# Patient Record
Sex: FEMALE | Race: WHITE | NOT HISPANIC OR LATINO | Employment: UNEMPLOYED | URBAN - METROPOLITAN AREA
[De-identification: names, ages, dates, MRNs, and addresses within clinical notes are randomized per-mention and may not be internally consistent; named-entity substitution may affect disease eponyms.]

---

## 2017-04-27 ENCOUNTER — HOSPITAL ENCOUNTER (EMERGENCY)
Facility: HOSPITAL | Age: 62
Discharge: HOME | End: 2017-04-27
Attending: EMERGENCY MEDICINE

## 2017-04-27 VITALS
RESPIRATION RATE: 24 BRPM | OXYGEN SATURATION: 97 % | BODY MASS INDEX: 35.93 KG/M2 | HEART RATE: 76 BPM | SYSTOLIC BLOOD PRESSURE: 148 MMHG | HEIGHT: 60 IN | TEMPERATURE: 98 F | DIASTOLIC BLOOD PRESSURE: 73 MMHG | WEIGHT: 183 LBS

## 2017-04-27 DIAGNOSIS — J44.1 COPD EXACERBATION: Primary | ICD-10-CM

## 2017-04-27 DIAGNOSIS — R05.9 COUGH: ICD-10-CM

## 2017-04-27 PROCEDURE — 99284 EMERGENCY DEPT VISIT MOD MDM: CPT | Mod: 25

## 2017-04-27 PROCEDURE — 63600175 PHARM REV CODE 636 W HCPCS: Performed by: PHYSICIAN ASSISTANT

## 2017-04-27 PROCEDURE — 25000242 PHARM REV CODE 250 ALT 637 W/ HCPCS: Performed by: PHYSICIAN ASSISTANT

## 2017-04-27 PROCEDURE — 96372 THER/PROPH/DIAG INJ SC/IM: CPT

## 2017-04-27 PROCEDURE — 94640 AIRWAY INHALATION TREATMENT: CPT

## 2017-04-27 RX ORDER — IPRATROPIUM BROMIDE AND ALBUTEROL SULFATE 2.5; .5 MG/3ML; MG/3ML
3 SOLUTION RESPIRATORY (INHALATION)
Status: COMPLETED | OUTPATIENT
Start: 2017-04-27 | End: 2017-04-27

## 2017-04-27 RX ORDER — BENZONATATE 100 MG/1
100 CAPSULE ORAL 3 TIMES DAILY PRN
Qty: 20 CAPSULE | Refills: 0 | Status: SHIPPED | OUTPATIENT
Start: 2017-04-27 | End: 2017-05-07

## 2017-04-27 RX ORDER — ALBUTEROL SULFATE 90 UG/1
1-2 AEROSOL, METERED RESPIRATORY (INHALATION) EVERY 6 HOURS PRN
Qty: 1 INHALER | Refills: 0 | Status: SHIPPED | OUTPATIENT
Start: 2017-04-27 | End: 2018-07-09

## 2017-04-27 RX ORDER — BETAMETHASONE SODIUM PHOSPHATE AND BETAMETHASONE ACETATE 3; 3 MG/ML; MG/ML
6 INJECTION, SUSPENSION INTRA-ARTICULAR; INTRALESIONAL; INTRAMUSCULAR; SOFT TISSUE
Status: COMPLETED | OUTPATIENT
Start: 2017-04-27 | End: 2017-04-27

## 2017-04-27 RX ORDER — METHYLPREDNISOLONE 4 MG/1
TABLET ORAL
Qty: 1 PACKAGE | Refills: 0 | Status: SHIPPED | OUTPATIENT
Start: 2017-04-27 | End: 2017-05-18

## 2017-04-27 RX ORDER — CIPROFLOXACIN 500 MG/1
500 TABLET ORAL
COMMUNITY
End: 2018-01-30

## 2017-04-27 RX ADMIN — IPRATROPIUM BROMIDE AND ALBUTEROL SULFATE 3 ML: .5; 3 SOLUTION RESPIRATORY (INHALATION) at 11:04

## 2017-04-27 RX ADMIN — BETAMETHASONE SODIUM PHOSPHATE AND BETAMETHASONE ACETATE 6 MG: 3; 3 INJECTION, SUSPENSION INTRA-ARTICULAR; INTRALESIONAL; INTRAMUSCULAR at 12:04

## 2017-04-27 NOTE — ED NOTES
Tolerated IM injection well Given written and verbal DC instructions questions answered per MD aware to follow up with PCP encouraged to return if needed. Given RX with teaching.

## 2017-04-27 NOTE — ED PROVIDER NOTES
Encounter Date: 4/27/2017       History     Chief Complaint   Patient presents with    Cough     Onset of cough productive of clear sputum 4 days ago.  Hx of chronic bronchitis.       Review of patient's allergies indicates:   Allergen Reactions    Pcn [penicillins]      HPI Comments: Patient is a 61 year old female with complaint of cough for four days. She reports PMH significant for COPD and bronchitis. She reports productive cough with clear sputum. Associated rhinorrhea and sneezing. She states she has used her nebulizer and taken three days of cipro with no significant improvement. She reports smoking 1 pack of cigarettes a day. She denied fever, chills, nausea, vomiting, chest pain, shortness of breath or recent sick contact.     The history is provided by the patient.     Past Medical History:   Diagnosis Date    COPD (chronic obstructive pulmonary disease)      Past Surgical History:   Procedure Laterality Date    CHOLECYSTECTOMY      HYSTERECTOMY       No family history on file.  Social History   Substance Use Topics    Smoking status: Current Every Day Smoker     Packs/day: 1.00    Smokeless tobacco: Not on file    Alcohol use Not on file     Review of Systems   Constitutional: Negative for chills and fever.   HENT: Positive for congestion and rhinorrhea. Negative for sore throat.    Respiratory: Positive for cough and wheezing. Negative for shortness of breath.    Cardiovascular: Negative for chest pain.   Gastrointestinal: Negative for abdominal pain, diarrhea, nausea and vomiting.   Genitourinary: Negative for dysuria.   Musculoskeletal: Negative for back pain, neck pain and neck stiffness.   Skin: Negative for rash.   Neurological: Negative for dizziness, weakness and headaches.   Hematological: Does not bruise/bleed easily.       Physical Exam   Initial Vitals   BP Pulse Resp Temp SpO2   04/27/17 1117 04/27/17 1117 04/27/17 1117 04/27/17 1117 04/27/17 1117   148/73 84 16 97.8 °F (36.6 °C) 97  %     Physical Exam    Nursing note and vitals reviewed.  Constitutional: She appears well-developed and well-nourished. No distress.   HENT:   Head: Normocephalic and atraumatic.   Right Ear: Tympanic membrane, external ear and ear canal normal.   Left Ear: Tympanic membrane, external ear and ear canal normal.   Nose: Rhinorrhea present.   Mouth/Throat: Uvula is midline and oropharynx is clear and moist.   Eyes: Conjunctivae are normal. Pupils are equal, round, and reactive to light. Right eye exhibits no discharge. Left eye exhibits no discharge.   Neck: Normal range of motion. Neck supple.   Cardiovascular: Normal rate, regular rhythm and normal heart sounds. Exam reveals no gallop and no friction rub.    No murmur heard.  Pulmonary/Chest: Effort normal. She has wheezes. She has no rhonchi. She has no rales.   Mild expiratory wheezing bilaterally.    Abdominal: Soft. Bowel sounds are normal. There is no tenderness. There is no guarding.   Musculoskeletal: Normal range of motion.   Neurological: She is alert.   Skin: Skin is warm and dry.         ED Course   Procedures  Labs Reviewed - No data to display          Medical Decision Making:   History:   Old Medical Records: I decided to obtain old medical records.  Clinical Tests:   Radiological Study: Ordered       APC / Resident Notes:   This is an emergent evaluation of a 61 year old female with complaint of cough for four days. She denied fever. She reports clear mucous. She has been using nebulizer treatments with mild improvement. She is well appearing and vital signs are stable. Oxygen sats stable. She has no increased work of breathing or tachypnea. She has mild expiratory wheezing. She was given a duo neb treatment with improvement. CXR is negative. She has no indication for antibiotics. Will treat with steroids. Smoking cessation discussed with patient in detail. Discussed results with patient. Return precautions given. Patient is to follow up with their  primary care provider. Case was discussed with Dr. Hines who is in agreement with the plan of care. All questions answered.                 ED Course     Clinical Impression:   The primary encounter diagnosis was COPD exacerbation. A diagnosis of Cough was also pertinent to this visit.          Geovanna Robles PA-C  04/27/17 142

## 2017-04-27 NOTE — ED AVS SNAPSHOT
[facility] [address] [place] [address]               Tessa Enriquez   2017 11:22 AM   ED    Description:  Female : 1955   Department:  [facility]           Your Care was Coordinated By:     Provider Role From To    Lance Hines MD Attending Provider 17 1123 --    Geovanna Robles PA-C Physician Assistant 17 1123 --      Reason for Visit     Cough           Diagnoses this Visit        Comments    COPD exacerbation    -  Primary     Cough           ED Disposition     ED Disposition Condition Comment    Discharge             To Do List           Follow-up Information     Please follow up.    Why:  Call the [facility] at [phone] to establish care with a primary care provider         Follow up with [facility].    Specialty:  Emergency Medicine    Why:  As needed    Contact information:    [address] [place] [id number] [phone]       These Medications        Disp Refills Start End    methylPREDNISolone (MEDROL DOSEPACK) 4 mg tablet 1 Package 0 2017    Take as prescribed    albuterol 90 mcg/actuation inhaler 1 Inhaler 0 2017    Inhale 1-2 puffs into the lungs every 6 (six) hours as needed for Wheezing. Rescue - Inhalation    benzonatate (TESSALON) 100 MG capsule 20 capsule 0 2017    Take 1 capsule (100 mg total) by mouth 3 (three) times daily as needed for Cough. - Oral      [facility]     [facility] Nurse Care Line - 24/ Assistance  Unless otherwise directed by your provider, please contact [facility], our nurse care line that is available for  assistance.     Registered nurses in the [facility] provide: appointment scheduling, clinical advisement, health education, and other advisory services.  Call: [phone] (toll free)               Medications            Message regarding Medications     Verify the changes and/or additions to your medication regime listed below are the same as discussed with your clinician today.  If any of these changes or additions are incorrect, please notify your healthcare provider.        START taking these NEW medications        Refills    methylPREDNISolone (MEDROL DOSEPACK) 4 mg tablet 0    Sig: Take as prescribed    Class: Print    albuterol 90 mcg/actuation inhaler 0    Sig: Inhale 1-2 puffs into the lungs every 6 (six) hours as needed for Wheezing. Rescue    Class: Print    Route: Inhalation    benzonatate (TESSALON) 100 MG capsule 0    Sig: Take 1 capsule (100 mg total) by mouth 3 (three) times daily as needed for Cough.    Class: Print    Route: Oral      These medications were administered today        Dose Freq    albuterol-ipratropium 2.5mg-0.5mg/3mL nebulizer solution 3 mL 3 mL ED 1 Time    Sig: Take 3 mLs by nebulization ED 1 Time.    Class: Normal    Route: Nebulization    betamethasone acetate-betamethasone sodium phosphate injection 6 mg 6 mg ED 1 Time    Sig: Inject 1 mL (6 mg total) into the muscle ED 1 Time.    Class: Normal    Route: Intramuscular           Verify that the below list of medications is an accurate representation of the medications you are currently taking.  If none reported, the list may be blank. If incorrect, please contact your healthcare provider. Carry this list with you in case of emergency.           Current Medications     albuterol-ipratropium 2.5mg-0.5mg/3mL (DUONEB) 0.5 mg-3 mg(2.5 mg base)/3 mL nebulizer solution Take 3 mLs by nebulization every 6 (six) hours as needed for Wheezing.    ciprofloxacin HCl (CIPRO) 500 MG tablet Take 500 mg by mouth every 12 (twelve) hours.    albuterol 90 mcg/actuation inhaler Inhale 1-2 puffs into the lungs every 6 (six) hours as needed for Wheezing. Rescue    albuterol-ipratropium 2.5mg-0.5mg/3mL nebulizer solution 3 mL Take 3 mLs by nebulization ED 1 Time.     benzonatate (TESSALON) 100 MG capsule Take 1 capsule (100 mg total) by mouth 3 (three) times daily as needed for Cough.    betamethasone acetate-betamethasone sodium phosphate injection 6 mg Inject 1 mL (6 mg total) into the muscle ED 1 Time.    methylPREDNISolone (MEDROL DOSEPACK) 4 mg tablet Take as prescribed           Clinical Reference Information           Your Vitals Were     BP Pulse Temp Resp Height Weight    148/73 (BP Location: Left arm, Patient Position: Sitting) 76 97.8 °F (36.6 °C) (Oral) 24 5' (1.524 m) 83 kg (183 lb)    SpO2 BMI             97% 35.74 kg/m2         Allergies as of 4/27/2017        Reactions    Pcn [Penicillins]       Immunizations Administered on Date of Encounter - 4/27/2017     None      ED Micro, Lab, POCT     None      ED Imaging Orders     Start Ordered       Status Ordering Provider    04/27/17 1131 04/27/17 1130  X-Ray Chest PA And Lateral  1 time imaging      Final result         Discharge Instructions       Stop smoking!  Take medications as prescribed.  Establish care with a primary care provider.  For worsening symptoms, chest pain, shortness of breath, increased abdominal pain, high grade fever, stroke or stroke like symptoms, immediately go to the nearest Emergency Room or call 911 as soon as possible.       Discharge References/Attachments     COPD FLARE (ENGLISH)      [facility] Sign-Up     Activating your [facility] account is as easy as 1-2-3!     1) Visit [email or web address], select Sign Up Now, enter this activation code and your date of birth, then select Next.  [email or web address]  Expires: 6/11/2017 12:03 PM      2) Create a username and password to use when you visit [facility] in the future and select a security question in case you lose your password and select Next.    3) Enter your e-mail address and click Sign Up!    Additional Information  If you have questions, please e-mail [email or web address] or call [phone] to talk to our [facility] staff. Remember,  [facility] is NOT to be used for urgent needs. For medical emergencies, dial 911.         Smoking Cessation     If you would like to quit smoking:   You may be eligible for free services if you are a [place] resident and started smoking cigarettes before September 1, 1988.  Call the Smoking Cessation Trust (SCT) toll free at (646) 884-8407 or (123) 618-5975.   Call 1-800-QUIT-NOW if you do not meet the above criteria.   Contact us via email: [email or web address]   View our website for more information: [email or web address]         [facility] complies with applicable Federal civil rights laws and does not discriminate on the basis of race, color, national origin, age, disability, or sex.        Language Assistance Services     ATTENTION: Language assistance services are available, free of charge. Please call [phone].      ATENCIÓN: Si habla español, tiene a connelly disposición servicios gratuitos de asistencia lingüística. Llame al [phone].     CHÚ Ý: N?u b?n nói Ti?ng Vi?t, có các d?ch v? h? tr? ngôn ng? mi?n phí dành cho b?n. G?i s? [phone].

## 2017-04-27 NOTE — ED NOTES
C/o cough and some wheezing when she breathes, states that she has used her home nebulizer last night and is taking cipro which she had at home for the past 3 days but does not feel that she is getting better. C/o cough with sneezing and post nasal drip that makes her cough. Alert calm even non labored respirations with moist cough. Smokes 1 PPD. Aware to notify nurse of needs or concerns.

## 2017-04-27 NOTE — DISCHARGE INSTRUCTIONS
Stop smoking!  Take medications as prescribed.  Establish care with a primary care provider.  For worsening symptoms, chest pain, shortness of breath, increased abdominal pain, high grade fever, stroke or stroke like symptoms, immediately go to the nearest Emergency Room or call 911 as soon as possible.

## 2017-08-30 ENCOUNTER — HOSPITAL ENCOUNTER (EMERGENCY)
Facility: HOSPITAL | Age: 62
Discharge: HOME | End: 2017-08-30
Attending: EMERGENCY MEDICINE

## 2017-08-30 VITALS
BODY MASS INDEX: 31.41 KG/M2 | TEMPERATURE: 97 F | WEIGHT: 160 LBS | HEIGHT: 60 IN | HEART RATE: 90 BPM | SYSTOLIC BLOOD PRESSURE: 149 MMHG | RESPIRATION RATE: 20 BRPM | DIASTOLIC BLOOD PRESSURE: 77 MMHG | OXYGEN SATURATION: 96 %

## 2017-08-30 DIAGNOSIS — S20.211A RIB CONTUSION, RIGHT, INITIAL ENCOUNTER: Primary | ICD-10-CM

## 2017-08-30 DIAGNOSIS — R07.81 RIB PAIN ON RIGHT SIDE: ICD-10-CM

## 2017-08-30 PROCEDURE — 99283 EMERGENCY DEPT VISIT LOW MDM: CPT | Mod: 25

## 2017-08-30 PROCEDURE — 94799 UNLISTED PULMONARY SVC/PX: CPT

## 2017-08-30 PROCEDURE — 25000003 PHARM REV CODE 250: Performed by: NURSE PRACTITIONER

## 2017-08-30 RX ORDER — ACETAMINOPHEN 500 MG
1000 TABLET ORAL
Status: COMPLETED | OUTPATIENT
Start: 2017-08-30 | End: 2017-08-30

## 2017-08-30 RX ORDER — IBUPROFEN 600 MG/1
600 TABLET ORAL
Status: COMPLETED | OUTPATIENT
Start: 2017-08-30 | End: 2017-08-30

## 2017-08-30 RX ADMIN — IBUPROFEN 600 MG: 600 TABLET, FILM COATED ORAL at 03:08

## 2017-08-30 RX ADMIN — ACETAMINOPHEN 1000 MG: 500 TABLET, FILM COATED ORAL at 03:08

## 2017-08-30 NOTE — ED PROVIDER NOTES
Encounter Date: 8/30/2017       History     Chief Complaint   Patient presents with    Rib Injury     Fell getting out of bath tub - right sided pain      Tessa Enriquez is a 61 year old female with pmh COPD presenting to the ED with c/o right lateral chest wall pain. The patient states that she was attempting to get out of the bath tub yesterday and that her arms slipped, causing her to hit the right side of her chest on the side of the tub. She has had no shortness of breath and has taken no medications for pain.           Review of patient's allergies indicates:   Allergen Reactions    Pcn [penicillins]      Past Medical History:   Diagnosis Date    COPD (chronic obstructive pulmonary disease)      Past Surgical History:   Procedure Laterality Date    CHOLECYSTECTOMY      HYSTERECTOMY       No family history on file.  Social History   Substance Use Topics    Smoking status: Current Every Day Smoker     Packs/day: 1.00    Smokeless tobacco: Not on file    Alcohol use Not on file     Review of Systems   Constitutional: Negative.    HENT: Negative.    Respiratory: Negative.  Negative for shortness of breath.    Cardiovascular: Positive for chest pain (right chest wall).   Gastrointestinal: Negative.    Genitourinary: Negative.    Musculoskeletal: Negative.    Skin: Negative.    Neurological: Negative.        Physical Exam     Initial Vitals [08/30/17 1431]   BP Pulse Resp Temp SpO2   (!) 149/77 90 20 97.1 °F (36.2 °C) 96 %      MAP       101         Physical Exam    Nursing note and vitals reviewed.  Constitutional: She appears well-developed and well-nourished. She is not diaphoretic. No distress.   HENT:   Head: Normocephalic and atraumatic.   Eyes: Conjunctivae are normal.   Neck: Normal range of motion.   Cardiovascular: Normal rate and regular rhythm.   Pulmonary/Chest: Breath sounds normal. She exhibits tenderness. She exhibits no crepitus and no deformity.       No chest ecchymosis   Musculoskeletal:  Normal range of motion.   Neurological: She is alert and oriented to person, place, and time.   Skin: Skin is warm and dry. Capillary refill takes less than 2 seconds.   Psychiatric: She has a normal mood and affect.         ED Course   Procedures  Labs Reviewed - No data to display                APC / Resident Notes:   Tessa Enriquez is a 61 year old female presenting to the ED with right chest wall tenderness after a fall. The patient had no chest ecchymosis or crepitus. Xrays show no fractures or pneumothorax and she had no shortness of breath. She was provided with an incentive spirometer and instructed on use by respiratory therapist. I discussed specific return precautions and the patient verbalized understanding. She was instructed to use her 800 mg Ibuprofen BID as needed for pain. Based on my clinical evaluation, I do not appreciate any immediate, emergent, or life threatening condition or etiology that warrants additional workup today and feel that the patient can be discharged with close follow up care.                 ED Course     Clinical Impression:   The primary encounter diagnosis was Rib contusion, right, initial encounter. A diagnosis of Rib pain on right side was also pertinent to this visit.    Disposition:   Disposition: Discharged  Condition: Stable                        Debbi Valencia NP  08/30/17 4601

## 2017-08-30 NOTE — ED NOTES
Pt reports slipping while getting out of bath tub and falling against side of injuring her R ribs. Denies any other injuries or hitting head.

## 2018-01-30 ENCOUNTER — HOSPITAL ENCOUNTER (EMERGENCY)
Facility: HOSPITAL | Age: 63
Discharge: HOME | End: 2018-01-30
Attending: EMERGENCY MEDICINE
Payer: COMMERCIAL

## 2018-01-30 VITALS
TEMPERATURE: 98 F | SYSTOLIC BLOOD PRESSURE: 140 MMHG | HEART RATE: 91 BPM | DIASTOLIC BLOOD PRESSURE: 80 MMHG | WEIGHT: 160 LBS | OXYGEN SATURATION: 97 % | RESPIRATION RATE: 16 BRPM | BODY MASS INDEX: 31.25 KG/M2

## 2018-01-30 DIAGNOSIS — S61.212A LACERATION OF RIGHT MIDDLE FINGER WITHOUT FOREIGN BODY WITHOUT DAMAGE TO NAIL, INITIAL ENCOUNTER: Primary | ICD-10-CM

## 2018-01-30 DIAGNOSIS — S60.031A CONTUSION OF RIGHT MIDDLE FINGER WITHOUT DAMAGE TO NAIL, INITIAL ENCOUNTER: ICD-10-CM

## 2018-01-30 PROCEDURE — 99284 EMERGENCY DEPT VISIT MOD MDM: CPT

## 2018-01-30 PROCEDURE — 25000003 PHARM REV CODE 250: Performed by: PHYSICIAN ASSISTANT

## 2018-01-30 RX ORDER — CEPHALEXIN 500 MG/1
500 CAPSULE ORAL 4 TIMES DAILY
Qty: 20 CAPSULE | Refills: 0 | Status: SHIPPED | OUTPATIENT
Start: 2018-01-30 | End: 2018-02-04

## 2018-01-30 RX ORDER — IBUPROFEN 600 MG/1
600 TABLET ORAL EVERY 6 HOURS PRN
Qty: 20 TABLET | Refills: 0 | Status: SHIPPED | OUTPATIENT
Start: 2018-01-30 | End: 2018-07-09

## 2018-01-30 RX ORDER — HYDROCODONE BITARTRATE AND ACETAMINOPHEN 5; 325 MG/1; MG/1
1 TABLET ORAL 4 TIMES DAILY PRN
Qty: 8 TABLET | Refills: 0 | Status: SHIPPED | OUTPATIENT
Start: 2018-01-30 | End: 2018-07-09

## 2018-01-30 RX ORDER — IBUPROFEN 600 MG/1
600 TABLET ORAL
Status: COMPLETED | OUTPATIENT
Start: 2018-01-30 | End: 2018-01-30

## 2018-01-30 RX ADMIN — BACITRACIN ZINC, NEOMYCIN SULFATE, POLYMYXIN B SULFATE 1 EACH: 3.5; 5000; 4 OINTMENT TOPICAL at 02:01

## 2018-01-30 RX ADMIN — IBUPROFEN 600 MG: 600 TABLET ORAL at 02:01

## 2018-01-30 NOTE — ED PROVIDER NOTES
"Encounter Date: 1/30/2018    SCRIBE #1 NOTE: I, Dulce Do, am scribing for, and in the presence of,  PATRICE Foster. I have scribed the entire note.       History     Chief Complaint   Patient presents with    Laceration     right middle finger, slammed finger into car door on yesterday     01/30/2018 1:56 PM     Chief complaint: Right middle finger laceration      Tessa Enriquez is a 62 y.o. female who presents to the ED with concern for right middle finger laceration with associated "throbbing" pain after the patient slammed her finger into a car door yesterday "around 2 PM." She states that she has had a Tetanus shot in the last 5 years. Patient took Tylenol earlier today with mild relief. There are no exacerbating factors for her symptoms. No pertinent PMHx noted. Her PSHx includes a hysterectomy.       The history is provided by the patient.     Review of patient's allergies indicates:   Allergen Reactions    Pcn [penicillins]      Past Medical History:   Diagnosis Date    COPD (chronic obstructive pulmonary disease)      Past Surgical History:   Procedure Laterality Date    CHOLECYSTECTOMY      HYSTERECTOMY       History reviewed. No pertinent family history.  Social History   Substance Use Topics    Smoking status: Current Every Day Smoker     Packs/day: 1.00    Smokeless tobacco: Not on file    Alcohol use Not on file     Review of Systems   Constitutional: Negative for chills and fever.   HENT: Negative for facial swelling and trouble swallowing.    Eyes: Negative for discharge.   Respiratory: Negative for cough, chest tightness, shortness of breath and wheezing.    Cardiovascular: Negative for chest pain and palpitations.   Gastrointestinal: Negative for abdominal pain, diarrhea, nausea and vomiting.   Genitourinary: Negative for dysuria and hematuria.   Musculoskeletal: Negative for arthralgias, back pain, joint swelling, myalgias, neck pain and neck stiffness.   Skin: Positive " for wound (right middle finger laceration with associated pain). Negative for color change, pallor and rash.   Neurological: Negative for dizziness, syncope, weakness, light-headedness, numbness and headaches.   Hematological: Does not bruise/bleed easily.   Psychiatric/Behavioral: The patient is not nervous/anxious.        Physical Exam     Initial Vitals [01/30/18 1352]   BP Pulse Resp Temp SpO2   (!) 140/80 91 16 97.9 °F (36.6 °C) 97 %      MAP       100         Physical Exam    Nursing note and vitals reviewed.  Constitutional: She appears well-developed and well-nourished. She is not diaphoretic. No distress.   HENT:   Head: Normocephalic and atraumatic.   Cardiovascular: Normal rate, regular rhythm, normal heart sounds and intact distal pulses.   No murmur heard.  Pulmonary/Chest: Breath sounds normal. No respiratory distress. She has no wheezes. She has no rhonchi. She has no rales.   Musculoskeletal: Normal range of motion. She exhibits tenderness.        Right hand: She exhibits laceration and swelling.   1 cm healing laceration to flexor aspect of right third finger between DIP and PIP joint with associated swelling and ecchymosis.    Neurological: She is alert and oriented to person, place, and time. She has normal strength. No sensory deficit.   Skin: Skin is warm and dry. No rash and no abscess noted. No erythema.   Psychiatric: She has a normal mood and affect.         ED Course   Procedures  Labs Reviewed - No data to display     Imaging Results          X-Ray Hand 3 view Right (Final result)  Result time 01/30/18 14:35:42    Final result by Ramesh Bar Jr., MD (01/30/18 14:35:42)                 Impression:     No acute fractures identified. Mild narrowing of the radiocarpal joint and lucent defects in the proximal phalanx of the index and middle finger may be due to early DJD.      Electronically signed by: Ramesh Bar MD  Date:     01/30/18  Time:    14:35              Narrative:    3  views of right hand are provided. An acute fracture is not seen. A 4 mm cystic lesion in the proximal end of the proximal phalanx of the index finger may represent subchondral cyst formation. Similar hypodensity is noted at the proximal end of the proximal phalanx of middle finger. Or prominent bone erosion or osteoporosis is not seen. Sclerosis is not seen. There is mild narrowing of the radiocarpal noted.                                 Medical Decision Making:   History:   Old Medical Records: I decided to obtain old medical records.  Differential Diagnosis:   Fracture  Dislocation  Sprain  Contusion  Strain  Spasm      Clinical Tests:   Radiological Study: Reviewed and Ordered       APC / Resident Notes:   X-rays of the hand show no acute abnormalities, fractures or dislocations.  Her tetanus is up-to-date.  Her wound is approximately 24 hours old and no laceration repair is indicated at this time.  It is well cleansed and dressed with antibiotic ointment.  She is covered with Keflex.  She is discharged home to follow-up with her primary care provider for reevaluation in the next couple of days as needed.  She voices understanding and is agreeable to the plan.  She is given specific return precautions.  Any biotics are given, because she works in the [employer] at [employer] and is concerned about exposure to [employer] meats and dirty dishwater.       Scribe Attestation:   Scribe #1: I performed the above scribed service and the documentation accurately describes the services I performed. I attest to the accuracy of the note.    I, Katt Garland PA-C, personally performed the services described in this documentation. All medical record entries made by the scribe were at my direction and in my presence.  I have reviewed the chart and agree that the record reflects my personal performance and is accurate and complete. Katt Garland PA-C.  7:15 PM 01/31/2018          ED Course      Clinical Impression:     1.  Laceration of right middle finger without foreign body without damage to nail, initial encounter    2. Contusion of right middle finger without damage to nail, initial encounter          Disposition:   Disposition: Discharged  Condition: Stable                        Katt Garland PA-C  01/31/18 1915

## 2018-05-07 ENCOUNTER — HOSPITAL ENCOUNTER (EMERGENCY)
Facility: HOSPITAL | Age: 63
Discharge: HOME | End: 2018-05-07
Attending: EMERGENCY MEDICINE
Payer: COMMERCIAL

## 2018-05-07 VITALS
OXYGEN SATURATION: 98 % | HEIGHT: 61 IN | DIASTOLIC BLOOD PRESSURE: 75 MMHG | BODY MASS INDEX: 30.21 KG/M2 | WEIGHT: 160 LBS | SYSTOLIC BLOOD PRESSURE: 165 MMHG | TEMPERATURE: 98 F | HEART RATE: 85 BPM | RESPIRATION RATE: 20 BRPM

## 2018-05-07 DIAGNOSIS — R06.02 SHORTNESS OF BREATH: ICD-10-CM

## 2018-05-07 DIAGNOSIS — R51.9 SINUS HEADACHE: ICD-10-CM

## 2018-05-07 DIAGNOSIS — J40 BRONCHITIS: Primary | ICD-10-CM

## 2018-05-07 PROCEDURE — 93010 ELECTROCARDIOGRAM REPORT: CPT | Mod: ,,, | Performed by: INTERNAL MEDICINE

## 2018-05-07 PROCEDURE — 93005 ELECTROCARDIOGRAM TRACING: CPT

## 2018-05-07 PROCEDURE — 99284 EMERGENCY DEPT VISIT MOD MDM: CPT | Mod: 25

## 2018-05-07 RX ORDER — AZITHROMYCIN 250 MG/1
250 TABLET, FILM COATED ORAL DAILY
Qty: 6 TABLET | Refills: 0 | Status: SHIPPED | OUTPATIENT
Start: 2018-05-07 | End: 2018-07-09

## 2018-05-07 RX ORDER — METHYLPREDNISOLONE 4 MG/1
TABLET ORAL
Qty: 1 PACKAGE | Refills: 0 | Status: SHIPPED | OUTPATIENT
Start: 2018-05-07 | End: 2018-05-28

## 2018-05-07 NOTE — ED PROVIDER NOTES
"Encounter Date: 5/7/2018    SCRIBE #1 NOTE: I, Pamela Restrepo, am scribing for, and in the presence of, Dr. Arriaza.       History     Chief Complaint   Patient presents with    URI     c/o sore throat & Lt ear pain since yesterday    Shortness of Breath     Hx of COPD - "felt like increased humidity's giving me a flare-up" (reports had @ resp tx @ home PTA) - continues to smoke       05/07/2018 12:56 PM     Chief complaint: Shortness of breath      Tessa Enriquez is a 62 y.o. female with COPD who presents to the ED with an onset of intermittent SOB with associated nasal congestion, left ear pain, and worsening cough. The SOB is exacerbated with coughing spell. She reports prior similar symptoms to her bronchitis flare-ups. The patient states that her cough is worse than baseline, which is how she knows she is having a flare-up, and has been receiving breathing treatments PRN. She is a daily cigarette smoker. The patient denies recent antibiotics or steroids, fevers, or any other symptoms at this time. She has a SHx including a cholecystectomy and a hysterectomy.       The history is provided by the patient.     Review of patient's allergies indicates:   Allergen Reactions    Pcn [penicillins]      Past Medical History:   Diagnosis Date    COPD (chronic obstructive pulmonary disease)      Past Surgical History:   Procedure Laterality Date    CHOLECYSTECTOMY      HYSTERECTOMY       History reviewed. No pertinent family history.  Social History   Substance Use Topics    Smoking status: Current Every Day Smoker     Packs/day: 1.00    Smokeless tobacco: Not on file    Alcohol use Not on file     Review of Systems   Constitutional: Negative for fever.   HENT: Positive for congestion (nasal) and ear pain (left).    Respiratory: Positive for cough and shortness of breath.    All other systems reviewed and are negative.    Physical Exam     Initial Vitals [05/07/18 1203]   BP Pulse Resp Temp SpO2   (!) 165/75 85 20 " 98.1 °F (36.7 °C) 98 %      MAP       105         Physical Exam    Nursing note and vitals reviewed.  Constitutional: She appears well-developed and well-nourished.  Non-toxic appearance. No distress.   HENT:   Head: Normocephalic and atraumatic.   Right Ear: Tympanic membrane normal.   Left Ear: Tympanic membrane normal.   Mucous membranes are moist. Nasal congestion. cobblestoning at the back of throat. No facial or maxillary tenderness.    Eyes: EOM are normal. Pupils are equal, round, and reactive to light.   Neck: Normal range of motion. Neck supple. No neck rigidity. No JVD present.   Cardiovascular: Normal rate, regular rhythm, normal heart sounds and intact distal pulses. Exam reveals no gallop and no friction rub.    No murmur heard.  Pulmonary/Chest: She has wheezes. She has no rhonchi. She has no rales.   Good air movement with faint expiratory wheezing.    Abdominal: Soft. Bowel sounds are normal. She exhibits no distension. There is no tenderness. There is no rigidity, no rebound and no guarding.   Musculoskeletal: Normal range of motion.   Neurological: She is alert and oriented to person, place, and time. She has normal strength and normal reflexes. No cranial nerve deficit or sensory deficit. She exhibits normal muscle tone. Coordination normal. GCS eye subscore is 4. GCS verbal subscore is 5. GCS motor subscore is 6.   Skin: Skin is warm and dry.   Psychiatric: She has a normal mood and affect. Her speech is normal and behavior is normal. She is not actively hallucinating.       ED Course   Procedures  Labs Reviewed - No data to display     Imaging Results          X-Ray Chest PA And Lateral (Final result)  Result time 05/07/18 14:13:56    Final result by Karlie Ahuja MD (05/07/18 14:13:56)                 Impression:      No acute radiographic findings in the chest.      Electronically signed by: Karlie Ahuja MD  Date:    05/07/2018  Time:    14:13             Narrative:     EXAMINATION:  XR CHEST PA AND LATERAL    CLINICAL HISTORY:  cough;    TECHNIQUE:  PA and lateral views of the chest were performed.    COMPARISON:  08/30/2017    FINDINGS:  Cardiac size is normal.  Flattening of the diaphragm is noted.  No alveolar opacity, pleural abnormality or pneumothorax is seen.  Large patient body habitus noted.  Atherosclerosis is seen in the aortic arch.                                    Medical Decision Making:   History:   Old Medical Records: I decided to obtain old medical records.  Initial Assessment:   Patient is 62-year-old woman with a history of COPD who presents to the emergency department with complaints of intermittent shortness of breath. She continues to smoke.  She has associated left ear pain, nasal congestion, cough.  She shows no evidence of respiratory distress.  X-ray shows no acute radiographic findings, no evidence of pneumonia or pneumothorax.  She also complains of sinus pressure but has no facial erythema or significant tenderness to palpation. She has mild expiratory wheezing on examination. She will be treated with a course of azithromycin and steroids for acute on chronic bronchitis.  She is discharged improved in no acute distress to follow up with her primary care physician.  Clinical Tests:   Radiological Study: Ordered and Reviewed  Medical Tests: Ordered and Reviewed            Scribe Attestation:   Scribe #1: I performed the above scribed service and the documentation accurately describes the services I performed. I attest to the accuracy of the note.     I, Arsalan Reich, personally performed the services described in this documentation. All medical record entries made by the scribe were at my direction and in my presence.  I have reviewed the chart and agree that the record reflects my personal performance and is accurate and complete. Tomer Arriaza MD.           Clinical Impression:     1. Bronchitis    2. Shortness of breath    3. Sinus  headache          Disposition:   Disposition: Discharged  Condition: Stable                        Tomer Arriaza MD  05/07/18 7883

## 2018-05-07 NOTE — ED NOTES
Presents to ED with c/o SOB onset 1-2 weeks ago. + productive cough also. Pt denies chest pain. Does c/o pain to her left ear. Described as tender. Pt states she believes it is her sinuses and that she needs a Z-pack. Pt in NAD. VSS. Pt denies needs. Awaiting MD evaluation. Will monitor prn.

## 2018-07-09 ENCOUNTER — HOSPITAL ENCOUNTER (INPATIENT)
Facility: HOSPITAL | Age: 63
LOS: 1 days | Discharge: HOME | DRG: 190 | End: 2018-07-10
Attending: EMERGENCY MEDICINE | Admitting: INTERNAL MEDICINE
Payer: COMMERCIAL

## 2018-07-09 DIAGNOSIS — J44.9 CHRONIC OBSTRUCTIVE PULMONARY DISEASE WITH HYPOXIA: Primary | ICD-10-CM

## 2018-07-09 PROBLEM — F17.200 SMOKING ADDICTION: Status: ACTIVE | Noted: 2018-07-09

## 2018-07-09 PROBLEM — J96.01 ACUTE HYPOXEMIC RESPIRATORY FAILURE: Status: ACTIVE | Noted: 2018-07-09

## 2018-07-09 LAB
ALBUMIN SERPL BCP-MCNC: 3.8 G/DL
ALP SERPL-CCNC: 84 U/L
ALT SERPL W/O P-5'-P-CCNC: 12 U/L
ANION GAP SERPL CALC-SCNC: 10 MMOL/L
ANION GAP SERPL CALC-SCNC: 12 MMOL/L
AST SERPL-CCNC: 12 U/L
BASOPHILS # BLD AUTO: 0 K/UL
BASOPHILS # BLD AUTO: 0 K/UL
BASOPHILS NFR BLD: 0 %
BASOPHILS NFR BLD: 0.2 %
BILIRUB SERPL-MCNC: 0.6 MG/DL
BUN SERPL-MCNC: 10 MG/DL
BUN SERPL-MCNC: 9 MG/DL
CALCIUM SERPL-MCNC: 9.3 MG/DL
CALCIUM SERPL-MCNC: 9.3 MG/DL
CHLORIDE SERPL-SCNC: 103 MMOL/L
CHLORIDE SERPL-SCNC: 105 MMOL/L
CO2 SERPL-SCNC: 19 MMOL/L
CO2 SERPL-SCNC: 21 MMOL/L
CREAT SERPL-MCNC: 0.7 MG/DL
CREAT SERPL-MCNC: 0.9 MG/DL
DIFFERENTIAL METHOD: ABNORMAL
DIFFERENTIAL METHOD: ABNORMAL
EOSINOPHIL # BLD AUTO: 0 K/UL
EOSINOPHIL # BLD AUTO: 0 K/UL
EOSINOPHIL NFR BLD: 0 %
EOSINOPHIL NFR BLD: 0.2 %
ERYTHROCYTE [DISTWIDTH] IN BLOOD BY AUTOMATED COUNT: 13.3 %
ERYTHROCYTE [DISTWIDTH] IN BLOOD BY AUTOMATED COUNT: 13.4 %
EST. GFR  (AFRICAN AMERICAN): >60 ML/MIN/1.73 M^2
EST. GFR  (AFRICAN AMERICAN): >60 ML/MIN/1.73 M^2
EST. GFR  (NON AFRICAN AMERICAN): >60 ML/MIN/1.73 M^2
EST. GFR  (NON AFRICAN AMERICAN): >60 ML/MIN/1.73 M^2
GLUCOSE SERPL-MCNC: 103 MG/DL
GLUCOSE SERPL-MCNC: 226 MG/DL
HCT VFR BLD AUTO: 36.9 %
HCT VFR BLD AUTO: 39.7 %
HGB BLD-MCNC: 12.4 G/DL
HGB BLD-MCNC: 13.3 G/DL
LYMPHOCYTES # BLD AUTO: 0.3 K/UL
LYMPHOCYTES # BLD AUTO: 1.5 K/UL
LYMPHOCYTES NFR BLD: 18.6 %
LYMPHOCYTES NFR BLD: 4.4 %
MCH RBC QN AUTO: 29.5 PG
MCH RBC QN AUTO: 29.8 PG
MCHC RBC AUTO-ENTMCNC: 33.5 G/DL
MCHC RBC AUTO-ENTMCNC: 33.5 G/DL
MCV RBC AUTO: 88 FL
MCV RBC AUTO: 89 FL
MONOCYTES # BLD AUTO: 0 K/UL
MONOCYTES # BLD AUTO: 0.3 K/UL
MONOCYTES NFR BLD: 0.6 %
MONOCYTES NFR BLD: 4.2 %
NEUTROPHILS # BLD AUTO: 6 K/UL
NEUTROPHILS # BLD AUTO: 6.3 K/UL
NEUTROPHILS NFR BLD: 76.8 %
NEUTROPHILS NFR BLD: 95 %
PLATELET # BLD AUTO: 198 K/UL
PLATELET # BLD AUTO: 212 K/UL
PMV BLD AUTO: 9.7 FL
PMV BLD AUTO: 9.9 FL
POTASSIUM SERPL-SCNC: 3.3 MMOL/L
POTASSIUM SERPL-SCNC: 3.8 MMOL/L
PROT SERPL-MCNC: 7.4 G/DL
RBC # BLD AUTO: 4.15 M/UL
RBC # BLD AUTO: 4.5 M/UL
SODIUM SERPL-SCNC: 134 MMOL/L
SODIUM SERPL-SCNC: 136 MMOL/L
WBC # BLD AUTO: 6.4 K/UL
WBC # BLD AUTO: 8.2 K/UL

## 2018-07-09 PROCEDURE — 63600175 PHARM REV CODE 636 W HCPCS: Performed by: EMERGENCY MEDICINE

## 2018-07-09 PROCEDURE — 96365 THER/PROPH/DIAG IV INF INIT: CPT

## 2018-07-09 PROCEDURE — 12000002 HC ACUTE/MED SURGE SEMI-PRIVATE ROOM

## 2018-07-09 PROCEDURE — 36415 COLL VENOUS BLD VENIPUNCTURE: CPT

## 2018-07-09 PROCEDURE — 80048 BASIC METABOLIC PNL TOTAL CA: CPT

## 2018-07-09 PROCEDURE — 85025 COMPLETE CBC W/AUTO DIFF WBC: CPT

## 2018-07-09 PROCEDURE — 99285 EMERGENCY DEPT VISIT HI MDM: CPT | Mod: 25

## 2018-07-09 PROCEDURE — 25000242 PHARM REV CODE 250 ALT 637 W/ HCPCS: Performed by: EMERGENCY MEDICINE

## 2018-07-09 PROCEDURE — 80053 COMPREHEN METABOLIC PANEL: CPT

## 2018-07-09 PROCEDURE — 25000003 PHARM REV CODE 250: Performed by: NURSE PRACTITIONER

## 2018-07-09 PROCEDURE — 94761 N-INVAS EAR/PLS OXIMETRY MLT: CPT

## 2018-07-09 PROCEDURE — 25000003 PHARM REV CODE 250: Performed by: EMERGENCY MEDICINE

## 2018-07-09 PROCEDURE — 27000221 HC OXYGEN, UP TO 24 HOURS

## 2018-07-09 PROCEDURE — 94640 AIRWAY INHALATION TREATMENT: CPT

## 2018-07-09 PROCEDURE — 25000242 PHARM REV CODE 250 ALT 637 W/ HCPCS: Performed by: INTERNAL MEDICINE

## 2018-07-09 RX ORDER — POTASSIUM CHLORIDE 20 MEQ/1
40 TABLET, EXTENDED RELEASE ORAL
Status: DISCONTINUED | OUTPATIENT
Start: 2018-07-09 | End: 2018-07-10 | Stop reason: HOSPADM

## 2018-07-09 RX ORDER — ACETAMINOPHEN 325 MG/1
650 TABLET ORAL EVERY 6 HOURS PRN
Status: DISCONTINUED | OUTPATIENT
Start: 2018-07-09 | End: 2018-07-10

## 2018-07-09 RX ORDER — PREDNISONE 20 MG/1
60 TABLET ORAL
Status: COMPLETED | OUTPATIENT
Start: 2018-07-09 | End: 2018-07-09

## 2018-07-09 RX ORDER — SODIUM CHLORIDE 0.9 % (FLUSH) 0.9 %
3 SYRINGE (ML) INJECTION
Status: DISCONTINUED | OUTPATIENT
Start: 2018-07-09 | End: 2018-07-10 | Stop reason: HOSPADM

## 2018-07-09 RX ORDER — AMOXICILLIN 250 MG
1 CAPSULE ORAL 2 TIMES DAILY PRN
Status: DISCONTINUED | OUTPATIENT
Start: 2018-07-09 | End: 2018-07-10 | Stop reason: HOSPADM

## 2018-07-09 RX ORDER — POTASSIUM CHLORIDE 20 MEQ/1
40 TABLET, EXTENDED RELEASE ORAL ONCE
Status: DISCONTINUED | OUTPATIENT
Start: 2018-07-09 | End: 2018-07-09

## 2018-07-09 RX ORDER — PANTOPRAZOLE SODIUM 20 MG/1
40 TABLET, DELAYED RELEASE ORAL DAILY
Status: DISCONTINUED | OUTPATIENT
Start: 2018-07-10 | End: 2018-07-10 | Stop reason: HOSPADM

## 2018-07-09 RX ORDER — PANTOPRAZOLE SODIUM 20 MG/1
40 TABLET, DELAYED RELEASE ORAL DAILY
Status: DISCONTINUED | OUTPATIENT
Start: 2018-07-09 | End: 2018-07-09 | Stop reason: SDUPTHER

## 2018-07-09 RX ORDER — IPRATROPIUM BROMIDE AND ALBUTEROL SULFATE 2.5; .5 MG/3ML; MG/3ML
3 SOLUTION RESPIRATORY (INHALATION) EVERY 4 HOURS
Status: DISCONTINUED | OUTPATIENT
Start: 2018-07-09 | End: 2018-07-10 | Stop reason: HOSPADM

## 2018-07-09 RX ORDER — IPRATROPIUM BROMIDE AND ALBUTEROL SULFATE 2.5; .5 MG/3ML; MG/3ML
3 SOLUTION RESPIRATORY (INHALATION) EVERY 4 HOURS
Status: DISCONTINUED | OUTPATIENT
Start: 2018-07-09 | End: 2018-07-09 | Stop reason: SDUPTHER

## 2018-07-09 RX ORDER — IPRATROPIUM BROMIDE AND ALBUTEROL SULFATE 2.5; .5 MG/3ML; MG/3ML
3 SOLUTION RESPIRATORY (INHALATION)
Status: DISPENSED | OUTPATIENT
Start: 2018-07-09 | End: 2018-07-09

## 2018-07-09 RX ORDER — ACETAMINOPHEN 325 MG/1
650 TABLET ORAL EVERY 4 HOURS PRN
Status: DISCONTINUED | OUTPATIENT
Start: 2018-07-09 | End: 2018-07-10

## 2018-07-09 RX ORDER — ONDANSETRON 2 MG/ML
4 INJECTION INTRAMUSCULAR; INTRAVENOUS EVERY 8 HOURS PRN
Status: DISCONTINUED | OUTPATIENT
Start: 2018-07-09 | End: 2018-07-10 | Stop reason: HOSPADM

## 2018-07-09 RX ORDER — ENOXAPARIN SODIUM 100 MG/ML
40 INJECTION SUBCUTANEOUS EVERY 24 HOURS
Status: DISCONTINUED | OUTPATIENT
Start: 2018-07-09 | End: 2018-07-09

## 2018-07-09 RX ORDER — MOXIFLOXACIN HYDROCHLORIDE 400 MG/250ML
400 INJECTION, SOLUTION INTRAVENOUS
Status: COMPLETED | OUTPATIENT
Start: 2018-07-09 | End: 2018-07-09

## 2018-07-09 RX ORDER — ACETAMINOPHEN 500 MG
1000 TABLET ORAL
Status: COMPLETED | OUTPATIENT
Start: 2018-07-09 | End: 2018-07-09

## 2018-07-09 RX ORDER — POLYETHYLENE GLYCOL 3350 17 G/17G
17 POWDER, FOR SOLUTION ORAL DAILY
Status: DISCONTINUED | OUTPATIENT
Start: 2018-07-10 | End: 2018-07-10 | Stop reason: HOSPADM

## 2018-07-09 RX ADMIN — IPRATROPIUM BROMIDE AND ALBUTEROL SULFATE 3 ML: .5; 3 SOLUTION RESPIRATORY (INHALATION) at 08:07

## 2018-07-09 RX ADMIN — PANTOPRAZOLE SODIUM 40 MG: 20 TABLET, DELAYED RELEASE ORAL at 04:07

## 2018-07-09 RX ADMIN — ACETAMINOPHEN 650 MG: 325 TABLET, FILM COATED ORAL at 10:07

## 2018-07-09 RX ADMIN — IPRATROPIUM BROMIDE AND ALBUTEROL SULFATE 3 ML: .5; 3 SOLUTION RESPIRATORY (INHALATION) at 12:07

## 2018-07-09 RX ADMIN — POTASSIUM CHLORIDE 40 MEQ: 1500 TABLET, EXTENDED RELEASE ORAL at 10:07

## 2018-07-09 RX ADMIN — MOXIFLOXACIN HYDROCHLORIDE 400 MG: 400 INJECTION, SOLUTION INTRAVENOUS at 02:07

## 2018-07-09 RX ADMIN — IPRATROPIUM BROMIDE AND ALBUTEROL SULFATE 3 ML: .5; 3 SOLUTION RESPIRATORY (INHALATION) at 04:07

## 2018-07-09 RX ADMIN — PREDNISONE 60 MG: 20 TABLET ORAL at 12:07

## 2018-07-09 RX ADMIN — ACETAMINOPHEN 1000 MG: 500 TABLET, FILM COATED ORAL at 12:07

## 2018-07-09 NOTE — ASSESSMENT & PLAN NOTE
-In the setting of COPD and suspected URI   -Will treat COPD and URI and meanwhile continue with supplemental oxygen  -Assess for need for home oxygen.

## 2018-07-09 NOTE — H&P
[facility] Medicine  History & Physical    Patient Name: Tessa Enriquez  MRN: [id number]  Admission Date: 7/9/2018  Attending Physician: Jeff Malagon MD  Primary Care Provider: Karma Mcdermott MD         Patient information was obtained from patient and ER records.     Subjective:     Principal Problem:Acute hypoxemic respiratory failure    Chief Complaint:   Chief Complaint   Patient presents with    Cough    Shortness of Breath        HPI: Ms. Enriquez is a 62 YOWF with PMHx of COPD, and is a current smoker, who presented to the ED with complaints of SOB that is uncontrolled and unmanageable with her home remedies. She noted that few days ago she started having runny nose,sneezing and mild soreness in throat, following which she developed shortness of breath that was unbearable this morning.     An active smoker, without any PCP or Pulmonologist of record, her head started blowing with continuous cough since last night.     She was given steroid and nebulzier treatment with hope to improve her oxygen saturation, but it did not improve, although the pt showed subjective improvement. She was therefore decided to get admitted for possibility of her candidacy for home oxygent this time.     At the time of my interview with her she denied SOB, CP or palpitations. She relayed that she wants to quit smoking.            Past Medical History:   Diagnosis Date    COPD (chronic obstructive pulmonary disease)        Past Surgical History:   Procedure Laterality Date    CHOLECYSTECTOMY      HYSTERECTOMY         Review of patient's allergies indicates:   Allergen Reactions    Pcn [penicillins]        No current facility-administered medications on file prior to encounter.      Current Outpatient Prescriptions on File Prior to Encounter   Medication Sig    albuterol-ipratropium 2.5mg-0.5mg/3mL (DUONEB) 0.5 mg-3 mg(2.5 mg base)/3 mL nebulizer solution Take 3 mLs by nebulization every 6  (six) hours as needed for Wheezing.    [DISCONTINUED] albuterol 90 mcg/actuation inhaler Inhale 1-2 puffs into the lungs every 6 (six) hours as needed for Wheezing. Rescue    [DISCONTINUED] azithromycin (Z-LUCIEN) 250 MG tablet Take 1 tablet (250 mg total) by mouth once daily. Take first 2 tablets together, then 1 every day until finished.    [DISCONTINUED] hydrocodone-acetaminophen 5-325mg (NORCO) 5-325 mg per tablet Take 1 tablet by mouth 4 (four) times daily as needed.    [DISCONTINUED] ibuprofen (ADVIL,MOTRIN) 600 MG tablet Take 1 tablet (600 mg total) by mouth every 6 (six) hours as needed for Pain.     Family History     None        Social History Main Topics    Smoking status: Current Every Day Smoker     Packs/day: 1.00    Smokeless tobacco: Not on file    Alcohol use Not on file    Drug use: Unknown    Sexual activity: Not on file     Review of Systems   Constitutional: Negative.    HENT: Positive for congestion, sinus pain and sinus pressure.    Respiratory: Positive for shortness of breath.    Cardiovascular: Negative.    Gastrointestinal: Negative.    Genitourinary: Negative.    Musculoskeletal: Negative.    Neurological: Negative.    Hematological: Negative.      Objective:     Vital Signs (Most Recent):  Temp: 97.5 °F (36.4 °C) (07/09/18 1512)  Pulse: 77 (07/09/18 1631)  Resp: 18 (07/09/18 1631)  BP: 128/65 (07/09/18 1512)  SpO2: (!) 92 % (07/09/18 1631) Vital Signs (24h Range):  Temp:  [97.5 °F (36.4 °C)-98.5 °F (36.9 °C)] 97.5 °F (36.4 °C)  Pulse:  [] 77  Resp:  [18-24] 18  SpO2:  [85 %-96 %] 92 %  BP: (114-145)/(56-76) 128/65     Weight: 72.6 kg (160 lb)  Body mass index is 31.25 kg/m².    Physical Exam   Constitutional: She is oriented to person, place, and time. She appears well-developed and well-nourished.   HENT:   Head: Normocephalic and atraumatic.   Left Ear: External ear normal.   Nose: Nose normal.   Eyes: Conjunctivae and EOM are normal. Pupils are equal, round, and reactive  to light. Right eye exhibits no discharge. Left eye exhibits no discharge. No scleral icterus.   Neck: Normal range of motion. Neck supple. No JVD present. No thyromegaly present.   Cardiovascular: Normal rate, normal heart sounds and intact distal pulses.  Exam reveals no gallop and no friction rub.    No murmur heard.  Pulmonary/Chest: No respiratory distress. She has no wheezes. She has rales. She exhibits no tenderness.   Abdominal: Soft. Bowel sounds are normal. She exhibits no distension. There is no guarding. No hernia.   Musculoskeletal: Normal range of motion. She exhibits no edema, tenderness or deformity.   Lymphadenopathy:     She has no cervical adenopathy.   Neurological: She is alert and oriented to person, place, and time. She displays normal reflexes. No cranial nerve deficit or sensory deficit.   Skin: Skin is warm and dry. Capillary refill takes less than 2 seconds. No rash noted. No erythema. No pallor.   Nursing note and vitals reviewed.        CRANIAL NERVES     CN III, IV, VI   Pupils are equal, round, and reactive to light.  Extraocular motions are normal.        Significant Labs:   CBC:   Recent Labs  Lab 07/09/18  1240   WBC 8.20   HGB 13.3   HCT 39.7        CMP:   Recent Labs  Lab 07/09/18  1240   *   K 3.8      CO2 21*      BUN 9   CREATININE 0.7   CALCIUM 9.3   PROT 7.4   ALBUMIN 3.8   BILITOT 0.6   ALKPHOS 84   AST 12   ALT 12   ANIONGAP 10   EGFRNONAA >60       Significant Imaging: I have reviewed all pertinent imaging results/findings within the past 24 hours.    Assessment/Plan:     * Acute hypoxemic respiratory failure    -In the setting of COPD and suspected URI   -Will treat COPD and URI and meanwhile continue with supplemental oxygen  -Assess for need for home oxygen.             Smoking addiction    - Bed side counseling done and  to provide the educational material as well.         Chronic obstructive pulmonary disease with hypoxia      -  Started on bronchodilator nebulizer with supplemental oxygen via nasal canula.  - Also started on antibiotics for susepected sinus infection.             VTE Risk Mitigation         Ordered     enoxaparin injection 40 mg  Daily      07/09/18 1710     IP VTE HIGH RISK PATIENT  Once      07/09/18 1710     Place OLIVA hose  Until discontinued      07/09/18 6621             Jeff Malagon MD  Department of Hospital Medicine   [facility]

## 2018-07-09 NOTE — ED PROVIDER NOTES
"Encounter Date: 7/9/2018    SCRIBE #1 NOTE: I, Alyx Smith , am scribing for, and in the presence of, Dr. Arriaza .       History     Chief Complaint   Patient presents with    Cough    Shortness of Breath       07/09/2018 11:48 AM     Chief complaint: SOB      Tessa Enriquez is a 62 y.o. female with a PMHx of COPD,bronchitits, and PNA who presents to the ED with complaints of SOB with associated wheezing and coughing with an onset x 1 day PTA. The patient reports that she is still smoking 1 ppd. She relays that she is short of breath at baseline, but it has become much worse. She states that she has an audible wheeze and was not able to get an appointment with her new PCP until 7/12/2018. The patient relays that last night she slept on her left side to get some relief from her symptoms, but this has exacerbated her wheezing today. Patient reports that she is constantly coughing. She also endorses a throbbing headache. She states, " my head is hurting so badly, I feel like the top may explode." The patient is concerned that she may have pneumonia. She reports that the last time she had pneumonia in both lungs, she was afebrile. The patient denies fever or any other symptoms at this time. The patient does not have a pulmonologist.           The history is provided by the patient.     Review of patient's allergies indicates:   Allergen Reactions    Pcn [penicillins]      Past Medical History:   Diagnosis Date    COPD (chronic obstructive pulmonary disease)      Past Surgical History:   Procedure Laterality Date    CHOLECYSTECTOMY      HYSTERECTOMY       History reviewed. No pertinent family history.  Social History   Substance Use Topics    Smoking status: Current Every Day Smoker     Packs/day: 1.00    Smokeless tobacco: Not on file    Alcohol use Not on file     Review of Systems   REVIEW OF SYSTEMS  CONSTITUTIONAL: Negative for fever.  HEENT:  Negative for sore throat.   HEART:   Negative for " chest pain..  LUNG:  Positive for shortness of breath, cough, and wheezing   ABDOMEN:  Negative for nausea.   :  No discharge, dysuria  EXTREMITIES:  No swelling  NEURO:  Negative for weakness. Positive headache   SKIN:  Negative for rash.  Psych: No depression  HEME: Does not bruise/bleed easily.     Physical Exam     Initial Vitals [07/09/18 1138]   BP Pulse Resp Temp SpO2   (!) 145/76 96 (!) 24 98.5 °F (36.9 °C) 96 %      MAP       --         Physical Exam    Nursing note and vitals reviewed.  Constitutional: She appears well-developed and well-nourished.  Non-toxic appearance. No distress.   HENT:   Head: Normocephalic and atraumatic.   Eyes: EOM are normal. Pupils are equal, round, and reactive to light.   Neck: Normal range of motion. Neck supple. No neck rigidity. No JVD present.   Cardiovascular: Normal rate, regular rhythm, normal heart sounds and intact distal pulses. Exam reveals no gallop and no friction rub.    No murmur heard.  Pulmonary/Chest: No respiratory distress. She has wheezes (expiratory ). She has no rhonchi. She has no rales. She exhibits no tenderness.   Wheezing is greater on the left than the right    Abdominal: Soft. Bowel sounds are normal. She exhibits no distension. There is no tenderness. There is no rigidity, no rebound and no guarding.   Musculoskeletal: Normal range of motion. She exhibits no tenderness.   Neurological: She is alert and oriented to person, place, and time. She has normal strength and normal reflexes. No cranial nerve deficit or sensory deficit. She exhibits normal muscle tone. Coordination normal. GCS eye subscore is 4. GCS verbal subscore is 5. GCS motor subscore is 6.   5/5 strength   Speaking in full sentences      Skin: Skin is warm and dry.   Psychiatric: She has a normal mood and affect. Her speech is normal and behavior is normal. She is not actively hallucinating.         ED Course   Procedures  Labs Reviewed   CBC W/ AUTO DIFFERENTIAL - Abnormal;  Notable for the following:        Result Value    Gran% 76.8 (*)     All other components within normal limits   COMPREHENSIVE METABOLIC PANEL - Abnormal; Notable for the following:     Sodium 134 (*)     CO2 21 (*)     All other components within normal limits          Imaging Results          X-Ray Chest PA And Lateral (Final result)  Result time 07/09/18 12:37:57    Final result by Ramesh Bar Jr., MD (07/09/18 12:37:57)                 Impression:      No acute abnormality.      Electronically signed by: Ramesh Bar MD  Date:    07/09/2018  Time:    12:37             Narrative:    EXAMINATION:  XR CHEST PA AND LATERAL    CLINICAL HISTORY:  cough, sob;    TECHNIQUE:  PA and lateral views of the chest were performed.    COMPARISON:  Chest x-ray of May 7, 2018    FINDINGS:  The lungs are clear, with normal appearance of pulmonary vasculature and no pleural effusion or pneumothorax.    The cardiac silhouette is normal in size. The hilar and mediastinal contours are unremarkable.    Bones are intact.                                 Medical Decision Making:   History:   Old Medical Records: I decided to obtain old medical records.  Initial Assessment:   8-year-old woman with a history of COPD and current 1 pack per day tobacco use presents emergency department complaining of shortness of breath for 1 day with associated wheezing.  Chest x-ray shows no evidence of pneumonia.  No pneumothorax.  She has extensive wheezing on examination consistent with COPD exacerbation.  She was given 60 mg of prednisone orally and 3 rounds of duo nebs with no improvement.  Her oxygen worsened after treatments with O2 sats of 85-87% on room air.  She is placed on oxygen.  Discussed with the hospitalist who agrees to admit the patient for breathing treatments, oxygen supplementation and steroids for hypoxic respiratory failure likely secondary to COPD.  She started prophylactically on antibiotics.  Clinical Tests:   Lab  Tests: Ordered and Reviewed  Radiological Study: Ordered and Reviewed            Scribe Attestation:   Scribe #1: I performed the above scribed service and the documentation accurately describes the services I performed. I attest to the accuracy of the note.               Clinical Impression:   The encounter diagnosis was Chronic obstructive pulmonary disease with hypoxia.      Disposition:   Disposition: Admitted               I, Arsalan Reich, personally performed the services described in this documentation. All medical record entries made by the scribe were at my direction and in my presence.  I have reviewed the chart and agree that the record reflects my personal performance and is accurate and complete. Tomer Arriaza MD.         Tomer Arriaza MD  07/09/18 9413

## 2018-07-09 NOTE — HPI
Ms. Enriquez is a 62 YOWF with PMHx of COPD, and is a current smoker, who presented to the ED with complaints of SOB that is uncontrolled and unmanageable with her home remedies. She noted that few days ago she started having runny nose,sneezing and mild soreness in throat, following which she developed shortness of breath that was unbearable this morning.     An active smoker, without any PCP or Pulmonologist of record, her head started blowing with continuous cough since last night.     She was given steroid and nebulzier treatment with hope to improve her oxygen saturation, but it did not improve, although the pt showed subjective improvement. She was therefore decided to get admitted for possibility of her candidacy for home oxygent this time.     At the time of my interview with her she denied SOB, CP or palpitations. She relayed that she wants to quit smoking.

## 2018-07-09 NOTE — PLAN OF CARE
Problem: Patient Care Overview  Goal: Plan of Care Review  Outcome: Ongoing (interventions implemented as appropriate)  Patient receiving arosol tx via duoneb now and q4hr.  Hr 77 and 02 saturation 92% on nc at 1lpm.  Increased to 2lpm.

## 2018-07-09 NOTE — SUBJECTIVE & OBJECTIVE
Past Medical History:   Diagnosis Date    COPD (chronic obstructive pulmonary disease)        Past Surgical History:   Procedure Laterality Date    CHOLECYSTECTOMY      HYSTERECTOMY         Review of patient's allergies indicates:   Allergen Reactions    Pcn [penicillins]        No current facility-administered medications on file prior to encounter.      Current Outpatient Prescriptions on File Prior to Encounter   Medication Sig    albuterol-ipratropium 2.5mg-0.5mg/3mL (DUONEB) 0.5 mg-3 mg(2.5 mg base)/3 mL nebulizer solution Take 3 mLs by nebulization every 6 (six) hours as needed for Wheezing.    [DISCONTINUED] albuterol 90 mcg/actuation inhaler Inhale 1-2 puffs into the lungs every 6 (six) hours as needed for Wheezing. Rescue    [DISCONTINUED] azithromycin (Z-LUCIEN) 250 MG tablet Take 1 tablet (250 mg total) by mouth once daily. Take first 2 tablets together, then 1 every day until finished.    [DISCONTINUED] hydrocodone-acetaminophen 5-325mg (NORCO) 5-325 mg per tablet Take 1 tablet by mouth 4 (four) times daily as needed.    [DISCONTINUED] ibuprofen (ADVIL,MOTRIN) 600 MG tablet Take 1 tablet (600 mg total) by mouth every 6 (six) hours as needed for Pain.     Family History     None        Social History Main Topics    Smoking status: Current Every Day Smoker     Packs/day: 1.00    Smokeless tobacco: Not on file    Alcohol use Not on file    Drug use: Unknown    Sexual activity: Not on file     Review of Systems   Constitutional: Negative.    HENT: Positive for congestion, sinus pain and sinus pressure.    Respiratory: Positive for shortness of breath.    Cardiovascular: Negative.    Gastrointestinal: Negative.    Genitourinary: Negative.    Musculoskeletal: Negative.    Neurological: Negative.    Hematological: Negative.      Objective:     Vital Signs (Most Recent):  Temp: 97.5 °F (36.4 °C) (07/09/18 1512)  Pulse: 77 (07/09/18 1631)  Resp: 18 (07/09/18 1631)  BP: 128/65 (07/09/18 1512)  SpO2: (!)  92 % (07/09/18 1631) Vital Signs (24h Range):  Temp:  [97.5 °F (36.4 °C)-98.5 °F (36.9 °C)] 97.5 °F (36.4 °C)  Pulse:  [] 77  Resp:  [18-24] 18  SpO2:  [85 %-96 %] 92 %  BP: (114-145)/(56-76) 128/65     Weight: 72.6 kg (160 lb)  Body mass index is 31.25 kg/m².    Physical Exam   Constitutional: She is oriented to person, place, and time. She appears well-developed and well-nourished.   HENT:   Head: Normocephalic and atraumatic.   Left Ear: External ear normal.   Nose: Nose normal.   Eyes: Conjunctivae and EOM are normal. Pupils are equal, round, and reactive to light. Right eye exhibits no discharge. Left eye exhibits no discharge. No scleral icterus.   Neck: Normal range of motion. Neck supple. No JVD present. No thyromegaly present.   Cardiovascular: Normal rate, normal heart sounds and intact distal pulses.  Exam reveals no gallop and no friction rub.    No murmur heard.  Pulmonary/Chest: No respiratory distress. She has no wheezes. She has rales. She exhibits no tenderness.   Abdominal: Soft. Bowel sounds are normal. She exhibits no distension. There is no guarding. No hernia.   Musculoskeletal: Normal range of motion. She exhibits no edema, tenderness or deformity.   Lymphadenopathy:     She has no cervical adenopathy.   Neurological: She is alert and oriented to person, place, and time. She displays normal reflexes. No cranial nerve deficit or sensory deficit.   Skin: Skin is warm and dry. Capillary refill takes less than 2 seconds. No rash noted. No erythema. No pallor.   Nursing note and vitals reviewed.        CRANIAL NERVES     CN III, IV, VI   Pupils are equal, round, and reactive to light.  Extraocular motions are normal.        Significant Labs:   CBC:   Recent Labs  Lab 07/09/18  1240   WBC 8.20   HGB 13.3   HCT 39.7        CMP:   Recent Labs  Lab 07/09/18  1240   *   K 3.8      CO2 21*      BUN 9   CREATININE 0.7   CALCIUM 9.3   PROT 7.4   ALBUMIN 3.8   BILITOT 0.6    ALKPHOS 84   AST 12   ALT 12   ANIONGAP 10   EGFRNONAA >60       Significant Imaging: I have reviewed all pertinent imaging results/findings within the past 24 hours.

## 2018-07-09 NOTE — ASSESSMENT & PLAN NOTE
- Started on bronchodilator nebulizer with supplemental oxygen via nasal canula.  - Also started on antibiotics for susepected sinus infection.

## 2018-07-09 NOTE — PLAN OF CARE
Problem: Patient Care Overview  Goal: Plan of Care Review  Outcome: Ongoing (interventions implemented as appropriate)  Patient resting in bed watching tv without distress noted or complained of.  Remains on oxygen @ 2L via NC with no SOB noted or stated.  Plan of care and medications reviewed with patient with stated understanding.  Smoking cessation discussed with patient.  No requests/concerns at this time.  Encouraged patient to voice any.  Will continue to monitor.

## 2018-07-10 VITALS
DIASTOLIC BLOOD PRESSURE: 67 MMHG | TEMPERATURE: 99 F | OXYGEN SATURATION: 95 % | RESPIRATION RATE: 18 BRPM | HEIGHT: 60 IN | BODY MASS INDEX: 31.41 KG/M2 | HEART RATE: 94 BPM | WEIGHT: 160 LBS | SYSTOLIC BLOOD PRESSURE: 136 MMHG

## 2018-07-10 LAB
ANION GAP SERPL CALC-SCNC: 9 MMOL/L
BUN SERPL-MCNC: 12 MG/DL
CALCIUM SERPL-MCNC: 9.4 MG/DL
CHLORIDE SERPL-SCNC: 109 MMOL/L
CO2 SERPL-SCNC: 23 MMOL/L
CREAT SERPL-MCNC: 1 MG/DL
EST. GFR  (AFRICAN AMERICAN): >60 ML/MIN/1.73 M^2
EST. GFR  (NON AFRICAN AMERICAN): >60 ML/MIN/1.73 M^2
GLUCOSE SERPL-MCNC: 98 MG/DL
POTASSIUM SERPL-SCNC: 4.1 MMOL/L
SODIUM SERPL-SCNC: 141 MMOL/L

## 2018-07-10 PROCEDURE — 94640 AIRWAY INHALATION TREATMENT: CPT

## 2018-07-10 PROCEDURE — 80048 BASIC METABOLIC PNL TOTAL CA: CPT

## 2018-07-10 PROCEDURE — 94761 N-INVAS EAR/PLS OXIMETRY MLT: CPT

## 2018-07-10 PROCEDURE — 25000242 PHARM REV CODE 250 ALT 637 W/ HCPCS: Performed by: INTERNAL MEDICINE

## 2018-07-10 PROCEDURE — 63600175 PHARM REV CODE 636 W HCPCS: Performed by: HOSPITALIST

## 2018-07-10 PROCEDURE — 99900035 HC TECH TIME PER 15 MIN (STAT)

## 2018-07-10 PROCEDURE — 25000003 PHARM REV CODE 250: Performed by: NURSE PRACTITIONER

## 2018-07-10 PROCEDURE — 36415 COLL VENOUS BLD VENIPUNCTURE: CPT

## 2018-07-10 PROCEDURE — 25000003 PHARM REV CODE 250: Performed by: INTERNAL MEDICINE

## 2018-07-10 RX ORDER — MOXIFLOXACIN HYDROCHLORIDE 400 MG/1
400 TABLET ORAL DAILY
Qty: 5 TABLET | Refills: 0 | Status: SHIPPED | OUTPATIENT
Start: 2018-07-11 | End: 2018-07-16

## 2018-07-10 RX ORDER — HYDROXYZINE HYDROCHLORIDE 25 MG/1
25 TABLET, FILM COATED ORAL 3 TIMES DAILY PRN
Qty: 10 TABLET | Refills: 0 | Status: SHIPPED | OUTPATIENT
Start: 2018-07-10 | End: 2018-07-11

## 2018-07-10 RX ORDER — GABAPENTIN 300 MG/1
300 CAPSULE ORAL NIGHTLY
Qty: 30 CAPSULE | Refills: 0 | Status: SHIPPED | OUTPATIENT
Start: 2018-07-10 | End: 2018-07-26

## 2018-07-10 RX ORDER — HYDROXYZINE HYDROCHLORIDE 25 MG/1
25 TABLET, FILM COATED ORAL 3 TIMES DAILY PRN
Status: DISCONTINUED | OUTPATIENT
Start: 2018-07-10 | End: 2018-07-10 | Stop reason: HOSPADM

## 2018-07-10 RX ORDER — PREDNISONE 20 MG/1
60 TABLET ORAL DAILY
Status: DISCONTINUED | OUTPATIENT
Start: 2018-07-10 | End: 2018-07-10 | Stop reason: HOSPADM

## 2018-07-10 RX ORDER — PREDNISONE 20 MG/1
60 TABLET ORAL DAILY
Qty: 20 TABLET | Refills: 0 | Status: SHIPPED | OUTPATIENT
Start: 2018-07-10 | End: 2018-07-20

## 2018-07-10 RX ORDER — MOXIFLOXACIN HYDROCHLORIDE 400 MG/250ML
400 INJECTION, SOLUTION INTRAVENOUS ONCE
Status: COMPLETED | OUTPATIENT
Start: 2018-07-10 | End: 2018-07-10

## 2018-07-10 RX ORDER — MOXIFLOXACIN HYDROCHLORIDE 400 MG/1
400 TABLET ORAL DAILY
Status: DISCONTINUED | OUTPATIENT
Start: 2018-07-11 | End: 2018-07-10 | Stop reason: HOSPADM

## 2018-07-10 RX ADMIN — HYDROXYZINE HYDROCHLORIDE 25 MG: 25 TABLET, FILM COATED ORAL at 05:07

## 2018-07-10 RX ADMIN — PREDNISONE 60 MG: 20 TABLET ORAL at 03:07

## 2018-07-10 RX ADMIN — PANTOPRAZOLE SODIUM 40 MG: 20 TABLET, DELAYED RELEASE ORAL at 09:07

## 2018-07-10 RX ADMIN — IPRATROPIUM BROMIDE AND ALBUTEROL SULFATE 3 ML: .5; 3 SOLUTION RESPIRATORY (INHALATION) at 12:07

## 2018-07-10 RX ADMIN — MOXIFLOXACIN HYDROCHLORIDE 400 MG: 400 INJECTION, SOLUTION INTRAVENOUS at 03:07

## 2018-07-10 RX ADMIN — POTASSIUM CHLORIDE 40 MEQ: 1500 TABLET, EXTENDED RELEASE ORAL at 12:07

## 2018-07-10 NOTE — PROGRESS NOTES
07/10/18 0800   Patient Assessment/Suction   Level of Consciousness (AVPU) alert   Respiratory Effort Normal   All Lung Fields Breath Sounds diminished   PRE-TX-O2-ETCO2   O2 Device (Oxygen Therapy) room air   SpO2 95 %   Pulse Oximetry Type Intermittent   $ Pulse Oximetry - Multiple Charge Pulse Oximetry - Multiple   Pulse 98   Resp 18   Aerosol Therapy   $ Aerosol Therapy Charges Refused   Respiratory Treatment Status refused   Duoneb Q4, POX Q4, refused tx this am, states she is getting to much albuterol and had a reaction to it last night, vitals as charted.

## 2018-07-10 NOTE — NURSING
All discharge and medication instructions reviewed with pt at this time. 1 paper prescription given to pt. Pt education given, Smoking cessation encouraged. IV removed, catheter intact, pressure applied x 2 min, covered with gauze and coban. Pt tolerated well. Tele monitor removed and returned to monitor room at this time. Pt waiting for ride home, gave instructions for pt to call nurse station when ready to go. Pt verbalized understanding of all above information.

## 2018-07-10 NOTE — PLAN OF CARE
Met with pt to complete her assessment.  Pt, who is independent with her self care, has a nebulizer and lives with her son and daughter-in-law.  Pt's PCP is Dr. Mcdermott and insurance is BC/BS.  Pt's discharge disposition is home with no anticipated needs.       07/10/18 1432   Discharge Assessment   Assessment Type Discharge Planning Assessment   Confirmed/corrected address and phone number on facesheet? Yes   Assessment information obtained from? Patient   Communicated expected length of stay with patient/caregiver yes   Prior to hospitilization cognitive status: Alert/Oriented   Prior to hospitalization functional status: Independent   Current cognitive status: Alert/Oriented   Current Functional Status: Independent   Lives With child(aakash), adult  (Pt lives with her son and daughter-in-law. )   Able to Return to Prior Arrangements yes   Is patient able to care for self after discharge? Yes   Who are your caregiver(s) and their phone number(s)? (daughter-in-law Umu, [phone])   Patient's perception of discharge disposition home or selfcare   Readmission Within The Last 30 Days no previous admission in last 30 days   Patient currently being followed by outpatient case management? No   Patient currently receives any other outside agency services? No   Equipment Currently Used at Home nebulizer   Do you have any problems affording any of your prescribed medications? No  ([facility] on [place])   Is the patient taking medications as prescribed? yes   Does the patient have transportation home? Yes   Transportation Available family or friend will provide   Does the patient receive services at the [facility]? No   Discharge Plan A Home   Patient/Family In Agreement With Plan yes

## 2018-07-10 NOTE — PLAN OF CARE
7/10/2018    Patient: Tessa Enriquez    YOB: 1955    To whom it may concern,    Tessa Enriquez was admitted to the hospital on 7/9/2018 and was discharged on 7/10/2018. Patient was under my care at the hospital and is cleared to return to work on 7/13/18, with no restrictions. If you have any questions or concerns, please don't hesitate to contact the department of hospital medicine at [facility] at [phone].     Sincerely,    Tyrone Dash MD    Department of Hospital Medicine

## 2018-07-10 NOTE — HOSPITAL COURSE
Patient is a 62-year-old  female with a past medical history significant for obesity, smoking addiction, and COPD who presents the hospital for COPD exacerbation.  Patient initially started on 3 L nasal cannula with significant hypoxemia and was started on scheduled bronchodilators, steroids, and antibiotics.  Patient rapidly improved over the course of her hospitalization.  She was saturating well on room air and with minimal wheezing and shortness of breath at time of discharge. She did initially have some hypokalemia which was replaced and recheck prior to discharge. Patient was seen and examined on the day of discharge and deemed stable medically for discharge home.  She was started on some gabapentin for which she had related some neuropathy and also given referral for Orthopedic surgery for Dr. Branch.  She will need to follow with the primary care provider.  She was given extensive counseling regarding smoking cessation, as well as avoiding vaping over the next 2 weeks is that would increase her risk of recurrence for COPD.  She was offered nicotine replacement therapy but refused.

## 2018-07-10 NOTE — PLAN OF CARE
Hospital follow ups on the AVS.  Updated pt's nurse Tabatha.       07/10/18 6855   Discharge Reassessment   Assessment Type Discharge Planning Reassessment

## 2018-07-10 NOTE — DISCHARGE INSTRUCTIONS
Thank you for choosing [facility] for your medical care. The primary doctor who is taking care of you at the time of your discharge is Tyrone Dash MD.     You were admitted to the hospital with Acute hypoxemic respiratory failure.     Please note your discharge instructions, including diet/activity restrictions, follow-up appointments, and medication changes.  If you have any questions about your medical issues, prescriptions, or any other questions, please feel free to contact the [facility] Medicine Dept at [phone] and we will help.    If you are previously with Home health, outpatient PT/OT or under a therapy program, you are cleared to return to those programs.    Please direct all long term medication refills and follow up to your primary care provider, Karma Mcdermott MD. Thank you again for letting us take care of your health care needs.       negative...

## 2018-07-10 NOTE — PLAN OF CARE
Problem: Patient Care Overview  Goal: Plan of Care Review  Outcome: Ongoing (interventions implemented as appropriate)  Pt resting quietly with no signs of distress. C/O itching post respiratory treatment, NP notified. Pt free from falls during shift. Will continue to monitor.

## 2018-07-10 NOTE — PLAN OF CARE
07/10/18 1458   Final Note   Assessment Type Final Discharge Note   Discharge Disposition Home   What phone number can be called within the next 1-3 days to see how you are doing after discharge? ([phone])   Hospital Follow Up  Appt(s) scheduled? Yes  (on the AVS)

## 2018-07-11 ENCOUNTER — TELEPHONE (OUTPATIENT)
Dept: ORTHOPEDICS | Facility: CLINIC | Age: 63
End: 2018-07-11

## 2018-07-11 ENCOUNTER — TELEPHONE (OUTPATIENT)
Dept: MEDSURG UNIT | Facility: HOSPITAL | Age: 63
End: 2018-07-11

## 2018-07-11 ENCOUNTER — OFFICE VISIT (OUTPATIENT)
Dept: ORTHOPEDICS | Facility: CLINIC | Age: 63
End: 2018-07-11
Payer: COMMERCIAL

## 2018-07-11 VITALS
BODY MASS INDEX: 31.41 KG/M2 | HEART RATE: 88 BPM | WEIGHT: 160 LBS | HEIGHT: 60 IN | SYSTOLIC BLOOD PRESSURE: 148 MMHG | DIASTOLIC BLOOD PRESSURE: 84 MMHG

## 2018-07-11 DIAGNOSIS — M48.062 SPINAL STENOSIS OF LUMBAR REGION WITH NEUROGENIC CLAUDICATION: Primary | ICD-10-CM

## 2018-07-11 DIAGNOSIS — M43.16 SPONDYLOLISTHESIS, LUMBAR REGION: ICD-10-CM

## 2018-07-11 PROCEDURE — 3008F BODY MASS INDEX DOCD: CPT | Mod: ,,, | Performed by: ORTHOPAEDIC SURGERY

## 2018-07-11 PROCEDURE — 72100 X-RAY EXAM L-S SPINE 2/3 VWS: CPT | Mod: ,,, | Performed by: ORTHOPAEDIC SURGERY

## 2018-07-11 PROCEDURE — 72170 X-RAY EXAM OF PELVIS: CPT | Mod: ,,, | Performed by: ORTHOPAEDIC SURGERY

## 2018-07-11 PROCEDURE — 1111F DSCHRG MED/CURRENT MED MERGE: CPT | Mod: ,,, | Performed by: ORTHOPAEDIC SURGERY

## 2018-07-11 PROCEDURE — 99203 OFFICE O/P NEW LOW 30 MIN: CPT | Mod: 25,,, | Performed by: ORTHOPAEDIC SURGERY

## 2018-07-11 RX ORDER — HYDROXYZINE HYDROCHLORIDE 25 MG/1
25 TABLET, FILM COATED ORAL 3 TIMES DAILY
COMMUNITY
End: 2018-07-12

## 2018-07-11 NOTE — TELEPHONE ENCOUNTER
Spoke with the patient.     ----- Message from Irving Jacob sent at 7/11/2018 10:23 AM CDT -----  Patient was seen today and forgot to ask a question before she left just call her when you get a chance [phone]

## 2018-07-11 NOTE — PROGRESS NOTES
Subjective:       Patient ID: Tessa Enriquez is a 62 y.o. female.    Chief Complaint: Pain of the Lumbar Spine (Lumbar pain radiates down into the groin area, both legs to knees with numbness, burning and tingling. She fell at home 1/1/2 years ago on left leg. Only had x ray at that time and nothing was seen. )      History of Present Illness  Patient presents for 1-1/2 year history of low back pain radiating down the anterior aspect of the left leg to the knee. It now involves the right leg also but not below the knee for the left leg being greater her symptoms occur at rest and a worse with prolonged walking she has no bowel or bladder dysfunction. She is not undergone any treatment her pains on a constant daily basis.    Current Medications  Current Outpatient Prescriptions   Medication Sig Dispense Refill    albuterol-ipratropium 2.5mg-0.5mg/3mL (DUONEB) 0.5 mg-3 mg(2.5 mg base)/3 mL nebulizer solution Take 3 mLs by nebulization every 6 (six) hours as needed for Wheezing.      gabapentin (NEURONTIN) 300 MG capsule Take 1 capsule (300 mg total) by mouth every evening. 30 capsule 0    hydrOXYzine HCl (ATARAX) 25 MG tablet Take 25 mg by mouth 3 (three) times daily.      moxifloxacin (AVELOX) 400 mg tablet Take 1 tablet (400 mg total) by mouth once daily. for 5 days 5 tablet 0    predniSONE (DELTASONE) 20 MG tablet Take 3 tablets (60 mg total) by mouth once daily. Take 3 tablets daily on day #1-2, Take 2 tablets daily on days #3-4, Take 1 tablet daily on day #5-6, then stop taking for 10 days 20 tablet 0     No current facility-administered medications for this visit.        Allergies  Review of patient's allergies indicates:   Allergen Reactions    Pcn [penicillins]        Past Medical History  Past Medical History:   Diagnosis Date    COPD (chronic obstructive pulmonary disease)        Surgical History  Past Surgical History:   Procedure Laterality Date    CHOLECYSTECTOMY      HYSTERECTOMY          Family History:   History reviewed. No pertinent family history.    Social History:   Social History     Social History    Marital status:      Spouse name: N/A    Number of children: N/A    Years of education: N/A     Occupational History    Not on file.     Social History Main Topics    Smoking status: Former Smoker     Packs/day: 1.00    Smokeless tobacco: Not on file    Alcohol use Not on file    Drug use: Unknown    Sexual activity: Not on file     Other Topics Concern    Not on file     Social History Narrative    No narrative on file       Hospitalization/Major Diagnostic Procedure:     Review of Systems     General/Constitutional:  Chills denies. Fatigue denies. Fever denies. Weight gain denies. Weight loss denies.    Respiratory:  Shortness of breath denies.    Cardiovascular:  Chest pain denies.    Gastrointestinal:  Constipation denies. Diarrhea denies. Nausea denies. Vomiting denies.     Hematology:  Easy bruising denies. Prolonged bleeding denies.     Genitourinary:  Frequent urination denies. Pain in lower back denies. Painful urination denies.     Musculoskeletal:  See HPI for details    Skin:  Rash denies.    Neurologic:  Dizziness denies. Gait abnormalities denies. Seizures denies. Tingling/Numbess denies.    Psychiatric:  Anxiety denies. Depressed mood denies.     Objective:   Vital Signs:   Vitals:    07/11/18 0918   BP: (!) 148/84   Pulse: 88        Physical Exam      General Examination:     Constitutional: The patient is alert and oriented to lace person and time. Mood is pleasant.     Head/Face: Normal facial features normal eyebrows    Eyes: Normal extraocular motion bilaterally    Lungs: Respirations are equal and unlabored    Gait is coordinated.    Cardiovascular: There are no swelling or varicosities present.    Lymphatic: Negative for adenopathy    Skin: Normal    Neurological: Level of consciousness normal. Oriented to place person and time and  situation    Psychiatric: Oriented to time place person and situation    Lumbar spine exam shows patient walks with slightly waddling gait she can stand erect and has pain when doing so subjective tenderness over the paraspinous muscles L3-S1 without spasm range of motion moderately restricted due to pain range of motion of the hip and knee is normal straight leg raising causes back pain femoral nerve stretch test on the left side is weakly positive    XRAY Report/ Interpretation : AP lateral x-rays lumbar spine performed today. Indications low back pain. Findings: Grade 1 degenerative spondylolisthesis have what appears to be L5-S1 level though there may be a rudimentary S1-S2 level  AP pelvis x-ray was taken today. Indications low back pain and/or hip pain. Findings AP pelvis x-ray appears to be normal with no evidence of fractures or significant degenerative disease      Assessment:       1. Spinal stenosis of lumbar region with neurogenic claudication    2. Spondylolisthesis, lumbar region        Plan:       Tessa was seen today for pain.    Diagnoses and all orders for this visit:    Spinal stenosis of lumbar region with neurogenic claudication  -     MRI Lumbar Spine Without Contrast    Spondylolisthesis, lumbar region  -     X-Ray Lumbar Spine Ap And Lateral  -     X-Ray Pelvis Routine AP         No Follow-up on file.    Treatment options were discussed regards to the nature of the spinal condition conservative pain interventional and surgical options were discussed in detail and the probability of success of the separate treatment options was discussed in detail the patient expressed a clear understanding of the treatment options would regards to surgery the procedure risks benefits complications and outcomes were discussed.  No guarantees were given regards to surgical outcome.  Lumbar MRI study will be ordered for further evaluation of complaints or likely do not cause her symptoms is the spondylolisthesis  with nerve compression    This note was created using Dragon voice recognition software that occasionally misinterpreted phrases or words.

## 2018-07-11 NOTE — DISCHARGE SUMMARY
[facility] Hospital Medicine  Discharge Summary      Patient Name: Tessa Enriquez  MRN: [id number]  Admission Date: 7/9/2018  Hospital Length of Stay: 1 days  Discharge Date and Time: 7/10/2018  6:11 PM  Attending Physician: No att. providers found   Discharging Provider: Tyrone Dash MD  Primary Care Provider: Karma Mcdermott MD      HPI:   Ms. Enriquez is a 62 YOWF with PMHx of COPD, and is a current smoker, who presented to the ED with complaints of SOB that is uncontrolled and unmanageable with her home remedies. She noted that few days ago she started having runny nose,sneezing and mild soreness in throat, following which she developed shortness of breath that was unbearable this morning.     An active smoker, without any PCP or Pulmonologist of record, her head started blowing with continuous cough since last night.     She was given steroid and nebulzier treatment with hope to improve her oxygen saturation, but it did not improve, although the pt showed subjective improvement. She was therefore decided to get admitted for possibility of her candidacy for home oxygent this time.     At the time of my interview with her she denied SOB, CP or palpitations. She relayed that she wants to quit smoking.            * No surgery found *      Hospital Course:   Patient is a 62-year-old  female with a past medical history significant for obesity, smoking addiction, and COPD who presents the hospital for COPD exacerbation.  Patient initially started on 3 L nasal cannula with significant hypoxemia and was started on scheduled bronchodilators, steroids, and antibiotics.  Patient rapidly improved over the course of her hospitalization.  She was saturating well on room air and with minimal wheezing and shortness of breath at time of discharge. She did initially have some hypokalemia which was replaced and recheck prior to discharge. Patient was seen and examined on the day of discharge  and deemed stable medically for discharge home.  She was started on some gabapentin for which she had related some neuropathy and also given referral for Orthopedic surgery for Dr. Branch.  She will need to follow with the primary care provider.  She was given extensive counseling regarding smoking cessation, as well as avoiding vaping over the next 2 weeks is that would increase her risk of recurrence for COPD.  She was offered nicotine replacement therapy but refused.     Consults:     No new Assessment & Plan notes have been filed under this hospital service since the last note was generated.  Service: Hospital Medicine    Final Active Diagnoses:    Diagnosis Date Noted POA    PRINCIPAL PROBLEM:  Acute hypoxemic respiratory failure [J96.01] 07/09/2018 Unknown    Chronic obstructive pulmonary disease with hypoxia [J44.9, R09.02] 07/09/2018 Yes    Smoking addiction [F17.200] 07/09/2018 Unknown      Problems Resolved During this Admission:    Diagnosis Date Noted Date Resolved POA       Discharged Condition: stable    Disposition: Home or Self Care    Follow Up:  Follow-up Information     Karma Mcdermott MD On 7/12/2018.    Specialty:  Internal Medicine  Why:  new pt 7-12-18 @ 10:30 a.m.   Be there at 10 a.m.   Contact information:  [address]  [address]  [place] [address]  [phone]             Shun Branch MD In 2 weeks.    Specialties:  Orthopedic Surgery, General Surgery, Surgery  Why:  MD nurse will call pt to schedule the appointment  Contact information:  [address]  [address]  [place] [address]  [phone]                 Patient Instructions:     Diet Adult Regular     Notify your health care provider if you experience any of the following:  temperature >100.4     Notify your health care provider if you experience any of the following:  difficulty breathing or increased cough     Activity as tolerated         Significant Diagnostic Studies: Labs:   BMP:   Recent Labs  Lab 07/09/18  1240  07/09/18  1827 07/10/18  1352    226* 98   * 136 141   K 3.8 3.3* 4.1    105 109   CO2 21* 19* 23   BUN 9 10 12   CREATININE 0.7 0.9 1.0   CALCIUM 9.3 9.3 9.4       Pending Diagnostic Studies:     None         Medications:  Reconciled Home Medications:      Medication List      START taking these medications    gabapentin 300 MG capsule  Commonly known as:  NEURONTIN  Take 1 capsule (300 mg total) by mouth every evening.     hydrOXYzine HCl 25 MG tablet  Commonly known as:  ATARAX  Take 1 tablet (25 mg total) by mouth 3 (three) times daily as needed for Itching.     moxifloxacin 400 mg tablet  Commonly known as:  AVELOX  Take 1 tablet (400 mg total) by mouth once daily. for 5 days  Start taking on:  7/11/2018     predniSONE 20 MG tablet  Commonly known as:  DELTASONE  Take 3 tablets (60 mg total) by mouth once daily. Take 3 tablets daily on day #1-2, Take 2 tablets daily on days #3-4, Take 1 tablet daily on day #5-6, then stop taking for 10 days        CONTINUE taking these medications    DUONEB 2.5 mg-0.5 mg/3 mL nebulizer solution  Generic drug:  albuterol-ipratropium  Take 3 mLs by nebulization every 6 (six) hours as needed for Wheezing.        STOP taking these medications    azithromycin 250 MG tablet  Commonly known as:  Z-LUCIEN            Indwelling Lines/Drains at time of discharge:   Lines/Drains/Airways          No matching active lines, drains, or airways          Time spent on the discharge of patient: 33 minutes  Patient was seen and examined on the date of discharge and determined to be suitable for discharge.         Tyrone Dash MD  Department of Hospital Medicine  [facility]

## 2018-07-12 ENCOUNTER — OFFICE VISIT (OUTPATIENT)
Dept: INTERNAL MEDICINE | Facility: CLINIC | Age: 63
End: 2018-07-12
Payer: COMMERCIAL

## 2018-07-12 VITALS
DIASTOLIC BLOOD PRESSURE: 78 MMHG | WEIGHT: 186 LBS | BODY MASS INDEX: 36.52 KG/M2 | TEMPERATURE: 98 F | HEIGHT: 60 IN | OXYGEN SATURATION: 94 % | RESPIRATION RATE: 18 BRPM | HEART RATE: 82 BPM | SYSTOLIC BLOOD PRESSURE: 136 MMHG

## 2018-07-12 DIAGNOSIS — Z13.220 LIPID SCREENING: ICD-10-CM

## 2018-07-12 DIAGNOSIS — J20.9 CHRONIC BRONCHITIS WITH ACUTE EXACERBATION: Primary | ICD-10-CM

## 2018-07-12 DIAGNOSIS — Z11.59 NEED FOR HEPATITIS C SCREENING TEST: ICD-10-CM

## 2018-07-12 DIAGNOSIS — M89.9 BONE DISEASE: ICD-10-CM

## 2018-07-12 DIAGNOSIS — Z12.39 BREAST CANCER SCREENING: ICD-10-CM

## 2018-07-12 DIAGNOSIS — J42 CHRONIC BRONCHITIS WITH ACUTE EXACERBATION: Primary | ICD-10-CM

## 2018-07-12 DIAGNOSIS — Z12.11 COLON CANCER SCREENING: ICD-10-CM

## 2018-07-12 DIAGNOSIS — E78.00 PURE HYPERCHOLESTEROLEMIA: ICD-10-CM

## 2018-07-12 DIAGNOSIS — Z12.72 SCREENING FOR VAGINAL CANCER: ICD-10-CM

## 2018-07-12 DIAGNOSIS — Z01.00 ENCOUNTER FOR VISION SCREENING: ICD-10-CM

## 2018-07-12 DIAGNOSIS — E87.6 HYPOKALEMIA: ICD-10-CM

## 2018-07-12 PROCEDURE — 1111F DSCHRG MED/CURRENT MED MERGE: CPT | Mod: ,,, | Performed by: INTERNAL MEDICINE

## 2018-07-12 PROCEDURE — 99204 OFFICE O/P NEW MOD 45 MIN: CPT | Mod: ,,, | Performed by: INTERNAL MEDICINE

## 2018-07-12 PROCEDURE — 3008F BODY MASS INDEX DOCD: CPT | Mod: ,,, | Performed by: INTERNAL MEDICINE

## 2018-07-12 RX ORDER — BENZONATATE 100 MG/1
100 CAPSULE ORAL 3 TIMES DAILY PRN
Qty: 30 CAPSULE | Refills: 0 | Status: SHIPPED | OUTPATIENT
Start: 2018-07-12 | End: 2018-07-26

## 2018-07-12 RX ORDER — ALBUTEROL SULFATE 0.83 MG/ML
2.5 SOLUTION RESPIRATORY (INHALATION) EVERY 6 HOURS PRN
Qty: 25 EACH | Refills: 2 | Status: SHIPPED | OUTPATIENT
Start: 2018-07-12 | End: 2018-07-26

## 2018-07-12 NOTE — PROGRESS NOTES
SUBJECTIVE:    Patient ID: Tessa Enriquez is a 62 y.o. female.    Chief Complaint: Establish Care (pt was in [facility] for 1 day with onset of copd)    HPI     Pt went to hospital-hx bronchitis-got wet-bronchitis flared-worsened-oxygen level was 97%-got three breathing treatments and oxygen level dropped-got 7 treatments within 12 hours-which led to itching and rash on feet-hears wheezing as she flares-prior smoker ppd until the hospital stay-hospital did not give new Rx-she was given prednisone to take as well as those listed -no hx of asthma-only wheezes with the flares-still coughing clear mucous-    Generally, no shortness of breath until a flare-mostly starts with URI involving sinuses-she can feel sinuses draining with a flare-normally she is healthy exec for nebulized prn-she mud-bikes-    Arthritis-Ortho says it is disc related to degenerative changes-pain across lower back and into groin and down the lateral anterior left thigh constantly which makes her rub the leg constantly-standing in one spot makes it worse and after an 8 hour shift she can barely get to her car-      Past Medical History:   Diagnosis Date    Arthritis     COPD (chronic obstructive pulmonary disease)      Social History     Social History    Marital status:      Spouse name: N/A    Number of children: N/A    Years of education: N/A     Occupational History    Not on file.     Social History Main Topics    Smoking status: Former Smoker     Packs/day: 1.00     Years: 40.00     Types: Cigarettes     Quit date: 7/8/2018    Smokeless tobacco: Never Used    Alcohol use No    Drug use: No    Sexual activity: Not on file     Other Topics Concern    Not on file     Social History Narrative    No narrative on file     Past Surgical History:   Procedure Laterality Date    CHOLECYSTECTOMY      HYSTERECTOMY       Family History   Problem Relation Age of Onset    COPD Mother     COPD Father      Vitals:    07/12/18  1040   BP: 136/78   Pulse: 82   Resp: 18   Temp: 98.3 °F (36.8 °C)   SpO2: (!) 94%   Weight: 84.4 kg (186 lb)   Height: 5' (1.524 m)       Review of Systems   Constitutional: Negative for appetite change, chills, diaphoresis, fatigue, fever and unexpected weight change.   HENT: Negative for congestion, ear pain, hearing loss, nosebleeds, postnasal drip, sinus pain, sinus pressure, sneezing, sore throat, tinnitus, trouble swallowing and voice change.         HPI   Eyes: Negative for photophobia, pain, itching and visual disturbance.   Respiratory: Negative for apnea, cough, chest tightness, shortness of breath, wheezing and stridor.         HPI   Cardiovascular: Negative for chest pain, palpitations and leg swelling.   Gastrointestinal: Negative for abdominal distention, abdominal pain, blood in stool, constipation, diarrhea, nausea and vomiting.   Endocrine: Negative for cold intolerance, heat intolerance, polydipsia and polyuria.   Genitourinary: Negative for difficulty urinating, dyspareunia, dysuria, flank pain, frequency, hematuria, menstrual problem, pelvic pain, urgency, vaginal discharge and vaginal pain.   Musculoskeletal: Negative for arthralgias, back pain, joint swelling, myalgias, neck pain and neck stiffness.   Skin: Negative for pallor.   Allergic/Immunologic: Negative for environmental allergies and food allergies.   Neurological: Negative for dizziness, tremors, speech difficulty, weakness, light-headedness and numbness.   Hematological: Does not bruise/bleed easily.   Psychiatric/Behavioral: Negative for agitation, confusion, decreased concentration, sleep disturbance and suicidal ideas. The patient is not nervous/anxious.           Objective:      Physical Exam        Assessment:       1. Chronic bronchitis with acute exacerbation    2. Screening for vaginal cancer    3. Need for hepatitis C screening test    4. Bone disease    5. Breast cancer screening    6. Encounter for vision screening    7.  Hypokalemia    8. Colon cancer screening    9. Lipid screening    10. Pure hypercholesterolemia         Plan:           Chronic bronchitis with acute exacerbation  -     benzonatate (TESSALON) 100 MG capsule; Take 1 capsule (100 mg total) by mouth 3 (three) times daily as needed for Cough.  Dispense: 30 capsule; Refill: 0    Screening for vaginal cancer  -     Ambulatory referral to Obstetrics / Gynecology    Need for hepatitis C screening test    Bone disease  -     DXA Bone Density Spine And Hip    Breast cancer screening  -     Mammo Digital Screening Bilat with Tomos; Future; Expected date: 07/12/2018    Encounter for vision screening  -     Ambulatory referral to Optometry    Hypokalemia  -     Basic metabolic panel; Future; Expected date: 07/12/2018    Colon cancer screening  -     Cologuard Screening (Multitarget Stool DNA)    Lipid screening    Pure hypercholesterolemia  -     Lipid panel; Future; Expected date: 07/12/2018    Other orders  -     albuterol (PROVENTIL) 2.5 mg /3 mL (0.083 %) nebulizer solution; Take 3 mLs (2.5 mg total) by nebulization every 6 (six) hours as needed for Wheezing.  Dispense: 25 each; Refill: 2

## 2018-07-13 NOTE — PHYSICIAN QUERY
PT Name: Tessa Enriquez  MR #: [id number]    Physician Query Form - Respiratory Condition Clarification      CDS/: Tierney Cortez RN CCDS           Contact information: [email or web address]    This form is a permanent document in the medical record.    Query Date: July 13, 2018    By submitting this query, we are merely seeking further clarification of documentation. Please utilize your independent clinical judgment when addressing the question(s) below.    The Medical record contains the following   Indicators   Supporting Clinical Findings Location in Medical Record   x   SOB, MCGRATH, Wheezing, Productive Cough, Use of Accessory Muscles, etc. Positive for shortness of breath, cough, and wheezing .  Positive for SOB   ED note  H&P   x   Acute/Chronic Illness COPD  Smoker PMH      Radiology Findings     x   Respiratory Distress or Failure Acute hypoxemic respiratory failure     -In the setting of COPD and suspected URI   -Will treat COPD and URI and meanwhile continue with supplemental oxygen  -Assess for need for home oxygen.       H&P   x   Hypoxia or Hypercapnia Hypoxia H&P/DS   x   RR         ABGs         O2 sat Resp:  [18-24] 18  SpO2:  [85 %-96 %] 92 % 24 HR range 7/9      BiPAP/Intubation     x   Supplemental O2 1L-2L NC  NN      Home O2, Oxygen Dependence     x   Treatment Albuterol Neb  Avelox  Prednisone PO MAR      Other       Respiratory failure can be acute, chronic or both. It is generally further specificed as hypoxic, hypercapnic or both. Lastly, it is important to identify an etiology, if known or suspected.   References:: https://www.acphospitalist.org/archives/2013/10/coding; htm; http://[employer]/acute-respiratory-failure-know    The clinical guidelines noted below are only system guidelines, and do not replace the providers clinical judgment.    Provider, please specify diagnosis or diagnoses associated with above clinical findings.       [    ] Hypoxia Only    [  x  ]  Acute Respiratory Failure with Hypoxia - ABG pO2 < 60 mmHg or O2 sat of 88% on RA and respiratory symptoms documented    [    ] Acute Respiratory Insufficiency - Generally describes less severe respiratory symptoms and measurements (pO2, SpO2, pH, and pCO2) NOT meeting criteria for respiratory failure      [    ] Acute Respiratory Distress - Generally describes less severe respiratory symptoms (tachypnea, in respiratory distress, increased work of breathing, unable to speak in complete sentences, labored breathing, use of accessory muscles, RR> 24, cyanosis, dyspnea, wheezing, stridor, lethargy) without sufficient measurements (pO2, SpO2, pH, and pCO2) to meet criteria for respiratory failure     [    ] Other Respiratory Diagnosis (please specify): _________________________________  [    ] Clinically Undetermined    Please document in your progress notes daily for the duration of treatment until resolved and include in your discharge summary.

## 2018-07-19 ENCOUNTER — TELEPHONE (OUTPATIENT)
Dept: INTERNAL MEDICINE | Facility: CLINIC | Age: 63
End: 2018-07-19

## 2018-07-19 DIAGNOSIS — M85.80 OSTEOPENIA, UNSPECIFIED LOCATION: Primary | ICD-10-CM

## 2018-07-19 NOTE — TELEPHONE ENCOUNTER
----- Message from Karma Mcdermott MD sent at 7/19/2018  7:27 AM CDT -----  Please call the patient regarding her abnormal result. The studies indicate low bone mineral density but not low enough to begin treatment. I will order a vitamin D level and also would recommend Caltrate with D2 daily. Please prepare order for vitamin D level

## 2018-07-26 ENCOUNTER — OFFICE VISIT (OUTPATIENT)
Dept: INTERNAL MEDICINE | Facility: CLINIC | Age: 63
End: 2018-07-26
Payer: COMMERCIAL

## 2018-07-26 VITALS
HEIGHT: 60 IN | OXYGEN SATURATION: 97 % | TEMPERATURE: 98 F | RESPIRATION RATE: 18 BRPM | SYSTOLIC BLOOD PRESSURE: 122 MMHG | WEIGHT: 194 LBS | BODY MASS INDEX: 38.09 KG/M2 | DIASTOLIC BLOOD PRESSURE: 78 MMHG | HEART RATE: 78 BPM

## 2018-07-26 DIAGNOSIS — Z11.59 NEED FOR HEPATITIS C SCREENING TEST: ICD-10-CM

## 2018-07-26 DIAGNOSIS — J41.0 SIMPLE CHRONIC BRONCHITIS: Primary | ICD-10-CM

## 2018-07-26 DIAGNOSIS — E87.6 HYPOKALEMIA: ICD-10-CM

## 2018-07-26 PROBLEM — F17.200 SMOKING ADDICTION: Status: RESOLVED | Noted: 2018-07-09 | Resolved: 2018-07-26

## 2018-07-26 PROCEDURE — 3008F BODY MASS INDEX DOCD: CPT | Mod: ,,, | Performed by: INTERNAL MEDICINE

## 2018-07-26 PROCEDURE — 1111F DSCHRG MED/CURRENT MED MERGE: CPT | Mod: ,,, | Performed by: INTERNAL MEDICINE

## 2018-07-26 PROCEDURE — 99213 OFFICE O/P EST LOW 20 MIN: CPT | Mod: ,,, | Performed by: INTERNAL MEDICINE

## 2018-07-26 RX ORDER — PHENTERMINE HYDROCHLORIDE 15 MG/1
15 CAPSULE ORAL EVERY MORNING
Qty: 30 CAPSULE | Refills: 0 | Status: SHIPPED | OUTPATIENT
Start: 2018-07-26 | End: 2018-08-25

## 2018-07-26 RX ORDER — FLUTICASONE FUROATE AND VILANTEROL 200; 25 UG/1; UG/1
1 POWDER RESPIRATORY (INHALATION) DAILY
Qty: 2 EACH | Refills: 0 | COMMUNITY
Start: 2018-07-26 | End: 2020-09-30

## 2018-07-26 RX ORDER — FLUTICASONE FUROATE AND VILANTEROL 200; 25 UG/1; UG/1
1 POWDER RESPIRATORY (INHALATION) DAILY
COMMUNITY
End: 2018-07-26 | Stop reason: ALTCHOICE

## 2018-07-26 NOTE — PATIENT INSTRUCTIONS
Weight Management: Healthy Eating  Food is your bodys fuel. You cant live without it. The key is to give your body enough nutrients and energy without eating too much. Reading food labels can help you make healthy choices. Also, learn new eating habits to manage your weight.     All the values on the label are based on one serving. The serving size is the average portion. Remember to multiply the values on the label by the number of servings you eat.   Eat less fat  A gram of fat has almost 2.5 times the calories of a gram of protein or carbohydrates. Try to balance your food choices so that only 20% to 35% of your calories comes from total fat. This means an average of 2½ to 3½ grams of fat for each 100 calories you eat.  Eat more fiber  High-fiber foods are digested more slowly than low-fiber foods, so you feel full longer. Try to get at least 25 grams of fiber each day for a 2000 calorie diet. Foods high in fiber include:  · Vegetables and fruits  · Whole-grain or bran breads, pastas, and cereals  · Legumes (beans) and peas  As you begin to eat more fiber, be sure to drink plenty of water to keep your digestive system working smoothly.  Tips  Do's and don'ts include:   · Dont skip meals. This often leads to overeating later on. Its best to spread your eating throughout the day.  · Eat a variety of foods, not just a few favorites.  · If you find yourself eating when youre not hungry, ask yourself why. Many of us eat when were bored, stressed, or just to be polite. Listen to your body. If youre not hungry, get busy doing something else instead of eating.  · Eat slower, shooting for 20 to 30 minutes for each meal. It takes 20 minutes for your stomach to tell your brain that its full. Slow eaters tend to eat less and are still satisfied, while fast eaters may tend to be overeaters.   · Pay attention to what you eat. Dont read or watch TV during your meal.  Date Last Reviewed: 1/31/2016  © 0841-5725 [employer]  [employer]. [address] [place] [address], [place] [address]. All rights reserved. This information is not intended as a substitute for professional medical care. Always follow your healthcare professional's instructions.

## 2018-07-26 NOTE — PROGRESS NOTES
SUBJECTIVE:    Patient ID: Tessa Enriquez is a 62 y.o. female.    Chief Complaint: No chief complaint on file.    HPI     In for recheck for significant episode of bronchitis-Since last check she has STOPPED SMOKING!!! She has no separation anxiety-she is, however, gaining weight-she is not coughing-breathing good and definitely different since not smoking!!         Past Medical History:   Diagnosis Date    Arthritis     COPD (chronic obstructive pulmonary disease)      Social History     Social History    Marital status:      Spouse name: N/A    Number of children: N/A    Years of education: N/A     Occupational History    Not on file.     Social History Main Topics    Smoking status: Former Smoker     Packs/day: 1.00     Years: 40.00     Types: Cigarettes     Quit date: 7/8/2018    Smokeless tobacco: Never Used    Alcohol use No    Drug use: No    Sexual activity: Not on file     Other Topics Concern    Not on file     Social History Narrative    No narrative on file     Past Surgical History:   Procedure Laterality Date    CHOLECYSTECTOMY      HYSTERECTOMY       Family History   Problem Relation Age of Onset    COPD Mother     COPD Father      Vitals:    07/26/18 1742   BP: 122/78   Pulse: 78   Resp: 18   Temp: 98.2 °F (36.8 °C)   SpO2: 97%   Weight: 88 kg (194 lb)   Height: 5' (1.524 m)       Review of Systems   All other systems reviewed and are negative.         Objective:      Physical Exam   Constitutional: She is oriented to person, place, and time. She appears well-developed and well-nourished. She is cooperative.   overweight   HENT:   Head: Normocephalic and atraumatic.   Right Ear: Tympanic membrane normal.   Left Ear: Tympanic membrane normal.   Eyes: Conjunctivae and EOM are normal. Right pupil is round and reactive. Left pupil is round and reactive.   Neck: Trachea normal and normal range of motion. Neck supple.   Cardiovascular: Normal rate, regular rhythm, S1  normal, S2 normal, normal heart sounds and intact distal pulses.    Pulmonary/Chest: Breath sounds normal.   Abdominal: Soft. Bowel sounds are normal. There is no rigidity and no guarding.   Musculoskeletal: Normal range of motion.   Lymphadenopathy:     She has no cervical adenopathy.     She has no axillary adenopathy.   Neurological: She is alert and oriented to person, place, and time.   Skin: Skin is warm and dry. Capillary refill takes less than 2 seconds.   Psychiatric: She has a normal mood and affect. Her behavior is normal. Judgment and thought content normal.   Nursing note and vitals reviewed.          Assessment:       1. Simple chronic bronchitis    2. BMI 37.0-37.9, adult    3. Need for hepatitis C screening test    4. Hypokalemia         Plan:           Simple chronic bronchitis  -     fluticasone-vilnterol (BREO ELLIPTA) 200-25 mcg/dose DsDv diskus inhaler; Inhale 1 puff into the lungs once daily. Controller  Dispense: 2 each; Refill: 0    BMI 37.0-37.9, adult        -    Dietary recommendations made    Need for hepatitis C screening test  -     Hepatitis C antibody; Future; Expected date: 07/26/2018    Hypokalemia  -     Potassium; Future; Expected date: 08/09/2018    Other orders  -     phentermine 15 MG capsule; Take 1 capsule (15 mg total) by mouth every morning.  Dispense: 30 capsule; Refill: 0

## 2018-08-01 ENCOUNTER — OFFICE VISIT (OUTPATIENT)
Dept: ORTHOPEDICS | Facility: CLINIC | Age: 63
End: 2018-08-01
Payer: COMMERCIAL

## 2018-08-01 VITALS
HEIGHT: 60 IN | SYSTOLIC BLOOD PRESSURE: 130 MMHG | BODY MASS INDEX: 32.98 KG/M2 | DIASTOLIC BLOOD PRESSURE: 70 MMHG | WEIGHT: 168 LBS

## 2018-08-01 DIAGNOSIS — M48.062 SPINAL STENOSIS OF LUMBAR REGION WITH NEUROGENIC CLAUDICATION: ICD-10-CM

## 2018-08-01 DIAGNOSIS — M43.16 SPONDYLOLISTHESIS, LUMBAR REGION: Primary | ICD-10-CM

## 2018-08-01 PROCEDURE — 3008F BODY MASS INDEX DOCD: CPT | Mod: ,,, | Performed by: ORTHOPAEDIC SURGERY

## 2018-08-01 PROCEDURE — 1111F DSCHRG MED/CURRENT MED MERGE: CPT | Mod: ,,, | Performed by: ORTHOPAEDIC SURGERY

## 2018-08-01 PROCEDURE — 99213 OFFICE O/P EST LOW 20 MIN: CPT | Mod: ,,, | Performed by: ORTHOPAEDIC SURGERY

## 2018-08-01 RX ORDER — TRAMADOL HYDROCHLORIDE 50 MG/1
50 TABLET ORAL EVERY 6 HOURS PRN
Qty: 28 TABLET | Refills: 3 | Status: SHIPPED | OUTPATIENT
Start: 2018-08-01 | End: 2018-08-08

## 2018-08-01 NOTE — PROGRESS NOTES
Subjective:       Patient ID: Tessa Enriquez is a 62 y.o. female.    Chief Complaint: Pain of the Lumbar Spine (Lumbar MRI results)      History of Present Illness   Patient presents for 1-1/2 year history of low back pain radiating down the anterior aspect of the left leg to the knee. It now involves the right leg also but not below the knee for the left leg being greater her symptoms occur at rest and a worse with prolonged walking she has no bowel or bladder dysfunction. She is not undergone any treatment her pains on a constant daily basis.   to discuss mri  results    Current Medications  Current Outpatient Prescriptions   Medication Sig Dispense Refill    calcium carbonate/vitamin D3 (CALTRATE WITH VITAMIN D3 ORAL) Take by mouth.      fluticasone-vilanterol (BREO ELLIPTA) 200-25 mcg/dose DsDv diskus inhaler Inhale 1 puff into the lungs once daily. Controller 2 each 0    phentermine 15 MG capsule Take 1 capsule (15 mg total) by mouth every morning. 30 capsule 0     No current facility-administered medications for this visit.        Allergies  Review of patient's allergies indicates:   Allergen Reactions    Pcn [penicillins]        Past Medical History  Past Medical History:   Diagnosis Date    Arthritis     COPD (chronic obstructive pulmonary disease)        Surgical History  Past Surgical History:   Procedure Laterality Date    CHOLECYSTECTOMY      HYSTERECTOMY         Family History:   Family History   Problem Relation Age of Onset    COPD Mother     COPD Father        Social History:   Social History     Social History    Marital status:      Spouse name: N/A    Number of children: N/A    Years of education: N/A     Occupational History    Not on file.     Social History Main Topics    Smoking status: Former Smoker     Packs/day: 1.00     Years: 40.00     Types: Cigarettes     Quit date: 7/8/2018    Smokeless tobacco: Never Used    Alcohol use No    Drug use: No    Sexual  activity: Not on file     Other Topics Concern    Not on file     Social History Narrative    No narrative on file       Hospitalization/Major Diagnostic Procedure:     Review of Systems     General/Constitutional:  Chills denies. Fatigue denies. Fever denies. Weight gain denies. Weight loss denies.    Respiratory:  Shortness of breath denies.    Cardiovascular:  Chest pain denies.    Gastrointestinal:  Constipation denies. Diarrhea denies. Nausea denies. Vomiting denies.     Hematology:  Easy bruising denies. Prolonged bleeding denies.     Genitourinary:  Frequent urination denies. Pain in lower back denies. Painful urination denies.     Musculoskeletal:  See HPI for details    Skin:  Rash denies.    Neurologic:  Dizziness denies. Gait abnormalities denies. Seizures denies. Tingling/Numbess denies.    Psychiatric:  Anxiety denies. Depressed mood denies.     Objective:   Vital Signs:   Vitals:    08/01/18 1117   BP: 130/70        Physical Exam      General Examination:     Constitutional: The patient is alert and oriented to lace person and time. Mood is pleasant.     Head/Face: Normal facial features normal eyebrows    Eyes: Normal extraocular motion bilaterally    Lungs: Respirations are equal and unlabored    Gait is coordinated.    Cardiovascular: There are no swelling or varicosities present.    Lymphatic: Negative for adenopathy    Skin: Normal    Neurological: Level of consciousness normal. Oriented to place person and time and situation    Psychiatric: Oriented to time place person and situation  Lumbar spine exam shows patient walks with slightly waddling gait she can stand erect and has pain when doing so subjective tenderness over the paraspinous muscles L3-S1 without spasm range of motion moderately restricted due to pain range of motion of the hip and knee is normal straight leg raising causes back pain femoral nerve stretch test on the left side is weakly positive  Lumbar MRI was personally reviewed  reviewed and discussed with patient. She has a transitional S1 level. There are some degenerative changes with spondylolisthesis and foraminal narrowing at the L5-S1 level.    XRAY Report/ Interpretation:      Assessment:       1. Spondylolisthesis, lumbar region    2. Spinal stenosis of lumbar region with neurogenic claudication        Plan:       Tessa was seen today for pain.    Diagnoses and all orders for this visit:    Spondylolisthesis, lumbar region    Spinal stenosis of lumbar region with neurogenic claudication         No Follow-up on file.    Treatment options were discussed. She will consider referral to pain management.    This note was created using Dragon voice recognition software that occasionally misinterpreted phrases or words.

## 2018-08-01 NOTE — PATIENT INSTRUCTIONS
[facility]  [facility]      EPIDURAL STEROID INJECTION    What is the epidural space?    The membrane that covers the spinal cord and nerve roots in your spine is called the dura membrane. The space surrounding the dura is the epidural space. Nerves travel through the epidural space to your neck, back, legs and arms. Inflammation of these nerve roots may cause pain in these regions due to irritation from a damaged disc or from contact in some way with the bony structure of the spine.     What is an epidural steroid injection and why is it helpful?    An epidural injection places anti-inflammatory medicine into the epidural space to decrease inflammation of the nerve roots, hopefully reducing your pain. The epidural injection may help the injury to heal by reducing inflammation. It may provide permanent relief or a period of pain relief for several months while the injury or cause of your pain is healing.    What will happen to me during the procedure?    An IV will be started so that sedation can be given. You will be positioned in such a way that your doctor can best visualize the area to be injected. The skin on your back or neck will be scrubbed using sterile scrub (soap). Next, the physician will numb a small area of skin where the epidural needle will be placed. This medicine stings for several seconds. After the numbing medicine is effective, your doctor will direct a small epidural needle using x-ray guidance into the epidural space. A small of amount of contrast (dye) is then injected to insure proper needle position in the epidural space. Then a mixture of numbing medicine (anesthetic) and anti-inflammatory (cortisone/steroid) will be injected.     What will happen after the procedure?    You will go back to the recovery area where you will be monitored for 30-60 minutes. You may experience some numbness and/or weakness into your legs and arms for a few hours. The steroids  will begin working 3-5 days after the procedure and may continue to improve your pain for up to fourteen days.     You should have a follow up visit 2 weeks after the procedure to determine if further treatment is needed. These injections are often performed in a series of three (3) to provide the best possible results.     General Pre/Post Procedure Instructions:    You should not eat or drink anything after 12 oclock the night before the procedure. If you are diabetic, do not take your insulin or oral medication the morning of the procedure because you have had nothing to eat. If you are taking routine heart or blood pressure medicine, you should take it with a sip of water the morning of the procedure. You should not take medications that give pain relief or lessen your usual pain. These medicines can be restarted after the procedure if they are needed.     If you are on Coumadin, Heparin, Plavix or other blood thinners (including aspirin and all medications that contain aspirin), you must notify the office well in advance so the timing of these medications can be coordinated with your primary care physician.     You will be at the hospital/surgery center for a few hours for your procedure. A  must accompany you and be responsible for driving you home. No driving is allowed the day of the procedure. You may return to your normal activities the day after the procedure, including returning to work.     If you are unable to keep this appointment, please give notice as soon as possible and at least 24 hours in advance during regular office hours.    Thank you.

## 2018-10-01 ENCOUNTER — OFFICE VISIT (OUTPATIENT)
Dept: ORTHOPEDICS | Facility: CLINIC | Age: 63
End: 2018-10-01
Payer: COMMERCIAL

## 2018-10-01 VITALS
DIASTOLIC BLOOD PRESSURE: 70 MMHG | BODY MASS INDEX: 37.3 KG/M2 | SYSTOLIC BLOOD PRESSURE: 110 MMHG | HEIGHT: 60 IN | WEIGHT: 190 LBS

## 2018-10-01 DIAGNOSIS — M48.062 SPINAL STENOSIS OF LUMBAR REGION WITH NEUROGENIC CLAUDICATION: ICD-10-CM

## 2018-10-01 DIAGNOSIS — M43.16 SPONDYLOLISTHESIS, LUMBAR REGION: Primary | ICD-10-CM

## 2018-10-01 PROCEDURE — 99213 OFFICE O/P EST LOW 20 MIN: CPT | Mod: ,,, | Performed by: PHYSICIAN ASSISTANT

## 2018-10-01 PROCEDURE — 3008F BODY MASS INDEX DOCD: CPT | Mod: ,,, | Performed by: PHYSICIAN ASSISTANT

## 2018-10-01 RX ORDER — TRAMADOL HYDROCHLORIDE 50 MG/1
50 TABLET ORAL EVERY 4 HOURS PRN
Qty: 42 TABLET | Refills: 2 | Status: SHIPPED | OUTPATIENT
Start: 2018-10-01 | End: 2018-12-23 | Stop reason: SDUPTHER

## 2018-10-01 RX ORDER — TRAMADOL HYDROCHLORIDE 50 MG/1
TABLET ORAL
COMMUNITY
Start: 2018-09-10 | End: 2018-10-01 | Stop reason: SDUPTHER

## 2018-10-01 NOTE — PROGRESS NOTES
Subjective:       Patient ID: Tessa Enriquez is a 62 y.o. female.    Chief Complaint: Pain of the Lumbar Spine (Lumbar pain is a little better. It does radiate down both legs to knees with numbness, tingling and burning. No other treatment)      History of Present Illness  Patient is here to follow-up for chronic low back pain with bilateral lower extremity radiculitis to the knee worse on the left than on the right. Overall she manages her pain with activity modification and about 1-2 tramadol a day.    Current Medications  Current Outpatient Medications   Medication Sig Dispense Refill    calcium carbonate/vitamin D3 (CALTRATE WITH VITAMIN D3 ORAL) Take by mouth.      fluticasone-vilanterol (BREO ELLIPTA) 200-25 mcg/dose DsDv diskus inhaler Inhale 1 puff into the lungs once daily. Controller 2 each 0    traMADol (ULTRAM) 50 mg tablet        No current facility-administered medications for this visit.        Allergies  Review of patient's allergies indicates:   Allergen Reactions    Pcn [penicillins]        Past Medical History  Past Medical History:   Diagnosis Date    Arthritis     COPD (chronic obstructive pulmonary disease)        Surgical History  Past Surgical History:   Procedure Laterality Date    CHOLECYSTECTOMY      HYSTERECTOMY         Family History:   Family History   Problem Relation Age of Onset    COPD Mother     COPD Father        Social History:   Social History     Socioeconomic History    Marital status:      Spouse name: Not on file    Number of children: Not on file    Years of education: Not on file    Highest education level: Not on file   Social Needs    Financial resource strain: Not on file    Food insecurity - worry: Not on file    Food insecurity - inability: Not on file    Transportation needs - medical: Not on file    Transportation needs - non-medical: Not on file   Occupational History    Not on file   Tobacco Use    Smoking status: Former Smoker      Packs/day: 1.00     Years: 40.00     Pack years: 40.00     Types: Cigarettes     Last attempt to quit: 2018     Years since quittin.2    Smokeless tobacco: Never Used   Substance and Sexual Activity    Alcohol use: No    Drug use: No    Sexual activity: Not on file   Other Topics Concern    Not on file   Social History Narrative    Not on file       Hospitalization/Major Diagnostic Procedure:     Review of Systems     General/Constitutional:  Chills denies. Fatigue denies. Fever denies. Weight gain denies. Weight loss denies.    Respiratory:  Shortness of breath denies.    Cardiovascular:  Chest pain denies.    Gastrointestinal:  Constipation denies. Diarrhea denies. Nausea denies. Vomiting denies.     Hematology:  Easy bruising denies. Prolonged bleeding denies.     Genitourinary:  Frequent urination denies. Pain in lower back denies. Painful urination denies.     Musculoskeletal:  See HPI for details    Skin:  Rash denies.    Neurologic:  Dizziness denies. Gait abnormalities denies. Seizures denies. Tingling/Numbess denies.    Psychiatric:  Anxiety denies. Depressed mood denies.     Objective:   Vital Signs:   Vitals:    10/01/18 1551   BP: 110/70        Physical Exam      General Examination:     Constitutional: The patient is alert and oriented to lace person and time. Mood is pleasant.     Head/Face: Normal facial features normal eyebrows    Eyes: Normal extraocular motion bilaterally    Lungs: Respirations are equal and unlabored    Gait is coordinated.    Cardiovascular: There are no swelling or varicosities present.    Lymphatic: Negative for adenopathy    Skin: Normal    Neurological: Level of consciousness normal. Oriented to place person and time and situation    Psychiatric: Oriented to time place person and situation    Lumbar spine exam shows patient walks with slightly waddling gait she can stand erect and has pain when doing so subjective tenderness over the paraspinous muscles  L3-S1 without spasm range of motion moderately restricted due to pain range of motion of the hip and knee is normal straight leg raising causes back pain femoral nerve stretch test on the left side is weakly positive.    XRAY Report/ Interpretation: No new studies      Assessment:       1. Spondylolisthesis, lumbar region    2. Spinal stenosis of lumbar region with neurogenic claudication        Plan:       Tessa was seen today for pain.    Diagnoses and all orders for this visit:    Spondylolisthesis, lumbar region    Spinal stenosis of lumbar region with neurogenic claudication         No Follow-up on file.    Continue with tramadol. I refilled her prescription. We will see her back in 3 months or sooner if needed.    This note was created using Dragon voice recognition software that occasionally misinterpreted phrases or words.

## 2018-10-02 ENCOUNTER — OFFICE VISIT (OUTPATIENT)
Dept: FAMILY MEDICINE | Facility: CLINIC | Age: 63
End: 2018-10-02
Payer: COMMERCIAL

## 2018-10-02 VITALS
WEIGHT: 196 LBS | HEART RATE: 82 BPM | BODY MASS INDEX: 38.48 KG/M2 | RESPIRATION RATE: 16 BRPM | DIASTOLIC BLOOD PRESSURE: 78 MMHG | TEMPERATURE: 98 F | SYSTOLIC BLOOD PRESSURE: 142 MMHG | HEIGHT: 60 IN | OXYGEN SATURATION: 98 %

## 2018-10-02 DIAGNOSIS — R63.5 WEIGHT GAIN: Primary | ICD-10-CM

## 2018-10-02 DIAGNOSIS — J44.9 CHRONIC OBSTRUCTIVE PULMONARY DISEASE WITH HYPOXIA: ICD-10-CM

## 2018-10-02 DIAGNOSIS — M51.16 LUMBAR DISC DISEASE WITH RADICULOPATHY: ICD-10-CM

## 2018-10-02 PROCEDURE — 3008F BODY MASS INDEX DOCD: CPT | Mod: ,,, | Performed by: INTERNAL MEDICINE

## 2018-10-02 PROCEDURE — 99213 OFFICE O/P EST LOW 20 MIN: CPT | Mod: ,,, | Performed by: INTERNAL MEDICINE

## 2018-10-02 RX ORDER — PHENTERMINE HYDROCHLORIDE 15 MG/1
15 CAPSULE ORAL EVERY MORNING
COMMUNITY
End: 2018-10-02 | Stop reason: SDUPTHER

## 2018-10-02 RX ORDER — PHENTERMINE HYDROCHLORIDE 15 MG/1
15 CAPSULE ORAL EVERY MORNING
Qty: 30 CAPSULE | Refills: 0 | Status: SHIPPED | OUTPATIENT
Start: 2018-10-02 | End: 2019-02-11

## 2018-10-02 NOTE — PROGRESS NOTES
"  SUBJECTIVE:    Patient ID: Tessa Enriquez is a 62 y.o. female.    Chief Complaint: Cough; Medication Refill; and Follow-up    HPI     PT STOPPED SMOKING but gained all this weight from August-she says she was fine until this am when she got out of bed she felt her throat was closing up-said they have new chicken batter at work and since she is out of there she does not have that feeling-no actual sore throat-only new thing is the new batter-        Last 3 sets of Vitals    Vitals - 1 value per visit 8/1/2018 10/1/2018 10/2/2018   SYSTOLIC 130 110 144   DIASTOLIC 70 70 78   PULSE - - 82   TEMPERATURE - - 97.9   RESPIRATIONS - - 16   SPO2 - - 98   Weight (lb) 168 190 196   Weight (kg) 76.204 86.183 88.905   HEIGHT 5' 0" 5' 0" 5' 0"   BODY MASS INDEX 32.81 37.11 38.28   VISIT REPORT - - -   Pain Score  5 6 0     Admission on 07/09/2018, Discharged on 07/10/2018   Component Date Value Ref Range Status    WBC 07/09/2018 8.20  3.90 - 12.70 K/uL Final    RBC 07/09/2018 4.50  4.00 - 5.40 M/uL Final    Hemoglobin 07/09/2018 13.3  12.0 - 16.0 g/dL Final    Hematocrit 07/09/2018 39.7  37.0 - 48.5 % Final    MCV 07/09/2018 88  82 - 98 fL Final    MCH 07/09/2018 29.5  27.0 - 31.0 pg Final    MCHC 07/09/2018 33.5  32.0 - 36.0 g/dL Final    RDW 07/09/2018 13.3  11.5 - 14.5 % Final    Platelets 07/09/2018 212  150 - 350 K/uL Final    MPV 07/09/2018 9.7  9.2 - 12.9 fL Final    Gran # (ANC) 07/09/2018 6.3  1.8 - 7.7 K/uL Final    Lymph # 07/09/2018 1.5  1.0 - 4.8 K/uL Final    Mono # 07/09/2018 0.3  0.3 - 1.0 K/uL Final    Eos # 07/09/2018 0.0  0.0 - 0.5 K/uL Final    Baso # 07/09/2018 0.00  0.00 - 0.20 K/uL Final    Gran% 07/09/2018 76.8* 38.0 - 73.0 % Final    Lymph% 07/09/2018 18.6  18.0 - 48.0 % Final    Mono% 07/09/2018 4.2  4.0 - 15.0 % Final    Eosinophil% 07/09/2018 0.2  0.0 - 8.0 % Final    Basophil% 07/09/2018 0.2  0.0 - 1.9 % Final    Differential Method 07/09/2018 Automated   Final    " Sodium 07/09/2018 134* 136 - 145 mmol/L Final    Potassium 07/09/2018 3.8  3.5 - 5.1 mmol/L Final    Chloride 07/09/2018 103  95 - 110 mmol/L Final    CO2 07/09/2018 21* 23 - 29 mmol/L Final    Glucose 07/09/2018 103  70 - 110 mg/dL Final    BUN, Bld 07/09/2018 9  6 - 20 mg/dL Final    Creatinine 07/09/2018 0.7  0.5 - 1.4 mg/dL Final    Calcium 07/09/2018 9.3  8.7 - 10.5 mg/dL Final    Total Protein 07/09/2018 7.4  6.0 - 8.4 g/dL Final    Albumin 07/09/2018 3.8  3.5 - 5.2 g/dL Final    Total Bilirubin 07/09/2018 0.6  0.1 - 1.0 mg/dL Final    Alkaline Phosphatase 07/09/2018 84  55 - 135 U/L Final    AST 07/09/2018 12  10 - 40 U/L Final    ALT 07/09/2018 12  10 - 44 U/L Final    Anion Gap 07/09/2018 10  8 - 16 mmol/L Final    eGFR if African American 07/09/2018 >60  >60 mL/min/1.73 m^2 Final    eGFR if non African American 07/09/2018 >60  >60 mL/min/1.73 m^2 Final    Sodium 07/09/2018 136  136 - 145 mmol/L Final    Potassium 07/09/2018 3.3* 3.5 - 5.1 mmol/L Final    Chloride 07/09/2018 105  95 - 110 mmol/L Final    CO2 07/09/2018 19* 23 - 29 mmol/L Final    Glucose 07/09/2018 226* 70 - 110 mg/dL Final    BUN, Bld 07/09/2018 10  6 - 20 mg/dL Final    Creatinine 07/09/2018 0.9  0.5 - 1.4 mg/dL Final    Calcium 07/09/2018 9.3  8.7 - 10.5 mg/dL Final    Anion Gap 07/09/2018 12  8 - 16 mmol/L Final    eGFR if African American 07/09/2018 >60  >60 mL/min/1.73 m^2 Final    eGFR if non African American 07/09/2018 >60  >60 mL/min/1.73 m^2 Final    WBC 07/09/2018 6.40  3.90 - 12.70 K/uL Final    RBC 07/09/2018 4.15  4.00 - 5.40 M/uL Final    Hemoglobin 07/09/2018 12.4  12.0 - 16.0 g/dL Final    Hematocrit 07/09/2018 36.9* 37.0 - 48.5 % Final    MCV 07/09/2018 89  82 - 98 fL Final    MCH 07/09/2018 29.8  27.0 - 31.0 pg Final    MCHC 07/09/2018 33.5  32.0 - 36.0 g/dL Final    RDW 07/09/2018 13.4  11.5 - 14.5 % Final    Platelets 07/09/2018 198  150 - 350 K/uL Final    MPV 07/09/2018 9.9  9.2 -  12.9 fL Final    Gran # (ANC) 2018 6.0  1.8 - 7.7 K/uL Final    Lymph # 2018 0.3* 1.0 - 4.8 K/uL Final    Mono # 2018 0.0* 0.3 - 1.0 K/uL Final    Eos # 2018 0.0  0.0 - 0.5 K/uL Final    Baso # 2018 0.00  0.00 - 0.20 K/uL Final    Gran% 2018 95.0* 38.0 - 73.0 % Final    Lymph% 2018 4.4* 18.0 - 48.0 % Final    Mono% 2018 0.6* 4.0 - 15.0 % Final    Eosinophil% 2018 0.0  0.0 - 8.0 % Final    Basophil% 2018 0.0  0.0 - 1.9 % Final    Differential Method 2018 Automated   Final    Sodium 07/10/2018 141  136 - 145 mmol/L Final    Potassium 07/10/2018 4.1  3.5 - 5.1 mmol/L Final    Chloride 07/10/2018 109  95 - 110 mmol/L Final    CO2 07/10/2018 23  23 - 29 mmol/L Final    Glucose 07/10/2018 98  70 - 110 mg/dL Final    BUN, Bld 07/10/2018 12  6 - 20 mg/dL Final    Creatinine 07/10/2018 1.0  0.5 - 1.4 mg/dL Final    Calcium 07/10/2018 9.4  8.7 - 10.5 mg/dL Final    Anion Gap 07/10/2018 9  8 - 16 mmol/L Final    eGFR if African American 07/10/2018 >60  >60 mL/min/1.73 m^2 Final    eGFR if non African American 07/10/2018 >60  >60 mL/min/1.73 m^2 Final       Past Medical History:   Diagnosis Date    Arthritis     COPD (chronic obstructive pulmonary disease)      Social History     Socioeconomic History    Marital status:      Spouse name: Not on file    Number of children: Not on file    Years of education: Not on file    Highest education level: Not on file   Social Needs    Financial resource strain: Not on file    Food insecurity - worry: Not on file    Food insecurity - inability: Not on file    Transportation needs - medical: Not on file    Transportation needs - non-medical: Not on file   Occupational History    Not on file   Tobacco Use    Smoking status: Former Smoker     Packs/day: 1.00     Years: 40.00     Pack years: 40.00     Types: Cigarettes     Last attempt to quit: 2018     Years since quittin.2     Smokeless tobacco: Never Used   Substance and Sexual Activity    Alcohol use: No    Drug use: No    Sexual activity: Not on file   Other Topics Concern    Not on file   Social History Narrative    Not on file     Past Surgical History:   Procedure Laterality Date    CHOLECYSTECTOMY      HYSTERECTOMY       Family History   Problem Relation Age of Onset    COPD Mother     COPD Father      Vitals:    10/02/18 1614   BP: (!) 144/78   Pulse: 82   Resp: 16   Temp: 97.9 °F (36.6 °C)   SpO2: 98%   Weight: 88.9 kg (196 lb)   Height: 5' (1.524 m)       Review of Systems   Constitutional: Negative for chills, fatigue, fever and unexpected weight change.   HENT: Negative for congestion, ear pain, hearing loss, sinus pain and sore throat.    Eyes: Negative for pain and visual disturbance.   Respiratory: Negative for cough, shortness of breath and wheezing.    Cardiovascular: Negative for chest pain, palpitations and leg swelling.   Gastrointestinal: Negative for abdominal pain, blood in stool, constipation, diarrhea, nausea and vomiting.   Endocrine: Negative for cold intolerance and heat intolerance.   Genitourinary: Negative for difficulty urinating, dysuria, frequency, pelvic pain and urgency.   Musculoskeletal: Negative for back pain, joint swelling and neck pain.        3 ruptured discs-back and leg pain when she is up on her feet-she is on tramadol which has been allowed-does not want the injections   Skin: Negative for pallor and rash.   Neurological: Negative for dizziness, tremors, weakness, numbness and headaches.   Hematological: Does not bruise/bleed easily.   Psychiatric/Behavioral: Negative for agitation, sleep disturbance and suicidal ideas.          Objective:      Physical Exam   Constitutional: She is oriented to person, place, and time. She appears well-developed and well-nourished. She is cooperative.   BMI   HENT:   Head: Normocephalic and atraumatic.   Right Ear: Tympanic membrane normal.    Left Ear: Tympanic membrane normal.   Eyes: Conjunctivae and EOM are normal. Right pupil is round and reactive. Left pupil is round and reactive.   Neck: Trachea normal and normal range of motion. Neck supple.   Cardiovascular: Normal rate, regular rhythm, S1 normal, S2 normal, normal heart sounds and intact distal pulses.   Pulmonary/Chest:   Decreased BS   Abdominal: Soft. Bowel sounds are normal. There is no rigidity and no guarding.   Lymphadenopathy:     She has no cervical adenopathy.     She has no axillary adenopathy.   Neurological: She is alert and oriented to person, place, and time.   Skin: Skin is warm and dry. Capillary refill takes less than 2 seconds.   Psychiatric: She has a normal mood and affect. Her behavior is normal. Thought content normal.   Nursing note and vitals reviewed.          Assessment:       1. Weight gain    2. Chronic obstructive pulmonary disease with hypoxia    3. Lumbar disc disease with radiculopathy    4. BMI 38.0-38.9,adult         Plan:           Weight gain  -     phentermine 15 MG capsule; Take 1 capsule (15 mg total) by mouth every morning.  Dispense: 30 capsule; Refill: 0    Chronic obstructive pulmonary disease with hypoxia    Lumbar disc disease with radiculopathy    BMI 38.0-38.9,adult

## 2018-12-23 DIAGNOSIS — M43.16 SPONDYLOLISTHESIS, LUMBAR REGION: ICD-10-CM

## 2018-12-23 DIAGNOSIS — M48.062 SPINAL STENOSIS OF LUMBAR REGION WITH NEUROGENIC CLAUDICATION: ICD-10-CM

## 2018-12-26 RX ORDER — TRAMADOL HYDROCHLORIDE 50 MG/1
TABLET ORAL
Qty: 42 TABLET | Refills: 1 | Status: SHIPPED | OUTPATIENT
Start: 2018-12-26 | End: 2019-01-07 | Stop reason: SDUPTHER

## 2019-01-07 ENCOUNTER — OFFICE VISIT (OUTPATIENT)
Dept: ORTHOPEDICS | Facility: CLINIC | Age: 64
End: 2019-01-07
Payer: COMMERCIAL

## 2019-01-07 VITALS
WEIGHT: 196 LBS | DIASTOLIC BLOOD PRESSURE: 58 MMHG | HEART RATE: 77 BPM | HEIGHT: 60 IN | BODY MASS INDEX: 38.48 KG/M2 | SYSTOLIC BLOOD PRESSURE: 112 MMHG

## 2019-01-07 DIAGNOSIS — M70.62 GREATER TROCHANTERIC BURSITIS OF LEFT HIP: ICD-10-CM

## 2019-01-07 DIAGNOSIS — M48.062 SPINAL STENOSIS OF LUMBAR REGION WITH NEUROGENIC CLAUDICATION: ICD-10-CM

## 2019-01-07 DIAGNOSIS — M43.16 SPONDYLOLISTHESIS, LUMBAR REGION: Primary | ICD-10-CM

## 2019-01-07 PROCEDURE — 3008F PR BODY MASS INDEX (BMI) DOCUMENTED: ICD-10-PCS | Mod: ,,, | Performed by: ORTHOPAEDIC SURGERY

## 2019-01-07 PROCEDURE — 99213 OFFICE O/P EST LOW 20 MIN: CPT | Mod: 25,,, | Performed by: ORTHOPAEDIC SURGERY

## 2019-01-07 PROCEDURE — 99213 PR OFFICE/OUTPT VISIT, EST, LEVL III, 20-29 MIN: ICD-10-PCS | Mod: 25,,, | Performed by: ORTHOPAEDIC SURGERY

## 2019-01-07 PROCEDURE — 3008F BODY MASS INDEX DOCD: CPT | Mod: ,,, | Performed by: ORTHOPAEDIC SURGERY

## 2019-01-07 PROCEDURE — 20610 DRAIN/INJ JOINT/BURSA W/O US: CPT | Mod: LT,,, | Performed by: ORTHOPAEDIC SURGERY

## 2019-01-07 PROCEDURE — 20610 LARGE JOINT ASPIRATION/INJECTION: L GREATER TROCHANTERIC BURSA: ICD-10-PCS | Mod: LT,,, | Performed by: ORTHOPAEDIC SURGERY

## 2019-01-07 RX ORDER — TRAMADOL HYDROCHLORIDE 50 MG/1
50 TABLET ORAL EVERY 4 HOURS PRN
Qty: 42 TABLET | Refills: 3 | Status: SHIPPED | OUTPATIENT
Start: 2019-01-07 | End: 2019-01-14

## 2019-01-07 RX ORDER — METHYLPREDNISOLONE ACETATE 40 MG/ML
40 INJECTION, SUSPENSION INTRA-ARTICULAR; INTRALESIONAL; INTRAMUSCULAR; SOFT TISSUE
Status: DISCONTINUED | OUTPATIENT
Start: 2019-01-07 | End: 2019-01-07 | Stop reason: HOSPADM

## 2019-01-07 RX ADMIN — METHYLPREDNISOLONE ACETATE 40 MG: 40 INJECTION, SUSPENSION INTRA-ARTICULAR; INTRALESIONAL; INTRAMUSCULAR; SOFT TISSUE at 03:01

## 2019-01-07 NOTE — PROCEDURES
Large Joint Aspiration/Injection: L greater trochanteric bursa  Date/Time: 1/7/2019 3:16 PM  Performed by: Shun Branch MD  Authorized by: Shun Branch MD     Consent Done?:  Yes (Verbal)  Indications:  Pain  Procedure site marked: Yes    Timeout: Prior to procedure the correct patient, procedure, and site was verified      Location:  Hip  Site:  L greater trochanteric bursa  Prep: Patient was prepped and draped in usual sterile fashion    Needle size:  25 G  Medications:  40 mg methylPREDNISolone acetate 40 mg/mL; 40 mg methylPREDNISolone acetate 40 mg/mL  Patient tolerance:  Patient tolerated the procedure well with no immediate complications

## 2019-01-07 NOTE — PROGRESS NOTES
Subjective:       Patient ID: Tessa Enriquez is a 63 y.o. female.    Chief Complaint: Pain of the Lumbar Spine (Lumbar pain radiates down both legs to ankles with numbness, tingling and burning. No other treatment)      History of Present Illness  Patient is here to follow-up for chronic low back pain with bilateral lower extremity radiculitis to the knee worse on the left than on the right. Overall she manages her pain with activity modification and about 1-2 tramadol a day.    Chief complaint today is significant pain over the left greater trochanter    Current Medications  Current Outpatient Medications   Medication Sig Dispense Refill    calcium carbonate/vitamin D3 (CALTRATE WITH VITAMIN D3 ORAL) Take by mouth.      fluticasone-vilanterol (BREO ELLIPTA) 200-25 mcg/dose DsDv diskus inhaler Inhale 1 puff into the lungs once daily. Controller 2 each 0    phentermine 15 MG capsule Take 1 capsule (15 mg total) by mouth every morning. 30 capsule 0    traMADol (ULTRAM) 50 mg tablet TAKE ONE TABLET BY MOUTH EVERY FOUR HOURS AS NEEDED FOR PAIN 42 tablet 1     No current facility-administered medications for this visit.        Allergies  Review of patient's allergies indicates:   Allergen Reactions    Pcn [penicillins]        Past Medical History  Past Medical History:   Diagnosis Date    Arthritis     COPD (chronic obstructive pulmonary disease)        Surgical History  Past Surgical History:   Procedure Laterality Date    CHOLECYSTECTOMY      HYSTERECTOMY         Family History:   Family History   Problem Relation Age of Onset    COPD Mother     COPD Father        Social History:   Social History     Socioeconomic History    Marital status:      Spouse name: Not on file    Number of children: Not on file    Years of education: Not on file    Highest education level: Not on file   Social Needs    Financial resource strain: Not on file    Food insecurity - worry: Not on file    Food insecurity  - inability: Not on file    Transportation needs - medical: Not on file    Transportation needs - non-medical: Not on file   Occupational History    Not on file   Tobacco Use    Smoking status: Former Smoker     Packs/day: 1.00     Years: 40.00     Pack years: 40.00     Types: Cigarettes     Last attempt to quit: 2018     Years since quittin.5    Smokeless tobacco: Never Used   Substance and Sexual Activity    Alcohol use: No    Drug use: No    Sexual activity: Not on file   Other Topics Concern    Not on file   Social History Narrative    Not on file       Hospitalization/Major Diagnostic Procedure:     Review of Systems     General/Constitutional:  Chills denies. Fatigue denies. Fever denies. Weight gain denies. Weight loss denies.    Respiratory:  Shortness of breath denies.    Cardiovascular:  Chest pain denies.    Gastrointestinal:  Constipation denies. Diarrhea denies. Nausea denies. Vomiting denies.     Hematology:  Easy bruising denies. Prolonged bleeding denies.     Genitourinary:  Frequent urination denies. Pain in lower back denies. Painful urination denies.     Musculoskeletal:  See HPI for details    Skin:  Rash denies.    Neurologic:  Dizziness denies. Gait abnormalities denies. Seizures denies. Tingling/Numbess denies.    Psychiatric:  Anxiety denies. Depressed mood denies.     Objective:   Vital Signs:   Vitals:    19 1443   BP: (!) 112/58   Pulse: 77        Physical Exam      General Examination:     Constitutional: The patient is alert and oriented to lace person and time. Mood is pleasant.     Head/Face: Normal facial features normal eyebrows    Eyes: Normal extraocular motion bilaterally    Lungs: Respirations are equal and unlabored    Gait is coordinated.    Cardiovascular: There are no swelling or varicosities present.    Lymphatic: Negative for adenopathy    Skin: Normal    Neurological: Level of consciousness normal. Oriented to place person and time and  "situation    Psychiatric: Oriented to time place person and situation    Lumbar spine exam shows patient walks with slightly waddling gait she can stand erect and has pain when doing so subjective tenderness over the paraspinous muscles L3-S1 without spasm range of motion moderately restricted due to pain range of motion of the hip and knee is normal straight leg raising causes back pain femoral nerve stretch test on the left side is weakly positive. Significant tenderness palpation over the left hip greater trochanter    XRAY Report/ Interpretation: No new studies today      Assessment:       1. Spondylolisthesis, lumbar region    2. Spinal stenosis of lumbar region with neurogenic claudication    3. Greater trochanteric bursitis of left hip        Plan:       Tessa was seen today for pain.    Diagnoses and all orders for this visit:    Spondylolisthesis, lumbar region    Spinal stenosis of lumbar region with neurogenic claudication    Greater trochanteric bursitis of left hip         No Follow-up on file.  Joseph Carranza, physician's assistant served in the capacity as a "scribe" for this patient encounter  A "face to face" encounter occurred with Dr. Branch on this date  The treatment plan and medical decision making is outlined below:    Continue with activity modification and activity as tolerated. Today she was in a left hip greater trochanteric bursa injection with 2 cc lidocaine and 80 mg Depo-Medrol. Please see procedure report for details. Continue with medications as prescribed. Follow-up in 3-4 months or sooner if needed.    This note was created using Dragon voice recognition software that occasionally misinterpreted phrases or words.  "

## 2019-02-11 ENCOUNTER — OFFICE VISIT (OUTPATIENT)
Dept: FAMILY MEDICINE | Facility: CLINIC | Age: 64
End: 2019-02-11
Payer: COMMERCIAL

## 2019-02-11 VITALS
HEIGHT: 60 IN | BODY MASS INDEX: 40.44 KG/M2 | OXYGEN SATURATION: 97 % | SYSTOLIC BLOOD PRESSURE: 138 MMHG | TEMPERATURE: 98 F | WEIGHT: 206 LBS | RESPIRATION RATE: 14 BRPM | DIASTOLIC BLOOD PRESSURE: 80 MMHG | HEART RATE: 78 BPM

## 2019-02-11 DIAGNOSIS — R63.0 ANOREXIA: ICD-10-CM

## 2019-02-11 DIAGNOSIS — J44.9 CHRONIC OBSTRUCTIVE PULMONARY DISEASE WITH HYPOXIA: ICD-10-CM

## 2019-02-11 DIAGNOSIS — Z11.59 NEED FOR HEPATITIS C SCREENING TEST: ICD-10-CM

## 2019-02-11 DIAGNOSIS — Z12.39 BREAST CANCER SCREENING: ICD-10-CM

## 2019-02-11 DIAGNOSIS — R73.01 IFG (IMPAIRED FASTING GLUCOSE): ICD-10-CM

## 2019-02-11 DIAGNOSIS — Z12.11 SCREEN FOR COLON CANCER: ICD-10-CM

## 2019-02-11 DIAGNOSIS — R53.83 OTHER FATIGUE: Primary | ICD-10-CM

## 2019-02-11 DIAGNOSIS — R07.89 ATYPICAL CHEST PAIN: ICD-10-CM

## 2019-02-11 PROCEDURE — 93000 PR ELECTROCARDIOGRAM, COMPLETE: ICD-10-PCS | Mod: ,,, | Performed by: INTERNAL MEDICINE

## 2019-02-11 PROCEDURE — 99214 PR OFFICE/OUTPT VISIT, EST, LEVL IV, 30-39 MIN: ICD-10-PCS | Mod: ,,, | Performed by: INTERNAL MEDICINE

## 2019-02-11 PROCEDURE — 3008F BODY MASS INDEX DOCD: CPT | Mod: ,,, | Performed by: INTERNAL MEDICINE

## 2019-02-11 PROCEDURE — 93000 ELECTROCARDIOGRAM COMPLETE: CPT | Mod: ,,, | Performed by: INTERNAL MEDICINE

## 2019-02-11 PROCEDURE — 99214 OFFICE O/P EST MOD 30 MIN: CPT | Mod: ,,, | Performed by: INTERNAL MEDICINE

## 2019-02-11 PROCEDURE — 3008F PR BODY MASS INDEX (BMI) DOCUMENTED: ICD-10-PCS | Mod: ,,, | Performed by: INTERNAL MEDICINE

## 2019-02-11 RX ORDER — TRAMADOL HYDROCHLORIDE 50 MG/1
50 TABLET ORAL EVERY 4 HOURS PRN
COMMUNITY
Start: 2019-01-18 | End: 2019-05-30 | Stop reason: SDUPTHER

## 2019-02-11 NOTE — PROGRESS NOTES
"  SUBJECTIVE:    Patient ID: Tessa Enriquez is a 63 y.o. female.    Chief Complaint: Fatigue (no energy and no appetite but pt works 50 hours a week) and Follow-up    Fatigue   This is a chronic (worse in the last 3-4 months) problem. The current episode started more than 1 month ago. The problem occurs constantly. The problem has been rapidly worsening. Associated symptoms include anorexia, fatigue, neck pain (base of skull) and numbness (lower thigh left). The symptoms are aggravated by exertion.    Pt stays resting, less so working."Im just tired" I can just sit down and go to sleep.Family concerned she is not eating but she has gained weight.    Pt states she goes to work every day at 6 am and leaves at 2:30-works 6 days a week        Admission on 07/09/2018, Discharged on 07/10/2018   Component Date Value Ref Range Status    WBC 07/09/2018 8.20  3.90 - 12.70 K/uL Final    RBC 07/09/2018 4.50  4.00 - 5.40 M/uL Final    Hemoglobin 07/09/2018 13.3  12.0 - 16.0 g/dL Final    Hematocrit 07/09/2018 39.7  37.0 - 48.5 % Final    MCV 07/09/2018 88  82 - 98 fL Final    MCH 07/09/2018 29.5  27.0 - 31.0 pg Final    MCHC 07/09/2018 33.5  32.0 - 36.0 g/dL Final    RDW 07/09/2018 13.3  11.5 - 14.5 % Final    Platelets 07/09/2018 212  150 - 350 K/uL Final    MPV 07/09/2018 9.7  9.2 - 12.9 fL Final    Gran # (ANC) 07/09/2018 6.3  1.8 - 7.7 K/uL Final    Lymph # 07/09/2018 1.5  1.0 - 4.8 K/uL Final    Mono # 07/09/2018 0.3  0.3 - 1.0 K/uL Final    Eos # 07/09/2018 0.0  0.0 - 0.5 K/uL Final    Baso # 07/09/2018 0.00  0.00 - 0.20 K/uL Final    Gran% 07/09/2018 76.8* 38.0 - 73.0 % Final    Lymph% 07/09/2018 18.6  18.0 - 48.0 % Final    Mono% 07/09/2018 4.2  4.0 - 15.0 % Final    Eosinophil% 07/09/2018 0.2  0.0 - 8.0 % Final    Basophil% 07/09/2018 0.2  0.0 - 1.9 % Final    Differential Method 07/09/2018 Automated   Final    Sodium 07/09/2018 134* 136 - 145 mmol/L Final    Potassium 07/09/2018 3.8  3.5 - 5.1 " mmol/L Final    Chloride 07/09/2018 103  95 - 110 mmol/L Final    CO2 07/09/2018 21* 23 - 29 mmol/L Final    Glucose 07/09/2018 103  70 - 110 mg/dL Final    BUN, Bld 07/09/2018 9  6 - 20 mg/dL Final    Creatinine 07/09/2018 0.7  0.5 - 1.4 mg/dL Final    Calcium 07/09/2018 9.3  8.7 - 10.5 mg/dL Final    Total Protein 07/09/2018 7.4  6.0 - 8.4 g/dL Final    Albumin 07/09/2018 3.8  3.5 - 5.2 g/dL Final    Total Bilirubin 07/09/2018 0.6  0.1 - 1.0 mg/dL Final    Alkaline Phosphatase 07/09/2018 84  55 - 135 U/L Final    AST 07/09/2018 12  10 - 40 U/L Final    ALT 07/09/2018 12  10 - 44 U/L Final    Anion Gap 07/09/2018 10  8 - 16 mmol/L Final    eGFR if African American 07/09/2018 >60  >60 mL/min/1.73 m^2 Final    eGFR if non African American 07/09/2018 >60  >60 mL/min/1.73 m^2 Final    Sodium 07/09/2018 136  136 - 145 mmol/L Final    Potassium 07/09/2018 3.3* 3.5 - 5.1 mmol/L Final    Chloride 07/09/2018 105  95 - 110 mmol/L Final    CO2 07/09/2018 19* 23 - 29 mmol/L Final    Glucose 07/09/2018 226* 70 - 110 mg/dL Final    BUN, Bld 07/09/2018 10  6 - 20 mg/dL Final    Creatinine 07/09/2018 0.9  0.5 - 1.4 mg/dL Final    Calcium 07/09/2018 9.3  8.7 - 10.5 mg/dL Final    Anion Gap 07/09/2018 12  8 - 16 mmol/L Final    eGFR if African American 07/09/2018 >60  >60 mL/min/1.73 m^2 Final    eGFR if non African American 07/09/2018 >60  >60 mL/min/1.73 m^2 Final    WBC 07/09/2018 6.40  3.90 - 12.70 K/uL Final    RBC 07/09/2018 4.15  4.00 - 5.40 M/uL Final    Hemoglobin 07/09/2018 12.4  12.0 - 16.0 g/dL Final    Hematocrit 07/09/2018 36.9* 37.0 - 48.5 % Final    MCV 07/09/2018 89  82 - 98 fL Final    MCH 07/09/2018 29.8  27.0 - 31.0 pg Final    MCHC 07/09/2018 33.5  32.0 - 36.0 g/dL Final    RDW 07/09/2018 13.4  11.5 - 14.5 % Final    Platelets 07/09/2018 198  150 - 350 K/uL Final    MPV 07/09/2018 9.9  9.2 - 12.9 fL Final    Gran # (ANC) 07/09/2018 6.0  1.8 - 7.7 K/uL Final    Lymph #  07/09/2018 0.3* 1.0 - 4.8 K/uL Final    Mono # 07/09/2018 0.0* 0.3 - 1.0 K/uL Final    Eos # 07/09/2018 0.0  0.0 - 0.5 K/uL Final    Baso # 07/09/2018 0.00  0.00 - 0.20 K/uL Final    Gran% 07/09/2018 95.0* 38.0 - 73.0 % Final    Lymph% 07/09/2018 4.4* 18.0 - 48.0 % Final    Mono% 07/09/2018 0.6* 4.0 - 15.0 % Final    Eosinophil% 07/09/2018 0.0  0.0 - 8.0 % Final    Basophil% 07/09/2018 0.0  0.0 - 1.9 % Final    Differential Method 07/09/2018 Automated   Final    Sodium 07/10/2018 141  136 - 145 mmol/L Final    Potassium 07/10/2018 4.1  3.5 - 5.1 mmol/L Final    Chloride 07/10/2018 109  95 - 110 mmol/L Final    CO2 07/10/2018 23  23 - 29 mmol/L Final    Glucose 07/10/2018 98  70 - 110 mg/dL Final    BUN, Bld 07/10/2018 12  6 - 20 mg/dL Final    Creatinine 07/10/2018 1.0  0.5 - 1.4 mg/dL Final    Calcium 07/10/2018 9.4  8.7 - 10.5 mg/dL Final    Anion Gap 07/10/2018 9  8 - 16 mmol/L Final    eGFR if African American 07/10/2018 >60  >60 mL/min/1.73 m^2 Final    eGFR if non African American 07/10/2018 >60  >60 mL/min/1.73 m^2 Final       Past Medical History:   Diagnosis Date    Arthritis     COPD (chronic obstructive pulmonary disease)      Past Surgical History:   Procedure Laterality Date    CHOLECYSTECTOMY      HYSTERECTOMY       Family History   Problem Relation Age of Onset    COPD Mother     COPD Father        Marital Status:   Alcohol History:  reports that she does not drink alcohol.  Tobacco History:  reports that she quit smoking about 7 months ago. Her smoking use included cigarettes. She has a 40.00 pack-year smoking history. she has never used smokeless tobacco.  Drug History:  reports that she does not use drugs.  Sexual History:   Social History     Substance and Sexual Activity   Sexual Activity Not on file     Sexual Orientation:Sexual Orientation: heterosexual    Review of patient's allergies indicates:   Allergen Reactions    Pcn [penicillins]      Current  Outpatient Medications   Medication Sig Dispense Refill    calcium carbonate/vitamin D3 (CALTRATE WITH VITAMIN D3 ORAL) Take by mouth.      fluticasone-vilanterol (BREO ELLIPTA) 200-25 mcg/dose DsDv diskus inhaler Inhale 1 puff into the lungs once daily. Controller 2 each 0    traMADol (ULTRAM) 50 mg tablet Take 50 mg by mouth every 4 (four) hours as needed.        No current facility-administered medications for this visit.        Review of Systems   Constitutional: Positive for fatigue.   Gastrointestinal: Positive for anorexia.   Genitourinary: Positive for frequency (bladdere leakage).   Musculoskeletal: Positive for neck pain (base of skull).   Neurological: Positive for numbness (lower thigh left).          Objective:        Physical Exam   Constitutional: She is oriented to person, place, and time. She appears well-developed and well-nourished. She is cooperative. No distress.   HENT:   Head: Normocephalic and atraumatic.   Right Ear: Tympanic membrane normal.   Left Ear: Tympanic membrane normal.   Nose: Nose normal.   Mouth/Throat: Uvula is midline and mucous membranes are normal.   Eyes: Conjunctivae and EOM are normal. Pupils are equal, round, and reactive to light. Right eye exhibits no discharge. Left eye exhibits no discharge. No scleral icterus. Right pupil is round and reactive. Left pupil is round and reactive.   Neck: Trachea normal and normal range of motion. Neck supple. No JVD present. Carotid bruit is not present. No thyromegaly present.   Cardiovascular: Normal rate, regular rhythm and intact distal pulses. Exam reveals no gallop and no friction rub.   No murmur heard.  Pulmonary/Chest: Effort normal and breath sounds normal. No respiratory distress. She has no wheezes. She has no rales.   Abdominal: Soft. Bowel sounds are normal. She exhibits no distension and no mass. There is no splenomegaly or hepatomegaly. There is no tenderness. There is no guarding. No hernia.   Musculoskeletal:  Normal range of motion. She exhibits no edema.   Neurological: She is alert and oriented to person, place, and time. She has normal strength.   Skin: Skin is warm and dry. Capillary refill takes less than 2 seconds. No lesion and no rash noted. No cyanosis. Nails show no clubbing.   Psychiatric: She has a normal mood and affect. Her speech is normal and behavior is normal. Judgment and thought content normal.   Nursing note and vitals reviewed.          Assessment:       1. Other fatigue    .    2. BMI 40.0-44.9, adult    3. Chronic obstructive pulmonary disease with hypoxia    4. Atypical chest pain    5. IFG (impaired fasting glucose)    6. Breast cancer screening    7. Need for hepatitis C screening test    8. Screen for colon cancer         Plan:       Other fatigue  -     Basic metabolic panel; Future; Expected date: 02/11/2019  -     TSH; Future; Expected date: 02/11/2019  -     CBC auto differential; Future; Expected date: 02/11/2019  -     Comprehensive metabolic panel; Future; Expected date: 02/11/2019  -     Vitamin D; Future; Expected date: 02/12/2019  -     IN OFFICE EKG 12-LEAD (to Muse)        BMI 40.0-44.9, adult    Chronic obstructive pulmonary disease with hypoxia    Atypical chest pain  -     Lipid panel; Future; Expected date: 02/11/2019  -     IN OFFICE EKG 12-LEAD (to Muse)    IFG (impaired fasting glucose)  -     Hemoglobin A1c; Future; Expected date: 02/11/2019  -     Basic metabolic panel; Future; Expected date: 02/11/2019  -     Lipid panel; Future; Expected date: 02/11/2019    Breast cancer screening  -     Mammo Digital Screening Bilat with [place]; Future; Expected date: 08/11/2019    Need for hepatitis C screening test  -     Hepatitis C antibody; Future; Expected date: 02/11/2019    Screen for colon cancer  -     Cologuard Screening (Multitarget Stool DNA); Future; Expected date: 02/11/2019      Follow-up in about 8 weeks (around 4/8/2019).        2/11/2019 Karma Mcdermott M.D.

## 2019-02-19 ENCOUNTER — HOSPITAL ENCOUNTER (EMERGENCY)
Facility: HOSPITAL | Age: 64
Discharge: HOME | End: 2019-02-19
Attending: EMERGENCY MEDICINE
Payer: COMMERCIAL

## 2019-02-19 VITALS
HEART RATE: 86 BPM | SYSTOLIC BLOOD PRESSURE: 147 MMHG | OXYGEN SATURATION: 99 % | DIASTOLIC BLOOD PRESSURE: 78 MMHG | BODY MASS INDEX: 38.71 KG/M2 | RESPIRATION RATE: 18 BRPM | TEMPERATURE: 98 F | HEIGHT: 61 IN | WEIGHT: 205 LBS

## 2019-02-19 DIAGNOSIS — J32.9 VIRAL SINUSITIS: ICD-10-CM

## 2019-02-19 DIAGNOSIS — R51.9 NONINTRACTABLE HEADACHE, UNSPECIFIED CHRONICITY PATTERN, UNSPECIFIED HEADACHE TYPE: Primary | ICD-10-CM

## 2019-02-19 DIAGNOSIS — B97.89 VIRAL SINUSITIS: ICD-10-CM
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
## 2019-02-19 PROCEDURE — 63600175 PHARM REV CODE 636 W HCPCS: Performed by: NURSE PRACTITIONER

## 2019-02-19 PROCEDURE — 73630 XR FOOT COMPLETE 3 VIEW LEFT: ICD-10-PCS | Mod: 26,LT,, | Performed by: RADIOLOGY

## 2019-02-19 PROCEDURE — 73630 X-RAY EXAM OF FOOT: CPT | Mod: 26,LT,, | Performed by: RADIOLOGY

## 2019-02-19 PROCEDURE — 96372 THER/PROPH/DIAG INJ SC/IM: CPT

## 2019-02-19 PROCEDURE — 99284 EMERGENCY DEPT VISIT MOD MDM: CPT | Mod: 25

## 2019-02-19 PROCEDURE — 73630 X-RAY EXAM OF FOOT: CPT | Mod: TC,FY,LT

## 2019-02-19 RX ORDER — DOXYLAMINE SUCCINATE AND PHENYLEPHRINE HYDROCHLORIDE 7.5; 1 MG/1; MG/1
1 TABLET ORAL
Qty: 25 TABLET | Refills: 0 | Status: SHIPPED | OUTPATIENT
Start: 2019-02-19 | End: 2019-02-19 | Stop reason: SDUPTHER

## 2019-02-19 RX ORDER — DOXYLAMINE SUCCINATE AND PHENYLEPHRINE HYDROCHLORIDE 7.5; 1 MG/1; MG/1
1 TABLET ORAL
Qty: 25 TABLET | Refills: 0 | Status: SHIPPED | OUTPATIENT
Start: 2019-02-19 | End: 2019-03-01

## 2019-02-19 RX ORDER — ONDANSETRON 4 MG/1
4 TABLET, FILM COATED ORAL EVERY 8 HOURS PRN
Qty: 10 TABLET | Refills: 0 | Status: SHIPPED | OUTPATIENT
Start: 2019-02-19 | End: 2019-02-19 | Stop reason: SDUPTHER

## 2019-02-19 RX ORDER — PROMETHAZINE HYDROCHLORIDE 25 MG/ML
25 INJECTION, SOLUTION INTRAMUSCULAR; INTRAVENOUS
Status: COMPLETED | OUTPATIENT
Start: 2019-02-19 | End: 2019-02-19

## 2019-02-19 RX ORDER — KETOROLAC TROMETHAMINE 30 MG/ML
30 INJECTION, SOLUTION INTRAMUSCULAR; INTRAVENOUS
Status: COMPLETED | OUTPATIENT
Start: 2019-02-19 | End: 2019-02-19

## 2019-02-19 RX ORDER — FLUTICASONE PROPIONATE 50 MCG
2 SPRAY, SUSPENSION (ML) NASAL DAILY PRN
Qty: 15 G | Refills: 0 | Status: SHIPPED | OUTPATIENT
Start: 2019-02-19 | End: 2019-02-19 | Stop reason: SDUPTHER

## 2019-02-19 RX ORDER — ONDANSETRON 4 MG/1
4 TABLET, FILM COATED ORAL EVERY 8 HOURS PRN
Qty: 10 TABLET | Refills: 0 | Status: SHIPPED | OUTPATIENT
Start: 2019-02-19 | End: 2019-06-12

## 2019-02-19 RX ORDER — FLUTICASONE PROPIONATE 50 MCG
2 SPRAY, SUSPENSION (ML) NASAL DAILY PRN
Qty: 15 G | Refills: 0 | Status: SHIPPED | OUTPATIENT
Start: 2019-02-19 | End: 2019-03-01

## 2019-02-19 RX ADMIN — KETOROLAC TROMETHAMINE 30 MG: 30 INJECTION, SOLUTION INTRAMUSCULAR at 01:02

## 2019-02-19 RX ADMIN — PROMETHAZINE HYDROCHLORIDE 25 MG: 25 INJECTION INTRAMUSCULAR; INTRAVENOUS at 01:02

## 2019-02-19 NOTE — ED PROVIDER NOTES
Encounter Date: 2019       History     Chief Complaint   Patient presents with    Headache     R SIDE FRONTAL HEADACHE ONSET YESTERDAY    Nasal Congestion    Vomiting     VOMITING WITH POSITION CHANGE ONSET TODAY     Tessa Enriquez is a 63 y.o female with PMHx including arthritis and COPD. She presents to ED with headache and vomiting since this morning.   Patient reports sinus congestion and sinus pressure since yesterday. Clear nasal discharge  No fever, body aches or chills  No abdominal pain.     Patient also complants of pain to left great and 2nd toe from fall 3 days ago            Review of patient's allergies indicates:   Allergen Reactions    Pcn [penicillins]      Past Medical History:   Diagnosis Date    Arthritis     COPD (chronic obstructive pulmonary disease)      Past Surgical History:   Procedure Laterality Date    CHOLECYSTECTOMY      HYSTERECTOMY       Family History   Problem Relation Age of Onset    COPD Mother     COPD Father      Social History     Tobacco Use    Smoking status: Former Smoker     Packs/day: 1.00     Years: 40.00     Pack years: 40.00     Types: Cigarettes     Last attempt to quit: 2018     Years since quittin.6    Smokeless tobacco: Never Used   Substance Use Topics    Alcohol use: No    Drug use: No     Review of Systems   Constitutional: Negative for fever.   HENT: Positive for congestion, ear pain, sinus pressure and sinus pain. Negative for postnasal drip, rhinorrhea and sore throat.    Respiratory: Negative for cough and shortness of breath.    Cardiovascular: Negative for chest pain.   Gastrointestinal: Positive for abdominal distention, abdominal pain and vomiting. Negative for diarrhea and nausea.   Genitourinary: Negative for dysuria.   Musculoskeletal: Negative for back pain.   Skin: Negative for rash.   Neurological: Negative for weakness.   Hematological: Does not bruise/bleed easily.   All other systems reviewed and are  negative.      Physical Exam     Initial Vitals [02/19/19 1250]   BP Pulse Resp Temp SpO2   (!) 147/80 84 18 98.4 °F (36.9 °C) 97 %      MAP       --         Physical Exam    Nursing note and vitals reviewed.  Constitutional: She appears well-developed and well-nourished.   HENT:   Head: Normocephalic.       Right Ear: Hearing, tympanic membrane, external ear and ear canal normal.   Left Ear: Hearing, tympanic membrane, external ear and ear canal normal.   Nose: Right sinus exhibits frontal sinus tenderness.   Eyes: Conjunctivae are normal. Pupils are equal, round, and reactive to light.   Neck: Normal range of motion.   Cardiovascular: Normal rate, regular rhythm and normal heart sounds.   Pulmonary/Chest: Breath sounds normal.   Abdominal: Soft. Bowel sounds are normal. She exhibits no distension. There is no tenderness. There is no rebound and no guarding.   Musculoskeletal: She exhibits tenderness.        Left foot: There is decreased range of motion, tenderness, bony tenderness and swelling. There is normal capillary refill, no crepitus, no deformity and no laceration.        Feet:    Neurological: She is alert and oriented to person, place, and time.   Skin: Skin is warm and dry.   Psychiatric: She has a normal mood and affect. Her behavior is normal. Judgment and thought content normal.         ED Course   Procedures  Labs Reviewed - No data to display       Imaging Results    None          Medical Decision Making:   Initial Assessment:   Patient with headache and vomiting since this morning.   Patient reports sinus congestion and sinus pressure since yesterday. Clear nasal discharge  No fever  No abdominal pain.     Left 1st and 2nd toe with mild swelling and light purple discoloration in color. Tender with manipulation. Limited ROM  Differential Diagnosis:   Sinusitis  Migraine headache  URI  Influenza   Otitis media      Toe fracture, contusion  Clinical Tests:   Radiological Study: Ordered  ED  Management:  Normal Orthostatic BP    Meds admin    XR left foot normal    Headache and nauseaDiscussed physical exam findings with patient  No acute emergent medical condition identified at this time to warrant further testing/diagnostics.  Plan to discharge patient home with instructions per AVS.  Follow-up with PCP in 2-5 days or return to ED if symptoms worsen.  Patient verbalized agreement to discharge treatment plan. improved                          Clinical Impression:   The primary encounter diagnosis was Nonintractable headache, unspecified chronicity pattern, unspecified headache type. Diagnoses of Fall and Viral sinusitis were also pertinent to this visit.                             Jackie Carter NP  02/19/19 [id number]

## 2019-05-19 PROBLEM — J96.01 ACUTE HYPOXEMIC RESPIRATORY FAILURE: Status: RESOLVED | Noted: 2018-07-09 | Resolved: 2019-05-19

## 2019-05-31 RX ORDER — TRAMADOL HYDROCHLORIDE 50 MG/1
TABLET ORAL
Qty: 42 TABLET | Refills: 2 | Status: SHIPPED | OUTPATIENT
Start: 2019-05-31 | End: 2019-06-04 | Stop reason: SDUPTHER

## 2019-06-10 RX ORDER — TRAMADOL HYDROCHLORIDE 50 MG/1
TABLET ORAL
Qty: 42 TABLET | Refills: 2 | Status: SHIPPED | OUTPATIENT
Start: 2019-06-10 | End: 2019-06-12 | Stop reason: SDUPTHER

## 2019-06-12 ENCOUNTER — OFFICE VISIT (OUTPATIENT)
Dept: ORTHOPEDICS | Facility: CLINIC | Age: 64
End: 2019-06-12
Payer: COMMERCIAL

## 2019-06-12 VITALS
HEART RATE: 82 BPM | SYSTOLIC BLOOD PRESSURE: 140 MMHG | HEIGHT: 61 IN | DIASTOLIC BLOOD PRESSURE: 60 MMHG | BODY MASS INDEX: 39.27 KG/M2 | WEIGHT: 208 LBS

## 2019-06-12 DIAGNOSIS — M48.062 SPINAL STENOSIS OF LUMBAR REGION WITH NEUROGENIC CLAUDICATION: ICD-10-CM

## 2019-06-12 DIAGNOSIS — M70.62 GREATER TROCHANTERIC BURSITIS OF LEFT HIP: ICD-10-CM

## 2019-06-12 DIAGNOSIS — M43.16 SPONDYLOLISTHESIS, LUMBAR REGION: Primary | ICD-10-CM

## 2019-06-12 PROCEDURE — 72100 PR  X-RAY LUMBAR SPINE 2/3 VW: ICD-10-PCS | Mod: ,,, | Performed by: ORTHOPAEDIC SURGERY

## 2019-06-12 PROCEDURE — 20610 LARGE JOINT ASPIRATION/INJECTION: L GREATER TROCHANTERIC BURSA: ICD-10-PCS | Mod: LT,,, | Performed by: ORTHOPAEDIC SURGERY

## 2019-06-12 PROCEDURE — 20610 DRAIN/INJ JOINT/BURSA W/O US: CPT | Mod: LT,,, | Performed by: ORTHOPAEDIC SURGERY

## 2019-06-12 PROCEDURE — 3008F BODY MASS INDEX DOCD: CPT | Mod: ,,, | Performed by: ORTHOPAEDIC SURGERY

## 2019-06-12 PROCEDURE — 3008F PR BODY MASS INDEX (BMI) DOCUMENTED: ICD-10-PCS | Mod: ,,, | Performed by: ORTHOPAEDIC SURGERY

## 2019-06-12 PROCEDURE — 72100 X-RAY EXAM L-S SPINE 2/3 VWS: CPT | Mod: ,,, | Performed by: ORTHOPAEDIC SURGERY

## 2019-06-12 PROCEDURE — 99213 PR OFFICE/OUTPT VISIT, EST, LEVL III, 20-29 MIN: ICD-10-PCS | Mod: 25,,, | Performed by: ORTHOPAEDIC SURGERY

## 2019-06-12 PROCEDURE — 99213 OFFICE O/P EST LOW 20 MIN: CPT | Mod: 25,,, | Performed by: ORTHOPAEDIC SURGERY

## 2019-06-12 RX ORDER — TRAMADOL HYDROCHLORIDE 50 MG/1
50 TABLET ORAL EVERY 4 HOURS PRN
Qty: 42 TABLET | Refills: 2 | Status: SHIPPED | OUTPATIENT
Start: 2019-06-12 | End: 2019-06-19

## 2019-06-12 RX ORDER — METHYLPREDNISOLONE ACETATE 40 MG/ML
40 INJECTION, SUSPENSION INTRA-ARTICULAR; INTRALESIONAL; INTRAMUSCULAR; SOFT TISSUE
Status: DISCONTINUED | OUTPATIENT
Start: 2019-06-12 | End: 2019-06-12 | Stop reason: HOSPADM

## 2019-06-12 RX ADMIN — METHYLPREDNISOLONE ACETATE 40 MG: 40 INJECTION, SUSPENSION INTRA-ARTICULAR; INTRALESIONAL; INTRAMUSCULAR; SOFT TISSUE at 03:06

## 2019-06-12 NOTE — PROCEDURES
Large Joint Aspiration/Injection: L greater trochanteric bursa  Date/Time: 6/12/2019 3:01 PM  Performed by: Shun Branch MD  Authorized by: Shun Branch MD     Consent Done?:  Yes (Verbal)  Indications:  Pain  Procedure site marked: Yes    Timeout: Prior to procedure the correct patient, procedure, and site was verified      Location:  Hip  Site:  L greater trochanteric bursa  Prep: Patient was prepped and draped in usual sterile fashion    Needle size:  25 G  Medications:  40 mg methylPREDNISolone acetate 40 mg/mL; 40 mg methylPREDNISolone acetate 40 mg/mL  Patient tolerance:  Patient tolerated the procedure well with no immediate complications

## 2019-06-12 NOTE — PROGRESS NOTES
Subjective:       Patient ID: Tessa Enriquez is a 63 y.o. female.    Chief Complaint: Pain of the Lumbar Spine (Lumbar pain x 3 years. Pain radiates down both legs to knees with numbness, tingling and burning.)      History of Present Illness  Patient is here to follow-up for chronic low back pain with bilateral lower extremity radiculitis to the knee worse on the left than on the right. Overall she manages her pain with activity modification and about 1-2 tramadol a day. She also has a history of left hip greater trochanter bursitis. When she was last here 6 months ago we gave her an injection and this helped significantly. She is requesting a repeat    Current Medications  Current Outpatient Medications   Medication Sig Dispense Refill    calcium carbonate/vitamin D3 (CALTRATE WITH VITAMIN D3 ORAL) Take by mouth.      fluticasone-vilanterol (BREO ELLIPTA) 200-25 mcg/dose DsDv diskus inhaler Inhale 1 puff into the lungs once daily. Controller 2 each 0    traMADol (ULTRAM) 50 mg tablet TAKE ONE TABLET BY MOUTH EVERY FOUR HOURS AS NEEDED 42 tablet 2     No current facility-administered medications for this visit.        Allergies  Review of patient's allergies indicates:   Allergen Reactions    Pcn [penicillins]        Past Medical History  Past Medical History:   Diagnosis Date    Acute hypoxemic respiratory failure 7/9/2018    Arthritis     COPD (chronic obstructive pulmonary disease)        Surgical History  Past Surgical History:   Procedure Laterality Date    CHOLECYSTECTOMY      HYSTERECTOMY         Family History:   Family History   Problem Relation Age of Onset    COPD Mother     COPD Father        Social History:   Social History     Socioeconomic History    Marital status:      Spouse name: Not on file    Number of children: Not on file    Years of education: Not on file    Highest education level: Not on file   Occupational History    Not on file   Social Needs    Financial  resource strain: Not on file    Food insecurity:     Worry: Not on file     Inability: Not on file    Transportation needs:     Medical: Not on file     Non-medical: Not on file   Tobacco Use    Smoking status: Former Smoker     Packs/day: 1.00     Years: 40.00     Pack years: 40.00     Types: Cigarettes     Last attempt to quit: 2018     Years since quittin.9    Smokeless tobacco: Never Used   Substance and Sexual Activity    Alcohol use: No    Drug use: No    Sexual activity: Not on file   Lifestyle    Physical activity:     Days per week: Not on file     Minutes per session: Not on file    Stress: Not on file   Relationships    Social connections:     Talks on phone: Not on file     Gets together: Not on file     Attends Gnosticism service: Not on file     Active member of club or organization: Not on file     Attends meetings of clubs or organizations: Not on file     Relationship status: Not on file   Other Topics Concern    Not on file   Social History Narrative    Not on file       Hospitalization/Major Diagnostic Procedure:     Review of Systems     General/Constitutional:  Chills denies. Fatigue denies. Fever denies. Weight gain denies. Weight loss denies.    Respiratory:  Shortness of breath denies.    Cardiovascular:  Chest pain denies.    Gastrointestinal:  Constipation denies. Diarrhea denies. Nausea denies. Vomiting denies.     Hematology:  Easy bruising denies. Prolonged bleeding denies.     Genitourinary:  Frequent urination denies. Pain in lower back denies. Painful urination denies.     Musculoskeletal:  See HPI for details    Skin:  Rash denies.    Neurologic:  Dizziness denies. Gait abnormalities denies. Seizures denies. Tingling/Numbess denies.    Psychiatric:  Anxiety denies. Depressed mood denies.     Objective:   Vital Signs:   Vitals:    19 1438   BP: (!) 140/60   Pulse:         Physical Exam      General Examination:     Constitutional: The patient is alert and  "oriented to lace person and time. Mood is pleasant.     Head/Face: Normal facial features normal eyebrows    Eyes: Normal extraocular motion bilaterally    Lungs: Respirations are equal and unlabored    Gait is coordinated.    Cardiovascular: There are no swelling or varicosities present.    Lymphatic: Negative for adenopathy    Skin: Normal    Neurological: Level of consciousness normal. Oriented to place person and time and situation    Psychiatric: Oriented to time place person and situation    Continue with activity modification and activity as tolerated. Today she was in a left hip greater trochanteric bursa injection with 2 cc lidocaine and 80 mg Depo-Medrol. Please see procedure report for details. Continue with medications as prescribed. Follow-up in 3-4 months or sooner if needed.    XRAY Report/ Interpretation: AP and lateral x-rays of lumbar spine will performed today. Indications low back pain. Findings: Rudimentary L5-S1 disc with degenerative spondylolisthesis L3-4 level      Assessment:       1. Spondylolisthesis, lumbar region    2. Spinal stenosis of lumbar region with neurogenic claudication    3. Greater trochanteric bursitis of left hip        Plan:       Tessa was seen today for pain.    Diagnoses and all orders for this visit:    Spondylolisthesis, lumbar region  -     X-Ray Lumbar Spine Ap And Lateral    Spinal stenosis of lumbar region with neurogenic claudication    Greater trochanteric bursitis of left hip         No follow-ups on file.  Joseph Carranza, physician's assistant served in the capacity as a "scribe" for this patient encounter  A "face to face" encounter occurred with Dr. Branch on this date  The treatment plan and medical decision making is outlined below:  Today she was in a left hip greater trochanteric bursa injection with 2 cc lidocaine and 80 mg Depo-Medrol. Please see procedure report for details. Continue with medications as prescribed. Follow-up in 4 months or sooner if " needed.    This note was created using Dragon voice recognition software that occasionally misinterpreted phrases or words.

## 2019-06-18 ENCOUNTER — TELEPHONE (OUTPATIENT)
Dept: FAMILY MEDICINE | Facility: CLINIC | Age: 64
End: 2019-06-18

## 2019-06-18 NOTE — TELEPHONE ENCOUNTER
Pt called to Reschedule F/U Appt and see if had a recent Colon screen LM on VM to call office back.

## 2019-09-13 RX ORDER — TRAMADOL HYDROCHLORIDE 50 MG/1
TABLET ORAL
Qty: 42 TABLET | Refills: 1 | Status: SHIPPED | OUTPATIENT
Start: 2019-09-13 | End: 2019-10-09 | Stop reason: SDUPTHER

## 2019-10-09 ENCOUNTER — OFFICE VISIT (OUTPATIENT)
Dept: ORTHOPEDICS | Facility: CLINIC | Age: 64
End: 2019-10-09
Payer: COMMERCIAL

## 2019-10-09 VITALS
BODY MASS INDEX: 39.27 KG/M2 | DIASTOLIC BLOOD PRESSURE: 58 MMHG | HEIGHT: 61 IN | SYSTOLIC BLOOD PRESSURE: 120 MMHG | WEIGHT: 208 LBS | HEART RATE: 60 BPM

## 2019-10-09 DIAGNOSIS — M43.16 SPONDYLOLISTHESIS, LUMBAR REGION: Primary | ICD-10-CM

## 2019-10-09 DIAGNOSIS — M48.062 SPINAL STENOSIS OF LUMBAR REGION WITH NEUROGENIC CLAUDICATION: ICD-10-CM

## 2019-10-09 DIAGNOSIS — M70.62 GREATER TROCHANTERIC BURSITIS OF LEFT HIP: ICD-10-CM

## 2019-10-09 PROCEDURE — 99213 OFFICE O/P EST LOW 20 MIN: CPT | Mod: 25,S$GLB,, | Performed by: ORTHOPAEDIC SURGERY

## 2019-10-09 PROCEDURE — 3008F PR BODY MASS INDEX (BMI) DOCUMENTED: ICD-10-PCS | Mod: S$GLB,,, | Performed by: ORTHOPAEDIC SURGERY

## 2019-10-09 PROCEDURE — 99213 PR OFFICE/OUTPT VISIT, EST, LEVL III, 20-29 MIN: ICD-10-PCS | Mod: 25,S$GLB,, | Performed by: ORTHOPAEDIC SURGERY

## 2019-10-09 PROCEDURE — 20610 DRAIN/INJ JOINT/BURSA W/O US: CPT | Mod: LT,S$GLB,, | Performed by: ORTHOPAEDIC SURGERY

## 2019-10-09 PROCEDURE — 3008F BODY MASS INDEX DOCD: CPT | Mod: S$GLB,,, | Performed by: ORTHOPAEDIC SURGERY

## 2019-10-09 PROCEDURE — 20610 LARGE JOINT ASPIRATION/INJECTION: L GREATER TROCHANTERIC BURSA: ICD-10-PCS | Mod: LT,S$GLB,, | Performed by: ORTHOPAEDIC SURGERY

## 2019-10-09 RX ORDER — METHYLPREDNISOLONE ACETATE 40 MG/ML
40 INJECTION, SUSPENSION INTRA-ARTICULAR; INTRALESIONAL; INTRAMUSCULAR; SOFT TISSUE
Status: DISCONTINUED | OUTPATIENT
Start: 2019-10-09 | End: 2019-10-09 | Stop reason: HOSPADM

## 2019-10-09 RX ORDER — CELECOXIB 100 MG/1
100 CAPSULE ORAL 2 TIMES DAILY
Qty: 60 CAPSULE | Refills: 2 | Status: SHIPPED | OUTPATIENT
Start: 2019-10-09 | End: 2020-01-07

## 2019-10-09 RX ORDER — TRAMADOL HYDROCHLORIDE 50 MG/1
50 TABLET ORAL EVERY 4 HOURS PRN
Qty: 42 TABLET | Refills: 1 | Status: SHIPPED | OUTPATIENT
Start: 2019-10-09 | End: 2019-10-23

## 2019-10-09 RX ADMIN — METHYLPREDNISOLONE ACETATE 40 MG: 40 INJECTION, SUSPENSION INTRA-ARTICULAR; INTRALESIONAL; INTRAMUSCULAR; SOFT TISSUE at 03:10

## 2019-10-09 NOTE — PROGRESS NOTES
Subjective:       Patient ID: Tessa Enriquez is a 63 y.o. female.    Chief Complaint: Pain of the Left Hip (Left hip pain/injection f/u. State that the injection did help and states that it is wearing off. State that she would like to get another injection today possible. )      History of Present Illness  Patient is here to follow-up for chronic low back pain with bilateral lower extremity radiculitis to the knee worse on the left than on the right. Overall she manages her pain with activity modification and about 1-2 tramadol a day. She also has a history of left hip greater trochanter bursitis. When she was last here 4 months ago we gave her an injection and this helped significantly. She is requesting a repeat    Current Medications  Current Outpatient Medications   Medication Sig Dispense Refill    calcium carbonate/vitamin D3 (CALTRATE WITH VITAMIN D3 ORAL) Take by mouth.      fluticasone-vilanterol (BREO ELLIPTA) 200-25 mcg/dose DsDv diskus inhaler Inhale 1 puff into the lungs once daily. Controller 2 each 0    traMADol (ULTRAM) 50 mg tablet TAKE ONE TABLET BY MOUTH EVERY FOUR HOURS AS NEEDED 42 tablet 1     No current facility-administered medications for this visit.        Allergies  Review of patient's allergies indicates:   Allergen Reactions    Pcn [penicillins]        Past Medical History  Past Medical History:   Diagnosis Date    Acute hypoxemic respiratory failure 7/9/2018    Arthritis     COPD (chronic obstructive pulmonary disease)        Surgical History  Past Surgical History:   Procedure Laterality Date    CHOLECYSTECTOMY      HYSTERECTOMY         Family History:   Family History   Problem Relation Age of Onset    COPD Mother     COPD Father        Social History:   Social History     Socioeconomic History    Marital status:      Spouse name: Not on file    Number of children: Not on file    Years of education: Not on file    Highest education level: Not on file    Occupational History    Not on file   Social Needs    Financial resource strain: Not on file    Food insecurity:     Worry: Not on file     Inability: Not on file    Transportation needs:     Medical: Not on file     Non-medical: Not on file   Tobacco Use    Smoking status: Former Smoker     Packs/day: 1.00     Years: 40.00     Pack years: 40.00     Types: Cigarettes     Last attempt to quit: 2018     Years since quittin.2    Smokeless tobacco: Never Used   Substance and Sexual Activity    Alcohol use: No    Drug use: No    Sexual activity: Not on file   Lifestyle    Physical activity:     Days per week: Not on file     Minutes per session: Not on file    Stress: Not on file   Relationships    Social connections:     Talks on phone: Not on file     Gets together: Not on file     Attends Religion service: Not on file     Active member of club or organization: Not on file     Attends meetings of clubs or organizations: Not on file     Relationship status: Not on file   Other Topics Concern    Not on file   Social History Narrative    Not on file       Hospitalization/Major Diagnostic Procedure:     Review of Systems     General/Constitutional:  Chills denies. Fatigue denies. Fever denies. Weight gain denies. Weight loss denies.    Respiratory:  Shortness of breath denies.    Cardiovascular:  Chest pain denies.    Gastrointestinal:  Constipation denies. Diarrhea denies. Nausea denies. Vomiting denies.     Hematology:  Easy bruising denies. Prolonged bleeding denies.     Genitourinary:  Frequent urination denies. Pain in lower back denies. Painful urination denies.     Musculoskeletal:  See HPI for details    Skin:  Rash denies.    Neurologic:  Dizziness denies. Gait abnormalities denies. Seizures denies. Tingling/Numbess denies.    Psychiatric:  Anxiety denies. Depressed mood denies.     Objective:   Vital Signs:   Vitals:    10/09/19 1448   BP: (!) 120/58   Pulse: 60        Physical Exam   "    General Examination:     Constitutional: The patient is alert and oriented to lace person and time. Mood is pleasant.     Head/Face: Normal facial features normal eyebrows    Eyes: Normal extraocular motion bilaterally    Lungs: Respirations are equal and unlabored    Gait is coordinated.    Cardiovascular: There are no swelling or varicosities present.    Lymphatic: Negative for adenopathy    Skin: Normal    Neurological: Level of consciousness normal. Oriented to place person and time and situation    Psychiatric: Oriented to time place person and situation    Patient is obese. Tenderness palpation over the left hip greater trochanter and reproducible pain with resisted abduction of the left hip. Bilateral lower extremities additional neurovascular intact. Lumbar range of motion flexion within normal limitations and extension limited secondary to pain and guarding.    XRAY Report/ Interpretation: No new studies today      Assessment:       1. Spondylolisthesis, lumbar region    2. Spinal stenosis of lumbar region with neurogenic claudication    3. Greater trochanteric bursitis of left hip        Plan:       Tessa was seen today for pain.    Diagnoses and all orders for this visit:    Spondylolisthesis, lumbar region    Spinal stenosis of lumbar region with neurogenic claudication    Greater trochanteric bursitis of left hip         No follow-ups on file.  Joseph Carranza, physician's assistant served in the capacity as a "scribe" for this patient encounter  A "face to face" encounter occurred with Dr. Branch on this date  The treatment plan and medical decision making is outlined below:  Continue with activity modification and activity as tolerated. Today she was in a left hip greater trochanteric bursa injection with 2 cc lidocaine and 80 mg Depo-Medrol. Please see procedure report for details. Continue with medications as prescribed. Follow-up in 3-4 months or sooner if needed. Refills of tramadol and trial " of Celebrex    This note was created using Dragon voice recognition software that occasionally misinterpreted phrases or words.

## 2019-10-09 NOTE — PROCEDURES
Large Joint Aspiration/Injection: L greater trochanteric bursa  Date/Time: 10/9/2019 3:00 PM  Performed by: Shun Branch MD  Authorized by: Shun Branch MD     Consent Done?:  Yes (Verbal)  Indications:  Pain  Procedure site marked: Yes    Timeout: Prior to procedure the correct patient, procedure, and site was verified    Anesthesia  Local anesthesia used  Anesthetic: lidocaine 1% without epinephrine    Location:  Hip  Site:  L greater trochanteric bursa  Prep: Patient was prepped and draped in usual sterile fashion    Needle size:  25 G  Medications:  40 mg methylPREDNISolone acetate 40 mg/mL; 40 mg methylPREDNISolone acetate 40 mg/mL  Patient tolerance:  Patient tolerated the procedure well with no immediate complications

## 2020-01-27 RX ORDER — TRAMADOL HYDROCHLORIDE 50 MG/1
TABLET ORAL
Qty: 42 TABLET | Refills: 0 | Status: SHIPPED | OUTPATIENT
Start: 2020-01-27 | End: 2020-02-12 | Stop reason: SDUPTHER

## 2020-01-30 ENCOUNTER — TELEPHONE (OUTPATIENT)
Dept: ORTHOPEDICS | Facility: CLINIC | Age: 65
End: 2020-01-30

## 2020-01-30 NOTE — TELEPHONE ENCOUNTER
----- Message from Uum Haddad sent at 1/30/2020 11:30 AM CST -----  Contact: patient  Patient needs a refill on Tramadol 100 mg, she is requesting it be sent to [address], if there is an issue call her back at [phone].

## 2020-02-12 ENCOUNTER — OFFICE VISIT (OUTPATIENT)
Dept: ORTHOPEDICS | Facility: CLINIC | Age: 65
End: 2020-02-12
Payer: COMMERCIAL

## 2020-02-12 VITALS — SYSTOLIC BLOOD PRESSURE: 142 MMHG | BODY MASS INDEX: 40.62 KG/M2 | DIASTOLIC BLOOD PRESSURE: 70 MMHG | WEIGHT: 215 LBS

## 2020-02-12 DIAGNOSIS — M17.12 PRIMARY OSTEOARTHRITIS OF LEFT KNEE: ICD-10-CM

## 2020-02-12 DIAGNOSIS — M70.62 GREATER TROCHANTERIC BURSITIS OF LEFT HIP: Primary | ICD-10-CM

## 2020-02-12 PROCEDURE — 3008F BODY MASS INDEX DOCD: CPT | Mod: S$GLB,,, | Performed by: ORTHOPAEDIC SURGERY

## 2020-02-12 PROCEDURE — 20610 DRAIN/INJ JOINT/BURSA W/O US: CPT | Mod: 51,LT,S$GLB, | Performed by: ORTHOPAEDIC SURGERY

## 2020-02-12 PROCEDURE — 3008F PR BODY MASS INDEX (BMI) DOCUMENTED: ICD-10-PCS | Mod: S$GLB,,, | Performed by: ORTHOPAEDIC SURGERY

## 2020-02-12 PROCEDURE — 20610 DRAIN/INJ JOINT/BURSA W/O US: CPT | Mod: LT,S$GLB,, | Performed by: ORTHOPAEDIC SURGERY

## 2020-02-12 PROCEDURE — 20610 LARGE JOINT ASPIRATION/INJECTION: L KNEE: ICD-10-PCS | Mod: LT,S$GLB,, | Performed by: ORTHOPAEDIC SURGERY

## 2020-02-12 PROCEDURE — 99213 OFFICE O/P EST LOW 20 MIN: CPT | Mod: 25,S$GLB,, | Performed by: ORTHOPAEDIC SURGERY

## 2020-02-12 PROCEDURE — 99213 PR OFFICE/OUTPT VISIT, EST, LEVL III, 20-29 MIN: ICD-10-PCS | Mod: 25,S$GLB,, | Performed by: ORTHOPAEDIC SURGERY

## 2020-02-12 RX ORDER — METHYLPREDNISOLONE ACETATE 40 MG/ML
40 INJECTION, SUSPENSION INTRA-ARTICULAR; INTRALESIONAL; INTRAMUSCULAR; SOFT TISSUE
Status: DISCONTINUED | OUTPATIENT
Start: 2020-02-12 | End: 2020-02-12 | Stop reason: HOSPADM

## 2020-02-12 RX ORDER — CELECOXIB 100 MG/1
100 CAPSULE ORAL 2 TIMES DAILY
Qty: 60 CAPSULE | Refills: 2 | Status: ON HOLD | OUTPATIENT
Start: 2020-02-12 | End: 2020-11-09 | Stop reason: SDDI

## 2020-02-12 RX ORDER — TRAMADOL HYDROCHLORIDE 50 MG/1
50 TABLET ORAL EVERY 4 HOURS PRN
Qty: 42 TABLET | Refills: 2 | Status: SHIPPED | OUTPATIENT
Start: 2020-02-12 | End: 2020-05-11 | Stop reason: SDUPTHER

## 2020-02-12 RX ADMIN — METHYLPREDNISOLONE ACETATE 40 MG: 40 INJECTION, SUSPENSION INTRA-ARTICULAR; INTRALESIONAL; INTRAMUSCULAR; SOFT TISSUE at 03:02

## 2020-02-12 NOTE — PROCEDURES
Large Joint Aspiration/Injection: L knee  Performed by: Shun Branch MD  Authorized by: Shun Branch MD  Date/Time: 2/12/2020 3:15 PM    Consent Done?:  Yes (Verbal)  Indications:  pain  Timeout: Immediately prior to procedure a time out was called to verify the correct patient, procedure, equipment, support staff and site/side marked as required.  Prep:Patient was prepped and draped in the usual sterile fashion.  Procedure site marked: Yes     Anesthesia  Local anesthesia used  Anesthetic: lidocaine 1% without epinephrine    Details:   Needle size: 22 G    Ultrasonic Guidance for needle placement: No    Medications: 40 mg methylPREDNISolone acetate 40 mg/mL; 40 mg methylPREDNISolone acetate 40 mg/mL  Patient tolerance:  patient tolerated the procedure well with no immediate complications    Large Joint Aspiration/Injection: L greater trochanteric bursa  Performed by: Shun Branch MD  Authorized by: Shun Branch MD  Date/Time: 2/12/2020 3:15 PM    Consent Done?:  Yes (Verbal)  Indications:  pain  Timeout: Immediately prior to procedure a time out was called to verify the correct patient, procedure, equipment, support staff and site/side marked as required.  Prep:Patient was prepped and draped in the usual sterile fashion.  Procedure site marked: Yes     Anesthesia  Local anesthesia used  Anesthetic: lidocaine 1% without epinephrine    Details:   Needle size: 22 G    Ultrasonic Guidance for needle placement: No    Medications: 40 mg methylPREDNISolone acetate 40 mg/mL; 40 mg methylPREDNISolone acetate 40 mg/mL  Patient tolerance:  patient tolerated the procedure well with no immediate complications

## 2020-02-12 NOTE — PROGRESS NOTES
Subjective:       Patient ID: Tessa Enriquez is a 64 y.o. female.    Chief Complaint: Pain of the Left Hip (Left hip injection helped a lot 10-9-19. She is here for another injection)      History of Present Illness  extremity radiculitis to the knee worse on the left than on the right. Overall she manages her pain with activity modification and about 1-2 tramadol a day. She also has a history of left hip greater trochanter bursitis. When she was last here 4 months ago we gave her an injection and this helped significantly. She is requesting a repeat    Current Medications  Current Outpatient Medications   Medication Sig Dispense Refill    calcium carbonate/vitamin D3 (CALTRATE WITH VITAMIN D3 ORAL) Take by mouth.      fluticasone-vilanterol (BREO ELLIPTA) 200-25 mcg/dose DsDv diskus inhaler Inhale 1 puff into the lungs once daily. Controller 2 each 0    traMADol (ULTRAM) 50 mg tablet TAKE ONE TABLET BY MOUTH EVERY FOUR HOURS AS NEEDED 42 tablet 0     No current facility-administered medications for this visit.        Allergies  Review of patient's allergies indicates:   Allergen Reactions    Pcn [penicillins]        Past Medical History  Past Medical History:   Diagnosis Date    Acute hypoxemic respiratory failure 7/9/2018    Arthritis     COPD (chronic obstructive pulmonary disease)        Surgical History  Past Surgical History:   Procedure Laterality Date    CHOLECYSTECTOMY      HYSTERECTOMY         Family History:   Family History   Problem Relation Age of Onset    COPD Mother     COPD Father        Social History:   Social History     Socioeconomic History    Marital status:      Spouse name: Not on file    Number of children: Not on file    Years of education: Not on file    Highest education level: Not on file   Occupational History    Not on file   Social Needs    Financial resource strain: Not on file    Food insecurity:     Worry: Not on file     Inability: Not on file     Transportation needs:     Medical: Not on file     Non-medical: Not on file   Tobacco Use    Smoking status: Former Smoker     Packs/day: 1.00     Years: 40.00     Pack years: 40.00     Types: Cigarettes     Last attempt to quit: 2018     Years since quittin.6    Smokeless tobacco: Never Used   Substance and Sexual Activity    Alcohol use: No    Drug use: No    Sexual activity: Not on file   Lifestyle    Physical activity:     Days per week: Not on file     Minutes per session: Not on file    Stress: Not on file   Relationships    Social connections:     Talks on phone: Not on file     Gets together: Not on file     Attends Sikhism service: Not on file     Active member of club or organization: Not on file     Attends meetings of clubs or organizations: Not on file     Relationship status: Not on file   Other Topics Concern    Not on file   Social History Narrative    Not on file       Hospitalization/Major Diagnostic Procedure:     Review of Systems     General/Constitutional:  Chills denies. Fatigue denies. Fever denies. Weight gain denies. Weight loss denies.    Respiratory:  Shortness of breath denies.    Cardiovascular:  Chest pain denies.    Gastrointestinal:  Constipation denies. Diarrhea denies. Nausea denies. Vomiting denies.     Hematology:  Easy bruising denies. Prolonged bleeding denies.     Genitourinary:  Frequent urination denies. Pain in lower back denies. Painful urination denies.     Musculoskeletal:  See HPI for details    Skin:  Rash denies.    Neurologic:  Dizziness denies. Gait abnormalities denies. Seizures denies. Tingling/Numbess denies.    Psychiatric:  Anxiety denies. Depressed mood denies.     Objective:   Vital Signs:   Vitals:    20 1509   BP: (!) 142/70        Physical Exam      General Examination:     Constitutional: The patient is alert and oriented to lace person and time. Mood is pleasant.     Head/Face: Normal facial features normal eyebrows    Eyes:  "Normal extraocular motion bilaterally    Lungs: Respirations are equal and unlabored    Gait is coordinated.    Cardiovascular: There are no swelling or varicosities present.    Lymphatic: Negative for adenopathy    Skin: Normal    Neurological: Level of consciousness normal. Oriented to place person and time and situation    Psychiatric: Oriented to time place person and situation    Patient is obese. Tenderness palpation over the left hip greater trochanter and reproducible pain with resisted abduction of the left hip. Bilateral lower extremities additional neurovascular intact. Lumbar range of motion flexion within normal limitations and extension limited secondary to pain and guarding.  Left knee with popliteal fossa tenderness palpation in medial joint line tenderness palpation full active range of motion and normal strength with no ligamentous instability.    XRAY Report/ Interpretation:  Left knee AP and lateral x-rays taken in the office today reviewed the patient demonstrate mild medial compartment bilateral knee degenerative change      Assessment:       1. Greater trochanteric bursitis of left hip    2. Primary osteoarthritis of left knee        Plan:       Tessa was seen today for pain.    Diagnoses and all orders for this visit:    Greater trochanteric bursitis of left hip    Primary osteoarthritis of left knee         No follow-ups on file.    Joesph Carranza, physician's assistant served in the capacity as a "scribe" for this patient encounter  A "face to face" encounter occurred with Dr. Branch on this date  The treatment plan and medical decision making is outlined below:  Trochanteric bursa of the left hip was injected with steroid xylocaine mixture.   Osteoarthritis left knee was injected with steroid xylocaine mixture. No side effects were noted from the injections    This note was created using Dragon voice recognition software that occasionally misinterpreted phrases or words.  "

## 2020-05-11 ENCOUNTER — OFFICE VISIT (OUTPATIENT)
Dept: ORTHOPEDICS | Facility: CLINIC | Age: 65
End: 2020-05-11
Payer: COMMERCIAL

## 2020-05-11 VITALS — BODY MASS INDEX: 38.36 KG/M2 | WEIGHT: 203 LBS | DIASTOLIC BLOOD PRESSURE: 74 MMHG | SYSTOLIC BLOOD PRESSURE: 118 MMHG

## 2020-05-11 DIAGNOSIS — M70.62 GREATER TROCHANTERIC BURSITIS OF LEFT HIP: Primary | ICD-10-CM

## 2020-05-11 DIAGNOSIS — S99.921A INJURY OF SMALL TOE, RIGHT, INITIAL ENCOUNTER: ICD-10-CM

## 2020-05-11 DIAGNOSIS — M17.12 PRIMARY OSTEOARTHRITIS OF LEFT KNEE: ICD-10-CM

## 2020-05-11 PROCEDURE — 3008F PR BODY MASS INDEX (BMI) DOCUMENTED: ICD-10-PCS | Mod: S$GLB,,, | Performed by: ORTHOPAEDIC SURGERY

## 2020-05-11 PROCEDURE — 99213 PR OFFICE/OUTPT VISIT, EST, LEVL III, 20-29 MIN: ICD-10-PCS | Mod: 25,S$GLB,, | Performed by: ORTHOPAEDIC SURGERY

## 2020-05-11 PROCEDURE — 20610 LARGE JOINT ASPIRATION/INJECTION: L KNEE: ICD-10-PCS | Mod: 51,LT,S$GLB, | Performed by: ORTHOPAEDIC SURGERY

## 2020-05-11 PROCEDURE — 99213 OFFICE O/P EST LOW 20 MIN: CPT | Mod: 25,S$GLB,, | Performed by: ORTHOPAEDIC SURGERY

## 2020-05-11 PROCEDURE — 3008F BODY MASS INDEX DOCD: CPT | Mod: S$GLB,,, | Performed by: ORTHOPAEDIC SURGERY

## 2020-05-11 PROCEDURE — 20610 DRAIN/INJ JOINT/BURSA W/O US: CPT | Mod: 51,LT,S$GLB, | Performed by: ORTHOPAEDIC SURGERY

## 2020-05-11 PROCEDURE — 20610 DRAIN/INJ JOINT/BURSA W/O US: CPT | Mod: LT,S$GLB,, | Performed by: ORTHOPAEDIC SURGERY

## 2020-05-11 RX ORDER — METHYLPREDNISOLONE ACETATE 40 MG/ML
40 INJECTION, SUSPENSION INTRA-ARTICULAR; INTRALESIONAL; INTRAMUSCULAR; SOFT TISSUE
Status: DISCONTINUED | OUTPATIENT
Start: 2020-05-11 | End: 2020-05-11 | Stop reason: HOSPADM

## 2020-05-11 RX ORDER — TRAMADOL HYDROCHLORIDE 50 MG/1
50 TABLET ORAL EVERY 4 HOURS PRN
Qty: 42 TABLET | Refills: 3 | Status: SHIPPED | OUTPATIENT
Start: 2020-05-11 | End: 2020-08-17 | Stop reason: SDUPTHER

## 2020-05-11 RX ADMIN — METHYLPREDNISOLONE ACETATE 40 MG: 40 INJECTION, SUSPENSION INTRA-ARTICULAR; INTRALESIONAL; INTRAMUSCULAR; SOFT TISSUE at 10:05

## 2020-05-11 NOTE — PROGRESS NOTES
Subjective:       Patient ID: Tessa Enriquez is a 64 y.o. female.    Chief Complaint: Pain of the Left Knee (Left knee injection helped and she is here for another injection. ) and Pain of the Left Hip (Left hip injection helped 2-12 and she is here for another injection)      History of Present Illness  Patient is here follow-up for left knee pain and left hip pain over the greater trochanter.  Requesting repeat corticosteroid injections.  Also she states that she fell while getting out of the shower and injured her right little toe    Current Medications  Current Outpatient Medications   Medication Sig Dispense Refill    calcium carbonate/vitamin D3 (CALTRATE WITH VITAMIN D3 ORAL) Take by mouth.      celecoxib (CELEBREX) 100 MG capsule Take 1 capsule (100 mg total) by mouth 2 (two) times daily. 60 capsule 2    fluticasone-vilanterol (BREO ELLIPTA) 200-25 mcg/dose DsDv diskus inhaler Inhale 1 puff into the lungs once daily. Controller 2 each 0    traMADol (ULTRAM) 50 mg tablet Take 1 tablet (50 mg total) by mouth every 4 (four) hours as needed. 42 tablet 2     No current facility-administered medications for this visit.        Allergies  Review of patient's allergies indicates:   Allergen Reactions    Pcn [penicillins]        Past Medical History  Past Medical History:   Diagnosis Date    Acute hypoxemic respiratory failure 7/9/2018    Arthritis     COPD (chronic obstructive pulmonary disease)        Surgical History  Past Surgical History:   Procedure Laterality Date    CHOLECYSTECTOMY      HYSTERECTOMY         Family History:   Family History   Problem Relation Age of Onset    COPD Mother     COPD Father        Social History:   Social History     Socioeconomic History    Marital status:      Spouse name: Not on file    Number of children: Not on file    Years of education: Not on file    Highest education level: Not on file   Occupational History    Not on file   Social Needs     Financial resource strain: Not on file    Food insecurity:     Worry: Not on file     Inability: Not on file    Transportation needs:     Medical: Not on file     Non-medical: Not on file   Tobacco Use    Smoking status: Former Smoker     Packs/day: 1.00     Years: 40.00     Pack years: 40.00     Types: Cigarettes     Last attempt to quit: 2018     Years since quittin.8    Smokeless tobacco: Never Used   Substance and Sexual Activity    Alcohol use: No    Drug use: No    Sexual activity: Not on file   Lifestyle    Physical activity:     Days per week: Not on file     Minutes per session: Not on file    Stress: Not on file   Relationships    Social connections:     Talks on phone: Not on file     Gets together: Not on file     Attends Muslim service: Not on file     Active member of club or organization: Not on file     Attends meetings of clubs or organizations: Not on file     Relationship status: Not on file   Other Topics Concern    Not on file   Social History Narrative    Not on file       Hospitalization/Major Diagnostic Procedure:     Review of Systems     General/Constitutional:  Chills denies. Fatigue denies. Fever denies. Weight gain denies. Weight loss denies.    Respiratory:  Shortness of breath denies.    Cardiovascular:  Chest pain denies.    Gastrointestinal:  Constipation denies. Diarrhea denies. Nausea denies. Vomiting denies.     Hematology:  Easy bruising denies. Prolonged bleeding denies.     Genitourinary:  Frequent urination denies. Pain in lower back denies. Painful urination denies.     Musculoskeletal:  See HPI for details    Skin:  Rash denies.    Neurologic:  Dizziness denies. Gait abnormalities denies. Seizures denies. Tingling/Numbess denies.    Psychiatric:  Anxiety denies. Depressed mood denies.     Objective:   Vital Signs:   Vitals:    20 1012   BP: 118/74        Physical Exam      General Examination:     Constitutional: The patient is alert and oriented  "to lace person and time. Mood is pleasant.     Head/Face: Normal facial features normal eyebrows    Eyes: Normal extraocular motion bilaterally    Lungs: Respirations are equal and unlabored    Gait is coordinated.    Cardiovascular: There are no swelling or varicosities present.    Lymphatic: Negative for adenopathy    Skin: Normal    Neurological: Level of consciousness normal. Oriented to place person and time and situation    Psychiatric: Oriented to time place person and situation    Patient is obese. Tenderness palpation over the left hip greater trochanter and reproducible pain with resisted abduction of the left hip. Bilateral lower extremities additional neurovascular intact. Lumbar range of motion flexion within normal limitations and extension limited secondary to pain and guarding.  Left knee with popliteal fossa tenderness palpation in medial joint line tenderness palpation full active range of motion and normal strength with no ligamentous instability.  - right little toe swollen and erythematous with tenderness palpation    XRAY Report/ Interpretation:  AP pelvis and left hip frogleg x-ray taken in the office today and reviewed with the patient demonstrates no significant arthritic change but evidence of chronic bursitis over the greater troch.          Assessment:       1. Greater trochanteric bursitis of left hip    2. Primary osteoarthritis of left knee    3. Injury of small toe, right, initial encounter        Plan:       Tessa was seen today for pain and pain.    Diagnoses and all orders for this visit:    Greater trochanteric bursitis of left hip  -     X-Ray Hip 2 or 3 views Left    Primary osteoarthritis of left knee    Injury of small toe, right, initial encounter         No follow-ups on file.  Joseph Carranza, physician's assistant served in the capacity as a "scribe" for this patient encounter  A "face to face" encounter occurred with Dr. Branch on this date  The treatment plan and medical " decision making is outlined below:  Today she was given a left hip greater trochanteric bursa injection with 80 mg Depo-Medrol and 2 cc lidocaine.  She was given the same injection for the left knee.  Activity as tolerated.  Patient defers any treatment for her right pinky toe.  Follow-up as needed.    This note was created using Dragon voice recognition software that occasionally misinterpreted phrases or words.

## 2020-05-11 NOTE — PROCEDURES
Large Joint Aspiration/Injection: L greater trochanteric bursa  Date/Time: 5/11/2020 10:30 AM  Performed by: Shun Branch MD  Authorized by: Shun Branch MD     Consent Done?:  Yes (Verbal)  Indications:  Pain  Site marked: the procedure site was marked    Timeout: prior to procedure the correct patient, procedure, and site was verified    Prep: patient was prepped and draped in usual sterile fashion      Local anesthesia used?: Yes    Local anesthetic:  Lidocaine 1% without epinephrine    Details:  Needle Size:  22 G  Ultrasonic Guidance for needle placement?: No    Location:  Hip  Site:  L greater trochanteric bursa  Medications:  40 mg methylPREDNISolone acetate 40 mg/mL; 40 mg methylPREDNISolone acetate 40 mg/mL  Patient tolerance:  Patient tolerated the procedure well with no immediate complications  Large Joint Aspiration/Injection: L knee  Date/Time: 5/11/2020 10:30 AM  Performed by: Shun Branch MD  Authorized by: Shun Branch MD     Consent Done?:  Yes (Verbal)  Indications:  Pain  Site marked: the procedure site was marked    Timeout: prior to procedure the correct patient, procedure, and site was verified    Prep: patient was prepped and draped in usual sterile fashion      Local anesthesia used?: Yes    Local anesthetic:  Lidocaine 1% without epinephrine    Details:  Needle Size:  22 G  Ultrasonic Guidance for needle placement?: No    Location:  Knee  Site:  L knee  Medications:  40 mg methylPREDNISolone acetate 40 mg/mL; 40 mg methylPREDNISolone acetate 40 mg/mL  Patient tolerance:  Patient tolerated the procedure well with no immediate complications

## 2020-08-17 ENCOUNTER — OFFICE VISIT (OUTPATIENT)
Dept: ORTHOPEDICS | Facility: CLINIC | Age: 65
End: 2020-08-17
Payer: COMMERCIAL

## 2020-08-17 VITALS
DIASTOLIC BLOOD PRESSURE: 73 MMHG | BODY MASS INDEX: 38.36 KG/M2 | WEIGHT: 203 LBS | HEART RATE: 82 BPM | SYSTOLIC BLOOD PRESSURE: 146 MMHG

## 2020-08-17 DIAGNOSIS — M17.12 PRIMARY OSTEOARTHRITIS OF LEFT KNEE: ICD-10-CM

## 2020-08-17 DIAGNOSIS — M70.62 GREATER TROCHANTERIC BURSITIS OF LEFT HIP: Primary | ICD-10-CM

## 2020-08-17 PROCEDURE — 99213 OFFICE O/P EST LOW 20 MIN: CPT | Mod: S$GLB,,, | Performed by: ORTHOPAEDIC SURGERY

## 2020-08-17 PROCEDURE — 99213 PR OFFICE/OUTPT VISIT, EST, LEVL III, 20-29 MIN: ICD-10-PCS | Mod: S$GLB,,, | Performed by: ORTHOPAEDIC SURGERY

## 2020-08-17 PROCEDURE — 3008F BODY MASS INDEX DOCD: CPT | Mod: S$GLB,,, | Performed by: ORTHOPAEDIC SURGERY

## 2020-08-17 PROCEDURE — 3008F PR BODY MASS INDEX (BMI) DOCUMENTED: ICD-10-PCS | Mod: S$GLB,,, | Performed by: ORTHOPAEDIC SURGERY

## 2020-08-17 RX ORDER — TRAMADOL HYDROCHLORIDE 50 MG/1
50 TABLET ORAL EVERY 4 HOURS PRN
Qty: 42 TABLET | Refills: 3 | Status: SHIPPED | OUTPATIENT
Start: 2020-08-17 | End: 2020-08-19

## 2020-08-17 NOTE — PROGRESS NOTES
Subjective:       Patient ID: Tessa Enriquez is a 64 y.o. female.    Chief Complaint: Pain of the Left Hip (Left hip is. Injection is still working) and Pain of the Left Knee (Left knee is good and she said the injection helped. She just would like some Tramadol)      History of Present Illness  Patient is here follow-up for left knee pain and left hip pain over the greater trochanter.   when she was last here a few months ago she received corticosteroid injections for both and they have continue to work for her.  She has minimal pain at this time.    Current Medications  Current Outpatient Medications   Medication Sig Dispense Refill    calcium carbonate/vitamin D3 (CALTRATE WITH VITAMIN D3 ORAL) Take by mouth.      celecoxib (CELEBREX) 100 MG capsule Take 1 capsule (100 mg total) by mouth 2 (two) times daily. 60 capsule 2    fluticasone-vilanterol (BREO ELLIPTA) 200-25 mcg/dose DsDv diskus inhaler Inhale 1 puff into the lungs once daily. Controller 2 each 0    traMADoL (ULTRAM) 50 mg tablet Take 1 tablet (50 mg total) by mouth every 4 (four) hours as needed. 42 tablet 3     No current facility-administered medications for this visit.        Allergies  Review of patient's allergies indicates:   Allergen Reactions    Pcn [penicillins]        Past Medical History  Past Medical History:   Diagnosis Date    Acute hypoxemic respiratory failure 7/9/2018    Arthritis     COPD (chronic obstructive pulmonary disease)        Surgical History  Past Surgical History:   Procedure Laterality Date    CHOLECYSTECTOMY      HYSTERECTOMY         Family History:   Family History   Problem Relation Age of Onset    COPD Mother     COPD Father        Social History:   Social History     Socioeconomic History    Marital status:      Spouse name: Not on file    Number of children: Not on file    Years of education: Not on file    Highest education level: Not on file   Occupational History    Not on file   Social  Needs    Financial resource strain: Not on file    Food insecurity     Worry: Not on file     Inability: Not on file    Transportation needs     Medical: Not on file     Non-medical: Not on file   Tobacco Use    Smoking status: Former Smoker     Packs/day: 1.00     Years: 40.00     Pack years: 40.00     Types: Cigarettes     Quit date: 2018     Years since quittin.1    Smokeless tobacco: Never Used   Substance and Sexual Activity    Alcohol use: No    Drug use: No    Sexual activity: Not on file   Lifestyle    Physical activity     Days per week: Not on file     Minutes per session: Not on file    Stress: Not on file   Relationships    Social connections     Talks on phone: Not on file     Gets together: Not on file     Attends Church service: Not on file     Active member of club or organization: Not on file     Attends meetings of clubs or organizations: Not on file     Relationship status: Not on file   Other Topics Concern    Not on file   Social History Narrative    Not on file       Hospitalization/Major Diagnostic Procedure:     Review of Systems     General/Constitutional:  Chills denies. Fatigue denies. Fever denies. Weight gain denies. Weight loss denies.    Respiratory:  Shortness of breath denies.    Cardiovascular:  Chest pain denies.    Gastrointestinal:  Constipation denies. Diarrhea denies. Nausea denies. Vomiting denies.     Hematology:  Easy bruising denies. Prolonged bleeding denies.     Genitourinary:  Frequent urination denies. Pain in lower back denies. Painful urination denies.     Musculoskeletal:  See HPI for details    Skin:  Rash denies.    Neurologic:  Dizziness denies. Gait abnormalities denies. Seizures denies. Tingling/Numbess denies.    Psychiatric:  Anxiety denies. Depressed mood denies.     Objective:   Vital Signs:   Vitals:    20 1407   BP: (!) 146/73   Pulse: 82        Physical Exam      General Examination:     Constitutional: The patient is alert  "and oriented to lace person and time. Mood is pleasant.     Head/Face: Normal facial features normal eyebrows    Eyes: Normal extraocular motion bilaterally    Lungs: Respirations are equal and unlabored    Gait is coordinated.    Cardiovascular: There are no swelling or varicosities present.    Lymphatic: Negative for adenopathy    Skin: Normal    Neurological: Level of consciousness normal. Oriented to place person and time and situation    Psychiatric: Oriented to time place person and situation    Patient is obese.  Very mild Tenderness to palpation over the left hip greater trochanter. Bilateral lower extremities additional neurovascular intact. Lumbar range of motion flexion within normal limitations and extension limited secondary to pain and guarding.  Left knee with mild popliteal fossa tenderness to palpation.  No medial joint line tenderness palpation. full active range of motion and normal strength with no ligamentous instability.    XRAY Report/ Interpretation:  No new studies today      Assessment:       1. Greater trochanteric bursitis of left hip    2. Primary osteoarthritis of left knee        Plan:       Tessa was seen today for pain and pain.    Diagnoses and all orders for this visit:    Greater trochanteric bursitis of left hip    Primary osteoarthritis of left knee         No follow-ups on file.  Joseph Carranza, physician's assistant served in the capacity as a "scribe" for this patient encounter  A "face to face" encounter occurred with Dr. Branch on this date  The treatment plan and medical decision making is outlined below:  Follow-up p.r.n. to repeat injections if needed.  Medications refilled today.  No corticosteroid injections needed    This note was created using Dragon voice recognition software that occasionally misinterpreted phrases or words.    "

## 2020-09-30 ENCOUNTER — OFFICE VISIT (OUTPATIENT)
Dept: FAMILY MEDICINE | Facility: CLINIC | Age: 65
End: 2020-09-30
Payer: COMMERCIAL

## 2020-09-30 VITALS
TEMPERATURE: 98 F | DIASTOLIC BLOOD PRESSURE: 78 MMHG | BODY MASS INDEX: 38.42 KG/M2 | HEIGHT: 61 IN | RESPIRATION RATE: 16 BRPM | HEART RATE: 88 BPM | WEIGHT: 203.5 LBS | OXYGEN SATURATION: 98 % | SYSTOLIC BLOOD PRESSURE: 136 MMHG

## 2020-09-30 DIAGNOSIS — Z13.6 ENCOUNTER FOR LIPID SCREENING FOR CARDIOVASCULAR DISEASE: ICD-10-CM

## 2020-09-30 DIAGNOSIS — K57.92 DIVERTICULITIS: Primary | ICD-10-CM

## 2020-09-30 DIAGNOSIS — Z11.59 ENCOUNTER FOR HEPATITIS C SCREENING TEST FOR LOW RISK PATIENT: ICD-10-CM

## 2020-09-30 DIAGNOSIS — Z11.4 SCREENING FOR HIV WITHOUT PRESENCE OF RISK FACTORS: ICD-10-CM

## 2020-09-30 DIAGNOSIS — Z87.891 FORMER HEAVY TOBACCO SMOKER: ICD-10-CM

## 2020-09-30 DIAGNOSIS — Z12.31 ENCOUNTER FOR SCREENING MAMMOGRAM FOR MALIGNANT NEOPLASM OF BREAST: ICD-10-CM

## 2020-09-30 DIAGNOSIS — Z12.2 ENCOUNTER FOR SCREENING FOR MALIGNANT NEOPLASM OF RESPIRATORY ORGANS: ICD-10-CM

## 2020-09-30 DIAGNOSIS — K62.5 RECTAL BLEEDING: ICD-10-CM

## 2020-09-30 DIAGNOSIS — R73.01 ELEVATED FASTING GLUCOSE: ICD-10-CM

## 2020-09-30 DIAGNOSIS — E87.6 HYPOKALEMIA: ICD-10-CM

## 2020-09-30 DIAGNOSIS — Z13.220 ENCOUNTER FOR LIPID SCREENING FOR CARDIOVASCULAR DISEASE: ICD-10-CM

## 2020-09-30 PROCEDURE — 3008F BODY MASS INDEX DOCD: CPT | Mod: S$GLB,,, | Performed by: NURSE PRACTITIONER

## 2020-09-30 PROCEDURE — 3008F PR BODY MASS INDEX (BMI) DOCUMENTED: ICD-10-PCS | Mod: S$GLB,,, | Performed by: NURSE PRACTITIONER

## 2020-09-30 PROCEDURE — 99215 PR OFFICE/OUTPT VISIT, EST, LEVL V, 40-54 MIN: ICD-10-PCS | Mod: S$GLB,,, | Performed by: NURSE PRACTITIONER

## 2020-09-30 PROCEDURE — 99215 OFFICE O/P EST HI 40 MIN: CPT | Mod: S$GLB,,, | Performed by: NURSE PRACTITIONER

## 2020-09-30 RX ORDER — CIPROFLOXACIN 500 MG/5ML
KIT ORAL 2 TIMES DAILY
Refills: 0 | Status: CANCELLED | OUTPATIENT
Start: 2020-09-30 | End: 2020-10-10

## 2020-09-30 RX ORDER — METRONIDAZOLE 500 MG/1
500 TABLET ORAL EVERY 8 HOURS
Qty: 30 TABLET | Refills: 0 | Status: SHIPPED | OUTPATIENT
Start: 2020-09-30 | End: 2020-10-10

## 2020-09-30 RX ORDER — CIPROFLOXACIN 500 MG/1
500 TABLET ORAL 2 TIMES DAILY
Qty: 20 TABLET | Refills: 0 | Status: SHIPPED | OUTPATIENT
Start: 2020-09-30 | End: 2020-10-10

## 2020-09-30 NOTE — LETTER
September 30, 2020      [facility]  [address]  [facility] [address]  Phone: [phone]  Fax: [phone]       Patient: Tessa Enriquez   YOB: 1955  Date of Visit: 09/30/2020    To Whom It May Concern:    Sophia Enriquez  was at [facility] on 09/30/2020. She may return to work on 10/05/2020 with no restrictions. If you have any questions or concerns, or if I can be of further assistance, please do not hesitate to contact me.    Sincerely,        KEANU Null

## 2020-09-30 NOTE — PROGRESS NOTES
SUBJECTIVE:      Patient ID: Tessa Enriquez is a 64 y.o. female.    Chief Complaint: Rectal Bleeding (x 3 days )    Patient of Dr. Mcdermott, unknown to this provider, presents for rectal bleeding x3 days.  Lost to follow-up since February 2019.  She admits she has a H/O diverticulitis and ate popcorn a couple days prior to the start of rectal bleeding.  She states she is having bowel urgency and abdominal cramping.  She denies nausea, vomiting, fever.  She is very out of date for labs and imaging studies.  Labs from several years ago reveal hypokalemia & elevated fasting glucose.    Discussed outstanding health maintenance - refuses immunizations at this time but amenable to imaging studies    Rectal Bleeding  This is a new problem. The current episode started in the past 7 days. The problem occurs 2 to 4 times per day. The problem has been unchanged. Associated symptoms include abdominal pain, a change in bowel habit and fatigue. Pertinent negatives include no arthralgias, chest pain, congestion, coughing, joint swelling, myalgias, nausea, neck pain, numbness, sore throat, vomiting or weakness. The symptoms are aggravated by eating. She has tried nothing for the symptoms.       Past Surgical History:   Procedure Laterality Date    CHOLECYSTECTOMY      HYSTERECTOMY       Family History   Problem Relation Age of Onset    COPD Mother     COPD Father       Social History     Socioeconomic History    Marital status:      Spouse name: Not on file    Number of children: Not on file    Years of education: Not on file    Highest education level: Not on file   Occupational History    Not on file   Social Needs    Financial resource strain: Not on file    Food insecurity     Worry: Not on file     Inability: Not on file    Transportation needs     Medical: Not on file     Non-medical: Not on file   Tobacco Use    Smoking status: Former Smoker     Packs/day: 2.00     Years: 40.00     Pack years: 80.00      Types: Cigarettes     Quit date: 2018     Years since quittin.2    Smokeless tobacco: Never Used   Substance and Sexual Activity    Alcohol use: No    Drug use: No    Sexual activity: Not on file   Lifestyle    Physical activity     Days per week: Not on file     Minutes per session: Not on file    Stress: Not on file   Relationships    Social connections     Talks on phone: Not on file     Gets together: Not on file     Attends [employer] service: Not on file     Active member of club or organization: Not on file     Attends meetings of clubs or organizations: Not on file     Relationship status: Not on file   Other Topics Concern    Not on file   Social History Narrative    Not on file     Current Outpatient Medications   Medication Sig Dispense Refill    celecoxib (CELEBREX) 100 MG capsule Take 1 capsule (100 mg total) by mouth 2 (two) times daily. 60 capsule 2    traMADoL (ULTRAM) 50 mg tablet TAKE ONE TABLET BY MOUTH EVERY FOUR HOURS AS NEEDED  42 tablet 2    ciprofloxacin HCl (CIPRO) 500 MG tablet Take 1 tablet (500 mg total) by mouth 2 (two) times daily. for 10 days 20 tablet 0    metroNIDAZOLE (FLAGYL) 500 MG tablet Take 1 tablet (500 mg total) by mouth every 8 (eight) hours. for 10 days 30 tablet 0     No current facility-administered medications for this visit.      Review of patient's allergies indicates:   Allergen Reactions    Pcn [penicillins]       Past Medical History:   Diagnosis Date    Acute hypoxemic respiratory failure 2018    Arthritis     COPD (chronic obstructive pulmonary disease)      Past Surgical History:   Procedure Laterality Date    CHOLECYSTECTOMY      HYSTERECTOMY         Review of Systems   Constitutional: Positive for fatigue. Negative for activity change, appetite change and unexpected weight change.   HENT: Negative for congestion, ear pain, hearing loss, postnasal drip, sinus pressure, sinus pain, sneezing and sore throat.    Eyes: Negative for  "photophobia and pain.   Respiratory: Negative for cough, chest tightness, shortness of breath and wheezing.    Cardiovascular: Negative for chest pain, palpitations and leg swelling.   Gastrointestinal: Positive for abdominal pain, change in bowel habit, diarrhea and hematochezia. Negative for abdominal distention, blood in stool, constipation, nausea and vomiting.   Endocrine: Negative for cold intolerance, heat intolerance, polydipsia and polyuria.   Genitourinary: Negative for difficulty urinating, dysuria, flank pain, frequency, hematuria, pelvic pain and urgency.   Musculoskeletal: Negative for arthralgias, back pain, joint swelling, myalgias and neck pain.   Skin: Negative for pallor.   Allergic/Immunologic: Negative for environmental allergies and food allergies.   Neurological: Negative for dizziness, weakness, light-headedness and numbness.   Hematological: Does not bruise/bleed easily.   Psychiatric/Behavioral: Negative for agitation, confusion, decreased concentration and sleep disturbance. The patient is not nervous/anxious.       OBJECTIVE:      Vitals:    09/30/20 1501 09/30/20 1622   BP: (!) 144/74 136/78   BP Location: Right arm Left arm   Patient Position: Sitting Sitting   BP Method: Large (Manual) Large (Manual)   Pulse: 88    Resp: 16    Temp: 97.7 °F (36.5 °C)    TempSrc: Oral    SpO2: 98%    Weight: 92.3 kg (203 lb 8 oz)    Height: 5' 1" (1.549 m)      Physical Exam  Vitals signs and nursing note reviewed.   Constitutional:       General: She is not in acute distress.     Appearance: Normal appearance. She is well-developed. She is obese.   HENT:      Head: Normocephalic and atraumatic.      Right Ear: Hearing normal.      Left Ear: Hearing normal.      Nose: Nose normal. No rhinorrhea.   Eyes:      General: Lids are normal.         Right eye: No discharge.         Left eye: No discharge.      Conjunctiva/sclera: Conjunctivae normal.      Right eye: Right conjunctiva is not injected.      Left " eye: Left conjunctiva is not injected.      Pupils: Pupils are equal, round, and reactive to light. Pupils are equal.      Right eye: Pupil is round and reactive.      Left eye: Pupil is round and reactive.   Neck:      Musculoskeletal: Normal range of motion and neck supple.      Thyroid: No thyromegaly.      Vascular: No JVD.      Trachea: Trachea normal. No tracheal deviation.   Cardiovascular:      Rate and Rhythm: Normal rate and regular rhythm.      Pulses:           Radial pulses are 2+ on the right side and 2+ on the left side.      Heart sounds: Normal heart sounds. No murmur. No friction rub. No gallop.    Pulmonary:      Effort: Pulmonary effort is normal. No respiratory distress.      Breath sounds: Normal breath sounds. No stridor. No decreased breath sounds, wheezing, rhonchi or rales.   Abdominal:      General: Abdomen is protuberant. Bowel sounds are increased. There is no distension.      Palpations: Abdomen is soft. Abdomen is not rigid.      Tenderness: There is abdominal tenderness in the right lower quadrant and left lower quadrant. There is no guarding.   Musculoskeletal: Normal range of motion.   Lymphadenopathy:      Cervical: No cervical adenopathy.   Skin:     General: Skin is warm and dry.      Capillary Refill: Capillary refill takes less than 2 seconds.      Coloration: Skin is not pale.      Findings: No lesion or rash.   Neurological:      Mental Status: She is alert and oriented to person, place, and time.      Motor: No atrophy.      Coordination: Coordination normal.      Gait: Gait normal.   Psychiatric:         Attention and Perception: She is attentive.         Speech: Speech normal.         Behavior: Behavior normal.         Thought Content: Thought content normal.         Judgment: Judgment normal.        Assessment:       1. Diverticulitis    2. Rectal bleeding    3. Hypokalemia    4. Elevated fasting glucose    5. Encounter for lipid screening for cardiovascular disease    6.  Encounter for screening for malignant neoplasm of respiratory organs    7. Former heavy tobacco smoker    8. Encounter for screening mammogram for malignant neoplasm of breast    9. Encounter for hepatitis C screening test for low risk patient    10. Screening for HIV without presence of risk factors        Plan:       Diverticulitis  -     Comprehensive metabolic panel; Future; Expected date: 09/30/2020  -     Ambulatory referral/consult to Gastroenterology; Future; Expected date: 10/07/2020  -     metroNIDAZOLE (FLAGYL) 500 MG tablet; Take 1 tablet (500 mg total) by mouth every 8 (eight) hours. for 10 days  Dispense: 30 tablet; Refill: 0  -     ciprofloxacin HCl (CIPRO) 500 MG tablet; Take 1 tablet (500 mg total) by mouth 2 (two) times daily. for 10 days  Dispense: 20 tablet; Refill: 0  -     CT Abdomen Pelvis  Without Contrast; Future; Expected date: 09/30/2020    Rectal bleeding  -     CBC auto differential; Future; Expected date: 09/30/2020  -     Occult blood x 1, stool; Future; Expected date: 09/30/2020  -     Ambulatory referral/consult to Gastroenterology; Future; Expected date: 10/07/2020  -     CT Abdomen Pelvis  Without Contrast; Future; Expected date: 09/30/2020    Hypokalemia  -     Comprehensive metabolic panel; Future; Expected date: 09/30/2020    Elevated fasting glucose  -     Comprehensive metabolic panel; Future; Expected date: 09/30/2020  -     Hemoglobin A1C; Future; Expected date: 09/30/2020    Encounter for lipid screening for cardiovascular disease  -     Lipid Panel; Future; Expected date: 09/30/2020    Encounter for screening for malignant neoplasm of respiratory organs  -     CT Chest Lung Screening Low Dose; Future; Expected date: 09/30/2020    Former heavy tobacco smoker  -     CT Chest Lung Screening Low Dose; Future; Expected date: 09/30/2020    Encounter for screening mammogram for malignant neoplasm of breast  -     Mammo Digital Screening Bilat; Future; Expected date:  09/30/2020    Encounter for hepatitis C screening test for low risk patient  -     Hepatitis C Antibody; Future; Expected date: 09/30/2020    Screening for HIV without presence of risk factors  -     HIV 1/2 Ag/Ab (4th Gen); Future; Expected date: 09/30/2020        Follow up in about 2 weeks (around 10/14/2020) for labs/imaging/stomach review.      9/30/2020 KEANU Proctor, FNP-C

## 2020-10-06 ENCOUNTER — TELEPHONE (OUTPATIENT)
Dept: FAMILY MEDICINE | Facility: CLINIC | Age: 65
End: 2020-10-06

## 2020-10-06 ENCOUNTER — LAB VISIT (OUTPATIENT)
Dept: LAB | Facility: HOSPITAL | Age: 65
End: 2020-10-06
Attending: NURSE PRACTITIONER
Payer: COMMERCIAL

## 2020-10-06 DIAGNOSIS — R73.01 ELEVATED FASTING GLUCOSE: ICD-10-CM

## 2020-10-06 DIAGNOSIS — Z13.6 ENCOUNTER FOR LIPID SCREENING FOR CARDIOVASCULAR DISEASE: ICD-10-CM

## 2020-10-06 DIAGNOSIS — E87.6 HYPOKALEMIA: ICD-10-CM

## 2020-10-06 DIAGNOSIS — Z13.220 ENCOUNTER FOR LIPID SCREENING FOR CARDIOVASCULAR DISEASE: ICD-10-CM

## 2020-10-06 DIAGNOSIS — Z11.4 SCREENING FOR HIV WITHOUT PRESENCE OF RISK FACTORS: ICD-10-CM

## 2020-10-06 DIAGNOSIS — Z13.29 SCREENING FOR THYROID DISORDER: Primary | ICD-10-CM

## 2020-10-06 DIAGNOSIS — Z11.59 ENCOUNTER FOR HEPATITIS C SCREENING TEST FOR LOW RISK PATIENT: ICD-10-CM

## 2020-10-06 DIAGNOSIS — K57.92 DIVERTICULITIS: ICD-10-CM

## 2020-10-06 LAB
ALBUMIN SERPL BCP-MCNC: 3.9 G/DL (ref 3.5–5.2)
ALP SERPL-CCNC: 68 U/L (ref 55–135)
ALT SERPL W/O P-5'-P-CCNC: 18 U/L (ref 10–44)
ANION GAP SERPL CALC-SCNC: 8 MMOL/L (ref 8–16)
AST SERPL-CCNC: 23 U/L (ref 10–40)
BILIRUB SERPL-MCNC: 0.4 MG/DL (ref 0.1–1)
BUN SERPL-MCNC: 13 MG/DL (ref 8–23)
CALCIUM SERPL-MCNC: 8.9 MG/DL (ref 8.7–10.5)
CHLORIDE SERPL-SCNC: 103 MMOL/L (ref 95–110)
CHOLEST SERPL-MCNC: 229 MG/DL (ref 120–199)
CHOLEST/HDLC SERPL: 4.2 {RATIO} (ref 2–5)
CO2 SERPL-SCNC: 26 MMOL/L (ref 23–29)
CREAT SERPL-MCNC: 0.7 MG/DL (ref 0.5–1.4)
EST. GFR  (AFRICAN AMERICAN): >60 ML/MIN/1.73 M^2
EST. GFR  (NON AFRICAN AMERICAN): >60 ML/MIN/1.73 M^2
GLUCOSE SERPL-MCNC: 107 MG/DL (ref 70–110)
HDLC SERPL-MCNC: 54 MG/DL (ref 40–75)
HDLC SERPL: 23.6 % (ref 20–50)
LDLC SERPL CALC-MCNC: 151.2 MG/DL (ref 63–159)
NONHDLC SERPL-MCNC: 175 MG/DL
POTASSIUM SERPL-SCNC: 4.1 MMOL/L (ref 3.5–5.1)
PROT SERPL-MCNC: 7.1 G/DL (ref 6–8.4)
SODIUM SERPL-SCNC: 137 MMOL/L (ref 136–145)
TRIGL SERPL-MCNC: 119 MG/DL (ref 30–150)

## 2020-10-06 PROCEDURE — 80061 LIPID PANEL: CPT

## 2020-10-06 PROCEDURE — 87389 HIV-1 AG W/HIV-1&-2 AB AG IA: CPT

## 2020-10-06 PROCEDURE — 83036 HEMOGLOBIN GLYCOSYLATED A1C: CPT

## 2020-10-06 PROCEDURE — 36415 COLL VENOUS BLD VENIPUNCTURE: CPT

## 2020-10-06 PROCEDURE — 86803 HEPATITIS C AB TEST: CPT

## 2020-10-06 PROCEDURE — 80053 COMPREHEN METABOLIC PANEL: CPT

## 2020-10-07 ENCOUNTER — HOSPITAL ENCOUNTER (OUTPATIENT)
Dept: RADIOLOGY | Facility: HOSPITAL | Age: 65
Discharge: HOME | End: 2020-10-07
Attending: NURSE PRACTITIONER
Payer: COMMERCIAL

## 2020-10-07 DIAGNOSIS — K57.92 DIVERTICULITIS: ICD-10-CM

## 2020-10-07 DIAGNOSIS — Z12.2 ENCOUNTER FOR SCREENING FOR MALIGNANT NEOPLASM OF RESPIRATORY ORGANS: ICD-10-CM

## 2020-10-07 DIAGNOSIS — K62.5 RECTAL BLEEDING: ICD-10-CM

## 2020-10-07 DIAGNOSIS — Z87.891 FORMER HEAVY TOBACCO SMOKER: ICD-10-CM

## 2020-10-07 LAB
ESTIMATED AVG GLUCOSE: 114 MG/DL (ref 68–131)
HBA1C MFR BLD HPLC: 5.6 % (ref 4.5–6.2)
HCV AB S/CO SERPL IA: 0.1 S/CO RATIO (ref 0–0.9)
HIV 1+2 AB+HIV1 P24 AG SERPL QL IA: NON REACTIVE

## 2020-10-07 PROCEDURE — 25500020 PHARM REV CODE 255: Mod: PO | Performed by: NURSE PRACTITIONER

## 2020-10-07 PROCEDURE — G0297 LDCT FOR LUNG CA SCREEN: HCPCS | Mod: TC,PO

## 2020-10-07 PROCEDURE — 74177 CT ABD & PELVIS W/CONTRAST: CPT | Mod: TC,PO

## 2020-10-07 RX ADMIN — IOHEXOL 100 ML: 350 INJECTION, SOLUTION INTRAVENOUS at 03:10

## 2020-10-14 ENCOUNTER — PATIENT MESSAGE (OUTPATIENT)
Dept: FAMILY MEDICINE | Facility: CLINIC | Age: 65
End: 2020-10-14

## 2020-10-14 ENCOUNTER — LAB VISIT (OUTPATIENT)
Dept: LAB | Facility: HOSPITAL | Age: 65
End: 2020-10-14
Attending: INTERNAL MEDICINE
Payer: COMMERCIAL

## 2020-10-14 ENCOUNTER — TELEPHONE (OUTPATIENT)
Dept: FAMILY MEDICINE | Facility: CLINIC | Age: 65
End: 2020-10-14

## 2020-10-14 ENCOUNTER — OFFICE VISIT (OUTPATIENT)
Dept: FAMILY MEDICINE | Facility: CLINIC | Age: 65
End: 2020-10-14
Payer: COMMERCIAL

## 2020-10-14 VITALS
SYSTOLIC BLOOD PRESSURE: 124 MMHG | RESPIRATION RATE: 16 BRPM | HEART RATE: 86 BPM | WEIGHT: 203.81 LBS | OXYGEN SATURATION: 98 % | DIASTOLIC BLOOD PRESSURE: 76 MMHG | BODY MASS INDEX: 38.48 KG/M2 | TEMPERATURE: 97 F | HEIGHT: 61 IN

## 2020-10-14 DIAGNOSIS — Z13.29 SCREENING FOR THYROID DISORDER: ICD-10-CM

## 2020-10-14 DIAGNOSIS — K63.89 COLONIC MASS: ICD-10-CM

## 2020-10-14 DIAGNOSIS — E03.9 ACQUIRED HYPOTHYROIDISM: Primary | ICD-10-CM

## 2020-10-14 DIAGNOSIS — E78.00 PURE HYPERCHOLESTEROLEMIA: ICD-10-CM

## 2020-10-14 DIAGNOSIS — K62.5 RECTAL BLEEDING: ICD-10-CM

## 2020-10-14 DIAGNOSIS — E03.9 HYPOTHYROIDISM, UNSPECIFIED TYPE: Primary | ICD-10-CM

## 2020-10-14 DIAGNOSIS — R91.1 SOLITARY PULMONARY NODULE: ICD-10-CM

## 2020-10-14 LAB
BASOPHILS # BLD AUTO: 0.07 K/UL (ref 0–0.2)
BASOPHILS NFR BLD: 1.2 % (ref 0–1.9)
DIFFERENTIAL METHOD: ABNORMAL
EOSINOPHIL # BLD AUTO: 0.2 K/UL (ref 0–0.5)
EOSINOPHIL NFR BLD: 3.8 % (ref 0–8)
ERYTHROCYTE [DISTWIDTH] IN BLOOD BY AUTOMATED COUNT: 12.9 % (ref 11.5–14.5)
HCT VFR BLD AUTO: 39.1 % (ref 37–48.5)
HGB BLD-MCNC: 12.1 G/DL (ref 12–16)
IMM GRANULOCYTES # BLD AUTO: 0.02 K/UL (ref 0–0.04)
IMM GRANULOCYTES NFR BLD AUTO: 0.3 % (ref 0–0.5)
LYMPHOCYTES # BLD AUTO: 1.4 K/UL (ref 1–4.8)
LYMPHOCYTES NFR BLD: 23.4 % (ref 18–48)
MCH RBC QN AUTO: 28.4 PG (ref 27–31)
MCHC RBC AUTO-ENTMCNC: 30.9 G/DL (ref 32–36)
MCV RBC AUTO: 92 FL (ref 82–98)
MONOCYTES # BLD AUTO: 0.4 K/UL (ref 0.3–1)
MONOCYTES NFR BLD: 6.7 % (ref 4–15)
NEUTROPHILS # BLD AUTO: 3.9 K/UL (ref 1.8–7.7)
NEUTROPHILS NFR BLD: 64.6 % (ref 38–73)
NRBC BLD-RTO: 0 /100 WBC
PLATELET # BLD AUTO: 275 K/UL (ref 150–350)
PMV BLD AUTO: 11.6 FL (ref 9.2–12.9)
RBC # BLD AUTO: 4.26 M/UL (ref 4–5.4)
T4 FREE SERPL-MCNC: 0.49 NG/DL (ref 0.71–1.51)
TSH SERPL DL<=0.005 MIU/L-ACNC: 17.68 UIU/ML (ref 0.34–5.6)
WBC # BLD AUTO: 5.99 K/UL (ref 3.9–12.7)

## 2020-10-14 PROCEDURE — 99214 PR OFFICE/OUTPT VISIT, EST, LEVL IV, 30-39 MIN: ICD-10-PCS | Mod: S$GLB,,, | Performed by: NURSE PRACTITIONER

## 2020-10-14 PROCEDURE — 36415 COLL VENOUS BLD VENIPUNCTURE: CPT

## 2020-10-14 PROCEDURE — 84439 ASSAY OF FREE THYROXINE: CPT

## 2020-10-14 PROCEDURE — 85025 COMPLETE CBC W/AUTO DIFF WBC: CPT

## 2020-10-14 PROCEDURE — 84443 ASSAY THYROID STIM HORMONE: CPT

## 2020-10-14 PROCEDURE — 99214 OFFICE O/P EST MOD 30 MIN: CPT | Mod: S$GLB,,, | Performed by: NURSE PRACTITIONER

## 2020-10-14 PROCEDURE — 3008F BODY MASS INDEX DOCD: CPT | Mod: S$GLB,,, | Performed by: NURSE PRACTITIONER

## 2020-10-14 PROCEDURE — 3008F PR BODY MASS INDEX (BMI) DOCUMENTED: ICD-10-PCS | Mod: S$GLB,,, | Performed by: NURSE PRACTITIONER

## 2020-10-14 RX ORDER — LEVOTHYROXINE SODIUM 125 UG/1
125 TABLET ORAL
Qty: 30 TABLET | Refills: 11 | Status: SHIPPED | OUTPATIENT
Start: 2020-10-14 | End: 2020-10-14 | Stop reason: DRUGHIGH

## 2020-10-14 RX ORDER — LEVOTHYROXINE SODIUM 88 UG/1
88 TABLET ORAL
Qty: 30 TABLET | Refills: 11 | Status: SHIPPED | OUTPATIENT
Start: 2020-10-14 | End: 2021-05-25 | Stop reason: SDUPTHER

## 2020-10-14 NOTE — PROGRESS NOTES
SUBJECTIVE:      Patient ID: Tessa Enriquez is a 64 y.o. female.    Chief Complaint: Follow-up (lab/imaging )    Patient of Dr. Mcdermott, known to this provider, presents for lab & imaging review - TSH 17.68, free T4 0.49, total cholesterol 229, trigs 119, HDL 54, ; low-dose lung CT showed 3 mm nodule in lateral right lower lobe and a focus of ground glass opacity 11 mm in the superior right lower lobe along with mild emphysematous changes, CT abdomen/pelvis showed colonic mass and/or hematoma near rectosigmoid junction (referral placed to GI at last visit with complaints of rectal bleeding, patient is scheduled next week for same)  Fatigue  This is a chronic problem. The current episode started more than 1 month ago. The problem occurs daily. The problem has been unchanged. Associated symptoms include a change in bowel habit and fatigue. Pertinent negatives include no abdominal pain, arthralgias, chest pain, congestion, coughing, joint swelling, myalgias, nausea, neck pain, numbness, sore throat, vomiting or weakness. The symptoms are aggravated by exertion. She has tried rest for the symptoms. The treatment provided mild relief.   Hyperlipidemia  This is a new problem. This is a new diagnosis. The problem is uncontrolled. Recent lipid tests were reviewed and are high. Exacerbating diseases include hypothyroidism and obesity. Factors aggravating her hyperlipidemia include fatty foods and smoking. Pertinent negatives include no chest pain, myalgias or shortness of breath. She is currently on no antihyperlipidemic treatment. Compliance problems include adherence to diet and adherence to exercise.  Risk factors for coronary artery disease include dyslipidemia, obesity, post-menopausal and a sedentary lifestyle.       Past Surgical History:   Procedure Laterality Date    CHOLECYSTECTOMY      HYSTERECTOMY       Family History   Problem Relation Age of Onset    COPD Mother     COPD Father       Social  History     Socioeconomic History    Marital status:      Spouse name: Not on file    Number of children: Not on file    Years of education: Not on file    Highest education level: Not on file   Occupational History    Not on file   Social Needs    Financial resource strain: Not on file    Food insecurity     Worry: Not on file     Inability: Not on file    Transportation needs     Medical: Not on file     Non-medical: Not on file   Tobacco Use    Smoking status: Former Smoker     Packs/day: 2.00     Years: 40.00     Pack years: 80.00     Types: Cigarettes     Quit date: 2018     Years since quittin.2    Smokeless tobacco: Never Used   Substance and Sexual Activity    Alcohol use: No    Drug use: No    Sexual activity: Not on file   Lifestyle    Physical activity     Days per week: Not on file     Minutes per session: Not on file    Stress: Not on file   Relationships    Social connections     Talks on phone: Not on file     Gets together: Not on file     Attends Restorationist service: Not on file     Active member of club or organization: Not on file     Attends meetings of clubs or organizations: Not on file     Relationship status: Not on file   Other Topics Concern    Not on file   Social History Narrative    Not on file     Current Outpatient Medications   Medication Sig Dispense Refill    celecoxib (CELEBREX) 100 MG capsule Take 1 capsule (100 mg total) by mouth 2 (two) times daily. 60 capsule 2    traMADoL (ULTRAM) 50 mg tablet TAKE ONE TABLET BY MOUTH EVERY FOUR HOURS AS NEEDED  42 tablet 2    levothyroxine (SYNTHROID) 88 MCG tablet Take 1 tablet (88 mcg total) by mouth before breakfast. 30 tablet 11     No current facility-administered medications for this visit.      Review of patient's allergies indicates:   Allergen Reactions    Pcn [penicillins]       Past Medical History:   Diagnosis Date    Acute hypoxemic respiratory failure 2018    Arthritis     COPD (chronic  "obstructive pulmonary disease)      Past Surgical History:   Procedure Laterality Date    CHOLECYSTECTOMY      HYSTERECTOMY         Review of Systems   Constitutional: Positive for fatigue and unexpected weight change (gain). Negative for activity change and appetite change.   HENT: Negative for congestion, ear pain, hearing loss, postnasal drip, sinus pressure, sinus pain, sneezing and sore throat.    Eyes: Negative for photophobia and pain.   Respiratory: Negative for cough, chest tightness, shortness of breath and wheezing.    Cardiovascular: Negative for chest pain, palpitations and leg swelling.   Gastrointestinal: Positive for blood in stool (intermittent), change in bowel habit, diarrhea and hematochezia. Negative for abdominal distention, abdominal pain, constipation, nausea and vomiting.   Endocrine: Negative for cold intolerance, heat intolerance, polydipsia and polyuria.   Genitourinary: Negative for difficulty urinating, dysuria, flank pain, frequency, hematuria, pelvic pain and urgency.   Musculoskeletal: Negative for arthralgias, back pain, joint swelling, myalgias and neck pain.   Skin: Negative for pallor.   Allergic/Immunologic: Negative for environmental allergies and food allergies.   Neurological: Negative for dizziness, weakness, light-headedness and numbness.   Hematological: Does not bruise/bleed easily.   Psychiatric/Behavioral: Negative for agitation, confusion, decreased concentration and sleep disturbance. The patient is not nervous/anxious.       OBJECTIVE:      Vitals:    10/14/20 0954   BP: 124/76   BP Location: Right arm   Patient Position: Sitting   BP Method: Large (Manual)   Pulse: 86   Resp: 16   Temp: 97.2 °F (36.2 °C)   TempSrc: Oral   SpO2: 98%   Weight: 92.4 kg (203 lb 12.8 oz)   Height: 5' 1" (1.549 m)     Physical Exam  Vitals signs and nursing note reviewed.   Constitutional:       General: She is not in acute distress.     Appearance: Normal appearance. She is " well-developed. She is obese.   HENT:      Head: Normocephalic and atraumatic.      Right Ear: Hearing normal.      Left Ear: Hearing normal.      Nose: Nose normal. No rhinorrhea.   Eyes:      General: Lids are normal.         Right eye: No discharge.         Left eye: No discharge.      Conjunctiva/sclera: Conjunctivae normal.      Right eye: Right conjunctiva is not injected.      Left eye: Left conjunctiva is not injected.      Pupils: Pupils are equal, round, and reactive to light. Pupils are equal.      Right eye: Pupil is round and reactive.      Left eye: Pupil is round and reactive.   Neck:      Musculoskeletal: Normal range of motion and neck supple.      Thyroid: No thyromegaly.      Vascular: No JVD.      Trachea: Trachea normal. No tracheal deviation.   Cardiovascular:      Rate and Rhythm: Normal rate and regular rhythm.      Pulses:           Radial pulses are 2+ on the right side and 2+ on the left side.      Heart sounds: Normal heart sounds. No murmur. No friction rub. No gallop.    Pulmonary:      Effort: Pulmonary effort is normal. No respiratory distress.      Breath sounds: Normal breath sounds. No stridor. No decreased breath sounds, wheezing, rhonchi or rales.   Abdominal:      General: Abdomen is protuberant. Bowel sounds are normal. There is no distension.      Palpations: Abdomen is soft. Abdomen is not rigid.      Tenderness: There is no abdominal tenderness. There is no guarding.   Musculoskeletal: Normal range of motion.   Lymphadenopathy:      Cervical: No cervical adenopathy.   Skin:     General: Skin is warm and dry.      Capillary Refill: Capillary refill takes less than 2 seconds.      Coloration: Skin is not pale.      Findings: No lesion or rash.   Neurological:      Mental Status: She is alert and oriented to person, place, and time.      Motor: No atrophy.      Coordination: Coordination normal.      Gait: Gait normal.   Psychiatric:         Attention and Perception: She is  attentive.         Speech: Speech normal.         Behavior: Behavior normal.         Thought Content: Thought content normal.         Judgment: Judgment normal.        Assessment:       1. Acquired hypothyroidism    2. Solitary pulmonary nodule    3. Colonic mass    4. Pure hypercholesterolemia        Plan:       Acquired hypothyroidism        - levothyroxine 88 mcg before breakfast ordered in separate encounter        - repeat TSH in 6 weeks ordered in separate encounter    Solitary pulmonary nodule  -     CT Chest Lung Screening Low Dose; Future; Expected date: 04/13/2021    Colonic mass        - already scheduled for GI appointment from last visit    Pure hypercholesterolemia        - Limit: red meat, butter, fried foods, cheese, and other foods that have a lot of saturated fat. Consume more: lean meats, fish, fruits, vegetables, whole grains, beans, lentils, and nuts. Weight loss, and 30-45 min of cardiovascular exercise daily.        Follow up in about 6 months (around 4/14/2021) for CT lung recheck.      10/15/2020 KEANU Proctor, FNP-C

## 2020-10-14 NOTE — TELEPHONE ENCOUNTER
Based on TSH, patient is severely hypothyroid. Calling in script for levothyroxine 88 mcg based on weight. TSH to be repeated in 6 weeks (non-fasting) ordered in this encounter. I will call her to remind her about the lab draw.    Patient notified by phone about med & repeat lab.

## 2020-10-14 NOTE — TELEPHONE ENCOUNTER
----- Message from Karma Mcdermott MD sent at 10/14/2020  1:36 PM CDT -----  Pt is hypothyroid and needs to be on thyroid medication-start levothyroxine 50 ucg and recheck TSH in 6 weeks

## 2020-10-15 ENCOUNTER — PATIENT MESSAGE (OUTPATIENT)
Dept: FAMILY MEDICINE | Facility: CLINIC | Age: 65
End: 2020-10-15

## 2020-10-15 PROBLEM — E03.9 ACQUIRED HYPOTHYROIDISM: Status: ACTIVE | Noted: 2020-10-15

## 2020-10-15 PROBLEM — E78.00 PURE HYPERCHOLESTEROLEMIA: Status: ACTIVE | Noted: 2020-10-15

## 2020-10-15 RX ORDER — LEVOTHYROXINE SODIUM 50 UG/1
50 TABLET ORAL
Qty: 30 TABLET | Refills: 3 | OUTPATIENT
Start: 2020-10-15 | End: 2021-10-15

## 2020-10-19 ENCOUNTER — HOSPITAL ENCOUNTER (OUTPATIENT)
Dept: RADIOLOGY | Facility: HOSPITAL | Age: 65
Discharge: HOME | End: 2020-10-19
Attending: NURSE PRACTITIONER
Payer: COMMERCIAL

## 2020-10-19 DIAGNOSIS — Z12.31 ENCOUNTER FOR SCREENING MAMMOGRAM FOR MALIGNANT NEOPLASM OF BREAST: ICD-10-CM

## 2020-10-19 PROCEDURE — 77067 SCR MAMMO BI INCL CAD: CPT | Mod: TC,PO

## 2020-10-20 ENCOUNTER — TELEPHONE (OUTPATIENT)
Dept: FAMILY MEDICINE | Facility: CLINIC | Age: 65
End: 2020-10-20

## 2020-10-20 ENCOUNTER — OFFICE VISIT (OUTPATIENT)
Dept: GASTROENTEROLOGY | Facility: CLINIC | Age: 65
End: 2020-10-20
Payer: COMMERCIAL

## 2020-10-20 ENCOUNTER — LAB VISIT (OUTPATIENT)
Dept: PRIMARY CARE CLINIC | Facility: CLINIC | Age: 65
End: 2020-10-20
Payer: COMMERCIAL

## 2020-10-20 VITALS
SYSTOLIC BLOOD PRESSURE: 174 MMHG | HEIGHT: 61 IN | DIASTOLIC BLOOD PRESSURE: 83 MMHG | RESPIRATION RATE: 16 BRPM | BODY MASS INDEX: 38.34 KG/M2 | HEART RATE: 89 BPM | WEIGHT: 203.06 LBS

## 2020-10-20 DIAGNOSIS — Z01.818 PREOP TESTING: ICD-10-CM

## 2020-10-20 DIAGNOSIS — R93.3 ABNORMAL CT SCAN, COLON: ICD-10-CM

## 2020-10-20 DIAGNOSIS — K62.5 RECTAL BLEEDING: Primary | ICD-10-CM

## 2020-10-20 DIAGNOSIS — K52.9 CHRONIC DIARRHEA: ICD-10-CM

## 2020-10-20 DIAGNOSIS — K57.92 DIVERTICULITIS: ICD-10-CM

## 2020-10-20 PROCEDURE — 99999 PR PBB SHADOW E&M-EST. PATIENT-LVL IV: CPT | Mod: PBBFAC,,, | Performed by: INTERNAL MEDICINE

## 2020-10-20 PROCEDURE — U0003 INFECTIOUS AGENT DETECTION BY NUCLEIC ACID (DNA OR RNA); SEVERE ACUTE RESPIRATORY SYNDROME CORONAVIRUS 2 (SARS-COV-2) (CORONAVIRUS DISEASE [COVID-19]), AMPLIFIED PROBE TECHNIQUE, MAKING USE OF HIGH THROUGHPUT TECHNOLOGIES AS DESCRIBED BY CMS-2020-01-R: HCPCS

## 2020-10-20 PROCEDURE — 99999 PR PBB SHADOW E&M-EST. PATIENT-LVL IV: ICD-10-PCS | Mod: PBBFAC,,, | Performed by: INTERNAL MEDICINE

## 2020-10-20 PROCEDURE — 99244 OFF/OP CNSLTJ NEW/EST MOD 40: CPT | Mod: S$GLB,,, | Performed by: INTERNAL MEDICINE

## 2020-10-20 PROCEDURE — 99244 PR OFFICE CONSULTATION,LEVEL IV: ICD-10-PCS | Mod: S$GLB,,, | Performed by: INTERNAL MEDICINE

## 2020-10-20 NOTE — TELEPHONE ENCOUNTER
----- Message from Yaron Lopez, KEANU,FNP-C sent at 10/19/2020  3:34 PM CDT -----  Mammogram fine, repeat in 1 year

## 2020-10-20 NOTE — PROGRESS NOTES
[facility]     CC: Blood in stool     HPI 64 y.o. female with prior tobacco use, COPD and arthritis/chronic LBP presents for evaluation of 1 month of intermittent, moderate volume, red blood in stool not associated with abdominal pain, weight loss or change in bowel habits. She has no prior colonoscopy and no family history of colon cancer or IBD. She does note chronic, post-prandial diarrhea since cholecystectomy in  and will often have 4-8 BMs per day.       Past Medical History:   Diagnosis Date    Acute hypoxemic respiratory failure 2018    Arthritis     COPD (chronic obstructive pulmonary disease)        Past Surgical History:   Procedure Laterality Date    CHOLECYSTECTOMY      HYSTERECTOMY         Social History     Tobacco Use    Smoking status: Former Smoker     Packs/day: 2.00     Years: 40.00     Pack years: 80.00     Types: Cigarettes     Quit date: 2018     Years since quittin.2    Smokeless tobacco: Never Used   Substance Use Topics    Alcohol use: No    Drug use: No       Family History   Problem Relation Age of Onset    COPD Mother     COPD Father        Allergies and Medications reviewed     Review of Systems  General ROS: negative for - chills, fever or weight loss  Psychological ROS: negative for - hallucination, depression or suicidal ideation  Ophthalmic ROS: negative for - blurry vision, photophobia or eye pain  ENT ROS: negative for - epistaxis, sore throat or rhinorrhea  Respiratory ROS: no cough, shortness of breath, or wheezing  Cardiovascular ROS: no chest pain or dyspnea on exertion  Gastrointestinal ROS: + blood in stool, + chronic diarrhea, no change in bowel habits.   Genito-Urinary ROS: no dysuria, trouble voiding, or hematuria  Musculoskeletal ROS: negative for - arthralgia, myalgia, weakness  Neurological ROS: no syncope or seizures; no ataxia  Dermatological ROS: negative for pruritis, rash and jaundice    Physical Examination  BP (!) 174/83  "(BP Location: Left arm, Patient Position: Sitting)   Pulse 89   Resp 16   Ht 5' 1" (1.549 m)   Wt 92.1 kg (203 lb 0.7 oz)   BMI 38.36 kg/m²   General appearance: alert, cooperative, no distress  HENT: Normocephalic, atraumatic, neck symmetrical, no nasal discharge   Eyes: conjunctivae/corneas clear, PERRL, EOM's intact, sclera anicteric  Lungs: clear to auscultation bilaterally, no dullness to percussion bilaterally, symmetric expansion, breathing unlabored  Heart: regular rate and rhythm without rub; no displacement of the PMI   Abdomen: obese. Soft, non-tender,nondistended, BS active , no masses appreciated   Extremities: extremities symmetric; no clubbing, cyanosis, or edema  Integument: Skin color, texture, turgor normal; no rashes; hair distrubution normal, no jaundice  Neurologic: Alert and oriented X 3, no focal sensory or motor neurologic deficits  Psychiatric: no pressured speech; normal affect; no evidence of impaired cognition, no anxiety/depression     Labs:  Lab Results   Component Value Date    WBC 5.99 10/14/2020    HGB 12.1 10/14/2020    HCT 39.1 10/14/2020    MCV 92 10/14/2020     10/14/2020       CMP  Sodium   Date Value Ref Range Status   10/06/2020 137 136 - 145 mmol/L Final     Potassium   Date Value Ref Range Status   10/06/2020 4.1 3.5 - 5.1 mmol/L Final     Chloride   Date Value Ref Range Status   10/06/2020 103 95 - 110 mmol/L Final     CO2   Date Value Ref Range Status   10/06/2020 26 23 - 29 mmol/L Final     Glucose   Date Value Ref Range Status   10/06/2020 107 70 - 110 mg/dL Final     BUN, Bld   Date Value Ref Range Status   10/06/2020 13 8 - 23 mg/dL Final     Creatinine   Date Value Ref Range Status   10/06/2020 0.7 0.5 - 1.4 mg/dL Final     Calcium   Date Value Ref Range Status   10/06/2020 8.9 8.7 - 10.5 mg/dL Final     Total Protein   Date Value Ref Range Status   10/06/2020 7.1 6.0 - 8.4 g/dL Final     Albumin   Date Value Ref Range Status   10/06/2020 3.9 3.5 - 5.2 " g/dL Final     Total Bilirubin   Date Value Ref Range Status   10/06/2020 0.4 0.1 - 1.0 mg/dL Final     Comment:     For infants and newborns, interpretation of results should be based  on gestational age, weight and in agreement with clinical  observations.  Premature Infant recommended reference ranges:  Up to 24 hours.............<8.0 mg/dL  Up to 48 hours............<12.0 mg/dL  3-5 days..................<15.0 mg/dL  6-29 days.................<15.0 mg/dL       Alkaline Phosphatase   Date Value Ref Range Status   10/06/2020 68 55 - 135 U/L Final     AST   Date Value Ref Range Status   10/06/2020 23 10 - 40 U/L Final     ALT   Date Value Ref Range Status   10/06/2020 18 10 - 44 U/L Final     Anion Gap   Date Value Ref Range Status   10/06/2020 8 8 - 16 mmol/L Final     eGFR if    Date Value Ref Range Status   10/06/2020 >60.0 >60 mL/min/1.73 m^2 Final     eGFR if non    Date Value Ref Range Status   10/06/2020 >60.0 >60 mL/min/1.73 m^2 Final     Comment:     Calculation used to obtain the estimated glomerular filtration  rate (eGFR) is the CKD-EPI equation.            Imaging:  CT abdomen 10/7/20 was independently visualized and reviewed by me and showed    Hyperattenuating intraluminal colonic mass and/or hematoma near the  rectosigmoid junction. Colonoscopy correlation is recommended in this  patient with history of hematochezia.    Assessment:   64 y.o. female presents for evaluation of blood in stool, abnormal CT of colon and chronic diarrhea.     Plan:  -Schedule colonoscopy with biopsies for microscopic colitis  -Consider trial of bile acid sequestrant       Jolynn Ayala MD  [facility]  [address], [address]  [place] [address]  Office: [phone]  Fax: [phone]

## 2020-10-20 NOTE — LETTER
October 20, 2020      Yaron Lopez, KEANU,FNP-C  [address]  [place] [address]           [facility] [facility]  [address],   [place] [address]  Phone: [phone]          Patient: Tessa Enriquez   MR Number: [id number]   YOB: 1955   Date of Visit: 10/20/2020       Dear Yaron Lopez:    Thank you for referring Tessa Enrqiuez to me for evaluation. Attached you will find relevant portions of my assessment and plan of care.    If you have questions, please do not hesitate to call me. I look forward to following Tessa Enriquez along with you.    Sincerely,    Jolynn Ayala MD    Enclosure  CC:  No Recipients    If you would like to receive this communication electronically, please contact [email or web address] or [phone] to request more information on Immediately Link access.    For providers and/or their staff who would like to refer a patient to [facility], please contact us through our one-stop-shop provider referral line, [facility], at [phone].    If you feel you have received this communication in error or would no longer like to receive these types of communications, please e-mail [email or web address]

## 2020-10-21 LAB — SARS-COV-2 RNA RESP QL NAA+PROBE: NOT DETECTED

## 2020-10-23 ENCOUNTER — ANESTHESIA EVENT (OUTPATIENT)
Dept: ENDOSCOPY | Facility: HOSPITAL | Age: 65
End: 2020-10-23
Payer: COMMERCIAL

## 2020-10-23 ENCOUNTER — ANESTHESIA (OUTPATIENT)
Dept: ENDOSCOPY | Facility: HOSPITAL | Age: 65
End: 2020-10-23
Payer: COMMERCIAL

## 2020-10-23 ENCOUNTER — HOSPITAL ENCOUNTER (OUTPATIENT)
Facility: HOSPITAL | Age: 65
Discharge: HOME | End: 2020-10-23
Attending: INTERNAL MEDICINE | Admitting: INTERNAL MEDICINE
Payer: COMMERCIAL

## 2020-10-23 DIAGNOSIS — K62.5 RECTAL BLEEDING: ICD-10-CM

## 2020-10-23 DIAGNOSIS — K63.89 COLONIC MASS: Primary | ICD-10-CM

## 2020-10-23 PROCEDURE — 88342 CHG IMMUNOCYTOCHEMISTRY: ICD-10-PCS | Mod: 26,,, | Performed by: PATHOLOGY

## 2020-10-23 PROCEDURE — D9220A PRA ANESTHESIA: Mod: CRNA,,, | Performed by: NURSE ANESTHETIST, CERTIFIED REGISTERED

## 2020-10-23 PROCEDURE — 88305 TISSUE EXAM BY PATHOLOGIST: CPT | Mod: 26,,, | Performed by: PATHOLOGY

## 2020-10-23 PROCEDURE — D9220A PRA ANESTHESIA: ICD-10-PCS | Mod: ANES,,, | Performed by: ANESTHESIOLOGY

## 2020-10-23 PROCEDURE — 45385 COLONOSCOPY W/LESION REMOVAL: CPT | Mod: ,,, | Performed by: INTERNAL MEDICINE

## 2020-10-23 PROCEDURE — 63600175 PHARM REV CODE 636 W HCPCS: Performed by: NURSE ANESTHETIST, CERTIFIED REGISTERED

## 2020-10-23 PROCEDURE — 88342 IMHCHEM/IMCYTCHM 1ST ANTB: CPT | Performed by: PATHOLOGY

## 2020-10-23 PROCEDURE — 45385 COLONOSCOPY W/LESION REMOVAL: CPT | Performed by: INTERNAL MEDICINE

## 2020-10-23 PROCEDURE — 88341 IMHCHEM/IMCYTCHM EA ADD ANTB: CPT | Performed by: PATHOLOGY

## 2020-10-23 PROCEDURE — 27201028 HC NEEDLE, SCLERO: Performed by: INTERNAL MEDICINE

## 2020-10-23 PROCEDURE — 25000003 PHARM REV CODE 250: Performed by: INTERNAL MEDICINE

## 2020-10-23 PROCEDURE — 37000009 HC ANESTHESIA EA ADD 15 MINS: Performed by: INTERNAL MEDICINE

## 2020-10-23 PROCEDURE — 45380 PR COLONOSCOPY,BIOPSY: ICD-10-PCS | Mod: 59,,, | Performed by: INTERNAL MEDICINE

## 2020-10-23 PROCEDURE — 25000003 PHARM REV CODE 250: Performed by: NURSE ANESTHETIST, CERTIFIED REGISTERED

## 2020-10-23 PROCEDURE — 27200997: Performed by: INTERNAL MEDICINE

## 2020-10-23 PROCEDURE — 45380 COLONOSCOPY AND BIOPSY: CPT | Mod: 59,,, | Performed by: INTERNAL MEDICINE

## 2020-10-23 PROCEDURE — 45381 COLONOSCOPY SUBMUCOUS NJX: CPT | Mod: 51,,, | Performed by: INTERNAL MEDICINE

## 2020-10-23 PROCEDURE — 88342 IMHCHEM/IMCYTCHM 1ST ANTB: CPT | Mod: 26,,, | Performed by: PATHOLOGY

## 2020-10-23 PROCEDURE — 45381 PR COLONOSCPY,FLEX,W/DIR SUBMUC INJECT: ICD-10-PCS | Mod: 51,,, | Performed by: INTERNAL MEDICINE

## 2020-10-23 PROCEDURE — 45385 PR COLONOSCOPY,REMV LESN,SNARE: ICD-10-PCS | Mod: ,,, | Performed by: INTERNAL MEDICINE

## 2020-10-23 PROCEDURE — 88341 IMHCHEM/IMCYTCHM EA ADD ANTB: CPT | Mod: 26,,, | Performed by: PATHOLOGY

## 2020-10-23 PROCEDURE — 88305 TISSUE EXAM BY PATHOLOGIST: CPT | Mod: 59 | Performed by: PATHOLOGY

## 2020-10-23 PROCEDURE — 37000008 HC ANESTHESIA 1ST 15 MINUTES: Performed by: INTERNAL MEDICINE

## 2020-10-23 PROCEDURE — 45380 COLONOSCOPY AND BIOPSY: CPT | Performed by: INTERNAL MEDICINE

## 2020-10-23 PROCEDURE — 45381 COLONOSCOPY SUBMUCOUS NJX: CPT | Performed by: INTERNAL MEDICINE

## 2020-10-23 PROCEDURE — 88341 PR IHC OR ICC EACH ADD'L SINGLE ANTIBODY  STAINPR: ICD-10-PCS | Mod: 26,,, | Performed by: PATHOLOGY

## 2020-10-23 PROCEDURE — 27201089 HC SNARE, DISP (ANY): Performed by: INTERNAL MEDICINE

## 2020-10-23 PROCEDURE — D9220A PRA ANESTHESIA: Mod: ANES,,, | Performed by: ANESTHESIOLOGY

## 2020-10-23 PROCEDURE — 27201012 HC FORCEPS, HOT/COLD, DISP: Performed by: INTERNAL MEDICINE

## 2020-10-23 PROCEDURE — D9220A PRA ANESTHESIA: ICD-10-PCS | Mod: CRNA,,, | Performed by: NURSE ANESTHETIST, CERTIFIED REGISTERED

## 2020-10-23 PROCEDURE — 88305 TISSUE EXAM BY PATHOLOGIST: ICD-10-PCS | Mod: 26,,, | Performed by: PATHOLOGY

## 2020-10-23 RX ORDER — PROPOFOL 10 MG/ML
VIAL (ML) INTRAVENOUS
Status: DISCONTINUED | OUTPATIENT
Start: 2020-10-23 | End: 2020-10-23

## 2020-10-23 RX ORDER — SODIUM CHLORIDE 9 MG/ML
INJECTION, SOLUTION INTRAVENOUS CONTINUOUS
Status: DISCONTINUED | OUTPATIENT
Start: 2020-10-23 | End: 2020-10-23 | Stop reason: HOSPADM

## 2020-10-23 RX ORDER — LIDOCAINE HYDROCHLORIDE 20 MG/ML
INJECTION INTRAVENOUS
Status: DISCONTINUED | OUTPATIENT
Start: 2020-10-23 | End: 2020-10-23

## 2020-10-23 RX ADMIN — LIDOCAINE HYDROCHLORIDE 100 MG: 20 INJECTION, SOLUTION INTRAVENOUS at 08:10

## 2020-10-23 RX ADMIN — PROPOFOL 50 MG: 10 INJECTION, EMULSION INTRAVENOUS at 09:10

## 2020-10-23 RX ADMIN — PROPOFOL 50 MG: 10 INJECTION, EMULSION INTRAVENOUS at 08:10

## 2020-10-23 RX ADMIN — SODIUM CHLORIDE: 0.9 INJECTION, SOLUTION INTRAVENOUS at 08:10

## 2020-10-23 RX ADMIN — PROPOFOL 100 MG: 10 INJECTION, EMULSION INTRAVENOUS at 08:10

## 2020-10-23 NOTE — DISCHARGE INSTRUCTIONS
Anesthesia: General Anesthesia     You are watched continuously during your procedure by your anesthesia provider.     Youre due to have surgery. During surgery, youll be given medicine called anesthesia or anesthetic. This will keep you comfortable and pain-free. Your anesthesia provider will use general anesthesia.  What is general anesthesia?  General anesthesia puts you into a state like deep sleep. It goes into the bloodstream (IV anesthetics), into the lungs (gas anesthetics), or both. You feel nothing during the procedure. You will not remember it. During the procedure, the anesthesia provider monitors you continuously. He or she checks your heart rate and rhythm, blood pressure, breathing, and blood oxygen.  · IV anesthetics. IV anesthetics are given through an IV line in your arm. Theyre often given first. This is so you are asleep before a gas anesthetic is started. Some kinds of IV anesthetics relieve pain. Others relax you. Your doctor will decide which kind is best in your case.  · Gas anesthetics. Gas anesthetics are breathed into the lungs. They are often used to keep you asleep. They can be given through a facemask or a tube placed in your larynx or trachea (breathing tube).  ¨ If you have a facemask, your anesthesia provider will most likely place it over your nose and mouth while youre still awake. Youll breathe oxygen through the mask as your IV anesthetic is started. Gas anesthetic may be added through the mask.  ¨ If you have a tube in the larynx or trachea, it will be inserted into your throat after youre asleep.  Anesthesia tools and medicines  You will likely have:  · IV anesthetics. These are put into an IV line into your bloodstream.  · Gas anesthetics. You breathe these anesthetics into your lungs, where they pass into your bloodstream.  · Pulse oximeter. This is a small clip that is attached to the end of your finger. This measures your blood oxygen level.  · Electrocardiography  leads (electrodes). These are small sticky pads that are placed on your chest. They record your heart rate and rhythm.  · Blood pressure cuff. This reads your blood pressure.  Risks and possible complications  General anesthesia has some risks. These include:  · Breathing problems  · Nausea and vomiting  · Sore throat or hoarseness (usually temporary)  · Allergic reaction to the anesthetic  · Irregular heartbeat (rare)  · Cardiac arrest (rare)   Anesthesia safety  · Follow all instructions you are given for how long not to eat or drink before your procedure.  · Be sure your doctor knows what medicines and drugs you take. This includes over-the-counter medicines, herbs, supplements, alcohol or other drugs. You will be asked when those were last taken.  · Have an adult family member or friend drive you home after the procedure.  · For the first 24 hours after your surgery:  ¨ Do not drive or use heavy equipment.  ¨ Do not make important decisions or sign legal documents. If important decisions or signing legal documents is necessary during the first 24 hours after surgery, have a trusted family member or spouse act on your behalf.  ¨ Avoid alcohol.  ¨ Have a responsible adult stay with you. He or she can watch for problems and help keep you safe.  Date Last Reviewed: 12/1/2016  © 4874-9367 [employer]. [address] [place] [address], [place] [address]. All rights reserved. This information is not intended as a substitute for professional medical care. Always follow your healthcare professional's instructions.        Understanding Colon and Rectal Polyps    The colon (also called the large intestine) is a muscular tube that forms the last part of the digestive tract. It absorbs water and stores food waste. The colon is about 4 to 6 feet long. The rectum is the last 6 inches of the colon. The colon and rectum have a smooth lining composed of millions of cells. Changes in these cells can lead to growths in the  colon that can become cancerous and should be removed. Multiple tests are available to screen for colon cancer, but the colonoscopy is the most recommended test. During colonoscopy, these polyps can be removed. How often you need this test depends on many things including your condition, your family history, symptoms, and what the findings were at the previous colonoscopy.   When the colon lining changes  Changes that happen in the cells that line the colon or rectum can lead to growths called polyps. Over a period of years, polyps can turn cancerous. Removing polyps early may prevent cancer from ever forming.  Polyps  Polyps are fleshy clumps of tissue that form on the lining of the colon or rectum. Small polyps are usually benign (not cancerous). However, over time, cells in a polyp can change and become cancerous. Certain types of polyps known as adenomatous polyps are premalignant. The risk for invasive cancer increases with the size of the polyp and certain cell and gene features. This means that they can become cancerous if they're not removed. Hyperplastic polyps are benign. They can grow quite large and not turn cancerous.   Cancer  Almost all colorectal cancers start when polyp cells begin growing abnormally. As a cancerous tumor grows, it may involve more and more of the colon or rectum. In time, cancer can also grow beyond the colon or rectum and spread to nearby organs or to glands called lymph nodes. The cells can also travel to other parts of the body. This is known as metastasis. The earlier a cancerous tumor is removed, the better the chance of preventing its spread.    Date Last Reviewed: 8/1/2016  © 3392-8113 [employer]. [address] [place] [address], [place] [address]. All rights reserved. This information is not intended as a substitute for professional medical care. Always follow your healthcare professional's instructions.

## 2020-10-23 NOTE — ANESTHESIA PREPROCEDURE EVALUATION
10/23/2020  Tessa Enriquez is a 64 y.o., female.    Anesthesia Evaluation          Review of Systems  Anesthesia Hx:  No problems with previous Anesthesia   Cardiovascular:   Exercise tolerance: good MCGRATH    Pulmonary:   COPD    Renal/:  Renal/ Normal     Hepatic/GI:   Bowel Prep.    Neurological:  Neurology Normal    Endocrine:   Hypothyroidism        Physical Exam  General:  Morbid Obesity    Airway/Jaw/Neck:  Airway Findings: Mouth Opening: Normal Tongue: Normal  General Airway Assessment: Adult  TM Distance: Normal, at least 6 cm       Chest/Lungs:  Chest/Lungs Clear    Heart/Vascular:  Heart Findings: Normal            Anesthesia Plan  Type of Anesthesia, risks & benefits discussed:  Anesthesia Type:  general  Patient's Preference:   Intra-op Monitoring Plan: standard ASA monitors  Intra-op Monitoring Plan Comments:   Post Op Pain Control Plan: IV/PO Opioids PRN  Post Op Pain Control Plan Comments:   Induction:   IV  Beta Blocker:  Patient is not currently on a Beta-Blocker (No further documentation required).       Informed Consent: Patient understands risks and agrees with Anesthesia plan.  Questions answered. Anesthesia consent signed with patient.  ASA Score: 3     Day of Surgery Review of History & Physical:    H&P update referred to the provider.         Ready For Surgery From Anesthesia Perspective.

## 2020-10-23 NOTE — PROVATION PATIENT INSTRUCTIONS
Discharge Summary/Instructions after an Endoscopic Procedure  Patient Name: Tessa Enriquez  Patient MRN: [id number]  Patient YOB: 1955 Friday, October 23, 2020  Jolynn Ayala MD  RESTRICTIONS:  During your procedure today, you received medications for sedation.  These   medications may affect your judgment, balance and coordination.  Therefore,   for 24 hours, you have the following restrictions:   - DO NOT drive a car, operate machinery, make legal/financial decisions,   sign important papers or drink alcohol.    ACTIVITY:  Today: no heavy lifting, straining or running due to procedural   sedation/anesthesia.  The following day: return to full activity including work.  DIET:  Eat and drink normally unless instructed otherwise.     TREATMENT FOR COMMON SIDE EFFECTS:  - Mild abdominal pain, nausea, belching, bloating or excessive gas:  rest,   eat lightly and use a heating pad.  - Sore Throat: treat with throat lozenges and/or gargle with warm salt   water.  - Because air was used during the procedure, expelling large amounts of air   from your rectum or belching is normal.  - If a bowel prep was taken, you may not have a bowel movement for 1-3 days.    This is normal.  SYMPTOMS TO WATCH FOR AND REPORT TO YOUR PHYSICIAN:  1. Abdominal pain or bloating, other than gas cramps.  2. Chest pain.  3. Back pain.  4. Signs of infection such as: chills or fever occurring within 24 hours   after the procedure.  5. Rectal bleeding, which would show as bright red, maroon, or black stools.   (A tablespoon of blood from the rectum is not serious, especially if   hemorrhoids are present.)  6. Vomiting.  7. Weakness or dizziness.  GO DIRECTLY TO THE NEAREST EMERGENCY ROOM IF YOU HAVE ANY OF THE FOLLOWING:      Difficulty breathing              Chills and/or fever over 101 F   Persistent vomiting and/or vomiting blood   Severe abdominal pain   Severe chest pain   Black, tarry stools   Bleeding- more than  one tablespoon   Any other symptom or condition that you feel may need urgent attention  Your doctor recommends these additional instructions:  If any biopsies were taken, your doctors clinic will contact you in 1 to 2   weeks with any results.  - Discharge patient to home (with escort).   - Patient has a contact number available for emergencies.  The signs and   symptoms of potential delayed complications were discussed with the   patient.  Return to normal activities tomorrow.  Written discharge   instructions were provided to the patient.   - Resume previous diet.   - Continue present medications.   - Await pathology results.   - Repeat colonoscopy is recommended.  The colonoscopy date will be   determined after pathology results from today's exam become available for   review.   - Refer to a colo-rectal surgeon  -CT chest/abdomen/pelvis and CEA for staging.  For questions, problems or results please call your physician - Jolynn Ayala MD at Work:  [phone].  [facility], EMERGENCY ROOM PHONE NUMBER: [phone]  IF A COMPLICATION OR EMERGENCY SITUATION ARISES AND YOU ARE UNABLE TO REACH   YOUR PHYSICIAN - GO DIRECTLY TO THE EMERGENCY ROOM.  Jolynn Ayala MD  10/23/2020 9:33:32 AM  This report has been verified and signed electronically.  [employer]ATION

## 2020-10-23 NOTE — H&P
"[facility] Note    CC: Rectal bleeding, abnormal cT abdomen    HPI 64 y.o. female presents for initial colonoscopy due to abnormal CT and rectal bleeding    Past Medical History:   Diagnosis Date    Acute hypoxemic respiratory failure 7/9/2018    Arthritis     COPD (chronic obstructive pulmonary disease)     Thyroid disease        Allergies and Medications reviewed     Review of Systems  General ROS: negative for - chills, fever or weight loss  Cardiovascular ROS: no chest pain or dyspnea on exertion  Gastrointestinal ROS: + rectal bleeding    Physical Examination  BP (!) 166/77 (BP Location: Left arm, Patient Position: Lying)   Pulse 82   Temp 97.5 °F (36.4 °C) (Skin)   Resp 17   Ht 5' 1" (1.549 m)   Wt 92.1 kg (203 lb)   SpO2 96%   Breastfeeding No   BMI 38.36 kg/m²   General appearance: alert, cooperative, no distress  HENT: Normocephalic, atraumatic, neck symmetrical, no nasal discharge, sclera anicteric   Lungs: clear to auscultation bilaterally, symmetric chest wall expansion bilaterally  Heart: regular rate and rhythm without rub; no displacement of the PMI   Abdomen: soft  Extremities: extremities symmetric; no clubbing, cyanosis, or edema        Labs:  Lab Results   Component Value Date    WBC 5.99 10/14/2020    HGB 12.1 10/14/2020    HCT 39.1 10/14/2020    MCV 92 10/14/2020     10/14/2020         Assessment:   64 y.o. female presents for colonoscopy    Plan:  -Proceed to colonoscopy     Adrienne Arbour Carona, MD [facility]  [address], [address]  [place] [address]  Office: [phone]  Fax: [phone]  "

## 2020-10-23 NOTE — ANESTHESIA POSTPROCEDURE EVALUATION
Anesthesia Post Evaluation    Patient: Tessa Enriquez    Procedure(s) Performed: Procedure(s) (LRB):  COLONOSCOPY (N/A)    Final Anesthesia Type: general    Patient location during evaluation: PACU  Patient participation: Yes- Able to Participate  Level of consciousness: awake and alert and oriented  Post-procedure vital signs: reviewed and stable  Pain management: adequate  Airway patency: patent    PONV status at discharge: No PONV  Anesthetic complications: no      Cardiovascular status: blood pressure returned to baseline  Respiratory status: unassisted, spontaneous ventilation and room air  Hydration status: euvolemic  Follow-up not needed.          Vitals Value Taken Time   /64 10/23/20 1000   Temp 36.4 °C (97.5 °F) 10/23/20 0935   Pulse 70 10/23/20 1041   Resp 16 10/23/20 1000   SpO2 93 % 10/23/20 1041   Vitals shown include unvalidated device data.      No case tracking events are documented in the log.      Pain/Bautista Score: Bautista Score: 8 (10/23/2020  9:37 AM)

## 2020-10-23 NOTE — TRANSFER OF CARE
"Anesthesia Transfer of Care Note    Patient: Tessa Enriquez    Procedure(s) Performed: Procedure(s) (LRB):  COLONOSCOPY (N/A)    Patient location: GI    Anesthesia Type: general    Transport from OR: Transported from OR on room air with adequate spontaneous ventilation    Post pain: adequate analgesia    Post assessment: no apparent anesthetic complications    Post vital signs: stable    Level of consciousness: sedated    Nausea/Vomiting: no nausea/vomiting    Complications: none    Transfer of care protocol was followed      Last vitals:   Visit Vitals  BP (!) 90/53   Pulse 74   Temp 36.4 °C (97.5 °F) (Skin)   Resp 16   Ht 5' 1" (1.549 m)   Wt 92.1 kg (203 lb)   SpO2 97%   Breastfeeding No   BMI 38.36 kg/m²     "

## 2020-10-23 NOTE — ANESTHESIA POSTPROCEDURE EVALUATION
Anesthesia Post Evaluation    Patient: Tessa Enriquez    Procedure(s) Performed: Procedure(s) (LRB):  COLONOSCOPY (N/A)    Final Anesthesia Type: general    Patient location during evaluation: PACU  Patient participation: Yes- Able to Participate  Level of consciousness: awake and alert and oriented  Post-procedure vital signs: reviewed and stable  Pain management: adequate  Airway patency: patent    PONV status at discharge: No PONV  Anesthetic complications: no      Cardiovascular status: blood pressure returned to baseline  Respiratory status: unassisted, spontaneous ventilation and room air  Hydration status: euvolemic  Follow-up not needed.          Vitals Value Taken Time   /82 10/23/20 0941   Temp 36.4 °C (97.5 °F) 10/23/20 0935   Pulse 86 10/23/20 0942   Resp 14 10/23/20 0935   SpO2 98 % 10/23/20 0942   Vitals shown include unvalidated device data.      No case tracking events are documented in the log.      Pain/Bautista Score: Bautista Score: 8 (10/23/2020  9:37 AM)
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
## 2020-10-26 ENCOUNTER — TELEPHONE (OUTPATIENT)
Dept: SURGERY | Facility: CLINIC | Age: 65
End: 2020-10-26

## 2020-10-26 VITALS
HEART RATE: 88 BPM | TEMPERATURE: 98 F | HEIGHT: 61 IN | RESPIRATION RATE: 16 BRPM | WEIGHT: 203 LBS | OXYGEN SATURATION: 94 % | BODY MASS INDEX: 38.33 KG/M2 | DIASTOLIC BLOOD PRESSURE: 64 MMHG | SYSTOLIC BLOOD PRESSURE: 155 MMHG

## 2020-10-26 NOTE — TELEPHONE ENCOUNTER
Hey this lady has big near obstructive rectosigmoid mass ( ~10-19 cm from anal verge, proximal and distal ends tattooed) as well as large sigmoid polyp I piece-mealed and tattooed. She had a recent CT chest/abdomen that shows mass but not def spread (small pulmonary nodules). Could you please see in clinic in the next week or 2 ?   Dr. Wagner said next week.  Anurag

## 2020-10-26 NOTE — TELEPHONE ENCOUNTER
----- Message from Lizzy Mccall LPN sent at 10/26/2020  9:06 AM CDT -----  Good morning, Dr. Ayala has placed a referral to Dr. Wagenr for clonic mass.    Thanks,  Lizzy

## 2020-10-27 NOTE — TELEPHONE ENCOUNTER
I called patient and scheduled her on Tuesday, 11/3/20 at 1:30pm in [place].  Patient asked me if she should return to work before she sees Dr. Wagner and I informed her that it would be up to her.  She stated that she had a COVID-19 test on Thursday and it was negative.  I informed her that if Dr. Wagner schedules surgery then a COVID-19 test would be done 72hrs prior to surgery.  Patient understood. Anurag

## 2020-10-28 LAB
COMMENT: ABNORMAL
FINAL PATHOLOGIC DIAGNOSIS: ABNORMAL
GROSS: ABNORMAL
Lab: ABNORMAL
MICROSCOPIC EXAM: ABNORMAL

## 2020-11-03 ENCOUNTER — OFFICE VISIT (OUTPATIENT)
Dept: SURGERY | Facility: CLINIC | Age: 65
End: 2020-11-03
Payer: COMMERCIAL

## 2020-11-03 VITALS
SYSTOLIC BLOOD PRESSURE: 171 MMHG | BODY MASS INDEX: 37.91 KG/M2 | WEIGHT: 200.81 LBS | HEART RATE: 91 BPM | HEIGHT: 61 IN | TEMPERATURE: 98 F | DIASTOLIC BLOOD PRESSURE: 82 MMHG

## 2020-11-03 DIAGNOSIS — Z01.818 PREOP EXAMINATION: ICD-10-CM

## 2020-11-03 DIAGNOSIS — K63.89 COLONIC MASS: Primary | ICD-10-CM

## 2020-11-03 PROCEDURE — 99999 PR PBB SHADOW E&M-EST. PATIENT-LVL IV: ICD-10-PCS | Mod: PBBFAC,,, | Performed by: SURGERY

## 2020-11-03 PROCEDURE — 99245 OFF/OP CONSLTJ NEW/EST HI 55: CPT | Mod: S$GLB,,, | Performed by: SURGERY

## 2020-11-03 PROCEDURE — 99245 PR OFFICE CONSULTATION,LEVEL V: ICD-10-PCS | Mod: S$GLB,,, | Performed by: SURGERY

## 2020-11-03 PROCEDURE — 99999 PR PBB SHADOW E&M-EST. PATIENT-LVL IV: CPT | Mod: PBBFAC,,, | Performed by: SURGERY

## 2020-11-03 NOTE — PROGRESS NOTES
Subjective:       Patient ID: Tessa Enriquez is a 64 y.o. female.    Chief Complaint: Consult (Colonic mass)    HPI  this is a pleasant 64-year-old female who was referred to me in consultation from Dr. Ayala for evaluation of colon mass.  Patient notes she had noted some blood per rectum.  In addition to this she was having some abdominal pain.  This initially led to a CT scan which demonstrated a mass in the rectosigmoid area.  Patient then had a colonoscopy performed in late October.  Patient had multiple polyps removed.  She had a large circumferential mass noted in the rectosigmoid area.  This was biopsied.  Pathology confirmed malignancy the tissue was unable to truly define the type of cancer.  Most likely consistent with an adenocarcinoma.  In addition she had a flat polyp in the sigmoid colon with adenomatous features in high-grade dysplasia.  Both areas were tattooed.  Patient notes that she has had cessation of her bleeding.  Does have occasional lower abdominal pain.  She has had no fevers or chills and no other complaints.  She does have a past medical history significant for COPD and arthritis.  Her past surgical history significant for hysterectomy and cholecystectomy.  This was her 1st colonoscopy.  Patient lives at home with her daughter and does perform all activities of daily living  Review of Systems   Constitutional: Negative for activity change, appetite change, fever and unexpected weight change.   Respiratory: Negative for chest tightness, shortness of breath and wheezing.    Cardiovascular: Negative for chest pain.   Gastrointestinal: Positive for abdominal pain, anal bleeding and blood in stool. Negative for abdominal distention, constipation, diarrhea, nausea, rectal pain and vomiting.   Genitourinary: Negative for difficulty urinating, dysuria and frequency.   Musculoskeletal: Negative for arthralgias and joint swelling.   Integumentary:  Negative for wound and breast mass.    Neurological: Negative for dizziness.   Hematological: Negative for adenopathy.   Psychiatric/Behavioral: Negative for agitation and decreased concentration.   Breast: Negative for mass        Objective:      Physical Exam  Vitals signs reviewed.   Constitutional:       Appearance: She is well-developed.   HENT:      Head: Normocephalic and atraumatic.   Eyes:      General: No scleral icterus.        Right eye: No discharge.         Left eye: No discharge.      Pupils: Pupils are equal, round, and reactive to light.   Neck:      Musculoskeletal: Normal range of motion and neck supple.      Thyroid: No thyromegaly.      Trachea: No tracheal deviation.   Cardiovascular:      Rate and Rhythm: Normal rate and regular rhythm.      Heart sounds: Normal heart sounds. No murmur.   Pulmonary:      Effort: Pulmonary effort is normal.      Breath sounds: Normal breath sounds.   Chest:      Chest wall: No tenderness.   Abdominal:      General: Bowel sounds are normal. There is no distension or abdominal bruit.      Palpations: Abdomen is soft. Abdomen is not rigid. There is no mass or pulsatile mass.      Tenderness: There is no abdominal tenderness. There is no guarding or rebound. Negative signs include Evans's sign and McBurney's sign.      Hernia: No hernia is present. There is no hernia in the ventral area.   Genitourinary:     Rectum: Normal.   Musculoskeletal: Normal range of motion.   Skin:     General: Skin is warm.      Findings: No erythema or rash.   Neurological:      Mental Status: She is alert and oriented to person, place, and time.   Psychiatric:         Mood and Affect: Mood normal.         Behavior: Behavior normal.                   Path: 10/23    .  Colon, sigmoid polyps x3 (polypectomies):   Tubulovillous adenoma with focal superficial high-grade dysplasia, margin   negative for dysplasia   Tubulo/tubulovillous adenoma, multiple fragments   4.  Rectosigmoid mass (biopsy):   Detached fragments of  malignant glandular epithelium, see comment   Predominately necrotic tissue fibrinopurulent exudate     CT scan:  Ct scan reviewed.  No evidence of metastatic disease.            Assessment:       1. Colonic mass    2. Preop examination        Plan:       Lengthy discussion with the patient and her daughter I have informed them that patient does have a colon cancer of the mass in the rectosigmoid junction.  Most likely this is an adenocarcinoma.  Based on CT scan findings there is no evidence of metastatic spread.  Discussed with her the staging criteria for colon cancer.  Discussed with them that this is most likely stage I 2 or 3.  I did recommend surgical resection.  Discussed with him that following surgery once the final pathology was reviewed decisions on proceeding with chemotherapy would be made at that time.  Lengthy discussion with the patient and her daughter regarding the surgical options as well as the outcomes.  Risks of the surgery including but not limited to bleeding, infection, anastomotic leak, ostomy and hernia were discussed in detail.  Patient acknowledges these risks and desires to proceed with surgery.  Will plan robotic with possible conversion to open colectomy with colorectal anastomosis.  Informed consent has been obtained.  Surgery scheduled for Monday November 9th.

## 2020-11-03 NOTE — Clinical Note
I saw your patient, Tessa Enriquez in the office.  Attached are my findings and plan.  Thank you for referring her to my office and if you have any questions please do not hesitate to call my cell [phone].    Dewey Wagner

## 2020-11-03 NOTE — PATIENT INSTRUCTIONS
Surgery is scheduled for 11/9/20 arrival time will be given by the the preop nurse.  The preop nurse will call you from [phone]  Nothing to eat or drink after midnight.  Someone to drive you home.    THE PREOP NURSE WILL CALL, SOMETIMES AS LATE AS 4 or 5 PM IN THE AFTERNOON THE DAY BEFORE SURGERY.    Bathe the night before and the morning of your procedure with a Chlorhexidine wash such as Hibiclens, can be purchased at most Pharmacy's no prescription needed.    Special Instruction:  Your surgery is scheduled at the [facility] in [address] [place].    Here is instruction for your colon cleanse the day before your procedure.    Try to Avoid these foods 7 days before your Colonosocpy:  beans, peas, corn, nuts, popcorn and tomatoes.  Try not to use Advil or Ibuprofen, Non-Steroidal Anti-inflammatories such as Aleve for 7 days before your colonoscopy, no fish oil for 5 days before the procedure.  No Red or Purple colored clear liquids on the day of your colon cleanse.  ** Dr. Wagner does not usually stop Aspirin especially if you have been placed on this by your family doctor or a Cardiologist.  Please Call if you have any questions [phone] for Dr. Wagner' office.    Medication to purchase at your pharmacy no need for Prescription:  2 bottles of Magnesium Citrate 10 or 12 ounce bottle will do.  Dulcolax tablets you will need 4 tablets in total.    On the day before the procedure you are on clear liquids all day, no solid food.   You can take your morning medications if you are allowed by your Doctor.  Clear liquid means any nonalcoholic  liquids including Jello and popcicles, juice with no pulp, soft drinks, tea, coffee no creamer, water Gatorade, chicken and/or beef broth.    You can start taking your first laxative starting as early as 8am but try not to start later than 12:00 noon.  First dose will be two Dulcolax tabs followed by 8 ounces clear liquid.  Take 2 more Dulcolax 2  hours after the first dose followed by 8 ounces of clear liquids.  Remember that the laxatives work best when you drink plenty of fluids.    Drink your first bottle of Magnesium Citrate at 5:00pm followed by 5-8ounce glasses of liquid over a 3 hour period.  At 9:00 pm take your 2nd bottle magnesium citrate followed by 3 more 8ounce glasses of clear liquid.  After Midnight nothing else to eat or drink.    Contact the office if you have questions.    Contact Marito Graham LPN for any questions or concerns. [phone]

## 2020-11-03 NOTE — LETTER
November 3, 2020      Jolynn Ayala MD  [address]  [address]  [facility] [address]           [facility] General [facility]  [address],   [facility] [address]  Phone: [phone]          Patient: Tessa Enriquez   MR Number: [id number]   YOB: 1955   Date of Visit: 11/3/2020       Dear Dr. Jolynn Ayala:    Thank you for referring Tessa Enriquez to me for evaluation. Attached you will find relevant portions of my assessment and plan of care.    If you have questions, please do not hesitate to call me. I look forward to following Tessa Enriquez along with you.    Sincerely,    Brandon Wagner MD    Enclosure  CC:  No Recipients    If you would like to receive this communication electronically, please contact [email or web address] or [phone] to request more information on [employer] Link access.    For providers and/or their staff who would like to refer a patient to [facility], please contact us through our one-stop-shop provider referral line, [facility], at [phone].    If you feel you have received this communication in error or would no longer like to receive these types of communications, please e-mail [email or web address]

## 2020-11-04 RX ORDER — SODIUM CHLORIDE 9 MG/ML
INJECTION, SOLUTION INTRAVENOUS CONTINUOUS
Status: CANCELLED | OUTPATIENT
Start: 2020-11-04

## 2020-11-04 RX ORDER — METRONIDAZOLE 500 MG/100ML
500 INJECTION, SOLUTION INTRAVENOUS
Status: CANCELLED | OUTPATIENT
Start: 2020-11-04

## 2020-11-06 ENCOUNTER — ANESTHESIA EVENT (OUTPATIENT)
Dept: SURGERY | Facility: HOSPITAL | Age: 65
DRG: 330 | End: 2020-11-06
Payer: COMMERCIAL

## 2020-11-06 ENCOUNTER — HOSPITAL ENCOUNTER (OUTPATIENT)
Dept: PREADMISSION TESTING | Facility: HOSPITAL | Age: 65
Discharge: HOME | DRG: 330 | End: 2020-11-06
Attending: SURGERY
Payer: COMMERCIAL

## 2020-11-06 ENCOUNTER — LAB VISIT (OUTPATIENT)
Dept: PRIMARY CARE CLINIC | Facility: CLINIC | Age: 65
End: 2020-11-06
Payer: COMMERCIAL

## 2020-11-06 VITALS — WEIGHT: 200.81 LBS | BODY MASS INDEX: 37.91 KG/M2 | HEIGHT: 61 IN

## 2020-11-06 DIAGNOSIS — K63.89 COLONIC MASS: ICD-10-CM

## 2020-11-06 DIAGNOSIS — Z01.818 PREOP EXAMINATION: ICD-10-CM

## 2020-11-06 LAB
ABO + RH BLD: NORMAL
AG TESTED RBC: NORMAL
BLD GP AB SCN CELLS X3 SERPL QL: NORMAL
BLOOD GROUP ANTIBODIES SERPL: NORMAL

## 2020-11-06 PROCEDURE — U0003 INFECTIOUS AGENT DETECTION BY NUCLEIC ACID (DNA OR RNA); SEVERE ACUTE RESPIRATORY SYNDROME CORONAVIRUS 2 (SARS-COV-2) (CORONAVIRUS DISEASE [COVID-19]), AMPLIFIED PROBE TECHNIQUE, MAKING USE OF HIGH THROUGHPUT TECHNOLOGIES AS DESCRIBED BY CMS-2020-01-R: HCPCS

## 2020-11-06 PROCEDURE — 36415 COLL VENOUS BLD VENIPUNCTURE: CPT

## 2020-11-06 PROCEDURE — 93005 ELECTROCARDIOGRAM TRACING: CPT

## 2020-11-06 PROCEDURE — 86922 COMPATIBILITY TEST ANTIGLOB: CPT

## 2020-11-06 PROCEDURE — 99900104 DSU ONLY-NO CHARGE-EA ADD'L HR (STAT)

## 2020-11-06 PROCEDURE — 86901 BLOOD TYPING SEROLOGIC RH(D): CPT

## 2020-11-06 PROCEDURE — 99900103 DSU ONLY-NO CHARGE-INITIAL HR (STAT)

## 2020-11-06 PROCEDURE — 93010 EKG 12-LEAD: ICD-10-PCS | Mod: ,,, | Performed by: INTERNAL MEDICINE

## 2020-11-06 PROCEDURE — 86902 BLOOD TYPE ANTIGEN DONOR EA: CPT

## 2020-11-06 PROCEDURE — 86870 RBC ANTIBODY IDENTIFICATION: CPT

## 2020-11-06 PROCEDURE — 86905 BLOOD TYPING RBC ANTIGENS: CPT

## 2020-11-06 PROCEDURE — 93010 ELECTROCARDIOGRAM REPORT: CPT | Mod: ,,, | Performed by: INTERNAL MEDICINE

## 2020-11-06 NOTE — DISCHARGE INSTRUCTIONS
To confirm, Your doctor has instructed you that surgery is scheduled for: 11/9/2020    Please report to [facility] the morning of surgery. You must check-in and receive a wristband before going to your procedure.    Pre-Op will call the FRIDAY prior to surgery between 1:00 and 6:00 PM with the final arrival time.  Phone number: [phone]    PLEASE NOTE:  The surgery schedule has many variables which may affect the time of your surgery case.  Family members should be available if your surgery time changes.  Plan to be here the day of your procedure between 4-6 hours.    MEDICATIONS:  TAKE ONLY THESE MEDICATIONS WITH A SMALL SIP OF WATER THE MORNING OF YOUR PROCEDURE:  LEVOTHYROXINE, TRAMADOL IF NEEDED      DO NOT TAKE THESE MEDICATIONS 5-7 DAYS PRIOR to your procedure or per your surgeon's request:   ASPIRIN, ALEVE, ADVIL, IBUPROFEN, FISH OIL VITAMIN E, HERBALS, CELEBREX  (May take Tylenol)    ONLY if you are prescribed any types of blood thinners such as:  Aspirin, Coumadin, Plavix, Pradaxa, Xarelto, Aggrenox, Effient, Eliquis, Savasya, Brilinta, or any other, ask your surgeon whether you should stop taking them and how long before surgery you should stop.  You may also need to verify with the prescribing physician if it is ok to stop your medication.      INSTRUCTIONS IMPORTANT!!  · Do not eat or drink anything between midnight and the time of your procedure- this includes gum, mints, and candy.  · Do not smoke or drink alcoholic beverages 24 hours prior to your procedure.  · Shower the night before AND the morning of your procedure with a Chlorhexidine wash such as Hibiclens or Dial antibacterial soap from the neck down.  Do not get it on your face or in your eyes.  You may use your own shampoo and face wash. This helps your skin to be as bacteria free as possible.    · If you wear contact lenses, dentures, hearing aids or glasses, bring a container to put them in during  surgery and give to a family member for safe keeping.  Please leave all jewelry, piercing's and valuables at home.   · DO NOT remove hair from the surgery site.  Do not shave the incision site unless you are given specific instructions to do so.    · ONLY if you have been diagnosed with sleep apnea please bring your C-PAP machine.  · ONLY if you wear home oxygen please bring your portable oxygen tank the day of your procedure.  · ONLY if you have a history of OPEN HEART SURGERY you will need a clearance from your Cardiologist per Anesthesia.      · ONLY for patients requiring bowel prep, written instructions will be given by your doctor's office.  · ONLY if you have a neuro stimulator, please bring the controller with you the morning of surgery  · ONLY if a type and screen test is needed before surgery, please return: DONE TODAY  · If your doctor has scheduled you for an overnight stay, bring a small overnight bag with any personal items you need.  · Make arrangements in advance for transportation home by a responsible adult.  It is not safe to drive a vehicle during the 24 hours after anesthesia.     · ONLY ONE VISITOR PER PATIENT IS ALLOWED TO COME IN THE HOSPITAL THE DAY OF PROCEDURE.   · Visiting hours are 8:00AM-6:00PM. For the safety of all patients, visitors under the age of 12 are not allowed above the first floor of the hospital.    · All [facility] facilities and properties are tobacco free.  Smoking is NOT allowed.       If you have any questions about these instructions, call Pre-Op Admit  Nursing at [phone] or the Pre-Op Day Surgery Unit at [phone].

## 2020-11-07 LAB — SARS-COV-2 RNA RESP QL NAA+PROBE: NOT DETECTED

## 2020-11-09 ENCOUNTER — HOSPITAL ENCOUNTER (INPATIENT)
Facility: HOSPITAL | Age: 65
LOS: 5 days | Discharge: HOME | DRG: 330 | End: 2020-11-14
Attending: SURGERY | Admitting: SURGERY
Payer: COMMERCIAL

## 2020-11-09 ENCOUNTER — ANESTHESIA (OUTPATIENT)
Dept: SURGERY | Facility: HOSPITAL | Age: 65
DRG: 330 | End: 2020-11-09
Payer: COMMERCIAL

## 2020-11-09 DIAGNOSIS — C20 RECTAL CANCER: Primary | ICD-10-CM

## 2020-11-09 DIAGNOSIS — K63.89 COLONIC MASS: ICD-10-CM

## 2020-11-09 LAB
HGB BLD-MCNC: 10.4 G/DL (ref 12–16)
T4 FREE SERPL-MCNC: 0.78 NG/DL (ref 0.71–1.51)
TSH SERPL DL<=0.005 MIU/L-ACNC: 7.28 UIU/ML (ref 0.4–4)

## 2020-11-09 PROCEDURE — 88341 IMHCHEM/IMCYTCHM EA ADD ANTB: CPT | Performed by: PATHOLOGY

## 2020-11-09 PROCEDURE — 25000003 PHARM REV CODE 250: Performed by: ANESTHESIOLOGY

## 2020-11-09 PROCEDURE — 99900103 DSU ONLY-NO CHARGE-INITIAL HR (STAT): Performed by: SURGERY

## 2020-11-09 PROCEDURE — 88309 TISSUE EXAM BY PATHOLOGIST: CPT | Performed by: PATHOLOGY

## 2020-11-09 PROCEDURE — 88309 PR  SURG PATH,LEVEL VI: ICD-10-PCS | Mod: 26,,, | Performed by: PATHOLOGY

## 2020-11-09 PROCEDURE — 88305 TISSUE EXAM BY PATHOLOGIST: ICD-10-PCS | Mod: 26,,, | Performed by: PATHOLOGY

## 2020-11-09 PROCEDURE — 63600175 PHARM REV CODE 636 W HCPCS: Performed by: NURSE ANESTHETIST, CERTIFIED REGISTERED

## 2020-11-09 PROCEDURE — 63600175 PHARM REV CODE 636 W HCPCS: Performed by: SURGERY

## 2020-11-09 PROCEDURE — 71000033 HC RECOVERY, INTIAL HOUR: Performed by: SURGERY

## 2020-11-09 PROCEDURE — 25000003 PHARM REV CODE 250: Performed by: NURSE ANESTHETIST, CERTIFIED REGISTERED

## 2020-11-09 PROCEDURE — 44139 PR MOBILIZE SPLENIC FLEX: ICD-10-PCS | Mod: ,,, | Performed by: SURGERY

## 2020-11-09 PROCEDURE — 63600175 PHARM REV CODE 636 W HCPCS: Performed by: ANESTHESIOLOGY

## 2020-11-09 PROCEDURE — 36000713 HC OR TIME LEV V EA ADD 15 MIN: Performed by: SURGERY

## 2020-11-09 PROCEDURE — 88341 PR IHC OR ICC EACH ADD'L SINGLE ANTIBODY  STAINPR: ICD-10-PCS | Mod: 26,,, | Performed by: PATHOLOGY

## 2020-11-09 PROCEDURE — S0030 INJECTION, METRONIDAZOLE: HCPCS | Performed by: SURGERY

## 2020-11-09 PROCEDURE — 88341 IMHCHEM/IMCYTCHM EA ADD ANTB: CPT | Mod: 26,,, | Performed by: PATHOLOGY

## 2020-11-09 PROCEDURE — 88309 TISSUE EXAM BY PATHOLOGIST: CPT | Mod: 26,,, | Performed by: PATHOLOGY

## 2020-11-09 PROCEDURE — 85018 HEMOGLOBIN: CPT

## 2020-11-09 PROCEDURE — 25000003 PHARM REV CODE 250: Performed by: SURGERY

## 2020-11-09 PROCEDURE — C1894 INTRO/SHEATH, NON-LASER: HCPCS | Performed by: SURGERY

## 2020-11-09 PROCEDURE — 88342 IMHCHEM/IMCYTCHM 1ST ANTB: CPT | Mod: 26,,, | Performed by: PATHOLOGY

## 2020-11-09 PROCEDURE — 37000009 HC ANESTHESIA EA ADD 15 MINS: Performed by: SURGERY

## 2020-11-09 PROCEDURE — C9290 INJ, BUPIVACAINE LIPOSOME: HCPCS | Performed by: SURGERY

## 2020-11-09 PROCEDURE — 36415 COLL VENOUS BLD VENIPUNCTURE: CPT

## 2020-11-09 PROCEDURE — 71000039 HC RECOVERY, EACH ADD'L HOUR: Performed by: SURGERY

## 2020-11-09 PROCEDURE — D9220A PRA ANESTHESIA: Mod: ANES,,, | Performed by: ANESTHESIOLOGY

## 2020-11-09 PROCEDURE — 88342 IMHCHEM/IMCYTCHM 1ST ANTB: CPT | Performed by: PATHOLOGY

## 2020-11-09 PROCEDURE — 88305 TISSUE EXAM BY PATHOLOGIST: CPT | Mod: 26,,, | Performed by: PATHOLOGY

## 2020-11-09 PROCEDURE — 84439 ASSAY OF FREE THYROXINE: CPT

## 2020-11-09 PROCEDURE — 84443 ASSAY THYROID STIM HORMONE: CPT

## 2020-11-09 PROCEDURE — 88342 CHG IMMUNOCYTOCHEMISTRY: ICD-10-PCS | Mod: 26,,, | Performed by: PATHOLOGY

## 2020-11-09 PROCEDURE — 99900104 DSU ONLY-NO CHARGE-EA ADD'L HR (STAT): Performed by: SURGERY

## 2020-11-09 PROCEDURE — 12000002 HC ACUTE/MED SURGE SEMI-PRIVATE ROOM

## 2020-11-09 PROCEDURE — 44145 PARTIAL REMOVAL OF COLON: CPT | Mod: 22,,, | Performed by: SURGERY

## 2020-11-09 PROCEDURE — 88305 TISSUE EXAM BY PATHOLOGIST: CPT | Performed by: PATHOLOGY

## 2020-11-09 PROCEDURE — 27201423 OPTIME MED/SURG SUP & DEVICES STERILE SUPPLY: Performed by: SURGERY

## 2020-11-09 PROCEDURE — 37000008 HC ANESTHESIA 1ST 15 MINUTES: Performed by: SURGERY

## 2020-11-09 PROCEDURE — 94761 N-INVAS EAR/PLS OXIMETRY MLT: CPT

## 2020-11-09 PROCEDURE — 36000712 HC OR TIME LEV V 1ST 15 MIN: Performed by: SURGERY

## 2020-11-09 PROCEDURE — 44139 MOBILIZATION OF COLON: CPT | Mod: ,,, | Performed by: SURGERY

## 2020-11-09 PROCEDURE — D9220A PRA ANESTHESIA: ICD-10-PCS | Mod: CRNA,,, | Performed by: NURSE ANESTHETIST, CERTIFIED REGISTERED

## 2020-11-09 PROCEDURE — 44145 PR PART REMOVAL COLON W COLOPROCTOSTOMY: ICD-10-PCS | Mod: 22,,, | Performed by: SURGERY

## 2020-11-09 PROCEDURE — D9220A PRA ANESTHESIA: ICD-10-PCS | Mod: ANES,,, | Performed by: ANESTHESIOLOGY

## 2020-11-09 PROCEDURE — D9220A PRA ANESTHESIA: Mod: CRNA,,, | Performed by: NURSE ANESTHETIST, CERTIFIED REGISTERED

## 2020-11-09 RX ORDER — HYDROMORPHONE HYDROCHLORIDE 2 MG/ML
0.5 INJECTION, SOLUTION INTRAMUSCULAR; INTRAVENOUS; SUBCUTANEOUS
Status: DISCONTINUED | OUTPATIENT
Start: 2020-11-09 | End: 2020-11-12

## 2020-11-09 RX ORDER — METRONIDAZOLE 500 MG/100ML
500 INJECTION, SOLUTION INTRAVENOUS
Status: COMPLETED | OUTPATIENT
Start: 2020-11-09 | End: 2020-11-09

## 2020-11-09 RX ORDER — GABAPENTIN 100 MG/1
200 CAPSULE ORAL 2 TIMES DAILY
Status: DISCONTINUED | OUTPATIENT
Start: 2020-11-09 | End: 2020-11-14 | Stop reason: HOSPADM

## 2020-11-09 RX ORDER — LIDOCAINE HCL/PF 100 MG/5ML
SYRINGE (ML) INTRAVENOUS
Status: DISCONTINUED | OUTPATIENT
Start: 2020-11-09 | End: 2020-11-09

## 2020-11-09 RX ORDER — BISACODYL 5 MG
5 TABLET, DELAYED RELEASE (ENTERIC COATED) ORAL NIGHTLY
Status: DISCONTINUED | OUTPATIENT
Start: 2020-11-09 | End: 2020-11-14 | Stop reason: HOSPADM

## 2020-11-09 RX ORDER — SODIUM CHLORIDE 0.9 % (FLUSH) 0.9 %
3 SYRINGE (ML) INJECTION EVERY 8 HOURS
Status: DISCONTINUED | OUTPATIENT
Start: 2020-11-09 | End: 2020-11-09

## 2020-11-09 RX ORDER — OXYCODONE HYDROCHLORIDE 5 MG/1
5 TABLET ORAL ONCE AS NEEDED
Status: DISCONTINUED | OUTPATIENT
Start: 2020-11-09 | End: 2020-11-09

## 2020-11-09 RX ORDER — MUPIROCIN 20 MG/G
OINTMENT TOPICAL 2 TIMES DAILY
Status: COMPLETED | OUTPATIENT
Start: 2020-11-09 | End: 2020-11-14

## 2020-11-09 RX ORDER — DEXAMETHASONE SODIUM PHOSPHATE 4 MG/ML
INJECTION, SOLUTION INTRA-ARTICULAR; INTRALESIONAL; INTRAMUSCULAR; INTRAVENOUS; SOFT TISSUE
Status: DISCONTINUED | OUTPATIENT
Start: 2020-11-09 | End: 2020-11-09

## 2020-11-09 RX ORDER — ACETAMINOPHEN 10 MG/ML
1000 INJECTION, SOLUTION INTRAVENOUS EVERY 6 HOURS
Status: COMPLETED | OUTPATIENT
Start: 2020-11-09 | End: 2020-11-10

## 2020-11-09 RX ORDER — PROPOFOL 10 MG/ML
VIAL (ML) INTRAVENOUS
Status: DISCONTINUED | OUTPATIENT
Start: 2020-11-09 | End: 2020-11-09

## 2020-11-09 RX ORDER — KETOROLAC TROMETHAMINE 30 MG/ML
INJECTION, SOLUTION INTRAMUSCULAR; INTRAVENOUS
Status: DISCONTINUED | OUTPATIENT
Start: 2020-11-09 | End: 2020-11-09

## 2020-11-09 RX ORDER — SODIUM CHLORIDE, SODIUM LACTATE, POTASSIUM CHLORIDE, CALCIUM CHLORIDE 600; 310; 30; 20 MG/100ML; MG/100ML; MG/100ML; MG/100ML
500 INJECTION, SOLUTION INTRAVENOUS ONCE
Status: DISCONTINUED | OUTPATIENT
Start: 2020-11-09 | End: 2020-11-09

## 2020-11-09 RX ORDER — ONDANSETRON 2 MG/ML
INJECTION INTRAMUSCULAR; INTRAVENOUS
Status: DISCONTINUED | OUTPATIENT
Start: 2020-11-09 | End: 2020-11-09

## 2020-11-09 RX ORDER — IBUPROFEN 600 MG/1
600 TABLET ORAL 4 TIMES DAILY
Status: DISCONTINUED | OUTPATIENT
Start: 2020-11-09 | End: 2020-11-11

## 2020-11-09 RX ORDER — FAMOTIDINE 10 MG/ML
20 INJECTION INTRAVENOUS EVERY 12 HOURS
Status: DISCONTINUED | OUTPATIENT
Start: 2020-11-09 | End: 2020-11-14 | Stop reason: HOSPADM

## 2020-11-09 RX ORDER — LIDOCAINE HYDROCHLORIDE 10 MG/ML
1 INJECTION, SOLUTION EPIDURAL; INFILTRATION; INTRACAUDAL; PERINEURAL ONCE
Status: COMPLETED | OUTPATIENT
Start: 2020-11-09 | End: 2020-11-09

## 2020-11-09 RX ORDER — EPHEDRINE SULFATE 50 MG/ML
INJECTION, SOLUTION INTRAVENOUS
Status: DISCONTINUED | OUTPATIENT
Start: 2020-11-09 | End: 2020-11-09

## 2020-11-09 RX ORDER — BUPIVACAINE HYDROCHLORIDE AND EPINEPHRINE 2.5; 5 MG/ML; UG/ML
INJECTION, SOLUTION EPIDURAL; INFILTRATION; INTRACAUDAL; PERINEURAL
Status: DISCONTINUED | OUTPATIENT
Start: 2020-11-09 | End: 2020-11-09 | Stop reason: HOSPADM

## 2020-11-09 RX ORDER — ROCURONIUM BROMIDE 10 MG/ML
INJECTION, SOLUTION INTRAVENOUS
Status: DISCONTINUED | OUTPATIENT
Start: 2020-11-09 | End: 2020-11-09

## 2020-11-09 RX ORDER — DEXTROSE, SODIUM CHLORIDE, SODIUM LACTATE, POTASSIUM CHLORIDE, AND CALCIUM CHLORIDE 5; .6; .31; .03; .02 G/100ML; G/100ML; G/100ML; G/100ML; G/100ML
INJECTION, SOLUTION INTRAVENOUS CONTINUOUS
Status: DISCONTINUED | OUTPATIENT
Start: 2020-11-09 | End: 2020-11-10

## 2020-11-09 RX ORDER — LORAZEPAM 2 MG/ML
0.25 INJECTION INTRAMUSCULAR EVERY 4 HOURS PRN
Status: DISCONTINUED | OUTPATIENT
Start: 2020-11-09 | End: 2020-11-14 | Stop reason: HOSPADM

## 2020-11-09 RX ORDER — FENTANYL CITRATE 50 UG/ML
25 INJECTION, SOLUTION INTRAMUSCULAR; INTRAVENOUS EVERY 5 MIN PRN
Status: DISCONTINUED | OUTPATIENT
Start: 2020-11-09 | End: 2020-11-09

## 2020-11-09 RX ORDER — SUCCINYLCHOLINE CHLORIDE 20 MG/ML
INJECTION INTRAMUSCULAR; INTRAVENOUS
Status: DISCONTINUED | OUTPATIENT
Start: 2020-11-09 | End: 2020-11-09

## 2020-11-09 RX ORDER — MIDAZOLAM HYDROCHLORIDE 1 MG/ML
INJECTION, SOLUTION INTRAMUSCULAR; INTRAVENOUS
Status: DISCONTINUED | OUTPATIENT
Start: 2020-11-09 | End: 2020-11-09

## 2020-11-09 RX ORDER — PHENYLEPHRINE HYDROCHLORIDE 10 MG/ML
INJECTION INTRAVENOUS
Status: DISCONTINUED | OUTPATIENT
Start: 2020-11-09 | End: 2020-11-09

## 2020-11-09 RX ORDER — METOCLOPRAMIDE HYDROCHLORIDE 5 MG/ML
5 INJECTION INTRAMUSCULAR; INTRAVENOUS EVERY 6 HOURS PRN
Status: DISCONTINUED | OUTPATIENT
Start: 2020-11-09 | End: 2020-11-14 | Stop reason: HOSPADM

## 2020-11-09 RX ORDER — ONDANSETRON 2 MG/ML
4 INJECTION INTRAMUSCULAR; INTRAVENOUS ONCE AS NEEDED
Status: COMPLETED | OUTPATIENT
Start: 2020-11-09 | End: 2020-11-09

## 2020-11-09 RX ORDER — NALOXONE HCL 0.4 MG/ML
0.02 VIAL (ML) INJECTION
Status: DISCONTINUED | OUTPATIENT
Start: 2020-11-09 | End: 2020-11-14 | Stop reason: HOSPADM

## 2020-11-09 RX ORDER — METRONIDAZOLE 500 MG/100ML
500 INJECTION, SOLUTION INTRAVENOUS
Status: COMPLETED | OUTPATIENT
Start: 2020-11-09 | End: 2020-11-10

## 2020-11-09 RX ORDER — ACETAMINOPHEN 10 MG/ML
INJECTION, SOLUTION INTRAVENOUS
Status: DISCONTINUED | OUTPATIENT
Start: 2020-11-09 | End: 2020-11-09

## 2020-11-09 RX ORDER — ENOXAPARIN SODIUM 100 MG/ML
30 INJECTION SUBCUTANEOUS EVERY 24 HOURS
Status: DISCONTINUED | OUTPATIENT
Start: 2020-11-10 | End: 2020-11-12

## 2020-11-09 RX ORDER — HYDROMORPHONE HYDROCHLORIDE 2 MG/ML
1 INJECTION, SOLUTION INTRAMUSCULAR; INTRAVENOUS; SUBCUTANEOUS EVERY 4 HOURS PRN
Status: DISCONTINUED | OUTPATIENT
Start: 2020-11-09 | End: 2020-11-14 | Stop reason: HOSPADM

## 2020-11-09 RX ORDER — ONDANSETRON 2 MG/ML
4 INJECTION INTRAMUSCULAR; INTRAVENOUS EVERY 12 HOURS PRN
Status: DISCONTINUED | OUTPATIENT
Start: 2020-11-09 | End: 2020-11-14 | Stop reason: HOSPADM

## 2020-11-09 RX ORDER — ONDANSETRON 2 MG/ML
4 INJECTION INTRAMUSCULAR; INTRAVENOUS ONCE AS NEEDED
Status: DISCONTINUED | OUTPATIENT
Start: 2020-11-09 | End: 2020-11-14 | Stop reason: HOSPADM

## 2020-11-09 RX ORDER — SODIUM CHLORIDE 9 MG/ML
INJECTION, SOLUTION INTRAVENOUS CONTINUOUS
Status: DISCONTINUED | OUTPATIENT
Start: 2020-11-09 | End: 2020-11-09

## 2020-11-09 RX ORDER — FENTANYL CITRATE 50 UG/ML
INJECTION, SOLUTION INTRAMUSCULAR; INTRAVENOUS
Status: DISCONTINUED | OUTPATIENT
Start: 2020-11-09 | End: 2020-11-09

## 2020-11-09 RX ORDER — CIPROFLOXACIN 2 MG/ML
400 INJECTION, SOLUTION INTRAVENOUS
Status: COMPLETED | OUTPATIENT
Start: 2020-11-09 | End: 2020-11-09

## 2020-11-09 RX ADMIN — PHENYLEPHRINE HYDROCHLORIDE 100 MCG: 10 INJECTION INTRAVENOUS at 01:11

## 2020-11-09 RX ADMIN — MIDAZOLAM 2 MG: 1 INJECTION INTRAMUSCULAR; INTRAVENOUS at 01:11

## 2020-11-09 RX ADMIN — ROCURONIUM BROMIDE 5 MG: 10 INJECTION, SOLUTION INTRAVENOUS at 01:11

## 2020-11-09 RX ADMIN — ACETAMINOPHEN 1000 MG: 10 INJECTION, SOLUTION INTRAVENOUS at 02:11

## 2020-11-09 RX ADMIN — METRONIDAZOLE 500 MG: 500 INJECTION, SOLUTION INTRAVENOUS at 09:11

## 2020-11-09 RX ADMIN — PHENYLEPHRINE HYDROCHLORIDE 100 MCG: 10 INJECTION INTRAVENOUS at 03:11

## 2020-11-09 RX ADMIN — SODIUM CHLORIDE, SODIUM GLUCONATE, SODIUM ACETATE, POTASSIUM CHLORIDE, MAGNESIUM CHLORIDE, SODIUM PHOSPHATE, DIBASIC, AND POTASSIUM PHOSPHATE: .53; .5; .37; .037; .03; .012; .00082 INJECTION, SOLUTION INTRAVENOUS at 02:11

## 2020-11-09 RX ADMIN — LIDOCAINE HYDROCHLORIDE 100 MG: 20 INJECTION, SOLUTION INTRAVENOUS at 01:11

## 2020-11-09 RX ADMIN — PHENYLEPHRINE HYDROCHLORIDE 200 MCG: 10 INJECTION INTRAVENOUS at 03:11

## 2020-11-09 RX ADMIN — METRONIDAZOLE 500 MG: 500 INJECTION, SOLUTION INTRAVENOUS at 01:11

## 2020-11-09 RX ADMIN — PHENYLEPHRINE HYDROCHLORIDE 200 MCG: 10 INJECTION INTRAVENOUS at 04:11

## 2020-11-09 RX ADMIN — ROCURONIUM BROMIDE 10 MG: 10 INJECTION, SOLUTION INTRAVENOUS at 03:11

## 2020-11-09 RX ADMIN — ONDANSETRON 4 MG: 2 INJECTION INTRAMUSCULAR; INTRAVENOUS at 05:11

## 2020-11-09 RX ADMIN — ONDANSETRON 4 MG: 2 INJECTION, SOLUTION INTRAMUSCULAR; INTRAVENOUS at 02:11

## 2020-11-09 RX ADMIN — SODIUM CHLORIDE, SODIUM GLUCONATE, SODIUM ACETATE, POTASSIUM CHLORIDE, MAGNESIUM CHLORIDE, SODIUM PHOSPHATE, DIBASIC, AND POTASSIUM PHOSPHATE: .53; .5; .37; .037; .03; .012; .00082 INJECTION, SOLUTION INTRAVENOUS at 04:11

## 2020-11-09 RX ADMIN — ACETAMINOPHEN 1000 MG: 10 INJECTION, SOLUTION INTRAVENOUS at 08:11

## 2020-11-09 RX ADMIN — FAMOTIDINE 20 MG: 10 INJECTION INTRAVENOUS at 09:11

## 2020-11-09 RX ADMIN — FENTANYL CITRATE 75 MCG: 50 INJECTION, SOLUTION INTRAMUSCULAR; INTRAVENOUS at 01:11

## 2020-11-09 RX ADMIN — PHENYLEPHRINE HYDROCHLORIDE 100 MCG: 10 INJECTION INTRAVENOUS at 02:11

## 2020-11-09 RX ADMIN — DEXTROSE, SODIUM CHLORIDE, SODIUM LACTATE, POTASSIUM CHLORIDE, AND CALCIUM CHLORIDE: 5; .6; .31; .03; .02 INJECTION, SOLUTION INTRAVENOUS at 06:11

## 2020-11-09 RX ADMIN — BISACODYL 5 MG: 5 TABLET, COATED ORAL at 09:11

## 2020-11-09 RX ADMIN — SUCCINYLCHOLINE CHLORIDE 140 MG: 20 INJECTION, SOLUTION INTRAMUSCULAR; INTRAVENOUS; PARENTERAL at 01:11

## 2020-11-09 RX ADMIN — EPHEDRINE SULFATE 25 MG: 50 INJECTION, SOLUTION INTRAMUSCULAR; INTRAVENOUS; SUBCUTANEOUS at 03:11

## 2020-11-09 RX ADMIN — SUGAMMADEX 200 MG: 100 INJECTION, SOLUTION INTRAVENOUS at 04:11

## 2020-11-09 RX ADMIN — PROMETHAZINE HYDROCHLORIDE 6.25 MG: 25 INJECTION INTRAMUSCULAR; INTRAVENOUS at 05:11

## 2020-11-09 RX ADMIN — MUPIROCIN: 20 OINTMENT TOPICAL at 09:11

## 2020-11-09 RX ADMIN — SODIUM CHLORIDE, SODIUM GLUCONATE, SODIUM ACETATE, POTASSIUM CHLORIDE, MAGNESIUM CHLORIDE, SODIUM PHOSPHATE, DIBASIC, AND POTASSIUM PHOSPHATE: .53; .5; .37; .037; .03; .012; .00082 INJECTION, SOLUTION INTRAVENOUS at 11:11

## 2020-11-09 RX ADMIN — FENTANYL CITRATE 25 MCG: 50 INJECTION INTRAMUSCULAR; INTRAVENOUS at 05:11

## 2020-11-09 RX ADMIN — LIDOCAINE HYDROCHLORIDE 10 MG: 10 INJECTION, SOLUTION EPIDURAL; INFILTRATION; INTRACAUDAL; PERINEURAL at 10:11

## 2020-11-09 RX ADMIN — FENTANYL CITRATE 25 MCG: 50 INJECTION, SOLUTION INTRAMUSCULAR; INTRAVENOUS at 02:11

## 2020-11-09 RX ADMIN — IBUPROFEN 600 MG: 600 TABLET ORAL at 09:11

## 2020-11-09 RX ADMIN — PHENYLEPHRINE HYDROCHLORIDE 200 MCG: 10 INJECTION INTRAVENOUS at 01:11

## 2020-11-09 RX ADMIN — KETOROLAC TROMETHAMINE 30 MG: 30 INJECTION, SOLUTION INTRAMUSCULAR; INTRAVENOUS at 04:11

## 2020-11-09 RX ADMIN — PROPOFOL 150 MG: 10 INJECTION, EMULSION INTRAVENOUS at 01:11

## 2020-11-09 RX ADMIN — FENTANYL CITRATE 100 MCG: 50 INJECTION, SOLUTION INTRAMUSCULAR; INTRAVENOUS at 04:11

## 2020-11-09 RX ADMIN — CIPROFLOXACIN 400 MG: 2 INJECTION INTRAVENOUS at 01:11

## 2020-11-09 RX ADMIN — PHENYLEPHRINE HYDROCHLORIDE 150 MCG: 10 INJECTION INTRAVENOUS at 01:11

## 2020-11-09 RX ADMIN — ROCURONIUM BROMIDE 45 MG: 10 INJECTION, SOLUTION INTRAVENOUS at 01:11

## 2020-11-09 RX ADMIN — DEXAMETHASONE SODIUM PHOSPHATE 8 MG: 4 INJECTION, SOLUTION INTRA-ARTICULAR; INTRALESIONAL; INTRAMUSCULAR; INTRAVENOUS; SOFT TISSUE at 02:11

## 2020-11-09 RX ADMIN — GABAPENTIN 200 MG: 100 CAPSULE ORAL at 09:11

## 2020-11-09 NOTE — ANESTHESIA PROCEDURE NOTES
Intubation  Performed by: Nickie Jones CRNA  Authorized by: Eamon Gallegos MD     Intubation:     Induction:  Intravenous    Intubated:  Postinduction    Mask Ventilation:  Easy with oral airway    Attempts:  1    Attempted By:  Student    Method of Intubation:  Direct    Blade:  Marcus 2    Laryngeal View Grade: Grade I - full view of chords      Difficult Airway Encountered?: No      Complications:  None    Airway Device:  Oral endotracheal tube    Airway Device Size:  7.0    Style/Cuff Inflation:  Cuffed    Tube secured:  20    Secured at:  The lips    Placement Verified By:  Capnometry    Complicating Factors:  None    Findings Post-Intubation:  BS equal bilateral

## 2020-11-09 NOTE — OR NURSING
Spoke with Dr Wagner, blood bank called they have 2 units of PRBCS ready, pt has an antibody and if more blood is needed, will have to get from [place].  Dr wagner says 2 is good no new orders.

## 2020-11-09 NOTE — OP NOTE
Date of surgery:  November 9th 2020      Staff surgeon:  Dr. Brandon Wagner     Resident: Dr Candy Palumbo    Assistant: Lisette HERRERAA    Preop diagnosis:  Rectosigmoid cancer    Postoperative diagnosis:  Proximal rectal cancer    Procedure:  Robotic with conversion to open  Low anterior resection with colorectal anastomosis (22 modifier)  Mobilization of splenic flexure    Anesthesia:  General endotracheal anesthesia    Indication for procedure:  Pleasant 64-year-old female referred to my office following colonoscopy demonstrating large bulky mass in the rectosigmoid colon.  Biopsy confirmed adenocarcinoma.  Patient was scheduled for surgery on the above-mentioned date.    Description of procedure:  Following signing informed consent patient received preoperative IV antibiotics taken the operating room placed supine position.  General endotracheal anesthesia was administered without event.  Both arms were tucked patient was placed in stirrups and Velasco catheter was inserted.  The abdomen is prepped and draped standard fashion and appropriate time-out procedure was performed.  I make a small transverse incision above the umbilicus to right of midline.  Dissection was carried down to the fascia in the abdominal cavity is entered.  Pneumoperitoneum to 15 mm of mercury was established and an Optiview trocar was placed.  I next place a robotic trocar in the right lower quadrant.  To replace the left upper quadrant.  All trocars are placed under direct visualization.  A 5 mm assistant trocars placed in the right upper quadrant.  Patient is placed in Trendelenburg tilted towards the right side.  The robot is docked.  Omental adhesions were taken down without event.  Patient has had tattoo present in the proximal sigmoid colon corresponding to flat added no masses polyp which was resected piecemeal at the time of her colonoscopy.  There is significant inflammation of the sigmoid colon extending down to the  rectosigmoid colon.  It is plastered to the left lateral anterior abdominal wall and also to the anterior abdominal wall to the pelvis at the peritoneal reflection.  The DONNY is identified.  I dissected just distal to the takeoff of the DONNY.  Dissection was carried down the retroperitoneum.  The ureter was clearly seen and identified.  Continued my dissection distally.  Next the inferior mesenteric artery was isolated.  It is ligated with the vessel sealer uneventfully.  Mobilized proximally along the retroperitoneum without event.  I next take down the white line of Toldt laterally and perform a dissection proceeding distally.  As mentioned previously there is significant inflammation in the chronic scarring presumptively for diverticulitis along the sigmod colon into the rectosigmoid.  I dissected posteriorly around the mesorectum into Waldeyer's fascia.  Tattoo was present in the proximal or mid rectum.  There is a bulky mass present this area.  I am able to dissect posteriorly but given the size of the lesion I am unable to dissect laterally.  This point decision was made to proceed with the exploratory laparotomy.  Generous lower midline incision was made.  Fascia divided sharply abdominal cavity is entered a Irving wound retractor was placed.  Bulky firm masses present in the proximal rectal tattoo was seen extending down to the mid rectum.  Mesorectum is ligated.  I stapled across the mid rectum with a contour stapler uneventfully.  Next mobilized along the white line of Toldt in fully released splenic flexure uneventfully.  Vessels are ligated with silk ties.  Transverse colon and omentum were  free the descending colon to come down for the anastomosis.  Given patient's body habitus chronic scarring in size of the mass I do feel a 22 modifier is appropriate.  Next my point of proximal resection.  This is present in the distal descending colon is most proximal tattoo.  The mesentery is ligated with  silk ties.  Sharply divide the descending colon without event.  Sigmoid and proximal rectum were sent off the field to pathology.  Pursestring Prolene sutures placed uneventfully 29 EEA anvil was placed uneventfully.  The rectal stump was then checked for a leak from the staple line.  This is done by submerging the pelvis and fluid in injecting air to the rectum.  No leak is identified.  I then date the EEA stapler bring out the tip anteriorly and fashion create a stapled end-to-end anastomosis.  Flexible sigmoidoscopy was performed to the descending colon.  The anastomosis was clearly visualized.  Appears nice and healthy with good circular rings.  There is no leak identified.  Both donuts are visualized.  They appeared healthy.  The distal doughnut is slightly slimmer than normal.  Next we irrigated sure adequate hemostasis.  All lap counts are correct.  To imbricate the staple line left posterior lateral wall uneventfully.  Next inject the fascia with Exparel mixed with Marcaine.  I closed trocar sites with 0 Vicryl suture.  The fascia is closed with running looped PDS suture.  Skin incision closed for Monocryl.  Patient was extubated taken recovery room in stable condition.  There were no immediate complications.  Blood loss was 300 cc

## 2020-11-09 NOTE — BRIEF OP NOTE
[facility]  Brief Operative Note    Surgery Date: 11/9/2020     Surgeon(s) and Role:     * Brandon Wagner MD - Primary    Assisting Surgeon:Lisette ENGLAND    Resident: Candy Palumbo, PGY 5      Pre-op Diagnosis:  Colonic Cancer    Post-op Diagnosis:  Post-Op Diagnosis Codes:     * Colonic cancer    Procedure: Robotic with conversion to open low anterior resection  Colorectal anastomosis in mid rectum  Mobilization of splenic flexure    Anesthesia: General    Description of the findings of the procedure(s): Firm bulky mass noted in proximal rectum. Low anterior resection performed with colorectal anastomosis after mobilization fo splenic flexure.        Estimated Blood Loss: 200 mL         Specimens:   Specimen (12h ago, onward)    None

## 2020-11-09 NOTE — TRANSFER OF CARE
"Anesthesia Transfer of Care Note    Patient: Tessa Enriquez    Procedure(s) Performed: Procedure(s) (LRB):  ROBOTIC COLECTOMY low anterior resection, possible open (N/A)  COLECTOMY (N/A)    Patient location: PACU    Anesthesia Type: general    Transport from OR: Transported from OR on 2-3 L/min O2 by NC with adequate spontaneous ventilation    Post pain: adequate analgesia    Post assessment: no apparent anesthetic complications and tolerated procedure well    Post vital signs: stable    Level of consciousness: sedated and responds to stimulation    Nausea/Vomiting: no nausea/vomiting    Complications: none    Transfer of care protocol was followed      Last vitals:   Visit Vitals  BP (!) 156/82 (BP Location: Left arm, Patient Position: Sitting)   Pulse 94   Temp 36.2 °C (97.2 °F) (Temporal)   Resp 20   Ht 5' 1" (1.549 m)   Wt 90.7 kg (200 lb)   SpO2 (!) 94%   Breastfeeding No   BMI 37.79 kg/m²     "

## 2020-11-09 NOTE — ANESTHESIA PREPROCEDURE EVALUATION
11/09/2020  Tessa Enriquez is a 64 y.o., female.    Pre-op Assessment    I have reviewed the Patient Summary Reports.     I have reviewed the Nursing Notes. I have reviewed the NPO Status.   I have reviewed the Medications.     Review of Systems  Anesthesia Hx:  No problems with previous Anesthesia    Social:  Former Smoker    Hematology/Oncology:        Current/Recent Cancer. Other (see Oncology comments) Oncology Comments: Colon mass    Cardiovascular:   Exercise tolerance: good MCGRATH    Pulmonary:   COPD    Renal/:  Renal/ Normal     Hepatic/GI:   Bowel Prep.    Musculoskeletal:   Arthritis     Neurological:  Neurology Normal    Endocrine:   Hypothyroidism        Physical Exam  General:  Obesity    Airway/Jaw/Neck:  Airway Findings: Mouth Opening: Normal Tongue: Normal  General Airway Assessment: Adult  TM Distance: Normal, at least 6 cm        Eyes/Ears/Nose:  EYES/EARS/NOSE FINDINGS: Normal   Dental:  Dental Findings: Edentulous   Chest/Lungs:  Chest/Lungs Findings: Clear to auscultation, Normal Respiratory Rate     Heart/Vascular:  Heart Findings: Rate: Normal  Rhythm: Regular Rhythm        Mental Status:  Mental Status Findings:  Cooperative, Alert and Oriented         Anesthesia Plan  Type of Anesthesia, risks & benefits discussed:  Anesthesia Type:  general  Patient's Preference:   Intra-op Monitoring Plan: standard ASA monitors  Intra-op Monitoring Plan Comments:   Post Op Pain Control Plan: IV/PO Opioids PRN  Post Op Pain Control Plan Comments:   Induction:   IV  Beta Blocker:  Patient is not currently on a Beta-Blocker (No further documentation required).       Informed Consent: Patient understands risks and agrees with Anesthesia plan.  Questions answered. Anesthesia consent signed with patient.  ASA Score: 3     Day of Surgery Review of History & Physical: I have interviewed and examined the  patient. I have reviewed the patient's H&P dated:    H&P update referred to the surgeon.         Ready For Surgery From Anesthesia Perspective.

## 2020-11-09 NOTE — INTERVAL H&P NOTE
The patient has been examined and the H&P has been reviewed:    I concur with the findings and no changes have occurred since H&P was written.    Surgery risks, benefits and alternative options discussed and understood by patient/family.          Active Hospital Problems    Diagnosis  POA    Colonic mass [K63.89]  Yes      Resolved Hospital Problems   No resolved problems to display.

## 2020-11-10 LAB
ANION GAP SERPL CALC-SCNC: 7 MMOL/L (ref 8–16)
BASOPHILS # BLD AUTO: ABNORMAL K/UL (ref 0–0.2)
BASOPHILS NFR BLD: 0 % (ref 0–1.9)
BLD PROD TYP BPU: NORMAL
BLD PROD TYP BPU: NORMAL
BLOOD UNIT EXPIRATION DATE: NORMAL
BLOOD UNIT EXPIRATION DATE: NORMAL
BLOOD UNIT TYPE CODE: 6200
BLOOD UNIT TYPE CODE: 6200
BLOOD UNIT TYPE: NORMAL
BLOOD UNIT TYPE: NORMAL
BUN SERPL-MCNC: 7 MG/DL (ref 8–23)
CALCIUM SERPL-MCNC: 7.6 MG/DL (ref 8.7–10.5)
CHLORIDE SERPL-SCNC: 103 MMOL/L (ref 95–110)
CO2 SERPL-SCNC: 24 MMOL/L (ref 23–29)
CODING SYSTEM: NORMAL
CODING SYSTEM: NORMAL
CREAT SERPL-MCNC: 0.8 MG/DL (ref 0.5–1.4)
DIFFERENTIAL METHOD: ABNORMAL
DISPENSE STATUS: NORMAL
DISPENSE STATUS: NORMAL
EOSINOPHIL # BLD AUTO: ABNORMAL K/UL (ref 0–0.5)
EOSINOPHIL NFR BLD: 0 % (ref 0–8)
ERYTHROCYTE [DISTWIDTH] IN BLOOD BY AUTOMATED COUNT: 12.6 % (ref 11.5–14.5)
EST. GFR  (AFRICAN AMERICAN): >60 ML/MIN/1.73 M^2
EST. GFR  (NON AFRICAN AMERICAN): >60 ML/MIN/1.73 M^2
GLUCOSE SERPL-MCNC: 180 MG/DL (ref 70–110)
HCT VFR BLD AUTO: 31.5 % (ref 37–48.5)
HGB BLD-MCNC: 9.9 G/DL (ref 12–16)
HYPOCHROMIA BLD QL SMEAR: ABNORMAL
IMM GRANULOCYTES # BLD AUTO: ABNORMAL K/UL (ref 0–0.04)
IMM GRANULOCYTES NFR BLD AUTO: ABNORMAL % (ref 0–0.5)
LYMPHOCYTES # BLD AUTO: ABNORMAL K/UL (ref 1–4.8)
LYMPHOCYTES NFR BLD: 8 % (ref 18–48)
MCH RBC QN AUTO: 28.9 PG (ref 27–31)
MCHC RBC AUTO-ENTMCNC: 31.4 G/DL (ref 32–36)
MCV RBC AUTO: 92 FL (ref 82–98)
MONOCYTES # BLD AUTO: ABNORMAL K/UL (ref 0.3–1)
MONOCYTES NFR BLD: 5 % (ref 4–15)
NEUTROPHILS NFR BLD: 72 % (ref 38–73)
NEUTS BAND NFR BLD MANUAL: 15 %
NRBC BLD-RTO: 0 /100 WBC
NUM UNITS TRANS PACKED RBC: NORMAL
NUM UNITS TRANS PACKED RBC: NORMAL
PLATELET # BLD AUTO: 261 K/UL (ref 150–350)
PLATELET BLD QL SMEAR: ABNORMAL
PMV BLD AUTO: 11.1 FL (ref 9.2–12.9)
POTASSIUM SERPL-SCNC: 4.4 MMOL/L (ref 3.5–5.1)
RBC # BLD AUTO: 3.42 M/UL (ref 4–5.4)
SODIUM SERPL-SCNC: 134 MMOL/L (ref 136–145)
WBC # BLD AUTO: 9.48 K/UL (ref 3.9–12.7)

## 2020-11-10 PROCEDURE — 12000002 HC ACUTE/MED SURGE SEMI-PRIVATE ROOM

## 2020-11-10 PROCEDURE — 36415 COLL VENOUS BLD VENIPUNCTURE: CPT

## 2020-11-10 PROCEDURE — 97161 PT EVAL LOW COMPLEX 20 MIN: CPT

## 2020-11-10 PROCEDURE — 25000003 PHARM REV CODE 250: Performed by: INTERNAL MEDICINE

## 2020-11-10 PROCEDURE — 85007 BL SMEAR W/DIFF WBC COUNT: CPT

## 2020-11-10 PROCEDURE — 94761 N-INVAS EAR/PLS OXIMETRY MLT: CPT

## 2020-11-10 PROCEDURE — 97530 THERAPEUTIC ACTIVITIES: CPT

## 2020-11-10 PROCEDURE — 25000003 PHARM REV CODE 250: Performed by: SURGERY

## 2020-11-10 PROCEDURE — S0030 INJECTION, METRONIDAZOLE: HCPCS | Performed by: SURGERY

## 2020-11-10 PROCEDURE — 94799 UNLISTED PULMONARY SVC/PX: CPT

## 2020-11-10 PROCEDURE — 27000221 HC OXYGEN, UP TO 24 HOURS

## 2020-11-10 PROCEDURE — 97165 OT EVAL LOW COMPLEX 30 MIN: CPT

## 2020-11-10 PROCEDURE — 85027 COMPLETE CBC AUTOMATED: CPT

## 2020-11-10 PROCEDURE — 63600175 PHARM REV CODE 636 W HCPCS: Performed by: SURGERY

## 2020-11-10 PROCEDURE — 80048 BASIC METABOLIC PNL TOTAL CA: CPT

## 2020-11-10 RX ORDER — SODIUM CHLORIDE, SODIUM LACTATE, POTASSIUM CHLORIDE, CALCIUM CHLORIDE 600; 310; 30; 20 MG/100ML; MG/100ML; MG/100ML; MG/100ML
INJECTION, SOLUTION INTRAVENOUS CONTINUOUS
Status: DISCONTINUED | OUTPATIENT
Start: 2020-11-10 | End: 2020-11-11

## 2020-11-10 RX ORDER — DIPHENHYDRAMINE HCL 25 MG
25 CAPSULE ORAL EVERY 6 HOURS PRN
Status: DISCONTINUED | OUTPATIENT
Start: 2020-11-10 | End: 2020-11-14 | Stop reason: HOSPADM

## 2020-11-10 RX ADMIN — MUPIROCIN: 20 OINTMENT TOPICAL at 08:11

## 2020-11-10 RX ADMIN — DIPHENHYDRAMINE HYDROCHLORIDE 25 MG: 25 CAPSULE ORAL at 08:11

## 2020-11-10 RX ADMIN — FAMOTIDINE 20 MG: 10 INJECTION INTRAVENOUS at 08:11

## 2020-11-10 RX ADMIN — HYDROMORPHONE HYDROCHLORIDE 0.5 MG: 2 INJECTION INTRAMUSCULAR; INTRAVENOUS; SUBCUTANEOUS at 03:11

## 2020-11-10 RX ADMIN — ACETAMINOPHEN 1000 MG: 10 INJECTION, SOLUTION INTRAVENOUS at 05:11

## 2020-11-10 RX ADMIN — IBUPROFEN 600 MG: 600 TABLET ORAL at 05:11

## 2020-11-10 RX ADMIN — METRONIDAZOLE 500 MG: 500 INJECTION, SOLUTION INTRAVENOUS at 04:11

## 2020-11-10 RX ADMIN — GABAPENTIN 200 MG: 100 CAPSULE ORAL at 08:11

## 2020-11-10 RX ADMIN — IBUPROFEN 600 MG: 600 TABLET ORAL at 01:11

## 2020-11-10 RX ADMIN — ENOXAPARIN SODIUM 30 MG: 30 INJECTION SUBCUTANEOUS at 11:11

## 2020-11-10 RX ADMIN — BISACODYL 5 MG: 5 TABLET, COATED ORAL at 08:11

## 2020-11-10 RX ADMIN — DEXTROSE, SODIUM CHLORIDE, SODIUM LACTATE, POTASSIUM CHLORIDE, AND CALCIUM CHLORIDE: 5; .6; .31; .03; .02 INJECTION, SOLUTION INTRAVENOUS at 04:11

## 2020-11-10 RX ADMIN — SODIUM CHLORIDE, SODIUM LACTATE, POTASSIUM CHLORIDE, AND CALCIUM CHLORIDE: .6; .31; .03; .02 INJECTION, SOLUTION INTRAVENOUS at 11:11

## 2020-11-10 RX ADMIN — ACETAMINOPHEN 1000 MG: 10 INJECTION, SOLUTION INTRAVENOUS at 11:11

## 2020-11-10 RX ADMIN — HYDROMORPHONE HYDROCHLORIDE 1 MG: 2 INJECTION INTRAMUSCULAR; INTRAVENOUS; SUBCUTANEOUS at 08:11

## 2020-11-10 RX ADMIN — IBUPROFEN 600 MG: 600 TABLET ORAL at 08:11

## 2020-11-10 NOTE — ANESTHESIA POSTPROCEDURE EVALUATION
Anesthesia Post Evaluation    Patient: Tessa Enriquez    Procedure(s) Performed: Procedure(s) (LRB):  ROBOTIC COLECTOMY low anterior resection, possible open (N/A)  COLECTOMY (N/A)    Final Anesthesia Type: general    Patient location during evaluation: PACU  Patient participation: Yes- Able to Participate  Level of consciousness: awake and alert  Post-procedure vital signs: reviewed and stable  Pain management: adequate  Airway patency: patent    PONV status at discharge: No PONV  Anesthetic complications: no      Cardiovascular status: hemodynamically stable  Respiratory status: unassisted and room air  Hydration status: euvolemic  Follow-up not needed.          Vitals Value Taken Time   /57 11/09/20 2004   Temp 36.4 °C (97.6 °F) 11/09/20 2004   Pulse 92 11/09/20 2004   Resp 16 11/09/20 2004   SpO2 98 % 11/09/20 2004         Event Time   Out of Recovery 18:20:00         Pain/Bautista Score: Pain Rating Prior to Med Admin: 9 (11/9/2020  8:06 PM)  Bautista Score: 8 (11/9/2020  6:15 PM)

## 2020-11-10 NOTE — RESPIRATORY THERAPY
11/10/20 0712   Patient Assessment/Suction   Level of Consciousness (AVPU) alert   All Lung Fields Breath Sounds diminished   PRE-TX-O2   O2 Device (Oxygen Therapy) nasal cannula   $ Is the patient on Low Flow Oxygen? Yes   Flow (L/min) 2  (decreased to 1lpm)   SpO2 97 %   Pulse Oximetry Type Intermittent   $ Pulse Oximetry - Multiple Charge Pulse Oximetry - Multiple   Pulse 98   Resp 18   Incentive Spirometer   $ Incentive Spirometer Charges done with encouragement   Incentive Spirometer Predicted Level (mL) 1800   Administration (IS) instruction provided, initial   Number of Repetitions (IS) 10   Level Incentive Spirometer (mL) 1250   Patient Tolerance (IS) good

## 2020-11-10 NOTE — NURSING
Pt arrived to unit from surgery via bed, IVF infusing, 02 2L NC and SCD's applied, pt is alert, 5 lap sites and midline dressing is dry/intact, report given to on-coming nurse

## 2020-11-10 NOTE — PT/OT/SLP EVAL
"Physical Therapy Evaluation    Patient Name:  Tessa Enriquez   MRN:  [id number]    Recommendations:     Discharge Recommendations:  home health PT   Discharge Equipment Recommendations: (TBD with gait)   Barriers to discharge: dizziness    Assessment:     Tessa Enriquez is a 64 y.o. female admitted with a medical diagnosis of Colonic mass.  She presents with the following impairments/functional limitations:  weakness, impaired endurance, impaired functional mobilty, impaired balance, pain, impaired skin, edema, impaired cardiopulmonary response to activity. Patient is agreeable to participation with PT evaluation. She reports 6/10 abdominal pain and was premedicated for activity. She reports feeling groggy after receiving pain medication and benadryl. Patient has a medical history of chronic back pain hypothyroidism, and obesity. Her daughter in law lives with her. The patient works full time in the [employer] department at [employer]. She requires ModA for supine to sit transfer stating "this is always hard for me because I have arthritis everywhere". Upon sitting EOB she reports significant dizziness and was only able to sit for ~2 minutes before requesting to return to bed. Patient will likely be appropriate for D/C home with HHPT and assistance from family once able to tolerate transfers and ambulation. PT returned in afternoon at patient's request and she was able to tolerate standing with RW and a few side steps at the EOB with RW, but still reports dizziness. At this time, recommend RW and BSC. Will update D/C recommendations as patient progresses.     Rehab Prognosis: Good; patient would benefit from acute skilled PT services to address these deficits and reach maximum level of function.    Recent Surgery: Procedure(s) (LRB):  ROBOTIC COLECTOMY low anterior resection, possible open (N/A)  COLECTOMY (N/A) 1 Day Post-Op    Plan:     During this hospitalization, patient to be seen daily to address the identified " rehab impairments via gait training, therapeutic activities, therapeutic exercises and progress toward the following goals:    · Plan of Care Expires:  12/10/20    Subjective     Chief Complaint: dizzy with sitting   Patient/Family Comments/goals: decrease dizziness   Pain/Comfort:  · Pain Rating 1: 6/10  · Location - Orientation 1: generalized  · Location 1: abdomen  · Pain Addressed 1: Pre-medicate for activity    Patients cultural, spiritual, Baptism conflicts given the current situation:      Living Environment:  Patient lives with DIL in [facility] wit 3 UMA and B rails   Prior to admission, patients level of function was independent. She works full time in the [employer] department at [employer]. She had a fall 5 years ago in the tub.  Equipment used at home: none.  DME owned (not currently used): none.  Upon discharge, patient will have assistance from DIL.    Objective:     Communicated with CHARITY Fine prior to session.  Patient found HOB elevated with bed alarm, colin catheter, oxygen, peripheral IV, SCD  upon PT entry to room.    General Precautions: Standard, fall, respiratory   Orthopedic Precautions:N/A   Braces: N/A     Exams:  · Cognitive Exam:  Patient is oriented to Person, Place, Time and Situation  · RUE ROM: WFL  · LUE ROM: WFL  · RLE Strength: WFL  · LLE Strength: WFL    Functional Mobility:  · Bed Mobility:     · Supine to Sit: moderate assistance  · Balance: Sitting: CGA    Therapeutic Activities and Exercises:   Patient was educated on the importance of OOB activity and functional mobility to negate negative effects of prolonged bed rest during hospitalization, safe transfers and ambulation, log rolling, abdominal bracing, and D/C planning     AM-PAC 6 CLICK MOBILITY  Total Score:18     Patient left HOB elevated with all lines intact, call button in reach, bed alarm on and RN notified.    GOALS:   Multidisciplinary Problems     Physical Therapy Goals        Problem: Physical Therapy Goal    Goal  Priority Disciplines Outcome Goal Variances Interventions   Physical Therapy Goal     PT, PT/OT      Description: Goals to be met by: 12/10/2020     Patient will increase functional independence with mobility by performin. Supine to sit with Supervision  2. Sit to stand transfer with Supervision  3. Bed to chair transfer with Supervision   4. Gait  x 250 feet with Supervision.   5. Ascend/descend 3 stairs with bilateral Handrails Supervision.                      History:     Past Medical History:   Diagnosis Date    Acute hypoxemic respiratory failure 2018    Arthritis     Cancer     colon    COPD (chronic obstructive pulmonary disease)     History of home oxygen therapy     ONLY PRN AND HASN'T USED IT IN A YEAR    Thyroid disease        Past Surgical History:   Procedure Laterality Date    CHOLECYSTECTOMY      COLONOSCOPY N/A 10/23/2020    Procedure: COLONOSCOPY;  Surgeon: Jolynn Ayala MD;  Location: [facility];  Service: Endoscopy;  Laterality: N/A;    HYSTERECTOMY         Time Tracking:     PT Received On: 11/10/20  PT Start Time: 924     PT Stop Time: 45  PT Total Time (min): 21 min     Billable Minutes: Evaluation 10 and Gait Training 11      Radha Coleman, PT  11/10/2020

## 2020-11-10 NOTE — PLAN OF CARE
SW met with patient at bedside to complete discharge planning assessment.  Patient alert and oriented xs 4.  Patient verified all demographic information on facesheet is correct.  Patient verified PCP is Dr. Karma Mcdermott.  Patient verified primary health insurance is [employer].  Patient with NO home health and NEBULIZER for DME.  Patient with NO POA or Living Will.  Patient not on dialysis or medication coumadin.  Patient with no 30 day admission.  Patient with no financial issues at this time.  Patient family will provide transportation upon discharge from facility.  Patient independent with ADLs, live with son and daughter in law, drives self.  Patient uses [id number] - [place], [place] - [address].       11/10/20 0959   Discharge Assessment   Assessment Type Discharge Planning Assessment   Confirmed/corrected address and phone number on facesheet? Yes   Assessment information obtained from? Patient   Expected Length of Stay (days) 3   Communicated expected length of stay with patient/caregiver yes   Prior to hospitilization cognitive status: Alert/Oriented   Prior to hospitalization functional status: Independent   Current cognitive status: Alert/Oriented   Current Functional Status: Independent   Facility Arrived From: home   Lives With child(aakash), adult   Able to Return to Prior Arrangements yes   Is patient able to care for self after discharge? Yes   Patient's perception of discharge disposition home or selfcare   Readmission Within the Last 30 Days no previous admission in last 30 days   Patient currently being followed by outpatient case management? No   Patient currently receives any other outside agency services? No   Equipment Currently Used at Home nebulizer   Do you have any problems affording any of your prescribed medications? No   Is the patient taking medications as prescribed? yes   Does the patient have transportation home? Yes   Transportation Anticipated family or friend will provide    Does the patient receive services at the [facility]mad[facility] Clinic? No   Discharge Plan A Home   Discharge Plan B Home with family   DME Needed Upon Discharge  none   Patient/Family in Agreement with Plan yes

## 2020-11-10 NOTE — RESPIRATORY THERAPY
11/09/20 1929   PRE-TX-O2   O2 Device (Oxygen Therapy) nasal cannula   Flow (L/min) 3   SpO2 98 %

## 2020-11-10 NOTE — SUBJECTIVE & OBJECTIVE
Interval History:  Patient is seen and examined today.  She is awake alert.  Place of some bloating and abdomen.  Otherwise denies any nausea denies any abdominal pain.  Patient has been started on clear liquid diet tolerating it.  Physical therapy has been consulted for early ambulation patient still has Velasco's catheter per surgery recommendation it will be discontinued tomorrow.  Patient had itching in the morning denies any rash requesting Benadryl.    Review of Systems   Constitutional: Negative for activity change, appetite change and fever.   HENT: Negative for congestion and sore throat.    Eyes: Negative for photophobia and visual disturbance.   Respiratory: Negative for cough and shortness of breath.    Cardiovascular: Negative for chest pain and leg swelling.   Gastrointestinal: Positive for abdominal distention and abdominal pain. Negative for nausea and vomiting.   Endocrine: Negative for cold intolerance and heat intolerance.   Genitourinary: Negative for difficulty urinating and hematuria.   Musculoskeletal: Positive for back pain. Negative for arthralgias.   Skin: Negative for color change and rash.   Allergic/Immunologic: Negative for food allergies.   Neurological: Negative for dizziness.   Hematological: Does not bruise/bleed easily.   Psychiatric/Behavioral: Negative for agitation. The patient is not nervous/anxious.      Objective:     Vital Signs (Most Recent):  Temp: 97.3 °F (36.3 °C) (11/10/20 1220)  Pulse: 103 (11/10/20 1220)  Resp: 18 (11/10/20 1220)  BP: (!) 115/57 (11/10/20 1220)  SpO2: 98 % (11/10/20 1220) Vital Signs (24h Range):  Temp:  [97 °F (36.1 °C)-98.1 °F (36.7 °C)] 97.3 °F (36.3 °C)  Pulse:  [] 103  Resp:  [8-24] 18  SpO2:  [93 %-98 %] 98 %  BP: ()/(50-60) 115/57     Weight: 90.7 kg (200 lb)  Body mass index is 37.79 kg/m².    Intake/Output Summary (Last 24 hours) at 11/10/2020 1246  Last data filed at 11/10/2020 0600  Gross per 24 hour   Intake 5445 ml   Output 850  ml   Net 4595 ml      Physical Exam  Vitals signs reviewed.   Constitutional:       Appearance: She is obese.      Comments: Somnolent, easily arousable with verbal stimulus answering question appropriately   HENT:      Head: Normocephalic and atraumatic.      Nose: Nose normal.      Mouth/Throat:      Mouth: Mucous membranes are dry.   Eyes:      Extraocular Movements: Extraocular movements intact.      Pupils: Pupils are equal, round, and reactive to light.   Cardiovascular:      Rate and Rhythm: Normal rate.      Pulses: Normal pulses.   Pulmonary:      Effort: Pulmonary effort is normal.      Breath sounds: Normal breath sounds.   Abdominal:      General: There is distension.      Tenderness: There is abdominal tenderness.      Comments: Incision site intact in dressing   Genitourinary:     Comments: Urinary catheter present  Musculoskeletal: Normal range of motion.   Skin:     General: Skin is warm.      Capillary Refill: Capillary refill takes 2 to 3 seconds.   Neurological:      General: No focal deficit present.      Mental Status: She is alert and oriented to person, place, and time.   Psychiatric:         Mood and Affect: Mood normal.         Behavior: Behavior normal.         Significant Labs:   BMP:   Recent Labs   Lab 11/10/20  0554   *   *   K 4.4      CO2 24   BUN 7*   CREATININE 0.8   CALCIUM 7.6*     CBC:   Recent Labs   Lab 11/09/20  1932 11/10/20  0554   WBC  --  9.48   HGB 10.4* 9.9*   HCT  --  31.5*   PLT  --  261     CMP:   Recent Labs   Lab 11/10/20  0554   *   K 4.4      CO2 24   *   BUN 7*   CREATININE 0.8   CALCIUM 7.6*   ANIONGAP 7*   EGFRNONAA >60       Significant Imaging: I have reviewed all pertinent imaging results/findings within the past 24 hours.

## 2020-11-10 NOTE — HPI
Patient is 64-year-old  female with past medical history significant for chronic back pain, hypothyroidism, obesity who getting admitted in the hospital postoperatively.  Patient received robotic laparoscopic with conversion to open low anterior resection with colorectal anastomosis by Dr. patel on 11/09/2020.  Patient was recently diagnosed on colonoscopy a large bulky mass in the rectosigmoid colon biopsy conformed adenocarcinoma patient was referred to general surgery for surgery.  Patient is getting seen in PACU currently patient received pain medication and and Phenergan for nausea and is somnolent.  Is arousable with verbal stimulus complains of some nausea and abdominal soreness otherwise denies any vomiting, chest pain, shortness of breath.  Vital signs are stable

## 2020-11-10 NOTE — ASSESSMENT & PLAN NOTE
Status post laparoscopic with conversion to open low anterior resection with colorectal anastomosis by Dr. patel on 11/09/2020.  Will follow postop surgical recommendations.  Encourage intensive spirometry use  Early ambulation and physical therapy has been consulted  As needed antiemetics, pain medication  DVT prophylaxis  A.m. labs

## 2020-11-10 NOTE — PT/OT/SLP EVAL
Occupational Therapy   Evaluation    Name: Tessa Enriquez  MRN: [id number]  Admitting Diagnosis:  Colonic mass 1 Day Post-Op    Recommendations:     Discharge Recommendations: home health OT  Discharge Equipment Recommendations:  TBD  Barriers to discharge:  None    Assessment:     Tessa Enriquez is a 64 y.o. female with a medical diagnosis of Colonic mass.  She presents with performance deficits affecting function: weakness, impaired endurance, impaired self care skills and impaired functional mobilty. Pt sat EOB x 3 minutes with supervision.     Rehab Prognosis: Good; patient would benefit from acute skilled OT services to address these deficits and reach maximum level of function.       Plan:     Patient to be seen 3 x/week to address the above listed problems via self-care/home management, therapeutic activities, therapeutic exercises  · Plan of Care Expires: 11/24/20  · Plan of Care Reviewed with: patient    Subjective     Chief Complaint: None  Patient/Family Comments/goals: To go home    Occupational Profile:  Living Environment: Pt lives alone in a single story home.  Previous level of function: Independent with ADLs  Roles and Routines: Independent employed adult  Equipment Used at Home:  none  Assistance upon Discharge: Pt will have assistance from family at discharge.    Pain/Comfort:  Pain Rating 1: 0/10    Patients cultural, spiritual, Gnosticist conflicts given the current situation:      Objective:     Communicated with: nurse prior to session.  Patient found supine with colin catheter, peripheral IV, SCD, telemetry upon OT entry to room.    General Precautions: Standard, fall      Occupational Performance:    Bed Mobility:    · Patient completed Rolling/Turning to Left with  supervision  · Patient completed Scooting/Bridging with supervision  · Patient completed Supine to Sit with supervision  · Patient completed Sit to Supine with supervision    Activities of Daily Living:  · Feeding:  modified  independence  · Grooming: supervision  · Upper Body Dressing: supervision  · Lower Body Dressing: minimum assistance  · Toileting: minimum assistance    Cognitive/Visual Perceptual:  Cognitive/Psychosocial Skills:  -       Oriented to: Person, Place, Time and Situation   -       Follows Commands/attention:Follows multistep  commands  -       Communication: clear/fluent    Physical Exam:  Balance: -       Sitting: good  Upper Extremity Range of Motion:  -       Right Upper Extremity: WNL  -       Left Upper Extremity: WNL  Upper Extremity Strength: -       Right Upper Extremity: WNL  -       Left Upper Extremity: WNL    AMPAC 6 Click ADL:  AMPAC Total Score: 18    Treatment & Education:  Pt was given education on role of OT, POC and calling for assistance for OOB mobility. Pt verbalized understanding.   Education:    Patient left supine with all lines intact, call button in reach and her daughter in law present    GOALS:   Multidisciplinary Problems     Occupational Therapy Goals        Problem: Occupational Therapy Goal    Goal Priority Disciplines Outcome Interventions   Occupational Therapy Goal     OT, PT/OT Ongoing, Progressing    Description: Goals to be met by: 11/24/2020     Patient will increase functional independence with ADLs by performing:    UE Dressing with Set-up Assistance.  LE Dressing with Supervision.  Grooming while seated with Set-up Assistance.  Toileting from toilet with Supervision for hygiene and clothing management.   Toilet transfer to toilet with Supervision.                     History:     Past Medical History:   Diagnosis Date    Acute hypoxemic respiratory failure 7/9/2018    Arthritis     Cancer     colon    COPD (chronic obstructive pulmonary disease)     History of home oxygen therapy     ONLY PRN AND HASN'T USED IT IN A YEAR    Thyroid disease          Past Surgical History:   Procedure Laterality Date    CHOLECYSTECTOMY      COLECTOMY N/A 11/9/2020    Procedure:  COLECTOMY;  Surgeon: Brandon Wagner MD;  Location: [facility] OR;  Service: General;  Laterality: N/A;  LOWER- ANTERIOR    COLONOSCOPY N/A 10/23/2020    Procedure: COLONOSCOPY;  Surgeon: Jolynn Ayala MD;  Location: [facility] ENDO;  Service: Endoscopy;  Laterality: N/A;    HYSTERECTOMY      ROBOT-ASSISTED COLECTOMY N/A 11/9/2020    Procedure: ROBOTIC COLECTOMY low anterior resection, possible open;  Surgeon: Brandon Wagner MD;  Location: [facility] OR;  Service: General;  Laterality: N/A;  CONVERTED TO OPEN AT 1503       Time Tracking:     OT Date of Treatment: 11/10/20  OT Start Time: 1112  OT Stop Time: 1125  OT Total Time (min): 13 min    Billable Minutes:Evaluation 13    Brenda Pelaez, OTR  11/10/2020

## 2020-11-10 NOTE — ASSESSMENT & PLAN NOTE
Patient's COPD is controlled currently.  Patient is currently off COPD Pathway. Continue scheduled inhalers Supplemental oxygen and monitor respiratory status closely.

## 2020-11-10 NOTE — PLAN OF CARE
Problem: Occupational Therapy Goal  Goal: Occupational Therapy Goal  Description: Goals to be met by: 11/24/2020     Patient will increase functional independence with ADLs by performing:    UE Dressing with Set-up Assistance.  LE Dressing with Supervision.  Grooming while seated with Set-up Assistance.  Toileting from toilet with Supervision for hygiene and clothing management.   Toilet transfer to toilet with Supervision.    Outcome: Ongoing, Progressing

## 2020-11-10 NOTE — ASSESSMENT & PLAN NOTE
Status post laparoscopic with conversion to open low anterior resection with colorectal anastomosis by Dr. patel on 11/09/2020.  Will follow postop surgical recommendations.  Encourage intensive spirometry use  Gentle IV fluid hydration  Early ambulation  As needed antiemetics, pain medication  DVT prophylaxis  A.m. labs

## 2020-11-10 NOTE — PLAN OF CARE
Patient sleeping, arouses slowly and says pain 5, nausea better, reevaluated per anesthesia MD and released to her room

## 2020-11-10 NOTE — PROGRESS NOTES
POD 1 s/p LAR for proximal rectal cancer  Pt resting comfortably  She notes good pain control.  No n/v.      No fever/chills.    Wt Readings from Last 3 Encounters:   11/09/20 90.7 kg (200 lb)   11/07/20 89.4 kg (197 lb)   11/06/20 91.1 kg (200 lb 13.4 oz)     Temp Readings from Last 3 Encounters:   11/10/20 97.4 °F (36.3 °C)   11/07/20 98.4 °F (36.9 °C) (Oral)   11/03/20 97.7 °F (36.5 °C) (Temporal)     BP Readings from Last 3 Encounters:   11/10/20 (!) 107/58   11/07/20 135/73   11/03/20 (!) 171/82     Pulse Readings from Last 3 Encounters:   11/10/20 93   11/07/20 84   11/03/20 91     AAOx3  CTA B  Sinus  Soft/obese/appt ttp.  Few BS noted  2+ pulses B      Lab Results   Component Value Date    WBC 9.48 11/10/2020    HGB 9.9 (L) 11/10/2020    HCT 31.5 (L) 11/10/2020    MCV 92 11/10/2020     11/10/2020       BMP  Lab Results   Component Value Date     (L) 11/10/2020    K 4.4 11/10/2020     11/10/2020    CO2 24 11/10/2020    BUN 7 (L) 11/10/2020    CREATININE 0.8 11/10/2020    CALCIUM 7.6 (L) 11/10/2020    ANIONGAP 7 (L) 11/10/2020    ESTGFRAFRICA >60 11/10/2020    EGFRNONAA >60 11/10/2020     A/P: s/p LAR  Ambulate  D/c colin tomorrow  Clears.  wean o2.

## 2020-11-10 NOTE — SUBJECTIVE & OBJECTIVE
Past Medical History:   Diagnosis Date    Acute hypoxemic respiratory failure 2018    Arthritis     Cancer     colon    COPD (chronic obstructive pulmonary disease)     History of home oxygen therapy     ONLY PRN AND HASN'T USED IT IN A YEAR    Thyroid disease        Past Surgical History:   Procedure Laterality Date    CHOLECYSTECTOMY      COLONOSCOPY N/A 10/23/2020    Procedure: COLONOSCOPY;  Surgeon: Jolynn Ayala MD;  Location: [facility];  Service: Endoscopy;  Laterality: N/A;    HYSTERECTOMY         Review of patient's allergies indicates:   Allergen Reactions    Pcn [penicillins] Hives, Itching and Rash       No current facility-administered medications on file prior to encounter.      Current Outpatient Medications on File Prior to Encounter   Medication Sig    levothyroxine (SYNTHROID) 88 MCG tablet Take 1 tablet (88 mcg total) by mouth before breakfast.    traMADoL (ULTRAM) 50 mg tablet TAKE ONE TABLET BY MOUTH EVERY FOUR HOURS AS NEEDED     [DISCONTINUED] celecoxib (CELEBREX) 100 MG capsule Take 1 capsule (100 mg total) by mouth 2 (two) times daily. (Patient not taking: Reported on 11/3/2020)     Family History     Problem Relation (Age of Onset)    COPD Mother, Father        Tobacco Use    Smoking status: Former Smoker     Packs/day: 2.00     Years: 40.00     Pack years: 80.00     Types: Cigarettes     Quit date: 2018     Years since quittin.3    Smokeless tobacco: Never Used   Substance and Sexual Activity    Alcohol use: No    Drug use: No    Sexual activity: Not Currently     Partners: Male     Review of Systems   Constitutional: Positive for activity change and appetite change. Negative for fever.   HENT: Negative for congestion and sore throat.    Eyes: Negative for photophobia and visual disturbance.   Respiratory: Negative for cough and shortness of breath.    Cardiovascular: Negative for chest pain and leg swelling.   Gastrointestinal: Positive for abdominal  distention, abdominal pain and nausea. Negative for vomiting.   Endocrine: Negative for cold intolerance and heat intolerance.   Genitourinary: Negative for difficulty urinating and hematuria.   Musculoskeletal: Positive for back pain. Negative for arthralgias.   Skin: Negative for color change and rash.   Allergic/Immunologic: Negative for food allergies.   Neurological: Negative for dizziness.   Hematological: Does not bruise/bleed easily.   Psychiatric/Behavioral: Negative for agitation. The patient is not nervous/anxious.      Objective:     Vital Signs (Most Recent):  Temp: 97.2 °F (36.2 °C) (11/09/20 1826)  Pulse: 89 (11/09/20 1826)  Resp: 14 (11/09/20 1826)  BP: (!) 100/53 (11/09/20 1826)  SpO2: 95 % (11/09/20 1826) Vital Signs (24h Range):  Temp:  [97 °F (36.1 °C)-97.2 °F (36.2 °C)] 97.2 °F (36.2 °C)  Pulse:  [88-94] 89  Resp:  [8-24] 14  SpO2:  [93 %-98 %] 95 %  BP: ()/(50-82) 100/53     Weight: 90.7 kg (200 lb)  Body mass index is 37.79 kg/m².    Physical Exam  Vitals signs reviewed.   Constitutional:       Appearance: She is obese.      Comments: Somnolent, easily arousable with verbal stimulus answering question appropriately   HENT:      Head: Normocephalic and atraumatic.      Nose: Nose normal.      Mouth/Throat:      Mouth: Mucous membranes are dry.   Eyes:      Extraocular Movements: Extraocular movements intact.      Pupils: Pupils are equal, round, and reactive to light.   Cardiovascular:      Rate and Rhythm: Normal rate.      Pulses: Normal pulses.   Pulmonary:      Effort: Pulmonary effort is normal.      Breath sounds: Normal breath sounds.   Abdominal:      General: There is distension.      Tenderness: There is abdominal tenderness.      Comments: Incision site intact in dressing   Genitourinary:     Comments: Urinary catheter present  Musculoskeletal: Normal range of motion.   Skin:     General: Skin is warm.      Capillary Refill: Capillary refill takes 2 to 3 seconds.    Neurological:      General: No focal deficit present.      Mental Status: She is oriented to person, place, and time.      Comments: Somnolent   Psychiatric:         Mood and Affect: Mood normal.         Behavior: Behavior normal.           CRANIAL NERVES     CN III, IV, VI   Pupils are equal, round, and reactive to light.       Significant Labs:   BMP:   Recent Labs   Lab 11/07/20 1947         K 4.1      CO2 25   BUN 8   CREATININE 0.8   CALCIUM 9.2     CBC:   Recent Labs   Lab 11/07/20 1947   WBC 6.57   HGB 12.2   HCT 38.2        CMP:   Recent Labs   Lab 11/07/20 1947      K 4.1      CO2 25      BUN 8   CREATININE 0.8   CALCIUM 9.2   PROT 7.0   ALBUMIN 3.5   BILITOT 0.3   ALKPHOS 80   AST 12   ALT 12   ANIONGAP 10   EGFRNONAA >60       Significant Imaging: I have reviewed all pertinent imaging results/findings within the past 24 hours.

## 2020-11-10 NOTE — PLAN OF CARE
Patient AAO, VSS. Pt verbalized understanding of POC. Purposeful hourly/q2hr rounding done during shift to promote patient safety. Patient free from falls and injury during shift.  Bed alarm on. Dressing to surgical incision remain dry/intact. No N/V noted. Bed in lowest position, brakes locked, and call light within reach.  Will continue to monitor.

## 2020-11-10 NOTE — PROGRESS NOTES
[facility] Hospital Medicine  Progress Note    Patient Name: Tessa Enriquez  MRN: [id number]  Patient Class: IP- Inpatient   Admission Date: 11/9/2020  Length of Stay: 1 days  Attending Physician: Delisa Monzon MD  Primary Care Provider: Karma Mcdermott MD        Subjective:     Principal Problem:Colonic mass        HPI:  Patient is 64-year-old  female with past medical history significant for chronic back pain, hypothyroidism, obesity who getting admitted in the hospital postoperatively.  Patient received robotic laparoscopic with conversion to open low anterior resection with colorectal anastomosis by Dr. patel on 11/09/2020.  Patient was recently diagnosed on colonoscopy a large bulky mass in the rectosigmoid colon biopsy conformed adenocarcinoma patient was referred to general surgery for surgery.  Patient is getting seen in PACU currently patient received pain medication and and Phenergan for nausea and is somnolent.  Is arousable with verbal stimulus complains of some nausea and abdominal soreness otherwise denies any vomiting, chest pain, shortness of breath.  Vital signs are stable    Overview/Hospital Course:  No notes on file    Interval History:  Patient is seen and examined today.  She is awake alert.  Place of some bloating and abdomen.  Otherwise denies any nausea denies any abdominal pain.  Patient has been started on clear liquid diet tolerating it.  Physical therapy has been consulted for early ambulation patient still has Velasco's catheter per surgery recommendation it will be discontinued tomorrow.  Patient had itching in the morning denies any rash requesting Benadryl.    Review of Systems   Constitutional: Negative for activity change, appetite change and fever.   HENT: Negative for congestion and sore throat.    Eyes: Negative for photophobia and visual disturbance.   Respiratory: Negative for cough and shortness of breath.    Cardiovascular: Negative for chest pain  and leg swelling.   Gastrointestinal: Positive for abdominal distention and abdominal pain. Negative for nausea and vomiting.   Endocrine: Negative for cold intolerance and heat intolerance.   Genitourinary: Negative for difficulty urinating and hematuria.   Musculoskeletal: Positive for back pain. Negative for arthralgias.   Skin: Negative for color change and rash.   Allergic/Immunologic: Negative for food allergies.   Neurological: Negative for dizziness.   Hematological: Does not bruise/bleed easily.   Psychiatric/Behavioral: Negative for agitation. The patient is not nervous/anxious.      Objective:     Vital Signs (Most Recent):  Temp: 97.3 °F (36.3 °C) (11/10/20 1220)  Pulse: 103 (11/10/20 1220)  Resp: 18 (11/10/20 1220)  BP: (!) 115/57 (11/10/20 1220)  SpO2: 98 % (11/10/20 1220) Vital Signs (24h Range):  Temp:  [97 °F (36.1 °C)-98.1 °F (36.7 °C)] 97.3 °F (36.3 °C)  Pulse:  [] 103  Resp:  [8-24] 18  SpO2:  [93 %-98 %] 98 %  BP: ()/(50-60) 115/57     Weight: 90.7 kg (200 lb)  Body mass index is 37.79 kg/m².    Intake/Output Summary (Last 24 hours) at 11/10/2020 1246  Last data filed at 11/10/2020 0600  Gross per 24 hour   Intake 5445 ml   Output 850 ml   Net 4595 ml      Physical Exam  Vitals signs reviewed.   Constitutional:       Appearance: She is obese.      Comments: Somnolent, easily arousable with verbal stimulus answering question appropriately   HENT:      Head: Normocephalic and atraumatic.      Nose: Nose normal.      Mouth/Throat:      Mouth: Mucous membranes are dry.   Eyes:      Extraocular Movements: Extraocular movements intact.      Pupils: Pupils are equal, round, and reactive to light.   Cardiovascular:      Rate and Rhythm: Normal rate.      Pulses: Normal pulses.   Pulmonary:      Effort: Pulmonary effort is normal.      Breath sounds: Normal breath sounds.   Abdominal:      General: There is distension.      Tenderness: There is abdominal tenderness.      Comments: Incision  site intact in dressing   Genitourinary:     Comments: Urinary catheter present  Musculoskeletal: Normal range of motion.   Skin:     General: Skin is warm.      Capillary Refill: Capillary refill takes 2 to 3 seconds.   Neurological:      General: No focal deficit present.      Mental Status: She is alert and oriented to person, place, and time.   Psychiatric:         Mood and Affect: Mood normal.         Behavior: Behavior normal.         Significant Labs:   BMP:   Recent Labs   Lab 11/10/20  0554   *   *   K 4.4      CO2 24   BUN 7*   CREATININE 0.8   CALCIUM 7.6*     CBC:   Recent Labs   Lab 11/09/20  1932 11/10/20  0554   WBC  --  9.48   HGB 10.4* 9.9*   HCT  --  31.5*   PLT  --  261     CMP:   Recent Labs   Lab 11/10/20  0554   *   K 4.4      CO2 24   *   BUN 7*   CREATININE 0.8   CALCIUM 7.6*   ANIONGAP 7*   EGFRNONAA >60       Significant Imaging: I have reviewed all pertinent imaging results/findings within the past 24 hours.      Assessment/Plan:      * Colonic mass  Status post laparoscopic with conversion to open low anterior resection with colorectal anastomosis by Dr. patel on 11/09/2020.  Will follow postop surgical recommendations.  Encourage intensive spirometry use  Early ambulation and physical therapy has been consulted  As needed antiemetics, pain medication  DVT prophylaxis  A.m. labs          Acquired hypothyroidism  Patient has chronic hypothyroidism. TFTs reviewed-   Lab Results   Component Value Date    TSH 17.680 (H) 10/14/2020   . Will continue chronic levothyroxine and adjust for and clinical changes.        Chronic obstructive pulmonary disease with hypoxia  Patient's COPD is controlled currently.  Patient is currently off COPD Pathway. Continue scheduled inhalers Supplemental oxygen and monitor respiratory status closely.           VTE Risk Mitigation (From admission, onward)         Ordered     enoxaparin injection 30 mg  Every 24 hours       11/09/20 1831     IP VTE HIGH RISK PATIENT  Once      11/09/20 1831     Place sequential compression device  Until discontinued      11/09/20 1831                Discharge Planning   DERICK:      Code Status: Prior   Is the patient medically ready for discharge?:     Reason for patient still in hospital (select all that apply): Patient trending condition  Discharge Plan A: Home                  Delisa Monzon MD  Department of Hospital Medicine   [facility]

## 2020-11-10 NOTE — H&P
[facility] Medicine  History & Physical    Patient Name: Tessa Enriquez  MRN: [id number]  Admission Date: 11/9/2020  Attending Physician: Delisa Monzon MD  Primary Care Provider: Karma Mcdermott MD         Patient information was obtained from patient and ER records.     Subjective:     Principal Problem:Colonic mass    Chief Complaint: No chief complaint on file.       HPI: Patient is 64-year-old  female with past medical history significant for chronic back pain, hypothyroidism, obesity who getting admitted in the hospital postoperatively.  Patient received robotic laparoscopic with conversion to open low anterior resection with colorectal anastomosis by Dr. patel on 11/09/2020.  Patient was recently diagnosed on colonoscopy a large bulky mass in the rectosigmoid colon biopsy conformed adenocarcinoma patient was referred to general surgery for surgery.  Patient is getting seen in PACU currently patient received pain medication and and Phenergan for nausea and is somnolent.  Is arousable with verbal stimulus complains of some nausea and abdominal soreness otherwise denies any vomiting, chest pain, shortness of breath.  Vital signs are stable    Past Medical History:   Diagnosis Date    Acute hypoxemic respiratory failure 7/9/2018    Arthritis     Cancer     colon    COPD (chronic obstructive pulmonary disease)     History of home oxygen therapy     ONLY PRN AND HASN'T USED IT IN A YEAR    Thyroid disease        Past Surgical History:   Procedure Laterality Date    CHOLECYSTECTOMY      COLONOSCOPY N/A 10/23/2020    Procedure: COLONOSCOPY;  Surgeon: Jolynn Ayala MD;  Location: [facility];  Service: Endoscopy;  Laterality: N/A;    HYSTERECTOMY         Review of patient's allergies indicates:   Allergen Reactions    Pcn [penicillins] Hives, Itching and Rash       No current facility-administered medications on file prior to encounter.      Current Outpatient  Medications on File Prior to Encounter   Medication Sig    levothyroxine (SYNTHROID) 88 MCG tablet Take 1 tablet (88 mcg total) by mouth before breakfast.    traMADoL (ULTRAM) 50 mg tablet TAKE ONE TABLET BY MOUTH EVERY FOUR HOURS AS NEEDED     [DISCONTINUED] celecoxib (CELEBREX) 100 MG capsule Take 1 capsule (100 mg total) by mouth 2 (two) times daily. (Patient not taking: Reported on 11/3/2020)     Family History     Problem Relation (Age of Onset)    COPD Mother, Father        Tobacco Use    Smoking status: Former Smoker     Packs/day: 2.00     Years: 40.00     Pack years: 80.00     Types: Cigarettes     Quit date: 2018     Years since quittin.3    Smokeless tobacco: Never Used   Substance and Sexual Activity    Alcohol use: No    Drug use: No    Sexual activity: Not Currently     Partners: Male     Review of Systems   Constitutional: Positive for activity change and appetite change. Negative for fever.   HENT: Negative for congestion and sore throat.    Eyes: Negative for photophobia and visual disturbance.   Respiratory: Negative for cough and shortness of breath.    Cardiovascular: Negative for chest pain and leg swelling.   Gastrointestinal: Positive for abdominal distention, abdominal pain and nausea. Negative for vomiting.   Endocrine: Negative for cold intolerance and heat intolerance.   Genitourinary: Negative for difficulty urinating and hematuria.   Musculoskeletal: Positive for back pain. Negative for arthralgias.   Skin: Negative for color change and rash.   Allergic/Immunologic: Negative for food allergies.   Neurological: Negative for dizziness.   Hematological: Does not bruise/bleed easily.   Psychiatric/Behavioral: Negative for agitation. The patient is not nervous/anxious.      Objective:     Vital Signs (Most Recent):  Temp: 97.2 °F (36.2 °C) (20)  Pulse: 89 (20)  Resp: 14 (20)  BP: (!) 100/53 (20)  SpO2: 95 % (20) Vital  Signs (24h Range):  Temp:  [97 °F (36.1 °C)-97.2 °F (36.2 °C)] 97.2 °F (36.2 °C)  Pulse:  [88-94] 89  Resp:  [8-24] 14  SpO2:  [93 %-98 %] 95 %  BP: ()/(50-82) 100/53     Weight: 90.7 kg (200 lb)  Body mass index is 37.79 kg/m².    Physical Exam  Vitals signs reviewed.   Constitutional:       Appearance: She is obese.      Comments: Somnolent, easily arousable with verbal stimulus answering question appropriately   HENT:      Head: Normocephalic and atraumatic.      Nose: Nose normal.      Mouth/Throat:      Mouth: Mucous membranes are dry.   Eyes:      Extraocular Movements: Extraocular movements intact.      Pupils: Pupils are equal, round, and reactive to light.   Cardiovascular:      Rate and Rhythm: Normal rate.      Pulses: Normal pulses.   Pulmonary:      Effort: Pulmonary effort is normal.      Breath sounds: Normal breath sounds.   Abdominal:      General: There is distension.      Tenderness: There is abdominal tenderness.      Comments: Incision site intact in dressing   Genitourinary:     Comments: Urinary catheter present  Musculoskeletal: Normal range of motion.   Skin:     General: Skin is warm.      Capillary Refill: Capillary refill takes 2 to 3 seconds.   Neurological:      General: No focal deficit present.      Mental Status: She is oriented to person, place, and time.      Comments: Somnolent   Psychiatric:         Mood and Affect: Mood normal.         Behavior: Behavior normal.           CRANIAL NERVES     CN III, IV, VI   Pupils are equal, round, and reactive to light.       Significant Labs:   BMP:   Recent Labs   Lab 11/07/20 1947         K 4.1      CO2 25   BUN 8   CREATININE 0.8   CALCIUM 9.2     CBC:   Recent Labs   Lab 11/07/20 1947   WBC 6.57   HGB 12.2   HCT 38.2        CMP:   Recent Labs   Lab 11/07/20 1947      K 4.1      CO2 25      BUN 8   CREATININE 0.8   CALCIUM 9.2   PROT 7.0   ALBUMIN 3.5   BILITOT 0.3   ALKPHOS 80   AST 12    ALT 12   ANIONGAP 10   EGFRNONAA >60       Significant Imaging: I have reviewed all pertinent imaging results/findings within the past 24 hours.    Assessment/Plan:     * Colonic mass  Status post laparoscopic with conversion to open low anterior resection with colorectal anastomosis by Dr. patel on 11/09/2020.  Will follow postop surgical recommendations.  Encourage intensive spirometry use  Gentle IV fluid hydration  Early ambulation  As needed antiemetics, pain medication  DVT prophylaxis  A.m. labs          Acquired hypothyroidism  Patient has chronic hypothyroidism. TFTs reviewed-   Lab Results   Component Value Date    TSH 17.680 (H) 10/14/2020   . Will continue chronic levothyroxine and adjust for and clinical changes.        Chronic obstructive pulmonary disease with hypoxia  Patient's COPD is controlled currently.  Patient is currently off COPD Pathway. Continue scheduled inhalers Supplemental oxygen and monitor respiratory status closely.           VTE Risk Mitigation (From admission, onward)         Ordered     enoxaparin injection 30 mg  Every 24 hours      11/09/20 1831     IP VTE HIGH RISK PATIENT  Once      11/09/20 1831     Place sequential compression device  Until discontinued      11/09/20 1831                   Delisa Monzon MD  Department of Hospital Medicine   [facility]

## 2020-11-10 NOTE — ASSESSMENT & PLAN NOTE
Patient has chronic hypothyroidism. TFTs reviewed-   Lab Results   Component Value Date    TSH 17.680 (H) 10/14/2020   . Will continue chronic levothyroxine and adjust for and clinical changes.

## 2020-11-11 LAB
ANION GAP SERPL CALC-SCNC: 14 MMOL/L (ref 8–16)
BASOPHILS # BLD AUTO: 0.06 K/UL (ref 0–0.2)
BASOPHILS NFR BLD: 0.6 % (ref 0–1.9)
BUN SERPL-MCNC: 17 MG/DL (ref 8–23)
CALCIUM SERPL-MCNC: 7.7 MG/DL (ref 8.7–10.5)
CHLORIDE SERPL-SCNC: 98 MMOL/L (ref 95–110)
CO2 SERPL-SCNC: 19 MMOL/L (ref 23–29)
CREAT SERPL-MCNC: 1.5 MG/DL (ref 0.5–1.4)
CRP SERPL-MCNC: 413.2 MG/L (ref 0–8.2)
DIFFERENTIAL METHOD: ABNORMAL
EOSINOPHIL # BLD AUTO: 0 K/UL (ref 0–0.5)
EOSINOPHIL NFR BLD: 0.4 % (ref 0–8)
ERYTHROCYTE [DISTWIDTH] IN BLOOD BY AUTOMATED COUNT: 12.9 % (ref 11.5–14.5)
EST. GFR  (AFRICAN AMERICAN): 43 ML/MIN/1.73 M^2
EST. GFR  (NON AFRICAN AMERICAN): 37 ML/MIN/1.73 M^2
GLUCOSE SERPL-MCNC: 114 MG/DL (ref 70–110)
HCT VFR BLD AUTO: 28.9 % (ref 37–48.5)
HGB BLD-MCNC: 8.9 G/DL (ref 12–16)
IMM GRANULOCYTES # BLD AUTO: 0.07 K/UL (ref 0–0.04)
IMM GRANULOCYTES NFR BLD AUTO: 0.7 % (ref 0–0.5)
LYMPHOCYTES # BLD AUTO: 0.8 K/UL (ref 1–4.8)
LYMPHOCYTES NFR BLD: 7.4 % (ref 18–48)
MCH RBC QN AUTO: 29 PG (ref 27–31)
MCHC RBC AUTO-ENTMCNC: 30.8 G/DL (ref 32–36)
MCV RBC AUTO: 94 FL (ref 82–98)
MONOCYTES # BLD AUTO: 0.3 K/UL (ref 0.3–1)
MONOCYTES NFR BLD: 2.8 % (ref 4–15)
NEUTROPHILS # BLD AUTO: 9.3 K/UL (ref 1.8–7.7)
NEUTROPHILS NFR BLD: 88.1 % (ref 38–73)
NRBC BLD-RTO: 0 /100 WBC
PLATELET # BLD AUTO: 314 K/UL (ref 150–350)
PMV BLD AUTO: 11.4 FL (ref 9.2–12.9)
POTASSIUM SERPL-SCNC: 4 MMOL/L (ref 3.5–5.1)
RBC # BLD AUTO: 3.07 M/UL (ref 4–5.4)
SODIUM SERPL-SCNC: 131 MMOL/L (ref 136–145)
WBC # BLD AUTO: 10.53 K/UL (ref 3.9–12.7)

## 2020-11-11 PROCEDURE — 80048 BASIC METABOLIC PNL TOTAL CA: CPT

## 2020-11-11 PROCEDURE — 27000221 HC OXYGEN, UP TO 24 HOURS

## 2020-11-11 PROCEDURE — 25000003 PHARM REV CODE 250: Performed by: SURGERY

## 2020-11-11 PROCEDURE — 85025 COMPLETE CBC W/AUTO DIFF WBC: CPT

## 2020-11-11 PROCEDURE — 86140 C-REACTIVE PROTEIN: CPT

## 2020-11-11 PROCEDURE — 94761 N-INVAS EAR/PLS OXIMETRY MLT: CPT

## 2020-11-11 PROCEDURE — 97530 THERAPEUTIC ACTIVITIES: CPT

## 2020-11-11 PROCEDURE — 25000003 PHARM REV CODE 250: Performed by: INTERNAL MEDICINE

## 2020-11-11 PROCEDURE — 94799 UNLISTED PULMONARY SVC/PX: CPT

## 2020-11-11 PROCEDURE — 12000002 HC ACUTE/MED SURGE SEMI-PRIVATE ROOM

## 2020-11-11 PROCEDURE — 63600175 PHARM REV CODE 636 W HCPCS: Performed by: SURGERY

## 2020-11-11 PROCEDURE — 97116 GAIT TRAINING THERAPY: CPT

## 2020-11-11 RX ORDER — LEVOTHYROXINE SODIUM 88 UG/1
88 TABLET ORAL
Status: DISCONTINUED | OUTPATIENT
Start: 2020-11-11 | End: 2020-11-14 | Stop reason: HOSPADM

## 2020-11-11 RX ORDER — SODIUM CHLORIDE 9 MG/ML
INJECTION, SOLUTION INTRAVENOUS CONTINUOUS
Status: DISCONTINUED | OUTPATIENT
Start: 2020-11-11 | End: 2020-11-12

## 2020-11-11 RX ADMIN — DIPHENHYDRAMINE HYDROCHLORIDE 25 MG: 25 CAPSULE ORAL at 04:11

## 2020-11-11 RX ADMIN — FAMOTIDINE 20 MG: 10 INJECTION INTRAVENOUS at 08:11

## 2020-11-11 RX ADMIN — HYDROMORPHONE HYDROCHLORIDE 1 MG: 2 INJECTION INTRAMUSCULAR; INTRAVENOUS; SUBCUTANEOUS at 04:11

## 2020-11-11 RX ADMIN — FAMOTIDINE 20 MG: 10 INJECTION INTRAVENOUS at 09:11

## 2020-11-11 RX ADMIN — MUPIROCIN: 20 OINTMENT TOPICAL at 08:11

## 2020-11-11 RX ADMIN — GABAPENTIN 200 MG: 100 CAPSULE ORAL at 08:11

## 2020-11-11 RX ADMIN — ENOXAPARIN SODIUM 30 MG: 30 INJECTION SUBCUTANEOUS at 04:11

## 2020-11-11 RX ADMIN — MUPIROCIN: 20 OINTMENT TOPICAL at 09:11

## 2020-11-11 RX ADMIN — GABAPENTIN 200 MG: 100 CAPSULE ORAL at 09:11

## 2020-11-11 RX ADMIN — LEVOTHYROXINE SODIUM 88 MCG: 88 TABLET ORAL at 09:11

## 2020-11-11 RX ADMIN — DIPHENHYDRAMINE HYDROCHLORIDE 25 MG: 25 CAPSULE ORAL at 08:11

## 2020-11-11 RX ADMIN — SODIUM CHLORIDE: 0.9 INJECTION, SOLUTION INTRAVENOUS at 09:11

## 2020-11-11 NOTE — RESPIRATORY THERAPY
11/10/20 1900   Patient Assessment/Suction   Level of Consciousness (AVPU) alert   Respiratory Effort Normal;Unlabored   PRE-TX-O2   O2 Device (Oxygen Therapy) nasal cannula   $ Is the patient on Low Flow Oxygen? Yes   Flow (L/min) 2   SpO2 96 %   Pulse Oximetry Type Intermittent   $ Pulse Oximetry - Multiple Charge Pulse Oximetry - Multiple   Incentive Spirometer   $ Incentive Spirometer Charges done with encouragement   Incentive Spirometer Predicted Level (mL) 1800   Administration (IS) instruction provided, follow-up;mouthpiece   Number of Repetitions (IS) 10   Level Incentive Spirometer (mL) 1500   Patient Tolerance (IS) good        11/10/20 1900   Patient Assessment/Suction   Level of Consciousness (AVPU) alert   Respiratory Effort Normal;Unlabored   PRE-TX-O2   O2 Device (Oxygen Therapy) nasal cannula   $ Is the patient on Low Flow Oxygen? Yes   Flow (L/min) 2   SpO2 96 %   Pulse Oximetry Type Intermittent   $ Pulse Oximetry - Multiple Charge Pulse Oximetry - Multiple   Incentive Spirometer   $ Incentive Spirometer Charges done with encouragement   Incentive Spirometer Predicted Level (mL) 1800   Administration (IS) instruction provided, follow-up;mouthpiece   Number of Repetitions (IS) 10   Level Incentive Spirometer (mL) 1500   Patient Tolerance (IS) good

## 2020-11-11 NOTE — PT/OT/SLP PROGRESS
Physical Therapy Treatment    Patient Name:  Tessa Enriquez   MRN:  [id number]    Recommendations:     Discharge Recommendations:  home health PT   Discharge Equipment Recommendations: walker, rolling   Barriers to discharge: None    Assessment:     Tessa Enriquez is a 64 y.o. female admitted with a medical diagnosis of Colonic mass.  She presents with the following impairments/functional limitations:  weakness, impaired endurance, impaired self care skills, impaired functional mobilty, gait instability, impaired balance, impaired skin, edema, impaired cardiopulmonary response to activity. Patient is agreeable to participation with PT treatment. RT present for O2 assessment and supplemental O2 was removed. Patient requires Carol for supine <> sit <> stand transfer. She ambulated 250' with RW, CGA, and a chair to follow. Patient reports significant improvement in dizziness today. She is agreeable to sit up in chair with alarm on and RN notified.     Rehab Prognosis: Good; patient would benefit from acute skilled PT services to address these deficits and reach maximum level of function.    Recent Surgery: Procedure(s) (LRB):  ROBOTIC COLECTOMY low anterior resection, possible open (N/A)  COLECTOMY (N/A) 2 Days Post-Op    Plan:     During this hospitalization, patient to be seen daily to address the identified rehab impairments via gait training, therapeutic activities, therapeutic exercises and progress toward the following goals:    · Plan of Care Expires:  12/10/20    Subjective     Chief Complaint: needs to urinate and have BM  Patient/Family Comments/goals: walk  Pain/Comfort:  · Pain Rating 1: 0/10      Objective:     Communicated with CHARITY Lance prior to session.  Patient found HOB elevated with bed alarm, oxygen, peripheral IV, telemetry, SCD upon PT entry to room.     General Precautions: Standard, fall   Orthopedic Precautions:N/A   Braces: N/A     Functional Mobility:  · Bed Mobility:     · Supine to Sit:  minimum assistance  · Transfers:     · Sit to Stand:  minimum assistance with rolling walker  · Gait: 250' RW, CGA, RA, chair to follow  · Balance: Fair -      AM-PAC 6 CLICK MOBILITY  Turning over in bed (including adjusting bedclothes, sheets and blankets)?: 3  Sitting down on and standing up from a chair with arms (e.g., wheelchair, bedside commode, etc.): 3  Moving from lying on back to sitting on the side of the bed?: 3  Moving to and from a bed to a chair (including a wheelchair)?: 3  Need to walk in hospital room?: 3  Climbing 3-5 steps with a railing?: 3  Basic Mobility Total Score: 18       Therapeutic Activities and Exercises:   Patient was educated on the importance of OOB activity and functional mobility to negate negative effects of prolonged bed rest during hospitalization, safe transfers and ambulation, and D/C planning     Patient assisted to the restroom where she had BM and urinated. She requires TotalA for hygiene after BM stating she cannot reach to wipe due to incision    Patient left up in chair with all lines intact, call button in reach, chair alarm on and RN notified..    GOALS:   Multidisciplinary Problems     Physical Therapy Goals        Problem: Physical Therapy Goal    Goal Priority Disciplines Outcome Goal Variances Interventions   Physical Therapy Goal     PT, PT/OT Ongoing, Progressing     Description: Goals to be met by: 12/10/2020     Patient will increase functional independence with mobility by performin. Supine to sit with Supervision  2. Sit to stand transfer with Supervision  3. Bed to chair transfer with Supervision   4. Gait  x 250 feet with Supervision.   5. Ascend/descend 3 stairs with bilateral Handrails Supervision.                      Time Tracking:     PT Received On: 20  PT Start Time: 1020     PT Stop Time: 1043  PT Total Time (min): 23 min     Billable Minutes: Gait Training 13 and Therapeutic Activity 10    Treatment Type: Treatment  PT/PTA: PT      PTA Visit Number: 0     Radha Coleman, PT  11/11/2020

## 2020-11-11 NOTE — PROGRESS NOTES
POD 2 s/p LAR  Pt resting comfortably in chair  Pt notes minimal abd pain  Denies n/v.  She reports flatus and has had 2 loose BM  SHe reports urinating twice this AM since colin removal    Wt Readings from Last 3 Encounters:   11/09/20 90.7 kg (200 lb)   11/07/20 89.4 kg (197 lb)   11/06/20 91.1 kg (200 lb 13.4 oz)     Temp Readings from Last 3 Encounters:   11/11/20 98.8 °F (37.1 °C)   11/07/20 98.4 °F (36.9 °C) (Oral)   11/03/20 97.7 °F (36.5 °C) (Temporal)     BP Readings from Last 3 Encounters:   11/11/20 (!) 110/54   11/07/20 135/73   11/03/20 (!) 171/82     Pulse Readings from Last 3 Encounters:   11/11/20 96   11/07/20 84   11/03/20 91     AAIx3  CTA B  Sinus  Soft/nd/ obese  BS are present  2+ pulses B    Lab Results   Component Value Date    WBC 10.53 11/11/2020    HGB 8.9 (L) 11/11/2020    HCT 28.9 (L) 11/11/2020    MCV 94 11/11/2020     11/11/2020       BMP  Lab Results   Component Value Date     (L) 11/11/2020    K 4.0 11/11/2020    CL 98 11/11/2020    CO2 19 (L) 11/11/2020    BUN 17 11/11/2020    CREATININE 1.5 (H) 11/11/2020    CALCIUM 7.7 (L) 11/11/2020    ANIONGAP 14 11/11/2020    ESTGFRAFRICA 43 (A) 11/11/2020    EGFRNONAA 37 (A) 11/11/2020     Lab Results   Component Value Date    .2 (H) 11/11/2020     A/P: s/p LAR  Ambulate  Adv to full liquid diet.  Clinically pt doingvery well.  Will follow CRP and cr.

## 2020-11-11 NOTE — PLAN OF CARE
Minimal comoplaints of pain. Pt. Has been able to sit on the side of the bed only, dizziness with attempting to stand. Velasco draining dark, colored urine. Abd dressing CDI. Purposeful rounding utilized to ensure pt. Needs met and safety maintained.

## 2020-11-11 NOTE — PLAN OF CARE
Pt AAO, VSS. Plan of care reviewed with pt at beginning of shift.  Pt/family verbalized understanding. Pain well managed with scheduled meds. IV fluids infusing. Dressings to abd CDI. Hypoactive BS. Hourly/ Q2 hourly rounds completed on this pt throughout shift.  Pain monitored, restroom offered, repositions independently, safety maintained.  Patient has remained free from fall/injury, no skin breakdown noted.  Side rails up x2, bed in locked and lowest position, call light kept within reach.  Needs attended to, will continue to monitor/ update as indicated.

## 2020-11-11 NOTE — SUBJECTIVE & OBJECTIVE
Interval History:  Patient is seen and examined today.  Patient is doing very well denies any acute complaints.  Creatinine slightly trended up to 1.5 will change IV fluid to normal saline at 1:20 a.m. 5 cc recheck BMP in the morning possible discharge in the morning with home health.  Tolerating diet  Review of Systems   Constitutional: Negative for activity change, appetite change and fever.   HENT: Negative for congestion and sore throat.    Eyes: Negative for photophobia and visual disturbance.   Respiratory: Negative for cough and shortness of breath.    Cardiovascular: Negative for chest pain and leg swelling.   Gastrointestinal: Negative for abdominal distention, abdominal pain, nausea and vomiting.   Endocrine: Negative for cold intolerance and heat intolerance.   Genitourinary: Negative for difficulty urinating and hematuria.   Musculoskeletal: Positive for back pain. Negative for arthralgias.   Skin: Negative for color change and rash.   Allergic/Immunologic: Negative for food allergies.   Neurological: Negative for dizziness.   Hematological: Does not bruise/bleed easily.   Psychiatric/Behavioral: Negative for agitation. The patient is not nervous/anxious.      Objective:     Vital Signs (Most Recent):  Temp: 98.8 °F (37.1 °C) (11/11/20 0727)  Pulse: 96 (11/11/20 0727)  Resp: 16 (11/11/20 0727)  BP: (!) 110/54 (11/11/20 0727)  SpO2: 98 % (11/11/20 1038) Vital Signs (24h Range):  Temp:  [97.5 °F (36.4 °C)-100.2 °F (37.9 °C)] 98.8 °F (37.1 °C)  Pulse:  [] 96  Resp:  [16-18] 16  SpO2:  [94 %-98 %] 98 %  BP: (103-132)/(54-64) 110/54     Weight: 90.7 kg (200 lb)  Body mass index is 37.79 kg/m².    Intake/Output Summary (Last 24 hours) at 11/11/2020 1222  Last data filed at 11/11/2020 1000  Gross per 24 hour   Intake 2196.25 ml   Output 1045 ml   Net 1151.25 ml      Physical Exam  Vitals signs reviewed.   Constitutional:       Appearance: She is obese.      Comments: Somnolent, easily arousable with  verbal stimulus answering question appropriately   HENT:      Head: Normocephalic and atraumatic.      Nose: Nose normal.      Mouth/Throat:      Mouth: Mucous membranes are dry.   Eyes:      Extraocular Movements: Extraocular movements intact.      Pupils: Pupils are equal, round, and reactive to light.   Cardiovascular:      Rate and Rhythm: Normal rate.      Pulses: Normal pulses.   Pulmonary:      Effort: Pulmonary effort is normal.      Breath sounds: Normal breath sounds.   Abdominal:      General: There is distension.      Tenderness: There is abdominal tenderness.      Comments: Incision site intact in dressing   Genitourinary:     Comments: Urinary catheter present  Musculoskeletal: Normal range of motion.   Skin:     General: Skin is warm.      Capillary Refill: Capillary refill takes 2 to 3 seconds.   Neurological:      General: No focal deficit present.      Mental Status: She is alert and oriented to person, place, and time.   Psychiatric:         Mood and Affect: Mood normal.         Behavior: Behavior normal.         Significant Labs:   BMP:   Recent Labs   Lab 11/11/20  0547   *   *   K 4.0   CL 98   CO2 19*   BUN 17   CREATININE 1.5*   CALCIUM 7.7*     CBC:   Recent Labs   Lab 11/09/20  1932 11/10/20  0554 11/11/20  0547   WBC  --  9.48 10.53   HGB 10.4* 9.9* 8.9*   HCT  --  31.5* 28.9*   PLT  --  261 314     CMP:   Recent Labs   Lab 11/10/20  0554 11/11/20  0547   * 131*   K 4.4 4.0    98   CO2 24 19*   * 114*   BUN 7* 17   CREATININE 0.8 1.5*   CALCIUM 7.6* 7.7*   ANIONGAP 7* 14   EGFRNONAA >60 37*       Significant Imaging: I have reviewed all pertinent imaging results/findings within the past 24 hours.

## 2020-11-11 NOTE — ASSESSMENT & PLAN NOTE
Status post laparoscopic with conversion to open low anterior resection with colorectal anastomosis by Dr. patel on 11/09/2020.  Will follow postop surgical recommendations.  Encourage intensive spirometry use  Early ambulation and physical therapy has been consulted  As needed antiemetics, pain medication  DVT prophylaxis  Creatinine slightly elevated to 1.5 will give normal saline at 125 cc/hour recheck BMP in the morning

## 2020-11-11 NOTE — PLAN OF CARE
Problem: Physical Therapy Goal  Goal: Physical Therapy Goal  Description: Goals to be met by: 12/10/2020     Patient will increase functional independence with mobility by performin. Supine to sit with Supervision  2. Sit to stand transfer with Supervision  3. Bed to chair transfer with Supervision   4. Gait  x 250 feet with Supervision.   5. Ascend/descend 3 stairs with bilateral Handrails Supervision.     Outcome: Ongoing, Progressing

## 2020-11-11 NOTE — PROGRESS NOTES
[facility] Medicine  Progress Note    Patient Name: Tessa Enriquez  MRN: [id number]  Patient Class: IP- Inpatient   Admission Date: 11/9/2020  Length of Stay: 2 days  Attending Physician: Delisa Monzon MD  Primary Care Provider: Karma Mcdermott MD        Subjective:     Principal Problem:Colonic mass        HPI:  Patient is 64-year-old  female with past medical history significant for chronic back pain, hypothyroidism, obesity who getting admitted in the hospital postoperatively.  Patient received robotic laparoscopic with conversion to open low anterior resection with colorectal anastomosis by Dr. patel on 11/09/2020.  Patient was recently diagnosed on colonoscopy a large bulky mass in the rectosigmoid colon biopsy conformed adenocarcinoma patient was referred to general surgery for surgery.  Patient is getting seen in PACU currently patient received pain medication and and Phenergan for nausea and is somnolent.  Is arousable with verbal stimulus complains of some nausea and abdominal soreness otherwise denies any vomiting, chest pain, shortness of breath.  Vital signs are stable    Overview/Hospital Course:  No notes on file    Interval History:  Patient is seen and examined today.  Patient is doing very well denies any acute complaints.  Creatinine slightly trended up to 1.5 will change IV fluid to normal saline at 1:20 a.m. 5 cc recheck BMP in the morning possible discharge in the morning with home health.  Tolerating diet  Review of Systems   Constitutional: Negative for activity change, appetite change and fever.   HENT: Negative for congestion and sore throat.    Eyes: Negative for photophobia and visual disturbance.   Respiratory: Negative for cough and shortness of breath.    Cardiovascular: Negative for chest pain and leg swelling.   Gastrointestinal: Negative for abdominal distention, abdominal pain, nausea and vomiting.   Endocrine: Negative for cold intolerance and  heat intolerance.   Genitourinary: Negative for difficulty urinating and hematuria.   Musculoskeletal: Positive for back pain. Negative for arthralgias.   Skin: Negative for color change and rash.   Allergic/Immunologic: Negative for food allergies.   Neurological: Negative for dizziness.   Hematological: Does not bruise/bleed easily.   Psychiatric/Behavioral: Negative for agitation. The patient is not nervous/anxious.      Objective:     Vital Signs (Most Recent):  Temp: 98.8 °F (37.1 °C) (11/11/20 0727)  Pulse: 96 (11/11/20 0727)  Resp: 16 (11/11/20 0727)  BP: (!) 110/54 (11/11/20 0727)  SpO2: 98 % (11/11/20 1038) Vital Signs (24h Range):  Temp:  [97.5 °F (36.4 °C)-100.2 °F (37.9 °C)] 98.8 °F (37.1 °C)  Pulse:  [] 96  Resp:  [16-18] 16  SpO2:  [94 %-98 %] 98 %  BP: (103-132)/(54-64) 110/54     Weight: 90.7 kg (200 lb)  Body mass index is 37.79 kg/m².    Intake/Output Summary (Last 24 hours) at 11/11/2020 1222  Last data filed at 11/11/2020 1000  Gross per 24 hour   Intake 2196.25 ml   Output 1045 ml   Net 1151.25 ml      Physical Exam  Vitals signs reviewed.   Constitutional:       Appearance: She is obese.      Comments: Somnolent, easily arousable with verbal stimulus answering question appropriately   HENT:      Head: Normocephalic and atraumatic.      Nose: Nose normal.      Mouth/Throat:      Mouth: Mucous membranes are dry.   Eyes:      Extraocular Movements: Extraocular movements intact.      Pupils: Pupils are equal, round, and reactive to light.   Cardiovascular:      Rate and Rhythm: Normal rate.      Pulses: Normal pulses.   Pulmonary:      Effort: Pulmonary effort is normal.      Breath sounds: Normal breath sounds.   Abdominal:      General: There is distension.      Tenderness: There is abdominal tenderness.      Comments: Incision site intact in dressing   Genitourinary:     Comments: Urinary catheter present  Musculoskeletal: Normal range of motion.   Skin:     General: Skin is warm.       Capillary Refill: Capillary refill takes 2 to 3 seconds.   Neurological:      General: No focal deficit present.      Mental Status: She is alert and oriented to person, place, and time.   Psychiatric:         Mood and Affect: Mood normal.         Behavior: Behavior normal.         Significant Labs:   BMP:   Recent Labs   Lab 11/11/20  0547   *   *   K 4.0   CL 98   CO2 19*   BUN 17   CREATININE 1.5*   CALCIUM 7.7*     CBC:   Recent Labs   Lab 11/09/20  1932 11/10/20  0554 11/11/20  0547   WBC  --  9.48 10.53   HGB 10.4* 9.9* 8.9*   HCT  --  31.5* 28.9*   PLT  --  261 314     CMP:   Recent Labs   Lab 11/10/20  0554 11/11/20  0547   * 131*   K 4.4 4.0    98   CO2 24 19*   * 114*   BUN 7* 17   CREATININE 0.8 1.5*   CALCIUM 7.6* 7.7*   ANIONGAP 7* 14   EGFRNONAA >60 37*       Significant Imaging: I have reviewed all pertinent imaging results/findings within the past 24 hours.      Assessment/Plan:      * Colonic mass  Status post laparoscopic with conversion to open low anterior resection with colorectal anastomosis by Dr. patel on 11/09/2020.  Will follow postop surgical recommendations.  Encourage intensive spirometry use  Early ambulation and physical therapy has been consulted  As needed antiemetics, pain medication  DVT prophylaxis  Creatinine slightly elevated to 1.5 will give normal saline at 125 cc/hour recheck BMP in the morning          Acquired hypothyroidism  Patient has chronic hypothyroidism. TFTs reviewed-   Lab Results   Component Value Date    TSH 17.680 (H) 10/14/2020   . Will continue chronic levothyroxine and adjust for and clinical changes.        Chronic obstructive pulmonary disease with hypoxia  Patient's COPD is controlled currently.  Patient is currently off COPD Pathway. Continue scheduled inhalers Supplemental oxygen and monitor respiratory status closely.           VTE Risk Mitigation (From admission, onward)         Ordered     enoxaparin injection 30 mg   Every 24 hours      11/09/20 1831     IP VTE HIGH RISK PATIENT  Once      11/09/20 1831     Place sequential compression device  Until discontinued      11/09/20 1831                Discharge Planning   DERICK:      Code Status: Prior   Is the patient medically ready for discharge?:     Reason for patient still in hospital (select all that apply): Patient trending condition  Discharge Plan A: Home                  Delisa Monzon MD  Department of Hospital Medicine   [facility]

## 2020-11-12 LAB
ABO + RH BLD: NORMAL
ANION GAP SERPL CALC-SCNC: 9 MMOL/L (ref 8–16)
BASOPHILS # BLD AUTO: 0.01 K/UL (ref 0–0.2)
BASOPHILS NFR BLD: 0.1 % (ref 0–1.9)
BLD GP AB SCN CELLS X3 SERPL QL: NORMAL
BLD PROD TYP BPU: NORMAL
BLD PROD TYP BPU: NORMAL
BLOOD UNIT EXPIRATION DATE: NORMAL
BLOOD UNIT EXPIRATION DATE: NORMAL
BLOOD UNIT TYPE CODE: 6200
BLOOD UNIT TYPE CODE: 6200
BLOOD UNIT TYPE: NORMAL
BLOOD UNIT TYPE: NORMAL
BUN SERPL-MCNC: 8 MG/DL (ref 8–23)
CALCIUM SERPL-MCNC: 7.4 MG/DL (ref 8.7–10.5)
CHLORIDE SERPL-SCNC: 104 MMOL/L (ref 95–110)
CO2 SERPL-SCNC: 22 MMOL/L (ref 23–29)
CODING SYSTEM: NORMAL
CODING SYSTEM: NORMAL
CREAT SERPL-MCNC: 0.7 MG/DL (ref 0.5–1.4)
CRP SERPL-MCNC: 391.3 MG/L (ref 0–8.2)
DIFFERENTIAL METHOD: ABNORMAL
DISPENSE STATUS: NORMAL
DISPENSE STATUS: NORMAL
EOSINOPHIL # BLD AUTO: 0.1 K/UL (ref 0–0.5)
EOSINOPHIL NFR BLD: 0.7 % (ref 0–8)
ERYTHROCYTE [DISTWIDTH] IN BLOOD BY AUTOMATED COUNT: 13 % (ref 11.5–14.5)
EST. GFR  (AFRICAN AMERICAN): >60 ML/MIN/1.73 M^2
EST. GFR  (NON AFRICAN AMERICAN): >60 ML/MIN/1.73 M^2
GLUCOSE SERPL-MCNC: 107 MG/DL (ref 70–110)
HCT VFR BLD AUTO: 24.2 % (ref 37–48.5)
HGB BLD-MCNC: 7.7 G/DL (ref 12–16)
IMM GRANULOCYTES # BLD AUTO: 0.04 K/UL (ref 0–0.04)
IMM GRANULOCYTES NFR BLD AUTO: 0.6 % (ref 0–0.5)
LYMPHOCYTES # BLD AUTO: 0.7 K/UL (ref 1–4.8)
LYMPHOCYTES NFR BLD: 10.1 % (ref 18–48)
MCH RBC QN AUTO: 29.7 PG (ref 27–31)
MCHC RBC AUTO-ENTMCNC: 31.8 G/DL (ref 32–36)
MCV RBC AUTO: 93 FL (ref 82–98)
MONOCYTES # BLD AUTO: 0.3 K/UL (ref 0.3–1)
MONOCYTES NFR BLD: 3.8 % (ref 4–15)
NEUTROPHILS # BLD AUTO: 6 K/UL (ref 1.8–7.7)
NEUTROPHILS NFR BLD: 84.7 % (ref 38–73)
NRBC BLD-RTO: 0 /100 WBC
NUM UNITS TRANS PACKED RBC: NORMAL
NUM UNITS TRANS PACKED RBC: NORMAL
PLATELET # BLD AUTO: 201 K/UL (ref 150–350)
PLATELET BLD QL SMEAR: ABNORMAL
PMV BLD AUTO: 11.2 FL (ref 9.2–12.9)
POTASSIUM SERPL-SCNC: 3.8 MMOL/L (ref 3.5–5.1)
RBC # BLD AUTO: 2.59 M/UL (ref 4–5.4)
SODIUM SERPL-SCNC: 135 MMOL/L (ref 136–145)
WBC # BLD AUTO: 7.06 K/UL (ref 3.9–12.7)

## 2020-11-12 PROCEDURE — 36430 TRANSFUSION BLD/BLD COMPNT: CPT

## 2020-11-12 PROCEDURE — 36415 COLL VENOUS BLD VENIPUNCTURE: CPT

## 2020-11-12 PROCEDURE — 97116 GAIT TRAINING THERAPY: CPT | Mod: CQ

## 2020-11-12 PROCEDURE — 86850 RBC ANTIBODY SCREEN: CPT

## 2020-11-12 PROCEDURE — 63600175 PHARM REV CODE 636 W HCPCS: Performed by: SURGERY

## 2020-11-12 PROCEDURE — 94799 UNLISTED PULMONARY SVC/PX: CPT

## 2020-11-12 PROCEDURE — 63600175 PHARM REV CODE 636 W HCPCS: Performed by: INTERNAL MEDICINE

## 2020-11-12 PROCEDURE — 86922 COMPATIBILITY TEST ANTIGLOB: CPT

## 2020-11-12 PROCEDURE — 25000003 PHARM REV CODE 250: Performed by: SURGERY

## 2020-11-12 PROCEDURE — 94761 N-INVAS EAR/PLS OXIMETRY MLT: CPT

## 2020-11-12 PROCEDURE — 80048 BASIC METABOLIC PNL TOTAL CA: CPT

## 2020-11-12 PROCEDURE — 85025 COMPLETE CBC W/AUTO DIFF WBC: CPT

## 2020-11-12 PROCEDURE — P9016 RBC LEUKOCYTES REDUCED: HCPCS

## 2020-11-12 PROCEDURE — 86140 C-REACTIVE PROTEIN: CPT

## 2020-11-12 PROCEDURE — 97535 SELF CARE MNGMENT TRAINING: CPT | Mod: CO

## 2020-11-12 PROCEDURE — 12000002 HC ACUTE/MED SURGE SEMI-PRIVATE ROOM

## 2020-11-12 PROCEDURE — 25000003 PHARM REV CODE 250: Performed by: INTERNAL MEDICINE

## 2020-11-12 RX ORDER — HYDROCODONE BITARTRATE AND ACETAMINOPHEN 500; 5 MG/1; MG/1
TABLET ORAL
Status: DISCONTINUED | OUTPATIENT
Start: 2020-11-12 | End: 2020-11-14 | Stop reason: HOSPADM

## 2020-11-12 RX ORDER — OXYCODONE AND ACETAMINOPHEN 5; 325 MG/1; MG/1
1 TABLET ORAL EVERY 6 HOURS PRN
Status: DISCONTINUED | OUTPATIENT
Start: 2020-11-12 | End: 2020-11-14 | Stop reason: HOSPADM

## 2020-11-12 RX ORDER — ENOXAPARIN SODIUM 100 MG/ML
40 INJECTION SUBCUTANEOUS EVERY 24 HOURS
Status: DISCONTINUED | OUTPATIENT
Start: 2020-11-12 | End: 2020-11-14 | Stop reason: HOSPADM

## 2020-11-12 RX ADMIN — DIPHENHYDRAMINE HYDROCHLORIDE 25 MG: 25 CAPSULE ORAL at 03:11

## 2020-11-12 RX ADMIN — MUPIROCIN: 20 OINTMENT TOPICAL at 10:11

## 2020-11-12 RX ADMIN — FAMOTIDINE 20 MG: 10 INJECTION INTRAVENOUS at 10:11

## 2020-11-12 RX ADMIN — DIPHENHYDRAMINE HYDROCHLORIDE 25 MG: 25 CAPSULE ORAL at 08:11

## 2020-11-12 RX ADMIN — HYDROMORPHONE HYDROCHLORIDE 1 MG: 2 INJECTION INTRAMUSCULAR; INTRAVENOUS; SUBCUTANEOUS at 10:11

## 2020-11-12 RX ADMIN — GABAPENTIN 200 MG: 100 CAPSULE ORAL at 10:11

## 2020-11-12 RX ADMIN — LEVOTHYROXINE SODIUM 88 MCG: 88 TABLET ORAL at 05:11

## 2020-11-12 RX ADMIN — FAMOTIDINE 20 MG: 10 INJECTION INTRAVENOUS at 08:11

## 2020-11-12 RX ADMIN — HYDROMORPHONE HYDROCHLORIDE 1 MG: 2 INJECTION INTRAMUSCULAR; INTRAVENOUS; SUBCUTANEOUS at 03:11

## 2020-11-12 RX ADMIN — MUPIROCIN: 20 OINTMENT TOPICAL at 08:11

## 2020-11-12 RX ADMIN — GABAPENTIN 200 MG: 100 CAPSULE ORAL at 08:11

## 2020-11-12 RX ADMIN — ENOXAPARIN SODIUM 40 MG: 40 INJECTION SUBCUTANEOUS at 05:11

## 2020-11-12 RX ADMIN — HYDROMORPHONE HYDROCHLORIDE 1 MG: 2 INJECTION INTRAMUSCULAR; INTRAVENOUS; SUBCUTANEOUS at 08:11

## 2020-11-12 NOTE — CHAPLAIN
Brief visit w/pt; she was in good spirits; gave her a blessing right before doc needed to come in; Lord, in your mercy.

## 2020-11-12 NOTE — PROGRESS NOTES
[facility] Medicine  Progress Note    Patient Name: Tessa Enriquez  MRN: [id number]  Patient Class: IP- Inpatient   Admission Date: 11/9/2020  Length of Stay: 3 days  Attending Physician: Delisa Monzon MD  Primary Care Provider: Karma Mcdermott MD        Subjective:     Principal Problem:Colonic mass        HPI:  Patient is 64-year-old  female with past medical history significant for chronic back pain, hypothyroidism, obesity who getting admitted in the hospital postoperatively.  Patient received robotic laparoscopic with conversion to open low anterior resection with colorectal anastomosis by Dr. patel on 11/09/2020.  Patient was recently diagnosed on colonoscopy a large bulky mass in the rectosigmoid colon biopsy conformed adenocarcinoma patient was referred to general surgery for surgery.  Patient is getting seen in PACU currently patient received pain medication and and Phenergan for nausea and is somnolent.  Is arousable with verbal stimulus complains of some nausea and abdominal soreness otherwise denies any vomiting, chest pain, shortness of breath.  Vital signs are stable    Overview/Hospital Course:  No notes on file    Interval History:  Patient is seen and examined today.  Patient is doing very well.  She is tolerating diet having bowel movements.  Per the nursing staff patient had some old looking blood clots in her stool today.  Patient will receive 2 units of PRBC transfusion.  Patient working with physical therapy.  Review of Systems   Constitutional: Negative for activity change, appetite change and fever.   HENT: Negative for congestion and sore throat.    Eyes: Negative for photophobia and visual disturbance.   Respiratory: Negative for cough and shortness of breath.    Cardiovascular: Negative for chest pain and leg swelling.   Gastrointestinal: Negative for abdominal distention, abdominal pain, nausea and vomiting.   Endocrine: Negative for cold intolerance  and heat intolerance.   Genitourinary: Negative for difficulty urinating and hematuria.   Musculoskeletal: Positive for back pain. Negative for arthralgias.   Skin: Negative for color change and rash.   Allergic/Immunologic: Negative for food allergies.   Neurological: Negative for dizziness.   Hematological: Does not bruise/bleed easily.   Psychiatric/Behavioral: Negative for agitation. The patient is not nervous/anxious.      Objective:     Vital Signs (Most Recent):  Temp: 98.4 °F (36.9 °C) (11/12/20 1358)  Pulse: 99 (11/12/20 1358)  Resp: 16 (11/12/20 1358)  BP: (!) 119/57 (11/12/20 1358)  SpO2: (!) 94 % (11/12/20 1358) Vital Signs (24h Range):  Temp:  [97.1 °F (36.2 °C)-99.2 °F (37.3 °C)] 98.4 °F (36.9 °C)  Pulse:  [] 99  Resp:  [16-18] 16  SpO2:  [91 %-98 %] 94 %  BP: (115-154)/(56-67) 119/57     Weight: 90.7 kg (200 lb)  Body mass index is 37.79 kg/m².    Intake/Output Summary (Last 24 hours) at 11/12/2020 1435  Last data filed at 11/12/2020 0600  Gross per 24 hour   Intake 2986.67 ml   Output 400 ml   Net 2586.67 ml      Physical Exam  Vitals signs reviewed.   Constitutional:       Appearance: She is obese.      Comments: Somnolent, easily arousable with verbal stimulus answering question appropriately   HENT:      Head: Normocephalic and atraumatic.      Nose: Nose normal.      Mouth/Throat:      Mouth: Mucous membranes are dry.   Eyes:      Extraocular Movements: Extraocular movements intact.      Pupils: Pupils are equal, round, and reactive to light.   Cardiovascular:      Rate and Rhythm: Normal rate.      Pulses: Normal pulses.   Pulmonary:      Effort: Pulmonary effort is normal.      Breath sounds: Normal breath sounds.   Abdominal:      General: There is distension.      Tenderness: There is abdominal tenderness.      Comments: Incision site intact in dressing   Genitourinary:     Comments: Urinary catheter present  Musculoskeletal: Normal range of motion.   Skin:     General: Skin is warm.       Capillary Refill: Capillary refill takes 2 to 3 seconds.   Neurological:      General: No focal deficit present.      Mental Status: She is alert and oriented to person, place, and time.   Psychiatric:         Mood and Affect: Mood normal.         Behavior: Behavior normal.         Significant Labs:   BMP:   Recent Labs   Lab 11/12/20 0414      *   K 3.8      CO2 22*   BUN 8   CREATININE 0.7   CALCIUM 7.4*     CBC:   Recent Labs   Lab 11/11/20 0547 11/12/20 0414   WBC 10.53 7.06   HGB 8.9* 7.7*   HCT 28.9* 24.2*    201     CMP:   Recent Labs   Lab 11/11/20 0547 11/12/20 0414   * 135*   K 4.0 3.8   CL 98 104   CO2 19* 22*   * 107   BUN 17 8   CREATININE 1.5* 0.7   CALCIUM 7.7* 7.4*   ANIONGAP 14 9   EGFRNONAA 37* >60       Significant Imaging: I have reviewed all pertinent imaging results/findings within the past 24 hours.      Assessment/Plan:      * Colonic mass  Status post laparoscopic with conversion to open low anterior resection with colorectal anastomosis by Dr. patel on 11/09/2020.  Will follow postop surgical recommendations.  Patient postsurgical anemia will give 2 units of PRBC transfusion  Encourage intensive spirometry use  Early ambulation and physical therapy has been consulted  As needed antiemetics, pain medication  DVT prophylaxis          Acquired hypothyroidism  Patient has chronic hypothyroidism. TFTs reviewed-   Lab Results   Component Value Date    TSH 17.680 (H) 10/14/2020   . Will continue chronic levothyroxine and adjust for and clinical changes.        Chronic obstructive pulmonary disease with hypoxia  Patient's COPD is controlled currently.  Patient is currently off COPD Pathway. Continue scheduled inhalers Supplemental oxygen and monitor respiratory status closely.           VTE Risk Mitigation (From admission, onward)         Ordered     enoxaparin injection 40 mg  Every 24 hours      11/12/20 0950     IP VTE HIGH RISK PATIENT  Once       11/09/20 1831     Place sequential compression device  Until discontinued      11/09/20 1831                Discharge Planning   DERICK:      Code Status: Prior   Is the patient medically ready for discharge?:     Reason for patient still in hospital (select all that apply): Patient trending condition  Discharge Plan A: Home                  Delisa Monzon MD  Department of Hospital Medicine   [facility]

## 2020-11-12 NOTE — ASSESSMENT & PLAN NOTE
Status post laparoscopic with conversion to open low anterior resection with colorectal anastomosis by Dr. patel on 11/09/2020.  Will follow postop surgical recommendations.  Patient postsurgical anemia will give 2 units of PRBC transfusion  Encourage intensive spirometry use  Early ambulation and physical therapy has been consulted  As needed antiemetics, pain medication  DVT prophylaxis

## 2020-11-12 NOTE — PROGRESS NOTES
Pharmacist Renal Dose Adjustment Note    Tessa Enriquez is a 64 y.o. female being treated with the medication Lovenox.     Patient Data:    Vital Signs (Most Recent):  Temp: 97.1 °F (36.2 °C) (11/12/20 0930)  Pulse: 101 (11/12/20 0930)  Resp: 17 (11/12/20 0930)  BP: (!) 154/67 (11/12/20 0930)  SpO2: 96 % (11/12/20 0930)   Vital Signs (72h Range):  Temp:  [97 °F (36.1 °C)-100.2 °F (37.9 °C)]   Pulse:  []   Resp:  [8-24]   BP: ()/(50-82)   SpO2:  [91 %-98 %]      Recent Labs   Lab 11/10/20  0554 11/11/20  0547 11/12/20  0414   CREATININE 0.8 1.5* 0.7     Serum creatinine: 0.7 mg/dL 11/12/20 0414  Estimated creatinine clearance: 85.5 mL/min    Lovenox 30 mg daily will be changed to Lovenox 40 mg daily.     Pharmacist's Name: Anurag Lloyd  Pharmacist's Extension: [phone]

## 2020-11-12 NOTE — PROGRESS NOTES
Pt POD 3 s/p ex lap and LAR for proximal rectal cancer.  Pt resting comfortably.  Notes minimal pain, no pain at time of my exam  Pt reports flatus and multiple loose BM.  Has had some old blood with some BMs per nursing.   Denies n/v.      Wt Readings from Last 3 Encounters:   11/09/20 90.7 kg (200 lb)   11/07/20 89.4 kg (197 lb)   11/06/20 91.1 kg (200 lb 13.4 oz)     Temp Readings from Last 3 Encounters:   11/12/20 97.1 °F (36.2 °C) (Oral)   11/07/20 98.4 °F (36.9 °C) (Oral)   11/03/20 97.7 °F (36.5 °C) (Temporal)     BP Readings from Last 3 Encounters:   11/12/20 (!) 154/67   11/07/20 135/73   11/03/20 (!) 171/82     Pulse Readings from Last 3 Encounters:   11/12/20 101   11/07/20 84   11/03/20 91     AAOx3  CTA B  Sinus  Soft/nd/obese/ appt ttp  2+ pulses B    Lab Results   Component Value Date    WBC 7.06 11/12/2020    HGB 7.7 (L) 11/12/2020    HCT 24.2 (L) 11/12/2020    MCV 93 11/12/2020     11/12/2020       BMP  Lab Results   Component Value Date     (L) 11/12/2020    K 3.8 11/12/2020     11/12/2020    CO2 22 (L) 11/12/2020    BUN 8 11/12/2020    CREATININE 0.7 11/12/2020    CALCIUM 7.4 (L) 11/12/2020    ANIONGAP 9 11/12/2020    ESTGFRAFRICA >60 11/12/2020    EGFRNONAA >60 11/12/2020     A/P: s/p LAR  Bun/cr: has normalized.  ANemia: secondary to blood loss from surgery.  Recommend transfusion with 2u prbc    CLinically pt looking very well.  Will see how responds to transfusion and if continues to do well overnight anticipate d/c tomorrow.

## 2020-11-12 NOTE — PT/OT/SLP PROGRESS
Occupational Therapy   Treatment    Name: Tessa Enriquez  MRN: [id number]  Admitting Diagnosis:  Colonic mass  3 Days Post-Op    Recommendations:     Discharge Recommendations: home  Discharge Equipment Recommendations:     Barriers to discharge:  None    Assessment:     Tessa Enriquez is a 64 y.o. female with a medical diagnosis of Colonic mass.  She presents with desire to shower, however Charge nurse stating that pt should not get wet in the shower at this time. Pt is agreeable to bathing at the sink. She was able to perform all aspects of bathing and dressing with SBA except for Carol to don brief (threading over B feet) and Max A to don/doff socks. Pt is going to stay with her daughter at d/c while se heals and will have sufficient support and (A). Performance deficits affecting function are weakness, impaired endurance, impaired self care skills, impaired functional mobilty, decreased safety awareness, decreased ROM.     Rehab Prognosis:  Good; patient would benefit from acute skilled OT services to address these deficits and reach maximum level of function.       Plan:     Patient to be seen 3 x/week to address the above listed problems via self-care/home management, therapeutic activities, therapeutic exercises  · Plan of Care Expires: 11/24/20  · Plan of Care Reviewed with: patient    Subjective     Pain/Comfort:  · Pain Rating 1: 0/10    Objective:     Communicated with: RN, Nannette prior to session.  Patient found HOB elevated with peripheral IV upon OT entry to room.    General Precautions: Standard, fall   Orthopedic Precautions:N/A   Braces: N/A     Occupational Performance:     Bed Mobility:    · Patient completed Scooting/Bridging with supervision  · Patient completed Supine to Sit with Carol (HHA)   · Patient completed Sit to Supine with supervision     Functional Mobility/Transfers:  · Patient completed Sit <> Stand Transfer with supervision  with  no assistive device   · Patient completed Toilet  Transfer Step Transfer technique with supervision with grab bars.  · Functional Mobility: GOOD; pt would benefit from use of RW rather than stabilizing on walls and objects.    Activities of Daily Living:  · Bathing: stand by assistance to bathe at sink. pt is able to achieve figure 4 and wash/dry feet.  · Upper Body Dressing: stand by assistance to don gown while standing at sink  · Lower Body Dressing: minimum assistance to don brief 2* (A) with threading BLE and Max A to don/dof socks while seated.  · Toileting: supervision at toilet.      [facility] 6 Click ADL: 22    Treatment & Education:  -PORTER ed for ankle pumps, heel slides and knee extensions to keep fluid moving in BLE and decrease edema.  -pt v/u and agreeable to all ed. She is thankful for the opportunity to bathe!    Patient left HOB elevated with all lines intact, call button in reach, bed alarm on and RN, notified notifiedEducation:      GOALS:   Multidisciplinary Problems     Occupational Therapy Goals        Problem: Occupational Therapy Goal    Goal Priority Disciplines Outcome Interventions   Occupational Therapy Goal     OT, PT/OT Ongoing, Progressing    Description: Goals to be met by: 11/24/2020     Patient will increase functional independence with ADLs by performing:    UE Dressing with Set-up Assistance.  LE Dressing with Supervision.  Grooming while seated with Set-up Assistance.  Toileting from toilet with Supervision for hygiene and clothing management.   Toilet transfer to toilet with Supervision.                     Time Tracking:     OT Date of Treatment: 11/12/20  OT Start Time: 1213  OT Stop Time: 1251  OT Total Time (min): 38 min    Billable Minutes:Self Care/Home Management 38 min    TRISTIN Heredia  11/12/2020

## 2020-11-12 NOTE — RESPIRATORY THERAPY
11/12/20 0719   Patient Assessment/Suction   Level of Consciousness (AVPU) alert   PRE-TX-O2   O2 Device (Oxygen Therapy) room air   SpO2 96 %   Pulse Oximetry Type Intermittent   $ Pulse Oximetry - Multiple Charge Pulse Oximetry - Multiple   Incentive Spirometer   $ Incentive Spirometer Charges done with encouragement   Number of Repetitions (IS) 10   Level Incentive Spirometer (mL) 1500   Patient Tolerance (IS) good

## 2020-11-12 NOTE — PT/OT/SLP PROGRESS
Physical Therapy Treatment    Patient Name:  Tessa Enriquez   MRN:  [id number]    Recommendations:     Discharge Recommendations:  home health PT   Discharge Equipment Recommendations: walker, rolling   Barriers to discharge: None    Assessment:     Tessa Enriquez is a 64 y.o. female admitted with a medical diagnosis of Colonic mass.  She presents with the following impairments/functional limitations:  weakness, impaired endurance, impaired self care skills, impaired functional mobilty, gait instability, decreased lower extremity function . Tolerated treatment. Ambulated with HHA ( CGA), declined use of assistive device. Reported feeling better following ambulation. Returned to room to receive blood, nurse Brunson present.    Rehab Prognosis: Good; patient would benefit from acute skilled PT services to address these deficits and reach maximum level of function.    Recent Surgery: Procedure(s) (LRB):  ROBOTIC COLECTOMY low anterior resection, possible open (N/A)  COLECTOMY (N/A) 3 Days Post-Op    Plan:     During this hospitalization, patient to be seen daily to address the identified rehab impairments via gait training, therapeutic activities, therapeutic exercises and progress toward the following goals:    · Plan of Care Expires:  12/10/20    Subjective     Chief Complaint: soreness  Patient/Family Comments/goals: to return home  Pain/Comfort:  · Pain Rating 1: 0/10(reports no pain just soreness.)  · Location - Orientation 1: generalized  · Location 1: abdomen  · Pain Addressed 1: Reposition, Nurse notified      Objective:     Communicated with nurse Brunson prior to session.  Patient found supine with bed alarm, SCD, telemetry upon PT entry to room.     General Precautions: Standard, fall   Orthopedic Precautions:N/A   Braces: N/A     Functional Mobility:  · Bed Mobility:     · Rolling Left:  contact guard assistance  · Rolling Right: contact guard assistance  · Supine to Sit: contact guard assistance  · Sit to  Supine: contact guard assistance  · Transfers:     · Sit to Stand:  contact guard assistance with hand-held assist  · Gait: 120' with HHA       AM-PAC 6 CLICK MOBILITY          Therapeutic Activities and Exercises:   Returned to room to bed to receive blood.     Patient left supine with all lines intact, call button in reach, bed alarm on and nurse Nannette present..    GOALS:   Multidisciplinary Problems     Physical Therapy Goals        Problem: Physical Therapy Goal    Goal Priority Disciplines Outcome Goal Variances Interventions   Physical Therapy Goal     PT, PT/OT Ongoing, Progressing     Description: Goals to be met by: 12/10/2020     Patient will increase functional independence with mobility by performin. Supine to sit with Supervision  2. Sit to stand transfer with Supervision  3. Bed to chair transfer with Supervision   4. Gait  x 250 feet with Supervision.   5. Ascend/descend 3 stairs with bilateral Handrails Supervision.                      Time Tracking:     PT Received On: 20  PT Start Time: 1322     PT Stop Time: 1332  PT Total Time (min): 10 min     Billable Minutes: Gait Training 10min    Treatment Type: Treatment  PT/PTA: PTA     PTA Visit Number: 1     Christie Hampton PTA  2020

## 2020-11-12 NOTE — NURSING
Spoke with Dr. Wagner about pt's light brown liquid bowel movement with small amounts of dark red blood. Pt's H&H this morning 7.7/24.2. Telephone order to transfuse 2 units given. Pt updated on current plan of care. Pt stated understanding.

## 2020-11-12 NOTE — SUBJECTIVE & OBJECTIVE
Interval History:  Patient is seen and examined today.  Patient is doing very well.  She is tolerating diet having bowel movements.  Per the nursing staff patient had some old looking blood clots in her stool today.  Patient will receive 2 units of PRBC transfusion.  Patient working with physical therapy.  Review of Systems   Constitutional: Negative for activity change, appetite change and fever.   HENT: Negative for congestion and sore throat.    Eyes: Negative for photophobia and visual disturbance.   Respiratory: Negative for cough and shortness of breath.    Cardiovascular: Negative for chest pain and leg swelling.   Gastrointestinal: Negative for abdominal distention, abdominal pain, nausea and vomiting.   Endocrine: Negative for cold intolerance and heat intolerance.   Genitourinary: Negative for difficulty urinating and hematuria.   Musculoskeletal: Positive for back pain. Negative for arthralgias.   Skin: Negative for color change and rash.   Allergic/Immunologic: Negative for food allergies.   Neurological: Negative for dizziness.   Hematological: Does not bruise/bleed easily.   Psychiatric/Behavioral: Negative for agitation. The patient is not nervous/anxious.      Objective:     Vital Signs (Most Recent):  Temp: 98.4 °F (36.9 °C) (11/12/20 1358)  Pulse: 99 (11/12/20 1358)  Resp: 16 (11/12/20 1358)  BP: (!) 119/57 (11/12/20 1358)  SpO2: (!) 94 % (11/12/20 1358) Vital Signs (24h Range):  Temp:  [97.1 °F (36.2 °C)-99.2 °F (37.3 °C)] 98.4 °F (36.9 °C)  Pulse:  [] 99  Resp:  [16-18] 16  SpO2:  [91 %-98 %] 94 %  BP: (115-154)/(56-67) 119/57     Weight: 90.7 kg (200 lb)  Body mass index is 37.79 kg/m².    Intake/Output Summary (Last 24 hours) at 11/12/2020 1435  Last data filed at 11/12/2020 0600  Gross per 24 hour   Intake 2986.67 ml   Output 400 ml   Net 2586.67 ml      Physical Exam  Vitals signs reviewed.   Constitutional:       Appearance: She is obese.      Comments: Somnolent, easily arousable with  verbal stimulus answering question appropriately   HENT:      Head: Normocephalic and atraumatic.      Nose: Nose normal.      Mouth/Throat:      Mouth: Mucous membranes are dry.   Eyes:      Extraocular Movements: Extraocular movements intact.      Pupils: Pupils are equal, round, and reactive to light.   Cardiovascular:      Rate and Rhythm: Normal rate.      Pulses: Normal pulses.   Pulmonary:      Effort: Pulmonary effort is normal.      Breath sounds: Normal breath sounds.   Abdominal:      General: There is distension.      Tenderness: There is abdominal tenderness.      Comments: Incision site intact in dressing   Genitourinary:     Comments: Urinary catheter present  Musculoskeletal: Normal range of motion.   Skin:     General: Skin is warm.      Capillary Refill: Capillary refill takes 2 to 3 seconds.   Neurological:      General: No focal deficit present.      Mental Status: She is alert and oriented to person, place, and time.   Psychiatric:         Mood and Affect: Mood normal.         Behavior: Behavior normal.         Significant Labs:   BMP:   Recent Labs   Lab 11/12/20 0414      *   K 3.8      CO2 22*   BUN 8   CREATININE 0.7   CALCIUM 7.4*     CBC:   Recent Labs   Lab 11/11/20 0547 11/12/20 0414   WBC 10.53 7.06   HGB 8.9* 7.7*   HCT 28.9* 24.2*    201     CMP:   Recent Labs   Lab 11/11/20 0547 11/12/20 0414   * 135*   K 4.0 3.8   CL 98 104   CO2 19* 22*   * 107   BUN 17 8   CREATININE 1.5* 0.7   CALCIUM 7.7* 7.4*   ANIONGAP 14 9   EGFRNONAA 37* >60       Significant Imaging: I have reviewed all pertinent imaging results/findings within the past 24 hours.

## 2020-11-12 NOTE — PLAN OF CARE
Plan of care reviewed with patient. Patient verbalized understanding. IV fluids infusing as ordered. One unit of RBC infused as ordered this shift. Patient ambulates with 1 person assist.  socks maintained. IS at bedside ,patient encouraged to use throughput shift.Patient has remained free from fall/injury. Bed in lowest ,locked position . Side rails up X3,  needs attended to ,call light kept within reach.Will continue to monitor.

## 2020-11-12 NOTE — RESPIRATORY THERAPY
11/11/20 2001   Patient Assessment/Suction   Level of Consciousness (AVPU) alert   Respiratory Effort Unlabored   Expansion/Accessory Muscles/Retractions no use of accessory muscles   All Lung Fields Breath Sounds clear;diminished   Rhythm/Pattern, Respiratory unlabored   Cough Frequency no cough   PRE-TX-O2   O2 Device (Oxygen Therapy) room air   SpO2 97 %   Pulse Oximetry Type Intermittent   $ Pulse Oximetry - Multiple Charge Pulse Oximetry - Multiple   Pulse 92   Resp 16   Incentive Spirometer   $ Incentive Spirometer Charges done with encouragement   Incentive Spirometer Predicted Level (mL) 1800   Administration (IS) instruction provided, follow-up   Number of Repetitions (IS) 10   Level Incentive Spirometer (mL) 1500   Patient Tolerance (IS) good

## 2020-11-12 NOTE — PLAN OF CARE
Problem: Physical Therapy Goal  Goal: Physical Therapy Goal  Description: Goals to be met by: 12/10/2020     Patient will increase functional independence with mobility by performin. Supine to sit with Supervision  2. Sit to stand transfer with Supervision  3. Bed to chair transfer with Supervision   4. Gait  x 250 feet with Supervision.   5. Ascend/descend 3 stairs with bilateral Handrails Supervision.     Outcome: Ongoing, Progressing   Ambulate with assistance for safety due to weakness following surgical procedure.

## 2020-11-13 LAB
ANION GAP SERPL CALC-SCNC: 9 MMOL/L (ref 8–16)
BASOPHILS # BLD AUTO: 0.03 K/UL (ref 0–0.2)
BASOPHILS NFR BLD: 0.6 % (ref 0–1.9)
BUN SERPL-MCNC: 4 MG/DL (ref 8–23)
CALCIUM SERPL-MCNC: 8.1 MG/DL (ref 8.7–10.5)
CHLORIDE SERPL-SCNC: 103 MMOL/L (ref 95–110)
CO2 SERPL-SCNC: 24 MMOL/L (ref 23–29)
CREAT SERPL-MCNC: 0.7 MG/DL (ref 0.5–1.4)
DIFFERENTIAL METHOD: ABNORMAL
EOSINOPHIL # BLD AUTO: 0.2 K/UL (ref 0–0.5)
EOSINOPHIL NFR BLD: 4.6 % (ref 0–8)
ERYTHROCYTE [DISTWIDTH] IN BLOOD BY AUTOMATED COUNT: 13.6 % (ref 11.5–14.5)
EST. GFR  (AFRICAN AMERICAN): >60 ML/MIN/1.73 M^2
EST. GFR  (NON AFRICAN AMERICAN): >60 ML/MIN/1.73 M^2
GLUCOSE SERPL-MCNC: 96 MG/DL (ref 70–110)
HCT VFR BLD AUTO: 33.1 % (ref 37–48.5)
HGB BLD-MCNC: 10.4 G/DL (ref 12–16)
IMM GRANULOCYTES # BLD AUTO: 0.03 K/UL (ref 0–0.04)
IMM GRANULOCYTES NFR BLD AUTO: 0.6 % (ref 0–0.5)
LYMPHOCYTES # BLD AUTO: 0.8 K/UL (ref 1–4.8)
LYMPHOCYTES NFR BLD: 15.8 % (ref 18–48)
MCH RBC QN AUTO: 28.7 PG (ref 27–31)
MCHC RBC AUTO-ENTMCNC: 31.4 G/DL (ref 32–36)
MCV RBC AUTO: 91 FL (ref 82–98)
MONOCYTES # BLD AUTO: 0.4 K/UL (ref 0.3–1)
MONOCYTES NFR BLD: 7 % (ref 4–15)
NEUTROPHILS # BLD AUTO: 3.8 K/UL (ref 1.8–7.7)
NEUTROPHILS NFR BLD: 71.4 % (ref 38–73)
NRBC BLD-RTO: 0 /100 WBC
PLATELET # BLD AUTO: 251 K/UL (ref 150–350)
PMV BLD AUTO: 10.7 FL (ref 9.2–12.9)
POTASSIUM SERPL-SCNC: 4 MMOL/L (ref 3.5–5.1)
RBC # BLD AUTO: 3.62 M/UL (ref 4–5.4)
SODIUM SERPL-SCNC: 136 MMOL/L (ref 136–145)
WBC # BLD AUTO: 5.25 K/UL (ref 3.9–12.7)

## 2020-11-13 PROCEDURE — 94799 UNLISTED PULMONARY SVC/PX: CPT

## 2020-11-13 PROCEDURE — 80048 BASIC METABOLIC PNL TOTAL CA: CPT

## 2020-11-13 PROCEDURE — 63600175 PHARM REV CODE 636 W HCPCS: Performed by: INTERNAL MEDICINE

## 2020-11-13 PROCEDURE — 25000003 PHARM REV CODE 250: Performed by: NURSE PRACTITIONER

## 2020-11-13 PROCEDURE — 63600175 PHARM REV CODE 636 W HCPCS: Performed by: SURGERY

## 2020-11-13 PROCEDURE — 97530 THERAPEUTIC ACTIVITIES: CPT | Mod: CQ

## 2020-11-13 PROCEDURE — 25000003 PHARM REV CODE 250: Performed by: INTERNAL MEDICINE

## 2020-11-13 PROCEDURE — 36415 COLL VENOUS BLD VENIPUNCTURE: CPT

## 2020-11-13 PROCEDURE — 25000003 PHARM REV CODE 250: Performed by: SURGERY

## 2020-11-13 PROCEDURE — 94761 N-INVAS EAR/PLS OXIMETRY MLT: CPT

## 2020-11-13 PROCEDURE — 99900035 HC TECH TIME PER 15 MIN (STAT)

## 2020-11-13 PROCEDURE — 12000002 HC ACUTE/MED SURGE SEMI-PRIVATE ROOM

## 2020-11-13 PROCEDURE — 85025 COMPLETE CBC W/AUTO DIFF WBC: CPT

## 2020-11-13 RX ORDER — ACETAMINOPHEN 325 MG/1
650 TABLET ORAL ONCE
Status: COMPLETED | OUTPATIENT
Start: 2020-11-13 | End: 2020-11-13

## 2020-11-13 RX ADMIN — GABAPENTIN 200 MG: 100 CAPSULE ORAL at 08:11

## 2020-11-13 RX ADMIN — ENOXAPARIN SODIUM 40 MG: 40 INJECTION SUBCUTANEOUS at 05:11

## 2020-11-13 RX ADMIN — HYDROMORPHONE HYDROCHLORIDE 1 MG: 2 INJECTION INTRAMUSCULAR; INTRAVENOUS; SUBCUTANEOUS at 05:11

## 2020-11-13 RX ADMIN — GABAPENTIN 200 MG: 100 CAPSULE ORAL at 09:11

## 2020-11-13 RX ADMIN — MUPIROCIN: 20 OINTMENT TOPICAL at 09:11

## 2020-11-13 RX ADMIN — ACETAMINOPHEN 650 MG: 325 TABLET ORAL at 05:11

## 2020-11-13 RX ADMIN — OXYCODONE HYDROCHLORIDE AND ACETAMINOPHEN 1 TABLET: 5; 325 TABLET ORAL at 11:11

## 2020-11-13 RX ADMIN — DIPHENHYDRAMINE HYDROCHLORIDE 25 MG: 25 CAPSULE ORAL at 05:11

## 2020-11-13 RX ADMIN — FAMOTIDINE 20 MG: 10 INJECTION INTRAVENOUS at 08:11

## 2020-11-13 RX ADMIN — OXYCODONE HYDROCHLORIDE AND ACETAMINOPHEN 1 TABLET: 5; 325 TABLET ORAL at 05:11

## 2020-11-13 RX ADMIN — MUPIROCIN: 20 OINTMENT TOPICAL at 08:11

## 2020-11-13 RX ADMIN — LEVOTHYROXINE SODIUM 88 MCG: 88 TABLET ORAL at 05:11

## 2020-11-13 RX ADMIN — FAMOTIDINE 20 MG: 10 INJECTION INTRAVENOUS at 09:11

## 2020-11-13 NOTE — PLAN OF CARE
11/12/20 1959   Patient Assessment/Suction   Level of Consciousness (AVPU) alert   Respiratory Effort Unlabored;Normal   Expansion/Accessory Muscles/Retractions no retractions;no use of accessory muscles   All Lung Fields Breath Sounds Anterior:;Lateral:   ROHIT Breath Sounds clear   LLL Breath Sounds diminished   RUL Breath Sounds clear   RML Breath Sounds clear   RLL Breath Sounds diminished   Rhythm/Pattern, Respiratory depth regular;pattern regular;unlabored   Cough Frequency no cough   PRE-TX-O2   O2 Device (Oxygen Therapy) room air   SpO2 (!) 93 %   Pulse Oximetry Type Intermittent   $ Pulse Oximetry - Multiple Charge Pulse Oximetry - Multiple   Pulse 96   Resp 18   Incentive Spirometer   $ Incentive Spirometer Charges done with encouragement   Incentive Spirometer Predicted Level (mL) 1800   Administration (IS) proper technique demonstrated   Number of Repetitions (IS) 10   Level Incentive Spirometer (mL) 1500   Patient Tolerance (IS) good

## 2020-11-13 NOTE — CARE UPDATE
11/13/20 0656   Patient Assessment/Suction   Level of Consciousness (AVPU) alert   Respiratory Effort Normal;Unlabored   Expansion/Accessory Muscles/Retractions expansion symmetric;no retractions;no use of accessory muscles   All Lung Fields Breath Sounds clear;diminished   Rhythm/Pattern, Respiratory unlabored;pattern regular;depth regular   Cough Frequency infrequent   Cough Type good;nonproductive   PRE-TX-O2   O2 Device (Oxygen Therapy) room air   SpO2 (!) 93 %   Pulse Oximetry Type Intermittent   $ Pulse Oximetry - Multiple Charge Pulse Oximetry - Multiple   Pulse 90   Resp 16   Positioning Sitting in chair   Incentive Spirometer   $ Incentive Spirometer Charges done with encouragement   Incentive Spirometer Predicted Level (mL) 1800   Administration (IS) instruction provided, follow-up;proper technique demonstrated   Number of Repetitions (IS) 10   Level Incentive Spirometer (mL) 1500   Patient Tolerance (IS) good   Will continue to monitor

## 2020-11-13 NOTE — HOSPITAL COURSE
Patient is admitted postoperatively she is status post laparoscopic with conversion to open low anterior resection with colorectal anastomosis by Dr. patel on 11/09/2020.  Postop creatinine slightly jumped to 1.5 with IV fluids creatinine is normal now.  Patient developed postsurgical anemia and received 2 units of PRBC transfusion hemoglobin remained stable.  However P patient is complaining blood in stool Dr. patel has been following and he is comfortable patient letting go home however patient wants to stay tonight.  Possible discharge in the morning with home health which has been arranged.

## 2020-11-13 NOTE — PT/OT/SLP PROGRESS
"Physical Therapy Treatment    Patient Name:  Tessa Enriquez   MRN:  [id number]    Recommendations:     Discharge Recommendations:  home health PT   Discharge Equipment Recommendations: walker, rolling   Barriers to discharge: None    Assessment:     Tessa Enriquez is a 64 y.o. female admitted with a medical diagnosis of Colonic mass.  She presents with the following impairments/functional limitations:  weakness, impaired endurance, impaired self care skills, impaired functional mobilty, gait instability, pain . Tolerated treatment. Concerned about rectal bleeding, M.D. aware. Ambulated with CGA ( declines use of rw).    Rehab Prognosis: Good; patient would benefit from acute skilled PT services to address these deficits and reach maximum level of function.    Recent Surgery: Procedure(s) (LRB):  ROBOTIC COLECTOMY low anterior resection, possible open (N/A)  COLECTOMY (N/A) 4 Days Post-Op    Plan:     During this hospitalization, patient to be seen daily to address the identified rehab impairments via gait training, therapeutic activities, therapeutic exercises and progress toward the following goals:    · Plan of Care Expires:  12/10/20    Subjective     Chief Complaint: rectal bleeding  Patient/Family Comments/goals: to discharge to [facility]  Pain/Comfort:  · Pain Rating 1: other (see comments)(" very little")  · Location - Orientation 1: generalized  · Location 1: abdomen  · Pain Addressed 1: Pre-medicate for activity, Reposition, Nurse notified      Objective:     Communicated with nurse Suh prior to session.  Patient found supine with bed alarm, telemetry upon PT entry to room.     General Precautions: Standard, fall   Orthopedic Precautions:N/A   Braces: N/A     Functional Mobility:  · Bed Mobility:     · Rolling Left:  contact guard assistance  · Supine to Sit: contact guard assistance  · Transfers:     · Sit to Stand:  contact guard assistance with hand-held assist  · Gait: 250' with rw and " CGA      AM-PAC 6 CLICK MOBILITY          Therapeutic Activities and Exercises:   BLE exercises at 10 reps each in chair at bedside: TKE's, Hip abd/add/flexion, AP's, SLR's, HS.     Patient left up in chair with all lines intact, call button in reach, chair alarm on and nurse Angeli notified..    GOALS:   Multidisciplinary Problems     Physical Therapy Goals        Problem: Physical Therapy Goal    Goal Priority Disciplines Outcome Goal Variances Interventions   Physical Therapy Goal     PT, PT/OT Ongoing, Progressing     Description: Goals to be met by: 12/10/2020     Patient will increase functional independence with mobility by performin. Supine to sit with Supervision  2. Sit to stand transfer with Supervision  3. Bed to chair transfer with Supervision   4. Gait  x 250 feet with Supervision.   5. Ascend/descend 3 stairs with bilateral Handrails Supervision.                      Time Tracking:     PT Received On: 20  PT Start Time: 0853     PT Stop Time: 0910  PT Total Time (min): 17 min     Billable Minutes: Therapeutic Activity 17min    Treatment Type: Treatment  PT/PTA: PTA     PTA Visit Number: 2     Christie Hampton, MARLON  2020

## 2020-11-13 NOTE — NURSING
Pt had a bloody BM. MD Wagner aware that this is not a new finding. Orders to transfuse 2 units PRBCs. Report given to Nannette NASH.

## 2020-11-13 NOTE — ASSESSMENT & PLAN NOTE
Status post laparoscopic with conversion to open low anterior resection with colorectal anastomosis by Dr. patel on 11/09/2020.  Will follow postop surgical recommendations.  Patient postsurgical anemia will give 2 units of PRBC transfusion  Encourage intensive spirometry use  Physical therapy following patient is ambulating  Patient complains of blood in stool, hemoglobin remained stable discussed with Dr. patel  As needed antiemetics, pain medication  DVT prophylaxis

## 2020-11-13 NOTE — SUBJECTIVE & OBJECTIVE
Interval History:  Patient is seen and examined today.  Patient is doing very well.  She is tolerating diet having bowel movements.  Patient stated that she had bright blood per rectum and does not feel comfortable to go home today.  Patient received 2 units of PRBC is tender hemoglobin is stable discussed with Dr. brown but hopefully discharge tomorrow morning with home health      Review of Systems   Constitutional: Negative for activity change, appetite change and fever.   HENT: Negative for congestion and sore throat.    Eyes: Negative for photophobia and visual disturbance.   Respiratory: Negative for cough and shortness of breath.    Cardiovascular: Negative for chest pain and leg swelling.   Gastrointestinal: Positive for blood in stool. Negative for abdominal distention, abdominal pain, nausea and vomiting.   Endocrine: Negative for cold intolerance and heat intolerance.   Genitourinary: Negative for difficulty urinating and hematuria.   Musculoskeletal: Positive for back pain. Negative for arthralgias.   Skin: Negative for color change and rash.   Allergic/Immunologic: Negative for food allergies.   Neurological: Negative for dizziness.   Hematological: Does not bruise/bleed easily.   Psychiatric/Behavioral: Negative for agitation. The patient is not nervous/anxious.      Objective:     Vital Signs (Most Recent):  Temp: 96.3 °F (35.7 °C) (11/13/20 1223)  Pulse: 87 (11/13/20 1223)  Resp: 18 (11/13/20 1223)  BP: 128/62 (11/13/20 1223)  SpO2: 96 % (11/13/20 1223) Vital Signs (24h Range):  Temp:  [96.3 °F (35.7 °C)-100.3 °F (37.9 °C)] 96.3 °F (35.7 °C)  Pulse:  [] 87  Resp:  [16-20] 18  SpO2:  [91 %-98 %] 96 %  BP: (119-152)/(57-76) 128/62     Weight: 90.7 kg (200 lb)  Body mass index is 37.79 kg/m².    Intake/Output Summary (Last 24 hours) at 11/13/2020 1307  Last data filed at 11/12/2020 2111  Gross per 24 hour   Intake 1071.25 ml   Output --   Net 1071.25 ml      Physical Exam  Vitals signs reviewed.    Constitutional:       Appearance: She is obese.   HENT:      Head: Normocephalic and atraumatic.      Nose: Nose normal.      Mouth/Throat:      Mouth: Mucous membranes are dry.   Eyes:      Extraocular Movements: Extraocular movements intact.      Pupils: Pupils are equal, round, and reactive to light.   Cardiovascular:      Rate and Rhythm: Normal rate.      Pulses: Normal pulses.   Pulmonary:      Effort: Pulmonary effort is normal.      Breath sounds: Normal breath sounds.   Abdominal:      General: There is no distension.      Tenderness: There is no abdominal tenderness.      Comments: Incision site intact in dressing   Musculoskeletal: Normal range of motion.   Skin:     General: Skin is warm.      Capillary Refill: Capillary refill takes 2 to 3 seconds.   Neurological:      General: No focal deficit present.      Mental Status: She is alert and oriented to person, place, and time.   Psychiatric:         Mood and Affect: Mood normal.         Behavior: Behavior normal.         Significant Labs:   BMP:   Recent Labs   Lab 11/13/20  0556   GLU 96      K 4.0      CO2 24   BUN 4*   CREATININE 0.7   CALCIUM 8.1*     CBC:   Recent Labs   Lab 11/12/20 0414 11/13/20  0556   WBC 7.06 5.25   HGB 7.7* 10.4*   HCT 24.2* 33.1*    251     CMP:   Recent Labs   Lab 11/12/20 0414 11/13/20  0556   * 136   K 3.8 4.0    103   CO2 22* 24    96   BUN 8 4*   CREATININE 0.7 0.7   CALCIUM 7.4* 8.1*   ANIONGAP 9 9   EGFRNONAA >60 >60       Significant Imaging: I have reviewed all pertinent imaging results/findings within the past 24 hours.

## 2020-11-13 NOTE — PROGRESS NOTES
[facility] Hospital Medicine  Progress Note    Patient Name: Tessa Enriquez  MRN: [id number]  Patient Class: IP- Inpatient   Admission Date: 11/9/2020  Length of Stay: 4 days  Attending Physician: Delisa Monzon MD  Primary Care Provider: Karma Mcdermott MD        Subjective:     Principal Problem:Colonic mass        HPI:  Patient is 64-year-old  female with past medical history significant for chronic back pain, hypothyroidism, obesity who getting admitted in the hospital postoperatively.  Patient received robotic laparoscopic with conversion to open low anterior resection with colorectal anastomosis by Dr. patel on 11/09/2020.  Patient was recently diagnosed on colonoscopy a large bulky mass in the rectosigmoid colon biopsy conformed adenocarcinoma patient was referred to general surgery for surgery.  Patient is getting seen in PACU currently patient received pain medication and and Phenergan for nausea and is somnolent.  Is arousable with verbal stimulus complains of some nausea and abdominal soreness otherwise denies any vomiting, chest pain, shortness of breath.  Vital signs are stable    Overview/Hospital Course:  Patient is admitted postoperatively she is status post laparoscopic with conversion to open low anterior resection with colorectal anastomosis by Dr. patel on 11/09/2020.  Postop creatinine slightly jumped to 1.5 with IV fluids creatinine is normal now.  Patient developed postsurgical anemia and received 2 units of PRBC transfusion hemoglobin remained stable.  However P patient is complaining blood in stool Dr. patel has been following and he is comfortable patient letting go home however patient wants to stay tonight.  Possible discharge in the morning with home health which has been arranged.    Interval History:  Patient is seen and examined today.  Patient is doing very well.  She is tolerating diet having bowel movements.  Patient stated that she had bright blood per  rectum and does not feel comfortable to go home today.  Patient received 2 units of PRBC is tender hemoglobin is stable discussed with Dr. brown but hopefully discharge tomorrow morning with home health      Review of Systems   Constitutional: Negative for activity change, appetite change and fever.   HENT: Negative for congestion and sore throat.    Eyes: Negative for photophobia and visual disturbance.   Respiratory: Negative for cough and shortness of breath.    Cardiovascular: Negative for chest pain and leg swelling.   Gastrointestinal: Positive for blood in stool. Negative for abdominal distention, abdominal pain, nausea and vomiting.   Endocrine: Negative for cold intolerance and heat intolerance.   Genitourinary: Negative for difficulty urinating and hematuria.   Musculoskeletal: Positive for back pain. Negative for arthralgias.   Skin: Negative for color change and rash.   Allergic/Immunologic: Negative for food allergies.   Neurological: Negative for dizziness.   Hematological: Does not bruise/bleed easily.   Psychiatric/Behavioral: Negative for agitation. The patient is not nervous/anxious.      Objective:     Vital Signs (Most Recent):  Temp: 96.3 °F (35.7 °C) (11/13/20 1223)  Pulse: 87 (11/13/20 1223)  Resp: 18 (11/13/20 1223)  BP: 128/62 (11/13/20 1223)  SpO2: 96 % (11/13/20 1223) Vital Signs (24h Range):  Temp:  [96.3 °F (35.7 °C)-100.3 °F (37.9 °C)] 96.3 °F (35.7 °C)  Pulse:  [] 87  Resp:  [16-20] 18  SpO2:  [91 %-98 %] 96 %  BP: (119-152)/(57-76) 128/62     Weight: 90.7 kg (200 lb)  Body mass index is 37.79 kg/m².    Intake/Output Summary (Last 24 hours) at 11/13/2020 1307  Last data filed at 11/12/2020 2111  Gross per 24 hour   Intake 1071.25 ml   Output --   Net 1071.25 ml      Physical Exam  Vitals signs reviewed.   Constitutional:       Appearance: She is obese.   HENT:      Head: Normocephalic and atraumatic.      Nose: Nose normal.      Mouth/Throat:      Mouth: Mucous membranes are dry.    Eyes:      Extraocular Movements: Extraocular movements intact.      Pupils: Pupils are equal, round, and reactive to light.   Cardiovascular:      Rate and Rhythm: Normal rate.      Pulses: Normal pulses.   Pulmonary:      Effort: Pulmonary effort is normal.      Breath sounds: Normal breath sounds.   Abdominal:      General: There is no distension.      Tenderness: There is no abdominal tenderness.      Comments: Incision site intact in dressing   Musculoskeletal: Normal range of motion.   Skin:     General: Skin is warm.      Capillary Refill: Capillary refill takes 2 to 3 seconds.   Neurological:      General: No focal deficit present.      Mental Status: She is alert and oriented to person, place, and time.   Psychiatric:         Mood and Affect: Mood normal.         Behavior: Behavior normal.         Significant Labs:   BMP:   Recent Labs   Lab 11/13/20  0556   GLU 96      K 4.0      CO2 24   BUN 4*   CREATININE 0.7   CALCIUM 8.1*     CBC:   Recent Labs   Lab 11/12/20  0414 11/13/20  0556   WBC 7.06 5.25   HGB 7.7* 10.4*   HCT 24.2* 33.1*    251     CMP:   Recent Labs   Lab 11/12/20 0414 11/13/20  0556   * 136   K 3.8 4.0    103   CO2 22* 24    96   BUN 8 4*   CREATININE 0.7 0.7   CALCIUM 7.4* 8.1*   ANIONGAP 9 9   EGFRNONAA >60 >60       Significant Imaging: I have reviewed all pertinent imaging results/findings within the past 24 hours.      Assessment/Plan:      * Colonic mass  Status post laparoscopic with conversion to open low anterior resection with colorectal anastomosis by Dr. patel on 11/09/2020.  Will follow postop surgical recommendations.  Patient postsurgical anemia will give 2 units of PRBC transfusion  Encourage intensive spirometry use  Physical therapy following patient is ambulating  Patient complains of blood in stool, hemoglobin remained stable discussed with Dr. patel  As needed antiemetics, pain medication  DVT prophylaxis          Acquired  hypothyroidism  Patient has chronic hypothyroidism. TFTs reviewed-   Lab Results   Component Value Date    TSH 17.680 (H) 10/14/2020   . Will continue chronic levothyroxine and adjust for and clinical changes.        Chronic obstructive pulmonary disease with hypoxia  Patient's COPD is controlled currently.  Patient is currently off COPD Pathway. Continue scheduled inhalers Supplemental oxygen and monitor respiratory status closely.           VTE Risk Mitigation (From admission, onward)         Ordered     enoxaparin injection 40 mg  Every 24 hours      11/12/20 0950     IP VTE HIGH RISK PATIENT  Once      11/09/20 1831     Place sequential compression device  Until discontinued      11/09/20 1831                Discharge Planning   DERICK:      Code Status: Prior   Is the patient medically ready for discharge?:     Reason for patient still in hospital (select all that apply): Patient trending condition  Discharge Plan A: Home                  Delisa Monzon MD  Department of Hospital Medicine   [facility]

## 2020-11-13 NOTE — PHYSICIAN QUERY
PT Name: Tessa Enriquez  MR #: [id number]    HEMATOLOGY CLARIFICATION      CDS: Brandy Capley, RN  Email: [email or web address]    This form is a permanent document in the medical record.      Query Date: November 13, 2020    By submitting this query, we are merely seeking further clarification of documentation. Please utilize your independent clinical judgment when addressing the question(s) below.    The Medical Record contains the following:   Indicators  Supporting Clinical Findings Location in Medical Record   X Anemia documented   ANemia: secondary to blood loss from surgery.  Recommend transfusion with 2u prbc     General surgery progress notes 11/12   X H&H    11/7/2020 19:47 11/9/2020 19:32 11/10/2020 05:54 11/11/2020 05:47 11/12/2020 04:14   Hemoglobin 12.2 10.4 (L) 9.9 (L) 8.9 (L) 7.7 (L)   Hematocrit 38.2  31.5 (L) 28.9 (L) 24.2 (L)      Labs 11/7-11/12   X BP                    HR Vital Signs (24h Range):  Temp:  [97.1 °F (36.2 °C)-99.2 °F (37.3 °C)] 98.4 °F (36.9 °C)  Pulse:  [] 99  Resp:  [16-18] 16  SpO2:  [91 %-98 %] 94 %  BP: (115-154)/(56-67) 119/57   Hospital medicine progress notes 11/12    GI bleeding documented      Acute bleeding (Non GI site)     X Transfusion(s) Patient postsurgical anemia will give 2 units of PRBC transfusion Hospital medicine progress notes 11/12      Acute/Chronic illness      Treatments     X Other Postoperative diagnosis:  Proximal rectal cancer    Procedure:    Robotic with conversion to open  Low anterior resection with colorectal anastomosis (22 modifier)  Mobilization of splenic flexure    Blood loss was 300 cc Op note 11/9     Provider, please specify diagnosis or diagnoses associated with above clinical findings.   Please further specify anemia:    [   ] Acute blood loss anemia      [  x ] Acute blood loss anemia expected post-operatively      [   ] Other Hematological Diagnosis (please specify): _________________     [   ] Clinically Undetermined              Please document in your progress notes daily for the duration of treatment, until resolved, and include in your discharge summary.    Form No. [id number]

## 2020-11-13 NOTE — PROGRESS NOTES
POD 4 s/p LAR. Pt resting comfortably today.  SHe received 2 u PRBC  SHe notes no pain.  Denies n/v.  Pt having significant flatus  Reports multiple BM, some bloody.  SHe is concerned by bloody bm    Wt Readings from Last 3 Encounters:   11/09/20 90.7 kg (200 lb)   11/07/20 89.4 kg (197 lb)   11/06/20 91.1 kg (200 lb 13.4 oz)     Temp Readings from Last 3 Encounters:   11/13/20 96.3 °F (35.7 °C) (Oral)   11/07/20 98.4 °F (36.9 °C) (Oral)   11/03/20 97.7 °F (36.5 °C) (Temporal)     BP Readings from Last 3 Encounters:   11/13/20 128/62   11/07/20 135/73   11/03/20 (!) 171/82     Pulse Readings from Last 3 Encounters:   11/13/20 87   11/07/20 84   11/03/20 91     AAOx3  CTA B  Sinus  Soft/nt/nd obese.  BS present  2+ pulses B    Lab Results   Component Value Date    WBC 5.25 11/13/2020    HGB 10.4 (L) 11/13/2020    HCT 33.1 (L) 11/13/2020    MCV 91 11/13/2020     11/13/2020       BMP  Lab Results   Component Value Date     11/13/2020    K 4.0 11/13/2020     11/13/2020    CO2 24 11/13/2020    BUN 4 (L) 11/13/2020    CREATININE 0.7 11/13/2020    CALCIUM 8.1 (L) 11/13/2020    ANIONGAP 9 11/13/2020    ESTGFRAFRICA >60 11/13/2020    EGFRNONAA >60 11/13/2020     A/P: s/p LAR  Ambulate  Regular diet  Pt with stable hemodynamics and appropriate response to h/h.  I think she is stable for d/c.  Pt concerned by bleeding and desires to stay another night.

## 2020-11-13 NOTE — NURSING
MD Wagner and Nicolás made aware that pt is continuing to have bloody BMs through this morning. VSS. Pt is passing gas and BS audible. No dizziness. H/H increased this morning to 10.4 and 33.1. Will continue to monitor.

## 2020-11-13 NOTE — PLAN OF CARE
Problem: Physical Therapy Goal  Goal: Physical Therapy Goal  Description: Goals to be met by: 12/10/2020     Patient will increase functional independence with mobility by performin. Supine to sit with Supervision  2. Sit to stand transfer with Supervision  3. Bed to chair transfer with Supervision   4. Gait  x 250 feet with Supervision.   5. Ascend/descend 3 stairs with bilateral Handrails Supervision.     Outcome: Ongoing, Progressing   Improve mobility, ambulate with assistance for safety.

## 2020-11-13 NOTE — PLAN OF CARE
POC reviewed with patient at the start of shift. Patient AAOx4. 2nd unit of blood completed this shift. Patient tolerated well. Up to toilet with 1 person asst and walker. Patient has one bm that had some bright blood present. Patient is passing a lot of gas. States her abd feels better. MD is aware. Voids adequately. Midline abd surgical incision with steristrips intact, also 5 lap sites covered with bandaids. Dressings CDI. Patient refused Dulcolax due to loose stools. Bed in low and locked position, bed alarm set. Call light in reach. Will continue to monitor. Voiced understanding to call for any assistance.

## 2020-11-14 VITALS
SYSTOLIC BLOOD PRESSURE: 119 MMHG | BODY MASS INDEX: 37.76 KG/M2 | TEMPERATURE: 100 F | HEIGHT: 61 IN | OXYGEN SATURATION: 96 % | RESPIRATION RATE: 18 BRPM | HEART RATE: 92 BPM | DIASTOLIC BLOOD PRESSURE: 74 MMHG | WEIGHT: 200 LBS

## 2020-11-14 LAB
ANION GAP SERPL CALC-SCNC: 6 MMOL/L (ref 8–16)
BASOPHILS # BLD AUTO: 0.02 K/UL (ref 0–0.2)
BASOPHILS NFR BLD: 0.4 % (ref 0–1.9)
BUN SERPL-MCNC: 3 MG/DL (ref 8–23)
CALCIUM SERPL-MCNC: 7.8 MG/DL (ref 8.7–10.5)
CHLORIDE SERPL-SCNC: 104 MMOL/L (ref 95–110)
CO2 SERPL-SCNC: 28 MMOL/L (ref 23–29)
CREAT SERPL-MCNC: 0.6 MG/DL (ref 0.5–1.4)
DIFFERENTIAL METHOD: ABNORMAL
EOSINOPHIL # BLD AUTO: 0.3 K/UL (ref 0–0.5)
EOSINOPHIL NFR BLD: 5.3 % (ref 0–8)
ERYTHROCYTE [DISTWIDTH] IN BLOOD BY AUTOMATED COUNT: 13.4 % (ref 11.5–14.5)
EST. GFR  (AFRICAN AMERICAN): >60 ML/MIN/1.73 M^2
EST. GFR  (NON AFRICAN AMERICAN): >60 ML/MIN/1.73 M^2
GLUCOSE SERPL-MCNC: 101 MG/DL (ref 70–110)
HCT VFR BLD AUTO: 30.9 % (ref 37–48.5)
HGB BLD-MCNC: 10 G/DL (ref 12–16)
IMM GRANULOCYTES # BLD AUTO: 0.03 K/UL (ref 0–0.04)
IMM GRANULOCYTES NFR BLD AUTO: 0.6 % (ref 0–0.5)
LYMPHOCYTES # BLD AUTO: 0.9 K/UL (ref 1–4.8)
LYMPHOCYTES NFR BLD: 18.9 % (ref 18–48)
MCH RBC QN AUTO: 29.6 PG (ref 27–31)
MCHC RBC AUTO-ENTMCNC: 32.4 G/DL (ref 32–36)
MCV RBC AUTO: 91 FL (ref 82–98)
MONOCYTES # BLD AUTO: 0.5 K/UL (ref 0.3–1)
MONOCYTES NFR BLD: 9.8 % (ref 4–15)
NEUTROPHILS # BLD AUTO: 3.1 K/UL (ref 1.8–7.7)
NEUTROPHILS NFR BLD: 65 % (ref 38–73)
NRBC BLD-RTO: 0 /100 WBC
PLATELET # BLD AUTO: 247 K/UL (ref 150–350)
PMV BLD AUTO: 10.2 FL (ref 9.2–12.9)
POTASSIUM SERPL-SCNC: 3.7 MMOL/L (ref 3.5–5.1)
RBC # BLD AUTO: 3.38 M/UL (ref 4–5.4)
SODIUM SERPL-SCNC: 138 MMOL/L (ref 136–145)
WBC # BLD AUTO: 4.7 K/UL (ref 3.9–12.7)

## 2020-11-14 PROCEDURE — 97116 GAIT TRAINING THERAPY: CPT

## 2020-11-14 PROCEDURE — 80048 BASIC METABOLIC PNL TOTAL CA: CPT

## 2020-11-14 PROCEDURE — 94761 N-INVAS EAR/PLS OXIMETRY MLT: CPT

## 2020-11-14 PROCEDURE — 94760 N-INVAS EAR/PLS OXIMETRY 1: CPT

## 2020-11-14 PROCEDURE — 25000003 PHARM REV CODE 250: Performed by: INTERNAL MEDICINE

## 2020-11-14 PROCEDURE — 36415 COLL VENOUS BLD VENIPUNCTURE: CPT

## 2020-11-14 PROCEDURE — 25000003 PHARM REV CODE 250: Performed by: SURGERY

## 2020-11-14 PROCEDURE — 94799 UNLISTED PULMONARY SVC/PX: CPT

## 2020-11-14 PROCEDURE — 85025 COMPLETE CBC W/AUTO DIFF WBC: CPT

## 2020-11-14 RX ORDER — OXYCODONE AND ACETAMINOPHEN 5; 325 MG/1; MG/1
1 TABLET ORAL EVERY 6 HOURS PRN
Qty: 20 TABLET | Refills: 0 | Status: SHIPPED | OUTPATIENT
Start: 2020-11-14 | End: 2021-04-16

## 2020-11-14 RX ADMIN — GABAPENTIN 200 MG: 100 CAPSULE ORAL at 09:11

## 2020-11-14 RX ADMIN — OXYCODONE HYDROCHLORIDE AND ACETAMINOPHEN 1 TABLET: 5; 325 TABLET ORAL at 06:11

## 2020-11-14 RX ADMIN — FAMOTIDINE 20 MG: 10 INJECTION INTRAVENOUS at 09:11

## 2020-11-14 RX ADMIN — OXYCODONE HYDROCHLORIDE AND ACETAMINOPHEN 1 TABLET: 5; 325 TABLET ORAL at 12:11

## 2020-11-14 RX ADMIN — MUPIROCIN: 20 OINTMENT TOPICAL at 09:11

## 2020-11-14 RX ADMIN — LEVOTHYROXINE SODIUM 88 MCG: 88 TABLET ORAL at 06:11

## 2020-11-14 NOTE — CARE UPDATE
11/14/20 0843   Patient Assessment/Suction   Level of Consciousness (AVPU) alert   Respiratory Effort Normal;Unlabored   Expansion/Accessory Muscles/Retractions no use of accessory muscles;no retractions   All Lung Fields Breath Sounds clear   Rhythm/Pattern, Respiratory unlabored;pattern regular   PRE-TX-O2   O2 Device (Oxygen Therapy) room air   SpO2 95 %   Pulse Oximetry Type Intermittent   $ Pulse Oximetry - Multiple Charge Pulse Oximetry - Multiple   Pulse 83   Resp 17   Incentive Spirometer   $ Incentive Spirometer Charges done with encouragement   Incentive Spirometer Predicted Level (mL) 1800   Administration (IS) instruction provided, follow-up   Number of Repetitions (IS) 10   Level Incentive Spirometer (mL) 1500   Patient Tolerance (IS) good

## 2020-11-14 NOTE — PLAN OF CARE
POC reviewed with pt; pt verbalized understanding. Pt AAO x 4. Pt ambulates with standby assistance. Pt voids on toilet w/o difficulty. Pt had 4 liquid bowel movements in total today. First 2 contained small amounts of blood and second 2 were free from blood and yellow/tan in color. Surgical dressing intact. Bandaids cdi; steristrips intact with dried drainage. Room air. PRN pain medication administered. Pt afebrile. PIV cdi. Call bell in reach, bed locked, bed alarm on, and fall band and non skid socks on. Will continue to monitor.

## 2020-11-14 NOTE — PLAN OF CARE
Problem: Physical Therapy Goal  Goal: Physical Therapy Goal  Description: Goals to be met by: 12/10/2020     Patient will increase functional independence with mobility by performin. Supine to sit with Supervision  2. Sit to stand transfer with Supervision  3. Bed to chair transfer with Supervision   4. Gait  x 250 feet with Supervision.   5. Ascend/descend 3 stairs with bilateral Handrails Supervision.     Outcome: Ongoing, Progressing  Follow up patient for progressive mobility and ambulation.

## 2020-11-14 NOTE — PLAN OF CARE
Pt clear for discharge from case management.           11/14/20 1446   Final Note   Assessment Type Final Discharge Note   Anticipated Discharge Disposition Home   What phone number can be called within the next 1-3 days to see how you are doing after discharge? [phone]

## 2020-11-14 NOTE — DISCHARGE SUMMARY
[facility] Hospital Medicine  Discharge Summary      Patient Name: Tessa Enriquez  MRN: [id number]  Admission Date: 11/9/2020  Hospital Length of Stay: 5 days  Discharge Date and Time:  11/14/2020 5:54 PM  Attending Physician: Parul Portillo MD   Discharging Provider: Parul Portillo MD  Primary Care Provider: Karma Mcdermott MD      HPI:   Patient is 64-year-old  female with past medical history significant for chronic back pain, hypothyroidism, obesity who getting admitted in the hospital postoperatively.  Patient received robotic laparoscopic with conversion to open low anterior resection with colorectal anastomosis by Dr. wagner on 11/09/2020.  Patient was recently diagnosed on colonoscopy a large bulky mass in the rectosigmoid colon biopsy conformed adenocarcinoma patient was referred to general surgery for surgery.  Patient is getting seen in PACU currently patient received pain medication and and Phenergan for nausea and is somnolent.  Is arousable with verbal stimulus complains of some nausea and abdominal soreness otherwise denies any vomiting, chest pain, shortness of breath.  Vital signs are stable    Procedure(s) (LRB):  ROBOTIC COLECTOMY low anterior resection, possible open (N/A)  COLECTOMY (N/A)      Hospital Course:   Patient was admitted postoperatively she is status post laparoscopic with conversion to open low anterior resection with colorectal anastomosis by Dr. Wagner on 11/09/2020.  Postop creatinine slightly jumped to 1.5 with IV fluids creatinine is normal now.  Patient developed postsurgical anemia and received 2 units of PRBC transfusion hemoglobin remained stable.  Postoperatively patient had few bowel movements which were melanotic.  Dr. Wagner reassured the patient.  Hemoglobin closely followed.  Abdominal pain is controlled.  This morning patient is stable for discharge and was cleared for discharge by Dr. Juaquin Barragan.  Case discussed with him.  Patient will  follow-up with Dr. Wagner next week.  In 4 days patient will have a repeat CBC and TSH will be checked in 1 month.  Patient was instructed in case of any worsening symptoms or rectal bleeding or dark stools, patient to return to the nearest emergency room as soon as possible or call 911.  Patient to continue close follow-up with her doctors and Dr. Wagner.    Consults: Dr. Wagner      Final Active Diagnoses:    Diagnosis Date Noted POA    PRINCIPAL PROBLEM:  Colonic mass [K63.89] 10/23/2020 Yes    Acquired hypothyroidism [E03.9] 10/15/2020 Yes    Pure hypercholesterolemia [E78.00] 10/15/2020 Yes    Chronic obstructive pulmonary disease with hypoxia [J44.9, R09.02] 07/09/2018 Yes      Problems Resolved During this Admission:       Discharged Condition: good    Disposition: Home or Self Care    Follow Up:  Follow-up Information     Brandon Wagner MD. Go in 1 week.    Specialties: General Surgery, Colon and Rectal Surgery, Surgery  Why: November 19, 2020 @ 1:30pm  Contact information:  [address] [facility]  [address]  [place] [address]  [phone]             Karma Mcdermott MD In 1 week.    Specialty: Internal Medicine  Contact information:  [address] [facility]  [place] [address]  [phone]                 Patient Instructions:      CBC W/ AUTO DIFFERENTIAL   Standing Status: Future Standing Exp. Date: 01/13/22     TSH   Standing Status: Future Standing Exp. Date: 01/13/22     Diet Cardiac     Activity as tolerated   Order Comments: Up with assist, observe fall precautions       Significant Diagnostic Studies: Labs:   CMP   Recent Labs   Lab 11/13/20 0556 11/14/20 0518    138   K 4.0 3.7    104   CO2 24 28   GLU 96 101   BUN 4* 3*   CREATININE 0.7 0.6   CALCIUM 8.1* 7.8*   ANIONGAP 9 6*   ESTGFRAFRICA >60 >60   EGFRNONAA >60 >60    and CBC   Recent Labs   Lab 11/13/20 0556 11/14/20 0518   WBC 5.25 4.70   HGB 10.4* 10.0*   HCT 33.1* 30.9*    247       Pending Diagnostic Studies:     Procedure  Component Value Units Date/Time    Specimen to Pathology, Surgery General Surgery [[id number]] Collected: 11/09/20 1646    Order Status: Sent Lab Status: In process Updated: 11/10/20 0724         Medications:  Reconciled Home Medications:      Medication List      START taking these medications    oxyCODONE-acetaminophen 5-325 mg per tablet  Commonly known as: PERCOCET  Take 1 tablet by mouth every 6 (six) hours as needed for Pain.        CONTINUE taking these medications    levothyroxine 88 MCG tablet  Commonly known as: SYNTHROID  Take 1 tablet (88 mcg total) by mouth before breakfast.        STOP taking these medications    traMADoL 50 mg tablet  Commonly known as: ULTRAM            Indwelling Lines/Drains at time of discharge:   Lines/Drains/Airways     None                 Time spent on the discharge of patient: 34 minutes  Patient was seen and examined on the date of discharge and determined to be suitable for discharge.         Parul Portillo MD  Department of Hospital Medicine  [facility]

## 2020-11-14 NOTE — PLAN OF CARE
Plan of care reviewed with patient, verbalizes understanding. VSS. Meds administered PRN for c/o pain. Abdominal dressings CDI. Ambulating with standby assist. No blood noted in stool during this shift. Safety maintained, bed in lowest position, wheels locked, call light in reach.

## 2020-11-14 NOTE — PT/OT/SLP PROGRESS
Physical Therapy Treatment    Patient Name:  Tessa Enriquez   MRN:  [id number]    Recommendations:     Discharge Recommendations:  home   Discharge Equipment Recommendations: walker, rolling   Barriers to discharge: None    Assessment:     Tessa Enriquez is a 64 y.o. female admitted with a medical diagnosis of Colonic mass.  She presents with the following impairments/functional limitations:  weakness, impaired endurance, pain ..    Rehab Prognosis: Good; patient would benefit from acute skilled PT services to address these deficits and reach maximum level of function.    Recent Surgery: Procedure(s) (LRB):  ROBOTIC COLECTOMY low anterior resection, possible open (N/A)  COLECTOMY (N/A) 5 Days Post-Op    Plan:     During this hospitalization, patient to be seen daily to address the identified rehab impairments via gait training, therapeutic activities, therapeutic exercises and progress toward the following goals:    · Plan of Care Expires:  12/10/20    Subjective     Chief Complaint: c/o gas pains  Patient/Family Comments/goals: refused using assistive device to walk  Pain/Comfort:  · Pain Rating 1: 8/10  · Location 1: abdomen  · Pain Addressed 1: Distraction, Reposition      Objective:     Communicated with nurse Payan prior to session.  Patient found up in chair with   upon PT entry to room.     General Precautions: Standard, fall   Orthopedic Precautions:N/A   Braces:       Functional Mobility:  · Transfers:     · Sit to Stand:  supervision with no AD  · Gait: no  ft. Slow and slightly tentative \. Supervision to CGA.  · Balance: good static standing; fair dynamic gait    Therapeutic Activities and Exercises:   Patiwent see for ambulation and mobility. Mostly modified independent with set up only to put on another gown and to minimize patient's tendency to reach for the counter during ambulation.Refused to use RW.    Patient left supine with all lines intact and call button in reach..    GOALS:    Multidisciplinary Problems     Physical Therapy Goals        Problem: Physical Therapy Goal    Goal Priority Disciplines Outcome Goal Variances Interventions   Physical Therapy Goal     PT, PT/OT Ongoing, Progressing     Description: Goals to be met by: 12/10/2020     Patient will increase functional independence with mobility by performin. Supine to sit with Supervision  2. Sit to stand transfer with Supervision  3. Bed to chair transfer with Supervision   4. Gait  x 250 feet with Supervision.   5. Ascend/descend 3 stairs with bilateral Handrails Supervision.                      Time Tracking:     PT Received On: 20  PT Start Time: 834     PT Stop Time: 849  PT Total Time (min): 15 min     Billable Minutes: Gait Training 15    Treatment Type: Treatment  PT/PTA: PT     PTA Visit Number: 2     Mikhail Nice, PT  2020

## 2020-11-15 NOTE — NURSING
POC reviewed with pt; pt verbalized understanding. Pt AAO x 4. Pt ambulates with standby assistance. Pt voids in toilet w/o difficulty. No n/v or blood in stool. Room air. Pain controlled with PRN medication. Pt afebrile. PIV cdi; removed prior to d/c. Safety maintained. Discharge instructions reviewed with pt; pt verbalized understanding. Pt advised on f/u appointments, incision care, and home medications. Pt advised to not take baths, swim, or drive until f/u appt.

## 2020-11-17 ENCOUNTER — TELEPHONE (OUTPATIENT)
Dept: SURGERY | Facility: CLINIC | Age: 65
End: 2020-11-17

## 2020-11-17 ENCOUNTER — PATIENT OUTREACH (OUTPATIENT)
Dept: ADMINISTRATIVE | Facility: CLINIC | Age: 65
End: 2020-11-17

## 2020-11-17 DIAGNOSIS — C18.9 MALIGNANT NEOPLASM OF COLON, UNSPECIFIED PART OF COLON: Primary | ICD-10-CM

## 2020-11-17 LAB
COMMENT: NORMAL
FINAL PATHOLOGIC DIAGNOSIS: NORMAL
GROSS: NORMAL
Lab: NORMAL

## 2020-11-17 NOTE — PROGRESS NOTES
Cc: post op    HPI: 64 y.o.  female  1.5 weeks s/p LAR.   Pt notes that she has been feeling well.  Notes she has nausea with her pain pills.  States she is tolerating diet.  Reports flatus 3-5 BMs a day.  Mostly loose but becoming more formed.        PE: AFVSS    AAOx3  CTA  Soft/NT/nd  Inc: clean/intact.  Some erythema noted.           Path:   1.  Rectum, sigmoid colon and proximal donut (low anterior resection):   - Invasive adenocarcinoma,  invading through the muscularis propria into   pericolorectal tissue   - Pathologic stage pT3, pN1a (see synoptic report)   - Resection margins negative for dysplasia or malignancy (proximal, distal   and radial margins), within 1 mm to radial margin   - One tumor deposit   - Adenocarcinoma involve one out of totally forty seven lymph nodes (1/46 in   part 1, totally 1/47, also see part 2)   - Background colon with diverticulosis associated with hyperpigmented   macrophages, benign   - Proximal donut with benign colon, no dysplasia or malignancy   - Tattoo identified   2.  Colon, distal donut (excision):   - Benign colon, negative for dysplasia or malignancy   - One lymph node, negative for metastatic carcinoma (0/1)   COLON AND RECTUM: Resection, Including Transanal Disk Excision of Rectal   Neoplasms   Procedure  Low anterior resection   Tumor Site  Rectum    Tumor Location: Straddles the anterior peritoneal reflection   Tumor Size   Greatest dimension (centimeters): 7.0 cm   + Additional dimensions (centimeters): 4.7 x 2.2 cm   Macroscopic Tumor Perforation  Not identified   Macroscopic Intactness of Mesorectum  Near complete   Histologic Type  Adenocarcinoma   Histologic Grade   G2: Moderately differentiated   Tumor Extension  Tumor invades through the muscularis propria into   pericolorectal tissue   Margins    All margins are uninvolved by invasive carcinoma, high-grade dysplasia,   intramucosal adenocarcinoma, and adenoma   Margins examined:  proximal, distal, radial  or mesenteric    Distance of invasive carcinoma from closest margin (millimeters or   centimeters): less than 1 mm, radial margin (block 1C)   Treatment Effect  No known presurgical therapy   Lymphovascular Invasion  Not identified   Perineural Invasion  Not identified   Tumor Deposits   Present   Specify number of deposits: 1   Regional Lymph Nodes   Number of Lymph Nodes Involved: 1   Number of Lymph Nodes Examined: 47   Pathologic Stage Classification (pTNM, AJCC 8th Edition)   Primary Tumor (pT)    pT3: Tumor invades through the muscularis propria into pericolorectal tissues   Regional Lymph Nodes (pN)    pN1a: One regional lymph node is positive    Additional Pathologic Findings    Diverticulosis, tattoo present       A/P: St III rectal cancer   Pt doing well post surgery.   Ok to stop narcotics and use tramodol  WOund opened for 4 cm. Moderate amount of purulent fluid drained.     Path d/w pt.  Will refer to oncology  Pt to rtc in 2 weeks.

## 2020-11-17 NOTE — TELEPHONE ENCOUNTER
Spoke to Umu, nausea only, pain in back, good BM's passing gas. Daughter feels pain med related, will use her Zofran prn 6 hours. Dr Wagner notified.

## 2020-11-17 NOTE — TELEPHONE ENCOUNTER
----- Message from Alisa Caba sent at 11/17/2020  9:53 AM CST -----  Contact: Pt's Daughter  Patient: Tessa Enriquez  Phone: [phone]    Pt's daughter Umu calling requesting call back. States pt has been feeling nauseated, no vomiting. Daughter states she has Rx of Zofran and wants to know if it's ok to give one to patient. Please call back and advise. Thank you

## 2020-11-17 NOTE — PROGRESS NOTES
C3 nurse attempted to contact patient. No answer. The following message was left for the patient to return the call:  Good morning I am a nurse calling on behalf of [facility] from the [facility].  This is a Transitional Care Call for Tessa. When you have a moment please contact us at [phone] or [phone] Monday through Friday, between the hours of 8 am to 4 pm. We look forward to speaking with you. On behalf of [facility] have a nice day.    The patient does not have a scheduled HOSFU appointment within 7-14 days post hospital discharge date 11/13/2020. Non [facility] provider.

## 2020-11-18 NOTE — PHYSICIAN QUERY
PT Name: Tessa Enriquez  MR #: [id number]    Pathology Findings Clarification     CDS: Brandy Capley, RN  Email: [email or web address]    This form is a permanent document in the medical record.     Query Date: November 18, 2020    By submitting this query, we are merely seeking further clarification of documentation.  Please utilize your independent clinical judgment when addressing the question(s) below.    The medical record contains the following:  Pathology Findings Location in Medical Record     Rectum, sigmoid colon and proximal donut (low anterior resection):  - Invasive adenocarcinoma, invading through the muscularis propria into pericolorectal tissue  - Pathologic stage pT3, pN1a (see synoptic report)  - Resection margins negative for dysplasia or malignancy (proximal, distal and radial margins), within 1 mm to radial margin  - One tumor deposit  - Adenocarcinoma involve one out of totally forty seven lymph nodes(1/46 in part 1, totally 1/47, also see part 2)  - Background colon with diverticulosis associated with hyperpigmented macrophages, benign  - Proximal donut with benign colon, no dysplasia or malignancy  - Tattoo identified   Surgical pathology 11/9       Please clarify:  [  x] Pathology findings noted above are ruled in/confirmed as diagnoses     [  ] Pathology findings noted above are not confirmed as diagnoses     [  ] Other diagnosis (please specify): ___________     [  ] Clinically Undetermined         Please document in your progress notes daily for the duration of treatment until resolved and include in your discharge summary.

## 2020-11-19 ENCOUNTER — OFFICE VISIT (OUTPATIENT)
Dept: SURGERY | Facility: CLINIC | Age: 65
End: 2020-11-19
Payer: COMMERCIAL

## 2020-11-19 VITALS — HEIGHT: 61 IN | WEIGHT: 196.44 LBS | BODY MASS INDEX: 37.09 KG/M2 | TEMPERATURE: 97 F

## 2020-11-19 DIAGNOSIS — T88.8XXA FLUID COLLECTION AT SURGICAL SITE, INITIAL ENCOUNTER: ICD-10-CM

## 2020-11-19 DIAGNOSIS — Z09 POSTOP CHECK: Primary | ICD-10-CM

## 2020-11-19 PROCEDURE — 99024 POSTOP FOLLOW-UP VISIT: CPT | Mod: S$GLB,,, | Performed by: SURGERY

## 2020-11-19 PROCEDURE — 3008F BODY MASS INDEX DOCD: CPT | Mod: CPTII,S$GLB,, | Performed by: SURGERY

## 2020-11-19 PROCEDURE — 99024 PR POST-OP FOLLOW-UP VISIT: ICD-10-PCS | Mod: S$GLB,,, | Performed by: SURGERY

## 2020-11-19 PROCEDURE — 3008F PR BODY MASS INDEX (BMI) DOCUMENTED: ICD-10-PCS | Mod: CPTII,S$GLB,, | Performed by: SURGERY

## 2020-11-19 PROCEDURE — 99999 PR PBB SHADOW E&M-EST. PATIENT-LVL III: CPT | Mod: PBBFAC,,, | Performed by: SURGERY

## 2020-11-19 PROCEDURE — 99999 PR PBB SHADOW E&M-EST. PATIENT-LVL III: ICD-10-PCS | Mod: PBBFAC,,, | Performed by: SURGERY

## 2020-11-19 PROCEDURE — 1125F AMNT PAIN NOTED PAIN PRSNT: CPT | Mod: S$GLB,,, | Performed by: SURGERY

## 2020-11-19 PROCEDURE — 1125F PR PAIN SEVERITY QUANTIFIED, PAIN PRESENT: ICD-10-PCS | Mod: S$GLB,,, | Performed by: SURGERY

## 2020-11-19 RX ORDER — TRAMADOL HYDROCHLORIDE 50 MG/1
50 TABLET ORAL EVERY 6 HOURS
Qty: 30 TABLET | Refills: 0 | Status: SHIPPED | OUTPATIENT
Start: 2020-11-19 | End: 2020-12-01 | Stop reason: SDUPTHER

## 2020-11-24 ENCOUNTER — TELEPHONE (OUTPATIENT)
Dept: SURGERY | Facility: CLINIC | Age: 65
End: 2020-11-24

## 2020-11-24 NOTE — TELEPHONE ENCOUNTER
I called and left a message at 12:04pm to inform her per Dr. Carlisle that she can take Imodium over the counter for the diarrhea.  Anurag

## 2020-11-24 NOTE — TELEPHONE ENCOUNTER
----- Message from Sylvia Armstrong sent at 11/24/2020  9:03 AM CST -----  Type: Needs Medical Advice  Who Called:  patient daughter in law Umu  Symptoms (please be specific):    How long has patient had these symptoms:    Pharmacy name and phone #:    Best Call Back Number:   Additional Information: requesting a call back from Marito regarding patient's diahrea need to know what patient can take for it?

## 2020-12-01 ENCOUNTER — OFFICE VISIT (OUTPATIENT)
Dept: SURGERY | Facility: CLINIC | Age: 65
End: 2020-12-01
Payer: COMMERCIAL

## 2020-12-01 VITALS
TEMPERATURE: 97 F | DIASTOLIC BLOOD PRESSURE: 75 MMHG | HEIGHT: 61 IN | HEART RATE: 104 BPM | BODY MASS INDEX: 34.75 KG/M2 | SYSTOLIC BLOOD PRESSURE: 143 MMHG | WEIGHT: 184.06 LBS

## 2020-12-01 DIAGNOSIS — G89.18 POSTOPERATIVE PAIN: Primary | ICD-10-CM

## 2020-12-01 DIAGNOSIS — Z09 POSTOP CHECK: ICD-10-CM

## 2020-12-01 DIAGNOSIS — C18.9 MALIGNANT NEOPLASM OF COLON, UNSPECIFIED PART OF COLON: ICD-10-CM

## 2020-12-01 PROCEDURE — 1126F PR PAIN SEVERITY QUANTIFIED, NO PAIN PRESENT: ICD-10-PCS | Mod: S$GLB,,, | Performed by: SURGERY

## 2020-12-01 PROCEDURE — 1126F AMNT PAIN NOTED NONE PRSNT: CPT | Mod: S$GLB,,, | Performed by: SURGERY

## 2020-12-01 PROCEDURE — 99999 PR PBB SHADOW E&M-EST. PATIENT-LVL IV: CPT | Mod: PBBFAC,,, | Performed by: SURGERY

## 2020-12-01 PROCEDURE — 99999 PR PBB SHADOW E&M-EST. PATIENT-LVL IV: ICD-10-PCS | Mod: PBBFAC,,, | Performed by: SURGERY

## 2020-12-01 PROCEDURE — 3008F BODY MASS INDEX DOCD: CPT | Mod: CPTII,S$GLB,, | Performed by: SURGERY

## 2020-12-01 PROCEDURE — 3008F PR BODY MASS INDEX (BMI) DOCUMENTED: ICD-10-PCS | Mod: CPTII,S$GLB,, | Performed by: SURGERY

## 2020-12-01 PROCEDURE — 99024 PR POST-OP FOLLOW-UP VISIT: ICD-10-PCS | Mod: S$GLB,,, | Performed by: SURGERY

## 2020-12-01 PROCEDURE — 99024 POSTOP FOLLOW-UP VISIT: CPT | Mod: S$GLB,,, | Performed by: SURGERY

## 2020-12-01 RX ORDER — TRAMADOL HYDROCHLORIDE 50 MG/1
50 TABLET ORAL EVERY 6 HOURS
Qty: 30 TABLET | Refills: 0 | Status: SHIPPED | OUTPATIENT
Start: 2020-12-01 | End: 2021-02-25 | Stop reason: SDUPTHER

## 2020-12-01 NOTE — PROGRESS NOTES
Cc: post op    HPI: 64 y.o.  female  3 weeks s/p LAR.   Pt notes that she is feeling well.  Pt had wound infection which was opened 2 weeks ago.  Cont with daily packing.  Minimal drainage daily.  No fever/chill. Pt reports good BM and flatus daily.       PE: AFVSS    AAOx3  CTA  Soft/NT/nd  Inc: 2 cm opening with granulating tissue.  No signs of infection in surrounding skin      Path: 1.  Rectum, sigmoid colon and proximal donut (low anterior resection):   - Invasive adenocarcinoma,  invading through the muscularis propria into   pericolorectal tissue   - Pathologic stage pT3, pN1a (see synoptic report)   - Resection margins negative for dysplasia or malignancy (proximal, distal   and radial margins), within 1 mm to radial margin   - One tumor deposit   - Adenocarcinoma involve one out of totally forty seven lymph nodes (1/46 in   part 1, totally 1/47, also see part 2)   - Background colon with diverticulosis associated with hyperpigmented   macrophages, benign   - Proximal donut with benign colon, no dysplasia or malignancy   - Tattoo identified   2.  Colon, distal donut (excision):   - Benign colon, negative for dysplasia or malignancy   - One lymph node, negative for metastatic carcinoma (0/1)   COLON AND RECTUM: Resection, Including Transanal Disk Excision of Rectal   Neoplasms   Procedure  Low anterior resection   Tumor Site  Rectum    Tumor Location: Straddles the anterior peritoneal reflection   Tumor Size     A/P:   Pt doing well post surgery.   F/U with me in 4 weeks  Pt needs referral to oncology for discussion on chemotherapy.  This has been arranged

## 2020-12-02 ENCOUNTER — TELEPHONE (OUTPATIENT)
Dept: HEMATOLOGY/ONCOLOGY | Facility: CLINIC | Age: 65
End: 2020-12-02

## 2020-12-02 NOTE — TELEPHONE ENCOUNTER
LM encouraging call back to clinic to schedule NP appt per referral from Dr. Brandon Wagner for colon cancer.    ----- Message from Marito Graham LPN sent at 12/2/2020 10:24 AM CST -----  New Pt, Hem Onc

## 2020-12-03 ENCOUNTER — TELEPHONE (OUTPATIENT)
Dept: HEMATOLOGY/ONCOLOGY | Facility: CLINIC | Age: 65
End: 2020-12-03

## 2020-12-03 DIAGNOSIS — J44.9 CHRONIC OBSTRUCTIVE PULMONARY DISEASE WITH HYPOXIA: Primary | ICD-10-CM

## 2020-12-03 RX ORDER — ALBUTEROL SULFATE 0.83 MG/ML
2.5 SOLUTION RESPIRATORY (INHALATION) EVERY 6 HOURS PRN
Qty: 1 BOX | Refills: 2 | Status: SHIPPED | OUTPATIENT
Start: 2020-12-03 | End: 2022-07-26

## 2020-12-03 NOTE — TELEPHONE ENCOUNTER
Returned patient's call, no answer.  Mail box full.  Unable to leave VM.    ----- Message from Alicja Prescott sent at 12/3/2020 12:34 PM CST -----  Regarding: PT  Contact: PT  PT returned your call. Please call back     Callback: [phone]

## 2020-12-03 NOTE — TELEPHONE ENCOUNTER
2nd attempt to contact patient to schedule NP appt per referral from Dr. Brandon Wagner for colon cancer.   Called both numbers in chart.  No answer on either number and no option to leave voicemail on either number.

## 2020-12-11 ENCOUNTER — OFFICE VISIT (OUTPATIENT)
Dept: HEMATOLOGY/ONCOLOGY | Facility: CLINIC | Age: 65
End: 2020-12-11
Payer: COMMERCIAL

## 2020-12-11 VITALS
SYSTOLIC BLOOD PRESSURE: 119 MMHG | HEART RATE: 98 BPM | RESPIRATION RATE: 18 BRPM | BODY MASS INDEX: 36.49 KG/M2 | WEIGHT: 185.88 LBS | TEMPERATURE: 99 F | DIASTOLIC BLOOD PRESSURE: 68 MMHG | OXYGEN SATURATION: 95 % | HEIGHT: 60 IN

## 2020-12-11 DIAGNOSIS — C20 RECTAL CANCER: ICD-10-CM

## 2020-12-11 DIAGNOSIS — E78.00 PURE HYPERCHOLESTEROLEMIA: ICD-10-CM

## 2020-12-11 DIAGNOSIS — E03.9 ACQUIRED HYPOTHYROIDISM: Primary | ICD-10-CM

## 2020-12-11 DIAGNOSIS — C18.9 MALIGNANT NEOPLASM OF COLON, UNSPECIFIED PART OF COLON: ICD-10-CM

## 2020-12-11 PROCEDURE — 99999 PR PBB SHADOW E&M-EST. PATIENT-LVL V: CPT | Mod: PBBFAC,,, | Performed by: INTERNAL MEDICINE

## 2020-12-11 PROCEDURE — 99204 OFFICE O/P NEW MOD 45 MIN: CPT | Mod: S$GLB,,, | Performed by: INTERNAL MEDICINE

## 2020-12-11 PROCEDURE — 99999 PR PBB SHADOW E&M-EST. PATIENT-LVL V: ICD-10-PCS | Mod: PBBFAC,,, | Performed by: INTERNAL MEDICINE

## 2020-12-11 PROCEDURE — 1126F PR PAIN SEVERITY QUANTIFIED, NO PAIN PRESENT: ICD-10-PCS | Mod: S$GLB,,, | Performed by: INTERNAL MEDICINE

## 2020-12-11 PROCEDURE — 1126F AMNT PAIN NOTED NONE PRSNT: CPT | Mod: S$GLB,,, | Performed by: INTERNAL MEDICINE

## 2020-12-11 PROCEDURE — 3008F PR BODY MASS INDEX (BMI) DOCUMENTED: ICD-10-PCS | Mod: CPTII,S$GLB,, | Performed by: INTERNAL MEDICINE

## 2020-12-11 PROCEDURE — 99204 PR OFFICE/OUTPT VISIT, NEW, LEVL IV, 45-59 MIN: ICD-10-PCS | Mod: S$GLB,,, | Performed by: INTERNAL MEDICINE

## 2020-12-11 PROCEDURE — 3008F BODY MASS INDEX DOCD: CPT | Mod: CPTII,S$GLB,, | Performed by: INTERNAL MEDICINE

## 2020-12-11 NOTE — PROGRESS NOTES
Subjective:       Patient ID: Tessa Enriquez is a 64 y.o. female.    Chief Complaint:  New diagnosis of rectal CA status post biopsy October 2020 robotic LAR November 9, 2020 by Dr. patel sent here for adjuvant therapy discussion  HPI works at [employer] no issues whatsoever had some mild rectal bleeding which she thought was from diverticulitis eventually had it evaluated pathology as above  Review of Systems      REVIEW OF SYSTEMS:     CONSTITUTIONAL: The patient denies any weight change. There is no apparent    change in appetite, fever, night sweats, headaches, fatigue, dizziness, or    weakness.      SKIN: Denies rash, issues with nails, non-healing sores, bleeding, blotching    skin or abnormal bruising. Denies new moles or changes to existing moles.      BREASTS: There is no swelling around breasts or nipple discharge.    EYES: Denies eye pain, blurred vision, swelling, redness or discharge.      ENT AND MOUTH: Denies runny nose, stuffiness, sinus trouble or sores. Denies    nosebleeds. Denies, hoarseness, change in voice or swelling in front of the    neck.      CARDIOVASCULAR: Denies chest pain, discomfort or palpitations. Denies neck    swelling or episodes of passing out.      RESPIRATORY: Denies cough, sputum production, blood in sputum, and denies    shortness of breath.      GI: Denies trouble swallowing, indigestion, heartburn, abdominal pain, nausea,    vomiting, diarrhea, altered bowel habits, blood in stool, discoloration of    stools, change in nature of stool, bloating, increased abdominal girth.  Status post abdominal surgery with some wound dehiscence undergoing wound care currently with surgery clinic    GENITOURINARY: No discharge. No pelvic pain or lumps. No rash around groin or  lesions. No urinary frequency, hesitation, painful urination or blood in    urine. Denies incontinence. No problems with intercourse.      MUSCULOSKELETAL: Denies neck or back pain. Denies weakness in arms or  legs,    joint problems or distended inflamed veins in legs. Denies swelling or abnormal  glands.      NEUROLOGICAL: Denies tingling, numbness, altered mentation changes to nerve    function in the face, weakness to one or both of the body. Denies changes to    gait and denies multiple falls or accidents.      PSYCHIATRIC: Denies nervousness, anxiety, hallucinations, depression, suicidal    ideation, trouble sleeping or changes in behavior noticed by family.      The patient denies recent foreign travel or recent exposure to chemicals or    products of concern or infectious diseases.           Oncology History:  T3 N1 rectal CA diagnosed October 2020 robotic LAR November 2020    Past Medical History:   Diagnosis Date    Acute hypoxemic respiratory failure 7/9/2018    Arthritis     Cancer     colon    COPD (chronic obstructive pulmonary disease)     History of home oxygen therapy     ONLY PRN AND HASN'T USED IT IN A YEAR    Thyroid disease      Past Surgical History:   Procedure Laterality Date    CHOLECYSTECTOMY      COLECTOMY N/A 11/9/2020    Procedure: COLECTOMY;  Surgeon: Brandon Wagner MD;  Location: [facility] OR;  Service: General;  Laterality: N/A;  LOWER- ANTERIOR    COLONOSCOPY N/A 10/23/2020    Procedure: COLONOSCOPY;  Surgeon: Jolynn Ayala MD;  Location: [facility] ENDO;  Service: Endoscopy;  Laterality: N/A;    HYSTERECTOMY      ROBOT-ASSISTED COLECTOMY N/A 11/9/2020    Procedure: ROBOTIC COLECTOMY low anterior resection, possible open;  Surgeon: Brandon Wagner MD;  Location: [facility] OR;  Service: General;  Laterality: N/A;  CONVERTED TO OPEN AT 1503     Family History   Problem Relation Age of Onset    COPD Mother     COPD Father       Social History     Socioeconomic History    Marital status:      Spouse name: Not on file    Number of children: Not on file    Years of education: Not on file    Highest education level: Not on file   Occupational History    Not on file   Social  Needs    Financial resource strain: Not on file    Food insecurity     Worry: Not on file     Inability: Not on file    Transportation needs     Medical: Not on file     Non-medical: Not on file   Tobacco Use    Smoking status: Former Smoker     Packs/day: 2.00     Years: 40.00     Pack years: 80.00     Types: Cigarettes     Quit date: 2018     Years since quittin.4    Smokeless tobacco: Never Used   Substance and Sexual Activity    Alcohol use: No    Drug use: No    Sexual activity: Not Currently     Partners: Male   Lifestyle    Physical activity     Days per week: Not on file     Minutes per session: Not on file    Stress: Not on file   Relationships    Social connections     Talks on phone: Not on file     Gets together: Not on file     Attends Zoroastrianism service: Not on file     Active member of club or organization: Not on file     Attends meetings of clubs or organizations: Not on file     Relationship status: Not on file   Other Topics Concern    Not on file   Social History Narrative    Not on file     Review of patient's allergies indicates:   Allergen Reactions    Flagyl [metronidazole] Itching and Rash    Pcn [penicillins] Hives, Itching and Rash       Current Outpatient Medications:     albuterol (PROVENTIL) 2.5 mg /3 mL (0.083 %) nebulizer solution, Take 3 mLs (2.5 mg total) by nebulization every 6 (six) hours as needed for Wheezing. Rescue, Disp: 1 Box, Rfl: 2    levothyroxine (SYNTHROID) 88 MCG tablet, Take 1 tablet (88 mcg total) by mouth before breakfast., Disp: 30 tablet, Rfl: 11    oxyCODONE-acetaminophen (PERCOCET) 5-325 mg per tablet, Take 1 tablet by mouth every 6 (six) hours as needed for Pain. (Patient not taking: Reported on 2020), Disp: 20 tablet, Rfl: 0    traMADoL (ULTRAM) 50 mg tablet, Take 1 tablet (50 mg total) by mouth every 6 (six) hours., Disp: 30 tablet, Rfl: 0  PHYSICAL EXAM:     Vitals:    20 1416   BP: 119/68   Pulse: 98   Resp: 18   Temp:  98.7 °F (37.1 °C)       GENERAL: Comfortable looking patient. Patient is in no distress.  Awake, alert and oriented to time, person and place.  No anxiety, or agitation.      HEENT: Normal conjunctivae and eyelids. WNL.  PERRLA 3 to 4 mm. No icterus, no pallor, no congestion, and no discharge noted.     NECK:  Supple. Trachea is central.  No crepitus.  No JVD or masses.    RESPIRATORY:  No intercostal retractions.  No dullness to percussion.  Chest is clear to auscultation.  No rales, rhonchi or wheezes.  No crepitus.  Good air entry bilaterally.    CARDIOVASCULAR:  S1 and S2 are normally heard without murmurs or gallops.  All peripheral pulses are present.    ABDOMEN:  Post surgery wound is open for about 4 cm some purulent fluid has been drained has dressing has follow-up    LYMPHATICS:  No axillary, cervical, supraclavicular, submental, or inguinal lymphadenopathy.    SKIN/MUSCULOSKELETAL:  There is no evidence of excoriation marks or ecchmosis.  No rashes.  No cyanosis.  No clubbing.  No joint or skeletal deformities noted.  Normal range of motion.    NEUROLOGIC:  Higher functions are appropriate.  No cranial nerve deficits.  Normal yandel.  Normal strength.  Motor and sensory functions are normal.  Deep tendon reflexes are normal.    GENITAL/RECTAL:  Exams are deferred.    Lab Results   Component Value Date    WBC 4.70 11/14/2020    HGB 10.0 (L) 11/14/2020    HCT 30.9 (L) 11/14/2020    MCV 91 11/14/2020     11/14/2020       BMP  Lab Results   Component Value Date     11/14/2020    K 3.7 11/14/2020     11/14/2020    CO2 28 11/14/2020    BUN 3 (L) 11/14/2020    CREATININE 0.6 11/14/2020    CALCIUM 7.8 (L) 11/14/2020    ANIONGAP 6 (L) 11/14/2020    ESTGFRAFRICA >60 11/14/2020    EGFRNONAA >60 11/14/2020 November 9, 2020  .  Rectum, sigmoid colon and proximal donut (low anterior resection):   - Invasive adenocarcinoma,  invading through the muscularis propria into   pericolorectal tissue   -  Pathologic stage pT3, pN1a (see synoptic report)   - Resection margins negative for dysplasia or malignancy (proximal, distal   and radial margins), within 1 mm to radial margin   - One tumor deposit   - Adenocarcinoma involve one out of totally forty seven lymph nodes (1/46 in   part 1, totally 1/47, also see part 2)   - Background colon with diverticulosis associated with hyperpigmented   macrophages, benign   - Proximal donut with benign colon, no dysplasia or malignancy   - Tattoo identified   2.  Colon, distal donut (excision):   - Benign colon, negative for dysplasia or malignancy   - One lymph node, negative for metastatic carcinoma (0/1)   COLON AND RECTUM: Resection, Including Transanal Disk Excision of Rectal   Neoplasms   Procedure  Low anterior resection   Tumor Site  Rectum    Tumor Location: Straddles the anterior peritoneal reflection   Tumor Size   Greatest dimension (centimeters): 7.0 cm   + Additional dimensions (centimeters): 4.7 x 2.2 cm   Macroscopic Tumor Perforation  Not identified   Macroscopic Intactness of Mesorectum  Near complete   Histologic Type  Adenocarcinoma   Histologic Grade   G2: Moderately differentiated   Tumor Extension  Tumor invades through the muscularis propria into   pericolorectal tissue     Impression:  November 7, 2020 CT abdomen pelvis     1. Rectosigmoid junction mass, unchanged from prior.  No definite evidence of intraluminal contrast extravasation to suggest active gastrointestinal hemorrhage, however please note that arterial phase CT is more sensitive.  2. Colonic diverticulosis without acute diverticulitis.  3. Minimal pneumobilia, unchanged      IMPRESSION:  CT chest lung screen October 2020     1.  3 mm pulmonary nodule identified in the lateral segment of the  right lower lobe.  2.  Focus of groundglass opacity measuring 11 mm in diameter in the  superior segment of the right lower lobe. Short-term follow-up for  this lesion is recommended.  3.  Mild  emphysematous lung disease.     LUNG-RADS CATEGORY 3: PROBABLY BENIGN FINDINGS     RECOMMENDATION: Short-term imaging follow-up with 6 month LDCT.  Patient Active Problem List   Diagnosis    Chronic obstructive pulmonary disease with hypoxia    Pure hypercholesterolemia    Acquired hypothyroidism    Rectal bleeding    Colonic mass        Assessment and Plan     Status post low anterior resection November 9, 2020 for rectal CA T3 N1a  Due to pulmonary nodule obtain PET scan return to clinic with PET scan iron studies  Refer to radiation oncology for adjuvant therapy evaluation  Anemia; probably iron deficient from colorectal CA patient's   hypocalcemia which seems chronic  Complete wound care with surgery clinic  History of chronic obstructive pulmonary disease, dyslipidemia and hypothyroidism continue with PCP  Advance Care planning/ directives /living will/patient's wishes discussed with patient.  Patient has been given guidelines and instructions on completing these directives  COVID social distancing, face mask use, hand washing techniques and personal hygiene routine discussed with patient  Good exercise, nutrition and weight management discussed with patient  Health maintenance activities and follow-up with PCPs recommendations discussed with patient

## 2020-12-14 ENCOUNTER — TELEPHONE (OUTPATIENT)
Dept: HEMATOLOGY/ONCOLOGY | Facility: CLINIC | Age: 65
End: 2020-12-14

## 2020-12-14 DIAGNOSIS — C18.9 MALIGNANT NEOPLASM OF COLON, UNSPECIFIED PART OF COLON: Primary | ICD-10-CM

## 2020-12-14 NOTE — TELEPHONE ENCOUNTER
Staff message sent to Dr. Olvera staff for port placement.     ----- Message from Danielle Frank MD sent at 12/11/2020  3:02 PM CST -----  port

## 2020-12-16 ENCOUNTER — DOCUMENTATION ONLY (OUTPATIENT)
Dept: HEMATOLOGY/ONCOLOGY | Facility: CLINIC | Age: 65
End: 2020-12-16

## 2020-12-16 NOTE — NURSING
Coordination of port placement referral, PET scan, and follow up appt with Dr. Frank.    Patient having Rad/Onc consult 12/17/2020.  Pet scheduled 12/30/2020.  F/u with Dr. Frank 01/04/2020.      Orders for chemo pending until after PET scan.

## 2020-12-17 ENCOUNTER — SOCIAL WORK (OUTPATIENT)
Dept: HEMATOLOGY/ONCOLOGY | Facility: CLINIC | Age: 65
End: 2020-12-17

## 2020-12-17 ENCOUNTER — OFFICE VISIT (OUTPATIENT)
Dept: RADIATION ONCOLOGY | Facility: CLINIC | Age: 65
End: 2020-12-17
Payer: COMMERCIAL

## 2020-12-17 ENCOUNTER — TELEPHONE (OUTPATIENT)
Dept: HEMATOLOGY/ONCOLOGY | Facility: CLINIC | Age: 65
End: 2020-12-17

## 2020-12-17 ENCOUNTER — DOCUMENTATION ONLY (OUTPATIENT)
Dept: HEMATOLOGY/ONCOLOGY | Facility: CLINIC | Age: 65
End: 2020-12-17

## 2020-12-17 VITALS
HEART RATE: 109 BPM | OXYGEN SATURATION: 99 % | SYSTOLIC BLOOD PRESSURE: 149 MMHG | DIASTOLIC BLOOD PRESSURE: 91 MMHG | WEIGHT: 183.69 LBS | TEMPERATURE: 98 F | BODY MASS INDEX: 35.88 KG/M2

## 2020-12-17 DIAGNOSIS — C18.9 MALIGNANT NEOPLASM OF COLON, UNSPECIFIED PART OF COLON: ICD-10-CM

## 2020-12-17 PROCEDURE — 99205 OFFICE O/P NEW HI 60 MIN: CPT | Mod: S$GLB,,, | Performed by: RADIOLOGY

## 2020-12-17 PROCEDURE — 1126F PR PAIN SEVERITY QUANTIFIED, NO PAIN PRESENT: ICD-10-PCS | Mod: S$GLB,,, | Performed by: RADIOLOGY

## 2020-12-17 PROCEDURE — 3008F BODY MASS INDEX DOCD: CPT | Mod: S$GLB,,, | Performed by: RADIOLOGY

## 2020-12-17 PROCEDURE — 1126F AMNT PAIN NOTED NONE PRSNT: CPT | Mod: S$GLB,,, | Performed by: RADIOLOGY

## 2020-12-17 PROCEDURE — 99205 PR OFFICE/OUTPT VISIT, NEW, LEVL V, 60-74 MIN: ICD-10-PCS | Mod: S$GLB,,, | Performed by: RADIOLOGY

## 2020-12-17 PROCEDURE — 3008F PR BODY MASS INDEX (BMI) DOCUMENTED: ICD-10-PCS | Mod: S$GLB,,, | Performed by: RADIOLOGY

## 2020-12-17 NOTE — TELEPHONE ENCOUNTER
PET CT approved today. Authorization number: [id number] with expiration date 01/13/2020. Approval with auth sent to staff through staff message.

## 2020-12-17 NOTE — PROGRESS NOTES
Tessa Enriquez  [id number]  1955 12/17/2020  Danielle Frank Md  [address] [address]    Dx: Stage IIIB [[id number]] rectosigmoid adenoCA s/p LAR    HISTORY OF PRESENT ILLNESS:   Ms. Tessa Enriquez is a 64F who recently p/w rectal bleeding and was found to have rectosigmoid mass on c-scope; bx proven adenoCA.  She then underwent TME via LAR on 11/9/2020, which noted pT3N1a disease w/ clear margins.  She was sent to Dr. Frank for adj chemo and then also referred to [facility] for input re: adj RT as well.  She reports an uneventful postop recovery w/ no BRBPR nor pain, incontinence, f/c/n/v/d/cp/sob, or other acute changes/concerns.    REVIEW OF SYSTEMS:  A complete ROS was performed and the patient denies any acute changes/concerns other than per HPI.    Past Medical History:   Diagnosis Date    Acute hypoxemic respiratory failure 7/9/2018    Arthritis     Cancer     colon    COPD (chronic obstructive pulmonary disease)     History of home oxygen therapy     ONLY PRN AND HASN'T USED IT IN A YEAR    Thyroid disease      Past Surgical History:   Procedure Laterality Date    CHOLECYSTECTOMY      COLECTOMY N/A 11/9/2020    Procedure: COLECTOMY;  Surgeon: Brandon Wagner MD;  Location: [facility] OR;  Service: General;  Laterality: N/A;  LOWER- ANTERIOR    COLONOSCOPY N/A 10/23/2020    Procedure: COLONOSCOPY;  Surgeon: Jolynn Ayala MD;  Location: [facility] ENDO;  Service: Endoscopy;  Laterality: N/A;    HYSTERECTOMY      ROBOT-ASSISTED COLECTOMY N/A 11/9/2020    Procedure: ROBOTIC COLECTOMY low anterior resection, possible open;  Surgeon: Brandon Wagner MD;  Location: [facility] OR;  Service: General;  Laterality: N/A;  CONVERTED TO OPEN AT 1503     Social History     Socioeconomic History    Marital status:      Spouse name: Not on file    Number of children: Not on file    Years of education: Not on file    Highest education level: Not on file   Occupational History    Not on  file   Social Needs    Financial resource strain: Not on file    Food insecurity     Worry: Not on file     Inability: Not on file    Transportation needs     Medical: Not on file     Non-medical: Not on file   Tobacco Use    Smoking status: Former Smoker     Packs/day: 2.00     Years: 40.00     Pack years: 80.00     Types: Cigarettes     Quit date: 2018     Years since quittin.4    Smokeless tobacco: Never Used   Substance and Sexual Activity    Alcohol use: No    Drug use: No    Sexual activity: Not Currently     Partners: Male   Lifestyle    Physical activity     Days per week: Not on file     Minutes per session: Not on file    Stress: Not on file   Relationships    Social connections     Talks on phone: Not on file     Gets together: Not on file     Attends Sabianist service: Not on file     Active member of club or organization: Not on file     Attends meetings of clubs or organizations: Not on file     Relationship status: Not on file   Other Topics Concern    Not on file   Social History Narrative    Not on file     Family History   Problem Relation Age of Onset    COPD Mother     COPD Father        PRIOR HISTORY OF CHEMOTHERAPY OR RADIOTHERAPY: Please see HPI for patients prior oncologic history.    Medication List with Changes/Refills   Current Medications    ALBUTEROL (PROVENTIL) 2.5 MG /3 ML (0.083 %) NEBULIZER SOLUTION    Take 3 mLs (2.5 mg total) by nebulization every 6 (six) hours as needed for Wheezing. Rescue    LEVOTHYROXINE (SYNTHROID) 88 MCG TABLET    Take 1 tablet (88 mcg total) by mouth before breakfast.    OXYCODONE-ACETAMINOPHEN (PERCOCET) 5-325 MG PER TABLET    Take 1 tablet by mouth every 6 (six) hours as needed for Pain.    TRAMADOL (ULTRAM) 50 MG TABLET    Take 1 tablet (50 mg total) by mouth every 6 (six) hours.     Review of patient's allergies indicates:   Allergen Reactions    Flagyl [metronidazole] Itching and Rash    Pcn [penicillins] Hives, Itching and  Rash       QUALITY OF LIFE: 90%- Able to Carry on Normal Activity: Minor Symptoms of Disease    There were no vitals filed for this visit.  There is no height or weight on file to calculate BMI.    PHYSICAL EXAM:  GENERAL: alert; in no apparent distress.   HEAD: normocephalic, atraumatic.  EYES: pupils are equal, round, reactive to light and accommodation. Sclera anicteric. Conjunctiva not injected.   NOSE/THROAT: no nasal erythema or rhinorrhea. Oropharynx pink, without erythema, ulcerations or thrush.   NECK: no cervical motion rigidity; supple with no masses.  CHEST: clear to auscultation bilaterally; no wheezes, crackles or rubs. Patient is speaking comfortably on room air with normal work of breathing without using accessory muscles of respiration.  CARDIOVASCULAR: regular rate and rhythm; no murmurs, rubs or gallops.  ABDOMEN: soft, nontender, nondistended. Bowel sounds present.   MUSCULOSKELETAL: no tenderness to palpation along the spine or scapulae. Normal range of motion.  NEUROLOGIC: cranial nerves II-XII intact bilaterally. Strength 5/5 in bilateral upper and lower extremities. No sensory deficits appreciated. Reflexes globally intact. No cerebellar signs. Normal gait.  LYMPHATIC: no cervical, supraclavicular or axillary adenopathy appreciated bilaterally.   EXTREMITIES: no clubbing, cyanosis, edema.  SKIN: no erythema, rashes, ulcerations noted.     REVIEW OF IMAGING/PATHOLOGY/LABS: Please see HPI. All images reviewed personally by dictating physician.       ASSESSMENT: Tessa Enriquez is a 64 y.o. female with h/o stage IIIB [[id number]] rectosigmoid adenoCA s/p LAR    PLAN:  After complete review of the patient's chart/hx and eval/discussion w/ the patient today, I spoke to Dr. Wagner re: her pathology/surgery , and, w/ clear margins and seemingly sigmoid nidus/origin of disease, we agreed that adj pelvic RT was not clearly indicated.  However, the presence of a mesorectal (+)LN I concerning for LRR  risk.  We also discussed that due to the proximal location of her tumor, salvage of a LR would be possible and less morbid compared to lower rectal disease, thus affording her decreased morbidity from pelvic RT at this time may be warranted.    I spent over one hour in consultation with the patient discussing the rationales, risks, benefits, and alternatives to pelvic RT/CRT, as well as the potential toxicities, including but not limited to fatigue, skin irritation/erythema/desquamation, sterility, weakening of pelvic/hip bones increasing risk of fracture, pelvic pain, diarrhea, proctitis, cystitis, dysuria, bowel or bladder perforation/fistulization requiring surgical repair, vaginal dryness/stenosis requiring dilator, and secondary malignancy.    In conclusion, Dr. Wagner, myself, and the patient agreed that, pending a clear PET scan, the patient should begin adj chemotherapy w/ Dr. Frank at this time, and we will schedule her case to be reviewed at upcoming tumor board on Jan 5th re: adj RT/CRT after her adj chemo versus observation.    The patient verbalized understanding of the above plan, risks, benefits, and details.  Contact info was exchanged, and the patient was encouraged to contact our clinic with any questions, needs, or concerns.    DISPOSITION: Will contact patient re: need or lack theraof to RTC or not after PET and tumor board; plan to start adj chemo per Dr. Frnak in meantime.    I have personally seen and evaluated this patient. Greater than 50% of this time was spent discussing coordination of care and/or counseling.    PHYSICIAN: Lion Rebolledo III, MD    Thank you for the opportunity to meet and consult with Tessa Enriquez.   Please feel free to contact me to discuss the above recommendation further.

## 2020-12-17 NOTE — PROGRESS NOTES
NUTRITION:   Ms. Tessa Enriquez is a 64 y.o. female with rectal cancer. She is here today for her initial consult with radiation oncology. She is s/p colectomy ~4 weeks ago. Pt feels that she is healing well since the surgery. Her appetite is good; wt stable. She endorses mild constipation- taking stool softener/dulcolax prn.   Plan: Provided handout with general nutrition guidelines for colorectal cancer treatment. Continue bowel regimen prn for constipation. Recommend increased fluid intake to at least 64 oz/day. Provided RD contact info; encouraged pt to call with any questions/concerns. RD to f/u at start of treatment.

## 2020-12-18 ENCOUNTER — TELEPHONE (OUTPATIENT)
Dept: HEMATOLOGY/ONCOLOGY | Facility: CLINIC | Age: 65
End: 2020-12-18

## 2020-12-18 ENCOUNTER — TELEPHONE (OUTPATIENT)
Dept: SURGERY | Facility: CLINIC | Age: 65
End: 2020-12-18

## 2020-12-18 NOTE — TELEPHONE ENCOUNTER
Pt was called for reminder of upcoming appts for OP PET scan and labs and follow up office appt with Dr reed. No answer. Left voicemail with details of date and times of appt

## 2020-12-18 NOTE — TELEPHONE ENCOUNTER
----- Message from Sylvia Armstrong sent at 12/18/2020 10:24 AM CST -----  Type: Needs Medical Advice  Who Called:  patient  Symptoms (please be specific):    How long has patient had these symptoms:    Pharmacy name and phone #:    Best Call Back Number: [phone]  Additional Information: patient called to give [facility] claim#[id number]

## 2020-12-18 NOTE — PROGRESS NOTES
Ms. Tessa Enriquez is a 64 year old diagnosed with rectal cancer.  She is here for a radiation consult with Dr. Rebolledo.  She stated that she was recently diagnosed which is causing her depression.  She denied wanting psychosocial support.  Ms. Enriquez stated that she will feel better once she knows what her treatment plan will be.  I provided information in the event she wishes to pursue counseling services in the future.

## 2020-12-21 ENCOUNTER — DOCUMENTATION ONLY (OUTPATIENT)
Dept: HEMATOLOGY/ONCOLOGY | Facility: CLINIC | Age: 65
End: 2020-12-21

## 2020-12-22 ENCOUNTER — OFFICE VISIT (OUTPATIENT)
Dept: SURGERY | Facility: CLINIC | Age: 65
End: 2020-12-22
Payer: COMMERCIAL

## 2020-12-22 VITALS
TEMPERATURE: 98 F | BODY MASS INDEX: 35.46 KG/M2 | HEART RATE: 114 BPM | DIASTOLIC BLOOD PRESSURE: 83 MMHG | RESPIRATION RATE: 16 BRPM | WEIGHT: 187.81 LBS | SYSTOLIC BLOOD PRESSURE: 166 MMHG | HEIGHT: 61 IN

## 2020-12-22 DIAGNOSIS — Z09 POSTOP CHECK: Primary | ICD-10-CM

## 2020-12-22 DIAGNOSIS — Z01.818 PREOP EXAMINATION: ICD-10-CM

## 2020-12-22 DIAGNOSIS — Z45.2 ENCOUNTER FOR INSERTION OF VENOUS ACCESS PORT: ICD-10-CM

## 2020-12-22 PROCEDURE — 3008F PR BODY MASS INDEX (BMI) DOCUMENTED: ICD-10-PCS | Mod: CPTII,S$GLB,, | Performed by: SURGERY

## 2020-12-22 PROCEDURE — 3008F BODY MASS INDEX DOCD: CPT | Mod: CPTII,S$GLB,, | Performed by: SURGERY

## 2020-12-22 PROCEDURE — 99024 PR POST-OP FOLLOW-UP VISIT: ICD-10-PCS | Mod: S$GLB,,, | Performed by: SURGERY

## 2020-12-22 PROCEDURE — 99999 PR PBB SHADOW E&M-EST. PATIENT-LVL III: ICD-10-PCS | Mod: PBBFAC,,, | Performed by: SURGERY

## 2020-12-22 PROCEDURE — 99999 PR PBB SHADOW E&M-EST. PATIENT-LVL III: CPT | Mod: PBBFAC,,, | Performed by: SURGERY

## 2020-12-22 PROCEDURE — 1126F AMNT PAIN NOTED NONE PRSNT: CPT | Mod: S$GLB,,, | Performed by: SURGERY

## 2020-12-22 PROCEDURE — 99024 POSTOP FOLLOW-UP VISIT: CPT | Mod: S$GLB,,, | Performed by: SURGERY

## 2020-12-22 PROCEDURE — 1126F PR PAIN SEVERITY QUANTIFIED, NO PAIN PRESENT: ICD-10-PCS | Mod: S$GLB,,, | Performed by: SURGERY

## 2020-12-22 NOTE — PATIENT INSTRUCTIONS
Surgery is scheduled for 01/11/20 arrival time will be given by the the preop nurse.  The preop nurse will call you from [phone]  Nothing to eat or drink after midnight.  Someone to drive you home.    THE PREOP NURSE WILL CALL, SOMETIMES AS LATE AS 4 or 5 PM IN THE AFTERNOON THE DAY BEFORE SURGERY.    Bathe the night before and the morning of your procedure with a Chlorhexidine wash such as Hibiclens, can be purchased at most Pharmacy's no prescription needed.    Special Instruction:  Contact Marito Graham LPN for any questions or concerns. [phone]    Your surgery is scheduled at the [facility] in [address] Dr[address] Amin.

## 2020-12-22 NOTE — PROGRESS NOTES
Cc: post op    HPI: 65 y.o.  female  6 weeks s/p LAR for rectosigmois cancer.   Pt postoperative course complicated by wound infection requiring drainage in wound care.  She is doing much better now.  Minimal to no drainage from the wound.  She denies pain or discomfort.  No nausea or vomiting.  No fevers or chills.  Patient was seen by Oncology given presence of lymph node positivity she has been recommended to proceed with chemotherapy.  There is some concern regarding nodule in the right lower lobe of her lung.  She is scheduled of a PET scan at the end of this month.  Patient was also referred to Radiation Oncology.  I have discussed this case with Radiation Oncology.  Given negative margin and given presence of the tumor at the rectosigmoid junction I do not feel strongly that proceeding with radiation would be to her benefit.  I would in fact be concerned that adjuvant radiation may present more risk than observation.  Do agree with proceeding with chemotherapy.    PE: AFVSS    AAOx3  CTA  Soft/NT/nd  Inc: c/d/  Nearly fully healed.    A/P:   Pt doing well post surgery.   Discussion with patient.  I have recommended and offered to place a port to facilitate chemotherapy administration.  This is tentatively been scheduled for Monday January 11th.  She states she is still considering whether not to proceed with chemotherapy.  She is awaiting results of the PET scan.  Further recommendations to follow

## 2020-12-23 ENCOUNTER — TELEPHONE (OUTPATIENT)
Dept: HEMATOLOGY/ONCOLOGY | Facility: CLINIC | Age: 65
End: 2020-12-23

## 2020-12-23 RX ORDER — SODIUM CHLORIDE 9 MG/ML
INJECTION, SOLUTION INTRAVENOUS CONTINUOUS
Status: CANCELLED | OUTPATIENT
Start: 2020-12-23

## 2020-12-23 NOTE — TELEPHONE ENCOUNTER
----- Message from Mary Nuno sent at 12/23/2020  2:59 PM CST -----  Regarding: PENDING PAYABLE CODE FOR PET SCAN  Pet scan scheduled for Wednesday, 12/30/20, diagnosis code C18.9 provided on the order does not meet medical necessity. A new order must be provided prior to patient's appointment.  Please advise if patient needs to be rescheduled or cancelled.  If I can be of assistance, please call me at [phone].  Thanks!!

## 2020-12-24 NOTE — TELEPHONE ENCOUNTER
Noted.     ----- Message from Maryveronica Nuon sent at 12/24/2020  8:57 AM CST -----  Regarding: FW: PENDING PAYABLE CODE FOR PET SCAN  The C20 dx code is ok added to the referral.  The supervisor of radiology at the [facility] can add it to the order.  Thanks!!    ----- Message -----  From: Mary Nuno  Sent: 12/23/2020   2:59 PM CST  To: Zac ENRIQUEZ Staff  Subject: PENDING PAYABLE CODE FOR PET SCAN                Pet scan scheduled for Wednesday, 12/30/20, diagnosis code C18.9 provided on the order does not meet medical necessity. A new order must be provided prior to patient's appointment.  Please advise if patient needs to be rescheduled or cancelled.  If I can be of assistance, please call me at [phone].  Thanks!!

## 2020-12-30 ENCOUNTER — HOSPITAL ENCOUNTER (OUTPATIENT)
Dept: RADIOLOGY | Facility: HOSPITAL | Age: 65
Discharge: HOME | End: 2020-12-30
Attending: INTERNAL MEDICINE
Payer: COMMERCIAL

## 2020-12-30 VITALS — WEIGHT: 180 LBS | BODY MASS INDEX: 33.99 KG/M2 | HEIGHT: 61 IN

## 2020-12-30 DIAGNOSIS — C18.9 MALIGNANT NEOPLASM OF COLON, UNSPECIFIED PART OF COLON: ICD-10-CM

## 2020-12-30 LAB — GLUCOSE SERPL-MCNC: 95 MG/DL (ref 70–110)

## 2020-12-30 PROCEDURE — 82962 GLUCOSE BLOOD TEST: CPT | Mod: PO

## 2020-12-30 PROCEDURE — 78815 PET IMAGE W/CT SKULL-THIGH: CPT | Mod: TC,PO

## 2020-12-31 ENCOUNTER — TELEPHONE (OUTPATIENT)
Dept: HEMATOLOGY/ONCOLOGY | Facility: CLINIC | Age: 65
End: 2020-12-31

## 2020-12-31 ENCOUNTER — LAB VISIT (OUTPATIENT)
Dept: LAB | Facility: HOSPITAL | Age: 65
End: 2020-12-31
Attending: INTERNAL MEDICINE
Payer: COMMERCIAL

## 2020-12-31 DIAGNOSIS — C18.9 MALIGNANT NEOPLASM OF COLON, UNSPECIFIED PART OF COLON: ICD-10-CM

## 2020-12-31 LAB
FERRITIN SERPL-MCNC: 97 NG/ML (ref 20–300)
IRON SERPL-MCNC: 69 UG/DL (ref 30–160)
SATURATED IRON: 19 % (ref 20–50)
TOTAL IRON BINDING CAPACITY: 370 UG/DL (ref 250–450)
TRANSFERRIN SERPL-MCNC: 250 MG/DL (ref 200–375)
VIT B12 SERPL-MCNC: <146 PG/ML (ref 210–950)

## 2020-12-31 PROCEDURE — 82728 ASSAY OF FERRITIN: CPT

## 2020-12-31 PROCEDURE — 82607 VITAMIN B-12: CPT

## 2020-12-31 PROCEDURE — 36415 COLL VENOUS BLD VENIPUNCTURE: CPT

## 2020-12-31 PROCEDURE — 83540 ASSAY OF IRON: CPT

## 2021-01-04 ENCOUNTER — TELEPHONE (OUTPATIENT)
Dept: HEMATOLOGY/ONCOLOGY | Facility: CLINIC | Age: 66
End: 2021-01-04

## 2021-01-04 ENCOUNTER — TELEPHONE (OUTPATIENT)
Dept: SURGERY | Facility: CLINIC | Age: 66
End: 2021-01-04

## 2021-01-04 ENCOUNTER — OFFICE VISIT (OUTPATIENT)
Dept: HEMATOLOGY/ONCOLOGY | Facility: CLINIC | Age: 66
End: 2021-01-04
Payer: COMMERCIAL

## 2021-01-04 ENCOUNTER — DOCUMENTATION ONLY (OUTPATIENT)
Dept: HEMATOLOGY/ONCOLOGY | Facility: CLINIC | Age: 66
End: 2021-01-04

## 2021-01-04 VITALS
BODY MASS INDEX: 35.92 KG/M2 | HEIGHT: 61 IN | HEART RATE: 102 BPM | SYSTOLIC BLOOD PRESSURE: 137 MMHG | TEMPERATURE: 98 F | WEIGHT: 190.25 LBS | DIASTOLIC BLOOD PRESSURE: 82 MMHG | RESPIRATION RATE: 18 BRPM | OXYGEN SATURATION: 97 %

## 2021-01-04 DIAGNOSIS — E03.9 ACQUIRED HYPOTHYROIDISM: ICD-10-CM

## 2021-01-04 DIAGNOSIS — C20 RECTAL CANCER: ICD-10-CM

## 2021-01-04 DIAGNOSIS — C18.9 MALIGNANT NEOPLASM OF COLON, UNSPECIFIED PART OF COLON: Primary | ICD-10-CM

## 2021-01-04 PROCEDURE — 99215 OFFICE O/P EST HI 40 MIN: CPT | Mod: S$GLB,,, | Performed by: INTERNAL MEDICINE

## 2021-01-04 PROCEDURE — 1125F PR PAIN SEVERITY QUANTIFIED, PAIN PRESENT: ICD-10-PCS | Mod: S$GLB,,, | Performed by: INTERNAL MEDICINE

## 2021-01-04 PROCEDURE — 3008F BODY MASS INDEX DOCD: CPT | Mod: CPTII,S$GLB,, | Performed by: INTERNAL MEDICINE

## 2021-01-04 PROCEDURE — 3008F PR BODY MASS INDEX (BMI) DOCUMENTED: ICD-10-PCS | Mod: CPTII,S$GLB,, | Performed by: INTERNAL MEDICINE

## 2021-01-04 PROCEDURE — 99999 PR PBB SHADOW E&M-EST. PATIENT-LVL III: ICD-10-PCS | Mod: PBBFAC,,, | Performed by: INTERNAL MEDICINE

## 2021-01-04 PROCEDURE — 99215 PR OFFICE/OUTPT VISIT, EST, LEVL V, 40-54 MIN: ICD-10-PCS | Mod: S$GLB,,, | Performed by: INTERNAL MEDICINE

## 2021-01-04 PROCEDURE — 1125F AMNT PAIN NOTED PAIN PRSNT: CPT | Mod: S$GLB,,, | Performed by: INTERNAL MEDICINE

## 2021-01-04 PROCEDURE — 99999 PR PBB SHADOW E&M-EST. PATIENT-LVL III: CPT | Mod: PBBFAC,,, | Performed by: INTERNAL MEDICINE

## 2021-01-04 RX ORDER — EPINEPHRINE 0.3 MG/.3ML
0.3 INJECTION SUBCUTANEOUS ONCE AS NEEDED
Status: CANCELLED | OUTPATIENT
Start: 2021-01-13

## 2021-01-04 RX ORDER — SODIUM CHLORIDE 0.9 % (FLUSH) 0.9 %
10 SYRINGE (ML) INJECTION
Status: CANCELLED | OUTPATIENT
Start: 2021-01-13

## 2021-01-04 RX ORDER — HEPARIN 100 UNIT/ML
500 SYRINGE INTRAVENOUS
Status: CANCELLED | OUTPATIENT
Start: 2021-01-15

## 2021-01-04 RX ORDER — SODIUM CHLORIDE 0.9 % (FLUSH) 0.9 %
10 SYRINGE (ML) INJECTION
Status: CANCELLED | OUTPATIENT
Start: 2021-01-15

## 2021-01-04 RX ORDER — DIPHENHYDRAMINE HYDROCHLORIDE 50 MG/ML
50 INJECTION INTRAMUSCULAR; INTRAVENOUS ONCE AS NEEDED
Status: CANCELLED | OUTPATIENT
Start: 2021-01-13

## 2021-01-04 RX ORDER — CYANOCOBALAMIN 1000 UG/ML
1000 INJECTION, SOLUTION INTRAMUSCULAR; SUBCUTANEOUS
Qty: 30 ML | Refills: 4 | Status: SHIPPED | OUTPATIENT
Start: 2021-01-04 | End: 2021-01-27 | Stop reason: SDUPTHER

## 2021-01-04 RX ORDER — FLUOROURACIL 50 MG/ML
400 INJECTION, SOLUTION INTRAVENOUS
Status: CANCELLED | OUTPATIENT
Start: 2021-01-13

## 2021-01-04 RX ORDER — HEPARIN 100 UNIT/ML
500 SYRINGE INTRAVENOUS
Status: CANCELLED | OUTPATIENT
Start: 2021-01-13

## 2021-01-05 ENCOUNTER — TELEPHONE (OUTPATIENT)
Dept: HEMATOLOGY/ONCOLOGY | Facility: CLINIC | Age: 66
End: 2021-01-05

## 2021-01-05 ENCOUNTER — DOCUMENTATION ONLY (OUTPATIENT)
Dept: RADIATION ONCOLOGY | Facility: CLINIC | Age: 66
End: 2021-01-05

## 2021-01-05 DIAGNOSIS — C18.9 MALIGNANT NEOPLASM OF COLON, UNSPECIFIED PART OF COLON: Primary | ICD-10-CM

## 2021-01-08 ENCOUNTER — LAB VISIT (OUTPATIENT)
Dept: PRIMARY CARE CLINIC | Facility: CLINIC | Age: 66
End: 2021-01-08
Payer: COMMERCIAL

## 2021-01-08 ENCOUNTER — OFFICE VISIT (OUTPATIENT)
Dept: HEMATOLOGY/ONCOLOGY | Facility: CLINIC | Age: 66
End: 2021-01-08
Payer: COMMERCIAL

## 2021-01-08 VITALS
TEMPERATURE: 98 F | RESPIRATION RATE: 18 BRPM | WEIGHT: 191.56 LBS | HEART RATE: 101 BPM | BODY MASS INDEX: 36.17 KG/M2 | DIASTOLIC BLOOD PRESSURE: 63 MMHG | OXYGEN SATURATION: 99 % | SYSTOLIC BLOOD PRESSURE: 140 MMHG | HEIGHT: 61 IN

## 2021-01-08 DIAGNOSIS — C20 RECTAL CANCER: Primary | ICD-10-CM

## 2021-01-08 DIAGNOSIS — C18.9 MALIGNANT NEOPLASM OF COLON, UNSPECIFIED PART OF COLON: ICD-10-CM

## 2021-01-08 DIAGNOSIS — Z01.818 PREOP EXAMINATION: ICD-10-CM

## 2021-01-08 PROCEDURE — 99999 PR PBB SHADOW E&M-EST. PATIENT-LVL V: CPT | Mod: PBBFAC,,, | Performed by: PHYSICIAN ASSISTANT

## 2021-01-08 PROCEDURE — 99213 OFFICE O/P EST LOW 20 MIN: CPT | Mod: S$GLB,,, | Performed by: PHYSICIAN ASSISTANT

## 2021-01-08 PROCEDURE — 99213 PR OFFICE/OUTPT VISIT, EST, LEVL III, 20-29 MIN: ICD-10-PCS | Mod: S$GLB,,, | Performed by: PHYSICIAN ASSISTANT

## 2021-01-08 PROCEDURE — U0003 INFECTIOUS AGENT DETECTION BY NUCLEIC ACID (DNA OR RNA); SEVERE ACUTE RESPIRATORY SYNDROME CORONAVIRUS 2 (SARS-COV-2) (CORONAVIRUS DISEASE [COVID-19]), AMPLIFIED PROBE TECHNIQUE, MAKING USE OF HIGH THROUGHPUT TECHNOLOGIES AS DESCRIBED BY CMS-2020-01-R: HCPCS

## 2021-01-08 PROCEDURE — 99999 PR PBB SHADOW E&M-EST. PATIENT-LVL V: ICD-10-PCS | Mod: PBBFAC,,, | Performed by: PHYSICIAN ASSISTANT

## 2021-01-08 RX ORDER — ONDANSETRON 8 MG/1
8 TABLET, ORALLY DISINTEGRATING ORAL EVERY 12 HOURS PRN
Qty: 30 TABLET | Refills: 1 | Status: SHIPPED | OUTPATIENT
Start: 2021-01-08 | End: 2021-07-29

## 2021-01-08 RX ORDER — PROCHLORPERAZINE MALEATE 10 MG
10 TABLET ORAL EVERY 6 HOURS PRN
Qty: 30 TABLET | Refills: 1 | Status: SHIPPED | OUTPATIENT
Start: 2021-01-08 | End: 2021-07-29

## 2021-01-09 LAB — SARS-COV-2 RNA RESP QL NAA+PROBE: NOT DETECTED

## 2021-01-10 ENCOUNTER — ANESTHESIA EVENT (OUTPATIENT)
Dept: SURGERY | Facility: HOSPITAL | Age: 66
End: 2021-01-10
Payer: COMMERCIAL

## 2021-01-11 ENCOUNTER — HOSPITAL ENCOUNTER (OUTPATIENT)
Facility: HOSPITAL | Age: 66
Discharge: HOME | End: 2021-01-11
Attending: SURGERY | Admitting: SURGERY
Payer: COMMERCIAL

## 2021-01-11 ENCOUNTER — ANESTHESIA (OUTPATIENT)
Dept: SURGERY | Facility: HOSPITAL | Age: 66
End: 2021-01-11
Payer: COMMERCIAL

## 2021-01-11 VITALS
TEMPERATURE: 98 F | SYSTOLIC BLOOD PRESSURE: 189 MMHG | OXYGEN SATURATION: 100 % | HEART RATE: 68 BPM | RESPIRATION RATE: 20 BRPM | DIASTOLIC BLOOD PRESSURE: 72 MMHG

## 2021-01-11 DIAGNOSIS — Z45.2 ENCOUNTER FOR INSERTION OF VENOUS ACCESS PORT: ICD-10-CM

## 2021-01-11 DIAGNOSIS — Z01.818 PREOP EXAMINATION: ICD-10-CM

## 2021-01-11 DIAGNOSIS — Z09 POSTOP CHECK: ICD-10-CM

## 2021-01-11 DIAGNOSIS — C20 RECTAL CANCER: Primary | ICD-10-CM

## 2021-01-11 PROCEDURE — 25000003 PHARM REV CODE 250: Performed by: SURGERY

## 2021-01-11 PROCEDURE — D9220A PRA ANESTHESIA: ICD-10-PCS | Mod: ANES,,, | Performed by: ANESTHESIOLOGY

## 2021-01-11 PROCEDURE — 63600175 PHARM REV CODE 636 W HCPCS: Performed by: SURGERY

## 2021-01-11 PROCEDURE — 36000706: Performed by: SURGERY

## 2021-01-11 PROCEDURE — 71000033 HC RECOVERY, INTIAL HOUR: Performed by: SURGERY

## 2021-01-11 PROCEDURE — 77001 FLUOROGUIDE FOR VEIN DEVICE: CPT | Mod: 26,,, | Performed by: SURGERY

## 2021-01-11 PROCEDURE — 36558 PR INSERT TUNNELED CV CATH W/O PORT OR PUMP: ICD-10-PCS | Mod: 58,LT,, | Performed by: SURGERY

## 2021-01-11 PROCEDURE — 99900104 DSU ONLY-NO CHARGE-EA ADD'L HR (STAT): Performed by: SURGERY

## 2021-01-11 PROCEDURE — 25000003 PHARM REV CODE 250: Performed by: ANESTHESIOLOGY

## 2021-01-11 PROCEDURE — D9220A PRA ANESTHESIA: Mod: CRNA,,, | Performed by: NURSE ANESTHETIST, CERTIFIED REGISTERED

## 2021-01-11 PROCEDURE — C1788 PORT, INDWELLING, IMP: HCPCS | Performed by: SURGERY

## 2021-01-11 PROCEDURE — 25000003 PHARM REV CODE 250: Performed by: NURSE ANESTHETIST, CERTIFIED REGISTERED

## 2021-01-11 PROCEDURE — D9220A PRA ANESTHESIA: ICD-10-PCS | Mod: CRNA,,, | Performed by: NURSE ANESTHETIST, CERTIFIED REGISTERED

## 2021-01-11 PROCEDURE — 37000008 HC ANESTHESIA 1ST 15 MINUTES: Performed by: SURGERY

## 2021-01-11 PROCEDURE — 36558 INSERT TUNNELED CV CATH: CPT | Mod: 58,LT,, | Performed by: SURGERY

## 2021-01-11 PROCEDURE — 77001 CHG FLUOROGUIDE CNTRL VEN ACCESS,PLACE,REPLACE,REMOVE: ICD-10-PCS | Mod: 26,,, | Performed by: SURGERY

## 2021-01-11 PROCEDURE — 63600175 PHARM REV CODE 636 W HCPCS: Performed by: ANESTHESIOLOGY

## 2021-01-11 PROCEDURE — 37000009 HC ANESTHESIA EA ADD 15 MINS: Performed by: SURGERY

## 2021-01-11 PROCEDURE — 36000707: Performed by: SURGERY

## 2021-01-11 PROCEDURE — 71000015 HC POSTOP RECOV 1ST HR: Performed by: SURGERY

## 2021-01-11 PROCEDURE — 63600175 PHARM REV CODE 636 W HCPCS: Performed by: NURSE ANESTHETIST, CERTIFIED REGISTERED

## 2021-01-11 PROCEDURE — 99900103 DSU ONLY-NO CHARGE-INITIAL HR (STAT): Performed by: SURGERY

## 2021-01-11 PROCEDURE — 71000039 HC RECOVERY, EACH ADD'L HOUR: Performed by: SURGERY

## 2021-01-11 PROCEDURE — D9220A PRA ANESTHESIA: Mod: ANES,,, | Performed by: ANESTHESIOLOGY

## 2021-01-11 DEVICE — KIT POWERPORT SINGLE 8FR
Type: IMPLANTABLE DEVICE | Site: SUBCLAVIAN | Status: NON-FUNCTIONAL
Removed: 2021-04-19

## 2021-01-11 RX ORDER — CLINDAMYCIN PHOSPHATE 900 MG/50ML
900 INJECTION, SOLUTION INTRAVENOUS
Status: DISCONTINUED | OUTPATIENT
Start: 2021-01-11 | End: 2021-01-11 | Stop reason: HOSPADM

## 2021-01-11 RX ORDER — PROPOFOL 10 MG/ML
VIAL (ML) INTRAVENOUS
Status: DISCONTINUED | OUTPATIENT
Start: 2021-01-11 | End: 2021-01-11

## 2021-01-11 RX ORDER — KETAMINE HYDROCHLORIDE 10 MG/ML
INJECTION, SOLUTION INTRAMUSCULAR; INTRAVENOUS
Status: DISCONTINUED | OUTPATIENT
Start: 2021-01-11 | End: 2021-01-11

## 2021-01-11 RX ORDER — FENTANYL CITRATE 50 UG/ML
INJECTION, SOLUTION INTRAMUSCULAR; INTRAVENOUS
Status: DISCONTINUED | OUTPATIENT
Start: 2021-01-11 | End: 2021-01-11

## 2021-01-11 RX ORDER — SODIUM CHLORIDE 9 MG/ML
INJECTION, SOLUTION INTRAVENOUS CONTINUOUS
Status: DISCONTINUED | OUTPATIENT
Start: 2021-01-11 | End: 2021-01-11 | Stop reason: HOSPADM

## 2021-01-11 RX ORDER — HYDROMORPHONE HYDROCHLORIDE 2 MG/ML
0.2 INJECTION, SOLUTION INTRAMUSCULAR; INTRAVENOUS; SUBCUTANEOUS EVERY 5 MIN PRN
Status: DISCONTINUED | OUTPATIENT
Start: 2021-01-11 | End: 2021-01-11 | Stop reason: HOSPADM

## 2021-01-11 RX ORDER — FENTANYL CITRATE 50 UG/ML
25 INJECTION, SOLUTION INTRAMUSCULAR; INTRAVENOUS EVERY 5 MIN PRN
Status: COMPLETED | OUTPATIENT
Start: 2021-01-11 | End: 2021-01-11

## 2021-01-11 RX ORDER — LIDOCAINE HYDROCHLORIDE 10 MG/ML
1 INJECTION, SOLUTION EPIDURAL; INFILTRATION; INTRACAUDAL; PERINEURAL ONCE
Status: DISCONTINUED | OUTPATIENT
Start: 2021-01-11 | End: 2021-01-11 | Stop reason: HOSPADM

## 2021-01-11 RX ORDER — LIDOCAINE HYDROCHLORIDE 20 MG/ML
INJECTION INTRAVENOUS
Status: DISCONTINUED | OUTPATIENT
Start: 2021-01-11 | End: 2021-01-11

## 2021-01-11 RX ORDER — OXYCODONE HYDROCHLORIDE 5 MG/1
5 TABLET ORAL
Status: DISCONTINUED | OUTPATIENT
Start: 2021-01-11 | End: 2021-01-11 | Stop reason: HOSPADM

## 2021-01-11 RX ORDER — HEPARIN SODIUM (PORCINE) LOCK FLUSH IV SOLN 100 UNIT/ML 100 UNIT/ML
SOLUTION INTRAVENOUS
Status: DISCONTINUED | OUTPATIENT
Start: 2021-01-11 | End: 2021-01-11 | Stop reason: HOSPADM

## 2021-01-11 RX ORDER — DEXAMETHASONE SODIUM PHOSPHATE 4 MG/ML
INJECTION, SOLUTION INTRA-ARTICULAR; INTRALESIONAL; INTRAMUSCULAR; INTRAVENOUS; SOFT TISSUE
Status: DISCONTINUED | OUTPATIENT
Start: 2021-01-11 | End: 2021-01-11

## 2021-01-11 RX ORDER — MIDAZOLAM HYDROCHLORIDE 1 MG/ML
INJECTION, SOLUTION INTRAMUSCULAR; INTRAVENOUS
Status: DISCONTINUED | OUTPATIENT
Start: 2021-01-11 | End: 2021-01-11

## 2021-01-11 RX ORDER — ONDANSETRON 2 MG/ML
4 INJECTION INTRAMUSCULAR; INTRAVENOUS EVERY 12 HOURS PRN
Status: DISCONTINUED | OUTPATIENT
Start: 2021-01-11 | End: 2021-01-11 | Stop reason: HOSPADM

## 2021-01-11 RX ORDER — HYDROCODONE BITARTRATE AND ACETAMINOPHEN 5; 325 MG/1; MG/1
1 TABLET ORAL EVERY 6 HOURS PRN
Qty: 20 TABLET | Refills: 0 | Status: SHIPPED | OUTPATIENT
Start: 2021-01-11 | End: 2021-04-16

## 2021-01-11 RX ORDER — ONDANSETRON 2 MG/ML
INJECTION INTRAMUSCULAR; INTRAVENOUS
Status: DISCONTINUED | OUTPATIENT
Start: 2021-01-11 | End: 2021-01-11

## 2021-01-11 RX ORDER — BUPIVACAINE HCL/EPINEPHRINE 0.25-.0005
VIAL (ML) INJECTION
Status: DISCONTINUED | OUTPATIENT
Start: 2021-01-11 | End: 2021-01-11 | Stop reason: HOSPADM

## 2021-01-11 RX ADMIN — PROPOFOL 20 MG: 10 INJECTION, EMULSION INTRAVENOUS at 07:01

## 2021-01-11 RX ADMIN — MIDAZOLAM 2 MG: 1 INJECTION INTRAMUSCULAR; INTRAVENOUS at 07:01

## 2021-01-11 RX ADMIN — FENTANYL CITRATE 25 MCG: 0.05 INJECTION, SOLUTION INTRAMUSCULAR; INTRAVENOUS at 08:01

## 2021-01-11 RX ADMIN — FENTANYL CITRATE 25 MCG: 0.05 INJECTION, SOLUTION INTRAMUSCULAR; INTRAVENOUS at 07:01

## 2021-01-11 RX ADMIN — DEXAMETHASONE SODIUM PHOSPHATE 4 MG: 4 INJECTION, SOLUTION INTRA-ARTICULAR; INTRALESIONAL; INTRAMUSCULAR; INTRAVENOUS; SOFT TISSUE at 07:01

## 2021-01-11 RX ADMIN — PROPOFOL 10 MG: 10 INJECTION, EMULSION INTRAVENOUS at 07:01

## 2021-01-11 RX ADMIN — LIDOCAINE HYDROCHLORIDE 80 MG: 20 INJECTION, SOLUTION INTRAVENOUS at 07:01

## 2021-01-11 RX ADMIN — ONDANSETRON 8 MG: 2 INJECTION, SOLUTION INTRAMUSCULAR; INTRAVENOUS at 07:01

## 2021-01-11 RX ADMIN — KETAMINE HYDROCHLORIDE 10 MG: 10 INJECTION, SOLUTION INTRAMUSCULAR; INTRAVENOUS at 07:01

## 2021-01-11 RX ADMIN — OXYCODONE 5 MG: 5 TABLET ORAL at 08:01

## 2021-01-11 RX ADMIN — FENTANYL CITRATE 50 MCG: 0.05 INJECTION, SOLUTION INTRAMUSCULAR; INTRAVENOUS at 07:01

## 2021-01-11 RX ADMIN — CLINDAMYCIN PHOSPHATE 900 MG: 18 INJECTION, SOLUTION INTRAVENOUS at 07:01

## 2021-01-11 RX ADMIN — SODIUM CHLORIDE, SODIUM GLUCONATE, SODIUM ACETATE, POTASSIUM CHLORIDE, MAGNESIUM CHLORIDE, SODIUM PHOSPHATE, DIBASIC, AND POTASSIUM PHOSPHATE: .53; .5; .37; .037; .03; .012; .00082 INJECTION, SOLUTION INTRAVENOUS at 06:01

## 2021-01-12 ENCOUNTER — LAB VISIT (OUTPATIENT)
Dept: LAB | Facility: HOSPITAL | Age: 66
End: 2021-01-12
Attending: INTERNAL MEDICINE
Payer: COMMERCIAL

## 2021-01-12 DIAGNOSIS — C20 RECTAL CANCER: ICD-10-CM

## 2021-01-12 LAB
ALBUMIN SERPL BCP-MCNC: 3.5 G/DL (ref 3.5–5.2)
ALP SERPL-CCNC: 82 U/L (ref 55–135)
ALT SERPL W/O P-5'-P-CCNC: 20 U/L (ref 10–44)
ANION GAP SERPL CALC-SCNC: 10 MMOL/L (ref 8–16)
AST SERPL-CCNC: 21 U/L (ref 10–40)
BASOPHILS # BLD AUTO: 0.03 K/UL (ref 0–0.2)
BASOPHILS NFR BLD: 0.5 % (ref 0–1.9)
BILIRUB SERPL-MCNC: 0.4 MG/DL (ref 0.1–1)
BUN SERPL-MCNC: 8 MG/DL (ref 8–23)
CALCIUM SERPL-MCNC: 9.1 MG/DL (ref 8.7–10.5)
CEA SERPL-MCNC: 1.6 NG/ML (ref 0–5)
CHLORIDE SERPL-SCNC: 101 MMOL/L (ref 95–110)
CO2 SERPL-SCNC: 27 MMOL/L (ref 23–29)
CREAT SERPL-MCNC: 0.8 MG/DL (ref 0.5–1.4)
DIFFERENTIAL METHOD: ABNORMAL
EOSINOPHIL # BLD AUTO: 0 K/UL (ref 0–0.5)
EOSINOPHIL NFR BLD: 0.5 % (ref 0–8)
ERYTHROCYTE [DISTWIDTH] IN BLOOD BY AUTOMATED COUNT: 14.9 % (ref 11.5–14.5)
EST. GFR  (AFRICAN AMERICAN): >60 ML/MIN/1.73 M^2
EST. GFR  (NON AFRICAN AMERICAN): >60 ML/MIN/1.73 M^2
GLUCOSE SERPL-MCNC: 95 MG/DL (ref 70–110)
HCT VFR BLD AUTO: 37.9 % (ref 37–48.5)
HGB BLD-MCNC: 11.8 G/DL (ref 12–16)
IMM GRANULOCYTES # BLD AUTO: 0.03 K/UL (ref 0–0.04)
IMM GRANULOCYTES NFR BLD AUTO: 0.5 % (ref 0–0.5)
LYMPHOCYTES # BLD AUTO: 1.9 K/UL (ref 1–4.8)
LYMPHOCYTES NFR BLD: 30.9 % (ref 18–48)
MAGNESIUM SERPL-MCNC: 2.2 MG/DL (ref 1.6–2.6)
MCH RBC QN AUTO: 28.5 PG (ref 27–31)
MCHC RBC AUTO-ENTMCNC: 31.1 G/DL (ref 32–36)
MCV RBC AUTO: 92 FL (ref 82–98)
MONOCYTES # BLD AUTO: 0.4 K/UL (ref 0.3–1)
MONOCYTES NFR BLD: 6 % (ref 4–15)
NEUTROPHILS # BLD AUTO: 3.7 K/UL (ref 1.8–7.7)
NEUTROPHILS NFR BLD: 61.6 % (ref 38–73)
NRBC BLD-RTO: 0 /100 WBC
PLATELET # BLD AUTO: 306 K/UL (ref 150–350)
PMV BLD AUTO: 10.3 FL (ref 9.2–12.9)
POTASSIUM SERPL-SCNC: 4.2 MMOL/L (ref 3.5–5.1)
PROT SERPL-MCNC: 7 G/DL (ref 6–8.4)
RBC # BLD AUTO: 4.14 M/UL (ref 4–5.4)
SODIUM SERPL-SCNC: 138 MMOL/L (ref 136–145)
WBC # BLD AUTO: 6.01 K/UL (ref 3.9–12.7)

## 2021-01-12 PROCEDURE — 85025 COMPLETE CBC W/AUTO DIFF WBC: CPT

## 2021-01-12 PROCEDURE — 83735 ASSAY OF MAGNESIUM: CPT

## 2021-01-12 PROCEDURE — 82378 CARCINOEMBRYONIC ANTIGEN: CPT

## 2021-01-12 PROCEDURE — 80053 COMPREHEN METABOLIC PANEL: CPT

## 2021-01-12 PROCEDURE — 36415 COLL VENOUS BLD VENIPUNCTURE: CPT

## 2021-01-13 ENCOUNTER — CLINICAL SUPPORT (OUTPATIENT)
Dept: HEMATOLOGY/ONCOLOGY | Facility: CLINIC | Age: 66
End: 2021-01-13
Payer: COMMERCIAL

## 2021-01-13 ENCOUNTER — PATIENT MESSAGE (OUTPATIENT)
Dept: HEMATOLOGY/ONCOLOGY | Facility: CLINIC | Age: 66
End: 2021-01-13

## 2021-01-13 ENCOUNTER — INFUSION (OUTPATIENT)
Dept: INFUSION THERAPY | Facility: HOSPITAL | Age: 66
End: 2021-01-13
Attending: INTERNAL MEDICINE
Payer: COMMERCIAL

## 2021-01-13 ENCOUNTER — OFFICE VISIT (OUTPATIENT)
Dept: HEMATOLOGY/ONCOLOGY | Facility: CLINIC | Age: 66
End: 2021-01-13
Payer: COMMERCIAL

## 2021-01-13 ENCOUNTER — TELEPHONE (OUTPATIENT)
Dept: HEMATOLOGY/ONCOLOGY | Facility: CLINIC | Age: 66
End: 2021-01-13

## 2021-01-13 VITALS
TEMPERATURE: 97 F | HEIGHT: 61 IN | WEIGHT: 191.13 LBS | OXYGEN SATURATION: 98 % | BODY MASS INDEX: 36.09 KG/M2 | HEART RATE: 89 BPM | SYSTOLIC BLOOD PRESSURE: 171 MMHG | RESPIRATION RATE: 18 BRPM | DIASTOLIC BLOOD PRESSURE: 89 MMHG

## 2021-01-13 VITALS
TEMPERATURE: 97 F | RESPIRATION RATE: 18 BRPM | WEIGHT: 191.13 LBS | HEIGHT: 61 IN | DIASTOLIC BLOOD PRESSURE: 89 MMHG | BODY MASS INDEX: 36.09 KG/M2 | OXYGEN SATURATION: 98 % | HEART RATE: 89 BPM | SYSTOLIC BLOOD PRESSURE: 171 MMHG

## 2021-01-13 DIAGNOSIS — C20 RECTAL CANCER: Primary | ICD-10-CM

## 2021-01-13 DIAGNOSIS — C20 RECTAL CANCER: ICD-10-CM

## 2021-01-13 DIAGNOSIS — E03.9 ACQUIRED HYPOTHYROIDISM: ICD-10-CM

## 2021-01-13 PROCEDURE — 96415 CHEMO IV INFUSION ADDL HR: CPT

## 2021-01-13 PROCEDURE — 3008F PR BODY MASS INDEX (BMI) DOCUMENTED: ICD-10-PCS | Mod: CPTII,S$GLB,, | Performed by: INTERNAL MEDICINE

## 2021-01-13 PROCEDURE — 96411 CHEMO IV PUSH ADDL DRUG: CPT

## 2021-01-13 PROCEDURE — 1125F AMNT PAIN NOTED PAIN PRSNT: CPT | Mod: S$GLB,,, | Performed by: INTERNAL MEDICINE

## 2021-01-13 PROCEDURE — 99999 PR PBB SHADOW E&M-EST. PATIENT-LVL III: ICD-10-PCS | Mod: PBBFAC,,, | Performed by: INTERNAL MEDICINE

## 2021-01-13 PROCEDURE — 99215 PR OFFICE/OUTPT VISIT, EST, LEVL V, 40-54 MIN: ICD-10-PCS | Mod: S$GLB,,, | Performed by: INTERNAL MEDICINE

## 2021-01-13 PROCEDURE — 96413 CHEMO IV INFUSION 1 HR: CPT

## 2021-01-13 PROCEDURE — 96368 THER/DIAG CONCURRENT INF: CPT

## 2021-01-13 PROCEDURE — 99215 OFFICE O/P EST HI 40 MIN: CPT | Mod: S$GLB,,, | Performed by: INTERNAL MEDICINE

## 2021-01-13 PROCEDURE — 63600175 PHARM REV CODE 636 W HCPCS: Performed by: INTERNAL MEDICINE

## 2021-01-13 PROCEDURE — 99999 PR PBB SHADOW E&M-EST. PATIENT-LVL III: CPT | Mod: PBBFAC,,, | Performed by: INTERNAL MEDICINE

## 2021-01-13 PROCEDURE — 1125F PR PAIN SEVERITY QUANTIFIED, PAIN PRESENT: ICD-10-PCS | Mod: S$GLB,,, | Performed by: INTERNAL MEDICINE

## 2021-01-13 PROCEDURE — 96416 CHEMO PROLONG INFUSE W/PUMP: CPT

## 2021-01-13 PROCEDURE — 96367 TX/PROPH/DG ADDL SEQ IV INF: CPT

## 2021-01-13 PROCEDURE — 25000003 PHARM REV CODE 250: Performed by: INTERNAL MEDICINE

## 2021-01-13 PROCEDURE — 3008F BODY MASS INDEX DOCD: CPT | Mod: CPTII,S$GLB,, | Performed by: INTERNAL MEDICINE

## 2021-01-13 PROCEDURE — 96366 THER/PROPH/DIAG IV INF ADDON: CPT

## 2021-01-13 RX ORDER — SODIUM CHLORIDE 0.9 % (FLUSH) 0.9 %
10 SYRINGE (ML) INJECTION
Status: DISCONTINUED | OUTPATIENT
Start: 2021-01-13 | End: 2021-01-13 | Stop reason: HOSPADM

## 2021-01-13 RX ORDER — DIPHENHYDRAMINE HYDROCHLORIDE 50 MG/ML
50 INJECTION INTRAMUSCULAR; INTRAVENOUS ONCE AS NEEDED
Status: DISCONTINUED | OUTPATIENT
Start: 2021-01-13 | End: 2021-01-13 | Stop reason: HOSPADM

## 2021-01-13 RX ORDER — EPINEPHRINE 0.3 MG/.3ML
0.3 INJECTION SUBCUTANEOUS ONCE AS NEEDED
Status: DISCONTINUED | OUTPATIENT
Start: 2021-01-13 | End: 2021-01-13 | Stop reason: HOSPADM

## 2021-01-13 RX ORDER — HEPARIN 100 UNIT/ML
500 SYRINGE INTRAVENOUS
Status: DISCONTINUED | OUTPATIENT
Start: 2021-01-13 | End: 2021-01-13 | Stop reason: HOSPADM

## 2021-01-13 RX ORDER — FLUOROURACIL 50 MG/ML
400 INJECTION, SOLUTION INTRAVENOUS
Status: COMPLETED | OUTPATIENT
Start: 2021-01-13 | End: 2021-01-13

## 2021-01-13 RX ADMIN — FLUOROURACIL 4630 MG: 50 INJECTION, SOLUTION INTRAVENOUS at 01:01

## 2021-01-13 RX ADMIN — OXALIPLATIN 164 MG: 100 INJECTION, SOLUTION, CONCENTRATE INTRAVENOUS at 10:01

## 2021-01-13 RX ADMIN — DEXAMETHASONE SODIUM PHOSPHATE 0.25 MG: 4 INJECTION, SOLUTION INTRA-ARTICULAR; INTRALESIONAL; INTRAMUSCULAR; INTRAVENOUS; SOFT TISSUE at 10:01

## 2021-01-13 RX ADMIN — DEXTROSE 770 MG: 5 SOLUTION INTRAVENOUS at 11:01

## 2021-01-13 RX ADMIN — FLUOROURACIL 770 MG: 50 INJECTION, SOLUTION INTRAVENOUS at 01:01

## 2021-01-14 ENCOUNTER — DOCUMENTATION ONLY (OUTPATIENT)
Dept: HEMATOLOGY/ONCOLOGY | Facility: CLINIC | Age: 66
End: 2021-01-14

## 2021-01-15 ENCOUNTER — INFUSION (OUTPATIENT)
Dept: INFUSION THERAPY | Facility: HOSPITAL | Age: 66
End: 2021-01-15
Attending: INTERNAL MEDICINE
Payer: COMMERCIAL

## 2021-01-15 VITALS
HEIGHT: 61 IN | HEART RATE: 96 BPM | BODY MASS INDEX: 35.98 KG/M2 | WEIGHT: 190.56 LBS | RESPIRATION RATE: 18 BRPM | DIASTOLIC BLOOD PRESSURE: 87 MMHG | SYSTOLIC BLOOD PRESSURE: 145 MMHG | TEMPERATURE: 98 F

## 2021-01-15 DIAGNOSIS — C20 RECTAL CANCER: Primary | ICD-10-CM

## 2021-01-15 PROCEDURE — 25000003 PHARM REV CODE 250: Performed by: INTERNAL MEDICINE

## 2021-01-15 PROCEDURE — 96523 IRRIG DRUG DELIVERY DEVICE: CPT

## 2021-01-15 PROCEDURE — 63600175 PHARM REV CODE 636 W HCPCS: Performed by: INTERNAL MEDICINE

## 2021-01-15 PROCEDURE — A4216 STERILE WATER/SALINE, 10 ML: HCPCS | Performed by: INTERNAL MEDICINE

## 2021-01-15 RX ORDER — HEPARIN 100 UNIT/ML
500 SYRINGE INTRAVENOUS
Status: DISCONTINUED | OUTPATIENT
Start: 2021-01-15 | End: 2021-01-15 | Stop reason: HOSPADM

## 2021-01-15 RX ORDER — SODIUM CHLORIDE 0.9 % (FLUSH) 0.9 %
10 SYRINGE (ML) INJECTION
Status: DISCONTINUED | OUTPATIENT
Start: 2021-01-15 | End: 2021-01-15 | Stop reason: HOSPADM

## 2021-01-15 RX ADMIN — SODIUM CHLORIDE, PRESERVATIVE FREE 10 ML: 5 INJECTION INTRAVENOUS at 11:01

## 2021-01-15 RX ADMIN — HEPARIN 500 UNITS: 100 SYRINGE at 11:01

## 2021-01-22 ENCOUNTER — TELEPHONE (OUTPATIENT)
Dept: HEMATOLOGY/ONCOLOGY | Facility: CLINIC | Age: 66
End: 2021-01-22

## 2021-01-26 ENCOUNTER — LAB VISIT (OUTPATIENT)
Dept: LAB | Facility: HOSPITAL | Age: 66
End: 2021-01-26
Attending: INTERNAL MEDICINE
Payer: COMMERCIAL

## 2021-01-26 ENCOUNTER — OFFICE VISIT (OUTPATIENT)
Dept: SURGERY | Facility: CLINIC | Age: 66
End: 2021-01-26
Payer: COMMERCIAL

## 2021-01-26 VITALS — HEIGHT: 61 IN | WEIGHT: 189.13 LBS | BODY MASS INDEX: 35.71 KG/M2 | TEMPERATURE: 97 F

## 2021-01-26 DIAGNOSIS — C18.9 MALIGNANT NEOPLASM OF COLON, UNSPECIFIED PART OF COLON: ICD-10-CM

## 2021-01-26 DIAGNOSIS — Z09 POSTOP CHECK: Primary | ICD-10-CM

## 2021-01-26 LAB
ALBUMIN SERPL BCP-MCNC: 3.3 G/DL (ref 3.5–5.2)
ALP SERPL-CCNC: 86 U/L (ref 55–135)
ALT SERPL W/O P-5'-P-CCNC: 12 U/L (ref 10–44)
ANION GAP SERPL CALC-SCNC: 8 MMOL/L (ref 8–16)
AST SERPL-CCNC: 14 U/L (ref 10–40)
BASOPHILS # BLD AUTO: 0.03 K/UL (ref 0–0.2)
BASOPHILS NFR BLD: 0.8 % (ref 0–1.9)
BILIRUB SERPL-MCNC: 0.3 MG/DL (ref 0.1–1)
BUN SERPL-MCNC: 8 MG/DL (ref 8–23)
CALCIUM SERPL-MCNC: 8.9 MG/DL (ref 8.7–10.5)
CHLORIDE SERPL-SCNC: 103 MMOL/L (ref 95–110)
CO2 SERPL-SCNC: 27 MMOL/L (ref 23–29)
CREAT SERPL-MCNC: 0.8 MG/DL (ref 0.5–1.4)
DIFFERENTIAL METHOD: ABNORMAL
EOSINOPHIL # BLD AUTO: 0.2 K/UL (ref 0–0.5)
EOSINOPHIL NFR BLD: 4.7 % (ref 0–8)
ERYTHROCYTE [DISTWIDTH] IN BLOOD BY AUTOMATED COUNT: 14.7 % (ref 11.5–14.5)
EST. GFR  (AFRICAN AMERICAN): >60 ML/MIN/1.73 M^2
EST. GFR  (NON AFRICAN AMERICAN): >60 ML/MIN/1.73 M^2
GLUCOSE SERPL-MCNC: 102 MG/DL (ref 70–110)
HCT VFR BLD AUTO: 36.2 % (ref 37–48.5)
HGB BLD-MCNC: 11.5 G/DL (ref 12–16)
IMM GRANULOCYTES # BLD AUTO: 0.01 K/UL (ref 0–0.04)
IMM GRANULOCYTES NFR BLD AUTO: 0.3 % (ref 0–0.5)
LYMPHOCYTES # BLD AUTO: 1.2 K/UL (ref 1–4.8)
LYMPHOCYTES NFR BLD: 32 % (ref 18–48)
MCH RBC QN AUTO: 29.3 PG (ref 27–31)
MCHC RBC AUTO-ENTMCNC: 31.8 G/DL (ref 32–36)
MCV RBC AUTO: 92 FL (ref 82–98)
MONOCYTES # BLD AUTO: 0.4 K/UL (ref 0.3–1)
MONOCYTES NFR BLD: 10.8 % (ref 4–15)
NEUTROPHILS # BLD AUTO: 2 K/UL (ref 1.8–7.7)
NEUTROPHILS NFR BLD: 51.4 % (ref 38–73)
NRBC BLD-RTO: 0 /100 WBC
PLATELET # BLD AUTO: 218 K/UL (ref 150–350)
PMV BLD AUTO: 10.5 FL (ref 9.2–12.9)
POTASSIUM SERPL-SCNC: 4.6 MMOL/L (ref 3.5–5.1)
PROT SERPL-MCNC: 6.7 G/DL (ref 6–8.4)
RBC # BLD AUTO: 3.93 M/UL (ref 4–5.4)
SODIUM SERPL-SCNC: 138 MMOL/L (ref 136–145)
WBC # BLD AUTO: 3.81 K/UL (ref 3.9–12.7)

## 2021-01-26 PROCEDURE — 1125F PR PAIN SEVERITY QUANTIFIED, PAIN PRESENT: ICD-10-PCS | Mod: S$GLB,,, | Performed by: SURGERY

## 2021-01-26 PROCEDURE — 3008F PR BODY MASS INDEX (BMI) DOCUMENTED: ICD-10-PCS | Mod: CPTII,S$GLB,, | Performed by: SURGERY

## 2021-01-26 PROCEDURE — 99999 PR PBB SHADOW E&M-EST. PATIENT-LVL III: CPT | Mod: PBBFAC,,, | Performed by: SURGERY

## 2021-01-26 PROCEDURE — 82378 CARCINOEMBRYONIC ANTIGEN: CPT

## 2021-01-26 PROCEDURE — 3008F BODY MASS INDEX DOCD: CPT | Mod: CPTII,S$GLB,, | Performed by: SURGERY

## 2021-01-26 PROCEDURE — 99999 PR PBB SHADOW E&M-EST. PATIENT-LVL III: ICD-10-PCS | Mod: PBBFAC,,, | Performed by: SURGERY

## 2021-01-26 PROCEDURE — 99024 POSTOP FOLLOW-UP VISIT: CPT | Mod: S$GLB,,, | Performed by: SURGERY

## 2021-01-26 PROCEDURE — 85025 COMPLETE CBC W/AUTO DIFF WBC: CPT

## 2021-01-26 PROCEDURE — 1125F AMNT PAIN NOTED PAIN PRSNT: CPT | Mod: S$GLB,,, | Performed by: SURGERY

## 2021-01-26 PROCEDURE — 80053 COMPREHEN METABOLIC PANEL: CPT

## 2021-01-26 PROCEDURE — 36415 COLL VENOUS BLD VENIPUNCTURE: CPT

## 2021-01-26 PROCEDURE — 99024 PR POST-OP FOLLOW-UP VISIT: ICD-10-PCS | Mod: S$GLB,,, | Performed by: SURGERY

## 2021-01-27 ENCOUNTER — HOSPITAL ENCOUNTER (OUTPATIENT)
Facility: HOSPITAL | Age: 66
Discharge: HOME | End: 2021-01-29
Attending: EMERGENCY MEDICINE | Admitting: STUDENT IN AN ORGANIZED HEALTH CARE EDUCATION/TRAINING PROGRAM
Payer: COMMERCIAL

## 2021-01-27 ENCOUNTER — INFUSION (OUTPATIENT)
Dept: INFUSION THERAPY | Facility: HOSPITAL | Age: 66
End: 2021-01-27
Attending: INTERNAL MEDICINE
Payer: COMMERCIAL

## 2021-01-27 ENCOUNTER — DOCUMENTATION ONLY (OUTPATIENT)
Dept: HEMATOLOGY/ONCOLOGY | Facility: CLINIC | Age: 66
End: 2021-01-27

## 2021-01-27 ENCOUNTER — NURSE TRIAGE (OUTPATIENT)
Dept: ADMINISTRATIVE | Facility: CLINIC | Age: 66
End: 2021-01-27

## 2021-01-27 ENCOUNTER — OFFICE VISIT (OUTPATIENT)
Dept: HEMATOLOGY/ONCOLOGY | Facility: CLINIC | Age: 66
End: 2021-01-27
Payer: COMMERCIAL

## 2021-01-27 VITALS
TEMPERATURE: 98 F | HEIGHT: 61 IN | BODY MASS INDEX: 35.97 KG/M2 | SYSTOLIC BLOOD PRESSURE: 136 MMHG | DIASTOLIC BLOOD PRESSURE: 72 MMHG | HEART RATE: 87 BPM | RESPIRATION RATE: 18 BRPM | WEIGHT: 190.5 LBS

## 2021-01-27 VITALS
WEIGHT: 190.5 LBS | TEMPERATURE: 97 F | HEART RATE: 84 BPM | HEIGHT: 61 IN | RESPIRATION RATE: 18 BRPM | OXYGEN SATURATION: 98 % | BODY MASS INDEX: 35.97 KG/M2 | DIASTOLIC BLOOD PRESSURE: 59 MMHG | SYSTOLIC BLOOD PRESSURE: 121 MMHG

## 2021-01-27 DIAGNOSIS — Z95.828 PORT-A-CATH IN PLACE: ICD-10-CM

## 2021-01-27 DIAGNOSIS — C18.9 MALIGNANT NEOPLASM OF COLON, UNSPECIFIED PART OF COLON: ICD-10-CM

## 2021-01-27 DIAGNOSIS — Z45.2 ENCOUNTER FOR CARE RELATED TO VASCULAR ACCESS PORT: Primary | ICD-10-CM

## 2021-01-27 DIAGNOSIS — E78.00 PURE HYPERCHOLESTEROLEMIA: ICD-10-CM

## 2021-01-27 DIAGNOSIS — E03.9 ACQUIRED HYPOTHYROIDISM: ICD-10-CM

## 2021-01-27 DIAGNOSIS — C20 RECTAL CANCER: ICD-10-CM

## 2021-01-27 DIAGNOSIS — C20 RECTAL CANCER: Primary | ICD-10-CM

## 2021-01-27 DIAGNOSIS — L50.9 HIVES: ICD-10-CM

## 2021-01-27 LAB
CEA SERPL-MCNC: 1.7 NG/ML (ref 0–5)
SARS-COV-2 RDRP RESP QL NAA+PROBE: NEGATIVE

## 2021-01-27 PROCEDURE — 96411 CHEMO IV PUSH ADDL DRUG: CPT

## 2021-01-27 PROCEDURE — 96367 TX/PROPH/DG ADDL SEQ IV INF: CPT

## 2021-01-27 PROCEDURE — 63600175 PHARM REV CODE 636 W HCPCS: Performed by: INTERNAL MEDICINE

## 2021-01-27 PROCEDURE — 99215 PR OFFICE/OUTPT VISIT, EST, LEVL V, 40-54 MIN: ICD-10-PCS | Mod: S$GLB,,, | Performed by: INTERNAL MEDICINE

## 2021-01-27 PROCEDURE — 1126F PR PAIN SEVERITY QUANTIFIED, NO PAIN PRESENT: ICD-10-PCS | Mod: S$GLB,,, | Performed by: INTERNAL MEDICINE

## 2021-01-27 PROCEDURE — 3008F BODY MASS INDEX DOCD: CPT | Mod: CPTII,S$GLB,, | Performed by: INTERNAL MEDICINE

## 2021-01-27 PROCEDURE — 99999 PR PBB SHADOW E&M-EST. PATIENT-LVL III: ICD-10-PCS | Mod: PBBFAC,,, | Performed by: INTERNAL MEDICINE

## 2021-01-27 PROCEDURE — G0378 HOSPITAL OBSERVATION PER HR: HCPCS

## 2021-01-27 PROCEDURE — 99999 PR PBB SHADOW E&M-EST. PATIENT-LVL III: CPT | Mod: PBBFAC,,, | Performed by: INTERNAL MEDICINE

## 2021-01-27 PROCEDURE — 96415 CHEMO IV INFUSION ADDL HR: CPT

## 2021-01-27 PROCEDURE — 96416 CHEMO PROLONG INFUSE W/PUMP: CPT

## 2021-01-27 PROCEDURE — 96413 CHEMO IV INFUSION 1 HR: CPT

## 2021-01-27 PROCEDURE — 3008F PR BODY MASS INDEX (BMI) DOCUMENTED: ICD-10-PCS | Mod: CPTII,S$GLB,, | Performed by: INTERNAL MEDICINE

## 2021-01-27 PROCEDURE — 1126F AMNT PAIN NOTED NONE PRSNT: CPT | Mod: S$GLB,,, | Performed by: INTERNAL MEDICINE

## 2021-01-27 PROCEDURE — 99215 OFFICE O/P EST HI 40 MIN: CPT | Mod: S$GLB,,, | Performed by: INTERNAL MEDICINE

## 2021-01-27 PROCEDURE — 99285 EMERGENCY DEPT VISIT HI MDM: CPT | Mod: 25

## 2021-01-27 PROCEDURE — 96366 THER/PROPH/DIAG IV INF ADDON: CPT

## 2021-01-27 PROCEDURE — 25000003 PHARM REV CODE 250: Performed by: EMERGENCY MEDICINE

## 2021-01-27 PROCEDURE — 25000003 PHARM REV CODE 250: Performed by: INTERNAL MEDICINE

## 2021-01-27 PROCEDURE — 96368 THER/DIAG CONCURRENT INF: CPT

## 2021-01-27 PROCEDURE — U0002 COVID-19 LAB TEST NON-CDC: HCPCS

## 2021-01-27 RX ORDER — FLUOROURACIL 50 MG/ML
400 INJECTION, SOLUTION INTRAVENOUS
Status: COMPLETED | OUTPATIENT
Start: 2021-01-27 | End: 2021-01-27

## 2021-01-27 RX ORDER — TRAMADOL HYDROCHLORIDE 50 MG/1
50 TABLET ORAL
Status: COMPLETED | OUTPATIENT
Start: 2021-01-27 | End: 2021-01-27

## 2021-01-27 RX ORDER — FLUOROURACIL 50 MG/ML
400 INJECTION, SOLUTION INTRAVENOUS
Status: CANCELLED | OUTPATIENT
Start: 2021-01-27

## 2021-01-27 RX ORDER — SODIUM CHLORIDE 0.9 % (FLUSH) 0.9 %
10 SYRINGE (ML) INJECTION
Status: CANCELLED | OUTPATIENT
Start: 2021-01-29

## 2021-01-27 RX ORDER — HEPARIN 100 UNIT/ML
500 SYRINGE INTRAVENOUS
Status: DISCONTINUED | OUTPATIENT
Start: 2021-01-27 | End: 2021-01-27 | Stop reason: HOSPADM

## 2021-01-27 RX ORDER — EPINEPHRINE 0.3 MG/.3ML
0.3 INJECTION SUBCUTANEOUS ONCE AS NEEDED
Status: CANCELLED | OUTPATIENT
Start: 2021-01-27

## 2021-01-27 RX ORDER — HEPARIN 100 UNIT/ML
500 SYRINGE INTRAVENOUS
Status: CANCELLED | OUTPATIENT
Start: 2021-01-27

## 2021-01-27 RX ORDER — SODIUM CHLORIDE 0.9 % (FLUSH) 0.9 %
10 SYRINGE (ML) INJECTION
Status: CANCELLED | OUTPATIENT
Start: 2021-01-27

## 2021-01-27 RX ORDER — HEPARIN 100 UNIT/ML
500 SYRINGE INTRAVENOUS
Status: CANCELLED | OUTPATIENT
Start: 2021-01-29

## 2021-01-27 RX ORDER — DIPHENHYDRAMINE HYDROCHLORIDE 50 MG/ML
50 INJECTION INTRAMUSCULAR; INTRAVENOUS ONCE AS NEEDED
Status: DISCONTINUED | OUTPATIENT
Start: 2021-01-27 | End: 2021-01-27 | Stop reason: HOSPADM

## 2021-01-27 RX ORDER — EPINEPHRINE 0.3 MG/.3ML
0.3 INJECTION SUBCUTANEOUS ONCE AS NEEDED
Status: DISCONTINUED | OUTPATIENT
Start: 2021-01-27 | End: 2021-01-27 | Stop reason: HOSPADM

## 2021-01-27 RX ORDER — SODIUM CHLORIDE 0.9 % (FLUSH) 0.9 %
10 SYRINGE (ML) INJECTION
Status: DISCONTINUED | OUTPATIENT
Start: 2021-01-27 | End: 2021-01-27 | Stop reason: HOSPADM

## 2021-01-27 RX ORDER — DIPHENHYDRAMINE HYDROCHLORIDE 50 MG/ML
50 INJECTION INTRAMUSCULAR; INTRAVENOUS ONCE AS NEEDED
Status: CANCELLED | OUTPATIENT
Start: 2021-01-27

## 2021-01-27 RX ORDER — CYANOCOBALAMIN 1000 UG/ML
1000 INJECTION, SOLUTION INTRAMUSCULAR; SUBCUTANEOUS
Qty: 30 ML | Refills: 4 | Status: SHIPPED | OUTPATIENT
Start: 2021-01-27 | End: 2022-01-31

## 2021-01-27 RX ADMIN — DEXTROSE 770 MG: 5 SOLUTION INTRAVENOUS at 10:01

## 2021-01-27 RX ADMIN — TRAMADOL HYDROCHLORIDE 50 MG: 50 TABLET, FILM COATED ORAL at 11:01

## 2021-01-27 RX ADMIN — FLUOROURACIL 770 MG: 50 INJECTION, SOLUTION INTRAVENOUS at 12:01

## 2021-01-27 RX ADMIN — OXALIPLATIN 164 MG: 100 INJECTION, SOLUTION, CONCENTRATE INTRAVENOUS at 10:01

## 2021-01-27 RX ADMIN — DEXAMETHASONE SODIUM PHOSPHATE 0.25 MG: 4 INJECTION, SOLUTION INTRA-ARTICULAR; INTRALESIONAL; INTRAMUSCULAR; INTRAVENOUS; SOFT TISSUE at 10:01

## 2021-01-27 RX ADMIN — FLUOROURACIL 4630 MG: 50 INJECTION, SOLUTION INTRAVENOUS at 12:01

## 2021-01-28 ENCOUNTER — TELEPHONE (OUTPATIENT)
Dept: HEMATOLOGY/ONCOLOGY | Facility: CLINIC | Age: 66
End: 2021-01-28

## 2021-01-28 PROBLEM — Z95.828 PORT-A-CATH IN PLACE: Status: ACTIVE | Noted: 2021-01-27

## 2021-01-28 LAB
ALBUMIN SERPL BCP-MCNC: 3.5 G/DL (ref 3.5–5.2)
ALP SERPL-CCNC: 89 U/L (ref 55–135)
ALT SERPL W/O P-5'-P-CCNC: 14 U/L (ref 10–44)
ANION GAP SERPL CALC-SCNC: 11 MMOL/L (ref 8–16)
AST SERPL-CCNC: 16 U/L (ref 10–40)
BASOPHILS # BLD AUTO: 0.03 K/UL (ref 0–0.2)
BASOPHILS NFR BLD: 0.7 % (ref 0–1.9)
BILIRUB SERPL-MCNC: 0.6 MG/DL (ref 0.1–1)
BUN SERPL-MCNC: 12 MG/DL (ref 8–23)
CALCIUM SERPL-MCNC: 9.2 MG/DL (ref 8.7–10.5)
CHLORIDE SERPL-SCNC: 102 MMOL/L (ref 95–110)
CO2 SERPL-SCNC: 22 MMOL/L (ref 23–29)
CREAT SERPL-MCNC: 0.7 MG/DL (ref 0.5–1.4)
DIFFERENTIAL METHOD: ABNORMAL
EOSINOPHIL # BLD AUTO: 0 K/UL (ref 0–0.5)
EOSINOPHIL NFR BLD: 0.5 % (ref 0–8)
ERYTHROCYTE [DISTWIDTH] IN BLOOD BY AUTOMATED COUNT: 14.7 % (ref 11.5–14.5)
EST. GFR  (AFRICAN AMERICAN): >60 ML/MIN/1.73 M^2
EST. GFR  (NON AFRICAN AMERICAN): >60 ML/MIN/1.73 M^2
GLUCOSE SERPL-MCNC: 127 MG/DL (ref 70–110)
HCT VFR BLD AUTO: 36.3 % (ref 37–48.5)
HGB BLD-MCNC: 12.1 G/DL (ref 12–16)
IMM GRANULOCYTES # BLD AUTO: 0.01 K/UL (ref 0–0.04)
IMM GRANULOCYTES NFR BLD AUTO: 0.2 % (ref 0–0.5)
LYMPHOCYTES # BLD AUTO: 0.3 K/UL (ref 1–4.8)
LYMPHOCYTES NFR BLD: 6.3 % (ref 18–48)
MAGNESIUM SERPL-MCNC: 2 MG/DL (ref 1.6–2.6)
MCH RBC QN AUTO: 29.2 PG (ref 27–31)
MCHC RBC AUTO-ENTMCNC: 33.3 G/DL (ref 32–36)
MCV RBC AUTO: 88 FL (ref 82–98)
MONOCYTES # BLD AUTO: 0.4 K/UL (ref 0.3–1)
MONOCYTES NFR BLD: 8.2 % (ref 4–15)
NEUTROPHILS # BLD AUTO: 3.6 K/UL (ref 1.8–7.7)
NEUTROPHILS NFR BLD: 84.1 % (ref 38–73)
NRBC BLD-RTO: 0 /100 WBC
PLATELET # BLD AUTO: 205 K/UL (ref 150–350)
PMV BLD AUTO: 10.9 FL (ref 9.2–12.9)
POTASSIUM SERPL-SCNC: 4 MMOL/L (ref 3.5–5.1)
PROT SERPL-MCNC: 7 G/DL (ref 6–8.4)
RBC # BLD AUTO: 4.14 M/UL (ref 4–5.4)
SODIUM SERPL-SCNC: 135 MMOL/L (ref 136–145)
T4 FREE SERPL-MCNC: 0.7 NG/DL (ref 0.71–1.51)
TSH SERPL DL<=0.005 MIU/L-ACNC: 6.86 UIU/ML (ref 0.4–4)
WBC # BLD AUTO: 4.26 K/UL (ref 3.9–12.7)

## 2021-01-28 PROCEDURE — 84443 ASSAY THYROID STIM HORMONE: CPT

## 2021-01-28 PROCEDURE — 63600175 PHARM REV CODE 636 W HCPCS: Performed by: STUDENT IN AN ORGANIZED HEALTH CARE EDUCATION/TRAINING PROGRAM

## 2021-01-28 PROCEDURE — 99214 PR OFFICE/OUTPT VISIT, EST, LEVL IV, 30-39 MIN: ICD-10-PCS | Mod: ,,, | Performed by: PHYSICIAN ASSISTANT

## 2021-01-28 PROCEDURE — 99900035 HC TECH TIME PER 15 MIN (STAT)

## 2021-01-28 PROCEDURE — G0378 HOSPITAL OBSERVATION PER HR: HCPCS

## 2021-01-28 PROCEDURE — 63600175 PHARM REV CODE 636 W HCPCS: Performed by: NURSE PRACTITIONER

## 2021-01-28 PROCEDURE — 96375 TX/PRO/DX INJ NEW DRUG ADDON: CPT

## 2021-01-28 PROCEDURE — 96374 THER/PROPH/DIAG INJ IV PUSH: CPT | Mod: 59

## 2021-01-28 PROCEDURE — 25000003 PHARM REV CODE 250: Performed by: STUDENT IN AN ORGANIZED HEALTH CARE EDUCATION/TRAINING PROGRAM

## 2021-01-28 PROCEDURE — 25500020 PHARM REV CODE 255: Performed by: STUDENT IN AN ORGANIZED HEALTH CARE EDUCATION/TRAINING PROGRAM

## 2021-01-28 PROCEDURE — 36415 COLL VENOUS BLD VENIPUNCTURE: CPT

## 2021-01-28 PROCEDURE — 85025 COMPLETE CBC W/AUTO DIFF WBC: CPT

## 2021-01-28 PROCEDURE — 80053 COMPREHEN METABOLIC PANEL: CPT

## 2021-01-28 PROCEDURE — 83735 ASSAY OF MAGNESIUM: CPT

## 2021-01-28 PROCEDURE — 84439 ASSAY OF FREE THYROXINE: CPT

## 2021-01-28 PROCEDURE — 25000003 PHARM REV CODE 250: Performed by: NURSE PRACTITIONER

## 2021-01-28 PROCEDURE — 94761 N-INVAS EAR/PLS OXIMETRY MLT: CPT

## 2021-01-28 PROCEDURE — 99214 OFFICE O/P EST MOD 30 MIN: CPT | Mod: ,,, | Performed by: PHYSICIAN ASSISTANT

## 2021-01-28 PROCEDURE — 96376 TX/PRO/DX INJ SAME DRUG ADON: CPT | Mod: 59

## 2021-01-28 RX ORDER — TALC
6 POWDER (GRAM) TOPICAL NIGHTLY PRN
Status: DISCONTINUED | OUTPATIENT
Start: 2021-01-28 | End: 2021-01-29 | Stop reason: HOSPADM

## 2021-01-28 RX ORDER — PROCHLORPERAZINE MALEATE 5 MG
10 TABLET ORAL EVERY 6 HOURS PRN
Status: DISCONTINUED | OUTPATIENT
Start: 2021-01-28 | End: 2021-01-28

## 2021-01-28 RX ORDER — SODIUM CHLORIDE 0.9 % (FLUSH) 0.9 %
10 SYRINGE (ML) INJECTION
Status: DISCONTINUED | OUTPATIENT
Start: 2021-01-28 | End: 2021-01-29 | Stop reason: HOSPADM

## 2021-01-28 RX ORDER — ONDANSETRON 8 MG/1
8 TABLET, ORALLY DISINTEGRATING ORAL EVERY 12 HOURS PRN
Status: DISCONTINUED | OUTPATIENT
Start: 2021-01-28 | End: 2021-01-29 | Stop reason: HOSPADM

## 2021-01-28 RX ORDER — ALBUTEROL SULFATE 2.5 MG/.5ML
2.5 SOLUTION RESPIRATORY (INHALATION) EVERY 4 HOURS PRN
Status: DISCONTINUED | OUTPATIENT
Start: 2021-01-28 | End: 2021-01-29 | Stop reason: HOSPADM

## 2021-01-28 RX ORDER — ACETAMINOPHEN 325 MG/1
650 TABLET ORAL EVERY 4 HOURS PRN
Status: DISCONTINUED | OUTPATIENT
Start: 2021-01-28 | End: 2021-01-29 | Stop reason: HOSPADM

## 2021-01-28 RX ORDER — GLUCAGON 1 MG
1 KIT INJECTION
Status: DISCONTINUED | OUTPATIENT
Start: 2021-01-28 | End: 2021-01-29 | Stop reason: HOSPADM

## 2021-01-28 RX ORDER — LANOLIN ALCOHOL/MO/W.PET/CERES
800 CREAM (GRAM) TOPICAL
Status: DISCONTINUED | OUTPATIENT
Start: 2021-01-28 | End: 2021-01-29 | Stop reason: HOSPADM

## 2021-01-28 RX ORDER — LEVOTHYROXINE SODIUM 88 UG/1
88 TABLET ORAL
Status: DISCONTINUED | OUTPATIENT
Start: 2021-01-28 | End: 2021-01-29 | Stop reason: HOSPADM

## 2021-01-28 RX ORDER — TRAMADOL HYDROCHLORIDE 50 MG/1
50 TABLET ORAL EVERY 6 HOURS PRN
Status: DISCONTINUED | OUTPATIENT
Start: 2021-01-28 | End: 2021-01-29 | Stop reason: HOSPADM

## 2021-01-28 RX ORDER — LORAZEPAM 2 MG/ML
0.5 INJECTION INTRAMUSCULAR EVERY 8 HOURS PRN
Status: DISCONTINUED | OUTPATIENT
Start: 2021-01-28 | End: 2021-01-29 | Stop reason: HOSPADM

## 2021-01-28 RX ORDER — IBUPROFEN 200 MG
24 TABLET ORAL
Status: DISCONTINUED | OUTPATIENT
Start: 2021-01-28 | End: 2021-01-29 | Stop reason: HOSPADM

## 2021-01-28 RX ORDER — DIPHENHYDRAMINE HYDROCHLORIDE 50 MG/ML
25 INJECTION INTRAMUSCULAR; INTRAVENOUS EVERY 6 HOURS PRN
Status: DISCONTINUED | OUTPATIENT
Start: 2021-01-28 | End: 2021-01-29 | Stop reason: HOSPADM

## 2021-01-28 RX ORDER — PROCHLORPERAZINE EDISYLATE 5 MG/ML
10 INJECTION INTRAMUSCULAR; INTRAVENOUS EVERY 6 HOURS PRN
Status: DISCONTINUED | OUTPATIENT
Start: 2021-01-28 | End: 2021-01-29 | Stop reason: HOSPADM

## 2021-01-28 RX ORDER — DIPHENHYDRAMINE HYDROCHLORIDE 50 MG/ML
12.5 INJECTION INTRAMUSCULAR; INTRAVENOUS ONCE
Status: COMPLETED | OUTPATIENT
Start: 2021-01-28 | End: 2021-01-28

## 2021-01-28 RX ORDER — PROMETHAZINE HYDROCHLORIDE 6.25 MG/5ML
12.5 SYRUP ORAL EVERY 6 HOURS PRN
Status: DISCONTINUED | OUTPATIENT
Start: 2021-01-28 | End: 2021-01-28

## 2021-01-28 RX ORDER — DIPHENHYDRAMINE HCL 25 MG
25 CAPSULE ORAL EVERY 6 HOURS PRN
Status: DISCONTINUED | OUTPATIENT
Start: 2021-01-28 | End: 2021-01-29 | Stop reason: HOSPADM

## 2021-01-28 RX ORDER — IBUPROFEN 200 MG
16 TABLET ORAL
Status: DISCONTINUED | OUTPATIENT
Start: 2021-01-28 | End: 2021-01-29 | Stop reason: HOSPADM

## 2021-01-28 RX ORDER — FAMOTIDINE 10 MG/ML
20 INJECTION INTRAVENOUS 2 TIMES DAILY
Status: DISCONTINUED | OUTPATIENT
Start: 2021-01-28 | End: 2021-01-29 | Stop reason: HOSPADM

## 2021-01-28 RX ADMIN — LEVOTHYROXINE SODIUM 88 MCG: 0.09 TABLET ORAL at 06:01

## 2021-01-28 RX ADMIN — IOHEXOL 50 ML: 350 INJECTION, SOLUTION INTRAVENOUS at 04:01

## 2021-01-28 RX ADMIN — LORAZEPAM 0.5 MG: 2 INJECTION INTRAMUSCULAR; INTRAVENOUS at 01:01

## 2021-01-28 RX ADMIN — FAMOTIDINE 20 MG: 10 INJECTION INTRAVENOUS at 10:01

## 2021-01-28 RX ADMIN — DIPHENHYDRAMINE HYDROCHLORIDE 25 MG: 50 INJECTION INTRAMUSCULAR; INTRAVENOUS at 10:01

## 2021-01-28 RX ADMIN — ONDANSETRON 8 MG: 8 TABLET, ORALLY DISINTEGRATING ORAL at 02:01

## 2021-01-28 RX ADMIN — ONDANSETRON 8 MG: 8 TABLET, ORALLY DISINTEGRATING ORAL at 04:01

## 2021-01-28 RX ADMIN — DIPHENHYDRAMINE HYDROCHLORIDE 25 MG: 50 INJECTION INTRAMUSCULAR; INTRAVENOUS at 05:01

## 2021-01-28 RX ADMIN — DIPHENHYDRAMINE HYDROCHLORIDE 12.5 MG: 50 INJECTION INTRAMUSCULAR; INTRAVENOUS at 03:01

## 2021-01-28 RX ADMIN — METHYLPREDNISOLONE SODIUM SUCCINATE 40 MG: 40 INJECTION, POWDER, FOR SOLUTION INTRAMUSCULAR; INTRAVENOUS at 10:01

## 2021-01-28 RX ADMIN — FAMOTIDINE 20 MG: 10 INJECTION INTRAVENOUS at 08:01

## 2021-01-29 ENCOUNTER — INFUSION (OUTPATIENT)
Dept: INFUSION THERAPY | Facility: HOSPITAL | Age: 66
End: 2021-01-29
Attending: INTERNAL MEDICINE
Payer: COMMERCIAL

## 2021-01-29 ENCOUNTER — TELEPHONE (OUTPATIENT)
Dept: HEMATOLOGY/ONCOLOGY | Facility: CLINIC | Age: 66
End: 2021-01-29

## 2021-01-29 VITALS
HEART RATE: 95 BPM | OXYGEN SATURATION: 95 % | RESPIRATION RATE: 17 BRPM | WEIGHT: 190.06 LBS | BODY MASS INDEX: 35.91 KG/M2

## 2021-01-29 VITALS
DIASTOLIC BLOOD PRESSURE: 64 MMHG | OXYGEN SATURATION: 97 % | RESPIRATION RATE: 18 BRPM | HEART RATE: 89 BPM | SYSTOLIC BLOOD PRESSURE: 117 MMHG | WEIGHT: 190.06 LBS | TEMPERATURE: 98 F | BODY MASS INDEX: 35.88 KG/M2 | HEIGHT: 61 IN

## 2021-01-29 DIAGNOSIS — C20 RECTAL CANCER: Primary | ICD-10-CM

## 2021-01-29 PROCEDURE — G0378 HOSPITAL OBSERVATION PER HR: HCPCS

## 2021-01-29 PROCEDURE — 96377 APPLICATON ON-BODY INJECTOR: CPT

## 2021-01-29 PROCEDURE — 96523 IRRIG DRUG DELIVERY DEVICE: CPT | Mod: XB

## 2021-01-29 PROCEDURE — 94761 N-INVAS EAR/PLS OXIMETRY MLT: CPT

## 2021-01-29 PROCEDURE — 99213 PR OFFICE/OUTPT VISIT, EST, LEVL III, 20-29 MIN: ICD-10-PCS | Mod: ,,, | Performed by: PHYSICIAN ASSISTANT

## 2021-01-29 PROCEDURE — 63600175 PHARM REV CODE 636 W HCPCS: Performed by: STUDENT IN AN ORGANIZED HEALTH CARE EDUCATION/TRAINING PROGRAM

## 2021-01-29 PROCEDURE — 25000003 PHARM REV CODE 250: Performed by: STUDENT IN AN ORGANIZED HEALTH CARE EDUCATION/TRAINING PROGRAM

## 2021-01-29 PROCEDURE — 99213 OFFICE O/P EST LOW 20 MIN: CPT | Mod: ,,, | Performed by: PHYSICIAN ASSISTANT

## 2021-01-29 PROCEDURE — 63600175 PHARM REV CODE 636 W HCPCS: Performed by: INTERNAL MEDICINE

## 2021-01-29 PROCEDURE — 96376 TX/PRO/DX INJ SAME DRUG ADON: CPT

## 2021-01-29 PROCEDURE — 99900035 HC TECH TIME PER 15 MIN (STAT)

## 2021-01-29 RX ORDER — HEPARIN 100 UNIT/ML
500 SYRINGE INTRAVENOUS
Status: DISCONTINUED | OUTPATIENT
Start: 2021-01-29 | End: 2021-01-29 | Stop reason: HOSPADM

## 2021-01-29 RX ORDER — SODIUM CHLORIDE 0.9 % (FLUSH) 0.9 %
10 SYRINGE (ML) INJECTION
Status: DISCONTINUED | OUTPATIENT
Start: 2021-01-29 | End: 2021-01-29 | Stop reason: HOSPADM

## 2021-01-29 RX ADMIN — DIPHENHYDRAMINE HYDROCHLORIDE 25 MG: 50 INJECTION INTRAMUSCULAR; INTRAVENOUS at 01:01

## 2021-01-29 RX ADMIN — HEPARIN 500 UNITS: 100 SYRINGE at 04:01

## 2021-01-29 RX ADMIN — DIPHENHYDRAMINE HYDROCHLORIDE 25 MG: 50 INJECTION INTRAMUSCULAR; INTRAVENOUS at 08:01

## 2021-01-29 RX ADMIN — PEGFILGRASTIM 6 MG: KIT SUBCUTANEOUS at 04:01

## 2021-01-29 RX ADMIN — FAMOTIDINE 20 MG: 10 INJECTION INTRAVENOUS at 11:01

## 2021-02-01 ENCOUNTER — TELEPHONE (OUTPATIENT)
Dept: MEDSURG UNIT | Facility: HOSPITAL | Age: 66
End: 2021-02-01

## 2021-02-09 ENCOUNTER — LAB VISIT (OUTPATIENT)
Dept: LAB | Facility: HOSPITAL | Age: 66
End: 2021-02-09
Attending: INTERNAL MEDICINE
Payer: COMMERCIAL

## 2021-02-09 DIAGNOSIS — C20 RECTAL CANCER: ICD-10-CM

## 2021-02-09 LAB
ALBUMIN SERPL BCP-MCNC: 3.5 G/DL (ref 3.5–5.2)
ALP SERPL-CCNC: 102 U/L (ref 55–135)
ALT SERPL W/O P-5'-P-CCNC: 26 U/L (ref 10–44)
ANION GAP SERPL CALC-SCNC: 8 MMOL/L (ref 8–16)
AST SERPL-CCNC: 23 U/L (ref 10–40)
BASOPHILS NFR BLD: 0 % (ref 0–1.9)
BILIRUB SERPL-MCNC: 0.4 MG/DL (ref 0.1–1)
BUN SERPL-MCNC: 7 MG/DL (ref 8–23)
CALCIUM SERPL-MCNC: 9 MG/DL (ref 8.7–10.5)
CHLORIDE SERPL-SCNC: 102 MMOL/L (ref 95–110)
CO2 SERPL-SCNC: 28 MMOL/L (ref 23–29)
CREAT SERPL-MCNC: 0.9 MG/DL (ref 0.5–1.4)
DIFFERENTIAL METHOD: ABNORMAL
EOSINOPHIL NFR BLD: 0 % (ref 0–8)
ERYTHROCYTE [DISTWIDTH] IN BLOOD BY AUTOMATED COUNT: 16.2 % (ref 11.5–14.5)
EST. GFR  (AFRICAN AMERICAN): >60 ML/MIN/1.73 M^2
EST. GFR  (NON AFRICAN AMERICAN): >60 ML/MIN/1.73 M^2
GLUCOSE SERPL-MCNC: 103 MG/DL (ref 70–110)
HCT VFR BLD AUTO: 39.4 % (ref 37–48.5)
HGB BLD-MCNC: 12.7 G/DL (ref 12–16)
IMM GRANULOCYTES # BLD AUTO: ABNORMAL K/UL
IMM GRANULOCYTES NFR BLD AUTO: ABNORMAL %
LYMPHOCYTES NFR BLD: 23 % (ref 18–48)
MCH RBC QN AUTO: 29.5 PG (ref 27–31)
MCHC RBC AUTO-ENTMCNC: 32.2 G/DL (ref 32–36)
MCV RBC AUTO: 92 FL (ref 82–98)
METAMYELOCYTES NFR BLD MANUAL: 2 %
MONOCYTES NFR BLD: 1 % (ref 4–15)
MYELOCYTES NFR BLD MANUAL: 1 %
NEUTROPHILS NFR BLD: 67 % (ref 38–73)
NEUTS BAND NFR BLD MANUAL: 6 %
NRBC BLD-RTO: 0 /100 WBC
PLATELET # BLD AUTO: 108 K/UL (ref 150–350)
PLATELET BLD QL SMEAR: ABNORMAL
PMV BLD AUTO: 10.6 FL (ref 9.2–12.9)
POTASSIUM SERPL-SCNC: 3.8 MMOL/L (ref 3.5–5.1)
PROT SERPL-MCNC: 6.8 G/DL (ref 6–8.4)
RBC # BLD AUTO: 4.3 M/UL (ref 4–5.4)
SODIUM SERPL-SCNC: 138 MMOL/L (ref 136–145)
WBC # BLD AUTO: 7.79 K/UL (ref 3.9–12.7)

## 2021-02-09 PROCEDURE — 85027 COMPLETE CBC AUTOMATED: CPT

## 2021-02-09 PROCEDURE — 36415 COLL VENOUS BLD VENIPUNCTURE: CPT

## 2021-02-09 PROCEDURE — 80053 COMPREHEN METABOLIC PANEL: CPT

## 2021-02-09 PROCEDURE — 85007 BL SMEAR W/DIFF WBC COUNT: CPT

## 2021-02-10 ENCOUNTER — TELEPHONE (OUTPATIENT)
Dept: HEMATOLOGY/ONCOLOGY | Facility: CLINIC | Age: 66
End: 2021-02-10

## 2021-02-10 ENCOUNTER — OFFICE VISIT (OUTPATIENT)
Dept: HEMATOLOGY/ONCOLOGY | Facility: CLINIC | Age: 66
End: 2021-02-10
Payer: COMMERCIAL

## 2021-02-10 VITALS
TEMPERATURE: 98 F | DIASTOLIC BLOOD PRESSURE: 75 MMHG | HEART RATE: 100 BPM | RESPIRATION RATE: 18 BRPM | SYSTOLIC BLOOD PRESSURE: 156 MMHG | WEIGHT: 185.63 LBS | BODY MASS INDEX: 35.07 KG/M2 | OXYGEN SATURATION: 100 %

## 2021-02-10 DIAGNOSIS — C20 RECTAL CANCER: Primary | ICD-10-CM

## 2021-02-10 DIAGNOSIS — E03.9 ACQUIRED HYPOTHYROIDISM: ICD-10-CM

## 2021-02-10 DIAGNOSIS — E53.8 B12 DEFICIENCY: ICD-10-CM

## 2021-02-10 DIAGNOSIS — Z95.828 PORT-A-CATH IN PLACE: ICD-10-CM

## 2021-02-10 PROCEDURE — 99999 PR PBB SHADOW E&M-EST. PATIENT-LVL III: CPT | Mod: PBBFAC,,, | Performed by: INTERNAL MEDICINE

## 2021-02-10 PROCEDURE — 3008F PR BODY MASS INDEX (BMI) DOCUMENTED: ICD-10-PCS | Mod: CPTII,S$GLB,, | Performed by: INTERNAL MEDICINE

## 2021-02-10 PROCEDURE — 1125F AMNT PAIN NOTED PAIN PRSNT: CPT | Mod: S$GLB,,, | Performed by: INTERNAL MEDICINE

## 2021-02-10 PROCEDURE — 3008F BODY MASS INDEX DOCD: CPT | Mod: CPTII,S$GLB,, | Performed by: INTERNAL MEDICINE

## 2021-02-10 PROCEDURE — 99214 PR OFFICE/OUTPT VISIT, EST, LEVL IV, 30-39 MIN: ICD-10-PCS | Mod: S$GLB,,, | Performed by: INTERNAL MEDICINE

## 2021-02-10 PROCEDURE — 99999 PR PBB SHADOW E&M-EST. PATIENT-LVL III: ICD-10-PCS | Mod: PBBFAC,,, | Performed by: INTERNAL MEDICINE

## 2021-02-10 PROCEDURE — 1125F PR PAIN SEVERITY QUANTIFIED, PAIN PRESENT: ICD-10-PCS | Mod: S$GLB,,, | Performed by: INTERNAL MEDICINE

## 2021-02-10 PROCEDURE — 99214 OFFICE O/P EST MOD 30 MIN: CPT | Mod: S$GLB,,, | Performed by: INTERNAL MEDICINE

## 2021-02-10 RX ORDER — EPINEPHRINE 0.3 MG/.3ML
0.3 INJECTION SUBCUTANEOUS ONCE AS NEEDED
Status: CANCELLED | OUTPATIENT
Start: 2021-03-03

## 2021-02-10 RX ORDER — HEPARIN 100 UNIT/ML
500 SYRINGE INTRAVENOUS
Status: CANCELLED | OUTPATIENT
Start: 2021-03-03

## 2021-02-10 RX ORDER — HEPARIN 100 UNIT/ML
500 SYRINGE INTRAVENOUS
Status: CANCELLED | OUTPATIENT
Start: 2021-03-05

## 2021-02-10 RX ORDER — FLUOROURACIL 50 MG/ML
400 INJECTION, SOLUTION INTRAVENOUS
Status: CANCELLED | OUTPATIENT
Start: 2021-03-03

## 2021-02-10 RX ORDER — DIPHENHYDRAMINE HYDROCHLORIDE 50 MG/ML
50 INJECTION INTRAMUSCULAR; INTRAVENOUS ONCE AS NEEDED
Status: CANCELLED | OUTPATIENT
Start: 2021-03-03

## 2021-02-10 RX ORDER — SODIUM CHLORIDE 0.9 % (FLUSH) 0.9 %
10 SYRINGE (ML) INJECTION
Status: CANCELLED | OUTPATIENT
Start: 2021-03-03

## 2021-02-10 RX ORDER — SODIUM CHLORIDE 0.9 % (FLUSH) 0.9 %
10 SYRINGE (ML) INJECTION
Status: CANCELLED | OUTPATIENT
Start: 2021-03-05

## 2021-02-15 ENCOUNTER — LAB VISIT (OUTPATIENT)
Dept: LAB | Facility: HOSPITAL | Age: 66
End: 2021-02-15
Attending: INTERNAL MEDICINE
Payer: COMMERCIAL

## 2021-02-15 DIAGNOSIS — E53.8 B12 DEFICIENCY: ICD-10-CM

## 2021-02-15 DIAGNOSIS — C20 RECTAL CANCER: ICD-10-CM

## 2021-02-15 LAB
ALBUMIN SERPL BCP-MCNC: 3.3 G/DL (ref 3.5–5.2)
ALP SERPL-CCNC: 103 U/L (ref 55–135)
ALT SERPL W/O P-5'-P-CCNC: 18 U/L (ref 10–44)
ANION GAP SERPL CALC-SCNC: 9 MMOL/L (ref 8–16)
AST SERPL-CCNC: 16 U/L (ref 10–40)
BASOPHILS # BLD AUTO: 0.05 K/UL (ref 0–0.2)
BASOPHILS NFR BLD: 1 % (ref 0–1.9)
BILIRUB SERPL-MCNC: 0.3 MG/DL (ref 0.1–1)
BUN SERPL-MCNC: 6 MG/DL (ref 8–23)
CALCIUM SERPL-MCNC: 9.4 MG/DL (ref 8.7–10.5)
CHLORIDE SERPL-SCNC: 104 MMOL/L (ref 95–110)
CO2 SERPL-SCNC: 26 MMOL/L (ref 23–29)
CREAT SERPL-MCNC: 0.8 MG/DL (ref 0.5–1.4)
DIFFERENTIAL METHOD: ABNORMAL
EOSINOPHIL # BLD AUTO: 0 K/UL (ref 0–0.5)
EOSINOPHIL NFR BLD: 0.6 % (ref 0–8)
ERYTHROCYTE [DISTWIDTH] IN BLOOD BY AUTOMATED COUNT: 16.4 % (ref 11.5–14.5)
EST. GFR  (AFRICAN AMERICAN): >60 ML/MIN/1.73 M^2
EST. GFR  (NON AFRICAN AMERICAN): >60 ML/MIN/1.73 M^2
GLUCOSE SERPL-MCNC: 105 MG/DL (ref 70–110)
HCT VFR BLD AUTO: 40.1 % (ref 37–48.5)
HGB BLD-MCNC: 12.3 G/DL (ref 12–16)
IMM GRANULOCYTES # BLD AUTO: 0.03 K/UL (ref 0–0.04)
IMM GRANULOCYTES NFR BLD AUTO: 0.6 % (ref 0–0.5)
LYMPHOCYTES # BLD AUTO: 1.4 K/UL (ref 1–4.8)
LYMPHOCYTES NFR BLD: 27.6 % (ref 18–48)
MCH RBC QN AUTO: 28.3 PG (ref 27–31)
MCHC RBC AUTO-ENTMCNC: 30.7 G/DL (ref 32–36)
MCV RBC AUTO: 92 FL (ref 82–98)
MONOCYTES # BLD AUTO: 0.4 K/UL (ref 0.3–1)
MONOCYTES NFR BLD: 7.7 % (ref 4–15)
NEUTROPHILS # BLD AUTO: 3.1 K/UL (ref 1.8–7.7)
NEUTROPHILS NFR BLD: 62.5 % (ref 38–73)
NRBC BLD-RTO: 0 /100 WBC
PLATELET # BLD AUTO: 193 K/UL (ref 150–350)
PLATELET BLD QL SMEAR: ABNORMAL
PMV BLD AUTO: 11.2 FL (ref 9.2–12.9)
POTASSIUM SERPL-SCNC: 3.8 MMOL/L (ref 3.5–5.1)
PROT SERPL-MCNC: 7.2 G/DL (ref 6–8.4)
RBC # BLD AUTO: 4.35 M/UL (ref 4–5.4)
SODIUM SERPL-SCNC: 139 MMOL/L (ref 136–145)
VIT B12 SERPL-MCNC: >2000 PG/ML (ref 210–950)
WBC # BLD AUTO: 4.92 K/UL (ref 3.9–12.7)

## 2021-02-15 PROCEDURE — 36415 COLL VENOUS BLD VENIPUNCTURE: CPT

## 2021-02-15 PROCEDURE — 85025 COMPLETE CBC W/AUTO DIFF WBC: CPT

## 2021-02-15 PROCEDURE — 82607 VITAMIN B-12: CPT

## 2021-02-15 PROCEDURE — 80053 COMPREHEN METABOLIC PANEL: CPT

## 2021-02-17 ENCOUNTER — OFFICE VISIT (OUTPATIENT)
Dept: HEMATOLOGY/ONCOLOGY | Facility: CLINIC | Age: 66
End: 2021-02-17
Payer: COMMERCIAL

## 2021-02-17 ENCOUNTER — TELEPHONE (OUTPATIENT)
Dept: HEMATOLOGY/ONCOLOGY | Facility: CLINIC | Age: 66
End: 2021-02-17

## 2021-02-17 VITALS
DIASTOLIC BLOOD PRESSURE: 75 MMHG | OXYGEN SATURATION: 99 % | BODY MASS INDEX: 36.58 KG/M2 | RESPIRATION RATE: 18 BRPM | TEMPERATURE: 97 F | HEART RATE: 97 BPM | WEIGHT: 186.31 LBS | SYSTOLIC BLOOD PRESSURE: 138 MMHG | HEIGHT: 60 IN

## 2021-02-17 DIAGNOSIS — E03.9 ACQUIRED HYPOTHYROIDISM: Primary | ICD-10-CM

## 2021-02-17 DIAGNOSIS — C20 RECTAL CANCER: ICD-10-CM

## 2021-02-17 DIAGNOSIS — E78.00 PURE HYPERCHOLESTEROLEMIA: ICD-10-CM

## 2021-02-17 DIAGNOSIS — C18.9 MALIGNANT NEOPLASM OF COLON, UNSPECIFIED PART OF COLON: Primary | ICD-10-CM

## 2021-02-17 DIAGNOSIS — J41.0 SIMPLE CHRONIC BRONCHITIS: ICD-10-CM

## 2021-02-17 PROCEDURE — 3008F BODY MASS INDEX DOCD: CPT | Mod: CPTII,S$GLB,, | Performed by: INTERNAL MEDICINE

## 2021-02-17 PROCEDURE — 3008F PR BODY MASS INDEX (BMI) DOCUMENTED: ICD-10-PCS | Mod: CPTII,S$GLB,, | Performed by: INTERNAL MEDICINE

## 2021-02-17 PROCEDURE — 1125F AMNT PAIN NOTED PAIN PRSNT: CPT | Mod: S$GLB,,, | Performed by: INTERNAL MEDICINE

## 2021-02-17 PROCEDURE — 99214 PR OFFICE/OUTPT VISIT, EST, LEVL IV, 30-39 MIN: ICD-10-PCS | Mod: S$GLB,,, | Performed by: INTERNAL MEDICINE

## 2021-02-17 PROCEDURE — 1125F PR PAIN SEVERITY QUANTIFIED, PAIN PRESENT: ICD-10-PCS | Mod: S$GLB,,, | Performed by: INTERNAL MEDICINE

## 2021-02-17 PROCEDURE — 99999 PR PBB SHADOW E&M-EST. PATIENT-LVL IV: ICD-10-PCS | Mod: PBBFAC,,, | Performed by: INTERNAL MEDICINE

## 2021-02-17 PROCEDURE — 99999 PR PBB SHADOW E&M-EST. PATIENT-LVL IV: CPT | Mod: PBBFAC,,, | Performed by: INTERNAL MEDICINE

## 2021-02-17 PROCEDURE — 99214 OFFICE O/P EST MOD 30 MIN: CPT | Mod: S$GLB,,, | Performed by: INTERNAL MEDICINE

## 2021-02-17 RX ORDER — SODIUM CHLORIDE 0.9 % (FLUSH) 0.9 %
10 SYRINGE (ML) INJECTION
Status: CANCELLED | OUTPATIENT
Start: 2021-03-17

## 2021-02-17 RX ORDER — EPINEPHRINE 0.3 MG/.3ML
0.3 INJECTION SUBCUTANEOUS ONCE AS NEEDED
Status: CANCELLED | OUTPATIENT
Start: 2021-03-17

## 2021-02-17 RX ORDER — FLUOROURACIL 50 MG/ML
400 INJECTION, SOLUTION INTRAVENOUS
Status: CANCELLED | OUTPATIENT
Start: 2021-03-17

## 2021-02-17 RX ORDER — HEPARIN 100 UNIT/ML
500 SYRINGE INTRAVENOUS
Status: CANCELLED | OUTPATIENT
Start: 2021-03-17

## 2021-02-17 RX ORDER — SODIUM CHLORIDE 0.9 % (FLUSH) 0.9 %
10 SYRINGE (ML) INJECTION
Status: CANCELLED | OUTPATIENT
Start: 2021-03-19

## 2021-02-17 RX ORDER — HEPARIN 100 UNIT/ML
500 SYRINGE INTRAVENOUS
Status: CANCELLED | OUTPATIENT
Start: 2021-03-19

## 2021-02-17 RX ORDER — DIPHENHYDRAMINE HYDROCHLORIDE 50 MG/ML
50 INJECTION INTRAMUSCULAR; INTRAVENOUS ONCE AS NEEDED
Status: CANCELLED | OUTPATIENT
Start: 2021-03-17

## 2021-02-18 ENCOUNTER — TELEPHONE (OUTPATIENT)
Dept: HEMATOLOGY/ONCOLOGY | Facility: CLINIC | Age: 66
End: 2021-02-18

## 2021-02-18 ENCOUNTER — HOSPITAL ENCOUNTER (OUTPATIENT)
Dept: RADIOLOGY | Facility: HOSPITAL | Age: 66
Discharge: HOME | End: 2021-02-18
Attending: INTERNAL MEDICINE
Payer: COMMERCIAL

## 2021-02-18 DIAGNOSIS — C18.9 MALIGNANT NEOPLASM OF COLON, UNSPECIFIED PART OF COLON: Primary | ICD-10-CM

## 2021-02-18 DIAGNOSIS — C18.9 MALIGNANT NEOPLASM OF COLON, UNSPECIFIED PART OF COLON: ICD-10-CM

## 2021-02-18 PROCEDURE — 36598 INJ W/FLUOR EVAL CV DEVICE: CPT

## 2021-02-18 PROCEDURE — 25500020 PHARM REV CODE 255: Performed by: INTERNAL MEDICINE

## 2021-02-18 PROCEDURE — 36598 FL CENTRAL LINE, TUNNEL, CONTRAST INJ W/PORT OR PUMP W/IMG & RPT: ICD-10-PCS | Mod: ,,, | Performed by: RADIOLOGY

## 2021-02-18 PROCEDURE — 36598 INJ W/FLUOR EVAL CV DEVICE: CPT | Mod: ,,, | Performed by: RADIOLOGY

## 2021-02-18 RX ADMIN — IOHEXOL 10 ML: 350 INJECTION, SOLUTION INTRAVENOUS at 01:02

## 2021-02-19 ENCOUNTER — TELEPHONE (OUTPATIENT)
Dept: BARIATRICS | Facility: CLINIC | Age: 66
End: 2021-02-19

## 2021-02-22 ENCOUNTER — TELEPHONE (OUTPATIENT)
Dept: SURGERY | Facility: CLINIC | Age: 66
End: 2021-02-22

## 2021-02-22 ENCOUNTER — LAB VISIT (OUTPATIENT)
Dept: LAB | Facility: HOSPITAL | Age: 66
End: 2021-02-22
Attending: INTERNAL MEDICINE
Payer: COMMERCIAL

## 2021-02-22 DIAGNOSIS — E03.9 ACQUIRED HYPOTHYROIDISM: ICD-10-CM

## 2021-02-22 DIAGNOSIS — C20 RECTAL CANCER: ICD-10-CM

## 2021-02-22 LAB
ALBUMIN SERPL BCP-MCNC: 3.4 G/DL (ref 3.5–5.2)
ALP SERPL-CCNC: 98 U/L (ref 55–135)
ALT SERPL W/O P-5'-P-CCNC: 15 U/L (ref 10–44)
ANION GAP SERPL CALC-SCNC: 11 MMOL/L (ref 8–16)
AST SERPL-CCNC: 16 U/L (ref 10–40)
BASOPHILS # BLD AUTO: 0.1 K/UL (ref 0–0.2)
BASOPHILS NFR BLD: 2 % (ref 0–1.9)
BILIRUB SERPL-MCNC: 0.3 MG/DL (ref 0.1–1)
BUN SERPL-MCNC: 8 MG/DL (ref 8–23)
CALCIUM SERPL-MCNC: 9.3 MG/DL (ref 8.7–10.5)
CHLORIDE SERPL-SCNC: 102 MMOL/L (ref 95–110)
CO2 SERPL-SCNC: 27 MMOL/L (ref 23–29)
CREAT SERPL-MCNC: 0.8 MG/DL (ref 0.5–1.4)
DIFFERENTIAL METHOD: ABNORMAL
EOSINOPHIL # BLD AUTO: 0.1 K/UL (ref 0–0.5)
EOSINOPHIL NFR BLD: 1.6 % (ref 0–8)
ERYTHROCYTE [DISTWIDTH] IN BLOOD BY AUTOMATED COUNT: 15.6 % (ref 11.5–14.5)
EST. GFR  (AFRICAN AMERICAN): >60 ML/MIN/1.73 M^2
EST. GFR  (NON AFRICAN AMERICAN): >60 ML/MIN/1.73 M^2
GLUCOSE SERPL-MCNC: 93 MG/DL (ref 70–110)
HCT VFR BLD AUTO: 38.9 % (ref 37–48.5)
HGB BLD-MCNC: 12.2 G/DL (ref 12–16)
IMM GRANULOCYTES # BLD AUTO: 0.03 K/UL (ref 0–0.04)
IMM GRANULOCYTES NFR BLD AUTO: 0.6 % (ref 0–0.5)
LYMPHOCYTES # BLD AUTO: 1.3 K/UL (ref 1–4.8)
LYMPHOCYTES NFR BLD: 26.2 % (ref 18–48)
MCH RBC QN AUTO: 29.3 PG (ref 27–31)
MCHC RBC AUTO-ENTMCNC: 31.4 G/DL (ref 32–36)
MCV RBC AUTO: 93 FL (ref 82–98)
MONOCYTES # BLD AUTO: 0.4 K/UL (ref 0.3–1)
MONOCYTES NFR BLD: 8.9 % (ref 4–15)
NEUTROPHILS # BLD AUTO: 3 K/UL (ref 1.8–7.7)
NEUTROPHILS NFR BLD: 60.7 % (ref 38–73)
NRBC BLD-RTO: 0 /100 WBC
PLATELET # BLD AUTO: 304 K/UL (ref 150–350)
PMV BLD AUTO: 11.4 FL (ref 9.2–12.9)
POTASSIUM SERPL-SCNC: 4.5 MMOL/L (ref 3.5–5.1)
PROT SERPL-MCNC: 7.2 G/DL (ref 6–8.4)
RBC # BLD AUTO: 4.17 M/UL (ref 4–5.4)
SODIUM SERPL-SCNC: 140 MMOL/L (ref 136–145)
WBC # BLD AUTO: 4.92 K/UL (ref 3.9–12.7)

## 2021-02-22 PROCEDURE — 80053 COMPREHEN METABOLIC PANEL: CPT

## 2021-02-22 PROCEDURE — 36415 COLL VENOUS BLD VENIPUNCTURE: CPT

## 2021-02-22 PROCEDURE — 85025 COMPLETE CBC W/AUTO DIFF WBC: CPT

## 2021-02-23 ENCOUNTER — TELEPHONE (OUTPATIENT)
Dept: HEMATOLOGY/ONCOLOGY | Facility: CLINIC | Age: 66
End: 2021-02-23

## 2021-02-23 ENCOUNTER — OFFICE VISIT (OUTPATIENT)
Dept: SURGERY | Facility: CLINIC | Age: 66
End: 2021-02-23
Payer: COMMERCIAL

## 2021-02-23 VITALS — BODY MASS INDEX: 36.49 KG/M2 | TEMPERATURE: 97 F | WEIGHT: 185.88 LBS | HEIGHT: 60 IN

## 2021-02-23 DIAGNOSIS — M79.601 PAIN, ARM, RIGHT: Primary | ICD-10-CM

## 2021-02-23 DIAGNOSIS — Z09 POSTOP CHECK: ICD-10-CM

## 2021-02-23 PROCEDURE — 99999 PR PBB SHADOW E&M-EST. PATIENT-LVL III: ICD-10-PCS | Mod: PBBFAC,,, | Performed by: SURGERY

## 2021-02-23 PROCEDURE — 3008F BODY MASS INDEX DOCD: CPT | Mod: CPTII,S$GLB,, | Performed by: SURGERY

## 2021-02-23 PROCEDURE — 1126F AMNT PAIN NOTED NONE PRSNT: CPT | Mod: S$GLB,,, | Performed by: SURGERY

## 2021-02-23 PROCEDURE — 99024 PR POST-OP FOLLOW-UP VISIT: ICD-10-PCS | Mod: S$GLB,,, | Performed by: SURGERY

## 2021-02-23 PROCEDURE — 3008F PR BODY MASS INDEX (BMI) DOCUMENTED: ICD-10-PCS | Mod: CPTII,S$GLB,, | Performed by: SURGERY

## 2021-02-23 PROCEDURE — 99999 PR PBB SHADOW E&M-EST. PATIENT-LVL III: CPT | Mod: PBBFAC,,, | Performed by: SURGERY

## 2021-02-23 PROCEDURE — 99024 POSTOP FOLLOW-UP VISIT: CPT | Mod: S$GLB,,, | Performed by: SURGERY

## 2021-02-23 PROCEDURE — 1126F PR PAIN SEVERITY QUANTIFIED, NO PAIN PRESENT: ICD-10-PCS | Mod: S$GLB,,, | Performed by: SURGERY

## 2021-02-24 ENCOUNTER — HOSPITAL ENCOUNTER (OUTPATIENT)
Dept: RADIOLOGY | Facility: HOSPITAL | Age: 66
Discharge: HOME | End: 2021-02-24
Attending: SURGERY
Payer: COMMERCIAL

## 2021-02-24 DIAGNOSIS — M79.601 PAIN, ARM, RIGHT: ICD-10-CM

## 2021-02-24 PROCEDURE — 93971 US UPPER EXTREMITY VEINS LEFT: ICD-10-PCS | Mod: 26,LT,, | Performed by: RADIOLOGY

## 2021-02-24 PROCEDURE — 93971 EXTREMITY STUDY: CPT | Mod: TC,LT

## 2021-02-24 PROCEDURE — 93971 EXTREMITY STUDY: CPT | Mod: 26,LT,, | Performed by: RADIOLOGY

## 2021-02-25 DIAGNOSIS — C18.9 MALIGNANT NEOPLASM OF COLON, UNSPECIFIED PART OF COLON: ICD-10-CM

## 2021-02-25 DIAGNOSIS — G89.18 POSTOPERATIVE PAIN: ICD-10-CM

## 2021-02-25 RX ORDER — TRAMADOL HYDROCHLORIDE 50 MG/1
50 TABLET ORAL EVERY 6 HOURS
Qty: 30 TABLET | Refills: 0 | Status: SHIPPED | OUTPATIENT
Start: 2021-02-25 | End: 2021-04-12 | Stop reason: SDUPTHER

## 2021-03-01 ENCOUNTER — LAB VISIT (OUTPATIENT)
Dept: LAB | Facility: HOSPITAL | Age: 66
End: 2021-03-01
Attending: INTERNAL MEDICINE
Payer: COMMERCIAL

## 2021-03-01 DIAGNOSIS — C20 RECTAL CANCER: ICD-10-CM

## 2021-03-01 LAB
ALBUMIN SERPL BCP-MCNC: 3.5 G/DL (ref 3.5–5.2)
ALP SERPL-CCNC: 92 U/L (ref 55–135)
ALT SERPL W/O P-5'-P-CCNC: 14 U/L (ref 10–44)
ANION GAP SERPL CALC-SCNC: 7 MMOL/L (ref 8–16)
AST SERPL-CCNC: 15 U/L (ref 10–40)
BASOPHILS # BLD AUTO: 0.06 K/UL (ref 0–0.2)
BASOPHILS NFR BLD: 1 % (ref 0–1.9)
BILIRUB SERPL-MCNC: 0.4 MG/DL (ref 0.1–1)
BUN SERPL-MCNC: 10 MG/DL (ref 8–23)
CALCIUM SERPL-MCNC: 9 MG/DL (ref 8.7–10.5)
CHLORIDE SERPL-SCNC: 103 MMOL/L (ref 95–110)
CO2 SERPL-SCNC: 29 MMOL/L (ref 23–29)
CREAT SERPL-MCNC: 0.8 MG/DL (ref 0.5–1.4)
DIFFERENTIAL METHOD: ABNORMAL
EOSINOPHIL # BLD AUTO: 0.3 K/UL (ref 0–0.5)
EOSINOPHIL NFR BLD: 4.8 % (ref 0–8)
ERYTHROCYTE [DISTWIDTH] IN BLOOD BY AUTOMATED COUNT: 14.4 % (ref 11.5–14.5)
EST. GFR  (AFRICAN AMERICAN): >60 ML/MIN/1.73 M^2
EST. GFR  (NON AFRICAN AMERICAN): >60 ML/MIN/1.73 M^2
GLUCOSE SERPL-MCNC: 94 MG/DL (ref 70–110)
HCT VFR BLD AUTO: 39.2 % (ref 37–48.5)
HGB BLD-MCNC: 12.2 G/DL (ref 12–16)
IMM GRANULOCYTES # BLD AUTO: 0.01 K/UL (ref 0–0.04)
IMM GRANULOCYTES NFR BLD AUTO: 0.2 % (ref 0–0.5)
LYMPHOCYTES # BLD AUTO: 1.6 K/UL (ref 1–4.8)
LYMPHOCYTES NFR BLD: 27.1 % (ref 18–48)
MCH RBC QN AUTO: 29 PG (ref 27–31)
MCHC RBC AUTO-ENTMCNC: 31.1 G/DL (ref 32–36)
MCV RBC AUTO: 93 FL (ref 82–98)
MONOCYTES # BLD AUTO: 0.4 K/UL (ref 0.3–1)
MONOCYTES NFR BLD: 7 % (ref 4–15)
NEUTROPHILS # BLD AUTO: 3.5 K/UL (ref 1.8–7.7)
NEUTROPHILS NFR BLD: 59.9 % (ref 38–73)
NRBC BLD-RTO: 0 /100 WBC
PLATELET # BLD AUTO: 278 K/UL (ref 150–350)
PMV BLD AUTO: 11.2 FL (ref 9.2–12.9)
POTASSIUM SERPL-SCNC: 4.4 MMOL/L (ref 3.5–5.1)
PROT SERPL-MCNC: 7.4 G/DL (ref 6–8.4)
RBC # BLD AUTO: 4.21 M/UL (ref 4–5.4)
SODIUM SERPL-SCNC: 139 MMOL/L (ref 136–145)
WBC # BLD AUTO: 5.82 K/UL (ref 3.9–12.7)

## 2021-03-01 PROCEDURE — 36415 COLL VENOUS BLD VENIPUNCTURE: CPT

## 2021-03-01 PROCEDURE — 80053 COMPREHEN METABOLIC PANEL: CPT

## 2021-03-01 PROCEDURE — 85025 COMPLETE CBC W/AUTO DIFF WBC: CPT

## 2021-03-03 ENCOUNTER — OFFICE VISIT (OUTPATIENT)
Dept: HEMATOLOGY/ONCOLOGY | Facility: CLINIC | Age: 66
End: 2021-03-03
Payer: COMMERCIAL

## 2021-03-03 ENCOUNTER — TELEPHONE (OUTPATIENT)
Dept: HEMATOLOGY/ONCOLOGY | Facility: CLINIC | Age: 66
End: 2021-03-03

## 2021-03-03 ENCOUNTER — INFUSION (OUTPATIENT)
Dept: INFUSION THERAPY | Facility: HOSPITAL | Age: 66
End: 2021-03-03
Attending: INTERNAL MEDICINE
Payer: COMMERCIAL

## 2021-03-03 VITALS
HEART RATE: 84 BPM | DIASTOLIC BLOOD PRESSURE: 66 MMHG | WEIGHT: 188.25 LBS | SYSTOLIC BLOOD PRESSURE: 151 MMHG | HEIGHT: 61 IN | RESPIRATION RATE: 18 BRPM | TEMPERATURE: 97 F | BODY MASS INDEX: 35.54 KG/M2 | OXYGEN SATURATION: 96 %

## 2021-03-03 VITALS
HEIGHT: 61 IN | SYSTOLIC BLOOD PRESSURE: 143 MMHG | RESPIRATION RATE: 18 BRPM | TEMPERATURE: 98 F | WEIGHT: 188.13 LBS | HEART RATE: 97 BPM | DIASTOLIC BLOOD PRESSURE: 84 MMHG | BODY MASS INDEX: 35.52 KG/M2

## 2021-03-03 DIAGNOSIS — C20 RECTAL CANCER: Primary | ICD-10-CM

## 2021-03-03 PROCEDURE — 96368 THER/DIAG CONCURRENT INF: CPT

## 2021-03-03 PROCEDURE — 99214 OFFICE O/P EST MOD 30 MIN: CPT | Mod: S$GLB,,, | Performed by: PHYSICIAN ASSISTANT

## 2021-03-03 PROCEDURE — 99999 PR PBB SHADOW E&M-EST. PATIENT-LVL V: ICD-10-PCS | Mod: PBBFAC,,, | Performed by: PHYSICIAN ASSISTANT

## 2021-03-03 PROCEDURE — 96367 TX/PROPH/DG ADDL SEQ IV INF: CPT

## 2021-03-03 PROCEDURE — 96366 THER/PROPH/DIAG IV INF ADDON: CPT

## 2021-03-03 PROCEDURE — 96415 CHEMO IV INFUSION ADDL HR: CPT

## 2021-03-03 PROCEDURE — 99214 PR OFFICE/OUTPT VISIT, EST, LEVL IV, 30-39 MIN: ICD-10-PCS | Mod: S$GLB,,, | Performed by: PHYSICIAN ASSISTANT

## 2021-03-03 PROCEDURE — 96411 CHEMO IV PUSH ADDL DRUG: CPT

## 2021-03-03 PROCEDURE — 25000003 PHARM REV CODE 250: Performed by: INTERNAL MEDICINE

## 2021-03-03 PROCEDURE — 96413 CHEMO IV INFUSION 1 HR: CPT

## 2021-03-03 PROCEDURE — 96416 CHEMO PROLONG INFUSE W/PUMP: CPT

## 2021-03-03 PROCEDURE — 63600175 PHARM REV CODE 636 W HCPCS: Performed by: INTERNAL MEDICINE

## 2021-03-03 PROCEDURE — 99999 PR PBB SHADOW E&M-EST. PATIENT-LVL V: CPT | Mod: PBBFAC,,, | Performed by: PHYSICIAN ASSISTANT

## 2021-03-03 RX ORDER — EPINEPHRINE 0.3 MG/.3ML
0.3 INJECTION SUBCUTANEOUS ONCE AS NEEDED
Status: DISCONTINUED | OUTPATIENT
Start: 2021-03-03 | End: 2021-03-03 | Stop reason: HOSPADM

## 2021-03-03 RX ORDER — FLUOROURACIL 50 MG/ML
400 INJECTION, SOLUTION INTRAVENOUS
Status: COMPLETED | OUTPATIENT
Start: 2021-03-03 | End: 2021-03-03

## 2021-03-03 RX ORDER — SODIUM CHLORIDE 0.9 % (FLUSH) 0.9 %
10 SYRINGE (ML) INJECTION
Status: DISCONTINUED | OUTPATIENT
Start: 2021-03-03 | End: 2021-03-03 | Stop reason: HOSPADM

## 2021-03-03 RX ORDER — HEPARIN 100 UNIT/ML
500 SYRINGE INTRAVENOUS
Status: DISCONTINUED | OUTPATIENT
Start: 2021-03-03 | End: 2021-03-03 | Stop reason: HOSPADM

## 2021-03-03 RX ORDER — DIPHENHYDRAMINE HYDROCHLORIDE 50 MG/ML
50 INJECTION INTRAMUSCULAR; INTRAVENOUS ONCE AS NEEDED
Status: DISCONTINUED | OUTPATIENT
Start: 2021-03-03 | End: 2021-03-03 | Stop reason: HOSPADM

## 2021-03-03 RX ADMIN — OXALIPLATIN 164 MG: 5 INJECTION, SOLUTION INTRAVENOUS at 09:03

## 2021-03-03 RX ADMIN — DEXAMETHASONE SODIUM PHOSPHATE 0.25 MG: 4 INJECTION, SOLUTION INTRA-ARTICULAR; INTRALESIONAL; INTRAMUSCULAR; INTRAVENOUS; SOFT TISSUE at 09:03

## 2021-03-03 RX ADMIN — FLUOROURACIL 4630 MG: 50 INJECTION, SOLUTION INTRAVENOUS at 11:03

## 2021-03-03 RX ADMIN — LEUCOVORIN CALCIUM 770 MG: 350 INJECTION, POWDER, LYOPHILIZED, FOR SOLUTION INTRAMUSCULAR; INTRAVENOUS at 09:03

## 2021-03-03 RX ADMIN — FLUOROURACIL 770 MG: 50 INJECTION, SOLUTION INTRAVENOUS at 11:03

## 2021-03-05 ENCOUNTER — INFUSION (OUTPATIENT)
Dept: INFUSION THERAPY | Facility: HOSPITAL | Age: 66
End: 2021-03-05
Attending: INTERNAL MEDICINE
Payer: COMMERCIAL

## 2021-03-05 VITALS
TEMPERATURE: 97 F | WEIGHT: 186.06 LBS | DIASTOLIC BLOOD PRESSURE: 84 MMHG | SYSTOLIC BLOOD PRESSURE: 143 MMHG | OXYGEN SATURATION: 97 % | BODY MASS INDEX: 35.16 KG/M2 | HEART RATE: 92 BPM | RESPIRATION RATE: 18 BRPM

## 2021-03-05 DIAGNOSIS — C20 RECTAL CANCER: Primary | ICD-10-CM

## 2021-03-05 PROCEDURE — 96377 APPLICATON ON-BODY INJECTOR: CPT

## 2021-03-05 PROCEDURE — 25000003 PHARM REV CODE 250: Performed by: INTERNAL MEDICINE

## 2021-03-05 PROCEDURE — 63600175 PHARM REV CODE 636 W HCPCS: Mod: JG | Performed by: INTERNAL MEDICINE

## 2021-03-05 PROCEDURE — 96523 IRRIG DRUG DELIVERY DEVICE: CPT

## 2021-03-05 PROCEDURE — A4216 STERILE WATER/SALINE, 10 ML: HCPCS | Performed by: INTERNAL MEDICINE

## 2021-03-05 RX ORDER — SODIUM CHLORIDE 0.9 % (FLUSH) 0.9 %
10 SYRINGE (ML) INJECTION
Status: DISCONTINUED | OUTPATIENT
Start: 2021-03-05 | End: 2021-03-05 | Stop reason: HOSPADM

## 2021-03-05 RX ORDER — HEPARIN 100 UNIT/ML
500 SYRINGE INTRAVENOUS
Status: DISCONTINUED | OUTPATIENT
Start: 2021-03-05 | End: 2021-03-05 | Stop reason: HOSPADM

## 2021-03-05 RX ADMIN — PEGFILGRASTIM 6 MG: KIT SUBCUTANEOUS at 10:03

## 2021-03-05 RX ADMIN — SODIUM CHLORIDE, PRESERVATIVE FREE 10 ML: 5 INJECTION INTRAVENOUS at 10:03

## 2021-03-05 RX ADMIN — HEPARIN 500 UNITS: 100 SYRINGE at 10:03

## 2021-03-06 ENCOUNTER — NURSE TRIAGE (OUTPATIENT)
Dept: ADMINISTRATIVE | Facility: CLINIC | Age: 66
End: 2021-03-06

## 2021-03-08 ENCOUNTER — TELEPHONE (OUTPATIENT)
Dept: HEMATOLOGY/ONCOLOGY | Facility: CLINIC | Age: 66
End: 2021-03-08

## 2021-03-15 ENCOUNTER — LAB VISIT (OUTPATIENT)
Dept: LAB | Facility: HOSPITAL | Age: 66
End: 2021-03-15
Attending: INTERNAL MEDICINE
Payer: COMMERCIAL

## 2021-03-15 DIAGNOSIS — C20 RECTAL CANCER: ICD-10-CM

## 2021-03-15 LAB
ALBUMIN SERPL BCP-MCNC: 3.3 G/DL (ref 3.5–5.2)
ALP SERPL-CCNC: 114 U/L (ref 55–135)
ALT SERPL W/O P-5'-P-CCNC: 24 U/L (ref 10–44)
ANION GAP SERPL CALC-SCNC: 9 MMOL/L (ref 8–16)
AST SERPL-CCNC: 23 U/L (ref 10–40)
BASOPHILS # BLD AUTO: 0.04 K/UL (ref 0–0.2)
BASOPHILS NFR BLD: 0.6 % (ref 0–1.9)
BILIRUB SERPL-MCNC: 0.3 MG/DL (ref 0.1–1)
BUN SERPL-MCNC: 9 MG/DL (ref 8–23)
CALCIUM SERPL-MCNC: 8.7 MG/DL (ref 8.7–10.5)
CHLORIDE SERPL-SCNC: 106 MMOL/L (ref 95–110)
CO2 SERPL-SCNC: 26 MMOL/L (ref 23–29)
CREAT SERPL-MCNC: 0.8 MG/DL (ref 0.5–1.4)
DIFFERENTIAL METHOD: ABNORMAL
EOSINOPHIL # BLD AUTO: 0.3 K/UL (ref 0–0.5)
EOSINOPHIL NFR BLD: 4.4 % (ref 0–8)
ERYTHROCYTE [DISTWIDTH] IN BLOOD BY AUTOMATED COUNT: 14.4 % (ref 11.5–14.5)
EST. GFR  (AFRICAN AMERICAN): >60 ML/MIN/1.73 M^2
EST. GFR  (NON AFRICAN AMERICAN): >60 ML/MIN/1.73 M^2
GLUCOSE SERPL-MCNC: 109 MG/DL (ref 70–110)
HCT VFR BLD AUTO: 38.3 % (ref 37–48.5)
HGB BLD-MCNC: 12.2 G/DL (ref 12–16)
IMM GRANULOCYTES # BLD AUTO: 0.27 K/UL (ref 0–0.04)
IMM GRANULOCYTES NFR BLD AUTO: 3.9 % (ref 0–0.5)
LYMPHOCYTES # BLD AUTO: 1.4 K/UL (ref 1–4.8)
LYMPHOCYTES NFR BLD: 19.9 % (ref 18–48)
MCH RBC QN AUTO: 29.6 PG (ref 27–31)
MCHC RBC AUTO-ENTMCNC: 31.9 G/DL (ref 32–36)
MCV RBC AUTO: 93 FL (ref 82–98)
MONOCYTES # BLD AUTO: 0.6 K/UL (ref 0.3–1)
MONOCYTES NFR BLD: 8.2 % (ref 4–15)
NEUTROPHILS # BLD AUTO: 4.3 K/UL (ref 1.8–7.7)
NEUTROPHILS NFR BLD: 63 % (ref 38–73)
NRBC BLD-RTO: 0 /100 WBC
PLATELET # BLD AUTO: 118 K/UL (ref 150–350)
PMV BLD AUTO: 11.5 FL (ref 9.2–12.9)
POTASSIUM SERPL-SCNC: 3.8 MMOL/L (ref 3.5–5.1)
PROT SERPL-MCNC: 6.7 G/DL (ref 6–8.4)
RBC # BLD AUTO: 4.12 M/UL (ref 4–5.4)
SODIUM SERPL-SCNC: 141 MMOL/L (ref 136–145)
WBC # BLD AUTO: 6.84 K/UL (ref 3.9–12.7)

## 2021-03-15 PROCEDURE — 85025 COMPLETE CBC W/AUTO DIFF WBC: CPT | Performed by: PHYSICIAN ASSISTANT

## 2021-03-15 PROCEDURE — 36415 COLL VENOUS BLD VENIPUNCTURE: CPT | Performed by: PHYSICIAN ASSISTANT

## 2021-03-15 PROCEDURE — 80053 COMPREHEN METABOLIC PANEL: CPT | Performed by: PHYSICIAN ASSISTANT

## 2021-03-16 ENCOUNTER — TELEPHONE (OUTPATIENT)
Dept: FAMILY MEDICINE | Facility: CLINIC | Age: 66
End: 2021-03-16

## 2021-03-16 DIAGNOSIS — F17.200 NICOTINE DEPENDENCE, UNCOMPLICATED, UNSPECIFIED NICOTINE PRODUCT TYPE: Primary | ICD-10-CM

## 2021-03-16 DIAGNOSIS — Z12.2 ENCOUNTER FOR SCREENING FOR MALIGNANT NEOPLASM OF RESPIRATORY ORGANS: ICD-10-CM

## 2021-03-17 ENCOUNTER — INFUSION (OUTPATIENT)
Dept: INFUSION THERAPY | Facility: HOSPITAL | Age: 66
End: 2021-03-17
Attending: INTERNAL MEDICINE
Payer: COMMERCIAL

## 2021-03-17 ENCOUNTER — TELEPHONE (OUTPATIENT)
Dept: FAMILY MEDICINE | Facility: CLINIC | Age: 66
End: 2021-03-17

## 2021-03-17 ENCOUNTER — OFFICE VISIT (OUTPATIENT)
Dept: HEMATOLOGY/ONCOLOGY | Facility: CLINIC | Age: 66
End: 2021-03-17
Payer: COMMERCIAL

## 2021-03-17 VITALS
WEIGHT: 186.75 LBS | SYSTOLIC BLOOD PRESSURE: 159 MMHG | HEART RATE: 107 BPM | HEIGHT: 61 IN | TEMPERATURE: 97 F | OXYGEN SATURATION: 98 % | BODY MASS INDEX: 35.26 KG/M2 | DIASTOLIC BLOOD PRESSURE: 96 MMHG | RESPIRATION RATE: 19 BRPM

## 2021-03-17 VITALS
OXYGEN SATURATION: 96 % | WEIGHT: 186.75 LBS | DIASTOLIC BLOOD PRESSURE: 75 MMHG | BODY MASS INDEX: 35.26 KG/M2 | HEART RATE: 88 BPM | SYSTOLIC BLOOD PRESSURE: 143 MMHG | TEMPERATURE: 97 F | HEIGHT: 61 IN | RESPIRATION RATE: 17 BRPM

## 2021-03-17 DIAGNOSIS — Z12.2 ENCOUNTER FOR SCREENING FOR MALIGNANT NEOPLASM OF RESPIRATORY ORGANS: ICD-10-CM

## 2021-03-17 DIAGNOSIS — F17.210 SMOKING GREATER THAN 30 PACK YEARS: ICD-10-CM

## 2021-03-17 DIAGNOSIS — C20 RECTAL CANCER: Primary | ICD-10-CM

## 2021-03-17 DIAGNOSIS — E78.00 PURE HYPERCHOLESTEROLEMIA: ICD-10-CM

## 2021-03-17 DIAGNOSIS — C20 RECTAL CANCER: ICD-10-CM

## 2021-03-17 DIAGNOSIS — J41.0 SIMPLE CHRONIC BRONCHITIS: Primary | ICD-10-CM

## 2021-03-17 DIAGNOSIS — Z87.891 FORMER SMOKER: Primary | ICD-10-CM

## 2021-03-17 PROCEDURE — 96413 CHEMO IV INFUSION 1 HR: CPT

## 2021-03-17 PROCEDURE — 1125F AMNT PAIN NOTED PAIN PRSNT: CPT | Mod: S$GLB,,, | Performed by: INTERNAL MEDICINE

## 2021-03-17 PROCEDURE — 63600175 PHARM REV CODE 636 W HCPCS: Performed by: INTERNAL MEDICINE

## 2021-03-17 PROCEDURE — 96367 TX/PROPH/DG ADDL SEQ IV INF: CPT

## 2021-03-17 PROCEDURE — 1125F PR PAIN SEVERITY QUANTIFIED, PAIN PRESENT: ICD-10-PCS | Mod: S$GLB,,, | Performed by: INTERNAL MEDICINE

## 2021-03-17 PROCEDURE — 99999 PR PBB SHADOW E&M-EST. PATIENT-LVL III: CPT | Mod: PBBFAC,,, | Performed by: INTERNAL MEDICINE

## 2021-03-17 PROCEDURE — 25000003 PHARM REV CODE 250: Performed by: INTERNAL MEDICINE

## 2021-03-17 PROCEDURE — 96368 THER/DIAG CONCURRENT INF: CPT

## 2021-03-17 PROCEDURE — 3008F BODY MASS INDEX DOCD: CPT | Mod: CPTII,S$GLB,, | Performed by: INTERNAL MEDICINE

## 2021-03-17 PROCEDURE — 96366 THER/PROPH/DIAG IV INF ADDON: CPT

## 2021-03-17 PROCEDURE — 96416 CHEMO PROLONG INFUSE W/PUMP: CPT

## 2021-03-17 PROCEDURE — 99999 PR PBB SHADOW E&M-EST. PATIENT-LVL III: ICD-10-PCS | Mod: PBBFAC,,, | Performed by: INTERNAL MEDICINE

## 2021-03-17 PROCEDURE — 99215 PR OFFICE/OUTPT VISIT, EST, LEVL V, 40-54 MIN: ICD-10-PCS | Mod: S$GLB,,, | Performed by: INTERNAL MEDICINE

## 2021-03-17 PROCEDURE — 96411 CHEMO IV PUSH ADDL DRUG: CPT

## 2021-03-17 PROCEDURE — 96415 CHEMO IV INFUSION ADDL HR: CPT

## 2021-03-17 PROCEDURE — 3008F PR BODY MASS INDEX (BMI) DOCUMENTED: ICD-10-PCS | Mod: CPTII,S$GLB,, | Performed by: INTERNAL MEDICINE

## 2021-03-17 PROCEDURE — 99215 OFFICE O/P EST HI 40 MIN: CPT | Mod: S$GLB,,, | Performed by: INTERNAL MEDICINE

## 2021-03-17 RX ORDER — HEPARIN 100 UNIT/ML
500 SYRINGE INTRAVENOUS
Status: DISCONTINUED | OUTPATIENT
Start: 2021-03-17 | End: 2021-03-17 | Stop reason: HOSPADM

## 2021-03-17 RX ORDER — DIPHENHYDRAMINE HYDROCHLORIDE 50 MG/ML
50 INJECTION INTRAMUSCULAR; INTRAVENOUS ONCE AS NEEDED
Status: DISCONTINUED | OUTPATIENT
Start: 2021-03-17 | End: 2021-03-17 | Stop reason: HOSPADM

## 2021-03-17 RX ORDER — EPINEPHRINE 0.3 MG/.3ML
0.3 INJECTION SUBCUTANEOUS ONCE AS NEEDED
Status: DISCONTINUED | OUTPATIENT
Start: 2021-03-17 | End: 2021-03-17 | Stop reason: HOSPADM

## 2021-03-17 RX ORDER — SODIUM CHLORIDE 0.9 % (FLUSH) 0.9 %
10 SYRINGE (ML) INJECTION
Status: DISCONTINUED | OUTPATIENT
Start: 2021-03-17 | End: 2021-03-17 | Stop reason: HOSPADM

## 2021-03-17 RX ORDER — FLUOROURACIL 50 MG/ML
400 INJECTION, SOLUTION INTRAVENOUS
Status: COMPLETED | OUTPATIENT
Start: 2021-03-17 | End: 2021-03-17

## 2021-03-17 RX ADMIN — LEUCOVORIN CALCIUM 770 MG: 350 INJECTION, POWDER, LYOPHILIZED, FOR SOLUTION INTRAMUSCULAR; INTRAVENOUS at 10:03

## 2021-03-17 RX ADMIN — DEXAMETHASONE SODIUM PHOSPHATE 0.25 MG: 4 INJECTION, SOLUTION INTRA-ARTICULAR; INTRALESIONAL; INTRAMUSCULAR; INTRAVENOUS; SOFT TISSUE at 10:03

## 2021-03-17 RX ADMIN — FLUOROURACIL 770 MG: 50 INJECTION, SOLUTION INTRAVENOUS at 12:03

## 2021-03-17 RX ADMIN — FLUOROURACIL 4630 MG: 50 INJECTION, SOLUTION INTRAVENOUS at 12:03

## 2021-03-17 RX ADMIN — OXALIPLATIN 164 MG: 100 INJECTION, SOLUTION, CONCENTRATE INTRAVENOUS at 10:03

## 2021-03-19 ENCOUNTER — INFUSION (OUTPATIENT)
Dept: INFUSION THERAPY | Facility: HOSPITAL | Age: 66
End: 2021-03-19
Attending: INTERNAL MEDICINE
Payer: COMMERCIAL

## 2021-03-19 VITALS
SYSTOLIC BLOOD PRESSURE: 123 MMHG | RESPIRATION RATE: 17 BRPM | HEART RATE: 112 BPM | BODY MASS INDEX: 35.07 KG/M2 | DIASTOLIC BLOOD PRESSURE: 83 MMHG | TEMPERATURE: 99 F | WEIGHT: 185.63 LBS

## 2021-03-19 DIAGNOSIS — C20 RECTAL CANCER: Primary | ICD-10-CM

## 2021-03-19 PROCEDURE — 63600175 PHARM REV CODE 636 W HCPCS: Performed by: INTERNAL MEDICINE

## 2021-03-19 PROCEDURE — 96377 APPLICATON ON-BODY INJECTOR: CPT

## 2021-03-19 PROCEDURE — 96523 IRRIG DRUG DELIVERY DEVICE: CPT

## 2021-03-19 RX ORDER — SODIUM CHLORIDE 0.9 % (FLUSH) 0.9 %
10 SYRINGE (ML) INJECTION
Status: DISCONTINUED | OUTPATIENT
Start: 2021-03-19 | End: 2021-03-19 | Stop reason: HOSPADM

## 2021-03-19 RX ORDER — HEPARIN 100 UNIT/ML
500 SYRINGE INTRAVENOUS
Status: DISCONTINUED | OUTPATIENT
Start: 2021-03-19 | End: 2021-03-19 | Stop reason: HOSPADM

## 2021-03-19 RX ADMIN — HEPARIN 500 UNITS: 100 SYRINGE at 12:03

## 2021-03-19 RX ADMIN — PEGFILGRASTIM 6 MG: KIT SUBCUTANEOUS at 12:03

## 2021-03-30 ENCOUNTER — OFFICE VISIT (OUTPATIENT)
Dept: HEMATOLOGY/ONCOLOGY | Facility: CLINIC | Age: 66
End: 2021-03-30
Payer: COMMERCIAL

## 2021-03-30 ENCOUNTER — TELEPHONE (OUTPATIENT)
Dept: HEMATOLOGY/ONCOLOGY | Facility: CLINIC | Age: 66
End: 2021-03-30

## 2021-03-30 VITALS
OXYGEN SATURATION: 98 % | HEART RATE: 103 BPM | TEMPERATURE: 97 F | DIASTOLIC BLOOD PRESSURE: 79 MMHG | HEIGHT: 61 IN | SYSTOLIC BLOOD PRESSURE: 138 MMHG | RESPIRATION RATE: 19 BRPM | WEIGHT: 185.88 LBS | BODY MASS INDEX: 35.09 KG/M2

## 2021-03-30 DIAGNOSIS — C18.9 MALIGNANT NEOPLASM OF COLON, UNSPECIFIED PART OF COLON: ICD-10-CM

## 2021-03-30 DIAGNOSIS — D49.0 COLORECTAL NEOPLASM: ICD-10-CM

## 2021-03-30 DIAGNOSIS — C20 RECTAL CANCER: Primary | ICD-10-CM

## 2021-03-30 PROCEDURE — 99213 PR OFFICE/OUTPT VISIT, EST, LEVL III, 20-29 MIN: ICD-10-PCS | Mod: S$GLB,,, | Performed by: PHYSICIAN ASSISTANT

## 2021-03-30 PROCEDURE — 99999 PR PBB SHADOW E&M-EST. PATIENT-LVL V: ICD-10-PCS | Mod: PBBFAC,,, | Performed by: PHYSICIAN ASSISTANT

## 2021-03-30 PROCEDURE — 99999 PR PBB SHADOW E&M-EST. PATIENT-LVL V: CPT | Mod: PBBFAC,,, | Performed by: PHYSICIAN ASSISTANT

## 2021-03-30 PROCEDURE — 99213 OFFICE O/P EST LOW 20 MIN: CPT | Mod: S$GLB,,, | Performed by: PHYSICIAN ASSISTANT

## 2021-04-01 ENCOUNTER — TELEPHONE (OUTPATIENT)
Dept: RADIATION ONCOLOGY | Facility: CLINIC | Age: 66
End: 2021-04-01

## 2021-04-01 ENCOUNTER — OFFICE VISIT (OUTPATIENT)
Dept: RADIATION ONCOLOGY | Facility: CLINIC | Age: 66
End: 2021-04-01
Payer: COMMERCIAL

## 2021-04-01 VITALS
DIASTOLIC BLOOD PRESSURE: 89 MMHG | HEART RATE: 99 BPM | OXYGEN SATURATION: 97 % | SYSTOLIC BLOOD PRESSURE: 161 MMHG | WEIGHT: 185 LBS | TEMPERATURE: 98 F | RESPIRATION RATE: 18 BRPM | BODY MASS INDEX: 34.96 KG/M2

## 2021-04-01 DIAGNOSIS — C20 RECTAL CANCER: Primary | ICD-10-CM

## 2021-04-01 DIAGNOSIS — Z03.818 ENCNTR FOR OBS FOR SUSP EXPSR TO OTH BIOLG AGENTS RULED OUT: Primary | ICD-10-CM

## 2021-04-01 PROCEDURE — 99215 PR OFFICE/OUTPT VISIT, EST, LEVL V, 40-54 MIN: ICD-10-PCS | Mod: S$GLB,,, | Performed by: RADIOLOGY

## 2021-04-01 PROCEDURE — 3008F PR BODY MASS INDEX (BMI) DOCUMENTED: ICD-10-PCS | Mod: S$GLB,,, | Performed by: RADIOLOGY

## 2021-04-01 PROCEDURE — 1126F PR PAIN SEVERITY QUANTIFIED, NO PAIN PRESENT: ICD-10-PCS | Mod: S$GLB,,, | Performed by: RADIOLOGY

## 2021-04-01 PROCEDURE — 99215 OFFICE O/P EST HI 40 MIN: CPT | Mod: S$GLB,,, | Performed by: RADIOLOGY

## 2021-04-01 PROCEDURE — 3008F BODY MASS INDEX DOCD: CPT | Mod: S$GLB,,, | Performed by: RADIOLOGY

## 2021-04-01 PROCEDURE — 1126F AMNT PAIN NOTED NONE PRSNT: CPT | Mod: S$GLB,,, | Performed by: RADIOLOGY

## 2021-04-06 ENCOUNTER — LAB VISIT (OUTPATIENT)
Dept: PRIMARY CARE CLINIC | Facility: CLINIC | Age: 66
End: 2021-04-06
Payer: COMMERCIAL

## 2021-04-06 DIAGNOSIS — Z03.818 ENCNTR FOR OBS FOR SUSP EXPSR TO OTH BIOLG AGENTS RULED OUT: ICD-10-CM

## 2021-04-06 PROCEDURE — U0005 INFEC AGEN DETEC AMPLI PROBE: HCPCS | Performed by: RADIOLOGY

## 2021-04-06 PROCEDURE — U0003 INFECTIOUS AGENT DETECTION BY NUCLEIC ACID (DNA OR RNA); SEVERE ACUTE RESPIRATORY SYNDROME CORONAVIRUS 2 (SARS-COV-2) (CORONAVIRUS DISEASE [COVID-19]), AMPLIFIED PROBE TECHNIQUE, MAKING USE OF HIGH THROUGHPUT TECHNOLOGIES AS DESCRIBED BY CMS-2020-01-R: HCPCS | Performed by: RADIOLOGY

## 2021-04-07 ENCOUNTER — TELEPHONE (OUTPATIENT)
Dept: HEMATOLOGY/ONCOLOGY | Facility: CLINIC | Age: 66
End: 2021-04-07

## 2021-04-07 LAB — SARS-COV-2 RNA RESP QL NAA+PROBE: NOT DETECTED

## 2021-04-12 DIAGNOSIS — C18.9 MALIGNANT NEOPLASM OF COLON, UNSPECIFIED PART OF COLON: ICD-10-CM

## 2021-04-12 DIAGNOSIS — G89.18 POSTOPERATIVE PAIN: ICD-10-CM

## 2021-04-12 PROBLEM — Z09 POSTOP CHECK: Status: RESOLVED | Noted: 2021-01-11 | Resolved: 2021-04-12

## 2021-04-13 ENCOUNTER — TELEPHONE (OUTPATIENT)
Dept: HEMATOLOGY/ONCOLOGY | Facility: CLINIC | Age: 66
End: 2021-04-13

## 2021-04-14 ENCOUNTER — SOCIAL WORK (OUTPATIENT)
Dept: HEMATOLOGY/ONCOLOGY | Facility: CLINIC | Age: 66
End: 2021-04-14

## 2021-04-14 RX ORDER — TRAMADOL HYDROCHLORIDE 50 MG/1
50 TABLET ORAL EVERY 6 HOURS
Qty: 30 TABLET | Refills: 0 | Status: SHIPPED | OUTPATIENT
Start: 2021-04-14 | End: 2021-05-03

## 2021-04-15 ENCOUNTER — PATIENT MESSAGE (OUTPATIENT)
Dept: SURGERY | Facility: CLINIC | Age: 66
End: 2021-04-15

## 2021-04-15 DIAGNOSIS — C20 RECTAL CANCER: Primary | ICD-10-CM

## 2021-04-15 DIAGNOSIS — Z01.818 PREOP EXAMINATION: Primary | ICD-10-CM

## 2021-04-15 RX ORDER — SODIUM CHLORIDE 9 MG/ML
INJECTION, SOLUTION INTRAVENOUS CONTINUOUS
Status: CANCELLED | OUTPATIENT
Start: 2021-04-15

## 2021-04-16 ENCOUNTER — LAB VISIT (OUTPATIENT)
Dept: PRIMARY CARE CLINIC | Facility: CLINIC | Age: 66
End: 2021-04-16
Payer: COMMERCIAL

## 2021-04-16 ENCOUNTER — ANESTHESIA EVENT (OUTPATIENT)
Dept: SURGERY | Facility: HOSPITAL | Age: 66
End: 2021-04-16
Payer: COMMERCIAL

## 2021-04-16 DIAGNOSIS — Z01.818 PREOP EXAMINATION: ICD-10-CM

## 2021-04-16 PROCEDURE — U0005 INFEC AGEN DETEC AMPLI PROBE: HCPCS | Performed by: SURGERY

## 2021-04-16 PROCEDURE — U0003 INFECTIOUS AGENT DETECTION BY NUCLEIC ACID (DNA OR RNA); SEVERE ACUTE RESPIRATORY SYNDROME CORONAVIRUS 2 (SARS-COV-2) (CORONAVIRUS DISEASE [COVID-19]), AMPLIFIED PROBE TECHNIQUE, MAKING USE OF HIGH THROUGHPUT TECHNOLOGIES AS DESCRIBED BY CMS-2020-01-R: HCPCS | Performed by: SURGERY

## 2021-04-17 LAB — SARS-COV-2 RNA RESP QL NAA+PROBE: NOT DETECTED

## 2021-04-19 ENCOUNTER — ANESTHESIA (OUTPATIENT)
Dept: SURGERY | Facility: HOSPITAL | Age: 66
End: 2021-04-19
Payer: COMMERCIAL

## 2021-04-19 ENCOUNTER — HOSPITAL ENCOUNTER (OUTPATIENT)
Facility: HOSPITAL | Age: 66
Discharge: HOME | End: 2021-04-19
Attending: SURGERY | Admitting: SURGERY
Payer: COMMERCIAL

## 2021-04-19 VITALS
RESPIRATION RATE: 18 BRPM | SYSTOLIC BLOOD PRESSURE: 152 MMHG | BODY MASS INDEX: 34.74 KG/M2 | TEMPERATURE: 98 F | HEART RATE: 77 BPM | HEIGHT: 61 IN | OXYGEN SATURATION: 98 % | WEIGHT: 184 LBS | DIASTOLIC BLOOD PRESSURE: 76 MMHG

## 2021-04-19 DIAGNOSIS — C20 RECTAL CANCER: ICD-10-CM

## 2021-04-19 DIAGNOSIS — Z95.828 PORT-A-CATH IN PLACE: Primary | ICD-10-CM

## 2021-04-19 PROCEDURE — 36000707: Performed by: SURGERY

## 2021-04-19 PROCEDURE — D9220A PRA ANESTHESIA: Mod: ,,, | Performed by: NURSE ANESTHETIST, CERTIFIED REGISTERED

## 2021-04-19 PROCEDURE — 71000033 HC RECOVERY, INTIAL HOUR: Performed by: SURGERY

## 2021-04-19 PROCEDURE — 63600175 PHARM REV CODE 636 W HCPCS: Performed by: NURSE ANESTHETIST, CERTIFIED REGISTERED

## 2021-04-19 PROCEDURE — 25000003 PHARM REV CODE 250: Performed by: SURGERY

## 2021-04-19 PROCEDURE — 37000008 HC ANESTHESIA 1ST 15 MINUTES: Performed by: SURGERY

## 2021-04-19 PROCEDURE — 71000015 HC POSTOP RECOV 1ST HR: Performed by: SURGERY

## 2021-04-19 PROCEDURE — 99900104 DSU ONLY-NO CHARGE-EA ADD'L HR (STAT): Performed by: SURGERY

## 2021-04-19 PROCEDURE — D9220A PRA ANESTHESIA: Mod: ,,, | Performed by: ANESTHESIOLOGY

## 2021-04-19 PROCEDURE — 25000003 PHARM REV CODE 250: Performed by: ANESTHESIOLOGY

## 2021-04-19 PROCEDURE — D9220A PRA ANESTHESIA: ICD-10-PCS | Mod: ,,, | Performed by: ANESTHESIOLOGY

## 2021-04-19 PROCEDURE — D9220A PRA ANESTHESIA: ICD-10-PCS | Mod: ,,, | Performed by: NURSE ANESTHETIST, CERTIFIED REGISTERED

## 2021-04-19 PROCEDURE — 25000003 PHARM REV CODE 250: Performed by: NURSE ANESTHETIST, CERTIFIED REGISTERED

## 2021-04-19 PROCEDURE — 37000009 HC ANESTHESIA EA ADD 15 MINS: Performed by: SURGERY

## 2021-04-19 PROCEDURE — 36590 PR REMOVAL TUNNELED CV CATH W SUBQ PORT OR PUMP: ICD-10-PCS | Mod: ,,, | Performed by: SURGERY

## 2021-04-19 PROCEDURE — 99900103 DSU ONLY-NO CHARGE-INITIAL HR (STAT): Performed by: SURGERY

## 2021-04-19 PROCEDURE — 36590 REMOVAL TUNNELED CV CATH: CPT | Mod: ,,, | Performed by: SURGERY

## 2021-04-19 PROCEDURE — 36000706: Performed by: SURGERY

## 2021-04-19 RX ORDER — HYDROCODONE BITARTRATE AND ACETAMINOPHEN 5; 325 MG/1; MG/1
1 TABLET ORAL EVERY 4 HOURS PRN
Status: CANCELLED | OUTPATIENT
Start: 2021-04-19

## 2021-04-19 RX ORDER — OXYCODONE HYDROCHLORIDE 5 MG/1
5 TABLET ORAL
Status: DISCONTINUED | OUTPATIENT
Start: 2021-04-19 | End: 2021-04-19 | Stop reason: HOSPADM

## 2021-04-19 RX ORDER — MIDAZOLAM HYDROCHLORIDE 1 MG/ML
INJECTION, SOLUTION INTRAMUSCULAR; INTRAVENOUS
Status: DISCONTINUED | OUTPATIENT
Start: 2021-04-19 | End: 2021-04-19

## 2021-04-19 RX ORDER — LIDOCAINE HYDROCHLORIDE 20 MG/ML
INJECTION INTRAVENOUS
Status: DISCONTINUED | OUTPATIENT
Start: 2021-04-19 | End: 2021-04-19

## 2021-04-19 RX ORDER — SODIUM CHLORIDE 9 MG/ML
INJECTION, SOLUTION INTRAVENOUS CONTINUOUS
Status: DISCONTINUED | OUTPATIENT
Start: 2021-04-19 | End: 2021-04-19 | Stop reason: HOSPADM

## 2021-04-19 RX ORDER — HYDROMORPHONE HYDROCHLORIDE 2 MG/ML
0.2 INJECTION, SOLUTION INTRAMUSCULAR; INTRAVENOUS; SUBCUTANEOUS EVERY 5 MIN PRN
Status: DISCONTINUED | OUTPATIENT
Start: 2021-04-19 | End: 2021-04-19 | Stop reason: HOSPADM

## 2021-04-19 RX ORDER — BUPIVACAINE HCL/EPINEPHRINE 0.25-.0005
VIAL (ML) INJECTION
Status: DISCONTINUED | OUTPATIENT
Start: 2021-04-19 | End: 2021-04-19 | Stop reason: HOSPADM

## 2021-04-19 RX ORDER — ONDANSETRON 2 MG/ML
4 INJECTION INTRAMUSCULAR; INTRAVENOUS EVERY 12 HOURS PRN
Status: CANCELLED | OUTPATIENT
Start: 2021-04-19

## 2021-04-19 RX ORDER — SODIUM CHLORIDE, SODIUM LACTATE, POTASSIUM CHLORIDE, CALCIUM CHLORIDE 600; 310; 30; 20 MG/100ML; MG/100ML; MG/100ML; MG/100ML
10 INJECTION, SOLUTION INTRAVENOUS CONTINUOUS
Status: DISCONTINUED | OUTPATIENT
Start: 2021-04-19 | End: 2021-04-19 | Stop reason: HOSPADM

## 2021-04-19 RX ORDER — DIPHENHYDRAMINE HYDROCHLORIDE 50 MG/ML
25 INJECTION INTRAMUSCULAR; INTRAVENOUS EVERY 6 HOURS PRN
Status: DISCONTINUED | OUTPATIENT
Start: 2021-04-19 | End: 2021-04-19 | Stop reason: HOSPADM

## 2021-04-19 RX ORDER — FENTANYL CITRATE 50 UG/ML
INJECTION, SOLUTION INTRAMUSCULAR; INTRAVENOUS
Status: DISCONTINUED | OUTPATIENT
Start: 2021-04-19 | End: 2021-04-19

## 2021-04-19 RX ORDER — FENTANYL CITRATE 50 UG/ML
25 INJECTION, SOLUTION INTRAMUSCULAR; INTRAVENOUS EVERY 5 MIN PRN
Status: DISCONTINUED | OUTPATIENT
Start: 2021-04-19 | End: 2021-04-19 | Stop reason: HOSPADM

## 2021-04-19 RX ORDER — CLINDAMYCIN PHOSPHATE 900 MG/50ML
900 INJECTION, SOLUTION INTRAVENOUS
Status: DISCONTINUED | OUTPATIENT
Start: 2021-04-19 | End: 2021-04-19 | Stop reason: HOSPADM

## 2021-04-19 RX ORDER — SODIUM CHLORIDE, SODIUM LACTATE, POTASSIUM CHLORIDE, CALCIUM CHLORIDE 600; 310; 30; 20 MG/100ML; MG/100ML; MG/100ML; MG/100ML
75 INJECTION, SOLUTION INTRAVENOUS CONTINUOUS
Status: DISCONTINUED | OUTPATIENT
Start: 2021-04-19 | End: 2021-04-19 | Stop reason: HOSPADM

## 2021-04-19 RX ORDER — PROCHLORPERAZINE EDISYLATE 5 MG/ML
5 INJECTION INTRAMUSCULAR; INTRAVENOUS EVERY 30 MIN PRN
Status: DISCONTINUED | OUTPATIENT
Start: 2021-04-19 | End: 2021-04-19 | Stop reason: HOSPADM

## 2021-04-19 RX ORDER — PROPOFOL 10 MG/ML
VIAL (ML) INTRAVENOUS
Status: DISCONTINUED | OUTPATIENT
Start: 2021-04-19 | End: 2021-04-19

## 2021-04-19 RX ORDER — SODIUM CHLORIDE 0.9 % (FLUSH) 0.9 %
3 SYRINGE (ML) INJECTION
Status: DISCONTINUED | OUTPATIENT
Start: 2021-04-19 | End: 2021-04-19 | Stop reason: HOSPADM

## 2021-04-19 RX ORDER — HYDROCODONE BITARTRATE AND ACETAMINOPHEN 5; 325 MG/1; MG/1
1 TABLET ORAL EVERY 6 HOURS PRN
Qty: 10 TABLET | Refills: 0 | Status: SHIPPED | OUTPATIENT
Start: 2021-04-19 | End: 2021-05-25

## 2021-04-19 RX ORDER — ONDANSETRON 2 MG/ML
4 INJECTION INTRAMUSCULAR; INTRAVENOUS ONCE AS NEEDED
Status: DISCONTINUED | OUTPATIENT
Start: 2021-04-19 | End: 2021-04-19 | Stop reason: HOSPADM

## 2021-04-19 RX ORDER — LIDOCAINE HYDROCHLORIDE 10 MG/ML
1 INJECTION, SOLUTION EPIDURAL; INFILTRATION; INTRACAUDAL; PERINEURAL ONCE
Status: COMPLETED | OUTPATIENT
Start: 2021-04-19 | End: 2021-04-19

## 2021-04-19 RX ORDER — SODIUM CHLORIDE 0.9 % (FLUSH) 0.9 %
3 SYRINGE (ML) INJECTION EVERY 8 HOURS
Status: DISCONTINUED | OUTPATIENT
Start: 2021-04-19 | End: 2021-04-19 | Stop reason: HOSPADM

## 2021-04-19 RX ORDER — SODIUM CHLORIDE 9 MG/ML
INJECTION, SOLUTION INTRAVENOUS CONTINUOUS
Status: CANCELLED | OUTPATIENT
Start: 2021-04-19

## 2021-04-19 RX ORDER — SODIUM CHLORIDE, SODIUM LACTATE, POTASSIUM CHLORIDE, CALCIUM CHLORIDE 600; 310; 30; 20 MG/100ML; MG/100ML; MG/100ML; MG/100ML
500 INJECTION, SOLUTION INTRAVENOUS ONCE
Status: DISCONTINUED | OUTPATIENT
Start: 2021-04-19 | End: 2021-04-19 | Stop reason: HOSPADM

## 2021-04-19 RX ADMIN — MIDAZOLAM 2 MG: 1 INJECTION INTRAMUSCULAR; INTRAVENOUS at 11:04

## 2021-04-19 RX ADMIN — CLINDAMYCIN PHOSPHATE 900 MG: 18 INJECTION, SOLUTION INTRAVENOUS at 11:04

## 2021-04-19 RX ADMIN — LIDOCAINE HYDROCHLORIDE 20 MG: 20 INJECTION, SOLUTION INTRAVENOUS at 11:04

## 2021-04-19 RX ADMIN — PROPOFOL 40 MG: 10 INJECTION, EMULSION INTRAVENOUS at 11:04

## 2021-04-19 RX ADMIN — FENTANYL CITRATE 50 MCG: 50 INJECTION, SOLUTION INTRAMUSCULAR; INTRAVENOUS at 11:04

## 2021-04-19 RX ADMIN — SODIUM CHLORIDE, SODIUM GLUCONATE, SODIUM ACETATE, POTASSIUM CHLORIDE, MAGNESIUM CHLORIDE, SODIUM PHOSPHATE, DIBASIC, AND POTASSIUM PHOSPHATE: .53; .5; .37; .037; .03; .012; .00082 INJECTION, SOLUTION INTRAVENOUS at 10:04

## 2021-04-19 RX ADMIN — LIDOCAINE HYDROCHLORIDE 10 MG: 10 INJECTION, SOLUTION EPIDURAL; INFILTRATION; INTRACAUDAL; PERINEURAL at 10:04

## 2021-04-19 RX ADMIN — PROPOFOL 50 MG: 10 INJECTION, EMULSION INTRAVENOUS at 11:04

## 2021-04-26 DIAGNOSIS — C20 RECTAL CANCER: Primary | ICD-10-CM

## 2021-04-26 RX ORDER — DIPHENOXYLATE HYDROCHLORIDE AND ATROPINE SULFATE 2.5; .025 MG/1; MG/1
1 TABLET ORAL 4 TIMES DAILY PRN
Qty: 40 TABLET | Refills: 0 | Status: SHIPPED | OUTPATIENT
Start: 2021-04-26 | End: 2021-05-06

## 2021-04-28 ENCOUNTER — TELEPHONE (OUTPATIENT)
Dept: SURGERY | Facility: CLINIC | Age: 66
End: 2021-04-28

## 2021-05-04 ENCOUNTER — PATIENT MESSAGE (OUTPATIENT)
Dept: RESEARCH | Facility: HOSPITAL | Age: 66
End: 2021-05-04

## 2021-05-04 ENCOUNTER — OFFICE VISIT (OUTPATIENT)
Dept: SURGERY | Facility: CLINIC | Age: 66
End: 2021-05-04
Payer: COMMERCIAL

## 2021-05-04 VITALS — WEIGHT: 191.81 LBS | BODY MASS INDEX: 36.21 KG/M2 | TEMPERATURE: 99 F | HEIGHT: 61 IN

## 2021-05-04 DIAGNOSIS — Z09 POSTOP CHECK: Primary | ICD-10-CM

## 2021-05-04 PROCEDURE — 99024 POSTOP FOLLOW-UP VISIT: CPT | Mod: S$GLB,,, | Performed by: SURGERY

## 2021-05-04 PROCEDURE — 99024 PR POST-OP FOLLOW-UP VISIT: ICD-10-PCS | Mod: S$GLB,,, | Performed by: SURGERY

## 2021-05-04 PROCEDURE — 3008F BODY MASS INDEX DOCD: CPT | Mod: CPTII,S$GLB,, | Performed by: SURGERY

## 2021-05-04 PROCEDURE — 1126F PR PAIN SEVERITY QUANTIFIED, NO PAIN PRESENT: ICD-10-PCS | Mod: S$GLB,,, | Performed by: SURGERY

## 2021-05-04 PROCEDURE — 3008F PR BODY MASS INDEX (BMI) DOCUMENTED: ICD-10-PCS | Mod: CPTII,S$GLB,, | Performed by: SURGERY

## 2021-05-04 PROCEDURE — 99999 PR PBB SHADOW E&M-EST. PATIENT-LVL III: ICD-10-PCS | Mod: PBBFAC,,, | Performed by: SURGERY

## 2021-05-04 PROCEDURE — 1126F AMNT PAIN NOTED NONE PRSNT: CPT | Mod: S$GLB,,, | Performed by: SURGERY

## 2021-05-04 PROCEDURE — 99999 PR PBB SHADOW E&M-EST. PATIENT-LVL III: CPT | Mod: PBBFAC,,, | Performed by: SURGERY

## 2021-05-05 PROCEDURE — 77427 RADIATION TX MANAGEMENT X5: CPT | Mod: S$GLB,,, | Performed by: RADIOLOGY

## 2021-05-05 PROCEDURE — 77427 PR CHG RADIATION,MANGEMENT,5 TX'S: ICD-10-PCS | Mod: S$GLB,,, | Performed by: RADIOLOGY

## 2021-05-12 ENCOUNTER — TREATMENT (OUTPATIENT)
Dept: RADIATION ONCOLOGY | Facility: CLINIC | Age: 66
End: 2021-05-12
Payer: COMMERCIAL

## 2021-05-12 PROCEDURE — G6002 STEREOSCOPIC X-RAY GUIDANCE: HCPCS | Mod: S$GLB,,, | Performed by: RADIOLOGY

## 2021-05-12 PROCEDURE — G6014 PR RADN TX DELIVERY,  >= 20 MEV, >= 3 TX AREAS: ICD-10-PCS | Mod: S$GLB,,, | Performed by: RADIOLOGY

## 2021-05-12 PROCEDURE — G6002 PR STEREOSCOPIC XRAY GUIDE FOR RADIATION TX DELIV: ICD-10-PCS | Mod: S$GLB,,, | Performed by: RADIOLOGY

## 2021-05-12 PROCEDURE — G6014 RADIATION TREATMENT DELIVERY: HCPCS | Mod: S$GLB,,, | Performed by: RADIOLOGY

## 2021-05-18 DIAGNOSIS — C20 RECTAL CANCER: Primary | ICD-10-CM

## 2021-05-18 RX ORDER — HYDROCORTISONE ACETATE 25 MG/1
25 SUPPOSITORY RECTAL 2 TIMES DAILY
Qty: 20 SUPPOSITORY | Refills: 0 | Status: SHIPPED | OUTPATIENT
Start: 2021-05-18 | End: 2021-05-25

## 2021-05-19 ENCOUNTER — TREATMENT (OUTPATIENT)
Dept: RADIATION ONCOLOGY | Facility: CLINIC | Age: 66
End: 2021-05-19
Payer: COMMERCIAL

## 2021-05-19 ENCOUNTER — DOCUMENTATION ONLY (OUTPATIENT)
Dept: RADIATION ONCOLOGY | Facility: CLINIC | Age: 66
End: 2021-05-19

## 2021-05-19 PROCEDURE — G6002 STEREOSCOPIC X-RAY GUIDANCE: HCPCS | Mod: S$GLB,,, | Performed by: RADIOLOGY

## 2021-05-19 PROCEDURE — G6014 PR RADN TX DELIVERY,  >= 20 MEV, >= 3 TX AREAS: ICD-10-PCS | Mod: S$GLB,,, | Performed by: RADIOLOGY

## 2021-05-19 PROCEDURE — G6002 PR STEREOSCOPIC XRAY GUIDE FOR RADIATION TX DELIV: ICD-10-PCS | Mod: S$GLB,,, | Performed by: RADIOLOGY

## 2021-05-19 PROCEDURE — G6014 RADIATION TREATMENT DELIVERY: HCPCS | Mod: S$GLB,,, | Performed by: RADIOLOGY

## 2021-05-25 ENCOUNTER — OFFICE VISIT (OUTPATIENT)
Dept: FAMILY MEDICINE | Facility: CLINIC | Age: 66
End: 2021-05-25
Payer: COMMERCIAL

## 2021-05-25 VITALS
DIASTOLIC BLOOD PRESSURE: 76 MMHG | TEMPERATURE: 99 F | BODY MASS INDEX: 35.97 KG/M2 | SYSTOLIC BLOOD PRESSURE: 138 MMHG | HEART RATE: 87 BPM | HEIGHT: 61 IN | WEIGHT: 190.5 LBS | OXYGEN SATURATION: 96 %

## 2021-05-25 DIAGNOSIS — C20 RECTAL CANCER: Primary | ICD-10-CM

## 2021-05-25 DIAGNOSIS — C18.9 MALIGNANT NEOPLASM OF COLON, UNSPECIFIED PART OF COLON: ICD-10-CM

## 2021-05-25 DIAGNOSIS — E03.9 ACQUIRED HYPOTHYROIDISM: Primary | ICD-10-CM

## 2021-05-25 PROCEDURE — 1125F PR PAIN SEVERITY QUANTIFIED, PAIN PRESENT: ICD-10-PCS | Mod: S$GLB,,, | Performed by: NURSE PRACTITIONER

## 2021-05-25 PROCEDURE — 3008F PR BODY MASS INDEX (BMI) DOCUMENTED: ICD-10-PCS | Mod: S$GLB,,, | Performed by: NURSE PRACTITIONER

## 2021-05-25 PROCEDURE — 3008F BODY MASS INDEX DOCD: CPT | Mod: S$GLB,,, | Performed by: NURSE PRACTITIONER

## 2021-05-25 PROCEDURE — 99214 PR OFFICE/OUTPT VISIT, EST, LEVL IV, 30-39 MIN: ICD-10-PCS | Mod: S$GLB,,, | Performed by: NURSE PRACTITIONER

## 2021-05-25 PROCEDURE — 99214 OFFICE O/P EST MOD 30 MIN: CPT | Mod: S$GLB,,, | Performed by: NURSE PRACTITIONER

## 2021-05-25 PROCEDURE — 1125F AMNT PAIN NOTED PAIN PRSNT: CPT | Mod: S$GLB,,, | Performed by: NURSE PRACTITIONER

## 2021-05-25 RX ORDER — LEVOTHYROXINE SODIUM 88 UG/1
88 TABLET ORAL
Qty: 30 TABLET | Refills: 11 | Status: SHIPPED | OUTPATIENT
Start: 2021-05-25 | End: 2021-08-09

## 2021-05-25 RX ORDER — DIPHENOXYLATE HYDROCHLORIDE AND ATROPINE SULFATE 2.5; .025 MG/1; MG/1
1 TABLET ORAL 4 TIMES DAILY PRN
Qty: 40 TABLET | Refills: 1 | Status: SHIPPED | OUTPATIENT
Start: 2021-05-25 | End: 2021-06-04

## 2021-05-25 RX ORDER — TRAMADOL HYDROCHLORIDE 50 MG/1
50 TABLET ORAL EVERY 6 HOURS
Qty: 28 TABLET | Refills: 0 | Status: SHIPPED | OUTPATIENT
Start: 2021-05-25 | End: 2021-07-27 | Stop reason: SDUPTHER

## 2021-05-31 ENCOUNTER — HOSPITAL ENCOUNTER (EMERGENCY)
Facility: HOSPITAL | Age: 66
Discharge: HOME | End: 2021-05-31
Attending: EMERGENCY MEDICINE
Payer: COMMERCIAL

## 2021-05-31 VITALS
RESPIRATION RATE: 22 BRPM | HEART RATE: 107 BPM | DIASTOLIC BLOOD PRESSURE: 66 MMHG | HEIGHT: 60 IN | OXYGEN SATURATION: 99 % | WEIGHT: 191 LBS | SYSTOLIC BLOOD PRESSURE: 129 MMHG | BODY MASS INDEX: 37.5 KG/M2 | TEMPERATURE: 98 F

## 2021-05-31 DIAGNOSIS — R06.02 SOB (SHORTNESS OF BREATH): ICD-10-CM

## 2021-05-31 DIAGNOSIS — J44.1 COPD EXACERBATION: Primary | ICD-10-CM

## 2021-05-31 LAB
BASOPHILS # BLD AUTO: 0.04 K/UL (ref 0–0.2)
BASOPHILS NFR BLD: 0.7 % (ref 0–1.9)
BNP SERPL-MCNC: 12 PG/ML (ref 0–99)
DIFFERENTIAL METHOD: ABNORMAL
EOSINOPHIL # BLD AUTO: 0.1 K/UL (ref 0–0.5)
EOSINOPHIL NFR BLD: 1 % (ref 0–8)
ERYTHROCYTE [DISTWIDTH] IN BLOOD BY AUTOMATED COUNT: 13.6 % (ref 11.5–14.5)
HCT VFR BLD AUTO: 39.2 % (ref 37–48.5)
HGB BLD-MCNC: 13.1 G/DL (ref 12–16)
IMM GRANULOCYTES # BLD AUTO: 0.02 K/UL (ref 0–0.04)
IMM GRANULOCYTES NFR BLD AUTO: 0.3 % (ref 0–0.5)
LYMPHOCYTES # BLD AUTO: 0.5 K/UL (ref 1–4.8)
LYMPHOCYTES NFR BLD: 7.8 % (ref 18–48)
MCH RBC QN AUTO: 29.7 PG (ref 27–31)
MCHC RBC AUTO-ENTMCNC: 33.4 G/DL (ref 32–36)
MCV RBC AUTO: 89 FL (ref 82–98)
MONOCYTES # BLD AUTO: 0.3 K/UL (ref 0.3–1)
MONOCYTES NFR BLD: 5.1 % (ref 4–15)
NEUTROPHILS # BLD AUTO: 5 K/UL (ref 1.8–7.7)
NEUTROPHILS NFR BLD: 85.1 % (ref 38–73)
NRBC BLD-RTO: 0 /100 WBC
PLATELET # BLD AUTO: 205 K/UL (ref 150–450)
PMV BLD AUTO: 10.4 FL (ref 9.2–12.9)
RBC # BLD AUTO: 4.41 M/UL (ref 4–5.4)
SARS-COV-2 RDRP RESP QL NAA+PROBE: NEGATIVE
WBC # BLD AUTO: 5.89 K/UL (ref 3.9–12.7)

## 2021-05-31 PROCEDURE — 93010 EKG 12-LEAD: ICD-10-PCS | Mod: ,,, | Performed by: INTERNAL MEDICINE

## 2021-05-31 PROCEDURE — 93010 ELECTROCARDIOGRAM REPORT: CPT | Mod: ,,, | Performed by: INTERNAL MEDICINE

## 2021-05-31 PROCEDURE — 94761 N-INVAS EAR/PLS OXIMETRY MLT: CPT

## 2021-05-31 PROCEDURE — 93005 ELECTROCARDIOGRAM TRACING: CPT

## 2021-05-31 PROCEDURE — 25000242 PHARM REV CODE 250 ALT 637 W/ HCPCS: Performed by: EMERGENCY MEDICINE

## 2021-05-31 PROCEDURE — 36415 COLL VENOUS BLD VENIPUNCTURE: CPT | Performed by: EMERGENCY MEDICINE

## 2021-05-31 PROCEDURE — U0002 COVID-19 LAB TEST NON-CDC: HCPCS | Performed by: EMERGENCY MEDICINE

## 2021-05-31 PROCEDURE — 94640 AIRWAY INHALATION TREATMENT: CPT

## 2021-05-31 PROCEDURE — 63600175 PHARM REV CODE 636 W HCPCS: Performed by: EMERGENCY MEDICINE

## 2021-05-31 PROCEDURE — 99285 EMERGENCY DEPT VISIT HI MDM: CPT | Mod: 25

## 2021-05-31 PROCEDURE — 83880 ASSAY OF NATRIURETIC PEPTIDE: CPT | Performed by: EMERGENCY MEDICINE

## 2021-05-31 PROCEDURE — 94644 CONT INHLJ TX 1ST HOUR: CPT

## 2021-05-31 PROCEDURE — 25000003 PHARM REV CODE 250: Performed by: EMERGENCY MEDICINE

## 2021-05-31 PROCEDURE — 85025 COMPLETE CBC W/AUTO DIFF WBC: CPT | Performed by: EMERGENCY MEDICINE

## 2021-05-31 RX ORDER — IPRATROPIUM BROMIDE AND ALBUTEROL SULFATE 2.5; .5 MG/3ML; MG/3ML
6 SOLUTION RESPIRATORY (INHALATION) CONTINUOUS
Status: DISCONTINUED | OUTPATIENT
Start: 2021-05-31 | End: 2021-05-31

## 2021-05-31 RX ORDER — PREDNISONE 20 MG/1
60 TABLET ORAL
Status: COMPLETED | OUTPATIENT
Start: 2021-05-31 | End: 2021-05-31

## 2021-05-31 RX ORDER — PREDNISONE 20 MG/1
20 TABLET ORAL 2 TIMES DAILY
Qty: 10 TABLET | Refills: 0 | Status: SHIPPED | OUTPATIENT
Start: 2021-05-31 | End: 2021-06-05

## 2021-05-31 RX ORDER — CLONAZEPAM 0.5 MG/1
1 TABLET ORAL
Status: COMPLETED | OUTPATIENT
Start: 2021-05-31 | End: 2021-05-31

## 2021-05-31 RX ADMIN — CLONAZEPAM 1 MG: 0.5 TABLET ORAL at 04:05

## 2021-05-31 RX ADMIN — PREDNISONE 60 MG: 20 TABLET ORAL at 06:05

## 2021-05-31 RX ADMIN — IPRATROPIUM BROMIDE AND ALBUTEROL SULFATE 6 ML: .5; 2.5 SOLUTION RESPIRATORY (INHALATION) at 04:05

## 2021-06-01 ENCOUNTER — PES CALL (OUTPATIENT)
Dept: ADMINISTRATIVE | Facility: CLINIC | Age: 66
End: 2021-06-01

## 2021-06-02 ENCOUNTER — HOSPITAL ENCOUNTER (EMERGENCY)
Facility: HOSPITAL | Age: 66
Discharge: HOME | End: 2021-06-02
Attending: EMERGENCY MEDICINE
Payer: COMMERCIAL

## 2021-06-02 VITALS
OXYGEN SATURATION: 95 % | HEIGHT: 60 IN | WEIGHT: 191 LBS | SYSTOLIC BLOOD PRESSURE: 128 MMHG | RESPIRATION RATE: 21 BRPM | DIASTOLIC BLOOD PRESSURE: 66 MMHG | BODY MASS INDEX: 37.5 KG/M2 | HEART RATE: 104 BPM | TEMPERATURE: 98 F

## 2021-06-02 DIAGNOSIS — J42 CHRONIC BRONCHITIS, UNSPECIFIED CHRONIC BRONCHITIS TYPE: Primary | ICD-10-CM

## 2021-06-02 DIAGNOSIS — F41.1 ANXIETY REACTION: ICD-10-CM

## 2021-06-02 LAB
ANION GAP SERPL CALC-SCNC: 15 MMOL/L (ref 8–16)
BUN SERPL-MCNC: 11 MG/DL (ref 8–23)
CALCIUM SERPL-MCNC: 9.6 MG/DL (ref 8.7–10.5)
CHLORIDE SERPL-SCNC: 106 MMOL/L (ref 95–110)
CO2 SERPL-SCNC: 20 MMOL/L (ref 23–29)
CREAT SERPL-MCNC: 0.8 MG/DL (ref 0.5–1.4)
EST. GFR  (AFRICAN AMERICAN): >60 ML/MIN/1.73 M^2
EST. GFR  (NON AFRICAN AMERICAN): >60 ML/MIN/1.73 M^2
GLUCOSE SERPL-MCNC: 119 MG/DL (ref 70–110)
POTASSIUM SERPL-SCNC: 4 MMOL/L (ref 3.5–5.1)
SODIUM SERPL-SCNC: 141 MMOL/L (ref 136–145)

## 2021-06-02 PROCEDURE — 99283 EMERGENCY DEPT VISIT LOW MDM: CPT | Mod: 25

## 2021-06-02 PROCEDURE — 94640 AIRWAY INHALATION TREATMENT: CPT

## 2021-06-02 PROCEDURE — 25000003 PHARM REV CODE 250: Performed by: EMERGENCY MEDICINE

## 2021-06-02 PROCEDURE — 25000242 PHARM REV CODE 250 ALT 637 W/ HCPCS: Performed by: EMERGENCY MEDICINE

## 2021-06-02 PROCEDURE — 36415 COLL VENOUS BLD VENIPUNCTURE: CPT | Performed by: EMERGENCY MEDICINE

## 2021-06-02 PROCEDURE — 80048 BASIC METABOLIC PNL TOTAL CA: CPT | Performed by: EMERGENCY MEDICINE

## 2021-06-02 RX ORDER — CLONAZEPAM 1 MG/1
0.5 TABLET ORAL 3 TIMES DAILY PRN
Qty: 15 TABLET | Refills: 0 | Status: SHIPPED | OUTPATIENT
Start: 2021-06-02 | End: 2021-06-17 | Stop reason: SDUPTHER

## 2021-06-02 RX ORDER — IPRATROPIUM BROMIDE AND ALBUTEROL SULFATE 2.5; .5 MG/3ML; MG/3ML
3 SOLUTION RESPIRATORY (INHALATION)
Status: COMPLETED | OUTPATIENT
Start: 2021-06-02 | End: 2021-06-02

## 2021-06-02 RX ORDER — CLONAZEPAM 0.5 MG/1
1 TABLET ORAL
Status: COMPLETED | OUTPATIENT
Start: 2021-06-02 | End: 2021-06-02

## 2021-06-02 RX ADMIN — CLONAZEPAM 1 MG: 0.5 TABLET ORAL at 06:06

## 2021-06-02 RX ADMIN — IPRATROPIUM BROMIDE AND ALBUTEROL SULFATE 3 ML: .5; 2.5 SOLUTION RESPIRATORY (INHALATION) at 06:06

## 2021-06-03 ENCOUNTER — OFFICE VISIT (OUTPATIENT)
Dept: FAMILY MEDICINE | Facility: CLINIC | Age: 66
End: 2021-06-03
Payer: COMMERCIAL

## 2021-06-03 VITALS
RESPIRATION RATE: 16 BRPM | HEIGHT: 60 IN | BODY MASS INDEX: 37.5 KG/M2 | DIASTOLIC BLOOD PRESSURE: 82 MMHG | WEIGHT: 191 LBS | SYSTOLIC BLOOD PRESSURE: 134 MMHG | HEART RATE: 84 BPM | OXYGEN SATURATION: 99 %

## 2021-06-03 DIAGNOSIS — R06.4 HYPERVENTILATION: ICD-10-CM

## 2021-06-03 DIAGNOSIS — R13.13 PHARYNGEAL DYSPHAGIA: ICD-10-CM

## 2021-06-03 DIAGNOSIS — F41.9 ACUTE ANXIETY: Primary | ICD-10-CM

## 2021-06-03 DIAGNOSIS — R07.2 PRECORDIAL PAIN: ICD-10-CM

## 2021-06-03 DIAGNOSIS — E78.00 PURE HYPERCHOLESTEROLEMIA: ICD-10-CM

## 2021-06-03 DIAGNOSIS — R06.02 SHORTNESS OF BREATH: ICD-10-CM

## 2021-06-03 PROCEDURE — 3008F PR BODY MASS INDEX (BMI) DOCUMENTED: ICD-10-PCS | Mod: S$GLB,,, | Performed by: INTERNAL MEDICINE

## 2021-06-03 PROCEDURE — 1126F PR PAIN SEVERITY QUANTIFIED, NO PAIN PRESENT: ICD-10-PCS | Mod: S$GLB,,, | Performed by: INTERNAL MEDICINE

## 2021-06-03 PROCEDURE — 99214 PR OFFICE/OUTPT VISIT, EST, LEVL IV, 30-39 MIN: ICD-10-PCS | Mod: 25,S$GLB,, | Performed by: INTERNAL MEDICINE

## 2021-06-03 PROCEDURE — 99214 OFFICE O/P EST MOD 30 MIN: CPT | Mod: 25,S$GLB,, | Performed by: INTERNAL MEDICINE

## 2021-06-03 PROCEDURE — 96372 THER/PROPH/DIAG INJ SC/IM: CPT | Mod: S$GLB,,, | Performed by: INTERNAL MEDICINE

## 2021-06-03 PROCEDURE — 3008F BODY MASS INDEX DOCD: CPT | Mod: S$GLB,,, | Performed by: INTERNAL MEDICINE

## 2021-06-03 PROCEDURE — 1126F AMNT PAIN NOTED NONE PRSNT: CPT | Mod: S$GLB,,, | Performed by: INTERNAL MEDICINE

## 2021-06-03 PROCEDURE — 96372 PR INJECTION,THERAP/PROPH/DIAG2ST, IM OR SUBCUT: ICD-10-PCS | Mod: S$GLB,,, | Performed by: INTERNAL MEDICINE

## 2021-06-03 RX ORDER — DIPHENHYDRAMINE HYDROCHLORIDE 50 MG/ML
25 INJECTION INTRAMUSCULAR; INTRAVENOUS ONCE
Status: COMPLETED | OUTPATIENT
Start: 2021-06-03 | End: 2021-06-03

## 2021-06-03 RX ORDER — DIPHENHYDRAMINE HYDROCHLORIDE 50 MG/ML
25 INJECTION INTRAMUSCULAR; INTRAVENOUS
Status: DISCONTINUED | OUTPATIENT
Start: 2021-06-03 | End: 2021-06-03

## 2021-06-03 RX ORDER — ATORVASTATIN CALCIUM 40 MG/1
40 TABLET, FILM COATED ORAL DAILY
Qty: 90 TABLET | Refills: 1 | Status: SHIPPED | OUTPATIENT
Start: 2021-06-03 | End: 2022-07-26

## 2021-06-03 RX ORDER — FLUOXETINE 10 MG/1
10 CAPSULE ORAL DAILY
Qty: 30 CAPSULE | Refills: 2 | Status: SHIPPED | OUTPATIENT
Start: 2021-06-03 | End: 2021-07-29

## 2021-06-03 RX ADMIN — DIPHENHYDRAMINE HYDROCHLORIDE 25 MG: 50 INJECTION INTRAMUSCULAR; INTRAVENOUS at 11:06

## 2021-06-09 ENCOUNTER — TELEPHONE (OUTPATIENT)
Dept: FAMILY MEDICINE | Facility: CLINIC | Age: 66
End: 2021-06-09

## 2021-06-09 DIAGNOSIS — R07.2 PRECORDIAL PAIN: Primary | ICD-10-CM

## 2021-06-10 ENCOUNTER — TELEPHONE (OUTPATIENT)
Dept: FAMILY MEDICINE | Facility: CLINIC | Age: 66
End: 2021-06-10

## 2021-06-11 ENCOUNTER — CLINICAL SUPPORT (OUTPATIENT)
Dept: RADIATION ONCOLOGY | Facility: CLINIC | Age: 66
End: 2021-06-11
Payer: COMMERCIAL

## 2021-06-17 ENCOUNTER — OFFICE VISIT (OUTPATIENT)
Dept: FAMILY MEDICINE | Facility: CLINIC | Age: 66
End: 2021-06-17
Payer: COMMERCIAL

## 2021-06-17 VITALS
BODY MASS INDEX: 37.3 KG/M2 | SYSTOLIC BLOOD PRESSURE: 136 MMHG | OXYGEN SATURATION: 95 % | HEIGHT: 60 IN | DIASTOLIC BLOOD PRESSURE: 74 MMHG | HEART RATE: 84 BPM | RESPIRATION RATE: 18 BRPM | WEIGHT: 190 LBS | TEMPERATURE: 99 F

## 2021-06-17 DIAGNOSIS — F41.9 SEVERE ANXIETY: Primary | ICD-10-CM

## 2021-06-17 DIAGNOSIS — M62.838 MUSCLE SPASM: ICD-10-CM

## 2021-06-17 PROCEDURE — 99214 PR OFFICE/OUTPT VISIT, EST, LEVL IV, 30-39 MIN: ICD-10-PCS | Mod: S$GLB,,, | Performed by: INTERNAL MEDICINE

## 2021-06-17 PROCEDURE — 3008F PR BODY MASS INDEX (BMI) DOCUMENTED: ICD-10-PCS | Mod: S$GLB,,, | Performed by: INTERNAL MEDICINE

## 2021-06-17 PROCEDURE — 3008F BODY MASS INDEX DOCD: CPT | Mod: S$GLB,,, | Performed by: INTERNAL MEDICINE

## 2021-06-17 PROCEDURE — 1126F PR PAIN SEVERITY QUANTIFIED, NO PAIN PRESENT: ICD-10-PCS | Mod: S$GLB,,, | Performed by: INTERNAL MEDICINE

## 2021-06-17 PROCEDURE — 1126F AMNT PAIN NOTED NONE PRSNT: CPT | Mod: S$GLB,,, | Performed by: INTERNAL MEDICINE

## 2021-06-17 PROCEDURE — 99214 OFFICE O/P EST MOD 30 MIN: CPT | Mod: S$GLB,,, | Performed by: INTERNAL MEDICINE

## 2021-06-17 RX ORDER — CLONAZEPAM 0.5 MG/1
0.5 TABLET ORAL 3 TIMES DAILY PRN
Qty: 60 TABLET | Refills: 2 | Status: SHIPPED | OUTPATIENT
Start: 2021-06-17 | End: 2021-10-19

## 2021-06-17 RX ORDER — CYCLOBENZAPRINE HCL 10 MG
10 TABLET ORAL 3 TIMES DAILY PRN
Qty: 30 TABLET | Refills: 0 | Status: SHIPPED | OUTPATIENT
Start: 2021-06-17 | End: 2021-06-27

## 2021-06-28 ENCOUNTER — LAB VISIT (OUTPATIENT)
Dept: LAB | Facility: HOSPITAL | Age: 66
End: 2021-06-28
Attending: INTERNAL MEDICINE
Payer: COMMERCIAL

## 2021-06-28 ENCOUNTER — HOSPITAL ENCOUNTER (OUTPATIENT)
Dept: PREADMISSION TESTING | Facility: HOSPITAL | Age: 66
Discharge: HOME | End: 2021-06-28
Attending: INTERNAL MEDICINE
Payer: COMMERCIAL

## 2021-06-28 VITALS
HEART RATE: 107 BPM | DIASTOLIC BLOOD PRESSURE: 77 MMHG | OXYGEN SATURATION: 96 % | RESPIRATION RATE: 16 BRPM | TEMPERATURE: 99 F | SYSTOLIC BLOOD PRESSURE: 129 MMHG | HEIGHT: 60 IN | WEIGHT: 190 LBS | BODY MASS INDEX: 37.3 KG/M2

## 2021-06-28 DIAGNOSIS — C20 RECTAL CANCER: ICD-10-CM

## 2021-06-28 DIAGNOSIS — Z01.818 PRE-OP TESTING: Primary | ICD-10-CM

## 2021-06-28 LAB
ALBUMIN SERPL BCP-MCNC: 3.7 G/DL (ref 3.5–5.2)
ALP SERPL-CCNC: 105 U/L (ref 55–135)
ALT SERPL W/O P-5'-P-CCNC: 16 U/L (ref 10–44)
ANION GAP SERPL CALC-SCNC: 9 MMOL/L (ref 8–16)
AST SERPL-CCNC: 18 U/L (ref 10–40)
BASOPHILS # BLD AUTO: 0.03 K/UL (ref 0–0.2)
BASOPHILS NFR BLD: 0.9 % (ref 0–1.9)
BILIRUB SERPL-MCNC: 0.5 MG/DL (ref 0.1–1)
BUN SERPL-MCNC: 9 MG/DL (ref 8–23)
CALCIUM SERPL-MCNC: 9.6 MG/DL (ref 8.7–10.5)
CHLORIDE SERPL-SCNC: 103 MMOL/L (ref 95–110)
CO2 SERPL-SCNC: 27 MMOL/L (ref 23–29)
CREAT SERPL-MCNC: 0.9 MG/DL (ref 0.5–1.4)
DIFFERENTIAL METHOD: ABNORMAL
EOSINOPHIL # BLD AUTO: 0.2 K/UL (ref 0–0.5)
EOSINOPHIL NFR BLD: 5.2 % (ref 0–8)
ERYTHROCYTE [DISTWIDTH] IN BLOOD BY AUTOMATED COUNT: 13.7 % (ref 11.5–14.5)
EST. GFR  (AFRICAN AMERICAN): >60 ML/MIN/1.73 M^2
EST. GFR  (NON AFRICAN AMERICAN): >60 ML/MIN/1.73 M^2
GLUCOSE SERPL-MCNC: 112 MG/DL (ref 70–110)
HCT VFR BLD AUTO: 39 % (ref 37–48.5)
HGB BLD-MCNC: 12.8 G/DL (ref 12–16)
IMM GRANULOCYTES # BLD AUTO: 0.01 K/UL (ref 0–0.04)
IMM GRANULOCYTES NFR BLD AUTO: 0.3 % (ref 0–0.5)
LYMPHOCYTES # BLD AUTO: 0.4 K/UL (ref 1–4.8)
LYMPHOCYTES NFR BLD: 12.5 % (ref 18–48)
MCH RBC QN AUTO: 29.8 PG (ref 27–31)
MCHC RBC AUTO-ENTMCNC: 32.8 G/DL (ref 32–36)
MCV RBC AUTO: 91 FL (ref 82–98)
MONOCYTES # BLD AUTO: 0.3 K/UL (ref 0.3–1)
MONOCYTES NFR BLD: 8.5 % (ref 4–15)
NEUTROPHILS # BLD AUTO: 2.4 K/UL (ref 1.8–7.7)
NEUTROPHILS NFR BLD: 72.6 % (ref 38–73)
NRBC BLD-RTO: 0 /100 WBC
PLATELET # BLD AUTO: 217 K/UL (ref 150–450)
PMV BLD AUTO: 10 FL (ref 9.2–12.9)
POTASSIUM SERPL-SCNC: 3.9 MMOL/L (ref 3.5–5.1)
PROT SERPL-MCNC: 7.2 G/DL (ref 6–8.4)
RBC # BLD AUTO: 4.29 M/UL (ref 4–5.4)
SARS-COV-2 RDRP RESP QL NAA+PROBE: NEGATIVE
SODIUM SERPL-SCNC: 139 MMOL/L (ref 136–145)
WBC # BLD AUTO: 3.29 K/UL (ref 3.9–12.7)

## 2021-06-28 PROCEDURE — 85025 COMPLETE CBC W/AUTO DIFF WBC: CPT | Performed by: PHYSICIAN ASSISTANT

## 2021-06-28 PROCEDURE — U0002 COVID-19 LAB TEST NON-CDC: HCPCS | Performed by: INTERNAL MEDICINE

## 2021-06-28 PROCEDURE — 80053 COMPREHEN METABOLIC PANEL: CPT | Performed by: PHYSICIAN ASSISTANT

## 2021-06-28 PROCEDURE — 36415 COLL VENOUS BLD VENIPUNCTURE: CPT | Performed by: PHYSICIAN ASSISTANT

## 2021-06-29 ENCOUNTER — HOSPITAL ENCOUNTER (OUTPATIENT)
Facility: HOSPITAL | Age: 66
Discharge: HOME | End: 2021-06-29
Attending: INTERNAL MEDICINE | Admitting: INTERNAL MEDICINE
Payer: COMMERCIAL

## 2021-06-29 ENCOUNTER — ANESTHESIA EVENT (OUTPATIENT)
Dept: SURGERY | Facility: HOSPITAL | Age: 66
End: 2021-06-29
Payer: COMMERCIAL

## 2021-06-29 ENCOUNTER — ANESTHESIA (OUTPATIENT)
Dept: SURGERY | Facility: HOSPITAL | Age: 66
End: 2021-06-29
Payer: COMMERCIAL

## 2021-06-29 VITALS
HEART RATE: 70 BPM | HEIGHT: 60 IN | RESPIRATION RATE: 17 BRPM | OXYGEN SATURATION: 100 % | TEMPERATURE: 98 F | SYSTOLIC BLOOD PRESSURE: 152 MMHG | DIASTOLIC BLOOD PRESSURE: 83 MMHG | BODY MASS INDEX: 37.3 KG/M2 | WEIGHT: 190 LBS

## 2021-06-29 DIAGNOSIS — R13.10 DYSPHAGIA: ICD-10-CM

## 2021-06-29 PROCEDURE — 63600175 PHARM REV CODE 636 W HCPCS: Performed by: NURSE ANESTHETIST, CERTIFIED REGISTERED

## 2021-06-29 PROCEDURE — 27000671 HC TUBING MICROBORE EXT: Performed by: STUDENT IN AN ORGANIZED HEALTH CARE EDUCATION/TRAINING PROGRAM

## 2021-06-29 PROCEDURE — 43239 EGD BIOPSY SINGLE/MULTIPLE: CPT | Performed by: INTERNAL MEDICINE

## 2021-06-29 PROCEDURE — 43248 EGD GUIDE WIRE INSERTION: CPT | Performed by: INTERNAL MEDICINE

## 2021-06-29 PROCEDURE — 25000003 PHARM REV CODE 250: Performed by: NURSE ANESTHETIST, CERTIFIED REGISTERED

## 2021-06-29 PROCEDURE — 37000008 HC ANESTHESIA 1ST 15 MINUTES: Performed by: INTERNAL MEDICINE

## 2021-06-29 PROCEDURE — 43245 EGD DILATE STRICTURE: CPT | Performed by: INTERNAL MEDICINE

## 2021-06-29 PROCEDURE — 37000009 HC ANESTHESIA EA ADD 15 MINS: Performed by: INTERNAL MEDICINE

## 2021-06-29 PROCEDURE — 27200043 HC FORCEPS, BIOPSY: Performed by: INTERNAL MEDICINE

## 2021-06-29 RX ORDER — LIDOCAINE HYDROCHLORIDE 20 MG/ML
INJECTION, SOLUTION EPIDURAL; INFILTRATION; INTRACAUDAL; PERINEURAL
Status: DISCONTINUED | OUTPATIENT
Start: 2021-06-29 | End: 2021-06-29

## 2021-06-29 RX ORDER — SODIUM CHLORIDE 9 MG/ML
INJECTION, SOLUTION INTRAVENOUS CONTINUOUS PRN
Status: DISCONTINUED | OUTPATIENT
Start: 2021-06-29 | End: 2021-06-29

## 2021-06-29 RX ORDER — SODIUM CHLORIDE 9 MG/ML
INJECTION, SOLUTION INTRAVENOUS CONTINUOUS
Status: DISCONTINUED | OUTPATIENT
Start: 2021-06-29 | End: 2021-06-29 | Stop reason: HOSPADM

## 2021-06-29 RX ORDER — SODIUM CHLORIDE 0.9 % (FLUSH) 0.9 %
2 SYRINGE (ML) INJECTION
Status: DISCONTINUED | OUTPATIENT
Start: 2021-06-29 | End: 2021-06-29 | Stop reason: HOSPADM

## 2021-06-29 RX ORDER — PROPOFOL 10 MG/ML
VIAL (ML) INTRAVENOUS
Status: DISCONTINUED | OUTPATIENT
Start: 2021-06-29 | End: 2021-06-29

## 2021-06-29 RX ADMIN — PROPOFOL 20 MG: 10 INJECTION, EMULSION INTRAVENOUS at 08:06

## 2021-06-29 RX ADMIN — LIDOCAINE HYDROCHLORIDE 80 MG: 20 INJECTION, SOLUTION EPIDURAL; INFILTRATION; INTRACAUDAL; PERINEURAL at 08:06

## 2021-06-29 RX ADMIN — PROPOFOL 30 MG: 10 INJECTION, EMULSION INTRAVENOUS at 08:06

## 2021-06-29 RX ADMIN — PROPOFOL 60 MG: 10 INJECTION, EMULSION INTRAVENOUS at 08:06

## 2021-06-29 RX ADMIN — PROPOFOL 40 MG: 10 INJECTION, EMULSION INTRAVENOUS at 08:06

## 2021-06-29 RX ADMIN — LIDOCAINE HYDROCHLORIDE 20 MG: 20 INJECTION, SOLUTION EPIDURAL; INFILTRATION; INTRACAUDAL; PERINEURAL at 08:06

## 2021-06-29 RX ADMIN — SODIUM CHLORIDE: 9 INJECTION, SOLUTION INTRAVENOUS at 08:06

## 2021-06-30 ENCOUNTER — TELEPHONE (OUTPATIENT)
Dept: FAMILY MEDICINE | Facility: CLINIC | Age: 66
End: 2021-06-30

## 2021-07-02 ENCOUNTER — OFFICE VISIT (OUTPATIENT)
Dept: HEMATOLOGY/ONCOLOGY | Facility: CLINIC | Age: 66
End: 2021-07-02
Payer: COMMERCIAL

## 2021-07-02 VITALS
SYSTOLIC BLOOD PRESSURE: 138 MMHG | RESPIRATION RATE: 18 BRPM | DIASTOLIC BLOOD PRESSURE: 76 MMHG | HEIGHT: 60 IN | TEMPERATURE: 97 F | OXYGEN SATURATION: 99 % | WEIGHT: 189.81 LBS | BODY MASS INDEX: 37.27 KG/M2 | HEART RATE: 87 BPM

## 2021-07-02 DIAGNOSIS — E03.9 ACQUIRED HYPOTHYROIDISM: ICD-10-CM

## 2021-07-02 DIAGNOSIS — J41.0 SIMPLE CHRONIC BRONCHITIS: Primary | ICD-10-CM

## 2021-07-02 DIAGNOSIS — C20 RECTAL CANCER: ICD-10-CM

## 2021-07-02 PROCEDURE — 99999 PR PBB SHADOW E&M-EST. PATIENT-LVL III: ICD-10-PCS | Mod: PBBFAC,,, | Performed by: INTERNAL MEDICINE

## 2021-07-02 PROCEDURE — 99214 PR OFFICE/OUTPT VISIT, EST, LEVL IV, 30-39 MIN: ICD-10-PCS | Mod: S$GLB,,, | Performed by: INTERNAL MEDICINE

## 2021-07-02 PROCEDURE — 3008F BODY MASS INDEX DOCD: CPT | Mod: CPTII,S$GLB,, | Performed by: INTERNAL MEDICINE

## 2021-07-02 PROCEDURE — 3008F PR BODY MASS INDEX (BMI) DOCUMENTED: ICD-10-PCS | Mod: CPTII,S$GLB,, | Performed by: INTERNAL MEDICINE

## 2021-07-02 PROCEDURE — 1126F PR PAIN SEVERITY QUANTIFIED, NO PAIN PRESENT: ICD-10-PCS | Mod: S$GLB,,, | Performed by: INTERNAL MEDICINE

## 2021-07-02 PROCEDURE — 99214 OFFICE O/P EST MOD 30 MIN: CPT | Mod: S$GLB,,, | Performed by: INTERNAL MEDICINE

## 2021-07-02 PROCEDURE — 1126F AMNT PAIN NOTED NONE PRSNT: CPT | Mod: S$GLB,,, | Performed by: INTERNAL MEDICINE

## 2021-07-02 PROCEDURE — 99999 PR PBB SHADOW E&M-EST. PATIENT-LVL III: CPT | Mod: PBBFAC,,, | Performed by: INTERNAL MEDICINE

## 2021-07-13 DIAGNOSIS — C20 RECTAL CANCER: Primary | ICD-10-CM

## 2021-07-14 ENCOUNTER — LAB VISIT (OUTPATIENT)
Dept: LAB | Facility: HOSPITAL | Age: 66
End: 2021-07-14
Attending: INTERNAL MEDICINE
Payer: COMMERCIAL

## 2021-07-14 ENCOUNTER — HOSPITAL ENCOUNTER (OUTPATIENT)
Dept: RADIOLOGY | Facility: HOSPITAL | Age: 66
Discharge: HOME | End: 2021-07-14
Attending: PHYSICIAN ASSISTANT
Payer: COMMERCIAL

## 2021-07-14 ENCOUNTER — TELEPHONE (OUTPATIENT)
Dept: HEMATOLOGY/ONCOLOGY | Facility: CLINIC | Age: 66
End: 2021-07-14

## 2021-07-14 VITALS — HEIGHT: 61 IN | WEIGHT: 189 LBS | BODY MASS INDEX: 35.68 KG/M2

## 2021-07-14 DIAGNOSIS — C20 RECTAL CANCER: ICD-10-CM

## 2021-07-14 LAB
ALBUMIN SERPL BCP-MCNC: 3.7 G/DL (ref 3.5–5.2)
ALP SERPL-CCNC: 96 U/L (ref 55–135)
ALT SERPL W/O P-5'-P-CCNC: 14 U/L (ref 10–44)
ANION GAP SERPL CALC-SCNC: 10 MMOL/L (ref 8–16)
AST SERPL-CCNC: 16 U/L (ref 10–40)
BASOPHILS # BLD AUTO: 0.03 K/UL (ref 0–0.2)
BASOPHILS NFR BLD: 0.8 % (ref 0–1.9)
BILIRUB SERPL-MCNC: 0.6 MG/DL (ref 0.1–1)
BUN SERPL-MCNC: 9 MG/DL (ref 8–23)
CALCIUM SERPL-MCNC: 9.4 MG/DL (ref 8.7–10.5)
CHLORIDE SERPL-SCNC: 104 MMOL/L (ref 95–110)
CO2 SERPL-SCNC: 26 MMOL/L (ref 23–29)
CREAT SERPL-MCNC: 0.8 MG/DL (ref 0.5–1.4)
DIFFERENTIAL METHOD: ABNORMAL
EOSINOPHIL # BLD AUTO: 0.3 K/UL (ref 0–0.5)
EOSINOPHIL NFR BLD: 8.6 % (ref 0–8)
ERYTHROCYTE [DISTWIDTH] IN BLOOD BY AUTOMATED COUNT: 13.5 % (ref 11.5–14.5)
EST. GFR  (AFRICAN AMERICAN): >60 ML/MIN/1.73 M^2
EST. GFR  (NON AFRICAN AMERICAN): >60 ML/MIN/1.73 M^2
GLUCOSE SERPL-MCNC: 90 MG/DL (ref 70–110)
GLUCOSE SERPL-MCNC: 92 MG/DL (ref 70–110)
HCT VFR BLD AUTO: 38.8 % (ref 37–48.5)
HGB BLD-MCNC: 12.6 G/DL (ref 12–16)
IMM GRANULOCYTES # BLD AUTO: 0.02 K/UL (ref 0–0.04)
IMM GRANULOCYTES NFR BLD AUTO: 0.5 % (ref 0–0.5)
LYMPHOCYTES # BLD AUTO: 0.5 K/UL (ref 1–4.8)
LYMPHOCYTES NFR BLD: 12.4 % (ref 18–48)
MCH RBC QN AUTO: 29.4 PG (ref 27–31)
MCHC RBC AUTO-ENTMCNC: 32.5 G/DL (ref 32–36)
MCV RBC AUTO: 91 FL (ref 82–98)
MONOCYTES # BLD AUTO: 0.3 K/UL (ref 0.3–1)
MONOCYTES NFR BLD: 7.3 % (ref 4–15)
NEUTROPHILS # BLD AUTO: 2.6 K/UL (ref 1.8–7.7)
NEUTROPHILS NFR BLD: 70.4 % (ref 38–73)
NRBC BLD-RTO: 0 /100 WBC
PLATELET # BLD AUTO: 223 K/UL (ref 150–450)
PMV BLD AUTO: 10.6 FL (ref 9.2–12.9)
POTASSIUM SERPL-SCNC: 4.7 MMOL/L (ref 3.5–5.1)
PROT SERPL-MCNC: 7.2 G/DL (ref 6–8.4)
RBC # BLD AUTO: 4.28 M/UL (ref 4–5.4)
SODIUM SERPL-SCNC: 140 MMOL/L (ref 136–145)
WBC # BLD AUTO: 3.71 K/UL (ref 3.9–12.7)

## 2021-07-14 PROCEDURE — 78815 PET IMAGE W/CT SKULL-THIGH: CPT | Mod: TC,PO

## 2021-07-14 PROCEDURE — 82378 CARCINOEMBRYONIC ANTIGEN: CPT | Performed by: INTERNAL MEDICINE

## 2021-07-14 PROCEDURE — 80053 COMPREHEN METABOLIC PANEL: CPT | Performed by: INTERNAL MEDICINE

## 2021-07-14 PROCEDURE — 36415 COLL VENOUS BLD VENIPUNCTURE: CPT | Performed by: INTERNAL MEDICINE

## 2021-07-14 PROCEDURE — 85025 COMPLETE CBC W/AUTO DIFF WBC: CPT | Performed by: INTERNAL MEDICINE

## 2021-07-15 ENCOUNTER — TELEPHONE (OUTPATIENT)
Dept: FAMILY MEDICINE | Facility: CLINIC | Age: 66
End: 2021-07-15

## 2021-07-15 ENCOUNTER — TELEPHONE (OUTPATIENT)
Dept: SURGERY | Facility: CLINIC | Age: 66
End: 2021-07-15

## 2021-07-15 LAB — CEA SERPL-MCNC: 1.8 NG/ML (ref 0–5)

## 2021-07-19 ENCOUNTER — OFFICE VISIT (OUTPATIENT)
Dept: HEMATOLOGY/ONCOLOGY | Facility: CLINIC | Age: 66
End: 2021-07-19
Payer: COMMERCIAL

## 2021-07-19 ENCOUNTER — TELEPHONE (OUTPATIENT)
Dept: GASTROENTEROLOGY | Facility: CLINIC | Age: 66
End: 2021-07-19

## 2021-07-19 VITALS
SYSTOLIC BLOOD PRESSURE: 145 MMHG | TEMPERATURE: 97 F | DIASTOLIC BLOOD PRESSURE: 75 MMHG | HEIGHT: 60 IN | HEART RATE: 87 BPM | RESPIRATION RATE: 19 BRPM | OXYGEN SATURATION: 99 % | WEIGHT: 192.88 LBS | BODY MASS INDEX: 37.87 KG/M2

## 2021-07-19 DIAGNOSIS — C20 RECTAL CANCER: Primary | ICD-10-CM

## 2021-07-19 DIAGNOSIS — E53.8 B12 DEFICIENCY: ICD-10-CM

## 2021-07-19 DIAGNOSIS — R13.11 ORAL PHASE DYSPHAGIA: ICD-10-CM

## 2021-07-19 PROCEDURE — 1126F AMNT PAIN NOTED NONE PRSNT: CPT | Mod: CPTII,S$GLB,, | Performed by: INTERNAL MEDICINE

## 2021-07-19 PROCEDURE — 99214 OFFICE O/P EST MOD 30 MIN: CPT | Mod: S$GLB,,, | Performed by: INTERNAL MEDICINE

## 2021-07-19 PROCEDURE — 3008F PR BODY MASS INDEX (BMI) DOCUMENTED: ICD-10-PCS | Mod: CPTII,S$GLB,, | Performed by: INTERNAL MEDICINE

## 2021-07-19 PROCEDURE — 3008F BODY MASS INDEX DOCD: CPT | Mod: CPTII,S$GLB,, | Performed by: INTERNAL MEDICINE

## 2021-07-19 PROCEDURE — 1126F PR PAIN SEVERITY QUANTIFIED, NO PAIN PRESENT: ICD-10-PCS | Mod: CPTII,S$GLB,, | Performed by: INTERNAL MEDICINE

## 2021-07-19 PROCEDURE — 99214 PR OFFICE/OUTPT VISIT, EST, LEVL IV, 30-39 MIN: ICD-10-PCS | Mod: S$GLB,,, | Performed by: INTERNAL MEDICINE

## 2021-07-19 PROCEDURE — 99999 PR PBB SHADOW E&M-EST. PATIENT-LVL III: CPT | Mod: PBBFAC,,, | Performed by: INTERNAL MEDICINE

## 2021-07-19 PROCEDURE — 99999 PR PBB SHADOW E&M-EST. PATIENT-LVL III: ICD-10-PCS | Mod: PBBFAC,,, | Performed by: INTERNAL MEDICINE

## 2021-07-26 ENCOUNTER — PATIENT MESSAGE (OUTPATIENT)
Dept: GASTROENTEROLOGY | Facility: CLINIC | Age: 66
End: 2021-07-26

## 2021-07-26 DIAGNOSIS — C18.9 MALIGNANT NEOPLASM OF COLON, UNSPECIFIED PART OF COLON: Primary | ICD-10-CM

## 2021-07-29 ENCOUNTER — OFFICE VISIT (OUTPATIENT)
Dept: CARDIOLOGY | Facility: CLINIC | Age: 66
End: 2021-07-29
Payer: COMMERCIAL

## 2021-07-29 VITALS
HEART RATE: 89 BPM | DIASTOLIC BLOOD PRESSURE: 60 MMHG | OXYGEN SATURATION: 99 % | SYSTOLIC BLOOD PRESSURE: 122 MMHG | BODY MASS INDEX: 37.69 KG/M2 | WEIGHT: 192 LBS | HEIGHT: 60 IN

## 2021-07-29 DIAGNOSIS — J42 CHRONIC BRONCHITIS, UNSPECIFIED CHRONIC BRONCHITIS TYPE: ICD-10-CM

## 2021-07-29 DIAGNOSIS — R07.2 PRECORDIAL PAIN: ICD-10-CM

## 2021-07-29 DIAGNOSIS — K63.89 COLONIC MASS: Primary | ICD-10-CM

## 2021-07-29 PROCEDURE — 3078F PR MOST RECENT DIASTOLIC BLOOD PRESSURE < 80 MM HG: ICD-10-PCS | Mod: CPTII,S$GLB,, | Performed by: SPECIALIST

## 2021-07-29 PROCEDURE — 3008F PR BODY MASS INDEX (BMI) DOCUMENTED: ICD-10-PCS | Mod: CPTII,S$GLB,, | Performed by: SPECIALIST

## 2021-07-29 PROCEDURE — 1160F PR REVIEW ALL MEDS BY PRESCRIBER/CLIN PHARMACIST DOCUMENTED: ICD-10-PCS | Mod: CPTII,S$GLB,, | Performed by: SPECIALIST

## 2021-07-29 PROCEDURE — 3074F SYST BP LT 130 MM HG: CPT | Mod: CPTII,S$GLB,, | Performed by: SPECIALIST

## 2021-07-29 PROCEDURE — 3008F BODY MASS INDEX DOCD: CPT | Mod: CPTII,S$GLB,, | Performed by: SPECIALIST

## 2021-07-29 PROCEDURE — 3078F DIAST BP <80 MM HG: CPT | Mod: CPTII,S$GLB,, | Performed by: SPECIALIST

## 2021-07-29 PROCEDURE — 1160F RVW MEDS BY RX/DR IN RCRD: CPT | Mod: CPTII,S$GLB,, | Performed by: SPECIALIST

## 2021-07-29 PROCEDURE — 3074F PR MOST RECENT SYSTOLIC BLOOD PRESSURE < 130 MM HG: ICD-10-PCS | Mod: CPTII,S$GLB,, | Performed by: SPECIALIST

## 2021-07-29 PROCEDURE — 99204 OFFICE O/P NEW MOD 45 MIN: CPT | Mod: S$GLB,,, | Performed by: SPECIALIST

## 2021-07-29 PROCEDURE — 1159F PR MEDICATION LIST DOCUMENTED IN MEDICAL RECORD: ICD-10-PCS | Mod: CPTII,S$GLB,, | Performed by: SPECIALIST

## 2021-07-29 PROCEDURE — 1159F MED LIST DOCD IN RCRD: CPT | Mod: CPTII,S$GLB,, | Performed by: SPECIALIST

## 2021-07-29 PROCEDURE — 99204 PR OFFICE/OUTPT VISIT, NEW, LEVL IV, 45-59 MIN: ICD-10-PCS | Mod: S$GLB,,, | Performed by: SPECIALIST

## 2021-07-30 ENCOUNTER — TELEPHONE (OUTPATIENT)
Dept: SURGERY | Facility: CLINIC | Age: 66
End: 2021-07-30

## 2021-08-03 ENCOUNTER — OFFICE VISIT (OUTPATIENT)
Dept: SURGERY | Facility: CLINIC | Age: 66
End: 2021-08-03
Payer: COMMERCIAL

## 2021-08-03 ENCOUNTER — LAB VISIT (OUTPATIENT)
Dept: PRIMARY CARE CLINIC | Facility: CLINIC | Age: 66
End: 2021-08-03
Payer: COMMERCIAL

## 2021-08-03 VITALS
WEIGHT: 192.88 LBS | BODY MASS INDEX: 37.87 KG/M2 | TEMPERATURE: 98 F | HEIGHT: 60 IN | DIASTOLIC BLOOD PRESSURE: 70 MMHG | HEART RATE: 84 BPM | SYSTOLIC BLOOD PRESSURE: 144 MMHG

## 2021-08-03 DIAGNOSIS — Z01.818 PREOP TESTING: ICD-10-CM

## 2021-08-03 DIAGNOSIS — Z85.048 HISTORY OF RECTAL CANCER: Primary | ICD-10-CM

## 2021-08-03 PROCEDURE — 99999 PR PBB SHADOW E&M-EST. PATIENT-LVL III: ICD-10-PCS | Mod: PBBFAC,,, | Performed by: SURGERY

## 2021-08-03 PROCEDURE — 1159F PR MEDICATION LIST DOCUMENTED IN MEDICAL RECORD: ICD-10-PCS | Mod: CPTII,S$GLB,, | Performed by: SURGERY

## 2021-08-03 PROCEDURE — 3077F SYST BP >= 140 MM HG: CPT | Mod: CPTII,S$GLB,, | Performed by: SURGERY

## 2021-08-03 PROCEDURE — 1159F MED LIST DOCD IN RCRD: CPT | Mod: CPTII,S$GLB,, | Performed by: SURGERY

## 2021-08-03 PROCEDURE — 3078F DIAST BP <80 MM HG: CPT | Mod: CPTII,S$GLB,, | Performed by: SURGERY

## 2021-08-03 PROCEDURE — 3078F PR MOST RECENT DIASTOLIC BLOOD PRESSURE < 80 MM HG: ICD-10-PCS | Mod: CPTII,S$GLB,, | Performed by: SURGERY

## 2021-08-03 PROCEDURE — 1125F AMNT PAIN NOTED PAIN PRSNT: CPT | Mod: CPTII,S$GLB,, | Performed by: SURGERY

## 2021-08-03 PROCEDURE — 99213 PR OFFICE/OUTPT VISIT, EST, LEVL III, 20-29 MIN: ICD-10-PCS | Mod: S$GLB,,, | Performed by: SURGERY

## 2021-08-03 PROCEDURE — U0003 INFECTIOUS AGENT DETECTION BY NUCLEIC ACID (DNA OR RNA); SEVERE ACUTE RESPIRATORY SYNDROME CORONAVIRUS 2 (SARS-COV-2) (CORONAVIRUS DISEASE [COVID-19]), AMPLIFIED PROBE TECHNIQUE, MAKING USE OF HIGH THROUGHPUT TECHNOLOGIES AS DESCRIBED BY CMS-2020-01-R: HCPCS | Performed by: INTERNAL MEDICINE

## 2021-08-03 PROCEDURE — 1125F PR PAIN SEVERITY QUANTIFIED, PAIN PRESENT: ICD-10-PCS | Mod: CPTII,S$GLB,, | Performed by: SURGERY

## 2021-08-03 PROCEDURE — 99213 OFFICE O/P EST LOW 20 MIN: CPT | Mod: S$GLB,,, | Performed by: SURGERY

## 2021-08-03 PROCEDURE — 99999 PR PBB SHADOW E&M-EST. PATIENT-LVL III: CPT | Mod: PBBFAC,,, | Performed by: SURGERY

## 2021-08-03 PROCEDURE — 3008F BODY MASS INDEX DOCD: CPT | Mod: CPTII,S$GLB,, | Performed by: SURGERY

## 2021-08-03 PROCEDURE — U0005 INFEC AGEN DETEC AMPLI PROBE: HCPCS | Performed by: INTERNAL MEDICINE

## 2021-08-03 PROCEDURE — 3008F PR BODY MASS INDEX (BMI) DOCUMENTED: ICD-10-PCS | Mod: CPTII,S$GLB,, | Performed by: SURGERY

## 2021-08-03 PROCEDURE — 3077F PR MOST RECENT SYSTOLIC BLOOD PRESSURE >= 140 MM HG: ICD-10-PCS | Mod: CPTII,S$GLB,, | Performed by: SURGERY

## 2021-08-04 LAB
SARS-COV-2 RNA RESP QL NAA+PROBE: NOT DETECTED
SARS-COV-2- CYCLE NUMBER: -1

## 2021-08-06 ENCOUNTER — ANESTHESIA EVENT (OUTPATIENT)
Dept: ENDOSCOPY | Facility: HOSPITAL | Age: 66
End: 2021-08-06
Payer: COMMERCIAL

## 2021-08-06 ENCOUNTER — ANESTHESIA (OUTPATIENT)
Dept: ENDOSCOPY | Facility: HOSPITAL | Age: 66
End: 2021-08-06
Payer: COMMERCIAL

## 2021-08-06 ENCOUNTER — HOSPITAL ENCOUNTER (OUTPATIENT)
Facility: HOSPITAL | Age: 66
Discharge: HOME | End: 2021-08-06
Attending: INTERNAL MEDICINE | Admitting: INTERNAL MEDICINE
Payer: COMMERCIAL

## 2021-08-06 DIAGNOSIS — C18.9 MALIGNANT NEOPLASM OF COLON: ICD-10-CM

## 2021-08-06 PROCEDURE — D9220A PRA ANESTHESIA: Mod: ,,, | Performed by: NURSE ANESTHETIST, CERTIFIED REGISTERED

## 2021-08-06 PROCEDURE — 37000008 HC ANESTHESIA 1ST 15 MINUTES: Performed by: INTERNAL MEDICINE

## 2021-08-06 PROCEDURE — 45381 PR COLONOSCPY,FLEX,W/DIR SUBMUC INJECT: ICD-10-PCS | Mod: 51,,, | Performed by: INTERNAL MEDICINE

## 2021-08-06 PROCEDURE — 27201089 HC SNARE, DISP (ANY): Performed by: INTERNAL MEDICINE

## 2021-08-06 PROCEDURE — D9220A PRA ANESTHESIA: ICD-10-PCS | Mod: ,,, | Performed by: NURSE ANESTHETIST, CERTIFIED REGISTERED

## 2021-08-06 PROCEDURE — D9220A PRA ANESTHESIA: Mod: ,,, | Performed by: ANESTHESIOLOGY

## 2021-08-06 PROCEDURE — 25000003 PHARM REV CODE 250: Performed by: NURSE ANESTHETIST, CERTIFIED REGISTERED

## 2021-08-06 PROCEDURE — 27201028 HC NEEDLE, SCLERO: Performed by: INTERNAL MEDICINE

## 2021-08-06 PROCEDURE — 88305 TISSUE EXAM BY PATHOLOGIST: CPT | Mod: 26,,, | Performed by: PATHOLOGY

## 2021-08-06 PROCEDURE — 45381 COLONOSCOPY SUBMUCOUS NJX: CPT | Mod: 51,,, | Performed by: INTERNAL MEDICINE

## 2021-08-06 PROCEDURE — 27201012 HC FORCEPS, HOT/COLD, DISP: Performed by: INTERNAL MEDICINE

## 2021-08-06 PROCEDURE — 88305 TISSUE EXAM BY PATHOLOGIST: CPT | Performed by: PATHOLOGY

## 2021-08-06 PROCEDURE — 63600175 PHARM REV CODE 636 W HCPCS: Performed by: NURSE ANESTHETIST, CERTIFIED REGISTERED

## 2021-08-06 PROCEDURE — 25000003 PHARM REV CODE 250: Performed by: INTERNAL MEDICINE

## 2021-08-06 PROCEDURE — 27200997: Performed by: INTERNAL MEDICINE

## 2021-08-06 PROCEDURE — D9220A PRA ANESTHESIA: ICD-10-PCS | Mod: ,,, | Performed by: ANESTHESIOLOGY

## 2021-08-06 PROCEDURE — 45385 PR COLONOSCOPY,REMV LESN,SNARE: ICD-10-PCS | Mod: ,,, | Performed by: INTERNAL MEDICINE

## 2021-08-06 PROCEDURE — 45380 COLONOSCOPY AND BIOPSY: CPT | Mod: 59,,, | Performed by: INTERNAL MEDICINE

## 2021-08-06 PROCEDURE — 37000009 HC ANESTHESIA EA ADD 15 MINS: Performed by: INTERNAL MEDICINE

## 2021-08-06 PROCEDURE — 45380 PR COLONOSCOPY,BIOPSY: ICD-10-PCS | Mod: 59,,, | Performed by: INTERNAL MEDICINE

## 2021-08-06 PROCEDURE — 88305 TISSUE EXAM BY PATHOLOGIST: ICD-10-PCS | Mod: 26,,, | Performed by: PATHOLOGY

## 2021-08-06 PROCEDURE — 45381 COLONOSCOPY SUBMUCOUS NJX: CPT | Performed by: INTERNAL MEDICINE

## 2021-08-06 PROCEDURE — 45385 COLONOSCOPY W/LESION REMOVAL: CPT | Performed by: INTERNAL MEDICINE

## 2021-08-06 PROCEDURE — 45385 COLONOSCOPY W/LESION REMOVAL: CPT | Mod: ,,, | Performed by: INTERNAL MEDICINE

## 2021-08-06 PROCEDURE — 45380 COLONOSCOPY AND BIOPSY: CPT | Performed by: INTERNAL MEDICINE

## 2021-08-06 RX ORDER — PROPOFOL 10 MG/ML
VIAL (ML) INTRAVENOUS
Status: DISCONTINUED | OUTPATIENT
Start: 2021-08-06 | End: 2021-08-06

## 2021-08-06 RX ORDER — SODIUM CHLORIDE 9 MG/ML
INJECTION, SOLUTION INTRAVENOUS CONTINUOUS
Status: DISCONTINUED | OUTPATIENT
Start: 2021-08-06 | End: 2021-08-06 | Stop reason: HOSPADM

## 2021-08-06 RX ORDER — LIDOCAINE HCL/PF 100 MG/5ML
SYRINGE (ML) INTRAVENOUS
Status: DISCONTINUED | OUTPATIENT
Start: 2021-08-06 | End: 2021-08-06

## 2021-08-06 RX ADMIN — PROPOFOL 20 MG: 10 INJECTION, EMULSION INTRAVENOUS at 09:08

## 2021-08-06 RX ADMIN — PROPOFOL 30 MG: 10 INJECTION, EMULSION INTRAVENOUS at 09:08

## 2021-08-06 RX ADMIN — PROPOFOL 100 MG: 10 INJECTION, EMULSION INTRAVENOUS at 09:08

## 2021-08-06 RX ADMIN — SODIUM CHLORIDE: 0.9 INJECTION, SOLUTION INTRAVENOUS at 08:08

## 2021-08-06 RX ADMIN — PROPOFOL 40 MG: 10 INJECTION, EMULSION INTRAVENOUS at 09:08

## 2021-08-06 RX ADMIN — LIDOCAINE HYDROCHLORIDE 100 MG: 20 INJECTION INTRAVENOUS at 09:08

## 2021-08-09 ENCOUNTER — OFFICE VISIT (OUTPATIENT)
Dept: FAMILY MEDICINE | Facility: CLINIC | Age: 66
End: 2021-08-09
Payer: COMMERCIAL

## 2021-08-09 VITALS
RESPIRATION RATE: 16 BRPM | HEART RATE: 94 BPM | HEIGHT: 60 IN | SYSTOLIC BLOOD PRESSURE: 130 MMHG | BODY MASS INDEX: 37.89 KG/M2 | WEIGHT: 193 LBS | TEMPERATURE: 99 F | DIASTOLIC BLOOD PRESSURE: 68 MMHG | OXYGEN SATURATION: 99 %

## 2021-08-09 VITALS
TEMPERATURE: 98 F | DIASTOLIC BLOOD PRESSURE: 71 MMHG | BODY MASS INDEX: 36.91 KG/M2 | HEART RATE: 70 BPM | WEIGHT: 188 LBS | RESPIRATION RATE: 19 BRPM | SYSTOLIC BLOOD PRESSURE: 133 MMHG | HEIGHT: 60 IN | OXYGEN SATURATION: 99 %

## 2021-08-09 DIAGNOSIS — Z85.038 HISTORY OF COLON CANCER: ICD-10-CM

## 2021-08-09 DIAGNOSIS — F41.9 ACUTE ANXIETY: ICD-10-CM

## 2021-08-09 DIAGNOSIS — E03.9 ACQUIRED HYPOTHYROIDISM: Primary | ICD-10-CM

## 2021-08-09 PROCEDURE — 3075F PR MOST RECENT SYSTOLIC BLOOD PRESS GE 130-139MM HG: ICD-10-PCS | Mod: S$GLB,,, | Performed by: INTERNAL MEDICINE

## 2021-08-09 PROCEDURE — 99213 OFFICE O/P EST LOW 20 MIN: CPT | Mod: S$GLB,,, | Performed by: INTERNAL MEDICINE

## 2021-08-09 PROCEDURE — 3008F PR BODY MASS INDEX (BMI) DOCUMENTED: ICD-10-PCS | Mod: S$GLB,,, | Performed by: INTERNAL MEDICINE

## 2021-08-09 PROCEDURE — 1160F PR REVIEW ALL MEDS BY PRESCRIBER/CLIN PHARMACIST DOCUMENTED: ICD-10-PCS | Mod: S$GLB,,, | Performed by: INTERNAL MEDICINE

## 2021-08-09 PROCEDURE — 3075F SYST BP GE 130 - 139MM HG: CPT | Mod: S$GLB,,, | Performed by: INTERNAL MEDICINE

## 2021-08-09 PROCEDURE — 3078F PR MOST RECENT DIASTOLIC BLOOD PRESSURE < 80 MM HG: ICD-10-PCS | Mod: S$GLB,,, | Performed by: INTERNAL MEDICINE

## 2021-08-09 PROCEDURE — 3008F BODY MASS INDEX DOCD: CPT | Mod: S$GLB,,, | Performed by: INTERNAL MEDICINE

## 2021-08-09 PROCEDURE — 99213 PR OFFICE/OUTPT VISIT, EST, LEVL III, 20-29 MIN: ICD-10-PCS | Mod: S$GLB,,, | Performed by: INTERNAL MEDICINE

## 2021-08-09 PROCEDURE — 1126F PR PAIN SEVERITY QUANTIFIED, NO PAIN PRESENT: ICD-10-PCS | Mod: S$GLB,,, | Performed by: INTERNAL MEDICINE

## 2021-08-09 PROCEDURE — 3078F DIAST BP <80 MM HG: CPT | Mod: S$GLB,,, | Performed by: INTERNAL MEDICINE

## 2021-08-09 PROCEDURE — 1160F RVW MEDS BY RX/DR IN RCRD: CPT | Mod: S$GLB,,, | Performed by: INTERNAL MEDICINE

## 2021-08-09 PROCEDURE — 1126F AMNT PAIN NOTED NONE PRSNT: CPT | Mod: S$GLB,,, | Performed by: INTERNAL MEDICINE

## 2021-08-09 RX ORDER — LEVOTHYROXINE SODIUM 100 UG/1
100 TABLET ORAL
Qty: 30 TABLET | Refills: 2 | Status: SHIPPED | OUTPATIENT
Start: 2021-08-09 | End: 2021-10-26

## 2021-08-12 ENCOUNTER — TELEPHONE (OUTPATIENT)
Dept: FAMILY MEDICINE | Facility: CLINIC | Age: 66
End: 2021-08-12

## 2021-08-12 LAB
FINAL PATHOLOGIC DIAGNOSIS: NORMAL
GROSS: NORMAL
Lab: NORMAL

## 2021-08-19 ENCOUNTER — TELEPHONE (OUTPATIENT)
Dept: HEMATOLOGY/ONCOLOGY | Facility: CLINIC | Age: 66
End: 2021-08-19

## 2021-08-26 ENCOUNTER — TELEPHONE (OUTPATIENT)
Dept: HEMATOLOGY/ONCOLOGY | Facility: CLINIC | Age: 66
End: 2021-08-26

## 2021-09-14 ENCOUNTER — OFFICE VISIT (OUTPATIENT)
Dept: FAMILY MEDICINE | Facility: CLINIC | Age: 66
End: 2021-09-14
Payer: COMMERCIAL

## 2021-09-14 VITALS
BODY MASS INDEX: 38.09 KG/M2 | HEART RATE: 88 BPM | RESPIRATION RATE: 16 BRPM | DIASTOLIC BLOOD PRESSURE: 62 MMHG | OXYGEN SATURATION: 98 % | SYSTOLIC BLOOD PRESSURE: 130 MMHG | TEMPERATURE: 98 F | HEIGHT: 60 IN | WEIGHT: 194 LBS

## 2021-09-14 DIAGNOSIS — Z12.31 ENCOUNTER FOR SCREENING MAMMOGRAM FOR BREAST CANCER: ICD-10-CM

## 2021-09-14 DIAGNOSIS — M54.40 CHRONIC MIDLINE LOW BACK PAIN WITH SCIATICA, SCIATICA LATERALITY UNSPECIFIED: Primary | ICD-10-CM

## 2021-09-14 DIAGNOSIS — G89.29 CHRONIC MIDLINE LOW BACK PAIN WITH SCIATICA, SCIATICA LATERALITY UNSPECIFIED: Primary | ICD-10-CM

## 2021-09-14 DIAGNOSIS — C18.9 MALIGNANT NEOPLASM OF COLON, UNSPECIFIED PART OF COLON: ICD-10-CM

## 2021-09-14 PROCEDURE — 99213 OFFICE O/P EST LOW 20 MIN: CPT | Mod: S$GLB,,, | Performed by: INTERNAL MEDICINE

## 2021-09-14 PROCEDURE — 3075F SYST BP GE 130 - 139MM HG: CPT | Mod: S$GLB,,, | Performed by: INTERNAL MEDICINE

## 2021-09-14 PROCEDURE — 1160F PR REVIEW ALL MEDS BY PRESCRIBER/CLIN PHARMACIST DOCUMENTED: ICD-10-PCS | Mod: S$GLB,,, | Performed by: INTERNAL MEDICINE

## 2021-09-14 PROCEDURE — 3008F PR BODY MASS INDEX (BMI) DOCUMENTED: ICD-10-PCS | Mod: S$GLB,,, | Performed by: INTERNAL MEDICINE

## 2021-09-14 PROCEDURE — 1160F RVW MEDS BY RX/DR IN RCRD: CPT | Mod: S$GLB,,, | Performed by: INTERNAL MEDICINE

## 2021-09-14 PROCEDURE — 3075F PR MOST RECENT SYSTOLIC BLOOD PRESS GE 130-139MM HG: ICD-10-PCS | Mod: S$GLB,,, | Performed by: INTERNAL MEDICINE

## 2021-09-14 PROCEDURE — 3008F BODY MASS INDEX DOCD: CPT | Mod: S$GLB,,, | Performed by: INTERNAL MEDICINE

## 2021-09-14 PROCEDURE — 3078F DIAST BP <80 MM HG: CPT | Mod: S$GLB,,, | Performed by: INTERNAL MEDICINE

## 2021-09-14 PROCEDURE — 3078F PR MOST RECENT DIASTOLIC BLOOD PRESSURE < 80 MM HG: ICD-10-PCS | Mod: S$GLB,,, | Performed by: INTERNAL MEDICINE

## 2021-09-14 PROCEDURE — 99213 PR OFFICE/OUTPT VISIT, EST, LEVL III, 20-29 MIN: ICD-10-PCS | Mod: S$GLB,,, | Performed by: INTERNAL MEDICINE

## 2021-09-14 RX ORDER — TRAMADOL HYDROCHLORIDE 50 MG/1
50 TABLET ORAL EVERY 6 HOURS
Qty: 28 TABLET | Refills: 0 | Status: SHIPPED | OUTPATIENT
Start: 2021-09-14 | End: 2022-01-19

## 2021-10-05 DIAGNOSIS — M54.40 CHRONIC MIDLINE LOW BACK PAIN WITH SCIATICA, SCIATICA LATERALITY UNSPECIFIED: ICD-10-CM

## 2021-10-05 DIAGNOSIS — G89.29 CHRONIC MIDLINE LOW BACK PAIN WITH SCIATICA, SCIATICA LATERALITY UNSPECIFIED: ICD-10-CM

## 2021-10-05 DIAGNOSIS — C18.9 MALIGNANT NEOPLASM OF COLON, UNSPECIFIED PART OF COLON: ICD-10-CM

## 2021-10-06 RX ORDER — TRAMADOL HYDROCHLORIDE 50 MG/1
50 TABLET ORAL EVERY 6 HOURS
Qty: 28 TABLET | Refills: 0 | OUTPATIENT
Start: 2021-10-06

## 2021-10-11 ENCOUNTER — TELEPHONE (OUTPATIENT)
Dept: FAMILY MEDICINE | Facility: CLINIC | Age: 66
End: 2021-10-11

## 2021-10-18 ENCOUNTER — TELEPHONE (OUTPATIENT)
Dept: HEMATOLOGY/ONCOLOGY | Facility: CLINIC | Age: 66
End: 2021-10-18

## 2021-10-22 ENCOUNTER — TELEPHONE (OUTPATIENT)
Dept: HEMATOLOGY/ONCOLOGY | Facility: CLINIC | Age: 66
End: 2021-10-22

## 2021-10-22 ENCOUNTER — OFFICE VISIT (OUTPATIENT)
Dept: HEMATOLOGY/ONCOLOGY | Facility: CLINIC | Age: 66
End: 2021-10-22
Payer: COMMERCIAL

## 2021-10-22 ENCOUNTER — LAB VISIT (OUTPATIENT)
Dept: LAB | Facility: HOSPITAL | Age: 66
End: 2021-10-22
Attending: INTERNAL MEDICINE
Payer: COMMERCIAL

## 2021-10-22 VITALS
TEMPERATURE: 97 F | OXYGEN SATURATION: 99 % | RESPIRATION RATE: 18 BRPM | SYSTOLIC BLOOD PRESSURE: 133 MMHG | DIASTOLIC BLOOD PRESSURE: 71 MMHG | HEART RATE: 76 BPM | BODY MASS INDEX: 36.42 KG/M2 | WEIGHT: 192.88 LBS | HEIGHT: 61 IN

## 2021-10-22 DIAGNOSIS — C20 RECTAL CANCER: Primary | ICD-10-CM

## 2021-10-22 DIAGNOSIS — E53.8 B12 DEFICIENCY: ICD-10-CM

## 2021-10-22 DIAGNOSIS — C20 RECTAL CANCER: ICD-10-CM

## 2021-10-22 DIAGNOSIS — E78.00 PURE HYPERCHOLESTEROLEMIA: ICD-10-CM

## 2021-10-22 DIAGNOSIS — R13.11 ORAL PHASE DYSPHAGIA: ICD-10-CM

## 2021-10-22 LAB
ALBUMIN SERPL BCP-MCNC: 3.4 G/DL (ref 3.5–5.2)
ALP SERPL-CCNC: 71 U/L (ref 55–135)
ALT SERPL W/O P-5'-P-CCNC: 13 U/L (ref 10–44)
ANION GAP SERPL CALC-SCNC: 4 MMOL/L (ref 8–16)
AST SERPL-CCNC: 15 U/L (ref 10–40)
BASOPHILS # BLD AUTO: 0.03 K/UL (ref 0–0.2)
BASOPHILS NFR BLD: 1 % (ref 0–1.9)
BILIRUB SERPL-MCNC: 0.4 MG/DL (ref 0.1–1)
BUN SERPL-MCNC: 10 MG/DL (ref 8–23)
CALCIUM SERPL-MCNC: 8.7 MG/DL (ref 8.7–10.5)
CEA SERPL-MCNC: <1.7 NG/ML (ref 0–5)
CHLORIDE SERPL-SCNC: 107 MMOL/L (ref 95–110)
CO2 SERPL-SCNC: 28 MMOL/L (ref 23–29)
CREAT SERPL-MCNC: 0.7 MG/DL (ref 0.5–1.4)
DIFFERENTIAL METHOD: ABNORMAL
EOSINOPHIL # BLD AUTO: 0.2 K/UL (ref 0–0.5)
EOSINOPHIL NFR BLD: 7.6 % (ref 0–8)
ERYTHROCYTE [DISTWIDTH] IN BLOOD BY AUTOMATED COUNT: 12.9 % (ref 11.5–14.5)
EST. GFR  (AFRICAN AMERICAN): >60 ML/MIN/1.73 M^2
EST. GFR  (NON AFRICAN AMERICAN): >60 ML/MIN/1.73 M^2
GLUCOSE SERPL-MCNC: 97 MG/DL (ref 70–110)
HCT VFR BLD AUTO: 37 % (ref 37–48.5)
HGB BLD-MCNC: 11.6 G/DL (ref 12–16)
IMM GRANULOCYTES # BLD AUTO: 0.01 K/UL (ref 0–0.04)
IMM GRANULOCYTES NFR BLD AUTO: 0.3 % (ref 0–0.5)
LYMPHOCYTES # BLD AUTO: 0.5 K/UL (ref 1–4.8)
LYMPHOCYTES NFR BLD: 17.5 % (ref 18–48)
MCH RBC QN AUTO: 28.9 PG (ref 27–31)
MCHC RBC AUTO-ENTMCNC: 31.4 G/DL (ref 32–36)
MCV RBC AUTO: 92 FL (ref 82–98)
MONOCYTES # BLD AUTO: 0.2 K/UL (ref 0.3–1)
MONOCYTES NFR BLD: 7.6 % (ref 4–15)
NEUTROPHILS # BLD AUTO: 2 K/UL (ref 1.8–7.7)
NEUTROPHILS NFR BLD: 66 % (ref 38–73)
NRBC BLD-RTO: 0 /100 WBC
PLATELET # BLD AUTO: 191 K/UL (ref 150–450)
PMV BLD AUTO: 11.2 FL (ref 9.2–12.9)
POTASSIUM SERPL-SCNC: 4.6 MMOL/L (ref 3.5–5.1)
PROT SERPL-MCNC: 6.4 G/DL (ref 6–8.4)
RBC # BLD AUTO: 4.01 M/UL (ref 4–5.4)
SODIUM SERPL-SCNC: 139 MMOL/L (ref 136–145)
VIT B12 SERPL-MCNC: 226 PG/ML (ref 210–950)
WBC # BLD AUTO: 3.02 K/UL (ref 3.9–12.7)

## 2021-10-22 PROCEDURE — 82378 CARCINOEMBRYONIC ANTIGEN: CPT | Performed by: INTERNAL MEDICINE

## 2021-10-22 PROCEDURE — 80053 COMPREHEN METABOLIC PANEL: CPT | Performed by: INTERNAL MEDICINE

## 2021-10-22 PROCEDURE — 82607 VITAMIN B-12: CPT | Performed by: INTERNAL MEDICINE

## 2021-10-22 PROCEDURE — 3075F PR MOST RECENT SYSTOLIC BLOOD PRESS GE 130-139MM HG: ICD-10-PCS | Mod: CPTII,S$GLB,, | Performed by: INTERNAL MEDICINE

## 2021-10-22 PROCEDURE — 3078F DIAST BP <80 MM HG: CPT | Mod: CPTII,S$GLB,, | Performed by: INTERNAL MEDICINE

## 2021-10-22 PROCEDURE — 99999 PR PBB SHADOW E&M-EST. PATIENT-LVL III: ICD-10-PCS | Mod: PBBFAC,,, | Performed by: INTERNAL MEDICINE

## 2021-10-22 PROCEDURE — 3008F PR BODY MASS INDEX (BMI) DOCUMENTED: ICD-10-PCS | Mod: CPTII,S$GLB,, | Performed by: INTERNAL MEDICINE

## 2021-10-22 PROCEDURE — 85025 COMPLETE CBC W/AUTO DIFF WBC: CPT | Performed by: INTERNAL MEDICINE

## 2021-10-22 PROCEDURE — 3075F SYST BP GE 130 - 139MM HG: CPT | Mod: CPTII,S$GLB,, | Performed by: INTERNAL MEDICINE

## 2021-10-22 PROCEDURE — 99999 PR PBB SHADOW E&M-EST. PATIENT-LVL III: CPT | Mod: PBBFAC,,, | Performed by: INTERNAL MEDICINE

## 2021-10-22 PROCEDURE — 3078F PR MOST RECENT DIASTOLIC BLOOD PRESSURE < 80 MM HG: ICD-10-PCS | Mod: CPTII,S$GLB,, | Performed by: INTERNAL MEDICINE

## 2021-10-22 PROCEDURE — 99214 OFFICE O/P EST MOD 30 MIN: CPT | Mod: S$GLB,,, | Performed by: INTERNAL MEDICINE

## 2021-10-22 PROCEDURE — 3008F BODY MASS INDEX DOCD: CPT | Mod: CPTII,S$GLB,, | Performed by: INTERNAL MEDICINE

## 2021-10-22 PROCEDURE — 99214 PR OFFICE/OUTPT VISIT, EST, LEVL IV, 30-39 MIN: ICD-10-PCS | Mod: S$GLB,,, | Performed by: INTERNAL MEDICINE

## 2021-10-25 ENCOUNTER — OFFICE VISIT (OUTPATIENT)
Dept: ORTHOPEDICS | Facility: CLINIC | Age: 66
End: 2021-10-25
Payer: COMMERCIAL

## 2021-10-25 VITALS — BODY MASS INDEX: 36.42 KG/M2 | WEIGHT: 192.88 LBS | HEIGHT: 61 IN

## 2021-10-25 DIAGNOSIS — M16.0 PRIMARY OSTEOARTHRITIS OF BOTH HIPS: ICD-10-CM

## 2021-10-25 DIAGNOSIS — M51.36 LUMBAR DEGENERATIVE DISC DISEASE: ICD-10-CM

## 2021-10-25 DIAGNOSIS — M43.16 SPONDYLOLISTHESIS OF LUMBAR REGION: ICD-10-CM

## 2021-10-25 DIAGNOSIS — M47.816 FACET ARTHRITIS OF LUMBAR REGION: Primary | ICD-10-CM

## 2021-10-25 DIAGNOSIS — M50.30 DEGENERATIVE DISC DISEASE, CERVICAL: ICD-10-CM

## 2021-10-25 PROCEDURE — 3008F PR BODY MASS INDEX (BMI) DOCUMENTED: ICD-10-PCS | Mod: S$GLB,,, | Performed by: ORTHOPAEDIC SURGERY

## 2021-10-25 PROCEDURE — 99213 OFFICE O/P EST LOW 20 MIN: CPT | Mod: S$GLB,,, | Performed by: ORTHOPAEDIC SURGERY

## 2021-10-25 PROCEDURE — 99213 PR OFFICE/OUTPT VISIT, EST, LEVL III, 20-29 MIN: ICD-10-PCS | Mod: S$GLB,,, | Performed by: ORTHOPAEDIC SURGERY

## 2021-10-25 PROCEDURE — 3008F BODY MASS INDEX DOCD: CPT | Mod: S$GLB,,, | Performed by: ORTHOPAEDIC SURGERY

## 2021-10-25 RX ORDER — TRAMADOL HYDROCHLORIDE 50 MG/1
50 TABLET ORAL EVERY 6 HOURS PRN
Qty: 28 TABLET | Refills: 3 | Status: SHIPPED | OUTPATIENT
Start: 2021-10-25 | End: 2021-11-01

## 2021-11-06 NOTE — LETTER
December 11, 2020      Brandon Wanger MD  [address] [facility] [address] [place] [address]           [facility]  [address]  [facility] [address]  Phone: [phone]          Patient: Tessa Enriquez   MR Number: [id number]   YOB: 1955   Date of Visit: 12/11/2020       Dear Dr. Brandon Wagner:    Thank you for referring Tessa Enriquez to me for evaluation. Attached you will find relevant portions of my assessment and plan of care.    If you have questions, please do not hesitate to call me. I look forward to following Tessa Enriquez along with you.    Sincerely,    Danielle Frank MD    Enclosure  CC:  No Recipients    If you would like to receive this communication electronically, please contact [email or web address] or [phone] to request more information on [employer] access.    For providers and/or their staff who would like to refer a patient to [facility], please contact us through our one-stop-shop provider referral line, [facility], at [phone].    If you feel you have received this communication in error or would no longer like to receive these types of communications, please e-mail [email or web address]          8

## 2021-11-22 ENCOUNTER — PATIENT MESSAGE (OUTPATIENT)
Dept: FAMILY MEDICINE | Facility: CLINIC | Age: 66
End: 2021-11-22
Payer: COMMERCIAL

## 2021-11-22 ENCOUNTER — TELEPHONE (OUTPATIENT)
Dept: FAMILY MEDICINE | Facility: CLINIC | Age: 66
End: 2021-11-22
Payer: COMMERCIAL

## 2021-12-06 ENCOUNTER — OFFICE VISIT (OUTPATIENT)
Dept: ORTHOPEDICS | Facility: CLINIC | Age: 66
End: 2021-12-06
Payer: COMMERCIAL

## 2021-12-06 VITALS — BODY MASS INDEX: 36.25 KG/M2 | WEIGHT: 192 LBS | OXYGEN SATURATION: 98 % | HEART RATE: 82 BPM | HEIGHT: 61 IN

## 2021-12-06 DIAGNOSIS — M43.16 SPONDYLOLISTHESIS OF LUMBAR REGION: ICD-10-CM

## 2021-12-06 DIAGNOSIS — M47.816 FACET ARTHRITIS OF LUMBAR REGION: Primary | ICD-10-CM

## 2021-12-06 DIAGNOSIS — M51.36 LUMBAR DEGENERATIVE DISC DISEASE: ICD-10-CM

## 2021-12-06 PROCEDURE — 99213 PR OFFICE/OUTPT VISIT, EST, LEVL III, 20-29 MIN: ICD-10-PCS | Mod: S$GLB,,, | Performed by: ORTHOPAEDIC SURGERY

## 2021-12-06 PROCEDURE — 99213 OFFICE O/P EST LOW 20 MIN: CPT | Mod: S$GLB,,, | Performed by: ORTHOPAEDIC SURGERY

## 2021-12-06 RX ORDER — TRAMADOL HYDROCHLORIDE 50 MG/1
50 TABLET ORAL EVERY 6 HOURS PRN
Qty: 28 TABLET | Refills: 0 | Status: SHIPPED | OUTPATIENT
Start: 2021-12-06 | End: 2021-12-13

## 2021-12-09 ENCOUNTER — DOCUMENTATION ONLY (OUTPATIENT)
Dept: HEMATOLOGY/ONCOLOGY | Facility: CLINIC | Age: 66
End: 2021-12-09
Payer: COMMERCIAL

## 2021-12-09 ENCOUNTER — OFFICE VISIT (OUTPATIENT)
Dept: RADIATION ONCOLOGY | Facility: CLINIC | Age: 66
End: 2021-12-09
Payer: COMMERCIAL

## 2021-12-09 VITALS
TEMPERATURE: 98 F | WEIGHT: 189.69 LBS | SYSTOLIC BLOOD PRESSURE: 129 MMHG | OXYGEN SATURATION: 96 % | RESPIRATION RATE: 16 BRPM | HEART RATE: 88 BPM | BODY MASS INDEX: 35.84 KG/M2 | DIASTOLIC BLOOD PRESSURE: 74 MMHG

## 2021-12-09 DIAGNOSIS — C20 RECTAL CANCER: Primary | ICD-10-CM

## 2021-12-09 PROCEDURE — 99213 PR OFFICE/OUTPT VISIT, EST, LEVL III, 20-29 MIN: ICD-10-PCS | Mod: S$GLB,,, | Performed by: RADIOLOGY

## 2021-12-09 PROCEDURE — 99213 OFFICE O/P EST LOW 20 MIN: CPT | Mod: S$GLB,,, | Performed by: RADIOLOGY

## 2021-12-20 ENCOUNTER — TELEPHONE (OUTPATIENT)
Dept: HEMATOLOGY/ONCOLOGY | Facility: CLINIC | Age: 66
End: 2021-12-20
Payer: COMMERCIAL

## 2022-01-01 ENCOUNTER — TELEPHONE (OUTPATIENT)
Dept: HEMATOLOGY/ONCOLOGY | Facility: CLINIC | Age: 67
End: 2022-01-01
Payer: COMMERCIAL

## 2022-01-01 ENCOUNTER — INFUSION (OUTPATIENT)
Dept: INFUSION THERAPY | Facility: HOSPITAL | Age: 67
End: 2022-01-01
Attending: INTERNAL MEDICINE
Payer: COMMERCIAL

## 2022-01-01 ENCOUNTER — OFFICE VISIT (OUTPATIENT)
Dept: HEMATOLOGY/ONCOLOGY | Facility: CLINIC | Age: 67
End: 2022-01-01
Payer: COMMERCIAL

## 2022-01-01 ENCOUNTER — TELEPHONE (OUTPATIENT)
Dept: HEMATOLOGY/ONCOLOGY | Facility: CLINIC | Age: 67
End: 2022-01-01

## 2022-01-01 ENCOUNTER — HOSPITAL ENCOUNTER (OUTPATIENT)
Facility: HOSPITAL | Age: 67
Discharge: HOME | End: 2022-11-15
Attending: EMERGENCY MEDICINE | Admitting: HOSPITALIST
Payer: COMMERCIAL

## 2022-01-01 ENCOUNTER — LAB VISIT (OUTPATIENT)
Dept: PRIMARY CARE CLINIC | Facility: CLINIC | Age: 67
End: 2022-01-01
Payer: COMMERCIAL

## 2022-01-01 ENCOUNTER — TELEPHONE (OUTPATIENT)
Dept: FAMILY MEDICINE | Facility: CLINIC | Age: 67
End: 2022-01-01

## 2022-01-01 ENCOUNTER — OFFICE VISIT (OUTPATIENT)
Dept: FAMILY MEDICINE | Facility: CLINIC | Age: 67
End: 2022-01-01
Payer: COMMERCIAL

## 2022-01-01 ENCOUNTER — LAB VISIT (OUTPATIENT)
Dept: LAB | Facility: HOSPITAL | Age: 67
End: 2022-01-01
Attending: INTERNAL MEDICINE
Payer: COMMERCIAL

## 2022-01-01 ENCOUNTER — TELEPHONE (OUTPATIENT)
Dept: MEDSURG UNIT | Facility: HOSPITAL | Age: 67
End: 2022-01-01
Payer: COMMERCIAL

## 2022-01-01 ENCOUNTER — HOSPITAL ENCOUNTER (OUTPATIENT)
Dept: RADIOLOGY | Facility: HOSPITAL | Age: 67
Discharge: HOME | End: 2022-12-05
Attending: NURSE PRACTITIONER
Payer: COMMERCIAL

## 2022-01-01 ENCOUNTER — ANESTHESIA (OUTPATIENT)
Dept: SURGERY | Facility: HOSPITAL | Age: 67
End: 2022-01-01
Payer: COMMERCIAL

## 2022-01-01 ENCOUNTER — HOSPITAL ENCOUNTER (EMERGENCY)
Facility: HOSPITAL | Age: 67
Discharge: HOME | End: 2022-10-08
Attending: EMERGENCY MEDICINE
Payer: COMMERCIAL

## 2022-01-01 ENCOUNTER — DOCUMENTATION ONLY (OUTPATIENT)
Dept: SURGERY | Facility: CLINIC | Age: 67
End: 2022-01-01

## 2022-01-01 ENCOUNTER — HOSPITAL ENCOUNTER (OUTPATIENT)
Facility: HOSPITAL | Age: 67
Discharge: HOME | End: 2022-09-01
Attending: SURGERY | Admitting: SURGERY
Payer: COMMERCIAL

## 2022-01-01 ENCOUNTER — PATIENT OUTREACH (OUTPATIENT)
Dept: FAMILY MEDICINE | Facility: CLINIC | Age: 67
End: 2022-01-01

## 2022-01-01 ENCOUNTER — OUTPATIENT CASE MANAGEMENT (OUTPATIENT)
Dept: ADMINISTRATIVE | Facility: OTHER | Age: 67
End: 2022-01-01
Payer: COMMERCIAL

## 2022-01-01 ENCOUNTER — TELEPHONE (OUTPATIENT)
Dept: SURGERY | Facility: CLINIC | Age: 67
End: 2022-01-01
Payer: COMMERCIAL

## 2022-01-01 ENCOUNTER — ANESTHESIA EVENT (OUTPATIENT)
Dept: SURGERY | Facility: HOSPITAL | Age: 67
End: 2022-01-01
Payer: COMMERCIAL

## 2022-01-01 ENCOUNTER — PATIENT MESSAGE (OUTPATIENT)
Dept: HEMATOLOGY/ONCOLOGY | Facility: CLINIC | Age: 67
End: 2022-01-01

## 2022-01-01 ENCOUNTER — LAB VISIT (OUTPATIENT)
Dept: LAB | Facility: HOSPITAL | Age: 67
End: 2022-01-01
Attending: HOSPITALIST
Payer: COMMERCIAL

## 2022-01-01 ENCOUNTER — DOCUMENTATION ONLY (OUTPATIENT)
Dept: HEMATOLOGY/ONCOLOGY | Facility: CLINIC | Age: 67
End: 2022-01-01

## 2022-01-01 ENCOUNTER — TELEPHONE (OUTPATIENT)
Dept: INFUSION THERAPY | Facility: HOSPITAL | Age: 67
End: 2022-01-01
Payer: COMMERCIAL

## 2022-01-01 ENCOUNTER — LAB VISIT (OUTPATIENT)
Dept: LAB | Facility: HOSPITAL | Age: 67
End: 2022-01-01
Attending: NURSE PRACTITIONER
Payer: COMMERCIAL

## 2022-01-01 ENCOUNTER — PATIENT MESSAGE (OUTPATIENT)
Dept: FAMILY MEDICINE | Facility: CLINIC | Age: 67
End: 2022-01-01

## 2022-01-01 VITALS
TEMPERATURE: 98 F | BODY MASS INDEX: 35.5 KG/M2 | WEIGHT: 188 LBS | HEIGHT: 61 IN | SYSTOLIC BLOOD PRESSURE: 131 MMHG | OXYGEN SATURATION: 98 % | RESPIRATION RATE: 18 BRPM | HEART RATE: 82 BPM | DIASTOLIC BLOOD PRESSURE: 66 MMHG

## 2022-01-01 VITALS
RESPIRATION RATE: 12 BRPM | DIASTOLIC BLOOD PRESSURE: 80 MMHG | OXYGEN SATURATION: 100 % | HEIGHT: 61 IN | WEIGHT: 185.19 LBS | HEART RATE: 98 BPM | BODY MASS INDEX: 34.96 KG/M2 | TEMPERATURE: 97 F | SYSTOLIC BLOOD PRESSURE: 134 MMHG

## 2022-01-01 VITALS
BODY MASS INDEX: 34.64 KG/M2 | WEIGHT: 183.5 LBS | DIASTOLIC BLOOD PRESSURE: 80 MMHG | RESPIRATION RATE: 18 BRPM | TEMPERATURE: 98 F | HEIGHT: 61 IN | HEART RATE: 120 BPM | SYSTOLIC BLOOD PRESSURE: 127 MMHG

## 2022-01-01 VITALS
HEART RATE: 96 BPM | HEIGHT: 61 IN | RESPIRATION RATE: 18 BRPM | WEIGHT: 183.69 LBS | DIASTOLIC BLOOD PRESSURE: 66 MMHG | TEMPERATURE: 98 F | BODY MASS INDEX: 34.68 KG/M2 | OXYGEN SATURATION: 98 % | SYSTOLIC BLOOD PRESSURE: 114 MMHG

## 2022-01-01 VITALS
TEMPERATURE: 98 F | RESPIRATION RATE: 18 BRPM | SYSTOLIC BLOOD PRESSURE: 141 MMHG | HEIGHT: 61 IN | HEART RATE: 80 BPM | WEIGHT: 188.5 LBS | OXYGEN SATURATION: 97 % | BODY MASS INDEX: 35.59 KG/M2 | DIASTOLIC BLOOD PRESSURE: 84 MMHG

## 2022-01-01 VITALS
BODY MASS INDEX: 36.09 KG/M2 | WEIGHT: 191.13 LBS | HEART RATE: 86 BPM | OXYGEN SATURATION: 100 % | SYSTOLIC BLOOD PRESSURE: 147 MMHG | RESPIRATION RATE: 16 BRPM | TEMPERATURE: 97 F | DIASTOLIC BLOOD PRESSURE: 75 MMHG | HEIGHT: 61 IN

## 2022-01-01 VITALS
DIASTOLIC BLOOD PRESSURE: 80 MMHG | BODY MASS INDEX: 36.44 KG/M2 | HEIGHT: 61 IN | WEIGHT: 193 LBS | TEMPERATURE: 98 F | HEART RATE: 86 BPM | OXYGEN SATURATION: 96 % | RESPIRATION RATE: 16 BRPM | SYSTOLIC BLOOD PRESSURE: 132 MMHG

## 2022-01-01 VITALS
HEIGHT: 61 IN | WEIGHT: 180 LBS | DIASTOLIC BLOOD PRESSURE: 74 MMHG | WEIGHT: 179 LBS | HEART RATE: 96 BPM | BODY MASS INDEX: 33.99 KG/M2 | TEMPERATURE: 98 F | DIASTOLIC BLOOD PRESSURE: 87 MMHG | BODY MASS INDEX: 33.79 KG/M2 | SYSTOLIC BLOOD PRESSURE: 148 MMHG | SYSTOLIC BLOOD PRESSURE: 128 MMHG | HEART RATE: 114 BPM | OXYGEN SATURATION: 98 % | TEMPERATURE: 99 F | HEIGHT: 61 IN | RESPIRATION RATE: 16 BRPM

## 2022-01-01 VITALS
RESPIRATION RATE: 14 BRPM | SYSTOLIC BLOOD PRESSURE: 156 MMHG | TEMPERATURE: 97 F | BODY MASS INDEX: 36.3 KG/M2 | WEIGHT: 192.25 LBS | TEMPERATURE: 98 F | DIASTOLIC BLOOD PRESSURE: 72 MMHG | WEIGHT: 188.5 LBS | DIASTOLIC BLOOD PRESSURE: 71 MMHG | RESPIRATION RATE: 18 BRPM | OXYGEN SATURATION: 98 % | HEIGHT: 61 IN | OXYGEN SATURATION: 98 % | OXYGEN SATURATION: 95 % | HEART RATE: 95 BPM | WEIGHT: 191 LBS | BODY MASS INDEX: 36.06 KG/M2 | HEART RATE: 70 BPM | SYSTOLIC BLOOD PRESSURE: 135 MMHG | HEIGHT: 61 IN | HEIGHT: 61 IN | HEART RATE: 98 BPM | SYSTOLIC BLOOD PRESSURE: 117 MMHG | DIASTOLIC BLOOD PRESSURE: 70 MMHG | BODY MASS INDEX: 35.59 KG/M2 | RESPIRATION RATE: 16 BRPM | TEMPERATURE: 98 F

## 2022-01-01 VITALS
WEIGHT: 179.69 LBS | BODY MASS INDEX: 33.93 KG/M2 | HEIGHT: 61 IN | RESPIRATION RATE: 18 BRPM | SYSTOLIC BLOOD PRESSURE: 135 MMHG | SYSTOLIC BLOOD PRESSURE: 123 MMHG | OXYGEN SATURATION: 99 % | OXYGEN SATURATION: 97 % | WEIGHT: 183 LBS | HEART RATE: 92 BPM | RESPIRATION RATE: 12 BRPM | HEIGHT: 61 IN | DIASTOLIC BLOOD PRESSURE: 65 MMHG | HEART RATE: 92 BPM | DIASTOLIC BLOOD PRESSURE: 75 MMHG | TEMPERATURE: 97 F | BODY MASS INDEX: 34.55 KG/M2 | TEMPERATURE: 97 F

## 2022-01-01 VITALS
HEART RATE: 85 BPM | HEIGHT: 61 IN | SYSTOLIC BLOOD PRESSURE: 129 MMHG | DIASTOLIC BLOOD PRESSURE: 60 MMHG | SYSTOLIC BLOOD PRESSURE: 131 MMHG | WEIGHT: 183.19 LBS | RESPIRATION RATE: 18 BRPM | BODY MASS INDEX: 35.02 KG/M2 | RESPIRATION RATE: 12 BRPM | HEART RATE: 96 BPM | TEMPERATURE: 97 F | WEIGHT: 185.5 LBS | OXYGEN SATURATION: 99 % | HEIGHT: 61 IN | DIASTOLIC BLOOD PRESSURE: 71 MMHG | BODY MASS INDEX: 34.58 KG/M2 | TEMPERATURE: 98 F

## 2022-01-01 VITALS
BODY MASS INDEX: 36.06 KG/M2 | SYSTOLIC BLOOD PRESSURE: 123 MMHG | WEIGHT: 191 LBS | DIASTOLIC BLOOD PRESSURE: 67 MMHG | HEIGHT: 61 IN | OXYGEN SATURATION: 99 % | TEMPERATURE: 98 F | RESPIRATION RATE: 18 BRPM | HEART RATE: 78 BPM

## 2022-01-01 VITALS
TEMPERATURE: 96 F | HEIGHT: 61 IN | BODY MASS INDEX: 35.26 KG/M2 | RESPIRATION RATE: 14 BRPM | SYSTOLIC BLOOD PRESSURE: 160 MMHG | WEIGHT: 186.75 LBS | HEART RATE: 95 BPM | OXYGEN SATURATION: 98 % | DIASTOLIC BLOOD PRESSURE: 76 MMHG

## 2022-01-01 VITALS
TEMPERATURE: 98 F | HEIGHT: 61 IN | SYSTOLIC BLOOD PRESSURE: 124 MMHG | OXYGEN SATURATION: 95 % | RESPIRATION RATE: 18 BRPM | HEART RATE: 72 BPM | BODY MASS INDEX: 37.5 KG/M2 | WEIGHT: 198.63 LBS | DIASTOLIC BLOOD PRESSURE: 58 MMHG

## 2022-01-01 VITALS
RESPIRATION RATE: 12 BRPM | BODY MASS INDEX: 34.55 KG/M2 | WEIGHT: 183 LBS | DIASTOLIC BLOOD PRESSURE: 75 MMHG | HEIGHT: 61 IN | OXYGEN SATURATION: 97 % | TEMPERATURE: 95 F | SYSTOLIC BLOOD PRESSURE: 135 MMHG | HEART RATE: 92 BPM

## 2022-01-01 VITALS
WEIGHT: 186.75 LBS | DIASTOLIC BLOOD PRESSURE: 71 MMHG | HEART RATE: 81 BPM | SYSTOLIC BLOOD PRESSURE: 139 MMHG | OXYGEN SATURATION: 98 % | BODY MASS INDEX: 35.26 KG/M2 | RESPIRATION RATE: 16 BRPM | TEMPERATURE: 97 F | HEIGHT: 61 IN

## 2022-01-01 VITALS
TEMPERATURE: 97 F | OXYGEN SATURATION: 96 % | SYSTOLIC BLOOD PRESSURE: 126 MMHG | WEIGHT: 185.5 LBS | HEART RATE: 78 BPM | DIASTOLIC BLOOD PRESSURE: 78 MMHG | BODY MASS INDEX: 35.02 KG/M2 | RESPIRATION RATE: 18 BRPM | HEIGHT: 61 IN

## 2022-01-01 VITALS
WEIGHT: 190.63 LBS | HEIGHT: 61 IN | BODY MASS INDEX: 35.99 KG/M2 | RESPIRATION RATE: 18 BRPM | DIASTOLIC BLOOD PRESSURE: 83 MMHG | SYSTOLIC BLOOD PRESSURE: 151 MMHG | HEART RATE: 95 BPM | OXYGEN SATURATION: 98 %

## 2022-01-01 VITALS
BODY MASS INDEX: 35.93 KG/M2 | HEIGHT: 60 IN | WEIGHT: 183 LBS | TEMPERATURE: 96 F | DIASTOLIC BLOOD PRESSURE: 58 MMHG | OXYGEN SATURATION: 99 % | SYSTOLIC BLOOD PRESSURE: 113 MMHG | RESPIRATION RATE: 16 BRPM | HEART RATE: 99 BPM

## 2022-01-01 VITALS
WEIGHT: 193 LBS | DIASTOLIC BLOOD PRESSURE: 73 MMHG | RESPIRATION RATE: 18 BRPM | HEIGHT: 61 IN | SYSTOLIC BLOOD PRESSURE: 154 MMHG | BODY MASS INDEX: 36.44 KG/M2 | TEMPERATURE: 98 F | OXYGEN SATURATION: 99 % | HEART RATE: 81 BPM

## 2022-01-01 DIAGNOSIS — K52.1 CHEMOTHERAPY-INDUCED DIARRHEA: ICD-10-CM

## 2022-01-01 DIAGNOSIS — C77.5 RECTAL CANCER METASTASIZED TO INTRAPELVIC LYMPH NODE: ICD-10-CM

## 2022-01-01 DIAGNOSIS — J01.10 ACUTE NON-RECURRENT FRONTAL SINUSITIS: ICD-10-CM

## 2022-01-01 DIAGNOSIS — C20 RECTAL CANCER METASTASIZED TO INTRAPELVIC LYMPH NODE: ICD-10-CM

## 2022-01-01 DIAGNOSIS — D70.1 CHEMOTHERAPY-INDUCED NEUTROPENIA: ICD-10-CM

## 2022-01-01 DIAGNOSIS — E53.8 B12 DEFICIENCY: ICD-10-CM

## 2022-01-01 DIAGNOSIS — C20 RECTAL CANCER: ICD-10-CM

## 2022-01-01 DIAGNOSIS — D50.0 IRON DEFICIENCY ANEMIA DUE TO CHRONIC BLOOD LOSS: ICD-10-CM

## 2022-01-01 DIAGNOSIS — G89.29 CHRONIC MIDLINE LOW BACK PAIN WITH SCIATICA, SCIATICA LATERALITY UNSPECIFIED: ICD-10-CM

## 2022-01-01 DIAGNOSIS — C18.2 MALIGNANT NEOPLASM OF ASCENDING COLON: Primary | ICD-10-CM

## 2022-01-01 DIAGNOSIS — C79.51 RECTAL CANCER METASTATIC TO BONE: ICD-10-CM

## 2022-01-01 DIAGNOSIS — M53.3 SACRAL PAIN: ICD-10-CM

## 2022-01-01 DIAGNOSIS — C77.5 RECTAL CANCER METASTASIZED TO INTRAPELVIC LYMPH NODE: Primary | ICD-10-CM

## 2022-01-01 DIAGNOSIS — C18.2 MALIGNANT NEOPLASM OF ASCENDING COLON: ICD-10-CM

## 2022-01-01 DIAGNOSIS — T45.1X5A CHEMOTHERAPY-INDUCED DIARRHEA: Primary | ICD-10-CM

## 2022-01-01 DIAGNOSIS — E53.8 B12 DEFICIENCY: Primary | ICD-10-CM

## 2022-01-01 DIAGNOSIS — C20 RECTAL CANCER METASTASIZED TO INTRAPELVIC LYMPH NODE: Primary | ICD-10-CM

## 2022-01-01 DIAGNOSIS — C77.2 SECONDARY AND UNSPECIFIED MALIGNANT NEOPLASM OF INTRA-ABDOMINAL LYMPH NODES: ICD-10-CM

## 2022-01-01 DIAGNOSIS — E87.6 HYPOKALEMIA: ICD-10-CM

## 2022-01-01 DIAGNOSIS — K52.9 CHRONIC DIARRHEA: ICD-10-CM

## 2022-01-01 DIAGNOSIS — C77.2 SECONDARY AND UNSPECIFIED MALIGNANT NEOPLASM OF INTRA-ABDOMINAL LYMPH NODES: Primary | ICD-10-CM

## 2022-01-01 DIAGNOSIS — C20 RECTAL CANCER METASTATIC TO BONE: Primary | ICD-10-CM

## 2022-01-01 DIAGNOSIS — T45.1X5A CHEMOTHERAPY-INDUCED DIARRHEA: ICD-10-CM

## 2022-01-01 DIAGNOSIS — T45.1X5A CHEMOTHERAPY-INDUCED NEUTROPENIA: Primary | ICD-10-CM

## 2022-01-01 DIAGNOSIS — D50.0 IRON DEFICIENCY ANEMIA DUE TO CHRONIC BLOOD LOSS: Primary | ICD-10-CM

## 2022-01-01 DIAGNOSIS — C20 RECTAL CANCER METASTATIC TO BONE: ICD-10-CM

## 2022-01-01 DIAGNOSIS — K52.1 CHEMOTHERAPY INDUCED DIARRHEA: ICD-10-CM

## 2022-01-01 DIAGNOSIS — M54.40 CHRONIC MIDLINE LOW BACK PAIN WITH SCIATICA, SCIATICA LATERALITY UNSPECIFIED: ICD-10-CM

## 2022-01-01 DIAGNOSIS — K52.1 CHEMOTHERAPY-INDUCED DIARRHEA: Primary | ICD-10-CM

## 2022-01-01 DIAGNOSIS — E78.00 PURE HYPERCHOLESTEROLEMIA: ICD-10-CM

## 2022-01-01 DIAGNOSIS — D70.1 CHEMOTHERAPY-INDUCED NEUTROPENIA: Primary | ICD-10-CM

## 2022-01-01 DIAGNOSIS — K62.5 RECTAL BLEEDING: ICD-10-CM

## 2022-01-01 DIAGNOSIS — E03.9 ACQUIRED HYPOTHYROIDISM: ICD-10-CM

## 2022-01-01 DIAGNOSIS — J41.0 SIMPLE CHRONIC BRONCHITIS: Primary | ICD-10-CM

## 2022-01-01 DIAGNOSIS — Z01.818 PRE-OP TESTING: ICD-10-CM

## 2022-01-01 DIAGNOSIS — C18.9 MALIGNANT NEOPLASM OF COLON, UNSPECIFIED PART OF COLON: ICD-10-CM

## 2022-01-01 DIAGNOSIS — T45.1X5A CHEMOTHERAPY-INDUCED NEUTROPENIA: ICD-10-CM

## 2022-01-01 DIAGNOSIS — C20 RECTAL CANCER: Primary | ICD-10-CM

## 2022-01-01 DIAGNOSIS — C79.51 RECTAL CANCER METASTATIC TO BONE: Primary | ICD-10-CM

## 2022-01-01 DIAGNOSIS — Z01.818 PREOP TESTING: Primary | ICD-10-CM

## 2022-01-01 DIAGNOSIS — E86.0 DEHYDRATION: ICD-10-CM

## 2022-01-01 DIAGNOSIS — L70.8 ACNEIFORM RASH: ICD-10-CM

## 2022-01-01 DIAGNOSIS — J41.0 SIMPLE CHRONIC BRONCHITIS: ICD-10-CM

## 2022-01-01 DIAGNOSIS — R19.7 DIARRHEA, UNSPECIFIED TYPE: ICD-10-CM

## 2022-01-01 DIAGNOSIS — R07.9 CHEST PAIN: ICD-10-CM

## 2022-01-01 DIAGNOSIS — J20.9 ACUTE BRONCHITIS, UNSPECIFIED ORGANISM: ICD-10-CM

## 2022-01-01 DIAGNOSIS — J06.9 UPPER RESPIRATORY TRACT INFECTION, UNSPECIFIED TYPE: Primary | ICD-10-CM

## 2022-01-01 DIAGNOSIS — T45.1X5A CHEMOTHERAPY INDUCED DIARRHEA: ICD-10-CM

## 2022-01-01 LAB
ALBUMIN SERPL BCP-MCNC: 2.9 G/DL (ref 3.5–5.2)
ALBUMIN SERPL BCP-MCNC: 3 G/DL (ref 3.5–5.2)
ALBUMIN SERPL BCP-MCNC: 3.1 G/DL (ref 3.5–5.2)
ALBUMIN SERPL BCP-MCNC: 3.2 G/DL (ref 3.5–5.2)
ALBUMIN SERPL BCP-MCNC: 3.2 G/DL (ref 3.5–5.2)
ALBUMIN SERPL BCP-MCNC: 3.4 G/DL (ref 3.5–5.2)
ALBUMIN SERPL BCP-MCNC: 3.4 G/DL (ref 3.5–5.2)
ALBUMIN SERPL BCP-MCNC: 3.6 G/DL (ref 3.5–5.2)
ALBUMIN SERPL BCP-MCNC: 3.8 G/DL (ref 3.5–5.2)
ALBUMIN SERPL BCP-MCNC: 3.8 G/DL (ref 3.5–5.2)
ALP SERPL-CCNC: 100 U/L (ref 55–135)
ALP SERPL-CCNC: 102 U/L (ref 55–135)
ALP SERPL-CCNC: 105 U/L (ref 55–135)
ALP SERPL-CCNC: 105 U/L (ref 55–135)
ALP SERPL-CCNC: 116 U/L (ref 55–135)
ALP SERPL-CCNC: 141 U/L (ref 55–135)
ALP SERPL-CCNC: 160 U/L (ref 55–135)
ALP SERPL-CCNC: 89 U/L (ref 55–135)
ALP SERPL-CCNC: 92 U/L (ref 55–135)
ALP SERPL-CCNC: 96 U/L (ref 55–135)
ALT SERPL W/O P-5'-P-CCNC: 11 U/L (ref 10–44)
ALT SERPL W/O P-5'-P-CCNC: 12 U/L (ref 10–44)
ALT SERPL W/O P-5'-P-CCNC: 13 U/L (ref 10–44)
ALT SERPL W/O P-5'-P-CCNC: 16 U/L (ref 10–44)
ALT SERPL W/O P-5'-P-CCNC: 16 U/L (ref 10–44)
ALT SERPL W/O P-5'-P-CCNC: 17 U/L (ref 10–44)
ALT SERPL W/O P-5'-P-CCNC: 8 U/L (ref 10–44)
ALT SERPL W/O P-5'-P-CCNC: 9 U/L (ref 10–44)
ANION GAP SERPL CALC-SCNC: 10 MMOL/L (ref 8–16)
ANION GAP SERPL CALC-SCNC: 10 MMOL/L (ref 8–16)
ANION GAP SERPL CALC-SCNC: 11 MMOL/L (ref 8–16)
ANION GAP SERPL CALC-SCNC: 12 MMOL/L (ref 8–16)
ANION GAP SERPL CALC-SCNC: 12 MMOL/L (ref 8–16)
ANION GAP SERPL CALC-SCNC: 13 MMOL/L (ref 8–16)
ANION GAP SERPL CALC-SCNC: 14 MMOL/L (ref 8–16)
ANION GAP SERPL CALC-SCNC: 5 MMOL/L (ref 8–16)
ANION GAP SERPL CALC-SCNC: 9 MMOL/L (ref 8–16)
AST SERPL-CCNC: 10 U/L (ref 10–40)
AST SERPL-CCNC: 11 U/L (ref 10–40)
AST SERPL-CCNC: 11 U/L (ref 10–40)
AST SERPL-CCNC: 12 U/L (ref 10–40)
AST SERPL-CCNC: 12 U/L (ref 10–40)
AST SERPL-CCNC: 14 U/L (ref 10–40)
AST SERPL-CCNC: 16 U/L (ref 10–40)
AST SERPL-CCNC: 16 U/L (ref 10–40)
AST SERPL-CCNC: 8 U/L (ref 10–40)
AST SERPL-CCNC: 9 U/L (ref 10–40)
BACTERIA STL CULT: NORMAL
BASOPHILS # BLD AUTO: 0.01 K/UL (ref 0–0.2)
BASOPHILS # BLD AUTO: 0.03 K/UL (ref 0–0.2)
BASOPHILS # BLD AUTO: 0.04 K/UL (ref 0–0.2)
BASOPHILS # BLD AUTO: 0.04 K/UL (ref 0–0.2)
BASOPHILS # BLD AUTO: 0.06 K/UL (ref 0–0.2)
BASOPHILS # BLD AUTO: 0.08 K/UL (ref 0–0.2)
BASOPHILS # BLD AUTO: 0.09 K/UL (ref 0–0.2)
BASOPHILS # BLD AUTO: 0.12 K/UL (ref 0–0.2)
BASOPHILS # BLD AUTO: ABNORMAL K/UL (ref 0–0.2)
BASOPHILS # BLD AUTO: ABNORMAL K/UL (ref 0–0.2)
BASOPHILS NFR BLD: 0 % (ref 0–1.9)
BASOPHILS NFR BLD: 0 % (ref 0–1.9)
BASOPHILS NFR BLD: 0.5 % (ref 0–1.9)
BASOPHILS NFR BLD: 0.5 % (ref 0–1.9)
BASOPHILS NFR BLD: 0.7 % (ref 0–1.9)
BASOPHILS NFR BLD: 0.8 % (ref 0–1.9)
BASOPHILS NFR BLD: 0.9 % (ref 0–1.9)
BASOPHILS NFR BLD: 0.9 % (ref 0–1.9)
BASOPHILS NFR BLD: 1 % (ref 0–1.9)
BASOPHILS NFR BLD: 1.4 % (ref 0–1.9)
BASOPHILS NFR BLD: 2.5 % (ref 0–1.9)
BILIRUB SERPL-MCNC: 0.2 MG/DL (ref 0.1–1)
BILIRUB SERPL-MCNC: 0.2 MG/DL (ref 0.1–1)
BILIRUB SERPL-MCNC: 0.3 MG/DL (ref 0.1–1)
BILIRUB SERPL-MCNC: 0.4 MG/DL (ref 0.1–1)
BILIRUB SERPL-MCNC: 0.5 MG/DL (ref 0.1–1)
BILIRUB SERPL-MCNC: 0.5 MG/DL (ref 0.1–1)
BILIRUB SERPL-MCNC: 0.7 MG/DL (ref 0.1–1)
BILIRUB SERPL-MCNC: 0.8 MG/DL (ref 0.1–1)
BUN SERPL-MCNC: 10 MG/DL (ref 8–23)
BUN SERPL-MCNC: 10 MG/DL (ref 8–23)
BUN SERPL-MCNC: 11 MG/DL (ref 8–23)
BUN SERPL-MCNC: 4 MG/DL (ref 8–23)
BUN SERPL-MCNC: 5 MG/DL (ref 8–23)
BUN SERPL-MCNC: 5 MG/DL (ref 8–23)
BUN SERPL-MCNC: 6 MG/DL (ref 8–23)
BUN SERPL-MCNC: 7 MG/DL (ref 8–23)
BUN SERPL-MCNC: 9 MG/DL (ref 8–23)
C DIFF GDH STL QL: NEGATIVE
C DIFF TOX A+B STL QL IA: NEGATIVE
CALCIUM SERPL-MCNC: 8.2 MG/DL (ref 8.7–10.5)
CALCIUM SERPL-MCNC: 8.4 MG/DL (ref 8.7–10.5)
CALCIUM SERPL-MCNC: 8.6 MG/DL (ref 8.7–10.5)
CALCIUM SERPL-MCNC: 8.6 MG/DL (ref 8.7–10.5)
CALCIUM SERPL-MCNC: 8.8 MG/DL (ref 8.7–10.5)
CALCIUM SERPL-MCNC: 8.8 MG/DL (ref 8.7–10.5)
CALCIUM SERPL-MCNC: 9 MG/DL (ref 8.7–10.5)
CALCIUM SERPL-MCNC: 9 MG/DL (ref 8.7–10.5)
CALCIUM SERPL-MCNC: 9.1 MG/DL (ref 8.7–10.5)
CALCIUM SERPL-MCNC: 9.2 MG/DL (ref 8.7–10.5)
CALCIUM SERPL-MCNC: 9.2 MG/DL (ref 8.7–10.5)
CEA SERPL-MCNC: 1.8 NG/ML (ref 0–5)
CEA SERPL-MCNC: <1.7 NG/ML (ref 0–5)
CEA SERPL-MCNC: <1.7 NG/ML (ref 0–5)
CHLORIDE SERPL-SCNC: 101 MMOL/L (ref 95–110)
CHLORIDE SERPL-SCNC: 102 MMOL/L (ref 95–110)
CHLORIDE SERPL-SCNC: 102 MMOL/L (ref 95–110)
CHLORIDE SERPL-SCNC: 103 MMOL/L (ref 95–110)
CHLORIDE SERPL-SCNC: 103 MMOL/L (ref 95–110)
CHLORIDE SERPL-SCNC: 105 MMOL/L (ref 95–110)
CHLORIDE SERPL-SCNC: 107 MMOL/L (ref 95–110)
CHLORIDE SERPL-SCNC: 110 MMOL/L (ref 95–110)
CO2 SERPL-SCNC: 20 MMOL/L (ref 23–29)
CO2 SERPL-SCNC: 22 MMOL/L (ref 23–29)
CO2 SERPL-SCNC: 23 MMOL/L (ref 23–29)
CO2 SERPL-SCNC: 25 MMOL/L (ref 23–29)
CO2 SERPL-SCNC: 25 MMOL/L (ref 23–29)
CO2 SERPL-SCNC: 26 MMOL/L (ref 23–29)
CO2 SERPL-SCNC: 26 MMOL/L (ref 23–29)
CO2 SERPL-SCNC: 27 MMOL/L (ref 23–29)
CO2 SERPL-SCNC: 28 MMOL/L (ref 23–29)
CREAT SERPL-MCNC: 0.6 MG/DL (ref 0.5–1.4)
CREAT SERPL-MCNC: 0.7 MG/DL (ref 0.5–1.4)
CREAT SERPL-MCNC: 0.8 MG/DL (ref 0.5–1.4)
CTP QC/QA: YES
DIFFERENTIAL METHOD: ABNORMAL
E COLI SXT1 STL QL IA: NEGATIVE
E COLI SXT2 STL QL IA: NEGATIVE
EOSINOPHIL # BLD AUTO: 0.1 K/UL (ref 0–0.5)
EOSINOPHIL # BLD AUTO: 0.2 K/UL (ref 0–0.5)
EOSINOPHIL # BLD AUTO: ABNORMAL K/UL (ref 0–0.5)
EOSINOPHIL # BLD AUTO: ABNORMAL K/UL (ref 0–0.5)
EOSINOPHIL NFR BLD: 0.5 % (ref 0–8)
EOSINOPHIL NFR BLD: 1 % (ref 0–8)
EOSINOPHIL NFR BLD: 1.4 % (ref 0–8)
EOSINOPHIL NFR BLD: 2 % (ref 0–8)
EOSINOPHIL NFR BLD: 2.8 % (ref 0–8)
EOSINOPHIL NFR BLD: 2.9 % (ref 0–8)
EOSINOPHIL NFR BLD: 3 % (ref 0–8)
EOSINOPHIL NFR BLD: 3.3 % (ref 0–8)
EOSINOPHIL NFR BLD: 4.1 % (ref 0–8)
EOSINOPHIL NFR BLD: 5.5 % (ref 0–8)
EOSINOPHIL NFR BLD: 5.9 % (ref 0–8)
ERYTHROCYTE [DISTWIDTH] IN BLOOD BY AUTOMATED COUNT: 13.2 % (ref 11.5–14.5)
ERYTHROCYTE [DISTWIDTH] IN BLOOD BY AUTOMATED COUNT: 13.8 % (ref 11.5–14.5)
ERYTHROCYTE [DISTWIDTH] IN BLOOD BY AUTOMATED COUNT: 14.1 % (ref 11.5–14.5)
ERYTHROCYTE [DISTWIDTH] IN BLOOD BY AUTOMATED COUNT: 14.2 % (ref 11.5–14.5)
ERYTHROCYTE [DISTWIDTH] IN BLOOD BY AUTOMATED COUNT: 14.5 % (ref 11.5–14.5)
ERYTHROCYTE [DISTWIDTH] IN BLOOD BY AUTOMATED COUNT: 14.6 % (ref 11.5–14.5)
ERYTHROCYTE [DISTWIDTH] IN BLOOD BY AUTOMATED COUNT: 14.8 % (ref 11.5–14.5)
ERYTHROCYTE [DISTWIDTH] IN BLOOD BY AUTOMATED COUNT: 15 % (ref 11.5–14.5)
ERYTHROCYTE [DISTWIDTH] IN BLOOD BY AUTOMATED COUNT: 15.2 % (ref 11.5–14.5)
ERYTHROCYTE [DISTWIDTH] IN BLOOD BY AUTOMATED COUNT: 15.9 % (ref 11.5–14.5)
ERYTHROCYTE [DISTWIDTH] IN BLOOD BY AUTOMATED COUNT: 15.9 % (ref 11.5–14.5)
EST. GFR  (NO RACE VARIABLE): >60 ML/MIN/1.73 M^2
FERRITIN SERPL-MCNC: 318 NG/ML (ref 20–300)
GLUCOSE SERPL-MCNC: 103 MG/DL (ref 70–110)
GLUCOSE SERPL-MCNC: 103 MG/DL (ref 70–110)
GLUCOSE SERPL-MCNC: 104 MG/DL (ref 70–110)
GLUCOSE SERPL-MCNC: 105 MG/DL (ref 70–110)
GLUCOSE SERPL-MCNC: 117 MG/DL (ref 70–110)
GLUCOSE SERPL-MCNC: 117 MG/DL (ref 70–110)
GLUCOSE SERPL-MCNC: 118 MG/DL (ref 70–110)
GLUCOSE SERPL-MCNC: 126 MG/DL (ref 70–110)
GLUCOSE SERPL-MCNC: 134 MG/DL (ref 70–110)
GLUCOSE SERPL-MCNC: 96 MG/DL (ref 70–110)
GLUCOSE SERPL-MCNC: 98 MG/DL (ref 70–110)
GUARDANT 360: NORMAL
HCT VFR BLD AUTO: 31.8 % (ref 37–48.5)
HCT VFR BLD AUTO: 32.3 % (ref 37–48.5)
HCT VFR BLD AUTO: 33.7 % (ref 37–48.5)
HCT VFR BLD AUTO: 33.7 % (ref 37–48.5)
HCT VFR BLD AUTO: 35.3 % (ref 37–48.5)
HCT VFR BLD AUTO: 38.9 % (ref 37–48.5)
HCT VFR BLD AUTO: 39.3 % (ref 37–48.5)
HCT VFR BLD AUTO: 39.5 % (ref 37–48.5)
HCT VFR BLD AUTO: 39.6 % (ref 37–48.5)
HCT VFR BLD AUTO: 40.4 % (ref 37–48.5)
HCT VFR BLD AUTO: 43.1 % (ref 37–48.5)
HGB BLD-MCNC: 10.4 G/DL (ref 12–16)
HGB BLD-MCNC: 10.5 G/DL (ref 12–16)
HGB BLD-MCNC: 11.1 G/DL (ref 12–16)
HGB BLD-MCNC: 11.2 G/DL (ref 12–16)
HGB BLD-MCNC: 11.5 G/DL (ref 12–16)
HGB BLD-MCNC: 12.6 G/DL (ref 12–16)
HGB BLD-MCNC: 12.7 G/DL (ref 12–16)
HGB BLD-MCNC: 12.9 G/DL (ref 12–16)
HGB BLD-MCNC: 12.9 G/DL (ref 12–16)
HGB BLD-MCNC: 13.6 G/DL (ref 12–16)
HGB BLD-MCNC: 13.7 G/DL (ref 12–16)
IMM GRANULOCYTES # BLD AUTO: 0 K/UL (ref 0–0.04)
IMM GRANULOCYTES # BLD AUTO: 0.01 K/UL (ref 0–0.04)
IMM GRANULOCYTES # BLD AUTO: 0.01 K/UL (ref 0–0.04)
IMM GRANULOCYTES # BLD AUTO: 0.03 K/UL (ref 0–0.04)
IMM GRANULOCYTES # BLD AUTO: 0.05 K/UL (ref 0–0.04)
IMM GRANULOCYTES # BLD AUTO: 0.06 K/UL (ref 0–0.04)
IMM GRANULOCYTES # BLD AUTO: 0.39 K/UL (ref 0–0.04)
IMM GRANULOCYTES # BLD AUTO: 0.95 K/UL (ref 0–0.04)
IMM GRANULOCYTES # BLD AUTO: ABNORMAL K/UL
IMM GRANULOCYTES # BLD AUTO: ABNORMAL K/UL (ref 0–0.04)
IMM GRANULOCYTES # BLD AUTO: ABNORMAL K/UL (ref 0–0.04)
IMM GRANULOCYTES NFR BLD AUTO: 0 % (ref 0–0.5)
IMM GRANULOCYTES NFR BLD AUTO: 0.2 % (ref 0–0.5)
IMM GRANULOCYTES NFR BLD AUTO: 0.3 % (ref 0–0.5)
IMM GRANULOCYTES NFR BLD AUTO: 0.6 % (ref 0–0.5)
IMM GRANULOCYTES NFR BLD AUTO: 0.8 % (ref 0–0.5)
IMM GRANULOCYTES NFR BLD AUTO: 1.1 % (ref 0–0.5)
IMM GRANULOCYTES NFR BLD AUTO: 5.4 % (ref 0–0.5)
IMM GRANULOCYTES NFR BLD AUTO: 5.8 % (ref 0–0.5)
IMM GRANULOCYTES NFR BLD AUTO: ABNORMAL %
IMM GRANULOCYTES NFR BLD AUTO: ABNORMAL % (ref 0–0.5)
IMM GRANULOCYTES NFR BLD AUTO: ABNORMAL % (ref 0–0.5)
IRON SERPL-MCNC: 94 UG/DL (ref 30–160)
LYMPHOCYTES # BLD AUTO: 0.5 K/UL (ref 1–4.8)
LYMPHOCYTES # BLD AUTO: 0.7 K/UL (ref 1–4.8)
LYMPHOCYTES # BLD AUTO: 0.8 K/UL (ref 1–4.8)
LYMPHOCYTES # BLD AUTO: ABNORMAL K/UL (ref 1–4.8)
LYMPHOCYTES # BLD AUTO: ABNORMAL K/UL (ref 1–4.8)
LYMPHOCYTES NFR BLD: 10 % (ref 18–48)
LYMPHOCYTES NFR BLD: 10.2 % (ref 18–48)
LYMPHOCYTES NFR BLD: 12 % (ref 18–48)
LYMPHOCYTES NFR BLD: 12.2 % (ref 18–48)
LYMPHOCYTES NFR BLD: 14.3 % (ref 18–48)
LYMPHOCYTES NFR BLD: 23.2 % (ref 18–48)
LYMPHOCYTES NFR BLD: 3.1 % (ref 18–48)
LYMPHOCYTES NFR BLD: 6 % (ref 18–48)
LYMPHOCYTES NFR BLD: 8.7 % (ref 18–48)
LYMPHOCYTES NFR BLD: 9 % (ref 18–48)
LYMPHOCYTES NFR BLD: 9.3 % (ref 18–48)
MAGNESIUM SERPL-MCNC: 1.7 MG/DL (ref 1.6–2.6)
MAGNESIUM SERPL-MCNC: 2 MG/DL (ref 1.6–2.6)
MCH RBC QN AUTO: 29.2 PG (ref 27–31)
MCH RBC QN AUTO: 29.7 PG (ref 27–31)
MCH RBC QN AUTO: 30 PG (ref 27–31)
MCH RBC QN AUTO: 30.4 PG (ref 27–31)
MCH RBC QN AUTO: 30.6 PG (ref 27–31)
MCH RBC QN AUTO: 30.7 PG (ref 27–31)
MCH RBC QN AUTO: 30.9 PG (ref 27–31)
MCH RBC QN AUTO: 30.9 PG (ref 27–31)
MCH RBC QN AUTO: 31 PG (ref 27–31)
MCH RBC QN AUTO: 31.2 PG (ref 27–31)
MCH RBC QN AUTO: 31.3 PG (ref 27–31)
MCHC RBC AUTO-ENTMCNC: 31.7 G/DL (ref 32–36)
MCHC RBC AUTO-ENTMCNC: 31.8 G/DL (ref 32–36)
MCHC RBC AUTO-ENTMCNC: 32.2 G/DL (ref 32–36)
MCHC RBC AUTO-ENTMCNC: 32.3 G/DL (ref 32–36)
MCHC RBC AUTO-ENTMCNC: 32.4 G/DL (ref 32–36)
MCHC RBC AUTO-ENTMCNC: 32.6 G/DL (ref 32–36)
MCHC RBC AUTO-ENTMCNC: 32.7 G/DL (ref 32–36)
MCHC RBC AUTO-ENTMCNC: 32.9 G/DL (ref 32–36)
MCHC RBC AUTO-ENTMCNC: 33 G/DL (ref 32–36)
MCHC RBC AUTO-ENTMCNC: 33.7 G/DL (ref 32–36)
MCHC RBC AUTO-ENTMCNC: 34.1 G/DL (ref 32–36)
MCV RBC AUTO: 89 FL (ref 82–98)
MCV RBC AUTO: 91 FL (ref 82–98)
MCV RBC AUTO: 92 FL (ref 82–98)
MCV RBC AUTO: 93 FL (ref 82–98)
MCV RBC AUTO: 94 FL (ref 82–98)
MCV RBC AUTO: 95 FL (ref 82–98)
MCV RBC AUTO: 96 FL (ref 82–98)
MCV RBC AUTO: 97 FL (ref 82–98)
MCV RBC AUTO: 98 FL (ref 82–98)
MONOCYTES # BLD AUTO: 0.2 K/UL (ref 0.3–1)
MONOCYTES # BLD AUTO: 0.2 K/UL (ref 0.3–1)
MONOCYTES # BLD AUTO: 0.3 K/UL (ref 0.3–1)
MONOCYTES # BLD AUTO: 0.5 K/UL (ref 0.3–1)
MONOCYTES # BLD AUTO: 1.2 K/UL (ref 0.3–1)
MONOCYTES # BLD AUTO: ABNORMAL K/UL (ref 0.3–1)
MONOCYTES # BLD AUTO: ABNORMAL K/UL (ref 0.3–1)
MONOCYTES NFR BLD: 2 % (ref 4–15)
MONOCYTES NFR BLD: 3 % (ref 4–15)
MONOCYTES NFR BLD: 4.6 % (ref 4–15)
MONOCYTES NFR BLD: 6 % (ref 4–15)
MONOCYTES NFR BLD: 6 % (ref 4–15)
MONOCYTES NFR BLD: 6.8 % (ref 4–15)
MONOCYTES NFR BLD: 7 % (ref 4–15)
MONOCYTES NFR BLD: 7 % (ref 4–15)
MONOCYTES NFR BLD: 7.6 % (ref 4–15)
MONOCYTES NFR BLD: 8.5 % (ref 4–15)
MONOCYTES NFR BLD: 9.1 % (ref 4–15)
NEUTROPHILS # BLD AUTO: 1.4 K/UL (ref 1.8–7.7)
NEUTROPHILS # BLD AUTO: 14.6 K/UL (ref 1.8–7.7)
NEUTROPHILS # BLD AUTO: 2.6 K/UL (ref 1.8–7.7)
NEUTROPHILS # BLD AUTO: 3.3 K/UL (ref 1.8–7.7)
NEUTROPHILS # BLD AUTO: 3.8 K/UL (ref 1.8–7.7)
NEUTROPHILS # BLD AUTO: 4.5 K/UL (ref 1.8–7.7)
NEUTROPHILS # BLD AUTO: 4.9 K/UL (ref 1.8–7.7)
NEUTROPHILS # BLD AUTO: 5.1 K/UL (ref 1.8–7.7)
NEUTROPHILS NFR BLD: 61.3 % (ref 38–73)
NEUTROPHILS NFR BLD: 70.6 % (ref 38–73)
NEUTROPHILS NFR BLD: 73.3 % (ref 38–73)
NEUTROPHILS NFR BLD: 75 % (ref 38–73)
NEUTROPHILS NFR BLD: 75.2 % (ref 38–73)
NEUTROPHILS NFR BLD: 78.1 % (ref 38–73)
NEUTROPHILS NFR BLD: 79.4 % (ref 38–73)
NEUTROPHILS NFR BLD: 81 % (ref 38–73)
NEUTROPHILS NFR BLD: 83.2 % (ref 38–73)
NEUTROPHILS NFR BLD: 83.5 % (ref 38–73)
NEUTROPHILS NFR BLD: 88 % (ref 38–73)
NEUTS BAND NFR BLD MANUAL: 1 %
NEUTS BAND NFR BLD MANUAL: 2 %
NEUTS BAND NFR BLD MANUAL: 7 %
NRBC BLD-RTO: 0 /100 WBC
PHOSPHATE SERPL-MCNC: 2.7 MG/DL (ref 2.7–4.5)
PHOSPHATE SERPL-MCNC: 3.1 MG/DL (ref 2.7–4.5)
PLATELET # BLD AUTO: 155 K/UL (ref 150–450)
PLATELET # BLD AUTO: 176 K/UL (ref 150–450)
PLATELET # BLD AUTO: 181 K/UL (ref 150–450)
PLATELET # BLD AUTO: 182 K/UL (ref 150–450)
PLATELET # BLD AUTO: 187 K/UL (ref 150–450)
PLATELET # BLD AUTO: 192 K/UL (ref 150–450)
PLATELET # BLD AUTO: 205 K/UL (ref 150–450)
PLATELET # BLD AUTO: 232 K/UL (ref 150–450)
PLATELET # BLD AUTO: 243 K/UL (ref 150–450)
PLATELET # BLD AUTO: 324 K/UL (ref 150–450)
PLATELET # BLD AUTO: 374 K/UL (ref 150–450)
PLATELET BLD QL SMEAR: ABNORMAL
PLATELET BLD QL SMEAR: ABNORMAL
PMV BLD AUTO: 10.2 FL (ref 9.2–12.9)
PMV BLD AUTO: 10.5 FL (ref 9.2–12.9)
PMV BLD AUTO: 10.6 FL (ref 9.2–12.9)
PMV BLD AUTO: 10.7 FL (ref 9.2–12.9)
PMV BLD AUTO: 10.9 FL (ref 9.2–12.9)
PMV BLD AUTO: 10.9 FL (ref 9.2–12.9)
PMV BLD AUTO: 11 FL (ref 9.2–12.9)
PMV BLD AUTO: 11.2 FL (ref 9.2–12.9)
PMV BLD AUTO: 9.8 FL (ref 9.2–12.9)
POC MOLECULAR INFLUENZA A AGN: NEGATIVE
POC MOLECULAR INFLUENZA B AGN: NEGATIVE
POTASSIUM SERPL-SCNC: 2.6 MMOL/L (ref 3.5–5.1)
POTASSIUM SERPL-SCNC: 3.1 MMOL/L (ref 3.5–5.1)
POTASSIUM SERPL-SCNC: 3.1 MMOL/L (ref 3.5–5.1)
POTASSIUM SERPL-SCNC: 3.2 MMOL/L (ref 3.5–5.1)
POTASSIUM SERPL-SCNC: 3.2 MMOL/L (ref 3.5–5.1)
POTASSIUM SERPL-SCNC: 3.3 MMOL/L (ref 3.5–5.1)
POTASSIUM SERPL-SCNC: 3.6 MMOL/L (ref 3.5–5.1)
POTASSIUM SERPL-SCNC: 3.8 MMOL/L (ref 3.5–5.1)
POTASSIUM SERPL-SCNC: 3.8 MMOL/L (ref 3.5–5.1)
POTASSIUM SERPL-SCNC: 4.6 MMOL/L (ref 3.5–5.1)
PROT SERPL-MCNC: 5.3 G/DL (ref 6–8.4)
PROT SERPL-MCNC: 5.7 G/DL (ref 6–8.4)
PROT SERPL-MCNC: 5.8 G/DL (ref 6–8.4)
PROT SERPL-MCNC: 5.8 G/DL (ref 6–8.4)
PROT SERPL-MCNC: 6.5 G/DL (ref 6–8.4)
PROT SERPL-MCNC: 6.6 G/DL (ref 6–8.4)
PROT SERPL-MCNC: 6.8 G/DL (ref 6–8.4)
PROT SERPL-MCNC: 7.1 G/DL (ref 6–8.4)
PROT SERPL-MCNC: 7.2 G/DL (ref 6–8.4)
PROT SERPL-MCNC: 7.3 G/DL (ref 6–8.4)
RBC # BLD AUTO: 3.33 M/UL (ref 4–5.4)
RBC # BLD AUTO: 3.59 M/UL (ref 4–5.4)
RBC # BLD AUTO: 3.61 M/UL (ref 4–5.4)
RBC # BLD AUTO: 3.68 M/UL (ref 4–5.4)
RBC # BLD AUTO: 3.72 M/UL (ref 4–5.4)
RBC # BLD AUTO: 4.17 M/UL (ref 4–5.4)
RBC # BLD AUTO: 4.21 M/UL (ref 4–5.4)
RBC # BLD AUTO: 4.23 M/UL (ref 4–5.4)
RBC # BLD AUTO: 4.24 M/UL (ref 4–5.4)
RBC # BLD AUTO: 4.35 M/UL (ref 4–5.4)
RBC # BLD AUTO: 4.42 M/UL (ref 4–5.4)
SARS-COV-2 AG RESP QL IA.RAPID: NEGATIVE
SARS-COV-2 RDRP RESP QL NAA+PROBE: NEGATIVE
SARS-COV-2 RNA RESP QL NAA+PROBE: NOT DETECTED
SATURATED IRON: 27 % (ref 20–50)
SODIUM SERPL-SCNC: 136 MMOL/L (ref 136–145)
SODIUM SERPL-SCNC: 136 MMOL/L (ref 136–145)
SODIUM SERPL-SCNC: 137 MMOL/L (ref 136–145)
SODIUM SERPL-SCNC: 137 MMOL/L (ref 136–145)
SODIUM SERPL-SCNC: 138 MMOL/L (ref 136–145)
SODIUM SERPL-SCNC: 139 MMOL/L (ref 136–145)
SODIUM SERPL-SCNC: 139 MMOL/L (ref 136–145)
SODIUM SERPL-SCNC: 140 MMOL/L (ref 136–145)
SODIUM SERPL-SCNC: 140 MMOL/L (ref 136–145)
TOTAL IRON BINDING CAPACITY: 351 UG/DL (ref 250–450)
TRANSFERRIN SERPL-MCNC: 237 MG/DL (ref 200–375)
VIT B12 SERPL-MCNC: >2000 PG/ML (ref 210–950)
VIT B12 SERPL-MCNC: >2000 PG/ML (ref 210–950)
WBC # BLD AUTO: 10.53 K/UL (ref 3.9–12.7)
WBC # BLD AUTO: 13.01 K/UL (ref 3.9–12.7)
WBC # BLD AUTO: 17.47 K/UL (ref 3.9–12.7)
WBC # BLD AUTO: 2.2 K/UL (ref 3.9–12.7)
WBC # BLD AUTO: 3.63 K/UL (ref 3.9–12.7)
WBC # BLD AUTO: 3.8 K/UL (ref 3.9–12.7)
WBC # BLD AUTO: 4.35 K/UL (ref 3.9–12.7)
WBC # BLD AUTO: 4.83 K/UL (ref 3.9–12.7)
WBC # BLD AUTO: 5.42 K/UL (ref 3.9–12.7)
WBC # BLD AUTO: 6.5 K/UL (ref 3.9–12.7)
WBC # BLD AUTO: 6.67 K/UL (ref 3.9–12.7)
WBC #/AREA STL HPF: ABNORMAL /[HPF]

## 2022-01-01 PROCEDURE — 3008F BODY MASS INDEX DOCD: CPT | Mod: CPTII,S$GLB,, | Performed by: INTERNAL MEDICINE

## 2022-01-01 PROCEDURE — 25000003 PHARM REV CODE 250: Performed by: INTERNAL MEDICINE

## 2022-01-01 PROCEDURE — 85025 COMPLETE CBC W/AUTO DIFF WBC: CPT | Performed by: INTERNAL MEDICINE

## 2022-01-01 PROCEDURE — 3078F PR MOST RECENT DIASTOLIC BLOOD PRESSURE < 80 MM HG: ICD-10-PCS | Mod: CPTII,S$GLB,, | Performed by: INTERNAL MEDICINE

## 2022-01-01 PROCEDURE — 1160F RVW MEDS BY RX/DR IN RCRD: CPT | Mod: CPTII,S$GLB,, | Performed by: INTERNAL MEDICINE

## 2022-01-01 PROCEDURE — 96416 CHEMO PROLONG INFUSE W/PUMP: CPT

## 2022-01-01 PROCEDURE — 96377 APPLICATON ON-BODY INJECTOR: CPT

## 2022-01-01 PROCEDURE — 96375 TX/PRO/DX INJ NEW DRUG ADDON: CPT

## 2022-01-01 PROCEDURE — 99215 OFFICE O/P EST HI 40 MIN: CPT | Mod: S$GLB,,, | Performed by: INTERNAL MEDICINE

## 2022-01-01 PROCEDURE — 63600175 PHARM REV CODE 636 W HCPCS: Performed by: HOSPITALIST

## 2022-01-01 PROCEDURE — 96367 TX/PROPH/DG ADDL SEQ IV INF: CPT

## 2022-01-01 PROCEDURE — 3077F SYST BP >= 140 MM HG: CPT | Mod: CPTII,S$GLB,, | Performed by: HOSPITALIST

## 2022-01-01 PROCEDURE — 99214 PR OFFICE/OUTPT VISIT, EST, LEVL IV, 30-39 MIN: ICD-10-PCS | Mod: S$GLB,,, | Performed by: INTERNAL MEDICINE

## 2022-01-01 PROCEDURE — 96361 HYDRATE IV INFUSION ADD-ON: CPT

## 2022-01-01 PROCEDURE — 71000015 HC POSTOP RECOV 1ST HR: Performed by: SURGERY

## 2022-01-01 PROCEDURE — 99999 PR PBB SHADOW E&M-EST. PATIENT-LVL V: CPT | Mod: PBBFAC,,, | Performed by: HOSPITALIST

## 2022-01-01 PROCEDURE — 99214 OFFICE O/P EST MOD 30 MIN: CPT | Mod: S$GLB,,, | Performed by: INTERNAL MEDICINE

## 2022-01-01 PROCEDURE — A4216 STERILE WATER/SALINE, 10 ML: HCPCS | Performed by: INTERNAL MEDICINE

## 2022-01-01 PROCEDURE — 25000003 PHARM REV CODE 250: Performed by: NURSE ANESTHETIST, CERTIFIED REGISTERED

## 2022-01-01 PROCEDURE — 82378 CARCINOEMBRYONIC ANTIGEN: CPT | Performed by: INTERNAL MEDICINE

## 2022-01-01 PROCEDURE — 63600175 PHARM REV CODE 636 W HCPCS: Performed by: INTERNAL MEDICINE

## 2022-01-01 PROCEDURE — 96366 THER/PROPH/DIAG IV INF ADDON: CPT

## 2022-01-01 PROCEDURE — 96523 IRRIG DRUG DELIVERY DEVICE: CPT | Mod: XB

## 2022-01-01 PROCEDURE — 99213 PR OFFICE/OUTPT VISIT, EST, LEVL III, 20-29 MIN: ICD-10-PCS | Mod: S$GLB,,, | Performed by: INTERNAL MEDICINE

## 2022-01-01 PROCEDURE — 83735 ASSAY OF MAGNESIUM: CPT

## 2022-01-01 PROCEDURE — 83735 ASSAY OF MAGNESIUM: CPT | Performed by: EMERGENCY MEDICINE

## 2022-01-01 PROCEDURE — 99284 EMERGENCY DEPT VISIT MOD MDM: CPT | Mod: 25

## 2022-01-01 PROCEDURE — 63600175 PHARM REV CODE 636 W HCPCS: Performed by: NURSE ANESTHETIST, CERTIFIED REGISTERED

## 2022-01-01 PROCEDURE — 99999 PR PBB SHADOW E&M-EST. PATIENT-LVL IV: ICD-10-PCS | Mod: PBBFAC,,, | Performed by: INTERNAL MEDICINE

## 2022-01-01 PROCEDURE — C1788 PORT, INDWELLING, IMP: HCPCS | Performed by: SURGERY

## 2022-01-01 PROCEDURE — 99215 PR OFFICE/OUTPT VISIT, EST, LEVL V, 40-54 MIN: ICD-10-PCS | Mod: S$GLB,,, | Performed by: HOSPITALIST

## 2022-01-01 PROCEDURE — 36415 COLL VENOUS BLD VENIPUNCTURE: CPT | Performed by: EMERGENCY MEDICINE

## 2022-01-01 PROCEDURE — 3078F DIAST BP <80 MM HG: CPT | Mod: CPTII,S$GLB,, | Performed by: INTERNAL MEDICINE

## 2022-01-01 PROCEDURE — 85007 BL SMEAR W/DIFF WBC COUNT: CPT | Performed by: INTERNAL MEDICINE

## 2022-01-01 PROCEDURE — 99213 OFFICE O/P EST LOW 20 MIN: CPT | Mod: S$GLB,,, | Performed by: INTERNAL MEDICINE

## 2022-01-01 PROCEDURE — 3008F PR BODY MASS INDEX (BMI) DOCUMENTED: ICD-10-PCS | Mod: CPTII,S$GLB,, | Performed by: INTERNAL MEDICINE

## 2022-01-01 PROCEDURE — 1159F MED LIST DOCD IN RCRD: CPT | Mod: CPTII,S$GLB,, | Performed by: HOSPITALIST

## 2022-01-01 PROCEDURE — 80053 COMPREHEN METABOLIC PANEL: CPT | Performed by: EMERGENCY MEDICINE

## 2022-01-01 PROCEDURE — 36415 COLL VENOUS BLD VENIPUNCTURE: CPT | Performed by: INTERNAL MEDICINE

## 2022-01-01 PROCEDURE — 36000706: Performed by: SURGERY

## 2022-01-01 PROCEDURE — 3075F PR MOST RECENT SYSTOLIC BLOOD PRESS GE 130-139MM HG: ICD-10-PCS | Mod: CPTII,S$GLB,, | Performed by: INTERNAL MEDICINE

## 2022-01-01 PROCEDURE — 36415 COLL VENOUS BLD VENIPUNCTURE: CPT | Performed by: NURSE PRACTITIONER

## 2022-01-01 PROCEDURE — 87045 FECES CULTURE AEROBIC BACT: CPT | Performed by: EMERGENCY MEDICINE

## 2022-01-01 PROCEDURE — D9220A PRA ANESTHESIA: ICD-10-PCS | Mod: ANES,,, | Performed by: ANESTHESIOLOGY

## 2022-01-01 PROCEDURE — 96368 THER/DIAG CONCURRENT INF: CPT

## 2022-01-01 PROCEDURE — 85027 COMPLETE CBC AUTOMATED: CPT | Performed by: INTERNAL MEDICINE

## 2022-01-01 PROCEDURE — 1160F RVW MEDS BY RX/DR IN RCRD: CPT | Mod: CPTII,95,, | Performed by: INTERNAL MEDICINE

## 2022-01-01 PROCEDURE — 3079F PR MOST RECENT DIASTOLIC BLOOD PRESSURE 80-89 MM HG: ICD-10-PCS | Mod: CPTII,S$GLB,, | Performed by: INTERNAL MEDICINE

## 2022-01-01 PROCEDURE — 1160F PR REVIEW ALL MEDS BY PRESCRIBER/CLIN PHARMACIST DOCUMENTED: ICD-10-PCS | Mod: CPTII,S$GLB,, | Performed by: INTERNAL MEDICINE

## 2022-01-01 PROCEDURE — 37000009 HC ANESTHESIA EA ADD 15 MINS: Performed by: SURGERY

## 2022-01-01 PROCEDURE — 99999 PR PBB SHADOW E&M-EST. PATIENT-LVL IV: CPT | Mod: PBBFAC,,, | Performed by: INTERNAL MEDICINE

## 2022-01-01 PROCEDURE — 87449 NOS EACH ORGANISM AG IA: CPT | Performed by: EMERGENCY MEDICINE

## 2022-01-01 PROCEDURE — U0005 INFEC AGEN DETEC AMPLI PROBE: HCPCS | Performed by: INTERNAL MEDICINE

## 2022-01-01 PROCEDURE — 1159F MED LIST DOCD IN RCRD: CPT | Mod: CPTII,S$GLB,, | Performed by: INTERNAL MEDICINE

## 2022-01-01 PROCEDURE — 25000003 PHARM REV CODE 250

## 2022-01-01 PROCEDURE — 63600175 PHARM REV CODE 636 W HCPCS: Mod: JG | Performed by: INTERNAL MEDICINE

## 2022-01-01 PROCEDURE — 3077F SYST BP >= 140 MM HG: CPT | Mod: CPTII,S$GLB,, | Performed by: INTERNAL MEDICINE

## 2022-01-01 PROCEDURE — 3075F SYST BP GE 130 - 139MM HG: CPT | Mod: CPTII,S$GLB,, | Performed by: INTERNAL MEDICINE

## 2022-01-01 PROCEDURE — 3077F PR MOST RECENT SYSTOLIC BLOOD PRESSURE >= 140 MM HG: ICD-10-PCS | Mod: CPTII,S$GLB,, | Performed by: INTERNAL MEDICINE

## 2022-01-01 PROCEDURE — 1159F MED LIST DOCD IN RCRD: CPT | Mod: CPTII,S$GLB,, | Performed by: NURSE PRACTITIONER

## 2022-01-01 PROCEDURE — 83735 ASSAY OF MAGNESIUM: CPT | Performed by: INTERNAL MEDICINE

## 2022-01-01 PROCEDURE — 71260 CT THORAX DX C+: CPT | Mod: TC

## 2022-01-01 PROCEDURE — 99214 OFFICE O/P EST MOD 30 MIN: CPT | Mod: ,,, | Performed by: INTERNAL MEDICINE

## 2022-01-01 PROCEDURE — 3074F PR MOST RECENT SYSTOLIC BLOOD PRESSURE < 130 MM HG: ICD-10-PCS | Mod: CPTII,S$GLB,, | Performed by: INTERNAL MEDICINE

## 2022-01-01 PROCEDURE — 1160F PR REVIEW ALL MEDS BY PRESCRIBER/CLIN PHARMACIST DOCUMENTED: ICD-10-PCS | Mod: CPTII,S$GLB,, | Performed by: HOSPITALIST

## 2022-01-01 PROCEDURE — 99999 PR PBB SHADOW E&M-EST. PATIENT-LVL IV: CPT | Mod: PBBFAC,,, | Performed by: NURSE PRACTITIONER

## 2022-01-01 PROCEDURE — 3074F SYST BP LT 130 MM HG: CPT | Mod: CPTII,S$GLB,, | Performed by: INTERNAL MEDICINE

## 2022-01-01 PROCEDURE — 99215 OFFICE O/P EST HI 40 MIN: CPT | Mod: S$GLB,,, | Performed by: NURSE PRACTITIONER

## 2022-01-01 PROCEDURE — 25000003 PHARM REV CODE 250: Performed by: EMERGENCY MEDICINE

## 2022-01-01 PROCEDURE — 1159F PR MEDICATION LIST DOCUMENTED IN MEDICAL RECORD: ICD-10-PCS | Mod: CPTII,S$GLB,, | Performed by: INTERNAL MEDICINE

## 2022-01-01 PROCEDURE — 85027 COMPLETE CBC AUTOMATED: CPT | Performed by: EMERGENCY MEDICINE

## 2022-01-01 PROCEDURE — 99215 PR OFFICE/OUTPT VISIT, EST, LEVL V, 40-54 MIN: ICD-10-PCS | Mod: 95,,, | Performed by: INTERNAL MEDICINE

## 2022-01-01 PROCEDURE — 82607 VITAMIN B-12: CPT | Performed by: INTERNAL MEDICINE

## 2022-01-01 PROCEDURE — 93010 EKG 12-LEAD: ICD-10-PCS | Mod: ,,, | Performed by: INTERNAL MEDICINE

## 2022-01-01 PROCEDURE — 1101F PR PT FALLS ASSESS DOC 0-1 FALLS W/OUT INJ PAST YR: ICD-10-PCS | Mod: CPTII,S$GLB,, | Performed by: INTERNAL MEDICINE

## 2022-01-01 PROCEDURE — 36561 INSERT TUNNELED CV CATH: CPT | Mod: RT,,, | Performed by: SURGERY

## 2022-01-01 PROCEDURE — 99215 PR OFFICE/OUTPT VISIT, EST, LEVL V, 40-54 MIN: ICD-10-PCS | Mod: S$GLB,,, | Performed by: INTERNAL MEDICINE

## 2022-01-01 PROCEDURE — 99215 PR OFFICE/OUTPT VISIT, EST, LEVL V, 40-54 MIN: ICD-10-PCS | Mod: S$GLB,,, | Performed by: NURSE PRACTITIONER

## 2022-01-01 PROCEDURE — 96411 CHEMO IV PUSH ADDL DRUG: CPT

## 2022-01-01 PROCEDURE — 3008F BODY MASS INDEX DOCD: CPT | Mod: CPTII,S$GLB,, | Performed by: NURSE PRACTITIONER

## 2022-01-01 PROCEDURE — 80053 COMPREHEN METABOLIC PANEL: CPT | Performed by: INTERNAL MEDICINE

## 2022-01-01 PROCEDURE — 85025 COMPLETE CBC W/AUTO DIFF WBC: CPT | Performed by: NURSE PRACTITIONER

## 2022-01-01 PROCEDURE — 3078F PR MOST RECENT DIASTOLIC BLOOD PRESSURE < 80 MM HG: ICD-10-PCS | Mod: CPTII,S$GLB,, | Performed by: NURSE PRACTITIONER

## 2022-01-01 PROCEDURE — 25500020 PHARM REV CODE 255: Performed by: NURSE PRACTITIONER

## 2022-01-01 PROCEDURE — 99999 PR PBB SHADOW E&M-EST. PATIENT-LVL V: CPT | Mod: PBBFAC,,, | Performed by: INTERNAL MEDICINE

## 2022-01-01 PROCEDURE — 96523 IRRIG DRUG DELIVERY DEVICE: CPT

## 2022-01-01 PROCEDURE — 96415 CHEMO IV INFUSION ADDL HR: CPT

## 2022-01-01 PROCEDURE — 99213 OFFICE O/P EST LOW 20 MIN: CPT | Mod: ,,, | Performed by: INTERNAL MEDICINE

## 2022-01-01 PROCEDURE — 3077F PR MOST RECENT SYSTOLIC BLOOD PRESSURE >= 140 MM HG: ICD-10-PCS | Mod: CPTII,S$GLB,, | Performed by: HOSPITALIST

## 2022-01-01 PROCEDURE — 3077F SYST BP >= 140 MM HG: CPT | Mod: CPTII,S$GLB,, | Performed by: NURSE PRACTITIONER

## 2022-01-01 PROCEDURE — U0003 INFECTIOUS AGENT DETECTION BY NUCLEIC ACID (DNA OR RNA); SEVERE ACUTE RESPIRATORY SYNDROME CORONAVIRUS 2 (SARS-COV-2) (CORONAVIRUS DISEASE [COVID-19]), AMPLIFIED PROBE TECHNIQUE, MAKING USE OF HIGH THROUGHPUT TECHNOLOGIES AS DESCRIBED BY CMS-2020-01-R: HCPCS | Performed by: INTERNAL MEDICINE

## 2022-01-01 PROCEDURE — 1159F PR MEDICATION LIST DOCUMENTED IN MEDICAL RECORD: ICD-10-PCS | Mod: CPTII,S$GLB,, | Performed by: HOSPITALIST

## 2022-01-01 PROCEDURE — 63600175 PHARM REV CODE 636 W HCPCS

## 2022-01-01 PROCEDURE — 1101F PT FALLS ASSESS-DOCD LE1/YR: CPT | Mod: CPTII,S$GLB,, | Performed by: INTERNAL MEDICINE

## 2022-01-01 PROCEDURE — 99285 EMERGENCY DEPT VISIT HI MDM: CPT | Mod: 25

## 2022-01-01 PROCEDURE — 93010 ELECTROCARDIOGRAM REPORT: CPT | Mod: ,,, | Performed by: INTERNAL MEDICINE

## 2022-01-01 PROCEDURE — 74177 CT CHEST ABDOMEN PELVIS WITH CONTRAST (XPD): ICD-10-PCS | Mod: 26,,, | Performed by: RADIOLOGY

## 2022-01-01 PROCEDURE — 71000033 HC RECOVERY, INTIAL HOUR: Performed by: SURGERY

## 2022-01-01 PROCEDURE — 1160F RVW MEDS BY RX/DR IN RCRD: CPT | Mod: CPTII,S$GLB,, | Performed by: NURSE PRACTITIONER

## 2022-01-01 PROCEDURE — 3288F FALL RISK ASSESSMENT DOCD: CPT | Mod: CPTII,S$GLB,, | Performed by: INTERNAL MEDICINE

## 2022-01-01 PROCEDURE — 80053 COMPREHEN METABOLIC PANEL: CPT | Performed by: NURSE PRACTITIONER

## 2022-01-01 PROCEDURE — 80048 BASIC METABOLIC PNL TOTAL CA: CPT | Mod: XB | Performed by: EMERGENCY MEDICINE

## 2022-01-01 PROCEDURE — 37000008 HC ANESTHESIA 1ST 15 MINUTES: Performed by: SURGERY

## 2022-01-01 PROCEDURE — 1159F MED LIST DOCD IN RCRD: CPT | Mod: CPTII,95,, | Performed by: INTERNAL MEDICINE

## 2022-01-01 PROCEDURE — 84100 ASSAY OF PHOSPHORUS: CPT

## 2022-01-01 PROCEDURE — 96372 THER/PROPH/DIAG INJ SC/IM: CPT

## 2022-01-01 PROCEDURE — 3008F PR BODY MASS INDEX (BMI) DOCUMENTED: ICD-10-PCS | Mod: CPTII,S$GLB,, | Performed by: NURSE PRACTITIONER

## 2022-01-01 PROCEDURE — 36415 COLL VENOUS BLD VENIPUNCTURE: CPT

## 2022-01-01 PROCEDURE — 99999 PR PBB SHADOW E&M-EST. PATIENT-LVL III: CPT | Mod: PBBFAC,,, | Performed by: INTERNAL MEDICINE

## 2022-01-01 PROCEDURE — 99214 PR OFFICE/OUTPT VISIT, EST, LEVL IV, 30-39 MIN: ICD-10-PCS | Mod: ,,, | Performed by: INTERNAL MEDICINE

## 2022-01-01 PROCEDURE — D9220A PRA ANESTHESIA: Mod: CRNA,,, | Performed by: NURSE ANESTHETIST, CERTIFIED REGISTERED

## 2022-01-01 PROCEDURE — G0378 HOSPITAL OBSERVATION PER HR: HCPCS

## 2022-01-01 PROCEDURE — 1160F PR REVIEW ALL MEDS BY PRESCRIBER/CLIN PHARMACIST DOCUMENTED: ICD-10-PCS | Mod: CPTII,S$GLB,, | Performed by: NURSE PRACTITIONER

## 2022-01-01 PROCEDURE — 99900103 DSU ONLY-NO CHARGE-INITIAL HR (STAT): Performed by: SURGERY

## 2022-01-01 PROCEDURE — 71260 CT CHEST ABDOMEN PELVIS WITH CONTRAST (XPD): ICD-10-PCS | Mod: 26,,, | Performed by: RADIOLOGY

## 2022-01-01 PROCEDURE — 63600175 PHARM REV CODE 636 W HCPCS: Performed by: EMERGENCY MEDICINE

## 2022-01-01 PROCEDURE — 96365 THER/PROPH/DIAG IV INF INIT: CPT

## 2022-01-01 PROCEDURE — 71260 CT THORAX DX C+: CPT | Mod: 26,,, | Performed by: RADIOLOGY

## 2022-01-01 PROCEDURE — 1160F RVW MEDS BY RX/DR IN RCRD: CPT | Mod: CPTII,S$GLB,, | Performed by: HOSPITALIST

## 2022-01-01 PROCEDURE — 3079F DIAST BP 80-89 MM HG: CPT | Mod: CPTII,S$GLB,, | Performed by: INTERNAL MEDICINE

## 2022-01-01 PROCEDURE — 25000003 PHARM REV CODE 250: Performed by: HOSPITALIST

## 2022-01-01 PROCEDURE — 3078F DIAST BP <80 MM HG: CPT | Mod: CPTII,S$GLB,, | Performed by: NURSE PRACTITIONER

## 2022-01-01 PROCEDURE — 87502 INFLUENZA DNA AMP PROBE: CPT | Mod: QW,S$GLB,, | Performed by: INTERNAL MEDICINE

## 2022-01-01 PROCEDURE — 99999 PR PBB SHADOW E&M-EST. PATIENT-LVL IV: ICD-10-PCS | Mod: PBBFAC,,, | Performed by: NURSE PRACTITIONER

## 2022-01-01 PROCEDURE — 25000003 PHARM REV CODE 250: Performed by: SURGERY

## 2022-01-01 PROCEDURE — 99999 PR PBB SHADOW E&M-EST. PATIENT-LVL V: ICD-10-PCS | Mod: PBBFAC,,, | Performed by: HOSPITALIST

## 2022-01-01 PROCEDURE — 85007 BL SMEAR W/DIFF WBC COUNT: CPT | Mod: NCS

## 2022-01-01 PROCEDURE — 25000003 PHARM REV CODE 250: Performed by: ANESTHESIOLOGY

## 2022-01-01 PROCEDURE — 87046 STOOL CULTR AEROBIC BACT EA: CPT | Performed by: EMERGENCY MEDICINE

## 2022-01-01 PROCEDURE — 3078F PR MOST RECENT DIASTOLIC BLOOD PRESSURE < 80 MM HG: ICD-10-PCS | Mod: CPTII,S$GLB,, | Performed by: HOSPITALIST

## 2022-01-01 PROCEDURE — 99213 PR OFFICE/OUTPT VISIT, EST, LEVL III, 20-29 MIN: ICD-10-PCS | Mod: ,,, | Performed by: INTERNAL MEDICINE

## 2022-01-01 PROCEDURE — 87635: ICD-10-PCS | Mod: QW,S$GLB,, | Performed by: INTERNAL MEDICINE

## 2022-01-01 PROCEDURE — 63600175 PHARM REV CODE 636 W HCPCS: Performed by: ANESTHESIOLOGY

## 2022-01-01 PROCEDURE — 96413 CHEMO IV INFUSION 1 HR: CPT

## 2022-01-01 PROCEDURE — 1159F PR MEDICATION LIST DOCUMENTED IN MEDICAL RECORD: ICD-10-PCS | Mod: CPTII,95,, | Performed by: INTERNAL MEDICINE

## 2022-01-01 PROCEDURE — 3008F PR BODY MASS INDEX (BMI) DOCUMENTED: ICD-10-PCS | Mod: CPTII,S$GLB,, | Performed by: HOSPITALIST

## 2022-01-01 PROCEDURE — 1159F PR MEDICATION LIST DOCUMENTED IN MEDICAL RECORD: ICD-10-PCS | Mod: CPTII,S$GLB,, | Performed by: NURSE PRACTITIONER

## 2022-01-01 PROCEDURE — 85025 COMPLETE CBC W/AUTO DIFF WBC: CPT | Performed by: HOSPITALIST

## 2022-01-01 PROCEDURE — 3077F PR MOST RECENT SYSTOLIC BLOOD PRESSURE >= 140 MM HG: ICD-10-PCS | Mod: CPTII,S$GLB,, | Performed by: NURSE PRACTITIONER

## 2022-01-01 PROCEDURE — 84466 ASSAY OF TRANSFERRIN: CPT | Performed by: INTERNAL MEDICINE

## 2022-01-01 PROCEDURE — 36415 COLL VENOUS BLD VENIPUNCTURE: CPT | Performed by: HOSPITALIST

## 2022-01-01 PROCEDURE — 3008F BODY MASS INDEX DOCD: CPT | Mod: CPTII,S$GLB,, | Performed by: HOSPITALIST

## 2022-01-01 PROCEDURE — 85025 COMPLETE CBC W/AUTO DIFF WBC: CPT

## 2022-01-01 PROCEDURE — 87502 POCT INFLUENZA A/B MOLECULAR: ICD-10-PCS | Mod: QW,S$GLB,, | Performed by: INTERNAL MEDICINE

## 2022-01-01 PROCEDURE — 74177 CT ABD & PELVIS W/CONTRAST: CPT | Mod: TC

## 2022-01-01 PROCEDURE — 96376 TX/PRO/DX INJ SAME DRUG ADON: CPT

## 2022-01-01 PROCEDURE — 99900104 DSU ONLY-NO CHARGE-EA ADD'L HR (STAT): Performed by: SURGERY

## 2022-01-01 PROCEDURE — 77001 CHG FLUOROGUIDE CNTRL VEN ACCESS,PLACE,REPLACE,REMOVE: ICD-10-PCS | Mod: 26,,, | Performed by: SURGERY

## 2022-01-01 PROCEDURE — 36561 PR INSERT TUNNELED CV CATH WITH PORT: ICD-10-PCS | Mod: RT,,, | Performed by: SURGERY

## 2022-01-01 PROCEDURE — 84132 ASSAY OF SERUM POTASSIUM: CPT | Performed by: INTERNAL MEDICINE

## 2022-01-01 PROCEDURE — 99215 OFFICE O/P EST HI 40 MIN: CPT | Mod: 95,,, | Performed by: INTERNAL MEDICINE

## 2022-01-01 PROCEDURE — A9698 NON-RAD CONTRAST MATERIALNOC: HCPCS | Performed by: NURSE PRACTITIONER

## 2022-01-01 PROCEDURE — 96417 CHEMO IV INFUS EACH ADDL SEQ: CPT

## 2022-01-01 PROCEDURE — 99999 PR PBB SHADOW E&M-EST. PATIENT-LVL V: ICD-10-PCS | Mod: PBBFAC,,, | Performed by: INTERNAL MEDICINE

## 2022-01-01 PROCEDURE — 25500020 PHARM REV CODE 255

## 2022-01-01 PROCEDURE — 87427 SHIGA-LIKE TOXIN AG IA: CPT | Performed by: EMERGENCY MEDICINE

## 2022-01-01 PROCEDURE — 3078F DIAST BP <80 MM HG: CPT | Mod: CPTII,S$GLB,, | Performed by: HOSPITALIST

## 2022-01-01 PROCEDURE — 1160F PR REVIEW ALL MEDS BY PRESCRIBER/CLIN PHARMACIST DOCUMENTED: ICD-10-PCS | Mod: CPTII,95,, | Performed by: INTERNAL MEDICINE

## 2022-01-01 PROCEDURE — D9220A PRA ANESTHESIA: ICD-10-PCS | Mod: CRNA,,, | Performed by: NURSE ANESTHETIST, CERTIFIED REGISTERED

## 2022-01-01 PROCEDURE — 85007 BL SMEAR W/DIFF WBC COUNT: CPT | Mod: NCS | Performed by: EMERGENCY MEDICINE

## 2022-01-01 PROCEDURE — 99215 OFFICE O/P EST HI 40 MIN: CPT | Mod: S$GLB,,, | Performed by: HOSPITALIST

## 2022-01-01 PROCEDURE — 96360 HYDRATION IV INFUSION INIT: CPT

## 2022-01-01 PROCEDURE — 3288F PR FALLS RISK ASSESSMENT DOCUMENTED: ICD-10-PCS | Mod: CPTII,S$GLB,, | Performed by: INTERNAL MEDICINE

## 2022-01-01 PROCEDURE — 87635 SARS-COV-2 COVID-19 AMP PRB: CPT | Mod: QW,S$GLB,, | Performed by: INTERNAL MEDICINE

## 2022-01-01 PROCEDURE — 89055 LEUKOCYTE ASSESSMENT FECAL: CPT

## 2022-01-01 PROCEDURE — 93005 ELECTROCARDIOGRAM TRACING: CPT

## 2022-01-01 PROCEDURE — 77001 FLUOROGUIDE FOR VEIN DEVICE: CPT | Mod: 26,,, | Performed by: SURGERY

## 2022-01-01 PROCEDURE — 99999 PR PBB SHADOW E&M-EST. PATIENT-LVL III: ICD-10-PCS | Mod: PBBFAC,,, | Performed by: INTERNAL MEDICINE

## 2022-01-01 PROCEDURE — 36000707: Performed by: SURGERY

## 2022-01-01 PROCEDURE — 63600175 PHARM REV CODE 636 W HCPCS: Performed by: SURGERY

## 2022-01-01 PROCEDURE — 74177 CT ABD & PELVIS W/CONTRAST: CPT | Mod: 26,,, | Performed by: RADIOLOGY

## 2022-01-01 PROCEDURE — 80053 COMPREHEN METABOLIC PANEL: CPT

## 2022-01-01 PROCEDURE — D9220A PRA ANESTHESIA: Mod: ANES,,, | Performed by: ANESTHESIOLOGY

## 2022-01-01 PROCEDURE — 82728 ASSAY OF FERRITIN: CPT | Performed by: INTERNAL MEDICINE

## 2022-01-01 PROCEDURE — 85027 COMPLETE CBC AUTOMATED: CPT

## 2022-01-01 DEVICE — POWERPORT M.R.I. IMPLANTABLE PORT WITH ATTACHABLE 8F CHRONOFLEX OPEN-ENDED SINGLE-LUMEN VENOUS CATHETER INTERMEDIATE KIT  (WITH SUTURE PLUGS)
Type: IMPLANTABLE DEVICE | Site: CHEST | Status: FUNCTIONAL
Brand: POWERPORT M.R.I., CHRONOFLEX

## 2022-01-01 RX ORDER — EPINEPHRINE 0.3 MG/.3ML
0.3 INJECTION SUBCUTANEOUS ONCE AS NEEDED
Status: CANCELLED | OUTPATIENT
Start: 2022-01-01

## 2022-01-01 RX ORDER — SODIUM CHLORIDE 9 MG/ML
INJECTION, SOLUTION INTRAVENOUS CONTINUOUS
Status: DISCONTINUED | OUTPATIENT
Start: 2022-01-01 | End: 2022-01-01 | Stop reason: HOSPADM

## 2022-01-01 RX ORDER — ATROPINE SULFATE 0.4 MG/ML
0.4 INJECTION, SOLUTION ENDOTRACHEAL; INTRAMEDULLARY; INTRAMUSCULAR; INTRAVENOUS; SUBCUTANEOUS ONCE AS NEEDED
Status: DISCONTINUED | OUTPATIENT
Start: 2022-01-01 | End: 2022-01-01 | Stop reason: HOSPADM

## 2022-01-01 RX ORDER — FLUOROURACIL 50 MG/ML
400 INJECTION, SOLUTION INTRAVENOUS
Status: CANCELLED | OUTPATIENT
Start: 2022-01-01

## 2022-01-01 RX ORDER — SODIUM CHLORIDE 0.9 % (FLUSH) 0.9 %
10 SYRINGE (ML) INJECTION
Status: CANCELLED | OUTPATIENT
Start: 2022-01-01

## 2022-01-01 RX ORDER — ENOXAPARIN SODIUM 100 MG/ML
40 INJECTION SUBCUTANEOUS EVERY 24 HOURS
Status: DISCONTINUED | OUTPATIENT
Start: 2022-01-01 | End: 2022-01-01 | Stop reason: HOSPADM

## 2022-01-01 RX ORDER — SODIUM CHLORIDE 0.9 % (FLUSH) 0.9 %
10 SYRINGE (ML) INJECTION
Status: DISCONTINUED | OUTPATIENT
Start: 2022-01-01 | End: 2022-01-01 | Stop reason: HOSPADM

## 2022-01-01 RX ORDER — GABAPENTIN 300 MG/1
300 CAPSULE ORAL NIGHTLY
Status: DISCONTINUED | OUTPATIENT
Start: 2022-01-01 | End: 2022-01-01 | Stop reason: HOSPADM

## 2022-01-01 RX ORDER — PROCHLORPERAZINE MALEATE 10 MG
10 TABLET ORAL EVERY 6 HOURS PRN
Qty: 30 TABLET | Refills: 1 | Status: SHIPPED | OUTPATIENT
Start: 2022-01-01 | End: 2023-10-28

## 2022-01-01 RX ORDER — LOPERAMIDE HYDROCHLORIDE 2 MG/1
2 CAPSULE ORAL 4 TIMES DAILY PRN
Status: DISCONTINUED | OUTPATIENT
Start: 2022-01-01 | End: 2022-01-01 | Stop reason: HOSPADM

## 2022-01-01 RX ORDER — ATROPINE SULFATE 0.4 MG/ML
0.4 INJECTION, SOLUTION ENDOTRACHEAL; INTRAMEDULLARY; INTRAMUSCULAR; INTRAVENOUS; SUBCUTANEOUS ONCE AS NEEDED
Status: CANCELLED | OUTPATIENT
Start: 2022-01-01

## 2022-01-01 RX ORDER — HYDROCODONE BITARTRATE AND ACETAMINOPHEN 5; 325 MG/1; MG/1
1 TABLET ORAL EVERY 6 HOURS PRN
Status: DISCONTINUED | OUTPATIENT
Start: 2022-01-01 | End: 2022-01-01 | Stop reason: HOSPADM

## 2022-01-01 RX ORDER — FLUOROURACIL 50 MG/ML
2400 INJECTION, SOLUTION INTRAVENOUS
Status: CANCELLED | OUTPATIENT
Start: 2022-01-01

## 2022-01-01 RX ORDER — HEPARIN 100 UNIT/ML
5 SYRINGE INTRAVENOUS
Status: DISCONTINUED | OUTPATIENT
Start: 2022-01-01 | End: 2022-01-01 | Stop reason: HOSPADM

## 2022-01-01 RX ORDER — POTASSIUM CHLORIDE 7.45 MG/ML
40 INJECTION INTRAVENOUS
Status: COMPLETED | OUTPATIENT
Start: 2022-01-01 | End: 2022-01-01

## 2022-01-01 RX ORDER — DIPHENHYDRAMINE HYDROCHLORIDE 50 MG/ML
50 INJECTION INTRAMUSCULAR; INTRAVENOUS ONCE AS NEEDED
Status: CANCELLED | OUTPATIENT
Start: 2022-01-01

## 2022-01-01 RX ORDER — POTASSIUM CHLORIDE 20 MEQ/15ML
20 SOLUTION ORAL 2 TIMES DAILY
Qty: 473 ML | Refills: 0 | Status: SHIPPED | OUTPATIENT
Start: 2022-01-01 | End: 2023-01-01 | Stop reason: SDUPTHER

## 2022-01-01 RX ORDER — FLUOROURACIL 50 MG/ML
400 INJECTION, SOLUTION INTRAVENOUS
Status: COMPLETED | OUTPATIENT
Start: 2022-01-01 | End: 2022-01-01

## 2022-01-01 RX ORDER — SODIUM,POTASSIUM PHOSPHATES 280-250MG
2 POWDER IN PACKET (EA) ORAL
Status: DISCONTINUED | OUTPATIENT
Start: 2022-01-01 | End: 2022-01-01 | Stop reason: HOSPADM

## 2022-01-01 RX ORDER — POTASSIUM CHLORIDE 20 MEQ/15ML
20 SOLUTION ORAL 2 TIMES DAILY
Qty: 5 ML | Refills: 0 | Status: SHIPPED | OUTPATIENT
Start: 2022-01-01 | End: 2022-01-01 | Stop reason: SDUPTHER

## 2022-01-01 RX ORDER — METRONIDAZOLE 500 MG/100ML
500 INJECTION, SOLUTION INTRAVENOUS
Status: CANCELLED | OUTPATIENT
Start: 2022-01-01

## 2022-01-01 RX ORDER — MAGNESIUM SULFATE HEPTAHYDRATE 40 MG/ML
2 INJECTION, SOLUTION INTRAVENOUS
Status: COMPLETED | OUTPATIENT
Start: 2022-01-01 | End: 2022-01-01

## 2022-01-01 RX ORDER — ATROPINE SULFATE 0.4 MG/ML
0.4 INJECTION, SOLUTION ENDOTRACHEAL; INTRAMEDULLARY; INTRAMUSCULAR; INTRAVENOUS; SUBCUTANEOUS ONCE AS NEEDED
Status: COMPLETED | OUTPATIENT
Start: 2022-01-01 | End: 2022-01-01

## 2022-01-01 RX ORDER — MIDAZOLAM HYDROCHLORIDE 1 MG/ML
INJECTION, SOLUTION INTRAMUSCULAR; INTRAVENOUS
Status: DISCONTINUED | OUTPATIENT
Start: 2022-01-01 | End: 2022-01-01

## 2022-01-01 RX ORDER — METHYLPREDNISOLONE SOD SUCC 125 MG
125 VIAL (EA) INJECTION ONCE AS NEEDED
Status: CANCELLED | OUTPATIENT
Start: 2022-01-01

## 2022-01-01 RX ORDER — ONDANSETRON 2 MG/ML
INJECTION INTRAMUSCULAR; INTRAVENOUS
Status: DISCONTINUED | OUTPATIENT
Start: 2022-01-01 | End: 2022-01-01

## 2022-01-01 RX ORDER — HYDROCODONE BITARTRATE AND ACETAMINOPHEN 5; 325 MG/1; MG/1
1 TABLET ORAL EVERY 6 HOURS PRN
Qty: 30 TABLET | Refills: 0 | Status: SHIPPED | OUTPATIENT
Start: 2022-01-01 | End: 2023-01-01 | Stop reason: SDUPTHER

## 2022-01-01 RX ORDER — DIPHENOXYLATE HYDROCHLORIDE AND ATROPINE SULFATE 2.5; .025 MG/1; MG/1
1 TABLET ORAL 4 TIMES DAILY PRN
Status: DISCONTINUED | OUTPATIENT
Start: 2022-01-01 | End: 2022-01-01 | Stop reason: HOSPADM

## 2022-01-01 RX ORDER — DOXYCYCLINE 100 MG/1
100 CAPSULE ORAL 2 TIMES DAILY
Qty: 14 CAPSULE | Refills: 0 | Status: ON HOLD | OUTPATIENT
Start: 2022-01-01 | End: 2023-01-01 | Stop reason: HOSPADM

## 2022-01-01 RX ORDER — LANOLIN ALCOHOL/MO/W.PET/CERES
100 CREAM (GRAM) TOPICAL DAILY
COMMUNITY
End: 2023-01-01 | Stop reason: ALTCHOICE

## 2022-01-01 RX ORDER — DIPHENOXYLATE HYDROCHLORIDE AND ATROPINE SULFATE 2.5; .025 MG/1; MG/1
TABLET ORAL
COMMUNITY
Start: 2022-01-01

## 2022-01-01 RX ORDER — IBUPROFEN 200 MG
24 TABLET ORAL
Status: DISCONTINUED | OUTPATIENT
Start: 2022-01-01 | End: 2022-01-01 | Stop reason: HOSPADM

## 2022-01-01 RX ORDER — LOPERAMIDE HYDROCHLORIDE 2 MG/1
4 CAPSULE ORAL ONCE
Status: DISCONTINUED | OUTPATIENT
Start: 2022-01-01 | End: 2022-01-01 | Stop reason: HOSPADM

## 2022-01-01 RX ORDER — SODIUM CHLORIDE 9 MG/ML
INJECTION, SOLUTION INTRAVENOUS CONTINUOUS
Status: CANCELLED | OUTPATIENT
Start: 2022-01-01

## 2022-01-01 RX ORDER — HEPARIN 100 UNIT/ML
500 SYRINGE INTRAVENOUS
Status: DISCONTINUED | OUTPATIENT
Start: 2022-01-01 | End: 2022-01-01 | Stop reason: HOSPADM

## 2022-01-01 RX ORDER — HEPARIN 100 UNIT/ML
500 SYRINGE INTRAVENOUS
Status: CANCELLED | OUTPATIENT
Start: 2022-01-01

## 2022-01-01 RX ORDER — PROCHLORPERAZINE EDISYLATE 5 MG/ML
5 INJECTION INTRAMUSCULAR; INTRAVENOUS EVERY 6 HOURS PRN
Status: DISCONTINUED | OUTPATIENT
Start: 2022-01-01 | End: 2022-01-01 | Stop reason: HOSPADM

## 2022-01-01 RX ORDER — EPINEPHRINE 0.3 MG/.3ML
0.3 INJECTION SUBCUTANEOUS ONCE AS NEEDED
Status: DISCONTINUED | OUTPATIENT
Start: 2022-01-01 | End: 2022-01-01 | Stop reason: HOSPADM

## 2022-01-01 RX ORDER — GLUCAGON 1 MG
1 KIT INJECTION
Status: DISCONTINUED | OUTPATIENT
Start: 2022-01-01 | End: 2022-01-01 | Stop reason: HOSPADM

## 2022-01-01 RX ORDER — ALBUTEROL SULFATE 0.83 MG/ML
2.5 SOLUTION RESPIRATORY (INHALATION) EVERY 6 HOURS PRN
Qty: 120 EACH | Refills: 1 | Status: SHIPPED | OUTPATIENT
Start: 2022-01-01 | End: 2023-12-27

## 2022-01-01 RX ORDER — ONDANSETRON 2 MG/ML
4 INJECTION INTRAMUSCULAR; INTRAVENOUS EVERY 8 HOURS PRN
Status: DISCONTINUED | OUTPATIENT
Start: 2022-01-01 | End: 2022-01-01 | Stop reason: HOSPADM

## 2022-01-01 RX ORDER — LOPERAMIDE HYDROCHLORIDE 2 MG/1
2 CAPSULE ORAL 4 TIMES DAILY PRN
Qty: 40 CAPSULE | Refills: 0 | Status: SHIPPED | OUTPATIENT
Start: 2022-01-01 | End: 2022-01-01

## 2022-01-01 RX ORDER — METHYLPREDNISOLONE 4 MG/1
TABLET ORAL
Qty: 21 EACH | Refills: 0 | Status: SHIPPED | OUTPATIENT
Start: 2022-01-01 | End: 2023-01-01

## 2022-01-01 RX ORDER — HYDROCODONE BITARTRATE AND ACETAMINOPHEN 5; 325 MG/1; MG/1
1 TABLET ORAL EVERY 6 HOURS PRN
Qty: 20 TABLET | Refills: 0 | Status: SHIPPED | OUTPATIENT
Start: 2022-01-01 | End: 2022-01-01

## 2022-01-01 RX ORDER — HEPARIN SODIUM (PORCINE) LOCK FLUSH IV SOLN 100 UNIT/ML 100 UNIT/ML
SOLUTION INTRAVENOUS
Status: DISCONTINUED | OUTPATIENT
Start: 2022-01-01 | End: 2022-01-01 | Stop reason: HOSPADM

## 2022-01-01 RX ORDER — HEPARIN 100 UNIT/ML
5 SYRINGE INTRAVENOUS
Status: CANCELLED | OUTPATIENT
Start: 2022-01-01

## 2022-01-01 RX ORDER — DIPHENOXYLATE HYDROCHLORIDE AND ATROPINE SULFATE 2.5; .025 MG/1; MG/1
1 TABLET ORAL 4 TIMES DAILY PRN
Qty: 30 TABLET | Refills: 0 | Status: SHIPPED | OUTPATIENT
Start: 2022-01-01 | End: 2022-01-01

## 2022-01-01 RX ORDER — LANOLIN ALCOHOL/MO/W.PET/CERES
1 CREAM (GRAM) TOPICAL DAILY
Status: DISCONTINUED | OUTPATIENT
Start: 2022-01-01 | End: 2022-01-01 | Stop reason: HOSPADM

## 2022-01-01 RX ORDER — DOXYCYCLINE 100 MG/1
100 CAPSULE ORAL 2 TIMES DAILY
Qty: 60 CAPSULE | Refills: 1 | Status: SHIPPED | OUTPATIENT
Start: 2022-01-01 | End: 2022-01-01

## 2022-01-01 RX ORDER — SODIUM CHLORIDE 9 MG/ML
INJECTION, SOLUTION INTRAVENOUS CONTINUOUS
Status: DISCONTINUED | OUTPATIENT
Start: 2022-01-01 | End: 2022-01-01

## 2022-01-01 RX ORDER — HEPARIN 100 UNIT/ML
500 SYRINGE INTRAVENOUS
Status: CANCELLED | OUTPATIENT
Start: 2023-01-01

## 2022-01-01 RX ORDER — LANOLIN ALCOHOL/MO/W.PET/CERES
800 CREAM (GRAM) TOPICAL
Status: DISCONTINUED | OUTPATIENT
Start: 2022-01-01 | End: 2022-01-01 | Stop reason: HOSPADM

## 2022-01-01 RX ORDER — FLUOROURACIL 50 MG/ML
2400 INJECTION, SOLUTION INTRAVENOUS
Status: CANCELLED | OUTPATIENT
Start: 2023-01-01

## 2022-01-01 RX ORDER — IBUPROFEN 200 MG
16 TABLET ORAL
Status: DISCONTINUED | OUTPATIENT
Start: 2022-01-01 | End: 2022-01-01 | Stop reason: HOSPADM

## 2022-01-01 RX ORDER — DIPHENOXYLATE HYDROCHLORIDE AND ATROPINE SULFATE 2.5; .025 MG/1; MG/1
2 TABLET ORAL
Status: COMPLETED | OUTPATIENT
Start: 2022-01-01 | End: 2022-01-01

## 2022-01-01 RX ORDER — HYDROCODONE BITARTRATE AND ACETAMINOPHEN 10; 325 MG/1; MG/1
1 TABLET ORAL EVERY 6 HOURS PRN
Status: DISCONTINUED | OUTPATIENT
Start: 2022-01-01 | End: 2022-01-01 | Stop reason: HOSPADM

## 2022-01-01 RX ORDER — LEVOTHYROXINE SODIUM 125 UG/1
125 TABLET ORAL
Status: DISCONTINUED | OUTPATIENT
Start: 2022-01-01 | End: 2022-01-01 | Stop reason: HOSPADM

## 2022-01-01 RX ORDER — SODIUM CHLORIDE 9 MG/ML
INJECTION, SOLUTION INTRAVENOUS
Status: COMPLETED | OUTPATIENT
Start: 2022-01-01 | End: 2022-01-01

## 2022-01-01 RX ORDER — OXYCODONE HYDROCHLORIDE 5 MG/1
5 TABLET ORAL
Status: DISCONTINUED | OUTPATIENT
Start: 2022-01-01 | End: 2022-01-01 | Stop reason: HOSPADM

## 2022-01-01 RX ORDER — EPINEPHRINE 0.3 MG/.3ML
0.3 INJECTION SUBCUTANEOUS ONCE AS NEEDED
Status: CANCELLED | OUTPATIENT
Start: 2023-01-01

## 2022-01-01 RX ORDER — ATROPINE SULFATE 0.4 MG/ML
0.4 INJECTION, SOLUTION ENDOTRACHEAL; INTRAMEDULLARY; INTRAMUSCULAR; INTRAVENOUS; SUBCUTANEOUS ONCE AS NEEDED
Status: CANCELLED | OUTPATIENT
Start: 2023-01-01

## 2022-01-01 RX ORDER — FENTANYL CITRATE 50 UG/ML
INJECTION, SOLUTION INTRAMUSCULAR; INTRAVENOUS
Status: DISCONTINUED | OUTPATIENT
Start: 2022-01-01 | End: 2022-01-01

## 2022-01-01 RX ORDER — TRAMADOL HYDROCHLORIDE 50 MG/1
50 TABLET ORAL EVERY 6 HOURS PRN
Qty: 30 TABLET | Refills: 0 | Status: SHIPPED | OUTPATIENT
Start: 2022-01-01 | End: 2022-01-01 | Stop reason: SDUPTHER

## 2022-01-01 RX ORDER — HYDROMORPHONE HYDROCHLORIDE 2 MG/ML
0.2 INJECTION, SOLUTION INTRAMUSCULAR; INTRAVENOUS; SUBCUTANEOUS EVERY 5 MIN PRN
Status: DISCONTINUED | OUTPATIENT
Start: 2022-01-01 | End: 2022-01-01 | Stop reason: HOSPADM

## 2022-01-01 RX ORDER — POTASSIUM CHLORIDE 20 MEQ/1
40 TABLET, EXTENDED RELEASE ORAL
Status: COMPLETED | OUTPATIENT
Start: 2022-01-01 | End: 2022-01-01

## 2022-01-01 RX ORDER — LIDOCAINE HYDROCHLORIDE 20 MG/ML
INJECTION INTRAVENOUS
Status: DISCONTINUED | OUTPATIENT
Start: 2022-01-01 | End: 2022-01-01

## 2022-01-01 RX ORDER — POTASSIUM CHLORIDE 20 MEQ/1
20 TABLET, EXTENDED RELEASE ORAL DAILY
Qty: 10 TABLET | Refills: 0 | Status: SHIPPED | OUTPATIENT
Start: 2022-01-01 | End: 2022-01-01

## 2022-01-01 RX ORDER — PROPOFOL 10 MG/ML
VIAL (ML) INTRAVENOUS
Status: DISCONTINUED | OUTPATIENT
Start: 2022-01-01 | End: 2022-01-01

## 2022-01-01 RX ORDER — AZITHROMYCIN 250 MG/1
TABLET, FILM COATED ORAL
Qty: 6 TABLET | Refills: 0 | Status: SHIPPED | OUTPATIENT
Start: 2022-01-01 | End: 2022-01-01

## 2022-01-01 RX ORDER — ONDANSETRON 2 MG/ML
4 INJECTION INTRAMUSCULAR; INTRAVENOUS EVERY 12 HOURS PRN
Status: DISCONTINUED | OUTPATIENT
Start: 2022-01-01 | End: 2022-01-01 | Stop reason: HOSPADM

## 2022-01-01 RX ORDER — TRIAMCINOLONE ACETONIDE 0.25 MG/G
CREAM TOPICAL DAILY
Qty: 80 G | Refills: 1 | Status: SHIPPED | OUTPATIENT
Start: 2022-01-01

## 2022-01-01 RX ORDER — NALOXONE HCL 0.4 MG/ML
0.02 VIAL (ML) INJECTION
Status: DISCONTINUED | OUTPATIENT
Start: 2022-01-01 | End: 2022-01-01 | Stop reason: HOSPADM

## 2022-01-01 RX ORDER — ACETAMINOPHEN 325 MG/1
650 TABLET ORAL EVERY 8 HOURS PRN
Status: DISCONTINUED | OUTPATIENT
Start: 2022-01-01 | End: 2022-01-01 | Stop reason: HOSPADM

## 2022-01-01 RX ORDER — HYDROCODONE BITARTRATE AND ACETAMINOPHEN 5; 325 MG/1; MG/1
1 TABLET ORAL EVERY 6 HOURS PRN
Qty: 30 TABLET | Refills: 0 | Status: SHIPPED | OUTPATIENT
Start: 2022-01-01 | End: 2022-01-01

## 2022-01-01 RX ORDER — GUAIFENESIN 600 MG/1
600 TABLET, EXTENDED RELEASE ORAL 2 TIMES DAILY
Qty: 20 TABLET | Refills: 0 | Status: ON HOLD | OUTPATIENT
Start: 2022-01-01 | End: 2023-01-01 | Stop reason: SDUPTHER

## 2022-01-01 RX ORDER — ONDANSETRON 8 MG/1
8 TABLET, ORALLY DISINTEGRATING ORAL EVERY 12 HOURS PRN
Qty: 30 TABLET | Refills: 1 | Status: SHIPPED | OUTPATIENT
Start: 2022-01-01 | End: 2023-11-28

## 2022-01-01 RX ORDER — FENTANYL CITRATE 50 UG/ML
25 INJECTION, SOLUTION INTRAMUSCULAR; INTRAVENOUS EVERY 5 MIN PRN
Status: DISCONTINUED | OUTPATIENT
Start: 2022-01-01 | End: 2022-01-01 | Stop reason: HOSPADM

## 2022-01-01 RX ORDER — BUPIVACAINE HCL/EPINEPHRINE 0.25-.0005
VIAL (ML) INJECTION
Status: DISCONTINUED | OUTPATIENT
Start: 2022-01-01 | End: 2022-01-01 | Stop reason: HOSPADM

## 2022-01-01 RX ORDER — DIPHENOXYLATE HYDROCHLORIDE AND ATROPINE SULFATE 2.5; .025 MG/1; MG/1
1 TABLET ORAL 4 TIMES DAILY PRN
Qty: 30 TABLET | Refills: 0 | Status: SHIPPED | OUTPATIENT
Start: 2022-01-01 | End: 2022-01-01 | Stop reason: SDUPTHER

## 2022-01-01 RX ORDER — SODIUM CHLORIDE 0.9 % (FLUSH) 0.9 %
10 SYRINGE (ML) INJECTION EVERY 12 HOURS PRN
Status: DISCONTINUED | OUTPATIENT
Start: 2022-01-01 | End: 2022-01-01 | Stop reason: HOSPADM

## 2022-01-01 RX ORDER — CLINDAMYCIN PHOSPHATE 900 MG/50ML
900 INJECTION, SOLUTION INTRAVENOUS
Status: DISCONTINUED | OUTPATIENT
Start: 2022-01-01 | End: 2022-01-01 | Stop reason: HOSPADM

## 2022-01-01 RX ORDER — DEXAMETHASONE SODIUM PHOSPHATE 4 MG/ML
INJECTION, SOLUTION INTRA-ARTICULAR; INTRALESIONAL; INTRAMUSCULAR; INTRAVENOUS; SOFT TISSUE
Status: DISCONTINUED | OUTPATIENT
Start: 2022-01-01 | End: 2022-01-01

## 2022-01-01 RX ORDER — PROMETHAZINE HYDROCHLORIDE 25 MG/ML
INJECTION, SOLUTION INTRAMUSCULAR; INTRAVENOUS
Status: DISCONTINUED | OUTPATIENT
Start: 2022-01-01 | End: 2022-01-01

## 2022-01-01 RX ORDER — SODIUM CHLORIDE 0.9 % (FLUSH) 0.9 %
10 SYRINGE (ML) INJECTION
Status: CANCELLED | OUTPATIENT
Start: 2023-01-01

## 2022-01-01 RX ORDER — TRAMADOL HYDROCHLORIDE 50 MG/1
50 TABLET ORAL EVERY 6 HOURS PRN
Qty: 30 TABLET | Refills: 0 | Status: SHIPPED | OUTPATIENT
Start: 2022-01-01 | End: 2023-01-01

## 2022-01-01 RX ORDER — POTASSIUM CHLORIDE 20 MEQ/1
40 TABLET, EXTENDED RELEASE ORAL DAILY
Status: DISCONTINUED | OUTPATIENT
Start: 2022-01-01 | End: 2022-01-01 | Stop reason: HOSPADM

## 2022-01-01 RX ORDER — DIPHENHYDRAMINE HYDROCHLORIDE 50 MG/ML
50 INJECTION INTRAMUSCULAR; INTRAVENOUS ONCE AS NEEDED
Status: CANCELLED | OUTPATIENT
Start: 2023-01-01

## 2022-01-01 RX ORDER — HYDROCODONE BITARTRATE AND ACETAMINOPHEN 5; 325 MG/1; MG/1
1 TABLET ORAL EVERY 4 HOURS PRN
Status: DISCONTINUED | OUTPATIENT
Start: 2022-01-01 | End: 2022-01-01 | Stop reason: HOSPADM

## 2022-01-01 RX ORDER — ATROPINE SULFATE 0.4 MG/ML
0.8 INJECTION, SOLUTION ENDOTRACHEAL; INTRAMEDULLARY; INTRAMUSCULAR; INTRAVENOUS; SUBCUTANEOUS ONCE AS NEEDED
Status: COMPLETED | OUTPATIENT
Start: 2022-01-01 | End: 2022-01-01

## 2022-01-01 RX ORDER — ACETAMINOPHEN 10 MG/ML
INJECTION, SOLUTION INTRAVENOUS
Status: DISCONTINUED | OUTPATIENT
Start: 2022-01-01 | End: 2022-01-01

## 2022-01-01 RX ADMIN — FLUOROURACIL 775 MG: 50 INJECTION, SOLUTION INTRAVENOUS at 03:09

## 2022-01-01 RX ADMIN — HEPARIN 500 UNITS: 100 SYRINGE at 10:12

## 2022-01-01 RX ADMIN — LOPERAMIDE HYDROCHLORIDE 2 MG: 2 CAPSULE ORAL at 02:11

## 2022-01-01 RX ADMIN — ONDANSETRON 8 MG: 2 INJECTION INTRAMUSCULAR; INTRAVENOUS at 01:09

## 2022-01-01 RX ADMIN — HYDROCODONE BITARTRATE AND ACETAMINOPHEN 1 TABLET: 5; 325 TABLET ORAL at 09:11

## 2022-01-01 RX ADMIN — PANITUMUMAB 500 MG: 400 SOLUTION INTRAVENOUS at 10:10

## 2022-01-01 RX ADMIN — SODIUM CHLORIDE: 0.9 INJECTION, SOLUTION INTRAVENOUS at 10:09

## 2022-01-01 RX ADMIN — POTASSIUM CHLORIDE 40 MEQ: 1500 TABLET, EXTENDED RELEASE ORAL at 08:11

## 2022-01-01 RX ADMIN — SODIUM CHLORIDE 1000 ML: 0.9 INJECTION, SOLUTION INTRAVENOUS at 10:09

## 2022-01-01 RX ADMIN — DIPHENOXYLATE HYDROCHLORIDE AND ATROPINE SULFATE 1 TABLET: 2.5; .025 TABLET ORAL at 08:11

## 2022-01-01 RX ADMIN — LEUCOVORIN CALCIUM 775 MG: 350 INJECTION, POWDER, LYOPHILIZED, FOR SUSPENSION INTRAMUSCULAR; INTRAVENOUS at 10:09

## 2022-01-01 RX ADMIN — POTASSIUM BICARBONATE 40 MEQ: 391 TABLET, EFFERVESCENT ORAL at 04:10

## 2022-01-01 RX ADMIN — LOPERAMIDE HYDROCHLORIDE 4 MG: 2 CAPSULE ORAL at 08:11

## 2022-01-01 RX ADMIN — FLUOROURACIL 4655 MG: 50 INJECTION, SOLUTION INTRAVENOUS at 03:09

## 2022-01-01 RX ADMIN — IOHEXOL 75 ML: 350 INJECTION, SOLUTION INTRAVENOUS at 12:12

## 2022-01-01 RX ADMIN — PANITUMUMAB 500 MG: 400 SOLUTION INTRAVENOUS at 11:11

## 2022-01-01 RX ADMIN — HEPARIN 500 UNITS: 100 SYRINGE at 10:09

## 2022-01-01 RX ADMIN — LOPERAMIDE HYDROCHLORIDE 2 MG: 2 CAPSULE ORAL at 08:11

## 2022-01-01 RX ADMIN — IRON SUCROSE 400 MG: 20 INJECTION, SOLUTION INTRAVENOUS at 01:08

## 2022-01-01 RX ADMIN — SODIUM CHLORIDE: 9 INJECTION, SOLUTION INTRAVENOUS at 11:11

## 2022-01-01 RX ADMIN — DEXAMETHASONE SODIUM PHOSPHATE 0.25 MG: 4 INJECTION, SOLUTION INTRA-ARTICULAR; INTRALESIONAL; INTRAMUSCULAR; INTRAVENOUS; SOFT TISSUE at 11:10

## 2022-01-01 RX ADMIN — BARIUM SULFATE 900 ML: 20 SUSPENSION ORAL at 12:12

## 2022-01-01 RX ADMIN — PEGFILGRASTIM 6 MG: KIT SUBCUTANEOUS at 11:10

## 2022-01-01 RX ADMIN — FERRIC CARBOXYMALTOSE INJECTION 750 MG: 50 INJECTION, SOLUTION INTRAVENOUS at 11:08

## 2022-01-01 RX ADMIN — PROPOFOL 120 MG: 10 INJECTION, EMULSION INTRAVENOUS at 01:09

## 2022-01-01 RX ADMIN — PEGFILGRASTIM 6 MG: KIT SUBCUTANEOUS at 01:09

## 2022-01-01 RX ADMIN — FERROUS SULFATE TAB 325 MG (65 MG ELEMENTAL FE) 1 EACH: 325 (65 FE) TAB at 08:11

## 2022-01-01 RX ADMIN — DEXAMETHASONE SODIUM PHOSPHATE 4 MG: 4 INJECTION, SOLUTION INTRA-ARTICULAR; INTRALESIONAL; INTRAMUSCULAR; INTRAVENOUS; SOFT TISSUE at 01:09

## 2022-01-01 RX ADMIN — PALONOSETRON HYDROCHLORIDE 0.25 MG: 0.25 INJECTION, SOLUTION INTRAVENOUS at 11:12

## 2022-01-01 RX ADMIN — LOPERAMIDE HYDROCHLORIDE 2 MG: 2 CAPSULE ORAL at 01:11

## 2022-01-01 RX ADMIN — FENTANYL CITRATE 25 MCG: 50 INJECTION, SOLUTION INTRAMUSCULAR; INTRAVENOUS at 02:09

## 2022-01-01 RX ADMIN — FERROUS SULFATE TAB 325 MG (65 MG ELEMENTAL FE) 1 EACH: 325 (65 FE) TAB at 08:08

## 2022-01-01 RX ADMIN — DEXTROSE 775 MG: 5 SOLUTION INTRAVENOUS at 11:12

## 2022-01-01 RX ADMIN — ENOXAPARIN SODIUM 40 MG: 100 INJECTION SUBCUTANEOUS at 05:11

## 2022-01-01 RX ADMIN — DIPHENOXYLATE HYDROCHLORIDE AND ATROPINE SULFATE 2 TABLET: 2.5; .025 TABLET ORAL at 01:11

## 2022-01-01 RX ADMIN — MAGNESIUM SULFATE HEPTAHYDRATE 2 G: 2 INJECTION, SOLUTION INTRAVENOUS at 05:10

## 2022-01-01 RX ADMIN — IRINOTECAN HYDROCHLORIDE 350 MG: 20 INJECTION, SOLUTION INTRAVENOUS at 10:09

## 2022-01-01 RX ADMIN — LOPERAMIDE HYDROCHLORIDE 2 MG: 2 CAPSULE ORAL at 11:11

## 2022-01-01 RX ADMIN — HEPARIN 500 UNITS: 100 SYRINGE at 11:10

## 2022-01-01 RX ADMIN — IRINOTECAN HYDROCHLORIDE 320 MG: 20 INJECTION, SOLUTION INTRAVENOUS at 12:11

## 2022-01-01 RX ADMIN — MIDAZOLAM 2 MG: 1 INJECTION INTRAMUSCULAR; INTRAVENOUS at 01:09

## 2022-01-01 RX ADMIN — POTASSIUM BICARBONATE 35 MEQ: 391 TABLET, EFFERVESCENT ORAL at 08:11

## 2022-01-01 RX ADMIN — FLUOROURACIL 4655 MG: 50 INJECTION, SOLUTION INTRAVENOUS at 12:09

## 2022-01-01 RX ADMIN — FLUOROURACIL 4655 MG: 50 INJECTION, SOLUTION INTRAVENOUS at 01:10

## 2022-01-01 RX ADMIN — SODIUM CHLORIDE, PRESERVATIVE FREE 10 ML: 5 INJECTION INTRAVENOUS at 10:09

## 2022-01-01 RX ADMIN — FLUOROURACIL 4655 MG: 50 INJECTION, SOLUTION INTRAVENOUS at 01:11

## 2022-01-01 RX ADMIN — PEGFILGRASTIM 6 MG: KIT SUBCUTANEOUS at 11:11

## 2022-01-01 RX ADMIN — FENTANYL CITRATE 25 MCG: 50 INJECTION, SOLUTION INTRAMUSCULAR; INTRAVENOUS at 01:09

## 2022-01-01 RX ADMIN — ATROPINE SULFATE 0.4 MG: 0.4 INJECTION, SOLUTION INTRAMUSCULAR; INTRAVENOUS; SUBCUTANEOUS at 11:10

## 2022-01-01 RX ADMIN — HEPARIN 500 UNITS: 100 SYRINGE at 11:11

## 2022-01-01 RX ADMIN — ATROPINE SULFATE 0.4 MG: 0.4 INJECTION, SOLUTION INTRAMUSCULAR; INTRAVENOUS; SUBCUTANEOUS at 10:09

## 2022-01-01 RX ADMIN — MORPHINE SULFATE 4 MG: 4 INJECTION INTRAVENOUS at 01:08

## 2022-01-01 RX ADMIN — LEUCOVORIN CALCIUM 775 MG: 350 INJECTION, POWDER, LYOPHILIZED, FOR SUSPENSION INTRAMUSCULAR; INTRAVENOUS at 01:09

## 2022-01-01 RX ADMIN — PEGFILGRASTIM 6 MG: KIT SUBCUTANEOUS at 10:12

## 2022-01-01 RX ADMIN — HYDROCODONE BITARTRATE AND ACETAMINOPHEN 1 TABLET: 5; 325 TABLET ORAL at 08:11

## 2022-01-01 RX ADMIN — DEXAMETHASONE SODIUM PHOSPHATE 0.25 MG: 4 INJECTION, SOLUTION INTRA-ARTICULAR; INTRALESIONAL; INTRAMUSCULAR; INTRAVENOUS; SOFT TISSUE at 10:09

## 2022-01-01 RX ADMIN — SODIUM CHLORIDE: 0.9 INJECTION, SOLUTION INTRAVENOUS at 01:11

## 2022-01-01 RX ADMIN — ATROPINE SULFATE 0.4 MG: 0.4 INJECTION, SOLUTION INTRAVENOUS at 12:11

## 2022-01-01 RX ADMIN — LOPERAMIDE HYDROCHLORIDE 2 MG: 2 CAPSULE ORAL at 05:11

## 2022-01-01 RX ADMIN — FLUOROURACIL 775 MG: 50 INJECTION, SOLUTION INTRAVENOUS at 12:09

## 2022-01-01 RX ADMIN — SODIUM CHLORIDE: 0.9 INJECTION, SOLUTION INTRAVENOUS at 12:08

## 2022-01-01 RX ADMIN — SODIUM CHLORIDE, SODIUM GLUCONATE, SODIUM ACETATE, POTASSIUM CHLORIDE, MAGNESIUM CHLORIDE, SODIUM PHOSPHATE, DIBASIC, AND POTASSIUM PHOSPHATE: .53; .5; .37; .037; .03; .012; .00082 INJECTION, SOLUTION INTRAVENOUS at 12:09

## 2022-01-01 RX ADMIN — HYDROCODONE BITARTRATE AND ACETAMINOPHEN 1 TABLET: 5; 325 TABLET ORAL at 05:11

## 2022-01-01 RX ADMIN — POTASSIUM BICARBONATE 35 MEQ: 391 TABLET, EFFERVESCENT ORAL at 11:11

## 2022-01-01 RX ADMIN — IRINOTECAN HYDROCHLORIDE 350 MG: 20 INJECTION, SOLUTION INTRAVENOUS at 01:09

## 2022-01-01 RX ADMIN — HEPARIN 500 UNITS: 100 SYRINGE at 01:09

## 2022-01-01 RX ADMIN — LIDOCAINE HYDROCHLORIDE 80 MG: 20 INJECTION, SOLUTION INTRAVENOUS at 01:09

## 2022-01-01 RX ADMIN — LOPERAMIDE HYDROCHLORIDE 2 MG: 2 CAPSULE ORAL at 03:11

## 2022-01-01 RX ADMIN — DIPHENOXYLATE HYDROCHLORIDE AND ATROPINE SULFATE 1 TABLET: 2.5; .025 TABLET ORAL at 04:11

## 2022-01-01 RX ADMIN — DEXTROSE 775 MG: 5 SOLUTION INTRAVENOUS at 12:11

## 2022-01-01 RX ADMIN — SODIUM CHLORIDE: 0.9 INJECTION, SOLUTION INTRAVENOUS at 11:08

## 2022-01-01 RX ADMIN — DEXAMETHASONE SODIUM PHOSPHATE 0.25 MG: 4 INJECTION, SOLUTION INTRA-ARTICULAR; INTRALESIONAL; INTRAMUSCULAR; INTRAVENOUS; SOFT TISSUE at 11:11

## 2022-01-01 RX ADMIN — IRINOTECAN HYDROCHLORIDE 350 MG: 20 INJECTION, SOLUTION INTRAVENOUS at 11:10

## 2022-01-01 RX ADMIN — SODIUM CHLORIDE: 0.9 INJECTION, SOLUTION INTRAVENOUS at 08:11

## 2022-01-01 RX ADMIN — DEXTROSE MONOHYDRATE 775 MG: 50 INJECTION, SOLUTION INTRAVENOUS at 11:10

## 2022-01-01 RX ADMIN — PANITUMUMAB 500 MG: 400 SOLUTION INTRAVENOUS at 10:12

## 2022-01-01 RX ADMIN — HYDROCODONE BITARTRATE AND ACETAMINOPHEN 1 TABLET: 5; 325 TABLET ORAL at 06:11

## 2022-01-01 RX ADMIN — HYDROCODONE BITARTRATE AND ACETAMINOPHEN 1 TABLET: 5; 325 TABLET ORAL at 03:11

## 2022-01-01 RX ADMIN — PROMETHAZINE HYDROCHLORIDE 6.25 MG: 25 INJECTION INTRAMUSCULAR; INTRAVENOUS at 01:09

## 2022-01-01 RX ADMIN — SODIUM CHLORIDE: 0.9 INJECTION, SOLUTION INTRAVENOUS at 02:11

## 2022-01-01 RX ADMIN — POTASSIUM CHLORIDE 40 MEQ: 1500 TABLET, EXTENDED RELEASE ORAL at 02:11

## 2022-01-01 RX ADMIN — SODIUM CHLORIDE, PRESERVATIVE FREE 10 ML: 5 INJECTION INTRAVENOUS at 01:09

## 2022-01-01 RX ADMIN — DEXAMETHASONE SODIUM PHOSPHATE 0.25 MG: 4 INJECTION, SOLUTION INTRA-ARTICULAR; INTRALESIONAL; INTRAMUSCULAR; INTRAVENOUS; SOFT TISSUE at 12:09

## 2022-01-01 RX ADMIN — PANITUMUMAB 500 MG: 400 SOLUTION INTRAVENOUS at 11:09

## 2022-01-01 RX ADMIN — LEVOTHYROXINE SODIUM 125 MCG: 0.03 TABLET ORAL at 06:08

## 2022-01-01 RX ADMIN — POTASSIUM CHLORIDE 40 MEQ: 7.46 INJECTION, SOLUTION INTRAVENOUS at 04:10

## 2022-01-01 RX ADMIN — ATROPINE SULFATE 0.8 MG: 0.4 INJECTION, SOLUTION INTRAMUSCULAR; INTRAVENOUS; SUBCUTANEOUS at 12:09

## 2022-01-01 RX ADMIN — SODIUM CHLORIDE, PRESERVATIVE FREE 10 ML: 5 INJECTION INTRAVENOUS at 11:10

## 2022-01-01 RX ADMIN — CLINDAMYCIN PHOSPHATE 900 MG: 18 INJECTION, SOLUTION INTRAVENOUS at 01:09

## 2022-01-01 RX ADMIN — FLUOROURACIL 4655 MG: 50 INJECTION, SOLUTION INTRAVENOUS at 12:12

## 2022-01-01 RX ADMIN — LEVOTHYROXINE SODIUM 125 MCG: 0.12 TABLET ORAL at 05:11

## 2022-01-01 RX ADMIN — ACETAMINOPHEN 1000 MG: 10 INJECTION, SOLUTION INTRAVENOUS at 01:09

## 2022-01-01 RX ADMIN — SODIUM CHLORIDE, PRESERVATIVE FREE 10 ML: 5 INJECTION INTRAVENOUS at 11:11

## 2022-01-01 RX ADMIN — SODIUM CHLORIDE, PRESERVATIVE FREE 10 ML: 5 INJECTION INTRAVENOUS at 10:12

## 2022-01-01 RX ADMIN — SODIUM CHLORIDE, PRESERVATIVE FREE 10 ML: 5 INJECTION INTRAVENOUS at 01:11

## 2022-01-05 ENCOUNTER — TELEPHONE (OUTPATIENT)
Dept: HEMATOLOGY/ONCOLOGY | Facility: CLINIC | Age: 67
End: 2022-01-05
Payer: COMMERCIAL

## 2022-01-05 NOTE — TELEPHONE ENCOUNTER
Pt's call returned as requested. Explained to pt that PET was denied by insurance and Dr Frank did not want to order further studies at this time. Pt v/u.    ----- Message from Anne Marie Jacob sent at 1/5/2022  4:14 PM CST -----  Regarding: advice  Contact: FENG DELUNA [[id number]]  Patient is requesting a call back from the nurse in regards to PET scan that was supposed to been scheduled for 1/6/22. Patient stated she received a message on her portal indicating that her appt has been canceled and she inquired why.   Please call the patient upon request at phone number [phone].

## 2022-01-06 ENCOUNTER — HOSPITAL ENCOUNTER (OUTPATIENT)
Dept: RADIOLOGY | Facility: HOSPITAL | Age: 67
Discharge: HOME | End: 2022-01-06
Attending: INTERNAL MEDICINE
Payer: COMMERCIAL

## 2022-01-06 VITALS — HEIGHT: 61 IN | BODY MASS INDEX: 35.8 KG/M2 | WEIGHT: 189.63 LBS

## 2022-01-06 DIAGNOSIS — Z12.31 ENCOUNTER FOR SCREENING MAMMOGRAM FOR BREAST CANCER: ICD-10-CM

## 2022-01-06 PROCEDURE — 77063 BREAST TOMOSYNTHESIS BI: CPT | Mod: TC,PO

## 2022-01-06 PROCEDURE — 77067 SCR MAMMO BI INCL CAD: CPT | Mod: TC,PO

## 2022-01-07 ENCOUNTER — TELEPHONE (OUTPATIENT)
Dept: HEMATOLOGY/ONCOLOGY | Facility: CLINIC | Age: 67
End: 2022-01-07
Payer: COMMERCIAL

## 2022-01-18 DIAGNOSIS — M54.40 CHRONIC MIDLINE LOW BACK PAIN WITH SCIATICA, SCIATICA LATERALITY UNSPECIFIED: ICD-10-CM

## 2022-01-18 DIAGNOSIS — C18.9 MALIGNANT NEOPLASM OF COLON, UNSPECIFIED PART OF COLON: ICD-10-CM

## 2022-01-18 DIAGNOSIS — G89.29 CHRONIC MIDLINE LOW BACK PAIN WITH SCIATICA, SCIATICA LATERALITY UNSPECIFIED: ICD-10-CM

## 2022-01-19 ENCOUNTER — TELEPHONE (OUTPATIENT)
Dept: HEMATOLOGY/ONCOLOGY | Facility: CLINIC | Age: 67
End: 2022-01-19
Payer: COMMERCIAL

## 2022-01-19 RX ORDER — TRAMADOL HYDROCHLORIDE 50 MG/1
TABLET ORAL
Qty: 28 TABLET | Refills: 2 | Status: SHIPPED | OUTPATIENT
Start: 2022-01-19 | End: 2022-01-22

## 2022-01-19 NOTE — TELEPHONE ENCOUNTER
Pt rescheduled out 1w with vm left to call if that doesn't work for her schedule    ----- Message from Diann Baca sent at 1/19/2022 12:08 PM CST -----  Contact: pt  Type: Needs Medical Advice  Who Called:  pt   Best Call Back Number: [phone]    Additional Information: please call to r/s pts appt

## 2022-01-27 ENCOUNTER — LAB VISIT (OUTPATIENT)
Dept: LAB | Facility: HOSPITAL | Age: 67
End: 2022-01-27
Attending: INTERNAL MEDICINE
Payer: COMMERCIAL

## 2022-01-27 DIAGNOSIS — E53.8 B12 DEFICIENCY: ICD-10-CM

## 2022-01-27 DIAGNOSIS — C20 RECTAL CANCER: ICD-10-CM

## 2022-01-27 LAB
ALBUMIN SERPL BCP-MCNC: 3.7 G/DL (ref 3.5–5.2)
ALP SERPL-CCNC: 84 U/L (ref 55–135)
ALT SERPL W/O P-5'-P-CCNC: 15 U/L (ref 10–44)
ANION GAP SERPL CALC-SCNC: 11 MMOL/L (ref 8–16)
AST SERPL-CCNC: 16 U/L (ref 10–40)
BASOPHILS # BLD AUTO: 0.03 K/UL (ref 0–0.2)
BASOPHILS NFR BLD: 0.7 % (ref 0–1.9)
BILIRUB SERPL-MCNC: 0.5 MG/DL (ref 0.1–1)
BUN SERPL-MCNC: 13 MG/DL (ref 8–23)
CALCIUM SERPL-MCNC: 9 MG/DL (ref 8.7–10.5)
CEA SERPL-MCNC: <1.7 NG/ML (ref 0–5)
CHLORIDE SERPL-SCNC: 103 MMOL/L (ref 95–110)
CO2 SERPL-SCNC: 23 MMOL/L (ref 23–29)
CREAT SERPL-MCNC: 0.9 MG/DL (ref 0.5–1.4)
DIFFERENTIAL METHOD: ABNORMAL
EOSINOPHIL # BLD AUTO: 0.2 K/UL (ref 0–0.5)
EOSINOPHIL NFR BLD: 3.4 % (ref 0–8)
ERYTHROCYTE [DISTWIDTH] IN BLOOD BY AUTOMATED COUNT: 13.5 % (ref 11.5–14.5)
EST. GFR  (AFRICAN AMERICAN): >60 ML/MIN/1.73 M^2
EST. GFR  (NON AFRICAN AMERICAN): >60 ML/MIN/1.73 M^2
GLUCOSE SERPL-MCNC: 86 MG/DL (ref 70–110)
HCT VFR BLD AUTO: 34.2 % (ref 37–48.5)
HGB BLD-MCNC: 11.1 G/DL (ref 12–16)
IMM GRANULOCYTES # BLD AUTO: 0.02 K/UL (ref 0–0.04)
IMM GRANULOCYTES NFR BLD AUTO: 0.5 % (ref 0–0.5)
LYMPHOCYTES # BLD AUTO: 0.7 K/UL (ref 1–4.8)
LYMPHOCYTES NFR BLD: 16.1 % (ref 18–48)
MCH RBC QN AUTO: 29 PG (ref 27–31)
MCHC RBC AUTO-ENTMCNC: 32.5 G/DL (ref 32–36)
MCV RBC AUTO: 89 FL (ref 82–98)
MONOCYTES # BLD AUTO: 0.3 K/UL (ref 0.3–1)
MONOCYTES NFR BLD: 7.5 % (ref 4–15)
NEUTROPHILS # BLD AUTO: 3.2 K/UL (ref 1.8–7.7)
NEUTROPHILS NFR BLD: 71.8 % (ref 38–73)
NRBC BLD-RTO: 0 /100 WBC
PLATELET # BLD AUTO: 248 K/UL (ref 150–450)
PMV BLD AUTO: 11.3 FL (ref 9.2–12.9)
POTASSIUM SERPL-SCNC: 4.2 MMOL/L (ref 3.5–5.1)
PROT SERPL-MCNC: 6.8 G/DL (ref 6–8.4)
RBC # BLD AUTO: 3.83 M/UL (ref 4–5.4)
SODIUM SERPL-SCNC: 137 MMOL/L (ref 136–145)
VIT B12 SERPL-MCNC: 315 PG/ML (ref 210–950)
WBC # BLD AUTO: 4.42 K/UL (ref 3.9–12.7)

## 2022-01-27 PROCEDURE — 82378 CARCINOEMBRYONIC ANTIGEN: CPT | Performed by: INTERNAL MEDICINE

## 2022-01-27 PROCEDURE — 36415 COLL VENOUS BLD VENIPUNCTURE: CPT | Performed by: INTERNAL MEDICINE

## 2022-01-27 PROCEDURE — 80053 COMPREHEN METABOLIC PANEL: CPT | Performed by: INTERNAL MEDICINE

## 2022-01-27 PROCEDURE — 85025 COMPLETE CBC W/AUTO DIFF WBC: CPT | Performed by: INTERNAL MEDICINE

## 2022-01-27 PROCEDURE — 82607 VITAMIN B-12: CPT | Performed by: INTERNAL MEDICINE

## 2022-02-02 ENCOUNTER — OFFICE VISIT (OUTPATIENT)
Dept: HEMATOLOGY/ONCOLOGY | Facility: CLINIC | Age: 67
End: 2022-02-02
Payer: COMMERCIAL

## 2022-02-02 VITALS
HEART RATE: 84 BPM | DIASTOLIC BLOOD PRESSURE: 59 MMHG | SYSTOLIC BLOOD PRESSURE: 121 MMHG | HEIGHT: 60 IN | OXYGEN SATURATION: 99 % | TEMPERATURE: 97 F | RESPIRATION RATE: 18 BRPM | BODY MASS INDEX: 36.92 KG/M2 | WEIGHT: 188.06 LBS

## 2022-02-02 DIAGNOSIS — C20 RECTAL CANCER: Primary | ICD-10-CM

## 2022-02-02 DIAGNOSIS — C18.9 MALIGNANT NEOPLASM OF COLON, UNSPECIFIED PART OF COLON: ICD-10-CM

## 2022-02-02 DIAGNOSIS — E53.8 B12 DEFICIENCY: ICD-10-CM

## 2022-02-02 PROCEDURE — 99214 OFFICE O/P EST MOD 30 MIN: CPT | Mod: S$GLB,,, | Performed by: INTERNAL MEDICINE

## 2022-02-02 PROCEDURE — 3078F DIAST BP <80 MM HG: CPT | Mod: CPTII,S$GLB,, | Performed by: INTERNAL MEDICINE

## 2022-02-02 PROCEDURE — 3074F PR MOST RECENT SYSTOLIC BLOOD PRESSURE < 130 MM HG: ICD-10-PCS | Mod: CPTII,S$GLB,, | Performed by: INTERNAL MEDICINE

## 2022-02-02 PROCEDURE — 1159F MED LIST DOCD IN RCRD: CPT | Mod: CPTII,S$GLB,, | Performed by: INTERNAL MEDICINE

## 2022-02-02 PROCEDURE — 3078F PR MOST RECENT DIASTOLIC BLOOD PRESSURE < 80 MM HG: ICD-10-PCS | Mod: CPTII,S$GLB,, | Performed by: INTERNAL MEDICINE

## 2022-02-02 PROCEDURE — 1159F PR MEDICATION LIST DOCUMENTED IN MEDICAL RECORD: ICD-10-PCS | Mod: CPTII,S$GLB,, | Performed by: INTERNAL MEDICINE

## 2022-02-02 PROCEDURE — 99214 PR OFFICE/OUTPT VISIT, EST, LEVL IV, 30-39 MIN: ICD-10-PCS | Mod: S$GLB,,, | Performed by: INTERNAL MEDICINE

## 2022-02-02 PROCEDURE — 3008F PR BODY MASS INDEX (BMI) DOCUMENTED: ICD-10-PCS | Mod: CPTII,S$GLB,, | Performed by: INTERNAL MEDICINE

## 2022-02-02 PROCEDURE — 3008F BODY MASS INDEX DOCD: CPT | Mod: CPTII,S$GLB,, | Performed by: INTERNAL MEDICINE

## 2022-02-02 PROCEDURE — 99999 PR PBB SHADOW E&M-EST. PATIENT-LVL IV: ICD-10-PCS | Mod: PBBFAC,,, | Performed by: INTERNAL MEDICINE

## 2022-02-02 PROCEDURE — 3074F SYST BP LT 130 MM HG: CPT | Mod: CPTII,S$GLB,, | Performed by: INTERNAL MEDICINE

## 2022-02-02 PROCEDURE — 99999 PR PBB SHADOW E&M-EST. PATIENT-LVL IV: CPT | Mod: PBBFAC,,, | Performed by: INTERNAL MEDICINE

## 2022-02-02 RX ORDER — CYANOCOBALAMIN 1000 UG/ML
1000 INJECTION, SOLUTION INTRAMUSCULAR; SUBCUTANEOUS
Qty: 30 ML | Refills: 6 | Status: SHIPPED | OUTPATIENT
Start: 2022-02-02 | End: 2022-06-01 | Stop reason: SDUPTHER

## 2022-02-02 NOTE — PROGRESS NOTES
Subjective:       Patient ID: Tessa Enriquez is a 66 y.o. female.    Chief Complaint:   rectal CA status post biopsy October 2020 robotic LAR November 9, 2020 by Dr. Wagner  Discontinued folfox due to s/e completed xrt  5/21     works at [employer] no issues whatsoever had some mild rectal bleeding which she thought was from diverticulitis eventually had it evaluated     REVIEW OF SYSTEMS:     CONSTITUTIONAL: The patient denies any weight change. There is no apparent    change in appetite, fever, night sweats, headaches, lots fatigue, no dizziness, some  weakness.     port is out, no more pain on left cw     SKIN: Denies rash, issues with nails, non-healing sores, bleeding, blotching    skin or abnormal bruising. Denies new moles or changes to existing moles.      BREASTS: There is no swelling around breasts or nipple discharge.    EYES: Denies eye pain, blurred vision, swelling, redness or discharge.      ENT AND MOUTH: Denies runny nose, stuffiness, sinus trouble or sores. Denies    nosebleeds. Denies, hoarseness, change in voice or swelling in front of the    neck.      CARDIOVASCULAR: Denies chest pain, discomfort or palpitations. Denies neck    swelling or episodes of passing out.      RESPIRATORY: Denies cough, sputum production, blood in sputum, and denies    shortness of breath.      GI:  Had esophagus stretched as food/ pills were getting stuck  Pt saw DR Linares, diarrhea chronically since the xrt, if she has gas and takes meds she messes on her self, she alt constipation with loose stools    GENITOURINARY: No discharge. No pelvic pain or lumps. No rash around groin or  lesions. No urinary frequency, hesitation, painful urination or blood in    urine. Denies incontinence. No problems with intercourse.      MUSCULOSKELETAL: Denies neck or back pain. Denies weakness in arms or legs,    joint problems or distended inflamed veins in legs. Denies swelling or abnormal  glands.      NEUROLOGICAL: Denies  tingling, numbness, altered mentation changes to nerve    function in the face, weakness to one or both of the body. Denies changes to    gait and denies multiple falls or accidents.      PSYCHIATRIC: Denies nervousness, anxiety, hallucinations, depression, suicidal    ideation, trouble sleeping or changes in behavior noticed by family.          Oncology History:  T3 N1 rectal CA diagnosed October 2020 robotic LAR November 2020 December 2020 PET scan negative for metastatic disease   started FOLFOX 2/21 but stopped after 3 cycles and only wants surveillance   had colonoscopy 8/21 NAD  Past Medical History:   Diagnosis Date    Acute hypoxemic respiratory failure 7/9/2018    Arthritis     Cancer     colon    COPD (chronic obstructive pulmonary disease)     History of home oxygen therapy     ONLY PRN AND HASN'T USED IT IN A YEAR    Hyperlipidemia     Thyroid disease      Past Surgical History:   Procedure Laterality Date    CHOLECYSTECTOMY      COLECTOMY N/A 11/9/2020    Procedure: COLECTOMY;  Surgeon: Brandon Wagner MD;  [facility];  Service: General;  Laterality: N/A;  LOWER- ANTERIOR    COLONOSCOPY N/A 10/23/2020    Procedure: COLONOSCOPY;  Surgeon: Jolynn Ayala MD;  Location: [facility];  Service: Endoscopy;  Laterality: N/A;    COLONOSCOPY N/A 8/6/2021    Procedure: COLONOSCOPY;  Surgeon: Jolynn Ayala MD;  Location: [facility] ENDO;  Service: Endoscopy;  Laterality: N/A;    ESOPHAGEAL DILATION N/A 6/29/2021    Procedure: DILATION, ESOPHAGUS;  Surgeon: Sourav Baires III, MD;  Location: [facility];  Service: Endoscopy;  Laterality: N/A;    ESOPHAGOGASTRODUODENOSCOPY  06/29/2021    Dilation to 57 Fr    ESOPHAGOGASTRODUODENOSCOPY N/A 6/29/2021    Procedure: EGD (ESOPHAGOGASTRODUODENOSCOPY);  Surgeon: Sourav Baires III, MD;  Location: [facility];  Service: Endoscopy;  Laterality: N/A;    HYSTERECTOMY      INSERTION OF TUNNELED CENTRAL VENOUS CATHETER (CVC) WITH SUBCUTANEOUS PORT  N/A 2021    Procedure: [id number];  Surgeon: Brandon Wagner MD;  Location: [facility] OR;  Service: General;  Laterality: N/A;    MEDIPORT REMOVAL Left 2021    Procedure: REMOVAL, CATHETER, CENTRAL VENOUS, TUNNELED, WITH PORT;  Surgeon: Brandon Wagner MD;  Location: [facility] OR;  Service: General;  Laterality: Left;    ROBOT-ASSISTED COLECTOMY N/A 2020    Procedure: ROBOTIC COLECTOMY low anterior resection, possible open;  Surgeon: Brandon Wagner MD;  Location: [facility] OR;  Service: General;  Laterality: N/A;  CONVERTED TO OPEN AT 1503     Family History   Problem Relation Age of Onset    COPD Mother     COPD Father     Breast cancer Maternal Grandmother       Social History     Socioeconomic History    Marital status:    Tobacco Use    Smoking status: Former Smoker     Packs/day: 2.00     Years: 40.00     Pack years: 80.00     Types: Cigarettes     Quit date: 2017     Years since quittin.5    Smokeless tobacco: Never Used   Substance and Sexual Activity    Alcohol use: No    Drug use: No    Sexual activity: Not Currently     Partners: Male     Review of patient's allergies indicates:   Allergen Reactions    Ativan [lorazepam] Hives and Itching    Flagyl [metronidazole] Itching and Rash    Pcn [penicillins] Hives, Itching and Rash       Current Outpatient Medications:     clonazePAM (KLONOPIN) 0.5 MG tablet, TAKE ONE TABLET BY MOUTH THREE TIMES DAILY AS NEEDED FOR ANXIETY., Disp: 60 tablet, Rfl: 0    cyanocobalamin 1,000 mcg/mL injection, INJECT 1 MLS INTO THE MUSCLE DAILY X 7 DAYS, THEN 1ML WEEKLY X 3 WEEKS, THEN ONCE MONTHLY FOR LIFE., Disp: 30 mL, Rfl: 7    levothyroxine (SYNTHROID) 112 MCG tablet, Take 1 tablet (112 mcg total) by mouth before breakfast., Disp: 30 tablet, Rfl: 11    traMADoL (ULTRAM) 50 mg tablet, TAKE ONE TABLET BY MOUTH EVERY SIX HOURS AS NEEDED FOR PAIN, Disp: 28 tablet, Rfl: 2    albuterol (PROVENTIL) 2.5 mg /3 mL (0.083 %) nebulizer  solution, Take 3 mLs (2.5 mg total) by nebulization every 6 (six) hours as needed for Wheezing. Rescue (Patient not taking: No sig reported), Disp: 1 Box, Rfl: 2    atorvastatin (LIPITOR) 40 MG tablet, Take 1 tablet (40 mg total) by mouth once daily. (Patient not taking: No sig reported), Disp: 90 tablet, Rfl: 1  PHYSICAL EXAM:     Wt Readings from Last 3 Encounters:   02/02/22 85.3 kg (188 lb 0.8 oz)   01/06/22 86 kg (189 lb 9.5 oz)   12/09/21 86 kg (189 lb 11.2 oz)     Temp Readings from Last 3 Encounters:   02/02/22 97 °F (36.1 °C) (Temporal)   12/09/21 98.1 °F (36.7 °C) (Oral)   10/22/21 97 °F (36.1 °C) (Temporal)     BP Readings from Last 3 Encounters:   02/02/22 (!) 121/59   12/09/21 129/74   10/22/21 133/71     Pulse Readings from Last 3 Encounters:   02/02/22 84   12/09/21 88   12/06/21 82     GENERAL: alert; in no apparent distress, , well-built well-nourished   HEAD: normocephalic, atraumatic.  EYES: pupils are equal, round, reactive to light and accommodation. Sclera anicteric. Conjunctiva not injected.   NOSE/THROAT: no nasal erythema or rhinorrhea. Oropharynx pink, without erythema, ulcerations or thrush.   NECK: no cervical motion rigidity; supple with no masses.  CHEST: clear to auscultation bilaterally; no wheezes, crackles or rubs. Patient is speaking comfortably on room air with normal work of breathing without using accessory muscles of respiration.  CARDIOVASCULAR: regular rate and rhythm; no murmurs, rubs or gallops.  ABDOMEN: soft, nontender, nondistended. Bowel sounds present.   MUSCULOSKELETAL: no tenderness to palpation along the spine or scapulae. Normal range of motion.  NEUROLOGIC: cranial nerves II-XII intact bilaterally. Strength 5/5 in bilateral upper and lower extremities. No sensory deficits appreciated. Reflexes globally intact. No cerebellar signs. Normal gait.  LYMPHATIC: no cervical, supraclavicular or axillary adenopathy appreciated bilaterally.   EXTREMITIES: no clubbing,  cyanosis, edema.  SKIN: no erythema, rashes, ulcerations noted  ECOG-0  Lab Results   Component Value Date    WBC 4.42 01/27/2022    HGB 11.1 (L) 01/27/2022    HCT 34.2 (L) 01/27/2022    MCV 89 01/27/2022     01/27/2022       BMP  Lab Results   Component Value Date     01/27/2022    K 4.2 01/27/2022     01/27/2022    CO2 23 01/27/2022    BUN 13 01/27/2022    CREATININE 0.9 01/27/2022    CALCIUM 9.0 01/27/2022    ANIONGAP 11 01/27/2022    ESTGFRAFRICA >60 01/27/2022    EGFRNONAA >60 01/27/2022      cea 7/14/21 1.8  November 9, 2020  .  Rectum, sigmoid colon and proximal donut (low anterior resection):   - Invasive adenocarcinoma,  invading through the muscularis propria into   pericolorectal tissue   - Pathologic stage pT3, pN1a (see synoptic report)   - Resection margins negative for dysplasia or malignancy (proximal, distal   and radial margins), within 1 mm to radial margin   - One tumor deposit   - Adenocarcinoma involve one out of totally forty seven lymph nodes (1/46 in   part 1, totally 1/47, also see part 2)   - Background colon with diverticulosis associated with hyperpigmented   macrophages, benign   - Proximal donut with benign colon, no dysplasia or malignancy   - Tattoo identified   2.  Colon, distal donut (excision):   - Benign colon, negative for dysplasia or malignancy   - One lymph node, negative for metastatic carcinoma (0/1)   COLON AND RECTUM: Resection, Including Transanal Disk Excision of Rectal   Neoplasms   Procedure  Low anterior resection   Tumor Site  Rectum    Tumor Location: Straddles the anterior peritoneal reflection   Tumor Size   Greatest dimension (centimeters): 7.0 cm   + Additional dimensions (centimeters): 4.7 x 2.2 cm   Macroscopic Tumor Perforation  Not identified   Macroscopic Intactness of Mesorectum  Near complete   Histologic Type  Adenocarcinoma   Histologic Grade   G2: Moderately differentiated   Tumor Extension  Tumor invades through the muscularis  propria into   pericolorectal tissue     Impression:  November 7, 2020 CT abdomen pelvis     1. Rectosigmoid junction mass, unchanged from prior.  No definite evidence of intraluminal contrast extravasation to suggest active gastrointestinal hemorrhage, however please note that arterial phase CT is more sensitive.  2. Colonic diverticulosis without acute diverticulitis.  3. Minimal pneumobilia, unchanged      IMPRESSION:  CT chest lung screen October 2020     1.  3 mm pulmonary nodule identified in the lateral segment of the  right lower lobe.  2.  Focus of groundglass opacity measuring 11 mm in diameter in the  superior segment of the right lower lobe. Short-term follow-up for  this lesion is recommended.  3.  Mild emphysematous lung disease.     LUNG-RADS CATEGORY 3: PROBABLY BENIGN FINDINGS     RECOMMENDATION: Short-term imaging follow-up with 6 month LDCT.    IMPRESSION: 7/21  1. Nonspecific hypermetabolic soft tissue density focus cranial to the rectosigmoid colon suture line, perhaps reflecting postsurgical and or posttreatment or postinflammatory changes. Attention to this area at follow-up imaging is recommended to help differentiate from residual or recurrent neoplasm.  2. Improving postoperative changes in the lower anterior abdominal wall, with associated multifocal FDG hypermetabolism which is most characteristic of postoperative etiology.  3. Otherwise no convincing evidence of recurrent neoplasm or metastatic disease.  Patient Active Problem List   Diagnosis    COPD (chronic obstructive pulmonary disease)    Pure hypercholesterolemia    Acquired hypothyroidism    Rectal bleeding    Colonic mass    Rectal cancer    Port-A-Cath in place    Encounter for care related to vascular access port    Dysphagia    Malignant neoplasm of colon    B12 deficiency   IMPRESSION:pet 12/2020  1. Postoperative changes of rectal colonic resection, with adjacent  FDG avid presacral soft tissue stranding.  2.  Adjacent punctate extraluminal foci of free air suggesting a tiny  tract extending towards the adjacent small bowel (possibly  postoperative but suggesting a tiny enterocolonic fistula)  3. Deep subcutaneous soft tissue structure along the ventral abdomen  suggesting a postoperative hematoma/seroma, and less likely abscess.  4. No findings of additional sites of FDG avid disease.      IMPRESSION: 7/21  1. Nonspecific hypermetabolic soft tissue density focus cranial to the rectosigmoid colon suture line, perhaps reflecting postsurgical and or posttreatment or postinflammatory changes. Attention to this area at follow-up imaging is recommended to help differentiate from residual or recurrent neoplasm.  2. Improving postoperative changes in the lower anterior abdominal wall, with associated multifocal FDG hypermetabolism which is most characteristic of postoperative etiology.  3. Otherwise no convincing evidence of recurrent neoplasm or metastatic disease  Assessment and Plan     Status post low anterior resection November 9, 2020 for rectal CA T3 N1a     tumor board convened and agreed on xrt that will follow  After completion of chemo  sequentially  therapy   Patient started FOLFOX.  Completed 4 out of 12 planned adjuvant cycles,  Discontinued  Chemo and had port removed completed xrt 5/21  rtc 3 months with b12, cbc, cmp, cea ct cap and b12   ct cap   b12 def anemia, taking injections q month level checked 10/21 sub therpautic 226 take shots twice a month  No iron deficiency noted  hypocalcemia which seems chronic pt to supplement orally daily wnl this visit  Hypothyroidism, sees DR Mcdermott will adj meds  History of chronic obstructive pulmonary disease, dyslipidemia and hypothyroidism continue with PCP  Doesn't want to be covid vaccinated

## 2022-02-02 NOTE — Clinical Note
Pt states her insurance gave her approval for scan and also an auth number, we canceled because we were denied,  please have this go through again   Cbc, cmp, iron ferritn b12 and see me in 3 months  with scans Ct [facility]

## 2022-02-10 ENCOUNTER — TELEPHONE (OUTPATIENT)
Dept: HEMATOLOGY/ONCOLOGY | Facility: CLINIC | Age: 67
End: 2022-02-10
Payer: COMMERCIAL

## 2022-02-10 NOTE — TELEPHONE ENCOUNTER
----- Message from Rupal Thomas sent at 2/10/2022  3:08 PM CST -----  Type: Patient Call Back         Who called:Patient          What is the request in detail:Regarding a few questions and concerns          Can the clinic reply by [facility]?no          Would the patient rather a call back or a response via [facility]?call back          Best call back number:[phone] (mobile)          Additional Information:           Thank You

## 2022-02-21 DIAGNOSIS — F41.9 SEVERE ANXIETY: ICD-10-CM

## 2022-02-21 RX ORDER — CLONAZEPAM 0.5 MG/1
0.5 TABLET ORAL 3 TIMES DAILY
Qty: 60 TABLET | Refills: 0 | Status: CANCELLED | OUTPATIENT
Start: 2022-02-21

## 2022-02-22 DIAGNOSIS — F41.9 SEVERE ANXIETY: ICD-10-CM

## 2022-02-22 RX ORDER — CLONAZEPAM 0.5 MG/1
0.5 TABLET ORAL 3 TIMES DAILY
Qty: 60 TABLET | Refills: 0 | OUTPATIENT
Start: 2022-02-22

## 2022-02-24 ENCOUNTER — TELEPHONE (OUTPATIENT)
Dept: FAMILY MEDICINE | Facility: CLINIC | Age: 67
End: 2022-02-24
Payer: COMMERCIAL

## 2022-02-24 NOTE — TELEPHONE ENCOUNTER
Spoke to patient .   She is Dr Mcdermott patient.  She is on of her Klonopin. She does not have any more left. She is asking for some medication until her appointment with Dr Mcdermott on 3/14/2022.   Please advise

## 2022-03-13 NOTE — PROGRESS NOTES
SUBJECTIVE:    Patient ID: Tessa Enriquez is a 66 y.o. female.    Chief Complaint: Medication Refill and sciatic pain    HPI     In for lab results:    WBC 4.42 hemoglobin 11.1 hematocrit 34.2 platelets [id number] sodium 137 potassium 4 2 glucose 86 BUN 13 creatinine 0.9 liver functions normal    Recurrent sciatica and lower back pain for the last two days and she has difficulty moving-no underlying recent cause-pain down butt on left down the back of the leg down to the bottom of the butt cheek-taking tylenol    Chronic diarrhea since the colon surgery-and she has tenesmus -she does have good rectal tone in between diarrheal episodes    Lab Visit on 01/27/2022   Component Date Value Ref Range Status    WBC 01/27/2022 4.42  3.90 - 12.70 K/uL Final    RBC 01/27/2022 3.83 (A) 4.00 - 5.40 M/uL Final    Hemoglobin 01/27/2022 11.1 (A) 12.0 - 16.0 g/dL Final    Hematocrit 01/27/2022 34.2 (A) 37.0 - 48.5 % Final    MCV 01/27/2022 89  82 - 98 fL Final    MCH 01/27/2022 29.0  27.0 - 31.0 pg Final    MCHC 01/27/2022 32.5  32.0 - 36.0 g/dL Final    RDW 01/27/2022 13.5  11.5 - 14.5 % Final    Platelets 01/27/2022 248  150 - 450 K/uL Final    MPV 01/27/2022 11.3  9.2 - 12.9 fL Final    Immature Granulocytes 01/27/2022 0.5  0.0 - 0.5 % Final    Gran # (ANC) 01/27/2022 3.2  1.8 - 7.7 K/uL Final    Immature Grans (Abs) 01/27/2022 0.02  0.00 - 0.04 K/uL Final    Lymph # 01/27/2022 0.7 (A) 1.0 - 4.8 K/uL Final    Mono # 01/27/2022 0.3  0.3 - 1.0 K/uL Final    Eos # 01/27/2022 0.2  0.0 - 0.5 K/uL Final    Baso # 01/27/2022 0.03  0.00 - 0.20 K/uL Final    nRBC 01/27/2022 0  0 /100 WBC Final    Gran % 01/27/2022 71.8  38.0 - 73.0 % Final    Lymph % 01/27/2022 16.1 (A) 18.0 - 48.0 % Final    Mono % 01/27/2022 7.5  4.0 - 15.0 % Final    Eosinophil % 01/27/2022 3.4  0.0 - 8.0 % Final    Basophil % 01/27/2022 0.7  0.0 - 1.9 % Final    Differential Method 01/27/2022 Automated   Final    Sodium 01/27/2022 137  136 -  145 mmol/L Final    Potassium 01/27/2022 4.2  3.5 - 5.1 mmol/L Final    Chloride 01/27/2022 103  95 - 110 mmol/L Final    CO2 01/27/2022 23  23 - 29 mmol/L Final    Glucose 01/27/2022 86  70 - 110 mg/dL Final    BUN 01/27/2022 13  8 - 23 mg/dL Final    Creatinine 01/27/2022 0.9  0.5 - 1.4 mg/dL Final    Calcium 01/27/2022 9.0  8.7 - 10.5 mg/dL Final    Total Protein 01/27/2022 6.8  6.0 - 8.4 g/dL Final    Albumin 01/27/2022 3.7  3.5 - 5.2 g/dL Final    Total Bilirubin 01/27/2022 0.5  0.1 - 1.0 mg/dL Final    Alkaline Phosphatase 01/27/2022 84  55 - 135 U/L Final    AST 01/27/2022 16  10 - 40 U/L Final    ALT 01/27/2022 15  10 - 44 U/L Final    Anion Gap 01/27/2022 11  8 - 16 mmol/L Final    eGFR if African American 01/27/2022 >60  >60 mL/min/1.73 m^2 Final    eGFR if non African American 01/27/2022 >60  >60 mL/min/1.73 m^2 Final    CEA 01/27/2022 <1.7  0.0 - 5.0 ng/mL Final    Vitamin B-12 01/27/2022 315  210 - 950 pg/mL Final   Lab Visit on 10/22/2021   Component Date Value Ref Range Status    WBC 10/22/2021 3.02 (A) 3.90 - 12.70 K/uL Final    RBC 10/22/2021 4.01  4.00 - 5.40 M/uL Final    Hemoglobin 10/22/2021 11.6 (A) 12.0 - 16.0 g/dL Final    Hematocrit 10/22/2021 37.0  37.0 - 48.5 % Final    MCV 10/22/2021 92  82 - 98 fL Final    MCH 10/22/2021 28.9  27.0 - 31.0 pg Final    MCHC 10/22/2021 31.4 (A) 32.0 - 36.0 g/dL Final    RDW 10/22/2021 12.9  11.5 - 14.5 % Final    Platelets 10/22/2021 191  150 - 450 K/uL Final    MPV 10/22/2021 11.2  9.2 - 12.9 fL Final    Immature Granulocytes 10/22/2021 0.3  0.0 - 0.5 % Final    Gran # (ANC) 10/22/2021 2.0  1.8 - 7.7 K/uL Final    Immature Grans (Abs) 10/22/2021 0.01  0.00 - 0.04 K/uL Final    Lymph # 10/22/2021 0.5 (A) 1.0 - 4.8 K/uL Final    Mono # 10/22/2021 0.2 (A) 0.3 - 1.0 K/uL Final    Eos # 10/22/2021 0.2  0.0 - 0.5 K/uL Final    Baso # 10/22/2021 0.03  0.00 - 0.20 K/uL Final    nRBC 10/22/2021 0  0 /100 WBC Final    Gran %  10/22/2021 66.0  38.0 - 73.0 % Final    Lymph % 10/22/2021 17.5 (A) 18.0 - 48.0 % Final    Mono % 10/22/2021 7.6  4.0 - 15.0 % Final    Eosinophil % 10/22/2021 7.6  0.0 - 8.0 % Final    Basophil % 10/22/2021 1.0  0.0 - 1.9 % Final    Differential Method 10/22/2021 Automated   Final    Sodium 10/22/2021 139  136 - 145 mmol/L Final    Potassium 10/22/2021 4.6  3.5 - 5.1 mmol/L Final    Chloride 10/22/2021 107  95 - 110 mmol/L Final    CO2 10/22/2021 28  23 - 29 mmol/L Final    Glucose 10/22/2021 97  70 - 110 mg/dL Final    BUN 10/22/2021 10  8 - 23 mg/dL Final    Creatinine 10/22/2021 0.7  0.5 - 1.4 mg/dL Final    Calcium 10/22/2021 8.7  8.7 - 10.5 mg/dL Final    Total Protein 10/22/2021 6.4  6.0 - 8.4 g/dL Final    Albumin 10/22/2021 3.4 (A) 3.5 - 5.2 g/dL Final    Total Bilirubin 10/22/2021 0.4  0.1 - 1.0 mg/dL Final    Alkaline Phosphatase 10/22/2021 71  55 - 135 U/L Final    AST 10/22/2021 15  10 - 40 U/L Final    ALT 10/22/2021 13  10 - 44 U/L Final    Anion Gap 10/22/2021 4 (A) 8 - 16 mmol/L Final    eGFR if African American 10/22/2021 >60  >60 mL/min/1.73 m^2 Final    eGFR if non African American 10/22/2021 >60  >60 mL/min/1.73 m^2 Final    CEA 10/22/2021 <1.7  0.0 - 5.0 ng/mL Final    Vitamin B-12 10/22/2021 226  210 - 950 pg/mL Final   Lab Visit on 10/22/2021   Component Date Value Ref Range Status    TSH 10/22/2021 30.964 (A) 0.400 - 4.000 uIU/mL Final    Free T4 10/22/2021 0.44 (A) 0.71 - 1.51 ng/dL Final   Admission on 08/06/2021, Discharged on 08/06/2021   Component Date Value Ref Range Status    Final Pathologic Diagnosis 08/06/2021    Final                    Value:1. Colon, transverse polyps x2 (polypectomy):  Tubular adenoma(s), multiple fragments  2. Colon, sigmoid polyp (polypectomy):  Tubular adenoma  No high grade dysplasia  Multiple deeper levels examined  GI consensus: Milli Jules, Omar, Joe  3. Colon, random (biopsy):  Colonic mucosa with no significant  "histopathologic changes  No support for microscopic colitis  4. Rectosigmoid anastomosis (biopsy):  No dysplasia, no malignancy  Colonic mucosa with reactive changes  Multiple deeper levels examined      Gross 08/06/2021    Final                    Value:Number of containers:  4 a  Part 1  Container Label: Clinic Number/AP Number:  [id number], and "transverse colon  polyps x2"  Received in formalin in polyp trap 1 are multiple soft tan polypoid tissue  fragments ranging from 2-7 mm in greatest dimension.  The largest fragment is  inked and bisected.  Specimen is filtered and entirely submitted in  VYS--1-A.  Part 2  Container Label: Clinic Number/AP Number:  [id number], and "sigmoid colon polyp"  Received in formalin in polyp trap 2 is an 11 x 6 x 4 mm soft tan polypoid  tissue fragment.  The base is inked blue.  Specimen is serially sectioned and  entirely submitted in JBY--2-A.  Part 3  Container Label: Clinic Number/AP Number:  [id number], and "random colon Bx  rule out microscopic colitis"  Received in formalin is a 6 x 5 x 1 mm aggregate of soft tan tissue  fragments.  Specimen is stained with Hematoxylin and entirely submitted in  MVO--3-A.  Part 4  Container Label: Clinic Number/AP Number:  [id number], and "rectosig                          moid  anastomosis site Bx"  Received in formalin are 3 soft tan tissue fragments ranging from 2-3 mm in  greatest dimension.  Specimen is stained with Hematoxylin and entirely  submitted in GLV--4-A.  Orquidea Kurtz PBolaA.      Disclaimer 08/06/2021    Final                    Value:Unless the case is a 'gross only' or additional testing only, the final  diagnosis for each specimen is based on a microscopic examination of  appropriate tissue sections.     Lab Visit on 08/03/2021   Component Date Value Ref Range Status    SARS-CoV2 (COVID-19) Qualitative P* 08/03/2021 Not Detected  Not Detected Final    SARS-COV-2- Cycle Number 08/03/2021 -1.00  Not " Detected Final   Hospital Outpatient Visit on 07/14/2021   Component Date Value Ref Range Status    POC Glucose 07/14/2021 90  70 - 110 Final   Lab Visit on 07/14/2021   Component Date Value Ref Range Status    WBC 07/14/2021 3.71 (A) 3.90 - 12.70 K/uL Final    RBC 07/14/2021 4.28  4.00 - 5.40 M/uL Final    Hemoglobin 07/14/2021 12.6  12.0 - 16.0 g/dL Final    Hematocrit 07/14/2021 38.8  37.0 - 48.5 % Final    MCV 07/14/2021 91  82 - 98 fL Final    MCH 07/14/2021 29.4  27.0 - 31.0 pg Final    MCHC 07/14/2021 32.5  32.0 - 36.0 g/dL Final    RDW 07/14/2021 13.5  11.5 - 14.5 % Final    Platelets 07/14/2021 223  150 - 450 K/uL Final    MPV 07/14/2021 10.6  9.2 - 12.9 fL Final    Immature Granulocytes 07/14/2021 0.5  0.0 - 0.5 % Final    Gran # (ANC) 07/14/2021 2.6  1.8 - 7.7 K/uL Final    Immature Grans (Abs) 07/14/2021 0.02  0.00 - 0.04 K/uL Final    Lymph # 07/14/2021 0.5 (A) 1.0 - 4.8 K/uL Final    Mono # 07/14/2021 0.3  0.3 - 1.0 K/uL Final    Eos # 07/14/2021 0.3  0.0 - 0.5 K/uL Final    Baso # 07/14/2021 0.03  0.00 - 0.20 K/uL Final    nRBC 07/14/2021 0  0 /100 WBC Final    Gran % 07/14/2021 70.4  38.0 - 73.0 % Final    Lymph % 07/14/2021 12.4 (A) 18.0 - 48.0 % Final    Mono % 07/14/2021 7.3  4.0 - 15.0 % Final    Eosinophil % 07/14/2021 8.6 (A) 0.0 - 8.0 % Final    Basophil % 07/14/2021 0.8  0.0 - 1.9 % Final    Differential Method 07/14/2021 Automated   Final    Sodium 07/14/2021 140  136 - 145 mmol/L Final    Potassium 07/14/2021 4.7  3.5 - 5.1 mmol/L Final    Chloride 07/14/2021 104  95 - 110 mmol/L Final    CO2 07/14/2021 26  23 - 29 mmol/L Final    Glucose 07/14/2021 92  70 - 110 mg/dL Final    BUN 07/14/2021 9  8 - 23 mg/dL Final    Creatinine 07/14/2021 0.8  0.5 - 1.4 mg/dL Final    Calcium 07/14/2021 9.4  8.7 - 10.5 mg/dL Final    Total Protein 07/14/2021 7.2  6.0 - 8.4 g/dL Final    Albumin 07/14/2021 3.7  3.5 - 5.2 g/dL Final    Total Bilirubin 07/14/2021 0.6  0.1 -  1.0 mg/dL Final    Alkaline Phosphatase 07/14/2021 96  55 - 135 U/L Final    AST 07/14/2021 16  10 - 40 U/L Final    ALT 07/14/2021 14  10 - 44 U/L Final    Anion Gap 07/14/2021 10  8 - 16 mmol/L Final    eGFR if African American 07/14/2021 >60  >60 mL/min/1.73 m^2 Final    eGFR if non African American 07/14/2021 >60  >60 mL/min/1.73 m^2 Final    CEA 07/14/2021 1.8  0.0 - 5.0 ng/mL Final   Hospital Outpatient Visit on 06/28/2021   Component Date Value Ref Range Status    SARS-CoV-2 RNA, Amplification, Qual 06/28/2021 Negative  Negative Final   Lab Visit on 06/28/2021   Component Date Value Ref Range Status    TSH 06/28/2021 30.071 (A) 0.400 - 4.000 uIU/mL Final    Free T4 06/28/2021 0.51 (A) 0.71 - 1.51 ng/dL Final   Lab Visit on 06/28/2021   Component Date Value Ref Range Status    WBC 06/28/2021 3.29 (A) 3.90 - 12.70 K/uL Final    RBC 06/28/2021 4.29  4.00 - 5.40 M/uL Final    Hemoglobin 06/28/2021 12.8  12.0 - 16.0 g/dL Final    Hematocrit 06/28/2021 39.0  37.0 - 48.5 % Final    MCV 06/28/2021 91  82 - 98 fL Final    MCH 06/28/2021 29.8  27.0 - 31.0 pg Final    MCHC 06/28/2021 32.8  32.0 - 36.0 g/dL Final    RDW 06/28/2021 13.7  11.5 - 14.5 % Final    Platelets 06/28/2021 217  150 - 450 K/uL Final    MPV 06/28/2021 10.0  9.2 - 12.9 fL Final    Immature Granulocytes 06/28/2021 0.3  0.0 - 0.5 % Final    Gran # (ANC) 06/28/2021 2.4  1.8 - 7.7 K/uL Final    Immature Grans (Abs) 06/28/2021 0.01  0.00 - 0.04 K/uL Final    Lymph # 06/28/2021 0.4 (A) 1.0 - 4.8 K/uL Final    Mono # 06/28/2021 0.3  0.3 - 1.0 K/uL Final    Eos # 06/28/2021 0.2  0.0 - 0.5 K/uL Final    Baso # 06/28/2021 0.03  0.00 - 0.20 K/uL Final    nRBC 06/28/2021 0  0 /100 WBC Final    Gran % 06/28/2021 72.6  38.0 - 73.0 % Final    Lymph % 06/28/2021 12.5 (A) 18.0 - 48.0 % Final    Mono % 06/28/2021 8.5  4.0 - 15.0 % Final    Eosinophil % 06/28/2021 5.2  0.0 - 8.0 % Final    Basophil % 06/28/2021 0.9  0.0 - 1.9 % Final     Differential Method 06/28/2021 Automated   Final    Sodium 06/28/2021 139  136 - 145 mmol/L Final    Potassium 06/28/2021 3.9  3.5 - 5.1 mmol/L Final    Chloride 06/28/2021 103  95 - 110 mmol/L Final    CO2 06/28/2021 27  23 - 29 mmol/L Final    Glucose 06/28/2021 112 (A) 70 - 110 mg/dL Final    BUN 06/28/2021 9  8 - 23 mg/dL Final    Creatinine 06/28/2021 0.9  0.5 - 1.4 mg/dL Final    Calcium 06/28/2021 9.6  8.7 - 10.5 mg/dL Final    Total Protein 06/28/2021 7.2  6.0 - 8.4 g/dL Final    Albumin 06/28/2021 3.7  3.5 - 5.2 g/dL Final    Total Bilirubin 06/28/2021 0.5  0.1 - 1.0 mg/dL Final    Alkaline Phosphatase 06/28/2021 105  55 - 135 U/L Final    AST 06/28/2021 18  10 - 40 U/L Final    ALT 06/28/2021 16  10 - 44 U/L Final    Anion Gap 06/28/2021 9  8 - 16 mmol/L Final    eGFR if African American 06/28/2021 >60  >60 mL/min/1.73 m^2 Final    eGFR if non African American 06/28/2021 >60  >60 mL/min/1.73 m^2 Final   There may be more visits with results that are not included.       Past Medical History:   Diagnosis Date    Acute hypoxemic respiratory failure 7/9/2018    Arthritis     Cancer     colon    COPD (chronic obstructive pulmonary disease)     History of home oxygen therapy     ONLY PRN AND HASN'T USED IT IN A YEAR    Hyperlipidemia     Thyroid disease      Past Surgical History:   Procedure Laterality Date    CHOLECYSTECTOMY      COLECTOMY N/A 11/9/2020    Procedure: COLECTOMY;  Surgeon: Brandon Wagner MD;  Location: [facility] OR;  Service: General;  Laterality: N/A;  LOWER- ANTERIOR    COLONOSCOPY N/A 10/23/2020    Procedure: COLONOSCOPY;  Surgeon: Jolynn Ayala MD;  Location: [facility];  Service: Endoscopy;  Laterality: N/A;    COLONOSCOPY N/A 8/6/2021    Procedure: COLONOSCOPY;  Surgeon: Jolynn Ayala MD;  Location: [facility];  Service: Endoscopy;  Laterality: N/A;    ESOPHAGEAL DILATION N/A 6/29/2021    Procedure: DILATION, ESOPHAGUS;  Surgeon: Sourav Baires  MD KEARA;  Location: [facility] ENDO;  Service: Endoscopy;  Laterality: N/A;    ESOPHAGOGASTRODUODENOSCOPY  06/29/2021    Dilation to 57 Fr    ESOPHAGOGASTRODUODENOSCOPY N/A 6/29/2021    Procedure: EGD (ESOPHAGOGASTRODUODENOSCOPY);  Surgeon: Sourav Baires III, MD;  Location: [facility] ENDO;  Service: Endoscopy;  Laterality: N/A;    HYSTERECTOMY      INSERTION OF TUNNELED CENTRAL VENOUS CATHETER (CVC) WITH SUBCUTANEOUS PORT N/A 1/11/2021    Procedure: [id number];  Surgeon: Brandon Wagner MD;  Location: [facility] OR;  Service: General;  Laterality: N/A;    MEDIPORT REMOVAL Left 4/19/2021    Procedure: REMOVAL, CATHETER, CENTRAL VENOUS, TUNNELED, WITH PORT;  Surgeon: Brandon Wagner MD;  Location: [facility] OR;  Service: General;  Laterality: Left;    ROBOT-ASSISTED COLECTOMY N/A 11/9/2020    Procedure: ROBOTIC COLECTOMY low anterior resection, possible open;  Surgeon: Brandon Wagner MD;  Location: [facility] OR;  Service: General;  Laterality: N/A;  CONVERTED TO OPEN AT 1503     Family History   Problem Relation Age of Onset    COPD Mother     COPD Father     Breast cancer Maternal Grandmother        Marital Status:   Alcohol History:  reports no history of alcohol use.  Tobacco History:  reports that she quit smoking about 4 years ago. Her smoking use included cigarettes. She has a 80.00 pack-year smoking history. She has never used smokeless tobacco.  Drug History:  reports no history of drug use.    Review of patient's allergies indicates:   Allergen Reactions    Ativan [lorazepam] Hives and Itching    Flagyl [metronidazole] Itching and Rash    Pcn [penicillins] Hives, Itching and Rash       Current Outpatient Medications:     albuterol (PROVENTIL) 2.5 mg /3 mL (0.083 %) nebulizer solution, Take 3 mLs (2.5 mg total) by nebulization every 6 (six) hours as needed for Wheezing. Rescue, Disp: 1 Box, Rfl: 2    cyanocobalamin 1,000 mcg/mL injection, Inject 1 mL (1,000 mcg total) into the muscle every 14  (fourteen) days., Disp: 30 mL, Rfl: 6    levothyroxine (SYNTHROID) 112 MCG tablet, Take 1 tablet (112 mcg total) by mouth before breakfast., Disp: 30 tablet, Rfl: 11    traMADoL (ULTRAM) 50 mg tablet, TAKE ONE TABLET BY MOUTH EVERY SIX HOURS AS NEEDED FOR PAIN, Disp: 28 tablet, Rfl: 2    atorvastatin (LIPITOR) 40 MG tablet, Take 1 tablet (40 mg total) by mouth once daily. (Patient not taking: No sig reported), Disp: 90 tablet, Rfl: 1    clonazePAM (KLONOPIN) 0.5 MG tablet, Take 1 tablet (0.5 mg total) by mouth 3 (three) times daily., Disp: 90 tablet, Rfl: 0    colestipoL (COLESTID) 5 gram Pack, Take 5 g by mouth 2 (two) times daily., Disp: 60 packet, Rfl: 0    gabapentin (NEURONTIN) 300 MG capsule, Take 1 capsule (300 mg total) by mouth every evening., Disp: 15 capsule, Rfl: 0    predniSONE (DELTASONE) 20 MG tablet, Take 1 tablet (20 mg total) by mouth once daily. for 5 days, Disp: 5 tablet, Rfl: 0    Review of Systems   Constitutional: Negative for appetite change, chills, diaphoresis, fatigue, fever and unexpected weight change.   HENT: Negative for congestion, ear pain, hearing loss, nosebleeds, postnasal drip, sinus pressure, sinus pain, sneezing, sore throat, tinnitus, trouble swallowing and voice change.    Eyes: Negative for photophobia, pain, itching and visual disturbance.   Respiratory: Negative for apnea, cough, chest tightness, shortness of breath, wheezing and stridor.    Cardiovascular: Negative for chest pain, palpitations and leg swelling.   Gastrointestinal: Positive for diarrhea and rectal pain (Blood). Negative for abdominal distention, abdominal pain, blood in stool, constipation, nausea and vomiting.   Endocrine: Negative for cold intolerance, heat intolerance, polydipsia and polyuria.   Genitourinary: Negative for difficulty urinating, dyspareunia, dysuria, flank pain, frequency, hematuria, menstrual problem, pelvic pain, urgency, vaginal discharge and vaginal pain.   Musculoskeletal:  Positive for back pain (HPI). Negative for arthralgias, joint swelling, myalgias, neck pain and neck stiffness.   Skin: Negative for pallor.   Allergic/Immunologic: Negative for environmental allergies and food allergies.   Neurological: Negative for dizziness, tremors, speech difficulty, weakness, light-headedness and numbness.   Hematological: Does not bruise/bleed easily.   Psychiatric/Behavioral: Negative for agitation, confusion, decreased concentration, sleep disturbance and suicidal ideas. The patient is not nervous/anxious.           Objective:      Vitals:    03/14/22 1641   BP: 128/70   Pulse: 86   Resp: 14   Temp: 98.2 °F (36.8 °C)   SpO2: 99%   Weight: 86.2 kg (190 lb)   Height: 5' (1.524 m)     Physical Exam  Constitutional:       General: She is not in acute distress.     Appearance: She is obese. She is not ill-appearing.   HENT:      Head: Normocephalic and atraumatic.   Eyes:      Extraocular Movements: Extraocular movements intact.      Pupils: Pupils are equal, round, and reactive to light.   Neck:      Vascular: No carotid bruit.   Cardiovascular:      Rate and Rhythm: Normal rate and regular rhythm.      Pulses: Normal pulses.      Heart sounds: Normal heart sounds.   Pulmonary:      Effort: Pulmonary effort is normal.      Breath sounds: Normal breath sounds.   Abdominal:      Palpations: Abdomen is soft.      Tenderness: There is no abdominal tenderness.      Comments: Bowel sounds constant   Musculoskeletal:      Right lower leg: No edema.      Left lower leg: No edema.   Lymphadenopathy:      Cervical: No cervical adenopathy.   Skin:     General: Skin is warm and dry.   Neurological:      General: No focal deficit present.      Mental Status: She is alert and oriented to person, place, and time.      Comments: Neg SLR   Psychiatric:         Mood and Affect: Mood normal.         Behavior: Behavior normal.         Thought Content: Thought content normal.           Assessment:       1. Pure  hypercholesterolemia    2. Diarrhea, unspecified type    3. Acquired hypothyroidism    4. Menopause    5. Encounter for osteoporosis screening in asymptomatic postmenopausal patient    6. Severe anxiety    7. Sciatica of left side         Plan:       Pure hypercholesterolemia  -     Lipid Panel; Future; Expected date: 03/14/2022    Diarrhea, unspecified type  -     colestipoL (COLESTID) 5 gram Pack; Take 5 g by mouth 2 (two) times daily.  Dispense: 60 packet; Refill: 0    Acquired hypothyroidism  -     TSH; Future; Expected date: 03/14/2022  -     T4, Free; Future; Expected date: 03/14/2022    Menopause  -     DXA Bone Density Spine And Hip; Future; Expected date: 03/14/2022    Encounter for osteoporosis screening in asymptomatic postmenopausal patient  -     DXA Bone Density Spine And Hip; Future; Expected date: 03/14/2022    Severe anxiety  -     clonazePAM (KLONOPIN) 0.5 MG tablet; Take 1 tablet (0.5 mg total) by mouth 3 (three) times daily.  Dispense: 90 tablet; Refill: 0    Sciatica of left side  -     predniSONE (DELTASONE) 20 MG tablet; Take 1 tablet (20 mg total) by mouth once daily. for 5 days  Dispense: 5 tablet; Refill: 0  -     gabapentin (NEURONTIN) 300 MG capsule; Take 1 capsule (300 mg total) by mouth every evening.  Dispense: 15 capsule; Refill: 0      No follow-ups on file.        3/14/2022 Karma Mcdermott M.D.

## 2022-03-14 ENCOUNTER — OFFICE VISIT (OUTPATIENT)
Dept: FAMILY MEDICINE | Facility: CLINIC | Age: 67
End: 2022-03-14
Payer: COMMERCIAL

## 2022-03-14 VITALS
DIASTOLIC BLOOD PRESSURE: 70 MMHG | RESPIRATION RATE: 14 BRPM | BODY MASS INDEX: 37.3 KG/M2 | WEIGHT: 190 LBS | OXYGEN SATURATION: 99 % | SYSTOLIC BLOOD PRESSURE: 128 MMHG | TEMPERATURE: 98 F | HEART RATE: 86 BPM | HEIGHT: 60 IN

## 2022-03-14 DIAGNOSIS — Z78.0 ENCOUNTER FOR OSTEOPOROSIS SCREENING IN ASYMPTOMATIC POSTMENOPAUSAL PATIENT: ICD-10-CM

## 2022-03-14 DIAGNOSIS — M54.32 SCIATICA OF LEFT SIDE: ICD-10-CM

## 2022-03-14 DIAGNOSIS — E03.9 ACQUIRED HYPOTHYROIDISM: ICD-10-CM

## 2022-03-14 DIAGNOSIS — F41.9 SEVERE ANXIETY: ICD-10-CM

## 2022-03-14 DIAGNOSIS — R19.7 DIARRHEA, UNSPECIFIED TYPE: ICD-10-CM

## 2022-03-14 DIAGNOSIS — Z13.820 ENCOUNTER FOR OSTEOPOROSIS SCREENING IN ASYMPTOMATIC POSTMENOPAUSAL PATIENT: ICD-10-CM

## 2022-03-14 DIAGNOSIS — E78.00 PURE HYPERCHOLESTEROLEMIA: Primary | ICD-10-CM

## 2022-03-14 DIAGNOSIS — Z78.0 MENOPAUSE: ICD-10-CM

## 2022-03-14 PROCEDURE — 3078F PR MOST RECENT DIASTOLIC BLOOD PRESSURE < 80 MM HG: ICD-10-PCS | Mod: S$GLB,,, | Performed by: INTERNAL MEDICINE

## 2022-03-14 PROCEDURE — 1160F RVW MEDS BY RX/DR IN RCRD: CPT | Mod: S$GLB,,, | Performed by: INTERNAL MEDICINE

## 2022-03-14 PROCEDURE — 3008F PR BODY MASS INDEX (BMI) DOCUMENTED: ICD-10-PCS | Mod: S$GLB,,, | Performed by: INTERNAL MEDICINE

## 2022-03-14 PROCEDURE — 99214 PR OFFICE/OUTPT VISIT, EST, LEVL IV, 30-39 MIN: ICD-10-PCS | Mod: S$GLB,,, | Performed by: INTERNAL MEDICINE

## 2022-03-14 PROCEDURE — 3074F PR MOST RECENT SYSTOLIC BLOOD PRESSURE < 130 MM HG: ICD-10-PCS | Mod: S$GLB,,, | Performed by: INTERNAL MEDICINE

## 2022-03-14 PROCEDURE — 3074F SYST BP LT 130 MM HG: CPT | Mod: S$GLB,,, | Performed by: INTERNAL MEDICINE

## 2022-03-14 PROCEDURE — 1160F PR REVIEW ALL MEDS BY PRESCRIBER/CLIN PHARMACIST DOCUMENTED: ICD-10-PCS | Mod: S$GLB,,, | Performed by: INTERNAL MEDICINE

## 2022-03-14 PROCEDURE — 3078F DIAST BP <80 MM HG: CPT | Mod: S$GLB,,, | Performed by: INTERNAL MEDICINE

## 2022-03-14 PROCEDURE — 3008F BODY MASS INDEX DOCD: CPT | Mod: S$GLB,,, | Performed by: INTERNAL MEDICINE

## 2022-03-14 PROCEDURE — 99214 OFFICE O/P EST MOD 30 MIN: CPT | Mod: S$GLB,,, | Performed by: INTERNAL MEDICINE

## 2022-03-14 RX ORDER — PREDNISONE 20 MG/1
20 TABLET ORAL DAILY
Qty: 5 TABLET | Refills: 0 | Status: SHIPPED | OUTPATIENT
Start: 2022-03-14 | End: 2022-03-19

## 2022-03-14 RX ORDER — COLESTIPOL HYDROCHLORIDE 5 G/5G
5 GRANULE, FOR SUSPENSION ORAL 2 TIMES DAILY
Qty: 60 PACKET | Refills: 0 | Status: SHIPPED | OUTPATIENT
Start: 2022-03-14 | End: 2022-06-01

## 2022-03-14 RX ORDER — CLONAZEPAM 0.5 MG/1
0.5 TABLET ORAL 3 TIMES DAILY
Qty: 90 TABLET | Refills: 0 | Status: SHIPPED | OUTPATIENT
Start: 2022-03-14 | End: 2022-06-01

## 2022-03-14 RX ORDER — GABAPENTIN 300 MG/1
300 CAPSULE ORAL NIGHTLY
Qty: 15 CAPSULE | Refills: 0 | Status: SHIPPED | OUTPATIENT
Start: 2022-03-14 | End: 2022-06-01 | Stop reason: SDUPTHER

## 2022-03-28 ENCOUNTER — HOSPITAL ENCOUNTER (EMERGENCY)
Facility: HOSPITAL | Age: 67
Discharge: HOME | End: 2022-03-28
Attending: EMERGENCY MEDICINE
Payer: COMMERCIAL

## 2022-03-28 VITALS
HEART RATE: 80 BPM | TEMPERATURE: 98 F | OXYGEN SATURATION: 99 % | DIASTOLIC BLOOD PRESSURE: 68 MMHG | SYSTOLIC BLOOD PRESSURE: 152 MMHG | HEIGHT: 60 IN | RESPIRATION RATE: 16 BRPM | WEIGHT: 190 LBS | BODY MASS INDEX: 37.3 KG/M2

## 2022-03-28 DIAGNOSIS — R19.7 DIARRHEA, UNSPECIFIED TYPE: Primary | ICD-10-CM

## 2022-03-28 LAB
ALBUMIN SERPL BCP-MCNC: 3.5 G/DL (ref 3.5–5.2)
ALP SERPL-CCNC: 93 U/L (ref 55–135)
ALT SERPL W/O P-5'-P-CCNC: 15 U/L (ref 10–44)
ANION GAP SERPL CALC-SCNC: 9 MMOL/L (ref 8–16)
AST SERPL-CCNC: 14 U/L (ref 10–40)
BACTERIA #/AREA URNS HPF: ABNORMAL /HPF
BASOPHILS # BLD AUTO: 0.03 K/UL (ref 0–0.2)
BASOPHILS NFR BLD: 0.6 % (ref 0–1.9)
BILIRUB SERPL-MCNC: 0.4 MG/DL (ref 0.1–1)
BILIRUB UR QL STRIP: NEGATIVE
BUN SERPL-MCNC: 13 MG/DL (ref 8–23)
C DIFF GDH STL QL: NEGATIVE
C DIFF TOX A+B STL QL IA: NEGATIVE
CALCIUM SERPL-MCNC: 8.8 MG/DL (ref 8.7–10.5)
CHLORIDE SERPL-SCNC: 105 MMOL/L (ref 95–110)
CLARITY UR: ABNORMAL
CO2 SERPL-SCNC: 24 MMOL/L (ref 23–29)
COLOR UR: YELLOW
CREAT SERPL-MCNC: 0.8 MG/DL (ref 0.5–1.4)
DIFFERENTIAL METHOD: ABNORMAL
EOSINOPHIL # BLD AUTO: 0.1 K/UL (ref 0–0.5)
EOSINOPHIL NFR BLD: 1.6 % (ref 0–8)
ERYTHROCYTE [DISTWIDTH] IN BLOOD BY AUTOMATED COUNT: 13.3 % (ref 11.5–14.5)
EST. GFR  (AFRICAN AMERICAN): >60 ML/MIN/1.73 M^2
EST. GFR  (NON AFRICAN AMERICAN): >60 ML/MIN/1.73 M^2
GLUCOSE SERPL-MCNC: 92 MG/DL (ref 70–110)
GLUCOSE UR QL STRIP: NEGATIVE
HCT VFR BLD AUTO: 34.1 % (ref 37–48.5)
HGB BLD-MCNC: 10.9 G/DL (ref 12–16)
HGB UR QL STRIP: ABNORMAL
IMM GRANULOCYTES # BLD AUTO: 0.02 K/UL (ref 0–0.04)
IMM GRANULOCYTES NFR BLD AUTO: 0.4 % (ref 0–0.5)
KETONES UR QL STRIP: NEGATIVE
LEUKOCYTE ESTERASE UR QL STRIP: ABNORMAL
LIPASE SERPL-CCNC: 23 U/L (ref 4–60)
LYMPHOCYTES # BLD AUTO: 0.6 K/UL (ref 1–4.8)
LYMPHOCYTES NFR BLD: 11.3 % (ref 18–48)
MAGNESIUM SERPL-MCNC: 2 MG/DL (ref 1.6–2.6)
MCH RBC QN AUTO: 28.2 PG (ref 27–31)
MCHC RBC AUTO-ENTMCNC: 32 G/DL (ref 32–36)
MCV RBC AUTO: 88 FL (ref 82–98)
MICROSCOPIC COMMENT: ABNORMAL
MONOCYTES # BLD AUTO: 0.3 K/UL (ref 0.3–1)
MONOCYTES NFR BLD: 6.3 % (ref 4–15)
NEUTROPHILS # BLD AUTO: 4 K/UL (ref 1.8–7.7)
NEUTROPHILS NFR BLD: 79.8 % (ref 38–73)
NITRITE UR QL STRIP: POSITIVE
NRBC BLD-RTO: 0 /100 WBC
PH UR STRIP: 6 [PH] (ref 5–8)
PLATELET # BLD AUTO: 231 K/UL (ref 150–450)
PMV BLD AUTO: 10.9 FL (ref 9.2–12.9)
POTASSIUM SERPL-SCNC: 3.6 MMOL/L (ref 3.5–5.1)
PROT SERPL-MCNC: 6.7 G/DL (ref 6–8.4)
PROT UR QL STRIP: ABNORMAL
RBC # BLD AUTO: 3.87 M/UL (ref 4–5.4)
RBC #/AREA URNS HPF: 10 /HPF (ref 0–4)
SODIUM SERPL-SCNC: 138 MMOL/L (ref 136–145)
SP GR UR STRIP: >=1.03 (ref 1–1.03)
SQUAMOUS #/AREA URNS HPF: 9 /HPF
URN SPEC COLLECT METH UR: ABNORMAL
UROBILINOGEN UR STRIP-ACNC: 1 EU/DL
WBC # BLD AUTO: 5.04 K/UL (ref 3.9–12.7)
WBC #/AREA URNS HPF: 25 /HPF (ref 0–5)

## 2022-03-28 PROCEDURE — 87186 SC STD MICRODIL/AGAR DIL: CPT | Performed by: PHYSICIAN ASSISTANT

## 2022-03-28 PROCEDURE — 87177 OVA AND PARASITES SMEARS: CPT | Performed by: PHYSICIAN ASSISTANT

## 2022-03-28 PROCEDURE — 25000003 PHARM REV CODE 250: Performed by: PHYSICIAN ASSISTANT

## 2022-03-28 PROCEDURE — 96360 HYDRATION IV INFUSION INIT: CPT

## 2022-03-28 PROCEDURE — 87045 FECES CULTURE AEROBIC BACT: CPT | Performed by: PHYSICIAN ASSISTANT

## 2022-03-28 PROCEDURE — 87449 NOS EACH ORGANISM AG IA: CPT | Performed by: PHYSICIAN ASSISTANT

## 2022-03-28 PROCEDURE — 87077 CULTURE AEROBIC IDENTIFY: CPT | Performed by: PHYSICIAN ASSISTANT

## 2022-03-28 PROCEDURE — 99284 EMERGENCY DEPT VISIT MOD MDM: CPT | Mod: 25

## 2022-03-28 PROCEDURE — 80053 COMPREHEN METABOLIC PANEL: CPT | Performed by: PHYSICIAN ASSISTANT

## 2022-03-28 PROCEDURE — 81000 URINALYSIS NONAUTO W/SCOPE: CPT | Performed by: PHYSICIAN ASSISTANT

## 2022-03-28 PROCEDURE — 83735 ASSAY OF MAGNESIUM: CPT | Performed by: PHYSICIAN ASSISTANT

## 2022-03-28 PROCEDURE — 87389 HIV-1 AG W/HIV-1&-2 AB AG IA: CPT | Performed by: EMERGENCY MEDICINE

## 2022-03-28 PROCEDURE — 36415 COLL VENOUS BLD VENIPUNCTURE: CPT | Performed by: EMERGENCY MEDICINE

## 2022-03-28 PROCEDURE — 87427 SHIGA-LIKE TOXIN AG IA: CPT | Performed by: PHYSICIAN ASSISTANT

## 2022-03-28 PROCEDURE — 87088 URINE BACTERIA CULTURE: CPT | Performed by: PHYSICIAN ASSISTANT

## 2022-03-28 PROCEDURE — 85025 COMPLETE CBC W/AUTO DIFF WBC: CPT | Performed by: PHYSICIAN ASSISTANT

## 2022-03-28 PROCEDURE — 87209 SMEAR COMPLEX STAIN: CPT | Performed by: PHYSICIAN ASSISTANT

## 2022-03-28 PROCEDURE — 86803 HEPATITIS C AB TEST: CPT | Performed by: EMERGENCY MEDICINE

## 2022-03-28 PROCEDURE — 87086 URINE CULTURE/COLONY COUNT: CPT | Performed by: PHYSICIAN ASSISTANT

## 2022-03-28 PROCEDURE — 83690 ASSAY OF LIPASE: CPT | Performed by: PHYSICIAN ASSISTANT

## 2022-03-28 PROCEDURE — 87046 STOOL CULTR AEROBIC BACT EA: CPT | Performed by: PHYSICIAN ASSISTANT

## 2022-03-28 RX ADMIN — SODIUM CHLORIDE 1000 ML: 0.9 INJECTION, SOLUTION INTRAVENOUS at 01:03

## 2022-03-28 NOTE — DISCHARGE INSTRUCTIONS
Drink plenty of fluids. Eat a bland diet.  Follow up closely with your primary care provider.  We will call you if there are any abnormalities with your stool or urine cultures.  For worsening symptoms, chest pain, shortness of breath, increased abdominal pain, high grade fever, stroke or stroke like symptoms, immediately go to the nearest Emergency Room or call 911 as soon as possible.

## 2022-03-28 NOTE — ED PROVIDER NOTES
Encounter Date: 3/28/2022       History     Chief Complaint   Patient presents with    Diarrhea     Started Wednesday      Patient is a 66 year old female who presents with diarrhea. She has PMH significant for COPD, hyperlipidemia, colon CA s/p resection and thyroid disease. She reports 6 days ago she had nasuea, vomiting and diarrhea. She took zofran and lomotil and symptoms improved for three days. This AM she had abdominal pain and persistent diarrhea. Non-bloody diarrhea. Denied fever. Denied urinary symptoms.     The history is provided by the patient.     Review of patient's allergies indicates:   Allergen Reactions    Ativan [lorazepam] Hives and Itching    Flagyl [metronidazole] Itching and Rash    Pcn [penicillins] Hives, Itching and Rash     Past Medical History:   Diagnosis Date    Acute hypoxemic respiratory failure 7/9/2018    Arthritis     Cancer     colon    COPD (chronic obstructive pulmonary disease)     History of home oxygen therapy     ONLY PRN AND HASN'T USED IT IN A YEAR    Hyperlipidemia     Thyroid disease      Past Surgical History:   Procedure Laterality Date    CHOLECYSTECTOMY      COLECTOMY N/A 11/9/2020    Procedure: COLECTOMY;  Surgeon: Brandon Wagner MD;  Location: [facility];  Service: General;  Laterality: N/A;  LOWER- ANTERIOR    COLONOSCOPY N/A 10/23/2020    Procedure: COLONOSCOPY;  Surgeon: Jolynn Ayala MD;  Location: [facility];  Service: Endoscopy;  Laterality: N/A;    COLONOSCOPY N/A 8/6/2021    Procedure: COLONOSCOPY;  Surgeon: Jolynn Ayala MD;  Location: [facility];  Service: Endoscopy;  Laterality: N/A;    ESOPHAGEAL DILATION N/A 6/29/2021    Procedure: DILATION, ESOPHAGUS;  Surgeon: Sourav Baires III, MD;  Location: [facility];  Service: Endoscopy;  Laterality: N/A;    ESOPHAGOGASTRODUODENOSCOPY  06/29/2021    Dilation to 57 Fr    ESOPHAGOGASTRODUODENOSCOPY N/A 6/29/2021    Procedure: EGD (ESOPHAGOGASTRODUODENOSCOPY);  Surgeon: Sourav PORRAS  Dequan SARAH MD;  Location: [facility] ENDO;  Service: Endoscopy;  Laterality: N/A;    HYSTERECTOMY      INSERTION OF TUNNELED CENTRAL VENOUS CATHETER (CVC) WITH SUBCUTANEOUS PORT N/A 2021    Procedure: [id number];  Surgeon: Brandon Wagner MD;  Location: [facility] OR;  Service: General;  Laterality: N/A;    MEDIPORT REMOVAL Left 2021    Procedure: REMOVAL, CATHETER, CENTRAL VENOUS, TUNNELED, WITH PORT;  Surgeon: Brandon Wagner MD;  Location: [facility] OR;  Service: General;  Laterality: Left;    ROBOT-ASSISTED COLECTOMY N/A 2020    Procedure: ROBOTIC COLECTOMY low anterior resection, possible open;  Surgeon: Brandon Wagner MD;  Location: [facility] OR;  Service: General;  Laterality: N/A;  CONVERTED TO OPEN AT 1503     Family History   Problem Relation Age of Onset    COPD Mother     COPD Father     Breast cancer Maternal Grandmother      Social History     Tobacco Use    Smoking status: Former Smoker     Packs/day: 2.00     Years: 40.00     Pack years: 80.00     Types: Cigarettes     Quit date: 2017     Years since quittin.7    Smokeless tobacco: Never Used   Substance Use Topics    Alcohol use: No    Drug use: No     Review of Systems   Constitutional: Negative for activity change, appetite change, chills and fever.   HENT: Negative for congestion, rhinorrhea and sore throat.    Eyes: Negative for redness and visual disturbance.   Respiratory: Negative for cough, chest tightness and shortness of breath.    Cardiovascular: Negative for chest pain.   Gastrointestinal: Positive for abdominal pain (abdominal cramping), diarrhea, nausea and vomiting.   Genitourinary: Negative for dysuria and frequency.   Musculoskeletal: Negative for back pain, neck pain and neck stiffness.   Skin: Negative for rash.   Neurological: Negative for dizziness, syncope, numbness and headaches.       Physical Exam     Initial Vitals [22 1113]   BP Pulse Resp Temp SpO2   (!) 157/76 85 18 98 °F (36.7 °C) 97  %      MAP       --         Physical Exam    Nursing note and vitals reviewed.  Constitutional: She appears well-developed and well-nourished. She is cooperative.  Non-toxic appearance. She does not have a sickly appearance.   HENT:   Head: Normocephalic and atraumatic.   Right Ear: External ear normal.   Left Ear: External ear normal.   Nose: Nose normal.   Eyes: Conjunctivae and lids are normal.   Cardiovascular: Normal rate, regular rhythm and normal heart sounds. Exam reveals no gallop and no friction rub.    No murmur heard.  Pulmonary/Chest: Breath sounds normal. She has no wheezes. She has no rhonchi. She has no rales.   Abdominal: Abdomen is soft. There is no abdominal tenderness. There is no rebound and no guarding.     Neurological: She is alert.   Skin: Skin is warm, dry and intact. No rash noted.         ED Course   Procedures  Labs Reviewed   CBC W/ AUTO DIFFERENTIAL - Abnormal; Notable for the following components:       Result Value    RBC 3.87 (*)     Hemoglobin 10.9 (*)     Hematocrit 34.1 (*)     Lymph # 0.6 (*)     Gran % 79.8 (*)     Lymph % 11.3 (*)     All other components within normal limits   URINALYSIS, REFLEX TO URINE CULTURE - Abnormal; Notable for the following components:    Appearance, UA Cloudy (*)     Specific Gravity, UA >=1.030 (*)     Protein, UA Trace (*)     Occult Blood UA 3+ (*)     Nitrite, UA Positive (*)     Leukocytes, UA Trace (*)     All other components within normal limits    Narrative:     Specimen Source->Urine   URINALYSIS MICROSCOPIC - Abnormal; Notable for the following components:    RBC, UA 10 (*)     WBC, UA 25 (*)     Bacteria Many (*)     All other components within normal limits    Narrative:     Specimen Source->Urine   CLOSTRIDIUM DIFFICILE   CULTURE, STOOL   ENTEROHEMORRHAGIC E.COLI   CULTURE, URINE   COMPREHENSIVE METABOLIC PANEL   LIPASE   MAGNESIUM   HIV 1 / 2 ANTIBODY   HEPATITIS C ANTIBODY   STOOL EXAM-OVA,CYSTS,PARASITES          Imaging Results     None          Medications   sodium chloride 0.9% bolus 1,000 mL (0 mLs Intravenous Stopped 3/28/22 1405)                Attending Attestation:     Physician Attestation Statement for NP/PA:   I have conducted a face to face encounter with this patient in addition to the NP/PA, due to Medical Complexity    Other NP/PA Attestation Additions:      Medical Decision Making: Tessa Enriquez is a 66 y.o. female presenting with relapse of nonbloody diarrhea following proceeding recent vomiting and diarrhea in this otherwise well-appearing patient.  She has minimal episodic upper abdominal discomfort.  On my examination she has minimal epigastric tenderness with no other abdominal tenderness.  No voluntary or involuntary guarding.  No distention, masses, organomegaly.  No palpable hernias.  She is ambulatory and well-appearing.  I do think she merits outpatient workup if diarrhea persists.  I do not think empiric antibiotics are indicated if infectious still more likely viral.  There is no sign of renal insult or marked electrolyte derangement.  Outpatient follow-up recommended in with detailed return precautions reviewed.  Very low suspicion for other acute, life-threatening intra-abdominal process such as mesenteric ischemia, abscess, obstruction.  I do not think further abdominal imaging is indicated.  Equivocal urine results with noted contamination sent from triage.  Patient has no urinary complaints and I doubt UTI.  She may follow urine culture up on follow-up to determine likelihood of UTI with very low suspicion at present.                      Clinical Impression:   Final diagnoses:  [R19.7] Diarrhea, unspecified type (Primary)          ED Disposition Condition    Discharge Stable        ED Prescriptions     None        Follow-up Information     Follow up With Specialties Details Why Contact Info    Karma Mcdermott MD Internal Medicine   [address] [place] [id number]  [phone]      [facility] Emergency  Dept Emergency Medicine  As [address] [address]  [phone]           Geovanna Robles PA-C  03/28/22 2032

## 2022-03-29 LAB
HCV AB SERPL QL IA: NEGATIVE
HIV 1+2 AB+HIV1 P24 AG SERPL QL IA: NEGATIVE

## 2022-03-30 LAB
E COLI SXT1 STL QL IA: NEGATIVE
E COLI SXT2 STL QL IA: NEGATIVE

## 2022-03-31 LAB
BACTERIA UR CULT: ABNORMAL
O+P STL MICRO: NORMAL

## 2022-04-01 LAB — BACTERIA STL CULT: NORMAL

## 2022-04-02 DIAGNOSIS — N39.0 URINARY TRACT INFECTION WITHOUT HEMATURIA, SITE UNSPECIFIED: Primary | ICD-10-CM

## 2022-04-02 RX ORDER — NITROFURANTOIN 25; 75 MG/1; MG/1
100 CAPSULE ORAL 2 TIMES DAILY
Qty: 10 CAPSULE | Refills: 0 | Status: SHIPPED | OUTPATIENT
Start: 2022-04-02 | End: 2022-04-07

## 2022-05-02 ENCOUNTER — LAB VISIT (OUTPATIENT)
Dept: LAB | Facility: HOSPITAL | Age: 67
End: 2022-05-02
Attending: INTERNAL MEDICINE
Payer: COMMERCIAL

## 2022-05-02 DIAGNOSIS — C18.9 MALIGNANT NEOPLASM OF COLON, UNSPECIFIED PART OF COLON: ICD-10-CM

## 2022-05-02 LAB
ALBUMIN SERPL BCP-MCNC: 3.5 G/DL (ref 3.5–5.2)
ALP SERPL-CCNC: 83 U/L (ref 55–135)
ALT SERPL W/O P-5'-P-CCNC: 16 U/L (ref 10–44)
ANION GAP SERPL CALC-SCNC: 10 MMOL/L (ref 8–16)
AST SERPL-CCNC: 16 U/L (ref 10–40)
BASOPHILS # BLD AUTO: 0.04 K/UL (ref 0–0.2)
BASOPHILS NFR BLD: 0.9 % (ref 0–1.9)
BILIRUB SERPL-MCNC: 0.3 MG/DL (ref 0.1–1)
BUN SERPL-MCNC: 16 MG/DL (ref 8–23)
CALCIUM SERPL-MCNC: 8.6 MG/DL (ref 8.7–10.5)
CHLORIDE SERPL-SCNC: 106 MMOL/L (ref 95–110)
CO2 SERPL-SCNC: 22 MMOL/L (ref 23–29)
CREAT SERPL-MCNC: 0.8 MG/DL (ref 0.5–1.4)
DIFFERENTIAL METHOD: ABNORMAL
EOSINOPHIL # BLD AUTO: 0.1 K/UL (ref 0–0.5)
EOSINOPHIL NFR BLD: 2.8 % (ref 0–8)
ERYTHROCYTE [DISTWIDTH] IN BLOOD BY AUTOMATED COUNT: 14 % (ref 11.5–14.5)
EST. GFR  (AFRICAN AMERICAN): >60 ML/MIN/1.73 M^2
EST. GFR  (NON AFRICAN AMERICAN): >60 ML/MIN/1.73 M^2
FERRITIN SERPL-MCNC: 18 NG/ML (ref 20–300)
GLUCOSE SERPL-MCNC: 89 MG/DL (ref 70–110)
HCT VFR BLD AUTO: 32.5 % (ref 37–48.5)
HGB BLD-MCNC: 10.4 G/DL (ref 12–16)
IMM GRANULOCYTES # BLD AUTO: 0.01 K/UL (ref 0–0.04)
IMM GRANULOCYTES NFR BLD AUTO: 0.2 % (ref 0–0.5)
LYMPHOCYTES # BLD AUTO: 0.8 K/UL (ref 1–4.8)
LYMPHOCYTES NFR BLD: 16.3 % (ref 18–48)
MCH RBC QN AUTO: 28.5 PG (ref 27–31)
MCHC RBC AUTO-ENTMCNC: 32 G/DL (ref 32–36)
MCV RBC AUTO: 89 FL (ref 82–98)
MONOCYTES # BLD AUTO: 0.4 K/UL (ref 0.3–1)
MONOCYTES NFR BLD: 7.9 % (ref 4–15)
NEUTROPHILS # BLD AUTO: 3.4 K/UL (ref 1.8–7.7)
NEUTROPHILS NFR BLD: 71.9 % (ref 38–73)
NRBC BLD-RTO: 0 /100 WBC
PLATELET # BLD AUTO: 205 K/UL (ref 150–450)
PMV BLD AUTO: 11.3 FL (ref 9.2–12.9)
POTASSIUM SERPL-SCNC: 4.1 MMOL/L (ref 3.5–5.1)
PROT SERPL-MCNC: 6.8 G/DL (ref 6–8.4)
RBC # BLD AUTO: 3.65 M/UL (ref 4–5.4)
SODIUM SERPL-SCNC: 138 MMOL/L (ref 136–145)
VIT B12 SERPL-MCNC: 292 PG/ML (ref 210–950)
WBC # BLD AUTO: 4.66 K/UL (ref 3.9–12.7)

## 2022-05-02 PROCEDURE — 82728 ASSAY OF FERRITIN: CPT | Performed by: INTERNAL MEDICINE

## 2022-05-02 PROCEDURE — 82378 CARCINOEMBRYONIC ANTIGEN: CPT | Performed by: INTERNAL MEDICINE

## 2022-05-02 PROCEDURE — 84466 ASSAY OF TRANSFERRIN: CPT | Performed by: INTERNAL MEDICINE

## 2022-05-02 PROCEDURE — 82607 VITAMIN B-12: CPT | Performed by: INTERNAL MEDICINE

## 2022-05-02 PROCEDURE — 85025 COMPLETE CBC W/AUTO DIFF WBC: CPT | Performed by: INTERNAL MEDICINE

## 2022-05-02 PROCEDURE — 80053 COMPREHEN METABOLIC PANEL: CPT | Performed by: INTERNAL MEDICINE

## 2022-05-03 ENCOUNTER — HOSPITAL ENCOUNTER (OUTPATIENT)
Dept: RADIOLOGY | Facility: HOSPITAL | Age: 67
Discharge: HOME | End: 2022-05-03
Attending: INTERNAL MEDICINE
Payer: COMMERCIAL

## 2022-05-03 DIAGNOSIS — C18.9 MALIGNANT NEOPLASM OF COLON, UNSPECIFIED PART OF COLON: ICD-10-CM

## 2022-05-03 LAB
CEA SERPL-MCNC: 1.8 NG/ML (ref 0–5)
IRON SERPL-MCNC: 64 UG/DL (ref 30–160)
SATURATED IRON: 13 % (ref 20–50)
TOTAL IRON BINDING CAPACITY: 494 UG/DL (ref 250–450)
TRANSFERRIN SERPL-MCNC: 334 MG/DL (ref 200–375)

## 2022-05-03 PROCEDURE — 71260 CT THORAX DX C+: CPT | Mod: 26,,, | Performed by: RADIOLOGY

## 2022-05-03 PROCEDURE — 74177 CT ABD & PELVIS W/CONTRAST: CPT | Mod: 26,,, | Performed by: RADIOLOGY

## 2022-05-03 PROCEDURE — 74177 CT CHEST ABDOMEN PELVIS WITH CONTRAST (XPD): ICD-10-PCS | Mod: 26,,, | Performed by: RADIOLOGY

## 2022-05-03 PROCEDURE — 71260 CT THORAX DX C+: CPT | Mod: TC

## 2022-05-03 PROCEDURE — 71260 CT CHEST ABDOMEN PELVIS WITH CONTRAST (XPD): ICD-10-PCS | Mod: 26,,, | Performed by: RADIOLOGY

## 2022-05-03 PROCEDURE — 74177 CT ABD & PELVIS W/CONTRAST: CPT | Mod: TC

## 2022-05-03 PROCEDURE — 25500020 PHARM REV CODE 255

## 2022-05-03 PROCEDURE — A9698 NON-RAD CONTRAST MATERIALNOC: HCPCS

## 2022-05-03 RX ADMIN — IOHEXOL 1000 ML: 9 SOLUTION ORAL at 02:05

## 2022-05-03 RX ADMIN — IOHEXOL 100 ML: 350 INJECTION, SOLUTION INTRAVENOUS at 02:05

## 2022-05-04 ENCOUNTER — OFFICE VISIT (OUTPATIENT)
Dept: HEMATOLOGY/ONCOLOGY | Facility: CLINIC | Age: 67
End: 2022-05-04
Payer: COMMERCIAL

## 2022-05-04 ENCOUNTER — TELEPHONE (OUTPATIENT)
Dept: FAMILY MEDICINE | Facility: CLINIC | Age: 67
End: 2022-05-04

## 2022-05-04 VITALS
HEART RATE: 79 BPM | RESPIRATION RATE: 16 BRPM | WEIGHT: 190.06 LBS | OXYGEN SATURATION: 98 % | BODY MASS INDEX: 37.11 KG/M2 | SYSTOLIC BLOOD PRESSURE: 159 MMHG | DIASTOLIC BLOOD PRESSURE: 72 MMHG | TEMPERATURE: 97 F

## 2022-05-04 DIAGNOSIS — E53.8 B12 DEFICIENCY: ICD-10-CM

## 2022-05-04 DIAGNOSIS — E03.9 ACQUIRED HYPOTHYROIDISM: Primary | ICD-10-CM

## 2022-05-04 DIAGNOSIS — D50.0 IRON DEFICIENCY ANEMIA DUE TO CHRONIC BLOOD LOSS: ICD-10-CM

## 2022-05-04 DIAGNOSIS — K52.9 CHRONIC DIARRHEA: ICD-10-CM

## 2022-05-04 DIAGNOSIS — C18.9 MALIGNANT NEOPLASM OF COLON, UNSPECIFIED PART OF COLON: Primary | ICD-10-CM

## 2022-05-04 DIAGNOSIS — C20 RECTAL CANCER: ICD-10-CM

## 2022-05-04 DIAGNOSIS — E83.51 HYPOCALCEMIA: ICD-10-CM

## 2022-05-04 PROBLEM — D50.9 IDA (IRON DEFICIENCY ANEMIA): Status: ACTIVE | Noted: 2022-05-04

## 2022-05-04 PROCEDURE — 3077F PR MOST RECENT SYSTOLIC BLOOD PRESSURE >= 140 MM HG: ICD-10-PCS | Mod: CPTII,S$GLB,, | Performed by: INTERNAL MEDICINE

## 2022-05-04 PROCEDURE — 1159F MED LIST DOCD IN RCRD: CPT | Mod: CPTII,S$GLB,, | Performed by: INTERNAL MEDICINE

## 2022-05-04 PROCEDURE — 1159F PR MEDICATION LIST DOCUMENTED IN MEDICAL RECORD: ICD-10-PCS | Mod: CPTII,S$GLB,, | Performed by: INTERNAL MEDICINE

## 2022-05-04 PROCEDURE — 3077F SYST BP >= 140 MM HG: CPT | Mod: CPTII,S$GLB,, | Performed by: INTERNAL MEDICINE

## 2022-05-04 PROCEDURE — 99999 PR PBB SHADOW E&M-EST. PATIENT-LVL III: CPT | Mod: PBBFAC,,, | Performed by: INTERNAL MEDICINE

## 2022-05-04 PROCEDURE — 3008F PR BODY MASS INDEX (BMI) DOCUMENTED: ICD-10-PCS | Mod: CPTII,S$GLB,, | Performed by: INTERNAL MEDICINE

## 2022-05-04 PROCEDURE — 99999 PR PBB SHADOW E&M-EST. PATIENT-LVL III: ICD-10-PCS | Mod: PBBFAC,,, | Performed by: INTERNAL MEDICINE

## 2022-05-04 PROCEDURE — 3078F PR MOST RECENT DIASTOLIC BLOOD PRESSURE < 80 MM HG: ICD-10-PCS | Mod: CPTII,S$GLB,, | Performed by: INTERNAL MEDICINE

## 2022-05-04 PROCEDURE — 99214 OFFICE O/P EST MOD 30 MIN: CPT | Mod: S$GLB,,, | Performed by: INTERNAL MEDICINE

## 2022-05-04 PROCEDURE — 99214 PR OFFICE/OUTPT VISIT, EST, LEVL IV, 30-39 MIN: ICD-10-PCS | Mod: S$GLB,,, | Performed by: INTERNAL MEDICINE

## 2022-05-04 PROCEDURE — 3008F BODY MASS INDEX DOCD: CPT | Mod: CPTII,S$GLB,, | Performed by: INTERNAL MEDICINE

## 2022-05-04 PROCEDURE — 3078F DIAST BP <80 MM HG: CPT | Mod: CPTII,S$GLB,, | Performed by: INTERNAL MEDICINE

## 2022-05-04 RX ORDER — LEVOTHYROXINE SODIUM 125 UG/1
125 TABLET ORAL
Qty: 30 TABLET | Refills: 11 | Status: SHIPPED | OUTPATIENT
Start: 2022-05-04 | End: 2023-01-01

## 2022-05-04 RX ORDER — CYANOCOBALAMIN 1000 UG/ML
1000 INJECTION, SOLUTION INTRAMUSCULAR; SUBCUTANEOUS
Qty: 30 ML | Refills: 5 | Status: SHIPPED | OUTPATIENT
Start: 2022-05-04 | End: 2022-08-16 | Stop reason: SDUPTHER

## 2022-05-04 NOTE — PROGRESS NOTES
Subjective:       Patient ID: Tessa Enriquez is a 66 y.o. female.    Chief Complaint:   rectal CA status post biopsy October 2020 robotic LAR November 9, 2020 by Dr. Wagner  Discontinued folfox due to s/e completed xrt  5/21     works at [employer] no issues whatsoever had some mild rectal bleeding which she thought was from diverticulitis eventually had it evaluated     REVIEW OF SYSTEMS:     CONSTITUTIONAL: The patient denies any weight change. There is no apparent    change in appetite, fever, night sweats, headaches, lots fatigue, no dizziness, some  weakness.     port is out, no more pain on left cw     SKIN: Denies rash, issues with nails, non-healing sores, bleeding, blotching    skin or abnormal bruising. Denies new moles or changes to existing moles.      BREASTS: There is no swelling around breasts or nipple discharge.    EYES: Denies eye pain, blurred vision, swelling, redness or discharge.      ENT AND MOUTH: Denies runny nose, stuffiness, sinus trouble or sores. Denies    nosebleeds. Denies, hoarseness, change in voice or swelling in front of the    neck.      CARDIOVASCULAR: Denies chest pain, discomfort or palpitations. Denies neck    swelling or episodes of passing out.      RESPIRATORY: Denies cough, sputum production, blood in sputum, and denies    shortness of breath.      GI:  Had esophagus stretched as food/ pills were getting stuck  Pt saw DR Linares, diarrhea chronically since the xrt, if she has gas and takes meds she messes on her self, she alt constipation with loose stools    GENITOURINARY: No discharge. No pelvic pain or lumps. No rash around groin or  lesions. No urinary frequency, hesitation, painful urination or blood in    urine. Denies incontinence. No problems with intercourse.      MUSCULOSKELETAL: Denies neck or back pain. Denies weakness in arms or legs,    joint problems or distended inflamed veins in legs. Denies swelling or abnormal  glands.      NEUROLOGICAL: Denies  tingling, numbness, altered mentation changes to nerve    function in the face, weakness to one or both of the body. Denies changes to    gait and denies multiple falls or accidents.      PSYCHIATRIC: Denies nervousness, anxiety, hallucinations, depression, suicidal    ideation, trouble sleeping or changes in behavior noticed by family.          Oncology History:  T3 N1 rectal CA diagnosed October 2020 robotic LAR November 2020 December 2020 PET scan negative for metastatic disease   started FOLFOX 2/21 but stopped after 3 cycles and only wants surveillance   had colonoscopy 8/21 NAD  Past Medical History:   Diagnosis Date    Acute hypoxemic respiratory failure 7/9/2018    Arthritis     Cancer     colon    COPD (chronic obstructive pulmonary disease)     History of home oxygen therapy     ONLY PRN AND HASN'T USED IT IN A YEAR    Hyperlipidemia     Thyroid disease      Past Surgical History:   Procedure Laterality Date    CHOLECYSTECTOMY      COLECTOMY N/A 11/9/2020    Procedure: COLECTOMY;  Surgeon: Brandon Wagner MD;  [facility]: [facility];  Service: General;  Laterality: N/A;  LOWER- ANTERIOR    COLONOSCOPY N/A 10/23/2020    Procedure: COLONOSCOPY;  Surgeon: Jolynn Ayala MD;  Location: [facility];  Service: Endoscopy;  Laterality: N/A;    COLONOSCOPY N/A 8/6/2021    Procedure: COLONOSCOPY;  Surgeon: Jolynn Ayala MD;  Location: [facility] ENDO;  Service: Endoscopy;  Laterality: N/A;    ESOPHAGEAL DILATION N/A 6/29/2021    Procedure: DILATION, ESOPHAGUS;  Surgeon: Sourav Baires III, MD;  Location: [facility];  Service: Endoscopy;  Laterality: N/A;    ESOPHAGOGASTRODUODENOSCOPY  06/29/2021    Dilation to 57 Fr    ESOPHAGOGASTRODUODENOSCOPY N/A 6/29/2021    Procedure: EGD (ESOPHAGOGASTRODUODENOSCOPY);  Surgeon: Sourav Baires III, MD;  Location: [facility];  Service: Endoscopy;  Laterality: N/A;    HYSTERECTOMY      INSERTION OF TUNNELED CENTRAL VENOUS CATHETER (CVC) WITH SUBCUTANEOUS PORT  N/A 2021    Procedure: [id number];  Surgeon: Brandon Wagner MD;  Location: [facility] OR;  Service: General;  Laterality: N/A;    MEDIPORT REMOVAL Left 2021    Procedure: REMOVAL, CATHETER, CENTRAL VENOUS, TUNNELED, WITH PORT;  Surgeon: Brandon Wagner MD;  Location: [facility] OR;  Service: General;  Laterality: Left;    ROBOT-ASSISTED COLECTOMY N/A 2020    Procedure: ROBOTIC COLECTOMY low anterior resection, possible open;  Surgeon: Brandon Wagner MD;  Location: [facility] OR;  Service: General;  Laterality: N/A;  CONVERTED TO OPEN AT 1503     Family History   Problem Relation Age of Onset    COPD Mother     COPD Father     Breast cancer Maternal Grandmother       Social History     Socioeconomic History    Marital status:    Tobacco Use    Smoking status: Former Smoker     Packs/day: 2.00     Years: 40.00     Pack years: 80.00     Types: Cigarettes     Quit date: 2017     Years since quittin.8    Smokeless tobacco: Never Used   Substance and Sexual Activity    Alcohol use: No    Drug use: No    Sexual activity: Not Currently     Partners: Male     Review of patient's allergies indicates:   Allergen Reactions    Ativan [lorazepam] Hives and Itching    Flagyl [metronidazole] Itching and Rash    Pcn [penicillins] Hives, Itching and Rash       Current Outpatient Medications:     albuterol (PROVENTIL) 2.5 mg /3 mL (0.083 %) nebulizer solution, Take 3 mLs (2.5 mg total) by nebulization every 6 (six) hours as needed for Wheezing. Rescue, Disp: 1 Box, Rfl: 2    atorvastatin (LIPITOR) 40 MG tablet, Take 1 tablet (40 mg total) by mouth once daily., Disp: 90 tablet, Rfl: 1    clonazePAM (KLONOPIN) 0.5 MG tablet, Take 1 tablet (0.5 mg total) by mouth 3 (three) times daily., Disp: 90 tablet, Rfl: 0    colestipoL (COLESTID) 5 gram Pack, Take 5 g by mouth 2 (two) times daily., Disp: 60 packet, Rfl: 0    cyanocobalamin 1,000 mcg/mL injection, Inject 1 mL (1,000 mcg total) into the  muscle every 14 (fourteen) days., Disp: 30 mL, Rfl: 6    gabapentin (NEURONTIN) 300 MG capsule, Take 1 capsule (300 mg total) by mouth every evening., Disp: 15 capsule, Rfl: 0    levothyroxine (SYNTHROID) 112 MCG tablet, Take 1 tablet (112 mcg total) by mouth before breakfast., Disp: 30 tablet, Rfl: 11    traMADoL (ULTRAM) 50 mg tablet, TAKE ONE TABLET BY MOUTH EVERY SIX HOURS AS NEEDED FOR PAIN, Disp: 28 tablet, Rfl: 2  No current facility-administered medications for this visit.  PHYSICAL EXAM:     Wt Readings from Last 3 Encounters:   05/04/22 86.2 kg (190 lb 0.6 oz)   03/28/22 86.2 kg (190 lb)   03/14/22 86.2 kg (190 lb)     Temp Readings from Last 3 Encounters:   05/04/22 97 °F (36.1 °C) (Temporal)   03/28/22 98.2 °F (36.8 °C) (Oral)   03/14/22 98.2 °F (36.8 °C)     BP Readings from Last 3 Encounters:   05/04/22 (!) 159/72   03/28/22 (!) 152/68   03/14/22 128/70     Pulse Readings from Last 3 Encounters:   05/04/22 79   03/28/22 80   03/14/22 86     GENERAL: alert; in no apparent distress, , well-built well-nourished   HEAD: normocephalic, atraumatic.  EYES: pupils are equal, round, reactive to light and accommodation. Sclera anicteric. Conjunctiva not injected.   NOSE/THROAT: no nasal erythema or rhinorrhea. Oropharynx pink, without erythema, ulcerations or thrush.   NECK: no cervical motion rigidity; supple with no masses.  CHEST: clear to auscultation bilaterally; no wheezes, crackles or rubs. Patient is speaking comfortably on room air with normal work of breathing without using accessory muscles of respiration.  CARDIOVASCULAR: regular rate and rhythm; no murmurs, rubs or gallops.  ABDOMEN: soft, nontender, nondistended. Bowel sounds present.   MUSCULOSKELETAL: no tenderness to palpation along the spine or scapulae. Normal range of motion.  NEUROLOGIC: cranial nerves II-XII intact bilaterally. Strength 5/5 in bilateral upper and lower extremities. No sensory deficits appreciated. Reflexes globally  intact. No cerebellar signs. Normal gait.  LYMPHATIC: no cervical, supraclavicular or axillary adenopathy appreciated bilaterally.   EXTREMITIES: no clubbing, cyanosis, edema.  SKIN: no erythema, rashes, ulcerations noted  ECOG-0  Lab Results   Component Value Date    WBC 4.66 05/02/2022    HGB 10.4 (L) 05/02/2022    HCT 32.5 (L) 05/02/2022    MCV 89 05/02/2022     05/02/2022       BMP  Lab Results   Component Value Date     05/02/2022    K 4.1 05/02/2022     05/02/2022    CO2 22 (L) 05/02/2022    BUN 16 05/02/2022    CREATININE 0.8 05/02/2022    CALCIUM 8.6 (L) 05/02/2022    ANIONGAP 10 05/02/2022    ESTGFRAFRICA >60 05/02/2022    EGFRNONAA >60 05/02/2022      cea 7/14/21 1.8  November 9, 2020  .  Rectum, sigmoid colon and proximal donut (low anterior resection):   - Invasive adenocarcinoma,  invading through the muscularis propria into   pericolorectal tissue   - Pathologic stage pT3, pN1a (see synoptic report)   - Resection margins negative for dysplasia or malignancy (proximal, distal   and radial margins), within 1 mm to radial margin   - One tumor deposit   - Adenocarcinoma involve one out of totally forty seven lymph nodes (1/46 in   part 1, totally 1/47, also see part 2)   - Background colon with diverticulosis associated with hyperpigmented   macrophages, benign   - Proximal donut with benign colon, no dysplasia or malignancy   - Tattoo identified   2.  Colon, distal donut (excision):   - Benign colon, negative for dysplasia or malignancy   - One lymph node, negative for metastatic carcinoma (0/1)   COLON AND RECTUM: Resection, Including Transanal Disk Excision of Rectal   Neoplasms   Procedure  Low anterior resection   Tumor Site  Rectum    Tumor Location: Straddles the anterior peritoneal reflection   Tumor Size   Greatest dimension (centimeters): 7.0 cm   + Additional dimensions (centimeters): 4.7 x 2.2 cm   Macroscopic Tumor Perforation  Not identified   Macroscopic Intactness of  Mesorectum  Near complete   Histologic Type  Adenocarcinoma   Histologic Grade   G2: Moderately differentiated   Tumor Extension  Tumor invades through the muscularis propria into   pericolorectal tissue     Impression:  November 7, 2020 CT abdomen pelvis     1. Rectosigmoid junction mass, unchanged from prior.  No definite evidence of intraluminal contrast extravasation to suggest active gastrointestinal hemorrhage, however please note that arterial phase CT is more sensitive.  2. Colonic diverticulosis without acute diverticulitis.  3. Minimal pneumobilia, unchanged      IMPRESSION:  CT chest lung screen October 2020     1.  3 mm pulmonary nodule identified in the lateral segment of the  right lower lobe.  2.  Focus of groundglass opacity measuring 11 mm in diameter in the  superior segment of the right lower lobe. Short-term follow-up for  this lesion is recommended.  3.  Mild emphysematous lung disease.     LUNG-RADS CATEGORY 3: PROBABLY BENIGN FINDINGS     RECOMMENDATION: Short-term imaging follow-up with 6 month LDCT.    IMPRESSION: 7/21  1. Nonspecific hypermetabolic soft tissue density focus cranial to the rectosigmoid colon suture line, perhaps reflecting postsurgical and or posttreatment or postinflammatory changes. Attention to this area at follow-up imaging is recommended to help differentiate from residual or recurrent neoplasm.  2. Improving postoperative changes in the lower anterior abdominal wall, with associated multifocal FDG hypermetabolism which is most characteristic of postoperative etiology.  3. Otherwise no convincing evidence of recurrent neoplasm or metastatic disease.  Patient Active Problem List   Diagnosis    COPD (chronic obstructive pulmonary disease)    Pure hypercholesterolemia    Acquired hypothyroidism    Rectal bleeding    Colonic mass    Rectal cancer    Port-A-Cath in place    Encounter for care related to vascular access port    Dysphagia    Malignant neoplasm of  colon    B12 deficiency   IMPRESSION:pet 12/2020  1. Postoperative changes of rectal colonic resection, with adjacent  FDG avid presacral soft tissue stranding.  2. Adjacent punctate extraluminal foci of free air suggesting a tiny  tract extending towards the adjacent small bowel (possibly  postoperative but suggesting a tiny enterocolonic fistula)  3. Deep subcutaneous soft tissue structure along the ventral abdomen  suggesting a postoperative hematoma/seroma, and less likely abscess.  4. No findings of additional sites of FDG avid disease.      IMPRESSION: 7/21  1. Nonspecific hypermetabolic soft tissue density focus cranial to the rectosigmoid colon suture line, perhaps reflecting postsurgical and or posttreatment or postinflammatory changes. Attention to this area at follow-up imaging is recommended to help differentiate from residual or recurrent neoplasm.  2. Improving postoperative changes in the lower anterior abdominal wall, with associated multifocal FDG hypermetabolism which is most characteristic of postoperative etiology.  3. Otherwise no convincing evidence of recurrent neoplasm or metastatic disease      Impression:2/2/22   CT CAP  A single 4 mm soft tissue density nodule is noted in the right lower lobe.  No other intrapulmonary masses or nodules are seen.  Follow-up CT in 12 months per of this nodule is recommended.  Coronary artery calcification is noted.  No masses or adenopathy or seen.     Prior cholecystectomy.  Prior hysterectomy.  2.2 cm left ovarian cyst.  Small ventral hernia containing a loop of small bowel without CT evidence of obstruction  Assessment and Plan     Status post low anterior resection November 9, 2020 for rectal CA T3 N1a     tumor board convened and agreed on xrt that will follow  After completion of chemo  sequentially  therapy   Patient started FOLFOX.  Completed 4 out of 12 planned adjuvant cycles,  Discontinued  Chemo and had port removed completed xrt 5/21   CT CAP  2/22 next scan 8/22  rtc 3  8/22months with b12, cbc, cmp, cea ct cap and b12   ct cap   b12 def anemia, taking injections q month level checked 10/21 sub therpautic 226 take shots twice a months sent new rx  iron deficiency  Pt to try oral for now, will recheck in 8/22 ( may help her chronic diarrhea)  hypocalcemia which seems chronic pt to supplement orally daily wnl this visit  Hypothyroidism, sees DR Mcdermott will adj meds  History of chronic obstructive pulmonary disease, dyslipidemia and hypothyroidism continue with PCP  Doesn't want to be covid vaccinated

## 2022-05-04 NOTE — TELEPHONE ENCOUNTER
----- Message from Karma Mcdermott MD sent at 5/3/2022 11:15 AM CDT -----  Until she finishes up her present thyroid take two on sundays then lets check the TSH in two months-we may increase her dose of thyroid when this rx runs out to 125 ucg

## 2022-05-04 NOTE — TELEPHONE ENCOUNTER
Patient notified of results and recommendations,   Verbalizes understanding    Levothyroxine and TSH pended

## 2022-06-01 ENCOUNTER — OFFICE VISIT (OUTPATIENT)
Dept: FAMILY MEDICINE | Facility: CLINIC | Age: 67
End: 2022-06-01
Payer: COMMERCIAL

## 2022-06-01 VITALS
WEIGHT: 188 LBS | DIASTOLIC BLOOD PRESSURE: 64 MMHG | SYSTOLIC BLOOD PRESSURE: 126 MMHG | TEMPERATURE: 98 F | RESPIRATION RATE: 16 BRPM | OXYGEN SATURATION: 97 % | BODY MASS INDEX: 36.91 KG/M2 | HEIGHT: 60 IN | HEART RATE: 82 BPM

## 2022-06-01 DIAGNOSIS — R19.7 DIARRHEA, UNSPECIFIED TYPE: Primary | ICD-10-CM

## 2022-06-01 DIAGNOSIS — E66.01 SEVERE OBESITY (BMI 35.0-39.9) WITH COMORBIDITY: ICD-10-CM

## 2022-06-01 DIAGNOSIS — J42 CHRONIC BRONCHITIS, UNSPECIFIED CHRONIC BRONCHITIS TYPE: ICD-10-CM

## 2022-06-01 DIAGNOSIS — K64.9 HEMORRHOIDS, UNSPECIFIED HEMORRHOID TYPE: ICD-10-CM

## 2022-06-01 DIAGNOSIS — M54.32 SCIATICA OF LEFT SIDE: ICD-10-CM

## 2022-06-01 DIAGNOSIS — D50.9 IRON DEFICIENCY ANEMIA, UNSPECIFIED IRON DEFICIENCY ANEMIA TYPE: ICD-10-CM

## 2022-06-01 DIAGNOSIS — C77.2 SECONDARY AND UNSPECIFIED MALIGNANT NEOPLASM OF INTRA-ABDOMINAL LYMPH NODES: ICD-10-CM

## 2022-06-01 PROCEDURE — 3008F BODY MASS INDEX DOCD: CPT | Mod: CPTII,S$GLB,, | Performed by: INTERNAL MEDICINE

## 2022-06-01 PROCEDURE — 99214 OFFICE O/P EST MOD 30 MIN: CPT | Mod: S$GLB,,, | Performed by: INTERNAL MEDICINE

## 2022-06-01 PROCEDURE — 1159F MED LIST DOCD IN RCRD: CPT | Mod: CPTII,S$GLB,, | Performed by: INTERNAL MEDICINE

## 2022-06-01 PROCEDURE — 1160F PR REVIEW ALL MEDS BY PRESCRIBER/CLIN PHARMACIST DOCUMENTED: ICD-10-PCS | Mod: CPTII,S$GLB,, | Performed by: INTERNAL MEDICINE

## 2022-06-01 PROCEDURE — 3008F PR BODY MASS INDEX (BMI) DOCUMENTED: ICD-10-PCS | Mod: CPTII,S$GLB,, | Performed by: INTERNAL MEDICINE

## 2022-06-01 PROCEDURE — 1160F RVW MEDS BY RX/DR IN RCRD: CPT | Mod: CPTII,S$GLB,, | Performed by: INTERNAL MEDICINE

## 2022-06-01 PROCEDURE — 1159F PR MEDICATION LIST DOCUMENTED IN MEDICAL RECORD: ICD-10-PCS | Mod: CPTII,S$GLB,, | Performed by: INTERNAL MEDICINE

## 2022-06-01 PROCEDURE — 99214 PR OFFICE/OUTPT VISIT, EST, LEVL IV, 30-39 MIN: ICD-10-PCS | Mod: S$GLB,,, | Performed by: INTERNAL MEDICINE

## 2022-06-01 RX ORDER — GABAPENTIN 300 MG/1
300 CAPSULE ORAL NIGHTLY
Qty: 30 CAPSULE | Refills: 2 | Status: SHIPPED | OUTPATIENT
Start: 2022-06-01 | End: 2022-01-01

## 2022-06-01 RX ORDER — FERROUS SULFATE 325(65) MG
325 TABLET, DELAYED RELEASE (ENTERIC COATED) ORAL 2 TIMES DAILY
Status: ON HOLD | COMMUNITY
End: 2023-01-01 | Stop reason: SDUPTHER

## 2022-06-01 RX ORDER — DICYCLOMINE HYDROCHLORIDE 20 MG/1
20 TABLET ORAL EVERY 6 HOURS
Qty: 120 TABLET | Refills: 0 | Status: SHIPPED | OUTPATIENT
Start: 2022-06-01 | End: 2022-07-01

## 2022-06-01 NOTE — PROGRESS NOTES
SUBJECTIVE:    Patient ID: Tessa Enriquez is a 66 y.o. female.    Chief Complaint: Hemorrhoids (Pain and some bleeding (bright red))    Patient has bleeding external hemorroids-they are causing pain -she has chronic diarrhea-see below-    Rectal Bleeding  This is a recurrent problem. The current episode started 1 to 4 weeks ago. The problem occurs constantly. The problem has been gradually worsening. Associated symptoms include abdominal pain (LLQ). Pertinent negatives include no arthralgias, chest pain, chills, congestion, coughing, diaphoresis, fatigue, fever, joint swelling, myalgias, nausea, neck pain, numbness, sore throat, vomiting or weakness. The treatment provided no relief.      Patient had radiation therapy for colon cancer and following same she has had significant gas and diarrhea since-she is having diarrheal stools all day-she has tenesmus-and rectal incontinence-the stool is very sticky-uses epsom salts and comes home to soak in same and then lies down-really interfering with her social life   Labs-Chol 232 HDL 58 -restarting Lipitor  Iron low Na 138 K 4.1 Ferritin 18 TIBC increased  TSH increased and thyroid increased    Sciatica-gabapentin prevents her from taking tramadol-    Lab Visit on 05/03/2022   Component Date Value Ref Range Status    Cholesterol 05/03/2022 232 (A) 120 - 199 mg/dL Final    Triglycerides 05/03/2022 102  30 - 150 mg/dL Final    HDL 05/03/2022 58  40 - 75 mg/dL Final    LDL Cholesterol 05/03/2022 153.6  63.0 - 159.0 mg/dL Final    HDL/Cholesterol Ratio 05/03/2022 25.0  20.0 - 50.0 % Final    Total Cholesterol/HDL Ratio 05/03/2022 4.0  2.0 - 5.0 Final    Non-HDL Cholesterol 05/03/2022 174  mg/dL Final   Lab Visit on 05/02/2022   Component Date Value Ref Range Status    Sodium 05/02/2022 138  136 - 145 mmol/L Final    Potassium 05/02/2022 4.1  3.5 - 5.1 mmol/L Final    Chloride 05/02/2022 106  95 - 110 mmol/L Final    CO2 05/02/2022 22 (A) 23 - 29 mmol/L  Final    Glucose 05/02/2022 89  70 - 110 mg/dL Final    BUN 05/02/2022 16  8 - 23 mg/dL Final    Creatinine 05/02/2022 0.8  0.5 - 1.4 mg/dL Final    Calcium 05/02/2022 8.6 (A) 8.7 - 10.5 mg/dL Final    Total Protein 05/02/2022 6.8  6.0 - 8.4 g/dL Final    Albumin 05/02/2022 3.5  3.5 - 5.2 g/dL Final    Total Bilirubin 05/02/2022 0.3  0.1 - 1.0 mg/dL Final    Alkaline Phosphatase 05/02/2022 83  55 - 135 U/L Final    AST 05/02/2022 16  10 - 40 U/L Final    ALT 05/02/2022 16  10 - 44 U/L Final    Anion Gap 05/02/2022 10  8 - 16 mmol/L Final    eGFR if African American 05/02/2022 >60  >60 mL/min/1.73 m^2 Final    eGFR if non African American 05/02/2022 >60  >60 mL/min/1.73 m^2 Final    WBC 05/02/2022 4.66  3.90 - 12.70 K/uL Final    RBC 05/02/2022 3.65 (A) 4.00 - 5.40 M/uL Final    Hemoglobin 05/02/2022 10.4 (A) 12.0 - 16.0 g/dL Final    Hematocrit 05/02/2022 32.5 (A) 37.0 - 48.5 % Final    MCV 05/02/2022 89  82 - 98 fL Final    MCH 05/02/2022 28.5  27.0 - 31.0 pg Final    MCHC 05/02/2022 32.0  32.0 - 36.0 g/dL Final    RDW 05/02/2022 14.0  11.5 - 14.5 % Final    Platelets 05/02/2022 205  150 - 450 K/uL Final    MPV 05/02/2022 11.3  9.2 - 12.9 fL Final    Immature Granulocytes 05/02/2022 0.2  0.0 - 0.5 % Final    Gran # (ANC) 05/02/2022 3.4  1.8 - 7.7 K/uL Final    Immature Grans (Abs) 05/02/2022 0.01  0.00 - 0.04 K/uL Final    Lymph # 05/02/2022 0.8 (A) 1.0 - 4.8 K/uL Final    Mono # 05/02/2022 0.4  0.3 - 1.0 K/uL Final    Eos # 05/02/2022 0.1  0.0 - 0.5 K/uL Final    Baso # 05/02/2022 0.04  0.00 - 0.20 K/uL Final    nRBC 05/02/2022 0  0 /100 WBC Final    Gran % 05/02/2022 71.9  38.0 - 73.0 % Final    Lymph % 05/02/2022 16.3 (A) 18.0 - 48.0 % Final    Mono % 05/02/2022 7.9  4.0 - 15.0 % Final    Eosinophil % 05/02/2022 2.8  0.0 - 8.0 % Final    Basophil % 05/02/2022 0.9  0.0 - 1.9 % Final    Differential Method 05/02/2022 Automated   Final    CEA 05/02/2022 1.8  0.0 - 5.0 ng/mL  Final    Ferritin 05/02/2022 18 (A) 20.0 - 300.0 ng/mL Final    Iron 05/02/2022 64  30 - 160 ug/dL Final    Transferrin 05/02/2022 334  200 - 375 mg/dL Final    TIBC 05/02/2022 494 (A) 250 - 450 ug/dL Final    Saturated Iron 05/02/2022 13 (A) 20 - 50 % Final    Vitamin B-12 05/02/2022 292  210 - 950 pg/mL Final   Lab Visit on 05/02/2022   Component Date Value Ref Range Status    TSH 05/02/2022 9.523 (A) 0.400 - 4.000 uIU/mL Final    Free T4 05/02/2022 0.91  0.71 - 1.51 ng/dL Final   Admission on 03/28/2022, Discharged on 03/28/2022   Component Date Value Ref Range Status    HIV 1/2 Ag/Ab 03/28/2022 Negative  Negative Final    Hepatitis C Ab 03/28/2022 Negative  Negative Final    WBC 03/28/2022 5.04  3.90 - 12.70 K/uL Final    RBC 03/28/2022 3.87 (A) 4.00 - 5.40 M/uL Final    Hemoglobin 03/28/2022 10.9 (A) 12.0 - 16.0 g/dL Final    Hematocrit 03/28/2022 34.1 (A) 37.0 - 48.5 % Final    MCV 03/28/2022 88  82 - 98 fL Final    MCH 03/28/2022 28.2  27.0 - 31.0 pg Final    MCHC 03/28/2022 32.0  32.0 - 36.0 g/dL Final    RDW 03/28/2022 13.3  11.5 - 14.5 % Final    Platelets 03/28/2022 231  150 - 450 K/uL Final    MPV 03/28/2022 10.9  9.2 - 12.9 fL Final    Immature Granulocytes 03/28/2022 0.4  0.0 - 0.5 % Final    Gran # (ANC) 03/28/2022 4.0  1.8 - 7.7 K/uL Final    Immature Grans (Abs) 03/28/2022 0.02  0.00 - 0.04 K/uL Final    Lymph # 03/28/2022 0.6 (A) 1.0 - 4.8 K/uL Final    Mono # 03/28/2022 0.3  0.3 - 1.0 K/uL Final    Eos # 03/28/2022 0.1  0.0 - 0.5 K/uL Final    Baso # 03/28/2022 0.03  0.00 - 0.20 K/uL Final    nRBC 03/28/2022 0  0 /100 WBC Final    Gran % 03/28/2022 79.8 (A) 38.0 - 73.0 % Final    Lymph % 03/28/2022 11.3 (A) 18.0 - 48.0 % Final    Mono % 03/28/2022 6.3  4.0 - 15.0 % Final    Eosinophil % 03/28/2022 1.6  0.0 - 8.0 % Final    Basophil % 03/28/2022 0.6  0.0 - 1.9 % Final    Differential Method 03/28/2022 Automated   Final    Sodium 03/28/2022 138  136 - 145 mmol/L  Final    Potassium 03/28/2022 3.6  3.5 - 5.1 mmol/L Final    Chloride 03/28/2022 105  95 - 110 mmol/L Final    CO2 03/28/2022 24  23 - 29 mmol/L Final    Glucose 03/28/2022 92  70 - 110 mg/dL Final    BUN 03/28/2022 13  8 - 23 mg/dL Final    Creatinine 03/28/2022 0.8  0.5 - 1.4 mg/dL Final    Calcium 03/28/2022 8.8  8.7 - 10.5 mg/dL Final    Total Protein 03/28/2022 6.7  6.0 - 8.4 g/dL Final    Albumin 03/28/2022 3.5  3.5 - 5.2 g/dL Final    Total Bilirubin 03/28/2022 0.4  0.1 - 1.0 mg/dL Final    Alkaline Phosphatase 03/28/2022 93  55 - 135 U/L Final    AST 03/28/2022 14  10 - 40 U/L Final    ALT 03/28/2022 15  10 - 44 U/L Final    Anion Gap 03/28/2022 9  8 - 16 mmol/L Final    eGFR if African American 03/28/2022 >60  >60 mL/min/1.73 m^2 Final    eGFR if non African American 03/28/2022 >60  >60 mL/min/1.73 m^2 Final    Lipase 03/28/2022 23  4 - 60 U/L Final    Specimen UA 03/28/2022 Urine, Supra Pubic   Final    Color, UA 03/28/2022 Yellow  Yellow, Straw, Emily Final    Appearance, UA 03/28/2022 Cloudy (A) Clear Final    pH, UA 03/28/2022 6.0  5.0 - 8.0 Final    Specific Gravity, UA 03/28/2022 >=1.030 (A) 1.005 - 1.030 Final    Protein, UA 03/28/2022 Trace (A) Negative Final    Glucose, UA 03/28/2022 Negative  Negative Final    Ketones, UA 03/28/2022 Negative  Negative Final    Bilirubin (UA) 03/28/2022 Negative  Negative Final    Occult Blood UA 03/28/2022 3+ (A) Negative Final    Nitrite, UA 03/28/2022 Positive (A) Negative Final    Urobilinogen, UA 03/28/2022 1.0  <2.0 EU/dL Final    Leukocytes, UA 03/28/2022 Trace (A) Negative Final    Magnesium 03/28/2022 2.0  1.6 - 2.6 mg/dL Final    Stool Culture 03/28/2022 No Salmonella,Shigella,Vibrio,Campylobacter,Yersinia isolated.   Final    Stool Exam-Ova,Cysts,Parasites 03/28/2022 FINAL 03/31/2022 1255   Final    C. diff Antigen 03/28/2022 Negative  Negative Final    C difficile Toxins A+B, EIA 03/28/2022 Negative  Negative Final     Shiga Toxin 1 E.coli 03/28/2022 Negative   Final    Shiga Toxin 2 E.coli 03/28/2022 Negative   Final    RBC, UA 03/28/2022 10 (A) 0 - 4 /hpf Final    WBC, UA 03/28/2022 25 (A) 0 - 5 /hpf Final    Bacteria 03/28/2022 Many (A) None-Occ /hpf Final    Squam Epithel, UA 03/28/2022 9  /hpf Final    Microscopic Comment 03/28/2022 SEE COMMENT   Final    Urine Culture, Routine 03/28/2022  (A)  Final                    Value:ESCHERICHIA COLI  >100,000 cfu/ml     Lab Visit on 01/27/2022   Component Date Value Ref Range Status    WBC 01/27/2022 4.42  3.90 - 12.70 K/uL Final    RBC 01/27/2022 3.83 (A) 4.00 - 5.40 M/uL Final    Hemoglobin 01/27/2022 11.1 (A) 12.0 - 16.0 g/dL Final    Hematocrit 01/27/2022 34.2 (A) 37.0 - 48.5 % Final    MCV 01/27/2022 89  82 - 98 fL Final    MCH 01/27/2022 29.0  27.0 - 31.0 pg Final    MCHC 01/27/2022 32.5  32.0 - 36.0 g/dL Final    RDW 01/27/2022 13.5  11.5 - 14.5 % Final    Platelets 01/27/2022 248  150 - 450 K/uL Final    MPV 01/27/2022 11.3  9.2 - 12.9 fL Final    Immature Granulocytes 01/27/2022 0.5  0.0 - 0.5 % Final    Gran # (ANC) 01/27/2022 3.2  1.8 - 7.7 K/uL Final    Immature Grans (Abs) 01/27/2022 0.02  0.00 - 0.04 K/uL Final    Lymph # 01/27/2022 0.7 (A) 1.0 - 4.8 K/uL Final    Mono # 01/27/2022 0.3  0.3 - 1.0 K/uL Final    Eos # 01/27/2022 0.2  0.0 - 0.5 K/uL Final    Baso # 01/27/2022 0.03  0.00 - 0.20 K/uL Final    nRBC 01/27/2022 0  0 /100 WBC Final    Gran % 01/27/2022 71.8  38.0 - 73.0 % Final    Lymph % 01/27/2022 16.1 (A) 18.0 - 48.0 % Final    Mono % 01/27/2022 7.5  4.0 - 15.0 % Final    Eosinophil % 01/27/2022 3.4  0.0 - 8.0 % Final    Basophil % 01/27/2022 0.7  0.0 - 1.9 % Final    Differential Method 01/27/2022 Automated   Final    Sodium 01/27/2022 137  136 - 145 mmol/L Final    Potassium 01/27/2022 4.2  3.5 - 5.1 mmol/L Final    Chloride 01/27/2022 103  95 - 110 mmol/L Final    CO2 01/27/2022 23  23 - 29 mmol/L Final    Glucose  01/27/2022 86  70 - 110 mg/dL Final    BUN 01/27/2022 13  8 - 23 mg/dL Final    Creatinine 01/27/2022 0.9  0.5 - 1.4 mg/dL Final    Calcium 01/27/2022 9.0  8.7 - 10.5 mg/dL Final    Total Protein 01/27/2022 6.8  6.0 - 8.4 g/dL Final    Albumin 01/27/2022 3.7  3.5 - 5.2 g/dL Final    Total Bilirubin 01/27/2022 0.5  0.1 - 1.0 mg/dL Final    Alkaline Phosphatase 01/27/2022 84  55 - 135 U/L Final    AST 01/27/2022 16  10 - 40 U/L Final    ALT 01/27/2022 15  10 - 44 U/L Final    Anion Gap 01/27/2022 11  8 - 16 mmol/L Final    eGFR if African American 01/27/2022 >60  >60 mL/min/1.73 m^2 Final    eGFR if non African American 01/27/2022 >60  >60 mL/min/1.73 m^2 Final    CEA 01/27/2022 <1.7  0.0 - 5.0 ng/mL Final    Vitamin B-12 01/27/2022 315  210 - 950 pg/mL Final   Lab Visit on 10/22/2021   Component Date Value Ref Range Status    WBC 10/22/2021 3.02 (A) 3.90 - 12.70 K/uL Final    RBC 10/22/2021 4.01  4.00 - 5.40 M/uL Final    Hemoglobin 10/22/2021 11.6 (A) 12.0 - 16.0 g/dL Final    Hematocrit 10/22/2021 37.0  37.0 - 48.5 % Final    MCV 10/22/2021 92  82 - 98 fL Final    MCH 10/22/2021 28.9  27.0 - 31.0 pg Final    MCHC 10/22/2021 31.4 (A) 32.0 - 36.0 g/dL Final    RDW 10/22/2021 12.9  11.5 - 14.5 % Final    Platelets 10/22/2021 191  150 - 450 K/uL Final    MPV 10/22/2021 11.2  9.2 - 12.9 fL Final    Immature Granulocytes 10/22/2021 0.3  0.0 - 0.5 % Final    Gran # (ANC) 10/22/2021 2.0  1.8 - 7.7 K/uL Final    Immature Grans (Abs) 10/22/2021 0.01  0.00 - 0.04 K/uL Final    Lymph # 10/22/2021 0.5 (A) 1.0 - 4.8 K/uL Final    Mono # 10/22/2021 0.2 (A) 0.3 - 1.0 K/uL Final    Eos # 10/22/2021 0.2  0.0 - 0.5 K/uL Final    Baso # 10/22/2021 0.03  0.00 - 0.20 K/uL Final    nRBC 10/22/2021 0  0 /100 WBC Final    Gran % 10/22/2021 66.0  38.0 - 73.0 % Final    Lymph % 10/22/2021 17.5 (A) 18.0 - 48.0 % Final    Mono % 10/22/2021 7.6  4.0 - 15.0 % Final    Eosinophil % 10/22/2021 7.6  0.0 - 8.0 %  Final    Basophil % 10/22/2021 1.0  0.0 - 1.9 % Final    Differential Method 10/22/2021 Automated   Final    Sodium 10/22/2021 139  136 - 145 mmol/L Final    Potassium 10/22/2021 4.6  3.5 - 5.1 mmol/L Final    Chloride 10/22/2021 107  95 - 110 mmol/L Final    CO2 10/22/2021 28  23 - 29 mmol/L Final    Glucose 10/22/2021 97  70 - 110 mg/dL Final    BUN 10/22/2021 10  8 - 23 mg/dL Final    Creatinine 10/22/2021 0.7  0.5 - 1.4 mg/dL Final    Calcium 10/22/2021 8.7  8.7 - 10.5 mg/dL Final    Total Protein 10/22/2021 6.4  6.0 - 8.4 g/dL Final    Albumin 10/22/2021 3.4 (A) 3.5 - 5.2 g/dL Final    Total Bilirubin 10/22/2021 0.4  0.1 - 1.0 mg/dL Final    Alkaline Phosphatase 10/22/2021 71  55 - 135 U/L Final    AST 10/22/2021 15  10 - 40 U/L Final    ALT 10/22/2021 13  10 - 44 U/L Final    Anion Gap 10/22/2021 4 (A) 8 - 16 mmol/L Final    eGFR if African American 10/22/2021 >60  >60 mL/min/1.73 m^2 Final    eGFR if non African American 10/22/2021 >60  >60 mL/min/1.73 m^2 Final    CEA 10/22/2021 <1.7  0.0 - 5.0 ng/mL Final    Vitamin B-12 10/22/2021 226  210 - 950 pg/mL Final   Lab Visit on 10/22/2021   Component Date Value Ref Range Status    TSH 10/22/2021 30.964 (A) 0.400 - 4.000 uIU/mL Final    Free T4 10/22/2021 0.44 (A) 0.71 - 1.51 ng/dL Final   Admission on 08/06/2021, Discharged on 08/06/2021   Component Date Value Ref Range Status    Final Pathologic Diagnosis 08/06/2021    Final                    Value:1. Colon, transverse polyps x2 (polypectomy):  Tubular adenoma(s), multiple fragments  2. Colon, sigmoid polyp (polypectomy):  Tubular adenoma  No high grade dysplasia  Multiple deeper levels examined  GI consensus: Milli Jules, Omar, Joe  3. Colon, random (biopsy):  Colonic mucosa with no significant histopathologic changes  No support for microscopic colitis  4. Rectosigmoid anastomosis (biopsy):  No dysplasia, no malignancy  Colonic mucosa with reactive changes  Multiple deeper  "levels examined      Gross 08/06/2021    Final                    Value:Number of containers:  4 a  Part 1  Container Label: Clinic Number/AP Number:  [id number], and "transverse colon  polyps x2"  Received in formalin in polyp trap 1 are multiple soft tan polypoid tissue  fragments ranging from 2-7 mm in greatest dimension.  The largest fragment is  inked and bisected.  Specimen is filtered and entirely submitted in  PZX--1-A.  Part 2  Container Label: Clinic Number/AP Number:  [id number], and "sigmoid colon polyp"  Received in formalin in polyp trap 2 is an 11 x 6 x 4 mm soft tan polypoid  tissue fragment.  The base is inked blue.  Specimen is serially sectioned and  entirely submitted in WUP--2-A.  Part 3  Container Label: Clinic Number/AP Number:  [id number], and "random colon Bx  rule out microscopic colitis"  Received in formalin is a 6 x 5 x 1 mm aggregate of soft tan tissue  fragments.  Specimen is stained with Hematoxylin and entirely submitted in  BGV--3-A.  Part 4  Container Label: Clinic Number/AP Number:  [id number], and "rectosig                          moid  anastomosis site Bx"  Received in formalin are 3 soft tan tissue fragments ranging from 2-3 mm in  greatest dimension.  Specimen is stained with Hematoxylin and entirely  submitted in QGB--4-A.  DANNY Guevara.      Disclaimer 08/06/2021    Final                    Value:Unless the case is a 'gross only' or additional testing only, the final  diagnosis for each specimen is based on a microscopic examination of  appropriate tissue sections.     Lab Visit on 08/03/2021   Component Date Value Ref Range Status    SARS-CoV2 (COVID-19) Qualitative P* 08/03/2021 Not Detected  Not Detected Final    SARS-COV-2- Cycle Number 08/03/2021 -1.00  Not Detected Final   Hospital Outpatient Visit on 07/14/2021   Component Date Value Ref Range Status    POC Glucose 07/14/2021 90  70 - 110 Final   There may be more visits with results that " are not included.       Past Medical History:   Diagnosis Date    Acute hypoxemic respiratory failure 7/9/2018    Arthritis     Cancer     colon    COPD (chronic obstructive pulmonary disease)     History of home oxygen therapy     ONLY PRN AND HASN'T USED IT IN A YEAR    Hyperlipidemia     Thyroid disease      Past Surgical History:   Procedure Laterality Date    CHOLECYSTECTOMY      COLECTOMY N/A 11/9/2020    Procedure: COLECTOMY;  Surgeon: Brandon Wagner MD;  Location: [facility] OR;  Service: General;  Laterality: N/A;  LOWER- ANTERIOR    COLONOSCOPY N/A 10/23/2020    Procedure: COLONOSCOPY;  Surgeon: Jolynn Ayala MD;  Location: [facility] ENDO;  Service: Endoscopy;  Laterality: N/A;    COLONOSCOPY N/A 8/6/2021    Procedure: COLONOSCOPY;  Surgeon: Jolynn Ayala MD;  Location: [facility] ENDO;  Service: Endoscopy;  Laterality: N/A;    ESOPHAGEAL DILATION N/A 6/29/2021    Procedure: DILATION, ESOPHAGUS;  Surgeon: Sourav Baires III, MD;  Location: [facility];  Service: Endoscopy;  Laterality: N/A;    ESOPHAGOGASTRODUODENOSCOPY  06/29/2021    Dilation to 57 Fr    ESOPHAGOGASTRODUODENOSCOPY N/A 6/29/2021    Procedure: EGD (ESOPHAGOGASTRODUODENOSCOPY);  Surgeon: Sourav Baires III, MD;  Location: [facility];  Service: Endoscopy;  Laterality: N/A;    HYSTERECTOMY      INSERTION OF TUNNELED CENTRAL VENOUS CATHETER (CVC) WITH SUBCUTANEOUS PORT N/A 1/11/2021    Procedure: [id number];  Surgeon: Brandon Wagner MD;  Location: [facility] OR;  Service: General;  Laterality: N/A;    MEDIPORT REMOVAL Left 4/19/2021    Procedure: REMOVAL, CATHETER, CENTRAL VENOUS, TUNNELED, WITH PORT;  Surgeon: Brandon Wagner MD;  Location: [facility] OR;  Service: General;  Laterality: Left;    ROBOT-ASSISTED COLECTOMY N/A 11/9/2020    Procedure: ROBOTIC COLECTOMY low anterior resection, possible open;  Surgeon: Brandon Wagner MD;  Location: [facility] OR;  Service: General;  Laterality: N/A;  CONVERTED TO OPEN AT 1503      Family History   Problem Relation Age of Onset    COPD Mother     COPD Father     Breast cancer Maternal Grandmother        Marital Status:   Alcohol History:  reports no history of alcohol use.  Tobacco History:  reports that she quit smoking about 4 years ago. Her smoking use included cigarettes. She has a 80.00 pack-year smoking history. She has never used smokeless tobacco.  Drug History:  reports no history of drug use.    Review of patient's allergies indicates:   Allergen Reactions    Ativan [lorazepam] Hives and Itching    Flagyl [metronidazole] Itching and Rash    Pcn [penicillins] Hives, Itching and Rash       Current Outpatient Medications:     albuterol (PROVENTIL) 2.5 mg /3 mL (0.083 %) nebulizer solution, Take 3 mLs (2.5 mg total) by nebulization every 6 (six) hours as needed for Wheezing. Rescue, Disp: 1 Box, Rfl: 2    cyanocobalamin 1,000 mcg/mL injection, Inject 1 mL (1,000 mcg total) into the muscle every 14 (fourteen) days., Disp: 30 mL, Rfl: 5    ferrous sulfate 325 (65 FE) MG EC tablet, Take 325 mg by mouth 2 (two) times daily., Disp: , Rfl:     levothyroxine (SYNTHROID) 125 MCG tablet, Take 1 tablet (125 mcg total) by mouth before breakfast., Disp: 30 tablet, Rfl: 11    atorvastatin (LIPITOR) 40 MG tablet, Take 1 tablet (40 mg total) by mouth once daily. (Patient not taking: Reported on 6/1/2022), Disp: 90 tablet, Rfl: 1    CMPD hydrocortisone 2%- LIDOcaine 3% suppository (RECTAL ROCKET), Place 1 suppository rectally every evening. Insert 1 suppository 3/4 of the way onto rectum. Wet for easier application. Repeat for 3 nights., Disp: 30 suppository, Rfl: 2    dicyclomine (BENTYL) 20 mg tablet, Take 1 tablet (20 mg total) by mouth every 6 (six) hours., Disp: 120 tablet, Rfl: 0    gabapentin (NEURONTIN) 300 MG capsule, Take 1 capsule (300 mg total) by mouth every evening., Disp: 30 capsule, Rfl: 2    traMADoL (ULTRAM) 50 mg tablet, TAKE ONE TABLET BY MOUTH EVERY SIX  HOURS AS NEEDED FOR PAIN (Patient not taking: Reported on 6/1/2022), Disp: 28 tablet, Rfl: 2    Review of Systems   Constitutional: Positive for appetite change (decreased tying to avoid diarrhea). Negative for chills, diaphoresis, fatigue, fever and unexpected weight change.   HENT: Negative for congestion, ear pain, hearing loss, nosebleeds, postnasal drip, sinus pressure, sinus pain, sneezing, sore throat, tinnitus, trouble swallowing and voice change.    Eyes: Negative for photophobia, pain, itching and visual disturbance.   Respiratory: Negative for apnea, cough, chest tightness, shortness of breath, wheezing and stridor.    Cardiovascular: Negative for chest pain, palpitations and leg swelling.   Gastrointestinal: Positive for abdominal pain (LLQ), diarrhea, hematochezia and rectal pain. Negative for abdominal distention, blood in stool, constipation, nausea and vomiting.        Rectal bleeding   Endocrine: Negative for cold intolerance, heat intolerance, polydipsia and polyuria.   Genitourinary: Negative for difficulty urinating, dyspareunia, dysuria, flank pain, frequency, hematuria, menstrual problem, pelvic pain, urgency, vaginal discharge and vaginal pain.   Musculoskeletal: Negative for arthralgias, back pain, joint swelling, myalgias, neck pain and neck stiffness.   Skin: Negative for pallor.   Allergic/Immunologic: Negative for environmental allergies and food allergies.   Neurological: Negative for dizziness, tremors, speech difficulty, weakness, light-headedness and numbness.   Hematological: Does not bruise/bleed easily.   Psychiatric/Behavioral: Positive for dysphoric mood. Negative for agitation, confusion, decreased concentration, sleep disturbance and suicidal ideas. The patient is not nervous/anxious.           Objective:      Last 3 sets of Vitals    Vitals - 1 value per visit 5/4/2022 6/1/2022 6/1/2022   SYSTOLIC 159 - 126   DIASTOLIC 72 - 64   Pulse 79 - 82   Temp 97 - 98.2   Resp 16 - 16    SPO2 98 - 97   Weight (lb) 190.04 - 188   Weight (kg) 86.2 - 85.276   Height - - 60   BMI (Calculated) - - 36.7   VISIT REPORT - - -   Pain Score  - 6 -   Some recent data might be hidden       Physical Exam  Constitutional:       Appearance: Normal appearance. She is obese.   HENT:      Head: Normocephalic and atraumatic.   Eyes:      Extraocular Movements: Extraocular movements intact.      Pupils: Pupils are equal, round, and reactive to light.   Cardiovascular:      Rate and Rhythm: Normal rate and regular rhythm.   Pulmonary:      Effort: Pulmonary effort is normal.      Breath sounds: Normal breath sounds.   Abdominal:      General: Bowel sounds are normal.      Palpations: Abdomen is soft.   Musculoskeletal:      Right lower leg: No edema.      Left lower leg: No edema.   Lymphadenopathy:      Cervical: No cervical adenopathy.   Skin:     General: Skin is warm and dry.   Neurological:      General: No focal deficit present.      Mental Status: She is alert and oriented to person, place, and time.   Psychiatric:         Mood and Affect: Mood normal.         Thought Content: Thought content normal.         Judgment: Judgment normal.           Assessment:       1. Diarrhea, unspecified type    2. Sciatica of left side    3. Severe obesity (BMI 35.0-39.9) with comorbidity    4. Iron deficiency anemia, unspecified iron deficiency anemia type    5. Hemorrhoids, unspecified hemorrhoid type    6. Secondary and unspecified malignant neoplasm of intra-abdominal lymph nodes    7. Chronic bronchitis, unspecified chronic bronchitis type         Plan:       Diarrhea, unspecified type  -     dicyclomine (BENTYL) 20 mg tablet; Take 1 tablet (20 mg total) by mouth every 6 (six) hours.  Dispense: 120 tablet; Refill: 0    Sciatica of left side  -     gabapentin (NEURONTIN) 300 MG capsule; Take 1 capsule (300 mg total) by mouth every evening.  Dispense: 30 capsule; Refill: 2    Severe obesity (BMI 35.0-39.9) with  comorbidity    Iron deficiency anemia, unspecified iron deficiency anemia type    Hemorrhoids, unspecified hemorrhoid type  -     CMPD hydrocortisone 2%- LIDOcaine 3% suppository (RECTAL ROCKET); Place 1 suppository rectally every evening. Insert 1 suppository 3/4 of the way onto rectum. Wet for easier application. Repeat for 3 nights.  Dispense: 30 suppository; Refill: 2    Secondary and unspecified malignant neoplasm of intra-abdominal lymph nodes    Chronic bronchitis, unspecified chronic bronchitis type      No follow-ups on file.        6/1/2022 Karma Mcdermott M.D.

## 2022-06-02 ENCOUNTER — TELEPHONE (OUTPATIENT)
Dept: FAMILY MEDICINE | Facility: CLINIC | Age: 67
End: 2022-06-02

## 2022-06-07 DIAGNOSIS — K64.9 HEMORRHOIDS, UNSPECIFIED HEMORRHOID TYPE: ICD-10-CM

## 2022-06-27 ENCOUNTER — OFFICE VISIT (OUTPATIENT)
Dept: FAMILY MEDICINE | Facility: CLINIC | Age: 67
End: 2022-06-27
Payer: COMMERCIAL

## 2022-06-27 VITALS
SYSTOLIC BLOOD PRESSURE: 138 MMHG | BODY MASS INDEX: 37.69 KG/M2 | TEMPERATURE: 98 F | OXYGEN SATURATION: 97 % | RESPIRATION RATE: 16 BRPM | DIASTOLIC BLOOD PRESSURE: 66 MMHG | HEART RATE: 82 BPM | HEIGHT: 60 IN | WEIGHT: 192 LBS

## 2022-06-27 DIAGNOSIS — K62.5 RECTAL BLEEDING: Primary | ICD-10-CM

## 2022-06-27 PROCEDURE — 99213 PR OFFICE/OUTPT VISIT, EST, LEVL III, 20-29 MIN: ICD-10-PCS | Mod: S$GLB,,, | Performed by: INTERNAL MEDICINE

## 2022-06-27 PROCEDURE — 99213 OFFICE O/P EST LOW 20 MIN: CPT | Mod: S$GLB,,, | Performed by: INTERNAL MEDICINE

## 2022-06-27 PROCEDURE — 3075F SYST BP GE 130 - 139MM HG: CPT | Mod: CPTII,S$GLB,, | Performed by: INTERNAL MEDICINE

## 2022-06-27 PROCEDURE — 3078F DIAST BP <80 MM HG: CPT | Mod: CPTII,S$GLB,, | Performed by: INTERNAL MEDICINE

## 2022-06-27 PROCEDURE — 1160F PR REVIEW ALL MEDS BY PRESCRIBER/CLIN PHARMACIST DOCUMENTED: ICD-10-PCS | Mod: CPTII,S$GLB,, | Performed by: INTERNAL MEDICINE

## 2022-06-27 PROCEDURE — 1159F PR MEDICATION LIST DOCUMENTED IN MEDICAL RECORD: ICD-10-PCS | Mod: CPTII,S$GLB,, | Performed by: INTERNAL MEDICINE

## 2022-06-27 PROCEDURE — 3008F BODY MASS INDEX DOCD: CPT | Mod: CPTII,S$GLB,, | Performed by: INTERNAL MEDICINE

## 2022-06-27 PROCEDURE — 1160F RVW MEDS BY RX/DR IN RCRD: CPT | Mod: CPTII,S$GLB,, | Performed by: INTERNAL MEDICINE

## 2022-06-27 PROCEDURE — 1159F MED LIST DOCD IN RCRD: CPT | Mod: CPTII,S$GLB,, | Performed by: INTERNAL MEDICINE

## 2022-06-27 PROCEDURE — 3075F PR MOST RECENT SYSTOLIC BLOOD PRESS GE 130-139MM HG: ICD-10-PCS | Mod: CPTII,S$GLB,, | Performed by: INTERNAL MEDICINE

## 2022-06-27 PROCEDURE — 3008F PR BODY MASS INDEX (BMI) DOCUMENTED: ICD-10-PCS | Mod: CPTII,S$GLB,, | Performed by: INTERNAL MEDICINE

## 2022-06-27 PROCEDURE — 3078F PR MOST RECENT DIASTOLIC BLOOD PRESSURE < 80 MM HG: ICD-10-PCS | Mod: CPTII,S$GLB,, | Performed by: INTERNAL MEDICINE

## 2022-06-27 NOTE — PROGRESS NOTES
SUBJECTIVE:    Patient ID: Tessa Enriquez is a 66 y.o. female.    Chief Complaint: Rectal Bleeding (Bright red)    HPI     Patient asked for hemorroidal cream two weeks ago-burning and stinging since three days ago and she has lots of rectal pressure-hx of rectal cancer and she is concerned-went through 14 pads since this am due to soft stool -seems on paper when she wipes-three nights ago she had some bleeding that oozed to her ankle-not any ore like that-    Lab Visit on 05/03/2022   Component Date Value Ref Range Status    Cholesterol 05/03/2022 232 (A) 120 - 199 mg/dL Final    Triglycerides 05/03/2022 102  30 - 150 mg/dL Final    HDL 05/03/2022 58  40 - 75 mg/dL Final    LDL Cholesterol 05/03/2022 153.6  63.0 - 159.0 mg/dL Final    HDL/Cholesterol Ratio 05/03/2022 25.0  20.0 - 50.0 % Final    Total Cholesterol/HDL Ratio 05/03/2022 4.0  2.0 - 5.0 Final    Non-HDL Cholesterol 05/03/2022 174  mg/dL Final   Lab Visit on 05/02/2022   Component Date Value Ref Range Status    Sodium 05/02/2022 138  136 - 145 mmol/L Final    Potassium 05/02/2022 4.1  3.5 - 5.1 mmol/L Final    Chloride 05/02/2022 106  95 - 110 mmol/L Final    CO2 05/02/2022 22 (A) 23 - 29 mmol/L Final    Glucose 05/02/2022 89  70 - 110 mg/dL Final    BUN 05/02/2022 16  8 - 23 mg/dL Final    Creatinine 05/02/2022 0.8  0.5 - 1.4 mg/dL Final    Calcium 05/02/2022 8.6 (A) 8.7 - 10.5 mg/dL Final    Total Protein 05/02/2022 6.8  6.0 - 8.4 g/dL Final    Albumin 05/02/2022 3.5  3.5 - 5.2 g/dL Final    Total Bilirubin 05/02/2022 0.3  0.1 - 1.0 mg/dL Final    Alkaline Phosphatase 05/02/2022 83  55 - 135 U/L Final    AST 05/02/2022 16  10 - 40 U/L Final    ALT 05/02/2022 16  10 - 44 U/L Final    Anion Gap 05/02/2022 10  8 - 16 mmol/L Final    eGFR if African American 05/02/2022 >60  >60 mL/min/1.73 m^2 Final    eGFR if non African American 05/02/2022 >60  >60 mL/min/1.73 m^2 Final    WBC 05/02/2022 4.66  3.90 - 12.70 K/uL Final    RBC  05/02/2022 3.65 (A) 4.00 - 5.40 M/uL Final    Hemoglobin 05/02/2022 10.4 (A) 12.0 - 16.0 g/dL Final    Hematocrit 05/02/2022 32.5 (A) 37.0 - 48.5 % Final    MCV 05/02/2022 89  82 - 98 fL Final    MCH 05/02/2022 28.5  27.0 - 31.0 pg Final    MCHC 05/02/2022 32.0  32.0 - 36.0 g/dL Final    RDW 05/02/2022 14.0  11.5 - 14.5 % Final    Platelets 05/02/2022 205  150 - 450 K/uL Final    MPV 05/02/2022 11.3  9.2 - 12.9 fL Final    Immature Granulocytes 05/02/2022 0.2  0.0 - 0.5 % Final    Gran # (ANC) 05/02/2022 3.4  1.8 - 7.7 K/uL Final    Immature Grans (Abs) 05/02/2022 0.01  0.00 - 0.04 K/uL Final    Lymph # 05/02/2022 0.8 (A) 1.0 - 4.8 K/uL Final    Mono # 05/02/2022 0.4  0.3 - 1.0 K/uL Final    Eos # 05/02/2022 0.1  0.0 - 0.5 K/uL Final    Baso # 05/02/2022 0.04  0.00 - 0.20 K/uL Final    nRBC 05/02/2022 0  0 /100 WBC Final    Gran % 05/02/2022 71.9  38.0 - 73.0 % Final    Lymph % 05/02/2022 16.3 (A) 18.0 - 48.0 % Final    Mono % 05/02/2022 7.9  4.0 - 15.0 % Final    Eosinophil % 05/02/2022 2.8  0.0 - 8.0 % Final    Basophil % 05/02/2022 0.9  0.0 - 1.9 % Final    Differential Method 05/02/2022 Automated   Final    CEA 05/02/2022 1.8  0.0 - 5.0 ng/mL Final    Ferritin 05/02/2022 18 (A) 20.0 - 300.0 ng/mL Final    Iron 05/02/2022 64  30 - 160 ug/dL Final    Transferrin 05/02/2022 334  200 - 375 mg/dL Final    TIBC 05/02/2022 494 (A) 250 - 450 ug/dL Final    Saturated Iron 05/02/2022 13 (A) 20 - 50 % Final    Vitamin B-12 05/02/2022 292  210 - 950 pg/mL Final   Lab Visit on 05/02/2022   Component Date Value Ref Range Status    TSH 05/02/2022 9.523 (A) 0.400 - 4.000 uIU/mL Final    Free T4 05/02/2022 0.91  0.71 - 1.51 ng/dL Final   Admission on 03/28/2022, Discharged on 03/28/2022   Component Date Value Ref Range Status    HIV 1/2 Ag/Ab 03/28/2022 Negative  Negative Final    Hepatitis C Ab 03/28/2022 Negative  Negative Final    WBC 03/28/2022 5.04  3.90 - 12.70 K/uL Final    RBC  03/28/2022 3.87 (A) 4.00 - 5.40 M/uL Final    Hemoglobin 03/28/2022 10.9 (A) 12.0 - 16.0 g/dL Final    Hematocrit 03/28/2022 34.1 (A) 37.0 - 48.5 % Final    MCV 03/28/2022 88  82 - 98 fL Final    MCH 03/28/2022 28.2  27.0 - 31.0 pg Final    MCHC 03/28/2022 32.0  32.0 - 36.0 g/dL Final    RDW 03/28/2022 13.3  11.5 - 14.5 % Final    Platelets 03/28/2022 231  150 - 450 K/uL Final    MPV 03/28/2022 10.9  9.2 - 12.9 fL Final    Immature Granulocytes 03/28/2022 0.4  0.0 - 0.5 % Final    Gran # (ANC) 03/28/2022 4.0  1.8 - 7.7 K/uL Final    Immature Grans (Abs) 03/28/2022 0.02  0.00 - 0.04 K/uL Final    Lymph # 03/28/2022 0.6 (A) 1.0 - 4.8 K/uL Final    Mono # 03/28/2022 0.3  0.3 - 1.0 K/uL Final    Eos # 03/28/2022 0.1  0.0 - 0.5 K/uL Final    Baso # 03/28/2022 0.03  0.00 - 0.20 K/uL Final    nRBC 03/28/2022 0  0 /100 WBC Final    Gran % 03/28/2022 79.8 (A) 38.0 - 73.0 % Final    Lymph % 03/28/2022 11.3 (A) 18.0 - 48.0 % Final    Mono % 03/28/2022 6.3  4.0 - 15.0 % Final    Eosinophil % 03/28/2022 1.6  0.0 - 8.0 % Final    Basophil % 03/28/2022 0.6  0.0 - 1.9 % Final    Differential Method 03/28/2022 Automated   Final    Sodium 03/28/2022 138  136 - 145 mmol/L Final    Potassium 03/28/2022 3.6  3.5 - 5.1 mmol/L Final    Chloride 03/28/2022 105  95 - 110 mmol/L Final    CO2 03/28/2022 24  23 - 29 mmol/L Final    Glucose 03/28/2022 92  70 - 110 mg/dL Final    BUN 03/28/2022 13  8 - 23 mg/dL Final    Creatinine 03/28/2022 0.8  0.5 - 1.4 mg/dL Final    Calcium 03/28/2022 8.8  8.7 - 10.5 mg/dL Final    Total Protein 03/28/2022 6.7  6.0 - 8.4 g/dL Final    Albumin 03/28/2022 3.5  3.5 - 5.2 g/dL Final    Total Bilirubin 03/28/2022 0.4  0.1 - 1.0 mg/dL Final    Alkaline Phosphatase 03/28/2022 93  55 - 135 U/L Final    AST 03/28/2022 14  10 - 40 U/L Final    ALT 03/28/2022 15  10 - 44 U/L Final    Anion Gap 03/28/2022 9  8 - 16 mmol/L Final    eGFR if African American 03/28/2022 >60  >60  mL/min/1.73 m^2 Final    eGFR if non African American 03/28/2022 >60  >60 mL/min/1.73 m^2 Final    Lipase 03/28/2022 23  4 - 60 U/L Final    Specimen UA 03/28/2022 Urine, Supra Pubic   Final    Color, UA 03/28/2022 Yellow  Yellow, Straw, Emily Final    Appearance, UA 03/28/2022 Cloudy (A) Clear Final    pH, UA 03/28/2022 6.0  5.0 - 8.0 Final    Specific Gravity, UA 03/28/2022 >=1.030 (A) 1.005 - 1.030 Final    Protein, UA 03/28/2022 Trace (A) Negative Final    Glucose, UA 03/28/2022 Negative  Negative Final    Ketones, UA 03/28/2022 Negative  Negative Final    Bilirubin (UA) 03/28/2022 Negative  Negative Final    Occult Blood UA 03/28/2022 3+ (A) Negative Final    Nitrite, UA 03/28/2022 Positive (A) Negative Final    Urobilinogen, UA 03/28/2022 1.0  <2.0 EU/dL Final    Leukocytes, UA 03/28/2022 Trace (A) Negative Final    Magnesium 03/28/2022 2.0  1.6 - 2.6 mg/dL Final    Stool Culture 03/28/2022 No Salmonella,Shigella,Vibrio,Campylobacter,Yersinia isolated.   Final    Stool Exam-Ova,Cysts,Parasites 03/28/2022 FINAL 03/31/2022 1255   Final    C. diff Antigen 03/28/2022 Negative  Negative Final    C difficile Toxins A+B, EIA 03/28/2022 Negative  Negative Final    Shiga Toxin 1 E.coli 03/28/2022 Negative   Final    Shiga Toxin 2 E.coli 03/28/2022 Negative   Final    RBC, UA 03/28/2022 10 (A) 0 - 4 /hpf Final    WBC, UA 03/28/2022 25 (A) 0 - 5 /hpf Final    Bacteria 03/28/2022 Many (A) None-Occ /hpf Final    Squam Epithel, UA 03/28/2022 9  /hpf Final    Microscopic Comment 03/28/2022 SEE COMMENT   Final    Urine Culture, Routine 03/28/2022  (A)  Final                    Value:ESCHERICHIA COLI  >100,000 cfu/ml     Lab Visit on 01/27/2022   Component Date Value Ref Range Status    WBC 01/27/2022 4.42  3.90 - 12.70 K/uL Final    RBC 01/27/2022 3.83 (A) 4.00 - 5.40 M/uL Final    Hemoglobin 01/27/2022 11.1 (A) 12.0 - 16.0 g/dL Final    Hematocrit 01/27/2022 34.2 (A) 37.0 - 48.5 % Final     MCV 01/27/2022 89  82 - 98 fL Final    MCH 01/27/2022 29.0  27.0 - 31.0 pg Final    MCHC 01/27/2022 32.5  32.0 - 36.0 g/dL Final    RDW 01/27/2022 13.5  11.5 - 14.5 % Final    Platelets 01/27/2022 248  150 - 450 K/uL Final    MPV 01/27/2022 11.3  9.2 - 12.9 fL Final    Immature Granulocytes 01/27/2022 0.5  0.0 - 0.5 % Final    Gran # (ANC) 01/27/2022 3.2  1.8 - 7.7 K/uL Final    Immature Grans (Abs) 01/27/2022 0.02  0.00 - 0.04 K/uL Final    Lymph # 01/27/2022 0.7 (A) 1.0 - 4.8 K/uL Final    Mono # 01/27/2022 0.3  0.3 - 1.0 K/uL Final    Eos # 01/27/2022 0.2  0.0 - 0.5 K/uL Final    Baso # 01/27/2022 0.03  0.00 - 0.20 K/uL Final    nRBC 01/27/2022 0  0 /100 WBC Final    Gran % 01/27/2022 71.8  38.0 - 73.0 % Final    Lymph % 01/27/2022 16.1 (A) 18.0 - 48.0 % Final    Mono % 01/27/2022 7.5  4.0 - 15.0 % Final    Eosinophil % 01/27/2022 3.4  0.0 - 8.0 % Final    Basophil % 01/27/2022 0.7  0.0 - 1.9 % Final    Differential Method 01/27/2022 Automated   Final    Sodium 01/27/2022 137  136 - 145 mmol/L Final    Potassium 01/27/2022 4.2  3.5 - 5.1 mmol/L Final    Chloride 01/27/2022 103  95 - 110 mmol/L Final    CO2 01/27/2022 23  23 - 29 mmol/L Final    Glucose 01/27/2022 86  70 - 110 mg/dL Final    BUN 01/27/2022 13  8 - 23 mg/dL Final    Creatinine 01/27/2022 0.9  0.5 - 1.4 mg/dL Final    Calcium 01/27/2022 9.0  8.7 - 10.5 mg/dL Final    Total Protein 01/27/2022 6.8  6.0 - 8.4 g/dL Final    Albumin 01/27/2022 3.7  3.5 - 5.2 g/dL Final    Total Bilirubin 01/27/2022 0.5  0.1 - 1.0 mg/dL Final    Alkaline Phosphatase 01/27/2022 84  55 - 135 U/L Final    AST 01/27/2022 16  10 - 40 U/L Final    ALT 01/27/2022 15  10 - 44 U/L Final    Anion Gap 01/27/2022 11  8 - 16 mmol/L Final    eGFR if African American 01/27/2022 >60  >60 mL/min/1.73 m^2 Final    eGFR if non African American 01/27/2022 >60  >60 mL/min/1.73 m^2 Final    CEA 01/27/2022 <1.7  0.0 - 5.0 ng/mL Final    Vitamin B-12  01/27/2022 315  210 - 950 pg/mL Final   Lab Visit on 10/22/2021   Component Date Value Ref Range Status    WBC 10/22/2021 3.02 (A) 3.90 - 12.70 K/uL Final    RBC 10/22/2021 4.01  4.00 - 5.40 M/uL Final    Hemoglobin 10/22/2021 11.6 (A) 12.0 - 16.0 g/dL Final    Hematocrit 10/22/2021 37.0  37.0 - 48.5 % Final    MCV 10/22/2021 92  82 - 98 fL Final    MCH 10/22/2021 28.9  27.0 - 31.0 pg Final    MCHC 10/22/2021 31.4 (A) 32.0 - 36.0 g/dL Final    RDW 10/22/2021 12.9  11.5 - 14.5 % Final    Platelets 10/22/2021 191  150 - 450 K/uL Final    MPV 10/22/2021 11.2  9.2 - 12.9 fL Final    Immature Granulocytes 10/22/2021 0.3  0.0 - 0.5 % Final    Gran # (ANC) 10/22/2021 2.0  1.8 - 7.7 K/uL Final    Immature Grans (Abs) 10/22/2021 0.01  0.00 - 0.04 K/uL Final    Lymph # 10/22/2021 0.5 (A) 1.0 - 4.8 K/uL Final    Mono # 10/22/2021 0.2 (A) 0.3 - 1.0 K/uL Final    Eos # 10/22/2021 0.2  0.0 - 0.5 K/uL Final    Baso # 10/22/2021 0.03  0.00 - 0.20 K/uL Final    nRBC 10/22/2021 0  0 /100 WBC Final    Gran % 10/22/2021 66.0  38.0 - 73.0 % Final    Lymph % 10/22/2021 17.5 (A) 18.0 - 48.0 % Final    Mono % 10/22/2021 7.6  4.0 - 15.0 % Final    Eosinophil % 10/22/2021 7.6  0.0 - 8.0 % Final    Basophil % 10/22/2021 1.0  0.0 - 1.9 % Final    Differential Method 10/22/2021 Automated   Final    Sodium 10/22/2021 139  136 - 145 mmol/L Final    Potassium 10/22/2021 4.6  3.5 - 5.1 mmol/L Final    Chloride 10/22/2021 107  95 - 110 mmol/L Final    CO2 10/22/2021 28  23 - 29 mmol/L Final    Glucose 10/22/2021 97  70 - 110 mg/dL Final    BUN 10/22/2021 10  8 - 23 mg/dL Final    Creatinine 10/22/2021 0.7  0.5 - 1.4 mg/dL Final    Calcium 10/22/2021 8.7  8.7 - 10.5 mg/dL Final    Total Protein 10/22/2021 6.4  6.0 - 8.4 g/dL Final    Albumin 10/22/2021 3.4 (A) 3.5 - 5.2 g/dL Final    Total Bilirubin 10/22/2021 0.4  0.1 - 1.0 mg/dL Final    Alkaline Phosphatase 10/22/2021 71  55 - 135 U/L Final    AST 10/22/2021 15  " 10 - 40 U/L Final    ALT 10/22/2021 13  10 - 44 U/L Final    Anion Gap 10/22/2021 4 (A) 8 - 16 mmol/L Final    eGFR if African American 10/22/2021 >60  >60 mL/min/1.73 m^2 Final    eGFR if non African American 10/22/2021 >60  >60 mL/min/1.73 m^2 Final    CEA 10/22/2021 <1.7  0.0 - 5.0 ng/mL Final    Vitamin B-12 10/22/2021 226  210 - 950 pg/mL Final   Lab Visit on 10/22/2021   Component Date Value Ref Range Status    TSH 10/22/2021 30.964 (A) 0.400 - 4.000 uIU/mL Final    Free T4 10/22/2021 0.44 (A) 0.71 - 1.51 ng/dL Final   Admission on 08/06/2021, Discharged on 08/06/2021   Component Date Value Ref Range Status    Final Pathologic Diagnosis 08/06/2021    Final                    Value:1. Colon, transverse polyps x2 (polypectomy):  Tubular adenoma(s), multiple fragments  2. Colon, sigmoid polyp (polypectomy):  Tubular adenoma  No high grade dysplasia  Multiple deeper levels examined  GI consensus: Milli Jules, Omar, Joe  3. Colon, random (biopsy):  Colonic mucosa with no significant histopathologic changes  No support for microscopic colitis  4. Rectosigmoid anastomosis (biopsy):  No dysplasia, no malignancy  Colonic mucosa with reactive changes  Multiple deeper levels examined      Gross 08/06/2021    Final                    Value:Number of containers:  4 a  Part 1  Container Label: Clinic Number/AP Number:  [id number], and "transverse colon  polyps x2"  Received in formalin in polyp trap 1 are multiple soft tan polypoid tissue  fragments ranging from 2-7 mm in greatest dimension.  The largest fragment is  inked and bisected.  Specimen is filtered and entirely submitted in  PYF--1-A.  Part 2  Container Label: Clinic Number/AP Number:  [id number], and "sigmoid colon polyp"  Received in formalin in polyp trap 2 is an 11 x 6 x 4 mm soft tan polypoid  tissue fragment.  The base is inked blue.  Specimen is serially sectioned and  entirely submitted in QTF--2-A.  Part 3  Container Label: " "Clinic Number/AP Number:  [id number], and "random colon Bx  rule out microscopic colitis"  Received in formalin is a 6 x 5 x 1 mm aggregate of soft tan tissue  fragments.  Specimen is stained with Hematoxylin and entirely submitted in  CAK--3-A.  Part 4  Container Label: Clinic Number/AP Number:  [id number], and "rectosig                          moid  anastomosis site Bx"  Received in formalin are 3 soft tan tissue fragments ranging from 2-3 mm in  greatest dimension.  Specimen is stained with Hematoxylin and entirely  submitted in QNN--4-A.  Orquidea Kurtz P.A.      Disclaimer 08/06/2021    Final                    Value:Unless the case is a 'gross only' or additional testing only, the final  diagnosis for each specimen is based on a microscopic examination of  appropriate tissue sections.     Lab Visit on 08/03/2021   Component Date Value Ref Range Status    SARS-CoV2 (COVID-19) Qualitative P* 08/03/2021 Not Detected  Not Detected Final    SARS-COV-2- Cycle Number 08/03/2021 -1.00  Not Detected Final   Hospital Outpatient Visit on 07/14/2021   Component Date Value Ref Range Status    POC Glucose 07/14/2021 90  70 - 110 Final   There may be more visits with results that are not included.       Past Medical History:   Diagnosis Date    Acute hypoxemic respiratory failure 7/9/2018    Arthritis     Cancer     colon    COPD (chronic obstructive pulmonary disease)     History of home oxygen therapy     ONLY PRN AND HASN'T USED IT IN A YEAR    Hyperlipidemia     Thyroid disease      Past Surgical History:   Procedure Laterality Date    CHOLECYSTECTOMY      COLECTOMY N/A 11/9/2020    Procedure: COLECTOMY;  Surgeon: Brandon Wagner MD;  Location: [facility] OR;  Service: General;  Laterality: N/A;  LOWER- ANTERIOR    COLONOSCOPY N/A 10/23/2020    Procedure: COLONOSCOPY;  Surgeon: Jolynn Ayala MD;  Location: [facility] ENDO;  Service: Endoscopy;  Laterality: N/A;    COLONOSCOPY N/A 8/6/2021    Procedure: " COLONOSCOPY;  Surgeon: Jolynn Ayala MD;  Location: [facility] ENDO;  Service: Endoscopy;  Laterality: N/A;    ESOPHAGEAL DILATION N/A 6/29/2021    Procedure: DILATION, ESOPHAGUS;  Surgeon: Sourav Baires III, MD;  Location: [facility] ENDO;  Service: Endoscopy;  Laterality: N/A;    ESOPHAGOGASTRODUODENOSCOPY  06/29/2021    Dilation to 57 Fr    ESOPHAGOGASTRODUODENOSCOPY N/A 6/29/2021    Procedure: EGD (ESOPHAGOGASTRODUODENOSCOPY);  Surgeon: Sourav Baires III, MD;  Location: [facility] ENDO;  Service: Endoscopy;  Laterality: N/A;    HYSTERECTOMY      INSERTION OF TUNNELED CENTRAL VENOUS CATHETER (CVC) WITH SUBCUTANEOUS PORT N/A 1/11/2021    Procedure: [id number];  Surgeon: Brandon Wagner MD;  Location: [facility] OR;  Service: General;  Laterality: N/A;    MEDIPORT REMOVAL Left 4/19/2021    Procedure: REMOVAL, CATHETER, CENTRAL VENOUS, TUNNELED, WITH PORT;  Surgeon: Brandon Wagner MD;  Location: [facility] OR;  Service: General;  Laterality: Left;    ROBOT-ASSISTED COLECTOMY N/A 11/9/2020    Procedure: ROBOTIC COLECTOMY low anterior resection, possible open;  Surgeon: Brandon Wagner MD;  Location: [facility] OR;  Service: General;  Laterality: N/A;  CONVERTED TO OPEN AT 1503     Family History   Problem Relation Age of Onset    COPD Mother     COPD Father     Breast cancer Maternal Grandmother        Marital Status:   Alcohol History:  reports no history of alcohol use.  Tobacco History:  reports that she quit smoking about 4 years ago. Her smoking use included cigarettes. She has a 80.00 pack-year smoking history. She has never used smokeless tobacco.  Drug History:  reports no history of drug use.    Review of patient's allergies indicates:   Allergen Reactions    Ativan [lorazepam] Hives and Itching    Flagyl [metronidazole] Itching and Rash    Pcn [penicillins] Hives, Itching and Rash       Current Outpatient Medications:     albuterol (PROVENTIL) 2.5 mg /3 mL (0.083 %) nebulizer solution,  Take 3 mLs (2.5 mg total) by nebulization every 6 (six) hours as needed for Wheezing. Rescue, Disp: 1 Box, Rfl: 2    atorvastatin (LIPITOR) 40 MG tablet, Take 1 tablet (40 mg total) by mouth once daily., Disp: 90 tablet, Rfl: 1    CMPD hydrocortisone 2%- LIDOcaine 3% suppository (RECTAL ROCKET), Place 1 suppository rectally every evening. Insert 1 suppository 3/4 of the way onto rectum. Wet for easier application. Repeat for 3 nights., Disp: 30 suppository, Rfl: 2    cyanocobalamin 1,000 mcg/mL injection, Inject 1 mL (1,000 mcg total) into the muscle every 14 (fourteen) days., Disp: 30 mL, Rfl: 5    dicyclomine (BENTYL) 20 mg tablet, Take 1 tablet (20 mg total) by mouth every 6 (six) hours., Disp: 120 tablet, Rfl: 0    ferrous sulfate 325 (65 FE) MG EC tablet, Take 325 mg by mouth 2 (two) times daily., Disp: , Rfl:     gabapentin (NEURONTIN) 300 MG capsule, Take 1 capsule (300 mg total) by mouth every evening., Disp: 30 capsule, Rfl: 2    levothyroxine (SYNTHROID) 125 MCG tablet, Take 1 tablet (125 mcg total) by mouth before breakfast., Disp: 30 tablet, Rfl: 11    traMADoL (ULTRAM) 50 mg tablet, TAKE ONE TABLET BY MOUTH EVERY SIX HOURS AS NEEDED FOR PAIN (Patient not taking: No sig reported), Disp: 28 tablet, Rfl: 2    Review of Systems   All other systems reviewed and are negative.         Objective:      Last 3 sets of Vitals    Vitals - 1 value per visit 6/1/2022 6/27/2022 6/27/2022   SYSTOLIC 126 - 138   DIASTOLIC 64 - 66   Pulse 82 - 82   Temp 98.2 - 98.2   Resp 16 - 16   SPO2 97 - 97   Weight (lb) 188 - 192   Weight (kg) 85.276 - 87.091   Height 60 - 60   BMI (Calculated) 36.7 - 37.5   VISIT REPORT - - -   Pain Score  - 8 -   Some recent data might be hidden       Physical Exam  Vitals and nursing note reviewed.   Constitutional:       General: She is not in acute distress.     Appearance: She is obese. She is not ill-appearing.   HENT:      Head: Normocephalic and atraumatic.   Eyes:      Extraocular  Movements: Extraocular movements intact.      Pupils: Pupils are equal, round, and reactive to light.   Cardiovascular:      Rate and Rhythm: Normal rate and regular rhythm.      Pulses: Normal pulses.      Heart sounds: Normal heart sounds.   Abdominal:      General: Bowel sounds are normal.      Palpations: Abdomen is soft.      Comments: No evidence of external hemorroids-soft stool exuding through the rectum-rectal exam delayed for GI-some dark brown stool with some blood noted   Musculoskeletal:      Right lower leg: No edema.      Left lower leg: No edema.   Neurological:      Mental Status: She is alert.   Psychiatric:         Mood and Affect: Mood normal.           Assessment:       1. Rectal bleeding         Plan:       Rectal bleeding  -     Ambulatory referral/consult to Gastroenterology; Future; Expected date: 07/04/2022      No follow-ups on file.        6/27/2022 Karma Mcdermott M.D.

## 2022-06-28 ENCOUNTER — TELEPHONE (OUTPATIENT)
Dept: GASTROENTEROLOGY | Facility: CLINIC | Age: 67
End: 2022-06-28
Payer: COMMERCIAL

## 2022-06-28 DIAGNOSIS — Z86.010 HISTORY OF COLON POLYPS: ICD-10-CM

## 2022-06-28 DIAGNOSIS — K62.5 RECTAL BLEEDING: Primary | ICD-10-CM

## 2022-06-28 NOTE — TELEPHONE ENCOUNTER
All placed to Ms. Zarate in regards to scheduling colonoscopy. Procedure set for Friday 7/1 at 1330 arriving for 1230. Pt will do home covid test the morning of her procedure. Mitral prep reviewed and pt will  from our office on thursday. She is aware that she will need to be on a clear liquid diet starting 6/30. No further issues noted.

## 2022-06-28 NOTE — TELEPHONE ENCOUNTER
Talked to patient ,received referral for rectal bleed, scheduled 7/7/22@ [facility]patients states would like to take to Dr Ayala's nurse to see if can get in sooner. Message sent.

## 2022-06-28 NOTE — TELEPHONE ENCOUNTER
----- Message from Jessenia Womack sent at 6/28/2022 11:27 AM CDT -----  Type:  Patient Returning Call    Who Called:Pt     Who Left Message for Patient:Nurse     Does the patient know what this is regarding?: colonoscopy     Best Call Back Number:Click to [phone]

## 2022-07-01 ENCOUNTER — HOSPITAL ENCOUNTER (OUTPATIENT)
Facility: HOSPITAL | Age: 67
Discharge: HOME | End: 2022-07-01
Attending: INTERNAL MEDICINE | Admitting: INTERNAL MEDICINE
Payer: COMMERCIAL

## 2022-07-01 ENCOUNTER — ANESTHESIA (OUTPATIENT)
Dept: ENDOSCOPY | Facility: HOSPITAL | Age: 67
End: 2022-07-01
Payer: COMMERCIAL

## 2022-07-01 ENCOUNTER — ANESTHESIA EVENT (OUTPATIENT)
Dept: ENDOSCOPY | Facility: HOSPITAL | Age: 67
End: 2022-07-01
Payer: COMMERCIAL

## 2022-07-01 DIAGNOSIS — K62.5 RECTAL BLEEDING: ICD-10-CM

## 2022-07-01 PROCEDURE — 63600175 PHARM REV CODE 636 W HCPCS: Performed by: NURSE ANESTHETIST, CERTIFIED REGISTERED

## 2022-07-01 PROCEDURE — 45378 PR COLONOSCOPY,DIAGNOSTIC: ICD-10-PCS | Mod: 53,,, | Performed by: INTERNAL MEDICINE

## 2022-07-01 PROCEDURE — 45378 DIAGNOSTIC COLONOSCOPY: CPT | Mod: 74 | Performed by: INTERNAL MEDICINE

## 2022-07-01 PROCEDURE — D9220A PRA ANESTHESIA: ICD-10-PCS | Mod: ANES,,, | Performed by: ANESTHESIOLOGY

## 2022-07-01 PROCEDURE — 37000008 HC ANESTHESIA 1ST 15 MINUTES: Performed by: INTERNAL MEDICINE

## 2022-07-01 PROCEDURE — 25000003 PHARM REV CODE 250: Performed by: NURSE ANESTHETIST, CERTIFIED REGISTERED

## 2022-07-01 PROCEDURE — D9220A PRA ANESTHESIA: ICD-10-PCS | Mod: CRNA,,, | Performed by: NURSE ANESTHETIST, CERTIFIED REGISTERED

## 2022-07-01 PROCEDURE — 25000003 PHARM REV CODE 250: Performed by: INTERNAL MEDICINE

## 2022-07-01 PROCEDURE — D9220A PRA ANESTHESIA: Mod: ANES,,, | Performed by: ANESTHESIOLOGY

## 2022-07-01 PROCEDURE — 45378 DIAGNOSTIC COLONOSCOPY: CPT | Mod: 53,,, | Performed by: INTERNAL MEDICINE

## 2022-07-01 PROCEDURE — D9220A PRA ANESTHESIA: Mod: CRNA,,, | Performed by: NURSE ANESTHETIST, CERTIFIED REGISTERED

## 2022-07-01 RX ORDER — LIDOCAINE HCL/PF 100 MG/5ML
SYRINGE (ML) INTRAVENOUS
Status: DISCONTINUED | OUTPATIENT
Start: 2022-07-01 | End: 2022-07-01

## 2022-07-01 RX ORDER — PROPOFOL 10 MG/ML
VIAL (ML) INTRAVENOUS
Status: DISCONTINUED | OUTPATIENT
Start: 2022-07-01 | End: 2022-07-01

## 2022-07-01 RX ORDER — SODIUM CHLORIDE 9 MG/ML
INJECTION, SOLUTION INTRAVENOUS CONTINUOUS
Status: DISCONTINUED | OUTPATIENT
Start: 2022-07-01 | End: 2022-07-01 | Stop reason: HOSPADM

## 2022-07-01 RX ADMIN — PROPOFOL 100 MG: 10 INJECTION, EMULSION INTRAVENOUS at 10:07

## 2022-07-01 RX ADMIN — SODIUM CHLORIDE: 0.9 INJECTION, SOLUTION INTRAVENOUS at 10:07

## 2022-07-01 RX ADMIN — LIDOCAINE HYDROCHLORIDE 100 MG: 20 INJECTION INTRAVENOUS at 10:07

## 2022-07-01 NOTE — H&P (VIEW-ONLY)
[facility]    CC: Rectal bleeding    HPI 66 y.o. female with history of colon cancer s/p resection presents for evaluation of rectal bleeding    Past Medical History:   Diagnosis Date    Acute hypoxemic respiratory failure 7/9/2018    Arthritis     Cancer     colon    COPD (chronic obstructive pulmonary disease)     History of home oxygen therapy     ONLY PRN AND HASN'T USED IT IN A YEAR    Hyperlipidemia     Thyroid disease        Allergies and Medications reviewed     Review of Systems  General ROS: negative for - chills, fever or weight loss  Cardiovascular ROS: no chest pain or dyspnea on exertion  Gastrointestinal ROS: + rectal bleeding    Physical Examination  Breastfeeding No   General appearance: alert, cooperative, no distress  HENT: Normocephalic, atraumatic, neck symmetrical, no nasal discharge, sclera anicteric   Lungs: clear to auscultation bilaterally, symmetric chest wall expansion bilaterally  Heart: regular rate and rhythm without rub; no displacement of the PMI   Abdomen: soft  Extremities: extremities symmetric; no clubbing, cyanosis, or edema        Labs:  Lab Results   Component Value Date    WBC 4.66 05/02/2022    HGB 10.4 (L) 05/02/2022    HCT 32.5 (L) 05/02/2022    MCV 89 05/02/2022     05/02/2022         Assessment:   66 y.o. female with history of colon cancer presents for evaluation of rectal bleeding    Plan:  -Proceed to colonoscopy     Jolynn Ayala MD  [facility]  [address], [address]  [place] [address]  Office: [phone]  Fax: [phone]   Verified Results  ALLERGEN: SEAFOOD PROFILE, IMMUNOCAP [id number] 06:00PM AFLIE RICK   [Jun 11, 2018 10:01AM ALFIE RICK]  Let patient know:  higher results noted for crab, shrimp and lobster - with classes as noted below; please REMIND patient that ACL clearly states that, \"A NEGATIVE TEST MAY NOT RULE OUT CLINICAL ALLERGY OR EVEN ANAPHYLAXIS.\"     Test Name Result Flag Reference   ALLERGEN: CRAB 0.59 kU/L H <=0.34   ALLERGEN: SHRIMP 0.77 kU/L H <=0.34   ALLERGEN CODFISH <0.10 kU/L  <=0.34   ALLERGEN: TUNA <0.10 kU/L  <=0.34   ALLERGEN: LOBSTER 0.42 kU/L H <=0.34   ALLERGEN INTERPRETAION See Note     See Comment  REFERENCE INTERVAL: Allergen, Interpretation  Less than 0.10 kU/L......Class 0.....No significant level detected  0.10-0.34 kU/L...........Class 0/1...Clinical relevance   undetermined  0.35-0.70 kU/L...........Class 1.....Low  0.71-3.50 kU/L...........Class 2.....Moderate  3.51-17.50 kU/L..........Class 3.....High  17.51-50.00 kU/L.........Class 4.....Very High  50..00 kU/L........Class 5.....Very High  Greater than 100.00kU/L..Class 6.....Very High  Allergen results of 0.10-0.34 kU/L are intended for specialist use   as the clinical relevance is undetermined. Even though increasing   ranges are reflective of increasing concentrations of   allergen-specific IgE, these concentrations may not correlate with   the degree of clinical response or skin testing results when   challenged with a specific allergen. The correlation of allergy   laboratory results with clinical history and in vivo reactivity to   specific allergens is essential. A negative test may not rule out   clinical allergy or even anaphylaxis.  Performed by [facility],  [address] [place] [id number] [phone]  [email or web address] Rafael Kay MD, Lab. Director

## 2022-07-01 NOTE — [employer]ATION PATIENT INSTRUCTIONS
Discharge Summary/Instructions after an Endoscopic Procedure  Patient Name: Tessa Enriquez  Patient MRN: [id number]  Patient YOB: 1955 Friday, July 1, 2022  Jolynn Ayala MD  Dear patient,  As a result of recent federal legislation (The Federal Cures Act), you may   receive lab or pathology results from your procedure in your [facility]   account before your physician is able to contact you. Your physician or   their representative will relay the results to you with their   recommendations at their soonest availability.  Thank you,  RESTRICTIONS:  During your procedure today, you received medications for sedation.  These   medications may affect your judgment, balance and coordination.  Therefore,   for 24 hours, you have the following restrictions:   - DO NOT drive a car, operate machinery, make legal/financial decisions,   sign important papers or drink alcohol.    ACTIVITY:  Today: no heavy lifting, straining or running due to procedural   sedation/anesthesia.  The following day: return to full activity including work.  DIET:  Eat and drink normally unless instructed otherwise.     TREATMENT FOR COMMON SIDE EFFECTS:  - Mild abdominal pain, nausea, belching, bloating or excessive gas:  rest,   eat lightly and use a heating pad.  - Sore Throat: treat with throat lozenges and/or gargle with warm salt   water.  - Because air was used during the procedure, expelling large amounts of air   from your rectum or belching is normal.  - If a bowel prep was taken, you may not have a bowel movement for 1-3 days.    This is normal.  SYMPTOMS TO WATCH FOR AND REPORT TO YOUR PHYSICIAN:  1. Abdominal pain or bloating, other than gas cramps.  2. Chest pain.  3. Back pain.  4. Signs of infection such as: chills or fever occurring within 24 hours   after the procedure.  5. Rectal bleeding, which would show as bright red, maroon, or black stools.   (A tablespoon of blood from the rectum is not serious,  especially if   hemorrhoids are present.)  6. Vomiting.  7. Weakness or dizziness.  GO DIRECTLY TO THE NEAREST EMERGENCY ROOM IF YOU HAVE ANY OF THE FOLLOWING:      Difficulty breathing              Chills and/or fever over 101 F   Persistent vomiting and/or vomiting blood   Severe abdominal pain   Severe chest pain   Black, tarry stools   Bleeding- more than one tablespoon   Any other symptom or condition that you feel may need urgent attention  Your doctor recommends these additional instructions:  If any biopsies were taken, your doctors clinic will contact you in 1 to 2   weeks with any results.  - Discharge patient to home (with escort).   - Patient has a contact number available for emergencies.  The signs and   symptoms of potential delayed complications were discussed with the   patient.  Return to normal activities tomorrow.  Written discharge   instructions were provided to the patient.   - Resume previous diet.   - Continue present medications.   - Repeat colonoscopy at the next available appointment because the bowel   preparation was poor.   - Return to my office PRN.  For questions, problems or results please call your physician - Jolynn Ayala MD at Work:  [phone].  [facility], EMERGENCY ROOM PHONE NUMBER: [phone]  IF A COMPLICATION OR EMERGENCY SITUATION ARISES AND YOU ARE UNABLE TO REACH   YOUR PHYSICIAN - GO DIRECTLY TO THE EMERGENCY ROOM.  Jolynn Ayala MD  7/1/2022 11:01:04 AM  This report has been verified and signed electronically.  Dear patient,  As a result of recent federal legislation (The Federal Cures Act), you may   receive lab or pathology results from your procedure in your [facility]   account before your physician is able to contact you. Your physician or   their representative will relay the results to you with their   recommendations at their soonest availability.  Thank you,  [employer]OVATION

## 2022-07-01 NOTE — TRANSFER OF CARE
Anesthesia Transfer of Care Note    Patient: Tessa Enriquez    Procedure(s) Performed: Procedure(s) (LRB):  COLONOSCOPY (N/A)    Patient location: GI    Anesthesia Type: general    Transport from OR: Transported from OR on room air with adequate spontaneous ventilation    Post pain: adequate analgesia    Post assessment: no apparent anesthetic complications and tolerated procedure well    Post vital signs: stable    Level of consciousness: awake, alert and oriented    Nausea/Vomiting: no nausea/vomiting    Complications: none    Transfer of care protocol was followed      Last vitals:   Visit Vitals  BP (!) 166/75   Pulse 69   Temp 36.6 °C (97.9 °F) (Skin)   Resp 15   SpO2 96%   Breastfeeding No

## 2022-07-01 NOTE — DISCHARGE INSTRUCTIONS
Procedure Date: Thursday, July 6  Arrival time: 0930    Report to [facility] ([address] [place] [address] )    No Blood Thinner or Aspirin or antiinflammatory meds (Motrin, Aleve, Ibuprofen, Naproxen, etc) for 7 days before procedure. Tylenol is OK.    Avoid eating: Beans, Peas, Corn, Nuts, Popcorn, Okra and Tomatoes for 5 days before your procedure.       Purchase - one 64 oz bottle of Lemon-Lime Gatorade, one 238 gram bottle of Miralax (generic is ok), 4 dulcolax laxative tablets.    Pour Miralax into gatorade and refrigerate.      The entire day before your procedure, clear liquids only. NO FOOD.  Clear liquids include - Coffee (no cream), tea, soft drinks, jello, gatorade, popsicles, juice (apple, white grape), lemonade, clear broth. No alcohol.    On Tuesday, only clear liquids all day, no food.   1. Take 2 dulcolax on Tuesday      On the day before your procedure, only clear liquids, no food.     1. Take 2 dulcolax in the morning and 2 more at 12noon.   2. At 5 pm, Drink 1/2 of the gatorade/ miralax mixture - 1 glass every 15 minutes   3. At 9 pm, drink remaining 1/2 of gatorade/ miralax mixture - 1 glass every 15 minutes.    Nothing to drink after 7 am. Not even water.     Ok to take morning meds, except diabetes meds, blood thinners, or fluid pills.    Since you will be sedated, you will need someone to drive you home, no taxi or uber.    Any questions, call [phone]

## 2022-07-01 NOTE — H&P
[facility]    CC: Rectal bleeding    HPI 66 y.o. female with history of colon cancer s/p resection presents for evaluation of rectal bleeding    Past Medical History:   Diagnosis Date    Acute hypoxemic respiratory failure 7/9/2018    Arthritis     Cancer     colon    COPD (chronic obstructive pulmonary disease)     History of home oxygen therapy     ONLY PRN AND HASN'T USED IT IN A YEAR    Hyperlipidemia     Thyroid disease        Allergies and Medications reviewed     Review of Systems  General ROS: negative for - chills, fever or weight loss  Cardiovascular ROS: no chest pain or dyspnea on exertion  Gastrointestinal ROS: + rectal bleeding    Physical Examination  Breastfeeding No   General appearance: alert, cooperative, no distress  HENT: Normocephalic, atraumatic, neck symmetrical, no nasal discharge, sclera anicteric   Lungs: clear to auscultation bilaterally, symmetric chest wall expansion bilaterally  Heart: regular rate and rhythm without rub; no displacement of the PMI   Abdomen: soft  Extremities: extremities symmetric; no clubbing, cyanosis, or edema        Labs:  Lab Results   Component Value Date    WBC 4.66 05/02/2022    HGB 10.4 (L) 05/02/2022    HCT 32.5 (L) 05/02/2022    MCV 89 05/02/2022     05/02/2022         Assessment:   66 y.o. female with history of colon cancer presents for evaluation of rectal bleeding    Plan:  -Proceed to colonoscopy     Jolynn Ayala MD  [facility]  [address], [address]  [place] [address]  Office: [phone]  Fax: [phone]

## 2022-07-01 NOTE — ANESTHESIA PREPROCEDURE EVALUATION
07/01/2022  Tessa Enriquez is a 66 y.o., female.    Pre-op Assessment    I have reviewed the Patient Summary Reports.    I have reviewed the Nursing Notes. I have reviewed the NPO Status.   I have reviewed the Medications.     Review of Systems  Anesthesia Hx:  No problems with previous Anesthesia    Social:  Former Smoker    Hematology/Oncology:         -- Cancer in past history (colon CA):   Cardiovascular:   hyperlipidemia    Pulmonary:   COPD, moderate Acute hypoxemic resp failure  Home O2   Renal/:  Renal/ Normal     Musculoskeletal:   Arthritis     Neurological:  Neurology Normal    Endocrine:   Hypothyroidism        Physical Exam  General:  Well nourished and Obesity      Airway/Jaw/Neck:  Airway Findings: Mouth Opening: Normal   Tongue: Normal   General Airway Assessment: Adult Oropharynx Findings:  Mallampati: II  Jaw/Neck Findings:  Neck ROM: Normal ROM      Eyes/Ears/Nose:  Eyes/Ears/Nose Findings:    Dental:  Dental Findings: Dentures     Chest/Lungs:  Chest/Lungs Findings: Normal Respiratory Rate      Heart/Vascular:  Heart Findings: Rate: Normal  Rhythm: Regular Rhythm        Mental Status:  Mental Status Findings:  Cooperative, Alert and Oriented         Anesthesia Plan  Type of Anesthesia, risks & benefits discussed:  Anesthesia Type:  Gen Natural Airway    Patient's Preference:   Plan Factors:          Intra-op Monitoring Plan: Standard ASA Monitors  Intra-op Monitoring Plan Comments:   Post Op Pain Control Plan: multimodal analgesia  Post Op Pain Control Plan Comments:     Induction:   IV  Beta Blocker:  Patient is not currently on a Beta-Blocker (No further documentation required).       Informed Consent: Informed consent signed with the Patient and all parties understand the risks and agree with anesthesia plan.  All questions answered.  Anesthesia consent signed with patient.  ASA  Score: 3     Day of Surgery Review of History & Physical:    H&P Update referred to the surgeon/provider.          Ready For Surgery From Anesthesia Perspective.           Physical Exam  General: Well nourished and Obesity    Airway:  Mallampati: II   Mouth Opening: Normal  Tongue: Normal  Neck ROM: Normal ROM    Dental:  Dentures    Chest/Lungs:  Normal Respiratory Rate    Heart:  Rate: Normal  Rhythm: Regular Rhythm          Anesthesia Plan  Type of Anesthesia, risks & benefits discussed:    Anesthesia Type: Gen Natural Airway  Intra-op Monitoring Plan: Standard ASA Monitors  Post Op Pain Control Plan: multimodal analgesia  Induction:  IV  Informed Consent: Informed consent signed with the Patient and all parties understand the risks and agree with anesthesia plan.  All questions answered.   ASA Score: 3  Day of Surgery Review of History & Physical: H&P Update referred to the surgeon/provider.    Ready For Surgery From Anesthesia Perspective.       .

## 2022-07-01 NOTE — ANESTHESIA POSTPROCEDURE EVALUATION
Anesthesia Post Evaluation    Patient: Tessa Enriquez    Procedure(s) Performed: Procedure(s) (LRB):  COLONOSCOPY (N/A)    Final Anesthesia Type: general      Patient location during evaluation: PACU  Patient participation: Yes- Able to Participate  Level of consciousness: awake and alert  Post-procedure vital signs: reviewed and stable  Pain management: adequate  Airway patency: patent    PONV status at discharge: No PONV  Anesthetic complications: no      Cardiovascular status: hemodynamically stable  Respiratory status: unassisted and room air  Hydration status: euvolemic  Follow-up not needed.          Vitals Value Taken Time   /65 07/01/22 1115        Pulse 75 07/01/22 1115   Resp 16 07/01/22 1115   SpO2 97 % 07/01/22 1115         Event Time   Out of Recovery 11:35:33         Pain/Bautista Score: Bautista Score: 10 (7/1/2022 11:30 AM)

## 2022-07-05 VITALS
OXYGEN SATURATION: 97 % | TEMPERATURE: 98 F | DIASTOLIC BLOOD PRESSURE: 65 MMHG | SYSTOLIC BLOOD PRESSURE: 141 MMHG | HEART RATE: 75 BPM | RESPIRATION RATE: 16 BRPM

## 2022-07-07 ENCOUNTER — HOSPITAL ENCOUNTER (OUTPATIENT)
Facility: HOSPITAL | Age: 67
Discharge: HOME | End: 2022-07-07
Attending: INTERNAL MEDICINE | Admitting: INTERNAL MEDICINE
Payer: COMMERCIAL

## 2022-07-07 ENCOUNTER — ANESTHESIA EVENT (OUTPATIENT)
Dept: ENDOSCOPY | Facility: HOSPITAL | Age: 67
End: 2022-07-07
Payer: COMMERCIAL

## 2022-07-07 ENCOUNTER — ANESTHESIA (OUTPATIENT)
Dept: ENDOSCOPY | Facility: HOSPITAL | Age: 67
End: 2022-07-07
Payer: COMMERCIAL

## 2022-07-07 VITALS
OXYGEN SATURATION: 100 % | BODY MASS INDEX: 35.34 KG/M2 | HEIGHT: 60 IN | TEMPERATURE: 97 F | HEART RATE: 80 BPM | DIASTOLIC BLOOD PRESSURE: 66 MMHG | SYSTOLIC BLOOD PRESSURE: 136 MMHG | RESPIRATION RATE: 22 BRPM | WEIGHT: 180 LBS

## 2022-07-07 DIAGNOSIS — K62.5 RECTAL BLEEDING: ICD-10-CM

## 2022-07-07 PROCEDURE — 45380 PR COLONOSCOPY,BIOPSY: ICD-10-PCS | Mod: ,,, | Performed by: INTERNAL MEDICINE

## 2022-07-07 PROCEDURE — 88342 IMHCHEM/IMCYTCHM 1ST ANTB: CPT | Performed by: PATHOLOGY

## 2022-07-07 PROCEDURE — 88341 IMHCHEM/IMCYTCHM EA ADD ANTB: CPT | Performed by: PATHOLOGY

## 2022-07-07 PROCEDURE — 45380 COLONOSCOPY AND BIOPSY: CPT | Performed by: INTERNAL MEDICINE

## 2022-07-07 PROCEDURE — 25000003 PHARM REV CODE 250: Performed by: NURSE ANESTHETIST, CERTIFIED REGISTERED

## 2022-07-07 PROCEDURE — 88342 CHG IMMUNOCYTOCHEMISTRY: ICD-10-PCS | Mod: 26,ICN,, | Performed by: PATHOLOGY

## 2022-07-07 PROCEDURE — 45380 COLONOSCOPY AND BIOPSY: CPT | Mod: ,,, | Performed by: INTERNAL MEDICINE

## 2022-07-07 PROCEDURE — D9220A PRA ANESTHESIA: ICD-10-PCS | Mod: ANES,,, | Performed by: ANESTHESIOLOGY

## 2022-07-07 PROCEDURE — 27201012 HC FORCEPS, HOT/COLD, DISP: Performed by: INTERNAL MEDICINE

## 2022-07-07 PROCEDURE — 88341 IMHCHEM/IMCYTCHM EA ADD ANTB: CPT | Mod: 26,ICN,, | Performed by: PATHOLOGY

## 2022-07-07 PROCEDURE — 25000003 PHARM REV CODE 250: Performed by: INTERNAL MEDICINE

## 2022-07-07 PROCEDURE — 88305 TISSUE EXAM BY PATHOLOGIST: CPT | Mod: 26,ICN,, | Performed by: PATHOLOGY

## 2022-07-07 PROCEDURE — 37000008 HC ANESTHESIA 1ST 15 MINUTES: Performed by: INTERNAL MEDICINE

## 2022-07-07 PROCEDURE — D9220A PRA ANESTHESIA: ICD-10-PCS | Mod: CRNA,,, | Performed by: NURSE ANESTHETIST, CERTIFIED REGISTERED

## 2022-07-07 PROCEDURE — D9220A PRA ANESTHESIA: Mod: ANES,,, | Performed by: ANESTHESIOLOGY

## 2022-07-07 PROCEDURE — 88342 IMHCHEM/IMCYTCHM 1ST ANTB: CPT | Mod: 26,ICN,, | Performed by: PATHOLOGY

## 2022-07-07 PROCEDURE — 88305 TISSUE EXAM BY PATHOLOGIST: ICD-10-PCS | Mod: 26,ICN,, | Performed by: PATHOLOGY

## 2022-07-07 PROCEDURE — D9220A PRA ANESTHESIA: Mod: CRNA,,, | Performed by: NURSE ANESTHETIST, CERTIFIED REGISTERED

## 2022-07-07 PROCEDURE — 63600175 PHARM REV CODE 636 W HCPCS: Performed by: NURSE ANESTHETIST, CERTIFIED REGISTERED

## 2022-07-07 PROCEDURE — 88341 PR IHC OR ICC EACH ADD'L SINGLE ANTIBODY  STAINPR: ICD-10-PCS | Mod: 26,ICN,, | Performed by: PATHOLOGY

## 2022-07-07 PROCEDURE — 88305 TISSUE EXAM BY PATHOLOGIST: CPT | Performed by: PATHOLOGY

## 2022-07-07 PROCEDURE — 37000009 HC ANESTHESIA EA ADD 15 MINS: Performed by: INTERNAL MEDICINE

## 2022-07-07 RX ORDER — PROPOFOL 10 MG/ML
VIAL (ML) INTRAVENOUS
Status: DISCONTINUED | OUTPATIENT
Start: 2022-07-07 | End: 2022-07-07

## 2022-07-07 RX ORDER — LIDOCAINE HCL/PF 100 MG/5ML
SYRINGE (ML) INTRAVENOUS
Status: DISCONTINUED | OUTPATIENT
Start: 2022-07-07 | End: 2022-07-07

## 2022-07-07 RX ORDER — SODIUM CHLORIDE 9 MG/ML
INJECTION, SOLUTION INTRAVENOUS CONTINUOUS
Status: DISCONTINUED | OUTPATIENT
Start: 2022-07-07 | End: 2022-07-07 | Stop reason: HOSPADM

## 2022-07-07 RX ADMIN — PROPOFOL 100 MG: 10 INJECTION, EMULSION INTRAVENOUS at 12:07

## 2022-07-07 RX ADMIN — LIDOCAINE HYDROCHLORIDE 50 MG: 20 INJECTION INTRAVENOUS at 12:07

## 2022-07-07 RX ADMIN — PROPOFOL 50 MG: 10 INJECTION, EMULSION INTRAVENOUS at 12:07

## 2022-07-07 RX ADMIN — SODIUM CHLORIDE: 0.9 INJECTION, SOLUTION INTRAVENOUS at 11:07

## 2022-07-07 NOTE — INTERVAL H&P NOTE
The patient has been examined and the H&P has been reviewed:    I concur with the findings and no changes have occurred since H&P was written. She is repeat due to prior poor prep.     Anesthesia/Surgery risks, benefits and alternative options discussed and understood by patient/family.          There are no hospital problems to display for this patient.

## 2022-07-07 NOTE — TRANSFER OF CARE
Anesthesia Transfer of Care Note    Patient: Tessa Enriquez    Procedure(s) Performed: Procedure(s) (LRB):  COLONOSCOPY (N/A)    Patient location: GI    Anesthesia Type: general    Transport from OR: Transported from OR on room air with adequate spontaneous ventilation    Post pain: adequate analgesia    Post assessment: no apparent anesthetic complications    Post vital signs: stable    Level of consciousness: awake    Nausea/Vomiting: no nausea/vomiting    Complications: none    Transfer of care protocol was followed      Last vitals:   Visit Vitals  BP (!) 163/74 (BP Location: Left arm, Patient Position: Lying)   Pulse 63   Temp 36.6 °C (97.9 °F) (Skin)   Resp 19   Ht 5' (1.524 m)   Wt 81.6 kg (180 lb)   SpO2 98%   BMI 35.15 kg/m²

## 2022-07-07 NOTE — PROVATION PATIENT INSTRUCTIONS
Discharge Summary/Instructions after an Endoscopic Procedure  Patient Name: Tessa Enriquez  Patient MRN: [id number]  Patient YOB: 1955 Thursday, July 7, 2022  Jolynn Ayala MD  Dear patient,  As a result of recent federal legislation (The Federal Cures Act), you may   receive lab or pathology results from your procedure in your [facility]   account before your physician is able to contact you. Your physician or   their representative will relay the results to you with their   recommendations at their soonest availability.  Thank you,  RESTRICTIONS:  During your procedure today, you received medications for sedation.  These   medications may affect your judgment, balance and coordination.  Therefore,   for 24 hours, you have the following restrictions:   - DO NOT drive a car, operate machinery, make legal/financial decisions,   sign important papers or drink alcohol.    ACTIVITY:  Today: no heavy lifting, straining or running due to procedural   sedation/anesthesia.  The following day: return to full activity including work.  DIET:  Eat and drink normally unless instructed otherwise.     TREATMENT FOR COMMON SIDE EFFECTS:  - Mild abdominal pain, nausea, belching, bloating or excessive gas:  rest,   eat lightly and use a heating pad.  - Sore Throat: treat with throat lozenges and/or gargle with warm salt   water.  - Because air was used during the procedure, expelling large amounts of air   from your rectum or belching is normal.  - If a bowel prep was taken, you may not have a bowel movement for 1-3 days.    This is normal.  SYMPTOMS TO WATCH FOR AND REPORT TO YOUR PHYSICIAN:  1. Abdominal pain or bloating, other than gas cramps.  2. Chest pain.  3. Back pain.  4. Signs of infection such as: chills or fever occurring within 24 hours   after the procedure.  5. Rectal bleeding, which would show as bright red, maroon, or black stools.   (A tablespoon of blood from the rectum is not serious,  especially if   hemorrhoids are present.)  6. Vomiting.  7. Weakness or dizziness.  GO DIRECTLY TO THE NEAREST EMERGENCY ROOM IF YOU HAVE ANY OF THE FOLLOWING:      Difficulty breathing              Chills and/or fever over 101 F   Persistent vomiting and/or vomiting blood   Severe abdominal pain   Severe chest pain   Black, tarry stools   Bleeding- more than one tablespoon   Any other symptom or condition that you feel may need urgent attention  Your doctor recommends these additional instructions:  If any biopsies were taken, your doctors clinic will contact you in 1 to 2   weeks with any results.  - Discharge patient to home (with escort).   - Patient has a contact number available for emergencies.  The signs and   symptoms of potential delayed complications were discussed with the   patient.  Return to normal activities tomorrow.  Written discharge   instructions were provided to the patient.   - Resume previous diet.   - Continue present medications.   - Await pathology results.   - Repeat colonoscopy date to be determined after pending pathology results   are reviewed for surveillance based on pathology results.   -Schedule CT as previously ordered   - Return to my office PRN.  For questions, problems or results please call your physician - Jolynn Ayala MD at Work:  [phone].  [facility], EMERGENCY ROOM PHONE NUMBER: [phone]  IF A COMPLICATION OR EMERGENCY SITUATION ARISES AND YOU ARE UNABLE TO REACH   YOUR PHYSICIAN - GO DIRECTLY TO THE EMERGENCY ROOM.  Jolynn Ayala MD  7/7/2022 12:37:35 PM  This report has been verified and signed electronically.  Dear patient,  As a result of recent federal legislation (The Federal Cures Act), you may   receive lab or pathology results from your procedure in your [facility]   account before your physician is able to contact you. Your physician or   their representative will relay the results to you with their    recommendations at their soonest availability.  Thank you,  [employer]ATION

## 2022-07-07 NOTE — ANESTHESIA POSTPROCEDURE EVALUATION
Anesthesia Post Evaluation    Patient: Tessa Enriquez    Procedure(s) Performed: Procedure(s) (LRB):  COLONOSCOPY (N/A)    Final Anesthesia Type: general      Patient location during evaluation: PACU  Patient participation: Yes- Able to Participate  Level of consciousness: awake and alert  Post-procedure vital signs: reviewed and stable  Pain management: adequate  Airway patency: patent    PONV status at discharge: No PONV  Anesthetic complications: no      Cardiovascular status: blood pressure returned to baseline  Respiratory status: unassisted  Hydration status: euvolemic  Follow-up not needed.          Vitals Value Taken Time   /60 07/07/22 1326   Temp 36.3 °C (97.4 °F) 07/07/22 1255   Pulse 70 07/07/22 1349   Resp 22 07/07/22 1236   SpO2 95 % 07/07/22 1349   Vitals shown include unvalidated device data.      No case tracking events are documented in the log.      Pain/Bautista Score: Bautista Score: 10 (7/7/2022 12:54 PM)

## 2022-07-19 LAB
FINAL PATHOLOGIC DIAGNOSIS: NORMAL
Lab: NORMAL
SUPPLEMENTAL DIAGNOSIS: NORMAL

## 2022-07-20 ENCOUNTER — TELEPHONE (OUTPATIENT)
Dept: GASTROENTEROLOGY | Facility: CLINIC | Age: 67
End: 2022-07-20
Payer: COMMERCIAL

## 2022-07-20 NOTE — TELEPHONE ENCOUNTER
----- Message from Diann Restrepo sent at 7/20/2022 10:21 AM CDT -----  Patient Call Back    Who Called: PT    What is the reqeust in detail:PT calling to speak with someone regarding if anyone has talked to Dr. Wagner regarding her being scheduled for surgery. Pls advise.    Can the clinic reply by [facility]?    Best Call Back Number: [phone]

## 2022-07-20 NOTE — TELEPHONE ENCOUNTER
Patient states she is supposed to return to work tomorrow and she is having problems with bowels when she stands stool just running down her legs. She wants to know if you think surgery will be soon and if she should just go out down on disability? Please advise?

## 2022-07-20 NOTE — TELEPHONE ENCOUNTER
Dr. Ayala will provide work note and  tomorrow form office .  until patient able to see Dr. Wagner and plan of care established with him

## 2022-07-25 ENCOUNTER — TELEPHONE (OUTPATIENT)
Dept: SURGERY | Facility: CLINIC | Age: 67
End: 2022-07-25
Payer: COMMERCIAL

## 2022-07-25 NOTE — TELEPHONE ENCOUNTER
I called patient and scheduled her to be seen tomorrow at 12:15pm in [place] by Dr. Wagner per Dr. Ayala.  Anurag

## 2022-07-26 ENCOUNTER — OFFICE VISIT (OUTPATIENT)
Dept: SURGERY | Facility: CLINIC | Age: 67
End: 2022-07-26
Payer: COMMERCIAL

## 2022-07-26 VITALS
TEMPERATURE: 99 F | WEIGHT: 189.63 LBS | HEIGHT: 60 IN | SYSTOLIC BLOOD PRESSURE: 156 MMHG | DIASTOLIC BLOOD PRESSURE: 77 MMHG | BODY MASS INDEX: 37.23 KG/M2 | HEART RATE: 79 BPM

## 2022-07-26 DIAGNOSIS — Z01.818 PREOP EXAMINATION: ICD-10-CM

## 2022-07-26 DIAGNOSIS — C18.9 MALIGNANT NEOPLASM OF COLON, UNSPECIFIED PART OF COLON: Primary | ICD-10-CM

## 2022-07-26 PROCEDURE — 1159F MED LIST DOCD IN RCRD: CPT | Mod: CPTII,S$GLB,, | Performed by: SURGERY

## 2022-07-26 PROCEDURE — 99215 OFFICE O/P EST HI 40 MIN: CPT | Mod: S$GLB,,, | Performed by: SURGERY

## 2022-07-26 PROCEDURE — 99999 PR PBB SHADOW E&M-EST. PATIENT-LVL IV: ICD-10-PCS | Mod: PBBFAC,,, | Performed by: SURGERY

## 2022-07-26 PROCEDURE — 3008F BODY MASS INDEX DOCD: CPT | Mod: CPTII,S$GLB,, | Performed by: SURGERY

## 2022-07-26 PROCEDURE — 99215 PR OFFICE/OUTPT VISIT, EST, LEVL V, 40-54 MIN: ICD-10-PCS | Mod: S$GLB,,, | Performed by: SURGERY

## 2022-07-26 PROCEDURE — 99999 PR PBB SHADOW E&M-EST. PATIENT-LVL IV: CPT | Mod: PBBFAC,,, | Performed by: SURGERY

## 2022-07-26 PROCEDURE — 3077F SYST BP >= 140 MM HG: CPT | Mod: CPTII,S$GLB,, | Performed by: SURGERY

## 2022-07-26 PROCEDURE — 3078F DIAST BP <80 MM HG: CPT | Mod: CPTII,S$GLB,, | Performed by: SURGERY

## 2022-07-26 PROCEDURE — 3077F PR MOST RECENT SYSTOLIC BLOOD PRESSURE >= 140 MM HG: ICD-10-PCS | Mod: CPTII,S$GLB,, | Performed by: SURGERY

## 2022-07-26 PROCEDURE — 3008F PR BODY MASS INDEX (BMI) DOCUMENTED: ICD-10-PCS | Mod: CPTII,S$GLB,, | Performed by: SURGERY

## 2022-07-26 PROCEDURE — 1159F PR MEDICATION LIST DOCUMENTED IN MEDICAL RECORD: ICD-10-PCS | Mod: CPTII,S$GLB,, | Performed by: SURGERY

## 2022-07-26 PROCEDURE — 3078F PR MOST RECENT DIASTOLIC BLOOD PRESSURE < 80 MM HG: ICD-10-PCS | Mod: CPTII,S$GLB,, | Performed by: SURGERY

## 2022-07-26 RX ORDER — TRAMADOL HYDROCHLORIDE 50 MG/1
50 TABLET ORAL EVERY 6 HOURS PRN
Qty: 20 TABLET | Refills: 0 | Status: SHIPPED | OUTPATIENT
Start: 2022-07-26 | End: 2022-08-05 | Stop reason: SDUPTHER

## 2022-07-26 NOTE — PATIENT INSTRUCTIONS
Flexible Sigmoidoscopy is scheduled for 8/1/22  the arrival time will be given to you by the preop nurse.  The preop nurse will call you from [phone]  Fasting after midnight.  Someone to drive home.        THE PREOP NURSE WILL CALL, SOMETIMES AS LATE AS 4 PM IN THE AFTERNOON THE DAY BEFORE SURGERY.        Special Instruction:   Purchase two Fleet Enema;s at your Pharmacy, use one the evening before the procedure and one the morning of the procedure. Use as a rinse, fluid in ,fluid out no need to try and retain fluid as instructed on the package.

## 2022-07-26 NOTE — PROGRESS NOTES
Subjective:       Patient ID: Tessa Enriquez is a 66 y.o. female.    Chief Complaint: Cancer (Colon cancer)    HPI  pleasant 66-year-old female whom I am familiar with.  Patient had robotic with conversion to open rectosigmoid resection with colorectal anastomosis performed by me in November of 2020. Patient initially had a screening colonoscopy was discovered to have a rectosigmoid mass.  Preoperative imaging and workup was suggestive of a lesion in the distal sigmoid colon.  Decision was made to proceed with surgical intervention.  At the time of surgery lesion was found a straddle the peritoneal reflection.  Patient's margins were negative but did have a close radial margin within 1 mm.  She had 1 a 46 notes that were positive for cancer.  Postoperatively after discussion in tumor Board it was recommended that she have adjuvant chemo radiation.  Patient started FOLFOX but tolerated poorly.  She completed 3 treatments.  Following this she had radiation therapy which she did complete.  Patient notes that she has struggled with tenesmus since surgery and in particular since the radiation.  It is gotten progressively worse.  She notes she has worn a diaper since her surgery.  In August of 2021 it was noted that she had a PET avid area in the area of the anastomosis.  She had a colonoscopy  performed at that time with no mass noted.  There was slight stenosis at the anastomosis.  In May of this year she had another CT scan of the abdomen pelvis again demonstrating a chronic 4 mm soft tissue lesion in the right lower lobe of the lung but no other significant intra-abdominal findings.  Her last CEA also in May was 1.8.  Earlier this month she had a follow-up colonoscopy this time demonstrating a mass at the anastomosis which was biopsied and confirmed to be an adenocarcinoma.  She presents for evaluation.  She denies any abdominal pain.  Denies any nausea or vomiting.  Her only complaint is that of the  tenesmus.  Review of Systems   Constitutional: Negative for activity change and appetite change.   Gastrointestinal: Positive for diarrhea.   Hematological: Negative for adenopathy. Does not bruise/bleed easily.   Psychiatric/Behavioral: Negative for agitation and decreased concentration.         Objective:      Physical Exam  Vitals reviewed.   Cardiovascular:      Rate and Rhythm: Normal rate.      Pulses: Normal pulses.   Pulmonary:      Effort: Pulmonary effort is normal.   Abdominal:      General: Abdomen is flat. There is no distension.      Tenderness: There is no abdominal tenderness.      Hernia: No hernia is present.   Neurological:      Mental Status: She is alert.       Cscope reviewed.  IMages in provation.  Mass noted at anastomosis.      Path:  Invasive moderately differentiated adenocarcinoma, see comment.   COMMENT:   v  Assessment:       Problem List Items Addressed This Visit     Malignant neoplasm of colon - Primary    Relevant Orders    NM PET CT Routine [facility]    Case Request Endoscopy: SIGMOIDOSCOPY, FLEXIBLE (Completed)      Other Visit Diagnoses     Preop examination        Relevant Orders    COVID-19 Routine Screening              Plan:       Discussion with the patient and her daughter.  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  She will likely need surgical resection at some point.  Need to evaluate exact location of the recurrence via endoscopy.  Will plan flexible sigmoidoscopy in the next few weeks.  Also need to assess for metastatic disease given recurrent nature of the tumor and will arrange  PET-CT scan

## 2022-07-26 NOTE — H&P (VIEW-ONLY)
Subjective:       Patient ID: Tessa Enriquez is a 66 y.o. female.    Chief Complaint: Cancer (Colon cancer)    HPI  pleasant 66-year-old female whom I am familiar with.  Patient had robotic with conversion to open rectosigmoid resection with colorectal anastomosis performed by me in November of 2020. Patient initially had a screening colonoscopy was discovered to have a rectosigmoid mass.  Preoperative imaging and workup was suggestive of a lesion in the distal sigmoid colon.  Decision was made to proceed with surgical intervention.  At the time of surgery lesion was found a straddle the peritoneal reflection.  Patient's margins were negative but did have a close radial margin within 1 mm.  She had 1 a 46 notes that were positive for cancer.  Postoperatively after discussion in tumor Board it was recommended that she have adjuvant chemo radiation.  Patient started FOLFOX but tolerated poorly.  She completed 3 treatments.  Following this she had radiation therapy which she did complete.  Patient notes that she has struggled with tenesmus since surgery and in particular since the radiation.  It is gotten progressively worse.  She notes she has worn a diaper since her surgery.  In August of 2021 it was noted that she had a PET avid area in the area of the anastomosis.  She had a colonoscopy  performed at that time with no mass noted.  There was slight stenosis at the anastomosis.  In May of this year she had another CT scan of the abdomen pelvis again demonstrating a chronic 4 mm soft tissue lesion in the right lower lobe of the lung but no other significant intra-abdominal findings.  Her last CEA also in May was 1.8.  Earlier this month she had a follow-up colonoscopy this time demonstrating a mass at the anastomosis which was biopsied and confirmed to be an adenocarcinoma.  She presents for evaluation.  She denies any abdominal pain.  Denies any nausea or vomiting.  Her only complaint is that of the  tenesmus.  Review of Systems   Constitutional: Negative for activity change and appetite change.   Gastrointestinal: Positive for diarrhea.   Hematological: Negative for adenopathy. Does not bruise/bleed easily.   Psychiatric/Behavioral: Negative for agitation and decreased concentration.         Objective:      Physical Exam  Vitals reviewed.   Cardiovascular:      Rate and Rhythm: Normal rate.      Pulses: Normal pulses.   Pulmonary:      Effort: Pulmonary effort is normal.   Abdominal:      General: Abdomen is flat. There is no distension.      Tenderness: There is no abdominal tenderness.      Hernia: No hernia is present.   Neurological:      Mental Status: She is alert.       Cscope reviewed.  IMages in provation.  Mass noted at anastomosis.      Path:  Invasive moderately differentiated adenocarcinoma, see comment.   COMMENT:   v  Assessment:       Problem List Items Addressed This Visit     Malignant neoplasm of colon - Primary    Relevant Orders    NM PET CT Routine [facility]    Case Request Endoscopy: SIGMOIDOSCOPY, FLEXIBLE (Completed)      Other Visit Diagnoses     Preop examination        Relevant Orders    COVID-19 Routine Screening              Plan:       Discussion with the patient and her daughter.  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  She will likely need surgical resection at some point.  Need to evaluate exact location of the recurrence via endoscopy.  Will plan flexible sigmoidoscopy in the next few weeks.  Also need to assess for metastatic disease given recurrent nature of the tumor and will arrange  PET-CT scan

## 2022-08-01 ENCOUNTER — PATIENT MESSAGE (OUTPATIENT)
Dept: FAMILY MEDICINE | Facility: CLINIC | Age: 67
End: 2022-08-01

## 2022-08-01 ENCOUNTER — ANESTHESIA (OUTPATIENT)
Dept: ENDOSCOPY | Facility: HOSPITAL | Age: 67
End: 2022-08-01
Payer: COMMERCIAL

## 2022-08-01 ENCOUNTER — ANESTHESIA EVENT (OUTPATIENT)
Dept: ENDOSCOPY | Facility: HOSPITAL | Age: 67
End: 2022-08-01
Payer: COMMERCIAL

## 2022-08-01 ENCOUNTER — HOSPITAL ENCOUNTER (OUTPATIENT)
Facility: HOSPITAL | Age: 67
Discharge: HOME | End: 2022-08-01
Attending: SURGERY | Admitting: SURGERY
Payer: COMMERCIAL

## 2022-08-01 VITALS
HEART RATE: 77 BPM | DIASTOLIC BLOOD PRESSURE: 81 MMHG | OXYGEN SATURATION: 99 % | SYSTOLIC BLOOD PRESSURE: 142 MMHG | TEMPERATURE: 98 F | RESPIRATION RATE: 18 BRPM

## 2022-08-01 DIAGNOSIS — Z85.038 PERSONAL HISTORY OF COLON CANCER: ICD-10-CM

## 2022-08-01 LAB
CTP QC/QA: YES
SARS-COV-2 AG RESP QL IA.RAPID: NEGATIVE

## 2022-08-01 PROCEDURE — D9220A PRA ANESTHESIA: ICD-10-PCS | Mod: CRNA,,, | Performed by: NURSE ANESTHETIST, CERTIFIED REGISTERED

## 2022-08-01 PROCEDURE — 45330 PR SIGMOIDOSCOPY,DIAG2STIC: ICD-10-PCS | Mod: ,,, | Performed by: SURGERY

## 2022-08-01 PROCEDURE — 25000003 PHARM REV CODE 250: Performed by: SURGERY

## 2022-08-01 PROCEDURE — D9220A PRA ANESTHESIA: ICD-10-PCS | Mod: ANES,,, | Performed by: ANESTHESIOLOGY

## 2022-08-01 PROCEDURE — 45330 DIAGNOSTIC SIGMOIDOSCOPY: CPT | Mod: ,,, | Performed by: SURGERY

## 2022-08-01 PROCEDURE — 37000008 HC ANESTHESIA 1ST 15 MINUTES: Performed by: SURGERY

## 2022-08-01 PROCEDURE — D9220A PRA ANESTHESIA: Mod: ANES,,, | Performed by: ANESTHESIOLOGY

## 2022-08-01 PROCEDURE — 25000003 PHARM REV CODE 250: Performed by: NURSE ANESTHETIST, CERTIFIED REGISTERED

## 2022-08-01 PROCEDURE — 45330 DIAGNOSTIC SIGMOIDOSCOPY: CPT | Performed by: SURGERY

## 2022-08-01 PROCEDURE — 63600175 PHARM REV CODE 636 W HCPCS: Performed by: NURSE ANESTHETIST, CERTIFIED REGISTERED

## 2022-08-01 PROCEDURE — D9220A PRA ANESTHESIA: Mod: CRNA,,, | Performed by: NURSE ANESTHETIST, CERTIFIED REGISTERED

## 2022-08-01 RX ORDER — PROPOFOL 10 MG/ML
VIAL (ML) INTRAVENOUS
Status: DISCONTINUED | OUTPATIENT
Start: 2022-08-01 | End: 2022-08-01

## 2022-08-01 RX ORDER — LIDOCAINE HCL/PF 100 MG/5ML
SYRINGE (ML) INTRAVENOUS
Status: DISCONTINUED | OUTPATIENT
Start: 2022-08-01 | End: 2022-08-01

## 2022-08-01 RX ORDER — SODIUM CHLORIDE 9 MG/ML
INJECTION, SOLUTION INTRAVENOUS CONTINUOUS
Status: DISCONTINUED | OUTPATIENT
Start: 2022-08-01 | End: 2022-08-01 | Stop reason: HOSPADM

## 2022-08-01 RX ADMIN — LIDOCAINE HYDROCHLORIDE 100 MG: 20 INJECTION INTRAVENOUS at 06:08

## 2022-08-01 RX ADMIN — SODIUM CHLORIDE: 0.9 INJECTION, SOLUTION INTRAVENOUS at 06:08

## 2022-08-01 RX ADMIN — PROPOFOL 100 MG: 10 INJECTION, EMULSION INTRAVENOUS at 06:08

## 2022-08-01 NOTE — TRANSFER OF CARE
Anesthesia Transfer of Care Note    Patient: Tessa Enriquez    Procedure(s) Performed: Procedure(s) (LRB):  SIGMOIDOSCOPY, FLEXIBLE (N/A)    Patient location: PACU    Anesthesia Type: general    Transport from OR: Transported from OR on room air with adequate spontaneous ventilation    Post pain: adequate analgesia    Post assessment: no apparent anesthetic complications    Post vital signs: stable    Level of consciousness: awake    Nausea/Vomiting: no nausea/vomiting    Complications: none    Transfer of care protocol was followed      Last vitals:   Visit Vitals  BP (!) 154/70 (BP Location: Left arm, Patient Position: Lying)   Pulse 81   Temp 36.6 °C (97.9 °F) (Skin)   Resp 16   SpO2 99%   Breastfeeding No

## 2022-08-01 NOTE — ANESTHESIA PREPROCEDURE EVALUATION
08/01/2022  Tessa Enriquez is a 66 y.o., female.    Pre-op Assessment    I have reviewed the Patient Summary Reports.    I have reviewed the Nursing Notes. I have reviewed the NPO Status.   I have reviewed the Medications.     Review of Systems  Anesthesia Hx:  No problems with previous Anesthesia  Denies Family Hx of Anesthesia complications.   Denies Personal Hx of Anesthesia complications.   Social:  Former Smoker    Hematology/Oncology:         -- Cancer in past history (colon CA):   Cardiovascular:   hyperlipidemia    Pulmonary:   COPD, moderate Acute hypoxemic resp failure  Home O2   Renal/:  Renal/ Normal     Musculoskeletal:   Arthritis     Neurological:  Neurology Normal    Endocrine:   Hypothyroidism        Physical Exam  General:  Well nourished and Obesity      Airway/Jaw/Neck:  Airway Findings: Mouth Opening: Normal   Tongue: Normal   General Airway Assessment: Adult Oropharynx Findings:  Mallampati: II  Jaw/Neck Findings:  Neck ROM: Normal ROM      Eyes/Ears/Nose:  Eyes/Ears/Nose Findings:    Dental:  Dental Findings: Dentures     Chest/Lungs:  Chest/Lungs Findings: Normal Respiratory Rate      Heart/Vascular:  Heart Findings: Rate: Normal  Rhythm: Regular Rhythm        Mental Status:  Mental Status Findings:  Cooperative, Alert and Oriented         Anesthesia Plan  Type of Anesthesia, risks & benefits discussed:  Anesthesia Type:  Gen Natural Airway    Patient's Preference:   Plan Factors:          Intra-op Monitoring Plan: Standard ASA Monitors  Intra-op Monitoring Plan Comments:   Post Op Pain Control Plan: multimodal analgesia  Post Op Pain Control Plan Comments:     Induction:   IV  Beta Blocker:  Patient is not currently on a Beta-Blocker (No further documentation required).       Informed Consent: Informed consent signed with the Patient and all parties understand the risks and  agree with anesthesia plan.  All questions answered.  Anesthesia consent signed with patient.  ASA Score: 3     Day of Surgery Review of History & Physical:    H&P Update referred to the surgeon/provider.          Ready For Surgery From Anesthesia Perspective.           Physical Exam  General: Well nourished and Obesity    Airway:  Mallampati: II   Mouth Opening: Normal  Tongue: Normal  Neck ROM: Normal ROM    Dental:  Dentures    Chest/Lungs:  Normal Respiratory Rate    Heart:  Rate: Normal  Rhythm: Regular Rhythm          Anesthesia Plan  Type of Anesthesia, risks & benefits discussed:    Anesthesia Type: Gen Natural Airway  Intra-op Monitoring Plan: Standard ASA Monitors  Post Op Pain Control Plan: multimodal analgesia  Induction:  IV  Informed Consent: Informed consent signed with the Patient and all parties understand the risks and agree with anesthesia plan.  All questions answered.   ASA Score: 3  Day of Surgery Review of History & Physical: H&P Update referred to the surgeon/provider.    Ready For Surgery From Anesthesia Perspective.       .

## 2022-08-01 NOTE — PROVATION PATIENT INSTRUCTIONS
Discharge Summary/Instructions after an Endoscopic Procedure  Patient Name: Tessa Enriquez  Patient MRN: [id number]  Patient YOB: 1955 Monday, August 1, 2022  Brandon Wagner MD  Dear patient,  As a result of recent federal legislation (The Federal Cures Act), you may   receive lab or pathology results from your procedure in your [facility]   account before your physician is able to contact you. Your physician or   their representative will relay the results to you with their   recommendations at their soonest availability.  Thank you,  RESTRICTIONS:  During your procedure today, you received medications for sedation.  These   medications may affect your judgment, balance and coordination.  Therefore,   for 24 hours, you have the following restrictions:   - DO NOT drive a car, operate machinery, make legal/financial decisions,   sign important papers or drink alcohol.    ACTIVITY:  Today: no heavy lifting, straining or running due to procedural   sedation/anesthesia.  The following day: return to full activity including work.  DIET:  Eat and drink normally unless instructed otherwise.     TREATMENT FOR COMMON SIDE EFFECTS:  - Mild abdominal pain, nausea, belching, bloating or excessive gas:  rest,   eat lightly and use a heating pad.  - Sore Throat: treat with throat lozenges and/or gargle with warm salt   water.  - Because air was used during the procedure, expelling large amounts of air   from your rectum or belching is normal.  - If a bowel prep was taken, you may not have a bowel movement for 1-3 days.    This is normal.  SYMPTOMS TO WATCH FOR AND REPORT TO YOUR PHYSICIAN:  1. Abdominal pain or bloating, other than gas cramps.  2. Chest pain.  3. Back pain.  4. Signs of infection such as: chills or fever occurring within 24 hours   after the procedure.  5. Rectal bleeding, which would show as bright red, maroon, or black stools.   (A tablespoon of blood from the rectum is not serious, especially  if   hemorrhoids are present.)  6. Vomiting.  7. Weakness or dizziness.  GO DIRECTLY TO THE NEAREST EMERGENCY ROOM IF YOU HAVE ANY OF THE FOLLOWING:      Difficulty breathing              Chills and/or fever over 101 F   Persistent vomiting and/or vomiting blood   Severe abdominal pain   Severe chest pain   Black, tarry stools   Bleeding- more than one tablespoon   Any other symptom or condition that you feel may need urgent attention  Your doctor recommends these additional instructions:  If any biopsies were taken, your doctors clinic will contact you in 1 to 2   weeks with any results.  - Use fiber, for example Citrucel, Fibercon, Konsyl or Metamucil.   - Discharge patient to home (ambulatory).  For questions, problems or results please call your physician - Brandon Wagner MD at Work:  [phone].  [facility], EMERGENCY ROOM PHONE NUMBER: [phone]  IF A COMPLICATION OR EMERGENCY SITUATION ARISES AND YOU ARE UNABLE TO REACH   YOUR PHYSICIAN - GO DIRECTLY TO THE EMERGENCY ROOM.  Brandon Wagner MD  8/1/2022 7:08:17 AM  This report has been verified and signed electronically.  Dear patient,  As a result of recent federal legislation (The Federal Cures Act), you may   receive lab or pathology results from your procedure in your [facility]   account before your physician is able to contact you. Your physician or   their representative will relay the results to you with their   recommendations at their soonest availability.  Thank you,  PROVATION

## 2022-08-01 NOTE — PLAN OF CARE
Vss, meera po fluids, denies pain, ambulates easily. IV removed, catheter intact. Discharge instructions provided and states understanding. States ready to go home.  Discharged from facility with family per wheelchair.

## 2022-08-01 NOTE — ANESTHESIA POSTPROCEDURE EVALUATION
Anesthesia Post Evaluation    Patient: Tessa Enriquez    Procedure(s) Performed: Procedure(s) (LRB):  SIGMOIDOSCOPY, FLEXIBLE (N/A)    Final Anesthesia Type: MAC      Patient location during evaluation: PACU  Patient participation: Yes- Able to Participate  Level of consciousness: awake and alert and oriented  Post-procedure vital signs: reviewed and stable  Pain management: adequate  Airway patency: patent    PONV status at discharge: No PONV  Anesthetic complications: no      Cardiovascular status: blood pressure returned to baseline  Respiratory status: unassisted, spontaneous ventilation and room air  Hydration status: euvolemic  Follow-up not needed.          Vitals Value Taken Time   /61 08/01/22 0713   Temp  08/01/22 0719   Pulse 68 08/01/22 0713   Resp 16 08/01/22 0713   SpO2 92 % 08/01/22 0713         No case tracking events are documented in the log.      Pain/Bautista Score: Bautista Score: 9 (8/1/2022  7:13 AM)

## 2022-08-02 ENCOUNTER — TELEPHONE (OUTPATIENT)
Dept: SURGERY | Facility: CLINIC | Age: 67
End: 2022-08-02
Payer: COMMERCIAL

## 2022-08-02 NOTE — PROGRESS NOTES
Pt. Will call MD to make one week return appt. And verify use of metamucil or similar fiber supplement. Very pleased with care given.

## 2022-08-02 NOTE — TELEPHONE ENCOUNTER
----- Message from Elma Cristina sent at 8/2/2022  2:10 PM CDT -----  Regarding: ##NEED NEW PET SCAN ORDER##  Good Morning,     The above mentioned patient is scheduled for a Pet Scan on August 4, 2022. The diagnosis code C18.9 provided on the order does not meet medical necessity and may not be covered by the patient's insurance. A new order must be provided prior to the patient's appointment.  Please advise if the patient needs to be rescheduled or cancelled.  If I can be of any assistance, please call me at [phone].        Thanks,     Dariana Cristina  [facility] Pre-Service

## 2022-08-03 ENCOUNTER — TELEPHONE (OUTPATIENT)
Dept: SURGERY | Facility: CLINIC | Age: 67
End: 2022-08-03
Payer: COMMERCIAL

## 2022-08-03 DIAGNOSIS — Z85.038 HISTORY OF COLON CANCER: ICD-10-CM

## 2022-08-03 DIAGNOSIS — C18.7 MALIGNANT NEOPLASM OF SIGMOID COLON: Primary | ICD-10-CM

## 2022-08-03 DIAGNOSIS — K62.89 RECTAL MASS: ICD-10-CM

## 2022-08-03 NOTE — TELEPHONE ENCOUNTER
----- Message from Elma Cristina sent at 8/3/2022 12:24 PM CDT -----  Regarding: ###NEED NEW PET SCAN DX###  Good Afternoon,     The above mentioned patient is scheduled for a Pet Scan on August 4, 2022. The diagnosis code C18.9 provided on the order does not meet medical necessity and may not be covered by the patient's insurance. A new order must be provided prior to the patient's appointment.  Please advise if the patient needs to be rescheduled or cancelled.  If I can be of any assistance, please call me at [phone].      Thanks,     Dariana Cristina  [facility] Pre-Service

## 2022-08-04 ENCOUNTER — HOSPITAL ENCOUNTER (OUTPATIENT)
Dept: RADIOLOGY | Facility: HOSPITAL | Age: 67
Discharge: HOME | End: 2022-08-04
Attending: SURGERY
Payer: COMMERCIAL

## 2022-08-04 VITALS — BODY MASS INDEX: 33.99 KG/M2 | WEIGHT: 180 LBS | HEIGHT: 61 IN

## 2022-08-04 DIAGNOSIS — Z85.038 HISTORY OF COLON CANCER: ICD-10-CM

## 2022-08-04 DIAGNOSIS — K62.89 RECTAL MASS: ICD-10-CM

## 2022-08-04 DIAGNOSIS — C18.7 MALIGNANT NEOPLASM OF SIGMOID COLON: ICD-10-CM

## 2022-08-04 LAB — GLUCOSE SERPL-MCNC: 101 MG/DL (ref 70–110)

## 2022-08-04 PROCEDURE — A9552 F18 FDG: HCPCS | Mod: PO

## 2022-08-05 ENCOUNTER — TELEPHONE (OUTPATIENT)
Dept: HEMATOLOGY/ONCOLOGY | Facility: CLINIC | Age: 67
End: 2022-08-05
Payer: COMMERCIAL

## 2022-08-05 ENCOUNTER — OFFICE VISIT (OUTPATIENT)
Dept: HEMATOLOGY/ONCOLOGY | Facility: CLINIC | Age: 67
End: 2022-08-05
Payer: COMMERCIAL

## 2022-08-05 VITALS
RESPIRATION RATE: 16 BRPM | BODY MASS INDEX: 35.87 KG/M2 | SYSTOLIC BLOOD PRESSURE: 139 MMHG | DIASTOLIC BLOOD PRESSURE: 75 MMHG | OXYGEN SATURATION: 99 % | TEMPERATURE: 97 F | WEIGHT: 189.81 LBS | HEART RATE: 85 BPM

## 2022-08-05 DIAGNOSIS — J41.0 SIMPLE CHRONIC BRONCHITIS: ICD-10-CM

## 2022-08-05 DIAGNOSIS — E78.00 PURE HYPERCHOLESTEROLEMIA: ICD-10-CM

## 2022-08-05 DIAGNOSIS — E03.9 ACQUIRED HYPOTHYROIDISM: ICD-10-CM

## 2022-08-05 DIAGNOSIS — C18.2 MALIGNANT NEOPLASM OF ASCENDING COLON: Primary | ICD-10-CM

## 2022-08-05 DIAGNOSIS — E53.8 B12 DEFICIENCY: ICD-10-CM

## 2022-08-05 DIAGNOSIS — K63.89 COLONIC MASS: ICD-10-CM

## 2022-08-05 PROBLEM — C79.51: Status: ACTIVE | Noted: 2020-12-11

## 2022-08-05 PROCEDURE — 3075F PR MOST RECENT SYSTOLIC BLOOD PRESS GE 130-139MM HG: ICD-10-PCS | Mod: CPTII,S$GLB,, | Performed by: INTERNAL MEDICINE

## 2022-08-05 PROCEDURE — 1159F PR MEDICATION LIST DOCUMENTED IN MEDICAL RECORD: ICD-10-PCS | Mod: CPTII,S$GLB,, | Performed by: INTERNAL MEDICINE

## 2022-08-05 PROCEDURE — 99215 PR OFFICE/OUTPT VISIT, EST, LEVL V, 40-54 MIN: ICD-10-PCS | Mod: S$GLB,,, | Performed by: INTERNAL MEDICINE

## 2022-08-05 PROCEDURE — 3008F PR BODY MASS INDEX (BMI) DOCUMENTED: ICD-10-PCS | Mod: CPTII,S$GLB,, | Performed by: INTERNAL MEDICINE

## 2022-08-05 PROCEDURE — 1159F MED LIST DOCD IN RCRD: CPT | Mod: CPTII,S$GLB,, | Performed by: INTERNAL MEDICINE

## 2022-08-05 PROCEDURE — 99215 OFFICE O/P EST HI 40 MIN: CPT | Mod: S$GLB,,, | Performed by: INTERNAL MEDICINE

## 2022-08-05 PROCEDURE — 3078F DIAST BP <80 MM HG: CPT | Mod: CPTII,S$GLB,, | Performed by: INTERNAL MEDICINE

## 2022-08-05 PROCEDURE — 3078F PR MOST RECENT DIASTOLIC BLOOD PRESSURE < 80 MM HG: ICD-10-PCS | Mod: CPTII,S$GLB,, | Performed by: INTERNAL MEDICINE

## 2022-08-05 PROCEDURE — 3075F SYST BP GE 130 - 139MM HG: CPT | Mod: CPTII,S$GLB,, | Performed by: INTERNAL MEDICINE

## 2022-08-05 PROCEDURE — 99999 PR PBB SHADOW E&M-EST. PATIENT-LVL III: ICD-10-PCS | Mod: PBBFAC,,, | Performed by: INTERNAL MEDICINE

## 2022-08-05 PROCEDURE — 99999 PR PBB SHADOW E&M-EST. PATIENT-LVL III: CPT | Mod: PBBFAC,,, | Performed by: INTERNAL MEDICINE

## 2022-08-05 PROCEDURE — 3008F BODY MASS INDEX DOCD: CPT | Mod: CPTII,S$GLB,, | Performed by: INTERNAL MEDICINE

## 2022-08-05 RX ORDER — DIPHENHYDRAMINE HYDROCHLORIDE 50 MG/ML
50 INJECTION INTRAMUSCULAR; INTRAVENOUS ONCE AS NEEDED
Status: CANCELLED | OUTPATIENT
Start: 2022-08-05

## 2022-08-05 RX ORDER — HEPARIN 100 UNIT/ML
5 SYRINGE INTRAVENOUS
Status: CANCELLED | OUTPATIENT
Start: 2022-08-05

## 2022-08-05 RX ORDER — METHYLPREDNISOLONE SOD SUCC 125 MG
125 VIAL (EA) INJECTION ONCE AS NEEDED
Status: CANCELLED | OUTPATIENT
Start: 2022-08-05

## 2022-08-05 RX ORDER — EPINEPHRINE 0.3 MG/.3ML
0.3 INJECTION SUBCUTANEOUS ONCE AS NEEDED
Status: CANCELLED | OUTPATIENT
Start: 2022-08-05

## 2022-08-05 RX ORDER — SODIUM CHLORIDE 9 MG/ML
INJECTION, SOLUTION INTRAVENOUS CONTINUOUS
Status: CANCELLED | OUTPATIENT
Start: 2022-08-05

## 2022-08-05 RX ORDER — SODIUM CHLORIDE 0.9 % (FLUSH) 0.9 %
10 SYRINGE (ML) INJECTION
Status: CANCELLED | OUTPATIENT
Start: 2022-08-05

## 2022-08-05 NOTE — TELEPHONE ENCOUNTER
----- Message from Danielle Frank MD sent at 8/5/2022 10:56 AM CDT -----  Please call perks office and tel them pt has ssen him but doesn't have an apt following pet and needs one asap  Injectafer 2 doses asap orders are in ,   pt to add SL b12 to her twice a month b12 shots   Please ask for nikole

## 2022-08-05 NOTE — Clinical Note
Please call perks office and tel them pt has ssen him but doesn't have an apt following pet and needs one asap Injectafer 2 doses asap orders are in ,  pt to add SL b12 to her twice a month b12 shots  Please ask for nikole

## 2022-08-10 ENCOUNTER — OFFICE VISIT (OUTPATIENT)
Dept: SURGERY | Facility: CLINIC | Age: 67
End: 2022-08-10
Payer: COMMERCIAL

## 2022-08-10 VITALS
HEIGHT: 61 IN | BODY MASS INDEX: 35.71 KG/M2 | TEMPERATURE: 99 F | HEART RATE: 96 BPM | DIASTOLIC BLOOD PRESSURE: 78 MMHG | SYSTOLIC BLOOD PRESSURE: 149 MMHG | WEIGHT: 189.13 LBS

## 2022-08-10 DIAGNOSIS — C20 RECTAL CANCER: Primary | ICD-10-CM

## 2022-08-10 PROCEDURE — 99999 PR PBB SHADOW E&M-EST. PATIENT-LVL III: ICD-10-PCS | Mod: PBBFAC,,, | Performed by: SURGERY

## 2022-08-10 PROCEDURE — 99213 OFFICE O/P EST LOW 20 MIN: CPT | Mod: S$GLB,,, | Performed by: SURGERY

## 2022-08-10 PROCEDURE — 3077F SYST BP >= 140 MM HG: CPT | Mod: CPTII,S$GLB,, | Performed by: SURGERY

## 2022-08-10 PROCEDURE — 3078F DIAST BP <80 MM HG: CPT | Mod: CPTII,S$GLB,, | Performed by: SURGERY

## 2022-08-10 PROCEDURE — 1159F MED LIST DOCD IN RCRD: CPT | Mod: CPTII,S$GLB,, | Performed by: SURGERY

## 2022-08-10 PROCEDURE — 3008F BODY MASS INDEX DOCD: CPT | Mod: CPTII,S$GLB,, | Performed by: SURGERY

## 2022-08-10 PROCEDURE — 3077F PR MOST RECENT SYSTOLIC BLOOD PRESSURE >= 140 MM HG: ICD-10-PCS | Mod: CPTII,S$GLB,, | Performed by: SURGERY

## 2022-08-10 PROCEDURE — 1159F PR MEDICATION LIST DOCUMENTED IN MEDICAL RECORD: ICD-10-PCS | Mod: CPTII,S$GLB,, | Performed by: SURGERY

## 2022-08-10 PROCEDURE — 3078F PR MOST RECENT DIASTOLIC BLOOD PRESSURE < 80 MM HG: ICD-10-PCS | Mod: CPTII,S$GLB,, | Performed by: SURGERY

## 2022-08-10 PROCEDURE — 99999 PR PBB SHADOW E&M-EST. PATIENT-LVL III: CPT | Mod: PBBFAC,,, | Performed by: SURGERY

## 2022-08-10 PROCEDURE — 3008F PR BODY MASS INDEX (BMI) DOCUMENTED: ICD-10-PCS | Mod: CPTII,S$GLB,, | Performed by: SURGERY

## 2022-08-10 PROCEDURE — 99213 PR OFFICE/OUTPT VISIT, EST, LEVL III, 20-29 MIN: ICD-10-PCS | Mod: S$GLB,,, | Performed by: SURGERY

## 2022-08-10 RX ORDER — ONDANSETRON 4 MG/1
4 TABLET, ORALLY DISINTEGRATING ORAL EVERY 6 HOURS PRN
Qty: 20 TABLET | Refills: 0 | Status: SHIPPED | OUTPATIENT
Start: 2022-08-10 | End: 2022-01-01

## 2022-08-10 NOTE — PROGRESS NOTES
66-year-old female who I am familiar with.  Patient unfortunately recent diagnosed with a the current rectal cancer at the anastomosis.  I performed a flexible sigmoidoscopy on August 1st demonstrating recurrence in the distal rectum approximately 5 cm from the anal verge.  There was a questionable lung lesion which led to a PET scan.  PET scan demonstrates a presacral hypermetabolic lesion at the anastomosis.  There is also hypermetabolic 16 mm enlarged right iliac chain lymph node.  Patient presents to discuss options.  Patient is very reluctant to undergo any chemotherapy.  She did not tolerate FOLFOX after the initial therapy only receiving 2 cycles.  She noted significant anxiety root in relation to the port and also noted significant neuropathy from this.    Afebrile vital signs stable   Abdomen:  Soft nontender nondistended   Rectal deferred:    Assessment recurrent rectal cancer with spread to iliac node     Lengthy discussion with patient and her daughter.  Patient has already received radiation therapy to the pelvis.  But has never received systemic chemotherapy.  Given the fact that she now has recurrence with a enlarged node outside the mesorectal fascia consistent with systemic disease I feel strongly that she would probably benefit from receiving chemotherapy prior to any surgical intervention.  Although reluctant to proceed she does express a willingness to consider oral 5 FU, capecitabine, addition to her IV chemotherapy.  She notes that she would likely proceed with intravenous chemotherapy 3rd traditional IVs as opposed to report.  She also expresses a desire to try different agent then oxaliplatin.  Will refer her back to Oncology to address these issues.  Discussed with patient that she would likely benefit from a port again attempt a port and different location.  She notes that she would rather IV therapy 1st and then if need be transition to a port.  We will also obtain an MRI to better  characterize the lesion as well as to evaluate the lymph node.

## 2022-08-11 ENCOUNTER — TELEPHONE (OUTPATIENT)
Dept: HEMATOLOGY/ONCOLOGY | Facility: CLINIC | Age: 67
End: 2022-08-11
Payer: COMMERCIAL

## 2022-08-11 ENCOUNTER — HOSPITAL ENCOUNTER (OUTPATIENT)
Dept: RADIOLOGY | Facility: HOSPITAL | Age: 67
Discharge: HOME | End: 2022-08-11
Attending: SURGERY
Payer: COMMERCIAL

## 2022-08-11 DIAGNOSIS — C20 RECTAL CANCER: ICD-10-CM

## 2022-08-11 DIAGNOSIS — C18.2 MALIGNANT NEOPLASM OF ASCENDING COLON: Primary | ICD-10-CM

## 2022-08-11 PROCEDURE — 72195 MRI PELVIS W/O DYE: CPT | Mod: 26,,, | Performed by: RADIOLOGY

## 2022-08-11 PROCEDURE — 72195 MRI RECTAL CANCER WITHOUT CONTRAST: ICD-10-PCS | Mod: 26,,, | Performed by: RADIOLOGY

## 2022-08-11 PROCEDURE — 72195 MRI PELVIS W/O DYE: CPT | Mod: TC

## 2022-08-11 RX ORDER — CAPECITABINE 150 MG/1
1350 TABLET, FILM COATED ORAL 2 TIMES DAILY
Qty: 126 TABLET | Refills: 4 | OUTPATIENT
Start: 2022-08-11 | End: 2022-08-12 | Stop reason: SDUPTHER

## 2022-08-11 RX ORDER — CAPECITABINE 500 MG/1
500 TABLET, FILM COATED ORAL 2 TIMES DAILY
Qty: 14 TABLET | Refills: 4 | OUTPATIENT
Start: 2022-08-11 | End: 2022-08-12 | Stop reason: SDUPTHER

## 2022-08-11 NOTE — TELEPHONE ENCOUNTER
This nurse called pt to inquire if she would be willing to have a port placed (per MD request). Pt stated she would not. Pt inquired if she needed to get CT scan. This nurse explained that CT is not needed per MD.

## 2022-08-11 NOTE — PLAN OF CARE
DISCONTINUE ON PATHWAY REGIMEN - Colorectal    COS83        Oxaliplatin (Eloxatin)       Leucovorin       Fluorouracil       Fluorouracil           Additional Orders: **Note: order sheet contains two q14 day cycles**    **Always confirm dose/schedule in your pharmacy ordering system**    REASON: Disease Progression  PRIOR TREATMENT: COS83  TREATMENT RESPONSE: Progressive Disease (PD)    START ON PATHWAY REGIMEN - Colorectal    [id number]        Capecitabine (Xeloda)       Bevacizumab-xxxx       Oxaliplatin (Eloxatin)           Additional Orders: Do not administer bevacizumab for at least 28 days   prior to elective surgery and for at least 28 days following surgery and until   the wound is fully healed.    **Always confirm dose/schedule in your pharmacy ordering system**    Patient Characteristics:  Distant Metastases, Nonsurgical Candidate, KRAS/NRAS Mutation Positive/Unknown   (BRAF V600 Wild-Type/Unknown), Standard Cytotoxic Therapy, First Line Standard   Cytotoxic Therapy, Bevacizumab Eligible, PS = 0,1  Tumor Location: Colon  Therapeutic Status: Distant Metastases  Microsatellite/Mismatch Repair Status: Unknown  BRAF Mutation Status: Awaiting Test Results  KRAS/NRAS Mutation Status: Awaiting Test Results  Standard Cytotoxic Line of Therapy: First Line Standard Cytotoxic Therapy  ECOG Performance Status: 0  Bevacizumab Eligibility: Eligible  Intent of Therapy:  Non-Curative / Palliative Intent, Discussed with Patient

## 2022-08-12 ENCOUNTER — SPECIALTY PHARMACY (OUTPATIENT)
Dept: PHARMACY | Facility: CLINIC | Age: 67
End: 2022-08-12
Payer: COMMERCIAL

## 2022-08-12 ENCOUNTER — TELEPHONE (OUTPATIENT)
Dept: SURGERY | Facility: CLINIC | Age: 67
End: 2022-08-12
Payer: COMMERCIAL

## 2022-08-12 DIAGNOSIS — C18.2 MALIGNANT NEOPLASM OF ASCENDING COLON: ICD-10-CM

## 2022-08-12 RX ORDER — CAPECITABINE 500 MG/1
500 TABLET, FILM COATED ORAL 2 TIMES DAILY
Qty: 28 TABLET | Refills: 4 | Status: SHIPPED | OUTPATIENT
Start: 2022-08-12 | End: 2022-01-01 | Stop reason: ALTCHOICE

## 2022-08-12 RX ORDER — CAPECITABINE 150 MG/1
1350 TABLET, FILM COATED ORAL 2 TIMES DAILY
Qty: 252 TABLET | Refills: 4 | Status: SHIPPED | OUTPATIENT
Start: 2022-08-12 | End: 2022-01-01 | Stop reason: ALTCHOICE

## 2022-08-12 NOTE — TELEPHONE ENCOUNTER
----- Message from Ora Arguello sent at 8/12/2022  1:57 PM CDT -----  Contact: Patient  Type:  Needs Medical Advice    Who Called:  Patient       Would the patient rather a call back or a response via [facility]?  Call    Best Call Back Number:  [phone] (home)     Additional Information:  patient needs to speak to Marito about test results of MRI

## 2022-08-12 NOTE — TELEPHONE ENCOUNTER
Received rx for Xeloda. Pending cycle clarification. No PA required, high co-pay. May need to investigate financial assistance once clarified orders received.     Storm, this is Yana Butcher with [facility].  We are working on your prescription that your doctor has sent us. We will be working with your insurance to get this approved for you. We will be calling you along the way with updates on your medication.  If you have any questions, you can reach us at [phone].    Welcome call outcome: Patient/caregiver reached

## 2022-08-15 ENCOUNTER — LAB VISIT (OUTPATIENT)
Dept: LAB | Facility: HOSPITAL | Age: 67
End: 2022-08-15
Attending: INTERNAL MEDICINE
Payer: COMMERCIAL

## 2022-08-15 DIAGNOSIS — C18.2 MALIGNANT NEOPLASM OF ASCENDING COLON: ICD-10-CM

## 2022-08-15 DIAGNOSIS — C18.9 MALIGNANT NEOPLASM OF COLON, UNSPECIFIED PART OF COLON: ICD-10-CM

## 2022-08-15 DIAGNOSIS — E53.8 B12 DEFICIENCY: ICD-10-CM

## 2022-08-15 LAB
ALBUMIN SERPL BCP-MCNC: 3.4 G/DL (ref 3.5–5.2)
ALP SERPL-CCNC: 86 U/L (ref 55–135)
ALT SERPL W/O P-5'-P-CCNC: 13 U/L (ref 10–44)
ANION GAP SERPL CALC-SCNC: 11 MMOL/L (ref 8–16)
AST SERPL-CCNC: 12 U/L (ref 10–40)
BASOPHILS # BLD AUTO: 0.03 K/UL (ref 0–0.2)
BASOPHILS NFR BLD: 0.6 % (ref 0–1.9)
BILIRUB SERPL-MCNC: 0.3 MG/DL (ref 0.1–1)
BUN SERPL-MCNC: 12 MG/DL (ref 8–23)
CALCIUM SERPL-MCNC: 8.7 MG/DL (ref 8.7–10.5)
CEA SERPL-MCNC: 2.2 NG/ML (ref 0–5)
CHLORIDE SERPL-SCNC: 102 MMOL/L (ref 95–110)
CO2 SERPL-SCNC: 25 MMOL/L (ref 23–29)
CREAT SERPL-MCNC: 0.8 MG/DL (ref 0.5–1.4)
DIFFERENTIAL METHOD: ABNORMAL
EOSINOPHIL # BLD AUTO: 0.2 K/UL (ref 0–0.5)
EOSINOPHIL NFR BLD: 4.2 % (ref 0–8)
ERYTHROCYTE [DISTWIDTH] IN BLOOD BY AUTOMATED COUNT: 13.2 % (ref 11.5–14.5)
EST. GFR  (NO RACE VARIABLE): >60 ML/MIN/1.73 M^2
FERRITIN SERPL-MCNC: 11 NG/ML (ref 20–300)
GLUCOSE SERPL-MCNC: 123 MG/DL (ref 70–110)
HCT VFR BLD AUTO: 37.3 % (ref 37–48.5)
HGB BLD-MCNC: 12.3 G/DL (ref 12–16)
IMM GRANULOCYTES # BLD AUTO: 0.01 K/UL (ref 0–0.04)
IMM GRANULOCYTES NFR BLD AUTO: 0.2 % (ref 0–0.5)
IRON SERPL-MCNC: 71 UG/DL (ref 30–160)
LYMPHOCYTES # BLD AUTO: 0.6 K/UL (ref 1–4.8)
LYMPHOCYTES NFR BLD: 12.7 % (ref 18–48)
MCH RBC QN AUTO: 29.2 PG (ref 27–31)
MCHC RBC AUTO-ENTMCNC: 33 G/DL (ref 32–36)
MCV RBC AUTO: 89 FL (ref 82–98)
MONOCYTES # BLD AUTO: 0.2 K/UL (ref 0.3–1)
MONOCYTES NFR BLD: 5 % (ref 4–15)
NEUTROPHILS # BLD AUTO: 3.7 K/UL (ref 1.8–7.7)
NEUTROPHILS NFR BLD: 77.3 % (ref 38–73)
NRBC BLD-RTO: 0 /100 WBC
PLATELET # BLD AUTO: 217 K/UL (ref 150–450)
PMV BLD AUTO: 10.9 FL (ref 9.2–12.9)
POTASSIUM SERPL-SCNC: 4.3 MMOL/L (ref 3.5–5.1)
PROT SERPL-MCNC: 6.9 G/DL (ref 6–8.4)
RBC # BLD AUTO: 4.21 M/UL (ref 4–5.4)
SATURATED IRON: 15 % (ref 20–50)
SODIUM SERPL-SCNC: 138 MMOL/L (ref 136–145)
TOTAL IRON BINDING CAPACITY: 482 UG/DL (ref 250–450)
TRANSFERRIN SERPL-MCNC: 326 MG/DL (ref 200–375)
VIT B12 SERPL-MCNC: 1515 PG/ML (ref 210–950)
WBC # BLD AUTO: 4.81 K/UL (ref 3.9–12.7)

## 2022-08-15 PROCEDURE — 82728 ASSAY OF FERRITIN: CPT | Performed by: INTERNAL MEDICINE

## 2022-08-15 PROCEDURE — 85025 COMPLETE CBC W/AUTO DIFF WBC: CPT | Performed by: INTERNAL MEDICINE

## 2022-08-15 PROCEDURE — 82607 VITAMIN B-12: CPT | Performed by: INTERNAL MEDICINE

## 2022-08-15 PROCEDURE — 82378 CARCINOEMBRYONIC ANTIGEN: CPT | Performed by: INTERNAL MEDICINE

## 2022-08-15 PROCEDURE — 36415 COLL VENOUS BLD VENIPUNCTURE: CPT | Performed by: INTERNAL MEDICINE

## 2022-08-15 PROCEDURE — 84466 ASSAY OF TRANSFERRIN: CPT | Performed by: INTERNAL MEDICINE

## 2022-08-15 PROCEDURE — 80053 COMPREHEN METABOLIC PANEL: CPT | Performed by: INTERNAL MEDICINE

## 2022-08-16 ENCOUNTER — TELEPHONE (OUTPATIENT)
Dept: HEMATOLOGY/ONCOLOGY | Facility: CLINIC | Age: 67
End: 2022-08-16
Payer: COMMERCIAL

## 2022-08-16 ENCOUNTER — OFFICE VISIT (OUTPATIENT)
Dept: HEMATOLOGY/ONCOLOGY | Facility: CLINIC | Age: 67
End: 2022-08-16
Payer: COMMERCIAL

## 2022-08-16 VITALS
HEART RATE: 83 BPM | HEIGHT: 61 IN | BODY MASS INDEX: 36.59 KG/M2 | TEMPERATURE: 97 F | DIASTOLIC BLOOD PRESSURE: 78 MMHG | WEIGHT: 193.81 LBS | OXYGEN SATURATION: 98 % | RESPIRATION RATE: 12 BRPM | SYSTOLIC BLOOD PRESSURE: 166 MMHG

## 2022-08-16 DIAGNOSIS — C18.9 MALIGNANT NEOPLASM OF COLON, UNSPECIFIED PART OF COLON: ICD-10-CM

## 2022-08-16 DIAGNOSIS — E53.8 B12 DEFICIENCY: ICD-10-CM

## 2022-08-16 DIAGNOSIS — C79.51 RECTAL CANCER METASTATIC TO BONE: Primary | ICD-10-CM

## 2022-08-16 DIAGNOSIS — C20 RECTAL CANCER METASTATIC TO BONE: Primary | ICD-10-CM

## 2022-08-16 DIAGNOSIS — D50.0 IRON DEFICIENCY ANEMIA DUE TO CHRONIC BLOOD LOSS: ICD-10-CM

## 2022-08-16 DIAGNOSIS — C18.2 MALIGNANT NEOPLASM OF ASCENDING COLON: ICD-10-CM

## 2022-08-16 PROCEDURE — 99999 PR PBB SHADOW E&M-EST. PATIENT-LVL III: ICD-10-PCS | Mod: PBBFAC,,, | Performed by: INTERNAL MEDICINE

## 2022-08-16 PROCEDURE — 99215 PR OFFICE/OUTPT VISIT, EST, LEVL V, 40-54 MIN: ICD-10-PCS | Mod: S$GLB,,, | Performed by: INTERNAL MEDICINE

## 2022-08-16 PROCEDURE — 3078F PR MOST RECENT DIASTOLIC BLOOD PRESSURE < 80 MM HG: ICD-10-PCS | Mod: CPTII,S$GLB,, | Performed by: INTERNAL MEDICINE

## 2022-08-16 PROCEDURE — 99999 PR PBB SHADOW E&M-EST. PATIENT-LVL III: CPT | Mod: PBBFAC,,, | Performed by: INTERNAL MEDICINE

## 2022-08-16 PROCEDURE — 3008F PR BODY MASS INDEX (BMI) DOCUMENTED: ICD-10-PCS | Mod: CPTII,S$GLB,, | Performed by: INTERNAL MEDICINE

## 2022-08-16 PROCEDURE — 3077F PR MOST RECENT SYSTOLIC BLOOD PRESSURE >= 140 MM HG: ICD-10-PCS | Mod: CPTII,S$GLB,, | Performed by: INTERNAL MEDICINE

## 2022-08-16 PROCEDURE — 1159F PR MEDICATION LIST DOCUMENTED IN MEDICAL RECORD: ICD-10-PCS | Mod: CPTII,S$GLB,, | Performed by: INTERNAL MEDICINE

## 2022-08-16 PROCEDURE — 3078F DIAST BP <80 MM HG: CPT | Mod: CPTII,S$GLB,, | Performed by: INTERNAL MEDICINE

## 2022-08-16 PROCEDURE — 99215 OFFICE O/P EST HI 40 MIN: CPT | Mod: S$GLB,,, | Performed by: INTERNAL MEDICINE

## 2022-08-16 PROCEDURE — 3077F SYST BP >= 140 MM HG: CPT | Mod: CPTII,S$GLB,, | Performed by: INTERNAL MEDICINE

## 2022-08-16 PROCEDURE — 1159F MED LIST DOCD IN RCRD: CPT | Mod: CPTII,S$GLB,, | Performed by: INTERNAL MEDICINE

## 2022-08-16 PROCEDURE — 3008F BODY MASS INDEX DOCD: CPT | Mod: CPTII,S$GLB,, | Performed by: INTERNAL MEDICINE

## 2022-08-16 RX ORDER — PROCHLORPERAZINE MALEATE 10 MG
10 TABLET ORAL EVERY 6 HOURS PRN
Qty: 30 TABLET | Refills: 1 | Status: SHIPPED | OUTPATIENT
Start: 2022-08-16 | End: 2022-01-01 | Stop reason: SDUPTHER

## 2022-08-16 RX ORDER — CYANOCOBALAMIN 1000 UG/ML
1000 INJECTION, SOLUTION INTRAMUSCULAR; SUBCUTANEOUS
Qty: 30 ML | Refills: 12 | Status: SHIPPED | OUTPATIENT
Start: 2022-08-16 | End: 2022-01-01

## 2022-08-16 RX ORDER — ONDANSETRON 8 MG/1
8 TABLET, ORALLY DISINTEGRATING ORAL EVERY 12 HOURS PRN
Qty: 30 TABLET | Refills: 1 | Status: SHIPPED | OUTPATIENT
Start: 2022-08-16 | End: 2022-01-01 | Stop reason: SDUPTHER

## 2022-08-16 NOTE — TELEPHONE ENCOUNTER
Benefits investigation   Per the [facility] website.    Payor: [employer]  Annual Deductible Amount: $2000 (satisfied)  Annual Out of Packet (OOP) Maximum: $6000 ($2424.80 remaining)  Estimated Copay: $100(500mg)/$45.57 (150 mg)  Network Status: In Network     Forwarding to FA

## 2022-08-16 NOTE — TELEPHONE ENCOUNTER
Pt chemo education scheduled for Monday 8/22.   Pt iron infusions scheduled for 8/17 and 8/24.     Cc chart forwarded to Angeli Jerry RN to handle the rest of requested orders.      ----- Message from Danielle Frank MD sent at 8/16/2022 10:40 AM CDT -----  Needs chemo class, we have applied for xeloda need to know when she will get it to her house so we can start regimen. Need start date for xeloda/ irinotecan/ avastin   See me for start date   Pt to start iron ans ordered   B12 sent topt will not be having a port pharmacy

## 2022-08-16 NOTE — Clinical Note
Needs chemo class, we have applied for xeloda need to know when she will get it to her house so we can start regimen. Need start date for xeloda/ irinotecan/ avastin  See me for start date  Pt to start iron ans ordered  B12 sent topt will not be having a port pharmacy

## 2022-08-16 NOTE — TELEPHONE ENCOUNTER
Outgoing call to patient regarding Xeloda prescription we received. Informed patient Xeloda has been approved through insurance with estimated copay of $100(500mg)/$45.57(150mg). No grants are currently available at this time but am able to apply patient for  assistance. Patient provided consent, HH size, and annual income and requested to have pt portion sent via email. Will continue to follow up.    Sent a staff message to [facility] regarding Xeloda assistance application and faxed application prescription to [phone] for provider review and signature. Will continue to follow up.       Patient is not eligible for CAGNO since she lives in [place].

## 2022-08-16 NOTE — PLAN OF CARE
DISCONTINUE ON PATHWAY REGIMEN - Colorectal    [id number]        Capecitabine (Xeloda)       Bevacizumab-xxxx       Oxaliplatin (Eloxatin)           Additional Orders: Do not administer bevacizumab for at least 28 days   prior to elective surgery and for at least 28 days following surgery and until   the wound is fully healed.    **Always confirm dose/schedule in your pharmacy ordering system**    REASON: Disease Progression  PRIOR TREATMENT: [id number]  TREATMENT RESPONSE: Progressive Disease (PD)    START ON PATHWAY REGIMEN - Colorectal    [id number]        Capecitabine (Xeloda)       Bevacizumab-xxxx       Irinotecan (Camptosar)           Additional Orders: Do not administer bevacizumab for at least 28 days   prior to elective surgery and for at least 28 days following surgery and until   the wound is fully healed.    **Always confirm dose/schedule in your pharmacy ordering system**    Patient Characteristics:  Distant Metastases, Nonsurgical Candidate, KRAS/NRAS Mutation Positive/Unknown   (BRAF V600 Wild-Type/Unknown), Standard Cytotoxic Therapy, First Line Standard   Cytotoxic Therapy, Bevacizumab Eligible, PS = 0,1  Tumor Location: Colon  Therapeutic Status: Distant Metastases  Microsatellite/Mismatch Repair Status: Unknown  BRAF Mutation Status: Awaiting Test Results  KRAS/NRAS Mutation Status: Awaiting Test Results  Standard Cytotoxic Line of Therapy: First Line Standard Cytotoxic Therapy  ECOG Performance Status: 0  Bevacizumab Eligibility: Eligible  Intent of Therapy:  Non-Curative / Palliative Intent, Discussed with Patient

## 2022-08-16 NOTE — PROGRESS NOTES
Subjective:       Patient ID: Tessa Enriquez is a 66 y.o. female.    Chief Complaint:   rectal CA status post biopsy October 2020 robotic LAR November 9, 2020 by Dr. Wagner  Discontinued folfox due to s/e completed xrt  5/21     works at [employer]    pt went to  wi rectal bleeding and abd pain scoped  Findings:        The digital rectal exam revealed a firm rectal mass palpated 5 to 6        cm from the anal verge. The mass was non-circumferential and located        predominantly at the posterior bowel wall.        There was evidence of a prior end-to-end colo-rectal anastomosis in        the distal rectum. This was patent and was characterized by        ulceration. The anastomosis was traversed. Mass in association with        the anastomosis has been previously biopsied and confirmed to be        adenocarcinoma. Anastomosis present at approximately 5 cm from anal    pt sent for discussion  REVIEW OF SYSTEMS:     CONSTITUTIONAL: The patient denies any weight change. There is no apparent    change in appetite, fever, night sweats, headaches, lots fatigue, no dizziness, some  weakness.     port is out, no more pain on left cw     SKIN: Denies rash, issues with nails, non-healing sores, bleeding, blotching    skin or abnormal bruising. Denies new moles or changes to existing moles.      BREASTS: There is no swelling around breasts or nipple discharge.    EYES: Denies eye pain, blurred vision, swelling, redness or discharge.      ENT AND MOUTH: Denies runny nose, stuffiness, sinus trouble or sores. Denies    nosebleeds. Denies, hoarseness, change in voice or swelling in front of the    neck.      CARDIOVASCULAR: Denies chest pain, discomfort or palpitations. Denies neck    swelling or episodes of passing out.      RESPIRATORY: Denies cough, sputum production, blood in sputum, and denies    shortness of breath.      GI:  Had esophagus stretched as food/ pills were getting stuck  Pt saw DR Linares, diarrhea  chronically since the xrt, if she has gas and takes meds she messes on her self, she alt constipation with loose stools also reports rectal bleeding. Rectal pain    GENITOURINARY: No discharge. No pelvic pain or lumps. No rash around groin or  lesions. No urinary frequency, hesitation, painful urination or blood in    urine. Denies incontinence. No problems with intercourse.      MUSCULOSKELETAL: Denies neck or back pain. Denies weakness in arms or legs,    joint problems or distended inflamed veins in legs. Denies swelling or abnormal  glands.      NEUROLOGICAL: Denies tingling, numbness, altered mentation changes to nerve    function in the face, weakness to one or both of the body. Denies changes to    gait and denies multiple falls or accidents.      PSYCHIATRIC: Denies nervousness, anxiety, hallucinations, depression, suicidal    ideation, trouble sleeping or changes in behavior noticed by family.          Oncology History:  T3 N1 rectal CA diagnosed October 2020 robotic LAR November 2020 December 2020 PET scan negative for metastatic disease   started FOLFOX 2/21 but stopped after 3 cycles and only wants surveillance   had colonoscopy 8/21 NAD  Past Medical History:   Diagnosis Date    Acute hypoxemic respiratory failure 7/9/2018    Arthritis     Cancer     colon    COPD (chronic obstructive pulmonary disease)     History of home oxygen therapy     ONLY PRN AND HASN'T USED IT IN A YEAR    Hyperlipidemia     Thyroid disease      Past Surgical History:   Procedure Laterality Date    CHOLECYSTECTOMY      COLECTOMY N/A 11/9/2020    Procedure: COLECTOMY;  Surgeon: Brandon Wagner MD;  [facility]: [facility] OR;  Service: General;  Laterality: N/A;  LOWER- ANTERIOR    COLONOSCOPY N/A 10/23/2020    Procedure: COLONOSCOPY;  Surgeon: Jolynn Ayala MD;  Location: [facility];  Service: Endoscopy;  Laterality: N/A;    COLONOSCOPY N/A 8/6/2021    Procedure: COLONOSCOPY;  Surgeon: Jolynn Ayala MD;  Location:  [facility] ENDO;  Service: Endoscopy;  Laterality: N/A;    COLONOSCOPY N/A 2022    Procedure: COLONOSCOPY;  Surgeon: Jolynn Ayala MD;  Location: [facility] ENDO;  Service: Endoscopy;  Laterality: N/A;    ESOPHAGEAL DILATION N/A 2021    Procedure: DILATION, ESOPHAGUS;  Surgeon: Sourav Baires III, MD;  Location: [facility] ENDO;  Service: Endoscopy;  Laterality: N/A;    ESOPHAGOGASTRODUODENOSCOPY  2021    Dilation to 57 Fr    ESOPHAGOGASTRODUODENOSCOPY N/A 2021    Procedure: EGD (ESOPHAGOGASTRODUODENOSCOPY);  Surgeon: Sourav Baires III, MD;  Location: [facility] ENDO;  Service: Endoscopy;  Laterality: N/A;    FLEXIBLE SIGMOIDOSCOPY N/A 2022    Procedure: SIGMOIDOSCOPY, FLEXIBLE;  Surgeon: Brandon Wagner MD;  Location: [facility] ENDO;  Service: Endoscopy;  Laterality: N/A;    HYSTERECTOMY      INSERTION OF TUNNELED CENTRAL VENOUS CATHETER (CVC) WITH SUBCUTANEOUS PORT N/A 2021    Procedure: [id number];  Surgeon: Brandon Wagner MD;  Location: [facility] OR;  Service: General;  Laterality: N/A;    MEDIPORT REMOVAL Left 2021    Procedure: REMOVAL, CATHETER, CENTRAL VENOUS, TUNNELED, WITH PORT;  Surgeon: Brandon Wagner MD;  Location: [facility] OR;  Service: General;  Laterality: Left;    ROBOT-ASSISTED COLECTOMY N/A 2020    Procedure: ROBOTIC COLECTOMY low anterior resection, possible open;  Surgeon: Brandon Wagner MD;  Location: [facility] OR;  Service: General;  Laterality: N/A;  CONVERTED TO OPEN AT 1503     Family History   Problem Relation Age of Onset    COPD Mother     COPD Father     Breast cancer Maternal Grandmother       Social History     Socioeconomic History    Marital status:    Tobacco Use    Smoking status: Former Smoker     Packs/day: 2.00     Years: 40.00     Pack years: 80.00     Types: Cigarettes     Quit date: 2017     Years since quittin.1    Smokeless tobacco: Never Used   Substance and Sexual Activity    Alcohol use: No    Drug use: No     Sexual activity: Not Currently     Partners: Male     Review of patient's allergies indicates:   Allergen Reactions    Ativan [lorazepam] Hives and Itching    Flagyl [metronidazole] Itching and Rash    Pcn [penicillins] Hives, Itching and Rash       Current Outpatient Medications:     capecitabine (XELODA) 150 MG tablet, Take 9 tablets (1,350 mg total) by mouth 2 (two) times daily with 1 other capecitabine prescription for 1,850 mg total., Disp: 252 tablet, Rfl: 4    capecitabine (XELODA) 500 MG Tab, Take 1 tablet (500 mg total) by mouth 2 (two) times daily with 1 other capecitabine prescription for 1,850 mg total., Disp: 28 tablet, Rfl: 4    cyanocobalamin 1,000 mcg/mL injection, Inject 1 mL (1,000 mcg total) into the muscle every 14 (fourteen) days., Disp: 30 mL, Rfl: 5    ferrous sulfate 325 (65 FE) MG EC tablet, Take 325 mg by mouth 2 (two) times daily., Disp: , Rfl:     levothyroxine (SYNTHROID) 125 MCG tablet, Take 1 tablet (125 mcg total) by mouth before breakfast., Disp: 30 tablet, Rfl: 11    ondansetron (ZOFRAN-ODT) 4 MG TbDL, Take 1 tablet (4 mg total) by mouth every 6 (six) hours as needed (nausea)., Disp: 20 tablet, Rfl: 0    CMPD hydrocortisone 2%- LIDOcaine 3% suppository (RECTAL ROCKET), Place 1 suppository rectally every evening. Insert 1 suppository 3/4 of the way onto rectum. Wet for easier application. Repeat for 3 nights. (Patient not taking: No sig reported), Disp: 30 suppository, Rfl: 2    gabapentin (NEURONTIN) 300 MG capsule, Take 1 capsule (300 mg total) by mouth every evening. (Patient not taking: No sig reported), Disp: 30 capsule, Rfl: 2    traMADoL (ULTRAM) 50 mg tablet, TAKE ONE TABLET BY MOUTH EVERY SIX HOURS AS NEEDED FOR PAIN (Patient not taking: No sig reported), Disp: 28 tablet, Rfl: 2  PHYSICAL EXAM:     Wt Readings from Last 3 Encounters:   08/16/22 87.9 kg (193 lb 12.6 oz)   08/10/22 85.8 kg (189 lb 2.5 oz)   08/05/22 86.1 kg (189 lb 13.1 oz)     Temp Readings  from Last 3 Encounters:   08/16/22 97.3 °F (36.3 °C) (Temporal)   08/10/22 98.8 °F (37.1 °C) (Oral)   08/05/22 97.2 °F (36.2 °C) (Temporal)     BP Readings from Last 3 Encounters:   08/16/22 (!) 166/78   08/10/22 (!) 149/78   08/05/22 139/75     Pulse Readings from Last 3 Encounters:   08/16/22 83   08/10/22 96   08/05/22 85     GENERAL: alert; in no apparent distress, , well-built well-nourished   HEAD: normocephalic, atraumatic.  EYES: pupils are equal, round, reactive to light and accommodation. Sclera anicteric. Conjunctiva not injected.   NOSE/THROAT: no nasal erythema or rhinorrhea. Oropharynx pink, without erythema, ulcerations or thrush.   NECK: no cervical motion rigidity; supple with no masses.  CHEST: clear to auscultation bilaterally; no wheezes, crackles or rubs. Patient is speaking comfortably on room air with normal work of breathing without using accessory muscles of respiration.  CARDIOVASCULAR: regular rate and rhythm; no murmurs, rubs or gallops.  ABDOMEN: soft, nontender, nondistended. Bowel sounds present.   MUSCULOSKELETAL: no tenderness to palpation along the spine or scapulae. Normal range of motion.  NEUROLOGIC: cranial nerves II-XII intact bilaterally. Strength 5/5 in bilateral upper and lower extremities. No sensory deficits appreciated. Reflexes globally intact. No cerebellar signs. Normal gait.  LYMPHATIC: no cervical, supraclavicular or axillary adenopathy appreciated bilaterally.   EXTREMITIES: no clubbing, cyanosis, edema.  SKIN: no erythema, rashes, ulcerations noted  ECOG-0  Lab Results   Component Value Date    WBC 4.81 08/15/2022    HGB 12.3 08/15/2022    HCT 37.3 08/15/2022    MCV 89 08/15/2022     08/15/2022       BMP  Lab Results   Component Value Date     08/15/2022    K 4.3 08/15/2022     08/15/2022    CO2 25 08/15/2022    BUN 12 08/15/2022    CREATININE 0.8 08/15/2022    CALCIUM 8.7 08/15/2022    ANIONGAP 11 08/15/2022    ESTGFRAFRICA >60 05/02/2022     EGFRNONAA >60 05/02/2022      cea 7/14/21 1.8  November 9, 2020  .  Rectum, sigmoid colon and proximal donut (low anterior resection):   - Invasive adenocarcinoma,  invading through the muscularis propria into   pericolorectal tissue   - Pathologic stage pT3, pN1a (see synoptic report)   - Resection margins negative for dysplasia or malignancy (proximal, distal   and radial margins), within 1 mm to radial margin   - One tumor deposit   - Adenocarcinoma involve one out of totally forty seven lymph nodes (1/46 in   part 1, totally 1/47, also see part 2)   - Background colon with diverticulosis associated with hyperpigmented   macrophages, benign   - Proximal donut with benign colon, no dysplasia or malignancy   - Tattoo identified   2.  Colon, distal donut (excision):   - Benign colon, negative for dysplasia or malignancy   - One lymph node, negative for metastatic carcinoma (0/1)   COLON AND RECTUM: Resection, Including Transanal Disk Excision of Rectal   Neoplasms   Procedure  Low anterior resection   Tumor Site  Rectum    Tumor Location: Straddles the anterior peritoneal reflection   Tumor Size   Greatest dimension (centimeters): 7.0 cm   + Additional dimensions (centimeters): 4.7 x 2.2 cm   Macroscopic Tumor Perforation  Not identified   Macroscopic Intactness of Mesorectum  Near complete   Histologic Type  Adenocarcinoma   Histologic Grade   G2: Moderately differentiated   Tumor Extension  Tumor invades through the muscularis propria into   pericolorectal tissue     Impression:  November 7, 2020 CT abdomen pelvis     1. Rectosigmoid junction mass, unchanged from prior.  No definite evidence of intraluminal contrast extravasation to suggest active gastrointestinal hemorrhage, however please note that arterial phase CT is more sensitive.  2. Colonic diverticulosis without acute diverticulitis.  3. Minimal pneumobilia, unchanged      IMPRESSION:  CT chest lung screen October 2020     1.  3 mm pulmonary nodule  identified in the lateral segment of the  right lower lobe.  2.  Focus of groundglass opacity measuring 11 mm in diameter in the  superior segment of the right lower lobe. Short-term follow-up for  this lesion is recommended.  3.  Mild emphysematous lung disease.     LUNG-RADS CATEGORY 3: PROBABLY BENIGN FINDINGS     RECOMMENDATION: Short-term imaging follow-up with 6 month LDCT.    IMPRESSION: 7/21  1. Nonspecific hypermetabolic soft tissue density focus cranial to the rectosigmoid colon suture line, perhaps reflecting postsurgical and or posttreatment or postinflammatory changes. Attention to this area at follow-up imaging is recommended to help differentiate from residual or recurrent neoplasm.  2. Improving postoperative changes in the lower anterior abdominal wall, with associated multifocal FDG hypermetabolism which is most characteristic of postoperative etiology.  3. Otherwise no convincing evidence of recurrent neoplasm or metastatic disease.IMPRESSION:  1. Interval increased size of hypermetabolic presacral mass contiguous with hypermetabolic circumferential wall thickening of the rectum at the level of the anastomotic suture line, consistent with recurrent neoplasm and metastatic disease.  2. Interval development of enlarged, hypermetabolic right iliac chain lymph node consistent with metastatic disease.  3. No additional convincing evidence of metastatic disease.  4. Bilateral sacral ala insufficiency fractures, with corresponding FDG hypermetabolism.  5. Intense FDG uptake throughout the thyroid gland, unchanged and nonspecific.      Patient Active Problem List   Diagnosis    COPD (chronic obstructive pulmonary disease)    Pure hypercholesterolemia    Acquired hypothyroidism    Rectal bleeding    Colonic mass    Rectal cancer metastatic to bone    Port-A-Cath in place    Encounter for care related to vascular access port    Dysphagia    Malignant neoplasm of colon    B12 deficiency    FENG  (iron deficiency anemia)    Hypocalcemia    Chronic diarrhea    Severe obesity (BMI 35.0-39.9) with comorbidity    Secondary and unspecified malignant neoplasm of intra-abdominal lymph nodes   IMPRESSION:pet 12/2020  1. Postoperative changes of rectal colonic resection, with adjacent  FDG avid presacral soft tissue stranding.  2. Adjacent punctate extraluminal foci of free air suggesting a tiny  tract extending towards the adjacent small bowel (possibly  postoperative but suggesting a tiny enterocolonic fistula)  3. Deep subcutaneous soft tissue structure along the ventral abdomen  suggesting a postoperative hematoma/seroma, and less likely abscess.  4. No findings of additional sites of FDG avid disease.      IMPRESSION: 7/21  1. Nonspecific hypermetabolic soft tissue density focus cranial to the rectosigmoid colon suture line, perhaps reflecting postsurgical and or posttreatment or postinflammatory changes. Attention to this area at follow-up imaging is recommended to help differentiate from residual or recurrent neoplasm.  2. Improving postoperative changes in the lower anterior abdominal wall, with associated multifocal FDG hypermetabolism which is most characteristic of postoperative etiology.  3. Otherwise no convincing evidence of recurrent neoplasm or metastatic disease      Impression:2/2/22   CT CAP  A single 4 mm soft tissue density nodule is noted in the right lower lobe.  No other intrapulmonary masses or nodules are seen.  Follow-up CT in 12 months per of this nodule is recommended.  Coronary artery calcification is noted.  No masses or adenopathy or seen.     Prior cholecystectomy.  Prior hysterectomy.  2.2 cm left ovarian cyst.  Small ventral hernia containing a loop of small bowel without CT evidence of obstruction  Assessment and Plan   At original dx 2020  Status post low anterior resection November 9, 2020 for rectal CA T3 N1a   tumor board convened and agreed on xrt that will follow  After  completion of chemo  sequentially  therapy   Patient started FOLFOX.  Completed 4 out of 12 planned adjuvant cycles,  Discontinued  Chemo / port removed completed xrt 5/21  Pet:8/22, showing progression  Pt has been seen by DR Wagner   long discussion today with pt. She donet like havnig a port, explained several options to pt, she so would prefer n oral pill, and doesn't want oxaliplatin   applied for xeloda   chemo therapy with xeloda/ irinitecan and avastin planned, needs chemo class,orders to be build by pha ordersr to bem billed by [facility] pharmacist[facility] advised pt that this is not my foirst line of choice    d/w  Injectafer 2 doses asap orders are in ,   pt to add SL b12 to her twice a month b12 shots    Please ask for caris     b12 def anemia, taking injections q month level checked 10/21 sub therpautic 226 take shots twice a months sent new rx  iron deficiency  Pt to try oral for now, will recheck in 8/22 ( may help her chronic diarrhea)  hypocalcemia which seems chronic pt to supplement orally daily wnl this visit  Hypothyroidism, sees DR Mcdermott will adj meds  History of chronic obstructive pulmonary disease, dyslipidemia and hypothyroidism continue with PCP  Doesn't want to be covid vaccinated

## 2022-08-17 ENCOUNTER — HOSPITAL ENCOUNTER (OUTPATIENT)
Facility: HOSPITAL | Age: 67
Discharge: HOME | End: 2022-08-19
Attending: EMERGENCY MEDICINE | Admitting: HOSPITALIST
Payer: COMMERCIAL

## 2022-08-17 ENCOUNTER — INFUSION (OUTPATIENT)
Dept: INFUSION THERAPY | Facility: HOSPITAL | Age: 67
End: 2022-08-17
Attending: INTERNAL MEDICINE
Payer: COMMERCIAL

## 2022-08-17 VITALS
DIASTOLIC BLOOD PRESSURE: 90 MMHG | TEMPERATURE: 97 F | WEIGHT: 191 LBS | BODY MASS INDEX: 36.06 KG/M2 | HEART RATE: 96 BPM | RESPIRATION RATE: 97 BRPM | HEIGHT: 61 IN | SYSTOLIC BLOOD PRESSURE: 165 MMHG

## 2022-08-17 DIAGNOSIS — K92.2 LOWER GI BLEED: ICD-10-CM

## 2022-08-17 DIAGNOSIS — K62.5 RECTAL BLEEDING: Primary | ICD-10-CM

## 2022-08-17 LAB
ABO + RH BLD: ABNORMAL
ALBUMIN SERPL BCP-MCNC: 3.6 G/DL (ref 3.5–5.2)
ALP SERPL-CCNC: 94 U/L (ref 55–135)
ALT SERPL W/O P-5'-P-CCNC: 11 U/L (ref 10–44)
ANION GAP SERPL CALC-SCNC: 10 MMOL/L (ref 8–16)
AST SERPL-CCNC: 11 U/L (ref 10–40)
BASOPHILS # BLD AUTO: 0.03 K/UL (ref 0–0.2)
BASOPHILS # BLD AUTO: 0.03 K/UL (ref 0–0.2)
BASOPHILS NFR BLD: 0.6 % (ref 0–1.9)
BASOPHILS NFR BLD: 0.7 % (ref 0–1.9)
BILIRUB SERPL-MCNC: 0.4 MG/DL (ref 0.1–1)
BLD GP AB SCN CELLS X3 SERPL QL: ABNORMAL
BUN SERPL-MCNC: 12 MG/DL (ref 8–23)
CALCIUM SERPL-MCNC: 9 MG/DL (ref 8.7–10.5)
CHLORIDE SERPL-SCNC: 103 MMOL/L (ref 95–110)
CO2 SERPL-SCNC: 26 MMOL/L (ref 23–29)
CREAT SERPL-MCNC: 0.8 MG/DL (ref 0.5–1.4)
DIFFERENTIAL METHOD: ABNORMAL
DIFFERENTIAL METHOD: ABNORMAL
EOSINOPHIL # BLD AUTO: 0.1 K/UL (ref 0–0.5)
EOSINOPHIL # BLD AUTO: 0.1 K/UL (ref 0–0.5)
EOSINOPHIL NFR BLD: 2.2 % (ref 0–8)
EOSINOPHIL NFR BLD: 2.5 % (ref 0–8)
ERYTHROCYTE [DISTWIDTH] IN BLOOD BY AUTOMATED COUNT: 13.1 % (ref 11.5–14.5)
ERYTHROCYTE [DISTWIDTH] IN BLOOD BY AUTOMATED COUNT: 13.2 % (ref 11.5–14.5)
EST. GFR  (NO RACE VARIABLE): >60 ML/MIN/1.73 M^2
GLUCOSE SERPL-MCNC: 95 MG/DL (ref 70–110)
HCT VFR BLD AUTO: 34.5 % (ref 37–48.5)
HCT VFR BLD AUTO: 36.7 % (ref 37–48.5)
HGB BLD-MCNC: 11.5 G/DL (ref 12–16)
HGB BLD-MCNC: 12.3 G/DL (ref 12–16)
IMM GRANULOCYTES # BLD AUTO: 0.01 K/UL (ref 0–0.04)
IMM GRANULOCYTES # BLD AUTO: 0.01 K/UL (ref 0–0.04)
IMM GRANULOCYTES NFR BLD AUTO: 0.2 % (ref 0–0.5)
IMM GRANULOCYTES NFR BLD AUTO: 0.2 % (ref 0–0.5)
LIPASE SERPL-CCNC: 20 U/L (ref 4–60)
LYMPHOCYTES # BLD AUTO: 0.5 K/UL (ref 1–4.8)
LYMPHOCYTES # BLD AUTO: 0.7 K/UL (ref 1–4.8)
LYMPHOCYTES NFR BLD: 12.3 % (ref 18–48)
LYMPHOCYTES NFR BLD: 13.1 % (ref 18–48)
MCH RBC QN AUTO: 29.5 PG (ref 27–31)
MCH RBC QN AUTO: 29.7 PG (ref 27–31)
MCHC RBC AUTO-ENTMCNC: 33.3 G/DL (ref 32–36)
MCHC RBC AUTO-ENTMCNC: 33.5 G/DL (ref 32–36)
MCV RBC AUTO: 89 FL (ref 82–98)
MCV RBC AUTO: 89 FL (ref 82–98)
MONOCYTES # BLD AUTO: 0.2 K/UL (ref 0.3–1)
MONOCYTES # BLD AUTO: 0.4 K/UL (ref 0.3–1)
MONOCYTES NFR BLD: 5.7 % (ref 4–15)
MONOCYTES NFR BLD: 7.3 % (ref 4–15)
NEUTROPHILS # BLD AUTO: 3.2 K/UL (ref 1.8–7.7)
NEUTROPHILS # BLD AUTO: 4 K/UL (ref 1.8–7.7)
NEUTROPHILS NFR BLD: 76.3 % (ref 38–73)
NEUTROPHILS NFR BLD: 78.9 % (ref 38–73)
NRBC BLD-RTO: 0 /100 WBC
NRBC BLD-RTO: 0 /100 WBC
PLATELET # BLD AUTO: 219 K/UL (ref 150–450)
PLATELET # BLD AUTO: 219 K/UL (ref 150–450)
PMV BLD AUTO: 10.9 FL (ref 9.2–12.9)
PMV BLD AUTO: 11.1 FL (ref 9.2–12.9)
POTASSIUM SERPL-SCNC: 4.3 MMOL/L (ref 3.5–5.1)
PROT SERPL-MCNC: 7.2 G/DL (ref 6–8.4)
RBC # BLD AUTO: 3.9 M/UL (ref 4–5.4)
RBC # BLD AUTO: 4.14 M/UL (ref 4–5.4)
SARS-COV-2 RDRP RESP QL NAA+PROBE: NEGATIVE
SODIUM SERPL-SCNC: 139 MMOL/L (ref 136–145)
WBC # BLD AUTO: 4.07 K/UL (ref 3.9–12.7)
WBC # BLD AUTO: 5.21 K/UL (ref 3.9–12.7)

## 2022-08-17 PROCEDURE — 86870 RBC ANTIBODY IDENTIFICATION: CPT | Performed by: EMERGENCY MEDICINE

## 2022-08-17 PROCEDURE — 25000003 PHARM REV CODE 250: Performed by: HOSPITALIST

## 2022-08-17 PROCEDURE — 36415 COLL VENOUS BLD VENIPUNCTURE: CPT | Performed by: EMERGENCY MEDICINE

## 2022-08-17 PROCEDURE — G0378 HOSPITAL OBSERVATION PER HR: HCPCS

## 2022-08-17 PROCEDURE — 96361 HYDRATE IV INFUSION ADD-ON: CPT

## 2022-08-17 PROCEDURE — 82272 OCCULT BLD FECES 1-3 TESTS: CPT

## 2022-08-17 PROCEDURE — 83690 ASSAY OF LIPASE: CPT | Performed by: EMERGENCY MEDICINE

## 2022-08-17 PROCEDURE — 25000003 PHARM REV CODE 250: Performed by: EMERGENCY MEDICINE

## 2022-08-17 PROCEDURE — 36415 COLL VENOUS BLD VENIPUNCTURE: CPT | Performed by: NURSE PRACTITIONER

## 2022-08-17 PROCEDURE — 99285 EMERGENCY DEPT VISIT HI MDM: CPT | Mod: 25

## 2022-08-17 PROCEDURE — 63600175 PHARM REV CODE 636 W HCPCS: Performed by: HOSPITALIST

## 2022-08-17 PROCEDURE — 85025 COMPLETE CBC W/AUTO DIFF WBC: CPT | Performed by: EMERGENCY MEDICINE

## 2022-08-17 PROCEDURE — U0002 COVID-19 LAB TEST NON-CDC: HCPCS | Performed by: EMERGENCY MEDICINE

## 2022-08-17 PROCEDURE — 86901 BLOOD TYPING SEROLOGIC RH(D): CPT | Performed by: EMERGENCY MEDICINE

## 2022-08-17 PROCEDURE — 80053 COMPREHEN METABOLIC PANEL: CPT | Performed by: EMERGENCY MEDICINE

## 2022-08-17 PROCEDURE — 96375 TX/PRO/DX INJ NEW DRUG ADDON: CPT

## 2022-08-17 PROCEDURE — 85025 COMPLETE CBC W/AUTO DIFF WBC: CPT | Mod: 91 | Performed by: NURSE PRACTITIONER

## 2022-08-17 RX ORDER — MORPHINE SULFATE 4 MG/ML
4 INJECTION, SOLUTION INTRAMUSCULAR; INTRAVENOUS EVERY 4 HOURS PRN
Status: DISCONTINUED | OUTPATIENT
Start: 2022-08-17 | End: 2022-01-01 | Stop reason: HOSPADM

## 2022-08-17 RX ORDER — PROCHLORPERAZINE EDISYLATE 5 MG/ML
5 INJECTION INTRAMUSCULAR; INTRAVENOUS EVERY 6 HOURS PRN
Status: DISCONTINUED | OUTPATIENT
Start: 2022-08-17 | End: 2022-01-01 | Stop reason: HOSPADM

## 2022-08-17 RX ORDER — ACETAMINOPHEN 500 MG
1000 TABLET ORAL
Status: COMPLETED | OUTPATIENT
Start: 2022-08-17 | End: 2022-08-17

## 2022-08-17 RX ORDER — ONDANSETRON 2 MG/ML
8 INJECTION INTRAMUSCULAR; INTRAVENOUS EVERY 6 HOURS PRN
Status: DISCONTINUED | OUTPATIENT
Start: 2022-08-17 | End: 2022-01-01 | Stop reason: HOSPADM

## 2022-08-17 RX ORDER — ACETAMINOPHEN 325 MG/1
650 TABLET ORAL ONCE
Status: DISCONTINUED | OUTPATIENT
Start: 2022-08-17 | End: 2022-01-01 | Stop reason: HOSPADM

## 2022-08-17 RX ORDER — NALOXONE HCL 0.4 MG/ML
0.02 VIAL (ML) INJECTION
Status: DISCONTINUED | OUTPATIENT
Start: 2022-08-17 | End: 2022-01-01 | Stop reason: HOSPADM

## 2022-08-17 RX ORDER — MORPHINE SULFATE 4 MG/ML
8 INJECTION, SOLUTION INTRAMUSCULAR; INTRAVENOUS EVERY 4 HOURS PRN
Status: DISCONTINUED | OUTPATIENT
Start: 2022-08-17 | End: 2022-01-01 | Stop reason: HOSPADM

## 2022-08-17 RX ORDER — SODIUM CHLORIDE 9 MG/ML
INJECTION, SOLUTION INTRAVENOUS CONTINUOUS
Status: DISCONTINUED | OUTPATIENT
Start: 2022-08-17 | End: 2022-01-01 | Stop reason: HOSPADM

## 2022-08-17 RX ADMIN — SODIUM CHLORIDE: 0.9 INJECTION, SOLUTION INTRAVENOUS at 09:08

## 2022-08-17 RX ADMIN — MORPHINE SULFATE 4 MG: 4 INJECTION INTRAVENOUS at 11:08

## 2022-08-17 RX ADMIN — ACETAMINOPHEN 1000 MG: 500 TABLET ORAL at 05:08

## 2022-08-17 NOTE — NURSING
@1050 patient arrived for Iron infusion, anxious, V/S 165/90 - 98 - 19 - 97.2 - O2 Sat 97%. Patient states that she has had rectal bleeding since midnight. Assessment intrerrupted because she had to go the the restroom. Patient states that she is still bleeding and will need to reschedule appointment, says she is going to the ED.

## 2022-08-17 NOTE — ED PROVIDER NOTES
"Encounter Date: 8/17/2022    SCRIBE #1 NOTE: I, Yvonne Ross, am scribing for, and in the presence of, Mike Mott MD.       History     Chief Complaint   Patient presents with    Rectal Bleeding     Reports increased rectal bleeding since yesterday -- normally present only when wiping      Time seen by provider: 12:11 PM on 08/17/2022    Tessa Enriquez is a 66 y.o. female with a history of rectal cancer presents with increased rectal bleeding for the past 2-3 days and left sided abdominal "cramping" when laying on her left side. The patient was first diagnosed with rectal cancer in 2020. She was treated with chemotherapy and radiation then underwent surgery with Dr. MIRANDA Wagner. The cancer was in remission until it returned about one month ago. The patient is set to start chemotherapy pills soon and is followed by Dr. Frank, her oncologist. Patient states she normally has some rectal bleeding when she wipes "because of the cancer" but the past 2-3 days she reports more bright red blood mixed in with her stool that "runs out" of her rectum when she stands up. She also feels very lightheaded and dizzy. She was supposed to have her first iron infusion today but told the infusion staff that she was feeling near-syncopal and had a BP of 166/90 so they sent her to the ER instead. She does not receive RBCs on a regular basis and states the last time she received RBCs was post-operatively. The patient denies drug or alcohol use. She is not on anticoagulation. The patient does report feeling nauseated since the cancer returned. She denies vomiting, recent syncope, SOB, hematuria, or dysuria.    The history is provided by the patient.     Review of patient's allergies indicates:   Allergen Reactions    Ativan [lorazepam] Hives and Itching    Flagyl [metronidazole] Itching and Rash    Pcn [penicillins] Hives, Itching and Rash     Past Medical History:   Diagnosis Date    Acute hypoxemic respiratory failure 7/9/2018 "    Arthritis     Cancer     colon    COPD (chronic obstructive pulmonary disease)     History of home oxygen therapy     ONLY PRN AND HASN'T USED IT IN A YEAR    Hyperlipidemia     Thyroid disease      Past Surgical History:   Procedure Laterality Date    CHOLECYSTECTOMY      COLECTOMY N/A 11/9/2020    Procedure: COLECTOMY;  Surgeon: Brandon Wagner MD;  Location: [facility] OR;  Service: General;  Laterality: N/A;  LOWER- ANTERIOR    COLONOSCOPY N/A 10/23/2020    Procedure: COLONOSCOPY;  Surgeon: Jolynn Ayala MD;  Location: [facility] ENDO;  Service: Endoscopy;  Laterality: N/A;    COLONOSCOPY N/A 8/6/2021    Procedure: COLONOSCOPY;  Surgeon: Jolynn Ayala MD;  Location: [facility] ENDO;  Service: Endoscopy;  Laterality: N/A;    COLONOSCOPY N/A 7/7/2022    Procedure: COLONOSCOPY;  Surgeon: Jolynn Ayala MD;  Location: [facility] ENDO;  Service: Endoscopy;  Laterality: N/A;    ESOPHAGEAL DILATION N/A 6/29/2021    Procedure: DILATION, ESOPHAGUS;  Surgeon: Sourav Baires III, MD;  Location: [facility];  Service: Endoscopy;  Laterality: N/A;    ESOPHAGOGASTRODUODENOSCOPY  06/29/2021    Dilation to 57 Fr    ESOPHAGOGASTRODUODENOSCOPY N/A 6/29/2021    Procedure: EGD (ESOPHAGOGASTRODUODENOSCOPY);  Surgeon: Sourav Baires III, MD;  Location: [facility];  Service: Endoscopy;  Laterality: N/A;    FLEXIBLE SIGMOIDOSCOPY N/A 8/1/2022    Procedure: SIGMOIDOSCOPY, FLEXIBLE;  Surgeon: Brandon Wagner MD;  Location: [facility] ENDO;  Service: Endoscopy;  Laterality: N/A;    HYSTERECTOMY      INSERTION OF TUNNELED CENTRAL VENOUS CATHETER (CVC) WITH SUBCUTANEOUS PORT N/A 1/11/2021    Procedure: [id number];  Surgeon: Brandon Wagner MD;  Location: [facility] OR;  Service: General;  Laterality: N/A;    MEDIPORT REMOVAL Left 4/19/2021    Procedure: REMOVAL, CATHETER, CENTRAL VENOUS, TUNNELED, WITH PORT;  Surgeon: Brandon Wagner MD;  Location: [facility] OR;  Service: General;  Laterality: Left;    ROBOT-ASSISTED  COLECTOMY N/A 2020    Procedure: ROBOTIC COLECTOMY low anterior resection, possible open;  Surgeon: Brandon Wagner MD;  Location: [facility];  Service: General;  Laterality: N/A;  CONVERTED TO OPEN AT 1503     Family History   Problem Relation Age of Onset    COPD Mother     COPD Father     Breast cancer Maternal Grandmother      Social History     Tobacco Use    Smoking status: Former Smoker     Packs/day: 2.00     Years: 40.00     Pack years: 80.00     Types: Cigarettes     Quit date: 2017     Years since quittin.1    Smokeless tobacco: Never Used   Substance Use Topics    Alcohol use: No    Drug use: No     Review of Systems   Constitutional: Negative for activity change, diaphoresis and fever.   HENT: Negative for ear pain, rhinorrhea, sore throat and trouble swallowing.    Eyes: Negative for pain and visual disturbance.   Respiratory: Negative for cough, shortness of breath and stridor.    Cardiovascular: Negative for chest pain.   Gastrointestinal: Positive for abdominal pain, blood in stool and nausea. Negative for constipation, diarrhea and vomiting.   Genitourinary: Negative for dysuria, hematuria, vaginal bleeding and vaginal discharge.   Musculoskeletal: Negative for gait problem.   Skin: Negative for rash and wound.   Neurological: Positive for dizziness and light-headedness. Negative for seizures, syncope and headaches.   Hematological: Does not bruise/bleed easily.   Psychiatric/Behavioral: Negative for hallucinations and suicidal ideas.       Physical Exam     Initial Vitals [22 1121]   BP Pulse Resp Temp SpO2   136/76 89 16 98.1 °F (36.7 °C) 98 %      MAP       --         Physical Exam    Nursing note and vitals reviewed.  Constitutional: She appears well-developed. She is not diaphoretic. No distress.   HENT:   Head: Normocephalic and atraumatic.   Nose: Nose normal.   Eyes: EOM are normal. No scleral icterus.   Neck: Neck supple.   Normal range of motion.  Cardiovascular:  Normal rate, regular rhythm, normal heart sounds and intact distal pulses. Exam reveals no gallop and no friction rub.    No murmur heard.  Pulmonary/Chest: Breath sounds normal. No stridor. No respiratory distress. She has no wheezes. She has no rhonchi. She has no rales.   Abdominal: Abdomen is soft. Bowel sounds are normal. She exhibits no distension. There is no abdominal tenderness. There is no rebound and no guarding.   Genitourinary: Rectum:      Guaiac result positive.      No anal fissure.   Guaiac positive stool. : Acceptable.   Genitourinary Comments: No anal fissures, lesions, or abscess. No gross blood noted.  Female nurse chaperone with Tricia     Musculoskeletal:         General: Normal range of motion.      Cervical back: Normal range of motion and neck supple.     Neurological: She is alert and oriented to person, place, and time. She has normal strength. No cranial nerve deficit or sensory deficit.   Skin: Skin is warm and dry. Capillary refill takes less than 2 seconds. No rash noted.   Psychiatric: She has a normal mood and affect.         ED Course   Procedures  Labs Reviewed   CBC W/ AUTO DIFFERENTIAL - Abnormal; Notable for the following components:       Result Value    Hematocrit 36.7 (*)     Lymph # 0.5 (*)     Mono # 0.2 (*)     Gran % 78.9 (*)     Lymph % 12.3 (*)     All other components within normal limits   TYPE & SCREEN - Abnormal; Notable for the following components:    Indirect Iron POS (*)     All other components within normal limits   COMPREHENSIVE METABOLIC PANEL   LIPASE   ANTIBODY IDENTIFICATION          Imaging Results    None          Medications   acetaminophen tablet 1,000 mg (1,000 mg Oral Given 8/17/22 1720)     Medical Decision Making:   History:   Old Medical Records: I decided to obtain old medical records.  Clinical Tests:   Lab Tests: Ordered and Reviewed          Scribe Attestation:   Scribe #1: I performed the above scribed service and the  documentation accurately describes the services I performed. I attest to the accuracy of the note.        ED Course as of 08/17/22 1740   Wed Aug 17, 2022   1155 Flex Sig on 8/1/22             - Rectal mass 5 to 6 cm from the anal verge.                          - Patent end-to-end colo-rectal anastomosis,                          characterized by ulceration.                          - No specimens collected.  [BD]   1606 66-year-old female past medical history of COPD, hyperlipidemia, thyroid disease, colon cancer status post resection and chemo radiation now with recurrence of rectal cancer presents today with rectal bleeding.  Afebrile.  Vital signs reassuring.  Hemoglobin reassuring at 12.  Rectal exam no gross blood but hemoccult positive.  Most recent flex sig on the 1st of this month showed rectal mass 5-6 cm on the anal verge.  Will discuss with GI for further management. [BD]   1641 Discussed case with GI on-call, Dr. Vasquez, who is familiar with patient.  She recommends discharge versus observation for hemoglobin trend.  Discussed with patient who requested overnight observation as she does not feel safe as she lives over an hour away.  Patient accepted by [facility], Dr. Qureshi for further management. [BD]      ED Course User Index  [BD] Mike Mott MD             Attending Attestation:     Physician Attestation for Scribe:    I, Dr. Mike Mott, personally performed the services described in this documentation.   All medical record entries made by the scribe were at my direction and in my presence.   I have reviewed the chart and agree that the record is accurate and complete.   Mike Mott MD  5:39 PM 08/17/2022     DISCLAIMER: This note was prepared with [employer] Naturally Speaking voice recognition transcription software. Garbled syntax, mangled pronouns, and other bizarre constructions may be attributed to that software system.    Clinical Impression:   Final diagnoses:  [K62.5]  Rectal bleeding (Primary)  [K92.2] Lower GI bleed          ED Disposition Condition    Observation               Mike Mott MD  08/17/22 6319

## 2022-08-18 LAB
BASOPHILS # BLD AUTO: 0.03 K/UL (ref 0–0.2)
BASOPHILS NFR BLD: 0.8 % (ref 0–1.9)
BLOOD GROUP ANTIBODIES SERPL: NORMAL
DIFFERENTIAL METHOD: ABNORMAL
EOSINOPHIL # BLD AUTO: 0.2 K/UL (ref 0–0.5)
EOSINOPHIL NFR BLD: 4 % (ref 0–8)
ERYTHROCYTE [DISTWIDTH] IN BLOOD BY AUTOMATED COUNT: 13.2 % (ref 11.5–14.5)
HCT VFR BLD AUTO: 33.2 % (ref 37–48.5)
HGB BLD-MCNC: 10.8 G/DL (ref 12–16)
IMM GRANULOCYTES # BLD AUTO: 0.01 K/UL (ref 0–0.04)
IMM GRANULOCYTES NFR BLD AUTO: 0.3 % (ref 0–0.5)
LYMPHOCYTES # BLD AUTO: 0.7 K/UL (ref 1–4.8)
LYMPHOCYTES NFR BLD: 17.5 % (ref 18–48)
MCH RBC QN AUTO: 28.9 PG (ref 27–31)
MCHC RBC AUTO-ENTMCNC: 32.5 G/DL (ref 32–36)
MCV RBC AUTO: 89 FL (ref 82–98)
MONOCYTES # BLD AUTO: 0.3 K/UL (ref 0.3–1)
MONOCYTES NFR BLD: 8.5 % (ref 4–15)
NEUTROPHILS # BLD AUTO: 2.8 K/UL (ref 1.8–7.7)
NEUTROPHILS NFR BLD: 68.9 % (ref 38–73)
NRBC BLD-RTO: 0 /100 WBC
PLATELET # BLD AUTO: 201 K/UL (ref 150–450)
PMV BLD AUTO: 11 FL (ref 9.2–12.9)
RBC # BLD AUTO: 3.74 M/UL (ref 4–5.4)
WBC # BLD AUTO: 4 K/UL (ref 3.9–12.7)

## 2022-08-18 PROCEDURE — 99214 PR OFFICE/OUTPT VISIT, EST, LEVL IV, 30-39 MIN: ICD-10-PCS | Mod: ,,, | Performed by: INTERNAL MEDICINE

## 2022-08-18 PROCEDURE — G0378 HOSPITAL OBSERVATION PER HR: HCPCS

## 2022-08-18 PROCEDURE — 85025 COMPLETE CBC W/AUTO DIFF WBC: CPT | Performed by: HOSPITALIST

## 2022-08-18 PROCEDURE — 63600175 PHARM REV CODE 636 W HCPCS: Performed by: HOSPITALIST

## 2022-08-18 PROCEDURE — 96361 HYDRATE IV INFUSION ADD-ON: CPT

## 2022-08-18 PROCEDURE — 96376 TX/PRO/DX INJ SAME DRUG ADON: CPT

## 2022-08-18 PROCEDURE — 36415 COLL VENOUS BLD VENIPUNCTURE: CPT | Performed by: HOSPITALIST

## 2022-08-18 PROCEDURE — 99214 OFFICE O/P EST MOD 30 MIN: CPT | Mod: ,,, | Performed by: INTERNAL MEDICINE

## 2022-08-18 RX ORDER — GABAPENTIN 300 MG/1
300 CAPSULE ORAL NIGHTLY
Status: DISCONTINUED | OUTPATIENT
Start: 2022-08-18 | End: 2022-01-01 | Stop reason: HOSPADM

## 2022-08-18 RX ORDER — LANOLIN ALCOHOL/MO/W.PET/CERES
1 CREAM (GRAM) TOPICAL 2 TIMES DAILY
Status: DISCONTINUED | OUTPATIENT
Start: 2022-01-01 | End: 2022-01-01 | Stop reason: HOSPADM

## 2022-08-18 RX ADMIN — MORPHINE SULFATE 4 MG: 4 INJECTION INTRAVENOUS at 10:08

## 2022-08-18 RX ADMIN — MORPHINE SULFATE 4 MG: 4 INJECTION INTRAVENOUS at 03:08

## 2022-08-18 NOTE — PLAN OF CARE
[facility]  Initial Discharge Assessment       Primary Care Provider: Karma Mcdermott MD    Admission Diagnosis: Rectal bleeding [K62.5]  Lower GI bleed [K92.2]    Admission Date: 8/17/2022  Expected Discharge Date: 8/19/2022    Discharge Barriers Identified: None    Payor: CATHIE CROSS BLUE SHIELD / Plan: BLUE CONNECT / Product Type: HMO /     Extended Emergency Contact Information  Primary Emergency Contact: ZoeUmu   [facility]  Home Phone: [phone]  Mobile Phone: [phone]  Relation: Relative   needed? No    Discharge Plan A: Home  Discharge Plan B: Home with family      [id number] - [place] - [address]  [address] [address]  Phone: [phone] Fax: [phone]    SW met with patient at bedside to complete discharge planning assessment.  Patient alert and oriented xs 4.  Patient verified all demographic information on facesheet is correct.  Patient verified PCP is Dr. Mcdermott.  Patient verified primary health insurance is BCBS.  Patient with NO home health or DME.  Patient with NO POA or Living Will.  Patient not on dialysis or medication coumadin.  Patient with no 30 day admission.  Patient with no financial issues at this time.  Patient family will provide transportation upon discharge from facility.  Patient independent with ADLs, live with roommate, drives self.      Initial Assessment (most recent)     Adult Discharge Assessment - 08/18/22 1629        Discharge Assessment    Assessment Type Discharge Planning Assessment     Confirmed/corrected address, phone number and insurance Yes     Confirmed Demographics Correct on Facesheet     Source of Information patient     Does patient/caregiver understand observation status Yes     Communicated DERICK with patient/caregiver Yes     Lives With other (see comments)     Facility Arrived From: home     Do you expect to return to your current living situation? Yes      Do you have help at home or someone to help you manage your care at home? Yes     Who are your caregiver(s) and their phone number(s)? self     Prior to hospitilization cognitive status: Alert/Oriented     Current cognitive status: Alert/Oriented     Walking or Climbing Stairs Difficulty none     Dressing/Bathing Difficulty none     Equipment Currently Used at Home none     Readmission within 30 days? No     Patient currently being followed by outpatient case management? No     Do you currently have service(s) that help you manage your care at home? No     Do you take prescription medications? Yes     Do you have prescription coverage? Yes     Do you have any problems affording any of your prescribed medications? No     Is the patient taking medications as prescribed? yes     Who is going to help you get home at discharge? self     How do you get to doctors appointments? car, drives self     Are you on dialysis? No     Do you take coumadin? No     Discharge Plan A Home     Discharge Plan B Home with family     DME Needed Upon Discharge  none     Discharge Plan discussed with: Patient     Discharge Barriers Identified None

## 2022-08-18 NOTE — PROGRESS NOTES
Pt seen and examined  Admitted yesterday secondary to increase blood loss  No abd pain.  No n/v.  No bleeding reported overnight.      Remains hemodynamically stable with minimal drop in Hgb overnight.    Reviewed results of MRI with pt    D/w pt.  Node along iliac chain worrisome for systemic disease.  Will discuss in MDT but believe census will be to proceed with chemotherapy prior to surgery

## 2022-08-18 NOTE — PLAN OF CARE
[facility]  Discharge Reassessment    Primary Care Provider: Karma Mcdermott MD    Expected Discharge Date: 8/19/2022     Per MDRs, patient not medically ready for discharge today possible tomorrow.  GI following patient.  CM following for discharge planning needs.    Reassessment (most recent)     Discharge Reassessment - 08/18/22 1631        Discharge Reassessment    Assessment Type Discharge Planning Reassessment     Did the patient's condition or plan change since previous assessment? Yes     Discharge Plan discussed with: Patient     Communicated DERICK with patient/caregiver Yes     Discharge Plan A Home     Discharge Plan B Home with family     DME Needed Upon Discharge  none     Discharge Barriers Identified None     Why the patient remains in the hospital Requires continued medical care

## 2022-08-18 NOTE — PLAN OF CARE
POC discussed with patient, verbalized understanding. IV to right ac intact and patent with cdi dressing. Pt with no bleeding since admission. Briefs and wipes at bedside. Pt only complained of headache pain and was given morphine once for 7/10. Safety maintained and patient was instructed to call for needs.

## 2022-08-18 NOTE — CONSULTS
[facility]     CC: Rectal bleeding    HPI 66 y.o. female well known to me, unfortunately with large, recurrent, rectosigmoid cancer diagnosed last month, who is admitted with worsening of chronic rectal bleeding. She notes some dizziness with standing. Her lower abdominal pain has improved since the last time I saw her, though she continues to have LLQ pain/cramping after eating. She reports her bowel movements are regular. She is also followed by Dr. Wagner and Dr. Frank, with plans to begin chemotherapy in near future.     Past Medical History:   Diagnosis Date    Acute hypoxemic respiratory failure 7/9/2018    Arthritis     Cancer     colon    COPD (chronic obstructive pulmonary disease)     History of home oxygen therapy     ONLY PRN AND HASN'T USED IT IN A YEAR    Hyperlipidemia     Thyroid disease        Past Surgical History:   Procedure Laterality Date    CHOLECYSTECTOMY      COLECTOMY N/A 11/9/2020    Procedure: COLECTOMY;  Surgeon: Brandon Wagner MD;  Location: [facility];  Service: General;  Laterality: N/A;  LOWER- ANTERIOR    COLONOSCOPY N/A 10/23/2020    Procedure: COLONOSCOPY;  Surgeon: Jolynn Ayala MD;  Location: [facility];  Service: Endoscopy;  Laterality: N/A;    COLONOSCOPY N/A 8/6/2021    Procedure: COLONOSCOPY;  Surgeon: Jolynn Ayala MD;  Location: [facility];  Service: Endoscopy;  Laterality: N/A;    COLONOSCOPY N/A 7/7/2022    Procedure: COLONOSCOPY;  Surgeon: Jolynn Ayala MD;  Location: [facility];  Service: Endoscopy;  Laterality: N/A;    ESOPHAGEAL DILATION N/A 6/29/2021    Procedure: DILATION, ESOPHAGUS;  Surgeon: Sourav Baires III, MD;  Location: [facility];  Service: Endoscopy;  Laterality: N/A;    ESOPHAGOGASTRODUODENOSCOPY  06/29/2021    Dilation to 57 Fr    ESOPHAGOGASTRODUODENOSCOPY N/A 6/29/2021    Procedure: EGD (ESOPHAGOGASTRODUODENOSCOPY);  Surgeon: Sourav Baires III, MD;  Location: [facility];  Service: Endoscopy;  Laterality:  N/A;    FLEXIBLE SIGMOIDOSCOPY N/A 2022    Procedure: SIGMOIDOSCOPY, FLEXIBLE;  Surgeon: Brandon Wagner MD;  Location: [facility] ENDO;  Service: Endoscopy;  Laterality: N/A;    HYSTERECTOMY      INSERTION OF TUNNELED CENTRAL VENOUS CATHETER (CVC) WITH SUBCUTANEOUS PORT N/A 2021    Procedure: [id number];  Surgeon: Brandon Wagner MD;  Location: [facility] OR;  Service: General;  Laterality: N/A;    MEDIPORT REMOVAL Left 2021    Procedure: REMOVAL, CATHETER, CENTRAL VENOUS, TUNNELED, WITH PORT;  Surgeon: Brandon Wagner MD;  Location: [facility] OR;  Service: General;  Laterality: Left;    ROBOT-ASSISTED COLECTOMY N/A 2020    Procedure: ROBOTIC COLECTOMY low anterior resection, possible open;  Surgeon: Brandon Wagner MD;  Location: [facility] OR;  Service: General;  Laterality: N/A;  CONVERTED TO OPEN AT 1503       Social History     Tobacco Use    Smoking status: Former Smoker     Packs/day: 2.00     Years: 40.00     Pack years: 80.00     Types: Cigarettes     Quit date: 2017     Years since quittin.1    Smokeless tobacco: Never Used   Substance Use Topics    Alcohol use: No    Drug use: No       Family History   Problem Relation Age of Onset    COPD Mother     COPD Father     Breast cancer Maternal Grandmother        Allergies and Medications reviewed     Review of Systems  General ROS: negative for - chills, fever or weight loss  Psychological ROS: negative for - hallucination, depression or suicidal ideation  Ophthalmic ROS: negative for - blurry vision, photophobia or eye pain  ENT ROS: negative for - epistaxis, sore throat or rhinorrhea  Respiratory ROS: no cough, shortness of breath, or wheezing  Cardiovascular ROS: no chest pain or dyspnea on exertion  Gastrointestinal ROS: + rectal bleeding, + abdominal cramping, no vomiting  Genito-Urinary ROS: no dysuria, trouble voiding, or hematuria  Musculoskeletal ROS: negative for - arthralgia, myalgia; + weakness  Neurological ROS: no  "syncope or seizures; no ataxia  Dermatological ROS: negative for pruritis, rash and jaundice    Physical Examination  /68   Pulse 77   Temp 97 °F (36.1 °C)   Resp 16   Ht 5' 1" (1.549 m)   Wt 90.1 kg (198 lb 10.2 oz)   SpO2 97%   Breastfeeding No   BMI 37.53 kg/m²   General appearance: alert, cooperative, no distress  HENT: Normocephalic, atraumatic, neck symmetrical, no nasal discharge   Eyes: conjunctivae/corneas clear, PERRL, EOM's intact, sclera anicteric  Lungs: clear to auscultation bilaterally, no dullness to percussion bilaterally, symmetric expansion, breathing unlabored  Heart: regular rate and rhythm without rub; no displacement of the PMI   Abdomen: obese, soft, no significant tenderness, BS active  Extremities: extremities symmetric; no clubbing, cyanosis, or edema  Integument: Skin color, texture, turgor normal; no rashes; hair distrubution normal, no jaundice  Neurologic: Alert and oriented X 3, no focal sensory or motor neurologic deficits  Psychiatric: no pressured speech; normal affect; no evidence of impaired cognition, no anxiety/depression     Labs:  Lab Results   Component Value Date    WBC 4.00 08/18/2022    HGB 10.8 (L) 08/18/2022    HCT 33.2 (L) 08/18/2022    MCV 89 08/18/2022     08/18/2022       CMP  Sodium   Date Value Ref Range Status   08/17/2022 139 136 - 145 mmol/L Final     Potassium   Date Value Ref Range Status   08/17/2022 4.3 3.5 - 5.1 mmol/L Final     Chloride   Date Value Ref Range Status   08/17/2022 103 95 - 110 mmol/L Final     CO2   Date Value Ref Range Status   08/17/2022 26 23 - 29 mmol/L Final     Glucose   Date Value Ref Range Status   08/17/2022 95 70 - 110 mg/dL Final     BUN   Date Value Ref Range Status   08/17/2022 12 8 - 23 mg/dL Final     Creatinine   Date Value Ref Range Status   08/17/2022 0.8 0.5 - 1.4 mg/dL Final     Calcium   Date Value Ref Range Status   08/17/2022 9.0 8.7 - 10.5 mg/dL Final     Total Protein   Date Value Ref Range " Status   08/17/2022 7.2 6.0 - 8.4 g/dL Final     Albumin   Date Value Ref Range Status   08/17/2022 3.6 3.5 - 5.2 g/dL Final     Total Bilirubin   Date Value Ref Range Status   08/17/2022 0.4 0.1 - 1.0 mg/dL Final     Comment:     For infants and newborns, interpretation of results should be based  on gestational age, weight and in agreement with clinical  observations.    Premature Infant recommended reference ranges:  Up to 24 hours.............<8.0 mg/dL  Up to 48 hours............<12.0 mg/dL  3-5 days..................<15.0 mg/dL  6-29 days.................<15.0 mg/dL       Alkaline Phosphatase   Date Value Ref Range Status   08/17/2022 94 55 - 135 U/L Final     AST   Date Value Ref Range Status   08/17/2022 11 10 - 40 U/L Final     ALT   Date Value Ref Range Status   08/17/2022 11 10 - 44 U/L Final     Anion Gap   Date Value Ref Range Status   08/17/2022 10 8 - 16 mmol/L Final     eGFR if    Date Value Ref Range Status   05/02/2022 >60 >60 mL/min/1.73 m^2 Final     eGFR if non    Date Value Ref Range Status   05/02/2022 >60 >60 mL/min/1.73 m^2 Final     Comment:     Calculation used to obtain the estimated glomerular filtration  rate (eGFR) is the CKD-EPI equation.        May 2022- Hgb- 10  -Ferritin- 11    Imaging:  MRI rectum was independently visualized and reviewed by me and showed Findings compatible with disease recurrence at the rectosigmoid anastomosis, with extramural invasion as described above.  Accompanying tethering to an adjacent loop of small bowel is present.  An abnormal right external iliac chain node is suspicious for pietro spread of disease.    Assessment:   66 y.o. female known to me with recurrent colorectal cancer (large rectosigmoid mass), who is admitted with worsening of chronic rectal bleeding that is due to her colon mass. She is hemodynamically stable though dizzy when standing.     Plan:  -CBC in AM  -Ok for low residue diet  -No plans for endoscopy  at this time  -Follow up with Dr. Frank for chemotherapy and IV iron   -Appreciate Dr. Wagner evaluation  -If able to tolerate diet and hemoglobin does not significantly decrease will be ok for discharge tomorrow    Jolynn Ayala MD  [facility]  [address], [address]  [place] [address]  Office: [phone]  Fax: [phone]

## 2022-08-18 NOTE — NURSING
Nurses Note -- 4 Eyes    8/17/22  2200      Skin assessed during: ALL      [x] No Pressure Injuries Present    []Prevention Measures Documented      [] Yes- Altered Skin Integrity Present or Discovered   [] LDA Added if Not in Epic (Describe Wound)   [] New Altered Skin Integrity was Present on Admit and Documented in LDA   [] Wound Image Taken    Wound Care Consulted? {YES/NO:43268}    Attending Nurse:  Jannette Romo RN     Second RN/Staff Member:  ***

## 2022-08-19 NOTE — PLAN OF CARE
Problem: Adult Inpatient Plan of Care  Goal: Plan of Care Review  8/19/2022 0529 by Juanita Delatorre RN  Outcome: Ongoing, Progressing  8/19/2022 0528 by Juanita Delatorre RN  Outcome: Ongoing, Progressing     Problem: Adult Inpatient Plan of Care  Goal: Patient-Specific Goal (Individualized)  8/19/2022 0529 by Juanita Delatorre RN  Outcome: Ongoing, Progressing  8/19/2022 0528 by Juanita Delatorre RN  Outcome: Ongoing, Progressing     Plan of care reviewed with patient - verbalized understanding. Alert + oriented. Room air. NSR. PRN pain medication given as indicated with relief in symptoms. mIVF infusing to piv. Ambulates to bathroom with standby assistance. Several Bms overnight - patient endorses this is normal for her. Dizziness upon repositioning from lying to sitting on edge of bed. NAEON. Safety measures in place.

## 2022-08-19 NOTE — ASSESSMENT & PLAN NOTE
Body mass index is 37.53 kg/m². Morbid obesity complicates all aspects of disease management from diagnostic modalities to treatment. Weight loss encouraged and health benefits explained to patient.

## 2022-08-19 NOTE — ASSESSMENT & PLAN NOTE
Probably due to the cancer.  Consulting with GI and colorectal surgeon.  Monitor HGB.  Monitor for further active bleeding.  Avoid blood thinners.

## 2022-08-19 NOTE — PLAN OF CARE
The pt is cleared for discharge home from case management after being seen by hospital medicine and has follow up appts added to her avs.    08/19/22 8141   Final Note   Assessment Type Final Discharge Note   Anticipated Discharge Disposition Home   Hospital Resources/Appts/Education Provided Appointments scheduled and added to AVS

## 2022-08-19 NOTE — PROGRESS NOTES
"[facility] Note       CC: Rectal bleeding     HPI 66 y.o. female well known to me, unfortunately with large, recurrent, rectosigmoid cancer diagnosed last month, who is admitted with worsening of chronic rectal bleeding. She notes some dizziness with standing. Her lower abdominal pain has improved since the last time I saw her, though she continues to have LLQ pain/cramping after eating. She reports her bowel movements are regular. She is also followed by Dr. Wagner and Dr. Frank, with plans to begin chemotherapy in near future.     Interval History:  Patient without acute events, tolerating diet. She is receiving IV iron before discharge home. She continues to see rectal bleeding, which we again discussed is expected   Past Medical History:   Diagnosis Date    Acute hypoxemic respiratory failure 7/9/2018    Arthritis     Cancer     colon    COPD (chronic obstructive pulmonary disease)     History of home oxygen therapy     ONLY PRN AND HASN'T USED IT IN A YEAR    Hyperlipidemia     Thyroid disease        Allergies and Medications reviewed     Review of Systems  General ROS: negative for - chills, fever or weight loss  Cardiovascular ROS: no chest pain or dyspnea on exertion  Gastrointestinal ROS: improved abdominal pain, no vomiting, + rectal bleeding    Physical Examination  BP (!) 124/58   Pulse 72   Temp 98 °F (36.7 °C)   Resp 18   Ht 5' 1" (1.549 m)   Wt 90.1 kg (198 lb 10.2 oz)   SpO2 95%   Breastfeeding No   BMI 37.53 kg/m²   General appearance: alert, cooperative, no distress  HENT: Normocephalic, atraumatic, neck symmetrical, no nasal discharge, sclera anicteric   Lungs: clear to auscultation bilaterally, symmetric chest wall expansion bilaterally  Heart: regular rate and rhythm without rub; no displacement of the PMI   Abdomen: soft, nontender,nondistended   Extremities: extremities symmetric; no clubbing, cyanosis, or edema        Labs:  Lab Results   Component Value Date    " WBC 3.63 (L) 08/19/2022    HGB 10.5 (L) 08/19/2022    HCT 31.8 (L) 08/19/2022    MCV 89 08/19/2022     08/19/2022           Assessment:   66 y.o. female known to me with recurrent colorectal cancer (large rectosigmoid mass), who is admitted with worsening of chronic rectal bleeding that is due to her colon mass. She is hemodynamically stable    Plan:  -Agree with discharge after IV iron  -Follow up with Dr. Wagner and Dr. Zac Ayala MD  [facility]  [address], [address]  [place] [address]  Office: [phone]  Fax: [phone]

## 2022-08-19 NOTE — NURSING
D/c education provided, pt verbalized understanding. PIV and tele removed, belongings in hand, pt left floor safely via wheelchair.

## 2022-08-19 NOTE — HPI
Patient presented to our ED with bleeding per rectum.  Began roughly three days ago.  Associated with a tightness-like pain in the left upper abdomen.  Also associated with dizziness and mild fatigue.  She has a significant intestinal history, which includes colorectal cancer treated with chemotherapy, radiation, and surgical resection.  About a month or so ago, she was diagnosed with recurrence at the anastamosis.  Also has an enlarged lymph node in the abdomen.  She is due to start chemotherapy again, which will be followed by surgical resection.  She has COPD and is on home oxygen supplementation.

## 2022-08-19 NOTE — H&P
[facility] Hospital Medicine  History & Physical    Patient Name: Tessa Enriquez  MRN: [id number]  Patient Class: OP- Observation  Admission Date: 8/17/2022  Attending Physician: Dhruv Qureshi MD   Primary Care Provider: Karma Mcdermott MD         Patient information was obtained from patient, past medical records and ER records.     Subjective:     Principal Problem:Rectal bleeding    Chief Complaint:   Chief Complaint   Patient presents with    Rectal Bleeding     Reports increased rectal bleeding since yesterday -- normally present only when wiping         HPI: Patient presented to our ED with bleeding per rectum.  Began roughly three days ago.  Associated with a tightness-like pain in the left upper abdomen.  Also associated with dizziness and mild fatigue.  She has a significant intestinal history, which includes colorectal cancer treated with chemotherapy, radiation, and surgical resection.  About a month or so ago, she was diagnosed with recurrence at the anastamosis.  Also has an enlarged lymph node in the abdomen.  She is due to start chemotherapy again, which will be followed by surgical resection.  She has COPD and is on home oxygen supplementation.      Past Medical History:   Diagnosis Date    Acute hypoxemic respiratory failure 7/9/2018    Arthritis     Cancer     colon    COPD (chronic obstructive pulmonary disease)     History of home oxygen therapy     ONLY PRN AND HASN'T USED IT IN A YEAR    Hyperlipidemia     Thyroid disease        Past Surgical History:   Procedure Laterality Date    CHOLECYSTECTOMY      COLECTOMY N/A 11/9/2020    Procedure: COLECTOMY;  Surgeon: Brandon Wagner MD;  Location: [facility] OR;  Service: General;  Laterality: N/A;  LOWER- ANTERIOR    COLONOSCOPY N/A 10/23/2020    Procedure: COLONOSCOPY;  Surgeon: Jolynn Ayala MD;  Location: [facility];  Service: Endoscopy;  Laterality: N/A;    COLONOSCOPY N/A 8/6/2021    Procedure: COLONOSCOPY;   Surgeon: Jolynn Ayala MD;  Location: [facility] ENDO;  Service: Endoscopy;  Laterality: N/A;    COLONOSCOPY N/A 7/7/2022    Procedure: COLONOSCOPY;  Surgeon: Jolynn Ayala MD;  Location: [facility] ENDO;  Service: Endoscopy;  Laterality: N/A;    ESOPHAGEAL DILATION N/A 6/29/2021    Procedure: DILATION, ESOPHAGUS;  Surgeon: Sourav Baires III, MD;  Location: [facility] ENDO;  Service: Endoscopy;  Laterality: N/A;    ESOPHAGOGASTRODUODENOSCOPY  06/29/2021    Dilation to 57 Fr    ESOPHAGOGASTRODUODENOSCOPY N/A 6/29/2021    Procedure: EGD (ESOPHAGOGASTRODUODENOSCOPY);  Surgeon: Sourav Baires III, MD;  Location: [facility] ENDO;  Service: Endoscopy;  Laterality: N/A;    FLEXIBLE SIGMOIDOSCOPY N/A 8/1/2022    Procedure: SIGMOIDOSCOPY, FLEXIBLE;  Surgeon: Brandon Wagner MD;  Location: [facility] ENDO;  Service: Endoscopy;  Laterality: N/A;    HYSTERECTOMY      INSERTION OF TUNNELED CENTRAL VENOUS CATHETER (CVC) WITH SUBCUTANEOUS PORT N/A 1/11/2021    Procedure: [id number];  Surgeon: Brandon Wagner MD;  Location: [facility] OR;  Service: General;  Laterality: N/A;    MEDIPORT REMOVAL Left 4/19/2021    Procedure: REMOVAL, CATHETER, CENTRAL VENOUS, TUNNELED, WITH PORT;  Surgeon: Brandon Wagner MD;  Location: [facility] OR;  Service: General;  Laterality: Left;    ROBOT-ASSISTED COLECTOMY N/A 11/9/2020    Procedure: ROBOTIC COLECTOMY low anterior resection, possible open;  Surgeon: Brandon Wagner MD;  Location: [facility] OR;  Service: General;  Laterality: N/A;  CONVERTED TO OPEN AT 1503       Review of patient's allergies indicates:   Allergen Reactions    Ativan [lorazepam] Hives and Itching    Flagyl [metronidazole] Itching and Rash    Pcn [penicillins] Hives, Itching and Rash       No current facility-administered medications on file prior to encounter.     Current Outpatient Medications on File Prior to Encounter   Medication Sig    capecitabine (XELODA) 150 MG tablet Take 9 tablets (1,350 mg total) by mouth 2  (two) times daily with 1 other capecitabine prescription for 1,850 mg total.    capecitabine (XELODA) 500 MG Tab Take 1 tablet (500 mg total) by mouth 2 (two) times daily with 1 other capecitabine prescription for 1,850 mg total.    CMPD hydrocortisone 2%- LIDOcaine 3% suppository (RECTAL ROCKET) Place 1 suppository rectally every evening. Insert 1 suppository 3/4 of the way onto rectum. Wet for easier application. Repeat for 3 nights. (Patient not taking: No sig reported)    cyanocobalamin 1,000 mcg/mL injection Inject 1 mL (1,000 mcg total) into the muscle every 14 (fourteen) days.    ferrous sulfate 325 (65 FE) MG EC tablet Take 325 mg by mouth 2 (two) times daily.    gabapentin (NEURONTIN) 300 MG capsule Take 1 capsule (300 mg total) by mouth every evening. (Patient not taking: No sig reported)    levothyroxine (SYNTHROID) 125 MCG tablet Take 1 tablet (125 mcg total) by mouth before breakfast.    ondansetron (ZOFRAN-ODT) 4 MG TbDL Take 1 tablet (4 mg total) by mouth every 6 (six) hours as needed (nausea).    ondansetron (ZOFRAN-ODT) 8 MG TbDL Take 1 tablet (8 mg total) by mouth every 12 (twelve) hours as needed.    prochlorperazine (COMPAZINE) 10 MG tablet Take 1 tablet (10 mg total) by mouth every 6 (six) hours as needed.    traMADoL (ULTRAM) 50 mg tablet TAKE ONE TABLET BY MOUTH EVERY SIX HOURS AS NEEDED FOR PAIN (Patient not taking: No sig reported)     Family History       Problem Relation (Age of Onset)    Breast cancer Maternal Grandmother    COPD Mother, Father          Tobacco Use    Smoking status: Former Smoker     Packs/day: 2.00     Years: 40.00     Pack years: 80.00     Types: Cigarettes     Quit date: 2017     Years since quittin.1    Smokeless tobacco: Never Used   Substance and Sexual Activity    Alcohol use: No    Drug use: No    Sexual activity: Not Currently     Partners: Male     Review of Systems   Constitutional:  Positive for fatigue.   Respiratory:  Negative for  cough and shortness of breath.    Cardiovascular:  Negative for chest pain.   Gastrointestinal:  Positive for abdominal pain and blood in stool. Negative for diarrhea, nausea and vomiting.   All other systems reviewed and are negative.  Objective:     Vital Signs (Most Recent):  Temp: 98.1 °F (36.7 °C) (08/18/22 1951)  Pulse: 75 (08/18/22 1951)  Resp: 15 (08/18/22 1951)  BP: 139/68 (08/18/22 1951)  SpO2: 96 % (08/18/22 1951) Vital Signs (24h Range):  Temp:  [96.1 °F (35.6 °C)-98.1 °F (36.7 °C)] 98.1 °F (36.7 °C)  Pulse:  [69-79] 75  Resp:  [15-18] 15  SpO2:  [94 %-97 %] 96 %  BP: (113-140)/(56-68) 139/68     Weight: 90.1 kg (198 lb 10.2 oz)  Body mass index is 37.53 kg/m².    Physical Exam  Constitutional:       General: She is not in acute distress.     Appearance: She is not diaphoretic.   HENT:      Head: Normocephalic and atraumatic.      Right Ear: External ear normal.      Left Ear: External ear normal.      Mouth/Throat:      Pharynx: No oropharyngeal exudate.   Eyes:      General: No scleral icterus.        Right eye: No discharge.         Left eye: No discharge.      Conjunctiva/sclera: Conjunctivae normal.   Neck:      Thyroid: No thyromegaly.      Vascular: No JVD.   Cardiovascular:      Rate and Rhythm: Normal rate and regular rhythm.      Heart sounds:     No gallop.   Pulmonary:      Effort: Pulmonary effort is normal.      Breath sounds: Normal breath sounds. No wheezing.   Abdominal:      General: Bowel sounds are normal. There is no distension.      Palpations: Abdomen is soft. There is no hepatomegaly or mass.      Tenderness: There is no abdominal tenderness.   Musculoskeletal:         General: No deformity.      Cervical back: Normal range of motion and neck supple.   Lymphadenopathy:      Cervical: No cervical adenopathy.   Skin:     General: Skin is warm and dry.      Findings: No rash.   Neurological:      Mental Status: She is alert and oriented to person, place, and time.   Psychiatric:          Behavior: Behavior normal. Behavior is cooperative.           Significant Labs: CBC:   Recent Labs   Lab 08/17/22  1236 08/17/22  2241 08/18/22  0508   WBC 4.07 5.21 4.00   HGB 12.3 11.5* 10.8*   HCT 36.7* 34.5* 33.2*    219 201       Significant Imaging:  None    Assessment/Plan:     * Rectal bleeding  Probably due to the cancer.  Consulting with GI and colorectal surgeon.  Monitor HGB.  Monitor for further active bleeding.  Avoid blood thinners.      Severe obesity (BMI 35.0-39.9) with comorbidity  Body mass index is 37.53 kg/m². Morbid obesity complicates all aspects of disease management from diagnostic modalities to treatment. Weight loss encouraged and health benefits explained to patient.         Malignant neoplasm of colon  She's due to get chemo soon and then will have surgical resection.      Acquired hypothyroidism  Continue her usual dose of levothyroxine.  Stable.      COPD (chronic obstructive pulmonary disease)  Stable.  No acute issues.        VTE Risk Mitigation (From admission, onward)         Ordered     IP VTE HIGH RISK PATIENT  Once         08/17/22 2140     Place sequential compression device  Until discontinued         08/17/22 2140     Reason for No Pharmacological VTE Prophylaxis  Once        Question:  Reasons:  Answer:  Active Bleeding    08/17/22 2140                   Dhruv Qureshi MD  Department of Hospital Medicine   [facility]

## 2022-08-19 NOTE — SUBJECTIVE & OBJECTIVE
Past Medical History:   Diagnosis Date    Acute hypoxemic respiratory failure 7/9/2018    Arthritis     Cancer     colon    COPD (chronic obstructive pulmonary disease)     History of home oxygen therapy     ONLY PRN AND HASN'T USED IT IN A YEAR    Hyperlipidemia     Thyroid disease        Past Surgical History:   Procedure Laterality Date    CHOLECYSTECTOMY      COLECTOMY N/A 11/9/2020    Procedure: COLECTOMY;  Surgeon: Brandon Wagner MD;  Location: [facility];  Service: General;  Laterality: N/A;  LOWER- ANTERIOR    COLONOSCOPY N/A 10/23/2020    Procedure: COLONOSCOPY;  Surgeon: Jolynn Ayala MD;  Location: [facility] ENDO;  Service: Endoscopy;  Laterality: N/A;    COLONOSCOPY N/A 8/6/2021    Procedure: COLONOSCOPY;  Surgeon: Jolynn Ayala MD;  Location: [facility] ENDO;  Service: Endoscopy;  Laterality: N/A;    COLONOSCOPY N/A 7/7/2022    Procedure: COLONOSCOPY;  Surgeon: Jolynn Ayala MD;  Location: [facility] ENDO;  Service: Endoscopy;  Laterality: N/A;    ESOPHAGEAL DILATION N/A 6/29/2021    Procedure: DILATION, ESOPHAGUS;  Surgeon: Sourav Baires III, MD;  Location: [facility];  Service: Endoscopy;  Laterality: N/A;    ESOPHAGOGASTRODUODENOSCOPY  06/29/2021    Dilation to 57 Fr    ESOPHAGOGASTRODUODENOSCOPY N/A 6/29/2021    Procedure: EGD (ESOPHAGOGASTRODUODENOSCOPY);  Surgeon: Sourav Baires III, MD;  Location: [facility];  Service: Endoscopy;  Laterality: N/A;    FLEXIBLE SIGMOIDOSCOPY N/A 8/1/2022    Procedure: SIGMOIDOSCOPY, FLEXIBLE;  Surgeon: Brandon Wagner MD;  Location: [facility];  Service: Endoscopy;  Laterality: N/A;    HYSTERECTOMY      INSERTION OF TUNNELED CENTRAL VENOUS CATHETER (CVC) WITH SUBCUTANEOUS PORT N/A 1/11/2021    Procedure: [id number];  Surgeon: Brandon Wagner MD;  Location: [facility];  Service: General;  Laterality: N/A;    MEDIPORT REMOVAL Left 4/19/2021    Procedure: REMOVAL, CATHETER, CENTRAL VENOUS, TUNNELED, WITH PORT;  Surgeon: Brandon Wagner MD;  Location:  [facility] OR;  Service: General;  Laterality: Left;    ROBOT-ASSISTED COLECTOMY N/A 11/9/2020    Procedure: ROBOTIC COLECTOMY low anterior resection, possible open;  Surgeon: Brandon Wagner MD;  Location: [facility] OR;  Service: General;  Laterality: N/A;  CONVERTED TO OPEN AT 1503       Review of patient's allergies indicates:   Allergen Reactions    Ativan [lorazepam] Hives and Itching    Flagyl [metronidazole] Itching and Rash    Pcn [penicillins] Hives, Itching and Rash       No current facility-administered medications on file prior to encounter.     Current Outpatient Medications on File Prior to Encounter   Medication Sig    capecitabine (XELODA) 150 MG tablet Take 9 tablets (1,350 mg total) by mouth 2 (two) times daily with 1 other capecitabine prescription for 1,850 mg total.    capecitabine (XELODA) 500 MG Tab Take 1 tablet (500 mg total) by mouth 2 (two) times daily with 1 other capecitabine prescription for 1,850 mg total.    CMPD hydrocortisone 2%- LIDOcaine 3% suppository (RECTAL ROCKET) Place 1 suppository rectally every evening. Insert 1 suppository 3/4 of the way onto rectum. Wet for easier application. Repeat for 3 nights. (Patient not taking: No sig reported)    cyanocobalamin 1,000 mcg/mL injection Inject 1 mL (1,000 mcg total) into the muscle every 14 (fourteen) days.    ferrous sulfate 325 (65 FE) MG EC tablet Take 325 mg by mouth 2 (two) times daily.    gabapentin (NEURONTIN) 300 MG capsule Take 1 capsule (300 mg total) by mouth every evening. (Patient not taking: No sig reported)    levothyroxine (SYNTHROID) 125 MCG tablet Take 1 tablet (125 mcg total) by mouth before breakfast.    ondansetron (ZOFRAN-ODT) 4 MG TbDL Take 1 tablet (4 mg total) by mouth every 6 (six) hours as needed (nausea).    ondansetron (ZOFRAN-ODT) 8 MG TbDL Take 1 tablet (8 mg total) by mouth every 12 (twelve) hours as needed.    prochlorperazine (COMPAZINE) 10 MG tablet Take 1 tablet (10 mg total) by mouth every 6 (six) hours as  needed.    traMADoL (ULTRAM) 50 mg tablet TAKE ONE TABLET BY MOUTH EVERY SIX HOURS AS NEEDED FOR PAIN (Patient not taking: No sig reported)     Family History       Problem Relation (Age of Onset)    Breast cancer Maternal Grandmother    COPD Mother, Father          Tobacco Use    Smoking status: Former Smoker     Packs/day: 2.00     Years: 40.00     Pack years: 80.00     Types: Cigarettes     Quit date: 2017     Years since quittin.1    Smokeless tobacco: Never Used   Substance and Sexual Activity    Alcohol use: No    Drug use: No    Sexual activity: Not Currently     Partners: Male     Review of Systems   Constitutional:  Positive for fatigue.   Respiratory:  Negative for cough and shortness of breath.    Cardiovascular:  Negative for chest pain.   Gastrointestinal:  Positive for abdominal pain and blood in stool. Negative for diarrhea, nausea and vomiting.   All other systems reviewed and are negative.  Objective:     Vital Signs (Most Recent):  Temp: 98.1 °F (36.7 °C) (22)  Pulse: 75 (22)  Resp: 15 (22)  BP: 139/68 (22)  SpO2: 96 % (22) Vital Signs (24h Range):  Temp:  [96.1 °F (35.6 °C)-98.1 °F (36.7 °C)] 98.1 °F (36.7 °C)  Pulse:  [69-79] 75  Resp:  [15-18] 15  SpO2:  [94 %-97 %] 96 %  BP: (113-140)/(56-68) 139/68     Weight: 90.1 kg (198 lb 10.2 oz)  Body mass index is 37.53 kg/m².    Physical Exam  Constitutional:       General: She is not in acute distress.     Appearance: She is not diaphoretic.   HENT:      Head: Normocephalic and atraumatic.      Right Ear: External ear normal.      Left Ear: External ear normal.      Mouth/Throat:      Pharynx: No oropharyngeal exudate.   Eyes:      General: No scleral icterus.        Right eye: No discharge.         Left eye: No discharge.      Conjunctiva/sclera: Conjunctivae normal.   Neck:      Thyroid: No thyromegaly.      Vascular: No JVD.   Cardiovascular:      Rate and Rhythm: Normal rate and  regular rhythm.      Heart sounds:     No gallop.   Pulmonary:      Effort: Pulmonary effort is normal.      Breath sounds: Normal breath sounds. No wheezing.   Abdominal:      General: Bowel sounds are normal. There is no distension.      Palpations: Abdomen is soft. There is no hepatomegaly or mass.      Tenderness: There is no abdominal tenderness.   Musculoskeletal:         General: No deformity.      Cervical back: Normal range of motion and neck supple.   Lymphadenopathy:      Cervical: No cervical adenopathy.   Skin:     General: Skin is warm and dry.      Findings: No rash.   Neurological:      Mental Status: She is alert and oriented to person, place, and time.   Psychiatric:         Behavior: Behavior normal. Behavior is cooperative.           Significant Labs: CBC:   Recent Labs   Lab 08/17/22  1236 08/17/22  2241 08/18/22  0508   WBC 4.07 5.21 4.00   HGB 12.3 11.5* 10.8*   HCT 36.7* 34.5* 33.2*    219 201       Significant Imaging:  None

## 2022-08-21 NOTE — DISCHARGE SUMMARY
[facility] Hospital Medicine  Discharge Summary      Patient Name: Tessa Enriquez  MRN: [id number]  Patient Class: OP- Observation  Admission Date: 8/17/2022  Hospital Length of Stay: 0 days  Discharge Date and Time: 8/19/2022  5:06 PM  Attending Physician: No att. providers found   Discharging Provider: Dhruv Qureshi MD  Primary Care Provider: Karma Mcdermott MD      HPI:   Patient presented to our ED with bleeding per rectum.  Began roughly three days ago.  Associated with a tightness-like pain in the left upper abdomen.  Also associated with dizziness and mild fatigue.  She has a significant intestinal history, which includes colorectal cancer treated with chemotherapy, radiation, and surgical resection.  About a month or so ago, she was diagnosed with recurrence at the anastamosis.  Also has an enlarged lymph node in the abdomen.  She is due to start chemotherapy again, which will be followed by surgical resection.  She has COPD and is on home oxygen supplementation.      * No surgery found *      Hospital Course:   She was placed on the hospitalist service.  Was seen by both GI and colorectal surgeon.  The bleeding eventually stopped on its own.  She did not require endoscopic intervention.  GI recommended low residue diet.  Patient was given intravenous iron (Venofer) before she was discharged from the hospital.  She was due to receive IV iron as an outpatient but missed it due to being in the hospital.  Was discharged home in stable condition.    Physical Exam  Constitutional:       General: She is not in acute distress.     Appearance: She is not diaphoretic.   HENT:      Head: Normocephalic and atraumatic.      Right Ear: External ear normal.      Left Ear: External ear normal.      Mouth/Throat:      Pharynx: No oropharyngeal exudate.   Eyes:      General: No scleral icterus.        Right eye: No discharge.         Left eye: No discharge.      Conjunctiva/sclera: Conjunctivae  normal.   Neck:      Thyroid: No thyromegaly.      Vascular: No JVD.   Cardiovascular:      Rate and Rhythm: Normal rate and regular rhythm.      Heart sounds:     No gallop.   Pulmonary:      Effort: Pulmonary effort is normal.      Breath sounds: Normal breath sounds. No wheezing.   Abdominal:      General: Bowel sounds are normal. There is no distension.      Palpations: Abdomen is soft. There is no hepatomegaly or mass.      Tenderness: There is no abdominal tenderness.        Goals of Care Treatment Preferences:  Code Status: Full Code      Consults:   Consults (From admission, onward)        Status Ordering Provider     IP consult to case management  Once        Provider:  (Not yet assigned)    Completed THALIA CARRERO     Inpatient consult to Gastroenterology  Once        Provider:  Jolynn Ayala MD    Completed THALIA CARRERO          No new Assessment & Plan notes have been filed under this hospital service since the last note was generated.  Service: Hospital Medicine    Final Active Diagnoses:    Diagnosis Date Noted POA    PRINCIPAL PROBLEM:  Rectal bleeding [K62.5] 10/23/2020 Yes    Severe obesity (BMI 35.0-39.9) with comorbidity [E66.01] 06/01/2022 Yes    Malignant neoplasm of colon [C18.9] 08/06/2021 Yes    Acquired hypothyroidism [E03.9] 10/15/2020 Yes    COPD (chronic obstructive pulmonary disease) [J44.9] 07/09/2018 Yes      Problems Resolved During this Admission:       Discharged Condition: stable    Disposition: Home or Self Care    Follow Up:   Follow-up Information     Karma Mcdermott MD Follow up on 8/30/2022.    Specialty: Internal Medicine  Why: 9:20 am  Contact information:  [facility] [address] [address]  [phone]                       Patient Instructions:      Diet Adult Regular     Order Specific Question Answer Comments   Additional restrictions: Low Fiber      Activity as tolerated       Significant Diagnostic Studies:   Lab Results   Component  Value Date    WBC 3.63 (L) 08/19/2022    HGB 10.5 (L) 08/19/2022    HCT 31.8 (L) 08/19/2022    MCV 89 08/19/2022     08/19/2022       Hemoglobin   Date Value Ref Range Status   08/19/2022 10.5 (L) 12.0 - 16.0 g/dL Final   08/18/2022 10.8 (L) 12.0 - 16.0 g/dL Final   08/17/2022 11.5 (L) 12.0 - 16.0 g/dL Final   08/17/2022 12.3 12.0 - 16.0 g/dL Final   ]    Pending Diagnostic Studies:     None         Medications:  Reconciled Home Medications:      Medication List      CONTINUE taking these medications    * capecitabine 150 MG tablet  Commonly known as: XELODA  Take 9 tablets (1,350 mg total) by mouth 2 (two) times daily with 1 other capecitabine prescription for 1,850 mg total.     * capecitabine 500 MG Tab  Commonly known as: XELODA  Take 1 tablet (500 mg total) by mouth 2 (two) times daily with 1 other capecitabine prescription for 1,850 mg total.     CMPD HYDROCORTISONE 2%, LIDOCAINE 3% SUPPOSITORY (RECTAL ROCKET)  Place 1 suppository rectally every evening. Insert 1 suppository 3/4 of the way onto rectum. Wet for easier application. Repeat for 3 nights.     cyanocobalamin 1,000 mcg/mL injection  Inject 1 mL (1,000 mcg total) into the muscle every 14 (fourteen) days.     ferrous sulfate 325 (65 FE) MG EC tablet  Take 325 mg by mouth 2 (two) times daily.     gabapentin 300 MG capsule  Commonly known as: NEURONTIN  Take 1 capsule (300 mg total) by mouth every evening.     levothyroxine 125 MCG tablet  Commonly known as: SYNTHROID  Take 1 tablet (125 mcg total) by mouth before breakfast.     * ondansetron 4 MG Tbdl  Commonly known as: ZOFRAN-ODT  Take 1 tablet (4 mg total) by mouth every 6 (six) hours as needed (nausea).     * ondansetron 8 MG Tbdl  Commonly known as: ZOFRAN-ODT  Take 1 tablet (8 mg total) by mouth every 12 (twelve) hours as needed.     prochlorperazine 10 MG tablet  Commonly known as: COMPAZINE  Take 1 tablet (10 mg total) by mouth every 6 (six) hours as needed.     traMADoL 50 mg  tablet  Commonly known as: ULTRAM  TAKE ONE TABLET BY MOUTH EVERY SIX HOURS AS NEEDED FOR PAIN         * This list has 4 medication(s) that are the same as other medications prescribed for you. Read the directions carefully, and ask your doctor or other care provider to review them with you.                Indwelling Lines/Drains at time of discharge:   Lines/Drains/Airways     None                 Time spent on the discharge of patient: 22 minutes         Dhruv Qureshi MD  Department of Hospital Medicine  [facility]

## 2022-08-21 NOTE — HOSPITAL COURSE
She was placed on the hospitalist service.  Was seen by both GI and colorectal surgeon.  The bleeding eventually stopped on its own.  She did not require endoscopic intervention.  GI recommended low residue diet.  Patient was given intravenous iron (Venofer) before she was discharged from the hospital.  She was due to receive IV iron as an outpatient but missed it due to being in the hospital.  Was discharged home in stable condition.    Physical Exam  Constitutional:       General: She is not in acute distress.     Appearance: She is not diaphoretic.   HENT:      Head: Normocephalic and atraumatic.      Right Ear: External ear normal.      Left Ear: External ear normal.      Mouth/Throat:      Pharynx: No oropharyngeal exudate.   Eyes:      General: No scleral icterus.        Right eye: No discharge.         Left eye: No discharge.      Conjunctiva/sclera: Conjunctivae normal.   Neck:      Thyroid: No thyromegaly.      Vascular: No JVD.   Cardiovascular:      Rate and Rhythm: Normal rate and regular rhythm.      Heart sounds:     No gallop.   Pulmonary:      Effort: Pulmonary effort is normal.      Breath sounds: Normal breath sounds. No wheezing.   Abdominal:      General: Bowel sounds are normal. There is no distension.      Palpations: Abdomen is soft. There is no hepatomegaly or mass.      Tenderness: There is no abdominal tenderness.

## 2022-08-22 NOTE — PROGRESS NOTES
[facility]  Colon, Rectal and Anal Malignancies  Multi-disciplinary Tumor Board     [address]  [place], [place]  Tel: [phone]  Fax: [phone]  [email or web address]     Patient name: Tessa Enriquez   YOB: 1955   MRN: [id number]    Date of discussion: 8/31/2022    Thank you for referring Ms. Enriquez to our care here at [facility]. Please allow us to summarize our review of records and recommendations.    The following disciplines were present for the discussion:   Colon and Rectal Surgery  Medical Oncology  Radiation Oncology  Pathology  Radiology    Endoscopy reviewed:   Date performed: 7/7/2022  Yes, complete evaluation of colon and rectum performed    CT Chest, Abdomen and Pelvis   Date performed: 5/3/2022   Performed at our facility: Yes  Metastatic disease present: No    MRI Rectal Cancer Protocol  Date performed: 8/11/2022   Performed at our facility: Yes  Tumor location in the rectum: Middle (5-10 cm)  Sphincter involvement: Absent  Pretreatment circumferential resection margin status: Not threatened    Pathology reviewed:   Date pathology finalized: 11/9/2020  Unavailable     Pre-treatment CEA:  Date performed: 8/15/2022  CEA Level:  2.2    Pre-treatment Clinical Stage:  T3 - Tumor invades the muscularis propria  N+ - Gregoria disease is suspected  M0 - No metastasis suspected    Based on our review of the current information we have made the following recommendations:  66F with T3N+ rectal cancer s/p lap to open LAR  s/p adjuvant ChemoRT, now with recurrence at the anastomosis with ext iliac LN. The plan is for chemotherapy with re-imaging in 3-6 months      Therapies  Chemotherapy (with re-imaging in 3-6 months)    Clinical research study eligibility - No      Please do not hesitate to contact us for any questions.

## 2022-08-22 NOTE — PROGRESS NOTES
Subjective:       Patient ID: Tessa Enriquez is a 66 y.o. female.    Chief Complaint:   rectal CA status post biopsy October 2020 robotic LAR November 9, 2020 by Dr. Wagner  Discontinued folfox due to s/e completed xrt  5/21    Surveillance PET 08/2022 showed recurrent disease   pt went to GI wih rectal bleeding and abd pain scoped  Findings:        The digital rectal exam revealed a firm rectal mass palpated 5 to 6        cm from the anal verge. The mass was non-circumferential and located        predominantly at the posterior bowel wall.        There was evidence of a prior end-to-end colo-rectal anastomosis in        the distal rectum. This was patent and was characterized by        ulceration. The anastomosis was traversed. Mass in association with        the anastomosis has been previously biopsied and confirmed to be        adenocarcinoma. Anastomosis present at approximately 5 cm from anal       08/22/2022:  She presents today for chemotherapy education.  She is scheduled will start treatment with Xeloda/irinotecan/Avastin.  She is feeling well and has no complaints    REVIEW OF SYSTEMS:     CONSTITUTIONAL: The patient denies any weight change. There is no apparent    change in appetite, fever, night sweats, headaches, lots fatigue, no dizziness, some  weakness.     port is out, no more pain on left cw     SKIN: Denies rash, issues with nails, non-healing sores, bleeding, blotching    skin or abnormal bruising. Denies new moles or changes to existing moles.      BREASTS: There is no swelling around breasts or nipple discharge.    EYES: Denies eye pain, blurred vision, swelling, redness or discharge.      ENT AND MOUTH: Denies runny nose, stuffiness, sinus trouble or sores. Denies    nosebleeds. Denies, hoarseness, change in voice or swelling in front of the    neck.      CARDIOVASCULAR: Denies chest pain, discomfort or palpitations. Denies neck    swelling or episodes of passing out.      RESPIRATORY: Denies  cough, sputum production, blood in sputum, and denies    shortness of breath.      GI:  Had esophagus stretched as food/ pills were getting stuck  Pt saw DR Linares, diarrhea chronically since the xrt    GENITOURINARY: No discharge. No pelvic pain or lumps. No rash around groin or  lesions. No urinary frequency, hesitation, painful urination or blood in    urine. Denies incontinence. No problems with intercourse.      MUSCULOSKELETAL: Denies neck or back pain. Denies weakness in arms or legs,    joint problems or distended inflamed veins in legs. Denies swelling or abnormal  glands.      NEUROLOGICAL: Denies tingling, numbness, altered mentation changes to nerve    function in the face, weakness to one or both of the body. Denies changes to    gait and denies multiple falls or accidents.      PSYCHIATRIC: Denies nervousness, anxiety, hallucinations, depression, suicidal    ideation, trouble sleeping or changes in behavior noticed by family.          Oncology History:  T3 N1 rectal CA diagnosed October 2020 robotic LAR November 2020 December 2020 PET scan negative for metastatic disease   started FOLFOX 2/21 but stopped after 3 cycles and only wants surveillance   had colonoscopy 8/21 NAD  Recurrent disease 8/22  Past Medical History:   Diagnosis Date    Acute hypoxemic respiratory failure 7/9/2018    Arthritis     Cancer     colon    COPD (chronic obstructive pulmonary disease)     History of home oxygen therapy     ONLY PRN AND HASN'T USED IT IN A YEAR    Hyperlipidemia     Thyroid disease      Past Surgical History:   Procedure Laterality Date    CHOLECYSTECTOMY      COLECTOMY N/A 11/9/2020    Procedure: COLECTOMY;  Surgeon: Brandon Wagner MD;  Location: [facility] OR;  Service: General;  Laterality: N/A;  LOWER- ANTERIOR    COLONOSCOPY N/A 10/23/2020    Procedure: COLONOSCOPY;  Surgeon: Jolynn Ayala MD;  Location: [facility];  Service: Endoscopy;  Laterality: N/A;    COLONOSCOPY N/A 8/6/2021     Procedure: COLONOSCOPY;  Surgeon: Jolynn Ayala MD;  Location: [facility] ENDO;  Service: Endoscopy;  Laterality: N/A;    COLONOSCOPY N/A 2022    Procedure: COLONOSCOPY;  Surgeon: Jolynn Ayala MD;  Location: [facility] ENDO;  Service: Endoscopy;  Laterality: N/A;    ESOPHAGEAL DILATION N/A 2021    Procedure: DILATION, ESOPHAGUS;  Surgeon: Sourav Baires III, MD;  Location: [facility] ENDO;  Service: Endoscopy;  Laterality: N/A;    ESOPHAGOGASTRODUODENOSCOPY  2021    Dilation to 57 Fr    ESOPHAGOGASTRODUODENOSCOPY N/A 2021    Procedure: EGD (ESOPHAGOGASTRODUODENOSCOPY);  Surgeon: Sourav Baires III, MD;  Location: [facility] ENDO;  Service: Endoscopy;  Laterality: N/A;    FLEXIBLE SIGMOIDOSCOPY N/A 2022    Procedure: SIGMOIDOSCOPY, FLEXIBLE;  Surgeon: Brandon Wagner MD;  Location: [facility] ENDO;  Service: Endoscopy;  Laterality: N/A;    HYSTERECTOMY      INSERTION OF TUNNELED CENTRAL VENOUS CATHETER (CVC) WITH SUBCUTANEOUS PORT N/A 2021    Procedure: [id number];  Surgeon: Brandon Wagner MD;  Location: [facility] OR;  Service: General;  Laterality: N/A;    MEDIPORT REMOVAL Left 2021    Procedure: REMOVAL, CATHETER, CENTRAL VENOUS, TUNNELED, WITH PORT;  Surgeon: Brandon Wagner MD;  Location: [facility] OR;  Service: General;  Laterality: Left;    ROBOT-ASSISTED COLECTOMY N/A 2020    Procedure: ROBOTIC COLECTOMY low anterior resection, possible open;  Surgeon: Brandon Wagner MD;  Location: [facility] OR;  Service: General;  Laterality: N/A;  CONVERTED TO OPEN AT 1503     Family History   Problem Relation Age of Onset    COPD Mother     COPD Father     Breast cancer Maternal Grandmother       Social History     Socioeconomic History    Marital status:    Tobacco Use    Smoking status: Former Smoker     Packs/day: 2.00     Years: 40.00     Pack years: 80.00     Types: Cigarettes     Quit date: 2017     Years since quittin.1    Smokeless tobacco: Never Used    Substance and Sexual Activity    Alcohol use: No    Drug use: No    Sexual activity: Not Currently     Partners: Male     Review of patient's allergies indicates:   Allergen Reactions    Ativan [lorazepam] Hives and Itching    Flagyl [metronidazole] Itching and Rash    Pcn [penicillins] Hives, Itching and Rash       Current Outpatient Medications:     capecitabine (XELODA) 150 MG tablet, Take 9 tablets (1,350 mg total) by mouth 2 (two) times daily with 1 other capecitabine prescription for 1,850 mg total., Disp: 252 tablet, Rfl: 4    capecitabine (XELODA) 500 MG Tab, Take 1 tablet (500 mg total) by mouth 2 (two) times daily with 1 other capecitabine prescription for 1,850 mg total., Disp: 28 tablet, Rfl: 4    CMPD hydrocortisone 2%- LIDOcaine 3% suppository (RECTAL ROCKET), Place 1 suppository rectally every evening. Insert 1 suppository 3/4 of the way onto rectum. Wet for easier application. Repeat for 3 nights. (Patient not taking: No sig reported), Disp: 30 suppository, Rfl: 2    cyanocobalamin 1,000 mcg/mL injection, Inject 1 mL (1,000 mcg total) into the muscle every 14 (fourteen) days., Disp: 30 mL, Rfl: 12    ferrous sulfate 325 (65 FE) MG EC tablet, Take 325 mg by mouth 2 (two) times daily., Disp: , Rfl:     gabapentin (NEURONTIN) 300 MG capsule, Take 1 capsule (300 mg total) by mouth every evening. (Patient not taking: No sig reported), Disp: 30 capsule, Rfl: 2    levothyroxine (SYNTHROID) 125 MCG tablet, Take 1 tablet (125 mcg total) by mouth before breakfast., Disp: 30 tablet, Rfl: 11    ondansetron (ZOFRAN-ODT) 4 MG TbDL, Take 1 tablet (4 mg total) by mouth every 6 (six) hours as needed (nausea)., Disp: 20 tablet, Rfl: 0    ondansetron (ZOFRAN-ODT) 8 MG TbDL, Take 1 tablet (8 mg total) by mouth every 12 (twelve) hours as needed., Disp: 30 tablet, Rfl: 1    prochlorperazine (COMPAZINE) 10 MG tablet, Take 1 tablet (10 mg total) by mouth every 6 (six) hours as needed., Disp: 30 tablet,  Rfl: 1    traMADoL (ULTRAM) 50 mg tablet, TAKE ONE TABLET BY MOUTH EVERY SIX HOURS AS NEEDED FOR PAIN (Patient not taking: No sig reported), Disp: 28 tablet, Rfl: 2  PHYSICAL EXAM:     Wt Readings from Last 3 Encounters:   08/17/22 90.1 kg (198 lb 10.2 oz)   08/17/22 86.6 kg (191 lb)   08/16/22 87.9 kg (193 lb 12.6 oz)     Temp Readings from Last 3 Encounters:   08/19/22 98 °F (36.7 °C)   08/17/22 97 °F (36.1 °C)   08/16/22 97.3 °F (36.3 °C) (Temporal)     BP Readings from Last 3 Encounters:   08/19/22 (!) 124/58   08/17/22 (!) 165/90   08/16/22 (!) 166/78     Pulse Readings from Last 3 Encounters:   08/19/22 72   08/17/22 96   08/16/22 83     GENERAL: alert; in no apparent distress, , well-built well-nourished   HEAD: normocephalic, atraumatic.  EYES: pupils are equal, round, reactive to light and accommodation. Sclera anicteric. Conjunctiva not injected.   NOSE/THROAT: no nasal erythema or rhinorrhea. Oropharynx pink, without erythema, ulcerations or thrush.   NECK: no cervical motion rigidity; supple with no masses.  CHEST: clear to auscultation bilaterally; no wheezes, crackles or rubs. Patient is speaking comfortably on room air with normal work of breathing without using accessory muscles of respiration.  CARDIOVASCULAR: regular rate and rhythm; no murmurs, rubs or gallops.  ABDOMEN: soft, nontender, nondistended. Bowel sounds present.   MUSCULOSKELETAL: no tenderness to palpation along the spine or scapulae. Normal range of motion.  NEUROLOGIC: cranial nerves II-XII intact bilaterally. Strength 5/5 in bilateral upper and lower extremities. No sensory deficits appreciated. Reflexes globally intact. No cerebellar signs. Normal gait.  LYMPHATIC: no cervical, supraclavicular or axillary adenopathy appreciated bilaterally.   EXTREMITIES: no clubbing, cyanosis, edema.  SKIN: no erythema, rashes, ulcerations noted  ECOG-0  Lab Results   Component Value Date    WBC 3.63 (L) 08/19/2022    HGB 10.5 (L) 08/19/2022     HCT 31.8 (L) 08/19/2022    MCV 89 08/19/2022     08/19/2022       BMP  Lab Results   Component Value Date     08/17/2022    K 4.3 08/17/2022     08/17/2022    CO2 26 08/17/2022    BUN 12 08/17/2022    CREATININE 0.8 08/17/2022    CALCIUM 9.0 08/17/2022    ANIONGAP 10 08/17/2022    ESTGFRAFRICA >60 05/02/2022    EGFRNONAA >60 05/02/2022      cea 7/14/21 1.8  November 9, 2020  .  Rectum, sigmoid colon and proximal donut (low anterior resection):   - Invasive adenocarcinoma,  invading through the muscularis propria into   pericolorectal tissue   - Pathologic stage pT3, pN1a (see synoptic report)   - Resection margins negative for dysplasia or malignancy (proximal, distal   and radial margins), within 1 mm to radial margin   - One tumor deposit   - Adenocarcinoma involve one out of totally forty seven lymph nodes (1/46 in   part 1, totally 1/47, also see part 2)   - Background colon with diverticulosis associated with hyperpigmented   macrophages, benign   - Proximal donut with benign colon, no dysplasia or malignancy   - Tattoo identified   2.  Colon, distal donut (excision):   - Benign colon, negative for dysplasia or malignancy   - One lymph node, negative for metastatic carcinoma (0/1)   COLON AND RECTUM: Resection, Including Transanal Disk Excision of Rectal   Neoplasms   Procedure  Low anterior resection   Tumor Site  Rectum    Tumor Location: Straddles the anterior peritoneal reflection   Tumor Size   Greatest dimension (centimeters): 7.0 cm   + Additional dimensions (centimeters): 4.7 x 2.2 cm   Macroscopic Tumor Perforation  Not identified   Macroscopic Intactness of Mesorectum  Near complete   Histologic Type  Adenocarcinoma   Histologic Grade   G2: Moderately differentiated   Tumor Extension  Tumor invades through the muscularis propria into   pericolorectal tissue     Impression:  November 7, 2020 CT abdomen pelvis     1. Rectosigmoid junction mass, unchanged from prior.  No definite  evidence of intraluminal contrast extravasation to suggest active gastrointestinal hemorrhage, however please note that arterial phase CT is more sensitive.  2. Colonic diverticulosis without acute diverticulitis.  3. Minimal pneumobilia, unchanged      IMPRESSION:  CT chest lung screen October 2020     1.  3 mm pulmonary nodule identified in the lateral segment of the  right lower lobe.  2.  Focus of groundglass opacity measuring 11 mm in diameter in the  superior segment of the right lower lobe. Short-term follow-up for  this lesion is recommended.  3.  Mild emphysematous lung disease.     LUNG-RADS CATEGORY 3: PROBABLY BENIGN FINDINGS     RECOMMENDATION: Short-term imaging follow-up with 6 month LDCT.    IMPRESSION: 7/21  1. Nonspecific hypermetabolic soft tissue density focus cranial to the rectosigmoid colon suture line, perhaps reflecting postsurgical and or posttreatment or postinflammatory changes. Attention to this area at follow-up imaging is recommended to help differentiate from residual or recurrent neoplasm.  2. Improving postoperative changes in the lower anterior abdominal wall, with associated multifocal FDG hypermetabolism which is most characteristic of postoperative etiology.  3. Otherwise no convincing evidence of recurrent neoplasm or metastatic disease.IMPRESSION:  1. Interval increased size of hypermetabolic presacral mass contiguous with hypermetabolic circumferential wall thickening of the rectum at the level of the anastomotic suture line, consistent with recurrent neoplasm and metastatic disease.  2. Interval development of enlarged, hypermetabolic right iliac chain lymph node consistent with metastatic disease.  3. No additional convincing evidence of metastatic disease.  4. Bilateral sacral ala insufficiency fractures, with corresponding FDG hypermetabolism.  5. Intense FDG uptake throughout the thyroid gland, unchanged and nonspecific.      Patient Active Problem List   Diagnosis     COPD (chronic obstructive pulmonary disease)    Pure hypercholesterolemia    Acquired hypothyroidism    Rectal bleeding    Colonic mass    Rectal cancer metastatic to bone    Port-A-Cath in place    Encounter for care related to vascular access port    Dysphagia    Malignant neoplasm of colon    B12 deficiency    FENG (iron deficiency anemia)    Hypocalcemia    Chronic diarrhea    Severe obesity (BMI 35.0-39.9) with comorbidity    Secondary and unspecified malignant neoplasm of intra-abdominal lymph nodes   IMPRESSION:pet 12/2020  1. Postoperative changes of rectal colonic resection, with adjacent  FDG avid presacral soft tissue stranding.  2. Adjacent punctate extraluminal foci of free air suggesting a tiny  tract extending towards the adjacent small bowel (possibly  postoperative but suggesting a tiny enterocolonic fistula)  3. Deep subcutaneous soft tissue structure along the ventral abdomen  suggesting a postoperative hematoma/seroma, and less likely abscess.  4. No findings of additional sites of FDG avid disease.      IMPRESSION: 7/21  1. Nonspecific hypermetabolic soft tissue density focus cranial to the rectosigmoid colon suture line, perhaps reflecting postsurgical and or posttreatment or postinflammatory changes. Attention to this area at follow-up imaging is recommended to help differentiate from residual or recurrent neoplasm.  2. Improving postoperative changes in the lower anterior abdominal wall, with associated multifocal FDG hypermetabolism which is most characteristic of postoperative etiology.  3. Otherwise no convincing evidence of recurrent neoplasm or metastatic disease      Impression:2/2/22   CT CAP  A single 4 mm soft tissue density nodule is noted in the right lower lobe.  No other intrapulmonary masses or nodules are seen.  Follow-up CT in 12 months per of this nodule is recommended.  Coronary artery calcification is noted.  No masses or adenopathy or seen.     Prior  cholecystectomy.  Prior hysterectomy.  2.2 cm left ovarian cyst.  Small ventral hernia containing a loop of small bowel without CT evidence of obstruction     Assessment and Plan   At original dx 2020  Status post low anterior resection November 9, 2020 for rectal CA T3 N1a   tumor board convened and agreed on xrt that will follow  After completion of chemo  sequentially  therapy   Patient started FOLFOX.  Completed 4 out of 12 planned adjuvant cycles,  Discontinued  Chemo / port removed completed xrt 5/21  Pet:8/22, showing progression  Planning to start Xeloda/irinotecan/Avastin  Refused port placement    -I met with the patient today for chemotherapy education.  Patient will be starting treatment with Avastin/irinotecan/Xeloda for recurrent metastatic colon cancer.  We discussed the mechanism of action, potential side effects of this treatment as well as ways she can manage them at home.  Some of the side effects include but are not limited to fever, nausea, vomiting, decreased appetite, fatigue, weakness, cytopenias, myalgia/arthralgia, constipation, diarrhea, bleeding, headache, shortness of breath, nail changes, takes change, hair thinning/loss, mood disturbances, or edema.  I explained to patient that we will obtain labs prior to treat the patient will follow-up with a physician or nurse practitioner for toxicity monitoring throughout treatment.  Chemo care doc, education was verbalized with patient regarding each chemotherapy drug.  Patient was instructed to call any time 24/7 because there is a physician call for any problems that may arise.  Patient was also instructed to report to [facility] ER if they cannot get in touch with a physician after hours.  The patient acknowledged full understanding of the risk, recommendations and plan.  He understands risks versus benefits of therapy.  I have answered all of the patient's questions and concerns.    -I met with the patient today for chemotherapy education.   The patient will be starting treatment with Xeloda 1850 mg b.i.d. days 1 through 14 followed by 7 days off.  Special teaching and attention was used for explanation of oral chemotherapy is being taken at home.  Patient was instructed that he/she is the only person to handle this medication.  If the dose is missed please continue the next dose.  Do not change or alter the medication in any way.  We discussed the mechanism of action, potential side effects of this treatment as well as ways he can manage them at home.  Some of these side effects include but are not limited to fever, nausea, vomiting, decreased appetite, fatigue, weakness, cytopenias, myalgia/arthralgia, constipation, rash, neuropathy, elevated blood pressure, diarrhea, bleeding, headache, shortness of breath, nail changes, taste change, hair thinning/loss, mood disturbances, or edema.  Patient notified to call anytime 24/7 because there is a physician on call for any problems that may arise.  Patient also notified to report to [facility] ER if they cannot get in touch with a physician after hours.  The patient acknowledged full understanding of the risk, recommendations and plan.  Patient understands risks versus benefits of therapy and I have answered all of the patient's questions.  -special attention/education was provided on hand-foot syndrome    -written instructions provided at today's visit for patient  -consent signed    Anemia; completed njectafer 2 doses  pt  added SL b12 to her twice a month b12 shots  Continue to monitor         nausea related to chemotherapy:  Compazine 10 mg every 8 hours as needed  Zofran 8 mg every 6 hours as needed    Nutrition:  Educated on the importance of maintaining healthy diet with adequate hydration    Malignancy related pain:  He denies pain at this moment  Will notify us if pain develops    History of chronic obstructive pulmonary disease, dyslipidemia and hypothyroidism continue with PCP  Doesn't want to be  covid vaccinated

## 2022-08-23 NOTE — TELEPHONE ENCOUNTER
Called pt as requested. She is wanting a referral to [facility] for a second opinion per her children's request. Advised pt to notify clinic when xeloda arrives by mail. She v/u.    Staff msg sent to seek auth for chemo tx.      ----- Message from Jack Abreu sent at 8/23/2022  2:25 PM CDT -----  Contact: pt at [phone]  Type: Needs Medical Advice  Who Called:  pt  Best Call Back Number: [phone]  Additional Information: pt is calling the office requesting a callback from the nurse. Please call back and advise.

## 2022-08-24 NOTE — TELEPHONE ENCOUNTER
Patient is approved for [employer] Patient Assistance Program as of 8/24/22 and will receive Xeloda free of charge through the programs dispensing pharmacy, [facility]. Someone from the program's pharmacy will be reaching out to patient to coordinate their first shipment. Patient can also contact [facility] at [phone] to check on the status of their prescription and schedule shipment for medication.    *Patient will remain approved for [employer] Patient [employer] until she is no longer taking medication as long as she continues to meet the programs eligibility guidelines.      FOR DOCUMENTATION ONLY:  Financial Assistance for Xeloda is approved from 8/24/22  Source: [employer] Patient Assistance Program  Case ID: [id number]  Phone: [phone]  Fax: [phone]   Pharmacy: [facility]

## 2022-08-24 NOTE — TELEPHONE ENCOUNTER
Spoke with patient and she is doing well.  She has follow up scheduled with you on 08/31/22 and the HEM/ONC in [place] on 08/30/22 for a 2nd opinion regarding rectal cancer.  Patient's blood pressure is running 136/71 the last time she checked at home

## 2022-08-24 NOTE — TELEPHONE ENCOUNTER
----- Message from Ciara Baum sent at 8/24/2022  9:01 AM CDT -----  Regarding: internal 2nd opinion  rectal CA status post biopsy October 2020 robotic LAR November 9, 2020 by Dr. Wagner      Rectal cancer metastatic to bone [C20, C79.51]  Danielle Frank MD in [facility]    per chart pt is requesting second opinion form [facility]

## 2022-08-24 NOTE — TELEPHONE ENCOUNTER
----- Message from Karma Mcdermott MD sent at 8/22/2022 12:43 PM CDT -----  Needs call and recheck

## 2022-08-25 NOTE — TELEPHONE ENCOUNTER
Outgoing call to patient to notify her that she has been approved for Xeloda PAP and provided program information and phone number for patient to schedule a shipment of her medication. See notes below regarding PAP approval. Patient voiced understanding.

## 2022-08-29 NOTE — PLAN OF CARE
Problem: Anemia  Goal: Anemia Symptom Improvement  Outcome: Ongoing, Progressing  Intervention: Monitor and Manage Anemia  Flowsheets (Taken 8/29/2022 1256)  Oral Nutrition Promotion:   rest periods promoted   medicated   nutritional therapy counseling provided  Safety Promotion/Fall Prevention:   family to remain at bedside   in recliner, wheels locked   instructed to call staff for mobility   Fall Risk reviewed with patient/family  Fatigue Management:   fatigue-related activity identified   frequent rest breaks encouraged

## 2022-08-30 PROBLEM — K63.89 COLONIC MASS: Status: RESOLVED | Noted: 2020-10-23 | Resolved: 2022-01-01

## 2022-08-30 PROBLEM — Z95.828 PORT-A-CATH IN PLACE: Status: RESOLVED | Noted: 2021-01-27 | Resolved: 2022-01-01

## 2022-08-30 PROBLEM — C77.5 RECTAL CANCER METASTASIZED TO INTRAPELVIC LYMPH NODE: Status: ACTIVE | Noted: 2020-12-11

## 2022-08-30 NOTE — ASSESSMENT & PLAN NOTE
- Reviewed with rad-onc - involved nodes abut small bowl limiting RT options; would plan for up front chemotherapy as above with later imaging to determine any possible definitve options

## 2022-08-30 NOTE — PATIENT INSTRUCTIONS
- Will arrange complete genomic profiling of you tumor to determine its sensitivity to certain medications  - Agree with irinotecan based chemotherapy regimen - would further discuss with Dr. Frank about IV infusion of 5FU vs capecitabine (CAPIRI vs FOLFIRI)  - Would hold off on bevacizumab until results of genomic profiling result  - Will discuss if right lymph node could be radiated  - Re-evaluate for possible surgery and/or radiation after ~3 months of chemotherapy

## 2022-08-30 NOTE — ASSESSMENT & PLAN NOTE
- Sent Guardant 360  complete genomic profiling to determine CLARK status  - Reviewed with multidisciplinary tumor board; recommend initiating systemic chemotherapy with CAPIRI vs FOLFIRI per patient preference; may add in bevacizumab vs panitumumab pending CLARK status  - Would reimage after three months to assess response; at that time we could re-adrress the role of definitive local therapy eg surgery and/or radiation

## 2022-08-30 NOTE — PROGRESS NOTES
MEDICAL ONCOLOGY - NEW PATIENT VISIT    Reason for visit: Tessa Enriquez is a 66 y.o. female who presents to medical oncology clinic for second opinion regarding management of recurrent stage IIIB rectal adenocarcinoma.     Best Contact Phone Number(s): [phone] (home)      Cancer/Stage/TNM:    Cancer Staging   Rectal cancer  Staging form: Colon and Rectum, AJCC 8th Edition  - Clinical: cN1a, cM0 - Signed by Danielle Frank MD on 12/11/2020  - Pathologic: Stage IIIB (pT3, pN1a, cM0) - Signed by Sourav Patel Jr., MD on 4/1/2021       Oncology History   Rectal cancer   12/11/2020 Initial Diagnosis    Rectal cancer     12/11/2020 Cancer Staged    Staging form: Colon and Rectum, AJCC 8th Edition  - Clinical: cN1a, cM0       1/13/2021 - 3/19/2021 Chemotherapy    Treatment Summary   Plan Name: OP FOLFOX 6 Q2W  Treatment Goal: Curative  Status: Inactive  Start Date: 1/13/2021  End Date: 3/19/2021  Provider: Danielle Frank MD  Chemotherapy: fluorouraciL injection 770 mg, 400 mg/m2 = 770 mg, Intravenous, Clinic/[facility] 1 time, 4 of 12 cycles  Administration: 770 mg (1/13/2021), 770 mg (1/27/2021), 770 mg (3/3/2021), 770 mg (3/17/2021)  fluorouracil (ADRUCIL) 2,400 mg/m2 = 4,630 mg in sodium chloride 0.9% 250 mL chemo infusion, 2,400 mg/m2 = 4,630 mg, Intravenous, Over 46 hours, 4 of 12 cycles  Administration: 4,630 mg (1/13/2021), 4,630 mg (1/27/2021), 4,630 mg (3/3/2021), 4,630 mg (3/17/2021)  leucovorin calcium 400 mg/m2 = 770 mg in dextrose 5 % 288.5 mL infusion, 400 mg/m2 = 770 mg, Intravenous, Clinic/H[facility] 1 time, 4 of 12 cycles  Administration: 770 mg (1/13/2021), 770 mg (1/27/2021), 770 mg (3/3/2021), 770 mg (3/17/2021)  oxaliplatin (ELOXATIN) 85 mg/m2 = 164 mg in dextrose 5 % 532.8 mL chemo infusion, 85 mg/m2 = 164 mg, Intravenous, Clinic/[facility] 1 time, 4 of 12 cycles  Administration: 164 mg (1/13/2021), 164 mg (1/27/2021), 164 mg (3/3/2021), 164 mg (3/17/2021)       4/1/2021 Cancer Staged    Staging form: Colon  and Rectum, AJCC 8th Edition  - Pathologic: Stage IIIB (pT3, pN1a, cM0)       8/11/2022 - 8/11/2022 Chemotherapy    Treatment Summary   Plan Name: OP COLORECTAL CAPECITABINE OXALIPLATIN (CAPEOX) BEVACIZUMAB Q3W  Treatment Goal: Palliative  Status: Inactive  Start Date:   End Date:   Provider: Danielle Frank MD  Chemotherapy: dexAMETHasone (DECADRON) 4 MG Tab, 8 mg, Oral, Daily, 0 of 1 cycle, Start date: --, End date: --  capecitabine (XELODA) 500 MG Tab, 1,000 mg/m2, Oral, 2 times daily, 0 of 1 cycle, Start date: --, End date: --  oxaliplatin (ELOXATIN) 130 mg/m2 in dextrose 5 % 500 mL chemo infusion, 130 mg/m2, Intravenous, Clinic/[facility] 1 time, 0 of 16 cycles  bevacizumab-bvzr in sodium chloride 0.9% 125.83 mL infusion, 7.5 mg/kg, Intravenous, Clinic/[facility] 1 time, 0 of 16 cycles       8/22/2022 -  Chemotherapy    Treatment Summary   Plan Name: OP COLORECTAL CAPECITABINE IRINOTECAN (CAPEIRI) BEVACIZUMAB Q3W  Treatment Goal: Palliative  Status: Active  Start Date: 8/22/2022 (Planned)  End Date: 7/3/2023 (Planned)  Provider: Danielle Frank MD  Chemotherapy: dexAMETHasone (DECADRON) 4 MG Tab, 8 mg, Oral, Daily, 0 of 1 cycle, Start date: --, End date: --  capecitabine (XELODA) tablet 1,950 mg, 1,000 mg/m2, Oral, 2 times daily, 0 of 1 cycle, Start date: --, End date: --  irinotecan (CAMPTOSAR) 200 mg/m2 = 390 mg in sodium chloride 0.9% 500 mL chemo infusion, 200 mg/m2 = 390 mg (original dose ), Intravenous, Clinic/[facility] 1 time, 0 of 16 cycles  Dose modification: 200 mg/m2 (Cycle 1)  bevacizumab-bvzr 659.25 mg in sodium chloride 0.9% 126.37 mL infusion, 7.5 mg/kg, Intravenous, Clinic/[facility] 1 time, 0 of 16 cycles       Secondary and unspecified malignant neoplasm of intra-abdominal lymph nodes   6/1/2022 Initial Diagnosis    Secondary and unspecified malignant neoplasm of intra-abdominal lymph nodes     8/11/2022 - 8/11/2022 Chemotherapy    Treatment Summary   Plan Name: OP COLORECTAL CAPECITABINE OXALIPLATIN (CAPEOX)  BEVACIZUMAB Q3W  Treatment Goal: Palliative  Status: Inactive  Start Date:   End Date:   Provider: Danielle Frank MD  Chemotherapy: dexAMETHasone (DECADRON) 4 MG Tab, 8 mg, Oral, Daily, 0 of 1 cycle, Start date: --, End date: --  capecitabine (XELODA) 500 MG Tab, 1,000 mg/m2, Oral, 2 times daily, 0 of 1 cycle, Start date: --, End date: --  oxaliplatin (ELOXATIN) 130 mg/m2 in dextrose 5 % 500 mL chemo infusion, 130 mg/m2, Intravenous, Clinic/[facility] 1 time, 0 of 16 cycles  bevacizumab-bvzr in sodium chloride 0.9% 125.83 mL infusion, 7.5 mg/kg, Intravenous, Clinic/[facility] 1 time, 0 of 16 cycles       8/22/2022 -  Chemotherapy    Treatment Summary   Plan Name: OP COLORECTAL CAPECITABINE IRINOTECAN (CAPEIRI) BEVACIZUMAB Q3W  Treatment Goal: Palliative  Status: Active  Start Date: 8/22/2022 (Planned)  End Date: 7/3/2023 (Planned)  Provider: Danielle Frank MD  Chemotherapy: dexAMETHasone (DECADRON) 4 MG Tab, 8 mg, Oral, Daily, 0 of 1 cycle, Start date: --, End date: --  capecitabine (XELODA) tablet 1,950 mg, 1,000 mg/m2, Oral, 2 times daily, 0 of 1 cycle, Start date: --, End date: --  irinotecan (CAMPTOSAR) 200 mg/m2 = 390 mg in sodium chloride 0.9% 500 mL chemo infusion, 200 mg/m2 = 390 mg (original dose ), Intravenous, Clinic/[facility] 1 time, 0 of 16 cycles  Dose modification: 200 mg/m2 (Cycle 1)  bevacizumab-bvzr 659.25 mg in sodium chloride 0.9% 126.37 mL infusion, 7.5 mg/kg, Intravenous, Clinic/[facility] 1 time, 0 of 16 cycles            HPI:   Tessa Enriquez is a 66 y.o. female with stage IIIB rectal adencoarcinoma, initially managed with up front surgical resection, adjuvant FOLFOX (received 4/12 planned cycles, stopped early due to intolerance), and adjuvant RT who now presents with local anastomotic recurrence and right external iliac node suspicious for distant metastasis.  History has been obtained by chart review and discussion with the patient.    Patient initially found to have stage IIIB (pT3pN1) proximal rectal  adenocarcinoma after undergoing LAR 11/9/2020. She received adjuvant FOLFOX from 01/21 - 03/21, but this was complicated by signficant discomfort from her port, along with marked neuropathy and fatigue. Patient elected forgo further chemotherapy after 4/12 planned cycles. She then completed 45cGy RT to the pelvis in 05/21.    She was found to have local recurrence on colonoscopy 07/07/22 in the setting of recurrent BRBPR. Pathology confirmed locally recurrent invasive adenocarcinoma. MMR status intact by IHC. PET-CT on 08/03/22 demonstrated 3.5cm FDG avid mass at the rectosigmoid anastomosis with associated wall thickening. An additional 16mm FDG avid right iliac chain lymph node was also noted. There was no other clear evidence of other distant metastatic disease.     She is followed by Dr. Danielle Frank in medical oncology, Dr. Brandon Wagner in colorectal surgery, and Dr. Sourav Patel in radiation oncology.     ROS:   Review of Systems   Constitutional:  Negative for appetite change, chills, fever and unexpected weight change.   HENT:  Negative for hearing loss, mouth sores, nosebleeds, sore throat and trouble swallowing.    Eyes:  Negative for pain and visual disturbance.   Respiratory:  Negative for cough and shortness of breath.    Cardiovascular:  Negative for chest pain, palpitations and leg swelling.   Gastrointestinal:  Positive for abdominal pain (Intermittent LUQ pain associated with BRBPR), blood in stool (small volume) and diarrhea (Chronic fecal incontinence). Negative for abdominal distention, nausea and vomiting.   Genitourinary:  Negative for difficulty urinating and dysuria.   Musculoskeletal:  Negative for arthralgias and joint swelling.   Skin:  Negative for rash and wound.   Neurological:  Negative for dizziness, weakness, light-headedness and headaches.   Hematological:  Negative for adenopathy. Does not bruise/bleed easily.      Past Medical History:   Past Medical History:   Diagnosis Date     Acute hypoxemic respiratory failure 07/09/2018    Arthritis     Cancer     colon    COPD (chronic obstructive pulmonary disease)     History of home oxygen therapy     ONLY PRN AND HASN'T USED IT IN A YEAR    Hyperlipidemia     Thyroid disease         Past Surgical History:   Past Surgical History:   Procedure Laterality Date    CHOLECYSTECTOMY      COLECTOMY N/A 11/9/2020    Procedure: COLECTOMY;  Surgeon: Barndon Wagner MD;  Location: [facility];  Service: General;  Laterality: N/A;  LOWER- ANTERIOR    COLONOSCOPY N/A 10/23/2020    Procedure: COLONOSCOPY;  Surgeon: Jolynn Ayala MD;  Location: [facility] ENDO;  Service: Endoscopy;  Laterality: N/A;    COLONOSCOPY N/A 8/6/2021    Procedure: COLONOSCOPY;  Surgeon: Jolynn Ayala MD;  Location: [facility] ENDO;  Service: Endoscopy;  Laterality: N/A;    COLONOSCOPY N/A 7/7/2022    Procedure: COLONOSCOPY;  Surgeon: Jolynn Ayala MD;  Location: [facility] ENDO;  Service: Endoscopy;  Laterality: N/A;    ESOPHAGEAL DILATION N/A 6/29/2021    Procedure: DILATION, ESOPHAGUS;  Surgeon: Sourav Baires III, MD;  Location: [facility];  Service: Endoscopy;  Laterality: N/A;    ESOPHAGOGASTRODUODENOSCOPY  06/29/2021    Dilation to 57 Fr    ESOPHAGOGASTRODUODENOSCOPY N/A 6/29/2021    Procedure: EGD (ESOPHAGOGASTRODUODENOSCOPY);  Surgeon: Sourav Baires III, MD;  Location: [facility];  Service: Endoscopy;  Laterality: N/A;    FLEXIBLE SIGMOIDOSCOPY N/A 8/1/2022    Procedure: SIGMOIDOSCOPY, FLEXIBLE;  Surgeon: Brandon Wagner MD;  Location: [facility];  Service: Endoscopy;  Laterality: N/A;    HYSTERECTOMY      INSERTION OF TUNNELED CENTRAL VENOUS CATHETER (CVC) WITH SUBCUTANEOUS PORT N/A 1/11/2021    Procedure: [id number]-A-CATH;  Surgeon: Brandon Wagner MD;  Location: [facility];  Service: General;  Laterality: N/A;    MEDIPORT REMOVAL Left 4/19/2021    Procedure: REMOVAL, CATHETER, CENTRAL VENOUS, TUNNELED, WITH PORT;  Surgeon: Brandon Wagner MD;  Location: [facility];   Service: General;  Laterality: Left;    ROBOT-ASSISTED COLECTOMY N/A 2020    Procedure: ROBOTIC COLECTOMY low anterior resection, possible open;  Surgeon: Brandon Wagner MD;  Location: [facility];  Service: General;  Laterality: N/A;  CONVERTED TO OPEN AT 1503        Family History:   Family History   Problem Relation Age of Onset    COPD Mother     COPD Father     Prostate cancer Brother     Breast cancer Maternal Grandmother         Social History:   Social History     Tobacco Use    Smoking status: Former     Packs/day: 2.00     Years: 40.00     Pack years: 80.00     Types: Cigarettes     Quit date: 2017     Years since quittin.1    Smokeless tobacco: Never   Substance Use Topics    Alcohol use: No        I have reviewed and updated the patient's past medical, surgical, family and social histories.    Allergies:   Review of patient's allergies indicates:   Allergen Reactions    Ativan [lorazepam] Hives and Itching    Flagyl [metronidazole] Itching and Rash    Pcn [penicillins] Hives, Itching and Rash        Medications:   No current facility-administered medications for this visit.     Current Outpatient Medications   Medication Sig Dispense Refill    HYDROcodone-acetaminophen (NORCO) 5-325 mg per tablet Take 1 tablet by mouth every 6 (six) hours as needed for Pain. 20 tablet 0     Facility-Administered Medications Ordered in Other Visits   Medication Dose Route Frequency Provider Last Rate Last Admin    0.9%  NaCl infusion   Intravenous Continuous Brandon Wagner MD        acetaminophen 1,000 mg/100 mL (10 mg/mL) injection   Intravenous PRN Tali Armenta CRNA   1,000 mg at 22 1317    clindamycin in D5W 900 mg/50 mL IVPB 900 mg  900 mg Intravenous On Call Procedure Brandon Wagner MD   900 mg at 22 1302    dexamethasone injection   Intravenous PRN Tali Armenta CRNA   4 mg at 22 1315    electrolyte-S (ISOLYTE)   Intravenous Continuous Fatoumata Bar MD 10 mL/hr at  "09/01/22 1234 New Bag at 09/01/22 1234    fentaNYL 50 mcg/mL injection   Intravenous PRN Tali Muroario, CRNA   25 mcg at 09/01/22 1326    LIDOcaine (cardiac) injection   Intravenous PRN Tali Muroario, CRNA   80 mg at 09/01/22 1309    midazolam injection   Intravenous PRN Tali Botello Arcario, CRNA   2 mg at 09/01/22 1301    ondansetron injection   Intravenous PRN Tali Botello Arcario, CRNA   8 mg at 09/01/22 1315    promethazine injection   Intravenous PRN Tali Botello Arcario, CRNA   6.25 mg at 09/01/22 1325    propofol (DIPRIVAN) 10 mg/mL infusion   Intravenous PRN Tali Muroario, CRNA   120 mg at 09/01/22 1309        Physical Exam:   BP (!) 154/73 (BP Location: Left arm, Patient Position: Sitting, BP Method: Large (Automatic))   Pulse 81   Temp 98.1 °F (36.7 °C) (Other (see comments))   Resp 18   Ht 5' 1" (1.549 m)   Wt 87.5 kg (193 lb 0.2 oz)   SpO2 99%   BMI 36.47 kg/m²      ECOG Performance status: 0            Physical Exam  Constitutional:       General: She is not in acute distress.     Appearance: She is obese.   HENT:      Head: Normocephalic.      Nose: Nose normal.      Mouth/Throat:      Mouth: Mucous membranes are moist.      Pharynx: Oropharynx is clear. No oropharyngeal exudate or posterior oropharyngeal erythema.   Eyes:      General: No scleral icterus.     Conjunctiva/sclera: Conjunctivae normal.      Pupils: Pupils are equal, round, and reactive to light.   Cardiovascular:      Rate and Rhythm: Normal rate and regular rhythm.      Heart sounds: No murmur heard.    No friction rub. No gallop.   Pulmonary:      Effort: Pulmonary effort is normal. No respiratory distress.   Abdominal:      General: There is no distension.      Palpations: Abdomen is soft.      Tenderness: There is no abdominal tenderness. There is no guarding or rebound.   Musculoskeletal:         General: Normal range of motion.      Cervical back: Normal range of motion and neck supple.      Right lower leg: No edema.      " Left lower leg: No edema.   Lymphadenopathy:      Cervical: No cervical adenopathy.   Skin:     General: Skin is warm.      Coloration: Skin is not jaundiced.      Findings: No bruising or rash.   Neurological:      General: No focal deficit present.      Mental Status: She is alert and oriented to person, place, and time.      Cranial Nerves: No cranial nerve deficit.      Motor: No weakness.   Psychiatric:         Mood and Affect: Mood normal.         Behavior: Behavior normal.         Thought Content: Thought content normal.           Lab Review:  CBC:   Recent Labs     08/19/22  0547 08/18/22  0508 08/17/22  2241   WBC 3.63* 4.00 5.21   RBC 3.59* 3.74* 3.90*   HGB 10.5* 10.8* 11.5*   HCT 31.8* 33.2* 34.5*    201 219   GRAN 70.6  2.6 68.9  2.8 76.3*  4.0     CMP:   Recent Labs     08/17/22  1236 08/15/22  1035    138   K 4.3 4.3    102   CO2 26 25   GLU 95 123*   BUN 12 12   CREATININE 0.8 0.8   CALCIUM 9.0 8.7   PROT 7.2 6.9   ALBUMIN 3.6 3.4*   BILITOT 0.4 0.3   ALKPHOS 94 86   AST 11 12   ALT 11 13     Coagulation: No results for input(s): PT, INR, APTT in the last 760 hours.     I have reviewed the pertinent labs which are notable for ferritin ferritin 11 / TIBC 482 on 8/15/22    Imaging:       I have personally reviewed the imaging which is notable for MR rectum 8/10/22; PET-CT 8/3/22 showing local recurrence at rectosigmoid anastamosis and suspicious rt iliac node    Path:   Reviewed pathology from 7/7/22 showing invasive adenocarcinoma and MMR intact by IHC.      Assessment and Plan:   1. 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 Received abbreviated course of adjuvant FOLFOX before completing adjuvant RT. Now with local recurrence at the anastamosis and suspicious right external iliac node on imaging.     Assessment & Plan:  - Sent Guardant 360  complete genomic profiling to determine CLARK  status  - Reviewed with multidisciplinary tumor board; recommend initiating systemic chemotherapy with CAPIRI vs FOLFIRI per patient preference; may add in bevacizumab vs panitumumab pending CLARK status  - Would reimage after three months to assess response; at that time we could re-adrress the role of definitive local therapy eg surgery and/or radiation    Orders:  -     Guardant 360    2. Iron deficiency anemia due to chronic blood loss  Overview:  In setting of chronic blood loss from colorectal cancer.    Assessment & Plan:  - Recent ferritin 11 on 8/15/22  - Received IV infusion through Dr. Frank's office      3. Secondary and unspecified malignant neoplasm of intra-abdominal lymph nodes  Overview:  Patient noted to have ~1.3 cm right external iliac LN on imaging at time of local recurrence, suspicious for distant metastatic disease.    Assessment & Plan:  - Reviewed with rad-onc - involved nodes abut small bowl limiting RT options; would plan for up front chemotherapy as above with later imaging to determine any possible definitve options             Follow up:   Route Chart for Scheduling    Med Onc Chart Routing      Follow up with physician No follow up needed. Patient will follow up with Dr. Frank in [place]. She can come back and see me as needed.   Follow up with [facility]    Infusion scheduling note    Injection scheduling note    Labs    Imaging    Pharmacy appointment    Other referrals        Treatment Plan Information   OP COLORECTAL CAPECITABINE IRINOTECAN (CAPEIRI) BEVACIZUMAB Q3W   Danielle Frank MD   Upcoming Treatment Dates - OP COLORECTAL CAPECITABINE IRINOTECAN (CAPEIRI) BEVACIZUMAB Q3W    8/21/2022       Take Home Chemotherapy       capecitabine (XELODA) tablet 1,950 mg  8/22/2022       Chemotherapy       bevacizumab-bvzr 659.25 mg in sodium chloride 0.9% 126.37 mL infusion       irinotecan (CAMPTOSAR) 200 mg/m2 = 390 mg in sodium chloride 0.9% 500 mL chemo infusion       Supportive Care        atropine injection 0.4 mg       Antiemetics       palonosetron 0.25mg/dexamethasone 12mg in NS IVPB 0.25 mg  9/12/2022       Chemotherapy       bevacizumab-bvzr 659.25 mg in sodium chloride 0.9% 126.37 mL infusion       irinotecan (CAMPTOSAR) 200 mg/m2 = 390 mg in sodium chloride 0.9% 500 mL chemo infusion       Supportive Care       atropine injection 0.4 mg       Antiemetics       palonosetron 0.25mg/dexamethasone 12mg in NS IVPB 0.25 mg  10/3/2022       Chemotherapy       bevacizumab-bvzr 659.25 mg in sodium chloride 0.9% 126.37 mL infusion       irinotecan (CAMPTOSAR) 200 mg/m2 = 390 mg in sodium chloride 0.9% 500 mL chemo infusion       Supportive Care       atropine injection 0.4 mg       Antiemetics       palonosetron 0.25mg/dexamethasone 12mg in NS IVPB 0.25 mg    Therapy Plan Information  Medications  ferric carboxymaltose (INJECTAFER) 750 mg in sodium chloride 0.9% 265 mL infusion  750 mg, Intravenous, 1 time a week  IV Fluids  0.9%  NaCl infusion  Intravenous, 1 time a week  Flushes  sodium chloride 0.9% flush 10 mL  10 mL, Intravenous, 1 time a week  heparin, porcine (PF) 100 unit/mL injection flush 500 Units  500 Units, Intravenous, 1 time a week  sodium chloride 0.9% 100 mL flush bag  Intravenous, 1 time a week  PRN Medications  EPINEPHrine (EPIPEN) 0.3 mg/0.3 mL pen injection 0.3 mg  0.3 mg, Intramuscular, PRN  diphenhydrAMINE injection 50 mg  50 mg, Intravenous, PRN  methylPREDNISolone sodium succinate injection 125 mg  125 mg, Intravenous, PRN  sodium chloride 0.9% bolus 1,000 mL  1,000 mL, Intravenous, PRN        The above information has been reviewed with the patient and all questions have been answered to their apparent satisfaction.  They understand that they can call the clinic with any questions.    Jose A Begum MD  Hematology/Oncology  [facility]

## 2022-08-31 NOTE — TELEPHONE ENCOUNTER
----- Message from Arash Byers sent at 8/31/2022  4:22 PM CDT -----  Contact: pt at [phone]  Type: Needs Medical Advice  Who Called:  pt    Best Call Back Number: [phone]    Additional Information: pt is calling the office requesting a call back. She states she will like to get set up for a port. Please call back and advise.

## 2022-08-31 NOTE — TELEPHONE ENCOUNTER
This nurse called pt to inquire if she had received her xeloda. Pt states she has not and she is considering getting port placed and getting IV chemo. Appt scheduled for pt to see Dr Frank Friday to discuss tx.

## 2022-08-31 NOTE — PROGRESS NOTES
SUBJECTIVE:    Patient ID: Tessa Enriquez is a 66 y.o. female.    Chief Complaint: Results and Follow-up    Follow-up  Pertinent negatives include no abdominal pain, chest pain, chills, congestion, coughing, fatigue, fever, headaches, joint swelling, nausea, neck pain, numbness, rash, sore throat, vomiting or weakness.     Patient has recurrent rectal cancer-and is getting ready for chemotherapy, infusion and oral-she is asx at this time despite mass noted-some lower back pain related to tumor mass pressing against the lower back    H/H 12.3/36.7 wbc 4.07 platelets 219 glucose 95 Na 139 K 4.3 BUN 12 Cr .8    Admission on 08/17/2022, Discharged on 08/19/2022   Component Date Value Ref Range Status    WBC 08/17/2022 4.07  3.90 - 12.70 K/uL Final    RBC 08/17/2022 4.14  4.00 - 5.40 M/uL Final    Hemoglobin 08/17/2022 12.3  12.0 - 16.0 g/dL Final    Hematocrit 08/17/2022 36.7 (L)  37.0 - 48.5 % Final    MCV 08/17/2022 89  82 - 98 fL Final    MCH 08/17/2022 29.7  27.0 - 31.0 pg Final    MCHC 08/17/2022 33.5  32.0 - 36.0 g/dL Final    RDW 08/17/2022 13.1  11.5 - 14.5 % Final    Platelets 08/17/2022 219  150 - 450 K/uL Final    MPV 08/17/2022 10.9  9.2 - 12.9 fL Final    Immature Granulocytes 08/17/2022 0.2  0.0 - 0.5 % Final    Gran # (ANC) 08/17/2022 3.2  1.8 - 7.7 K/uL Final    Immature Grans (Abs) 08/17/2022 0.01  0.00 - 0.04 K/uL Final    Lymph # 08/17/2022 0.5 (L)  1.0 - 4.8 K/uL Final    Mono # 08/17/2022 0.2 (L)  0.3 - 1.0 K/uL Final    Eos # 08/17/2022 0.1  0.0 - 0.5 K/uL Final    Baso # 08/17/2022 0.03  0.00 - 0.20 K/uL Final    nRBC 08/17/2022 0  0 /100 WBC Final    Gran % 08/17/2022 78.9 (H)  38.0 - 73.0 % Final    Lymph % 08/17/2022 12.3 (L)  18.0 - 48.0 % Final    Mono % 08/17/2022 5.7  4.0 - 15.0 % Final    Eosinophil % 08/17/2022 2.2  0.0 - 8.0 % Final    Basophil % 08/17/2022 0.7  0.0 - 1.9 % Final    Differential Method 08/17/2022 Automated   Final    Sodium 08/17/2022 139  136 - 145 mmol/L Final     Potassium 08/17/2022 4.3  3.5 - 5.1 mmol/L Final    Chloride 08/17/2022 103  95 - 110 mmol/L Final    CO2 08/17/2022 26  23 - 29 mmol/L Final    Glucose 08/17/2022 95  70 - 110 mg/dL Final    BUN 08/17/2022 12  8 - 23 mg/dL Final    Creatinine 08/17/2022 0.8  0.5 - 1.4 mg/dL Final    Calcium 08/17/2022 9.0  8.7 - 10.5 mg/dL Final    Total Protein 08/17/2022 7.2  6.0 - 8.4 g/dL Final    Albumin 08/17/2022 3.6  3.5 - 5.2 g/dL Final    Total Bilirubin 08/17/2022 0.4  0.1 - 1.0 mg/dL Final    Alkaline Phosphatase 08/17/2022 94  55 - 135 U/L Final    AST 08/17/2022 11  10 - 40 U/L Final    ALT 08/17/2022 11  10 - 44 U/L Final    Anion Gap 08/17/2022 10  8 - 16 mmol/L Final    eGFR 08/17/2022 >60  >60 mL/min/1.73 m^2 Final    Group & Rh 08/17/2022 A POS   Final    Indirect Iron 08/17/2022 POS (A)   Final    Lipase 08/17/2022 20  4 - 60 U/L Final    Antibody ID 08/17/2022 POS   Final    SARS-CoV-2 RNA, Amplification, Qual 08/17/2022 Negative  Negative Final    WBC 08/17/2022 5.21  3.90 - 12.70 K/uL Final    RBC 08/17/2022 3.90 (L)  4.00 - 5.40 M/uL Final    Hemoglobin 08/17/2022 11.5 (L)  12.0 - 16.0 g/dL Final    Hematocrit 08/17/2022 34.5 (L)  37.0 - 48.5 % Final    MCV 08/17/2022 89  82 - 98 fL Final    MCH 08/17/2022 29.5  27.0 - 31.0 pg Final    MCHC 08/17/2022 33.3  32.0 - 36.0 g/dL Final    RDW 08/17/2022 13.2  11.5 - 14.5 % Final    Platelets 08/17/2022 219  150 - 450 K/uL Final    MPV 08/17/2022 11.1  9.2 - 12.9 fL Final    Immature Granulocytes 08/17/2022 0.2  0.0 - 0.5 % Final    Gran # (ANC) 08/17/2022 4.0  1.8 - 7.7 K/uL Final    Immature Grans (Abs) 08/17/2022 0.01  0.00 - 0.04 K/uL Final    Lymph # 08/17/2022 0.7 (L)  1.0 - 4.8 K/uL Final    Mono # 08/17/2022 0.4  0.3 - 1.0 K/uL Final    Eos # 08/17/2022 0.1  0.0 - 0.5 K/uL Final    Baso # 08/17/2022 0.03  0.00 - 0.20 K/uL Final    nRBC 08/17/2022 0  0 /100 WBC Final    Gran % 08/17/2022 76.3 (H)  38.0 - 73.0 % Final    Lymph % 08/17/2022 13.1 (L)  18.0 -  48.0 % Final    Mono % 08/17/2022 7.3  4.0 - 15.0 % Final    Eosinophil % 08/17/2022 2.5  0.0 - 8.0 % Final    Basophil % 08/17/2022 0.6  0.0 - 1.9 % Final    Differential Method 08/17/2022 Automated   Final    WBC 08/18/2022 4.00  3.90 - 12.70 K/uL Final    RBC 08/18/2022 3.74 (L)  4.00 - 5.40 M/uL Final    Hemoglobin 08/18/2022 10.8 (L)  12.0 - 16.0 g/dL Final    Hematocrit 08/18/2022 33.2 (L)  37.0 - 48.5 % Final    MCV 08/18/2022 89  82 - 98 fL Final    MCH 08/18/2022 28.9  27.0 - 31.0 pg Final    MCHC 08/18/2022 32.5  32.0 - 36.0 g/dL Final    RDW 08/18/2022 13.2  11.5 - 14.5 % Final    Platelets 08/18/2022 201  150 - 450 K/uL Final    MPV 08/18/2022 11.0  9.2 - 12.9 fL Final    Immature Granulocytes 08/18/2022 0.3  0.0 - 0.5 % Final    Gran # (ANC) 08/18/2022 2.8  1.8 - 7.7 K/uL Final    Immature Grans (Abs) 08/18/2022 0.01  0.00 - 0.04 K/uL Final    Lymph # 08/18/2022 0.7 (L)  1.0 - 4.8 K/uL Final    Mono # 08/18/2022 0.3  0.3 - 1.0 K/uL Final    Eos # 08/18/2022 0.2  0.0 - 0.5 K/uL Final    Baso # 08/18/2022 0.03  0.00 - 0.20 K/uL Final    nRBC 08/18/2022 0  0 /100 WBC Final    Gran % 08/18/2022 68.9  38.0 - 73.0 % Final    Lymph % 08/18/2022 17.5 (L)  18.0 - 48.0 % Final    Mono % 08/18/2022 8.5  4.0 - 15.0 % Final    Eosinophil % 08/18/2022 4.0  0.0 - 8.0 % Final    Basophil % 08/18/2022 0.8  0.0 - 1.9 % Final    Differential Method 08/18/2022 Automated   Final    WBC 08/19/2022 3.63 (L)  3.90 - 12.70 K/uL Final    RBC 08/19/2022 3.59 (L)  4.00 - 5.40 M/uL Final    Hemoglobin 08/19/2022 10.5 (L)  12.0 - 16.0 g/dL Final    Hematocrit 08/19/2022 31.8 (L)  37.0 - 48.5 % Final    MCV 08/19/2022 89  82 - 98 fL Final    MCH 08/19/2022 29.2  27.0 - 31.0 pg Final    MCHC 08/19/2022 33.0  32.0 - 36.0 g/dL Final    RDW 08/19/2022 13.2  11.5 - 14.5 % Final    Platelets 08/19/2022 176  150 - 450 K/uL Final    MPV 08/19/2022 11.0  9.2 - 12.9 fL Final    Immature Granulocytes 08/19/2022 0.3  0.0 - 0.5 % Final    Gran #  (ANC) 08/19/2022 2.6  1.8 - 7.7 K/uL Final    Immature Grans (Abs) 08/19/2022 0.01  0.00 - 0.04 K/uL Final    Lymph # 08/19/2022 0.5 (L)  1.0 - 4.8 K/uL Final    Mono # 08/19/2022 0.3  0.3 - 1.0 K/uL Final    Eos # 08/19/2022 0.2  0.0 - 0.5 K/uL Final    Baso # 08/19/2022 0.03  0.00 - 0.20 K/uL Final    nRBC 08/19/2022 0  0 /100 WBC Final    Gran % 08/19/2022 70.6  38.0 - 73.0 % Final    Lymph % 08/19/2022 14.3 (L)  18.0 - 48.0 % Final    Mono % 08/19/2022 8.5  4.0 - 15.0 % Final    Eosinophil % 08/19/2022 5.5  0.0 - 8.0 % Final    Basophil % 08/19/2022 0.8  0.0 - 1.9 % Final    Differential Method 08/19/2022 Automated   Final   Lab Visit on 08/15/2022   Component Date Value Ref Range Status    WBC 08/15/2022 4.81  3.90 - 12.70 K/uL Final    RBC 08/15/2022 4.21  4.00 - 5.40 M/uL Final    Hemoglobin 08/15/2022 12.3  12.0 - 16.0 g/dL Final    Hematocrit 08/15/2022 37.3  37.0 - 48.5 % Final    MCV 08/15/2022 89  82 - 98 fL Final    MCH 08/15/2022 29.2  27.0 - 31.0 pg Final    MCHC 08/15/2022 33.0  32.0 - 36.0 g/dL Final    RDW 08/15/2022 13.2  11.5 - 14.5 % Final    Platelets 08/15/2022 217  150 - 450 K/uL Final    MPV 08/15/2022 10.9  9.2 - 12.9 fL Final    Immature Granulocytes 08/15/2022 0.2  0.0 - 0.5 % Final    Gran # (ANC) 08/15/2022 3.7  1.8 - 7.7 K/uL Final    Immature Grans (Abs) 08/15/2022 0.01  0.00 - 0.04 K/uL Final    Lymph # 08/15/2022 0.6 (L)  1.0 - 4.8 K/uL Final    Mono # 08/15/2022 0.2 (L)  0.3 - 1.0 K/uL Final    Eos # 08/15/2022 0.2  0.0 - 0.5 K/uL Final    Baso # 08/15/2022 0.03  0.00 - 0.20 K/uL Final    nRBC 08/15/2022 0  0 /100 WBC Final    Gran % 08/15/2022 77.3 (H)  38.0 - 73.0 % Final    Lymph % 08/15/2022 12.7 (L)  18.0 - 48.0 % Final    Mono % 08/15/2022 5.0  4.0 - 15.0 % Final    Eosinophil % 08/15/2022 4.2  0.0 - 8.0 % Final    Basophil % 08/15/2022 0.6  0.0 - 1.9 % Final    Differential Method 08/15/2022 Automated   Final    CEA 08/15/2022 2.2  0.0 - 5.0 ng/mL Final    Sodium 08/15/2022  138  136 - 145 mmol/L Final    Potassium 08/15/2022 4.3  3.5 - 5.1 mmol/L Final    Chloride 08/15/2022 102  95 - 110 mmol/L Final    CO2 08/15/2022 25  23 - 29 mmol/L Final    Glucose 08/15/2022 123 (H)  70 - 110 mg/dL Final    BUN 08/15/2022 12  8 - 23 mg/dL Final    Creatinine 08/15/2022 0.8  0.5 - 1.4 mg/dL Final    Calcium 08/15/2022 8.7  8.7 - 10.5 mg/dL Final    Total Protein 08/15/2022 6.9  6.0 - 8.4 g/dL Final    Albumin 08/15/2022 3.4 (L)  3.5 - 5.2 g/dL Final    Total Bilirubin 08/15/2022 0.3  0.1 - 1.0 mg/dL Final    Alkaline Phosphatase 08/15/2022 86  55 - 135 U/L Final    AST 08/15/2022 12  10 - 40 U/L Final    ALT 08/15/2022 13  10 - 44 U/L Final    Anion Gap 08/15/2022 11  8 - 16 mmol/L Final    eGFR 08/15/2022 >60  >60 mL/min/1.73 m^2 Final    Vitamin B-12 08/15/2022 1515 (H)  210 - 950 pg/mL Final    Iron 08/15/2022 71  30 - 160 ug/dL Final    Transferrin 08/15/2022 326  200 - 375 mg/dL Final    TIBC 08/15/2022 482 (H)  250 - 450 ug/dL Final    Saturated Iron 08/15/2022 15 (L)  20 - 50 % Final    Ferritin 08/15/2022 11 (L)  20.0 - 300.0 ng/mL Final   Hospital Outpatient Visit on 08/04/2022   Component Date Value Ref Range Status    POC Glucose 08/04/2022 101  70 - 110 Final   Admission on 08/01/2022, Discharged on 08/01/2022   Component Date Value Ref Range Status    SARS Coronavirus 2 Antigen 08/01/2022 Negative  Negative Final-Edited     Acceptable 08/01/2022 Yes   Final-Edited   Admission on 07/07/2022, Discharged on 07/07/2022   Component Date Value Ref Range Status    Final Pathologic Diagnosis 07/07/2022    Corrected                    Value:[facility] DIAGNOSIS:  COLON, RECTOSIGMOID, BIOPSIES:  -Invasive moderately differentiated adenocarcinoma, see comment.  COMMENT:  A MMR IHC panel will be performed at [facility] and will be reported under  separate cover.  Meg Overton M.D.  Report attached.  Performing site:  [facility]  [address] [place], [place]  "[id number]  "Disclaimer:  This case diagnosis was rendered completely by the outside  consultation pathologist and the case is electronically signed by an [facility]  pathologist listed below solely to release the report into the medical  record."      Supplemental Diagnosis 07/07/2022    Corrected                    Value:The purpose of this supplemental is solely to report MMR Results:  Immunohistochemistry (IHC) Testing for Mismatch Repair (MMR) Proteins:   MLH1 - Intact nuclear expression   MSH2 - Intact nuclear expression   MSH6 - Intact nuclear expression   PMS2 - Intact nuclear expression   Background nonneoplastic tissue/internal control with intact nuclear  expression   IHC Interpretation   No loss of nuclear expression of MMR proteins: low probability of  microsatellite instability  There are exceptions to the above IHC interpretations. These results should  not be considered in isolation, and clinical correlation with genetic  counseling is recommended to assess the need for germline testing.      Disclaimer 07/07/2022    Corrected                    Value:Unless the case is a 'gross only' or additional testing only, the final  diagnosis for each specimen is based on a microscopic examination of  appropriate tissue sections.     Lab Visit on 05/03/2022   Component Date Value Ref Range Status    Cholesterol 05/03/2022 232 (H)  120 - 199 mg/dL Final    Triglycerides 05/03/2022 102  30 - 150 mg/dL Final    HDL 05/03/2022 58  40 - 75 mg/dL Final    LDL Cholesterol 05/03/2022 153.6  63.0 - 159.0 mg/dL Final    HDL/Cholesterol Ratio 05/03/2022 25.0  20.0 - 50.0 % Final    Total Cholesterol/HDL Ratio 05/03/2022 4.0  2.0 - 5.0 Final    Non-HDL Cholesterol 05/03/2022 174  mg/dL Final   Lab Visit on 05/02/2022   Component Date Value Ref Range Status    Sodium 05/02/2022 138  136 - 145 mmol/L Final    Potassium 05/02/2022 4.1  3.5 - 5.1 mmol/L Final    Chloride 05/02/2022 106  95 - 110 mmol/L Final    CO2 05/02/2022 22 (L)  " 23 - 29 mmol/L Final    Glucose 05/02/2022 89  70 - 110 mg/dL Final    BUN 05/02/2022 16  8 - 23 mg/dL Final    Creatinine 05/02/2022 0.8  0.5 - 1.4 mg/dL Final    Calcium 05/02/2022 8.6 (L)  8.7 - 10.5 mg/dL Final    Total Protein 05/02/2022 6.8  6.0 - 8.4 g/dL Final    Albumin 05/02/2022 3.5  3.5 - 5.2 g/dL Final    Total Bilirubin 05/02/2022 0.3  0.1 - 1.0 mg/dL Final    Alkaline Phosphatase 05/02/2022 83  55 - 135 U/L Final    AST 05/02/2022 16  10 - 40 U/L Final    ALT 05/02/2022 16  10 - 44 U/L Final    Anion Gap 05/02/2022 10  8 - 16 mmol/L Final    eGFR if African American 05/02/2022 >60  >60 mL/min/1.73 m^2 Final    eGFR if non African American 05/02/2022 >60  >60 mL/min/1.73 m^2 Final    WBC 05/02/2022 4.66  3.90 - 12.70 K/uL Final    RBC 05/02/2022 3.65 (L)  4.00 - 5.40 M/uL Final    Hemoglobin 05/02/2022 10.4 (L)  12.0 - 16.0 g/dL Final    Hematocrit 05/02/2022 32.5 (L)  37.0 - 48.5 % Final    MCV 05/02/2022 89  82 - 98 fL Final    MCH 05/02/2022 28.5  27.0 - 31.0 pg Final    MCHC 05/02/2022 32.0  32.0 - 36.0 g/dL Final    RDW 05/02/2022 14.0  11.5 - 14.5 % Final    Platelets 05/02/2022 205  150 - 450 K/uL Final    MPV 05/02/2022 11.3  9.2 - 12.9 fL Final    Immature Granulocytes 05/02/2022 0.2  0.0 - 0.5 % Final    Gran # (ANC) 05/02/2022 3.4  1.8 - 7.7 K/uL Final    Immature Grans (Abs) 05/02/2022 0.01  0.00 - 0.04 K/uL Final    Lymph # 05/02/2022 0.8 (L)  1.0 - 4.8 K/uL Final    Mono # 05/02/2022 0.4  0.3 - 1.0 K/uL Final    Eos # 05/02/2022 0.1  0.0 - 0.5 K/uL Final    Baso # 05/02/2022 0.04  0.00 - 0.20 K/uL Final    nRBC 05/02/2022 0  0 /100 WBC Final    Gran % 05/02/2022 71.9  38.0 - 73.0 % Final    Lymph % 05/02/2022 16.3 (L)  18.0 - 48.0 % Final    Mono % 05/02/2022 7.9  4.0 - 15.0 % Final    Eosinophil % 05/02/2022 2.8  0.0 - 8.0 % Final    Basophil % 05/02/2022 0.9  0.0 - 1.9 % Final    Differential Method 05/02/2022 Automated   Final    CEA 05/02/2022 1.8  0.0 - 5.0 ng/mL Final    Ferritin  05/02/2022 18 (L)  20.0 - 300.0 ng/mL Final    Iron 05/02/2022 64  30 - 160 ug/dL Final    Transferrin 05/02/2022 334  200 - 375 mg/dL Final    TIBC 05/02/2022 494 (H)  250 - 450 ug/dL Final    Saturated Iron 05/02/2022 13 (L)  20 - 50 % Final    Vitamin B-12 05/02/2022 292  210 - 950 pg/mL Final   Lab Visit on 05/02/2022   Component Date Value Ref Range Status    TSH 05/02/2022 9.523 (H)  0.400 - 4.000 uIU/mL Final    Free T4 05/02/2022 0.91  0.71 - 1.51 ng/dL Final   Admission on 03/28/2022, Discharged on 03/28/2022   Component Date Value Ref Range Status    HIV 1/2 Ag/Ab 03/28/2022 Negative  Negative Final    Hepatitis C Ab 03/28/2022 Negative  Negative Final    WBC 03/28/2022 5.04  3.90 - 12.70 K/uL Final    RBC 03/28/2022 3.87 (L)  4.00 - 5.40 M/uL Final    Hemoglobin 03/28/2022 10.9 (L)  12.0 - 16.0 g/dL Final    Hematocrit 03/28/2022 34.1 (L)  37.0 - 48.5 % Final    MCV 03/28/2022 88  82 - 98 fL Final    MCH 03/28/2022 28.2  27.0 - 31.0 pg Final    MCHC 03/28/2022 32.0  32.0 - 36.0 g/dL Final    RDW 03/28/2022 13.3  11.5 - 14.5 % Final    Platelets 03/28/2022 231  150 - 450 K/uL Final    MPV 03/28/2022 10.9  9.2 - 12.9 fL Final    Immature Granulocytes 03/28/2022 0.4  0.0 - 0.5 % Final    Gran # (ANC) 03/28/2022 4.0  1.8 - 7.7 K/uL Final    Immature Grans (Abs) 03/28/2022 0.02  0.00 - 0.04 K/uL Final    Lymph # 03/28/2022 0.6 (L)  1.0 - 4.8 K/uL Final    Mono # 03/28/2022 0.3  0.3 - 1.0 K/uL Final    Eos # 03/28/2022 0.1  0.0 - 0.5 K/uL Final    Baso # 03/28/2022 0.03  0.00 - 0.20 K/uL Final    nRBC 03/28/2022 0  0 /100 WBC Final    Gran % 03/28/2022 79.8 (H)  38.0 - 73.0 % Final    Lymph % 03/28/2022 11.3 (L)  18.0 - 48.0 % Final    Mono % 03/28/2022 6.3  4.0 - 15.0 % Final    Eosinophil % 03/28/2022 1.6  0.0 - 8.0 % Final    Basophil % 03/28/2022 0.6  0.0 - 1.9 % Final    Differential Method 03/28/2022 Automated   Final    Sodium 03/28/2022 138  136 - 145 mmol/L Final    Potassium 03/28/2022 3.6  3.5 - 5.1  mmol/L Final    Chloride 03/28/2022 105  95 - 110 mmol/L Final    CO2 03/28/2022 24  23 - 29 mmol/L Final    Glucose 03/28/2022 92  70 - 110 mg/dL Final    BUN 03/28/2022 13  8 - 23 mg/dL Final    Creatinine 03/28/2022 0.8  0.5 - 1.4 mg/dL Final    Calcium 03/28/2022 8.8  8.7 - 10.5 mg/dL Final    Total Protein 03/28/2022 6.7  6.0 - 8.4 g/dL Final    Albumin 03/28/2022 3.5  3.5 - 5.2 g/dL Final    Total Bilirubin 03/28/2022 0.4  0.1 - 1.0 mg/dL Final    Alkaline Phosphatase 03/28/2022 93  55 - 135 U/L Final    AST 03/28/2022 14  10 - 40 U/L Final    ALT 03/28/2022 15  10 - 44 U/L Final    Anion Gap 03/28/2022 9  8 - 16 mmol/L Final    eGFR if African American 03/28/2022 >60  >60 mL/min/1.73 m^2 Final    eGFR if non African American 03/28/2022 >60  >60 mL/min/1.73 m^2 Final    Lipase 03/28/2022 23  4 - 60 U/L Final    Specimen UA 03/28/2022 Urine, Supra Pubic   Final    Color, UA 03/28/2022 Yellow  Yellow, Straw, Emily Final    Appearance, UA 03/28/2022 Cloudy (A)  Clear Final    pH, UA 03/28/2022 6.0  5.0 - 8.0 Final    Specific Gravity, UA 03/28/2022 >=1.030 (A)  1.005 - 1.030 Final    Protein, UA 03/28/2022 Trace (A)  Negative Final    Glucose, UA 03/28/2022 Negative  Negative Final    Ketones, UA 03/28/2022 Negative  Negative Final    Bilirubin (UA) 03/28/2022 Negative  Negative Final    Occult Blood UA 03/28/2022 3+ (A)  Negative Final    Nitrite, UA 03/28/2022 Positive (A)  Negative Final    Urobilinogen, UA 03/28/2022 1.0  <2.0 EU/dL Final    Leukocytes, UA 03/28/2022 Trace (A)  Negative Final    Magnesium 03/28/2022 2.0  1.6 - 2.6 mg/dL Final    Stool Culture 03/28/2022 No Salmonella,Shigella,Vibrio,Campylobacter,Yersinia isolated.   Final    Stool Exam-Ova,Cysts,Parasites 03/28/2022 FINAL 03/31/2022 1255   Final    C. diff Antigen 03/28/2022 Negative  Negative Final    C difficile Toxins A+B, EIA 03/28/2022 Negative  Negative Final    Shiga Toxin 1 E.coli 03/28/2022 Negative   Final    Shiga Toxin 2 E.coli  03/28/2022 Negative   Final    RBC, UA 03/28/2022 10 (H)  0 - 4 /hpf Final    WBC, UA 03/28/2022 25 (H)  0 - 5 /hpf Final    Bacteria 03/28/2022 Many (A)  None-Occ /hpf Final    Squam Epithel, UA 03/28/2022 9  /hpf Final    Microscopic Comment 03/28/2022 SEE COMMENT   Final    Urine Culture, Routine 03/28/2022  (A)   Final                    Value:ESCHERICHIA COLI  >100,000 cfu/ml     Lab Visit on 01/27/2022   Component Date Value Ref Range Status    WBC 01/27/2022 4.42  3.90 - 12.70 K/uL Final    RBC 01/27/2022 3.83 (L)  4.00 - 5.40 M/uL Final    Hemoglobin 01/27/2022 11.1 (L)  12.0 - 16.0 g/dL Final    Hematocrit 01/27/2022 34.2 (L)  37.0 - 48.5 % Final    MCV 01/27/2022 89  82 - 98 fL Final    MCH 01/27/2022 29.0  27.0 - 31.0 pg Final    MCHC 01/27/2022 32.5  32.0 - 36.0 g/dL Final    RDW 01/27/2022 13.5  11.5 - 14.5 % Final    Platelets 01/27/2022 248  150 - 450 K/uL Final    MPV 01/27/2022 11.3  9.2 - 12.9 fL Final    Immature Granulocytes 01/27/2022 0.5  0.0 - 0.5 % Final    Gran # (ANC) 01/27/2022 3.2  1.8 - 7.7 K/uL Final    Immature Grans (Abs) 01/27/2022 0.02  0.00 - 0.04 K/uL Final    Lymph # 01/27/2022 0.7 (L)  1.0 - 4.8 K/uL Final    Mono # 01/27/2022 0.3  0.3 - 1.0 K/uL Final    Eos # 01/27/2022 0.2  0.0 - 0.5 K/uL Final    Baso # 01/27/2022 0.03  0.00 - 0.20 K/uL Final    nRBC 01/27/2022 0  0 /100 WBC Final    Gran % 01/27/2022 71.8  38.0 - 73.0 % Final    Lymph % 01/27/2022 16.1 (L)  18.0 - 48.0 % Final    Mono % 01/27/2022 7.5  4.0 - 15.0 % Final    Eosinophil % 01/27/2022 3.4  0.0 - 8.0 % Final    Basophil % 01/27/2022 0.7  0.0 - 1.9 % Final    Differential Method 01/27/2022 Automated   Final    Sodium 01/27/2022 137  136 - 145 mmol/L Final    Potassium 01/27/2022 4.2  3.5 - 5.1 mmol/L Final    Chloride 01/27/2022 103  95 - 110 mmol/L Final    CO2 01/27/2022 23  23 - 29 mmol/L Final    Glucose 01/27/2022 86  70 - 110 mg/dL Final    BUN 01/27/2022 13  8 - 23 mg/dL Final    Creatinine 01/27/2022 0.9   0.5 - 1.4 mg/dL Final    Calcium 01/27/2022 9.0  8.7 - 10.5 mg/dL Final    Total Protein 01/27/2022 6.8  6.0 - 8.4 g/dL Final    Albumin 01/27/2022 3.7  3.5 - 5.2 g/dL Final    Total Bilirubin 01/27/2022 0.5  0.1 - 1.0 mg/dL Final    Alkaline Phosphatase 01/27/2022 84  55 - 135 U/L Final    AST 01/27/2022 16  10 - 40 U/L Final    ALT 01/27/2022 15  10 - 44 U/L Final    Anion Gap 01/27/2022 11  8 - 16 mmol/L Final    eGFR if African American 01/27/2022 >60  >60 mL/min/1.73 m^2 Final    eGFR if non African American 01/27/2022 >60  >60 mL/min/1.73 m^2 Final    CEA 01/27/2022 <1.7  0.0 - 5.0 ng/mL Final    Vitamin B-12 01/27/2022 315  210 - 950 pg/mL Final   There may be more visits with results that are not included.       Past Medical History:   Diagnosis Date    Acute hypoxemic respiratory failure 7/9/2018    Arthritis     Cancer     colon    COPD (chronic obstructive pulmonary disease)     History of home oxygen therapy     ONLY PRN AND HASN'T USED IT IN A YEAR    Hyperlipidemia     Thyroid disease      Past Surgical History:   Procedure Laterality Date    CHOLECYSTECTOMY      COLECTOMY N/A 11/9/2020    Procedure: COLECTOMY;  Surgeon: Brandon Wagner MD;  Location: [facility];  Service: General;  Laterality: N/A;  LOWER- ANTERIOR    COLONOSCOPY N/A 10/23/2020    Procedure: COLONOSCOPY;  Surgeon: Jolynn Ayala MD;  Location: [facility];  Service: Endoscopy;  Laterality: N/A;    COLONOSCOPY N/A 8/6/2021    Procedure: COLONOSCOPY;  Surgeon: Jolynn Ayala MD;  Location: [facility] ENDO;  Service: Endoscopy;  Laterality: N/A;    COLONOSCOPY N/A 7/7/2022    Procedure: COLONOSCOPY;  Surgeon: Jolynn Ayala MD;  Location: [facility] ENDO;  Service: Endoscopy;  Laterality: N/A;    ESOPHAGEAL DILATION N/A 6/29/2021    Procedure: DILATION, ESOPHAGUS;  Surgeon: Sourav Baires III, MD;  Location: [facility];  Service: Endoscopy;  Laterality: N/A;    ESOPHAGOGASTRODUODENOSCOPY  06/29/2021    Dilation to 57 Fr     ESOPHAGOGASTRODUODENOSCOPY N/A 6/29/2021    Procedure: EGD (ESOPHAGOGASTRODUODENOSCOPY);  Surgeon: Sourav Baires III, MD;  Location: [facility] ENDO;  Service: Endoscopy;  Laterality: N/A;    FLEXIBLE SIGMOIDOSCOPY N/A 8/1/2022    Procedure: SIGMOIDOSCOPY, FLEXIBLE;  Surgeon: Brandon Wagner MD;  Location: [facility] ENDO;  Service: Endoscopy;  Laterality: N/A;    HYSTERECTOMY      INSERTION OF TUNNELED CENTRAL VENOUS CATHETER (CVC) WITH SUBCUTANEOUS PORT N/A 1/11/2021    Procedure: [id number];  Surgeon: Brandon Wagner MD;  Location: [facility] OR;  Service: General;  Laterality: N/A;    MEDIPORT REMOVAL Left 4/19/2021    Procedure: REMOVAL, CATHETER, CENTRAL VENOUS, TUNNELED, WITH PORT;  Surgeon: Brandon Wagner MD;  Location: [facility] OR;  Service: General;  Laterality: Left;    ROBOT-ASSISTED COLECTOMY N/A 11/9/2020    Procedure: ROBOTIC COLECTOMY low anterior resection, possible open;  Surgeon: Brandon Wagner MD;  Location: [facility] OR;  Service: General;  Laterality: N/A;  CONVERTED TO OPEN AT 1503     Family History   Problem Relation Age of Onset    COPD Mother     COPD Father     Prostate cancer Brother     Breast cancer Maternal Grandmother        Marital Status:   Alcohol History:  reports no history of alcohol use.  Tobacco History:  reports that she quit smoking about 5 years ago. Her smoking use included cigarettes. She has a 80.00 pack-year smoking history. She has never used smokeless tobacco.  Drug History:  reports no history of drug use.    Review of patient's allergies indicates:   Allergen Reactions    Ativan [lorazepam] Hives and Itching    Flagyl [metronidazole] Itching and Rash    Pcn [penicillins] Hives, Itching and Rash       Current Outpatient Medications:     capecitabine (XELODA) 150 MG tablet, Take 9 tablets (1,350 mg total) by mouth 2 (two) times daily with 1 other capecitabine prescription for 1,850 mg total., Disp: 252 tablet, Rfl: 4    capecitabine (XELODA) 500 MG Tab, Take 1  tablet (500 mg total) by mouth 2 (two) times daily with 1 other capecitabine prescription for 1,850 mg total., Disp: 28 tablet, Rfl: 4    CMPD hydrocortisone 2%- LIDOcaine 3% suppository (RECTAL ROCKET), Place 1 suppository rectally every evening. Insert 1 suppository 3/4 of the way onto rectum. Wet for easier application. Repeat for 3 nights., Disp: 30 suppository, Rfl: 2    cyanocobalamin 1,000 mcg/mL injection, Inject 1 mL (1,000 mcg total) into the muscle every 14 (fourteen) days., Disp: 30 mL, Rfl: 12    ferrous sulfate 325 (65 FE) MG EC tablet, Take 325 mg by mouth 2 (two) times daily., Disp: , Rfl:     gabapentin (NEURONTIN) 300 MG capsule, Take 1 capsule (300 mg total) by mouth every evening., Disp: 30 capsule, Rfl: 2    levothyroxine (SYNTHROID) 125 MCG tablet, Take 1 tablet (125 mcg total) by mouth before breakfast., Disp: 30 tablet, Rfl: 11    ondansetron (ZOFRAN-ODT) 8 MG TbDL, Take 1 tablet (8 mg total) by mouth every 12 (twelve) hours as needed., Disp: 30 tablet, Rfl: 1    prochlorperazine (COMPAZINE) 10 MG tablet, Take 1 tablet (10 mg total) by mouth every 6 (six) hours as needed., Disp: 30 tablet, Rfl: 1    traMADoL (ULTRAM) 50 mg tablet, Take 1 tablet (50 mg total) by mouth every 6 (six) hours as needed., Disp: 30 tablet, Rfl: 0    Review of Systems   Constitutional:  Negative for activity change, appetite change, chills, fatigue, fever and unexpected weight change.   HENT:  Negative for congestion, ear pain, hearing loss, postnasal drip, sinus pain, sneezing, sore throat, tinnitus and trouble swallowing.    Eyes:  Negative for pain, discharge and visual disturbance.   Respiratory:  Negative for cough, choking, shortness of breath and wheezing.    Cardiovascular:  Negative for chest pain, palpitations and leg swelling.   Gastrointestinal:  Positive for rectal pain (rectal bleeding). Negative for abdominal distention, abdominal pain, blood in stool, constipation, diarrhea, nausea and vomiting.    Endocrine: Negative for cold intolerance and heat intolerance.   Genitourinary:  Negative for difficulty urinating, dysuria, frequency, pelvic pain and urgency.   Musculoskeletal:  Positive for back pain. Negative for joint swelling and neck pain.   Skin:  Negative for pallor and rash.   Allergic/Immunologic: Negative for environmental allergies and food allergies.   Neurological:  Negative for dizziness, tremors, weakness, numbness and headaches.   Hematological:  Does not bruise/bleed easily.   Psychiatric/Behavioral:  Negative for agitation, confusion, dysphoric mood, sleep disturbance and suicidal ideas. The patient is not nervous/anxious.         Objective:      Vitals    Vitals - 1 value per visit 8/29/2022 8/30/2022 8/30/2022 8/31/2022 8/31/2022   SYSTOLIC 123 - 154 - 132   DIASTOLIC 67 - 73 - 80   Pulse 78 - 81 - 86   Temp 97.8 - 98.1 - 98.1   Resp 18 - 18 - 16   SPO2 - - 99 - 96   Weight (lb) - - 193.01 - 193   Weight (kg) - - 87.55 - 87.544   Height - - 61 - 61   BMI (Calculated) - - 36.5 - 36.5   VISIT REPORT - - - - -   Pain Score  0 0 - 0 -   Some recent data might be hidden       Physical Exam  Vitals and nursing note reviewed.   Constitutional:       General: She is not in acute distress.     Appearance: She is obese. She is not ill-appearing.   HENT:      Head: Normocephalic and atraumatic.   Eyes:      Extraocular Movements: Extraocular movements intact.      Conjunctiva/sclera: Conjunctivae normal.      Pupils: Pupils are equal, round, and reactive to light.   Neck:      Vascular: No carotid bruit.   Cardiovascular:      Rate and Rhythm: Normal rate and regular rhythm.      Heart sounds: Murmur heard.   Pulmonary:      Comments: Decreased breath sounds  Abdominal:      General: Bowel sounds are normal. There is no distension.      Palpations: Abdomen is soft.      Tenderness: There is no abdominal tenderness.   Musculoskeletal:      Cervical back: Normal range of motion and neck supple.       Right lower leg: No edema.      Left lower leg: No edema.   Lymphadenopathy:      Cervical: No cervical adenopathy.   Skin:     General: Skin is warm and dry.   Neurological:      General: No focal deficit present.      Mental Status: She is alert and oriented to person, place, and time.   Psychiatric:         Mood and Affect: Mood normal.         Thought Content: Thought content normal.         Judgment: Judgment normal.         Assessment:       1. Rectal cancer metastasized to intrapelvic lymph node    2. Sacral pain    3. Malignant neoplasm of colon, unspecified part of colon    4. Chronic midline low back pain with sciatica, sciatica laterality unspecified         Plan:       Rectal cancer metastasized to intrapelvic lymph node  -     traMADoL (ULTRAM) 50 mg tablet; Take 1 tablet (50 mg total) by mouth every 6 (six) hours as needed.  Dispense: 30 tablet; Refill: 0    Sacral pain  -     traMADoL (ULTRAM) 50 mg tablet; Take 1 tablet (50 mg total) by mouth every 6 (six) hours as needed.  Dispense: 30 tablet; Refill: 0    Malignant neoplasm of colon, unspecified part of colon  -     traMADoL (ULTRAM) 50 mg tablet; Take 1 tablet (50 mg total) by mouth every 6 (six) hours as needed.  Dispense: 30 tablet; Refill: 0    Chronic midline low back pain with sciatica, sciatica laterality unspecified  -     traMADoL (ULTRAM) 50 mg tablet; Take 1 tablet (50 mg total) by mouth every 6 (six) hours as needed.  Dispense: 30 tablet; Refill: 0    Follow up in about 8 weeks (around 10/26/2022) for FOLLOW UP MEDICATIONS, FOLLOW UP LABS, FOLLOW-UP STATUS.        8/31/2022 Karma Mcdermott M.D.

## 2022-09-01 NOTE — DISCHARGE INSTRUCTIONS
"Discharge Instructions: After Your Surgery/Procedure  Youve just had surgery. During surgery you were given medicine called anesthesia to keep you relaxed and free of pain. After surgery you may have some pain or nausea. This is common. Here are some tips for feeling better and getting well after surgery.     Stay on schedule with your medication.   Going home  Your doctor or nurse will show you how to take care of yourself when you go home. He or she will also answer your questions. Have an adult family member or friend drive you home.      For your safety we recommend these precaution for the first 24 hours after your procedure:  Do not drive or use heavy equipment.  Do not make important decisions or sign legal papers.  Do not drink alcohol.  Have someone stay with you, if needed. He or she can watch for problems and help keep you safe.  Your concentration, balance, coordination, and judgement may be impaired for many hours after anesthesia.  Use caution when ambulating or standing up.     You may feel weak and "washed out" after anesthesia and surgery.      Subtle residual effects of general anesthesia or sedation with regional / local anesthesia can last more than 24 hours.  Rest for the remainder of the day or longer if your Doctor/Surgeon has advised you to do so.  Although you may feel normal within the first 24 hours, your reflexes and mental ability may be impaired without you realizing it.  You may feel dizzy, lightheaded or sleepy for 24 hours or longer.      Be sure to go to all follow-up visits with your doctor. And rest after your surgery for as long as your doctor tells you to.  Coping with pain  If you have pain after surgery, pain medicine will help you feel better. Take it as told, before pain becomes severe. Also, ask your doctor or pharmacist about other ways to control pain. This might be with heat, ice, or relaxation. And follow any other instructions your surgeon or nurse gives you.  Tips " for taking pain medicine  To get the best relief possible, remember these points:  Pain medicines can upset your stomach. Taking them with a little food may help.  Most pain relievers taken by mouth need at least 20 to 30 minutes to start to work.  Taking medicine on a schedule can help you remember to take it. Try to time your medicine so that you can take it before starting an activity. This might be before you get dressed, go for a walk, or sit down for dinner.  Constipation is a common side effect of pain medicines. Call your doctor before taking any medicines such as laxatives or stool softeners to help ease constipation. Also ask if you should skip any foods. Drinking lots of fluids and eating foods such as fruits and vegetables that are high in fiber can also help. Remember, do not take laxatives unless your surgeon has prescribed them.  Drinking alcohol and taking pain medicine can cause dizziness and slow your breathing. It can even be deadly. Do not drink alcohol while taking pain medicine.  Pain medicine can make you react more slowly to things. Do not drive or run machinery while taking pain medicine.  Your health care provider may tell you to take acetaminophen to help ease your pain. Ask him or her how much you are supposed to take each day. Acetaminophen or other pain relievers may interact with your prescription medicines or other over-the-counter (OTC) drugs. Some prescription medicines have acetaminophen and other ingredients. Using both prescription and OTC acetaminophen for pain can cause you to overdose. Read the labels on your OTC medicines with care. This will help you to clearly know the list of ingredients, how much to take, and any warnings. It may also help you not take too much acetaminophen. If you have questions or do not understand the information, ask your pharmacist or health care provider to explain it to you before you take the OTC medicine.  Managing nausea  Some people have an  upset stomach after surgery. This is often because of anesthesia, pain, or pain medicine, or the stress of surgery. These tips will help you handle nausea and eat healthy foods as you get better. If you were on a special food plan before surgery, ask your doctor if you should follow it while you get better. These tips may help:  Do not push yourself to eat. Your body will tell you when to eat and how much.  Start off with clear liquids and soup. They are easier to digest.  Next try semi-solid foods, such as mashed potatoes, applesauce, and gelatin, as you feel ready.  Slowly move to solid foods. Dont eat fatty, rich, or spicy foods at first.  Do not force yourself to have 3 large meals a day. Instead eat smaller amounts more often.  Take pain medicines with a small amount of solid food, such as crackers or toast, to avoid nausea.     Call your surgeon if  You still have pain an hour after taking medicine. The medicine may not be strong enough.  You feel too sleepy, dizzy, or groggy. The medicine may be too strong.  You have side effects like nausea, vomiting, or skin changes, such as rash, itching, or hives.       If you have obstructive sleep apnea  You were given anesthesia medicine during surgery to keep you comfortable and free of pain. After surgery, you may have more apnea spells because of this medicine and other medicines you were given. The spells may last longer than usual.   At home:  Keep using the continuous positive airway pressure (CPAP) device when you sleep. Unless your health care provider tells you not to, use it when you sleep, day or night. CPAP is a common device used to treat obstructive sleep apnea.  Talk with your provider before taking any pain medicine, muscle relaxants, or sedatives. Your provider will tell you about the possible dangers of taking these medicines.  © 4552-5396 [employer]. [address] [place] [address], [place] [address]. All rights reserved. This information is  not intended as a substitute for professional medical care. Always follow your healthcare professional's instructions. Using an Incentive Spirometer    An incentive spirometer is a device that helps you do deep breathing exercises. These exercises expand your lungs, aid in circulation, and help prevent pneumonia. Deep breathing exercises also help you breathe better and improve the function of your lungs by:  Keeping your lungs clear  Strengthening your breathing muscles  Helping prevent respiratory complications or problems  The incentive spirometer gives you a way to take an active part in recover. A nurse or therapist will teach you breathing exercises. To do these exercises, you will breathe in through your mouth and not your nose. The incentive spirometer only works correctly if you breathe in through your mouth.  Steps to clear lungs  Step 1. Exhale normally. Then, inhale normally.  Relax and breathe out.  Step 2. Place your lips tightly around the mouthpiece.  Make sure the device is upright and not tilted.  Step 3. Inhale as much air as you can through the mouthpiece (don't breath through your nose).  Inhale slowly and deeply.  Hold your breath long enough to keep the balls or disk raised for at least 3 to 5 seconds, or as instructed by your healthcare provider.  Some spirometers have an indicator to let you know that you are breathing in too fast. If the indicator goes off, breathe in more slowly.  Step 4. Repeat the exercise regularly.  Do this exercise every hour while you're awake, or as instructed by your healthcare provider.  If you were taught deep breathing and coughing exercises, do them regularly as instructed by your healthcare provider.      Post op instructions for prevention of DVT  What is deep vein thrombosis?  Deep vein thrombosis (DVT) is the medical term for blood clots in the deep veins of the leg.  These blood clots can be dangerous.  A DVT can block a blood vessel and keep blood from  getting where it needs to go.  Another problem is that the clot can travel to other parts of the body such as the lungs.  A clot that travels to the lungs is called a pulmonary embolus (PE) and can cause serious problems with breathing which can lead to death.  Am I at risk for DVT/PE?  If you are not very active, you are at risk of DVT.  Anyone confined to bed, sitting for long periods of time, recovering from surgery, etc. increases the risk of DVT.  Other risk factors are cancer diagnosis, certain medications, estrogen replacement in any form,older age, obesity, pregnancy, smoking, history of clotting disorders, and dehydration.  How will I know if I have a DVT?  Swelling in the lower leg  Pain  Warmth, redness, hardness or bulging of the vein  If you have any of these symptoms, call your doctors office right away.  Some people will not have any symptoms until the clot moves to the lungs.  What are the symptoms of a PE?  Panting, shortness of breath, or trouble breathing  Sharp, knife-like chest pain when you breathe  Coughing or coughing up blood  Rapid heartbeat  If you have any of these symptoms or get worse quickly, call 911 for emergency treatment.  How can I prevent a DVT?  Avoid long periods of inactivity and dont cross your legs--get up and walk around every hour or so.  Stay active--walking after surgery is highly encouraged.  This means you should get out of the house and walk in the neighborhood.  Going up and down stairs will not impair healing (unless advised against such activity by your doctor).    Drink plenty of noncaffeinated, nonalcoholic fluids each day to prevent dehydration.  Wear special support stockings, if they have been advised by your doctor.  If you travel, stop at least once an hour and walk around.  Avoid smoking (assistance with stopping is available through your healthcare provider)  Always notify your doctor if you are not able to follow the post operative instructions that are  given to you at the time of discharge.  It may be necessary to prescribe one of the medications available to prevent DVT. We hope your stay was comfortable as you heal now, mend and rest.    For we have enjoyed taking care of you by giving your our best.    And as you get better, by regaining your health and strength;   We count it as a privilege to have served you and hope your time at [facility] was well spent.      Thank  You!!!   General Information:    1.  Do not drink alcoholic beverages including beer for 24 hours or as long as you are on pain medication..  2.  Do not drive a motor vehicle, operate machinery or power tools, or signs legal papers for 24 hours or as long as you are on pain medication.   3.  You may experience light-headedness, dizziness, and sleepiness following surgery. Please do not stay alone. A responsible adult should be with you for this 24 hour period.  4.  Go home and rest.    5. Progress slowly to a normal diet unless instructed.  Otherwise, begin with liquids such as soft drinks, then soup and crackers working up to solid foods. Drink plenty of nonalcoholic fluids.  6.  Certain anesthetics and pain medications produce nausea and vomiting in certain       individuals. If nausea becomes a problem at home, call you doctor.    7. A nurse will be calling you sometime after surgery. Do not be alarmed. This is our way of finding out how you are doing.    8. Several times every hour while you are awake, take 2-3 deep breaths and cough. If you had stomach surgery hold a pillow or rolled towel firmly against your stomach before you cough. This will help with any pain the cough might cause.  9. Several times every hour while you are awake, pump and flex your feet 5-6 times and do foot circles. This will help prevent blood clots.    10.Call your doctor for severe pain, bleeding, fever, or signs or symptoms of infection (pain, swelling, redness, foul odor, drainage).

## 2022-09-01 NOTE — ANESTHESIA PROCEDURE NOTES
Intubation    Date/Time: 9/1/2022 1:11 PM  Performed by: Tali Armenta CRNA  Authorized by: Tali Armenta CRNA     Intubation:     Induction:  Intravenous    Intubated:  Postinduction    Attempts:  1    Attempted By:  CRNA    Difficult Airway Encountered?: No      Complications:  None    Airway Device:  Supraglottic airway/LMA    Airway Device Size:  3.0    Style/Cuff Inflation:  Cuffed (inflated to minimal occlusive pressure)    Secured at:  The lips    Placement Verified By:  Capnometry    Complicating Factors:  Obesity and short neck    Findings Post-Intubation:  BS equal bilateral and atraumatic/condition of teeth unchanged

## 2022-09-01 NOTE — BRIEF OP NOTE
[facility]  Brief Operative Note    Surgery Date: 9/1/2022     Surgeon(s) and Role:     * Brandon Wagner MD - Primary    Assisting Surgeon: None    Pre-op Diagnosis:  Rectal cancer [C20]    Post-op Diagnosis:  Post-Op Diagnosis Codes:     * Rectal cancer [C20]    Procedure(s) (LRB):  [id number] (Right)    Anesthesia: General    Operative Findings: Normal venous anatomy.      Estimated Blood Loss: 5 cc's           Specimens:   Specimen (24h ago, onward)      None              Discharge Note    OUTCOME: Patient tolerated treatment/procedure well without complication and is now ready for discharge.    DISPOSITION: Home or Self Care    FINAL DIAGNOSIS:  Rectal cancer    FOLLOWUP: In clinic    DISCHARGE INSTRUCTIONS:    Discharge Procedure Orders   Diet general     Call MD for:  extreme fatigue     Call MD for:  persistent dizziness or light-headedness     Call MD for:  hives     Call MD for:  redness, tenderness, or signs of infection (pain, swelling, redness, odor or green/yellow discharge around incision site)     Call MD for:  difficulty breathing, headache or visual disturbances     Call MD for:  severe uncontrolled pain     Call MD for:  persistent nausea and vomiting     Call MD for:  temperature >100.4     Remove dressing in 48 hours     Activity as tolerated

## 2022-09-01 NOTE — OP NOTE
Date of procedure:  September 1, 2022     Staff surgeon:  Dr. Brandon Wagner     Preoperative diagnosis:  Rectal cancer     Postoperative diagnosis:  The same     Procedure:  Right subclavian Port-A-Cath     Anesthesia:  General via LMA     Indication for procedure:   66-year-old female with recurrent cancer in need of port for chemotherapy     Description procedure:   Following signing of informed consent patient was taken to the operating room and placed in supine position.  SCDs were placed. Monitored anesthesia was administered without event.  Both arms were tucked and shoulder roll was placed. The patient was prepped and draped in standard fashion. An appropriate time-out procedure was then performed. Patient is placed in Trendelenburg. I obtained access to the Right subclavian vein with one percutaneous stick.  Guidewire was placed and position confirmed with fluoroscopy.  Next I make an incision incorporating the insertion site into my incision. I next took the tunneling sheath and dilator passed over the guidewire under fluoroscopic visualization. I removed the guidewire and dilator.  I next passed the catheter through the tunneling sheath and removed the tunneling sheath.  I used fluoroscopy to position the tip of the catheter such that the proximal tip is at the confluence of the subclavian vein. I then cut the distal tip of the catheter.  I secured the catheter to the port. I secure the port down to the chest wall with 2 Vicryl suture. Skin incision closed with 3 0 Vicryl and 4 0 Monocryl.  Patient was awakened taken recovery room stable condition there were no immediate complications blood loss 10 cc.  Port was aspirated and flushed easily.  POrt is ok to use if CxR is without complication.

## 2022-09-01 NOTE — TRANSFER OF CARE
"Anesthesia Transfer of Care Note    Patient: Tessa Enriquez    Procedure(s) Performed: Procedure(s) (LRB):  [id number] (Right)    Patient location: PACU    Anesthesia Type: general    Transport from OR: Transported from OR on 2-3 L/min O2 by NC with adequate spontaneous ventilation    Post pain: adequate analgesia    Post assessment: no apparent anesthetic complications    Post vital signs: stable    Level of consciousness: sedated    Nausea/Vomiting: no nausea/vomiting    Complications: none    Transfer of care protocol was followed      Last vitals:   Visit Vitals  BP (!) 140/76 (BP Location: Left arm)   Pulse 80   Temp 36.3 °C (97.3 °F) (Temporal)   Resp 17   Ht 5' 1" (1.549 m)   Wt 86.6 kg (191 lb)   Breastfeeding No   BMI 36.09 kg/m²     "

## 2022-09-01 NOTE — TELEPHONE ENCOUNTER
Returned pt's call as requested. Pt states that Dr Wagner has placed her port and she wants to start IV chemo next wk. She states that she was notified that it would be another 2 wks before she received her xeloda. Msg sent to Dr Frank.      ----- Message from Demetria Plummer sent at 9/1/2022 10:21 AM CDT -----  Type: Needs Medical Advice  Who Called:  Patient   Symptoms (please be specific):    How long has patient had these symptoms:    Pharmacy name and phone #:    Best Call Back Number: [phone]  Additional Information: Patient called to inform Dr. Frank that she was on her way to [facility] to have a port put in. She is requesting a call back from the nurse to possibly reschedule her appt. on 9/2. Please call back around 3:00-4:00.

## 2022-09-01 NOTE — ANESTHESIA POSTPROCEDURE EVALUATION
Anesthesia Post Evaluation    Patient: Tessa Enriquez    Procedure(s) Performed: Procedure(s) (LRB):  [id number] (Right)    Final Anesthesia Type: general      Patient location during evaluation: PACU  Patient participation: Yes- Able to Participate  Level of consciousness: awake and alert, oriented and awake  Post-procedure vital signs: reviewed and stable  Pain management: adequate  Airway patency: patent    PONV status at discharge: No PONV  Anesthetic complications: no      Cardiovascular status: blood pressure returned to baseline and hemodynamically stable  Respiratory status: unassisted, spontaneous ventilation and room air  Hydration status: euvolemic  Follow-up not needed.          Vitals Value Taken Time   /72 09/01/22 1445   Temp 36.4 °C (97.5 °F) 09/01/22 1440   Pulse 70 09/01/22 1445   Resp 16 09/01/22 1445   SpO2 98 % 09/01/22 1445         Event Time   Out of Recovery 14:44:00         Pain/Bautista Score: Pain Rating Prior to Med Admin: 6 (9/1/2022  2:35 PM)  Bautista Score: 10 (9/1/2022  2:45 PM)  Modified Bautista Score: 20 (9/1/2022  3:01 PM)

## 2022-09-01 NOTE — ANESTHESIA PREPROCEDURE EVALUATION
09/01/2022  Tessa Enriquez is a 66 y.o., female.    Pre-op Assessment    I have reviewed the Patient Summary Reports.    I have reviewed the Nursing Notes. I have reviewed the NPO Status.   I have reviewed the Medications.     Review of Systems  Anesthesia Hx:  No problems with previous Anesthesia  Denies Family Hx of Anesthesia complications.   Denies Personal Hx of Anesthesia complications.   Social:  Former Smoker    Hematology/Oncology:         -- Cancer in past history (colon CA):   Cardiovascular:   hyperlipidemia    Pulmonary:   COPD, moderate Acute hypoxemic resp failure  Home O2   Renal/:  Renal/ Normal     Musculoskeletal:   Arthritis     Neurological:  Neurology Normal    Endocrine:   Hypothyroidism        Physical Exam  General:  Well nourished and Obesity      Airway/Jaw/Neck:  Airway Findings: Mouth Opening: Normal   Tongue: Normal   General Airway Assessment: Adult Oropharynx Findings:  Mallampati: II  Jaw/Neck Findings:  Neck ROM: Normal ROM      Eyes/Ears/Nose:  Eyes/Ears/Nose Findings:    Dental:  Dental Findings: Dentures     Chest/Lungs:  Chest/Lungs Findings: Normal Respiratory Rate      Heart/Vascular:  Heart Findings: Rate: Normal  Rhythm: Regular Rhythm        Mental Status:  Mental Status Findings:  Cooperative, Alert and Oriented         Anesthesia Plan  Type of Anesthesia, risks & benefits discussed:  Anesthesia Type:  MAC    Patient's Preference:   Plan Factors:          Intra-op Monitoring Plan: Standard ASA Monitors  Intra-op Monitoring Plan Comments:   Post Op Pain Control Plan: multimodal analgesia  Post Op Pain Control Plan Comments:     Induction:   IV  Beta Blocker:  Patient is not currently on a Beta-Blocker (No further documentation required).       Informed Consent: Informed consent signed with the Patient and all parties understand the risks and agree with  anesthesia plan.  All questions answered.  Anesthesia consent signed with patient.  ASA Score: 3     Day of Surgery Review of History & Physical:    H&P Update referred to the surgeon/provider.          Ready For Surgery From Anesthesia Perspective.           Physical Exam  General: Well nourished and Obesity    Airway:  Mallampati: II   Mouth Opening: Normal  Tongue: Normal  Neck ROM: Normal ROM    Dental:  Dentures    Chest/Lungs:  Normal Respiratory Rate    Heart:  Rate: Normal  Rhythm: Regular Rhythm          Anesthesia Plan  Type of Anesthesia, risks & benefits discussed:    Anesthesia Type: MAC  Intra-op Monitoring Plan: Standard ASA Monitors  Post Op Pain Control Plan: multimodal analgesia  Induction:  IV  Informed Consent: Informed consent signed with the Patient and all parties understand the risks and agree with anesthesia plan.  All questions answered.   ASA Score: 3  Day of Surgery Review of History & Physical: H&P Update referred to the surgeon/provider.    Ready For Surgery From Anesthesia Perspective.       .

## 2022-09-01 NOTE — PLAN OF CARE
VSS. NAD. Resp E/U. Calm and cooperative. Speech appropriate. Tolerating clear liquids. Denies nausea. Pain controlled. IV patent. No swelling or redness. Dressing to Rt chest CDI. Family notified of patient status. All safety measures taken. Bed in low position. Bedrails up x2. Bed locked. Report given to Ursula NASH

## 2022-09-01 NOTE — H&P
[facility]  History & Physical     Subjective:     Chief Complaint/Reason for Admission: Cancer    History of Present Illness:   Patient 66 y.o. female presents for port placement. Pt notes history of anastomotic recurrence with enlarged node in the R ext iliac chain.  It has been recommended that she have chemotherapy for 3 months followed by imaging.     Patient Active Problem List    Diagnosis Date Noted    Severe obesity (BMI 35.0-39.9) with comorbidity 06/01/2022    Secondary and unspecified malignant neoplasm of intra-abdominal lymph nodes 06/01/2022    FENG (iron deficiency anemia) 05/04/2022    Hypocalcemia 05/04/2022    Chronic diarrhea 05/04/2022    B12 deficiency 10/22/2021    Malignant neoplasm of colon 08/06/2021    Dysphagia 06/29/2021    Rectal cancer metastasized to intrapelvic lymph node 12/11/2020    Rectal bleeding 10/23/2020    Pure hypercholesterolemia 10/15/2020    Acquired hypothyroidism 10/15/2020    COPD (chronic obstructive pulmonary disease) 07/09/2018        Medications Prior to Admission   Medication Sig Dispense Refill Last Dose    cyanocobalamin 1,000 mcg/mL injection Inject 1 mL (1,000 mcg total) into the muscle every 14 (fourteen) days. 30 mL 12 Past Month    ferrous sulfate 325 (65 FE) MG EC tablet Take 325 mg by mouth 2 (two) times daily.   Past Month    levothyroxine (SYNTHROID) 125 MCG tablet Take 1 tablet (125 mcg total) by mouth before breakfast. 30 tablet 11 9/1/2022    ondansetron (ZOFRAN-ODT) 8 MG TbDL Take 1 tablet (8 mg total) by mouth every 12 (twelve) hours as needed. 30 tablet 1 9/1/2022    traMADoL (ULTRAM) 50 mg tablet Take 1 tablet (50 mg total) by mouth every 6 (six) hours as needed. 30 tablet 0 Past Month    capecitabine (XELODA) 150 MG tablet Take 9 tablets (1,350 mg total) by mouth 2 (two) times daily with 1 other capecitabine prescription for 1,850 mg total. 252 tablet 4     capecitabine (XELODA) 500 MG Tab Take 1 tablet (500 mg total) by  mouth 2 (two) times daily with 1 other capecitabine prescription for 1,850 mg total. 28 tablet 4     CMPD hydrocortisone 2%- LIDOcaine 3% suppository (RECTAL ROCKET) Place 1 suppository rectally every evening. Insert 1 suppository 3/4 of the way onto rectum. Wet for easier application. Repeat for 3 nights. 30 suppository 2 More than a month    gabapentin (NEURONTIN) 300 MG capsule Take 1 capsule (300 mg total) by mouth every evening. 30 capsule 2 More than a month    prochlorperazine (COMPAZINE) 10 MG tablet Take 1 tablet (10 mg total) by mouth every 6 (six) hours as needed. 30 tablet 1 More than a month     Review of patient's allergies indicates:   Allergen Reactions    Ativan [lorazepam] Hives and Itching    Flagyl [metronidazole] Itching and Rash    Pcn [penicillins] Hives, Itching and Rash        Past Medical History:   Diagnosis Date    Acute hypoxemic respiratory failure 07/09/2018    Arthritis     Cancer     colon    COPD (chronic obstructive pulmonary disease)     History of home oxygen therapy     ONLY PRN AND HASN'T USED IT IN A YEAR    Hyperlipidemia     Thyroid disease       Past Surgical History:   Procedure Laterality Date    CHOLECYSTECTOMY      COLECTOMY N/A 11/9/2020    Procedure: COLECTOMY;  Surgeon: Brandon Wagner MD;  Location: [facility];  Service: General;  Laterality: N/A;  LOWER- ANTERIOR    COLONOSCOPY N/A 10/23/2020    Procedure: COLONOSCOPY;  Surgeon: Jolynn Ayala MD;  Location: [facility] ENDO;  Service: Endoscopy;  Laterality: N/A;    COLONOSCOPY N/A 8/6/2021    Procedure: COLONOSCOPY;  Surgeon: Jolynn Ayala MD;  Location: [facility] ENDO;  Service: Endoscopy;  Laterality: N/A;    COLONOSCOPY N/A 7/7/2022    Procedure: COLONOSCOPY;  Surgeon: Jolynn Ayala MD;  Location: [facility] ENDO;  Service: Endoscopy;  Laterality: N/A;    ESOPHAGEAL DILATION N/A 6/29/2021    Procedure: DILATION, ESOPHAGUS;  Surgeon: Sourav Baires III, MD;  Location: [facility] ENDO;  Service: Endoscopy;  Laterality:  "N/A;    ESOPHAGOGASTRODUODENOSCOPY  2021    Dilation to 57 Fr    ESOPHAGOGASTRODUODENOSCOPY N/A 2021    Procedure: EGD (ESOPHAGOGASTRODUODENOSCOPY);  Surgeon: Sourav Baires III, MD;  Location: [facility] ENDO;  Service: Endoscopy;  Laterality: N/A;    FLEXIBLE SIGMOIDOSCOPY N/A 2022    Procedure: SIGMOIDOSCOPY, FLEXIBLE;  Surgeon: Brandon Wagner MD;  Location: [facility] ENDO;  Service: Endoscopy;  Laterality: N/A;    HYSTERECTOMY      INSERTION OF TUNNELED CENTRAL VENOUS CATHETER (CVC) WITH SUBCUTANEOUS PORT N/A 2021    Procedure: [id number];  Surgeon: Brandon Wagner MD;  Location: [facility] OR;  Service: General;  Laterality: N/A;    MEDIPORT REMOVAL Left 2021    Procedure: REMOVAL, CATHETER, CENTRAL VENOUS, TUNNELED, WITH PORT;  Surgeon: Brandon Wagner MD;  Location: [facility] OR;  Service: General;  Laterality: Left;    ROBOT-ASSISTED COLECTOMY N/A 2020    Procedure: ROBOTIC COLECTOMY low anterior resection, possible open;  Surgeon: Brandon Wagner MD;  Location: [facility] OR;  Service: General;  Laterality: N/A;  CONVERTED TO OPEN AT 1503      Family History   Problem Relation Age of Onset    COPD Mother     COPD Father     Prostate cancer Brother     Breast cancer Maternal Grandmother       Social History     Tobacco Use    Smoking status: Former     Packs/day: 2.00     Years: 40.00     Pack years: 80.00     Types: Cigarettes     Quit date: 2017     Years since quittin.1    Smokeless tobacco: Never   Substance Use Topics    Alcohol use: No        Review of Systems:  Rectal bleeding tenesmus      OBJECTIVE:     Patient Vitals for the past 8 hrs:   BP Temp Temp src Pulse Resp Height Weight   22 1157 (!) 140/76 97.3 °F (36.3 °C) Temporal 80 17 5' 1" (1.549 m) 86.6 kg (191 lb)     AAox3  CTA B  Soft/nt/nd      Data Review:      ASSESSMENT/PLAN:     There are no hospital problems to display for this patient.  Port placement    Plan:  To have port today    "

## 2022-09-02 NOTE — TELEPHONE ENCOUNTER
"----- Message from Reba oMnae sent at 9/2/2022  2:06 PM CDT -----  Consult/Advisory:           Name Of Caller:  [facility]    Contact Preference?: [phone] option 1 for Client Services    What is the nature of the call?: confirming test selection and pt cancer type           Additional Notes:  "Thank you for all that you do for our patients'"     "

## 2022-09-02 NOTE — PLAN OF CARE
DISCONTINUE ON PATHWAY REGIMEN - Colorectal    [id number]        Capecitabine (Xeloda)       Bevacizumab-xxxx       Irinotecan (Camptosar)           Additional Orders: Do not administer bevacizumab for at least 28 days   prior to elective surgery and for at least 28 days following surgery and until   the wound is fully healed.    **Always confirm dose/schedule in your pharmacy ordering system**    REASON: Other Reason  PRIOR TREATMENT: [id number]  TREATMENT RESPONSE: Progressive Disease (PD)    START ON PATHWAY REGIMEN - Colorectal    [id number]        Bevacizumab-xxxx       Irinotecan (Camptosar)       Leucovorin       Fluorouracil       Fluorouracil           Additional Orders: Do not administer bevacizumab for at least 28 days   prior to elective surgery and for at least 28 days following surgery and until   the wound is fully healed.    **Always confirm dose/schedule in your pharmacy ordering system**    Patient Characteristics:  Distant Metastases, Nonsurgical Candidate, KRAS/NRAS Mutation Positive/Unknown   (BRAF V600 Wild-Type/Unknown), Standard Cytotoxic Therapy, Second Line Standard   Cytotoxic Therapy, Bevacizumab Eligible  Tumor Location: Colon  Therapeutic Status: Distant Metastases  Microsatellite/Mismatch Repair Status: Unknown  BRAF Mutation Status: Awaiting Test Results  KRAS/NRAS Mutation Status: Awaiting Test Results  Standard Cytotoxic Line of Therapy: Second Line Standard Cytotoxic Therapy  Bevacizumab Eligibility: Eligible  Intent of Therapy:  Non-Curative / Palliative Intent, Discussed with Patient

## 2022-09-02 NOTE — TELEPHONE ENCOUNTER
Spoke to patient about missing appointment and she let me know that she had her port put in and she just needs [facility] nurse to schedule chemo clear for next week.

## 2022-09-02 NOTE — TELEPHONE ENCOUNTER
----- Message from Danielle Frank MD sent at 9/2/2022 12:11 AM CDT -----  Orders have been placed for FOLFIRI+ avastin , I need urine analysis ( No culture ) and start date , chemno class and see me for start date, she prob has chemo class set up alreasy

## 2022-09-06 NOTE — TELEPHONE ENCOUNTER
----- Message from Danielle Frank MD sent at 9/2/2022  3:23 PM CDT -----  Order is in  for start of chemo, please make sure this is expedited

## 2022-09-07 NOTE — TELEPHONE ENCOUNTER
This nurse called pt to notify that chemo is authed. Pt has signed consent. She will need a covid test prior to start. Pt v/u. Pt notified that inf sched will contact her for start date.

## 2022-09-09 NOTE — TELEPHONE ENCOUNTER
Called pt to notify that lab and appt for UA will be necessary prior to chemo on 9/12. She v/u and asked that all appts be made on Saturday.

## 2022-09-12 NOTE — PROGRESS NOTES
CHEMO SCHOOL- NUTRITION    Tessa Enriquez is a 66 y.o. female with colorectal cancer. Pt will be receiving folfiri and bavacizumab. I met with Ms. Enriquez for chemo school today. Pt reports excellent appetite and intake. She denies any recent unintentional weight changes. She struggles with hydration and frequently drinks caffeinated beverages. She has good knowledge of nutrition related side effects of treatment from past treatment. She does continue to have fecal incontinence but denies N/V/D/C. She denies having any nutrition related questions or concerns at the current time.     CW: 192#.     Plan:   Discussed importance of maintaining wt & staying hydrated.   Reviewed food safety guidelines recommended during treatment.   Recommended small, frequent meals and snacks throughout treatment  Discussed alternate sources of hydration to improve oral hydration during chemotherapy  Provided RD contact info & encouraged pt to call with any questions/concerns.   Will f/u as needed.       Electronically signed by: Suzie Bazzi MBA, MILLIEN, LDN

## 2022-09-12 NOTE — PLAN OF CARE
Problem: Activity Intolerance  Goal: Enhanced Capacity and Energy  Outcome: Ongoing, Progressing  Intervention: Optimize Activity Tolerance  Flowsheets (Taken 9/12/2022 1254)  Self-Care Promotion: independence encouraged  Activity Management:   Ambulated -L4   Up in chair - L3  Environmental Support:   calm environment promoted   rest periods encouraged

## 2022-09-12 NOTE — PROGRESS NOTES
Subjective:       Patient ID: Tessa Enriquez is a 66 y.o. female.    Chief Complaint:   rectal CA status post biopsy October 2020 robotic LAR November 9, 2020 by Dr. Wagner  Discontinued folfox due to s/e completed xrt  5/21     works at [employer]    pt went to  wi rectal bleeding and abd pain scoped  Findings:        The digital rectal exam revealed a firm rectal mass palpated 5 to 6        cm from the anal verge. The mass was non-circumferential and located        predominantly at the posterior bowel wall.        There was evidence of a prior end-to-end colo-rectal anastomosis in        the distal rectum. This was patent and was characterized by        ulceration. The anastomosis was traversed. Mass in association with        the anastomosis has been previously biopsied and confirmed to be        adenocarcinoma. Anastomosis present at approximately 5 cm from anal    pt sent for discussion  REVIEW OF SYSTEMS:     CONSTITUTIONAL: The patient denies any weight change. There is no apparent    change in appetite, fever, night sweats, headaches, lots fatigue, no dizziness, some  weakness.     port is out, no more pain on left cw     SKIN: Denies rash, issues with nails, non-healing sores, bleeding, blotching    skin or abnormal bruising. Denies new moles or changes to existing moles.      BREASTS: There is no swelling around breasts or nipple discharge.    EYES: Denies eye pain, blurred vision, swelling, redness or discharge.      ENT AND MOUTH: Denies runny nose, stuffiness, sinus trouble or sores. Denies    nosebleeds. Denies, hoarseness, change in voice or swelling in front of the    neck.      CARDIOVASCULAR: Denies chest pain, discomfort or palpitations. Denies neck    swelling or episodes of passing out.      RESPIRATORY: Denies cough, sputum production, blood in sputum, and denies    shortness of breath.      GI:  Had esophagus stretched as food/ pills were getting stuck  Pt saw DR Linares, diarrhea  chronically since the xrt, if she has gas and takes meds she messes on her self, she alt constipation with loose stools also reports rectal bleeding. Rectal pain    GENITOURINARY: No discharge. No pelvic pain or lumps. No rash around groin or  lesions. No urinary frequency, hesitation, painful urination or blood in    urine. Denies incontinence. No problems with intercourse.      MUSCULOSKELETAL: Denies neck or back pain. Denies weakness in arms or legs,    joint problems or distended inflamed veins in legs. Denies swelling or abnormal  glands.      NEUROLOGICAL: Denies tingling, numbness, altered mentation changes to nerve    function in the face, weakness to one or both of the body. Denies changes to    gait and denies multiple falls or accidents.      PSYCHIATRIC: Denies nervousness, anxiety, hallucinations, depression, suicidal    ideation, trouble sleeping or changes in behavior noticed by family.          Oncology History:  T3 N1 rectal CA diagnosed October 2020 robotic LAR November 2020 December 2020 PET scan negative for metastatic disease   started FOLFOX 2/21 but stopped after 3 cycles and only wants surveillance   had colonoscopy 8/21 NAD  Past Medical History:   Diagnosis Date    Acute hypoxemic respiratory failure 07/09/2018    Arthritis     Cancer     colon    COPD (chronic obstructive pulmonary disease)     History of home oxygen therapy     ONLY PRN AND HASN'T USED IT IN A YEAR    Hyperlipidemia     Thyroid disease      Past Surgical History:   Procedure Laterality Date    CHOLECYSTECTOMY      COLECTOMY N/A 11/9/2020    Procedure: COLECTOMY;  Surgeon: Brandon Wagner MD;  Location: [facility] OR;  Service: General;  Laterality: N/A;  LOWER- ANTERIOR    COLONOSCOPY N/A 10/23/2020    Procedure: COLONOSCOPY;  Surgeon: Jolynn Ayala MD;  Location: [facility] ENDO;  Service: Endoscopy;  Laterality: N/A;    COLONOSCOPY N/A 8/6/2021    Procedure: COLONOSCOPY;  Surgeon: Jolynn Ayala MD;  Location: [facility]  ENDO;  Service: Endoscopy;  Laterality: N/A;    COLONOSCOPY N/A 7/7/2022    Procedure: COLONOSCOPY;  Surgeon: Jolynn Ayala MD;  Location: [facility] ENDO;  Service: Endoscopy;  Laterality: N/A;    ESOPHAGEAL DILATION N/A 6/29/2021    Procedure: DILATION, ESOPHAGUS;  Surgeon: Sourav Baires III, MD;  Location: [facility] ENDO;  Service: Endoscopy;  Laterality: N/A;    ESOPHAGOGASTRODUODENOSCOPY  06/29/2021    Dilation to 57 Fr    ESOPHAGOGASTRODUODENOSCOPY N/A 6/29/2021    Procedure: EGD (ESOPHAGOGASTRODUODENOSCOPY);  Surgeon: Sourav Baires III, MD;  Location: [facility] ENDO;  Service: Endoscopy;  Laterality: N/A;    FLEXIBLE SIGMOIDOSCOPY N/A 8/1/2022    Procedure: SIGMOIDOSCOPY, FLEXIBLE;  Surgeon: Brandon Wagner MD;  Location: [facility] ENDO;  Service: Endoscopy;  Laterality: N/A;    HYSTERECTOMY      INSERTION OF TUNNELED CENTRAL VENOUS CATHETER (CVC) WITH SUBCUTANEOUS PORT N/A 1/11/2021    Procedure: [id number];  Surgeon: Brandon Wagner MD;  Location: [facility] OR;  Service: General;  Laterality: N/A;    INSERTION OF TUNNELED CENTRAL VENOUS CATHETER (CVC) WITH SUBCUTANEOUS PORT Right 9/1/2022    Procedure: [id number];  Surgeon: Brandon Wagner MD;  Location: [facility] OR;  Service: General;  Laterality: Right;  Wants rt side placement    MEDIPORT REMOVAL Left 4/19/2021    Procedure: REMOVAL, CATHETER, CENTRAL VENOUS, TUNNELED, WITH PORT;  Surgeon: Brandon Wagner MD;  Location: [facility] OR;  Service: General;  Laterality: Left;    ROBOT-ASSISTED COLECTOMY N/A 11/9/2020    Procedure: ROBOTIC COLECTOMY low anterior resection, possible open;  Surgeon: Brandon Wagner MD;  Location: [facility] OR;  Service: General;  Laterality: N/A;  CONVERTED TO OPEN AT 1503     Family History   Problem Relation Age of Onset    COPD Mother     COPD Father     Prostate cancer Brother     Breast cancer Maternal Grandmother       Social History     Socioeconomic History    Marital status:    Tobacco Use    Smoking status:  Former     Packs/day: 2.00     Years: 40.00     Pack years: 80.00     Types: Cigarettes     Quit date: 2017     Years since quittin.1    Smokeless tobacco: Never   Substance and Sexual Activity    Alcohol use: No    Drug use: No    Sexual activity: Not Currently     Partners: Male     Review of patient's allergies indicates:   Allergen Reactions    Ativan [lorazepam] Hives and Itching    Flagyl [metronidazole] Itching and Rash    Pcn [penicillins] Hives, Itching and Rash       Current Outpatient Medications:     CMPD hydrocortisone 2%- LIDOcaine 3% suppository (RECTAL ROCKET), Place 1 suppository rectally every evening. Insert 1 suppository 3/4 of the way onto rectum. Wet for easier application. Repeat for 3 nights., Disp: 30 suppository, Rfl: 2    cyanocobalamin 1,000 mcg/mL injection, Inject 1 mL (1,000 mcg total) into the muscle every 14 (fourteen) days., Disp: 30 mL, Rfl: 12    ferrous sulfate 325 (65 FE) MG EC tablet, Take 325 mg by mouth 2 (two) times daily., Disp: , Rfl:     gabapentin (NEURONTIN) 300 MG capsule, Take 1 capsule (300 mg total) by mouth every evening., Disp: 30 capsule, Rfl: 2    HYDROcodone-acetaminophen (NORCO) 5-325 mg per tablet, Take 1 tablet by mouth every 6 (six) hours as needed for Pain., Disp: 20 tablet, Rfl: 0    levothyroxine (SYNTHROID) 125 MCG tablet, Take 1 tablet (125 mcg total) by mouth before breakfast., Disp: 30 tablet, Rfl: 11    ondansetron (ZOFRAN-ODT) 8 MG TbDL, Take 1 tablet (8 mg total) by mouth every 12 (twelve) hours as needed., Disp: 30 tablet, Rfl: 1    prochlorperazine (COMPAZINE) 10 MG tablet, Take 1 tablet (10 mg total) by mouth every 6 (six) hours as needed., Disp: 30 tablet, Rfl: 1    traMADoL (ULTRAM) 50 mg tablet, Take 1 tablet (50 mg total) by mouth every 6 (six) hours as needed., Disp: 30 tablet, Rfl: 0    capecitabine (XELODA) 150 MG tablet, Take 9 tablets (1,350 mg total) by mouth 2 (two) times daily with 1 other capecitabine prescription for 1,850  mg total. (Patient not taking: Reported on 9/12/2022.), Disp: 252 tablet, Rfl: 4    capecitabine (XELODA) 500 MG Tab, Take 1 tablet (500 mg total) by mouth 2 (two) times daily with 1 other capecitabine prescription for 1,850 mg total. (Patient not taking: Reported on 9/12/2022.), Disp: 28 tablet, Rfl: 4  PHYSICAL EXAM:     Wt Readings from Last 3 Encounters:   09/12/22 87.2 kg (192 lb 3.9 oz)   09/01/22 86.6 kg (191 lb)   08/31/22 87.5 kg (193 lb)     Temp Readings from Last 3 Encounters:   09/12/22 97.5 °F (36.4 °C) (Temporal)   09/01/22 97.5 °F (36.4 °C) (Skin)   08/31/22 98.1 °F (36.7 °C)     BP Readings from Last 3 Encounters:   09/12/22 117/70   09/01/22 (!) 156/72   08/31/22 132/80     Pulse Readings from Last 3 Encounters:   09/12/22 98   09/01/22 70   08/31/22 86     GENERAL: alert; in no apparent distress, , well-built well-nourished   HEAD: normocephalic, atraumatic.  EYES: pupils are equal, round, reactive to light and accommodation. Sclera anicteric. Conjunctiva not injected.   NOSE/THROAT: no nasal erythema or rhinorrhea. Oropharynx pink, without erythema, ulcerations or thrush.   NECK: no cervical motion rigidity; supple with no masses.  CHEST: clear to auscultation bilaterally; no wheezes, crackles or rubs. Patient is speaking comfortably on room air with normal work of breathing without using accessory muscles of respiration.  CARDIOVASCULAR: regular rate and rhythm; no murmurs, rubs or gallops.  ABDOMEN: soft, nontender, nondistended. Bowel sounds present.   MUSCULOSKELETAL: no tenderness to palpation along the spine or scapulae. Normal range of motion.  NEUROLOGIC: cranial nerves II-XII intact bilaterally. Strength 5/5 in bilateral upper and lower extremities. No sensory deficits appreciated. Reflexes globally intact. No cerebellar signs. Normal gait.  LYMPHATIC: no cervical, supraclavicular or axillary adenopathy appreciated bilaterally.   EXTREMITIES: no clubbing, cyanosis, edema.  SKIN: no  erythema, rashes, ulcerations noted  ECOG-0  Lab Results   Component Value Date    WBC 4.35 09/10/2022    HGB 12.6 09/10/2022    HCT 38.9 09/10/2022    MCV 92 09/10/2022     09/10/2022       BMP  Lab Results   Component Value Date     09/10/2022    K 3.8 09/10/2022     09/10/2022    CO2 23 09/10/2022    BUN 11 09/10/2022    CREATININE 0.8 09/10/2022    CALCIUM 9.1 09/10/2022    ANIONGAP 11 09/10/2022    ESTGFRAFRICA >60 05/02/2022    EGFRNONAA >60 05/02/2022      cea 7/14/21 1.8  November 9, 2020  .  Rectum, sigmoid colon and proximal donut (low anterior resection):   - Invasive adenocarcinoma,  invading through the muscularis propria into   pericolorectal tissue   - Pathologic stage pT3, pN1a (see synoptic report)   - Resection margins negative for dysplasia or malignancy (proximal, distal   and radial margins), within 1 mm to radial margin   - One tumor deposit   - Adenocarcinoma involve one out of totally forty seven lymph nodes (1/46 in   part 1, totally 1/47, also see part 2)   - Background colon with diverticulosis associated with hyperpigmented   macrophages, benign   - Proximal donut with benign colon, no dysplasia or malignancy   - Tattoo identified   2.  Colon, distal donut (excision):   - Benign colon, negative for dysplasia or malignancy   - One lymph node, negative for metastatic carcinoma (0/1)   COLON AND RECTUM: Resection, Including Transanal Disk Excision of Rectal   Neoplasms   Procedure  Low anterior resection   Tumor Site  Rectum    Tumor Location: Straddles the anterior peritoneal reflection   Tumor Size   Greatest dimension (centimeters): 7.0 cm   + Additional dimensions (centimeters): 4.7 x 2.2 cm   Macroscopic Tumor Perforation  Not identified   Macroscopic Intactness of Mesorectum  Near complete   Histologic Type  Adenocarcinoma   Histologic Grade   G2: Moderately differentiated   Tumor Extension  Tumor invades through the muscularis propria into   pericolorectal tissue      Impression:  November 7, 2020 CT abdomen pelvis     1. Rectosigmoid junction mass, unchanged from prior.  No definite evidence of intraluminal contrast extravasation to suggest active gastrointestinal hemorrhage, however please note that arterial phase CT is more sensitive.  2. Colonic diverticulosis without acute diverticulitis.  3. Minimal pneumobilia, unchanged      IMPRESSION:  CT chest lung screen October 2020     1.  3 mm pulmonary nodule identified in the lateral segment of the  right lower lobe.  2.  Focus of groundglass opacity measuring 11 mm in diameter in the  superior segment of the right lower lobe. Short-term follow-up for  this lesion is recommended.  3.  Mild emphysematous lung disease.     LUNG-RADS CATEGORY 3: PROBABLY BENIGN FINDINGS     RECOMMENDATION: Short-term imaging follow-up with 6 month LDCT.    IMPRESSION: 7/21  1. Nonspecific hypermetabolic soft tissue density focus cranial to the rectosigmoid colon suture line, perhaps reflecting postsurgical and or posttreatment or postinflammatory changes. Attention to this area at follow-up imaging is recommended to help differentiate from residual or recurrent neoplasm.  2. Improving postoperative changes in the lower anterior abdominal wall, with associated multifocal FDG hypermetabolism which is most characteristic of postoperative etiology.  3. Otherwise no convincing evidence of recurrent neoplasm or metastatic disease.IMPRESSION:  1. Interval increased size of hypermetabolic presacral mass contiguous with hypermetabolic circumferential wall thickening of the rectum at the level of the anastomotic suture line, consistent with recurrent neoplasm and metastatic disease.  2. Interval development of enlarged, hypermetabolic right iliac chain lymph node consistent with metastatic disease.  3. No additional convincing evidence of metastatic disease.  4. Bilateral sacral ala insufficiency fractures, with corresponding FDG hypermetabolism.  5.  Intense FDG uptake throughout the thyroid gland, unchanged and nonspecific.      Patient Active Problem List   Diagnosis    COPD (chronic obstructive pulmonary disease)    Pure hypercholesterolemia    Acquired hypothyroidism    Rectal bleeding    Rectal cancer    Dysphagia    Malignant neoplasm of colon    B12 deficiency    FENG (iron deficiency anemia)    Hypocalcemia    Chronic diarrhea    Severe obesity (BMI 35.0-39.9) with comorbidity    Secondary and unspecified malignant neoplasm of intra-abdominal lymph nodes   IMPRESSION:pet 12/2020  1. Postoperative changes of rectal colonic resection, with adjacent  FDG avid presacral soft tissue stranding.  2. Adjacent punctate extraluminal foci of free air suggesting a tiny  tract extending towards the adjacent small bowel (possibly  postoperative but suggesting a tiny enterocolonic fistula)  3. Deep subcutaneous soft tissue structure along the ventral abdomen  suggesting a postoperative hematoma/seroma, and less likely abscess.  4. No findings of additional sites of FDG avid disease.      IMPRESSION: 7/21  1. Nonspecific hypermetabolic soft tissue density focus cranial to the rectosigmoid colon suture line, perhaps reflecting postsurgical and or posttreatment or postinflammatory changes. Attention to this area at follow-up imaging is recommended to help differentiate from residual or recurrent neoplasm.  2. Improving postoperative changes in the lower anterior abdominal wall, with associated multifocal FDG hypermetabolism which is most characteristic of postoperative etiology.  3. Otherwise no convincing evidence of recurrent neoplasm or metastatic disease      Impression:2/2/22   CT CAP  A single 4 mm soft tissue density nodule is noted in the right lower lobe.  No other intrapulmonary masses or nodules are seen.  Follow-up CT in 12 months per of this nodule is recommended.  Coronary artery calcification is noted.  No masses or adenopathy or seen.     Prior  cholecystectomy.  Prior hysterectomy.  2.2 cm left ovarian cyst.  Small ventral hernia containing a loop of small bowel without CT evidence of obstruction  Assessment and Plan   At original dx 2020  Status post low anterior resection November 9, 2020 for rectal CA T3 N1a   tumor board convened and agreed on xrt that will follow  After completion of chemo  sequentially  therapy   Patient started FOLFOX.  Completed 4 out of 12 planned adjuvant cycles,  Discontinued  Chemo / port removed completed xrt 5/21  Pet:8/22, showing progression   Pt very hesitant T FIRST TO GET PORT OR INFUSIONAL 5 FU/ OXALIPLATIN, [facility] NOW, HAD PORT PLACED   Starting FIOLFIRI + avastin today   See kme for cycle 2 with cbc,c mp, iron ferittin    d/w  FENG :Injectafer  completed 8/22  pt to add SL b12 to her twice a month b12 shots    Please ask for caris     b12 def anemia, taking injections q month level checked 10/21 sub therpautic 226 t INSURANCE WONT PAY FOR B12.ok to cont with SL only as levels have improved to 1500  iron deficiency  Pt to try oral for now, will recheck in 8/22 ( may help her chronic diarrhea)  hypocalcemia which seems chronic pt to supplement orally daily wnl this visit  Hypothyroidism, sees DR Mcdermott will adj meds  History of chronic obstructive pulmonary disease, dyslipidemia and hypothyroidism continue with PCP  Doesn't want to be covid vaccinated

## 2022-09-12 NOTE — PLAN OF CARE
DISCONTINUE ON PATHWAY REGIMEN - Colorectal    [id number]        Bevacizumab-xxxx       Irinotecan (Camptosar)       Leucovorin       Fluorouracil       Fluorouracil           Additional Orders: Do not administer bevacizumab for at least 28 days   prior to elective surgery and for at least 28 days following surgery and until   the wound is fully healed.    **Always confirm dose/schedule in your pharmacy ordering system**    REASON: Other Reason  PRIOR TREATMENT: [id number]  TREATMENT RESPONSE: Unable to Evaluate    START ON PATHWAY REGIMEN - Colorectal    COS84        Panitumumab (Vectibix)       Irinotecan (Camptosar)       Leucovorin       Fluorouracil       Fluorouracil     **Always confirm dose/schedule in your pharmacy ordering system**    Patient Characteristics:  Distant Metastases, Nonsurgical Candidate, KRAS/NRAS Wild-Type (BRAF V600   Wild-Type/Unknown), Standard Cytotoxic Therapy, Second Line Standard Cytotoxic   Therapy, Bevacizumab Ineligible  Tumor Location: Colon  Therapeutic Status: Distant Metastases  Microsatellite/Mismatch Repair Status: Unknown  BRAF Mutation Status: Awaiting Test Results  KRAS/NRAS Mutation Status: Wild-Type (no mutation)  Preferred Therapy Approach: Standard Cytotoxic Therapy  Standard Cytotoxic Line of Therapy: Second Line Standard Cytotoxic Therapy  Bevacizumab Eligibility: Ineligible  Intent of Therapy:  Non-Curative / Palliative Intent, Discussed with Patient

## 2022-09-13 NOTE — TELEPHONE ENCOUNTER
Staff msg sent to seek auth for change in tx.      ----- Message from Danielle Frank MD sent at 9/12/2022  5:38 PM CDT -----  Treatment changed to FOLFIRI with panitumumab and avastin discontd, please get new apptoveal for panitumumab for next xyole

## 2022-09-26 PROBLEM — R19.7 DIARRHEA: Status: ACTIVE | Noted: 2022-01-01

## 2022-09-26 PROBLEM — E86.0 DEHYDRATION: Status: ACTIVE | Noted: 2022-01-01

## 2022-09-26 NOTE — Clinical Note
Pt is having lots of diarrhea, please see if they can give levsin. Ok for chemo added neulasta please let infusion know, also need one liter of IV fluids today  See me for next cycle with cbc,c mp, cea b12

## 2022-09-26 NOTE — PROGRESS NOTES
Vectibix dose rounded from 520 mg to 500 mg (within 5% for chemotherapy and 10% for monoclonal antibodies) per rounding protocol; original order 6 mg/kg.

## 2022-09-26 NOTE — PROGRESS NOTES
Subjective:       Patient ID: Tessa Enriquez is a 66 y.o. female.    Chief Complaint:   rectal CA status post biopsy October 2020 robotic LAR November 9, 2020 by Dr. Wagner  Discontinued folfox due to s/e completed xrt  5/21     works at [employer]    pt went to GI wih rectal bleeding and abd pain scoped  Findings:        The digital rectal exam revealed a firm rectal mass palpated 5 to 6        cm from the anal verge. The mass was non-circumferential and located        predominantly at the posterior bowel wall.        There was evidence of a prior end-to-end colo-rectal anastomosis in        the distal rectum. This was patent and was characterized by        ulceration. The anastomosis was traversed. Mass in association with        the anastomosis has been previously biopsied and confirmed to be        adenocarcinoma. Anastomosis present at approximately 5 cm from anal    pt sent for discussion    MPRESSION:  1. Interval increased size of hypermetabolic presacral mass contiguous with hypermetabolic circumferential wall thickening of the rectum at the level of the anastomotic suture line, consistent with recurrent neoplasm and metastatic disease.  2. Interval development of enlarged, hypermetabolic right iliac chain lymph node consistent with metastatic disease.  3. No additional convincing evidence of metastatic disease.  4. Bilateral sacral ala insufficiency fractures, with corresponding FDG hypermetabolism.  5. Intense FDG uptake throughout the thyroid gland, unchanged and nonspecific.  Pt discussed wt [facility] started folfiri +avastin then from cycle 2 changed to FOLFIRI+ panitumumab  REVIEW OF SYSTEMS:     CONSTITUTIONAL: The patient denies any weight change. There is no apparent    change in appetite, fever, night sweats, headaches, lots fatigue, no dizziness, some  weakness.     port is out, no more pain on left cw     SKIN: Denies rash, issues with nails, non-healing sores, bleeding, blotching    skin or abnormal  bruising. Denies new moles or changes to existing moles.      BREASTS: There is no swelling around breasts or nipple discharge.    EYES: Denies eye pain, blurred vision, swelling, redness or discharge.      ENT AND MOUTH: Denies runny nose, stuffiness, sinus trouble or sores. Denies    nosebleeds. Denies, hoarseness, change in voice or swelling in front of the    neck.      CARDIOVASCULAR: Denies chest pain, discomfort or palpitations. Denies neck    swelling or episodes of passing out.      RESPIRATORY: Denies cough, sputum production, blood in sputum, and denies    shortness of breath.      GI:  Had esophagus stretched as food/ pills were getting stuck  Pt saw DR Linares, diarrhea chronically since the xrt, if she has gas and takes meds she messes on her self, she alt constipation with loose stools also reports rectal bleeding. Rectal pain, loose stools with irinotecan    GENITOURINARY: No discharge. No pelvic pain or lumps. No rash around groin or  lesions. No urinary frequency, hesitation, painful urination or blood in    urine. Denies incontinence. No problems with intercourse.      MUSCULOSKELETAL: Denies neck or back pain. Denies weakness in arms or legs,    joint problems or distended inflamed veins in legs. Denies swelling or abnormal  glands.      NEUROLOGICAL: Denies tingling, numbness, altered mentation changes to nerve    function in the face, weakness to one or both of the body. Denies changes to    gait and denies multiple falls or accidents.      PSYCHIATRIC: Denies nervousness, anxiety, hallucinations, depression, suicidal    ideation, trouble sleeping or changes in behavior noticed by family.          Oncology History:  T3 N1 rectal CA diagnosed October 2020 robotic LAR November 2020 December 2020 PET scan negative for metastatic disease   started FOLFOX 2/21 but stopped after 3 cycles and only wants surveillance   had colonoscopy 8/21 NAD  Past Medical History:   Diagnosis Date    Acute  hypoxemic respiratory failure 07/09/2018    Arthritis     Cancer     colon    COPD (chronic obstructive pulmonary disease)     History of home oxygen therapy     ONLY PRN AND HASN'T USED IT IN A YEAR    Hyperlipidemia     Thyroid disease      Past Surgical History:   Procedure Laterality Date    CHOLECYSTECTOMY      COLECTOMY N/A 11/9/2020    Procedure: COLECTOMY;  Surgeon: Brandon Wagner MD;  Location: [facility] OR;  Service: General;  Laterality: N/A;  LOWER- ANTERIOR    COLONOSCOPY N/A 10/23/2020    Procedure: COLONOSCOPY;  Surgeon: Jolynn Ayala MD;  Location: [facility] ENDO;  Service: Endoscopy;  Laterality: N/A;    COLONOSCOPY N/A 8/6/2021    Procedure: COLONOSCOPY;  Surgeon: Jolynn Ayala MD;  Location: [facility] ENDO;  Service: Endoscopy;  Laterality: N/A;    COLONOSCOPY N/A 7/7/2022    Procedure: COLONOSCOPY;  Surgeon: Jolynn Ayala MD;  Location: [facility] ENDO;  Service: Endoscopy;  Laterality: N/A;    ESOPHAGEAL DILATION N/A 6/29/2021    Procedure: DILATION, ESOPHAGUS;  Surgeon: Sourav Baires III, MD;  Location: [facility] [place], [place];  Service: Endoscopy;  Laterality: N/A;    ESOPHAGOGASTRODUODENOSCOPY  06/29/2021    Dilation to 57 Fr    ESOPHAGOGASTRODUODENOSCOPY N/A 6/29/2021    Procedure: EGD (ESOPHAGOGASTRODUODENOSCOPY);  Surgeon: Sourav Baires III, MD;  Location: [facility] [place] [place];  Service: Endoscopy;  Laterality: N/A;    FLEXIBLE SIGMOIDOSCOPY N/A 8/1/2022    Procedure: SIGMOIDOSCOPY, FLEXIBLE;  Surgeon: Brandon Wagner MD;  Location: [facility] ENDO;  Service: Endoscopy;  Laterality: N/A;    HYSTERECTOMY      INSERTION OF TUNNELED CENTRAL VENOUS CATHETER (CVC) WITH SUBCUTANEOUS PORT N/A 1/11/2021    Procedure: [id number];  Surgeon: Brandon Wagner MD;  Location: [facility] OR;  Service: General;  Laterality: N/A;    INSERTION OF TUNNELED CENTRAL VENOUS CATHETER (CVC) WITH SUBCUTANEOUS PORT Right 9/1/2022    Procedure: [id number];  Surgeon: Brandon Wagner MD;  Location: [facility] OR;  Service:  General;  Laterality: Right;  Wants rt side placement    MEDIPORT REMOVAL Left 2021    Procedure: REMOVAL, CATHETER, CENTRAL VENOUS, TUNNELED, WITH PORT;  Surgeon: Brandon Wagner MD;  Location: [facility] OR;  Service: General;  Laterality: Left;    ROBOT-ASSISTED COLECTOMY N/A 2020    Procedure: ROBOTIC COLECTOMY low anterior resection, possible open;  Surgeon: Brandon Wagner MD;  Location: [facility] OR;  Service: General;  Laterality: N/A;  CONVERTED TO OPEN AT 1503     Family History   Problem Relation Age of Onset    COPD Mother     COPD Father     Prostate cancer Brother     Breast cancer Maternal Grandmother       Social History     Socioeconomic History    Marital status:    Tobacco Use    Smoking status: Former     Packs/day: 2.00     Years: 40.00     Pack years: 80.00     Types: Cigarettes     Quit date: 2017     Years since quittin.2    Smokeless tobacco: Never   Substance and Sexual Activity    Alcohol use: No    Drug use: No    Sexual activity: Not Currently     Partners: Male     Review of patient's allergies indicates:   Allergen Reactions    Ativan [lorazepam] Hives and Itching    Flagyl [metronidazole] Itching and Rash    Pcn [penicillins] Hives, Itching and Rash       Current Outpatient Medications:     CMPD hydrocortisone 2%- LIDOcaine 3% suppository (RECTAL ROCKET), Place 1 suppository rectally every evening. Insert 1 suppository 3/4 of the way onto rectum. Wet for easier application. Repeat for 3 nights., Disp: 30 suppository, Rfl: 2    cyanocobalamin 1,000 mcg/mL injection, Inject 1 mL (1,000 mcg total) into the muscle every 14 (fourteen) days., Disp: 30 mL, Rfl: 12    ferrous sulfate 325 (65 FE) MG EC tablet, Take 325 mg by mouth 2 (two) times daily., Disp: , Rfl:     gabapentin (NEURONTIN) 300 MG capsule, Take 1 capsule (300 mg total) by mouth every evening., Disp: 30 capsule, Rfl: 2    HYDROcodone-acetaminophen (NORCO) 5-325 mg per tablet, Take 1 tablet by mouth every 6  (six) hours as needed for Pain., Disp: 20 tablet, Rfl: 0    levothyroxine (SYNTHROID) 125 MCG tablet, Take 1 tablet (125 mcg total) by mouth before breakfast., Disp: 30 tablet, Rfl: 11    ondansetron (ZOFRAN-ODT) 8 MG TbDL, Take 1 tablet (8 mg total) by mouth every 12 (twelve) hours as needed., Disp: 30 tablet, Rfl: 1    prochlorperazine (COMPAZINE) 10 MG tablet, Take 1 tablet (10 mg total) by mouth every 6 (six) hours as needed., Disp: 30 tablet, Rfl: 1    traMADoL (ULTRAM) 50 mg tablet, Take 1 tablet (50 mg total) by mouth every 6 (six) hours as needed., Disp: 30 tablet, Rfl: 0  PHYSICAL EXAM:     Wt Readings from Last 3 Encounters:   09/26/22 85.5 kg (188 lb 7.9 oz)   09/14/22 86.5 kg (190 lb 9.6 oz)   09/12/22 87.2 kg (192 lb 3.9 oz)     Temp Readings from Last 3 Encounters:   09/26/22 97.1 °F (36.2 °C) (Temporal)   09/12/22 97.5 °F (36.4 °C) (Temporal)   09/01/22 97.5 °F (36.4 °C) (Skin)     BP Readings from Last 3 Encounters:   09/26/22 135/71   09/14/22 (!) 151/83   09/12/22 117/70     Pulse Readings from Last 3 Encounters:   09/26/22 95   09/14/22 95   09/12/22 98     GENERAL: alert; in no apparent distress, , well-built well-nourished   HEAD: normocephalic, atraumatic.  EYES: pupils are equal, round, reactive to light and accommodation. Sclera anicteric. Conjunctiva not injected.   NOSE/THROAT: no nasal erythema or rhinorrhea. Oropharynx pink, without erythema, ulcerations or thrush.   NECK: no cervical motion rigidity; supple with no masses.  CHEST: clear to auscultation bilaterally; no wheezes, crackles or rubs. Patient is speaking comfortably on room air with normal work of breathing without using accessory muscles of respiration.  CARDIOVASCULAR: regular rate and rhythm; no murmurs, rubs or gallops.  ABDOMEN: soft, nontender, nondistended. Bowel sounds present.   MUSCULOSKELETAL: no tenderness to palpation along the spine or scapulae. Normal range of motion.  NEUROLOGIC: cranial nerves II-XII intact  bilaterally. Strength 5/5 in bilateral upper and lower extremities. No sensory deficits appreciated. Reflexes globally intact. No cerebellar signs. Normal gait.  LYMPHATIC: no cervical, supraclavicular or axillary adenopathy appreciated bilaterally.   EXTREMITIES: no clubbing, cyanosis, edema.  SKIN: no erythema, rashes, ulcerations noted  ECOG-0  Lab Results   Component Value Date    WBC 2.20 (L) 09/24/2022    HGB 12.7 09/24/2022    HCT 39.3 09/24/2022    MCV 93 09/24/2022     09/24/2022       BMP  Lab Results   Component Value Date     09/24/2022    K 3.6 09/24/2022     09/24/2022    CO2 26 09/24/2022    BUN 9 09/24/2022    CREATININE 0.8 09/24/2022    CALCIUM 9.0 09/24/2022    ANIONGAP 9 09/24/2022    ESTGFRAFRICA >60 05/02/2022    EGFRNONAA >60 05/02/2022      cea 7/14/21 1.8  November 9, 2020  .  Rectum, sigmoid colon and proximal donut (low anterior resection):   - Invasive adenocarcinoma,  invading through the muscularis propria into   pericolorectal tissue   - Pathologic stage pT3, pN1a (see synoptic report)   - Resection margins negative for dysplasia or malignancy (proximal, distal   and radial margins), within 1 mm to radial margin   - One tumor deposit   - Adenocarcinoma involve one out of totally forty seven lymph nodes (1/46 in   part 1, totally 1/47, also see part 2)   - Background colon with diverticulosis associated with hyperpigmented   macrophages, benign   - Proximal donut with benign colon, no dysplasia or malignancy   - Tattoo identified   2.  Colon, distal donut (excision):   - Benign colon, negative for dysplasia or malignancy   - One lymph node, negative for metastatic carcinoma (0/1)   COLON AND RECTUM: Resection, Including Transanal Disk Excision of Rectal   Neoplasms   Procedure  Low anterior resection   Tumor Site  Rectum    Tumor Location: Straddles the anterior peritoneal reflection   Tumor Size   Greatest dimension (centimeters): 7.0 cm   + Additional dimensions  (centimeters): 4.7 x 2.2 cm   Macroscopic Tumor Perforation  Not identified   Macroscopic Intactness of Mesorectum  Near complete   Histologic Type  Adenocarcinoma   Histologic Grade   G2: Moderately differentiated   Tumor Extension  Tumor invades through the muscularis propria into   pericolorectal tissue     Impression:  November 7, 2020 CT abdomen pelvis     1. Rectosigmoid junction mass, unchanged from prior.  No definite evidence of intraluminal contrast extravasation to suggest active gastrointestinal hemorrhage, however please note that arterial phase CT is more sensitive.  2. Colonic diverticulosis without acute diverticulitis.  3. Minimal pneumobilia, unchanged      IMPRESSION:  CT chest lung screen October 2020     1.  3 mm pulmonary nodule identified in the lateral segment of the  right lower lobe.  2.  Focus of groundglass opacity measuring 11 mm in diameter in the  superior segment of the right lower lobe. Short-term follow-up for  this lesion is recommended.  3.  Mild emphysematous lung disease.     LUNG-RADS CATEGORY 3: PROBABLY BENIGN FINDINGS     RECOMMENDATION: Short-term imaging follow-up with 6 month LDCT.    IMPRESSION: 7/21  1. Nonspecific hypermetabolic soft tissue density focus cranial to the rectosigmoid colon suture line, perhaps reflecting postsurgical and or posttreatment or postinflammatory changes. Attention to this area at follow-up imaging is recommended to help differentiate from residual or recurrent neoplasm.  2. Improving postoperative changes in the lower anterior abdominal wall, with associated multifocal FDG hypermetabolism which is most characteristic of postoperative etiology.  3. Otherwise no convincing evidence of recurrent neoplasm or metastatic disease.IMPRESSION:  1. Interval increased size of hypermetabolic presacral mass contiguous with hypermetabolic circumferential wall thickening of the rectum at the level of the anastomotic suture line, consistent with recurrent  neoplasm and metastatic disease.  2. Interval development of enlarged, hypermetabolic right iliac chain lymph node consistent with metastatic disease.  3. No additional convincing evidence of metastatic disease.  4. Bilateral sacral ala insufficiency fractures, with corresponding FDG hypermetabolism.  5. Intense FDG uptake throughout the thyroid gland, unchanged and nonspecific.      Patient Active Problem List   Diagnosis    COPD (chronic obstructive pulmonary disease)    Pure hypercholesterolemia    Acquired hypothyroidism    Rectal bleeding    Rectal cancer    Dysphagia    Malignant neoplasm of colon    B12 deficiency    FENG (iron deficiency anemia)    Hypocalcemia    Chronic diarrhea    Severe obesity (BMI 35.0-39.9) with comorbidity    Secondary and unspecified malignant neoplasm of intra-abdominal lymph nodes   IMPRESSION:pet 12/2020  1. Postoperative changes of rectal colonic resection, with adjacent  FDG avid presacral soft tissue stranding.  2. Adjacent punctate extraluminal foci of free air suggesting a tiny  tract extending towards the adjacent small bowel (possibly  postoperative but suggesting a tiny enterocolonic fistula)  3. Deep subcutaneous soft tissue structure along the ventral abdomen  suggesting a postoperative hematoma/seroma, and less likely abscess.  4. No findings of additional sites of FDG avid disease.      IMPRESSION: 7/21  1. Nonspecific hypermetabolic soft tissue density focus cranial to the rectosigmoid colon suture line, perhaps reflecting postsurgical and or posttreatment or postinflammatory changes. Attention to this area at follow-up imaging is recommended to help differentiate from residual or recurrent neoplasm.  2. Improving postoperative changes in the lower anterior abdominal wall, with associated multifocal FDG hypermetabolism which is most characteristic of postoperative etiology.  3. Otherwise no convincing evidence of recurrent neoplasm or metastatic  disease      Impression:2/2/22   CT CAP  A single 4 mm soft tissue density nodule is noted in the right lower lobe.  No other intrapulmonary masses or nodules are seen.  Follow-up CT in 12 months per of this nodule is recommended.  Coronary artery calcification is noted.  No masses or adenopathy or seen.     Prior cholecystectomy.  Prior hysterectomy.  2.2 cm left ovarian cyst.  Small ventral hernia containing a loop of small bowel without CT evidence of obstruction  Assessment and Plan   At original dx 2020  Status post low anterior resection November 9, 2020 for rectal CA T3 N1a   tumor board convened and agreed on xrt that will follow  After completion of chemo  sequentially  therapy   Patient started FOLFOX.  Completed 4 out of 12 planned adjuvant cycles,  Discontinued  Chemo / port removed completed xrt 5/21  Pet:8/22, showing progression   Pt very hesitant T FIRST TO GET PORT OR INFUSIONAL 5 FU/ OXALIPLATIN, WENT TO [facility]RE[facility] NOW, HAD PORT PLACED   Started  FIOLFIRI + avastin   from second cycle changed to FOLFIRI panitumumab   Sig diarrhea with CPT-11, added lomotil to imodium may need to drop dose of CPT-11   Iv fluids today  d/w  FENG :Injectafer  completed 8/22 good response  pt to add SL b12 to her twice a month b12 shots    Please ask for caris     b12 def anemia, taking injections q month level checked 10/21 sub therpautic 226 t INSURANCE WONT PAY FOR B12.ok to cont with SL only as levels have improved to 1500  iron deficiency  Pt to try oral for now, will recheck in 8/22 ( may help her chronic diarrhea)  hypocalcemia which seems chronic pt to supplement orally daily wnl this visit  Hypothyroidism, sees DR Mcdermott will adj meds  History of chronic obstructive pulmonary disease, dyslipidemia and hypothyroidism continue with PCP  Doesn't want to be covid vaccinated

## 2022-09-26 NOTE — TELEPHONE ENCOUNTER
Staff msg sent to [place] WHALEN to seek auth for IVF and neulasta. Staff msg sent to Suzy LÓPEZ to schedule more chemo cycles.

## 2022-10-07 NOTE — TELEPHONE ENCOUNTER
This nurse called pt as requested. Pt states that she does not want to take doxycycline d/t concerns over s/e. She states that this medication was prescribed by Aby CHAUDHARY for rosacea which is a common s/e of chemo tx she is on. Pt reports she is still having diarrhea. This nurse notified MD Frank. Dr Frank states it is okay for pt to not take the doxycycline and pt can see a dermatologist for rosacea should it develop.     Called pt to notify. Pt states that she does not want to see another dr for tx. Pt states that Karma CHAUDHARY told her that an abx would be needed to clear up rash and pt is requesting a diff abx. Zac AMEZCUA notified. Awaiting response.       ----- Message from Alexander Bruno sent at 10/7/2022 10:17 AM CDT -----  Contact: self  Type: Needs Medical Advice  Who Called: Patient   Best Call Back Number: [phone]  Additional Information: Patient sates she really needs a call back from the nurse in Dr. Frank office. Chrystal call back today. Thanks

## 2022-10-08 NOTE — ED PROVIDER NOTES
Encounter Date: 10/8/2022       History     Chief Complaint   Patient presents with    Hypokalemia      Patient is a 66-year-old female with recurrent rectal cancer who is currently on chemotherapy presents the emergency room for evaluation of hypokalemia.  Patient states she feels fine.  She has had significant diarrhea and states that is up to 20 times a day of loose watery stool.  This occurs since her chemotherapy.  She denies any muscle spasms history of hypokalemia abdominal pain fevers persistent vomiting palpitations chest pain shortness of breath generalized weakness or any other complaints    Review of patient's allergies indicates:   Allergen Reactions    Ativan [lorazepam] Hives and Itching    Flagyl [metronidazole] Itching and Rash    Pcn [penicillins] Hives, Itching and Rash     Past Medical History:   Diagnosis Date    Acute hypoxemic respiratory failure 07/09/2018    Arthritis     Cancer     colon    COPD (chronic obstructive pulmonary disease)     History of home oxygen therapy     ONLY PRN AND HASN'T USED IT IN A YEAR    Hyperlipidemia     Thyroid disease      Past Surgical History:   Procedure Laterality Date    CHOLECYSTECTOMY      COLECTOMY N/A 11/9/2020    Procedure: COLECTOMY;  Surgeon: Brandon Wagner MD;  Location: [facility];  Service: General;  Laterality: N/A;  LOWER- ANTERIOR    COLONOSCOPY N/A 10/23/2020    Procedure: COLONOSCOPY;  Surgeon: Jolynn Ayala MD;  Location: [facility];  Service: Endoscopy;  Laterality: N/A;    COLONOSCOPY N/A 8/6/2021    Procedure: COLONOSCOPY;  Surgeon: Jolynn Ayala MD;  Location: [facility] ENDO;  Service: Endoscopy;  Laterality: N/A;    COLONOSCOPY N/A 7/7/2022    Procedure: COLONOSCOPY;  Surgeon: Jolynn Ayala MD;  Location: [facility];  Service: Endoscopy;  Laterality: N/A;    ESOPHAGEAL DILATION N/A 6/29/2021    Procedure: DILATION, ESOPHAGUS;  Surgeon: Sourav Baires III, MD;  Location: [facility] ENDO;  Service: Endoscopy;  Laterality: N/A;     ESOPHAGOGASTRODUODENOSCOPY  2021    Dilation to 57 Fr    ESOPHAGOGASTRODUODENOSCOPY N/A 2021    Procedure: EGD (ESOPHAGOGASTRODUODENOSCOPY);  Surgeon: Sourav Baires III, MD;  Location: [facility] ENDO;  Service: Endoscopy;  Laterality: N/A;    FLEXIBLE SIGMOIDOSCOPY N/A 2022    Procedure: SIGMOIDOSCOPY, FLEXIBLE;  Surgeon: Brandon Wagner MD;  Location: [facility] ENDO;  Service: Endoscopy;  Laterality: N/A;    HYSTERECTOMY      INSERTION OF TUNNELED CENTRAL VENOUS CATHETER (CVC) WITH SUBCUTANEOUS PORT N/A 2021    Procedure: [id number];  Surgeon: Brandon Wagner MD;  Location: [facility] OR;  Service: General;  Laterality: N/A;    INSERTION OF TUNNELED CENTRAL VENOUS CATHETER (CVC) WITH SUBCUTANEOUS PORT Right 2022    Procedure: [id number];  Surgeon: Brandon Wagner MD;  Location: [facility] OR;  Service: General;  Laterality: Right;  Wants rt side placement    MEDIPORT REMOVAL Left 2021    Procedure: REMOVAL, CATHETER, CENTRAL VENOUS, TUNNELED, WITH PORT;  Surgeon: Brandon Wagner MD;  Location: [facility] OR;  Service: General;  Laterality: Left;    ROBOT-ASSISTED COLECTOMY N/A 2020    Procedure: ROBOTIC COLECTOMY low anterior resection, possible open;  Surgeon: Brandon Wagner MD;  Location: [facility] OR;  Service: General;  Laterality: N/A;  CONVERTED TO OPEN AT 1503     Family History   Problem Relation Age of Onset    COPD Mother     COPD Father     Prostate cancer Brother     Breast cancer Maternal Grandmother      Social History     Tobacco Use    Smoking status: Former     Packs/day: 2.00     Years: 40.00     Pack years: 80.00     Types: Cigarettes     Quit date: 2017     Years since quittin.2    Smokeless tobacco: Never   Substance Use Topics    Alcohol use: No    Drug use: No     Review of Systems   Constitutional:  Negative for fever.   HENT:  Negative for congestion, ear pain, rhinorrhea and sore throat.    Eyes:  Negative for pain and visual disturbance.    Respiratory:  Negative for cough and shortness of breath.    Cardiovascular:  Negative for chest pain, palpitations and leg swelling.   Gastrointestinal:  Positive for diarrhea. Negative for abdominal pain, nausea and vomiting.   Genitourinary:  Negative for difficulty urinating.   Musculoskeletal:  Negative for arthralgias, back pain and myalgias.   Skin:  Negative for rash.   Neurological:  Negative for weakness, light-headedness, numbness and headaches.   All other systems reviewed and are negative.    Physical Exam     Initial Vitals [10/08/22 1423]   BP Pulse Resp Temp SpO2   (!) 123/59 88 18 98.1 °F (36.7 °C) 97 %      MAP       --         Physical Exam    Nursing note and vitals reviewed.  Constitutional: She appears well-developed and well-nourished.  Non-toxic appearance. No distress.   HENT:   Head: Normocephalic and atraumatic.   Mouth/Throat: Oropharynx is clear and moist.   Eyes: EOM are normal. Pupils are equal, round, and reactive to light.   Neck: Neck supple.   Normal range of motion.  Cardiovascular:  Normal rate, regular rhythm, normal heart sounds and intact distal pulses.     Exam reveals no gallop and no friction rub.       No murmur heard.  Pulmonary/Chest: Breath sounds normal. No respiratory distress. She has no decreased breath sounds. She has no wheezes. She has no rhonchi. She has no rales.   Abdominal: Abdomen is soft. Bowel sounds are normal. She exhibits no distension. There is no abdominal tenderness.   Musculoskeletal:         General: Normal range of motion.      Cervical back: Normal range of motion and neck supple.     Neurological: She is alert and oriented to person, place, and time. She has normal strength. No sensory deficit. GCS score is 15. GCS eye subscore is 4. GCS verbal subscore is 5. GCS motor subscore is 6.   Skin: Skin is warm and dry.   Psychiatric: She has a normal mood and affect.       ED Course   Procedures  Labs Reviewed   BASIC METABOLIC PANEL - Abnormal;  Notable for the following components:       Result Value    Potassium 3.1 (*)     BUN 7 (*)     All other components within normal limits   MAGNESIUM          Imaging Results    None          Medications   potassium chloride 10 mEq in 100 mL IVPB (0 mEq Intravenous Stopped 10/8/22 1844)   potassium bicarbonate disintegrating tablet 40 mEq (40 mEq Oral Given 10/8/22 1619)   magnesium sulfate 2g in water 50mL IVPB (premix) (0 g Intravenous Stopped 10/8/22 1844)     Medical Decision Making:   Hypokalemia likely from diarrhea.  Magnesium and potassium replaced here.  Patient will need follow-up labs as an outpatient.  Will prescribe short course of potassium.  Patient states diarrhea has been improving.  No significant EKG changes           ED Course as of 10/09/22 0742   Sat Oct 08, 2022   1632  Magnesium is 1.7 and potassium is 3.1.  I will give 2 g of IV Mag , 40 mEq orally of potassium and then start the patient on potassium daily.   [JS]   1729   Patient was given oral potassium some IV potassium and IV magnesium.      EKG shows no signs of hypokalemia.  EKG independently interpreted by me at 4:58 p.m. as normal sinus rate rhythm axis and intervals with no ST segment elevation or depression.  No U-waves.  No prolonged QT. [JS]      ED Course User Index  [JS] Casey Bocanegra MD                 Clinical Impression:   Final diagnoses:  [E87.6] Hypokalemia        ED Disposition Condition    Discharge Stable          ED Prescriptions       Medication Sig Dispense Start Date End Date Auth. Provider    potassium chloride SA (K-DUR,KLOR-CON) 20 MEQ tablet Take 1 tablet (20 mEq total) by mouth once daily. for 10 days 10 tablet 10/8/2022 10/18/2022 Casey Bocanegra MD          Follow-up Information       Follow up With Specialties Details Why Contact Info    Karma Mcdermott MD Internal Medicine Schedule an appointment as soon as possible for a visit  call to schedule an appointment to have your potassium checked again next  week.  Return to the ER for worsening symptoms of fatigue palpitations muscle cramps or any other concerns [address] [facility] [address]  [phone]               Casey Bocanegra MD  10/09/22 0742

## 2022-10-08 NOTE — TELEPHONE ENCOUNTER
Spoke with patient about her hypokalemia being dangerous low and the need for IV KCL, patient was instructed to go to the nearest hospital/ER to get some IV to support her and possible being discharge with PO KCL    She will need to follow up on Monday with Dr Frank

## 2022-10-10 NOTE — PROGRESS NOTES
Service Date:  10/10/22    Chief Complaint: Rectal Cancer    Tessa Enriquez is a 66 y.o. female with metastatic rectal cancer, currently follows with Dr. Frank.  She is on FOLFIRI with panitumumab.  She complains of some acne rash around her mouth.  She also complains of diarrhea.  She is having some fatigue.  She is asking to take a 2 week break from her chemotherapy.    Review of Systems   Constitutional: Negative.    HENT: Negative.     Eyes: Negative.    Respiratory: Negative.     Cardiovascular: Negative.    Gastrointestinal: Negative.    Endocrine: Negative.    Genitourinary: Negative.    Musculoskeletal: Negative.    Integumentary:  Negative.   Neurological: Negative.    Hematological: Negative.    Psychiatric/Behavioral: Negative.        Current Outpatient Medications   Medication Instructions    CMPD hydrocortisone 2%- LIDOcaine 3% suppository (RECTAL ROCKET) 1 suppository, Rectal, Nightly, Insert 1 suppository 3/4 of the way onto rectum. Wet for easier application. Repeat for 3 nights.    cyanocobalamin 1,000 mcg, Intramuscular, Every 14 days    diphenoxylate-atropine 2.5-0.025 mg (LOMOTIL) 2.5-0.025 mg per tablet 1 tablet, Oral, 4 times daily PRN    doxycycline (VIBRAMYCIN) 100 mg, Oral, 2 times daily    ferrous sulfate 325 mg, Oral, 2 times daily    gabapentin (NEURONTIN) 300 mg, Oral, Nightly    HYDROcodone-acetaminophen (NORCO) 5-325 mg per tablet 1 tablet, Oral, Every 6 hours PRN    levothyroxine (SYNTHROID) 125 mcg, Oral, Before breakfast    ondansetron (ZOFRAN-ODT) 8 mg, Oral, Every 12 hours PRN    potassium chloride SA (K-DUR,KLOR-CON) 20 MEQ tablet 20 mEq, Oral, Daily    prochlorperazine (COMPAZINE) 10 mg, Oral, Every 6 hours PRN    traMADoL (ULTRAM) 50 mg, Oral, Every 6 hours PRN    triamcinolone acetonide 0.025% (KENALOG) 0.025 % cream Topical (Top), Daily        Past Medical History:   Diagnosis Date    Acute hypoxemic respiratory failure 07/09/2018    Arthritis     Cancer     colon    COPD  (chronic obstructive pulmonary disease)     History of home oxygen therapy     ONLY PRN AND HASN'T USED IT IN A YEAR    Hyperlipidemia     Thyroid disease         Past Surgical History:   Procedure Laterality Date    CHOLECYSTECTOMY      COLECTOMY N/A 11/9/2020    Procedure: COLECTOMY;  Surgeon: Brandon Wagner MD;  Location: [facility] OR;  Service: General;  Laterality: N/A;  LOWER- ANTERIOR    COLONOSCOPY N/A 10/23/2020    Procedure: COLONOSCOPY;  Surgeon: Jolynn Ayala MD;  Location: [facility] ENDO;  Service: Endoscopy;  Laterality: N/A;    COLONOSCOPY N/A 8/6/2021    Procedure: COLONOSCOPY;  Surgeon: Jolynn Ayala MD;  Location: [facility] ENDO;  Service: Endoscopy;  Laterality: N/A;    COLONOSCOPY N/A 7/7/2022    Procedure: COLONOSCOPY;  Surgeon: Jolynn Ayala MD;  Location: [facility] ENDO;  Service: Endoscopy;  Laterality: N/A;    ESOPHAGEAL DILATION N/A 6/29/2021    Procedure: DILATION, ESOPHAGUS;  Surgeon: Sourav Baires III, MD;  Location: [facility];  Service: Endoscopy;  Laterality: N/A;    ESOPHAGOGASTRODUODENOSCOPY  06/29/2021    Dilation to 57 Fr    ESOPHAGOGASTRODUODENOSCOPY N/A 6/29/2021    Procedure: EGD (ESOPHAGOGASTRODUODENOSCOPY);  Surgeon: Sourav Baires III, MD;  Location: [facility];  Service: Endoscopy;  Laterality: N/A;    FLEXIBLE SIGMOIDOSCOPY N/A 8/1/2022    Procedure: SIGMOIDOSCOPY, FLEXIBLE;  Surgeon: Brandon Wagner MD;  Location: [facility] ENDO;  Service: Endoscopy;  Laterality: N/A;    HYSTERECTOMY      INSERTION OF TUNNELED CENTRAL VENOUS CATHETER (CVC) WITH SUBCUTANEOUS PORT N/A 1/11/2021    Procedure: [id number];  Surgeon: Brandon Wagner MD;  Location: [facility] OR;  Service: General;  Laterality: N/A;    INSERTION OF TUNNELED CENTRAL VENOUS CATHETER (CVC) WITH SUBCUTANEOUS PORT Right 9/1/2022    Procedure: [id number];  Surgeon: Brandon Wagner MD;  Location: [facility] OR;  Service: General;  Laterality: Right;  Wants rt side placement    MEDIPORT REMOVAL Left  "2021    Procedure: REMOVAL, CATHETER, CENTRAL VENOUS, TUNNELED, WITH PORT;  Surgeon: Brandon Wagner MD;  Location: [facility] OR;  Service: General;  Laterality: Left;    ROBOT-ASSISTED COLECTOMY N/A 2020    Procedure: ROBOTIC COLECTOMY low anterior resection, possible open;  Surgeon: Brandon Wagner MD;  Location: [facility] OR;  Service: General;  Laterality: N/A;  CONVERTED TO OPEN AT 1503        Family History   Problem Relation Age of Onset    COPD Mother     COPD Father     Prostate cancer Brother     Breast cancer Maternal Grandmother        Social History     Tobacco Use    Smoking status: Former     Packs/day: 2.00     Years: 40.00     Pack years: 80.00     Types: Cigarettes     Quit date: 2017     Years since quittin.2    Smokeless tobacco: Never   Substance Use Topics    Alcohol use: No    Drug use: No         Vitals:    10/10/22 1022   BP: 134/80   Pulse: 98   Resp: 12   Temp: 97.4 °F (36.3 °C)        Physical Exam:  /80 (BP Location: Right arm, Patient Position: Sitting, BP Method: Medium (Automatic))   Pulse 98   Temp 97.4 °F (36.3 °C) (Temporal)   Resp 12   Ht 5' 1" (1.549 m)   Wt 84 kg (185 lb 3 oz)   SpO2 100%   BMI 34.99 kg/m²     Physical Exam  Constitutional:       Appearance: Normal appearance.   HENT:      Head: Normocephalic and atraumatic.      Nose: Nose normal.      Mouth/Throat:      Mouth: Mucous membranes are moist.      Pharynx: Oropharynx is clear.   Eyes:      Conjunctiva/sclera: Conjunctivae normal.   Cardiovascular:      Rate and Rhythm: Normal rate and regular rhythm.      Heart sounds: Normal heart sounds.   Pulmonary:      Effort: Pulmonary effort is normal.      Breath sounds: Normal breath sounds.   Abdominal:      General: Abdomen is flat. Bowel sounds are normal.      Palpations: Abdomen is soft.   Musculoskeletal:         General: Normal range of motion.      Cervical back: Normal range of motion and neck supple.   Skin:     General: Skin is warm and " dry.   Neurological:      General: No focal deficit present.      Mental Status: She is alert and oriented to person, place, and time. Mental status is at baseline.   Psychiatric:         Mood and Affect: Mood normal.        Labs:  Lab Results   Component Value Date    WBC 6.67 10/08/2022    RBC 3.72 (L) 10/08/2022    HGB 11.5 (L) 10/08/2022    HCT 33.7 (L) 10/08/2022    MCV 91 10/08/2022    MCH 30.9 10/08/2022    MCHC 34.1 10/08/2022    RDW 15.9 (H) 10/08/2022     10/08/2022    MPV 10.7 10/08/2022    GRAN 4.9 10/08/2022    GRAN 73.3 (H) 10/08/2022    LYMPH 0.7 (L) 10/08/2022    LYMPH 10.2 (L) 10/08/2022    MONO 0.5 10/08/2022    MONO 7.0 10/08/2022    EOS 0.2 10/08/2022    BASO 0.06 10/08/2022    EOSINOPHIL 2.8 10/08/2022    BASOPHIL 0.9 10/08/2022     Sodium   Date Value Ref Range Status   10/08/2022 139 136 - 145 mmol/L Final     Potassium   Date Value Ref Range Status   10/10/2022 3.2 (L) 3.5 - 5.1 mmol/L Final     Chloride   Date Value Ref Range Status   10/08/2022 101 95 - 110 mmol/L Final     CO2   Date Value Ref Range Status   10/08/2022 26 23 - 29 mmol/L Final     Glucose   Date Value Ref Range Status   10/08/2022 98 70 - 110 mg/dL Final     BUN   Date Value Ref Range Status   10/08/2022 7 (L) 8 - 23 mg/dL Final     Creatinine   Date Value Ref Range Status   10/08/2022 0.7 0.5 - 1.4 mg/dL Final     Calcium   Date Value Ref Range Status   10/08/2022 8.8 8.7 - 10.5 mg/dL Final     Total Protein   Date Value Ref Range Status   10/08/2022 5.7 (L) 6.0 - 8.4 g/dL Final     Albumin   Date Value Ref Range Status   10/08/2022 3.0 (L) 3.5 - 5.2 g/dL Final     Total Bilirubin   Date Value Ref Range Status   10/08/2022 0.2 0.1 - 1.0 mg/dL Final     Comment:     For infants and newborns, interpretation of results should be based  on gestational age, weight and in agreement with clinical  observations.    Premature Infant recommended reference ranges:  Up to 24 hours.............<8.0 mg/dL  Up to 48  hours............<12.0 mg/dL  3-5 days..................<15.0 mg/dL  6-29 days.................<15.0 mg/dL       Alkaline Phosphatase   Date Value Ref Range Status   10/08/2022 96 55 - 135 U/L Final     AST   Date Value Ref Range Status   10/08/2022 8 (L) 10 - 40 U/L Final     ALT   Date Value Ref Range Status   10/08/2022 12 10 - 44 U/L Final     Anion Gap   Date Value Ref Range Status   10/08/2022 12 8 - 16 mmol/L Final     eGFR if    Date Value Ref Range Status   05/02/2022 >60 >60 mL/min/1.73 m^2 Final     eGFR if non    Date Value Ref Range Status   05/02/2022 >60 >60 mL/min/1.73 m^2 Final     Comment:     Calculation used to obtain the estimated glomerular filtration  rate (eGFR) is the CKD-EPI equation.          A/P:    Metastatic rectal cancer   Chemotherapy-induced diarrhea   Chemotherapy-induced fatigue   Acneiform rash  -currently on FOLFIRI with panitumumab.  Patient is asking for a 2 week break.  I explained that we can give this to her, but there is the possibility of her cancer progressing..  She understood this and still wanted to be break, even when offered just a 1 week break.  -she is taking Imodium and Lomotil for her diarrhea   -I prescribed a low-dose steroid cream to use on her face for at least 2 weeks.  -return to clinic in 2 weeks to follow up with Dr. Smith Aurash Khoobehi, MD  Hematology and Oncology

## 2022-10-13 NOTE — TELEPHONE ENCOUNTER
----- Message from Priscilla Davies, Patient Care Assistant sent at 10/13/2022 10:43 AM CDT -----  Regarding: refill  Contact: pt  Type:  RX Refill Request    Who Called:  pt   Refill or New Rx:  refill   RX Name and Strength:  traMADoL (ULTRAM) 50 mg tablet    How is the patient currently taking it? 1xday  Is this a 30 day or 90 day RX:  30    Preferred Pharmacy with phone number:    [id number] - [place] - [address]  [address] [address]  Phone: [phone] Fax: [phone]    Local or Mail Order:  local   Ordering Provider:  Kristy Whittaker Call Back Number:  [phone] (home)     Additional Information:  please call pt to advise. Thanks!

## 2022-10-13 NOTE — TELEPHONE ENCOUNTER
I called patient to inform her that MELODY Devi submitted the request for a refill to Dr. Wagner and it could take 48-72hrs.  We'll call her when we get a response.  Anurag

## 2022-10-24 NOTE — PROGRESS NOTES
Subjective:       Patient ID: Tessa Enriquez is a 66 y.o. female.    Chief Complaint:   rectal CA status post biopsy October 2020 robotic LAR November 9, 2020 by Dr. Wagner  Discontinued folfox due to s/e completed xrt  5/21     works at [employer]    pt went to GI wih rectal bleeding and abd pain scoped  Findings:        The digital rectal exam revealed a firm rectal mass palpated 5 to 6        cm from the anal verge. The mass was non-circumferential and located        predominantly at the posterior bowel wall.        There was evidence of a prior end-to-end colo-rectal anastomosis in        the distal rectum. This was patent and was characterized by        ulceration. The anastomosis was traversed. Mass in association with        the anastomosis has been previously biopsied and confirmed to be        adenocarcinoma. Anastomosis present at approximately 5 cm from anal    pt sent for discussion    MPRESSION:  1. Interval increased size of hypermetabolic presacral mass contiguous with hypermetabolic circumferential wall thickening of the rectum at the level of the anastomotic suture line, consistent with recurrent neoplasm and metastatic disease.  2. Interval development of enlarged, hypermetabolic right iliac chain lymph node consistent with metastatic disease.  3. No additional convincing evidence of metastatic disease.  4. Bilateral sacral ala insufficiency fractures, with corresponding FDG hypermetabolism.  5. Intense FDG uptake throughout the thyroid gland, unchanged and nonspecific.  Pt discussed wt [facility] started folfiri +avastin then from cycle 2 changed to FOLFIRI+ panitumumab  REVIEW OF SYSTEMS:     CONSTITUTIONAL: The patient denies any weight change. There is no apparent    change in appetite, fever, night sweats, headaches, lots fatigue, no dizziness, some  weakness.         SKIN: Denies rash, issues with nails, non-healing sores, bleeding, blotching    skin or abnormal bruising. Denies new moles or  changes to existing moles.      BREASTS: There is no swelling around breasts or nipple discharge.    EYES: Denies eye pain, blurred vision, swelling, redness or discharge.      ENT AND MOUTH: Denies runny nose, stuffiness, sinus trouble or sores. Denies    nosebleeds. Denies, hoarseness, change in voice or swelling in front of the    neck.      CARDIOVASCULAR: Denies chest pain, discomfort or palpitations. Denies neck    swelling or episodes of passing out.      RESPIRATORY: Denies cough, sputum production, blood in sputum, and denies    shortness of breath.      GI:  Had esophagus stretched as food/ pills were getting stuck  Pt saw DR Linares, diarrhea chronically since the xrt, much better now since start o0f chemo she alt constipation with loose stools also reports rectal bleeding.improved Rectal pain, l better no diarrhea now  GENITOURINARY: No discharge. No pelvic pain or lumps. No rash around groin or  lesions. No urinary frequency, hesitation, painful urination or blood in    urine. Denies incontinence. No problems with intercourse.      MUSCULOSKELETAL: Denies neck or back pain. Denies weakness in arms or legs,    joint problems or distended inflamed veins in legs. Denies swelling or abnormal  glands.      NEUROLOGICAL: Denies tingling, numbness, altered mentation changes to nerve    function in the face, weakness to one or both of the body. Denies changes to    gait and denies multiple falls or accidents.      PSYCHIATRIC: Denies nervousness, anxiety, hallucinations, depression, suicidal    ideation, trouble sleeping or changes in behavior noticed by family.          Oncology History:  T3 N1 rectal CA diagnosed October 2020 robotic LAR November 2020 December 2020 PET scan negative for metastatic disease   started FOLFOX 2/21 but stopped after 3 cycles and only wants surveillance   had colonoscopy 8/21 NAD  Past Medical History:   Diagnosis Date    Acute hypoxemic respiratory failure 07/09/2018     Arthritis     Cancer     colon    COPD (chronic obstructive pulmonary disease)     History of home oxygen therapy     ONLY PRN AND HASN'T USED IT IN A YEAR    Hyperlipidemia     Thyroid disease      Past Surgical History:   Procedure Laterality Date    CHOLECYSTECTOMY      COLECTOMY N/A 11/9/2020    Procedure: COLECTOMY;  Surgeon: Brandon Wagner MD;  Location: [facility] OR;  Service: General;  Laterality: N/A;  LOWER- ANTERIOR    COLONOSCOPY N/A 10/23/2020    Procedure: COLONOSCOPY;  Surgeon: Jolynn Ayala MD;  Location: [facility] ENDO;  Service: Endoscopy;  Laterality: N/A;    COLONOSCOPY N/A 8/6/2021    Procedure: COLONOSCOPY;  Surgeon: Jolynn Ayala MD;  Location: [facility] ENDO;  Service: Endoscopy;  Laterality: N/A;    COLONOSCOPY N/A 7/7/2022    Procedure: COLONOSCOPY;  Surgeon: Jolynn Ayala MD;  Location: [facility] ENDO;  Service: Endoscopy;  Laterality: N/A;    ESOPHAGEAL DILATION N/A 6/29/2021    Procedure: DILATION, ESOPHAGUS;  Surgeon: Sourav Baires III, MD;  Location: [facility];  Service: Endoscopy;  Laterality: N/A;    ESOPHAGOGASTRODUODENOSCOPY  06/29/2021    Dilation to 57 Fr    ESOPHAGOGASTRODUODENOSCOPY N/A 6/29/2021    Procedure: EGD (ESOPHAGOGASTRODUODENOSCOPY);  Surgeon: Sourav Baires III, MD;  Location: [facility];  Service: Endoscopy;  Laterality: N/A;    FLEXIBLE SIGMOIDOSCOPY N/A 8/1/2022    Procedure: SIGMOIDOSCOPY, FLEXIBLE;  Surgeon: Brandon Wagner MD;  Location: [facility] ENDO;  Service: Endoscopy;  Laterality: N/A;    HYSTERECTOMY      INSERTION OF TUNNELED CENTRAL VENOUS CATHETER (CVC) WITH SUBCUTANEOUS PORT N/A 1/11/2021    Procedure: [id number];  Surgeon: Brandon Wagner MD;  Location: [facility] OR;  Service: General;  Laterality: N/A;    INSERTION OF TUNNELED CENTRAL VENOUS CATHETER (CVC) WITH SUBCUTANEOUS PORT Right 9/1/2022    Procedure: [id number];  Surgeon: Brandon Wagner MD;  Location: [facility] OR;  Service: General;  Laterality: Right;  Wants rt side  placement    MEDIPORT REMOVAL Left 2021    Procedure: REMOVAL, CATHETER, CENTRAL VENOUS, TUNNELED, WITH PORT;  Surgeon: Brandon Wagner MD;  Location: [facility] OR;  Service: General;  Laterality: Left;    ROBOT-ASSISTED COLECTOMY N/A 2020    Procedure: ROBOTIC COLECTOMY low anterior resection, possible open;  Surgeon: Brandon Wagner MD;  Location: [facility] OR;  Service: General;  Laterality: N/A;  CONVERTED TO OPEN AT 1503     Family History   Problem Relation Age of Onset    COPD Mother     COPD Father     Prostate cancer Brother     Breast cancer Maternal Grandmother       Social History     Socioeconomic History    Marital status:    Tobacco Use    Smoking status: Former     Packs/day: 2.00     Years: 40.00     Pack years: 80.00     Types: Cigarettes     Quit date: 2017     Years since quittin.2    Smokeless tobacco: Never   Substance and Sexual Activity    Alcohol use: No    Drug use: No    Sexual activity: Not Currently     Partners: Male     Review of patient's allergies indicates:   Allergen Reactions    Ativan [lorazepam] Hives and Itching    Flagyl [metronidazole] Itching and Rash    Pcn [penicillins] Hives, Itching and Rash       Current Outpatient Medications:     CMPD hydrocortisone 2%- LIDOcaine 3% suppository (RECTAL ROCKET), Place 1 suppository rectally every evening. Insert 1 suppository 3/4 of the way onto rectum. Wet for easier application. Repeat for 3 nights., Disp: 30 suppository, Rfl: 2    cyanocobalamin 1,000 mcg/mL injection, Inject 1 mL (1,000 mcg total) into the muscle every 14 (fourteen) days., Disp: 30 mL, Rfl: 12    doxycycline (VIBRAMYCIN) 100 MG Cap, Take 1 capsule (100 mg total) by mouth 2 (two) times daily., Disp: 60 capsule, Rfl: 1    ferrous sulfate 325 (65 FE) MG EC tablet, Take 325 mg by mouth 2 (two) times daily., Disp: , Rfl:     gabapentin (NEURONTIN) 300 MG capsule, Take 1 capsule (300 mg total) by mouth every evening., Disp: 30 capsule, Rfl: 2     HYDROcodone-acetaminophen (NORCO) 5-325 mg per tablet, Take 1 tablet by mouth every 6 (six) hours as needed for Pain., Disp: 20 tablet, Rfl: 0    levothyroxine (SYNTHROID) 125 MCG tablet, Take 1 tablet (125 mcg total) by mouth before breakfast., Disp: 30 tablet, Rfl: 11    ondansetron (ZOFRAN-ODT) 8 MG TbDL, Take 1 tablet (8 mg total) by mouth every 12 (twelve) hours as needed., Disp: 30 tablet, Rfl: 1    prochlorperazine (COMPAZINE) 10 MG tablet, Take 1 tablet (10 mg total) by mouth every 6 (six) hours as needed., Disp: 30 tablet, Rfl: 1    traMADoL (ULTRAM) 50 mg tablet, Take 1 tablet (50 mg total) by mouth every 6 (six) hours as needed., Disp: 30 tablet, Rfl: 0    triamcinolone acetonide 0.025% (KENALOG) 0.025 % cream, Apply topically once daily., Disp: 80 g, Rfl: 1  PHYSICAL EXAM:     Wt Readings from Last 3 Encounters:   10/24/22 84.7 kg (186 lb 11.7 oz)   10/10/22 84 kg (185 lb 3 oz)   10/08/22 85.3 kg (188 lb)     Temp Readings from Last 3 Encounters:   10/24/22 96.2 °F (35.7 °C) (Temporal)   10/10/22 97.4 °F (36.3 °C) (Temporal)   10/08/22 98.1 °F (36.7 °C) (Oral)     BP Readings from Last 3 Encounters:   10/24/22 (!) 160/76   10/10/22 134/80   10/08/22 131/66     Pulse Readings from Last 3 Encounters:   10/24/22 95   10/10/22 98   10/08/22 82     GENERAL: alert; in no apparent distress, , well-built well-nourished   HEAD: normocephalic, atraumatic.  EYES: pupils are equal, round, reactive to light and accommodation. Sclera anicteric. Conjunctiva not injected.   NOSE/THROAT: no nasal erythema or rhinorrhea. Oropharynx pink, without erythema, ulcerations or thrush.   NECK: no cervical motion rigidity; supple with no masses.  CHEST: clear to auscultation bilaterally; no wheezes, crackles or rubs. Patient is speaking comfortably on room air with normal work of breathing without using accessory muscles of respiration.  CARDIOVASCULAR: regular rate and rhythm; no murmurs, rubs or gallops.  ABDOMEN: soft,  nontender, nondistended. Bowel sounds present.   MUSCULOSKELETAL: no tenderness to palpation along the spine or scapulae. Normal range of motion.  NEUROLOGIC: cranial nerves II-XII intact bilaterally. Strength 5/5 in bilateral upper and lower extremities. No sensory deficits appreciated. Reflexes globally intact. No cerebellar signs. Normal gait.  LYMPHATIC: no cervical, supraclavicular or axillary adenopathy appreciated bilaterally.   EXTREMITIES: no clubbing, cyanosis, edema.  SKIN: no erythema, rashes, ulcerations noted  ECOG-0  Lab Results   Component Value Date    WBC 4.83 10/21/2022    HGB 11.2 (L) 10/21/2022    HCT 35.3 (L) 10/21/2022    MCV 96 10/21/2022     10/21/2022       BMP  Lab Results   Component Value Date     10/21/2022    K 4.6 10/21/2022     10/21/2022    CO2 25 10/21/2022    BUN 10 10/21/2022    CREATININE 0.8 10/21/2022    CALCIUM 9.2 10/21/2022    ANIONGAP 9 10/21/2022    ESTGFRAFRICA >60 05/02/2022    EGFRNONAA >60 05/02/2022      cea 7/14/21 1.8  November 9, 2020  .  Rectum, sigmoid colon and proximal donut (low anterior resection):   - Invasive adenocarcinoma,  invading through the muscularis propria into   pericolorectal tissue   - Pathologic stage pT3, pN1a (see synoptic report)   - Resection margins negative for dysplasia or malignancy (proximal, distal   and radial margins), within 1 mm to radial margin   - One tumor deposit   - Adenocarcinoma involve one out of totally forty seven lymph nodes (1/46 in   part 1, totally 1/47, also see part 2)   - Background colon with diverticulosis associated with hyperpigmented   macrophages, benign   - Proximal donut with benign colon, no dysplasia or malignancy   - Tattoo identified   2.  Colon, distal donut (excision):   - Benign colon, negative for dysplasia or malignancy   - One lymph node, negative for metastatic carcinoma (0/1)   COLON AND RECTUM: Resection, Including Transanal Disk Excision of Rectal   Neoplasms   Procedure   Low anterior resection   Tumor Site  Rectum    Tumor Location: Straddles the anterior peritoneal reflection   Tumor Size   Greatest dimension (centimeters): 7.0 cm   + Additional dimensions (centimeters): 4.7 x 2.2 cm   Macroscopic Tumor Perforation  Not identified   Macroscopic Intactness of Mesorectum  Near complete   Histologic Type  Adenocarcinoma   Histologic Grade   G2: Moderately differentiated   Tumor Extension  Tumor invades through the muscularis propria into   pericolorectal tissue     Impression:  November 7, 2020 CT abdomen pelvis     1. Rectosigmoid junction mass, unchanged from prior.  No definite evidence of intraluminal contrast extravasation to suggest active gastrointestinal hemorrhage, however please note that arterial phase CT is more sensitive.  2. Colonic diverticulosis without acute diverticulitis.  3. Minimal pneumobilia, unchanged      IMPRESSION:  CT chest lung screen October 2020     1.  3 mm pulmonary nodule identified in the lateral segment of the  right lower lobe.  2.  Focus of groundglass opacity measuring 11 mm in diameter in the  superior segment of the right lower lobe. Short-term follow-up for  this lesion is recommended.  3.  Mild emphysematous lung disease.     LUNG-RADS CATEGORY 3: PROBABLY BENIGN FINDINGS     RECOMMENDATION: Short-term imaging follow-up with 6 month LDCT.    IMPRESSION: 7/21  1. Nonspecific hypermetabolic soft tissue density focus cranial to the rectosigmoid colon suture line, perhaps reflecting postsurgical and or posttreatment or postinflammatory changes. Attention to this area at follow-up imaging is recommended to help differentiate from residual or recurrent neoplasm.  2. Improving postoperative changes in the lower anterior abdominal wall, with associated multifocal FDG hypermetabolism which is most characteristic of postoperative etiology.  3. Otherwise no convincing evidence of recurrent neoplasm or metastatic disease.IMPRESSION:  1. Interval  increased size of hypermetabolic presacral mass contiguous with hypermetabolic circumferential wall thickening of the rectum at the level of the anastomotic suture line, consistent with recurrent neoplasm and metastatic disease.  2. Interval development of enlarged, hypermetabolic right iliac chain lymph node consistent with metastatic disease.  3. No additional convincing evidence of metastatic disease.  4. Bilateral sacral ala insufficiency fractures, with corresponding FDG hypermetabolism.  5. Intense FDG uptake throughout the thyroid gland, unchanged and nonspecific.      Patient Active Problem List   Diagnosis    COPD (chronic obstructive pulmonary disease)    Pure hypercholesterolemia    Acquired hypothyroidism    Rectal bleeding    Rectal cancer    Dysphagia    Malignant neoplasm of colon    B12 deficiency    FENG (iron deficiency anemia)    Hypocalcemia    Chronic diarrhea    Severe obesity (BMI 35.0-39.9) with comorbidity    Secondary and unspecified malignant neoplasm of intra-abdominal lymph nodes    Dehydration    Diarrhea   IMPRESSION:pet 12/2020  1. Postoperative changes of rectal colonic resection, with adjacent  FDG avid presacral soft tissue stranding.  2. Adjacent punctate extraluminal foci of free air suggesting a tiny  tract extending towards the adjacent small bowel (possibly  postoperative but suggesting a tiny enterocolonic fistula)  3. Deep subcutaneous soft tissue structure along the ventral abdomen  suggesting a postoperative hematoma/seroma, and less likely abscess.  4. No findings of additional sites of FDG avid disease.      IMPRESSION: 7/21  1. Nonspecific hypermetabolic soft tissue density focus cranial to the rectosigmoid colon suture line, perhaps reflecting postsurgical and or posttreatment or postinflammatory changes. Attention to this area at follow-up imaging is recommended to help differentiate from residual or recurrent neoplasm.  2. Improving postoperative changes in the lower  anterior abdominal wall, with associated multifocal FDG hypermetabolism which is most characteristic of postoperative etiology.  3. Otherwise no convincing evidence of recurrent neoplasm or metastatic disease      Impression:2/2/22   CT CAP  A single 4 mm soft tissue density nodule is noted in the right lower lobe.  No other intrapulmonary masses or nodules are seen.  Follow-up CT in 12 months per of this nodule is recommended.  Coronary artery calcification is noted.  No masses or adenopathy or seen.     Prior cholecystectomy.  Prior hysterectomy.  2.2 cm left ovarian cyst.  Small ventral hernia containing a loop of small bowel without CT evidence of obstruction  Assessment and Plan   At original dx 2020  Status post low anterior resection November 9, 2020 for rectal CA T3 N1a   tumor board convened and agreed on xrt that will follow  After completion of chemo  sequentially  therapy   Patient started FOLFOX.  Completed 4 out of 12 planned adjuvant cycles,  Discontinued  Chemo / port removed completed xrt 5/21  Pet:8/22, showing progression   Pt very hesitant T FIRST TO GET PORT OR INFUSIONAL 5 FU/ OXALIPLATIN, WENT TO [facility] ON SECOND OPINION AGREABLE NOW, HAD PORT PLACED   Started  FIOLFIRI + avastin   from second cycle changed to FOLFIRI panitumumab   Sig diarrhea with CPT-11, added lomotil to imodium may need to drop dose of CPT-11   Iv fluids today   Dc the bolus 5FU  d/w  FENG :Injectafer  completed 8/22 good response  pt to add SL b12 to her twice a month b12 shots    Please ask for caris     b12 def anemia, taking injections q month level checked 10/21 sub therpautic 226 t INSURANCE WONT PAY FOR B12.ok to cont with SL only as levels have improved to 1500  iron deficiency  Pt to try oral for now, will recheck in 8/22 ( may help her chronic diarrhea)  hypocalcemia which seems chronic pt to supplement orally daily wnl this visit  Hypothyroidism, sees DR Mcdermott will adj meds  History of chronic obstructive  pulmonary disease, dyslipidemia and hypothyroidism continue with PCP  Doesn't want to be covid vaccinated

## 2022-10-24 NOTE — PLAN OF CARE
Problem: Fatigue  Goal: Improved Activity Tolerance  Outcome: Ongoing, Progressing  Intervention: Promote Improved Energy  Flowsheets (Taken 10/24/2022 1112)  Fatigue Management: frequent rest breaks encouraged  Sleep/Rest Enhancement:   regular sleep/rest pattern promoted   relaxation techniques promoted  Activity Management: Ambulated -L4

## 2022-10-28 NOTE — TELEPHONE ENCOUNTER
----- Message from Nazario Sequeira sent at 10/28/2022 10:30 AM CDT -----  Who Called:pt      What is the reqeust in detail:is requesting a rx refill . Please advise       Can the clinic reply by [facility]? No       Best Call Back Number:[phone]      Additional Information:

## 2022-11-07 NOTE — PLAN OF CARE
Problem: Fall Injury Risk  Goal: Absence of Fall and Fall-Related Injury  Outcome: Ongoing, Progressing  Intervention: Identify and Manage Contributors  Flowsheets (Taken 11/7/2022 1036)  Self-Care Promotion: independence encouraged  Medication Review/Management: medications reviewed  Intervention: Promote Injury-Free Environment  Flowsheets (Taken 11/7/2022 1036)  Safety Promotion/Fall Prevention: medications reviewed

## 2022-11-07 NOTE — Clinical Note
Cbc,c mp,  and see me for next cycle pt wants to delay chemo for thanskgiving week to 11/28 please tell infusion. Pt to take kcl oral doesn't want IV replacennt

## 2022-11-07 NOTE — PROGRESS NOTES
Subjective:       Patient ID: Tessa Enriquez is a 66 y.o. female.    Chief Complaint:   rectal CA status post biopsy October 2020 robotic LAR November 9, 2020 by Dr. Wagner  Discontinued folfox due to s/e completed xrt  5/21     works at [employer]    pt went to GI wih rectal bleeding and abd pain scoped  Findings:        The digital rectal exam revealed a firm rectal mass palpated 5 to 6        cm from the anal verge. The mass was non-circumferential and located        predominantly at the posterior bowel wall.        There was evidence of a prior end-to-end colo-rectal anastomosis in        the distal rectum. This was patent and was characterized by        ulceration. The anastomosis was traversed. Mass in association with        the anastomosis has been previously biopsied and confirmed to be        adenocarcinoma. Anastomosis present at approximately 5 cm from anal    pt sent for discussion    MPRESSION:  1. Interval increased size of hypermetabolic presacral mass contiguous with hypermetabolic circumferential wall thickening of the rectum at the level of the anastomotic suture line, consistent with recurrent neoplasm and metastatic disease.  2. Interval development of enlarged, hypermetabolic right iliac chain lymph node consistent with metastatic disease.  3. No additional convincing evidence of metastatic disease.  4. Bilateral sacral ala insufficiency fractures, with corresponding FDG hypermetabolism.  5. Intense FDG uptake throughout the thyroid gland, unchanged and nonspecific.  Pt discussed wt [facility] started folfiri +avastin then from cycle 2 changed to FOLFIRI+ panitumumab  REVIEW OF SYSTEMS:     CONSTITUTIONAL: The patient denies any weight change. There is no apparent    change in appetite, fever, night sweats, headaches, lots fatigue, no dizziness, some  weakness.         SKIN: Denies rash, issues with nails, non-healing sores, bleeding, blotching    skin or abnormal bruising. Denies new moles or  changes to existing moles.      BREASTS: There is no swelling around breasts or nipple discharge.    EYES: Denies eye pain, blurred vision, swelling, redness or discharge.      ENT AND MOUTH: Denies runny nose, stuffiness, sinus trouble or sores. Denies    nosebleeds. Denies, hoarseness, change in voice or swelling in front of the    neck.      CARDIOVASCULAR: Denies chest pain, discomfort or palpitations. Denies neck    swelling or episodes of passing out.      RESPIRATORY: Denies cough, sputum production, blood in sputum, and denies    shortness of breath.      GI:  Had esophagus stretched as food/ pills were getting stuck  Pt saw DR Linares, diarrhea chronically since the xrt, much better now since start o0f chemo she alt constipation with loose stools also reports rectal bleeding.improved Rectal pain, l better no diarrhea now  GENITOURINARY: No discharge. No pelvic pain or lumps. No rash around groin or  lesions. No urinary frequency, hesitation, painful urination or blood in    urine. Denies incontinence. No problems with intercourse.      MUSCULOSKELETAL: Denies neck or back pain. Denies weakness in arms or legs,    joint problems or distended inflamed veins in legs. Denies swelling or abnormal  glands.      NEUROLOGICAL: Denies tingling, numbness, altered mentation changes to nerve    function in the face, weakness to one or both of the body. Denies changes to    gait and denies multiple falls or accidents.      PSYCHIATRIC: Denies nervousness, anxiety, hallucinations, depression, suicidal    ideation, trouble sleeping or changes in behavior noticed by family.          Oncology History:  T3 N1 rectal CA diagnosed October 2020 robotic LAR November 2020 December 2020 PET scan negative for metastatic disease   started FOLFOX 2/21 but stopped after 3 cycles and only wants surveillance   had colonoscopy 8/21 NAD  Past Medical History:   Diagnosis Date    Acute hypoxemic respiratory failure 07/09/2018     Arthritis     Cancer     colon    COPD (chronic obstructive pulmonary disease)     History of home oxygen therapy     ONLY PRN AND HASN'T USED IT IN A YEAR    Hyperlipidemia     Thyroid disease      Past Surgical History:   Procedure Laterality Date    CHOLECYSTECTOMY      COLECTOMY N/A 11/9/2020    Procedure: COLECTOMY;  Surgeon: Brandon Wagner MD;  Location: [facility] OR;  Service: General;  Laterality: N/A;  LOWER- ANTERIOR    COLONOSCOPY N/A 10/23/2020    Procedure: COLONOSCOPY;  Surgeon: Jolynn Ayala MD;  Location: [facility] ENDO;  Service: Endoscopy;  Laterality: N/A;    COLONOSCOPY N/A 8/6/2021    Procedure: COLONOSCOPY;  Surgeon: Jolynn Ayala MD;  Location: [facility] ENDO;  Service: Endoscopy;  Laterality: N/A;    COLONOSCOPY N/A 7/7/2022    Procedure: COLONOSCOPY;  Surgeon: Jolynn Ayala MD;  Location: [facility] ENDO;  Service: Endoscopy;  Laterality: N/A;    ESOPHAGEAL DILATION N/A 6/29/2021    Procedure: DILATION, ESOPHAGUS;  Surgeon: Sourav Baires III, MD;  Location: [facility];  Service: Endoscopy;  Laterality: N/A;    ESOPHAGOGASTRODUODENOSCOPY  06/29/2021    Dilation to 57 Fr    ESOPHAGOGASTRODUODENOSCOPY N/A 6/29/2021    Procedure: EGD (ESOPHAGOGASTRODUODENOSCOPY);  Surgeon: Sourav Baires III, MD;  Location: [facility];  Service: Endoscopy;  Laterality: N/A;    FLEXIBLE SIGMOIDOSCOPY N/A 8/1/2022    Procedure: SIGMOIDOSCOPY, FLEXIBLE;  Surgeon: Brandon Wagner MD;  Location: [facility] ENDO;  Service: Endoscopy;  Laterality: N/A;    HYSTERECTOMY      INSERTION OF TUNNELED CENTRAL VENOUS CATHETER (CVC) WITH SUBCUTANEOUS PORT N/A 1/11/2021    Procedure: [id number];  Surgeon: Brandon Wagner MD;  Location: [facility] OR;  Service: General;  Laterality: N/A;    INSERTION OF TUNNELED CENTRAL VENOUS CATHETER (CVC) WITH SUBCUTANEOUS PORT Right 9/1/2022    Procedure: [id number];  Surgeon: Brandon Wagner MD;  Location: [facility] OR;  Service: General;  Laterality: Right;  Wants rt side  placement    MEDIPORT REMOVAL Left 2021    Procedure: REMOVAL, CATHETER, CENTRAL VENOUS, TUNNELED, WITH PORT;  Surgeon: Brandon Wagner MD;  Location: [facility] OR;  Service: General;  Laterality: Left;    ROBOT-ASSISTED COLECTOMY N/A 2020    Procedure: ROBOTIC COLECTOMY low anterior resection, possible open;  Surgeon: Brandon Wagner MD;  Location: [facility] OR;  Service: General;  Laterality: N/A;  CONVERTED TO OPEN AT 1503     Family History   Problem Relation Age of Onset    COPD Mother     COPD Father     Prostate cancer Brother     Breast cancer Maternal Grandmother       Social History     Socioeconomic History    Marital status:    Tobacco Use    Smoking status: Former     Packs/day: 2.00     Years: 40.00     Pack years: 80.00     Types: Cigarettes     Quit date: 2017     Years since quittin.3    Smokeless tobacco: Never   Substance and Sexual Activity    Alcohol use: No    Drug use: No    Sexual activity: Not Currently     Partners: Male     Review of patient's allergies indicates:   Allergen Reactions    Ativan [lorazepam] Hives and Itching    Flagyl [metronidazole] Itching and Rash    Pcn [penicillins] Hives, Itching and Rash       Current Outpatient Medications:     CMPD hydrocortisone 2%- LIDOcaine 3% suppository (RECTAL ROCKET), Place 1 suppository rectally every evening. Insert 1 suppository 3/4 of the way onto rectum. Wet for easier application. Repeat for 3 nights., Disp: 30 suppository, Rfl: 2    cyanocobalamin 1,000 mcg/mL injection, Inject 1 mL (1,000 mcg total) into the muscle every 14 (fourteen) days., Disp: 30 mL, Rfl: 12    doxycycline (VIBRAMYCIN) 100 MG Cap, Take 1 capsule (100 mg total) by mouth 2 (two) times daily., Disp: 60 capsule, Rfl: 1    ferrous sulfate 325 (65 FE) MG EC tablet, Take 325 mg by mouth 2 (two) times daily., Disp: , Rfl:     gabapentin (NEURONTIN) 300 MG capsule, Take 1 capsule (300 mg total) by mouth every evening., Disp: 30 capsule, Rfl: 2     HYDROcodone-acetaminophen (NORCO) 5-325 mg per tablet, Take 1 tablet by mouth every 6 (six) hours as needed for Pain., Disp: 20 tablet, Rfl: 0    levothyroxine (SYNTHROID) 125 MCG tablet, Take 1 tablet (125 mcg total) by mouth before breakfast., Disp: 30 tablet, Rfl: 11    ondansetron (ZOFRAN-ODT) 8 MG TbDL, Take 1 tablet (8 mg total) by mouth every 12 (twelve) hours as needed., Disp: 30 tablet, Rfl: 1    prochlorperazine (COMPAZINE) 10 MG tablet, Take 1 tablet (10 mg total) by mouth every 6 (six) hours as needed., Disp: 30 tablet, Rfl: 1    traMADoL (ULTRAM) 50 mg tablet, Take 1 tablet (50 mg total) by mouth every 6 (six) hours as needed., Disp: 30 tablet, Rfl: 0    triamcinolone acetonide 0.025% (KENALOG) 0.025 % cream, Apply topically once daily., Disp: 80 g, Rfl: 1    potassium chloride 10% (KAYCIEL) 20 mEq/15 mL oral solution, Take 15 mLs (20 mEq total) by mouth 2 (two) times daily., Disp: 5 mL, Rfl: 0  PHYSICAL EXAM:     Wt Readings from Last 3 Encounters:   11/07/22 83 kg (182 lb 15.7 oz)   10/26/22 83.2 kg (183 lb 8 oz)   10/24/22 84.7 kg (186 lb 11.7 oz)     Temp Readings from Last 3 Encounters:   11/07/22 (!) 95 °F (35 °C) (Temporal)   10/26/22 97.8 °F (36.6 °C)   10/24/22 97.2 °F (36.2 °C)     BP Readings from Last 3 Encounters:   11/07/22 135/75   10/26/22 127/80   10/24/22 139/71     Pulse Readings from Last 3 Encounters:   11/07/22 92   10/26/22 (!) 120   10/24/22 81     GENERAL: alert; in no apparent distress, , well-built well-nourished   HEAD: normocephalic, atraumatic.  EYES: pupils are equal, round, reactive to light and accommodation. Sclera anicteric. Conjunctiva not injected.   NOSE/THROAT: no nasal erythema or rhinorrhea. Oropharynx pink, without erythema, ulcerations or thrush.   NECK: no cervical motion rigidity; supple with no masses.  CHEST: clear to auscultation bilaterally; no wheezes, crackles or rubs. Patient is speaking comfortably on room air with normal work of breathing without  using accessory muscles of respiration.  CARDIOVASCULAR: regular rate and rhythm; no murmurs, rubs or gallops.  ABDOMEN: soft, nontender, nondistended. Bowel sounds present.   MUSCULOSKELETAL: no tenderness to palpation along the spine or scapulae. Normal range of motion.  NEUROLOGIC: cranial nerves II-XII intact bilaterally. Strength 5/5 in bilateral upper and lower extremities. No sensory deficits appreciated. Reflexes globally intact. No cerebellar signs. Normal gait.  LYMPHATIC: no cervical, supraclavicular or axillary adenopathy appreciated bilaterally.   EXTREMITIES: no clubbing, cyanosis, edema.  SKIN: no erythema, rashes, ulcerations noted  ECOG-0  Lab Results   Component Value Date    WBC 10.53 11/05/2022    HGB 12.9 11/05/2022    HCT 39.5 11/05/2022    MCV 94 11/05/2022     11/05/2022       BMP  Lab Results   Component Value Date     11/05/2022    K 3.2 (L) 11/05/2022     11/05/2022    CO2 28 11/05/2022    BUN 6 (L) 11/05/2022    CREATININE 0.7 11/05/2022    CALCIUM 8.8 11/05/2022    ANIONGAP 10 11/05/2022    ESTGFRAFRICA >60 05/02/2022    EGFRNONAA >60 05/02/2022      cea 7/14/21 1.8  November 9, 2020  .  Rectum, sigmoid colon and proximal donut (low anterior resection):   - Invasive adenocarcinoma,  invading through the muscularis propria into   pericolorectal tissue   - Pathologic stage pT3, pN1a (see synoptic report)   - Resection margins negative for dysplasia or malignancy (proximal, distal   and radial margins), within 1 mm to radial margin   - One tumor deposit   - Adenocarcinoma involve one out of totally forty seven lymph nodes (1/46 in   part 1, totally 1/47, also see part 2)   - Background colon with diverticulosis associated with hyperpigmented   macrophages, benign   - Proximal donut with benign colon, no dysplasia or malignancy   - Tattoo identified   2.  Colon, distal donut (excision):   - Benign colon, negative for dysplasia or malignancy   - One lymph node, negative for  metastatic carcinoma (0/1)   COLON AND RECTUM: Resection, Including Transanal Disk Excision of Rectal   Neoplasms   Procedure  Low anterior resection   Tumor Site  Rectum    Tumor Location: Straddles the anterior peritoneal reflection   Tumor Size   Greatest dimension (centimeters): 7.0 cm   + Additional dimensions (centimeters): 4.7 x 2.2 cm   Macroscopic Tumor Perforation  Not identified   Macroscopic Intactness of Mesorectum  Near complete   Histologic Type  Adenocarcinoma   Histologic Grade   G2: Moderately differentiated   Tumor Extension  Tumor invades through the muscularis propria into   pericolorectal tissue     Impression:  November 7, 2020 CT abdomen pelvis     1. Rectosigmoid junction mass, unchanged from prior.  No definite evidence of intraluminal contrast extravasation to suggest active gastrointestinal hemorrhage, however please note that arterial phase CT is more sensitive.  2. Colonic diverticulosis without acute diverticulitis.  3. Minimal pneumobilia, unchanged      IMPRESSION:  CT chest lung screen October 2020     1.  3 mm pulmonary nodule identified in the lateral segment of the  right lower lobe.  2.  Focus of groundglass opacity measuring 11 mm in diameter in the  superior segment of the right lower lobe. Short-term follow-up for  this lesion is recommended.  3.  Mild emphysematous lung disease.     LUNG-RADS CATEGORY 3: PROBABLY BENIGN FINDINGS     RECOMMENDATION: Short-term imaging follow-up with 6 month LDCT.    IMPRESSION: 7/21  1. Nonspecific hypermetabolic soft tissue density focus cranial to the rectosigmoid colon suture line, perhaps reflecting postsurgical and or posttreatment or postinflammatory changes. Attention to this area at follow-up imaging is recommended to help differentiate from residual or recurrent neoplasm.  2. Improving postoperative changes in the lower anterior abdominal wall, with associated multifocal FDG hypermetabolism which is most characteristic of  postoperative etiology.  3. Otherwise no convincing evidence of recurrent neoplasm or metastatic disease.IMPRESSION:  1. Interval increased size of hypermetabolic presacral mass contiguous with hypermetabolic circumferential wall thickening of the rectum at the level of the anastomotic suture line, consistent with recurrent neoplasm and metastatic disease.  2. Interval development of enlarged, hypermetabolic right iliac chain lymph node consistent with metastatic disease.  3. No additional convincing evidence of metastatic disease.  4. Bilateral sacral ala insufficiency fractures, with corresponding FDG hypermetabolism.  5. Intense FDG uptake throughout the thyroid gland, unchanged and nonspecific.      Patient Active Problem List   Diagnosis    COPD (chronic obstructive pulmonary disease)    Pure hypercholesterolemia    Acquired hypothyroidism    Rectal bleeding    Rectal cancer    Dysphagia    Malignant neoplasm of colon    B12 deficiency    FENG (iron deficiency anemia)    Hypocalcemia    Chronic diarrhea    Severe obesity (BMI 35.0-39.9) with comorbidity    Secondary and unspecified malignant neoplasm of intra-abdominal lymph nodes    Dehydration    Diarrhea   IMPRESSION:pet 12/2020  1. Postoperative changes of rectal colonic resection, with adjacent  FDG avid presacral soft tissue stranding.  2. Adjacent punctate extraluminal foci of free air suggesting a tiny  tract extending towards the adjacent small bowel (possibly  postoperative but suggesting a tiny enterocolonic fistula)  3. Deep subcutaneous soft tissue structure along the ventral abdomen  suggesting a postoperative hematoma/seroma, and less likely abscess.  4. No findings of additional sites of FDG avid disease.      IMPRESSION: 7/21  1. Nonspecific hypermetabolic soft tissue density focus cranial to the rectosigmoid colon suture line, perhaps reflecting postsurgical and or posttreatment or postinflammatory changes. Attention to this area at follow-up  imaging is recommended to help differentiate from residual or recurrent neoplasm.  2. Improving postoperative changes in the lower anterior abdominal wall, with associated multifocal FDG hypermetabolism which is most characteristic of postoperative etiology.  3. Otherwise no convincing evidence of recurrent neoplasm or metastatic disease      Impression:2/2/22   CT CAP  A single 4 mm soft tissue density nodule is noted in the right lower lobe.  No other intrapulmonary masses or nodules are seen.  Follow-up CT in 12 months per of this nodule is recommended.  Coronary artery calcification is noted.  No masses or adenopathy or seen.     Prior cholecystectomy.  Prior hysterectomy.  2.2 cm left ovarian cyst.  Small ventral hernia containing a loop of small bowel without CT evidence of obstruction  Assessment and Plan   At original dx 2020  Status post low anterior resection November 9, 2020 for rectal CA T3 N1a   tumor board convened and agreed on xrt that will follow  After completion of chemo  sequentially  therapy   Patient started FOLFOX.  Completed 4 out of 12 planned adjuvant cycles,  Discontinued  Chemo / port removed completed xrt 5/21  Pet:8/22, showing progression   Pt very hesitant T FIRST TO GET PORT OR INFUSIONAL 5 FU/ OXALIPLATIN, WENT TO [facility] ON SECOND OPINION AGREABLE NOW, HAD PORT PLACED   Started  FOLFIRI + avastin   from second cycle changed to FOLFIRI panitumumab   Sig diarrhea with CPT-11, added lomotil to imodium  will drop  dose of CPT-11  to 165 mg/m2  Cbc,c mp,  and see me for next cycle pt wants to delay chemo for thanskgiving week to 11/28 please tell infusion.  Pt to take kcl oral doesn't want IV replacen   Dc the bolus 5FU  d/w  FENG :Injectafer  completed 8/22 good response  pt to add SL b12 to her twice a month b12 shots    Please ask for nikole  Pt wants to wait till holidays are over to get imaging studies looking for response   b12 def anemia, taking injections q month level checked  10/21 sub therpautic 226 t INSURANCE WONT PAY FOR B12.ok to cont with SL only as levels have improved to 1500  iron deficiency  Pt to try oral for now, will recheck in 8/22 ( may help her chronic diarrhea)  hypocalcemia which seems chronic pt to supplement orally daily wnl this visit  Hypothyroidism, sees DR Mcdermott will adj meds  History of chronic obstructive pulmonary disease, dyslipidemia and hypothyroidism continue with PCP  Doesn't want to be covid vaccinated

## 2022-11-07 NOTE — TELEPHONE ENCOUNTER
This nurse spoke with Umu at [facility] pharmacy. Notified her that new rx has been sent per NP. She confirmed receipt.    ----- Message from Ora Arguello sent at 11/7/2022 11:44 AM CST -----  Contact: [facility]  Type:  Pharmacy Calling to Clarify an RX    Name of Caller: Umu    [facility] Name: [facility]     Prescription Name: potassium chloride 10% (KAYCIEL) 20 mEq/15 mL oral solution      What do they need to clarify?: directions/ and they are out of this medication     Best Call Back Number: [phone]    Additional Information:

## 2022-11-08 PROBLEM — D70.1 CHEMOTHERAPY-INDUCED NEUTROPENIA: Status: ACTIVE | Noted: 2022-01-01

## 2022-11-08 PROBLEM — T45.1X5A CHEMOTHERAPY-INDUCED NEUTROPENIA: Status: ACTIVE | Noted: 2022-01-01

## 2022-11-09 NOTE — PLAN OF CARE
Problem: Fatigue  Goal: Improved Activity Tolerance  Outcome: Ongoing, Progressing  Intervention: Promote Improved Energy  Flowsheets (Taken 11/9/2022 1119)  Fatigue Management: frequent rest breaks encouraged  Sleep/Rest Enhancement:   regular sleep/rest pattern promoted   relaxation techniques promoted  Activity Management: Ambulated -L4

## 2022-11-11 NOTE — TELEPHONE ENCOUNTER
Incoming call from pt's son. He states that pt does not wish to go to ED at this time. Requested appt be made for Monday.

## 2022-11-11 NOTE — TELEPHONE ENCOUNTER
This nurse returned call as requested. Pt's son Jaiden reports that in the last 24 hrs, pt has had approximately 32 loose stools and half of them pt was unable to make it to the bathroom. He states that pt has been taking lomotil, eating beef broth and drinking Gatorade. Dr Frank and Aby CHAUDHARY notified. Both providers advised that pt go to the ED. Pt's son notified. He v/u but stated that pt rly does not want to go.       ----- Message from Dominga Youssef sent at 11/11/2022 11:34 AM CST -----  Regarding: medical advice  Contact: Son, brian Avalos Want to speak with a nurse regarding patient having uncontrollable bowel movements all day, please call back at [phone]    Case number [id number]

## 2022-11-13 PROBLEM — K52.1 CHEMOTHERAPY-INDUCED DIARRHEA: Status: ACTIVE | Noted: 2022-01-01

## 2022-11-13 PROBLEM — T45.1X5A CHEMOTHERAPY-INDUCED DIARRHEA: Status: ACTIVE | Noted: 2022-01-01

## 2022-11-13 NOTE — ED NOTES
Pt AAO x 3, no distress noted, IV  intact and dry. Updated pt with care, verbalized understanding.

## 2022-11-13 NOTE — ASSESSMENT & PLAN NOTE
Body mass index is 35.74 kg/m². Morbid obesity complicates all aspects of disease management from diagnostic modalities to treatment. Weight loss encouraged and health benefits explained to patient.

## 2022-11-13 NOTE — ASSESSMENT & PLAN NOTE
Acute, following Chemo infusion  IVF rehydration  Stool culture pending  C diff rule out pending  PRN antidiarrheal agents

## 2022-11-13 NOTE — ED PROVIDER NOTES
"Encounter Date: 11/13/2022    SCRIBE #1 NOTE: I, Danielle Jackson, am scribing for, and in the presence of,  Bennett San III, MD.     History     Chief Complaint   Patient presents with    Diarrhea     Started Thursday after chemo  /  states black stools  started prior to pepto-bismal      Time seen by provider: 12:47 PM on 11/13/2022    Tessa Enriquez is a 66 y.o. female with a PMHx of colon cancer, COPD, HLD, and acute hypoxemic respiratory failure who presents to the ED with an onset of diarrhea that started after her chemotherapy infusion on 11/09/2022. She describes the stool as "black water". The patient took Pepto Bismol for her symptoms with no relief. She reports a decreased appetite. The patient denies nausea, vomiting, or any other symptoms at this time.  She is not on any antibiotics. PSHx of hysterectomy and tunneled central venous catheter insertion.      The history is provided by the patient.   Review of patient's allergies indicates:   Allergen Reactions    Ativan [lorazepam] Hives and Itching    Flagyl [metronidazole] Itching and Rash    Pcn [penicillins] Hives, Itching and Rash     Past Medical History:   Diagnosis Date    Acute hypoxemic respiratory failure 07/09/2018    Arthritis     Cancer     colon    COPD (chronic obstructive pulmonary disease)     History of home oxygen therapy     ONLY PRN AND HASN'T USED IT IN A YEAR    Hyperlipidemia     Thyroid disease      Past Surgical History:   Procedure Laterality Date    CHOLECYSTECTOMY      COLECTOMY N/A 11/9/2020    Procedure: COLECTOMY;  Surgeon: Brandon Wagner MD;  Location: [facility] OR;  Service: General;  Laterality: N/A;  LOWER- ANTERIOR    COLONOSCOPY N/A 10/23/2020    Procedure: COLONOSCOPY;  Surgeon: Jolynn Ayala MD;  Location: [facility] ENDO;  Service: Endoscopy;  Laterality: N/A;    COLONOSCOPY N/A 8/6/2021    Procedure: COLONOSCOPY;  Surgeon: Jolynn Ayala MD;  Location: [facility] ENDO;  Service: Endoscopy;  Laterality: N/A;    " COLONOSCOPY N/A 7/7/2022    Procedure: COLONOSCOPY;  Surgeon: Jolynn Ayala MD;  Location: [facility] ENDO;  Service: Endoscopy;  Laterality: N/A;    ESOPHAGEAL DILATION N/A 6/29/2021    Procedure: DILATION, ESOPHAGUS;  Surgeon: Sourav Baires III, MD;  Location: [facility] ENDO;  Service: Endoscopy;  Laterality: N/A;    ESOPHAGOGASTRODUODENOSCOPY  06/29/2021    Dilation to 57 Fr    ESOPHAGOGASTRODUODENOSCOPY N/A 6/29/2021    Procedure: EGD (ESOPHAGOGASTRODUODENOSCOPY);  Surgeon: Sourav Baires III, MD;  Location: [facility] ENDO;  Service: Endoscopy;  Laterality: N/A;    FLEXIBLE SIGMOIDOSCOPY N/A 8/1/2022    Procedure: SIGMOIDOSCOPY, FLEXIBLE;  Surgeon: Brandon Wagner MD;  Location: [facility] ENDO;  Service: Endoscopy;  Laterality: N/A;    HYSTERECTOMY      INSERTION OF TUNNELED CENTRAL VENOUS CATHETER (CVC) WITH SUBCUTANEOUS PORT N/A 1/11/2021    Procedure: [id number]-CATH;  Surgeon: Brandon Wagner MD;  Location: [facility] OR;  Service: General;  Laterality: N/A;    INSERTION OF TUNNELED CENTRAL VENOUS CATHETER (CVC) WITH SUBCUTANEOUS PORT Right 9/1/2022    Procedure: [id number]-CATH;  Surgeon: Brandon Wagner MD;  Location: [facility] OR;  Service: General;  Laterality: Right;  Wants rt side placement    MEDIPORT REMOVAL Left 4/19/2021    Procedure: REMOVAL, CATHETER, CENTRAL VENOUS, TUNNELED, WITH PORT;  Surgeon: Brandon Wagner MD;  Location: [facility] OR;  Service: General;  Laterality: Left;    ROBOT-ASSISTED COLECTOMY N/A 11/9/2020    Procedure: ROBOTIC COLECTOMY low anterior resection, possible open;  Surgeon: Brandon Wagner MD;  Location: [facility] OR;  Service: General;  Laterality: N/A;  CONVERTED TO OPEN AT 1503     Family History   Problem Relation Age of Onset    COPD Mother     COPD Father     Prostate cancer Brother     Breast cancer Maternal Grandmother      Social History     Tobacco Use    Smoking status: Former     Packs/day: 2.00     Years: 40.00     Pack years: 80.00     Types: Cigarettes     Quit  date: 2017     Years since quittin.3    Smokeless tobacco: Never   Substance Use Topics    Alcohol use: No    Drug use: No     Review of Systems   Constitutional:  Positive for appetite change. Negative for activity change, chills, fatigue and fever.   Eyes:  Negative for visual disturbance.   Respiratory:  Negative for apnea and shortness of breath.    Cardiovascular:  Negative for chest pain and palpitations.   Gastrointestinal:  Positive for diarrhea. Negative for abdominal distention, abdominal pain, nausea and vomiting.   Genitourinary:  Negative for difficulty urinating.   Musculoskeletal:  Negative for neck pain.   Skin:  Negative for pallor and rash.   Neurological:  Negative for headaches.   Hematological:  Does not bruise/bleed easily.   Psychiatric/Behavioral:  Negative for agitation.      Physical Exam     Initial Vitals [22 1237]   BP Pulse Resp Temp SpO2   (!) 142/63 (!) 114 18 98.2 °F (36.8 °C) 99 %      MAP       --         Physical Exam    Nursing note and vitals reviewed.  Constitutional: She appears well-developed and well-nourished.   HENT:   Head: Normocephalic and atraumatic.   Eyes: Conjunctivae are normal.   Neck: Neck supple.   Normal range of motion.  Cardiovascular:  Normal heart sounds. An irregular rhythm present.   Tachycardia present.   Exam reveals no gallop and no friction rub.       No murmur heard.  Pulmonary/Chest: Effort normal and breath sounds normal. No respiratory distress. She has no wheezes. She has no rhonchi. She has no rales.   Abdominal: Abdomen is soft. She exhibits no distension. There is no abdominal tenderness.   Genitourinary:    Genitourinary Comments: Confluent erythematous papules in the rectal area. Hemoccult negative.      Musculoskeletal:         General: Normal range of motion.      Cervical back: Normal range of motion and neck supple.     Neurological: She is alert and oriented to person, place, and time.   Skin: Skin is warm and dry. No  erythema.   Psychiatric: She has a normal mood and affect.       ED Course   Procedures  Labs Reviewed   CBC W/ AUTO DIFFERENTIAL - Abnormal; Notable for the following components:       Result Value    WBC 13.01 (*)     MCH 31.3 (*)     Gran % 88.0 (*)     Lymph % 6.0 (*)     Mono % 2.0 (*)     All other components within normal limits   COMPREHENSIVE METABOLIC PANEL - Abnormal; Notable for the following components:    Potassium 3.1 (*)     CO2 20 (*)     Glucose 134 (*)     Alkaline Phosphatase 160 (*)     All other components within normal limits   CULTURE, STOOL   CLOSTRIDIUM DIFFICILE   ENTEROHEMORRHAGIC E.COLI          Imaging Results    None          Medications   diphenoxylate-atropine 2.5-0.025 mg per tablet 2 tablet (2 tablets Oral Given 11/13/22 1321)   0.9%  NaCl infusion (0 mL/hr Intravenous Stopped 11/13/22 1427)   potassium chloride SA CR tablet 40 mEq (40 mEq Oral Given 11/13/22 1440)   0.9%  NaCl infusion ( Intravenous New Bag 11/13/22 1440)     Medical Decision Making:   Clinical Tests:   Lab Tests: Ordered and Reviewed  ED Management:  66-year-old female currently receiving chemotherapy for colon cancer presents with severe unrelenting diarrhea.  She is currently on oral potassium replacement; however, has ongoing significant hypokalemia (potassium =3.1).  She will be admitted for hydration, management of her rectal candidiasis and antidiarrheals.  Other:   I have discussed this case with another health care provider.     APC / Resident Notes:   I, Dr. Bennett San III, personally performed the services described in this documentation. All medical record entries made by the scribe were at my direction and in my presence.  I have reviewed the chart and agree that the record reflects my personal performance and is accurate and complete   Scribe Attestation:   Scribe #1: I performed the above scribed service and the documentation accurately describes the services I performed. I attest to the accuracy  of the note.                   Clinical Impression:   Final diagnoses:  [E87.6] Hypokalemia      ED Disposition Condition    Observation                 Bennett San III, MD  11/13/22 4822

## 2022-11-13 NOTE — HPI
eTssa Enriquez is a 66 year old white female with a PMHx significant for colon cancer on chemotherapy, HLD, and thyroid disease presenting for diarrhea for 4 days. She follows Dr. Frank for her colon cancer. She reports she received her most recent infusion of chemotherapy on 11/9/22. Following the infusion, she began to have increased diarrhea. She states she normally has a degree of diarrhea after chemo infusion but the latest one has been more severe. She describes the bowel movements as black water. She reports 4 Bowel movements since sitting in ED. She states anything by mouth makes her have a bowel movement. She states she tried Lomotil, loperamide and peto bismul without any relief of diarrhea. She complains of anal irritation for frequent wiping following bowel movements.  She denies any bloody stools, abdominal pain, N/V, fever/chills, or palpitations. She denies any recent antibiotic use. ED workup significant for tachycardia and hypokalemia. CBC with mild leukocytosis. C diff panel and stool cultures ordered in ED. Plan to start patient on oral K+ replacement and IVFs. Patient to be admitted to hospital medicine observation for continued monitoring and treatment.

## 2022-11-13 NOTE — H&P
[facility] Emergency Dept  [facility] Medicine  History & Physical    Patient Name: Tessa Enriquez  MRN: [id number]  Patient Class: OP- Observation  Admission Date: 11/13/2022  Attending Physician: Linda Bellamy MD   Primary Care Provider: Karma Mcdermott MD         Patient information was obtained from patient and ER records.     Subjective:     Principal Problem:Chemotherapy-induced diarrhea    Chief Complaint:   Chief Complaint   Patient presents with    Diarrhea     Started Thursday after chemo  /  states black stools  started prior to pepto-bismal         HPI: Tessa Enriquez is a 66 year old white female with a PMHx significant for colon cancer on chemotherapy, HLD, and thyroid disease presenting for diarrhea for 4 days. She follows Dr. Frank for her colon cancer. She reports she received her most recent infusion of chemotherapy on 11/9/22. Following the infusion, she began to have increased diarrhea. She states she normally has a degree of diarrhea after chemo infusion but the latest one has been more severe. She describes the bowel movements as black water. She reports 4 Bowel movements since sitting in ED. She states anything by mouth makes her have a bowel movement. She states she tried Lomotil, loperamide and peto bismul without any relief of diarrhea. She complains of anal irritation for frequent wiping following bowel movements.  She denies any bloody stools, abdominal pain, N/V, fever/chills, or palpitations. She denies any recent antibiotic use. ED workup significant for tachycardia and hypokalemia. CBC with mild leukocytosis. C diff panel and stool cultures ordered in ED. Plan to start patient on oral K+ replacement and IVFs. Patient to be admitted to hospital medicine observation for continued monitoring and treatment.             Past Medical History:   Diagnosis Date    Acute hypoxemic respiratory failure 07/09/2018    Arthritis     Cancer     colon    COPD (chronic obstructive pulmonary  disease)     History of home oxygen therapy     ONLY PRN AND HASN'T USED IT IN A YEAR    Hyperlipidemia     Thyroid disease        Past Surgical History:   Procedure Laterality Date    CHOLECYSTECTOMY      COLECTOMY N/A 11/9/2020    Procedure: COLECTOMY;  Surgeon: Brandon Wagner MD;  Location: [facility] OR;  Service: General;  Laterality: N/A;  LOWER- ANTERIOR    COLONOSCOPY N/A 10/23/2020    Procedure: COLONOSCOPY;  Surgeon: Jolynn Ayala MD;  Location: [facility] ENDO;  Service: Endoscopy;  Laterality: N/A;    COLONOSCOPY N/A 8/6/2021    Procedure: COLONOSCOPY;  Surgeon: Jolynn Ayala MD;  Location: [facility] ENDO;  Service: Endoscopy;  Laterality: N/A;    COLONOSCOPY N/A 7/7/2022    Procedure: COLONOSCOPY;  Surgeon: Jolynn Ayala MD;  Location: [facility] ENDO;  Service: Endoscopy;  Laterality: N/A;    ESOPHAGEAL DILATION N/A 6/29/2021    Procedure: DILATION, ESOPHAGUS;  Surgeon: Sourav Baires III, MD;  Location: [facility];  Service: Endoscopy;  Laterality: N/A;    ESOPHAGOGASTRODUODENOSCOPY  06/29/2021    Dilation to 57 Fr    ESOPHAGOGASTRODUODENOSCOPY N/A 6/29/2021    Procedure: EGD (ESOPHAGOGASTRODUODENOSCOPY);  Surgeon: Sourav Baires III, MD;  Location: [facility];  Service: Endoscopy;  Laterality: N/A;    FLEXIBLE SIGMOIDOSCOPY N/A 8/1/2022    Procedure: SIGMOIDOSCOPY, FLEXIBLE;  Surgeon: Brandon Wagner MD;  Location: [facility] ENDO;  Service: Endoscopy;  Laterality: N/A;    HYSTERECTOMY      INSERTION OF TUNNELED CENTRAL VENOUS CATHETER (CVC) WITH SUBCUTANEOUS PORT N/A 1/11/2021    Procedure: [id number]-[id number];  Surgeon: Brandon Wagner MD;  Location: [facility] OR;  Service: General;  Laterality: N/A;    INSERTION OF TUNNELED CENTRAL VENOUS CATHETER (CVC) WITH SUBCUTANEOUS PORT Right 9/1/2022    Procedure: [id number]-CATH;  Surgeon: Brandon Wagner MD;  Location: [facility] OR;  Service: General;  Laterality: Right;  Wants rt side placement    MEDIPORT REMOVAL Left 4/19/2021     Procedure: REMOVAL, CATHETER, CENTRAL VENOUS, TUNNELED, WITH PORT;  Surgeon: Brandon Wagner MD;  Location: [facility] OR;  Service: General;  Laterality: Left;    ROBOT-ASSISTED COLECTOMY N/A 11/9/2020    Procedure: ROBOTIC COLECTOMY low anterior resection, possible open;  Surgeon: Brandon Wagner MD;  Location: [facility] OR;  Service: General;  Laterality: N/A;  CONVERTED TO OPEN AT 1503       Review of patient's allergies indicates:   Allergen Reactions    Ativan [lorazepam] Hives and Itching    Flagyl [metronidazole] Itching and Rash    Pcn [penicillins] Hives, Itching and Rash       No current facility-administered medications on file prior to encounter.     Current Outpatient Medications on File Prior to Encounter   Medication Sig    CMPD hydrocortisone 2%- LIDOcaine 3% suppository (RECTAL ROCKET) Place 1 suppository rectally every evening. Insert 1 suppository 3/4 of the way onto rectum. Wet for easier application. Repeat for 3 nights.    cyanocobalamin 1,000 mcg/mL injection Inject 1 mL (1,000 mcg total) into the muscle every 14 (fourteen) days.    doxycycline (VIBRAMYCIN) 100 MG Cap Take 1 capsule (100 mg total) by mouth 2 (two) times daily.    ferrous sulfate 325 (65 FE) MG EC tablet Take 325 mg by mouth 2 (two) times daily.    gabapentin (NEURONTIN) 300 MG capsule Take 1 capsule (300 mg total) by mouth every evening.    HYDROcodone-acetaminophen (NORCO) 5-325 mg per tablet Take 1 tablet by mouth every 6 (six) hours as needed for Pain.    levothyroxine (SYNTHROID) 125 MCG tablet Take 1 tablet (125 mcg total) by mouth before breakfast.    ondansetron (ZOFRAN-ODT) 8 MG TbDL Take 1 tablet (8 mg total) by mouth every 12 (twelve) hours as needed.    potassium chloride 10% (KAYCIEL) 20 mEq/15 mL oral solution Take 15 mLs (20 mEq total) by mouth 2 (two) times daily.    prochlorperazine (COMPAZINE) 10 MG tablet Take 1 tablet (10 mg total) by mouth every 6 (six) hours as needed.    traMADoL (ULTRAM) 50 mg  tablet Take 1 tablet (50 mg total) by mouth every 6 (six) hours as needed.    triamcinolone acetonide 0.025% (KENALOG) 0.025 % cream Apply topically once daily.     Family History       Problem Relation (Age of Onset)    Breast cancer Maternal Grandmother    COPD Mother, Father    Prostate cancer Brother          Tobacco Use    Smoking status: Former     Packs/day: 2.00     Years: 40.00     Pack years: 80.00     Types: Cigarettes     Quit date: 2017     Years since quittin.3    Smokeless tobacco: Never   Substance and Sexual Activity    Alcohol use: No    Drug use: No    Sexual activity: Not Currently     Partners: Male     Review of Systems   Constitutional:  Positive for appetite change and fatigue. Negative for chills and fever.   Respiratory:  Negative for cough, shortness of breath and wheezing.    Cardiovascular:  Negative for chest pain and palpitations.   Gastrointestinal:  Positive for diarrhea. Negative for abdominal distention, abdominal pain, blood in stool, nausea and vomiting.   Endocrine: Negative for polydipsia and polyuria.   Genitourinary:  Negative for difficulty urinating, dysuria and frequency.   Musculoskeletal:  Negative for arthralgias and myalgias.   Skin:  Negative for color change.   Allergic/Immunologic: Positive for immunocompromised state.   Neurological:  Negative for dizziness, light-headedness and headaches.   Hematological:  Positive for adenopathy.   Psychiatric/Behavioral:  Negative for behavioral problems and confusion.    Objective:     Vital Signs (Most Recent):  Temp: 98.2 °F (36.8 °C) (22 1237)  Pulse: 103 (22 1355)  Resp: 18 (22 1237)  BP: (!) 159/72 (22 1355)  SpO2: 100 % (22 1355)   Vital Signs (24h Range):  Temp:  [98.2 °F (36.8 °C)] 98.2 °F (36.8 °C)  Pulse:  [103-114] 103  Resp:  [18] 18  SpO2:  [99 %-100 %] 100 %  BP: (142-159)/(63-74) 159/72     Weight: 83 kg (183 lb)  Body mass index is 35.74 kg/m².    Physical Exam  Vitals  and nursing note reviewed.   Constitutional:       General: She is not in acute distress.     Appearance: She is obese. She is ill-appearing.   HENT:      Head: Normocephalic and atraumatic.      Right Ear: External ear normal.      Left Ear: External ear normal.      Mouth/Throat:      Mouth: Mucous membranes are dry.   Cardiovascular:      Rate and Rhythm: Regular rhythm. Tachycardia present.      Pulses: Normal pulses.      Heart sounds: Normal heart sounds. No murmur heard.    No gallop.   Pulmonary:      Effort: Pulmonary effort is normal. No respiratory distress.      Breath sounds: Normal breath sounds. No wheezing or rales.   Abdominal:      General: Abdomen is flat. There is no distension.      Palpations: Abdomen is soft.      Tenderness: There is no abdominal tenderness.      Comments: Inguinal adenopathy noted   Genitourinary:     Comments: Erythema confluent surrounding anus   Musculoskeletal:         General: No swelling or tenderness. Normal range of motion.   Skin:     General: Skin is warm.      Coloration: Skin is not jaundiced or pale.   Neurological:      General: No focal deficit present.      Mental Status: She is alert and oriented to person, place, and time.      Cranial Nerves: No cranial nerve deficit.      Motor: No weakness.   Psychiatric:         Mood and Affect: Mood normal.           Significant Labs: All pertinent labs within the past 24 hours have been reviewed.  CBC:   Recent Labs   Lab 11/13/22  1309   WBC 13.01*   HGB 13.6   HCT 40.4        CMP:   Recent Labs   Lab 11/13/22  1309      K 3.1*      CO2 20*   *   BUN 10   CREATININE 0.7   CALCIUM 9.2   PROT 7.1   ALBUMIN 3.8   BILITOT 0.8   ALKPHOS 160*   AST 11   ALT 11   ANIONGAP 14       Significant Imaging: I have reviewed all pertinent imaging results/findings within the past 24 hours.    Assessment/Plan:     * Chemotherapy-induced diarrhea  Acute, following Chemo infusion  IVF rehydration  Stool  culture pending  C diff rule out pending  PRN antidiarrheal agents    Dehydration  IVF hydration  Monitor electrolytes  Secondary to diarrhea       Severe obesity (BMI 35.0-39.9) with comorbidity  Body mass index is 35.74 kg/m². Morbid obesity complicates all aspects of disease management from diagnostic modalities to treatment. Weight loss encouraged and health benefits explained to patient.         Malignant neoplasm of colon  Noted  On chemotherapy  Follows Dr. Frank for Hem/Onc        Rectal cancer  History noted       Acquired hypothyroidism  Patient has chronic hypothyroidism. TFTs reviewed-   Lab Results   Component Value Date    TSH 9.523 (H) 05/02/2022   . Will continue chronic levothyroxine and adjust for and clinical changes.        COPD (chronic obstructive pulmonary disease)  Patient's COPD is controlled currently.  Patient is currently off COPD Pathway. Stable and continue to monitor respiratory status closely.           VTE Risk Mitigation (From admission, onward)    None             Keven Yao PA-C  Department of Hospital Medicine   [facility] - Emergency Dept

## 2022-11-13 NOTE — SUBJECTIVE & OBJECTIVE
Past Medical History:   Diagnosis Date    Acute hypoxemic respiratory failure 07/09/2018    Arthritis     Cancer     colon    COPD (chronic obstructive pulmonary disease)     History of home oxygen therapy     ONLY PRN AND HASN'T USED IT IN A YEAR    Hyperlipidemia     Thyroid disease        Past Surgical History:   Procedure Laterality Date    CHOLECYSTECTOMY      COLECTOMY N/A 11/9/2020    Procedure: COLECTOMY;  Surgeon: Brandon Wagner MD;  Location: [facility];  Service: General;  Laterality: N/A;  LOWER- ANTERIOR    COLONOSCOPY N/A 10/23/2020    Procedure: COLONOSCOPY;  Surgeon: Jolynn Ayala MD;  Location: [facility] ENDO;  Service: Endoscopy;  Laterality: N/A;    COLONOSCOPY N/A 8/6/2021    Procedure: COLONOSCOPY;  Surgeon: Jolynn Ayala MD;  Location: [facility] ENDO;  Service: Endoscopy;  Laterality: N/A;    COLONOSCOPY N/A 7/7/2022    Procedure: COLONOSCOPY;  Surgeon: Jolynn Ayala MD;  Location: [facility] ENDO;  Service: Endoscopy;  Laterality: N/A;    ESOPHAGEAL DILATION N/A 6/29/2021    Procedure: DILATION, ESOPHAGUS;  Surgeon: Sourav Baires III, MD;  Location: [facility];  Service: Endoscopy;  Laterality: N/A;    ESOPHAGOGASTRODUODENOSCOPY  06/29/2021    Dilation to 57 Fr    ESOPHAGOGASTRODUODENOSCOPY N/A 6/29/2021    Procedure: EGD (ESOPHAGOGASTRODUODENOSCOPY);  Surgeon: Sourav Baires III, MD;  Location: [facility];  Service: Endoscopy;  Laterality: N/A;    FLEXIBLE SIGMOIDOSCOPY N/A 8/1/2022    Procedure: SIGMOIDOSCOPY, FLEXIBLE;  Surgeon: Brandon Wagner MD;  Location: [facility] ENDO;  Service: Endoscopy;  Laterality: N/A;    HYSTERECTOMY      INSERTION OF TUNNELED CENTRAL VENOUS CATHETER (CVC) WITH SUBCUTANEOUS PORT N/A 1/11/2021    Procedure: [id number];  Surgeon: Brandon Wagner MD;  Location: [facility];  Service: General;  Laterality: N/A;    INSERTION OF TUNNELED CENTRAL VENOUS CATHETER (CVC) WITH SUBCUTANEOUS PORT Right 9/1/2022    Procedure: [id number];  Surgeon: Brandon  LONG Wagner MD;  Location: [facility] OR;  Service: General;  Laterality: Right;  Wants rt side placement    MEDIPORT REMOVAL Left 4/19/2021    Procedure: REMOVAL, CATHETER, CENTRAL VENOUS, TUNNELED, WITH PORT;  Surgeon: Brandon Wagner MD;  Location: [facility] OR;  Service: General;  Laterality: Left;    ROBOT-ASSISTED COLECTOMY N/A 11/9/2020    Procedure: ROBOTIC COLECTOMY low anterior resection, possible open;  Surgeon: Brandon Wagner MD;  Location: [facility] OR;  Service: General;  Laterality: N/A;  CONVERTED TO OPEN AT 1503       Review of patient's allergies indicates:   Allergen Reactions    Ativan [lorazepam] Hives and Itching    Flagyl [metronidazole] Itching and Rash    Pcn [penicillins] Hives, Itching and Rash       No current facility-administered medications on file prior to encounter.     Current Outpatient Medications on File Prior to Encounter   Medication Sig    CMPD hydrocortisone 2%- LIDOcaine 3% suppository (RECTAL ROCKET) Place 1 suppository rectally every evening. Insert 1 suppository 3/4 of the way onto rectum. Wet for easier application. Repeat for 3 nights.    cyanocobalamin 1,000 mcg/mL injection Inject 1 mL (1,000 mcg total) into the muscle every 14 (fourteen) days.    doxycycline (VIBRAMYCIN) 100 MG Cap Take 1 capsule (100 mg total) by mouth 2 (two) times daily.    ferrous sulfate 325 (65 FE) MG EC tablet Take 325 mg by mouth 2 (two) times daily.    gabapentin (NEURONTIN) 300 MG capsule Take 1 capsule (300 mg total) by mouth every evening.    HYDROcodone-acetaminophen (NORCO) 5-325 mg per tablet Take 1 tablet by mouth every 6 (six) hours as needed for Pain.    levothyroxine (SYNTHROID) 125 MCG tablet Take 1 tablet (125 mcg total) by mouth before breakfast.    ondansetron (ZOFRAN-ODT) 8 MG TbDL Take 1 tablet (8 mg total) by mouth every 12 (twelve) hours as needed.    potassium chloride 10% (KAYCIEL) 20 mEq/15 mL oral solution Take 15 mLs (20 mEq total) by mouth 2 (two) times daily.    prochlorperazine  (COMPAZINE) 10 MG tablet Take 1 tablet (10 mg total) by mouth every 6 (six) hours as needed.    traMADoL (ULTRAM) 50 mg tablet Take 1 tablet (50 mg total) by mouth every 6 (six) hours as needed.    triamcinolone acetonide 0.025% (KENALOG) 0.025 % cream Apply topically once daily.     Family History       Problem Relation (Age of Onset)    Breast cancer Maternal Grandmother    COPD Mother, Father    Prostate cancer Brother          Tobacco Use    Smoking status: Former     Packs/day: 2.00     Years: 40.00     Pack years: 80.00     Types: Cigarettes     Quit date: 2017     Years since quittin.3    Smokeless tobacco: Never   Substance and Sexual Activity    Alcohol use: No    Drug use: No    Sexual activity: Not Currently     Partners: Male     Review of Systems   Constitutional:  Positive for appetite change and fatigue. Negative for chills and fever.   Respiratory:  Negative for cough, shortness of breath and wheezing.    Cardiovascular:  Negative for chest pain and palpitations.   Gastrointestinal:  Positive for diarrhea. Negative for abdominal distention, abdominal pain, blood in stool, nausea and vomiting.   Endocrine: Negative for polydipsia and polyuria.   Genitourinary:  Negative for difficulty urinating, dysuria and frequency.   Musculoskeletal:  Negative for arthralgias and myalgias.   Skin:  Negative for color change.   Allergic/Immunologic: Positive for immunocompromised state.   Neurological:  Negative for dizziness, light-headedness and headaches.   Hematological:  Positive for adenopathy.   Psychiatric/Behavioral:  Negative for behavioral problems and confusion.    Objective:     Vital Signs (Most Recent):  Temp: 98.2 °F (36.8 °C) (22 1237)  Pulse: 103 (22 1355)  Resp: 18 (22 1237)  BP: (!) 159/72 (22 1355)  SpO2: 100 % (22 1355)   Vital Signs (24h Range):  Temp:  [98.2 °F (36.8 °C)] 98.2 °F (36.8 °C)  Pulse:  [103-114] 103  Resp:  [18] 18  SpO2:  [99 %-100 %] 100  %  BP: (142-159)/(63-74) 159/72     Weight: 83 kg (183 lb)  Body mass index is 35.74 kg/m².    Physical Exam  Vitals and nursing note reviewed.   Constitutional:       General: She is not in acute distress.     Appearance: She is obese. She is ill-appearing.   HENT:      Head: Normocephalic and atraumatic.      Right Ear: External ear normal.      Left Ear: External ear normal.      Mouth/Throat:      Mouth: Mucous membranes are dry.   Cardiovascular:      Rate and Rhythm: Regular rhythm. Tachycardia present.      Pulses: Normal pulses.      Heart sounds: Normal heart sounds. No murmur heard.    No gallop.   Pulmonary:      Effort: Pulmonary effort is normal. No respiratory distress.      Breath sounds: Normal breath sounds. No wheezing or rales.   Abdominal:      General: Abdomen is flat. There is no distension.      Palpations: Abdomen is soft.      Tenderness: There is no abdominal tenderness.      Comments: Inguinal adenopathy noted   Genitourinary:     Comments: Erythema confluent surrounding anus   Musculoskeletal:         General: No swelling or tenderness. Normal range of motion.   Skin:     General: Skin is warm.      Coloration: Skin is not jaundiced or pale.   Neurological:      General: No focal deficit present.      Mental Status: She is alert and oriented to person, place, and time.      Cranial Nerves: No cranial nerve deficit.      Motor: No weakness.   Psychiatric:         Mood and Affect: Mood normal.           Significant Labs: All pertinent labs within the past 24 hours have been reviewed.  CBC:   Recent Labs   Lab 11/13/22  1309   WBC 13.01*   HGB 13.6   HCT 40.4        CMP:   Recent Labs   Lab 11/13/22  1309      K 3.1*      CO2 20*   *   BUN 10   CREATININE 0.7   CALCIUM 9.2   PROT 7.1   ALBUMIN 3.8   BILITOT 0.8   ALKPHOS 160*   AST 11   ALT 11   ANIONGAP 14       Significant Imaging: I have reviewed all pertinent imaging results/findings within the past 24 hours.

## 2022-11-13 NOTE — ASSESSMENT & PLAN NOTE
Patient's COPD is controlled currently.  Patient is currently off COPD Pathway. Stable and continue to monitor respiratory status closely.

## 2022-11-13 NOTE — ASSESSMENT & PLAN NOTE
Patient has chronic hypothyroidism. TFTs reviewed-   Lab Results   Component Value Date    TSH 9.523 (H) 05/02/2022   . Will continue chronic levothyroxine and adjust for and clinical changes.

## 2022-11-14 NOTE — TELEPHONE ENCOUNTER
Pt is staying overnight. NP Ziggybritton will come see her again tomorrow.    ----- Message from Dick Henderson LPN sent at 11/14/2022  3:42 PM CST -----  Regarding: hospital follow up  Good afternoon. This pt has an appt scheduled in 2 weeks. She is discharging today and needs to be seen in 1 week instead. Please and thank you!

## 2022-11-14 NOTE — PLAN OF CARE
Pt has PCP follow up scheduled.  In basket message sent to Dr Frank's staff to move up her next follow up appt.    Pt clear for DC from CM standpoint. Discharging to home       11/14/22 1547   Final Note   Assessment Type Final Discharge Note   Anticipated Discharge Disposition Home

## 2022-11-14 NOTE — PLAN OF CARE
Problem: Adult Inpatient Plan of Care  Goal: Plan of Care Review  Outcome: Ongoing, Progressing     Problem: Electrolyte Imbalance  Goal: Electrolyte Balance  Outcome: Ongoing, Progressing

## 2022-11-14 NOTE — CONSULTS
[facility]  Adult Nutrition  Consult Note    SUMMARY     Recommendations  1) Continue regular diet avoid spicy/greasy/fried/sugary foods and drinks   2) Add banana 2 x daily per pt preference   3) weigh weekly   4) Consider soluble fiber supplement and/or probiotic   5) nutrition education and handout given    Goals: 1) PO intakes > 50% of meals at f/u  Nutrition Goal Status: new  Communication of RD Recs:  (POC, sticky note)    Assessment and Plan    Inadequate energy intake  R/t diarrhea, decreased appetite, pain  As evidenced by PO intakes < 50% of needs x >5 days   Intervention: nutrition education and general healthful diet  new    Malnutrition Assessment     Skin (Micronutrient):  (Jalil = 19)   Micronutrient Evaluation: suspected deficiency  Micronutrient Evaluation Comments: check iron   Energy Intake (Malnutrition): less than 75% for greater than 7 days           Edema (Fluid Accumulation): 0-->no edema present   Subcutaneous Fat Loss (Final Summary): well nourished  Muscle Loss Evaluation (Final Summary): well nourished         Reason for Assessment    Reason For Assessment: consult  Diagnosis:  (chemotherapy induced diarrhea)  Relevant Medical History: colon CA, HLD, thyroid disease  Interdisciplinary Rounds: did not attend    General Information Comments: 65 y/o female admitted with chemo induced diarrhea x 5 days, has not eaten solid food x 6 days. Also with chemo blister, she says, in rectal area. No medication, fiber supplement or dietary change has made a difference. I suggested pt add foods high in soluble fiber to her diet at this time and I gave her tips to manage diarrhea. NFPE WDL 11/14/22. No significant wt loss per chart review ( pt's  lb in 2020 prior to CA treatment).    Nutrition Discharge Planning: To be determined- regular diet ( avoid acidic/spicy/greasy foods and sugary foods/beverages)    Nutrition Risk Screen    Nutrition Risk Screen: no indicators  present    Nutrition/Diet History    Patient Reported Diet/Restrictions/Preferences: general  Spiritual, Cultural Beliefs, Bahai Practices, Values that Affect Care: no  Food Allergies: NKFA  Factors Affecting Nutritional Intake: diarrhea    Anthropometrics    Temp: 98.2 °F (36.8 °C)  Height Method: Measured (22)  Height: 5'  Height (inches): 60 in  Weight Method: Bed Scale  Weight: 83 kg (182 lb 15.7 oz)  Weight (lb): 182.98 lb  Ideal Body Weight (IBW), Female: 100 lb  % Ideal Body Weight, Female (lb): 182.98 %  BMI (Calculated): 35.7  BMI Grade: 35 - 39.9 - obesity - grade II  Usual Body Weight (UBW), k.3 kg (office 22)  % Usual Body Weight: 97.51  % Weight Change From Usual Weight: -2.7 %       Lab/Procedures/Meds    Pertinent Labs Reviewed: reviewed  BMP  Lab Results   Component Value Date     2022    K 3.8 2022     2022    CO2 23 2022    BUN 5 (L) 2022    CREATININE 0.6 2022    CALCIUM 8.2 (L) 2022    ANIONGAP 5 (L) 2022    EGFRNORACEVR >60 2022     Lab Results   Component Value Date    ALBUMIN 2.9 (L) 2022       Pertinent Medications Reviewed: reviewed  Pertinent Medications Comments: iron, KCl, NS @ 125 ml/hr, zofran, Kbicarb, KNaPhos    Estimated/Assessed Needs    Weight Used For Calorie Calculations: 83 kg (182 lb 15.7 oz)  Energy Calorie Requirements (kcal): MSJ ( no AF obesity) = 1300 kcal  Energy Need Method: Wright-[place] Fagan  Protein Requirements: 0.8-1 g protein/kg ( 66-83 g)  Weight Used For Protein Calculations: 83 kg (182 lb 15.7 oz)  Fluid Requirements (mL): 3950-2088 ml or per MD  Estimated Fluid Requirement Method: RDA Method  CHO Requirement: N/A      Nutrition Prescription Ordered    Current Diet Order: regular diet    Evaluation of Received Nutrient/Fluid Intake    Energy Calories Required: not meeting needs  Protein Required: not meeting needs  Fluid Required: meeting needs  Tolerance: tolerating (drank  juice/water and ate 100% of her Jell-O this morning)     Intake/Output Summary (Last 24 hours) at 11/14/2022 1252  Last data filed at 11/14/2022 0547  Gross per 24 hour   Intake 2313.75 ml   Output --   Net 2313.75 ml   IVF @ 125 ml/hr    % Intake of Estimated Energy Needs: 15%   % Meal Intake: 50% clears    Nutrition Risk    Level of Risk/Frequency of Follow-up: moderate 2 x weekly      Monitor and Evaluation    Food and Nutrient Intake: energy intake, food and beverage intake  Food and Nutrient Adminstration: diet order  Anthropometric Measurements: weight  Biochemical Data, Medical Tests and Procedures: electrolyte and renal panel, gastrointestinal profile, glucose/endocrine profile  Nutrition-Focused Physical Findings: overall appearance, skin       Nutrition Follow-Up    RD Follow-up?: Yes

## 2022-11-14 NOTE — NURSING
Patient with incontinence associated skin damage to perineum and inner buttocks. Patient reports she has been having a problem with this due to diarrhea induced by her chemo as stated by the patient. Cleaned patient who was incontinent of bowel and patient has no control of her stool at this time she only feels the stool as it is coming out. Patient is able to turn and reposition herself in the bed. Applied [employer] and educated the patient regarding wound care to these areas.

## 2022-11-14 NOTE — PLAN OF CARE
Recommendations  1) Continue regular diet avoid spicy/greasy/fried/sugary foods and drinks   2) Add banana 2 x daily per pt preference   3) weigh weekly   4) Consider soluble fiber supplement and/or probiotic   5) nutrition education and handout given    Goals: 1) PO intakes > 50% of meals at f/u  Nutrition Goal Status: new  Communication of RD Recs:  (POC, sticky note)

## 2022-11-14 NOTE — TELEPHONE ENCOUNTER
Pt states that MD at hospital is inquiring if she can have rectal tube placed. Per Dr Frank, this is okay. Pt aware and will let hospitalist know.

## 2022-11-14 NOTE — TELEPHONE ENCOUNTER
Returned call as requested. Pt states that she is currently in the hospital and having horrible diarrhea still. She would like to cancel scheduled appt and keep the appt that was already made for 11/28. Pt advised to call clinic if she would like to be seen sooner. Pt states she would like to stay in the hospital another night if possible bc she feels it is helping her. This nurse will make Dr Frank aware of all.      ----- Message from Radha Morales sent at 11/14/2022  9:06 AM CST -----  Contact: pt  Type: Needs Medical Advice  Who Called:  pt   Best Call Back Number: [phone]    Additional Information: pt states she is in the hospital and can not make appt for 11am would like to speak to someone in staff. Please advise.

## 2022-11-14 NOTE — PROGRESS NOTES
[facility] Hospital Medicine  Progress Note    Patient Name: Tessa Enriquez  MRN: [id number]  Patient Class: OP- Observation   Admission Date: 11/13/2022  Length of Stay: 0 days  Attending Physician: Linda Bellamy MD  Primary Care Provider: Karma Mcdermott MD        Subjective:     Principal Problem:Chemotherapy-induced diarrhea        HPI:  Tessa Enriquez is a 66 year old white female with a PMHx significant for colon cancer on chemotherapy, HLD, and thyroid disease presenting for diarrhea for 4 days. She follows Dr. Frank for her colon cancer. She reports she received her most recent infusion of chemotherapy on 11/9/22. Following the infusion, she began to have increased diarrhea. She states she normally has a degree of diarrhea after chemo infusion but the latest one has been more severe. She describes the bowel movements as black water. She reports 4 Bowel movements since sitting in ED. She states anything by mouth makes her have a bowel movement. She states she tried Lomotil, loperamide and peto bismul without any relief of diarrhea. She complains of anal irritation for frequent wiping following bowel movements.  She denies any bloody stools, abdominal pain, N/V, fever/chills, or palpitations. She denies any recent antibiotic use. ED workup significant for tachycardia and hypokalemia. CBC with mild leukocytosis. C diff panel and stool cultures ordered in ED. Plan to start patient on oral K+ replacement and IVFs. Patient to be admitted to hospital medicine observation for continued monitoring and treatment.             Overview/Hospital Course:  No notes on file    Interval History: Notes reviewed, no acute events overnight. Patient states she feels rough this morning. Complains of continued diarrhea without much improvement. She denies any N/V, abdominal pain, fever/chills, or SOB/chest pain. Tachycardia much improved following IV fluids. K+ improved following oral replacement. Stool  culture pending. Nurse reports 2 small loose bowel movement this morning. Patient wishing to stay another night for diarrhea. Informed patient diarrhea not likely to improve much overnight. Patient wished to see her Oncologist Dr. Frank while in hospital since she missed her appointment. Spoke to Dr. Frank who is unable to round today but will have one of her partners come and see patient. Plan to advance diet to regular. Anticapate DC later this evening once tolerating diet.     Review of Systems   Constitutional:  Positive for fatigue. Negative for chills and fever.   Respiratory:  Negative for cough, shortness of breath and wheezing.    Cardiovascular:  Negative for chest pain and palpitations.   Gastrointestinal:  Positive for diarrhea. Negative for abdominal distention, abdominal pain, blood in stool and nausea.   Genitourinary:  Negative for difficulty urinating, dysuria, flank pain and hematuria.   Musculoskeletal:  Negative for arthralgias and myalgias.   Skin:  Positive for rash.   Allergic/Immunologic: Positive for immunocompromised state.   Neurological:  Negative for facial asymmetry, speech difficulty and numbness.   Hematological:  Positive for adenopathy.   Psychiatric/Behavioral:  Negative for behavioral problems and confusion.    All other systems reviewed and are negative.  Objective:     Vital Signs (Most Recent):  Temp: 98.2 °F (36.8 °C) (11/14/22 0737)  Pulse: 81 (11/14/22 0737)  Resp: 16 (11/14/22 0954)  BP: (!) 118/56 (11/14/22 0737)  SpO2: 97 % (11/14/22 0737)   Vital Signs (24h Range):  Temp:  [96.6 °F (35.9 °C)-98.2 °F (36.8 °C)] 98.2 °F (36.8 °C)  Pulse:  [] 81  Resp:  [16-18] 16  SpO2:  [97 %-100 %] 97 %  BP: (112-159)/(56-74) 118/56     Weight: 83 kg (183 lb)  Body mass index is 35.74 kg/m².    Intake/Output Summary (Last 24 hours) at 11/14/2022 1242  Last data filed at 11/14/2022 0547  Gross per 24 hour   Intake 2313.75 ml   Output --   Net 2313.75 ml      Physical Exam  Vitals  and nursing note reviewed.   Constitutional:       General: She is not in acute distress.     Appearance: Normal appearance. She is obese. She is ill-appearing.   HENT:      Head: Normocephalic and atraumatic.      Right Ear: External ear normal.      Left Ear: External ear normal.   Cardiovascular:      Rate and Rhythm: Normal rate and regular rhythm.      Pulses: Normal pulses.      Heart sounds: No murmur heard.    No gallop.   Pulmonary:      Effort: Pulmonary effort is normal. No respiratory distress.      Breath sounds: Normal breath sounds. No wheezing or rales.   Abdominal:      General: There is no distension.      Palpations: Abdomen is soft.      Tenderness: There is no abdominal tenderness.      Comments: Inguinal adenopathy noted   Musculoskeletal:         General: No swelling or tenderness.   Skin:     General: Skin is warm.      Coloration: Skin is not jaundiced.      Findings: No bruising.   Neurological:      General: No focal deficit present.      Mental Status: She is alert and oriented to person, place, and time.      Cranial Nerves: No cranial nerve deficit.      Motor: No weakness.   Psychiatric:         Mood and Affect: Mood normal.       Significant Labs: All pertinent labs within the past 24 hours have been reviewed.  CBC:   Recent Labs   Lab 11/13/22  1309 11/14/22  0536   WBC 13.01* 5.42   HGB 13.6 10.4*   HCT 40.4 32.3*    155     CMP:   Recent Labs   Lab 11/13/22  1309 11/14/22  0536    138   K 3.1* 3.8    110   CO2 20* 23   * 96   BUN 10 5*   CREATININE 0.7 0.6   CALCIUM 9.2 8.2*   PROT 7.1 5.3*   ALBUMIN 3.8 2.9*   BILITOT 0.8 0.4   ALKPHOS 160* 100   AST 11 11   ALT 11 9*   ANIONGAP 14 5*       Significant Imaging: I have reviewed all pertinent imaging results/findings within the past 24 hours.      Assessment/Plan:      * Chemotherapy-induced diarrhea  Acute, following Chemo infusion  IVF rehydration  Stool culture pending  C diff negative   PRN  antidiarrheal agents    Dehydration  IVF hydration  Monitor electrolytes  Secondary to diarrhea   Improved    Severe obesity (BMI 35.0-39.9) with comorbidity  Body mass index is 35.74 kg/m². Morbid obesity complicates all aspects of disease management from diagnostic modalities to treatment. Weight loss encouraged and health benefits explained to patient.         Malignant neoplasm of colon  Noted  On chemotherapy  Follows Dr. Frank for Hem/Onc  Hem/onc consulted       Rectal cancer  History noted       Acquired hypothyroidism  Patient has chronic hypothyroidism. TFTs reviewed-   Lab Results   Component Value Date    TSH 9.523 (H) 05/02/2022   . Will continue chronic levothyroxine and adjust for and clinical changes.        COPD (chronic obstructive pulmonary disease)  Patient's COPD is controlled currently.  Patient is currently off COPD Pathway. Stable and continue to monitor respiratory status closely.           VTE Risk Mitigation (From admission, onward)         Ordered     enoxaparin injection 40 mg  Daily         11/13/22 1642     IP VTE HIGH RISK PATIENT  Once         11/13/22 1642     Place sequential compression device  Until discontinued         11/13/22 1642                Discharge Planning   DERICK: 11/14/2022     Code Status: Full Code   Is the patient medically ready for discharge?:     Reason for patient still in hospital (select all that apply): Patient trending condition and Consult recommendations  Discharge Plan A: Home with family                  Keven Yao PA-C  Department of Hospital Medicine   [facility]

## 2022-11-14 NOTE — ASSESSMENT & PLAN NOTE
Acute, following Chemo infusion  IVF rehydration  Stool culture pending  C diff negative   PRN antidiarrheal agents

## 2022-11-14 NOTE — SUBJECTIVE & OBJECTIVE
Interval History: Notes reviewed, no acute events overnight. Patient states she feels rough this morning. Complains of continued diarrhea without much improvement. She denies any N/V, abdominal pain, fever/chills, or SOB/chest pain. Tachycardia much improved following IV fluids. K+ improved following oral replacement. Stool culture pending. Nurse reports 2 small loose bowel movement this morning. Patient wishing to stay another night for diarrhea. Informed patient diarrhea not likely to improve much overnight. Patient wished to see her Oncologist Dr. Frank while in hospital since she missed her appointment. Spoke to Dr. Frank who is unable to round today but will have one of her partners come and see patient. Plan to advance diet to regular. Anticapate DC later this evening once tolerating diet.     Review of Systems   Constitutional:  Positive for fatigue. Negative for chills and fever.   Respiratory:  Negative for cough, shortness of breath and wheezing.    Cardiovascular:  Negative for chest pain and palpitations.   Gastrointestinal:  Positive for diarrhea. Negative for abdominal distention, abdominal pain, blood in stool and nausea.   Genitourinary:  Negative for difficulty urinating, dysuria, flank pain and hematuria.   Musculoskeletal:  Negative for arthralgias and myalgias.   Skin:  Positive for rash.   Allergic/Immunologic: Positive for immunocompromised state.   Neurological:  Negative for facial asymmetry, speech difficulty and numbness.   Hematological:  Positive for adenopathy.   Psychiatric/Behavioral:  Negative for behavioral problems and confusion.    All other systems reviewed and are negative.  Objective:     Vital Signs (Most Recent):  Temp: 98.2 °F (36.8 °C) (11/14/22 0737)  Pulse: 81 (11/14/22 0737)  Resp: 16 (11/14/22 0954)  BP: (!) 118/56 (11/14/22 0737)  SpO2: 97 % (11/14/22 0737)   Vital Signs (24h Range):  Temp:  [96.6 °F (35.9 °C)-98.2 °F (36.8 °C)] 98.2 °F (36.8 °C)  Pulse:  []  81  Resp:  [16-18] 16  SpO2:  [97 %-100 %] 97 %  BP: (112-159)/(56-74) 118/56     Weight: 83 kg (183 lb)  Body mass index is 35.74 kg/m².    Intake/Output Summary (Last 24 hours) at 11/14/2022 1157  Last data filed at 11/14/2022 0547  Gross per 24 hour   Intake 2313.75 ml   Output --   Net 2313.75 ml      Physical Exam  Vitals and nursing note reviewed.   Constitutional:       General: She is not in acute distress.     Appearance: Normal appearance. She is obese. She is ill-appearing.   HENT:      Head: Normocephalic and atraumatic.      Right Ear: External ear normal.      Left Ear: External ear normal.   Cardiovascular:      Rate and Rhythm: Normal rate and regular rhythm.      Pulses: Normal pulses.      Heart sounds: No murmur heard.    No gallop.   Pulmonary:      Effort: Pulmonary effort is normal. No respiratory distress.      Breath sounds: Normal breath sounds. No wheezing or rales.   Abdominal:      General: There is no distension.      Palpations: Abdomen is soft.      Tenderness: There is no abdominal tenderness.      Comments: Inguinal adenopathy noted   Musculoskeletal:         General: No swelling or tenderness.   Skin:     General: Skin is warm.      Coloration: Skin is not jaundiced.      Findings: No bruising.   Neurological:      General: No focal deficit present.      Mental Status: She is alert and oriented to person, place, and time.      Cranial Nerves: No cranial nerve deficit.      Motor: No weakness.   Psychiatric:         Mood and Affect: Mood normal.       Significant Labs: All pertinent labs within the past 24 hours have been reviewed.  CBC:   Recent Labs   Lab 11/13/22  1309 11/14/22  0536   WBC 13.01* 5.42   HGB 13.6 10.4*   HCT 40.4 32.3*    155     CMP:   Recent Labs   Lab 11/13/22  1309 11/14/22  0536    138   K 3.1* 3.8    110   CO2 20* 23   * 96   BUN 10 5*   CREATININE 0.7 0.6   CALCIUM 9.2 8.2*   PROT 7.1 5.3*   ALBUMIN 3.8 2.9*   BILITOT 0.8 0.4    ALKPHOS 160* 100   AST 11 11   ALT 11 9*   ANIONGAP 14 5*       Significant Imaging: I have reviewed all pertinent imaging results/findings within the past 24 hours.

## 2022-11-14 NOTE — DISCHARGE INSTRUCTIONS
Wound care to perineum and inner buttocks clean with soap and water and dry.  Apply triad hydrophilic wound dressing ointment to affected areas twice a day and as needed.

## 2022-11-14 NOTE — PLAN OF CARE
[facility]  Initial Discharge Assessment       Primary Care Provider: Karma Mcdermott MD    Admission Diagnosis: Hypokalemia [E87.6]    Admission Date: 11/13/2022  Expected Discharge Date: 11/14/2022    Discharge Barriers Identified: None    DC assessment completed with pt at bedside. Pt confirms demographics are current. Pt lives at listed address with a roommate. Her son lives next door. Pt is independent with ADLs and has no DME. PCP Dr Mcdermott and [facility]. Denies HD/DM/coumadin. Denies POA/LW. Denies recent admission. Son to give ride home. No new needs anticipated at DC. CM following.      Payor: [employer] / Plan: BLUE CONNECT / Product Type: HMO /     Extended Emergency Contact Information  Primary Emergency Contact: Umu Enriquez   [facility]  Home Phone: [phone]  Mobile Phone: [phone]  Relation: Relative   needed? No    Discharge Plan A: Home with family  Discharge Plan B: Home      [id number] - [place] - [address]  [address] [address]  Phone: [phone] Fax: [phone]      Initial Assessment (most recent)       Adult Discharge Assessment - 11/14/22 1058          Discharge Assessment    Assessment Type Discharge Planning Assessment     Confirmed/corrected address, phone number and insurance Yes     Confirmed Demographics Correct on Facesheet     Source of Information patient     Does patient/caregiver understand observation status Yes     Lives With friend(s)     Do you expect to return to your current living situation? Yes     Do you have help at home or someone to help you manage your care at home? Yes     Prior to hospitilization cognitive status: Alert/Oriented     Current cognitive status: Alert/Oriented     Walking or Climbing Stairs Difficulty none     Dressing/Bathing Difficulty none     Equipment Currently Used at Home none     Readmission within 30  days? No     Patient currently being followed by outpatient case management? No     Do you currently have service(s) that help you manage your care at home? No     Do you take prescription medications? Yes     Do you have prescription coverage? Yes     Do you have any problems affording any of your prescribed medications? No     Is the patient taking medications as prescribed? yes     How do you get to doctors appointments? family or friend will provide     Are you on dialysis? No     Do you take coumadin? No     Discharge Plan A Home with family     Discharge Plan B Home     DME Needed Upon Discharge  none     Discharge Plan discussed with: Patient     Discharge Barriers Identified None

## 2022-11-15 NOTE — PROGRESS NOTES
[facility]  Hematology/Oncology  Consult Note    Patient Name: Tessa Enriquez  MRN: [id number]  Admission Date: 11/13/2022  Hospital Length of Stay: 0 days  Code Status: Full Code   Attending Provider: Linda Bellamy MD  Consulting Provider: Harmony Badillo NP  Primary Care Physician: Karma Mcdermott MD  Principal Problem:Chemotherapy-induced diarrhea    Consults  Subjective:     HPI: Tessa Enriquez is a 66 year old white female with a PMHx significant for colon cancer on chemotherapy, HLD, and thyroid disease presenting for diarrhea for 4 days. She follows Dr. Frank for her colon cancer. She reports she received her most recent infusion of chemotherapy on 11/9/22. Following the infusion, she began to have increased diarrhea. She states she normally has a degree of diarrhea after chemo infusion but the latest one has been more severe. She describes the bowel movements as black water. She reports 4 Bowel movements since sitting in ED. She states anything by mouth makes her have a bowel movement. She states she tried Lomotil, loperamide and peto bismul without any relief of diarrhea. She complains of anal irritation for frequent wiping following bowel movements.  She denies any bloody stools, abdominal pain, N/V, fever/chills, or palpitations. She denies any recent antibiotic use. ED workup significant for tachycardia and hypokalemia. CBC with mild leukocytosis. C diff panel and stool cultures ordered in ED. Plan to start patient on oral K+ replacement and IVFs. Per med rec     11/14/2022:  As time of my interview patient is in bed eating lunch with no obvious signs of distress.  Patient continues to complain of diarrhea and skin breakdown due to diarrhea.  Patient states she is incontinent over stool at this time.    11/15/2022:pt still reports diarrhea. She is scheduled to go home today and fu with md on Monday   Oncology Treatment Plan:   OP COLORECTAL FOLFIRI + PANITUMUMAB  Q2W    Medications:  Continuous Infusions:      Scheduled Meds:   enoxaparin  40 mg Subcutaneous Daily    ferrous sulfate  1 tablet Oral Daily    gabapentin  300 mg Oral QHS    levothyroxine  125 mcg Oral Before breakfast    loperamide  4 mg Oral Once    potassium chloride  40 mEq Oral Daily     PRN Meds:acetaminophen, bismuth subsalicylate, dextrose 10%, dextrose 10%, diphenoxylate-atropine 2.5-0.025 mg, glucagon (human recombinant), glucose, glucose, HYDROcodone-acetaminophen, HYDROcodone-acetaminophen, loperamide, magnesium oxide, magnesium oxide, naloxone, ondansetron, potassium bicarbonate, potassium bicarbonate, potassium bicarbonate, potassium, sodium phosphates, potassium, sodium phosphates, potassium, sodium phosphates, prochlorperazine, sodium chloride 0.9%     Review of patient's allergies indicates:   Allergen Reactions    Ativan [lorazepam] Hives and Itching    Flagyl [metronidazole] Itching and Rash    Pcn [penicillins] Hives, Itching and Rash        Past Medical History:   Diagnosis Date    Acute hypoxemic respiratory failure 07/09/2018    Arthritis     Cancer     colon    COPD (chronic obstructive pulmonary disease)     History of home oxygen therapy     ONLY PRN AND HASN'T USED IT IN A YEAR    Hyperlipidemia     Thyroid disease      Past Surgical History:   Procedure Laterality Date    CHOLECYSTECTOMY      COLECTOMY N/A 11/9/2020    Procedure: COLECTOMY;  Surgeon: Brandon Wagner MD;  Location: [facility];  Service: General;  Laterality: N/A;  LOWER- ANTERIOR    COLONOSCOPY N/A 10/23/2020    Procedure: COLONOSCOPY;  Surgeon: Jolynn Ayala MD;  Location: [facility];  Service: Endoscopy;  Laterality: N/A;    COLONOSCOPY N/A 8/6/2021    Procedure: COLONOSCOPY;  Surgeon: Jolynn Ayala MD;  Location: [facility];  Service: Endoscopy;  Laterality: N/A;    COLONOSCOPY N/A 7/7/2022    Procedure: COLONOSCOPY;  Surgeon: Jolynn Ayala MD;  Location: [facility];  Service: Endoscopy;  Laterality: N/A;     ESOPHAGEAL DILATION N/A 2021    Procedure: DILATION, ESOPHAGUS;  Surgeon: Sourav Baires III, MD;  Location: [facility] ENDO;  Service: Endoscopy;  Laterality: N/A;    ESOPHAGOGASTRODUODENOSCOPY  2021    Dilation to 57 Fr    ESOPHAGOGASTRODUODENOSCOPY N/A 2021    Procedure: EGD (ESOPHAGOGASTRODUODENOSCOPY);  Surgeon: Sourav Baires III, MD;  Location: [facility] ENDO;  Service: Endoscopy;  Laterality: N/A;    FLEXIBLE SIGMOIDOSCOPY N/A 2022    Procedure: SIGMOIDOSCOPY, FLEXIBLE;  Surgeon: Brandon Wagner MD;  Location: [facility] ENDO;  Service: Endoscopy;  Laterality: N/A;    HYSTERECTOMY      INSERTION OF TUNNELED CENTRAL VENOUS CATHETER (CVC) WITH SUBCUTANEOUS PORT N/A 2021    Procedure: [id number];  Surgeon: Brandon Wagner MD;  Location: [facility] OR;  Service: General;  Laterality: N/A;    INSERTION OF TUNNELED CENTRAL VENOUS CATHETER (CVC) WITH SUBCUTANEOUS PORT Right 2022    Procedure: [id number];  Surgeon: Brandon Wagner MD;  Location: [facility] OR;  Service: General;  Laterality: Right;  Wants rt side placement    MEDIPORT REMOVAL Left 2021    Procedure: REMOVAL, CATHETER, CENTRAL VENOUS, TUNNELED, WITH PORT;  Surgeon: Brandon Wagner MD;  Location: [facility] OR;  Service: General;  Laterality: Left;    ROBOT-ASSISTED COLECTOMY N/A 2020    Procedure: ROBOTIC COLECTOMY low anterior resection, possible open;  Surgeon: Brandon Wagner MD;  Location: [facility] OR;  Service: General;  Laterality: N/A;  CONVERTED TO OPEN AT 1503     Family History       Problem Relation (Age of Onset)    Breast cancer Maternal Grandmother    COPD Mother, Father    Prostate cancer Brother          Tobacco Use    Smoking status: Former     Packs/day: 2.00     Years: 40.00     Pack years: 80.00     Types: Cigarettes     Quit date: 2017     Years since quittin.3    Smokeless tobacco: Never   Substance and Sexual Activity    Alcohol use: No    Drug use: No    Sexual activity: Not  Currently     Partners: Male       Review of Systems  As per mentioned in HPI; all other systems reviewed and negative  Objective:     Vital Signs (Most Recent):  Temp: 98.1 °F (36.7 °C) (11/15/22 1103)  Pulse: 95 (11/15/22 1103)  Resp: 16 (11/15/22 1103)  BP: 121/63 (11/15/22 1103)  SpO2: 98 % (11/15/22 1103)   Vital Signs (24h Range):  Temp:  [97.1 °F (36.2 °C)-98.7 °F (37.1 °C)] 98.1 °F (36.7 °C)  Pulse:  [85-98] 95  Resp:  [16-18] 16  SpO2:  [95 %-98 %] 98 %  BP: (114-129)/(56-63) 121/63     Weight: 83 kg (182 lb 15.7 oz)  Body mass index is 35.74 kg/m².  Body surface area is 1.87 meters squared.      Intake/Output Summary (Last 24 hours) at 11/15/2022 1430  Last data filed at 11/15/2022 0827  Gross per 24 hour   Intake 580 ml   Output --   Net 580 ml         Physical Exam  PHYSICAL EXAM:     Vitals:    11/15/22 1103   BP: 121/63   Pulse: 95   Resp: 16   Temp: 98.1 °F (36.7 °C)       GENERAL: Comfortable looking patient. Patient is in no distress.  Awake, alert and oriented to time, person and place.  No anxiety, or agitation.      HEENT: Normal conjunctivae and eyelids. WNL.  PERRLA 3 to 4 mm. No icterus, no pallor, no congestion, and no discharge noted.     NECK:  Supple. Trachea is central.  No crepitus.  No JVD or masses.    RESPIRATORY:  No intercostal retractions.  No dullness to percussion.  Chest is clear to auscultation.  No rales, rhonchi or wheezes.  No crepitus.  Good air entry bilaterally.    CARDIOVASCULAR:  S1 and S2 are normally heard without murmurs or gallops.  All peripheral pulses are present.    ABDOMEN:  Normal abdomen.  No hepatosplenomegaly.  No free fluid.  Bowel sounds are present.  No hernia noted. No masses.  No rebound or tenderness.  No guarding or rigidity.  Umbilicus is midline.    LYMPHATICS:  No axillary, cervical, supraclavicular, submental, or inguinal lymphadenopathy.    SKIN/MUSCULOSKELETAL:  Erythematous rash noted in genital area.  She states this is painful    NEUROLOGIC:   Higher functions are appropriate.  No cranial nerve deficits.  Normal yandel.  Normal strength.  Motor and sensory functions are normal.  Deep tendon reflexes are normal.    GENITAL/RECTAL:  Exams are deferred.   Significant Labs:   CBC:   Recent Labs   Lab 11/14/22  0536 11/15/22  0545   WBC 5.42 3.80*   HGB 10.4* 11.1*   HCT 32.3* 33.7*    182      and CMP:   Recent Labs   Lab 11/14/22  0536 11/15/22  0545    138   K 3.8 3.3*    107   CO2 23 22*   GLU 96 117*   BUN 5* 4*   CREATININE 0.6 0.7   CALCIUM 8.2* 8.6*   PROT 5.3* 5.8*   ALBUMIN 2.9* 3.2*   BILITOT 0.4 0.4   ALKPHOS 100 105   AST 11 9*   ALT 9* 9*   ANIONGAP 5* 9         Diagnostic Results:  I have reviewed all pertinent imaging results/findings within the past 24 hours.    Assessment/Plan:     Active Diagnoses:    Diagnosis Date Noted POA    PRINCIPAL PROBLEM:  Chemotherapy-induced diarrhea [K52.1, T45.1X5A] 11/13/2022 Yes    Dehydration [E86.0] 09/26/2022 Yes    Severe obesity (BMI 35.0-39.9) with comorbidity [E66.01] 06/01/2022 Yes    Malignant neoplasm of colon [C18.9] 08/06/2021 Yes    Rectal cancer [C20] 12/11/2020 Yes    Acquired hypothyroidism [E03.9] 10/15/2020 Yes    COPD (chronic obstructive pulmonary disease) [J44.9] 07/09/2018 Yes      Problems Resolved During this Admission:       Chemotherapy induced diarrhea:   -C diff was negative  -stool study was positive for neutrophil  -diarrhea is likely inflammatory in nature induced by CPT 11..  Doubtful that is infectious in nature due to patient remaining afebrile  -continue supportive care  -alternate Imodium and Lomotil  -follow-up in clinic next week  -MD will dose reduced CPT 11    Case discussed with dr Khoobehi  No new recs from hem/onc  Thank you for your consult. I will sign off. Please contact us if you have any additional questions.    Harmony Badillo NP  Hematology/Oncology  [facility]

## 2022-11-15 NOTE — PLAN OF CARE
Pt clear for DC from case management standpoint. Discharging to home         11/15/22 0967   Final Note   Assessment Type Final Discharge Note   Anticipated Discharge Disposition Home   Hospital Resources/Appts/Education Provided Appointments scheduled and added to AVS

## 2022-11-15 NOTE — PLAN OF CARE
Problem: Adult Inpatient Plan of Care  Goal: Plan of Care Review  Outcome: Met  Goal: Patient-Specific Goal (Individualized)  Outcome: Met  Goal: Absence of Hospital-Acquired Illness or Injury  Outcome: Met  Goal: Optimal Comfort and Wellbeing  Outcome: Met  Goal: Readiness for Transition of Care  Outcome: Met     Problem: Infection  Goal: Absence of Infection Signs and Symptoms  Outcome: Met     Problem: Electrolyte Imbalance  Goal: Electrolyte Balance  Outcome: Met     Problem: Oral Intake Inadequate  Goal: Improved Oral Intake  Outcome: Met

## 2022-11-15 NOTE — PLAN OF CARE
Problem: Adult Inpatient Plan of Care  Goal: Patient-Specific Goal (Individualized)  Outcome: Ongoing, Progressing     Problem: Electrolyte Imbalance  Goal: Electrolyte Balance  Outcome: Ongoing, Progressing     Problem: Oral Intake Inadequate  Goal: Improved Oral Intake  Outcome: Ongoing, Progressing

## 2022-11-15 NOTE — NURSING
Discharge instructions given to pt she verbalized understanding. The pt is waiting for her ride to arrive.

## 2022-11-16 NOTE — HOSPITAL COURSE
Patient was monitored closely throughout course of hospital stay by hospital medicine team. Patient started on continuous IV fluids for dehydration. Started on loperamide for acute diarrhea. Patient requested to see her oncologist while in hospital. Consulted her oncologist who agreed to have one of her partners see patient. Oncology recommending continue with antidiarrheals and will have patient follow up in 1 week. Added prn lomotil and pepto bismul for diarrhea. Replaced electrolytes with oral replacement. Patient cleared for discharge back home. She refused home health services. Patient scheduled to follow up with oncologist in 1 week. Patient verbalized understanding of discharge instructions and return precautions.

## 2022-11-16 NOTE — PROGRESS NOTES
11/16/22-Attempt assessment with patient for outpatient case management. Declines OPCM. Will mail OPCM brochure, RN contact infoo and [facility] on call magnet. OPCM Case Closure.

## 2022-11-16 NOTE — TELEPHONE ENCOUNTER
Discharge Information     Discharge Date:     11/15/22  Primary Discharge Diagnosis:  Hypokalemia      Discharge Summary:  Reviewed      Medication & Order Review     Were medication changes made or new medications added?   No    If so, has the patient filled the prescriptions?  N/A     Was Home Health ordered? No    If so, has Home Health contacted patient and/or initiated services?  No    Name of Home Health Agency? N/A    Durable Medical Equipment ordered?  No     If so, has the DME provider contacted patient and delivered equipment?  N/A    Follow Up               Any problems since discharge? No    How is the patient feeling since returning home?  About the same    Have you set up recommended follow up appointments?  (cardiology, surgery, etc.)    Schedule Hospital Follow-up appointment within 7-14 days (preferably 7).      Notes:  Follow up rescheduled to 12/1/22            Vannesa Shipley

## 2022-11-17 NOTE — DISCHARGE SUMMARY
[facility] Medicine  Discharge Summary      Patient Name: Tessa Enriquez  MRN: [id number]  TAE: [id number]  Patient Class: OP- Observation  Admission Date: 11/13/2022  Hospital Length of Stay: 0 days  Discharge Date and Time: 11/15/2022  4:57 PM  Attending Physician: [place] att. providers found   Discharging Provider: Keven Yao PA-C  Primary Care Provider: Karma Mcdermott MD    Primary Care Team: Networked reference to record PCT     HPI:   Tessa Enriquez is a 66 year old white female with a PMHx significant for colon cancer on chemotherapy, HLD, and thyroid disease presenting for diarrhea for 4 days. She follows Dr. Frank for her colon cancer. She reports she received her most recent infusion of chemotherapy on 11/9/22. Following the infusion, she began to have increased diarrhea. She states she normally has a degree of diarrhea after chemo infusion but the latest one has been more severe. She describes the bowel movements as black water. She reports 4 Bowel movements since sitting in ED. She states anything by mouth makes her have a bowel movement. She states she tried Lomotil, loperamide and peto bismul without any relief of diarrhea. She complains of anal irritation for frequent wiping following bowel movements.  She denies any bloody stools, abdominal pain, N/V, fever/chills, or palpitations. She denies any recent antibiotic use. ED workup significant for tachycardia and hypokalemia. CBC with mild leukocytosis. C diff panel and stool cultures ordered in ED. Plan to start patient on oral K+ replacement and IVFs. Patient to be admitted to hospital medicine observation for continued monitoring and treatment.             * No surgery found *      Hospital Course:   Patient was monitored closely throughout course of hospital stay by hospital medicine team. Patient started on continuous IV fluids for dehydration. Started on loperamide for acute diarrhea. Patient requested to see her  oncologist while in hospital. Consulted her oncologist who agreed to have one of her partners see patient. Oncology recommending continue with antidiarrheals and will have patient follow up in 1 week. Added prn lomotil and pepto bismul for diarrhea. Replaced electrolytes with oral replacement. Patient cleared for discharge back home. She refused home health services. Patient scheduled to follow up with oncologist in 1 week. Patient verbalized understanding of discharge instructions and return precautions.         Goals of Care Treatment Preferences:  Code Status: Full Code      Consults:   Consults (From admission, onward)        Status Ordering Provider     Inpatient consult to Registered Dietitian/Nutritionist  Once        Provider:  (Not yet assigned)    Completed SARA ARCHIBALD          No new Assessment & Plan notes have been filed under this hospital service since the last note was generated.  Service: Hospital Medicine    Final Active Diagnoses:    Diagnosis Date Noted POA    PRINCIPAL PROBLEM:  Chemotherapy-induced diarrhea [K52.1, T45.1X5A] 11/13/2022 Yes    Dehydration [E86.0] 09/26/2022 Yes    Severe obesity (BMI 35.0-39.9) with comorbidity [E66.01] 06/01/2022 Yes    Malignant neoplasm of colon [C18.9] 08/06/2021 Yes    Rectal cancer [C20] 12/11/2020 Yes    Acquired hypothyroidism [E03.9] 10/15/2020 Yes    COPD (chronic obstructive pulmonary disease) [J44.9] 07/09/2018 Yes      Problems Resolved During this Admission:       Discharged Condition: fair    Disposition: Home or Self Care    Follow Up:   Follow-up Information     Karma Mcdermott MD Follow up in 3 day(s).    Specialty: Internal Medicine  Why: hospital follow up  11/16 @ 2:40 pm  Contact information:  [address]  [address]  [place] [address]  [phone]             Danielle Frank MD Follow up in 1 week(s).    Specialties: Hematology and Oncology, Hematology, Oncology  Contact information:  [address]  [address]  [place]  [address]  [phone]                       Patient Instructions:      Ambulatory referral/consult to Outpatient Case Management   Referral Priority: Routine Referral Type: Consultation   Referral Reason: Specialty Services Required   Number of Visits Requested: 1     Diet Adult Regular     Notify your health care provider if you experience any of the following:  temperature >100.4     Notify your health care provider if you experience any of the following:  persistent nausea and vomiting or diarrhea     Notify your health care provider if you experience any of the following:  severe uncontrolled pain     Notify your health care provider if you experience any of the following:  redness, tenderness, or signs of infection (pain, swelling, redness, odor or green/yellow discharge around incision site)     Notify your health care provider if you experience any of the following:  difficulty breathing or increased cough     Notify your health care provider if you experience any of the following:  persistent dizziness, light-headedness, or visual disturbances     Activity as tolerated       Significant Diagnostic Studies: Labs: CMP No results for input(s): NA, K, CL, CO2, GLU, BUN, CREATININE, CALCIUM, PROT, ALBUMIN, BILITOT, ALKPHOS, AST, ALT, ANIONGAP, ESTGFRAFRICA, EGFRNONAA in the last 48 hours.    Pending Diagnostic Studies:     None         Medications:  Reconciled Home Medications:      Medication List      START taking these medications    loperamide 2 mg capsule  Commonly known as: IMODIUM  Take 1 capsule (2 mg total) by mouth 4 (four) times daily as needed for Diarrhea (4 mg initially, followed by 2 mg every two hours, no more than 16 mg daily).        CONTINUE taking these medications    CMPD HYDROCORTISONE 2%, LIDOCAINE 3% SUPPOSITORY (RECTAL ROCKET)  Place 1 suppository rectally every evening. Insert 1 suppository 3/4 of the way onto rectum. Wet for easier application. Repeat for 3 nights.     cyanocobalamin  1,000 mcg/mL injection  Inject 1 mL (1,000 mcg total) into the muscle every 14 (fourteen) days.     doxycycline 100 MG Cap  Commonly known as: VIBRAMYCIN  Take 1 capsule (100 mg total) by mouth 2 (two) times daily.     ferrous sulfate 325 (65 FE) MG EC tablet  Take 325 mg by mouth 2 (two) times daily.     gabapentin 300 MG capsule  Commonly known as: NEURONTIN  Take 1 capsule (300 mg total) by mouth every evening.     HYDROcodone-acetaminophen 5-325 mg per tablet  Commonly known as: NORCO  Take 1 tablet by mouth every 6 (six) hours as needed for Pain.     levothyroxine 125 MCG tablet  Commonly known as: SYNTHROID  Take 1 tablet (125 mcg total) by mouth before breakfast.     ondansetron 8 MG Tbdl  Commonly known as: ZOFRAN-ODT  Take 1 tablet (8 mg total) by mouth every 12 (twelve) hours as needed.     potassium chloride 10% 20 mEq/15 mL oral solution  Commonly known as: KAYCIEL  Take 15 mLs (20 mEq total) by mouth 2 (two) times daily.     prochlorperazine 10 MG tablet  Commonly known as: COMPAZINE  Take 1 tablet (10 mg total) by mouth every 6 (six) hours as needed.     traMADoL 50 mg tablet  Commonly known as: ULTRAM  Take 1 tablet (50 mg total) by mouth every 6 (six) hours as needed.     triamcinolone acetonide 0.025% 0.025 % cream  Commonly known as: KENALOG  Apply topically once daily.            Indwelling Lines/Drains at time of discharge:   Lines/Drains/Airways     None                 Time spent on the discharge of patient: 44 minutes         Keven Yao PA-C  Department of Hospital Medicine  [facility]

## 2022-11-28 NOTE — PROGRESS NOTES
Subjective:       Patient ID: Tessa Enriquez is a 66 y.o. female.    Chief Complaint:   rectal CA status post biopsy October 2020 robotic LAR November 9, 2020 by Dr. Wagner  Discontinued folfox due to s/e completed xrt  5/21     works at [employer]    pt went to GI wih rectal bleeding and abd pain scoped  Findings:        The digital rectal exam revealed a firm rectal mass palpated 5 to 6        cm from the anal verge. The mass was non-circumferential and located        predominantly at the posterior bowel wall.        There was evidence of a prior end-to-end colo-rectal anastomosis in        the distal rectum. This was patent and was characterized by        ulceration. The anastomosis was traversed. Mass in association with        the anastomosis has been previously biopsied and confirmed to be        adenocarcinoma. Anastomosis present at approximately 5 cm from anal    pt sent for discussion    MPRESSION:  1. Interval increased size of hypermetabolic presacral mass contiguous with hypermetabolic circumferential wall thickening of the rectum at the level of the anastomotic suture line, consistent with recurrent neoplasm and metastatic disease.  2. Interval development of enlarged, hypermetabolic right iliac chain lymph node consistent with metastatic disease.  3. No additional convincing evidence of metastatic disease.  4. Bilateral sacral ala insufficiency fractures, with corresponding FDG hypermetabolism.  5. Intense FDG uptake throughout the thyroid gland, unchanged and nonspecific.  Pt discussed wt [facility] started folfiri +avastin then from cycle 2 changed to FOLFIRI+ panitumumab  REVIEW OF SYSTEMS:   Admittd with severe side effects to chemotherapy including literally nonstop diarrhea blistering of anal and perineal region.  Tearing and watering eyes and abdominal cramping  Was unable to retain any fluids or solids   SKIN: Denies rash, issues with nails, non-healing sores, bleeding, blotching    skin or abnormal  bruising. Denies new moles or changes to existing moles.      BREASTS: There is no swelling around breasts or nipple discharge.    EYES: Denies eye pain, blurred vision, swelling, redness or discharge.      ENT AND MOUTH: Denies runny nose, stuffiness, sinus trouble or sores. Denies    nosebleeds. Denies, hoarseness, change in voice or swelling in front of the    neck.      CARDIOVASCULAR: Denies chest pain, discomfort or palpitations. Denies neck    swelling or episodes of passing out.      RESPIRATORY: Denies cough, sputum production, blood in sputum, and denies    shortness of breath.      GENITOURINARY: No discharge. No pelvic pain or lumps. No rash around groin or  lesions. No urinary frequency, hesitation, painful urination or blood in    urine. Denies incontinence. No problems with intercourse.      MUSCULOSKELETAL: Denies neck or back pain. Denies weakness in arms or legs,    joint problems or distended inflamed veins in legs. Denies swelling or abnormal  glands.      NEUROLOGICAL: Denies tingling, numbness, altered mentation changes to nerve    function in the face, weakness to one or both of the body. Denies changes to    gait and denies multiple falls or accidents.      PSYCHIATRIC: Denies nervousness, anxiety, hallucinations, depression, suicidal    ideation, trouble sleeping or changes in behavior noticed by family.          Oncology History:  T3 N1 rectal CA diagnosed October 2020 robotic LAR November 2020 December 2020 PET scan negative for metastatic disease   started FOLFOX 2/21 but stopped after 3 cycles and only wants surveillance   had colonoscopy 8/21 NAD  Past Medical History:   Diagnosis Date    Acute hypoxemic respiratory failure 07/09/2018    Arthritis     Cancer     colon    COPD (chronic obstructive pulmonary disease)     History of home oxygen therapy     ONLY PRN AND HASN'T USED IT IN A YEAR    Hyperlipidemia     Thyroid disease      Past Surgical History:   Procedure Laterality Date     CHOLECYSTECTOMY      COLECTOMY N/A 11/9/2020    Procedure: COLECTOMY;  Surgeon: Brandon Wagner MD;  Location: [facility] OR;  Service: General;  Laterality: N/A;  LOWER- ANTERIOR    COLONOSCOPY N/A 10/23/2020    Procedure: COLONOSCOPY;  Surgeon: Jolynn Ayala MD;  Location: [facility] ENDO;  Service: Endoscopy;  Laterality: N/A;    COLONOSCOPY N/A 8/6/2021    Procedure: COLONOSCOPY;  Surgeon: Jolynn Ayala MD;  Location: [facility] ENDO;  Service: Endoscopy;  Laterality: N/A;    COLONOSCOPY N/A 7/7/2022    Procedure: COLONOSCOPY;  Surgeon: Jolynn Ayala MD;  Location: [facility] ENDO;  Service: Endoscopy;  Laterality: N/A;    ESOPHAGEAL DILATION N/A 6/29/2021    Procedure: DILATION, ESOPHAGUS;  Surgeon: Sourav Baires III, MD;  Location: [facility] ENDO;  Service: Endoscopy;  Laterality: N/A;    ESOPHAGOGASTRODUODENOSCOPY  06/29/2021    Dilation to 57 Fr    ESOPHAGOGASTRODUODENOSCOPY N/A 6/29/2021    Procedure: EGD (ESOPHAGOGASTRODUODENOSCOPY);  Surgeon: Sourav Baires III, MD;  Location: [facility] ENDO;  Service: Endoscopy;  Laterality: N/A;    FLEXIBLE SIGMOIDOSCOPY N/A 8/1/2022    Procedure: SIGMOIDOSCOPY, FLEXIBLE;  Surgeon: Brandon Wagner MD;  Location: [facility] ENDO;  Service: Endoscopy;  Laterality: N/A;    HYSTERECTOMY      INSERTION OF TUNNELED CENTRAL VENOUS CATHETER (CVC) WITH SUBCUTANEOUS PORT N/A 1/11/2021    Procedure: [id number];  Surgeon: Brandon Wagner MD;  Location: [facility] OR;  Service: General;  Laterality: N/A;    INSERTION OF TUNNELED CENTRAL VENOUS CATHETER (CVC) WITH SUBCUTANEOUS PORT Right 9/1/2022    Procedure: [id number];  Surgeon: Brandon Wagner MD;  Location: [facility] OR;  Service: General;  Laterality: Right;  Wants rt side placement    MEDIPORT REMOVAL Left 4/19/2021    Procedure: REMOVAL, CATHETER, CENTRAL VENOUS, TUNNELED, WITH PORT;  Surgeon: Brandon Wagner MD;  Location: [facility] OR;  Service: General;  Laterality: Left;    ROBOT-ASSISTED COLECTOMY N/A 11/9/2020     Procedure: ROBOTIC COLECTOMY low anterior resection, possible open;  Surgeon: Brandon Wagner MD;  [facility];  Service: General;  Laterality: N/A;  CONVERTED TO OPEN AT 1503     Family History   Problem Relation Age of Onset    COPD Mother     COPD Father     Prostate cancer Brother     Breast cancer Maternal Grandmother       Social History     Socioeconomic History    Marital status:    Tobacco Use    Smoking status: Former     Packs/day: 2.00     Years: 40.00     Pack years: 80.00     Types: Cigarettes     Quit date: 2017     Years since quittin.3    Smokeless tobacco: Never   Substance and Sexual Activity    Alcohol use: No    Drug use: No    Sexual activity: Not Currently     Partners: Male     Review of patient's allergies indicates:   Allergen Reactions    Ativan [lorazepam] Hives and Itching    Flagyl [metronidazole] Itching and Rash    Pcn [penicillins] Hives, Itching and Rash       Current Outpatient Medications:     CMPD hydrocortisone 2%- LIDOcaine 3% suppository (RECTAL ROCKET), Place 1 suppository rectally every evening. Insert 1 suppository 3/4 of the way onto rectum. Wet for easier application. Repeat for 3 nights., Disp: 30 suppository, Rfl: 2    cyanocobalamin 1,000 mcg/mL injection, Inject 1 mL (1,000 mcg total) into the muscle every 14 (fourteen) days., Disp: 30 mL, Rfl: 12    doxycycline (VIBRAMYCIN) 100 MG Cap, Take 1 capsule (100 mg total) by mouth 2 (two) times daily., Disp: 60 capsule, Rfl: 1    ferrous sulfate 325 (65 FE) MG EC tablet, Take 325 mg by mouth 2 (two) times daily., Disp: , Rfl:     gabapentin (NEURONTIN) 300 MG capsule, Take 1 capsule (300 mg total) by mouth every evening., Disp: 30 capsule, Rfl: 2    HYDROcodone-acetaminophen (NORCO) 5-325 mg per tablet, Take 1 tablet by mouth every 6 (six) hours as needed for Pain., Disp: 20 tablet, Rfl: 0    levothyroxine (SYNTHROID) 125 MCG tablet, Take 1 tablet (125 mcg total) by mouth before breakfast., Disp: 30  tablet, Rfl: 11    ondansetron (ZOFRAN-ODT) 8 MG TbDL, Take 1 tablet (8 mg total) by mouth every 12 (twelve) hours as needed., Disp: 30 tablet, Rfl: 1    potassium chloride 10% (KAYCIEL) 20 mEq/15 mL oral solution, Take 15 mLs (20 mEq total) by mouth 2 (two) times daily., Disp: 473 mL, Rfl: 0    prochlorperazine (COMPAZINE) 10 MG tablet, Take 1 tablet (10 mg total) by mouth every 6 (six) hours as needed., Disp: 30 tablet, Rfl: 1    traMADoL (ULTRAM) 50 mg tablet, Take 1 tablet (50 mg total) by mouth every 6 (six) hours as needed., Disp: 30 tablet, Rfl: 0    triamcinolone acetonide 0.025% (KENALOG) 0.025 % cream, Apply topically once daily., Disp: 80 g, Rfl: 1  PHYSICAL EXAM:     Wt Readings from Last 3 Encounters:   11/14/22 83 kg (182 lb 15.7 oz)   11/09/22 83.3 kg (183 lb 11.2 oz)   11/07/22 83 kg (182 lb 15.7 oz)     Temp Readings from Last 3 Encounters:   11/15/22 96.3 °F (35.7 °C) (Oral)   11/09/22 97.5 °F (36.4 °C)   11/07/22 97.3 °F (36.3 °C)     BP Readings from Last 3 Encounters:   11/15/22 (!) 113/58   11/09/22 114/66   11/07/22 135/75     Pulse Readings from Last 3 Encounters:   11/15/22 99   11/09/22 96   11/07/22 92     GENERAL: alert; in no apparent distress, , well-built well-nourished   HEAD: normocephalic, atraumatic.  EYES: pupils are equal, round, reactive to light and accommodation. Sclera anicteric. Conjunctiva not injected.   NOSE/THROAT: no nasal erythema or rhinorrhea. Oropharynx pink, without erythema, ulcerations or thrush.   NECK: no cervical motion rigidity; supple with no masses.  CHEST: clear to auscultation bilaterally; no wheezes, crackles or rubs. Patient is speaking comfortably on room air with normal work of breathing without using accessory muscles of respiration.  CARDIOVASCULAR: regular rate and rhythm; no murmurs, rubs or gallops.  ABDOMEN: soft, nontender, nondistended. Bowel sounds present.   MUSCULOSKELETAL: no tenderness to palpation along the spine or scapulae. Normal  range of motion.  NEUROLOGIC: cranial nerves II-XII intact bilaterally. Strength 5/5 in bilateral upper and lower extremities. No sensory deficits appreciated. Reflexes globally intact. No cerebellar signs. Normal gait.  LYMPHATIC: no cervical, supraclavicular or axillary adenopathy appreciated bilaterally.   EXTREMITIES: no clubbing, cyanosis, edema.  SKIN: no erythema, rashes, ulcerations noted  ECOG-0  Lab Results   Component Value Date    WBC 6.50 11/26/2022    HGB 12.9 11/26/2022    HCT 39.6 11/26/2022    MCV 95 11/26/2022     11/26/2022       BMP  Lab Results   Component Value Date     11/26/2022    K 3.3 (L) 11/26/2022     11/26/2022    CO2 27 11/26/2022    BUN 6 (L) 11/26/2022    CREATININE 0.7 11/26/2022    CALCIUM 8.6 (L) 11/26/2022    ANIONGAP 10 11/26/2022    ESTGFRAFRICA >60 05/02/2022    EGFRNONAA >60 05/02/2022      cea 7/14/21 1.8  November 9, 2020  .  Rectum, sigmoid colon and proximal donut (low anterior resection):   - Invasive adenocarcinoma,  invading through the muscularis propria into   pericolorectal tissue   - Pathologic stage pT3, pN1a (see synoptic report)   - Resection margins negative for dysplasia or malignancy (proximal, distal   and radial margins), within 1 mm to radial margin   - One tumor deposit   - Adenocarcinoma involve one out of totally forty seven lymph nodes (1/46 in   part 1, totally 1/47, also see part 2)   - Background colon with diverticulosis associated with hyperpigmented   macrophages, benign   - Proximal donut with benign colon, no dysplasia or malignancy   - Tattoo identified   2.  Colon, distal donut (excision):   - Benign colon, negative for dysplasia or malignancy   - One lymph node, negative for metastatic carcinoma (0/1)   COLON AND RECTUM: Resection, Including Transanal Disk Excision of Rectal   Neoplasms   Procedure  Low anterior resection   Tumor Site  Rectum    Tumor Location: Straddles the anterior peritoneal reflection   Tumor Size    Greatest dimension (centimeters): 7.0 cm   + Additional dimensions (centimeters): 4.7 x 2.2 cm   Macroscopic Tumor Perforation  Not identified   Macroscopic Intactness of Mesorectum  Near complete   Histologic Type  Adenocarcinoma   Histologic Grade   G2: Moderately differentiated   Tumor Extension  Tumor invades through the muscularis propria into   pericolorectal tissue     Impression:  November 7, 2020 CT abdomen pelvis     1. Rectosigmoid junction mass, unchanged from prior.  No definite evidence of intraluminal contrast extravasation to suggest active gastrointestinal hemorrhage, however please note that arterial phase CT is more sensitive.  2. Colonic diverticulosis without acute diverticulitis.  3. Minimal pneumobilia, unchanged      IMPRESSION:  CT chest lung screen October 2020     1.  3 mm pulmonary nodule identified in the lateral segment of the  right lower lobe.  2.  Focus of groundglass opacity measuring 11 mm in diameter in the  superior segment of the right lower lobe. Short-term follow-up for  this lesion is recommended.  3.  Mild emphysematous lung disease.     LUNG-RADS CATEGORY 3: PROBABLY BENIGN FINDINGS     RECOMMENDATION: Short-term imaging follow-up with 6 month LDCT.    IMPRESSION: 7/21  1. Nonspecific hypermetabolic soft tissue density focus cranial to the rectosigmoid colon suture line, perhaps reflecting postsurgical and or posttreatment or postinflammatory changes. Attention to this area at follow-up imaging is recommended to help differentiate from residual or recurrent neoplasm.  2. Improving postoperative changes in the lower anterior abdominal wall, with associated multifocal FDG hypermetabolism which is most characteristic of postoperative etiology.  3. Otherwise no convincing evidence of recurrent neoplasm or metastatic disease.IMPRESSION:  1. Interval increased size of hypermetabolic presacral mass contiguous with hypermetabolic circumferential wall thickening of the rectum at  the level of the anastomotic suture line, consistent with recurrent neoplasm and metastatic disease.  2. Interval development of enlarged, hypermetabolic right iliac chain lymph node consistent with metastatic disease.  3. No additional convincing evidence of metastatic disease.  4. Bilateral sacral ala insufficiency fractures, with corresponding FDG hypermetabolism.  5. Intense FDG uptake throughout the thyroid gland, unchanged and nonspecific.      Patient Active Problem List   Diagnosis    COPD (chronic obstructive pulmonary disease)    Pure hypercholesterolemia    Acquired hypothyroidism    Rectal bleeding    Rectal cancer    Dysphagia    Malignant neoplasm of colon    B12 deficiency    FENG (iron deficiency anemia)    Hypocalcemia    Chronic diarrhea    Severe obesity (BMI 35.0-39.9) with comorbidity    Secondary and unspecified malignant neoplasm of intra-abdominal lymph nodes    Dehydration    Diarrhea    Chemotherapy-induced neutropenia    Chemotherapy-induced diarrhea   IMPRESSION:pet 12/2020  1. Postoperative changes of rectal colonic resection, with adjacent  FDG avid presacral soft tissue stranding.  2. Adjacent punctate extraluminal foci of free air suggesting a tiny  tract extending towards the adjacent small bowel (possibly  postoperative but suggesting a tiny enterocolonic fistula)  3. Deep subcutaneous soft tissue structure along the ventral abdomen  suggesting a postoperative hematoma/seroma, and less likely abscess.  4. No findings of additional sites of FDG avid disease.      IMPRESSION: 7/21  1. Nonspecific hypermetabolic soft tissue density focus cranial to the rectosigmoid colon suture line, perhaps reflecting postsurgical and or posttreatment or postinflammatory changes. Attention to this area at follow-up imaging is recommended to help differentiate from residual or recurrent neoplasm.  2. Improving postoperative changes in the lower anterior abdominal wall, with associated multifocal FDG  hypermetabolism which is most characteristic of postoperative etiology.  3. Otherwise no convincing evidence of recurrent neoplasm or metastatic disease      Impression:2/2/22   CT CAP  A single 4 mm soft tissue density nodule is noted in the right lower lobe.  No other intrapulmonary masses or nodules are seen.  Follow-up CT in 12 months per of this nodule is recommended.  Coronary artery calcification is noted.  No masses or adenopathy or seen.     Prior cholecystectomy.  Prior hysterectomy.  2.2 cm left ovarian cyst.  Small ventral hernia containing a loop of small bowel without CT evidence of obstruction  Assessment and Plan   At original dx 2020  Status post low anterior resection November 9, 2020 for rectal CA T3 N1a   tumor board convened and agreed on xrt that will follow  After completion of chemo  sequentially  therapy   Patient started FOLFOX.  Completed 4 out of 12 planned adjuvant cycles,  Discontinued  Chemo / port removed completed xrt 5/21  Pet:8/22, showing progression   Pt very hesitant T FIRST TO GET PORT OR INFUSIONAL 5 FU/ OXALIPLATIN, WENT TO [facility] ON SECOND OPINION AGREABLE NOW, HAD PORT PLACED   Started  FOLFIRI + avastin   from second cycle changed to FOLFIRI panitumumab   Sig diarrhea with CPT-11, in spite of added lomotil to imodium  decreased dose of CPT-11  to 165 mg/m2 but patient is still hospitalized with severe diarrhea electrolyte imbalance does not want any treatment till further scans are done to look for response prior to embarking I have discussed with patient the risks of this vision however I also showed her that we will remove CPT 11 from her regimen due to severity of side effects see me with PET scan    Pt to take kcl oral doesn't want IV replacen   Dc the bolus 5FU  d/w  FENG :Injectafer  completed 8/22 good response  pt to add SL b12 to her twice a month b12 shots    Please ask for nikole  Pt wants to wait till holidays are over to get imaging studies looking for  response   b12 def anemia, taking injections q month level checked 10/21 sub therpautic 226 t INSURANCE WONT PAY FOR B12.ok to cont with SL only as levels have improved to 1500  iron deficiency  Pt to try oral for now, will recheck in 8/22 ( may help her chronic diarrhea)  hypocalcemia which seems chronic pt to supplement orally daily wnl this visit  Hypothyroidism, sees DR Mcdermott will adj meds  History of chronic obstructive pulmonary disease, dyslipidemia and hypothyroidism continue with PCP  Doesn't want to be covid vaccinated

## 2022-12-01 NOTE — PROGRESS NOTES
SUBJECTIVE:    Patient ID: Tessa Enriquez is a 66 y.o. female.    Chief Complaint: Hospital Follow Up    HPI    Patient is having some undesirable side effects of the chem for her recurrent colon cancer-she had 31 BM in one day and is on lomotil immodium and pepto bismol prn    H/H 12.9/39.6 platelets 324 wbc 6.5 ALT 16 K 3.3 and she is on supplemental k bid    Lab Visit on 11/26/2022   Component Date Value Ref Range Status    WBC 11/26/2022 6.50  3.90 - 12.70 K/uL Final    RBC 11/26/2022 4.17  4.00 - 5.40 M/uL Final    Hemoglobin 11/26/2022 12.9  12.0 - 16.0 g/dL Final    Hematocrit 11/26/2022 39.6  37.0 - 48.5 % Final    MCV 11/26/2022 95  82 - 98 fL Final    MCH 11/26/2022 30.9  27.0 - 31.0 pg Final    MCHC 11/26/2022 32.6  32.0 - 36.0 g/dL Final    RDW 11/26/2022 14.5  11.5 - 14.5 % Final    Platelets 11/26/2022 324  150 - 450 K/uL Final    MPV 11/26/2022 10.6  9.2 - 12.9 fL Final    Immature Granulocytes 11/26/2022 0.8 (H)  0.0 - 0.5 % Final    Gran # (ANC) 11/26/2022 5.1  1.8 - 7.7 K/uL Final    Immature Grans (Abs) 11/26/2022 0.05 (H)  0.00 - 0.04 K/uL Final    Lymph # 11/26/2022 0.8 (L)  1.0 - 4.8 K/uL Final    Mono # 11/26/2022 0.3  0.3 - 1.0 K/uL Final    Eos # 11/26/2022 0.2  0.0 - 0.5 K/uL Final    Baso # 11/26/2022 0.09  0.00 - 0.20 K/uL Final    nRBC 11/26/2022 0  0 /100 WBC Final    Gran % 11/26/2022 78.1 (H)  38.0 - 73.0 % Final    Lymph % 11/26/2022 12.2 (L)  18.0 - 48.0 % Final    Mono % 11/26/2022 4.6  4.0 - 15.0 % Final    Eosinophil % 11/26/2022 2.9  0.0 - 8.0 % Final    Basophil % 11/26/2022 1.4  0.0 - 1.9 % Final    Differential Method 11/26/2022 Automated   Final    Sodium 11/26/2022 138  136 - 145 mmol/L Final    Potassium 11/26/2022 3.3 (L)  3.5 - 5.1 mmol/L Final    Chloride 11/26/2022 101  95 - 110 mmol/L Final    CO2 11/26/2022 27  23 - 29 mmol/L Final    Glucose 11/26/2022 105  70 - 110 mg/dL Final    BUN 11/26/2022 6 (L)  8 - 23 mg/dL Final    Creatinine 11/26/2022 0.7  0.5 - 1.4  mg/dL Final    Calcium 11/26/2022 8.6 (L)  8.7 - 10.5 mg/dL Final    Total Protein 11/26/2022 5.8 (L)  6.0 - 8.4 g/dL Final    Albumin 11/26/2022 3.1 (L)  3.5 - 5.2 g/dL Final    Total Bilirubin 11/26/2022 0.3  0.1 - 1.0 mg/dL Final    Alkaline Phosphatase 11/26/2022 89  55 - 135 U/L Final    AST 11/26/2022 16  10 - 40 U/L Final    ALT 11/26/2022 16  10 - 44 U/L Final    Anion Gap 11/26/2022 10  8 - 16 mmol/L Final    eGFR 11/26/2022 >60  >60 mL/min/1.73 m^2 Final   Admission on 11/13/2022, Discharged on 11/15/2022   Component Date Value Ref Range Status    Stool Culture 11/13/2022 No Salmonella,Shigella,Vibrio,Campylobacter,Yersinia isolated.   Final    C. diff Antigen 11/13/2022 Negative  Negative Final    C difficile Toxins A+B, EIA 11/13/2022 Negative  Negative Final    WBC 11/13/2022 13.01 (H)  3.90 - 12.70 K/uL Final    RBC 11/13/2022 4.35  4.00 - 5.40 M/uL Final    Hemoglobin 11/13/2022 13.6  12.0 - 16.0 g/dL Final    Hematocrit 11/13/2022 40.4  37.0 - 48.5 % Final    MCV 11/13/2022 93  82 - 98 fL Final    MCH 11/13/2022 31.3 (H)  27.0 - 31.0 pg Final    MCHC 11/13/2022 33.7  32.0 - 36.0 g/dL Final    RDW 11/13/2022 14.1  11.5 - 14.5 % Final    Platelets 11/13/2022 192  150 - 450 K/uL Final    MPV 11/13/2022 11.2  9.2 - 12.9 fL Final    Immature Granulocytes 11/13/2022 CANCELED  0.0 - 0.5 % Final    Immature Grans (Abs) 11/13/2022 CANCELED  0.00 - 0.04 K/uL Final    Lymph # 11/13/2022 CANCELED  1.0 - 4.8 K/uL Final    Mono # 11/13/2022 CANCELED  0.3 - 1.0 K/uL Final    Eos # 11/13/2022 CANCELED  0.0 - 0.5 K/uL Final    Baso # 11/13/2022 CANCELED  0.00 - 0.20 K/uL Final    nRBC 11/13/2022 0  0 /100 WBC Final    Gran % 11/13/2022 88.0 (H)  38.0 - 73.0 % Final    Lymph % 11/13/2022 6.0 (L)  18.0 - 48.0 % Final    Mono % 11/13/2022 2.0 (L)  4.0 - 15.0 % Final    Eosinophil % 11/13/2022 1.0  0.0 - 8.0 % Final    Basophil % 11/13/2022 1.0  0.0 - 1.9 % Final    Bands 11/13/2022 2.0  % Final    Platelet Estimate  11/13/2022 Appears normal   Final    Differential Method 11/13/2022 Manual   Corrected    Sodium 11/13/2022 137  136 - 145 mmol/L Final    Potassium 11/13/2022 3.1 (L)  3.5 - 5.1 mmol/L Final    Chloride 11/13/2022 103  95 - 110 mmol/L Final    CO2 11/13/2022 20 (L)  23 - 29 mmol/L Final    Glucose 11/13/2022 134 (H)  70 - 110 mg/dL Final    BUN 11/13/2022 10  8 - 23 mg/dL Final    Creatinine 11/13/2022 0.7  0.5 - 1.4 mg/dL Final    Calcium 11/13/2022 9.2  8.7 - 10.5 mg/dL Final    Total Protein 11/13/2022 7.1  6.0 - 8.4 g/dL Final    Albumin 11/13/2022 3.8  3.5 - 5.2 g/dL Final    Total Bilirubin 11/13/2022 0.8  0.1 - 1.0 mg/dL Final    Alkaline Phosphatase 11/13/2022 160 (H)  55 - 135 U/L Final    AST 11/13/2022 11  10 - 40 U/L Final    ALT 11/13/2022 11  10 - 44 U/L Final    Anion Gap 11/13/2022 14  8 - 16 mmol/L Final    eGFR 11/13/2022 >60  >60 mL/min/1.73 m^2 Final    Shiga Toxin 1 E.coli 11/13/2022 Negative   Final    Shiga Toxin 2 E.coli 11/13/2022 Negative   Final    Stool WBC 11/14/2022 Occ neutrophils seen (A)  No neutrophils seen Final    Magnesium 11/14/2022 2.0  1.6 - 2.6 mg/dL Final    Phosphorus 11/14/2022 3.1  2.7 - 4.5 mg/dL Final    Sodium 11/14/2022 138  136 - 145 mmol/L Final    Potassium 11/14/2022 3.8  3.5 - 5.1 mmol/L Final    Chloride 11/14/2022 110  95 - 110 mmol/L Final    CO2 11/14/2022 23  23 - 29 mmol/L Final    Glucose 11/14/2022 96  70 - 110 mg/dL Final    BUN 11/14/2022 5 (L)  8 - 23 mg/dL Final    Creatinine 11/14/2022 0.6  0.5 - 1.4 mg/dL Final    Calcium 11/14/2022 8.2 (L)  8.7 - 10.5 mg/dL Final    Total Protein 11/14/2022 5.3 (L)  6.0 - 8.4 g/dL Final    Albumin 11/14/2022 2.9 (L)  3.5 - 5.2 g/dL Final    Total Bilirubin 11/14/2022 0.4  0.1 - 1.0 mg/dL Final    Alkaline Phosphatase 11/14/2022 100  55 - 135 U/L Final    AST 11/14/2022 11  10 - 40 U/L Final    ALT 11/14/2022 9 (L)  10 - 44 U/L Final    Anion Gap 11/14/2022 5 (L)  8 - 16 mmol/L Final    eGFR 11/14/2022 >60  >60  mL/min/1.73 m^2 Final    WBC 11/14/2022 5.42  3.90 - 12.70 K/uL Final    RBC 11/14/2022 3.33 (L)  4.00 - 5.40 M/uL Final    Hemoglobin 11/14/2022 10.4 (L)  12.0 - 16.0 g/dL Final    Hematocrit 11/14/2022 32.3 (L)  37.0 - 48.5 % Final    MCV 11/14/2022 97  82 - 98 fL Final    MCH 11/14/2022 31.2 (H)  27.0 - 31.0 pg Final    MCHC 11/14/2022 32.2  32.0 - 36.0 g/dL Final    RDW 11/14/2022 14.6 (H)  11.5 - 14.5 % Final    Platelets 11/14/2022 155  150 - 450 K/uL Final    MPV 11/14/2022 10.9  9.2 - 12.9 fL Final    Immature Granulocytes 11/14/2022 1.1 (H)  0.0 - 0.5 % Final    Gran # (ANC) 11/14/2022 4.5  1.8 - 7.7 K/uL Final    Immature Grans (Abs) 11/14/2022 0.06 (H)  0.00 - 0.04 K/uL Final    Lymph # 11/14/2022 0.5 (L)  1.0 - 4.8 K/uL Final    Mono # 11/14/2022 0.2 (L)  0.3 - 1.0 K/uL Final    Eos # 11/14/2022 0.2  0.0 - 0.5 K/uL Final    Baso # 11/14/2022 0.04  0.00 - 0.20 K/uL Final    nRBC 11/14/2022 0  0 /100 WBC Final    Gran % 11/14/2022 83.2 (H)  38.0 - 73.0 % Final    Lymph % 11/14/2022 8.7 (L)  18.0 - 48.0 % Final    Mono % 11/14/2022 3.0 (L)  4.0 - 15.0 % Final    Eosinophil % 11/14/2022 3.3  0.0 - 8.0 % Final    Basophil % 11/14/2022 0.7  0.0 - 1.9 % Final    Differential Method 11/14/2022 Automated   Final    Magnesium 11/15/2022 2.0  1.6 - 2.6 mg/dL Final    Phosphorus 11/15/2022 2.7  2.7 - 4.5 mg/dL Final    Sodium 11/15/2022 138  136 - 145 mmol/L Final    Potassium 11/15/2022 3.3 (L)  3.5 - 5.1 mmol/L Final    Chloride 11/15/2022 107  95 - 110 mmol/L Final    CO2 11/15/2022 22 (L)  23 - 29 mmol/L Final    Glucose 11/15/2022 117 (H)  70 - 110 mg/dL Final    BUN 11/15/2022 4 (L)  8 - 23 mg/dL Final    Creatinine 11/15/2022 0.7  0.5 - 1.4 mg/dL Final    Calcium 11/15/2022 8.6 (L)  8.7 - 10.5 mg/dL Final    Total Protein 11/15/2022 5.8 (L)  6.0 - 8.4 g/dL Final    Albumin 11/15/2022 3.2 (L)  3.5 - 5.2 g/dL Final    Total Bilirubin 11/15/2022 0.4  0.1 - 1.0 mg/dL Final    Alkaline Phosphatase 11/15/2022 105   55 - 135 U/L Final    AST 11/15/2022 9 (L)  10 - 40 U/L Final    ALT 11/15/2022 9 (L)  10 - 44 U/L Final    Anion Gap 11/15/2022 9  8 - 16 mmol/L Final    eGFR 11/15/2022 >60  >60 mL/min/1.73 m^2 Final    WBC 11/15/2022 3.80 (L)  3.90 - 12.70 K/uL Final    RBC 11/15/2022 3.61 (L)  4.00 - 5.40 M/uL Final    Hemoglobin 11/15/2022 11.1 (L)  12.0 - 16.0 g/dL Final    Hematocrit 11/15/2022 33.7 (L)  37.0 - 48.5 % Final    MCV 11/15/2022 93  82 - 98 fL Final    MCH 11/15/2022 30.7  27.0 - 31.0 pg Final    MCHC 11/15/2022 32.9  32.0 - 36.0 g/dL Final    RDW 11/15/2022 14.2  11.5 - 14.5 % Final    Platelets 11/15/2022 182  150 - 450 K/uL Final    MPV 11/15/2022 10.7  9.2 - 12.9 fL Final    Immature Granulocytes 11/15/2022 CANCELED  % Final-Edited    Immature Grans (Abs) 11/15/2022 CANCELED  K/uL Final-Edited    nRBC 11/15/2022 0  0 /100 WBC Final    Gran % 11/15/2022 81.0 (H)  38.0 - 73.0 % Final    Lymph % 11/15/2022 9.0 (L)  18.0 - 48.0 % Final    Mono % 11/15/2022 6.0  4.0 - 15.0 % Final    Eosinophil % 11/15/2022 3.0  0.0 - 8.0 % Final    Basophil % 11/15/2022 0.0  0.0 - 1.9 % Final    Bands 11/15/2022 1.0  % Final    Differential Method 11/15/2022 Manual   Corrected   Lab Visit on 11/05/2022   Component Date Value Ref Range Status    WBC 11/05/2022 10.53  3.90 - 12.70 K/uL Final    RBC 11/05/2022 4.21  4.00 - 5.40 M/uL Final    Hemoglobin 11/05/2022 12.9  12.0 - 16.0 g/dL Final    Hematocrit 11/05/2022 39.5  37.0 - 48.5 % Final    MCV 11/05/2022 94  82 - 98 fL Final    MCH 11/05/2022 30.6  27.0 - 31.0 pg Final    MCHC 11/05/2022 32.7  32.0 - 36.0 g/dL Final    RDW 11/05/2022 15.2 (H)  11.5 - 14.5 % Final    Platelets 11/05/2022 243  150 - 450 K/uL Final    MPV 11/05/2022 10.5  9.2 - 12.9 fL Final    Immature Granulocytes 11/05/2022 CANCELED  0.0 - 0.5 % Final    Immature Grans (Abs) 11/05/2022 CANCELED  0.00 - 0.04 K/uL Final    Lymph # 11/05/2022 CANCELED  1.0 - 4.8 K/uL Final    Mono # 11/05/2022 CANCELED  0.3 - 1.0  K/uL Final    Eos # 11/05/2022 CANCELED  0.0 - 0.5 K/uL Final    Baso # 11/05/2022 CANCELED  0.00 - 0.20 K/uL Final    nRBC 11/05/2022 0  0 /100 WBC Final    Gran % 11/05/2022 75.0 (H)  38.0 - 73.0 % Final    Lymph % 11/05/2022 10.0 (L)  18.0 - 48.0 % Final    Mono % 11/05/2022 6.0  4.0 - 15.0 % Final    Eosinophil % 11/05/2022 2.0  0.0 - 8.0 % Final    Basophil % 11/05/2022 0.0  0.0 - 1.9 % Final    Bands 11/05/2022 7.0  % Final    Platelet Estimate 11/05/2022 Appears normal   Final    Differential Method 11/05/2022 Manual   Final    Sodium 11/05/2022 139  136 - 145 mmol/L Final    Potassium 11/05/2022 3.2 (L)  3.5 - 5.1 mmol/L Final    Chloride 11/05/2022 101  95 - 110 mmol/L Final    CO2 11/05/2022 28  23 - 29 mmol/L Final    Glucose 11/05/2022 103  70 - 110 mg/dL Final    BUN 11/05/2022 6 (L)  8 - 23 mg/dL Final    Creatinine 11/05/2022 0.7  0.5 - 1.4 mg/dL Final    Calcium 11/05/2022 8.8  8.7 - 10.5 mg/dL Final    Total Protein 11/05/2022 6.6  6.0 - 8.4 g/dL Final    Albumin 11/05/2022 3.4 (L)  3.5 - 5.2 g/dL Final    Total Bilirubin 11/05/2022 0.2  0.1 - 1.0 mg/dL Final    Alkaline Phosphatase 11/05/2022 116  55 - 135 U/L Final    AST 11/05/2022 12  10 - 40 U/L Final    ALT 11/05/2022 9 (L)  10 - 44 U/L Final    Anion Gap 11/05/2022 10  8 - 16 mmol/L Final    eGFR 11/05/2022 >60  >60 mL/min/1.73 m^2 Final    CEA 11/05/2022 1.8  0.0 - 5.0 ng/mL Final    Vitamin B-12 11/05/2022 >2000 (H)  210 - 950 pg/mL Final   Lab Visit on 10/21/2022   Component Date Value Ref Range Status    WBC 10/21/2022 4.83  3.90 - 12.70 K/uL Final    RBC 10/21/2022 3.68 (L)  4.00 - 5.40 M/uL Final    Hemoglobin 10/21/2022 11.2 (L)  12.0 - 16.0 g/dL Final    Hematocrit 10/21/2022 35.3 (L)  37.0 - 48.5 % Final    MCV 10/21/2022 96  82 - 98 fL Final    MCH 10/21/2022 30.4  27.0 - 31.0 pg Final    MCHC 10/21/2022 31.7 (L)  32.0 - 36.0 g/dL Final    RDW 10/21/2022 15.9 (H)  11.5 - 14.5 % Final    Platelets 10/21/2022 374  150 - 450 K/uL Final     MPV 10/21/2022 10.2  9.2 - 12.9 fL Final    Immature Granulocytes 10/21/2022 0.6 (H)  0.0 - 0.5 % Final    Gran # (ANC) 10/21/2022 3.8  1.8 - 7.7 K/uL Final    Immature Grans (Abs) 10/21/2022 0.03  0.00 - 0.04 K/uL Final    Lymph # 10/21/2022 0.5 (L)  1.0 - 4.8 K/uL Final    Mono # 10/21/2022 0.3  0.3 - 1.0 K/uL Final    Eos # 10/21/2022 0.1  0.0 - 0.5 K/uL Final    Baso # 10/21/2022 0.12  0.00 - 0.20 K/uL Final    nRBC 10/21/2022 0  0 /100 WBC Final    Gran % 10/21/2022 79.4 (H)  38.0 - 73.0 % Final    Lymph % 10/21/2022 9.3 (L)  18.0 - 48.0 % Final    Mono % 10/21/2022 6.8  4.0 - 15.0 % Final    Eosinophil % 10/21/2022 1.4  0.0 - 8.0 % Final    Basophil % 10/21/2022 2.5 (H)  0.0 - 1.9 % Final    Differential Method 10/21/2022 Automated   Final    Sodium 10/21/2022 136  136 - 145 mmol/L Final    Potassium 10/21/2022 4.6  3.5 - 5.1 mmol/L Final    Chloride 10/21/2022 102  95 - 110 mmol/L Final    CO2 10/21/2022 25  23 - 29 mmol/L Final    Glucose 10/21/2022 126 (H)  70 - 110 mg/dL Final    BUN 10/21/2022 10  8 - 23 mg/dL Final    Creatinine 10/21/2022 0.8  0.5 - 1.4 mg/dL Final    Calcium 10/21/2022 9.2  8.7 - 10.5 mg/dL Final    Total Protein 10/21/2022 6.8  6.0 - 8.4 g/dL Final    Albumin 10/21/2022 3.2 (L)  3.5 - 5.2 g/dL Final    Total Bilirubin 10/21/2022 0.4  0.1 - 1.0 mg/dL Final    Alkaline Phosphatase 10/21/2022 102  55 - 135 U/L Final    AST 10/21/2022 10  10 - 40 U/L Final    ALT 10/21/2022 8 (L)  10 - 44 U/L Final    Anion Gap 10/21/2022 9  8 - 16 mmol/L Final    eGFR 10/21/2022 >60  >60 mL/min/1.73 m^2 Final    Magnesium 10/21/2022 2.0  1.6 - 2.6 mg/dL Final   Lab Visit on 10/10/2022   Component Date Value Ref Range Status    Potassium 10/10/2022 3.2 (L)  3.5 - 5.1 mmol/L Final   Admission on 10/08/2022, Discharged on 10/08/2022   Component Date Value Ref Range Status    Magnesium 10/08/2022 1.7  1.6 - 2.6 mg/dL Final    Sodium 10/08/2022 139  136 - 145 mmol/L Final    Potassium 10/08/2022 3.1 (L)   3.5 - 5.1 mmol/L Final    Chloride 10/08/2022 101  95 - 110 mmol/L Final    CO2 10/08/2022 26  23 - 29 mmol/L Final    Glucose 10/08/2022 98  70 - 110 mg/dL Final    BUN 10/08/2022 7 (L)  8 - 23 mg/dL Final    Creatinine 10/08/2022 0.7  0.5 - 1.4 mg/dL Final    Calcium 10/08/2022 8.8  8.7 - 10.5 mg/dL Final    Anion Gap 10/08/2022 12  8 - 16 mmol/L Final    eGFR 10/08/2022 >60  >60 mL/min/1.73 m^2 Final   Lab Visit on 10/08/2022   Component Date Value Ref Range Status    WBC 10/08/2022 6.67  3.90 - 12.70 K/uL Final    RBC 10/08/2022 3.72 (L)  4.00 - 5.40 M/uL Final    Hemoglobin 10/08/2022 11.5 (L)  12.0 - 16.0 g/dL Final    Hematocrit 10/08/2022 33.7 (L)  37.0 - 48.5 % Final    MCV 10/08/2022 91  82 - 98 fL Final    MCH 10/08/2022 30.9  27.0 - 31.0 pg Final    MCHC 10/08/2022 34.1  32.0 - 36.0 g/dL Final    RDW 10/08/2022 15.9 (H)  11.5 - 14.5 % Final    Platelets 10/08/2022 187  150 - 450 K/uL Final    MPV 10/08/2022 10.7  9.2 - 12.9 fL Final    Immature Granulocytes 10/08/2022 5.8 (H)  0.0 - 0.5 % Final    Gran # (ANC) 10/08/2022 4.9  1.8 - 7.7 K/uL Final    Immature Grans (Abs) 10/08/2022 0.39 (H)  0.00 - 0.04 K/uL Final    Lymph # 10/08/2022 0.7 (L)  1.0 - 4.8 K/uL Final    Mono # 10/08/2022 0.5  0.3 - 1.0 K/uL Final    Eos # 10/08/2022 0.2  0.0 - 0.5 K/uL Final    Baso # 10/08/2022 0.06  0.00 - 0.20 K/uL Final    nRBC 10/08/2022 0  0 /100 WBC Final    Gran % 10/08/2022 73.3 (H)  38.0 - 73.0 % Final    Lymph % 10/08/2022 10.2 (L)  18.0 - 48.0 % Final    Mono % 10/08/2022 7.0  4.0 - 15.0 % Final    Eosinophil % 10/08/2022 2.8  0.0 - 8.0 % Final    Basophil % 10/08/2022 0.9  0.0 - 1.9 % Final    Differential Method 10/08/2022 Automated   Final    Sodium 10/08/2022 140  136 - 145 mmol/L Final    Potassium 10/08/2022 2.6 (LL)  3.5 - 5.1 mmol/L Final    Chloride 10/08/2022 102  95 - 110 mmol/L Final    CO2 10/08/2022 25  23 - 29 mmol/L Final    Glucose 10/08/2022 118 (H)  70 - 110 mg/dL Final    BUN 10/08/2022 6 (L)   8 - 23 mg/dL Final    Creatinine 10/08/2022 0.7  0.5 - 1.4 mg/dL Final    Calcium 10/08/2022 8.4 (L)  8.7 - 10.5 mg/dL Final    Total Protein 10/08/2022 5.7 (L)  6.0 - 8.4 g/dL Final    Albumin 10/08/2022 3.0 (L)  3.5 - 5.2 g/dL Final    Total Bilirubin 10/08/2022 0.2  0.1 - 1.0 mg/dL Final    Alkaline Phosphatase 10/08/2022 96  55 - 135 U/L Final    AST 10/08/2022 8 (L)  10 - 40 U/L Final    ALT 10/08/2022 12  10 - 44 U/L Final    Anion Gap 10/08/2022 13  8 - 16 mmol/L Final    eGFR 10/08/2022 >60  >60 mL/min/1.73 m^2 Final    Vitamin B-12 10/08/2022 >2000 (H)  210 - 950 pg/mL Final    CEA 10/08/2022 <1.7  0.0 - 5.0 ng/mL Final   Lab Visit on 09/24/2022   Component Date Value Ref Range Status    Ferritin 09/24/2022 318 (H)  20.0 - 300.0 ng/mL Final    WBC 09/24/2022 2.20 (L)  3.90 - 12.70 K/uL Final    RBC 09/24/2022 4.23  4.00 - 5.40 M/uL Final    Hemoglobin 09/24/2022 12.7  12.0 - 16.0 g/dL Final    Hematocrit 09/24/2022 39.3  37.0 - 48.5 % Final    MCV 09/24/2022 93  82 - 98 fL Final    MCH 09/24/2022 30.0  27.0 - 31.0 pg Final    MCHC 09/24/2022 32.3  32.0 - 36.0 g/dL Final    RDW 09/24/2022 14.8 (H)  11.5 - 14.5 % Final    Platelets 09/24/2022 232  150 - 450 K/uL Final    MPV 09/24/2022 9.8  9.2 - 12.9 fL Final    Immature Granulocytes 09/24/2022 0.0  0.0 - 0.5 % Final    Gran # (ANC) 09/24/2022 1.4 (L)  1.8 - 7.7 K/uL Final    Immature Grans (Abs) 09/24/2022 0.00  0.00 - 0.04 K/uL Final    Lymph # 09/24/2022 0.5 (L)  1.0 - 4.8 K/uL Final    Mono # 09/24/2022 0.2 (L)  0.3 - 1.0 K/uL Final    Eos # 09/24/2022 0.1  0.0 - 0.5 K/uL Final    Baso # 09/24/2022 0.01  0.00 - 0.20 K/uL Final    nRBC 09/24/2022 0  0 /100 WBC Final    Gran % 09/24/2022 61.3  38.0 - 73.0 % Final    Lymph % 09/24/2022 23.2  18.0 - 48.0 % Final    Mono % 09/24/2022 9.1  4.0 - 15.0 % Final    Eosinophil % 09/24/2022 5.9  0.0 - 8.0 % Final    Basophil % 09/24/2022 0.5  0.0 - 1.9 % Final    Differential Method 09/24/2022 Automated   Final     Sodium 09/24/2022 140  136 - 145 mmol/L Final    Potassium 09/24/2022 3.6  3.5 - 5.1 mmol/L Final    Chloride 09/24/2022 105  95 - 110 mmol/L Final    CO2 09/24/2022 26  23 - 29 mmol/L Final    Glucose 09/24/2022 117 (H)  70 - 110 mg/dL Final    BUN 09/24/2022 9  8 - 23 mg/dL Final    Creatinine 09/24/2022 0.8  0.5 - 1.4 mg/dL Final    Calcium 09/24/2022 9.0  8.7 - 10.5 mg/dL Final    Total Protein 09/24/2022 6.5  6.0 - 8.4 g/dL Final    Albumin 09/24/2022 3.4 (L)  3.5 - 5.2 g/dL Final    Total Bilirubin 09/24/2022 0.7  0.1 - 1.0 mg/dL Final    Alkaline Phosphatase 09/24/2022 92  55 - 135 U/L Final    AST 09/24/2022 14  10 - 40 U/L Final    ALT 09/24/2022 17  10 - 44 U/L Final    Anion Gap 09/24/2022 9  8 - 16 mmol/L Final    eGFR 09/24/2022 >60  >60 mL/min/1.73 m^2 Final    Iron 09/24/2022 94  30 - 160 ug/dL Final    Transferrin 09/24/2022 237  200 - 375 mg/dL Final    TIBC 09/24/2022 351  250 - 450 ug/dL Final    Saturated Iron 09/24/2022 27  20 - 50 % Final   Lab Visit on 09/10/2022   Component Date Value Ref Range Status    Specimen UA 09/10/2022 Urine, Clean Catch   Final    Color, UA 09/10/2022 Yellow  Yellow, Straw, Emily Final    Appearance, UA 09/10/2022 Clear  Clear Final    pH, UA 09/10/2022 6.0  5.0 - 8.0 Final    Specific Gravity, UA 09/10/2022 <=1.005 (A)  1.005 - 1.030 Final    Protein, UA 09/10/2022 Negative  Negative Final    Glucose, UA 09/10/2022 Negative  Negative Final    Ketones, UA 09/10/2022 Negative  Negative Final    Bilirubin (UA) 09/10/2022 Negative  Negative Final    Occult Blood UA 09/10/2022 Negative  Negative Final    Nitrite, UA 09/10/2022 Negative  Negative Final    Urobilinogen, UA 09/10/2022 Negative  <2.0 EU/dL Final    Leukocytes, UA 09/10/2022 Trace (A)  Negative Final    RBC, UA 09/10/2022 1  0 - 4 /hpf Final    WBC, UA 09/10/2022 13 (H)  0 - 5 /hpf Final    Bacteria 09/10/2022 Occasional  None-Occ /hpf Final    Squam Epithel, UA 09/10/2022 9  /hpf Final    Microscopic Comment  09/10/2022 SEE COMMENT   Final   Lab Visit on 09/10/2022   Component Date Value Ref Range Status    WBC 09/10/2022 4.35  3.90 - 12.70 K/uL Final    RBC 09/10/2022 4.24  4.00 - 5.40 M/uL Final    Hemoglobin 09/10/2022 12.6  12.0 - 16.0 g/dL Final    Hematocrit 09/10/2022 38.9  37.0 - 48.5 % Final    MCV 09/10/2022 92  82 - 98 fL Final    MCH 09/10/2022 29.7  27.0 - 31.0 pg Final    MCHC 09/10/2022 32.4  32.0 - 36.0 g/dL Final    RDW 09/10/2022 15.0 (H)  11.5 - 14.5 % Final    Platelets 09/10/2022 205  150 - 450 K/uL Final    MPV 09/10/2022 10.7  9.2 - 12.9 fL Final    Immature Granulocytes 09/10/2022 0.2  0.0 - 0.5 % Final    Gran # (ANC) 09/10/2022 3.3  1.8 - 7.7 K/uL Final    Immature Grans (Abs) 09/10/2022 0.01  0.00 - 0.04 K/uL Final    Lymph # 09/10/2022 0.5 (L)  1.0 - 4.8 K/uL Final    Mono # 09/10/2022 0.3  0.3 - 1.0 K/uL Final    Eos # 09/10/2022 0.2  0.0 - 0.5 K/uL Final    Baso # 09/10/2022 0.04  0.00 - 0.20 K/uL Final    nRBC 09/10/2022 0  0 /100 WBC Final    Gran % 09/10/2022 75.2 (H)  38.0 - 73.0 % Final    Lymph % 09/10/2022 12.0 (L)  18.0 - 48.0 % Final    Mono % 09/10/2022 7.6  4.0 - 15.0 % Final    Eosinophil % 09/10/2022 4.1  0.0 - 8.0 % Final    Basophil % 09/10/2022 0.9  0.0 - 1.9 % Final    Differential Method 09/10/2022 Automated   Final    Sodium 09/10/2022 137  136 - 145 mmol/L Final    Potassium 09/10/2022 3.8  3.5 - 5.1 mmol/L Final    Chloride 09/10/2022 103  95 - 110 mmol/L Final    CO2 09/10/2022 23  23 - 29 mmol/L Final    Glucose 09/10/2022 103  70 - 110 mg/dL Final    BUN 09/10/2022 11  8 - 23 mg/dL Final    Creatinine 09/10/2022 0.8  0.5 - 1.4 mg/dL Final    Calcium 09/10/2022 9.1  8.7 - 10.5 mg/dL Final    Total Protein 09/10/2022 7.3  6.0 - 8.4 g/dL Final    Albumin 09/10/2022 3.6  3.5 - 5.2 g/dL Final    Total Bilirubin 09/10/2022 0.5  0.1 - 1.0 mg/dL Final    Alkaline Phosphatase 09/10/2022 105  55 - 135 U/L Final    AST 09/10/2022 12  10 - 40 U/L Final    ALT 09/10/2022 13  10 -  44 U/L Final    Anion Gap 09/10/2022 11  8 - 16 mmol/L Final    eGFR 09/10/2022 >60  >60 mL/min/1.73 m^2 Final   There may be more visits with results that are not included.       Past Medical History:   Diagnosis Date    Acute hypoxemic respiratory failure 07/09/2018    Arthritis     Cancer     colon    COPD (chronic obstructive pulmonary disease)     History of home oxygen therapy     ONLY PRN AND HASN'T USED IT IN A YEAR    Hyperlipidemia     Thyroid disease      Past Surgical History:   Procedure Laterality Date    CHOLECYSTECTOMY      COLECTOMY N/A 11/9/2020    Procedure: COLECTOMY;  Surgeon: Brandon Wagner MD;  [facility];  Service: General;  Laterality: N/A;  LOWER- ANTERIOR    COLONOSCOPY N/A 10/23/2020    Procedure: COLONOSCOPY;  Surgeon: Jolynn Ayala MD;  Location: [facility];  Service: Endoscopy;  Laterality: N/A;    COLONOSCOPY N/A 8/6/2021    Procedure: COLONOSCOPY;  Surgeon: Jolynn Ayala MD;  Location: [facility] ENDO;  Service: Endoscopy;  Laterality: N/A;    COLONOSCOPY N/A 7/7/2022    Procedure: COLONOSCOPY;  Surgeon: Jolynn Ayala MD;  Location: [facility] ENDO;  Service: Endoscopy;  Laterality: N/A;    ESOPHAGEAL DILATION N/A 6/29/2021    Procedure: DILATION, ESOPHAGUS;  Surgeon: Sourav Baires III, MD;  Location: [facility];  Service: Endoscopy;  Laterality: N/A;    ESOPHAGOGASTRODUODENOSCOPY  06/29/2021    Dilation to 57 Fr    ESOPHAGOGASTRODUODENOSCOPY N/A 6/29/2021    Procedure: EGD (ESOPHAGOGASTRODUODENOSCOPY);  Surgeon: Sourav Baires III, MD;  Location: [facility];  Service: Endoscopy;  Laterality: N/A;    FLEXIBLE SIGMOIDOSCOPY N/A 8/1/2022    Procedure: SIGMOIDOSCOPY, FLEXIBLE;  Surgeon: Brandon Wagner MD;  Location: [facility];  Service: Endoscopy;  Laterality: N/A;    HYSTERECTOMY      INSERTION OF TUNNELED CENTRAL VENOUS CATHETER (CVC) WITH SUBCUTANEOUS PORT N/A 1/11/2021    Procedure: [id number];  Surgeon: Brandon Wagner MD;  [facility];   Service: General;  Laterality: N/A;    INSERTION OF TUNNELED CENTRAL VENOUS CATHETER (CVC) WITH SUBCUTANEOUS PORT Right 9/1/2022    Procedure: [id number];  Surgeon: Brandon Wagner MD;  Location: [facility] OR;  Service: General;  Laterality: Right;  Wants rt side placement    MEDIPORT REMOVAL Left 4/19/2021    Procedure: REMOVAL, CATHETER, CENTRAL VENOUS, TUNNELED, WITH PORT;  Surgeon: Brandon Wagner MD;  Location: [facility] OR;  Service: General;  Laterality: Left;    ROBOT-ASSISTED COLECTOMY N/A 11/9/2020    Procedure: ROBOTIC COLECTOMY low anterior resection, possible open;  Surgeon: Brandon Wagner MD;  Location: [facility] OR;  Service: General;  Laterality: N/A;  CONVERTED TO OPEN AT 1503     Family History   Problem Relation Age of Onset    COPD Mother     COPD Father     Prostate cancer Brother     Breast cancer Maternal Grandmother        Marital Status:   Alcohol History:  reports no history of alcohol use.  Tobacco History:  reports that she quit smoking about 5 years ago. Her smoking use included cigarettes. She has a 80.00 pack-year smoking history. She has never used smokeless tobacco.  Drug History:  reports no history of drug use.    Review of patient's allergies indicates:   Allergen Reactions    Ativan [lorazepam] Hives and Itching    Flagyl [metronidazole] Itching and Rash    Pcn [penicillins] Hives, Itching and Rash       Current Outpatient Medications:     CMPD hydrocortisone 2%- LIDOcaine 3% suppository (RECTAL ROCKET), Place 1 suppository rectally every evening. Insert 1 suppository 3/4 of the way onto rectum. Wet for easier application. Repeat for 3 nights., Disp: 30 suppository, Rfl: 2    cyanocobalamin (VITAMIN B-12) 1000 MCG tablet, Take 100 mcg by mouth once daily., Disp: , Rfl:     diphenoxylate-atropine 2.5-0.025 mg (LOMOTIL) 2.5-0.025 mg per tablet, Take by mouth., Disp: , Rfl:     ferrous sulfate 325 (65 FE) MG EC tablet, Take 325 mg by mouth 2 (two) times daily., Disp: , Rfl:      levothyroxine (SYNTHROID) 125 MCG tablet, Take 1 tablet (125 mcg total) by mouth before breakfast., Disp: 30 tablet, Rfl: 11    ondansetron (ZOFRAN-ODT) 8 MG TbDL, Take 1 tablet (8 mg total) by mouth every 12 (twelve) hours as needed., Disp: 30 tablet, Rfl: 1    potassium chloride 10% (KAYCIEL) 20 mEq/15 mL oral solution, Take 15 mLs (20 mEq total) by mouth 2 (two) times daily., Disp: 473 mL, Rfl: 0    prochlorperazine (COMPAZINE) 10 MG tablet, Take 1 tablet (10 mg total) by mouth every 6 (six) hours as needed., Disp: 30 tablet, Rfl: 1    traMADoL (ULTRAM) 50 mg tablet, Take 1 tablet (50 mg total) by mouth every 6 (six) hours as needed., Disp: 30 tablet, Rfl: 0    triamcinolone acetonide 0.025% (KENALOG) 0.025 % cream, Apply topically once daily., Disp: 80 g, Rfl: 1    HYDROcodone-acetaminophen (NORCO) 5-325 mg per tablet, Take 1 tablet by mouth every 6 (six) hours as needed for Pain. (Patient not taking: Reported on 12/1/2022), Disp: 20 tablet, Rfl: 0    Review of Systems   Constitutional:  Positive for appetite change and fatigue (comes and goes). Negative for chills, diaphoresis, fever and unexpected weight change.   HENT:  Negative for congestion, ear pain, hearing loss, nosebleeds, postnasal drip, sinus pressure, sinus pain, sneezing, sore throat, tinnitus, trouble swallowing and voice change.    Eyes:  Positive for itching. Negative for photophobia, pain and visual disturbance.   Respiratory:  Negative for apnea, cough, chest tightness, shortness of breath, wheezing and stridor.    Cardiovascular:  Negative for chest pain, palpitations and leg swelling.   Gastrointestinal:  Positive for diarrhea. Negative for abdominal distention, abdominal pain, blood in stool, constipation, nausea and vomiting.   Endocrine: Negative for cold intolerance, heat intolerance, polydipsia and polyuria.   Genitourinary:  Negative for difficulty urinating, dyspareunia, dysuria, flank pain, frequency, hematuria, menstrual  problem, pelvic pain, urgency, vaginal discharge and vaginal pain.   Musculoskeletal:  Negative for arthralgias, back pain, joint swelling, myalgias, neck pain and neck stiffness.   Skin:  Negative for pallor.        Periorbital erythema and dryness and some injection of the conjunctiva right greater than left   Allergic/Immunologic: Negative for environmental allergies and food allergies.   Neurological:  Negative for dizziness, tremors, speech difficulty, weakness, light-headedness and numbness.   Hematological:  Does not bruise/bleed easily.   Psychiatric/Behavioral:  Negative for agitation, confusion, decreased concentration, sleep disturbance and suicidal ideas. The patient is not nervous/anxious.         Objective:      Vitals    Vitals - 1 value per visit 11/15/2022 11/28/2022 11/28/2022 12/1/2022 12/1/2022   SYSTOLIC 113 - 123 - 128   DIASTOLIC 58 - 65 - 74   Pulse 99 - 92 - 114   Temp 96.3 - 96.9 - 98.1   Resp 16 - 12 - 16   SPO2 99 - 99 - 98   Weight (lb) - - 179.68 - 180   Weight (kg) - - 81.5 - 81.647   Height - - 61 - 61   BMI (Calculated) - - 34 - 34   VISIT REPORT - - - - -   Pain Score  - 2 - 0 -   Some recent data might be hidden       Physical Exam  Constitutional:       General: She is not in acute distress.     Appearance: She is not ill-appearing, toxic-appearing or diaphoretic.   HENT:      Head: Normocephalic and atraumatic.   Eyes:      Conjunctiva/sclera: Conjunctivae normal.      Pupils: Pupils are equal, round, and reactive to light.   Neck:      Vascular: No carotid bruit.   Cardiovascular:      Rate and Rhythm: Normal rate and regular rhythm.      Pulses: Normal pulses.      Heart sounds: Normal heart sounds.   Pulmonary:      Effort: Pulmonary effort is normal.      Breath sounds: Normal breath sounds.   Abdominal:      General: Bowel sounds are normal. There is no distension.      Palpations: Abdomen is soft. There is no mass.   Musculoskeletal:      Right lower leg: No edema.       Left lower leg: No edema.   Lymphadenopathy:      Cervical: No cervical adenopathy.   Skin:     General: Skin is warm and dry.      Findings: No lesion or rash.   Neurological:      General: No focal deficit present.      Mental Status: She is alert and oriented to person, place, and time.   Psychiatric:         Mood and Affect: Mood normal.         Behavior: Behavior normal.         Thought Content: Thought content normal.         Assessment:       1. Rectal cancer metastasized to intrapelvic lymph node         Plan:       Rectal cancer metastasized to intrapelvic lymph node    Follow up in about 3 months (around 3/1/2023) for FOLLOW UP LABS, FOLLOW-UP STATUS, FOLLOW UP MEDICATIONS.        12/1/2022 Karma Mcdermott M.D.

## 2022-12-02 NOTE — TELEPHONE ENCOUNTER
This nurse called pt to notify of next scheduled appointments and test change. This nurse explained that PET scan was denied, so test has been changed to a CT scan. Pt states she wants a PET scan, not a CT scan. She says she has been told by her insurance company that the PET scan was not denied in the past. This nurse read denial letter that was received this morning to pt. She will call her insurance company and requests call back from this nurse later today.

## 2022-12-05 NOTE — TELEPHONE ENCOUNTER
----- Message from Emily Gresham sent at 12/5/2022 10:32 AM CST -----  Contact: pt  Type:  RX Refill Request    Who Called:   pt daughter   Refill or New Rx:  refill  RX Name and Strength:   HYDROcodone-acetaminophen (NORCO) 5-325 mg per tablet  How is the patient currently taking it? (ex. 1XDay):   as ordered  Is this a 30 day or 90 day RX:   30  Preferred Pharmacy with phone number:   Class: [facility]  Local or Mail Order:  local  Ordering Provider:  Kristy  Best Call Back Number:  [phone]  Additional Information:   daughter in-law said to call her if there are any issues and when it will be ready for .

## 2022-12-06 NOTE — PROGRESS NOTES
Subjective:       Patient ID: Tessa Enriquez is a 66 y.o. female.    Chief Complaint:   rectal CA status post biopsy October 2020 robotic LAR November 9, 2020 by Dr. Wagner  Discontinued folfox due to s/e completed xrt  5/21     works at [employer]    pt went to GI wih rectal bleeding and abd pain scoped  Findings:        The digital rectal exam revealed a firm rectal mass palpated 5 to 6        cm from the anal verge. The mass was non-circumferential and located        predominantly at the posterior bowel wall.        There was evidence of a prior end-to-end colo-rectal anastomosis in        the distal rectum. This was patent and was characterized by        ulceration. The anastomosis was traversed. Mass in association with        the anastomosis has been previously biopsied and confirmed to be        adenocarcinoma. Anastomosis present at approximately 5 cm from anal    pt sent for discussion    MPRESSION:  1. Interval increased size of hypermetabolic presacral mass contiguous with hypermetabolic circumferential wall thickening of the rectum at the level of the anastomotic suture line, consistent with recurrent neoplasm and metastatic disease.  2. Interval development of enlarged, hypermetabolic right iliac chain lymph node consistent with metastatic disease.  3. No additional convincing evidence of metastatic disease.  4. Bilateral sacral ala insufficiency fractures, with corresponding FDG hypermetabolism.  5. Intense FDG uptake throughout the thyroid gland, unchanged and nonspecific.  Pt discussed wt [facility] started folfiri +avastin then from cycle 2 changed to FOLFIRI+ panitumumab  REVIEW OF SYSTEMS:   Admittd with severe side effects to chemotherapy including literally nonstop diarrhea blistering of anal and perineal region.  Tearing and watering eyes and abdominal cramping  Was unable to retain any fluids or solids   SKIN: Denies rash, issues with nails, non-healing sores, bleeding, blotching    skin or abnormal  bruising. Denies new moles or changes to existing moles.      BREASTS: There is no swelling around breasts or nipple discharge.    EYES: Denies eye pain, blurred vision, swelling, redness or discharge.      ENT AND MOUTH: Denies runny nose, stuffiness, sinus trouble or sores. Denies    nosebleeds. Denies, hoarseness, change in voice or swelling in front of the    neck.      CARDIOVASCULAR: Denies chest pain, discomfort or palpitations. Denies neck    swelling or episodes of passing out.      RESPIRATORY: Denies cough, sputum production, blood in sputum, and denies    shortness of breath.      GENITOURINARY: No discharge. No pelvic pain or lumps. No rash around groin or  lesions. No urinary frequency, hesitation, painful urination or blood in    urine. Denies incontinence. No problems with intercourse.      MUSCULOSKELETAL: Denies neck or back pain. Denies weakness in arms or legs,    joint problems or distended inflamed veins in legs. Denies swelling or abnormal  glands.      NEUROLOGICAL: Denies tingling, numbness, altered mentation changes to nerve    function in the face, weakness to one or both of the body. Denies changes to    gait and denies multiple falls or accidents.      PSYCHIATRIC: Denies nervousness, anxiety, hallucinations, depression, suicidal    ideation, trouble sleeping or changes in behavior noticed by family.          Oncology History:  T3 N1 rectal CA diagnosed October 2020 robotic LAR November 2020 December 2020 PET scan negative for metastatic disease   started FOLFOX 2/21 but stopped after 3 cycles and only wants surveillance   had colonoscopy 8/21 NAD  Past Medical History:   Diagnosis Date    Acute hypoxemic respiratory failure 07/09/2018    Arthritis     Cancer     colon    COPD (chronic obstructive pulmonary disease)     History of home oxygen therapy     ONLY PRN AND HASN'T USED IT IN A YEAR    Hyperlipidemia     Thyroid disease      Past Surgical History:   Procedure Laterality Date     CHOLECYSTECTOMY      COLECTOMY N/A 11/9/2020    Procedure: COLECTOMY;  Surgeon: Brandon aWgner MD;  Location: [facility] OR;  Service: General;  Laterality: N/A;  LOWER- ANTERIOR    COLONOSCOPY N/A 10/23/2020    Procedure: COLONOSCOPY;  Surgeon: Jolynn Ayala MD;  Location: [facility] ENDO;  Service: Endoscopy;  Laterality: N/A;    COLONOSCOPY N/A 8/6/2021    Procedure: COLONOSCOPY;  Surgeon: Jolynn Ayala MD;  Location: [facility] ENDO;  Service: Endoscopy;  Laterality: N/A;    COLONOSCOPY N/A 7/7/2022    Procedure: COLONOSCOPY;  Surgeon: Jolynn Ayala MD;  Location: [facility] ENDO;  Service: Endoscopy;  Laterality: N/A;    ESOPHAGEAL DILATION N/A 6/29/2021    Procedure: DILATION, ESOPHAGUS;  Surgeon: Sourav Baires III, MD;  Location: [facility] ENDO;  Service: Endoscopy;  Laterality: N/A;    ESOPHAGOGASTRODUODENOSCOPY  06/29/2021    Dilation to 57 Fr    ESOPHAGOGASTRODUODENOSCOPY N/A 6/29/2021    Procedure: EGD (ESOPHAGOGASTRODUODENOSCOPY);  Surgeon: Sourav Baires III, MD;  Location: [facility] ENDO;  Service: Endoscopy;  Laterality: N/A;    FLEXIBLE SIGMOIDOSCOPY N/A 8/1/2022    Procedure: SIGMOIDOSCOPY, FLEXIBLE;  Surgeon: Brandon Wagner MD;  Location: [facility] ENDO;  Service: Endoscopy;  Laterality: N/A;    HYSTERECTOMY      INSERTION OF TUNNELED CENTRAL VENOUS CATHETER (CVC) WITH SUBCUTANEOUS PORT N/A 1/11/2021    Procedure: [id number];  Surgeon: Brandon Wagner MD;  Location: [facility] OR;  Service: General;  Laterality: N/A;    INSERTION OF TUNNELED CENTRAL VENOUS CATHETER (CVC) WITH SUBCUTANEOUS PORT Right 9/1/2022    Procedure: [id number];  Surgeon: Brandon Wagner MD;  Location: [facility] OR;  Service: General;  Laterality: Right;  Wants rt side placement    MEDIPORT REMOVAL Left 4/19/2021    Procedure: REMOVAL, CATHETER, CENTRAL VENOUS, TUNNELED, WITH PORT;  Surgeon: Brandon Wagner MD;  Location: [facility] OR;  Service: General;  Laterality: Left;    ROBOT-ASSISTED COLECTOMY N/A 11/9/2020     Procedure: ROBOTIC COLECTOMY low anterior resection, possible open;  Surgeon: Brandon Wagner MD;  Location: [facility];  Service: General;  Laterality: N/A;  CONVERTED TO OPEN AT 1503     Family History   Problem Relation Age of Onset    COPD Mother     COPD Father     Prostate cancer Brother     Breast cancer Maternal Grandmother       Social History     Socioeconomic History    Marital status:    Tobacco Use    Smoking status: Former     Packs/day: 2.00     Years: 40.00     Pack years: 80.00     Types: Cigarettes     Quit date: 2017     Years since quittin.4    Smokeless tobacco: Never   Substance and Sexual Activity    Alcohol use: No    Drug use: No    Sexual activity: Not Currently     Partners: Male     Review of patient's allergies indicates:   Allergen Reactions    Ativan [lorazepam] Hives and Itching    Flagyl [metronidazole] Itching and Rash    Pcn [penicillins] Hives, Itching and Rash       Current Outpatient Medications:     CMPD hydrocortisone 2%- LIDOcaine 3% suppository (RECTAL ROCKET), Place 1 suppository rectally every evening. Insert 1 suppository 3/4 of the way onto rectum. Wet for easier application. Repeat for 3 nights., Disp: 30 suppository, Rfl: 2    cyanocobalamin (VITAMIN B-12) 1000 MCG tablet, Take 100 mcg by mouth once daily., Disp: , Rfl:     diphenoxylate-atropine 2.5-0.025 mg (LOMOTIL) 2.5-0.025 mg per tablet, Take by mouth., Disp: , Rfl:     ferrous sulfate 325 (65 FE) MG EC tablet, Take 325 mg by mouth 2 (two) times daily., Disp: , Rfl:     HYDROcodone-acetaminophen (NORCO) 5-325 mg per tablet, Take 1 tablet by mouth every 6 (six) hours as needed for Pain. (Patient not taking: Reported on 2022), Disp: 20 tablet, Rfl: 0    levothyroxine (SYNTHROID) 125 MCG tablet, Take 1 tablet (125 mcg total) by mouth before breakfast., Disp: 30 tablet, Rfl: 11    ondansetron (ZOFRAN-ODT) 8 MG TbDL, Take 1 tablet (8 mg total) by mouth every 12 (twelve) hours as needed., Disp: 30  tablet, Rfl: 1    potassium chloride 10% (KAYCIEL) 20 mEq/15 mL oral solution, Take 15 mLs (20 mEq total) by mouth 2 (two) times daily., Disp: 473 mL, Rfl: 0    prochlorperazine (COMPAZINE) 10 MG tablet, Take 1 tablet (10 mg total) by mouth every 6 (six) hours as needed., Disp: 30 tablet, Rfl: 1    traMADoL (ULTRAM) 50 mg tablet, Take 1 tablet (50 mg total) by mouth every 6 (six) hours as needed., Disp: 30 tablet, Rfl: 0    triamcinolone acetonide 0.025% (KENALOG) 0.025 % cream, Apply topically once daily., Disp: 80 g, Rfl: 1  No current facility-administered medications for this visit.  PHYSICAL EXAM:     Wt Readings from Last 3 Encounters:   12/01/22 81.6 kg (180 lb)   11/28/22 81.5 kg (179 lb 10.8 oz)   11/14/22 83 kg (182 lb 15.7 oz)     Temp Readings from Last 3 Encounters:   12/01/22 98.1 °F (36.7 °C)   11/28/22 96.9 °F (36.1 °C) (Temporal)   11/15/22 96.3 °F (35.7 °C) (Oral)     BP Readings from Last 3 Encounters:   12/01/22 128/74   11/28/22 123/65   11/15/22 (!) 113/58     Pulse Readings from Last 3 Encounters:   12/01/22 (!) 114   11/28/22 92   11/15/22 99     GENERAL: alert; in no apparent distress, , well-built well-nourished   HEAD: normocephalic, atraumatic.  EYES: pupils are equal, round, reactive to light and accommodation. Sclera anicteric. Conjunctiva not injected.   NOSE/THROAT: no nasal erythema or rhinorrhea. Oropharynx pink, without erythema, ulcerations or thrush.   NECK: no cervical motion rigidity; supple with no masses.  CHEST: clear to auscultation bilaterally; no wheezes, crackles or rubs. Patient is speaking comfortably on room air with normal work of breathing without using accessory muscles of respiration.  CARDIOVASCULAR: regular rate and rhythm; no murmurs, rubs or gallops.  ABDOMEN: soft, nontender, nondistended. Bowel sounds present.   MUSCULOSKELETAL: no tenderness to palpation along the spine or scapulae. Normal range of motion.  NEUROLOGIC: cranial nerves II-XII intact  bilaterally. Strength 5/5 in bilateral upper and lower extremities. No sensory deficits appreciated. Reflexes globally intact. No cerebellar signs. Normal gait.  LYMPHATIC: no cervical, supraclavicular or axillary adenopathy appreciated bilaterally.   EXTREMITIES: no clubbing, cyanosis, edema.  SKIN: no erythema, rashes, ulcerations noted  ECOG-0  Lab Results   Component Value Date    WBC 6.50 11/26/2022    HGB 12.9 11/26/2022    HCT 39.6 11/26/2022    MCV 95 11/26/2022     11/26/2022       BMP  Lab Results   Component Value Date     11/26/2022    K 3.3 (L) 11/26/2022     11/26/2022    CO2 27 11/26/2022    BUN 6 (L) 11/26/2022    CREATININE 0.7 11/26/2022    CALCIUM 8.6 (L) 11/26/2022    ANIONGAP 10 11/26/2022    ESTGFRAFRICA >60 05/02/2022    EGFRNONAA >60 05/02/2022      cea 7/14/21 1.8  November 9, 2020  .  Rectum, sigmoid colon and proximal donut (low anterior resection):   - Invasive adenocarcinoma,  invading through the muscularis propria into   pericolorectal tissue   - Pathologic stage pT3, pN1a (see synoptic report)   - Resection margins negative for dysplasia or malignancy (proximal, distal   and radial margins), within 1 mm to radial margin   - One tumor deposit   - Adenocarcinoma involve one out of totally forty seven lymph nodes (1/46 in   part 1, totally 1/47, also see part 2)   - Background colon with diverticulosis associated with hyperpigmented   macrophages, benign   - Proximal donut with benign colon, no dysplasia or malignancy   - Tattoo identified   2.  Colon, distal donut (excision):   - Benign colon, negative for dysplasia or malignancy   - One lymph node, negative for metastatic carcinoma (0/1)   COLON AND RECTUM: Resection, Including Transanal Disk Excision of Rectal   Neoplasms   Procedure  Low anterior resection   Tumor Site  Rectum    Tumor Location: Straddles the anterior peritoneal reflection   Tumor Size   Greatest dimension (centimeters): 7.0 cm   + Additional  dimensions (centimeters): 4.7 x 2.2 cm   Macroscopic Tumor Perforation  Not identified   Macroscopic Intactness of Mesorectum  Near complete   Histologic Type  Adenocarcinoma   Histologic Grade   G2: Moderately differentiated   Tumor Extension  Tumor invades through the muscularis propria into   pericolorectal tissue     Impression:  November 7, 2020 CT abdomen pelvis     1. Rectosigmoid junction mass, unchanged from prior.  No definite evidence of intraluminal contrast extravasation to suggest active gastrointestinal hemorrhage, however please note that arterial phase CT is more sensitive.  2. Colonic diverticulosis without acute diverticulitis.  3. Minimal pneumobilia, unchanged      IMPRESSION:  CT chest lung screen October 2020     1.  3 mm pulmonary nodule identified in the lateral segment of the  right lower lobe.  2.  Focus of groundglass opacity measuring 11 mm in diameter in the  superior segment of the right lower lobe. Short-term follow-up for  this lesion is recommended.  3.  Mild emphysematous lung disease.     LUNG-RADS CATEGORY 3: PROBABLY BENIGN FINDINGS     RECOMMENDATION: Short-term imaging follow-up with 6 month LDCT.    IMPRESSION: 7/21  1. Nonspecific hypermetabolic soft tissue density focus cranial to the rectosigmoid colon suture line, perhaps reflecting postsurgical and or posttreatment or postinflammatory changes. Attention to this area at follow-up imaging is recommended to help differentiate from residual or recurrent neoplasm.  2. Improving postoperative changes in the lower anterior abdominal wall, with associated multifocal FDG hypermetabolism which is most characteristic of postoperative etiology.  3. Otherwise no convincing evidence of recurrent neoplasm or metastatic disease.IMPRESSION:  1. Interval increased size of hypermetabolic presacral mass contiguous with hypermetabolic circumferential wall thickening of the rectum at the level of the anastomotic suture line, consistent with  recurrent neoplasm and metastatic disease.  2. Interval development of enlarged, hypermetabolic right iliac chain lymph node consistent with metastatic disease.  3. No additional convincing evidence of metastatic disease.  4. Bilateral sacral ala insufficiency fractures, with corresponding FDG hypermetabolism.  5. Intense FDG uptake throughout the thyroid gland, unchanged and nonspecific.      Patient Active Problem List   Diagnosis    COPD (chronic obstructive pulmonary disease)    Pure hypercholesterolemia    Acquired hypothyroidism    Rectal bleeding    Rectal cancer    Dysphagia    Malignant neoplasm of colon    B12 deficiency    FENG (iron deficiency anemia)    Hypocalcemia    Chronic diarrhea    Severe obesity (BMI 35.0-39.9) with comorbidity    Secondary and unspecified malignant neoplasm of intra-abdominal lymph nodes    Dehydration    Diarrhea    Chemotherapy-induced neutropenia    Chemotherapy-induced diarrhea   IMPRESSION:pet 12/2020  1. Postoperative changes of rectal colonic resection, with adjacent  FDG avid presacral soft tissue stranding.  2. Adjacent punctate extraluminal foci of free air suggesting a tiny  tract extending towards the adjacent small bowel (possibly  postoperative but suggesting a tiny enterocolonic fistula)  3. Deep subcutaneous soft tissue structure along the ventral abdomen  suggesting a postoperative hematoma/seroma, and less likely abscess.  4. No findings of additional sites of FDG avid disease.      IMPRESSION: 7/21  1. Nonspecific hypermetabolic soft tissue density focus cranial to the rectosigmoid colon suture line, perhaps reflecting postsurgical and or posttreatment or postinflammatory changes. Attention to this area at follow-up imaging is recommended to help differentiate from residual or recurrent neoplasm.  2. Improving postoperative changes in the lower anterior abdominal wall, with associated multifocal FDG hypermetabolism which is most characteristic of postoperative  etiology.  3. Otherwise no convincing evidence of recurrent neoplasm or metastatic disease      Impression:2/2/22   CT CAP  A single 4 mm soft tissue density nodule is noted in the right lower lobe.  No other intrapulmonary masses or nodules are seen.  Follow-up CT in 12 months per of this nodule is recommended.  Coronary artery calcification is noted.  No masses or adenopathy or seen.     Prior cholecystectomy.  Prior hysterectomy.  2.2 cm left ovarian cyst.  Small ventral hernia containing a loop of small bowel without CT evidence of obstruction    Impression:11/22     Presacral/pre coccygeal soft tissue density and fat stranding and circumferential wall thickening of segment of bowel down low in the pelvis at the surgical site are not significantly changed compared to the prior exam.  Prominent lymph node right iliac chain also does not appear significantly changed.  Findings also correspond as seen on prior PET-CT of 08/04/2022 and are suspicious for neoplasm  Assessment and Plan   At original dx 2020  Status post low anterior resection November 9, 2020 for rectal CA T3 N1a   tumor board convened and agreed on xrt that will follow  After completion of chemo  sequentially  therapy   Patient started FOLFOX.  Completed 4 out of 12 planned adjuvant cycles,  Discontinued  Chemo / port removed completed xrt 5/21  Pet:8/22, showing progression   Pt very hesitant T FIRST TO GET PORT OR INFUSIONAL 5 FU/ OXALIPLATIN, WENT TO [facility] ON SECOND OPINION AGREABLE NOW, HAD PORT PLACED   Started  FOLFIRI + avastin   from second cycle changed to FOLFIRI panitumumab   Sig diarrhea with CPT-11, in spite of added lomotil to imodium  decreased dose of CPT-11  to 165 mg/m2 but patient is still hospitalized with severe diarrhea electrolyte imbalance does not want any treatment till further scans are done to look for response prior to embarking I have discussed with patient the risks of this vision however I also showed her that we  will remove CPT 11 from her regimen due to severity of side effects see me with PET scan  Scan shows no change 12/22  Will drop cpt -11 and cont with 5fu and vectibix  Pt to take kcl oral doesn't want IV replacen   Dc the bolus 5FU  d/w  FENG :Injectafer  completed 8/22 good response  pt to add SL b12 to her twice a month b12 shots    Please ask for caris  Pt wants to wait till holidays are over to get imaging studies looking for response   b12 def anemia, taking injections q month level checked 10/21 sub therpautic 226 t INSURANCE WONT PAY FOR B12.ok to cont with SL only as levels have improved to 1500  iron deficiency  Pt to try oral for now, will recheck in 8/22 ( may help her chronic diarrhea)  hypocalcemia which seems chronic pt to supplement orally daily wnl this visit  Hypothyroidism, sees DR Mcdermott will adj meds  History of chronic obstructive pulmonary disease, dyslipidemia and hypothyroidism continue with PCP  Doesn't want to be covid vaccinated

## 2022-12-06 NOTE — Clinical Note
Ok for chemo, please tell infusion that I stipped the irinotecan but continuong all other drugs, rtc 2 weeks for next chemo with cbc,c mp, cea

## 2022-12-09 NOTE — TELEPHONE ENCOUNTER
----- Message from Brandon Wagner MD sent at 12/9/2022 11:32 AM CST -----  Contact: pt  If needed we can provide.  Just let me know  ----- Message -----  From: Marito Graham LPN  Sent: 12/5/2022  12:04 PM CST  To: Brandon Wagner MD    She need to get this through Hem/Onc?  ----- Message -----  From: Emily Gresham  Sent: 12/5/2022  10:34 AM CST  To: Kristy BOSS Staff    Type:  RX Refill Request    Who Called:   pt daughter   Refill or New Rx:  refill  RX Name and Strength:   HYDROcodone-acetaminophen (NORCO) 5-325 mg per tablet  How is the patient currently taking it? (ex. 1XDay):   as ordered  Is this a 30 day or 90 day RX:   30  Preferred Pharmacy with phone number:   Class: Print  Local or Mail Order:  local  Ordering Provider:  Kristy  Best Call Back Number:  [phone]  Additional Information:   daughter in-law said to call her if there are any issues and when it will be ready for .

## 2022-12-09 NOTE — TELEPHONE ENCOUNTER
This nurse spoke with pt. Explained to her that per discussion at pt's last office visit, pt is already cleared for upcoming tx. Pt v/u.    ----- Message from Yvonne Farris sent at 12/9/2022 11:03 AM CST -----  Regarding: LAB ORDERS PRIOR TO CHEMO  Contact: Patient  Type: Patient Call Back         Who called: Patient         What is the request in detail: calling to get lab orders put in prior to chemo appt sched for Monday 12/12; state she always has labs done prior to chemo; please advise           Best call back number: [phone]         Additional Information: wants to confirm sched in time           Thank You

## 2022-12-12 NOTE — PLAN OF CARE
Problem: Fatigue  Goal: Improved Activity Tolerance  Outcome: Ongoing, Progressing  Intervention: Promote Improved Energy  Flowsheets (Taken 12/12/2022 5036)  Fatigue Management:   fatigue-related activity identified   paced activity encouraged   frequent rest breaks encouraged  Sleep/Rest Enhancement:   noise level reduced   relaxation techniques promoted   regular sleep/rest pattern promoted

## 2022-12-14 NOTE — PLAN OF CARE
Problem: Fatigue  Goal: Improved Activity Tolerance  12/14/2022 1050 by Lizzy Sumner RN  Outcome: Met  12/14/2022 1050 by Lizzy Sumner RN  Outcome: Ongoing, Progressing

## 2022-12-23 NOTE — PROGRESS NOTES
Service Date:  12/23/22    Chief Complaint: metastatic colon cancer    Tessa Enriquez is a 67 y.o. female with metastatic rectal cancer, currently follows with Dr. Frank.  She is on FOLFIRI with avastin. She has no complaints to me. She is complaining of sinus infection with temp of 100.    Review of Systems   Constitutional: Negative.    HENT: Negative.     Eyes: Negative.    Respiratory: Negative.     Cardiovascular: Negative.    Gastrointestinal: Negative.    Endocrine: Negative.    Genitourinary: Negative.    Musculoskeletal: Negative.    Integumentary:  Negative.   Neurological: Negative.    Hematological: Negative.    Psychiatric/Behavioral: Negative.        Current Outpatient Medications   Medication Instructions    azithromycin (Z-LUCIEN) 250 MG tablet Take 2 tablets by mouth on day 1; Take 1 tablet by mouth on days 2-5<BR>    CMPD hydrocortisone 2%- LIDOcaine 3% suppository (RECTAL ROCKET) 1 suppository, Rectal, Nightly, Insert 1 suppository 3/4 of the way onto rectum. Wet for easier application. Repeat for 3 nights.    cyanocobalamin (VITAMIN B-12) 100 mcg, Oral, Daily    diphenoxylate-atropine 2.5-0.025 mg (LOMOTIL) 2.5-0.025 mg per tablet Oral    ferrous sulfate 325 mg, Oral, 2 times daily    HYDROcodone-acetaminophen (NORCO) 5-325 mg per tablet 1 tablet, Oral, Every 6 hours PRN    levothyroxine (SYNTHROID) 125 mcg, Oral, Before breakfast    ondansetron (ZOFRAN-ODT) 8 mg, Oral, Every 12 hours PRN    potassium chloride 10% (KAYCIEL) 20 mEq/15 mL oral solution 20 mEq, Oral, 2 times daily    prochlorperazine (COMPAZINE) 10 mg, Oral, Every 6 hours PRN    traMADoL (ULTRAM) 50 mg, Oral, Every 6 hours PRN    triamcinolone acetonide 0.025% (KENALOG) 0.025 % cream Topical (Top), Daily        Past Medical History:   Diagnosis Date    Acute hypoxemic respiratory failure 07/09/2018    Arthritis     Cancer     colon    COPD (chronic obstructive pulmonary disease)     History of home oxygen therapy     ONLY PRN  AND HASN'T USED IT IN A YEAR    Hyperlipidemia     Thyroid disease         Past Surgical History:   Procedure Laterality Date    CHOLECYSTECTOMY      COLECTOMY N/A 11/9/2020    Procedure: COLECTOMY;  Surgeon: Brandon Wagner MD;  Location: [facility] OR;  Service: General;  Laterality: N/A;  LOWER- ANTERIOR    COLONOSCOPY N/A 10/23/2020    Procedure: COLONOSCOPY;  Surgeon: Jolynn Ayala MD;  Location: [facility] ENDO;  Service: Endoscopy;  Laterality: N/A;    COLONOSCOPY N/A 8/6/2021    Procedure: COLONOSCOPY;  Surgeon: Jolynn Ayala MD;  Location: [facility] ENDO;  Service: Endoscopy;  Laterality: N/A;    COLONOSCOPY N/A 7/7/2022    Procedure: COLONOSCOPY;  Surgeon: Jolynn Ayala MD;  Location: [facility] ENDO;  Service: Endoscopy;  Laterality: N/A;    ESOPHAGEAL DILATION N/A 6/29/2021    Procedure: DILATION, ESOPHAGUS;  Surgeon: Sourav Baires III, MD;  Location: [facility];  Service: Endoscopy;  Laterality: N/A;    ESOPHAGOGASTRODUODENOSCOPY  06/29/2021    Dilation to 57 Fr    ESOPHAGOGASTRODUODENOSCOPY N/A 6/29/2021    Procedure: EGD (ESOPHAGOGASTRODUODENOSCOPY);  Surgeon: Sourav Baires III, MD;  Location: [facility] ENDO;  Service: Endoscopy;  Laterality: N/A;    FLEXIBLE SIGMOIDOSCOPY N/A 8/1/2022    Procedure: SIGMOIDOSCOPY, FLEXIBLE;  Surgeon: Brandon Wagner MD;  Location: [facility] ENDO;  Service: Endoscopy;  Laterality: N/A;    HYSTERECTOMY      INSERTION OF TUNNELED CENTRAL VENOUS CATHETER (CVC) WITH SUBCUTANEOUS PORT N/A 1/11/2021    Procedure: [id number];  Surgeon: Brandon Wagner MD;  Location: [facility] OR;  Service: General;  Laterality: N/A;    INSERTION OF TUNNELED CENTRAL VENOUS CATHETER (CVC) WITH SUBCUTANEOUS PORT Right 9/1/2022    Procedure: [id number];  Surgeon: Brandon Wagner MD;  Location: [facility] OR;  Service: General;  Laterality: Right;  Wants rt side placement    MEDIPORT REMOVAL Left 4/19/2021    Procedure: REMOVAL, CATHETER, CENTRAL VENOUS, TUNNELED, WITH PORT;  Surgeon:  Brandon Wagner MD;  Location: [facility] OR;  Service: General;  Laterality: Left;    ROBOT-ASSISTED COLECTOMY N/A 2020    Procedure: ROBOTIC COLECTOMY low anterior resection, possible open;  Surgeon: Brandon Wagner MD;  Location: [facility] OR;  Service: General;  Laterality: N/A;  CONVERTED TO OPEN AT 1503        Family History   Problem Relation Age of Onset    COPD Mother     COPD Father     Prostate cancer Brother     Breast cancer Maternal Grandmother        Social History     Tobacco Use    Smoking status: Former     Packs/day: 2.00     Years: 40.00     Pack years: 80.00     Types: Cigarettes     Quit date: 2017     Years since quittin.4    Smokeless tobacco: Never   Substance Use Topics    Alcohol use: No    Drug use: No         There were no vitals filed for this visit.       Physical Exam:  There were no vitals taken for this visit.    Physical Exam  Constitutional:       Appearance: Normal appearance.   HENT:      Head: Normocephalic and atraumatic.      Nose: Nose normal.      Mouth/Throat:      Mouth: Mucous membranes are moist.      Pharynx: Oropharynx is clear.   Eyes:      Conjunctiva/sclera: Conjunctivae normal.   Cardiovascular:      Rate and Rhythm: Normal rate and regular rhythm.      Heart sounds: Normal heart sounds.   Pulmonary:      Effort: Pulmonary effort is normal.      Breath sounds: Normal breath sounds.   Abdominal:      General: Abdomen is flat. Bowel sounds are normal.      Palpations: Abdomen is soft.   Musculoskeletal:         General: Normal range of motion.      Cervical back: Normal range of motion and neck supple.   Skin:     General: Skin is warm and dry.   Neurological:      General: No focal deficit present.      Mental Status: She is alert and oriented to person, place, and time. Mental status is at baseline.   Psychiatric:         Mood and Affect: Mood normal.        Labs:  Lab Results   Component Value Date    WBC 17.47 (H) 2022    RBC 4.42 2022    HGB  13.7 12/22/2022    HCT 43.1 12/22/2022    MCV 98 12/22/2022    MCH 31.0 12/22/2022    MCHC 31.8 (L) 12/22/2022    RDW 13.8 12/22/2022     12/22/2022    MPV 10.9 12/22/2022    GRAN 14.6 (H) 12/22/2022    GRAN 83.5 (H) 12/22/2022    LYMPH 0.5 (L) 12/22/2022    LYMPH 3.1 (L) 12/22/2022    MONO 1.2 (H) 12/22/2022    MONO 7.0 12/22/2022    EOS 0.1 12/22/2022    BASO 0.08 12/22/2022    EOSINOPHIL 0.5 12/22/2022    BASOPHIL 0.5 12/22/2022     Sodium   Date Value Ref Range Status   12/22/2022 136 136 - 145 mmol/L Final     Potassium   Date Value Ref Range Status   12/22/2022 3.3 (L) 3.5 - 5.1 mmol/L Final     Chloride   Date Value Ref Range Status   12/22/2022 101 95 - 110 mmol/L Final     CO2   Date Value Ref Range Status   12/22/2022 23 23 - 29 mmol/L Final     Glucose   Date Value Ref Range Status   12/22/2022 104 70 - 110 mg/dL Final     BUN   Date Value Ref Range Status   12/22/2022 5 (L) 8 - 23 mg/dL Final     Creatinine   Date Value Ref Range Status   12/22/2022 0.7 0.5 - 1.4 mg/dL Final     Calcium   Date Value Ref Range Status   12/22/2022 9.0 8.7 - 10.5 mg/dL Final     Total Protein   Date Value Ref Range Status   12/22/2022 7.2 6.0 - 8.4 g/dL Final     Albumin   Date Value Ref Range Status   12/22/2022 3.8 3.5 - 5.2 g/dL Final     Total Bilirubin   Date Value Ref Range Status   12/22/2022 0.5 0.1 - 1.0 mg/dL Final     Comment:     For infants and newborns, interpretation of results should be based  on gestational age, weight and in agreement with clinical  observations.    Premature Infant recommended reference ranges:  Up to 24 hours.............<8.0 mg/dL  Up to 48 hours............<12.0 mg/dL  3-5 days..................<15.0 mg/dL  6-29 days.................<15.0 mg/dL       Alkaline Phosphatase   Date Value Ref Range Status   12/22/2022 141 (H) 55 - 135 U/L Final     AST   Date Value Ref Range Status   12/22/2022 16 10 - 40 U/L Final     ALT   Date Value Ref Range Status   12/22/2022 16 10 - 44 U/L  Final     Anion Gap   Date Value Ref Range Status   12/22/2022 12 8 - 16 mmol/L Final     eGFR if    Date Value Ref Range Status   05/02/2022 >60 >60 mL/min/1.73 m^2 Final     eGFR if non    Date Value Ref Range Status   05/02/2022 >60 >60 mL/min/1.73 m^2 Final     Comment:     Calculation used to obtain the estimated glomerular filtration  rate (eGFR) is the CKD-EPI equation.          A/P:    Metastatic rectal cancer   Chemotherapy-induced fatigue   -currently on FOLFIRI with avastin.  Proceed with treatment today.  -return to clinic in 2 weeks to follow up with Dr. Frank    Sinusitis  -will give her a Z adrienne.      Aurash Khoobehi, MD  Hematology and Oncology

## 2022-12-23 NOTE — TELEPHONE ENCOUNTER
"This nurse called pt per NP Boyte request to inquire if she is taking oral K. Pt states that she is taking it BID. NP notified. Pt has virtual visit with Dr Khoobehi today. She states she has a "bad sinus infection and a sore throat."  "

## 2022-12-27 NOTE — PROGRESS NOTES
Subjective:       Patient ID: Tessa Enriquez is a 67 y.o. female.    Chief Complaint: Cough, Headache, and Nasal Congestion    Patient is a 67-year-old female who comes appointment.  She states that she is have cough, headaches and nasal congestion for the last 7-10 days.  She had gone to urgent care center and was given a Z-Sergio but is not feeling any better.  She has not been tested for COVID or flu.    Does not recall any direct contact with COVID a flu patients.  Does not recall any exposure to environmental pollutants.  Does not recall any cough and cold prior to onset of symptoms.    She is nonsmoker.      She also complains of a frontal and sinus headache.  Underlying COPD, history of colon cancer and chemotherapy also has been noted.        Headache   This is a new problem. The current episode started in the past 7 days. The problem occurs constantly. The problem has been unchanged. The pain is located in the Frontal region. The pain does not radiate. The quality of the pain is described as aching. The pain is at a severity of 3/10. The pain is mild. Associated symptoms include coughing and sinus pressure. Pertinent negatives include no abdominal pain, abnormal behavior, back pain, ear pain, eye pain or fever.     Past Medical History:   Diagnosis Date    Acute hypoxemic respiratory failure 2018    Arthritis     Cancer     colon    COPD (chronic obstructive pulmonary disease)     History of home oxygen therapy     ONLY PRN AND HASN'T USED IT IN A YEAR    Hyperlipidemia     Thyroid disease      Social History     Socioeconomic History    Marital status:    Tobacco Use    Smoking status: Former     Packs/day: 2.00     Years: 40.00     Pack years: 80.00     Types: Cigarettes     Quit date: 2017     Years since quittin.4    Smokeless tobacco: Never   Substance and Sexual Activity    Alcohol use: No    Drug use: No    Sexual activity: Not Currently     Partners: Male     Past Surgical  History:   Procedure Laterality Date    CHOLECYSTECTOMY      COLECTOMY N/A 11/9/2020    Procedure: COLECTOMY;  Surgeon: Brandon Wagner MD;  Location: [facility] OR;  Service: General;  Laterality: N/A;  LOWER- ANTERIOR    COLONOSCOPY N/A 10/23/2020    Procedure: COLONOSCOPY;  Surgeon: Jolynn Ayala MD;  Location: [facility] ENDO;  Service: Endoscopy;  Laterality: N/A;    COLONOSCOPY N/A 8/6/2021    Procedure: COLONOSCOPY;  Surgeon: Jolynn Ayala MD;  Location: [facility] ENDO;  Service: Endoscopy;  Laterality: N/A;    COLONOSCOPY N/A 7/7/2022    Procedure: COLONOSCOPY;  Surgeon: Jolynn Ayala MD;  Location: [facility] ENDO;  Service: Endoscopy;  Laterality: N/A;    ESOPHAGEAL DILATION N/A 6/29/2021    Procedure: DILATION, ESOPHAGUS;  Surgeon: Sourav Baires III, MD;  Location: [facility] ENDO;  Service: Endoscopy;  Laterality: N/A;    ESOPHAGOGASTRODUODENOSCOPY  06/29/2021    Dilation to 57 Fr    ESOPHAGOGASTRODUODENOSCOPY N/A 6/29/2021    Procedure: EGD (ESOPHAGOGASTRODUODENOSCOPY);  Surgeon: Sourav Baires III, MD;  Location: [facility] ENDO;  Service: Endoscopy;  Laterality: N/A;    FLEXIBLE SIGMOIDOSCOPY N/A 8/1/2022    Procedure: SIGMOIDOSCOPY, FLEXIBLE;  Surgeon: Brandon Wagner MD;  Location: [facility] ENDO;  Service: Endoscopy;  Laterality: N/A;    HYSTERECTOMY      INSERTION OF TUNNELED CENTRAL VENOUS CATHETER (CVC) WITH SUBCUTANEOUS PORT N/A 1/11/2021    Procedure: [id number];  Surgeon: Brandon Wagner MD;  Location: [facility] OR;  Service: General;  Laterality: N/A;    INSERTION OF TUNNELED CENTRAL VENOUS CATHETER (CVC) WITH SUBCUTANEOUS PORT Right 9/1/2022    Procedure: [id number];  Surgeon: Brandon Wagner MD;  Location: [facility] OR;  Service: General;  Laterality: Right;  Wants rt side placement    MEDIPORT REMOVAL Left 4/19/2021    Procedure: REMOVAL, CATHETER, CENTRAL VENOUS, TUNNELED, WITH PORT;  Surgeon: Brandon Wagner MD;  Location: [facility] OR;  Service: General;  Laterality: Left;     "ROBOT-ASSISTED COLECTOMY N/A 11/9/2020    Procedure: ROBOTIC COLECTOMY low anterior resection, possible open;  Surgeon: Brandon Wagner MD;  Location: [facility];  Service: General;  Laterality: N/A;  CONVERTED TO OPEN AT 1503     Family History   Problem Relation Age of Onset    COPD Mother     COPD Father     Prostate cancer Brother     Breast cancer Maternal Grandmother        Review of Systems   Constitutional:  Positive for activity change. Negative for chills, fatigue and fever.   HENT:  Positive for congestion and sinus pressure. Negative for ear pain.    Eyes:  Negative for pain.   Respiratory:  Positive for cough. Negative for shortness of breath.    Cardiovascular:  Negative for chest pain, palpitations and leg swelling.   Gastrointestinal:  Negative for abdominal distention, abdominal pain and anal bleeding.   Genitourinary:  Negative for difficulty urinating and dysuria.   Musculoskeletal:  Negative for back pain.   Neurological:  Positive for headaches.       Objective:      Blood pressure (!) 148/87, pulse 96, temperature 98.9 °F (37.2 °C), temperature source Oral, height 5' 1" (1.549 m), weight 81.2 kg (179 lb). Body mass index is 33.82 kg/m².  Physical Exam  Constitutional:       General: She is not in acute distress.     Appearance: She is not diaphoretic.   HENT:      Right Ear: Ear canal normal.      Left Ear: Ear canal normal.      Nose: Congestion and rhinorrhea present.      Mouth/Throat:      Pharynx: No oropharyngeal exudate or posterior oropharyngeal erythema.   Eyes:      General: No scleral icterus.  Cardiovascular:      Rate and Rhythm: Normal rate and regular rhythm.   Pulmonary:      Breath sounds: No stridor. Rhonchi present.   Abdominal:      Palpations: There is no mass.      Tenderness: There is no abdominal tenderness.   Musculoskeletal:      Right lower leg: No edema.      Left lower leg: No edema.   Skin:     Findings: No lesion or rash.   Neurological:      Mental Status: Mental " status is at baseline.   Psychiatric:         Behavior: Behavior normal.         Assessment:       1. Upper respiratory tract infection, unspecified type    2. Acute bronchitis, unspecified organism             Component      Latest Ref Rng & Units 12/27/2022 12/22/2022   WBC      3.90 - 12.70 K/uL  17.47 (H)   RBC      4.00 - 5.40 M/uL  4.42   HEMOGLOBIN      12.0 - 16.0 g/dL  13.7   HEMATOCRIT      37.0 - 48.5 %  43.1   MCV      82 - 98 fL  98   MCH      27.0 - 31.0 pg  31.0   MCHC      32.0 - 36.0 g/dL  31.8 (L)   RDW      11.5 - 14.5 %  13.8   Platelets      150 - 450 K/uL  181   MPV      9.2 - 12.9 fL  10.9   Immature Granulocytes      0.0 - 0.5 %  5.4 (H)   Gran # (ANC)      1.8 - 7.7 K/uL  14.6 (H)   Immature Grans (Abs)      0.00 - 0.04 K/uL  0.95 (H)   Lymph #      1.0 - 4.8 K/uL  0.5 (L)   Mono #      0.3 - 1.0 K/uL  1.2 (H)   Eos #      0.0 - 0.5 K/uL  0.1   Baso #      0.00 - 0.20 K/uL  0.08   nRBC      0 /100 WBC  0   Gran %      38.0 - 73.0 %  83.5 (H)   Lymph %      18.0 - 48.0 %  3.1 (L)   Mono %      4.0 - 15.0 %  7.0   Eosinophil %      0.0 - 8.0 %  0.5   Basophil %      0.0 - 1.9 %  0.5   Differential Method        Automated   Sodium      136 - 145 mmol/L  136   Potassium      3.5 - 5.1 mmol/L  3.3 (L)   Chloride      95 - 110 mmol/L  101   CO2      23 - 29 mmol/L  23   Glucose      70 - 110 mg/dL  104   BUN      8 - 23 mg/dL  5 (L)   Creatinine      0.5 - 1.4 mg/dL  0.7   Calcium      8.7 - 10.5 mg/dL  9.0   PROTEIN TOTAL      6.0 - 8.4 g/dL  7.2   Albumin      3.5 - 5.2 g/dL  3.8   BILIRUBIN TOTAL      0.1 - 1.0 mg/dL  0.5   Alkaline Phosphatase      55 - 135 U/L  141 (H)   AST      10 - 40 U/L  16   ALT      10 - 44 U/L  16   ANION GAP      8 - 16 mmol/L  12   eGFR      >60 mL/min/1.73 m:2  >60   SARS-CoV-2 RNA, Amplification, Qual      Negative Negative     Acceptable       Yes        Plan:           Upper respiratory tract infection, unspecified type  -     POCT Influenza A/B  Molecular  -     POCT COVID-19 Rapid Screening  -     doxycycline (MONODOX) 100 MG capsule; Take 1 capsule (100 mg total) by mouth 2 (two) times daily.  Dispense: 14 capsule; Refill: 0  -     methylPREDNISolone (MEDROL DOSEPACK) 4 mg tablet; use as directed  Dispense: 21 each; Refill: 0  -     guaiFENesin (MUCINEX) 600 mg 12 hr tablet; Take 1 tablet (600 mg total) by mouth 2 (two) times daily.  Dispense: 20 tablet; Refill: 0    Acute bronchitis, unspecified organism  -     albuterol (PROVENTIL) 2.5 mg /3 mL (0.083 %) nebulizer solution; Take 3 mLs (2.5 mg total) by nebulization every 6 (six) hours as needed for Wheezing. Rescue  Dispense: 120 each; Refill: 1  -     doxycycline (MONODOX) 100 MG capsule; Take 1 capsule (100 mg total) by mouth 2 (two) times daily.  Dispense: 14 capsule; Refill: 0  -     methylPREDNISolone (MEDROL DOSEPACK) 4 mg tablet; use as directed  Dispense: 21 each; Refill: 0  -     guaiFENesin (MUCINEX) 600 mg 12 hr tablet; Take 1 tablet (600 mg total) by mouth 2 (two) times daily.  Dispense: 20 tablet; Refill: 0      Patient's medical conditions have been noted.  She is having persistence of upper respiratory symptoms with yellowish mucus.  Some sinus pressure and headaches.      She has not been tested for COVID or flu and this testing was done in the office today.      The testing is negative.      She does have COPD.      I will treat her with doxycycline, Medrol Dosepak and Mucinex.  Salt water gargles and steam inhalation.  Hopefully this takes care of probably some degree of sinusitis and bronchitis.  History of smoking has been noted and she quit in 2017.      Increase your water intake to 64-80 oz daily    Nasal Saline spray (Over the counter [place] spray or [place])  2 sprays each nostril 2-3 times a day for nasal congestion    Tylenol 500 mg 2 tablets or Ibuprofen 200 mg 2 tablets every 4-6 hours as needed for fever, headaches, sore throat, ear pain, bodyaches, and/or nasal/sinus  inflammation    Warm salt water gargles with 1/2 cup water and 1 tablespoon salt every 4 hours    Warm tea with honey and lemon    Follow up with PCP in 1 week if symptoms do not resolve            Current Outpatient Medications:     azithromycin (Z-LUCIEN) 250 MG tablet, Take 2 tablets by mouth on day 1; Take 1 tablet by mouth on days 2-5, Disp: 6 tablet, Rfl: 0    CMPD hydrocortisone 2%- LIDOcaine 3% suppository (RECTAL ROCKET), Place 1 suppository rectally every evening. Insert 1 suppository 3/4 of the way onto rectum. Wet for easier application. Repeat for 3 nights., Disp: 30 suppository, Rfl: 2    cyanocobalamin (VITAMIN B-12) 1000 MCG tablet, Take 100 mcg by mouth once daily., Disp: , Rfl:     diphenoxylate-atropine 2.5-0.025 mg (LOMOTIL) 2.5-0.025 mg per tablet, Take by mouth., Disp: , Rfl:     ferrous sulfate 325 (65 FE) MG EC tablet, Take 325 mg by mouth 2 (two) times daily., Disp: , Rfl:     HYDROcodone-acetaminophen (NORCO) 5-325 mg per tablet, Take 1 tablet by mouth every 6 (six) hours as needed for Pain., Disp: 30 tablet, Rfl: 0    levothyroxine (SYNTHROID) 125 MCG tablet, Take 1 tablet (125 mcg total) by mouth before breakfast., Disp: 30 tablet, Rfl: 11    ondansetron (ZOFRAN-ODT) 8 MG TbDL, Take 1 tablet (8 mg total) by mouth every 12 (twelve) hours as needed., Disp: 30 tablet, Rfl: 1    potassium chloride 10% (KAYCIEL) 20 mEq/15 mL oral solution, Take 15 mLs (20 mEq total) by mouth 2 (two) times daily., Disp: 473 mL, Rfl: 0    prochlorperazine (COMPAZINE) 10 MG tablet, Take 1 tablet (10 mg total) by mouth every 6 (six) hours as needed., Disp: 30 tablet, Rfl: 1    traMADoL (ULTRAM) 50 mg tablet, Take 1 tablet (50 mg total) by mouth every 6 (six) hours as needed., Disp: 30 tablet, Rfl: 0    triamcinolone acetonide 0.025% (KENALOG) 0.025 % cream, Apply topically once daily., Disp: 80 g, Rfl: 1    albuterol (PROVENTIL) 2.5 mg /3 mL (0.083 %) nebulizer solution, Take 3 mLs (2.5 mg total) by nebulization  every 6 (six) hours as needed for Wheezing. Rescue, Disp: 120 each, Rfl: 1    doxycycline (MONODOX) 100 MG capsule, Take 1 capsule (100 mg total) by mouth 2 (two) times daily., Disp: 14 capsule, Rfl: 0    guaiFENesin (MUCINEX) 600 mg 12 hr tablet, Take 1 tablet (600 mg total) by mouth 2 (two) times daily., Disp: 20 tablet, Rfl: 0    methylPREDNISolone (MEDROL DOSEPACK) 4 mg tablet, use as directed, Disp: 21 each, Rfl: 0

## 2022-12-27 NOTE — TELEPHONE ENCOUNTER
----- Message from Smith Stone sent at 12/27/2022  7:46 AM CST -----  Type: Need Medical Advice  Who Called:FENG  [facility] callback number: [phone]  Additional Info: Patient is not feeling well she called to cancel her treatment for today 12/17  Please call to further assist with rescheduling, Thanks

## 2022-12-28 NOTE — TELEPHONE ENCOUNTER
This nurse called pt as requested. Pt states that she has a viral URI, and will not be able to make it for this round of chemo. She will be on 10 days of steroids and abx that her PCP has prescribed. Pt will see Dr Frank for next clearance then after these are finished. Infusion notified of cancellation.      ----- Message from Zo Bardales sent at 12/28/2022  9:23 AM CST -----  Regarding: pt call back requested  Name of Who is Calling:FENG DELUNA [[id number]]          What is the request in detail: pt call back requested           Can the clinic reply by [facility]: no           What Number to Call Back if not in [facility]: [phone] (home)

## 2023-01-01 ENCOUNTER — TELEPHONE (OUTPATIENT)
Dept: HEMATOLOGY/ONCOLOGY | Facility: CLINIC | Age: 68
End: 2023-01-01
Payer: COMMERCIAL

## 2023-01-01 ENCOUNTER — HOSPITAL ENCOUNTER (INPATIENT)
Facility: HOSPITAL | Age: 68
LOS: 26 days | DRG: 329 | End: 2023-08-19
Attending: SURGERY | Admitting: SURGERY
Payer: MEDICARE

## 2023-01-01 ENCOUNTER — OFFICE VISIT (OUTPATIENT)
Dept: GASTROENTEROLOGY | Facility: CLINIC | Age: 68
End: 2023-01-01
Payer: COMMERCIAL

## 2023-01-01 ENCOUNTER — PATIENT MESSAGE (OUTPATIENT)
Dept: RESEARCH | Facility: HOSPITAL | Age: 68
End: 2023-01-01
Payer: COMMERCIAL

## 2023-01-01 ENCOUNTER — ANESTHESIA (OUTPATIENT)
Dept: SURGERY | Facility: HOSPITAL | Age: 68
DRG: 329 | End: 2023-01-01
Payer: MEDICARE

## 2023-01-01 ENCOUNTER — INFUSION (OUTPATIENT)
Dept: INFUSION THERAPY | Facility: HOSPITAL | Age: 68
End: 2023-01-01
Attending: INTERNAL MEDICINE
Payer: COMMERCIAL

## 2023-01-01 ENCOUNTER — LAB VISIT (OUTPATIENT)
Dept: LAB | Facility: HOSPITAL | Age: 68
End: 2023-01-01
Attending: INTERNAL MEDICINE
Payer: COMMERCIAL

## 2023-01-01 ENCOUNTER — TELEPHONE (OUTPATIENT)
Dept: UROLOGY | Facility: CLINIC | Age: 68
End: 2023-01-01
Payer: COMMERCIAL

## 2023-01-01 ENCOUNTER — ANESTHESIA (OUTPATIENT)
Dept: SURGERY | Facility: HOSPITAL | Age: 68
DRG: 660 | End: 2023-01-01
Payer: COMMERCIAL

## 2023-01-01 ENCOUNTER — OFFICE VISIT (OUTPATIENT)
Dept: HEMATOLOGY/ONCOLOGY | Facility: CLINIC | Age: 68
End: 2023-01-01
Payer: COMMERCIAL

## 2023-01-01 ENCOUNTER — ANESTHESIA (OUTPATIENT)
Dept: ENDOSCOPY | Facility: HOSPITAL | Age: 68
End: 2023-01-01
Payer: COMMERCIAL

## 2023-01-01 ENCOUNTER — PATIENT OUTREACH (OUTPATIENT)
Dept: ADMINISTRATIVE | Facility: CLINIC | Age: 68
End: 2023-01-01
Payer: COMMERCIAL

## 2023-01-01 ENCOUNTER — CLINICAL SUPPORT (OUTPATIENT)
Dept: UROLOGY | Facility: CLINIC | Age: 68
End: 2023-01-01
Payer: COMMERCIAL

## 2023-01-01 ENCOUNTER — OFFICE VISIT (OUTPATIENT)
Dept: SURGERY | Facility: CLINIC | Age: 68
End: 2023-01-01
Payer: COMMERCIAL

## 2023-01-01 ENCOUNTER — OUTPATIENT CASE MANAGEMENT (OUTPATIENT)
Dept: ADMINISTRATIVE | Facility: OTHER | Age: 68
End: 2023-01-01
Payer: COMMERCIAL

## 2023-01-01 ENCOUNTER — OFFICE VISIT (OUTPATIENT)
Dept: UROLOGY | Facility: CLINIC | Age: 68
End: 2023-01-01
Payer: COMMERCIAL

## 2023-01-01 ENCOUNTER — DOCUMENTATION ONLY (OUTPATIENT)
Dept: INFUSION THERAPY | Facility: HOSPITAL | Age: 68
End: 2023-01-01

## 2023-01-01 ENCOUNTER — HOSPITAL ENCOUNTER (OUTPATIENT)
Dept: RADIOLOGY | Facility: HOSPITAL | Age: 68
Discharge: HOME | End: 2023-04-22
Attending: INTERNAL MEDICINE
Payer: COMMERCIAL

## 2023-01-01 ENCOUNTER — ANESTHESIA EVENT (OUTPATIENT)
Dept: SURGERY | Facility: HOSPITAL | Age: 68
DRG: 661 | End: 2023-01-01
Payer: MEDICARE

## 2023-01-01 ENCOUNTER — ANESTHESIA EVENT (OUTPATIENT)
Dept: SURGERY | Facility: HOSPITAL | Age: 68
End: 2023-01-01
Payer: COMMERCIAL

## 2023-01-01 ENCOUNTER — TELEPHONE (OUTPATIENT)
Dept: SURGERY | Facility: CLINIC | Age: 68
End: 2023-01-01
Payer: COMMERCIAL

## 2023-01-01 ENCOUNTER — HOSPITAL ENCOUNTER (OUTPATIENT)
Facility: HOSPITAL | Age: 68
Discharge: HOME | End: 2023-03-07
Attending: UROLOGY | Admitting: UROLOGY
Payer: COMMERCIAL

## 2023-01-01 ENCOUNTER — HOSPITAL ENCOUNTER (INPATIENT)
Facility: HOSPITAL | Age: 68
LOS: 5 days | Discharge: HOME | DRG: 660 | End: 2023-02-26
Attending: EMERGENCY MEDICINE | Admitting: STUDENT IN AN ORGANIZED HEALTH CARE EDUCATION/TRAINING PROGRAM
Payer: COMMERCIAL

## 2023-01-01 ENCOUNTER — ANESTHESIA (OUTPATIENT)
Dept: SURGERY | Facility: HOSPITAL | Age: 68
End: 2023-01-01
Payer: COMMERCIAL

## 2023-01-01 ENCOUNTER — HOSPITAL ENCOUNTER (OUTPATIENT)
Dept: RADIOLOGY | Facility: HOSPITAL | Age: 68
Discharge: HOME | End: 2023-06-14
Attending: SURGERY
Payer: COMMERCIAL

## 2023-01-01 ENCOUNTER — HOSPITAL ENCOUNTER (OUTPATIENT)
Dept: RADIOLOGY | Facility: HOSPITAL | Age: 68
Discharge: HOME | End: 2023-05-24
Attending: UROLOGY
Payer: COMMERCIAL

## 2023-01-01 ENCOUNTER — HOSPITAL ENCOUNTER (OUTPATIENT)
Facility: HOSPITAL | Age: 68
Discharge: HOME | End: 2023-05-31
Attending: INTERNAL MEDICINE | Admitting: INTERNAL MEDICINE
Payer: COMMERCIAL

## 2023-01-01 ENCOUNTER — HOSPITAL ENCOUNTER (OUTPATIENT)
Dept: PREADMISSION TESTING | Facility: HOSPITAL | Age: 68
Discharge: HOME | DRG: 329 | End: 2023-07-21
Attending: SURGERY
Payer: COMMERCIAL

## 2023-01-01 ENCOUNTER — TELEPHONE (OUTPATIENT)
Dept: GASTROENTEROLOGY | Facility: CLINIC | Age: 68
End: 2023-01-01
Payer: COMMERCIAL

## 2023-01-01 ENCOUNTER — TELEPHONE (OUTPATIENT)
Dept: INFUSION THERAPY | Facility: HOSPITAL | Age: 68
End: 2023-01-01

## 2023-01-01 ENCOUNTER — ANESTHESIA (OUTPATIENT)
Dept: SURGERY | Facility: HOSPITAL | Age: 68
DRG: 661 | End: 2023-01-01
Payer: COMMERCIAL

## 2023-01-01 ENCOUNTER — ANESTHESIA EVENT (OUTPATIENT)
Dept: SURGERY | Facility: HOSPITAL | Age: 68
DRG: 660 | End: 2023-01-01
Payer: MEDICARE

## 2023-01-01 ENCOUNTER — ANESTHESIA (OUTPATIENT)
Dept: SURGERY | Facility: HOSPITAL | Age: 68
DRG: 329 | End: 2023-01-01
Payer: COMMERCIAL

## 2023-01-01 ENCOUNTER — ANESTHESIA EVENT (OUTPATIENT)
Dept: ENDOSCOPY | Facility: HOSPITAL | Age: 68
End: 2023-01-01
Payer: COMMERCIAL

## 2023-01-01 ENCOUNTER — HOSPITAL ENCOUNTER (OUTPATIENT)
Dept: PREADMISSION TESTING | Facility: HOSPITAL | Age: 68
Discharge: HOME | End: 2023-07-20
Attending: SURGERY
Payer: MEDICARE

## 2023-01-01 ENCOUNTER — HOSPITAL ENCOUNTER (INPATIENT)
Facility: HOSPITAL | Age: 68
LOS: 3 days | Discharge: HOME | DRG: 661 | End: 2023-03-28
Attending: EMERGENCY MEDICINE | Admitting: STUDENT IN AN ORGANIZED HEALTH CARE EDUCATION/TRAINING PROGRAM
Payer: MEDICARE

## 2023-01-01 ENCOUNTER — NURSE TRIAGE (OUTPATIENT)
Dept: ADMINISTRATIVE | Facility: CLINIC | Age: 68
End: 2023-01-01
Payer: COMMERCIAL

## 2023-01-01 ENCOUNTER — HOSPITAL ENCOUNTER (OUTPATIENT)
Facility: HOSPITAL | Age: 68
Discharge: HOME | End: 2023-04-11
Attending: UROLOGY | Admitting: UROLOGY
Payer: COMMERCIAL

## 2023-01-01 ENCOUNTER — ANESTHESIA EVENT (OUTPATIENT)
Dept: SURGERY | Facility: HOSPITAL | Age: 68
DRG: 329 | End: 2023-01-01
Payer: MEDICARE

## 2023-01-01 VITALS
DIASTOLIC BLOOD PRESSURE: 74 MMHG | HEIGHT: 60 IN | BODY MASS INDEX: 36.61 KG/M2 | OXYGEN SATURATION: 98 % | SYSTOLIC BLOOD PRESSURE: 158 MMHG | HEIGHT: 60 IN | WEIGHT: 181.44 LBS | HEART RATE: 95 BPM | HEART RATE: 91 BPM | WEIGHT: 186.5 LBS | OXYGEN SATURATION: 99 % | TEMPERATURE: 96 F | RESPIRATION RATE: 12 BRPM | TEMPERATURE: 97 F | DIASTOLIC BLOOD PRESSURE: 79 MMHG | BODY MASS INDEX: 35.62 KG/M2 | RESPIRATION RATE: 12 BRPM | SYSTOLIC BLOOD PRESSURE: 124 MMHG

## 2023-01-01 VITALS
SYSTOLIC BLOOD PRESSURE: 135 MMHG | OXYGEN SATURATION: 97 % | HEART RATE: 80 BPM | BODY MASS INDEX: 34.55 KG/M2 | HEIGHT: 61 IN | RESPIRATION RATE: 18 BRPM | TEMPERATURE: 98 F | WEIGHT: 183 LBS | DIASTOLIC BLOOD PRESSURE: 61 MMHG

## 2023-01-01 VITALS
BODY MASS INDEX: 36.95 KG/M2 | OXYGEN SATURATION: 100 % | TEMPERATURE: 98 F | HEART RATE: 94 BPM | WEIGHT: 191.19 LBS | DIASTOLIC BLOOD PRESSURE: 74 MMHG | HEIGHT: 60 IN | BODY MASS INDEX: 37.53 KG/M2 | TEMPERATURE: 98 F | SYSTOLIC BLOOD PRESSURE: 123 MMHG | HEIGHT: 60 IN | WEIGHT: 188.19 LBS | SYSTOLIC BLOOD PRESSURE: 123 MMHG | HEART RATE: 82 BPM | DIASTOLIC BLOOD PRESSURE: 72 MMHG | RESPIRATION RATE: 18 BRPM | RESPIRATION RATE: 18 BRPM

## 2023-01-01 VITALS
TEMPERATURE: 98 F | RESPIRATION RATE: 12 BRPM | HEART RATE: 86 BPM | HEIGHT: 60 IN | HEIGHT: 61 IN | RESPIRATION RATE: 18 BRPM | OXYGEN SATURATION: 93 % | WEIGHT: 184.75 LBS | TEMPERATURE: 97 F | SYSTOLIC BLOOD PRESSURE: 139 MMHG | BODY MASS INDEX: 34.69 KG/M2 | BODY MASS INDEX: 36.27 KG/M2 | WEIGHT: 183.75 LBS | DIASTOLIC BLOOD PRESSURE: 64 MMHG | DIASTOLIC BLOOD PRESSURE: 75 MMHG | HEART RATE: 96 BPM | SYSTOLIC BLOOD PRESSURE: 122 MMHG | OXYGEN SATURATION: 97 %

## 2023-01-01 VITALS
DIASTOLIC BLOOD PRESSURE: 83 MMHG | HEIGHT: 60 IN | SYSTOLIC BLOOD PRESSURE: 140 MMHG | TEMPERATURE: 98 F | RESPIRATION RATE: 18 BRPM | HEART RATE: 92 BPM | OXYGEN SATURATION: 97 % | BODY MASS INDEX: 36.91 KG/M2 | WEIGHT: 188 LBS

## 2023-01-01 VITALS
HEIGHT: 60 IN | BODY MASS INDEX: 36.05 KG/M2 | RESPIRATION RATE: 18 BRPM | WEIGHT: 183.63 LBS | TEMPERATURE: 97 F | HEART RATE: 73 BPM | SYSTOLIC BLOOD PRESSURE: 133 MMHG | DIASTOLIC BLOOD PRESSURE: 72 MMHG

## 2023-01-01 VITALS
WEIGHT: 195 LBS | HEIGHT: 61 IN | DIASTOLIC BLOOD PRESSURE: 80 MMHG | HEART RATE: 82 BPM | RESPIRATION RATE: 17 BRPM | TEMPERATURE: 97 F | SYSTOLIC BLOOD PRESSURE: 143 MMHG | BODY MASS INDEX: 36.82 KG/M2

## 2023-01-01 VITALS
HEIGHT: 60 IN | HEART RATE: 89 BPM | OXYGEN SATURATION: 98 % | WEIGHT: 187.38 LBS | BODY MASS INDEX: 36.79 KG/M2 | SYSTOLIC BLOOD PRESSURE: 123 MMHG | RESPIRATION RATE: 18 BRPM | TEMPERATURE: 98 F | DIASTOLIC BLOOD PRESSURE: 72 MMHG

## 2023-01-01 VITALS
OXYGEN SATURATION: 98 % | BODY MASS INDEX: 36.58 KG/M2 | RESPIRATION RATE: 18 BRPM | HEART RATE: 84 BPM | BODY MASS INDEX: 34.96 KG/M2 | HEIGHT: 60 IN | DIASTOLIC BLOOD PRESSURE: 78 MMHG | WEIGHT: 185.19 LBS | DIASTOLIC BLOOD PRESSURE: 77 MMHG | HEIGHT: 61 IN | SYSTOLIC BLOOD PRESSURE: 116 MMHG | HEART RATE: 78 BPM | SYSTOLIC BLOOD PRESSURE: 153 MMHG | TEMPERATURE: 98 F | OXYGEN SATURATION: 100 % | WEIGHT: 186.31 LBS | RESPIRATION RATE: 12 BRPM | TEMPERATURE: 97 F

## 2023-01-01 VITALS
SYSTOLIC BLOOD PRESSURE: 152 MMHG | OXYGEN SATURATION: 97 % | BODY MASS INDEX: 35.12 KG/M2 | HEART RATE: 110 BPM | DIASTOLIC BLOOD PRESSURE: 80 MMHG | HEIGHT: 60 IN | HEART RATE: 83 BPM | OXYGEN SATURATION: 100 % | WEIGHT: 184.94 LBS | SYSTOLIC BLOOD PRESSURE: 120 MMHG | BODY MASS INDEX: 36.31 KG/M2 | RESPIRATION RATE: 18 BRPM | DIASTOLIC BLOOD PRESSURE: 76 MMHG | TEMPERATURE: 98 F | WEIGHT: 186 LBS | TEMPERATURE: 98 F | HEIGHT: 61 IN

## 2023-01-01 VITALS
HEIGHT: 61 IN | SYSTOLIC BLOOD PRESSURE: 146 MMHG | WEIGHT: 197.75 LBS | DIASTOLIC BLOOD PRESSURE: 86 MMHG | DIASTOLIC BLOOD PRESSURE: 75 MMHG | SYSTOLIC BLOOD PRESSURE: 185 MMHG | TEMPERATURE: 98 F | HEIGHT: 61 IN | HEART RATE: 84 BPM | HEART RATE: 84 BPM | WEIGHT: 192.69 LBS | BODY MASS INDEX: 36.38 KG/M2 | BODY MASS INDEX: 37.34 KG/M2 | TEMPERATURE: 98 F

## 2023-01-01 VITALS
DIASTOLIC BLOOD PRESSURE: 81 MMHG | RESPIRATION RATE: 17 BRPM | WEIGHT: 185.38 LBS | TEMPERATURE: 98 F | HEART RATE: 86 BPM | SYSTOLIC BLOOD PRESSURE: 152 MMHG | BODY MASS INDEX: 35 KG/M2 | OXYGEN SATURATION: 100 % | HEIGHT: 61 IN

## 2023-01-01 VITALS
WEIGHT: 184.06 LBS | OXYGEN SATURATION: 96 % | TEMPERATURE: 98 F | HEART RATE: 89 BPM | DIASTOLIC BLOOD PRESSURE: 68 MMHG | BODY MASS INDEX: 36.14 KG/M2 | SYSTOLIC BLOOD PRESSURE: 123 MMHG | RESPIRATION RATE: 18 BRPM | HEIGHT: 60 IN

## 2023-01-01 VITALS
RESPIRATION RATE: 12 BRPM | SYSTOLIC BLOOD PRESSURE: 170 MMHG | HEIGHT: 61 IN | BODY MASS INDEX: 36.84 KG/M2 | OXYGEN SATURATION: 99 % | TEMPERATURE: 98 F | HEART RATE: 78 BPM | WEIGHT: 195.13 LBS | DIASTOLIC BLOOD PRESSURE: 82 MMHG

## 2023-01-01 VITALS
HEIGHT: 61 IN | OXYGEN SATURATION: 97 % | SYSTOLIC BLOOD PRESSURE: 143 MMHG | TEMPERATURE: 98 F | WEIGHT: 196.88 LBS | BODY MASS INDEX: 37.17 KG/M2 | RESPIRATION RATE: 12 BRPM | DIASTOLIC BLOOD PRESSURE: 79 MMHG | HEART RATE: 84 BPM

## 2023-01-01 VITALS
DIASTOLIC BLOOD PRESSURE: 72 MMHG | DIASTOLIC BLOOD PRESSURE: 72 MMHG | RESPIRATION RATE: 18 BRPM | SYSTOLIC BLOOD PRESSURE: 103 MMHG | BODY MASS INDEX: 36.1 KG/M2 | WEIGHT: 183.88 LBS | SYSTOLIC BLOOD PRESSURE: 126 MMHG | OXYGEN SATURATION: 99 % | RESPIRATION RATE: 20 BRPM | TEMPERATURE: 98 F | HEART RATE: 98 BPM | OXYGEN SATURATION: 98 % | WEIGHT: 183.63 LBS | HEART RATE: 82 BPM | BODY MASS INDEX: 35.87 KG/M2 | HEIGHT: 60 IN | TEMPERATURE: 97 F

## 2023-01-01 VITALS
BODY MASS INDEX: 36.27 KG/M2 | WEIGHT: 184.75 LBS | DIASTOLIC BLOOD PRESSURE: 72 MMHG | OXYGEN SATURATION: 98 % | SYSTOLIC BLOOD PRESSURE: 138 MMHG | TEMPERATURE: 97 F | HEIGHT: 60 IN | RESPIRATION RATE: 12 BRPM | HEART RATE: 98 BPM

## 2023-01-01 VITALS
WEIGHT: 189 LBS | HEIGHT: 60 IN | SYSTOLIC BLOOD PRESSURE: 160 MMHG | HEART RATE: 87 BPM | DIASTOLIC BLOOD PRESSURE: 84 MMHG | BODY MASS INDEX: 37.11 KG/M2

## 2023-01-01 VITALS
RESPIRATION RATE: 16 BRPM | OXYGEN SATURATION: 96 % | HEIGHT: 60 IN | BODY MASS INDEX: 35.76 KG/M2 | DIASTOLIC BLOOD PRESSURE: 64 MMHG | HEART RATE: 83 BPM | WEIGHT: 182.13 LBS | SYSTOLIC BLOOD PRESSURE: 133 MMHG | TEMPERATURE: 97 F

## 2023-01-01 VITALS
RESPIRATION RATE: 16 BRPM | OXYGEN SATURATION: 95 % | BODY MASS INDEX: 35.62 KG/M2 | HEART RATE: 87 BPM | SYSTOLIC BLOOD PRESSURE: 125 MMHG | WEIGHT: 181.44 LBS | DIASTOLIC BLOOD PRESSURE: 64 MMHG | HEIGHT: 60 IN | TEMPERATURE: 98 F

## 2023-01-01 VITALS
DIASTOLIC BLOOD PRESSURE: 60 MMHG | WEIGHT: 197 LBS | HEART RATE: 107 BPM | HEIGHT: 61 IN | BODY MASS INDEX: 37.19 KG/M2 | TEMPERATURE: 99 F | RESPIRATION RATE: 18 BRPM | OXYGEN SATURATION: 93 % | SYSTOLIC BLOOD PRESSURE: 126 MMHG

## 2023-01-01 VITALS
DIASTOLIC BLOOD PRESSURE: 77 MMHG | TEMPERATURE: 98 F | OXYGEN SATURATION: 97 % | WEIGHT: 180 LBS | SYSTOLIC BLOOD PRESSURE: 161 MMHG | RESPIRATION RATE: 16 BRPM | HEART RATE: 77 BPM | BODY MASS INDEX: 33.99 KG/M2 | HEIGHT: 61 IN

## 2023-01-01 VITALS
OXYGEN SATURATION: 98 % | SYSTOLIC BLOOD PRESSURE: 153 MMHG | HEIGHT: 60 IN | BODY MASS INDEX: 36.79 KG/M2 | DIASTOLIC BLOOD PRESSURE: 86 MMHG | WEIGHT: 187.38 LBS | TEMPERATURE: 97 F | RESPIRATION RATE: 16 BRPM | HEART RATE: 93 BPM

## 2023-01-01 VITALS
WEIGHT: 189.63 LBS | SYSTOLIC BLOOD PRESSURE: 151 MMHG | HEART RATE: 96 BPM | HEIGHT: 60 IN | BODY MASS INDEX: 37.23 KG/M2 | DIASTOLIC BLOOD PRESSURE: 90 MMHG

## 2023-01-01 VITALS
RESPIRATION RATE: 18 BRPM | TEMPERATURE: 98 F | OXYGEN SATURATION: 99 % | HEART RATE: 92 BPM | DIASTOLIC BLOOD PRESSURE: 90 MMHG | BODY MASS INDEX: 36.65 KG/M2 | WEIGHT: 186.69 LBS | SYSTOLIC BLOOD PRESSURE: 162 MMHG | HEIGHT: 60 IN

## 2023-01-01 VITALS — BODY MASS INDEX: 36.82 KG/M2 | HEIGHT: 61 IN | WEIGHT: 195 LBS

## 2023-01-01 DIAGNOSIS — T45.1X5A CHEMOTHERAPY-INDUCED NEUTROPENIA: ICD-10-CM

## 2023-01-01 DIAGNOSIS — E78.00 PURE HYPERCHOLESTEROLEMIA: ICD-10-CM

## 2023-01-01 DIAGNOSIS — K62.89 RECTAL MASS: ICD-10-CM

## 2023-01-01 DIAGNOSIS — D50.0 IRON DEFICIENCY ANEMIA DUE TO CHRONIC BLOOD LOSS: ICD-10-CM

## 2023-01-01 DIAGNOSIS — N20.0 KIDNEY STONE: ICD-10-CM

## 2023-01-01 DIAGNOSIS — C18.2 MALIGNANT NEOPLASM OF ASCENDING COLON: ICD-10-CM

## 2023-01-01 DIAGNOSIS — I49.9 CARDIAC RHYTHM DISORDER OR DISTURBANCE OR CHANGE: ICD-10-CM

## 2023-01-01 DIAGNOSIS — T45.1X5A CHEMOTHERAPY-INDUCED NEUTROPENIA: Primary | ICD-10-CM

## 2023-01-01 DIAGNOSIS — R19.7 DIARRHEA, UNSPECIFIED: ICD-10-CM

## 2023-01-01 DIAGNOSIS — E53.8 B12 DEFICIENCY: ICD-10-CM

## 2023-01-01 DIAGNOSIS — J20.9 ACUTE BRONCHITIS, UNSPECIFIED ORGANISM: ICD-10-CM

## 2023-01-01 DIAGNOSIS — G89.3 CHRONIC PAIN DUE TO MALIGNANT NEOPLASTIC DISEASE: ICD-10-CM

## 2023-01-01 DIAGNOSIS — N20.0 BILATERAL KIDNEY STONES: ICD-10-CM

## 2023-01-01 DIAGNOSIS — C79.51 RECTAL CANCER METASTATIC TO BONE: Primary | ICD-10-CM

## 2023-01-01 DIAGNOSIS — C77.2 SECONDARY AND UNSPECIFIED MALIGNANT NEOPLASM OF INTRA-ABDOMINAL LYMPH NODES: ICD-10-CM

## 2023-01-01 DIAGNOSIS — D70.1 CHEMOTHERAPY-INDUCED NEUTROPENIA: Primary | ICD-10-CM

## 2023-01-01 DIAGNOSIS — N20.1 RIGHT URETERAL STONE: Primary | ICD-10-CM

## 2023-01-01 DIAGNOSIS — A41.9 SEVERE SEPSIS: ICD-10-CM

## 2023-01-01 DIAGNOSIS — K63.2 ENTEROCUTANEOUS FISTULA: ICD-10-CM

## 2023-01-01 DIAGNOSIS — R07.9 CHEST PAIN: ICD-10-CM

## 2023-01-01 DIAGNOSIS — C20 RECTAL CANCER METASTATIC TO BONE: Primary | ICD-10-CM

## 2023-01-01 DIAGNOSIS — K90.829 SHORT GUT SYNDROME: ICD-10-CM

## 2023-01-01 DIAGNOSIS — E53.8 B12 DEFICIENCY: Primary | ICD-10-CM

## 2023-01-01 DIAGNOSIS — C18.2 MALIGNANT NEOPLASM OF ASCENDING COLON: Primary | ICD-10-CM

## 2023-01-01 DIAGNOSIS — N13.9 OBSTRUCTIVE UROPATHY: Primary | ICD-10-CM

## 2023-01-01 DIAGNOSIS — C20 RECTAL CANCER: ICD-10-CM

## 2023-01-01 DIAGNOSIS — R65.20 SEVERE SEPSIS: ICD-10-CM

## 2023-01-01 DIAGNOSIS — N20.1 RIGHT URETERAL STONE: ICD-10-CM

## 2023-01-01 DIAGNOSIS — K52.9 CHRONIC DIARRHEA: ICD-10-CM

## 2023-01-01 DIAGNOSIS — R10.9 ABDOMINAL PAIN, UNSPECIFIED ABDOMINAL LOCATION: ICD-10-CM

## 2023-01-01 DIAGNOSIS — C19 RECTOSIGMOID CANCER: Primary | ICD-10-CM

## 2023-01-01 DIAGNOSIS — Z93.3 COLOSTOMY IN PLACE: ICD-10-CM

## 2023-01-01 DIAGNOSIS — K55.029 ISCHEMIC NECROSIS OF SMALL BOWEL: Primary | ICD-10-CM

## 2023-01-01 DIAGNOSIS — D70.1 CHEMOTHERAPY-INDUCED NEUTROPENIA: ICD-10-CM

## 2023-01-01 DIAGNOSIS — C20 RECTAL CANCER METASTASIZED TO INTRAPELVIC LYMPH NODE: ICD-10-CM

## 2023-01-01 DIAGNOSIS — N13.39 OTHER HYDRONEPHROSIS: Primary | ICD-10-CM

## 2023-01-01 DIAGNOSIS — E03.9 ACQUIRED HYPOTHYROIDISM: ICD-10-CM

## 2023-01-01 DIAGNOSIS — C19 RECTOSIGMOID CANCER: ICD-10-CM

## 2023-01-01 DIAGNOSIS — K90.829 SHORT GUT SYNDROME: Primary | ICD-10-CM

## 2023-01-01 DIAGNOSIS — J41.0 SIMPLE CHRONIC BRONCHITIS: Primary | ICD-10-CM

## 2023-01-01 DIAGNOSIS — C18.9 MALIGNANT NEOPLASM OF COLON, UNSPECIFIED PART OF COLON: ICD-10-CM

## 2023-01-01 DIAGNOSIS — C18.9 MALIGNANT NEOPLASM OF COLON: ICD-10-CM

## 2023-01-01 DIAGNOSIS — N20.0 BILATERAL KIDNEY STONES: Primary | ICD-10-CM

## 2023-01-01 DIAGNOSIS — J06.9 UPPER RESPIRATORY TRACT INFECTION, UNSPECIFIED TYPE: ICD-10-CM

## 2023-01-01 DIAGNOSIS — C77.5 RECTAL CANCER METASTASIZED TO INTRAPELVIC LYMPH NODE: ICD-10-CM

## 2023-01-01 DIAGNOSIS — Z01.818 PREOP TESTING: Primary | ICD-10-CM

## 2023-01-01 DIAGNOSIS — R19.7 DIARRHEA, UNSPECIFIED TYPE: Primary | ICD-10-CM

## 2023-01-01 DIAGNOSIS — N20.0 KIDNEY STONE: Primary | ICD-10-CM

## 2023-01-01 DIAGNOSIS — G89.3 CHRONIC PAIN DUE TO MALIGNANT NEOPLASTIC DISEASE: Primary | ICD-10-CM

## 2023-01-01 DIAGNOSIS — R10.9 RIGHT FLANK PAIN: ICD-10-CM

## 2023-01-01 DIAGNOSIS — E87.6 HYPOKALEMIA: Primary | ICD-10-CM

## 2023-01-01 DIAGNOSIS — E83.51 HYPOCALCEMIA: ICD-10-CM

## 2023-01-01 DIAGNOSIS — E66.01 SEVERE OBESITY (BMI 35.0-39.9) WITH COMORBIDITY: ICD-10-CM

## 2023-01-01 DIAGNOSIS — C20 RECTAL CANCER: Primary | ICD-10-CM

## 2023-01-01 DIAGNOSIS — E87.6 HYPOKALEMIA: ICD-10-CM

## 2023-01-01 DIAGNOSIS — R00.0 TACHYCARDIA: ICD-10-CM

## 2023-01-01 LAB
ABO + RH BLD: ABNORMAL
ALBUMIN SERPL BCP-MCNC: 1.1 G/DL (ref 3.5–5.2)
ALBUMIN SERPL BCP-MCNC: 1.1 G/DL (ref 3.5–5.2)
ALBUMIN SERPL BCP-MCNC: 1.2 G/DL (ref 3.5–5.2)
ALBUMIN SERPL BCP-MCNC: 1.3 G/DL (ref 3.5–5.2)
ALBUMIN SERPL BCP-MCNC: 1.5 G/DL (ref 3.5–5.2)
ALBUMIN SERPL BCP-MCNC: 1.6 G/DL (ref 3.5–5.2)
ALBUMIN SERPL BCP-MCNC: 2.1 G/DL (ref 3.5–5.2)
ALBUMIN SERPL BCP-MCNC: 2.6 G/DL (ref 3.5–5.2)
ALBUMIN SERPL BCP-MCNC: 2.7 G/DL (ref 3.5–5.2)
ALBUMIN SERPL BCP-MCNC: 2.8 G/DL (ref 3.5–5.2)
ALBUMIN SERPL BCP-MCNC: 2.9 G/DL (ref 3.5–5.2)
ALBUMIN SERPL BCP-MCNC: 3.1 G/DL (ref 3.5–5.2)
ALBUMIN SERPL BCP-MCNC: 3.2 G/DL (ref 3.5–5.2)
ALBUMIN SERPL BCP-MCNC: 3.2 G/DL (ref 3.5–5.2)
ALBUMIN SERPL BCP-MCNC: 3.3 G/DL (ref 3.5–5.2)
ALBUMIN SERPL BCP-MCNC: 3.5 G/DL (ref 3.5–5.2)
ALBUMIN SERPL BCP-MCNC: 3.6 G/DL (ref 3.5–5.2)
ALBUMIN SERPL BCP-MCNC: 3.6 G/DL (ref 3.5–5.2)
ALBUMIN SERPL BCP-MCNC: 3.7 G/DL (ref 3.5–5.2)
ALBUMIN SERPL BCP-MCNC: 3.7 G/DL (ref 3.5–5.2)
ALBUMIN SERPL BCP-MCNC: <1 G/DL (ref 3.5–5.2)
ALLENS TEST: ABNORMAL
ALP SERPL-CCNC: 104 U/L (ref 55–135)
ALP SERPL-CCNC: 107 U/L (ref 55–135)
ALP SERPL-CCNC: 121 U/L (ref 55–135)
ALP SERPL-CCNC: 126 U/L (ref 55–135)
ALP SERPL-CCNC: 129 U/L (ref 55–135)
ALP SERPL-CCNC: 133 U/L (ref 55–135)
ALP SERPL-CCNC: 144 U/L (ref 55–135)
ALP SERPL-CCNC: 171 U/L (ref 55–135)
ALP SERPL-CCNC: 217 U/L (ref 55–135)
ALP SERPL-CCNC: 219 U/L (ref 55–135)
ALP SERPL-CCNC: 241 U/L (ref 55–135)
ALP SERPL-CCNC: 242 U/L (ref 55–135)
ALP SERPL-CCNC: 242 U/L (ref 55–135)
ALP SERPL-CCNC: 311 U/L (ref 55–135)
ALP SERPL-CCNC: 39 U/L (ref 55–135)
ALP SERPL-CCNC: 50 U/L (ref 55–135)
ALP SERPL-CCNC: 52 U/L (ref 55–135)
ALP SERPL-CCNC: 53 U/L (ref 55–135)
ALP SERPL-CCNC: 54 U/L (ref 55–135)
ALP SERPL-CCNC: 55 U/L (ref 55–135)
ALP SERPL-CCNC: 62 U/L (ref 55–135)
ALP SERPL-CCNC: 75 U/L (ref 55–135)
ALP SERPL-CCNC: 81 U/L (ref 55–135)
ALP SERPL-CCNC: 83 U/L (ref 55–135)
ALP SERPL-CCNC: 86 U/L (ref 55–135)
ALP SERPL-CCNC: 88 U/L (ref 55–135)
ALP SERPL-CCNC: 93 U/L (ref 55–135)
ALP SERPL-CCNC: 93 U/L (ref 55–135)
ALP SERPL-CCNC: 95 U/L (ref 55–135)
ALT SERPL W/O P-5'-P-CCNC: 10 U/L (ref 10–44)
ALT SERPL W/O P-5'-P-CCNC: 10 U/L (ref 10–44)
ALT SERPL W/O P-5'-P-CCNC: 11 U/L (ref 10–44)
ALT SERPL W/O P-5'-P-CCNC: 114 U/L (ref 10–44)
ALT SERPL W/O P-5'-P-CCNC: 115 U/L (ref 10–44)
ALT SERPL W/O P-5'-P-CCNC: 12 U/L (ref 10–44)
ALT SERPL W/O P-5'-P-CCNC: 13 U/L (ref 10–44)
ALT SERPL W/O P-5'-P-CCNC: 20 U/L (ref 10–44)
ALT SERPL W/O P-5'-P-CCNC: 20 U/L (ref 10–44)
ALT SERPL W/O P-5'-P-CCNC: 21 U/L (ref 10–44)
ALT SERPL W/O P-5'-P-CCNC: 24 U/L (ref 10–44)
ALT SERPL W/O P-5'-P-CCNC: 25 U/L (ref 10–44)
ALT SERPL W/O P-5'-P-CCNC: 27 U/L (ref 10–44)
ALT SERPL W/O P-5'-P-CCNC: 30 U/L (ref 10–44)
ALT SERPL W/O P-5'-P-CCNC: 32 U/L (ref 10–44)
ALT SERPL W/O P-5'-P-CCNC: 39 U/L (ref 10–44)
ALT SERPL W/O P-5'-P-CCNC: 59 U/L (ref 10–44)
ALT SERPL W/O P-5'-P-CCNC: 65 U/L (ref 10–44)
ALT SERPL W/O P-5'-P-CCNC: 79 U/L (ref 10–44)
ALT SERPL W/O P-5'-P-CCNC: 9 U/L (ref 10–44)
ALT SERPL W/O P-5'-P-CCNC: 9 U/L (ref 10–44)
ALT SERPL W/O P-5'-P-CCNC: 96 U/L (ref 10–44)
AMORPH CRY URNS QL MICRO: ABNORMAL
ANION GAP SERPL CALC-SCNC: 10 MMOL/L (ref 8–16)
ANION GAP SERPL CALC-SCNC: 11 MMOL/L (ref 8–16)
ANION GAP SERPL CALC-SCNC: 12 MMOL/L (ref 8–16)
ANION GAP SERPL CALC-SCNC: 13 MMOL/L (ref 8–16)
ANION GAP SERPL CALC-SCNC: 15 MMOL/L (ref 8–16)
ANION GAP SERPL CALC-SCNC: 4 MMOL/L (ref 8–16)
ANION GAP SERPL CALC-SCNC: 5 MMOL/L (ref 8–16)
ANION GAP SERPL CALC-SCNC: 6 MMOL/L (ref 8–16)
ANION GAP SERPL CALC-SCNC: 7 MMOL/L (ref 8–16)
ANION GAP SERPL CALC-SCNC: 8 MMOL/L (ref 8–16)
ANION GAP SERPL CALC-SCNC: 9 MMOL/L (ref 8–16)
ANION GAP SERPL CALC-SCNC: ABNORMAL MMOL/L (ref 8–16)
ANISOCYTOSIS BLD QL SMEAR: SLIGHT
AST SERPL-CCNC: 10 U/L (ref 10–40)
AST SERPL-CCNC: 11 U/L (ref 10–40)
AST SERPL-CCNC: 12 U/L (ref 10–40)
AST SERPL-CCNC: 13 U/L (ref 10–40)
AST SERPL-CCNC: 15 U/L (ref 10–40)
AST SERPL-CCNC: 16 U/L (ref 10–40)
AST SERPL-CCNC: 23 U/L (ref 10–40)
AST SERPL-CCNC: 25 U/L (ref 10–40)
AST SERPL-CCNC: 25 U/L (ref 10–40)
AST SERPL-CCNC: 35 U/L (ref 10–40)
AST SERPL-CCNC: 36 U/L (ref 10–40)
AST SERPL-CCNC: 39 U/L (ref 10–40)
AST SERPL-CCNC: 39 U/L (ref 10–40)
AST SERPL-CCNC: 43 U/L (ref 10–40)
AST SERPL-CCNC: 47 U/L (ref 10–40)
AST SERPL-CCNC: 54 U/L (ref 10–40)
AST SERPL-CCNC: 67 U/L (ref 10–40)
AST SERPL-CCNC: 71 U/L (ref 10–40)
AST SERPL-CCNC: 83 U/L (ref 10–40)
AST SERPL-CCNC: 9 U/L (ref 10–40)
AST SERPL-CCNC: 9 U/L (ref 10–40)
AST SERPL-CCNC: 94 U/L (ref 10–40)
AST SERPL-CCNC: 97 U/L (ref 10–40)
BACTERIA #/AREA URNS HPF: ABNORMAL /HPF
BACTERIA #/AREA URNS HPF: NEGATIVE /HPF
BACTERIA BLD CULT: NORMAL
BACTERIA BLD CULT: NORMAL
BACTERIA SPEC AEROBE CULT: ABNORMAL
BACTERIA SPEC AEROBE CULT: ABNORMAL
BACTERIA SPEC ANAEROBE CULT: NORMAL
BACTERIA UR CULT: NO GROWTH
BACTERIA UR CULT: NORMAL
BACTERIA UR CULT: NORMAL
BASOPHILS # BLD AUTO: 0 K/UL (ref 0–0.2)
BASOPHILS # BLD AUTO: 0.01 K/UL (ref 0–0.2)
BASOPHILS # BLD AUTO: 0.01 K/UL (ref 0–0.2)
BASOPHILS # BLD AUTO: 0.03 K/UL (ref 0–0.2)
BASOPHILS # BLD AUTO: 0.04 K/UL (ref 0–0.2)
BASOPHILS # BLD AUTO: 0.05 K/UL (ref 0–0.2)
BASOPHILS # BLD AUTO: 0.05 K/UL (ref 0–0.2)
BASOPHILS # BLD AUTO: 0.06 K/UL (ref 0–0.2)
BASOPHILS # BLD AUTO: 0.07 K/UL (ref 0–0.2)
BASOPHILS # BLD AUTO: 0.08 K/UL (ref 0–0.2)
BASOPHILS # BLD AUTO: 0.1 K/UL (ref 0–0.2)
BASOPHILS # BLD AUTO: 0.1 K/UL (ref 0–0.2)
BASOPHILS # BLD AUTO: 0.11 K/UL (ref 0–0.2)
BASOPHILS # BLD AUTO: 0.13 K/UL (ref 0–0.2)
BASOPHILS # BLD AUTO: ABNORMAL K/UL (ref 0–0.2)
BASOPHILS NFR BLD: 0 % (ref 0–1.9)
BASOPHILS NFR BLD: 0.1 % (ref 0–1.9)
BASOPHILS NFR BLD: 0.2 % (ref 0–1.9)
BASOPHILS NFR BLD: 0.3 % (ref 0–1.9)
BASOPHILS NFR BLD: 0.3 % (ref 0–1.9)
BASOPHILS NFR BLD: 0.4 % (ref 0–1.9)
BASOPHILS NFR BLD: 0.6 % (ref 0–1.9)
BASOPHILS NFR BLD: 0.6 % (ref 0–1.9)
BASOPHILS NFR BLD: 0.7 % (ref 0–1.9)
BASOPHILS NFR BLD: 0.8 % (ref 0–1.9)
BASOPHILS NFR BLD: 0.9 % (ref 0–1.9)
BASOPHILS NFR BLD: 0.9 % (ref 0–1.9)
BASOPHILS NFR BLD: 1.5 % (ref 0–1.9)
BASOPHILS NFR BLD: 1.5 % (ref 0–1.9)
BASOPHILS NFR BLD: 1.7 % (ref 0–1.9)
BILIRUB SERPL-MCNC: 0.3 MG/DL (ref 0.1–1)
BILIRUB SERPL-MCNC: 0.4 MG/DL (ref 0.1–1)
BILIRUB SERPL-MCNC: 0.5 MG/DL (ref 0.1–1)
BILIRUB SERPL-MCNC: 0.6 MG/DL (ref 0.1–1)
BILIRUB SERPL-MCNC: 0.6 MG/DL (ref 0.1–1)
BILIRUB SERPL-MCNC: 0.7 MG/DL (ref 0.1–1)
BILIRUB SERPL-MCNC: 0.7 MG/DL (ref 0.1–1)
BILIRUB SERPL-MCNC: 0.8 MG/DL (ref 0.1–1)
BILIRUB SERPL-MCNC: 0.8 MG/DL (ref 0.1–1)
BILIRUB SERPL-MCNC: 0.9 MG/DL (ref 0.1–1)
BILIRUB SERPL-MCNC: 1 MG/DL (ref 0.1–1)
BILIRUB SERPL-MCNC: 1.1 MG/DL (ref 0.1–1)
BILIRUB SERPL-MCNC: 1.2 MG/DL (ref 0.1–1)
BILIRUB SERPL-MCNC: 1.2 MG/DL (ref 0.1–1)
BILIRUB SERPL-MCNC: 1.3 MG/DL (ref 0.1–1)
BILIRUB UR QL STRIP: ABNORMAL
BILIRUB UR QL STRIP: ABNORMAL
BILIRUB UR QL STRIP: NEGATIVE
BLASTS NFR BLD MANUAL: 1 %
BLD GP AB SCN CELLS X3 SERPL QL: ABNORMAL
BLD PROD TYP BPU: NORMAL
BLOOD GROUP ANTIBODIES SERPL: NORMAL
BLOOD UNIT EXPIRATION DATE: NORMAL
BLOOD UNIT TYPE CODE: 5100
BLOOD UNIT TYPE CODE: 6200
BLOOD UNIT TYPE: NORMAL
BNP SERPL-MCNC: 25 PG/ML (ref 0–99)
BNP SERPL-MCNC: 52 PG/ML (ref 0–99)
BUN SERPL-MCNC: 10 MG/DL (ref 8–23)
BUN SERPL-MCNC: 14 MG/DL (ref 8–23)
BUN SERPL-MCNC: 15 MG/DL (ref 8–23)
BUN SERPL-MCNC: 17 MG/DL (ref 8–23)
BUN SERPL-MCNC: 21 MG/DL (ref 8–23)
BUN SERPL-MCNC: 22 MG/DL (ref 8–23)
BUN SERPL-MCNC: 27 MG/DL (ref 8–23)
BUN SERPL-MCNC: 27 MG/DL (ref 8–23)
BUN SERPL-MCNC: 28 MG/DL (ref 8–23)
BUN SERPL-MCNC: 29 MG/DL (ref 8–23)
BUN SERPL-MCNC: 30 MG/DL (ref 8–23)
BUN SERPL-MCNC: 30 MG/DL (ref 8–23)
BUN SERPL-MCNC: 36 MG/DL (ref 8–23)
BUN SERPL-MCNC: 38 MG/DL (ref 8–23)
BUN SERPL-MCNC: 40 MG/DL (ref 8–23)
BUN SERPL-MCNC: 41 MG/DL (ref 8–23)
BUN SERPL-MCNC: 44 MG/DL (ref 8–23)
BUN SERPL-MCNC: 48 MG/DL (ref 8–23)
BUN SERPL-MCNC: 51 MG/DL (ref 8–23)
BUN SERPL-MCNC: 58 MG/DL (ref 8–23)
BUN SERPL-MCNC: 59 MG/DL (ref 8–23)
BUN SERPL-MCNC: 6 MG/DL (ref 8–23)
BUN SERPL-MCNC: 66 MG/DL (ref 8–23)
BUN SERPL-MCNC: 69 MG/DL (ref 8–23)
BUN SERPL-MCNC: 7 MG/DL (ref 8–23)
BUN SERPL-MCNC: 8 MG/DL (ref 8–23)
BUN SERPL-MCNC: 9 MG/DL (ref 8–23)
CA-I BLDV-SCNC: 0.92 MMOL/L (ref 1.06–1.42)
CA-I BLDV-SCNC: 0.92 MMOL/L (ref 1.06–1.42)
CA-I BLDV-SCNC: 0.93 MMOL/L (ref 1.06–1.42)
CA-I BLDV-SCNC: 0.94 MMOL/L (ref 1.06–1.42)
CA-I BLDV-SCNC: 0.95 MMOL/L (ref 1.06–1.42)
CA-I BLDV-SCNC: 0.97 MMOL/L (ref 1.06–1.42)
CA-I BLDV-SCNC: 1 MMOL/L (ref 1.06–1.42)
CA-I BLDV-SCNC: 1 MMOL/L (ref 1.06–1.42)
CA-I BLDV-SCNC: 1.01 MMOL/L (ref 1.06–1.42)
CA-I BLDV-SCNC: 1.02 MMOL/L (ref 1.06–1.42)
CA-I BLDV-SCNC: 1.04 MMOL/L (ref 1.06–1.42)
CA-I BLDV-SCNC: 1.08 MMOL/L (ref 1.06–1.42)
CA-I BLDV-SCNC: 1.08 MMOL/L (ref 1.06–1.42)
CA-I BLDV-SCNC: 1.09 MMOL/L (ref 1.06–1.42)
CA-I BLDV-SCNC: 1.1 MMOL/L (ref 1.06–1.42)
CA-I BLDV-SCNC: 1.12 MMOL/L (ref 1.06–1.42)
CA-I BLDV-SCNC: 1.13 MMOL/L (ref 1.06–1.42)
CA-I BLDV-SCNC: >2.5 MMOL/L (ref 1.06–1.42)
CALCIUM SERPL-MCNC: 5.6 MG/DL (ref 8.7–10.5)
CALCIUM SERPL-MCNC: 6.5 MG/DL (ref 8.7–10.5)
CALCIUM SERPL-MCNC: 6.6 MG/DL (ref 8.7–10.5)
CALCIUM SERPL-MCNC: 6.6 MG/DL (ref 8.7–10.5)
CALCIUM SERPL-MCNC: 6.7 MG/DL (ref 8.7–10.5)
CALCIUM SERPL-MCNC: 6.7 MG/DL (ref 8.7–10.5)
CALCIUM SERPL-MCNC: 6.8 MG/DL (ref 8.7–10.5)
CALCIUM SERPL-MCNC: 6.9 MG/DL (ref 8.7–10.5)
CALCIUM SERPL-MCNC: 7 MG/DL (ref 8.7–10.5)
CALCIUM SERPL-MCNC: 7.1 MG/DL (ref 8.7–10.5)
CALCIUM SERPL-MCNC: 7.1 MG/DL (ref 8.7–10.5)
CALCIUM SERPL-MCNC: 7.2 MG/DL (ref 8.7–10.5)
CALCIUM SERPL-MCNC: 7.4 MG/DL (ref 8.7–10.5)
CALCIUM SERPL-MCNC: 7.5 MG/DL (ref 8.7–10.5)
CALCIUM SERPL-MCNC: 7.6 MG/DL (ref 8.7–10.5)
CALCIUM SERPL-MCNC: 8 MG/DL (ref 8.7–10.5)
CALCIUM SERPL-MCNC: 8.1 MG/DL (ref 8.7–10.5)
CALCIUM SERPL-MCNC: 8.2 MG/DL (ref 8.7–10.5)
CALCIUM SERPL-MCNC: 8.2 MG/DL (ref 8.7–10.5)
CALCIUM SERPL-MCNC: 8.3 MG/DL (ref 8.7–10.5)
CALCIUM SERPL-MCNC: 8.4 MG/DL (ref 8.7–10.5)
CALCIUM SERPL-MCNC: 8.6 MG/DL (ref 8.7–10.5)
CALCIUM SERPL-MCNC: 8.6 MG/DL (ref 8.7–10.5)
CALCIUM SERPL-MCNC: 8.7 MG/DL (ref 8.7–10.5)
CALCIUM SERPL-MCNC: 8.8 MG/DL (ref 8.7–10.5)
CALCIUM SERPL-MCNC: 8.8 MG/DL (ref 8.7–10.5)
CALCIUM SERPL-MCNC: 8.9 MG/DL (ref 8.7–10.5)
CALCIUM SERPL-MCNC: 8.9 MG/DL (ref 8.7–10.5)
CALCIUM SERPL-MCNC: 9 MG/DL (ref 8.7–10.5)
CALCIUM SERPL-MCNC: 9 MG/DL (ref 8.7–10.5)
CALCIUM SERPL-MCNC: ABNORMAL MG/DL
CEA SERPL-MCNC: 1.7 NG/ML (ref 0–5)
CEA SERPL-MCNC: <1.7 NG/ML (ref 0–5)
CEA SERPL-MCNC: <1.7 NG/ML (ref 0–5)
CHLORIDE SERPL-SCNC: 100 MMOL/L (ref 95–110)
CHLORIDE SERPL-SCNC: 100 MMOL/L (ref 95–110)
CHLORIDE SERPL-SCNC: 101 MMOL/L (ref 95–110)
CHLORIDE SERPL-SCNC: 102 MMOL/L (ref 95–110)
CHLORIDE SERPL-SCNC: 103 MMOL/L (ref 95–110)
CHLORIDE SERPL-SCNC: 104 MMOL/L (ref 95–110)
CHLORIDE SERPL-SCNC: 104 MMOL/L (ref 95–110)
CHLORIDE SERPL-SCNC: 105 MMOL/L (ref 95–110)
CHLORIDE SERPL-SCNC: 106 MMOL/L (ref 95–110)
CHLORIDE SERPL-SCNC: 107 MMOL/L (ref 95–110)
CHLORIDE SERPL-SCNC: 108 MMOL/L (ref 95–110)
CHLORIDE SERPL-SCNC: 109 MMOL/L (ref 95–110)
CHLORIDE SERPL-SCNC: 110 MMOL/L (ref 95–110)
CHLORIDE SERPL-SCNC: 112 MMOL/L (ref 95–110)
CHLORIDE SERPL-SCNC: 112 MMOL/L (ref 95–110)
CHLORIDE SERPL-SCNC: 113 MMOL/L (ref 95–110)
CHLORIDE SERPL-SCNC: 113 MMOL/L (ref 95–110)
CHLORIDE SERPL-SCNC: 117 MMOL/L (ref 95–110)
CHLORIDE SERPL-SCNC: 99 MMOL/L (ref 95–110)
CLARITY UR: ABNORMAL
CLARITY UR: CLEAR
CLARITY UR: CLEAR
CO2 SERPL-SCNC: 17 MMOL/L (ref 23–29)
CO2 SERPL-SCNC: 18 MMOL/L (ref 23–29)
CO2 SERPL-SCNC: 18 MMOL/L (ref 23–29)
CO2 SERPL-SCNC: 19 MMOL/L (ref 23–29)
CO2 SERPL-SCNC: 20 MMOL/L (ref 23–29)
CO2 SERPL-SCNC: 20 MMOL/L (ref 23–29)
CO2 SERPL-SCNC: 21 MMOL/L (ref 23–29)
CO2 SERPL-SCNC: 22 MMOL/L (ref 23–29)
CO2 SERPL-SCNC: 23 MMOL/L (ref 23–29)
CO2 SERPL-SCNC: 24 MMOL/L (ref 23–29)
CO2 SERPL-SCNC: 25 MMOL/L (ref 23–29)
CO2 SERPL-SCNC: 26 MMOL/L (ref 23–29)
CO2 SERPL-SCNC: 27 MMOL/L (ref 23–29)
CO2 SERPL-SCNC: 28 MMOL/L (ref 23–29)
CO2 SERPL-SCNC: 30 MMOL/L (ref 23–29)
CO2 SERPL-SCNC: 31 MMOL/L (ref 23–29)
CO2 SERPL-SCNC: ABNORMAL MMOL/L
CODING SYSTEM: NORMAL
COLOR UR: ABNORMAL
COLOR UR: YELLOW
COMPN STONE: NORMAL
CREAT SERPL-MCNC: 0.6 MG/DL (ref 0.5–1.4)
CREAT SERPL-MCNC: 0.6 MG/DL (ref 0.5–1.4)
CREAT SERPL-MCNC: 0.7 MG/DL (ref 0.5–1.4)
CREAT SERPL-MCNC: 0.8 MG/DL (ref 0.5–1.4)
CREAT SERPL-MCNC: 0.9 MG/DL (ref 0.5–1.4)
CREAT SERPL-MCNC: 1 MG/DL (ref 0.5–1.4)
CREAT SERPL-MCNC: 1.1 MG/DL (ref 0.5–1.4)
CREAT SERPL-MCNC: 1.2 MG/DL (ref 0.5–1.4)
CREAT SERPL-MCNC: 1.3 MG/DL (ref 0.5–1.4)
CREAT SERPL-MCNC: 1.4 MG/DL (ref 0.5–1.4)
CREAT SERPL-MCNC: 1.4 MG/DL (ref 0.5–1.4)
CREAT SERPL-MCNC: 1.5 MG/DL (ref 0.5–1.4)
CREAT SERPL-MCNC: 1.6 MG/DL (ref 0.5–1.4)
CREAT SERPL-MCNC: 1.7 MG/DL (ref 0.5–1.4)
CREAT SERPL-MCNC: 1.8 MG/DL (ref 0.5–1.4)
CREAT SERPL-MCNC: 1.8 MG/DL (ref 0.5–1.4)
CREAT UR-MCNC: 151.8 MG/DL (ref 15–325)
CROSSMATCH INTERPRETATION: NORMAL
DACRYOCYTES BLD QL SMEAR: ABNORMAL
DELSYS: ABNORMAL
DIFFERENTIAL METHOD: ABNORMAL
DISPENSE STATUS: NORMAL
EOSINOPHIL # BLD AUTO: 0 K/UL (ref 0–0.5)
EOSINOPHIL # BLD AUTO: 0.1 K/UL (ref 0–0.5)
EOSINOPHIL # BLD AUTO: 0.2 K/UL (ref 0–0.5)
EOSINOPHIL # BLD AUTO: 0.3 K/UL (ref 0–0.5)
EOSINOPHIL # BLD AUTO: 0.4 K/UL (ref 0–0.5)
EOSINOPHIL # BLD AUTO: ABNORMAL K/UL (ref 0–0.5)
EOSINOPHIL NFR BLD: 0 % (ref 0–8)
EOSINOPHIL NFR BLD: 0.1 % (ref 0–8)
EOSINOPHIL NFR BLD: 0.2 % (ref 0–8)
EOSINOPHIL NFR BLD: 0.2 % (ref 0–8)
EOSINOPHIL NFR BLD: 0.3 % (ref 0–8)
EOSINOPHIL NFR BLD: 0.4 % (ref 0–8)
EOSINOPHIL NFR BLD: 0.7 % (ref 0–8)
EOSINOPHIL NFR BLD: 0.7 % (ref 0–8)
EOSINOPHIL NFR BLD: 0.9 % (ref 0–8)
EOSINOPHIL NFR BLD: 1 % (ref 0–8)
EOSINOPHIL NFR BLD: 1 % (ref 0–8)
EOSINOPHIL NFR BLD: 1.1 % (ref 0–8)
EOSINOPHIL NFR BLD: 1.3 % (ref 0–8)
EOSINOPHIL NFR BLD: 1.3 % (ref 0–8)
EOSINOPHIL NFR BLD: 1.4 % (ref 0–8)
EOSINOPHIL NFR BLD: 1.5 % (ref 0–8)
EOSINOPHIL NFR BLD: 1.8 % (ref 0–8)
EOSINOPHIL NFR BLD: 2.1 % (ref 0–8)
EOSINOPHIL NFR BLD: 2.4 % (ref 0–8)
EOSINOPHIL NFR BLD: 2.9 % (ref 0–8)
EOSINOPHIL NFR BLD: 2.9 % (ref 0–8)
EOSINOPHIL NFR BLD: 3.5 % (ref 0–8)
EOSINOPHIL NFR BLD: 4 % (ref 0–8)
EOSINOPHIL NFR BLD: 4.3 % (ref 0–8)
EOSINOPHIL NFR BLD: 4.8 % (ref 0–8)
EOSINOPHIL NFR BLD: 5.8 % (ref 0–8)
EOSINOPHIL NFR BLD: 6.6 % (ref 0–8)
EOSINOPHIL NFR BLD: 6.8 % (ref 0–8)
EOSINOPHIL URNS QL WRIGHT STN: NORMAL
ERYTHROCYTE [DISTWIDTH] IN BLOOD BY AUTOMATED COUNT: 12.9 % (ref 11.5–14.5)
ERYTHROCYTE [DISTWIDTH] IN BLOOD BY AUTOMATED COUNT: 13 % (ref 11.5–14.5)
ERYTHROCYTE [DISTWIDTH] IN BLOOD BY AUTOMATED COUNT: 13.1 % (ref 11.5–14.5)
ERYTHROCYTE [DISTWIDTH] IN BLOOD BY AUTOMATED COUNT: 13.4 % (ref 11.5–14.5)
ERYTHROCYTE [DISTWIDTH] IN BLOOD BY AUTOMATED COUNT: 14.2 % (ref 11.5–14.5)
ERYTHROCYTE [DISTWIDTH] IN BLOOD BY AUTOMATED COUNT: 14.5 % (ref 11.5–14.5)
ERYTHROCYTE [DISTWIDTH] IN BLOOD BY AUTOMATED COUNT: 14.6 % (ref 11.5–14.5)
ERYTHROCYTE [DISTWIDTH] IN BLOOD BY AUTOMATED COUNT: 14.7 % (ref 11.5–14.5)
ERYTHROCYTE [DISTWIDTH] IN BLOOD BY AUTOMATED COUNT: 14.7 % (ref 11.5–14.5)
ERYTHROCYTE [DISTWIDTH] IN BLOOD BY AUTOMATED COUNT: 15 % (ref 11.5–14.5)
ERYTHROCYTE [DISTWIDTH] IN BLOOD BY AUTOMATED COUNT: 15.2 % (ref 11.5–14.5)
ERYTHROCYTE [DISTWIDTH] IN BLOOD BY AUTOMATED COUNT: 15.2 % (ref 11.5–14.5)
ERYTHROCYTE [DISTWIDTH] IN BLOOD BY AUTOMATED COUNT: 15.3 % (ref 11.5–14.5)
ERYTHROCYTE [DISTWIDTH] IN BLOOD BY AUTOMATED COUNT: 15.5 % (ref 11.5–14.5)
ERYTHROCYTE [DISTWIDTH] IN BLOOD BY AUTOMATED COUNT: 16.6 % (ref 11.5–14.5)
ERYTHROCYTE [DISTWIDTH] IN BLOOD BY AUTOMATED COUNT: 16.7 % (ref 11.5–14.5)
ERYTHROCYTE [DISTWIDTH] IN BLOOD BY AUTOMATED COUNT: 16.8 % (ref 11.5–14.5)
ERYTHROCYTE [DISTWIDTH] IN BLOOD BY AUTOMATED COUNT: 17.1 % (ref 11.5–14.5)
ERYTHROCYTE [DISTWIDTH] IN BLOOD BY AUTOMATED COUNT: 17.1 % (ref 11.5–14.5)
ERYTHROCYTE [DISTWIDTH] IN BLOOD BY AUTOMATED COUNT: 17.2 % (ref 11.5–14.5)
ERYTHROCYTE [DISTWIDTH] IN BLOOD BY AUTOMATED COUNT: 17.3 % (ref 11.5–14.5)
ERYTHROCYTE [DISTWIDTH] IN BLOOD BY AUTOMATED COUNT: 17.3 % (ref 11.5–14.5)
ERYTHROCYTE [DISTWIDTH] IN BLOOD BY AUTOMATED COUNT: 17.4 % (ref 11.5–14.5)
ERYTHROCYTE [DISTWIDTH] IN BLOOD BY AUTOMATED COUNT: 17.4 % (ref 11.5–14.5)
ERYTHROCYTE [DISTWIDTH] IN BLOOD BY AUTOMATED COUNT: 17.5 % (ref 11.5–14.5)
ERYTHROCYTE [DISTWIDTH] IN BLOOD BY AUTOMATED COUNT: 17.7 % (ref 11.5–14.5)
ERYTHROCYTE [DISTWIDTH] IN BLOOD BY AUTOMATED COUNT: 17.7 % (ref 11.5–14.5)
ERYTHROCYTE [DISTWIDTH] IN BLOOD BY AUTOMATED COUNT: 17.8 % (ref 11.5–14.5)
ERYTHROCYTE [DISTWIDTH] IN BLOOD BY AUTOMATED COUNT: 17.9 % (ref 11.5–14.5)
ERYTHROCYTE [DISTWIDTH] IN BLOOD BY AUTOMATED COUNT: 17.9 % (ref 11.5–14.5)
ERYTHROCYTE [DISTWIDTH] IN BLOOD BY AUTOMATED COUNT: 18.1 % (ref 11.5–14.5)
ERYTHROCYTE [DISTWIDTH] IN BLOOD BY AUTOMATED COUNT: 18.2 % (ref 11.5–14.5)
ERYTHROCYTE [DISTWIDTH] IN BLOOD BY AUTOMATED COUNT: 18.3 % (ref 11.5–14.5)
ERYTHROCYTE [DISTWIDTH] IN BLOOD BY AUTOMATED COUNT: 18.5 % (ref 11.5–14.5)
ERYTHROCYTE [DISTWIDTH] IN BLOOD BY AUTOMATED COUNT: 18.6 % (ref 11.5–14.5)
ERYTHROCYTE [DISTWIDTH] IN BLOOD BY AUTOMATED COUNT: ABNORMAL % (ref 11.5–14.5)
ERYTHROCYTE [SEDIMENTATION RATE] IN BLOOD BY WESTERGREN METHOD: 14 MM/H
ERYTHROCYTE [SEDIMENTATION RATE] IN BLOOD BY WESTERGREN METHOD: 20 MM/H
ERYTHROCYTE [SEDIMENTATION RATE] IN BLOOD BY WESTERGREN METHOD: 24 MM/H
ERYTHROCYTE [SEDIMENTATION RATE] IN BLOOD BY WESTERGREN METHOD: 28 MM/H
ERYTHROCYTE [SEDIMENTATION RATE] IN BLOOD BY WESTERGREN METHOD: 28 MM/H
EST. GFR  (NO RACE VARIABLE): 31 ML/MIN/1.73 M^2
EST. GFR  (NO RACE VARIABLE): 31 ML/MIN/1.73 M^2
EST. GFR  (NO RACE VARIABLE): 33.1 ML/MIN/1.73 M^2
EST. GFR  (NO RACE VARIABLE): 35.6 ML/MIN/1.73 M^2
EST. GFR  (NO RACE VARIABLE): 35.6 ML/MIN/1.73 M^2
EST. GFR  (NO RACE VARIABLE): 36 ML/MIN/1.73 M^2
EST. GFR  (NO RACE VARIABLE): 38.4 ML/MIN/1.73 M^2
EST. GFR  (NO RACE VARIABLE): 41.8 ML/MIN/1.73 M^2
EST. GFR  (NO RACE VARIABLE): 41.8 ML/MIN/1.73 M^2
EST. GFR  (NO RACE VARIABLE): 45.6 ML/MIN/1.73 M^2
EST. GFR  (NO RACE VARIABLE): 50 ML/MIN/1.73 M^2
EST. GFR  (NO RACE VARIABLE): 50 ML/MIN/1.73 M^2
EST. GFR  (NO RACE VARIABLE): 50.2 ML/MIN/1.73 M^2
EST. GFR  (NO RACE VARIABLE): 55.8 ML/MIN/1.73 M^2
EST. GFR  (NO RACE VARIABLE): >60 ML/MIN/1.73 M^2
FERRITIN SERPL-MCNC: 419 NG/ML (ref 20–300)
FINAL PATHOLOGIC DIAGNOSIS: NORMAL
FIO2: 50
FIO2: 50
FIO2: 55
FIO2: 85
FLOW: 2
FOLATE SERPL-MCNC: 12.3 NG/ML (ref 4–24)
GLUCOSE SERPL-MCNC: 100 MG/DL (ref 70–110)
GLUCOSE SERPL-MCNC: 100 MG/DL (ref 70–110)
GLUCOSE SERPL-MCNC: 101 MG/DL (ref 70–110)
GLUCOSE SERPL-MCNC: 101 MG/DL (ref 70–110)
GLUCOSE SERPL-MCNC: 102 MG/DL (ref 70–110)
GLUCOSE SERPL-MCNC: 103 MG/DL (ref 70–110)
GLUCOSE SERPL-MCNC: 103 MG/DL (ref 70–110)
GLUCOSE SERPL-MCNC: 104 MG/DL (ref 70–110)
GLUCOSE SERPL-MCNC: 104 MG/DL (ref 70–110)
GLUCOSE SERPL-MCNC: 105 MG/DL (ref 70–110)
GLUCOSE SERPL-MCNC: 106 MG/DL (ref 70–110)
GLUCOSE SERPL-MCNC: 108 MG/DL (ref 70–110)
GLUCOSE SERPL-MCNC: 109 MG/DL (ref 70–110)
GLUCOSE SERPL-MCNC: 111 MG/DL (ref 70–110)
GLUCOSE SERPL-MCNC: 111 MG/DL (ref 70–110)
GLUCOSE SERPL-MCNC: 112 MG/DL (ref 70–110)
GLUCOSE SERPL-MCNC: 113 MG/DL (ref 70–110)
GLUCOSE SERPL-MCNC: 114 MG/DL (ref 70–110)
GLUCOSE SERPL-MCNC: 115 MG/DL (ref 70–110)
GLUCOSE SERPL-MCNC: 117 MG/DL (ref 70–110)
GLUCOSE SERPL-MCNC: 118 MG/DL (ref 70–110)
GLUCOSE SERPL-MCNC: 118 MG/DL (ref 70–110)
GLUCOSE SERPL-MCNC: 120 MG/DL (ref 70–110)
GLUCOSE SERPL-MCNC: 121 MG/DL (ref 70–110)
GLUCOSE SERPL-MCNC: 121 MG/DL (ref 70–110)
GLUCOSE SERPL-MCNC: 122 MG/DL (ref 70–110)
GLUCOSE SERPL-MCNC: 122 MG/DL (ref 70–110)
GLUCOSE SERPL-MCNC: 123 MG/DL (ref 70–110)
GLUCOSE SERPL-MCNC: 124 MG/DL (ref 70–110)
GLUCOSE SERPL-MCNC: 125 MG/DL (ref 70–110)
GLUCOSE SERPL-MCNC: 126 MG/DL (ref 70–110)
GLUCOSE SERPL-MCNC: 127 MG/DL (ref 70–110)
GLUCOSE SERPL-MCNC: 127 MG/DL (ref 70–110)
GLUCOSE SERPL-MCNC: 128 MG/DL (ref 70–110)
GLUCOSE SERPL-MCNC: 129 MG/DL (ref 70–110)
GLUCOSE SERPL-MCNC: 130 MG/DL (ref 70–110)
GLUCOSE SERPL-MCNC: 131 MG/DL (ref 70–110)
GLUCOSE SERPL-MCNC: 132 MG/DL (ref 70–110)
GLUCOSE SERPL-MCNC: 133 MG/DL (ref 70–110)
GLUCOSE SERPL-MCNC: 134 MG/DL (ref 70–110)
GLUCOSE SERPL-MCNC: 135 MG/DL (ref 70–110)
GLUCOSE SERPL-MCNC: 136 MG/DL (ref 70–110)
GLUCOSE SERPL-MCNC: 138 MG/DL (ref 70–110)
GLUCOSE SERPL-MCNC: 138 MG/DL (ref 70–110)
GLUCOSE SERPL-MCNC: 139 MG/DL (ref 70–110)
GLUCOSE SERPL-MCNC: 140 MG/DL (ref 70–110)
GLUCOSE SERPL-MCNC: 140 MG/DL (ref 70–110)
GLUCOSE SERPL-MCNC: 141 MG/DL (ref 70–110)
GLUCOSE SERPL-MCNC: 141 MG/DL (ref 70–110)
GLUCOSE SERPL-MCNC: 142 MG/DL (ref 70–110)
GLUCOSE SERPL-MCNC: 144 MG/DL (ref 70–110)
GLUCOSE SERPL-MCNC: 152 MG/DL (ref 70–110)
GLUCOSE SERPL-MCNC: 152 MG/DL (ref 70–110)
GLUCOSE SERPL-MCNC: 154 MG/DL (ref 70–110)
GLUCOSE SERPL-MCNC: 159 MG/DL (ref 70–110)
GLUCOSE SERPL-MCNC: 159 MG/DL (ref 70–110)
GLUCOSE SERPL-MCNC: 181 MG/DL (ref 70–110)
GLUCOSE SERPL-MCNC: 188 MG/DL (ref 70–110)
GLUCOSE SERPL-MCNC: 189 MG/DL (ref 70–110)
GLUCOSE SERPL-MCNC: 197 MG/DL (ref 70–110)
GLUCOSE SERPL-MCNC: 223 MG/DL (ref 70–110)
GLUCOSE SERPL-MCNC: 31 MG/DL (ref 70–110)
GLUCOSE SERPL-MCNC: 56 MG/DL (ref 70–110)
GLUCOSE SERPL-MCNC: 58 MG/DL (ref 70–110)
GLUCOSE SERPL-MCNC: 69 MG/DL (ref 70–110)
GLUCOSE SERPL-MCNC: 69 MG/DL (ref 70–110)
GLUCOSE SERPL-MCNC: 71 MG/DL (ref 70–110)
GLUCOSE SERPL-MCNC: 71 MG/DL (ref 70–110)
GLUCOSE SERPL-MCNC: 74 MG/DL (ref 70–110)
GLUCOSE SERPL-MCNC: 77 MG/DL (ref 70–110)
GLUCOSE SERPL-MCNC: 81 MG/DL (ref 70–110)
GLUCOSE SERPL-MCNC: 83 MG/DL (ref 70–110)
GLUCOSE SERPL-MCNC: 83 MG/DL (ref 70–110)
GLUCOSE SERPL-MCNC: 86 MG/DL (ref 70–110)
GLUCOSE SERPL-MCNC: 88 MG/DL (ref 70–110)
GLUCOSE SERPL-MCNC: 88 MG/DL (ref 70–110)
GLUCOSE SERPL-MCNC: 89 MG/DL (ref 70–110)
GLUCOSE SERPL-MCNC: 92 MG/DL (ref 70–110)
GLUCOSE SERPL-MCNC: 94 MG/DL (ref 70–110)
GLUCOSE SERPL-MCNC: 95 MG/DL (ref 70–110)
GLUCOSE SERPL-MCNC: 96 MG/DL (ref 70–110)
GLUCOSE SERPL-MCNC: 97 MG/DL (ref 70–110)
GLUCOSE SERPL-MCNC: 97 MG/DL (ref 70–110)
GLUCOSE SERPL-MCNC: 98 MG/DL (ref 70–110)
GLUCOSE SERPL-MCNC: 99 MG/DL (ref 70–110)
GLUCOSE UR QL STRIP: ABNORMAL
GLUCOSE UR QL STRIP: ABNORMAL
GLUCOSE UR QL STRIP: NEGATIVE
GRAM STN SPEC: ABNORMAL
GROSS: NORMAL
HCO3 UR-SCNC: 16.8 MMOL/L (ref 24–28)
HCO3 UR-SCNC: 17.3 MMOL/L (ref 24–28)
HCO3 UR-SCNC: 18.2 MMOL/L (ref 24–28)
HCO3 UR-SCNC: 18.3 MMOL/L (ref 24–28)
HCO3 UR-SCNC: 20.9 MMOL/L (ref 24–28)
HCT VFR BLD AUTO: 20.1 % (ref 37–48.5)
HCT VFR BLD AUTO: 21.4 % (ref 37–48.5)
HCT VFR BLD AUTO: 22.2 % (ref 37–48.5)
HCT VFR BLD AUTO: 22.5 % (ref 37–48.5)
HCT VFR BLD AUTO: 22.7 % (ref 37–48.5)
HCT VFR BLD AUTO: 23.2 % (ref 37–48.5)
HCT VFR BLD AUTO: 23.4 % (ref 37–48.5)
HCT VFR BLD AUTO: 23.8 % (ref 37–48.5)
HCT VFR BLD AUTO: 24.3 % (ref 37–48.5)
HCT VFR BLD AUTO: 24.5 % (ref 37–48.5)
HCT VFR BLD AUTO: 25.3 % (ref 37–48.5)
HCT VFR BLD AUTO: 25.5 % (ref 37–48.5)
HCT VFR BLD AUTO: 25.7 % (ref 37–48.5)
HCT VFR BLD AUTO: 25.9 % (ref 37–48.5)
HCT VFR BLD AUTO: 26.3 % (ref 37–48.5)
HCT VFR BLD AUTO: 26.4 % (ref 37–48.5)
HCT VFR BLD AUTO: 26.6 % (ref 37–48.5)
HCT VFR BLD AUTO: 27.3 % (ref 37–48.5)
HCT VFR BLD AUTO: 27.6 % (ref 37–48.5)
HCT VFR BLD AUTO: 27.7 % (ref 37–48.5)
HCT VFR BLD AUTO: 27.9 % (ref 37–48.5)
HCT VFR BLD AUTO: 28.1 % (ref 37–48.5)
HCT VFR BLD AUTO: 28.6 % (ref 37–48.5)
HCT VFR BLD AUTO: 29.4 % (ref 37–48.5)
HCT VFR BLD AUTO: 30.1 % (ref 37–48.5)
HCT VFR BLD AUTO: 33.1 % (ref 37–48.5)
HCT VFR BLD AUTO: 35.2 % (ref 37–48.5)
HCT VFR BLD AUTO: 35.8 % (ref 37–48.5)
HCT VFR BLD AUTO: 35.9 % (ref 37–48.5)
HCT VFR BLD AUTO: 36 % (ref 37–48.5)
HCT VFR BLD AUTO: 36 % (ref 37–48.5)
HCT VFR BLD AUTO: 37.1 % (ref 37–48.5)
HCT VFR BLD AUTO: 37.4 % (ref 37–48.5)
HCT VFR BLD AUTO: 38.9 % (ref 37–48.5)
HCT VFR BLD AUTO: 39.6 % (ref 37–48.5)
HCT VFR BLD AUTO: 40.2 % (ref 37–48.5)
HCT VFR BLD AUTO: 40.2 % (ref 37–48.5)
HCT VFR BLD AUTO: 40.6 % (ref 37–48.5)
HCT VFR BLD AUTO: 41.1 % (ref 37–48.5)
HCT VFR BLD AUTO: 41.2 % (ref 37–48.5)
HCT VFR BLD AUTO: 41.5 % (ref 37–48.5)
HCT VFR BLD AUTO: 42.4 % (ref 37–48.5)
HCT VFR BLD AUTO: ABNORMAL % (ref 37–48.5)
HCT VFR BLD CALC: 19 %PCV (ref 36–54)
HCT VFR BLD CALC: 26 %PCV (ref 36–54)
HCT VFR BLD CALC: 26 %PCV (ref 36–54)
HCT VFR BLD CALC: 28 %PCV (ref 36–54)
HGB BLD-MCNC: 10.7 G/DL (ref 12–16)
HGB BLD-MCNC: 11.2 G/DL (ref 12–16)
HGB BLD-MCNC: 11.5 G/DL (ref 12–16)
HGB BLD-MCNC: 11.6 G/DL (ref 12–16)
HGB BLD-MCNC: 11.6 G/DL (ref 12–16)
HGB BLD-MCNC: 11.8 G/DL (ref 12–16)
HGB BLD-MCNC: 11.9 G/DL (ref 12–16)
HGB BLD-MCNC: 12.1 G/DL (ref 12–16)
HGB BLD-MCNC: 12.1 G/DL (ref 12–16)
HGB BLD-MCNC: 12.6 G/DL (ref 12–16)
HGB BLD-MCNC: 12.8 G/DL (ref 12–16)
HGB BLD-MCNC: 12.9 G/DL (ref 12–16)
HGB BLD-MCNC: 13.2 G/DL (ref 12–16)
HGB BLD-MCNC: 13.4 G/DL (ref 12–16)
HGB BLD-MCNC: 13.5 G/DL (ref 12–16)
HGB BLD-MCNC: 13.7 G/DL (ref 12–16)
HGB BLD-MCNC: 13.8 G/DL (ref 12–16)
HGB BLD-MCNC: 6.8 G/DL (ref 12–16)
HGB BLD-MCNC: 7 G/DL (ref 12–16)
HGB BLD-MCNC: 7.2 G/DL (ref 12–16)
HGB BLD-MCNC: 7.2 G/DL (ref 12–16)
HGB BLD-MCNC: 7.5 G/DL (ref 12–16)
HGB BLD-MCNC: 7.6 G/DL (ref 12–16)
HGB BLD-MCNC: 7.6 G/DL (ref 12–16)
HGB BLD-MCNC: 7.7 G/DL (ref 12–16)
HGB BLD-MCNC: 7.7 G/DL (ref 12–16)
HGB BLD-MCNC: 7.8 G/DL (ref 12–16)
HGB BLD-MCNC: 8 G/DL (ref 12–16)
HGB BLD-MCNC: 8.2 G/DL (ref 12–16)
HGB BLD-MCNC: 8.3 G/DL (ref 12–16)
HGB BLD-MCNC: 8.4 G/DL (ref 12–16)
HGB BLD-MCNC: 8.5 G/DL (ref 12–16)
HGB BLD-MCNC: 8.5 G/DL (ref 12–16)
HGB BLD-MCNC: 8.6 G/DL (ref 12–16)
HGB BLD-MCNC: 8.8 G/DL (ref 12–16)
HGB BLD-MCNC: 8.9 G/DL (ref 12–16)
HGB BLD-MCNC: 8.9 G/DL (ref 12–16)
HGB BLD-MCNC: 9.5 G/DL (ref 12–16)
HGB BLD-MCNC: 9.6 G/DL (ref 12–16)
HGB BLD-MCNC: 9.7 G/DL (ref 12–16)
HGB BLD-MCNC: ABNORMAL G/DL (ref 12–16)
HGB UR QL STRIP: ABNORMAL
HYALINE CASTS #/AREA URNS LPF: 0 /LPF
HYALINE CASTS #/AREA URNS LPF: 220 /LPF
HYPOCHROMIA BLD QL SMEAR: ABNORMAL
IMM GRANULOCYTES # BLD AUTO: 0 K/UL (ref 0–0.04)
IMM GRANULOCYTES # BLD AUTO: 0.01 K/UL (ref 0–0.04)
IMM GRANULOCYTES # BLD AUTO: 0.02 K/UL (ref 0–0.04)
IMM GRANULOCYTES # BLD AUTO: 0.02 K/UL (ref 0–0.04)
IMM GRANULOCYTES # BLD AUTO: 0.03 K/UL (ref 0–0.04)
IMM GRANULOCYTES # BLD AUTO: 0.04 K/UL (ref 0–0.04)
IMM GRANULOCYTES # BLD AUTO: 0.05 K/UL (ref 0–0.04)
IMM GRANULOCYTES # BLD AUTO: 0.06 K/UL (ref 0–0.04)
IMM GRANULOCYTES # BLD AUTO: 0.06 K/UL (ref 0–0.04)
IMM GRANULOCYTES # BLD AUTO: 0.07 K/UL (ref 0–0.04)
IMM GRANULOCYTES # BLD AUTO: 0.07 K/UL (ref 0–0.04)
IMM GRANULOCYTES # BLD AUTO: 0.08 K/UL (ref 0–0.04)
IMM GRANULOCYTES # BLD AUTO: 0.08 K/UL (ref 0–0.04)
IMM GRANULOCYTES # BLD AUTO: 0.09 K/UL (ref 0–0.04)
IMM GRANULOCYTES # BLD AUTO: 0.13 K/UL (ref 0–0.04)
IMM GRANULOCYTES # BLD AUTO: 0.13 K/UL (ref 0–0.04)
IMM GRANULOCYTES # BLD AUTO: 0.16 K/UL (ref 0–0.04)
IMM GRANULOCYTES # BLD AUTO: 0.17 K/UL (ref 0–0.04)
IMM GRANULOCYTES # BLD AUTO: 0.17 K/UL (ref 0–0.04)
IMM GRANULOCYTES # BLD AUTO: 0.19 K/UL (ref 0–0.04)
IMM GRANULOCYTES # BLD AUTO: 0.25 K/UL (ref 0–0.04)
IMM GRANULOCYTES # BLD AUTO: 0.31 K/UL (ref 0–0.04)
IMM GRANULOCYTES # BLD AUTO: 0.33 K/UL (ref 0–0.04)
IMM GRANULOCYTES # BLD AUTO: 0.34 K/UL (ref 0–0.04)
IMM GRANULOCYTES # BLD AUTO: 0.35 K/UL (ref 0–0.04)
IMM GRANULOCYTES # BLD AUTO: 0.52 K/UL (ref 0–0.04)
IMM GRANULOCYTES # BLD AUTO: 1.15 K/UL (ref 0–0.04)
IMM GRANULOCYTES # BLD AUTO: 1.95 K/UL (ref 0–0.04)
IMM GRANULOCYTES # BLD AUTO: ABNORMAL K/UL
IMM GRANULOCYTES # BLD AUTO: ABNORMAL K/UL
IMM GRANULOCYTES # BLD AUTO: ABNORMAL K/UL (ref 0–0.04)
IMM GRANULOCYTES NFR BLD AUTO: 0 % (ref 0–0.5)
IMM GRANULOCYTES NFR BLD AUTO: 0.2 % (ref 0–0.5)
IMM GRANULOCYTES NFR BLD AUTO: 0.3 % (ref 0–0.5)
IMM GRANULOCYTES NFR BLD AUTO: 0.3 % (ref 0–0.5)
IMM GRANULOCYTES NFR BLD AUTO: 0.5 % (ref 0–0.5)
IMM GRANULOCYTES NFR BLD AUTO: 0.6 % (ref 0–0.5)
IMM GRANULOCYTES NFR BLD AUTO: 0.8 % (ref 0–0.5)
IMM GRANULOCYTES NFR BLD AUTO: 0.9 % (ref 0–0.5)
IMM GRANULOCYTES NFR BLD AUTO: 1 % (ref 0–0.5)
IMM GRANULOCYTES NFR BLD AUTO: 1 % (ref 0–0.5)
IMM GRANULOCYTES NFR BLD AUTO: 1.2 % (ref 0–0.5)
IMM GRANULOCYTES NFR BLD AUTO: 1.4 % (ref 0–0.5)
IMM GRANULOCYTES NFR BLD AUTO: 1.5 % (ref 0–0.5)
IMM GRANULOCYTES NFR BLD AUTO: 1.7 % (ref 0–0.5)
IMM GRANULOCYTES NFR BLD AUTO: 1.7 % (ref 0–0.5)
IMM GRANULOCYTES NFR BLD AUTO: 1.8 % (ref 0–0.5)
IMM GRANULOCYTES NFR BLD AUTO: 1.9 % (ref 0–0.5)
IMM GRANULOCYTES NFR BLD AUTO: 11.6 % (ref 0–0.5)
IMM GRANULOCYTES NFR BLD AUTO: 2.1 % (ref 0–0.5)
IMM GRANULOCYTES NFR BLD AUTO: 2.2 % (ref 0–0.5)
IMM GRANULOCYTES NFR BLD AUTO: 3.4 % (ref 0–0.5)
IMM GRANULOCYTES NFR BLD AUTO: 4.2 % (ref 0–0.5)
IMM GRANULOCYTES NFR BLD AUTO: 4.5 % (ref 0–0.5)
IMM GRANULOCYTES NFR BLD AUTO: ABNORMAL %
IMM GRANULOCYTES NFR BLD AUTO: ABNORMAL %
IMM GRANULOCYTES NFR BLD AUTO: ABNORMAL % (ref 0–0.5)
IRON SERPL-MCNC: 6 UG/DL (ref 30–160)
KETONES UR QL STRIP: ABNORMAL
KETONES UR QL STRIP: NEGATIVE
LACTATE SERPL-SCNC: 0.9 MMOL/L (ref 0.5–1.9)
LACTATE SERPL-SCNC: 1.2 MMOL/L (ref 0.5–2.2)
LACTATE SERPL-SCNC: 1.2 MMOL/L (ref 0.5–2.2)
LACTATE SERPL-SCNC: 1.4 MMOL/L (ref 0.5–1.9)
LACTATE SERPL-SCNC: 2.1 MMOL/L (ref 0.5–2.2)
LACTATE SERPL-SCNC: 2.2 MMOL/L (ref 0.5–2.2)
LACTATE SERPL-SCNC: 2.3 MMOL/L (ref 0.5–2.2)
LACTATE SERPL-SCNC: 2.7 MMOL/L (ref 0.5–2.2)
LACTATE SERPL-SCNC: 4.3 MMOL/L (ref 0.5–2.2)
LEUKOCYTE ESTERASE UR QL STRIP: ABNORMAL
LEUKOCYTE ESTERASE UR QL STRIP: NEGATIVE
LYMPHOCYTES # BLD AUTO: 0.1 K/UL (ref 1–4.8)
LYMPHOCYTES # BLD AUTO: 0.2 K/UL (ref 1–4.8)
LYMPHOCYTES # BLD AUTO: 0.3 K/UL (ref 1–4.8)
LYMPHOCYTES # BLD AUTO: 0.3 K/UL (ref 1–4.8)
LYMPHOCYTES # BLD AUTO: 0.4 K/UL (ref 1–4.8)
LYMPHOCYTES # BLD AUTO: 0.4 K/UL (ref 1–4.8)
LYMPHOCYTES # BLD AUTO: 0.6 K/UL (ref 1–4.8)
LYMPHOCYTES # BLD AUTO: 0.7 K/UL (ref 1–4.8)
LYMPHOCYTES # BLD AUTO: 0.8 K/UL (ref 1–4.8)
LYMPHOCYTES # BLD AUTO: 0.9 K/UL (ref 1–4.8)
LYMPHOCYTES # BLD AUTO: 3 K/UL (ref 1–4.8)
LYMPHOCYTES # BLD AUTO: ABNORMAL K/UL (ref 1–4.8)
LYMPHOCYTES NFR BLD: 0 % (ref 18–48)
LYMPHOCYTES NFR BLD: 10 % (ref 18–48)
LYMPHOCYTES NFR BLD: 11 % (ref 18–48)
LYMPHOCYTES NFR BLD: 12.4 % (ref 18–48)
LYMPHOCYTES NFR BLD: 12.9 % (ref 18–48)
LYMPHOCYTES NFR BLD: 13 % (ref 18–48)
LYMPHOCYTES NFR BLD: 13.2 % (ref 18–48)
LYMPHOCYTES NFR BLD: 14 % (ref 18–48)
LYMPHOCYTES NFR BLD: 16 % (ref 18–48)
LYMPHOCYTES NFR BLD: 16.5 % (ref 18–48)
LYMPHOCYTES NFR BLD: 17.8 % (ref 18–48)
LYMPHOCYTES NFR BLD: 2.2 % (ref 18–48)
LYMPHOCYTES NFR BLD: 2.8 % (ref 18–48)
LYMPHOCYTES NFR BLD: 3 % (ref 18–48)
LYMPHOCYTES NFR BLD: 3.4 % (ref 18–48)
LYMPHOCYTES NFR BLD: 3.4 % (ref 18–48)
LYMPHOCYTES NFR BLD: 3.7 % (ref 18–48)
LYMPHOCYTES NFR BLD: 4 % (ref 18–48)
LYMPHOCYTES NFR BLD: 4.1 % (ref 18–48)
LYMPHOCYTES NFR BLD: 4.8 % (ref 18–48)
LYMPHOCYTES NFR BLD: 5.4 % (ref 18–48)
LYMPHOCYTES NFR BLD: 5.4 % (ref 18–48)
LYMPHOCYTES NFR BLD: 6 % (ref 18–48)
LYMPHOCYTES NFR BLD: 6.2 % (ref 18–48)
LYMPHOCYTES NFR BLD: 7 % (ref 18–48)
LYMPHOCYTES NFR BLD: 7.3 % (ref 18–48)
LYMPHOCYTES NFR BLD: 7.4 % (ref 18–48)
LYMPHOCYTES NFR BLD: 7.5 % (ref 18–48)
LYMPHOCYTES NFR BLD: 8.7 % (ref 18–48)
LYMPHOCYTES NFR BLD: 8.8 % (ref 18–48)
LYMPHOCYTES NFR BLD: 8.9 % (ref 18–48)
LYMPHOCYTES NFR BLD: 9.1 % (ref 18–48)
LYMPHOCYTES NFR BLD: 9.2 % (ref 18–48)
LYMPHOCYTES NFR BLD: 9.3 % (ref 18–48)
LYMPHOCYTES NFR BLD: 9.4 % (ref 18–48)
LYMPHOCYTES NFR BLD: 9.6 % (ref 18–48)
Lab: NORMAL
MAGNESIUM SERPL-MCNC: 1.4 MG/DL (ref 1.6–2.6)
MAGNESIUM SERPL-MCNC: 1.6 MG/DL (ref 1.6–2.6)
MAGNESIUM SERPL-MCNC: 1.7 MG/DL (ref 1.6–2.6)
MAGNESIUM SERPL-MCNC: 1.8 MG/DL (ref 1.6–2.6)
MAGNESIUM SERPL-MCNC: 1.9 MG/DL (ref 1.6–2.6)
MAGNESIUM SERPL-MCNC: 2 MG/DL (ref 1.6–2.6)
MAGNESIUM SERPL-MCNC: 2.1 MG/DL (ref 1.6–2.6)
MAGNESIUM SERPL-MCNC: 2.2 MG/DL (ref 1.6–2.6)
MAGNESIUM SERPL-MCNC: 2.4 MG/DL (ref 1.6–2.6)
MCH RBC QN AUTO: 26.7 PG (ref 27–31)
MCH RBC QN AUTO: 26.8 PG (ref 27–31)
MCH RBC QN AUTO: 26.9 PG (ref 27–31)
MCH RBC QN AUTO: 27 PG (ref 27–31)
MCH RBC QN AUTO: 27.8 PG (ref 27–31)
MCH RBC QN AUTO: 27.9 PG (ref 27–31)
MCH RBC QN AUTO: 27.9 PG (ref 27–31)
MCH RBC QN AUTO: 28 PG (ref 27–31)
MCH RBC QN AUTO: 28.1 PG (ref 27–31)
MCH RBC QN AUTO: 28.2 PG (ref 27–31)
MCH RBC QN AUTO: 28.4 PG (ref 27–31)
MCH RBC QN AUTO: 28.4 PG (ref 27–31)
MCH RBC QN AUTO: 28.5 PG (ref 27–31)
MCH RBC QN AUTO: 28.6 PG (ref 27–31)
MCH RBC QN AUTO: 28.6 PG (ref 27–31)
MCH RBC QN AUTO: 28.7 PG (ref 27–31)
MCH RBC QN AUTO: 28.9 PG (ref 27–31)
MCH RBC QN AUTO: 30 PG (ref 27–31)
MCH RBC QN AUTO: 30.1 PG (ref 27–31)
MCH RBC QN AUTO: 30.2 PG (ref 27–31)
MCH RBC QN AUTO: 30.4 PG (ref 27–31)
MCH RBC QN AUTO: 30.5 PG (ref 27–31)
MCH RBC QN AUTO: 30.6 PG (ref 27–31)
MCH RBC QN AUTO: 30.6 PG (ref 27–31)
MCH RBC QN AUTO: 30.7 PG (ref 27–31)
MCH RBC QN AUTO: 30.8 PG (ref 27–31)
MCH RBC QN AUTO: 30.9 PG (ref 27–31)
MCH RBC QN AUTO: 30.9 PG (ref 27–31)
MCH RBC QN AUTO: 31 PG (ref 27–31)
MCH RBC QN AUTO: 31.4 PG (ref 27–31)
MCH RBC QN AUTO: ABNORMAL PG (ref 27–31)
MCHC RBC AUTO-ENTMCNC: 30.3 G/DL (ref 32–36)
MCHC RBC AUTO-ENTMCNC: 31 G/DL (ref 32–36)
MCHC RBC AUTO-ENTMCNC: 31.1 G/DL (ref 32–36)
MCHC RBC AUTO-ENTMCNC: 31.1 G/DL (ref 32–36)
MCHC RBC AUTO-ENTMCNC: 31.2 G/DL (ref 32–36)
MCHC RBC AUTO-ENTMCNC: 31.3 G/DL (ref 32–36)
MCHC RBC AUTO-ENTMCNC: 31.3 G/DL (ref 32–36)
MCHC RBC AUTO-ENTMCNC: 31.6 G/DL (ref 32–36)
MCHC RBC AUTO-ENTMCNC: 31.6 G/DL (ref 32–36)
MCHC RBC AUTO-ENTMCNC: 31.8 G/DL (ref 32–36)
MCHC RBC AUTO-ENTMCNC: 31.9 G/DL (ref 32–36)
MCHC RBC AUTO-ENTMCNC: 32.1 G/DL (ref 32–36)
MCHC RBC AUTO-ENTMCNC: 32.2 G/DL (ref 32–36)
MCHC RBC AUTO-ENTMCNC: 32.2 G/DL (ref 32–36)
MCHC RBC AUTO-ENTMCNC: 32.3 G/DL (ref 32–36)
MCHC RBC AUTO-ENTMCNC: 32.4 G/DL (ref 32–36)
MCHC RBC AUTO-ENTMCNC: 32.5 G/DL (ref 32–36)
MCHC RBC AUTO-ENTMCNC: 32.6 G/DL (ref 32–36)
MCHC RBC AUTO-ENTMCNC: 32.6 G/DL (ref 32–36)
MCHC RBC AUTO-ENTMCNC: 32.7 G/DL (ref 32–36)
MCHC RBC AUTO-ENTMCNC: 32.8 G/DL (ref 32–36)
MCHC RBC AUTO-ENTMCNC: 32.9 G/DL (ref 32–36)
MCHC RBC AUTO-ENTMCNC: 33.1 G/DL (ref 32–36)
MCHC RBC AUTO-ENTMCNC: 33.3 G/DL (ref 32–36)
MCHC RBC AUTO-ENTMCNC: 33.5 G/DL (ref 32–36)
MCHC RBC AUTO-ENTMCNC: 33.6 G/DL (ref 32–36)
MCHC RBC AUTO-ENTMCNC: 33.7 G/DL (ref 32–36)
MCHC RBC AUTO-ENTMCNC: 33.8 G/DL (ref 32–36)
MCHC RBC AUTO-ENTMCNC: 33.8 G/DL (ref 32–36)
MCHC RBC AUTO-ENTMCNC: ABNORMAL G/DL (ref 32–36)
MCV RBC AUTO: 83 FL (ref 82–98)
MCV RBC AUTO: 83 FL (ref 82–98)
MCV RBC AUTO: 84 FL (ref 82–98)
MCV RBC AUTO: 84 FL (ref 82–98)
MCV RBC AUTO: 85 FL (ref 82–98)
MCV RBC AUTO: 86 FL (ref 82–98)
MCV RBC AUTO: 87 FL (ref 82–98)
MCV RBC AUTO: 88 FL (ref 82–98)
MCV RBC AUTO: 89 FL (ref 82–98)
MCV RBC AUTO: 90 FL (ref 82–98)
MCV RBC AUTO: 91 FL (ref 82–98)
MCV RBC AUTO: 91 FL (ref 82–98)
MCV RBC AUTO: 92 FL (ref 82–98)
MCV RBC AUTO: 93 FL (ref 82–98)
MCV RBC AUTO: 93 FL (ref 82–98)
MCV RBC AUTO: 94 FL (ref 82–98)
MCV RBC AUTO: 95 FL (ref 82–98)
MCV RBC AUTO: 96 FL (ref 82–98)
MCV RBC AUTO: ABNORMAL FL (ref 82–98)
METAMYELOCYTES NFR BLD MANUAL: 1 %
METAMYELOCYTES NFR BLD MANUAL: 1 %
METAMYELOCYTES NFR BLD MANUAL: 3 %
MICROSCOPIC COMMENT: ABNORMAL
MIN VOL: 10
MIN VOL: 13
MIN VOL: 14
MODE: ABNORMAL
MONOCYTES # BLD AUTO: 0.1 K/UL (ref 0.3–1)
MONOCYTES # BLD AUTO: 0.2 K/UL (ref 0.3–1)
MONOCYTES # BLD AUTO: 0.3 K/UL (ref 0.3–1)
MONOCYTES # BLD AUTO: 0.4 K/UL (ref 0.3–1)
MONOCYTES # BLD AUTO: 0.5 K/UL (ref 0.3–1)
MONOCYTES # BLD AUTO: 0.6 K/UL (ref 0.3–1)
MONOCYTES # BLD AUTO: 0.7 K/UL (ref 0.3–1)
MONOCYTES # BLD AUTO: 0.8 K/UL (ref 0.3–1)
MONOCYTES # BLD AUTO: 0.9 K/UL (ref 0.3–1)
MONOCYTES # BLD AUTO: 1 K/UL (ref 0.3–1)
MONOCYTES # BLD AUTO: 1.1 K/UL (ref 0.3–1)
MONOCYTES # BLD AUTO: 1.2 K/UL (ref 0.3–1)
MONOCYTES # BLD AUTO: 1.2 K/UL (ref 0.3–1)
MONOCYTES # BLD AUTO: 1.4 K/UL (ref 0.3–1)
MONOCYTES # BLD AUTO: ABNORMAL K/UL (ref 0.3–1)
MONOCYTES NFR BLD: 0 % (ref 4–15)
MONOCYTES NFR BLD: 1 % (ref 4–15)
MONOCYTES NFR BLD: 10 % (ref 4–15)
MONOCYTES NFR BLD: 10.3 % (ref 4–15)
MONOCYTES NFR BLD: 10.8 % (ref 4–15)
MONOCYTES NFR BLD: 2 % (ref 4–15)
MONOCYTES NFR BLD: 2 % (ref 4–15)
MONOCYTES NFR BLD: 2.5 % (ref 4–15)
MONOCYTES NFR BLD: 2.7 % (ref 4–15)
MONOCYTES NFR BLD: 3 % (ref 4–15)
MONOCYTES NFR BLD: 3.4 % (ref 4–15)
MONOCYTES NFR BLD: 4 % (ref 4–15)
MONOCYTES NFR BLD: 4 % (ref 4–15)
MONOCYTES NFR BLD: 4.2 % (ref 4–15)
MONOCYTES NFR BLD: 4.2 % (ref 4–15)
MONOCYTES NFR BLD: 4.3 % (ref 4–15)
MONOCYTES NFR BLD: 4.4 % (ref 4–15)
MONOCYTES NFR BLD: 4.4 % (ref 4–15)
MONOCYTES NFR BLD: 4.5 % (ref 4–15)
MONOCYTES NFR BLD: 4.6 % (ref 4–15)
MONOCYTES NFR BLD: 4.8 % (ref 4–15)
MONOCYTES NFR BLD: 4.8 % (ref 4–15)
MONOCYTES NFR BLD: 4.9 % (ref 4–15)
MONOCYTES NFR BLD: 5.6 % (ref 4–15)
MONOCYTES NFR BLD: 6 % (ref 4–15)
MONOCYTES NFR BLD: 6 % (ref 4–15)
MONOCYTES NFR BLD: 6.1 % (ref 4–15)
MONOCYTES NFR BLD: 6.1 % (ref 4–15)
MONOCYTES NFR BLD: 6.2 % (ref 4–15)
MONOCYTES NFR BLD: 6.8 % (ref 4–15)
MONOCYTES NFR BLD: 6.9 % (ref 4–15)
MONOCYTES NFR BLD: 7.2 % (ref 4–15)
MONOCYTES NFR BLD: 7.5 % (ref 4–15)
MONOCYTES NFR BLD: 8.2 % (ref 4–15)
MONOCYTES NFR BLD: 9 % (ref 4–15)
MONOCYTES NFR BLD: 9 % (ref 4–15)
MYELOCYTES NFR BLD MANUAL: 1 %
MYELOCYTES NFR BLD MANUAL: 2 %
NEUTROPHILS # BLD AUTO: 1.7 K/UL (ref 1.8–7.7)
NEUTROPHILS # BLD AUTO: 10.6 K/UL (ref 1.8–7.7)
NEUTROPHILS # BLD AUTO: 11.2 K/UL (ref 1.8–7.7)
NEUTROPHILS # BLD AUTO: 12.3 K/UL (ref 1.8–7.7)
NEUTROPHILS # BLD AUTO: 13 K/UL (ref 1.8–7.7)
NEUTROPHILS # BLD AUTO: 13.6 K/UL (ref 1.8–7.7)
NEUTROPHILS # BLD AUTO: 16.3 K/UL (ref 1.8–7.7)
NEUTROPHILS # BLD AUTO: 16.5 K/UL (ref 1.8–7.7)
NEUTROPHILS # BLD AUTO: 16.6 K/UL (ref 1.8–7.7)
NEUTROPHILS # BLD AUTO: 2.8 K/UL (ref 1.8–7.7)
NEUTROPHILS # BLD AUTO: 24.3 K/UL (ref 1.8–7.7)
NEUTROPHILS # BLD AUTO: 3.3 K/UL (ref 1.8–7.7)
NEUTROPHILS # BLD AUTO: 3.4 K/UL (ref 1.8–7.7)
NEUTROPHILS # BLD AUTO: 4.6 K/UL (ref 1.8–7.7)
NEUTROPHILS # BLD AUTO: 4.6 K/UL (ref 1.8–7.7)
NEUTROPHILS # BLD AUTO: 5.2 K/UL (ref 1.8–7.7)
NEUTROPHILS # BLD AUTO: 5.2 K/UL (ref 1.8–7.7)
NEUTROPHILS # BLD AUTO: 5.4 K/UL (ref 1.8–7.7)
NEUTROPHILS # BLD AUTO: 5.5 K/UL (ref 1.8–7.7)
NEUTROPHILS # BLD AUTO: 5.5 K/UL (ref 1.8–7.7)
NEUTROPHILS # BLD AUTO: 6.4 K/UL (ref 1.8–7.7)
NEUTROPHILS # BLD AUTO: 6.5 K/UL (ref 1.8–7.7)
NEUTROPHILS # BLD AUTO: 6.7 K/UL (ref 1.8–7.7)
NEUTROPHILS # BLD AUTO: 6.8 K/UL (ref 1.8–7.7)
NEUTROPHILS # BLD AUTO: 6.9 K/UL (ref 1.8–7.7)
NEUTROPHILS # BLD AUTO: 7.2 K/UL (ref 1.8–7.7)
NEUTROPHILS # BLD AUTO: 7.5 K/UL (ref 1.8–7.7)
NEUTROPHILS # BLD AUTO: 7.9 K/UL (ref 1.8–7.7)
NEUTROPHILS # BLD AUTO: 8.1 K/UL (ref 1.8–7.7)
NEUTROPHILS # BLD AUTO: 9.2 K/UL (ref 1.8–7.7)
NEUTROPHILS NFR BLD: 37 % (ref 38–73)
NEUTROPHILS NFR BLD: 62 % (ref 38–73)
NEUTROPHILS NFR BLD: 62.8 % (ref 38–73)
NEUTROPHILS NFR BLD: 69.1 % (ref 38–73)
NEUTROPHILS NFR BLD: 70 % (ref 38–73)
NEUTROPHILS NFR BLD: 70.1 % (ref 38–73)
NEUTROPHILS NFR BLD: 73 % (ref 38–73)
NEUTROPHILS NFR BLD: 74.8 % (ref 38–73)
NEUTROPHILS NFR BLD: 76.1 % (ref 38–73)
NEUTROPHILS NFR BLD: 76.2 % (ref 38–73)
NEUTROPHILS NFR BLD: 76.4 % (ref 38–73)
NEUTROPHILS NFR BLD: 76.8 % (ref 38–73)
NEUTROPHILS NFR BLD: 77 % (ref 38–73)
NEUTROPHILS NFR BLD: 77.5 % (ref 38–73)
NEUTROPHILS NFR BLD: 79 % (ref 38–73)
NEUTROPHILS NFR BLD: 79.6 % (ref 38–73)
NEUTROPHILS NFR BLD: 79.7 % (ref 38–73)
NEUTROPHILS NFR BLD: 79.9 % (ref 38–73)
NEUTROPHILS NFR BLD: 79.9 % (ref 38–73)
NEUTROPHILS NFR BLD: 81 % (ref 38–73)
NEUTROPHILS NFR BLD: 81.4 % (ref 38–73)
NEUTROPHILS NFR BLD: 81.4 % (ref 38–73)
NEUTROPHILS NFR BLD: 82.2 % (ref 38–73)
NEUTROPHILS NFR BLD: 83 % (ref 38–73)
NEUTROPHILS NFR BLD: 84 % (ref 38–73)
NEUTROPHILS NFR BLD: 84.3 % (ref 38–73)
NEUTROPHILS NFR BLD: 84.4 % (ref 38–73)
NEUTROPHILS NFR BLD: 85.3 % (ref 38–73)
NEUTROPHILS NFR BLD: 85.4 % (ref 38–73)
NEUTROPHILS NFR BLD: 85.5 % (ref 38–73)
NEUTROPHILS NFR BLD: 87.3 % (ref 38–73)
NEUTROPHILS NFR BLD: 87.5 % (ref 38–73)
NEUTROPHILS NFR BLD: 88.2 % (ref 38–73)
NEUTROPHILS NFR BLD: 88.4 % (ref 38–73)
NEUTROPHILS NFR BLD: 88.5 % (ref 38–73)
NEUTROPHILS NFR BLD: 90 % (ref 38–73)
NEUTROPHILS NFR BLD: 90 % (ref 38–73)
NEUTROPHILS NFR BLD: 91.7 % (ref 38–73)
NEUTROPHILS NFR BLD: 91.9 % (ref 38–73)
NEUTROPHILS NFR BLD: 92.1 % (ref 38–73)
NEUTROPHILS NFR BLD: 93.2 % (ref 38–73)
NEUTS BAND NFR BLD MANUAL: 10 %
NEUTS BAND NFR BLD MANUAL: 12 %
NEUTS BAND NFR BLD MANUAL: 17 %
NEUTS BAND NFR BLD MANUAL: 18 %
NEUTS BAND NFR BLD MANUAL: 23 %
NEUTS BAND NFR BLD MANUAL: 3 %
NEUTS BAND NFR BLD MANUAL: 48 %
NEUTS BAND NFR BLD MANUAL: 5 %
NEUTS BAND NFR BLD MANUAL: 8 %
NITRITE UR QL STRIP: ABNORMAL
NITRITE UR QL STRIP: NEGATIVE
NRBC BLD-RTO: 0 /100 WBC
NRBC BLD-RTO: 1 /100 WBC
NUM UNITS TRANS PACKED RBC: NORMAL
OSMOLALITY UR: 436 MOSM/KG (ref 50–1200)
OVALOCYTES BLD QL SMEAR: ABNORMAL
PCO2 BLDA: 28.7 MMHG (ref 35–45)
PCO2 BLDA: 29.1 MMHG (ref 35–45)
PCO2 BLDA: 29.6 MMHG (ref 35–45)
PCO2 BLDA: 36.4 MMHG (ref 35–45)
PCO2 BLDA: 41.8 MMHG (ref 35–45)
PEEP: 10
PEEP: 5
PH SMN: 7.25 [PH] (ref 7.35–7.45)
PH SMN: 7.29 [PH] (ref 7.35–7.45)
PH SMN: 7.37 [PH] (ref 7.35–7.45)
PH SMN: 7.41 [PH] (ref 7.35–7.45)
PH SMN: 7.46 [PH] (ref 7.35–7.45)
PH UR STRIP: 6 [PH] (ref 5–8)
PH UR STRIP: 7 [PH] (ref 5–8)
PH UR STRIP: 7 [PH] (ref 5–8)
PH UR STRIP: 8 [PH] (ref 5–8)
PH UR STRIP: ABNORMAL [PH] (ref 5–8)
PHOSPHATE SERPL-MCNC: 2.2 MG/DL (ref 2.7–4.5)
PHOSPHATE SERPL-MCNC: 2.5 MG/DL (ref 2.7–4.5)
PHOSPHATE SERPL-MCNC: 3 MG/DL (ref 2.7–4.5)
PHOSPHATE SERPL-MCNC: 3.3 MG/DL (ref 2.7–4.5)
PHOSPHATE SERPL-MCNC: 3.4 MG/DL (ref 2.7–4.5)
PHOSPHATE SERPL-MCNC: 3.4 MG/DL (ref 2.7–4.5)
PHOSPHATE SERPL-MCNC: 3.5 MG/DL (ref 2.7–4.5)
PHOSPHATE SERPL-MCNC: 3.5 MG/DL (ref 2.7–4.5)
PHOSPHATE SERPL-MCNC: 3.6 MG/DL (ref 2.7–4.5)
PHOSPHATE SERPL-MCNC: 3.9 MG/DL (ref 2.7–4.5)
PHOSPHATE SERPL-MCNC: 4 MG/DL (ref 2.7–4.5)
PHOSPHATE SERPL-MCNC: 4 MG/DL (ref 2.7–4.5)
PHOSPHATE SERPL-MCNC: 4.1 MG/DL (ref 2.7–4.5)
PHOSPHATE SERPL-MCNC: 4.2 MG/DL (ref 2.7–4.5)
PHOSPHATE SERPL-MCNC: 4.2 MG/DL (ref 2.7–4.5)
PHOSPHATE SERPL-MCNC: 4.3 MG/DL (ref 2.7–4.5)
PHOSPHATE SERPL-MCNC: 4.4 MG/DL (ref 2.7–4.5)
PHOSPHATE SERPL-MCNC: 4.4 MG/DL (ref 2.7–4.5)
PHOSPHATE SERPL-MCNC: 4.6 MG/DL (ref 2.7–4.5)
PHOSPHATE SERPL-MCNC: 4.7 MG/DL (ref 2.7–4.5)
PHOSPHATE SERPL-MCNC: 5.1 MG/DL (ref 2.7–4.5)
PIP: 27
PIP: 28
PIP: 28
PIP: 35
PLATELET # BLD AUTO: 102 K/UL (ref 150–450)
PLATELET # BLD AUTO: 116 K/UL (ref 150–450)
PLATELET # BLD AUTO: 117 K/UL (ref 150–450)
PLATELET # BLD AUTO: 125 K/UL (ref 150–450)
PLATELET # BLD AUTO: 125 K/UL (ref 150–450)
PLATELET # BLD AUTO: 134 K/UL (ref 150–450)
PLATELET # BLD AUTO: 141 K/UL (ref 150–450)
PLATELET # BLD AUTO: 151 K/UL (ref 150–450)
PLATELET # BLD AUTO: 157 K/UL (ref 150–450)
PLATELET # BLD AUTO: 162 K/UL (ref 150–450)
PLATELET # BLD AUTO: 169 K/UL (ref 150–450)
PLATELET # BLD AUTO: 175 K/UL (ref 150–450)
PLATELET # BLD AUTO: 182 K/UL (ref 150–450)
PLATELET # BLD AUTO: 183 K/UL (ref 150–450)
PLATELET # BLD AUTO: 185 K/UL (ref 150–450)
PLATELET # BLD AUTO: 195 K/UL (ref 150–450)
PLATELET # BLD AUTO: 199 K/UL (ref 150–450)
PLATELET # BLD AUTO: 210 K/UL (ref 150–450)
PLATELET # BLD AUTO: 213 K/UL (ref 150–450)
PLATELET # BLD AUTO: 222 K/UL (ref 150–450)
PLATELET # BLD AUTO: 227 K/UL (ref 150–450)
PLATELET # BLD AUTO: 228 K/UL (ref 150–450)
PLATELET # BLD AUTO: 235 K/UL (ref 150–450)
PLATELET # BLD AUTO: 236 K/UL (ref 150–450)
PLATELET # BLD AUTO: 236 K/UL (ref 150–450)
PLATELET # BLD AUTO: 239 K/UL (ref 150–450)
PLATELET # BLD AUTO: 252 K/UL (ref 150–450)
PLATELET # BLD AUTO: 259 K/UL (ref 150–450)
PLATELET # BLD AUTO: 271 K/UL (ref 150–450)
PLATELET # BLD AUTO: 272 K/UL (ref 150–450)
PLATELET # BLD AUTO: 273 K/UL (ref 150–450)
PLATELET # BLD AUTO: 274 K/UL (ref 150–450)
PLATELET # BLD AUTO: 282 K/UL (ref 150–450)
PLATELET # BLD AUTO: 285 K/UL (ref 150–450)
PLATELET # BLD AUTO: 294 K/UL (ref 150–450)
PLATELET # BLD AUTO: 297 K/UL (ref 150–450)
PLATELET # BLD AUTO: 304 K/UL (ref 150–450)
PLATELET # BLD AUTO: 311 K/UL (ref 150–450)
PLATELET # BLD AUTO: 329 K/UL (ref 150–450)
PLATELET # BLD AUTO: 331 K/UL (ref 150–450)
PLATELET # BLD AUTO: ABNORMAL K/UL (ref 150–450)
PLATELET BLD QL SMEAR: ABNORMAL
PMV BLD AUTO: 10 FL (ref 9.2–12.9)
PMV BLD AUTO: 10.2 FL (ref 9.2–12.9)
PMV BLD AUTO: 10.2 FL (ref 9.2–12.9)
PMV BLD AUTO: 10.3 FL (ref 9.2–12.9)
PMV BLD AUTO: 10.3 FL (ref 9.2–12.9)
PMV BLD AUTO: 10.4 FL (ref 9.2–12.9)
PMV BLD AUTO: 10.4 FL (ref 9.2–12.9)
PMV BLD AUTO: 10.6 FL (ref 9.2–12.9)
PMV BLD AUTO: 10.7 FL (ref 9.2–12.9)
PMV BLD AUTO: 10.8 FL (ref 9.2–12.9)
PMV BLD AUTO: 10.9 FL (ref 9.2–12.9)
PMV BLD AUTO: 11.1 FL (ref 9.2–12.9)
PMV BLD AUTO: 11.2 FL (ref 9.2–12.9)
PMV BLD AUTO: 11.3 FL (ref 9.2–12.9)
PMV BLD AUTO: 11.4 FL (ref 9.2–12.9)
PMV BLD AUTO: 11.4 FL (ref 9.2–12.9)
PMV BLD AUTO: 11.5 FL (ref 9.2–12.9)
PMV BLD AUTO: 11.5 FL (ref 9.2–12.9)
PMV BLD AUTO: 11.6 FL (ref 9.2–12.9)
PMV BLD AUTO: 11.6 FL (ref 9.2–12.9)
PMV BLD AUTO: 11.7 FL (ref 9.2–12.9)
PMV BLD AUTO: 11.9 FL (ref 9.2–12.9)
PMV BLD AUTO: 11.9 FL (ref 9.2–12.9)
PMV BLD AUTO: 12 FL (ref 9.2–12.9)
PMV BLD AUTO: 12 FL (ref 9.2–12.9)
PMV BLD AUTO: 12.1 FL (ref 9.2–12.9)
PMV BLD AUTO: 12.2 FL (ref 9.2–12.9)
PMV BLD AUTO: 12.2 FL (ref 9.2–12.9)
PMV BLD AUTO: 12.3 FL (ref 9.2–12.9)
PMV BLD AUTO: 12.3 FL (ref 9.2–12.9)
PMV BLD AUTO: 12.7 FL (ref 9.2–12.9)
PMV BLD AUTO: 12.8 FL (ref 9.2–12.9)
PMV BLD AUTO: 12.8 FL (ref 9.2–12.9)
PMV BLD AUTO: 13.7 FL (ref 9.2–12.9)
PMV BLD AUTO: 9.7 FL (ref 9.2–12.9)
PMV BLD AUTO: ABNORMAL FL (ref 9.2–12.9)
PO2 BLDA: 270 MMHG (ref 80–100)
PO2 BLDA: 62 MMHG (ref 80–100)
PO2 BLDA: 68 MMHG (ref 80–100)
PO2 BLDA: 78 MMHG (ref 80–100)
PO2 BLDA: 84 MMHG (ref 80–100)
POC BE: -3 MMOL/L
POC BE: -6 MMOL/L
POC BE: -9 MMOL/L
POC IONIZED CALCIUM: 0.87 MMOL/L (ref 1.06–1.42)
POC IONIZED CALCIUM: 0.88 MMOL/L (ref 1.06–1.42)
POC IONIZED CALCIUM: 0.99 MMOL/L (ref 1.06–1.42)
POC IONIZED CALCIUM: 1.07 MMOL/L (ref 1.06–1.42)
POC SATURATED O2: 100 % (ref 95–100)
POC SATURATED O2: 89 % (ref 95–100)
POC SATURATED O2: 90 % (ref 95–100)
POC SATURATED O2: 95 % (ref 95–100)
POC SATURATED O2: 97 % (ref 95–100)
POC TCO2: 18 MMOL/L (ref 23–27)
POC TCO2: 18 MMOL/L (ref 23–27)
POC TCO2: 19 MMOL/L (ref 23–27)
POC TCO2: 19 MMOL/L (ref 23–27)
POC TCO2: 22 MMOL/L (ref 23–27)
POCT GLUCOSE: 136 MG/DL (ref 70–110)
POCT GLUCOSE: 79 MG/DL (ref 70–110)
POCT GLUCOSE: 98 MG/DL (ref 70–110)
POCT GLUCOSE: 98 MG/DL (ref 70–110)
POIKILOCYTOSIS BLD QL SMEAR: ABNORMAL
POIKILOCYTOSIS BLD QL SMEAR: SLIGHT
POTASSIUM BLD-SCNC: 3.7 MMOL/L (ref 3.5–5.1)
POTASSIUM BLD-SCNC: 3.7 MMOL/L (ref 3.5–5.1)
POTASSIUM BLD-SCNC: 3.8 MMOL/L (ref 3.5–5.1)
POTASSIUM BLD-SCNC: 4 MMOL/L (ref 3.5–5.1)
POTASSIUM SERPL-SCNC: 2.9 MMOL/L (ref 3.5–5.1)
POTASSIUM SERPL-SCNC: 2.9 MMOL/L (ref 3.5–5.1)
POTASSIUM SERPL-SCNC: 3 MMOL/L (ref 3.5–5.1)
POTASSIUM SERPL-SCNC: 3.2 MMOL/L (ref 3.5–5.1)
POTASSIUM SERPL-SCNC: 3.3 MMOL/L (ref 3.5–5.1)
POTASSIUM SERPL-SCNC: 3.3 MMOL/L (ref 3.5–5.1)
POTASSIUM SERPL-SCNC: 3.4 MMOL/L (ref 3.5–5.1)
POTASSIUM SERPL-SCNC: 3.4 MMOL/L (ref 3.5–5.1)
POTASSIUM SERPL-SCNC: 3.5 MMOL/L (ref 3.5–5.1)
POTASSIUM SERPL-SCNC: 3.5 MMOL/L (ref 3.5–5.1)
POTASSIUM SERPL-SCNC: 3.6 MMOL/L (ref 3.5–5.1)
POTASSIUM SERPL-SCNC: 3.7 MMOL/L (ref 3.5–5.1)
POTASSIUM SERPL-SCNC: 3.8 MMOL/L (ref 3.5–5.1)
POTASSIUM SERPL-SCNC: 3.9 MMOL/L (ref 3.5–5.1)
POTASSIUM SERPL-SCNC: 4 MMOL/L (ref 3.5–5.1)
POTASSIUM SERPL-SCNC: 4.1 MMOL/L (ref 3.5–5.1)
POTASSIUM SERPL-SCNC: 4.2 MMOL/L (ref 3.5–5.1)
POTASSIUM SERPL-SCNC: 4.3 MMOL/L (ref 3.5–5.1)
POTASSIUM SERPL-SCNC: 4.4 MMOL/L (ref 3.5–5.1)
POTASSIUM SERPL-SCNC: 4.4 MMOL/L (ref 3.5–5.1)
POTASSIUM SERPL-SCNC: 4.5 MMOL/L (ref 3.5–5.1)
POTASSIUM SERPL-SCNC: 4.5 MMOL/L (ref 3.5–5.1)
POTASSIUM SERPL-SCNC: 4.6 MMOL/L (ref 3.5–5.1)
POTASSIUM SERPL-SCNC: 4.7 MMOL/L (ref 3.5–5.1)
POTASSIUM SERPL-SCNC: 4.8 MMOL/L (ref 3.5–5.1)
POTASSIUM SERPL-SCNC: 4.9 MMOL/L (ref 3.5–5.1)
PREALB SERPL-MCNC: 2.2 MG/DL (ref 20–43)
PREALB SERPL-MCNC: 4 MG/DL (ref 20–43)
PREALB SERPL-MCNC: 4 MG/DL (ref 20–43)
PREALB SERPL-MCNC: 5 MG/DL (ref 20–43)
PREALB SERPL-MCNC: 5 MG/DL (ref 20–43)
PROT SERPL-MCNC: 3.7 G/DL (ref 6–8.4)
PROT SERPL-MCNC: 3.9 G/DL (ref 6–8.4)
PROT SERPL-MCNC: 4.1 G/DL (ref 6–8.4)
PROT SERPL-MCNC: 4.1 G/DL (ref 6–8.4)
PROT SERPL-MCNC: 4.2 G/DL (ref 6–8.4)
PROT SERPL-MCNC: 4.2 G/DL (ref 6–8.4)
PROT SERPL-MCNC: 4.3 G/DL (ref 6–8.4)
PROT SERPL-MCNC: 4.3 G/DL (ref 6–8.4)
PROT SERPL-MCNC: 4.4 G/DL (ref 6–8.4)
PROT SERPL-MCNC: 4.6 G/DL (ref 6–8.4)
PROT SERPL-MCNC: 4.8 G/DL (ref 6–8.4)
PROT SERPL-MCNC: 5 G/DL (ref 6–8.4)
PROT SERPL-MCNC: 5.1 G/DL (ref 6–8.4)
PROT SERPL-MCNC: 5.3 G/DL (ref 6–8.4)
PROT SERPL-MCNC: 5.5 G/DL (ref 6–8.4)
PROT SERPL-MCNC: 5.9 G/DL (ref 6–8.4)
PROT SERPL-MCNC: 5.9 G/DL (ref 6–8.4)
PROT SERPL-MCNC: 6 G/DL (ref 6–8.4)
PROT SERPL-MCNC: 6.3 G/DL (ref 6–8.4)
PROT SERPL-MCNC: 6.5 G/DL (ref 6–8.4)
PROT SERPL-MCNC: 6.5 G/DL (ref 6–8.4)
PROT SERPL-MCNC: 6.6 G/DL (ref 6–8.4)
PROT SERPL-MCNC: 6.6 G/DL (ref 6–8.4)
PROT SERPL-MCNC: 6.8 G/DL (ref 6–8.4)
PROT SERPL-MCNC: 6.8 G/DL (ref 6–8.4)
PROT SERPL-MCNC: 6.9 G/DL (ref 6–8.4)
PROT SERPL-MCNC: <3 G/DL (ref 6–8.4)
PROT UR QL STRIP: ABNORMAL
PROT UR QL STRIP: NEGATIVE
RBC # BLD AUTO: 2.39 M/UL (ref 4–5.4)
RBC # BLD AUTO: 2.45 M/UL (ref 4–5.4)
RBC # BLD AUTO: 2.52 M/UL (ref 4–5.4)
RBC # BLD AUTO: 2.59 M/UL (ref 4–5.4)
RBC # BLD AUTO: 2.63 M/UL (ref 4–5.4)
RBC # BLD AUTO: 2.63 M/UL (ref 4–5.4)
RBC # BLD AUTO: 2.67 M/UL (ref 4–5.4)
RBC # BLD AUTO: 2.76 M/UL (ref 4–5.4)
RBC # BLD AUTO: 2.82 M/UL (ref 4–5.4)
RBC # BLD AUTO: 2.93 M/UL (ref 4–5.4)
RBC # BLD AUTO: 2.96 M/UL (ref 4–5.4)
RBC # BLD AUTO: 2.98 M/UL (ref 4–5.4)
RBC # BLD AUTO: 3.06 M/UL (ref 4–5.4)
RBC # BLD AUTO: 3.06 M/UL (ref 4–5.4)
RBC # BLD AUTO: 3.08 M/UL (ref 4–5.4)
RBC # BLD AUTO: 3.09 M/UL (ref 4–5.4)
RBC # BLD AUTO: 3.1 M/UL (ref 4–5.4)
RBC # BLD AUTO: 3.13 M/UL (ref 4–5.4)
RBC # BLD AUTO: 3.13 M/UL (ref 4–5.4)
RBC # BLD AUTO: 3.17 M/UL (ref 4–5.4)
RBC # BLD AUTO: 3.42 M/UL (ref 4–5.4)
RBC # BLD AUTO: 3.51 M/UL (ref 4–5.4)
RBC # BLD AUTO: 3.53 M/UL (ref 4–5.4)
RBC # BLD AUTO: 3.63 M/UL (ref 4–5.4)
RBC # BLD AUTO: 3.71 M/UL (ref 4–5.4)
RBC # BLD AUTO: 3.75 M/UL (ref 4–5.4)
RBC # BLD AUTO: 3.75 M/UL (ref 4–5.4)
RBC # BLD AUTO: 3.79 M/UL (ref 4–5.4)
RBC # BLD AUTO: 3.8 M/UL (ref 4–5.4)
RBC # BLD AUTO: 3.86 M/UL (ref 4–5.4)
RBC # BLD AUTO: 3.96 M/UL (ref 4–5.4)
RBC # BLD AUTO: 4.18 M/UL (ref 4–5.4)
RBC # BLD AUTO: 4.19 M/UL (ref 4–5.4)
RBC # BLD AUTO: 4.36 M/UL (ref 4–5.4)
RBC # BLD AUTO: 4.4 M/UL (ref 4–5.4)
RBC # BLD AUTO: 4.4 M/UL (ref 4–5.4)
RBC # BLD AUTO: 4.44 M/UL (ref 4–5.4)
RBC # BLD AUTO: 4.48 M/UL (ref 4–5.4)
RBC # BLD AUTO: 4.49 M/UL (ref 4–5.4)
RBC # BLD AUTO: 4.54 M/UL (ref 4–5.4)
RBC # BLD AUTO: ABNORMAL M/UL (ref 4–5.4)
RBC #/AREA URNS HPF: 1 /HPF (ref 0–4)
RBC #/AREA URNS HPF: 21 /HPF (ref 0–4)
RBC #/AREA URNS HPF: 22 /HPF (ref 0–4)
RBC #/AREA URNS HPF: >100 /HPF (ref 0–4)
SAMPLE: ABNORMAL
SATURATED IRON: 4 % (ref 20–50)
SITE: ABNORMAL
SODIUM BLD-SCNC: 131 MMOL/L (ref 136–145)
SODIUM BLD-SCNC: 136 MMOL/L (ref 136–145)
SODIUM SERPL-SCNC: 131 MMOL/L (ref 136–145)
SODIUM SERPL-SCNC: 132 MMOL/L (ref 136–145)
SODIUM SERPL-SCNC: 133 MMOL/L (ref 136–145)
SODIUM SERPL-SCNC: 134 MMOL/L (ref 136–145)
SODIUM SERPL-SCNC: 135 MMOL/L (ref 136–145)
SODIUM SERPL-SCNC: 135 MMOL/L (ref 136–145)
SODIUM SERPL-SCNC: 136 MMOL/L (ref 136–145)
SODIUM SERPL-SCNC: 136 MMOL/L (ref 136–145)
SODIUM SERPL-SCNC: 137 MMOL/L (ref 136–145)
SODIUM SERPL-SCNC: 137 MMOL/L (ref 136–145)
SODIUM SERPL-SCNC: 138 MMOL/L (ref 136–145)
SODIUM SERPL-SCNC: 139 MMOL/L (ref 136–145)
SODIUM SERPL-SCNC: 140 MMOL/L (ref 136–145)
SODIUM SERPL-SCNC: 141 MMOL/L (ref 136–145)
SODIUM SERPL-SCNC: 141 MMOL/L (ref 136–145)
SODIUM SERPL-SCNC: 142 MMOL/L (ref 136–145)
SODIUM SERPL-SCNC: 143 MMOL/L (ref 136–145)
SODIUM SERPL-SCNC: ABNORMAL MMOL/L
SODIUM UR-SCNC: <20 MMOL/L (ref 20–250)
SP GR UR STRIP: 1.01 (ref 1–1.03)
SP GR UR STRIP: ABNORMAL (ref 1–1.03)
SP02: 94
SP02: 96
SP02: 97
SP02: 99
SP02: 99
SPECIMEN OUTDATE: ABNORMAL
SPECIMEN SOURCE: NORMAL
SQUAMOUS #/AREA URNS HPF: 1 /HPF
SQUAMOUS #/AREA URNS HPF: 3 /HPF
STONE ANALYSIS IR-IMP: NORMAL
T3FREE SERPL-MCNC: 1.1 PG/ML (ref 2–4.4)
T4 FREE SERPL-MCNC: 0.48 NG/DL (ref 0.71–1.51)
T4 FREE SERPL-MCNC: 0.76 NG/DL (ref 0.71–1.51)
T4 FREE SERPL-MCNC: 1.18 NG/DL (ref 0.71–1.51)
T4 FREE SERPL-MCNC: <0.4 NG/DL (ref 0.71–1.51)
TOTAL IRON BINDING CAPACITY: 158 UG/DL (ref 250–450)
TRANSFERRIN SERPL-MCNC: 113 MG/DL (ref 200–375)
TRIGL SERPL-MCNC: 102 MG/DL (ref 30–150)
TRIGL SERPL-MCNC: 143 MG/DL (ref 30–150)
TRIGL SERPL-MCNC: 43 MG/DL (ref 30–150)
TRIGL SERPL-MCNC: 62 MG/DL (ref 30–150)
TRIGL SERPL-MCNC: 80 MG/DL (ref 30–150)
TROPONIN I SERPL HS-MCNC: 12.4 PG/ML (ref 0–14.9)
TSH SERPL DL<=0.005 MIU/L-ACNC: 0.65 UIU/ML (ref 0.34–5.6)
TSH SERPL DL<=0.005 MIU/L-ACNC: 10.66 UIU/ML (ref 0.4–4)
TSH SERPL DL<=0.005 MIU/L-ACNC: 23.87 UIU/ML (ref 0.4–4)
URATE SERPL-MCNC: 4.9 MG/DL (ref 2.4–5.7)
URN SPEC COLLECT METH UR: ABNORMAL
UROBILINOGEN UR STRIP-ACNC: ABNORMAL EU/DL
UROBILINOGEN UR STRIP-ACNC: NEGATIVE EU/DL
UUN UR-MCNC: 483 MG/DL (ref 140–1050)
VANCOMYCIN TROUGH SERPL-MCNC: 11.4 UG/ML
VANCOMYCIN TROUGH SERPL-MCNC: 16.7 UG/ML
VIT B12 SERPL-MCNC: >1500 PG/ML (ref 210–950)
VT: 400
VT: 420
WBC # BLD AUTO: 1.14 K/UL (ref 3.9–12.7)
WBC # BLD AUTO: 10.22 K/UL (ref 3.9–12.7)
WBC # BLD AUTO: 10.85 K/UL (ref 3.9–12.7)
WBC # BLD AUTO: 11.59 K/UL (ref 3.9–12.7)
WBC # BLD AUTO: 13.15 K/UL (ref 3.9–12.7)
WBC # BLD AUTO: 13.63 K/UL (ref 3.9–12.7)
WBC # BLD AUTO: 14.66 K/UL (ref 3.9–12.7)
WBC # BLD AUTO: 14.79 K/UL (ref 3.9–12.7)
WBC # BLD AUTO: 14.88 K/UL (ref 3.9–12.7)
WBC # BLD AUTO: 14.9 K/UL (ref 3.9–12.7)
WBC # BLD AUTO: 15.86 K/UL (ref 3.9–12.7)
WBC # BLD AUTO: 17.96 K/UL (ref 3.9–12.7)
WBC # BLD AUTO: 18.72 K/UL (ref 3.9–12.7)
WBC # BLD AUTO: 19.02 K/UL (ref 3.9–12.7)
WBC # BLD AUTO: 2.04 K/UL (ref 3.9–12.7)
WBC # BLD AUTO: 23.19 K/UL (ref 3.9–12.7)
WBC # BLD AUTO: 24.27 K/UL (ref 3.9–12.7)
WBC # BLD AUTO: 24.56 K/UL (ref 3.9–12.7)
WBC # BLD AUTO: 27.45 K/UL (ref 3.9–12.7)
WBC # BLD AUTO: 4.07 K/UL (ref 3.9–12.7)
WBC # BLD AUTO: 4.49 K/UL (ref 3.9–12.7)
WBC # BLD AUTO: 4.72 K/UL (ref 3.9–12.7)
WBC # BLD AUTO: 4.77 K/UL (ref 3.9–12.7)
WBC # BLD AUTO: 5.26 K/UL (ref 3.9–12.7)
WBC # BLD AUTO: 5.89 K/UL (ref 3.9–12.7)
WBC # BLD AUTO: 5.91 K/UL (ref 3.9–12.7)
WBC # BLD AUTO: 6.07 K/UL (ref 3.9–12.7)
WBC # BLD AUTO: 6.14 K/UL (ref 3.9–12.7)
WBC # BLD AUTO: 6.19 K/UL (ref 3.9–12.7)
WBC # BLD AUTO: 6.53 K/UL (ref 3.9–12.7)
WBC # BLD AUTO: 6.77 K/UL (ref 3.9–12.7)
WBC # BLD AUTO: 7.46 K/UL (ref 3.9–12.7)
WBC # BLD AUTO: 7.53 K/UL (ref 3.9–12.7)
WBC # BLD AUTO: 8.44 K/UL (ref 3.9–12.7)
WBC # BLD AUTO: 8.71 K/UL (ref 3.9–12.7)
WBC # BLD AUTO: 8.95 K/UL (ref 3.9–12.7)
WBC # BLD AUTO: 8.97 K/UL (ref 3.9–12.7)
WBC # BLD AUTO: 9.15 K/UL (ref 3.9–12.7)
WBC # BLD AUTO: 9.31 K/UL (ref 3.9–12.7)
WBC # BLD AUTO: 9.36 K/UL (ref 3.9–12.7)
WBC # BLD AUTO: ABNORMAL K/UL (ref 3.9–12.7)
WBC #/AREA URNS HPF: 37 /HPF (ref 0–5)
WBC #/AREA URNS HPF: 6 /HPF (ref 0–5)
WBC #/AREA URNS HPF: 6 /HPF (ref 0–5)
WBC #/AREA URNS HPF: 63 /HPF (ref 0–5)

## 2023-01-01 PROCEDURE — 99215 PR OFFICE/OUTPT VISIT, EST, LEVL V, 40-54 MIN: ICD-10-PCS | Mod: S$GLB,,, | Performed by: INTERNAL MEDICINE

## 2023-01-01 PROCEDURE — 81000 URINALYSIS NONAUTO W/SCOPE: CPT | Performed by: EMERGENCY MEDICINE

## 2023-01-01 PROCEDURE — 82378 CARCINOEMBRYONIC ANTIGEN: CPT | Performed by: INTERNAL MEDICINE

## 2023-01-01 PROCEDURE — 44120 PR RESECT SMALL INTEST,SINGL RESEC/ANAS: ICD-10-PCS | Mod: 58,,, | Performed by: SURGERY

## 2023-01-01 PROCEDURE — 85007 BL SMEAR W/DIFF WBC COUNT: CPT | Performed by: HOSPITALIST

## 2023-01-01 PROCEDURE — 94760 N-INVAS EAR/PLS OXIMETRY 1: CPT

## 2023-01-01 PROCEDURE — 37000008 HC ANESTHESIA 1ST 15 MINUTES: Performed by: UROLOGY

## 2023-01-01 PROCEDURE — C1758 CATHETER, URETERAL: HCPCS | Performed by: UROLOGY

## 2023-01-01 PROCEDURE — 1159F MED LIST DOCD IN RCRD: CPT | Mod: CPTII,S$GLB,, | Performed by: INTERNAL MEDICINE

## 2023-01-01 PROCEDURE — 99999 PR PBB SHADOW E&M-EST. PATIENT-LVL IV: ICD-10-PCS | Mod: PBBFAC,,, | Performed by: SURGERY

## 2023-01-01 PROCEDURE — 94640 AIRWAY INHALATION TREATMENT: CPT

## 2023-01-01 PROCEDURE — 25000003 PHARM REV CODE 250: Performed by: INTERNAL MEDICINE

## 2023-01-01 PROCEDURE — 3288F PR FALLS RISK ASSESSMENT DOCUMENTED: ICD-10-PCS | Mod: CPTII,S$GLB,, | Performed by: INTERNAL MEDICINE

## 2023-01-01 PROCEDURE — 63600175 PHARM REV CODE 636 W HCPCS: Performed by: NURSE PRACTITIONER

## 2023-01-01 PROCEDURE — 3078F DIAST BP <80 MM HG: CPT | Mod: CPTII,S$GLB,, | Performed by: SURGERY

## 2023-01-01 PROCEDURE — 99900035 HC TECH TIME PER 15 MIN (STAT)

## 2023-01-01 PROCEDURE — 25000242 PHARM REV CODE 250 ALT 637 W/ HCPCS: Performed by: SURGERY

## 2023-01-01 PROCEDURE — 1101F PT FALLS ASSESS-DOCD LE1/YR: CPT | Mod: CPTII,S$GLB,, | Performed by: INTERNAL MEDICINE

## 2023-01-01 PROCEDURE — 45380 COLONOSCOPY AND BIOPSY: CPT | Mod: ,,, | Performed by: INTERNAL MEDICINE

## 2023-01-01 PROCEDURE — 80048 BASIC METABOLIC PNL TOTAL CA: CPT | Performed by: HOSPITALIST

## 2023-01-01 PROCEDURE — 12000002 HC ACUTE/MED SURGE SEMI-PRIVATE ROOM

## 2023-01-01 PROCEDURE — 85025 COMPLETE CBC W/AUTO DIFF WBC: CPT | Performed by: HOSPITALIST

## 2023-01-01 PROCEDURE — 3288F FALL RISK ASSESSMENT DOCD: CPT | Mod: CPTII,S$GLB,, | Performed by: INTERNAL MEDICINE

## 2023-01-01 PROCEDURE — 63600175 PHARM REV CODE 636 W HCPCS: Performed by: STUDENT IN AN ORGANIZED HEALTH CARE EDUCATION/TRAINING PROGRAM

## 2023-01-01 PROCEDURE — 84100 ASSAY OF PHOSPHORUS: CPT | Performed by: HOSPITALIST

## 2023-01-01 PROCEDURE — 94003 VENT MGMT INPAT SUBQ DAY: CPT

## 2023-01-01 PROCEDURE — 71000015 HC POSTOP RECOV 1ST HR: Performed by: UROLOGY

## 2023-01-01 PROCEDURE — 99215 OFFICE O/P EST HI 40 MIN: CPT | Mod: S$GLB,,, | Performed by: INTERNAL MEDICINE

## 2023-01-01 PROCEDURE — 63600175 PHARM REV CODE 636 W HCPCS: Performed by: SURGERY

## 2023-01-01 PROCEDURE — 63600175 PHARM REV CODE 636 W HCPCS: Performed by: ANESTHESIOLOGY

## 2023-01-01 PROCEDURE — 25000003 PHARM REV CODE 250: Performed by: STUDENT IN AN ORGANIZED HEALTH CARE EDUCATION/TRAINING PROGRAM

## 2023-01-01 PROCEDURE — 80053 COMPREHEN METABOLIC PANEL: CPT | Performed by: INTERNAL MEDICINE

## 2023-01-01 PROCEDURE — 36415 COLL VENOUS BLD VENIPUNCTURE: CPT | Performed by: NURSE PRACTITIONER

## 2023-01-01 PROCEDURE — 85025 COMPLETE CBC W/AUTO DIFF WBC: CPT | Performed by: INTERNAL MEDICINE

## 2023-01-01 PROCEDURE — 80048 BASIC METABOLIC PNL TOTAL CA: CPT | Performed by: SURGERY

## 2023-01-01 PROCEDURE — 3008F PR BODY MASS INDEX (BMI) DOCUMENTED: ICD-10-PCS | Mod: CPTII,S$GLB,, | Performed by: INTERNAL MEDICINE

## 2023-01-01 PROCEDURE — 36556 INSERT NON-TUNNEL CV CATH: CPT | Mod: 59,,, | Performed by: ANESTHESIOLOGY

## 2023-01-01 PROCEDURE — 36430 TRANSFUSION BLD/BLD COMPNT: CPT

## 2023-01-01 PROCEDURE — 3008F PR BODY MASS INDEX (BMI) DOCUMENTED: ICD-10-PCS | Mod: CPTII,S$GLB,, | Performed by: SURGERY

## 2023-01-01 PROCEDURE — C9113 INJ PANTOPRAZOLE SODIUM, VIA: HCPCS | Performed by: STUDENT IN AN ORGANIZED HEALTH CARE EDUCATION/TRAINING PROGRAM

## 2023-01-01 PROCEDURE — 80053 COMPREHEN METABOLIC PANEL: CPT | Performed by: HOSPITALIST

## 2023-01-01 PROCEDURE — 25000003 PHARM REV CODE 250: Performed by: ANESTHESIOLOGY

## 2023-01-01 PROCEDURE — B4185 PARENTERAL SOL 10 GM LIPIDS: HCPCS | Performed by: HOSPITALIST

## 2023-01-01 PROCEDURE — 3288F PR FALLS RISK ASSESSMENT DOCUMENTED: ICD-10-PCS | Mod: CPTII,S$GLB,, | Performed by: SURGERY

## 2023-01-01 PROCEDURE — 25000242 PHARM REV CODE 250 ALT 637 W/ HCPCS: Performed by: HOSPITALIST

## 2023-01-01 PROCEDURE — 25000003 PHARM REV CODE 250: Performed by: SURGERY

## 2023-01-01 PROCEDURE — 63600175 PHARM REV CODE 636 W HCPCS: Performed by: NURSE ANESTHETIST, CERTIFIED REGISTERED

## 2023-01-01 PROCEDURE — A4217 STERILE WATER/SALINE, 500 ML: HCPCS | Performed by: HOSPITALIST

## 2023-01-01 PROCEDURE — 99900026 HC AIRWAY MAINTENANCE (STAT)

## 2023-01-01 PROCEDURE — 27000221 HC OXYGEN, UP TO 24 HOURS

## 2023-01-01 PROCEDURE — 1111F PR DISCHARGE MEDS RECONCILED W/ CURRENT OUTPATIENT MED LIST: ICD-10-PCS | Mod: CPTII,S$GLB,, | Performed by: INTERNAL MEDICINE

## 2023-01-01 PROCEDURE — 1157F ADVNC CARE PLAN IN RCRD: CPT | Mod: CPTII,S$GLB,, | Performed by: INTERNAL MEDICINE

## 2023-01-01 PROCEDURE — 86922 COMPATIBILITY TEST ANTIGLOB: CPT | Performed by: INTERNAL MEDICINE

## 2023-01-01 PROCEDURE — 3079F PR MOST RECENT DIASTOLIC BLOOD PRESSURE 80-89 MM HG: ICD-10-PCS | Mod: CPTII,S$GLB,, | Performed by: UROLOGY

## 2023-01-01 PROCEDURE — 1157F PR ADVANCE CARE PLAN OR EQUIV PRESENT IN MEDICAL RECORD: ICD-10-PCS | Mod: CPTII,S$GLB,, | Performed by: NURSE PRACTITIONER

## 2023-01-01 PROCEDURE — 84132 ASSAY OF SERUM POTASSIUM: CPT

## 2023-01-01 PROCEDURE — 44320 COLOSTOMY: CPT | Mod: 58,51,59, | Performed by: SURGERY

## 2023-01-01 PROCEDURE — 99233 PR SUBSEQUENT HOSPITAL CARE,LEVL III: ICD-10-PCS | Mod: ,,, | Performed by: STUDENT IN AN ORGANIZED HEALTH CARE EDUCATION/TRAINING PROGRAM

## 2023-01-01 PROCEDURE — 3008F BODY MASS INDEX DOCD: CPT | Mod: CPTII,S$GLB,, | Performed by: INTERNAL MEDICINE

## 2023-01-01 PROCEDURE — 87040 BLOOD CULTURE FOR BACTERIA: CPT | Performed by: NURSE PRACTITIONER

## 2023-01-01 PROCEDURE — 83735 ASSAY OF MAGNESIUM: CPT | Performed by: HOSPITALIST

## 2023-01-01 PROCEDURE — 94761 N-INVAS EAR/PLS OXIMETRY MLT: CPT

## 2023-01-01 PROCEDURE — 25000003 PHARM REV CODE 250: Performed by: UROLOGY

## 2023-01-01 PROCEDURE — 83605 ASSAY OF LACTIC ACID: CPT | Performed by: NURSE PRACTITIONER

## 2023-01-01 PROCEDURE — D9220A PRA ANESTHESIA: Mod: CRNA,,, | Performed by: NURSE ANESTHETIST, CERTIFIED REGISTERED

## 2023-01-01 PROCEDURE — 82330 ASSAY OF CALCIUM: CPT

## 2023-01-01 PROCEDURE — 1101F PR PT FALLS ASSESS DOC 0-1 FALLS W/OUT INJ PAST YR: ICD-10-PCS | Mod: CPTII,S$GLB,, | Performed by: INTERNAL MEDICINE

## 2023-01-01 PROCEDURE — C9113 INJ PANTOPRAZOLE SODIUM, VIA: HCPCS | Performed by: SURGERY

## 2023-01-01 PROCEDURE — 87077 CULTURE AEROBIC IDENTIFY: CPT | Performed by: INTERNAL MEDICINE

## 2023-01-01 PROCEDURE — 1111F DSCHRG MED/CURRENT MED MERGE: CPT | Mod: CPTII,S$GLB,, | Performed by: INTERNAL MEDICINE

## 2023-01-01 PROCEDURE — B4185 PARENTERAL SOL 10 GM LIPIDS: HCPCS | Performed by: INTERNAL MEDICINE

## 2023-01-01 PROCEDURE — 88305 TISSUE EXAM BY PATHOLOGIST: CPT | Performed by: PATHOLOGY

## 2023-01-01 PROCEDURE — 83605 ASSAY OF LACTIC ACID: CPT | Performed by: INTERNAL MEDICINE

## 2023-01-01 PROCEDURE — 36415 COLL VENOUS BLD VENIPUNCTURE: CPT | Performed by: HOSPITALIST

## 2023-01-01 PROCEDURE — 44121 PR RESECT SMALL INTEST,EACH ADDNL: ICD-10-PCS | Mod: 58,,, | Performed by: SURGERY

## 2023-01-01 PROCEDURE — 3077F SYST BP >= 140 MM HG: CPT | Mod: CPTII,S$GLB,, | Performed by: INTERNAL MEDICINE

## 2023-01-01 PROCEDURE — 97535 SELF CARE MNGMENT TRAINING: CPT

## 2023-01-01 PROCEDURE — B4185 PARENTERAL SOL 10 GM LIPIDS: HCPCS | Performed by: STUDENT IN AN ORGANIZED HEALTH CARE EDUCATION/TRAINING PROGRAM

## 2023-01-01 PROCEDURE — 63600175 PHARM REV CODE 636 W HCPCS: Performed by: HOSPITALIST

## 2023-01-01 PROCEDURE — 94799 UNLISTED PULMONARY SVC/PX: CPT

## 2023-01-01 PROCEDURE — 99999 PR PBB SHADOW E&M-EST. PATIENT-LVL IV: ICD-10-PCS | Mod: PBBFAC,,, | Performed by: INTERNAL MEDICINE

## 2023-01-01 PROCEDURE — P9016 RBC LEUKOCYTES REDUCED: HCPCS | Performed by: STUDENT IN AN ORGANIZED HEALTH CARE EDUCATION/TRAINING PROGRAM

## 2023-01-01 PROCEDURE — 27200651 HC AIRWAY, LMA: Performed by: ANESTHESIOLOGY

## 2023-01-01 PROCEDURE — C1894 INTRO/SHEATH, NON-LASER: HCPCS | Performed by: UROLOGY

## 2023-01-01 PROCEDURE — D9220A PRA ANESTHESIA: ICD-10-PCS | Mod: ANES,,, | Performed by: ANESTHESIOLOGY

## 2023-01-01 PROCEDURE — 1126F AMNT PAIN NOTED NONE PRSNT: CPT | Mod: CPTII,S$GLB,, | Performed by: INTERNAL MEDICINE

## 2023-01-01 PROCEDURE — 1126F PR PAIN SEVERITY QUANTIFIED, NO PAIN PRESENT: ICD-10-PCS | Mod: CPTII,S$GLB,, | Performed by: INTERNAL MEDICINE

## 2023-01-01 PROCEDURE — 99999 PR PBB SHADOW E&M-EST. PATIENT-LVL III: CPT | Mod: PBBFAC,,, | Performed by: UROLOGY

## 2023-01-01 PROCEDURE — 3080F PR MOST RECENT DIASTOLIC BLOOD PRESSURE >= 90 MM HG: ICD-10-PCS | Mod: CPTII,S$GLB,, | Performed by: INTERNAL MEDICINE

## 2023-01-01 PROCEDURE — 99223 1ST HOSP IP/OBS HIGH 75: CPT | Mod: ,,, | Performed by: STUDENT IN AN ORGANIZED HEALTH CARE EDUCATION/TRAINING PROGRAM

## 2023-01-01 PROCEDURE — 1160F PR REVIEW ALL MEDS BY PRESCRIBER/CLIN PHARMACIST DOCUMENTED: ICD-10-PCS | Mod: CPTII,S$GLB,, | Performed by: INTERNAL MEDICINE

## 2023-01-01 PROCEDURE — 36620 ARTERIAL: ICD-10-PCS | Mod: 59,,, | Performed by: ANESTHESIOLOGY

## 2023-01-01 PROCEDURE — 1157F ADVNC CARE PLAN IN RCRD: CPT | Mod: CPTII,S$GLB,, | Performed by: SURGERY

## 2023-01-01 PROCEDURE — 85014 HEMATOCRIT: CPT

## 2023-01-01 PROCEDURE — 96367 TX/PROPH/DG ADDL SEQ IV INF: CPT

## 2023-01-01 PROCEDURE — 80053 COMPREHEN METABOLIC PANEL: CPT | Performed by: NURSE PRACTITIONER

## 2023-01-01 PROCEDURE — D9220A PRA ANESTHESIA: Mod: ANES,,, | Performed by: ANESTHESIOLOGY

## 2023-01-01 PROCEDURE — 1159F PR MEDICATION LIST DOCUMENTED IN MEDICAL RECORD: ICD-10-PCS | Mod: CPTII,S$GLB,, | Performed by: INTERNAL MEDICINE

## 2023-01-01 PROCEDURE — 99999 PR PBB SHADOW E&M-EST. PATIENT-LVL V: CPT | Mod: PBBFAC,,, | Performed by: NURSE PRACTITIONER

## 2023-01-01 PROCEDURE — 96523 IRRIG DRUG DELIVERY DEVICE: CPT

## 2023-01-01 PROCEDURE — 36410 VNPNXR 3YR/> PHY/QHP DX/THER: CPT

## 2023-01-01 PROCEDURE — 99999 PR PBB SHADOW E&M-EST. PATIENT-LVL V: ICD-10-PCS | Mod: PBBFAC,,, | Performed by: NURSE PRACTITIONER

## 2023-01-01 PROCEDURE — 37000009 HC ANESTHESIA EA ADD 15 MINS: Performed by: INTERNAL MEDICINE

## 2023-01-01 PROCEDURE — 3077F SYST BP >= 140 MM HG: CPT | Mod: CPTII,S$GLB,, | Performed by: SURGERY

## 2023-01-01 PROCEDURE — 36000707: Performed by: UROLOGY

## 2023-01-01 PROCEDURE — 1157F PR ADVANCE CARE PLAN OR EQUIV PRESENT IN MEDICAL RECORD: ICD-10-PCS | Mod: CPTII,S$GLB,, | Performed by: INTERNAL MEDICINE

## 2023-01-01 PROCEDURE — 80053 COMPREHEN METABOLIC PANEL: CPT | Performed by: EMERGENCY MEDICINE

## 2023-01-01 PROCEDURE — 84132 ASSAY OF SERUM POTASSIUM: CPT | Performed by: INTERNAL MEDICINE

## 2023-01-01 PROCEDURE — C1769 GUIDE WIRE: HCPCS | Performed by: UROLOGY

## 2023-01-01 PROCEDURE — 1101F PT FALLS ASSESS-DOCD LE1/YR: CPT | Mod: CPTII,S$GLB,, | Performed by: SURGERY

## 2023-01-01 PROCEDURE — 1157F PR ADVANCE CARE PLAN OR EQUIV PRESENT IN MEDICAL RECORD: ICD-10-PCS | Mod: CPTII,S$GLB,, | Performed by: SURGERY

## 2023-01-01 PROCEDURE — 52352 PR CYSTO/URETERO/PYELOSCOPY, CALCULUS TX: ICD-10-PCS | Mod: RT,,, | Performed by: UROLOGY

## 2023-01-01 PROCEDURE — 80053 COMPREHEN METABOLIC PANEL: CPT | Performed by: STUDENT IN AN ORGANIZED HEALTH CARE EDUCATION/TRAINING PROGRAM

## 2023-01-01 PROCEDURE — 63600175 PHARM REV CODE 636 W HCPCS: Performed by: INTERNAL MEDICINE

## 2023-01-01 PROCEDURE — 99223 PR INITIAL HOSPITAL CARE,LEVL III: ICD-10-PCS | Mod: ,,, | Performed by: STUDENT IN AN ORGANIZED HEALTH CARE EDUCATION/TRAINING PROGRAM

## 2023-01-01 PROCEDURE — 25000003 PHARM REV CODE 250: Performed by: NURSE PRACTITIONER

## 2023-01-01 PROCEDURE — A4217 STERILE WATER/SALINE, 500 ML: HCPCS | Performed by: INTERNAL MEDICINE

## 2023-01-01 PROCEDURE — 85025 COMPLETE CBC W/AUTO DIFF WBC: CPT | Performed by: NURSE PRACTITIONER

## 2023-01-01 PROCEDURE — 11000001 HC ACUTE MED/SURG PRIVATE ROOM

## 2023-01-01 PROCEDURE — 63600175 PHARM REV CODE 636 W HCPCS: Performed by: EMERGENCY MEDICINE

## 2023-01-01 PROCEDURE — A4217 STERILE WATER/SALINE, 500 ML: HCPCS | Performed by: STUDENT IN AN ORGANIZED HEALTH CARE EDUCATION/TRAINING PROGRAM

## 2023-01-01 PROCEDURE — 20000000 HC ICU ROOM

## 2023-01-01 PROCEDURE — C1769 GUIDE WIRE: HCPCS | Performed by: SURGERY

## 2023-01-01 PROCEDURE — A4216 STERILE WATER/SALINE, 10 ML: HCPCS | Performed by: STUDENT IN AN ORGANIZED HEALTH CARE EDUCATION/TRAINING PROGRAM

## 2023-01-01 PROCEDURE — 99499 UNLISTED E&M SERVICE: CPT | Mod: S$GLB,,, | Performed by: UROLOGY

## 2023-01-01 PROCEDURE — 97161 PT EVAL LOW COMPLEX 20 MIN: CPT

## 2023-01-01 PROCEDURE — 97530 THERAPEUTIC ACTIVITIES: CPT | Mod: CQ

## 2023-01-01 PROCEDURE — 36415 COLL VENOUS BLD VENIPUNCTURE: CPT

## 2023-01-01 PROCEDURE — 44625 REPAIR BOWEL OPENING: CPT | Mod: 52,78,51, | Performed by: SURGERY

## 2023-01-01 PROCEDURE — 3074F PR MOST RECENT SYSTOLIC BLOOD PRESSURE < 130 MM HG: ICD-10-PCS | Mod: CPTII,S$GLB,, | Performed by: INTERNAL MEDICINE

## 2023-01-01 PROCEDURE — 82803 BLOOD GASES ANY COMBINATION: CPT

## 2023-01-01 PROCEDURE — 3077F PR MOST RECENT SYSTOLIC BLOOD PRESSURE >= 140 MM HG: ICD-10-PCS | Mod: CPTII,S$GLB,, | Performed by: INTERNAL MEDICINE

## 2023-01-01 PROCEDURE — 1125F AMNT PAIN NOTED PAIN PRSNT: CPT | Mod: CPTII,S$GLB,, | Performed by: INTERNAL MEDICINE

## 2023-01-01 PROCEDURE — 97530 THERAPEUTIC ACTIVITIES: CPT

## 2023-01-01 PROCEDURE — 84300 ASSAY OF URINE SODIUM: CPT | Performed by: INTERNAL MEDICINE

## 2023-01-01 PROCEDURE — 93005 ELECTROCARDIOGRAM TRACING: CPT

## 2023-01-01 PROCEDURE — 99213 OFFICE O/P EST LOW 20 MIN: CPT | Mod: S$GLB,,, | Performed by: SURGERY

## 2023-01-01 PROCEDURE — 99214 OFFICE O/P EST MOD 30 MIN: CPT | Mod: S$GLB,,, | Performed by: INTERNAL MEDICINE

## 2023-01-01 PROCEDURE — 31500 INSERT EMERGENCY AIRWAY: CPT

## 2023-01-01 PROCEDURE — 82330 ASSAY OF CALCIUM: CPT | Performed by: STUDENT IN AN ORGANIZED HEALTH CARE EDUCATION/TRAINING PROGRAM

## 2023-01-01 PROCEDURE — 96416 CHEMO PROLONG INFUSE W/PUMP: CPT

## 2023-01-01 PROCEDURE — 3077F PR MOST RECENT SYSTOLIC BLOOD PRESSURE >= 140 MM HG: ICD-10-PCS | Mod: CPTII,S$GLB,, | Performed by: UROLOGY

## 2023-01-01 PROCEDURE — 99999 PR PBB SHADOW E&M-EST. PATIENT-LVL III: ICD-10-PCS | Mod: PBBFAC,,, | Performed by: INTERNAL MEDICINE

## 2023-01-01 PROCEDURE — 85018 HEMOGLOBIN: CPT | Performed by: SURGERY

## 2023-01-01 PROCEDURE — 85025 COMPLETE CBC W/AUTO DIFF WBC: CPT | Performed by: UROLOGY

## 2023-01-01 PROCEDURE — C1751 CATH, INF, PER/CENT/MIDLINE: HCPCS

## 2023-01-01 PROCEDURE — 27201423 OPTIME MED/SURG SUP & DEVICES STERILE SUPPLY: Performed by: SURGERY

## 2023-01-01 PROCEDURE — 36000706: Performed by: UROLOGY

## 2023-01-01 PROCEDURE — 3079F DIAST BP 80-89 MM HG: CPT | Mod: CPTII,S$GLB,, | Performed by: NURSE PRACTITIONER

## 2023-01-01 PROCEDURE — 99232 PR SUBSEQUENT HOSPITAL CARE,LEVL II: ICD-10-PCS | Mod: ,,, | Performed by: INTERNAL MEDICINE

## 2023-01-01 PROCEDURE — 85025 COMPLETE CBC W/AUTO DIFF WBC: CPT

## 2023-01-01 PROCEDURE — 84443 ASSAY THYROID STIM HORMONE: CPT | Performed by: NURSE PRACTITIONER

## 2023-01-01 PROCEDURE — 1101F PT FALLS ASSESS-DOCD LE1/YR: CPT | Mod: CPTII,S$GLB,, | Performed by: UROLOGY

## 2023-01-01 PROCEDURE — 88305 TISSUE EXAM BY PATHOLOGIST: CPT | Mod: 26,,, | Performed by: PATHOLOGY

## 2023-01-01 PROCEDURE — 3074F SYST BP LT 130 MM HG: CPT | Mod: CPTII,S$GLB,, | Performed by: INTERNAL MEDICINE

## 2023-01-01 PROCEDURE — 37000009 HC ANESTHESIA EA ADD 15 MINS: Performed by: UROLOGY

## 2023-01-01 PROCEDURE — 44120 REMOVAL OF SMALL INTESTINE: CPT | Mod: 52,78,59, | Performed by: SURGERY

## 2023-01-01 PROCEDURE — 71000033 HC RECOVERY, INTIAL HOUR: Performed by: UROLOGY

## 2023-01-01 PROCEDURE — 1157F ADVNC CARE PLAN IN RCRD: CPT | Mod: CPTII,S$GLB,, | Performed by: NURSE PRACTITIONER

## 2023-01-01 PROCEDURE — 25000003 PHARM REV CODE 250: Performed by: HOSPITALIST

## 2023-01-01 PROCEDURE — 83880 ASSAY OF NATRIURETIC PEPTIDE: CPT | Mod: 91 | Performed by: STUDENT IN AN ORGANIZED HEALTH CARE EDUCATION/TRAINING PROGRAM

## 2023-01-01 PROCEDURE — 87205 SMEAR GRAM STAIN: CPT | Performed by: INTERNAL MEDICINE

## 2023-01-01 PROCEDURE — 81003 URINALYSIS AUTO W/O SCOPE: CPT | Performed by: UROLOGY

## 2023-01-01 PROCEDURE — 82365 CALCULUS SPECTROSCOPY: CPT | Mod: 91 | Performed by: UROLOGY

## 2023-01-01 PROCEDURE — 71000039 HC RECOVERY, EACH ADD'L HOUR: Performed by: UROLOGY

## 2023-01-01 PROCEDURE — 96377 APPLICATON ON-BODY INJECTOR: CPT

## 2023-01-01 PROCEDURE — 3078F PR MOST RECENT DIASTOLIC BLOOD PRESSURE < 80 MM HG: ICD-10-PCS | Mod: CPTII,S$GLB,, | Performed by: INTERNAL MEDICINE

## 2023-01-01 PROCEDURE — 93005 ELECTROCARDIOGRAM TRACING: CPT | Performed by: SPECIALIST

## 2023-01-01 PROCEDURE — 1160F RVW MEDS BY RX/DR IN RCRD: CPT | Mod: CPTII,S$GLB,, | Performed by: NURSE PRACTITIONER

## 2023-01-01 PROCEDURE — 85027 COMPLETE CBC AUTOMATED: CPT | Performed by: HOSPITALIST

## 2023-01-01 PROCEDURE — 96372 THER/PROPH/DIAG INJ SC/IM: CPT

## 2023-01-01 PROCEDURE — 82365 CALCULUS SPECTROSCOPY: CPT | Performed by: UROLOGY

## 2023-01-01 PROCEDURE — 96375 TX/PRO/DX INJ NEW DRUG ADDON: CPT

## 2023-01-01 PROCEDURE — C1758 CATHETER, URETERAL: HCPCS | Performed by: SURGERY

## 2023-01-01 PROCEDURE — 99223 1ST HOSP IP/OBS HIGH 75: CPT | Mod: ,,, | Performed by: UROLOGY

## 2023-01-01 PROCEDURE — 45380 COLONOSCOPY AND BIOPSY: CPT | Performed by: INTERNAL MEDICINE

## 2023-01-01 PROCEDURE — 97110 THERAPEUTIC EXERCISES: CPT | Mod: CQ

## 2023-01-01 PROCEDURE — A4216 STERILE WATER/SALINE, 10 ML: HCPCS | Performed by: INTERNAL MEDICINE

## 2023-01-01 PROCEDURE — 36000708 HC OR TIME LEV III 1ST 15 MIN: Performed by: SURGERY

## 2023-01-01 PROCEDURE — 99214 PR OFFICE/OUTPT VISIT, EST, LEVL IV, 30-39 MIN: ICD-10-PCS | Mod: S$GLB,,, | Performed by: INTERNAL MEDICINE

## 2023-01-01 PROCEDURE — 44120 REMOVAL OF SMALL INTESTINE: CPT | Mod: 51,,, | Performed by: SURGERY

## 2023-01-01 PROCEDURE — 93010 ELECTROCARDIOGRAM REPORT: CPT | Mod: ,,, | Performed by: INTERNAL MEDICINE

## 2023-01-01 PROCEDURE — 84466 ASSAY OF TRANSFERRIN: CPT | Performed by: INTERNAL MEDICINE

## 2023-01-01 PROCEDURE — 36415 COLL VENOUS BLD VENIPUNCTURE: CPT | Performed by: STUDENT IN AN ORGANIZED HEALTH CARE EDUCATION/TRAINING PROGRAM

## 2023-01-01 PROCEDURE — 99900031 HC PATIENT EDUCATION (STAT)

## 2023-01-01 PROCEDURE — 1159F PR MEDICATION LIST DOCUMENTED IN MEDICAL RECORD: ICD-10-PCS | Mod: CPTII,S$GLB,, | Performed by: NURSE PRACTITIONER

## 2023-01-01 PROCEDURE — 83735 ASSAY OF MAGNESIUM: CPT | Performed by: STUDENT IN AN ORGANIZED HEALTH CARE EDUCATION/TRAINING PROGRAM

## 2023-01-01 PROCEDURE — 84478 ASSAY OF TRIGLYCERIDES: CPT | Performed by: STUDENT IN AN ORGANIZED HEALTH CARE EDUCATION/TRAINING PROGRAM

## 2023-01-01 PROCEDURE — 99214 PR OFFICE/OUTPT VISIT, EST, LEVL IV, 30-39 MIN: ICD-10-PCS | Mod: S$GLB,,, | Performed by: UROLOGY

## 2023-01-01 PROCEDURE — 36415 COLL VENOUS BLD VENIPUNCTURE: CPT | Performed by: INTERNAL MEDICINE

## 2023-01-01 PROCEDURE — 76770 US RETROPERITONEAL COMPLETE: ICD-10-PCS | Mod: 26,,, | Performed by: RADIOLOGY

## 2023-01-01 PROCEDURE — 1160F PR REVIEW ALL MEDS BY PRESCRIBER/CLIN PHARMACIST DOCUMENTED: ICD-10-PCS | Mod: CPTII,S$GLB,, | Performed by: UROLOGY

## 2023-01-01 PROCEDURE — 85014 HEMATOCRIT: CPT | Performed by: SURGERY

## 2023-01-01 PROCEDURE — 25000242 PHARM REV CODE 250 ALT 637 W/ HCPCS: Performed by: INTERNAL MEDICINE

## 2023-01-01 PROCEDURE — 3078F PR MOST RECENT DIASTOLIC BLOOD PRESSURE < 80 MM HG: ICD-10-PCS | Mod: CPTII,S$GLB,, | Performed by: SURGERY

## 2023-01-01 PROCEDURE — C2617 STENT, NON-COR, TEM W/O DEL: HCPCS | Performed by: UROLOGY

## 2023-01-01 PROCEDURE — 25000003 PHARM REV CODE 250: Performed by: NURSE ANESTHETIST, CERTIFIED REGISTERED

## 2023-01-01 PROCEDURE — 84134 ASSAY OF PREALBUMIN: CPT | Performed by: STUDENT IN AN ORGANIZED HEALTH CARE EDUCATION/TRAINING PROGRAM

## 2023-01-01 PROCEDURE — 97110 THERAPEUTIC EXERCISES: CPT

## 2023-01-01 PROCEDURE — 36569 INSJ PICC 5 YR+ W/O IMAGING: CPT

## 2023-01-01 PROCEDURE — 99223 1ST HOSP IP/OBS HIGH 75: CPT | Mod: ,,, | Performed by: INTERNAL MEDICINE

## 2023-01-01 PROCEDURE — 3078F DIAST BP <80 MM HG: CPT | Mod: CPTII,S$GLB,, | Performed by: INTERNAL MEDICINE

## 2023-01-01 PROCEDURE — 76770 US EXAM ABDO BACK WALL COMP: CPT | Mod: 26,,, | Performed by: RADIOLOGY

## 2023-01-01 PROCEDURE — 36620 INSERTION CATHETER ARTERY: CPT | Mod: 59,,, | Performed by: ANESTHESIOLOGY

## 2023-01-01 PROCEDURE — 84443 ASSAY THYROID STIM HORMONE: CPT | Performed by: STUDENT IN AN ORGANIZED HEALTH CARE EDUCATION/TRAINING PROGRAM

## 2023-01-01 PROCEDURE — 3079F PR MOST RECENT DIASTOLIC BLOOD PRESSURE 80-89 MM HG: ICD-10-PCS | Mod: CPTII,S$GLB,, | Performed by: NURSE PRACTITIONER

## 2023-01-01 PROCEDURE — 44121 REMOVAL OF SMALL INTESTINE: CPT | Mod: 52,78,59, | Performed by: SURGERY

## 2023-01-01 PROCEDURE — 37000008 HC ANESTHESIA 1ST 15 MINUTES: Performed by: SURGERY

## 2023-01-01 PROCEDURE — 3079F DIAST BP 80-89 MM HG: CPT | Mod: CPTII,S$GLB,, | Performed by: INTERNAL MEDICINE

## 2023-01-01 PROCEDURE — 87186 SC STD MICRODIL/AGAR DIL: CPT | Performed by: STUDENT IN AN ORGANIZED HEALTH CARE EDUCATION/TRAINING PROGRAM

## 2023-01-01 PROCEDURE — 99213 PR OFFICE/OUTPT VISIT, EST, LEVL III, 20-29 MIN: ICD-10-PCS | Mod: S$GLB,,, | Performed by: SURGERY

## 2023-01-01 PROCEDURE — 96413 CHEMO IV INFUSION 1 HR: CPT

## 2023-01-01 PROCEDURE — 99999 PR PBB SHADOW E&M-EST. PATIENT-LVL IV: CPT | Mod: PBBFAC,,, | Performed by: INTERNAL MEDICINE

## 2023-01-01 PROCEDURE — 99233 SBSQ HOSP IP/OBS HIGH 50: CPT | Mod: ,,, | Performed by: STUDENT IN AN ORGANIZED HEALTH CARE EDUCATION/TRAINING PROGRAM

## 2023-01-01 PROCEDURE — 86900 BLOOD TYPING SEROLOGIC ABO: CPT | Performed by: SURGERY

## 2023-01-01 PROCEDURE — 93010 EKG 12-LEAD: ICD-10-PCS | Mod: ,,, | Performed by: INTERNAL MEDICINE

## 2023-01-01 PROCEDURE — 52005 CYSTO W/URTRL CATHJ: CPT | Mod: 50,,, | Performed by: UROLOGY

## 2023-01-01 PROCEDURE — 63600175 PHARM REV CODE 636 W HCPCS: Performed by: UROLOGY

## 2023-01-01 PROCEDURE — 88307 TISSUE EXAM BY PATHOLOGIST: CPT | Mod: TC,59 | Performed by: PATHOLOGY

## 2023-01-01 PROCEDURE — 1125F PR PAIN SEVERITY QUANTIFIED, PAIN PRESENT: ICD-10-PCS | Mod: CPTII,S$GLB,, | Performed by: INTERNAL MEDICINE

## 2023-01-01 PROCEDURE — 1159F MED LIST DOCD IN RCRD: CPT | Mod: CPTII,S$GLB,, | Performed by: SURGERY

## 2023-01-01 PROCEDURE — D9220A PRA ANESTHESIA: ICD-10-PCS | Mod: CRNA,,, | Performed by: NURSE ANESTHETIST, CERTIFIED REGISTERED

## 2023-01-01 PROCEDURE — 27201012 HC FORCEPS, HOT/COLD, DISP: Performed by: INTERNAL MEDICINE

## 2023-01-01 PROCEDURE — 1160F PR REVIEW ALL MEDS BY PRESCRIBER/CLIN PHARMACIST DOCUMENTED: ICD-10-PCS | Mod: CPTII,S$GLB,, | Performed by: SURGERY

## 2023-01-01 PROCEDURE — 96366 THER/PROPH/DIAG IV INF ADDON: CPT

## 2023-01-01 PROCEDURE — 37000009 HC ANESTHESIA EA ADD 15 MINS: Performed by: STUDENT IN AN ORGANIZED HEALTH CARE EDUCATION/TRAINING PROGRAM

## 2023-01-01 PROCEDURE — 82330 ASSAY OF CALCIUM: CPT | Mod: 91 | Performed by: INTERNAL MEDICINE

## 2023-01-01 PROCEDURE — C9290 INJ, BUPIVACAINE LIPOSOME: HCPCS | Performed by: SURGERY

## 2023-01-01 PROCEDURE — 80053 COMPREHEN METABOLIC PANEL: CPT | Mod: 91 | Performed by: INTERNAL MEDICINE

## 2023-01-01 PROCEDURE — 1160F RVW MEDS BY RX/DR IN RCRD: CPT | Mod: CPTII,S$GLB,, | Performed by: UROLOGY

## 2023-01-01 PROCEDURE — 36556 CENTRAL LINE: ICD-10-PCS | Mod: 59,,, | Performed by: ANESTHESIOLOGY

## 2023-01-01 PROCEDURE — 25500020 PHARM REV CODE 255: Performed by: INTERNAL MEDICINE

## 2023-01-01 PROCEDURE — 81000 URINALYSIS NONAUTO W/SCOPE: CPT | Performed by: INTERNAL MEDICINE

## 2023-01-01 PROCEDURE — 80048 BASIC METABOLIC PNL TOTAL CA: CPT | Performed by: NURSE PRACTITIONER

## 2023-01-01 PROCEDURE — 3288F FALL RISK ASSESSMENT DOCD: CPT | Mod: CPTII,S$GLB,, | Performed by: UROLOGY

## 2023-01-01 PROCEDURE — 3077F PR MOST RECENT SYSTOLIC BLOOD PRESSURE >= 140 MM HG: ICD-10-PCS | Mod: CPTII,S$GLB,, | Performed by: NURSE PRACTITIONER

## 2023-01-01 PROCEDURE — 83605 ASSAY OF LACTIC ACID: CPT | Mod: 91 | Performed by: NURSE PRACTITIONER

## 2023-01-01 PROCEDURE — 82330 ASSAY OF CALCIUM: CPT | Performed by: INTERNAL MEDICINE

## 2023-01-01 PROCEDURE — 63600175 PHARM REV CODE 636 W HCPCS: Mod: JB | Performed by: STUDENT IN AN ORGANIZED HEALTH CARE EDUCATION/TRAINING PROGRAM

## 2023-01-01 PROCEDURE — 3288F FALL RISK ASSESSMENT DOCD: CPT | Mod: CPTII,S$GLB,, | Performed by: NURSE PRACTITIONER

## 2023-01-01 PROCEDURE — 99999 PR PBB SHADOW E&M-EST. PATIENT-LVL III: ICD-10-PCS | Mod: PBBFAC,,, | Performed by: UROLOGY

## 2023-01-01 PROCEDURE — C1729 CATH, DRAINAGE: HCPCS | Performed by: SURGERY

## 2023-01-01 PROCEDURE — 85007 BL SMEAR W/DIFF WBC COUNT: CPT | Performed by: INTERNAL MEDICINE

## 2023-01-01 PROCEDURE — 44121 REMOVAL OF SMALL INTESTINE: CPT | Mod: 58,,, | Performed by: SURGERY

## 2023-01-01 PROCEDURE — 99999 PR PBB SHADOW E&M-EST. PATIENT-LVL V: CPT | Mod: PBBFAC,,, | Performed by: INTERNAL MEDICINE

## 2023-01-01 PROCEDURE — 71000016 HC POSTOP RECOV ADDL HR: Performed by: UROLOGY

## 2023-01-01 PROCEDURE — 99999 PR PBB SHADOW E&M-EST. PATIENT-LVL III: CPT | Mod: PBBFAC,,, | Performed by: INTERNAL MEDICINE

## 2023-01-01 PROCEDURE — 37000008 HC ANESTHESIA 1ST 15 MINUTES: Performed by: STUDENT IN AN ORGANIZED HEALTH CARE EDUCATION/TRAINING PROGRAM

## 2023-01-01 PROCEDURE — 63600175 PHARM REV CODE 636 W HCPCS: Mod: JG | Performed by: INTERNAL MEDICINE

## 2023-01-01 PROCEDURE — 93010 ELECTROCARDIOGRAM REPORT: CPT | Mod: ,,, | Performed by: GENERAL PRACTICE

## 2023-01-01 PROCEDURE — 84481 FREE ASSAY (FT-3): CPT | Performed by: STUDENT IN AN ORGANIZED HEALTH CARE EDUCATION/TRAINING PROGRAM

## 2023-01-01 PROCEDURE — 96374 THER/PROPH/DIAG INJ IV PUSH: CPT

## 2023-01-01 PROCEDURE — 3075F SYST BP GE 130 - 139MM HG: CPT | Mod: CPTII,S$GLB,, | Performed by: INTERNAL MEDICINE

## 2023-01-01 PROCEDURE — 1159F PR MEDICATION LIST DOCUMENTED IN MEDICAL RECORD: ICD-10-PCS | Mod: CPTII,S$GLB,, | Performed by: SURGERY

## 2023-01-01 PROCEDURE — 3008F BODY MASS INDEX DOCD: CPT | Mod: CPTII,S$GLB,, | Performed by: UROLOGY

## 2023-01-01 PROCEDURE — 36000706: Performed by: STUDENT IN AN ORGANIZED HEALTH CARE EDUCATION/TRAINING PROGRAM

## 2023-01-01 PROCEDURE — 52332 CYSTOSCOPY AND TREATMENT: CPT | Mod: RT,,, | Performed by: UROLOGY

## 2023-01-01 PROCEDURE — 99231 PR SUBSEQUENT HOSPITAL CARE,LEVL I: ICD-10-PCS | Mod: ,,, | Performed by: INTERNAL MEDICINE

## 2023-01-01 PROCEDURE — 37799 UNLISTED PX VASCULAR SURGERY: CPT

## 2023-01-01 PROCEDURE — 1126F PR PAIN SEVERITY QUANTIFIED, NO PAIN PRESENT: ICD-10-PCS | Mod: CPTII,S$GLB,, | Performed by: NURSE PRACTITIONER

## 2023-01-01 PROCEDURE — 1101F PR PT FALLS ASSESS DOC 0-1 FALLS W/OUT INJ PAST YR: ICD-10-PCS | Mod: CPTII,S$GLB,, | Performed by: UROLOGY

## 2023-01-01 PROCEDURE — 97162 PT EVAL MOD COMPLEX 30 MIN: CPT

## 2023-01-01 PROCEDURE — 83735 ASSAY OF MAGNESIUM: CPT | Performed by: INTERNAL MEDICINE

## 2023-01-01 PROCEDURE — 87075 CULTR BACTERIA EXCEPT BLOOD: CPT | Performed by: STUDENT IN AN ORGANIZED HEALTH CARE EDUCATION/TRAINING PROGRAM

## 2023-01-01 PROCEDURE — 85025 COMPLETE CBC W/AUTO DIFF WBC: CPT | Performed by: SURGERY

## 2023-01-01 PROCEDURE — 3008F BODY MASS INDEX DOCD: CPT | Mod: CPTII,S$GLB,, | Performed by: SURGERY

## 2023-01-01 PROCEDURE — 3079F DIAST BP 80-89 MM HG: CPT | Mod: CPTII,S$GLB,, | Performed by: UROLOGY

## 2023-01-01 PROCEDURE — 99223 PR INITIAL HOSPITAL CARE,LEVL III: ICD-10-PCS | Mod: ,,, | Performed by: UROLOGY

## 2023-01-01 PROCEDURE — 71000039 HC RECOVERY, EACH ADD'L HOUR: Performed by: SURGERY

## 2023-01-01 PROCEDURE — 99900104 DSU ONLY-NO CHARGE-EA ADD'L HR (STAT): Performed by: UROLOGY

## 2023-01-01 PROCEDURE — 99900103 DSU ONLY-NO CHARGE-INITIAL HR (STAT): Performed by: UROLOGY

## 2023-01-01 PROCEDURE — 71000033 HC RECOVERY, INTIAL HOUR: Performed by: SURGERY

## 2023-01-01 PROCEDURE — 99223 PR INITIAL HOSPITAL CARE,LEVL III: ICD-10-PCS | Mod: ,,, | Performed by: INTERNAL MEDICINE

## 2023-01-01 PROCEDURE — 36000709 HC OR TIME LEV III EA ADD 15 MIN: Performed by: SURGERY

## 2023-01-01 PROCEDURE — 25500020 PHARM REV CODE 255: Performed by: UROLOGY

## 2023-01-01 PROCEDURE — 84295 ASSAY OF SERUM SODIUM: CPT

## 2023-01-01 PROCEDURE — 74420 UROGRAPHY RTRGR +-KUB: CPT | Mod: 26,,, | Performed by: UROLOGY

## 2023-01-01 PROCEDURE — 52332 PR CYSTOSCOPY,INSERT URETERAL STENT: ICD-10-PCS | Mod: 50,,, | Performed by: STUDENT IN AN ORGANIZED HEALTH CARE EDUCATION/TRAINING PROGRAM

## 2023-01-01 PROCEDURE — 36415 COLL VENOUS BLD VENIPUNCTURE: CPT | Performed by: SURGERY

## 2023-01-01 PROCEDURE — 85027 COMPLETE CBC AUTOMATED: CPT | Mod: 91 | Performed by: INTERNAL MEDICINE

## 2023-01-01 PROCEDURE — 83880 ASSAY OF NATRIURETIC PEPTIDE: CPT | Performed by: INTERNAL MEDICINE

## 2023-01-01 PROCEDURE — 94002 VENT MGMT INPAT INIT DAY: CPT

## 2023-01-01 PROCEDURE — 86900 BLOOD TYPING SEROLOGIC ABO: CPT | Performed by: STUDENT IN AN ORGANIZED HEALTH CARE EDUCATION/TRAINING PROGRAM

## 2023-01-01 PROCEDURE — 52332 PR CYSTOSCOPY,INSERT URETERAL STENT: ICD-10-PCS | Mod: 51,RT,, | Performed by: UROLOGY

## 2023-01-01 PROCEDURE — 63600175 PHARM REV CODE 636 W HCPCS: Mod: JZ,JG | Performed by: NURSE ANESTHETIST, CERTIFIED REGISTERED

## 2023-01-01 PROCEDURE — 86922 COMPATIBILITY TEST ANTIGLOB: CPT | Performed by: STUDENT IN AN ORGANIZED HEALTH CARE EDUCATION/TRAINING PROGRAM

## 2023-01-01 PROCEDURE — 93005 ELECTROCARDIOGRAM TRACING: CPT | Performed by: INTERNAL MEDICINE

## 2023-01-01 PROCEDURE — 85014 HEMATOCRIT: CPT | Performed by: INTERNAL MEDICINE

## 2023-01-01 PROCEDURE — 3080F DIAST BP >= 90 MM HG: CPT | Mod: CPTII,S$GLB,, | Performed by: INTERNAL MEDICINE

## 2023-01-01 PROCEDURE — 3079F DIAST BP 80-89 MM HG: CPT | Mod: CPTII,S$GLB,, | Performed by: SURGERY

## 2023-01-01 PROCEDURE — 74177 CT ABD & PELVIS W/CONTRAST: CPT | Mod: TC

## 2023-01-01 PROCEDURE — 45110 PR PROCTECTOMY,AP RESECT+OSTOMY: ICD-10-PCS | Mod: 22,,, | Performed by: SURGERY

## 2023-01-01 PROCEDURE — 99214 PR OFFICE/OUTPT VISIT, EST, LEVL IV, 30-39 MIN: ICD-10-PCS | Mod: S$GLB,,, | Performed by: SURGERY

## 2023-01-01 PROCEDURE — 99291 CRITICAL CARE FIRST HOUR: CPT | Mod: ,,, | Performed by: INTERNAL MEDICINE

## 2023-01-01 PROCEDURE — 52352 PR CYSTO/URETERO/PYELOSCOPY, CALCULUS TX: ICD-10-PCS | Mod: 59,RT,, | Performed by: UROLOGY

## 2023-01-01 PROCEDURE — 83735 ASSAY OF MAGNESIUM: CPT | Performed by: NURSE PRACTITIONER

## 2023-01-01 PROCEDURE — 84075 ASSAY ALKALINE PHOSPHATASE: CPT | Performed by: STUDENT IN AN ORGANIZED HEALTH CARE EDUCATION/TRAINING PROGRAM

## 2023-01-01 PROCEDURE — 1101F PT FALLS ASSESS-DOCD LE1/YR: CPT | Mod: CPTII,S$GLB,, | Performed by: NURSE PRACTITIONER

## 2023-01-01 PROCEDURE — 3288F FALL RISK ASSESSMENT DOCD: CPT | Mod: CPTII,S$GLB,, | Performed by: SURGERY

## 2023-01-01 PROCEDURE — 52356 PR CYSTO/URETERO W/LITHOTRIPSY: ICD-10-PCS | Mod: LT,,, | Performed by: UROLOGY

## 2023-01-01 PROCEDURE — 80048 BASIC METABOLIC PNL TOTAL CA: CPT | Performed by: UROLOGY

## 2023-01-01 PROCEDURE — 93010 EKG 12-LEAD: ICD-10-PCS | Mod: ,,, | Performed by: GENERAL PRACTICE

## 2023-01-01 PROCEDURE — 44121 PR RESECT SMALL INTEST,EACH ADDNL: ICD-10-PCS | Mod: 52,78,59, | Performed by: SURGERY

## 2023-01-01 PROCEDURE — 88305 TISSUE EXAM BY PATHOLOGIST: ICD-10-PCS | Mod: 26,,, | Performed by: PATHOLOGY

## 2023-01-01 PROCEDURE — 36415 COLL VENOUS BLD VENIPUNCTURE: CPT | Performed by: UROLOGY

## 2023-01-01 PROCEDURE — 85027 COMPLETE CBC AUTOMATED: CPT | Performed by: UROLOGY

## 2023-01-01 PROCEDURE — 97165 OT EVAL LOW COMPLEX 30 MIN: CPT

## 2023-01-01 PROCEDURE — 85007 BL SMEAR W/DIFF WBC COUNT: CPT | Performed by: UROLOGY

## 2023-01-01 PROCEDURE — 71260 CT CHEST ABDOMEN PELVIS WITH CONTRAST (XPD): ICD-10-PCS | Mod: 26,,, | Performed by: RADIOLOGY

## 2023-01-01 PROCEDURE — 87086 URINE CULTURE/COLONY COUNT: CPT | Performed by: NURSE PRACTITIONER

## 2023-01-01 PROCEDURE — 25500020 PHARM REV CODE 255

## 2023-01-01 PROCEDURE — 96365 THER/PROPH/DIAG IV INF INIT: CPT

## 2023-01-01 PROCEDURE — 84484 ASSAY OF TROPONIN QUANT: CPT | Performed by: INTERNAL MEDICINE

## 2023-01-01 PROCEDURE — 37000009 HC ANESTHESIA EA ADD 15 MINS: Performed by: SURGERY

## 2023-01-01 PROCEDURE — 3288F PR FALLS RISK ASSESSMENT DOCUMENTED: ICD-10-PCS | Mod: CPTII,S$GLB,, | Performed by: NURSE PRACTITIONER

## 2023-01-01 PROCEDURE — 52352 CYSTOURETERO W/STONE REMOVE: CPT | Mod: 59,RT,, | Performed by: UROLOGY

## 2023-01-01 PROCEDURE — 85027 COMPLETE CBC AUTOMATED: CPT | Performed by: INTERNAL MEDICINE

## 2023-01-01 PROCEDURE — 99999 PR PBB SHADOW E&M-EST. PATIENT-LVL III: CPT | Mod: PBBFAC,,, | Performed by: SURGERY

## 2023-01-01 PROCEDURE — D9220A PRA ANESTHESIA: Mod: CRNA,,, | Performed by: STUDENT IN AN ORGANIZED HEALTH CARE EDUCATION/TRAINING PROGRAM

## 2023-01-01 PROCEDURE — 99214 OFFICE O/P EST MOD 30 MIN: CPT | Mod: S$GLB,,, | Performed by: UROLOGY

## 2023-01-01 PROCEDURE — 1101F PR PT FALLS ASSESS DOC 0-1 FALLS W/OUT INJ PAST YR: ICD-10-PCS | Mod: CPTII,S$GLB,, | Performed by: SURGERY

## 2023-01-01 PROCEDURE — 36415 COLL VENOUS BLD VENIPUNCTURE: CPT | Performed by: EMERGENCY MEDICINE

## 2023-01-01 PROCEDURE — 3075F PR MOST RECENT SYSTOLIC BLOOD PRESS GE 130-139MM HG: ICD-10-PCS | Mod: CPTII,S$GLB,, | Performed by: INTERNAL MEDICINE

## 2023-01-01 PROCEDURE — 86906 BLD TYPING SEROLOGIC RH PHNT: CPT | Performed by: INTERNAL MEDICINE

## 2023-01-01 PROCEDURE — 52332 CYSTOSCOPY AND TREATMENT: CPT | Mod: 59,RT,, | Performed by: UROLOGY

## 2023-01-01 PROCEDURE — 37000008 HC ANESTHESIA 1ST 15 MINUTES: Performed by: INTERNAL MEDICINE

## 2023-01-01 PROCEDURE — 1123F PR ADV CARE PLAN DISCUSSED, PLAN OR SURROGATE DOCUMENTED: ICD-10-PCS | Mod: CPTII,S$GLB,, | Performed by: INTERNAL MEDICINE

## 2023-01-01 PROCEDURE — 99285 EMERGENCY DEPT VISIT HI MDM: CPT | Mod: 25

## 2023-01-01 PROCEDURE — 99291 PR CRITICAL CARE, E/M 30-74 MINUTES: ICD-10-PCS | Mod: ,,, | Performed by: INTERNAL MEDICINE

## 2023-01-01 PROCEDURE — 84439 ASSAY OF FREE THYROXINE: CPT | Performed by: HOSPITALIST

## 2023-01-01 PROCEDURE — 99232 SBSQ HOSP IP/OBS MODERATE 35: CPT | Mod: ,,, | Performed by: INTERNAL MEDICINE

## 2023-01-01 PROCEDURE — 1126F PR PAIN SEVERITY QUANTIFIED, NO PAIN PRESENT: ICD-10-PCS | Mod: CPTII,S$GLB,, | Performed by: UROLOGY

## 2023-01-01 PROCEDURE — 1160F RVW MEDS BY RX/DR IN RCRD: CPT | Mod: CPTII,S$GLB,, | Performed by: INTERNAL MEDICINE

## 2023-01-01 PROCEDURE — 1123F ACP DISCUSS/DSCN MKR DOCD: CPT | Mod: CPTII,S$GLB,, | Performed by: INTERNAL MEDICINE

## 2023-01-01 PROCEDURE — 3079F PR MOST RECENT DIASTOLIC BLOOD PRESSURE 80-89 MM HG: ICD-10-PCS | Mod: CPTII,S$GLB,, | Performed by: SURGERY

## 2023-01-01 PROCEDURE — 85018 HEMOGLOBIN: CPT | Performed by: INTERNAL MEDICINE

## 2023-01-01 PROCEDURE — 27201423 OPTIME MED/SURG SUP & DEVICES STERILE SUPPLY: Performed by: UROLOGY

## 2023-01-01 PROCEDURE — 74420 PR  X-RAY RETROGRADE PYELOGRAM: ICD-10-PCS | Mod: 26,,, | Performed by: UROLOGY

## 2023-01-01 PROCEDURE — 84100 ASSAY OF PHOSPHORUS: CPT | Performed by: INTERNAL MEDICINE

## 2023-01-01 PROCEDURE — 99999 PR PBB SHADOW E&M-EST. PATIENT-LVL V: ICD-10-PCS | Mod: PBBFAC,,, | Performed by: INTERNAL MEDICINE

## 2023-01-01 PROCEDURE — 85025 COMPLETE CBC W/AUTO DIFF WBC: CPT | Performed by: EMERGENCY MEDICINE

## 2023-01-01 PROCEDURE — 83605 ASSAY OF LACTIC ACID: CPT | Mod: 91 | Performed by: HOSPITALIST

## 2023-01-01 PROCEDURE — 3077F SYST BP >= 140 MM HG: CPT | Mod: CPTII,S$GLB,, | Performed by: NURSE PRACTITIONER

## 2023-01-01 PROCEDURE — 3077F PR MOST RECENT SYSTOLIC BLOOD PRESSURE >= 140 MM HG: ICD-10-PCS | Mod: CPTII,S$GLB,, | Performed by: SURGERY

## 2023-01-01 PROCEDURE — 44120 PR RESECT SMALL INTEST,SINGL RESEC/ANAS: ICD-10-PCS | Mod: 52,78,59, | Performed by: SURGERY

## 2023-01-01 PROCEDURE — 87186 SC STD MICRODIL/AGAR DIL: CPT | Performed by: INTERNAL MEDICINE

## 2023-01-01 PROCEDURE — 82728 ASSAY OF FERRITIN: CPT | Performed by: INTERNAL MEDICINE

## 2023-01-01 PROCEDURE — 74420 UROGRAPHY RTRGR +-KUB: CPT | Mod: 26,,, | Performed by: STUDENT IN AN ORGANIZED HEALTH CARE EDUCATION/TRAINING PROGRAM

## 2023-01-01 PROCEDURE — 81001 URINALYSIS AUTO W/SCOPE: CPT | Performed by: STUDENT IN AN ORGANIZED HEALTH CARE EDUCATION/TRAINING PROGRAM

## 2023-01-01 PROCEDURE — 84100 ASSAY OF PHOSPHORUS: CPT | Performed by: STUDENT IN AN ORGANIZED HEALTH CARE EDUCATION/TRAINING PROGRAM

## 2023-01-01 PROCEDURE — 44120 REMOVAL OF SMALL INTESTINE: CPT | Mod: 58,,, | Performed by: SURGERY

## 2023-01-01 PROCEDURE — P9045 ALBUMIN (HUMAN), 5%, 250 ML: HCPCS | Mod: JZ,JG | Performed by: NURSE ANESTHETIST, CERTIFIED REGISTERED

## 2023-01-01 PROCEDURE — 87070 CULTURE OTHR SPECIMN AEROBIC: CPT | Performed by: INTERNAL MEDICINE

## 2023-01-01 PROCEDURE — 85025 COMPLETE CBC W/AUTO DIFF WBC: CPT | Performed by: STUDENT IN AN ORGANIZED HEALTH CARE EDUCATION/TRAINING PROGRAM

## 2023-01-01 PROCEDURE — 80202 ASSAY OF VANCOMYCIN: CPT | Performed by: INTERNAL MEDICINE

## 2023-01-01 PROCEDURE — 3008F BODY MASS INDEX DOCD: CPT | Mod: CPTII,S$GLB,, | Performed by: NURSE PRACTITIONER

## 2023-01-01 PROCEDURE — 52332 CYSTOSCOPY AND TREATMENT: CPT | Mod: 51,RT,, | Performed by: UROLOGY

## 2023-01-01 PROCEDURE — 97116 GAIT TRAINING THERAPY: CPT

## 2023-01-01 PROCEDURE — 52356 CYSTO/URETERO W/LITHOTRIPSY: CPT | Mod: LT,,, | Performed by: UROLOGY

## 2023-01-01 PROCEDURE — 71000039 HC RECOVERY, EACH ADD'L HOUR: Performed by: STUDENT IN AN ORGANIZED HEALTH CARE EDUCATION/TRAINING PROGRAM

## 2023-01-01 PROCEDURE — 99900017 HC EXTUBATION W/PARAMETERS (STAT)

## 2023-01-01 PROCEDURE — 87086 URINE CULTURE/COLONY COUNT: CPT | Performed by: STUDENT IN AN ORGANIZED HEALTH CARE EDUCATION/TRAINING PROGRAM

## 2023-01-01 PROCEDURE — C2617 STENT, NON-COR, TEM W/O DEL: HCPCS | Performed by: STUDENT IN AN ORGANIZED HEALTH CARE EDUCATION/TRAINING PROGRAM

## 2023-01-01 PROCEDURE — 71000033 HC RECOVERY, INTIAL HOUR: Performed by: STUDENT IN AN ORGANIZED HEALTH CARE EDUCATION/TRAINING PROGRAM

## 2023-01-01 PROCEDURE — 86900 BLOOD TYPING SEROLOGIC ABO: CPT | Performed by: INTERNAL MEDICINE

## 2023-01-01 PROCEDURE — 3288F PR FALLS RISK ASSESSMENT DOCUMENTED: ICD-10-PCS | Mod: CPTII,S$GLB,, | Performed by: UROLOGY

## 2023-01-01 PROCEDURE — 83735 ASSAY OF MAGNESIUM: CPT | Performed by: UROLOGY

## 2023-01-01 PROCEDURE — 99214 OFFICE O/P EST MOD 30 MIN: CPT | Mod: S$GLB,,, | Performed by: NURSE PRACTITIONER

## 2023-01-01 PROCEDURE — 96523 IRRIG DRUG DELIVERY DEVICE: CPT | Mod: XB

## 2023-01-01 PROCEDURE — 99231 SBSQ HOSP IP/OBS SF/LOW 25: CPT | Mod: ,,, | Performed by: INTERNAL MEDICINE

## 2023-01-01 PROCEDURE — 87077 CULTURE AEROBIC IDENTIFY: CPT | Performed by: STUDENT IN AN ORGANIZED HEALTH CARE EDUCATION/TRAINING PROGRAM

## 2023-01-01 PROCEDURE — 3079F PR MOST RECENT DIASTOLIC BLOOD PRESSURE 80-89 MM HG: ICD-10-PCS | Mod: CPTII,S$GLB,, | Performed by: INTERNAL MEDICINE

## 2023-01-01 PROCEDURE — 85007 BL SMEAR W/DIFF WBC COUNT: CPT | Mod: 91 | Performed by: INTERNAL MEDICINE

## 2023-01-01 PROCEDURE — 87147 CULTURE TYPE IMMUNOLOGIC: CPT | Performed by: INTERNAL MEDICINE

## 2023-01-01 PROCEDURE — 84439 ASSAY OF FREE THYROXINE: CPT | Performed by: STUDENT IN AN ORGANIZED HEALTH CARE EDUCATION/TRAINING PROGRAM

## 2023-01-01 PROCEDURE — 1126F AMNT PAIN NOTED NONE PRSNT: CPT | Mod: CPTII,S$GLB,, | Performed by: UROLOGY

## 2023-01-01 PROCEDURE — 76770 US EXAM ABDO BACK WALL COMP: CPT | Mod: TC,PO

## 2023-01-01 PROCEDURE — A9698 NON-RAD CONTRAST MATERIALNOC: HCPCS

## 2023-01-01 PROCEDURE — 93010 EKG 12-LEAD: ICD-10-PCS | Mod: ,,, | Performed by: SPECIALIST

## 2023-01-01 PROCEDURE — 80069 RENAL FUNCTION PANEL: CPT | Performed by: INTERNAL MEDICINE

## 2023-01-01 PROCEDURE — 44625 PR CLOSE ENTEROSTOMY,RESEC+ANAST: ICD-10-PCS | Mod: 52,78,51, | Performed by: SURGERY

## 2023-01-01 PROCEDURE — 86870 RBC ANTIBODY IDENTIFICATION: CPT | Performed by: STUDENT IN AN ORGANIZED HEALTH CARE EDUCATION/TRAINING PROGRAM

## 2023-01-01 PROCEDURE — 63600175 PHARM REV CODE 636 W HCPCS: Mod: JZ,JG | Performed by: INTERNAL MEDICINE

## 2023-01-01 PROCEDURE — C1758 CATHETER, URETERAL: HCPCS | Performed by: STUDENT IN AN ORGANIZED HEALTH CARE EDUCATION/TRAINING PROGRAM

## 2023-01-01 PROCEDURE — 52352 CYSTOURETERO W/STONE REMOVE: CPT | Mod: RT,,, | Performed by: UROLOGY

## 2023-01-01 PROCEDURE — 43753 TX GASTRO INTUB W/ASP: CPT

## 2023-01-01 PROCEDURE — 25500020 PHARM REV CODE 255: Performed by: STUDENT IN AN ORGANIZED HEALTH CARE EDUCATION/TRAINING PROGRAM

## 2023-01-01 PROCEDURE — 3008F PR BODY MASS INDEX (BMI) DOCUMENTED: ICD-10-PCS | Mod: CPTII,S$GLB,, | Performed by: UROLOGY

## 2023-01-01 PROCEDURE — 82746 ASSAY OF FOLIC ACID SERUM: CPT | Performed by: STUDENT IN AN ORGANIZED HEALTH CARE EDUCATION/TRAINING PROGRAM

## 2023-01-01 PROCEDURE — 45110 REMOVAL OF RECTUM: CPT | Mod: 22,,, | Performed by: SURGERY

## 2023-01-01 PROCEDURE — 82570 ASSAY OF URINE CREATININE: CPT | Performed by: INTERNAL MEDICINE

## 2023-01-01 PROCEDURE — 3008F PR BODY MASS INDEX (BMI) DOCUMENTED: ICD-10-PCS | Mod: CPTII,S$GLB,, | Performed by: NURSE PRACTITIONER

## 2023-01-01 PROCEDURE — 87070 CULTURE OTHR SPECIMN AEROBIC: CPT | Performed by: STUDENT IN AN ORGANIZED HEALTH CARE EDUCATION/TRAINING PROGRAM

## 2023-01-01 PROCEDURE — 88307 TISSUE EXAM BY PATHOLOGIST: CPT | Mod: TC | Performed by: PATHOLOGY

## 2023-01-01 PROCEDURE — 74420 PR  X-RAY RETROGRADE PYELOGRAM: ICD-10-PCS | Mod: 26,,, | Performed by: STUDENT IN AN ORGANIZED HEALTH CARE EDUCATION/TRAINING PROGRAM

## 2023-01-01 PROCEDURE — P9016 RBC LEUKOCYTES REDUCED: HCPCS | Performed by: SURGERY

## 2023-01-01 PROCEDURE — 99999 PR PBB SHADOW E&M-EST. PATIENT-LVL IV: CPT | Mod: PBBFAC,,, | Performed by: SURGERY

## 2023-01-01 PROCEDURE — 84443 ASSAY THYROID STIM HORMONE: CPT | Performed by: INTERNAL MEDICINE

## 2023-01-01 PROCEDURE — 84540 ASSAY OF URINE/UREA-N: CPT | Performed by: INTERNAL MEDICINE

## 2023-01-01 PROCEDURE — 1159F MED LIST DOCD IN RCRD: CPT | Mod: CPTII,S$GLB,, | Performed by: UROLOGY

## 2023-01-01 PROCEDURE — 86922 COMPATIBILITY TEST ANTIGLOB: CPT | Performed by: SURGERY

## 2023-01-01 PROCEDURE — 85027 COMPLETE CBC AUTOMATED: CPT | Performed by: NURSE PRACTITIONER

## 2023-01-01 PROCEDURE — 44320 PR COLOSTOMY: ICD-10-PCS | Mod: 58,51,59, | Performed by: SURGERY

## 2023-01-01 PROCEDURE — 1125F PR PAIN SEVERITY QUANTIFIED, PAIN PRESENT: ICD-10-PCS | Mod: CPTII,S$GLB,, | Performed by: SURGERY

## 2023-01-01 PROCEDURE — 84134 ASSAY OF PREALBUMIN: CPT | Performed by: INTERNAL MEDICINE

## 2023-01-01 PROCEDURE — 1125F AMNT PAIN NOTED PAIN PRSNT: CPT | Mod: CPTII,S$GLB,, | Performed by: SURGERY

## 2023-01-01 PROCEDURE — 1160F PR REVIEW ALL MEDS BY PRESCRIBER/CLIN PHARMACIST DOCUMENTED: ICD-10-PCS | Mod: CPTII,S$GLB,, | Performed by: NURSE PRACTITIONER

## 2023-01-01 PROCEDURE — 1126F AMNT PAIN NOTED NONE PRSNT: CPT | Mod: CPTII,S$GLB,, | Performed by: NURSE PRACTITIONER

## 2023-01-01 PROCEDURE — 93010 ELECTROCARDIOGRAM REPORT: CPT | Mod: ,,, | Performed by: SPECIALIST

## 2023-01-01 PROCEDURE — 36000707: Performed by: STUDENT IN AN ORGANIZED HEALTH CARE EDUCATION/TRAINING PROGRAM

## 2023-01-01 PROCEDURE — 80053 COMPREHEN METABOLIC PANEL: CPT

## 2023-01-01 PROCEDURE — 1157F PR ADVANCE CARE PLAN OR EQUIV PRESENT IN MEDICAL RECORD: ICD-10-PCS | Mod: CPTII,S$GLB,, | Performed by: UROLOGY

## 2023-01-01 PROCEDURE — 20600001 HC STEP DOWN PRIVATE ROOM

## 2023-01-01 PROCEDURE — 84478 ASSAY OF TRIGLYCERIDES: CPT | Performed by: INTERNAL MEDICINE

## 2023-01-01 PROCEDURE — 99499 NO LOS: ICD-10-PCS | Mod: S$GLB,,, | Performed by: UROLOGY

## 2023-01-01 PROCEDURE — 81000 URINALYSIS NONAUTO W/SCOPE: CPT | Performed by: NURSE PRACTITIONER

## 2023-01-01 PROCEDURE — 86870 RBC ANTIBODY IDENTIFICATION: CPT | Performed by: INTERNAL MEDICINE

## 2023-01-01 PROCEDURE — 74177 CT CHEST ABDOMEN PELVIS WITH CONTRAST (XPD): ICD-10-PCS | Mod: 26,,, | Performed by: RADIOLOGY

## 2023-01-01 PROCEDURE — 88305 TISSUE EXAM BY PATHOLOGIST: CPT | Mod: TC | Performed by: PATHOLOGY

## 2023-01-01 PROCEDURE — 99214 OFFICE O/P EST MOD 30 MIN: CPT | Mod: S$GLB,,, | Performed by: SURGERY

## 2023-01-01 PROCEDURE — 1159F PR MEDICATION LIST DOCUMENTED IN MEDICAL RECORD: ICD-10-PCS | Mod: CPTII,S$GLB,, | Performed by: UROLOGY

## 2023-01-01 PROCEDURE — 45380 PR COLONOSCOPY,BIOPSY: ICD-10-PCS | Mod: ,,, | Performed by: INTERNAL MEDICINE

## 2023-01-01 PROCEDURE — 84439 ASSAY OF FREE THYROXINE: CPT | Performed by: NURSE PRACTITIONER

## 2023-01-01 PROCEDURE — 86905 BLOOD TYPING RBC ANTIGENS: CPT | Performed by: SURGERY

## 2023-01-01 PROCEDURE — A9552 F18 FDG: HCPCS | Mod: PO

## 2023-01-01 PROCEDURE — 52332 PR CYSTOSCOPY,INSERT URETERAL STENT: ICD-10-PCS | Mod: RT,,, | Performed by: UROLOGY

## 2023-01-01 PROCEDURE — 86870 RBC ANTIBODY IDENTIFICATION: CPT | Performed by: SURGERY

## 2023-01-01 PROCEDURE — 99999 PR PBB SHADOW E&M-EST. PATIENT-LVL III: ICD-10-PCS | Mod: PBBFAC,,, | Performed by: SURGERY

## 2023-01-01 PROCEDURE — 51720 TREATMENT OF BLADDER LESION: CPT

## 2023-01-01 PROCEDURE — 3077F SYST BP >= 140 MM HG: CPT | Mod: CPTII,S$GLB,, | Performed by: UROLOGY

## 2023-01-01 PROCEDURE — 71260 CT THORAX DX C+: CPT | Mod: 26,,, | Performed by: RADIOLOGY

## 2023-01-01 PROCEDURE — 74177 CT ABD & PELVIS W/CONTRAST: CPT | Mod: 26,,, | Performed by: RADIOLOGY

## 2023-01-01 PROCEDURE — 52332 CYSTOSCOPY AND TREATMENT: CPT | Mod: 50,,, | Performed by: STUDENT IN AN ORGANIZED HEALTH CARE EDUCATION/TRAINING PROGRAM

## 2023-01-01 PROCEDURE — 71260 CT THORAX DX C+: CPT | Mod: TC

## 2023-01-01 PROCEDURE — D9220A PRA ANESTHESIA: ICD-10-PCS | Mod: CRNA,,, | Performed by: STUDENT IN AN ORGANIZED HEALTH CARE EDUCATION/TRAINING PROGRAM

## 2023-01-01 PROCEDURE — 44120 PR RESECT SMALL INTEST,SINGL RESEC/ANAS: ICD-10-PCS | Mod: 51,,, | Performed by: SURGERY

## 2023-01-01 PROCEDURE — 1160F RVW MEDS BY RX/DR IN RCRD: CPT | Mod: CPTII,S$GLB,, | Performed by: SURGERY

## 2023-01-01 PROCEDURE — 1159F MED LIST DOCD IN RCRD: CPT | Mod: CPTII,S$GLB,, | Performed by: NURSE PRACTITIONER

## 2023-01-01 PROCEDURE — 99214 PR OFFICE/OUTPT VISIT, EST, LEVL IV, 30-39 MIN: ICD-10-PCS | Mod: S$GLB,,, | Performed by: NURSE PRACTITIONER

## 2023-01-01 PROCEDURE — 96361 HYDRATE IV INFUSION ADD-ON: CPT

## 2023-01-01 PROCEDURE — 86902 BLOOD TYPE ANTIGEN DONOR EA: CPT | Performed by: SURGERY

## 2023-01-01 PROCEDURE — 84439 ASSAY OF FREE THYROXINE: CPT | Performed by: INTERNAL MEDICINE

## 2023-01-01 PROCEDURE — 52332 PR CYSTOSCOPY,INSERT URETERAL STENT: ICD-10-PCS | Mod: 59,RT,, | Performed by: UROLOGY

## 2023-01-01 PROCEDURE — 25000003 PHARM REV CODE 250: Performed by: EMERGENCY MEDICINE

## 2023-01-01 PROCEDURE — 82607 VITAMIN B-12: CPT | Performed by: STUDENT IN AN ORGANIZED HEALTH CARE EDUCATION/TRAINING PROGRAM

## 2023-01-01 PROCEDURE — 83935 ASSAY OF URINE OSMOLALITY: CPT | Performed by: INTERNAL MEDICINE

## 2023-01-01 PROCEDURE — 85007 BL SMEAR W/DIFF WBC COUNT: CPT | Performed by: NURSE PRACTITIONER

## 2023-01-01 PROCEDURE — 1157F ADVNC CARE PLAN IN RCRD: CPT | Mod: CPTII,S$GLB,, | Performed by: UROLOGY

## 2023-01-01 PROCEDURE — C9113 INJ PANTOPRAZOLE SODIUM, VIA: HCPCS | Performed by: INTERNAL MEDICINE

## 2023-01-01 PROCEDURE — 52005 PR CYSTOURETHROSCOPY,URETER CATHETER: ICD-10-PCS | Mod: 50,,, | Performed by: UROLOGY

## 2023-01-01 PROCEDURE — 84550 ASSAY OF BLOOD/URIC ACID: CPT | Performed by: INTERNAL MEDICINE

## 2023-01-01 PROCEDURE — 1101F PR PT FALLS ASSESS DOC 0-1 FALLS W/OUT INJ PAST YR: ICD-10-PCS | Mod: CPTII,S$GLB,, | Performed by: NURSE PRACTITIONER

## 2023-01-01 PROCEDURE — 36600 WITHDRAWAL OF ARTERIAL BLOOD: CPT

## 2023-01-01 DEVICE — STENT SET URETERAL 6FR 22CM
Type: IMPLANTABLE DEVICE | Site: URETER | Status: NON-FUNCTIONAL
Removed: 2023-03-07

## 2023-01-01 DEVICE — BLACK SILICONE FILIFORM DBL PIGTAIL URETERAL STENT SET WIRE GUIDE WITH HYDROPHILIC COATING
Type: IMPLANTABLE DEVICE | Site: URETER | Status: FUNCTIONAL
Brand: BLACK SILICONE

## 2023-01-01 DEVICE — IMPLANTABLE DEVICE: Type: IMPLANTABLE DEVICE | Site: URETER | Status: FUNCTIONAL

## 2023-01-01 DEVICE — STENT SET URETERAL 6X26CM: Type: IMPLANTABLE DEVICE | Site: URETER | Status: FUNCTIONAL

## 2023-01-01 RX ORDER — ONDANSETRON HYDROCHLORIDE 2 MG/ML
INJECTION, SOLUTION INTRAMUSCULAR; INTRAVENOUS
Status: DISCONTINUED | OUTPATIENT
Start: 2023-01-01 | End: 2023-01-01

## 2023-01-01 RX ORDER — DIPHENHYDRAMINE HYDROCHLORIDE 50 MG/ML
50 INJECTION INTRAMUSCULAR; INTRAVENOUS ONCE AS NEEDED
Status: CANCELLED | OUTPATIENT
Start: 2023-01-01

## 2023-01-01 RX ORDER — MUPIROCIN 20 MG/G
OINTMENT TOPICAL 2 TIMES DAILY
Status: DISCONTINUED | OUTPATIENT
Start: 2023-01-01 | End: 2023-01-01

## 2023-01-01 RX ORDER — DEXAMETHASONE SODIUM PHOSPHATE 4 MG/ML
INJECTION, SOLUTION INTRA-ARTICULAR; INTRALESIONAL; INTRAMUSCULAR; INTRAVENOUS; SOFT TISSUE
Status: DISCONTINUED | OUTPATIENT
Start: 2023-01-01 | End: 2023-01-01

## 2023-01-01 RX ORDER — HYDROCODONE BITARTRATE AND ACETAMINOPHEN 500; 5 MG/1; MG/1
TABLET ORAL
Status: DISCONTINUED | OUTPATIENT
Start: 2023-01-01 | End: 2023-01-01

## 2023-01-01 RX ORDER — TAMSULOSIN HYDROCHLORIDE 0.4 MG/1
0.4 CAPSULE ORAL DAILY
Status: DISCONTINUED | OUTPATIENT
Start: 2023-01-01 | End: 2023-01-01 | Stop reason: HOSPADM

## 2023-01-01 RX ORDER — ONDANSETRON 2 MG/ML
INJECTION INTRAMUSCULAR; INTRAVENOUS
Status: DISCONTINUED | OUTPATIENT
Start: 2023-01-01 | End: 2023-01-01

## 2023-01-01 RX ORDER — SODIUM CHLORIDE 0.9 % (FLUSH) 0.9 %
10 SYRINGE (ML) INJECTION EVERY 12 HOURS PRN
Status: DISCONTINUED | OUTPATIENT
Start: 2023-01-01 | End: 2023-01-01 | Stop reason: HOSPADM

## 2023-01-01 RX ORDER — GENTAMICIN SULFATE 80 MG/100ML
80 INJECTION, SOLUTION INTRAVENOUS
Status: COMPLETED | OUTPATIENT
Start: 2023-01-01 | End: 2023-01-01

## 2023-01-01 RX ORDER — SODIUM CHLORIDE 0.9 % (FLUSH) 0.9 %
10 SYRINGE (ML) INJECTION
Status: DISCONTINUED | OUTPATIENT
Start: 2023-01-01 | End: 2023-01-01 | Stop reason: HOSPADM

## 2023-01-01 RX ORDER — DIPHENHYDRAMINE HYDROCHLORIDE 50 MG/ML
12.5 INJECTION INTRAMUSCULAR; INTRAVENOUS EVERY 6 HOURS PRN
Status: DISCONTINUED | OUTPATIENT
Start: 2023-01-01 | End: 2023-01-01 | Stop reason: SDUPTHER

## 2023-01-01 RX ORDER — MEPERIDINE HYDROCHLORIDE 50 MG/ML
12.5 INJECTION INTRAMUSCULAR; INTRAVENOUS; SUBCUTANEOUS ONCE
Status: DISCONTINUED | OUTPATIENT
Start: 2023-01-01 | End: 2023-01-01 | Stop reason: HOSPADM

## 2023-01-01 RX ORDER — LEVOTHYROXINE SODIUM ANHYDROUS 100 UG/5ML
50 INJECTION, POWDER, LYOPHILIZED, FOR SOLUTION INTRAVENOUS
Status: DISCONTINUED | OUTPATIENT
Start: 2023-01-01 | End: 2023-01-01 | Stop reason: HOSPADM

## 2023-01-01 RX ORDER — SODIUM CHLORIDE 0.9 % (FLUSH) 0.9 %
10 SYRINGE (ML) INJECTION
Status: CANCELLED | OUTPATIENT
Start: 2023-01-01

## 2023-01-01 RX ORDER — HYDROMORPHONE HYDROCHLORIDE 1 MG/ML
1 INJECTION, SOLUTION INTRAMUSCULAR; INTRAVENOUS; SUBCUTANEOUS EVERY 4 HOURS PRN
Status: DISCONTINUED | OUTPATIENT
Start: 2023-01-01 | End: 2023-01-01

## 2023-01-01 RX ORDER — LIDOCAINE HYDROCHLORIDE 10 MG/ML
1 INJECTION, SOLUTION EPIDURAL; INFILTRATION; INTRACAUDAL; PERINEURAL ONCE
Status: DISCONTINUED | OUTPATIENT
Start: 2023-01-01 | End: 2023-01-01 | Stop reason: HOSPADM

## 2023-01-01 RX ORDER — SIMETHICONE 80 MG
1 TABLET,CHEWABLE ORAL 4 TIMES DAILY PRN
Status: DISCONTINUED | OUTPATIENT
Start: 2023-01-01 | End: 2023-01-01 | Stop reason: HOSPADM

## 2023-01-01 RX ORDER — PHENAZOPYRIDINE HYDROCHLORIDE 200 MG/1
200 TABLET, FILM COATED ORAL
Qty: 9 TABLET | Refills: 0 | Status: SHIPPED | OUTPATIENT
Start: 2023-01-01 | End: 2023-01-01

## 2023-01-01 RX ORDER — IBUPROFEN 200 MG
16 TABLET ORAL
Status: DISCONTINUED | OUTPATIENT
Start: 2023-01-01 | End: 2023-01-01

## 2023-01-01 RX ORDER — KETOROLAC TROMETHAMINE 10 MG/1
10 TABLET, FILM COATED ORAL EVERY 6 HOURS
Qty: 20 TABLET | Refills: 0 | Status: SHIPPED | OUTPATIENT
Start: 2023-01-01 | End: 2023-01-01

## 2023-01-01 RX ORDER — EPINEPHRINE 0.3 MG/.3ML
0.3 INJECTION SUBCUTANEOUS ONCE AS NEEDED
Status: CANCELLED | OUTPATIENT
Start: 2023-01-01

## 2023-01-01 RX ORDER — SODIUM,POTASSIUM PHOSPHATES 280-250MG
2 POWDER IN PACKET (EA) ORAL
Status: DISCONTINUED | OUTPATIENT
Start: 2023-01-01 | End: 2023-01-01 | Stop reason: HOSPADM

## 2023-01-01 RX ORDER — HEPARIN 100 UNIT/ML
500 SYRINGE INTRAVENOUS
Status: CANCELLED | OUTPATIENT
Start: 2023-01-01

## 2023-01-01 RX ORDER — LEVOTHYROXINE SODIUM ANHYDROUS 100 UG/5ML
75 INJECTION, POWDER, LYOPHILIZED, FOR SOLUTION INTRAVENOUS
Status: DISCONTINUED | OUTPATIENT
Start: 2023-01-01 | End: 2023-01-01

## 2023-01-01 RX ORDER — HYDROCODONE BITARTRATE AND ACETAMINOPHEN 5; 325 MG/1; MG/1
1 TABLET ORAL EVERY 6 HOURS PRN
Qty: 30 TABLET | Refills: 0 | Status: ON HOLD | OUTPATIENT
Start: 2023-01-01 | End: 2023-01-01 | Stop reason: HOSPADM

## 2023-01-01 RX ORDER — KETOROLAC TROMETHAMINE 30 MG/ML
10 INJECTION, SOLUTION INTRAMUSCULAR; INTRAVENOUS
Status: COMPLETED | OUTPATIENT
Start: 2023-01-01 | End: 2023-01-01

## 2023-01-01 RX ORDER — NEOSTIGMINE METHYLSULFATE 1 MG/ML
INJECTION, SOLUTION INTRAVENOUS
Status: DISCONTINUED | OUTPATIENT
Start: 2023-01-01 | End: 2023-01-01

## 2023-01-01 RX ORDER — MIDAZOLAM HYDROCHLORIDE 1 MG/ML
INJECTION INTRAMUSCULAR; INTRAVENOUS
Status: DISCONTINUED | OUTPATIENT
Start: 2023-01-01 | End: 2023-01-01

## 2023-01-01 RX ORDER — AMOXICILLIN 250 MG
1 CAPSULE ORAL 2 TIMES DAILY
Status: DISCONTINUED | OUTPATIENT
Start: 2023-01-01 | End: 2023-01-01

## 2023-01-01 RX ORDER — FAMOTIDINE 10 MG/ML
20 INJECTION INTRAVENOUS DAILY
Status: DISCONTINUED | OUTPATIENT
Start: 2023-01-01 | End: 2023-01-01

## 2023-01-01 RX ORDER — ONDANSETRON 2 MG/ML
4 INJECTION INTRAMUSCULAR; INTRAVENOUS ONCE
Status: COMPLETED | OUTPATIENT
Start: 2023-01-01 | End: 2023-01-01

## 2023-01-01 RX ORDER — FENTANYL CITRATE 50 UG/ML
25 INJECTION, SOLUTION INTRAMUSCULAR; INTRAVENOUS EVERY 5 MIN PRN
Status: DISCONTINUED | OUTPATIENT
Start: 2023-01-01 | End: 2023-01-01 | Stop reason: HOSPADM

## 2023-01-01 RX ORDER — CLONAZEPAM 0.5 MG/1
0.5 TABLET ORAL 2 TIMES DAILY PRN
Status: DISCONTINUED | OUTPATIENT
Start: 2023-01-01 | End: 2023-01-01 | Stop reason: HOSPADM

## 2023-01-01 RX ORDER — DEXTROSE MONOHYDRATE AND SODIUM CHLORIDE 5; .9 G/100ML; G/100ML
INJECTION, SOLUTION INTRAVENOUS CONTINUOUS
Status: DISCONTINUED | OUTPATIENT
Start: 2023-01-01 | End: 2023-01-01

## 2023-01-01 RX ORDER — PROPOFOL 10 MG/ML
VIAL (ML) INTRAVENOUS
Status: DISCONTINUED | OUTPATIENT
Start: 2023-01-01 | End: 2023-01-01

## 2023-01-01 RX ORDER — CIPROFLOXACIN 500 MG/1
500 TABLET ORAL 2 TIMES DAILY
Qty: 8 TABLET | Refills: 0 | Status: ON HOLD | OUTPATIENT
Start: 2023-01-01 | End: 2023-01-01 | Stop reason: HOSPADM

## 2023-01-01 RX ORDER — NALOXONE HCL 0.4 MG/ML
0.02 VIAL (ML) INJECTION
Status: DISCONTINUED | OUTPATIENT
Start: 2023-01-01 | End: 2023-01-01 | Stop reason: HOSPADM

## 2023-01-01 RX ORDER — DEXTROSE, SODIUM CHLORIDE, SODIUM LACTATE, POTASSIUM CHLORIDE, AND CALCIUM CHLORIDE 5; .6; .31; .03; .02 G/100ML; G/100ML; G/100ML; G/100ML; G/100ML
INJECTION, SOLUTION INTRAVENOUS CONTINUOUS
Status: DISCONTINUED | OUTPATIENT
Start: 2023-01-01 | End: 2023-01-01

## 2023-01-01 RX ORDER — POTASSIUM CHLORIDE 20 MEQ/1
40 TABLET, EXTENDED RELEASE ORAL ONCE
Status: DISCONTINUED | OUTPATIENT
Start: 2023-01-01 | End: 2023-01-01 | Stop reason: HOSPADM

## 2023-01-01 RX ORDER — TALC
6 POWDER (GRAM) TOPICAL NIGHTLY PRN
Status: DISCONTINUED | OUTPATIENT
Start: 2023-01-01 | End: 2023-01-01 | Stop reason: HOSPADM

## 2023-01-01 RX ORDER — CALCIUM GLUCONATE 20 MG/ML
1 INJECTION, SOLUTION INTRAVENOUS
Status: COMPLETED | OUTPATIENT
Start: 2023-01-01 | End: 2023-01-01

## 2023-01-01 RX ORDER — HEPARIN 100 UNIT/ML
500 SYRINGE INTRAVENOUS
Status: DISCONTINUED | OUTPATIENT
Start: 2023-01-01 | End: 2023-01-01 | Stop reason: HOSPADM

## 2023-01-01 RX ORDER — PROCHLORPERAZINE EDISYLATE 5 MG/ML
5 INJECTION INTRAMUSCULAR; INTRAVENOUS EVERY 30 MIN PRN
Status: DISCONTINUED | OUTPATIENT
Start: 2023-01-01 | End: 2023-01-01 | Stop reason: HOSPADM

## 2023-01-01 RX ORDER — LANOLIN ALCOHOL/MO/W.PET/CERES
1 CREAM (GRAM) TOPICAL 2 TIMES DAILY
Status: DISCONTINUED | OUTPATIENT
Start: 2023-01-01 | End: 2023-01-01 | Stop reason: HOSPADM

## 2023-01-01 RX ORDER — HYDROMORPHONE HYDROCHLORIDE 1 MG/ML
1 INJECTION, SOLUTION INTRAMUSCULAR; INTRAVENOUS; SUBCUTANEOUS
Status: DISCONTINUED | OUTPATIENT
Start: 2023-01-01 | End: 2023-01-01 | Stop reason: HOSPADM

## 2023-01-01 RX ORDER — IPRATROPIUM BROMIDE 0.5 MG/2.5ML
0.5 SOLUTION RESPIRATORY (INHALATION) EVERY 6 HOURS
Status: DISCONTINUED | OUTPATIENT
Start: 2023-01-01 | End: 2023-01-01

## 2023-01-01 RX ORDER — OXYCODONE HYDROCHLORIDE 5 MG/1
5 TABLET ORAL EVERY 4 HOURS PRN
Status: DISCONTINUED | OUTPATIENT
Start: 2023-01-01 | End: 2023-01-01

## 2023-01-01 RX ORDER — CIPROFLOXACIN 500 MG/1
500 TABLET ORAL EVERY 12 HOURS
Status: DISCONTINUED | OUTPATIENT
Start: 2023-01-01 | End: 2023-01-01 | Stop reason: HOSPADM

## 2023-01-01 RX ORDER — PANTOPRAZOLE SODIUM 40 MG/10ML
40 INJECTION, POWDER, LYOPHILIZED, FOR SOLUTION INTRAVENOUS DAILY
Status: DISCONTINUED | OUTPATIENT
Start: 2023-01-01 | End: 2023-01-01 | Stop reason: HOSPADM

## 2023-01-01 RX ORDER — MEPERIDINE HYDROCHLORIDE 50 MG/ML
12.5 INJECTION INTRAMUSCULAR; INTRAVENOUS; SUBCUTANEOUS ONCE AS NEEDED
Status: ACTIVE | OUTPATIENT
Start: 2023-01-01 | End: 2023-01-01

## 2023-01-01 RX ORDER — ATROPINE SULFATE 0.4 MG/ML
0.4 INJECTION, SOLUTION ENDOTRACHEAL; INTRAMEDULLARY; INTRAMUSCULAR; INTRAVENOUS; SUBCUTANEOUS ONCE AS NEEDED
Status: CANCELLED | OUTPATIENT
Start: 2023-01-01

## 2023-01-01 RX ORDER — POTASSIUM CHLORIDE 20 MEQ/1
20 TABLET, EXTENDED RELEASE ORAL DAILY
Status: DISCONTINUED | OUTPATIENT
Start: 2023-01-01 | End: 2023-01-01

## 2023-01-01 RX ORDER — ACETAMINOPHEN 10 MG/ML
INJECTION, SOLUTION INTRAVENOUS
Status: DISCONTINUED | OUTPATIENT
Start: 2023-01-01 | End: 2023-01-01

## 2023-01-01 RX ORDER — POLYETHYLENE GLYCOL 3350 17 G/17G
17 POWDER, FOR SOLUTION ORAL 2 TIMES DAILY PRN
Status: DISCONTINUED | OUTPATIENT
Start: 2023-01-01 | End: 2023-01-01 | Stop reason: HOSPADM

## 2023-01-01 RX ORDER — ROCURONIUM BROMIDE 10 MG/ML
INJECTION, SOLUTION INTRAVENOUS
Status: DISCONTINUED | OUTPATIENT
Start: 2023-01-01 | End: 2023-01-01

## 2023-01-01 RX ORDER — FLUOROURACIL 50 MG/ML
2400 INJECTION, SOLUTION INTRAVENOUS
Status: CANCELLED | OUTPATIENT
Start: 2023-01-01

## 2023-01-01 RX ORDER — KETOROLAC TROMETHAMINE 30 MG/ML
INJECTION, SOLUTION INTRAMUSCULAR; INTRAVENOUS
Status: DISCONTINUED | OUTPATIENT
Start: 2023-01-01 | End: 2023-01-01

## 2023-01-01 RX ORDER — CIPROFLOXACIN 500 MG/1
500 TABLET ORAL 2 TIMES DAILY
Qty: 6 TABLET | Refills: 0 | Status: SHIPPED | OUTPATIENT
Start: 2023-01-01 | End: 2023-01-01

## 2023-01-01 RX ORDER — IBUPROFEN 200 MG
24 TABLET ORAL
Status: DISCONTINUED | OUTPATIENT
Start: 2023-01-01 | End: 2023-01-01

## 2023-01-01 RX ORDER — PANTOPRAZOLE SODIUM 40 MG/10ML
40 INJECTION, POWDER, LYOPHILIZED, FOR SOLUTION INTRAVENOUS DAILY
Status: DISCONTINUED | OUTPATIENT
Start: 2023-01-01 | End: 2023-01-01

## 2023-01-01 RX ORDER — FENTANYL CITRATE 50 UG/ML
INJECTION, SOLUTION INTRAMUSCULAR; INTRAVENOUS
Status: DISCONTINUED | OUTPATIENT
Start: 2023-01-01 | End: 2023-01-01

## 2023-01-01 RX ORDER — FENTANYL CITRATE 50 UG/ML
25 INJECTION, SOLUTION INTRAMUSCULAR; INTRAVENOUS EVERY 5 MIN PRN
Status: DISCONTINUED | OUTPATIENT
Start: 2023-01-01 | End: 2023-01-01

## 2023-01-01 RX ORDER — CALCIUM GLUCONATE 20 MG/ML
1 INJECTION, SOLUTION INTRAVENOUS
Status: DISCONTINUED | OUTPATIENT
Start: 2023-01-01 | End: 2023-01-01 | Stop reason: HOSPADM

## 2023-01-01 RX ORDER — BUPIVACAINE HYDROCHLORIDE 2.5 MG/ML
INJECTION, SOLUTION EPIDURAL; INFILTRATION; INTRACAUDAL
Status: DISCONTINUED | OUTPATIENT
Start: 2023-01-01 | End: 2023-01-01 | Stop reason: HOSPADM

## 2023-01-01 RX ORDER — FLUCONAZOLE 200 MG/1
200 TABLET ORAL DAILY
Qty: 5 TABLET | Refills: 0 | Status: SHIPPED | OUTPATIENT
Start: 2023-01-01 | End: 2023-01-01

## 2023-01-01 RX ORDER — CALCIUM GLUCONATE 20 MG/ML
3 INJECTION, SOLUTION INTRAVENOUS
Status: DISCONTINUED | OUTPATIENT
Start: 2023-01-01 | End: 2023-01-01 | Stop reason: HOSPADM

## 2023-01-01 RX ORDER — SODIUM CHLORIDE, SODIUM LACTATE, POTASSIUM CHLORIDE, CALCIUM CHLORIDE 600; 310; 30; 20 MG/100ML; MG/100ML; MG/100ML; MG/100ML
10 INJECTION, SOLUTION INTRAVENOUS CONTINUOUS
Status: ACTIVE | OUTPATIENT
Start: 2023-01-01

## 2023-01-01 RX ORDER — LIDOCAINE HYDROCHLORIDE 20 MG/ML
INJECTION INTRAVENOUS
Status: DISCONTINUED | OUTPATIENT
Start: 2023-01-01 | End: 2023-01-01

## 2023-01-01 RX ORDER — SODIUM CHLORIDE 9 MG/ML
INJECTION, SOLUTION INTRAVENOUS
Status: COMPLETED | OUTPATIENT
Start: 2023-01-01 | End: 2023-01-01

## 2023-01-01 RX ORDER — LIDOCAINE HYDROCHLORIDE 20 MG/ML
JELLY TOPICAL
Status: DISCONTINUED | OUTPATIENT
Start: 2023-01-01 | End: 2023-01-01

## 2023-01-01 RX ORDER — ATROPINE SULFATE 0.4 MG/ML
0.4 INJECTION, SOLUTION ENDOTRACHEAL; INTRAMEDULLARY; INTRAMUSCULAR; INTRAVENOUS; SUBCUTANEOUS ONCE AS NEEDED
Status: DISCONTINUED | OUTPATIENT
Start: 2023-01-01 | End: 2023-01-01 | Stop reason: HOSPADM

## 2023-01-01 RX ORDER — MEPERIDINE HYDROCHLORIDE 50 MG/ML
12.5 INJECTION INTRAMUSCULAR; INTRAVENOUS; SUBCUTANEOUS ONCE
Status: DISCONTINUED | OUTPATIENT
Start: 2023-01-01 | End: 2023-01-01

## 2023-01-01 RX ORDER — LIDOCAINE HYDROCHLORIDE 10 MG/ML
1 INJECTION, SOLUTION EPIDURAL; INFILTRATION; INTRACAUDAL; PERINEURAL ONCE
Status: ACTIVE | OUTPATIENT
Start: 2023-01-01

## 2023-01-01 RX ORDER — SODIUM CHLORIDE 9 MG/ML
INJECTION, SOLUTION INTRAVENOUS CONTINUOUS
Status: DISCONTINUED | OUTPATIENT
Start: 2023-01-01 | End: 2023-01-01

## 2023-01-01 RX ORDER — OCTREOTIDE ACETATE 100 UG/ML
100 INJECTION, SOLUTION INTRAVENOUS; SUBCUTANEOUS EVERY 8 HOURS
Status: DISCONTINUED | OUTPATIENT
Start: 2023-01-01 | End: 2023-01-01 | Stop reason: HOSPADM

## 2023-01-01 RX ORDER — POTASSIUM CHLORIDE 7.45 MG/ML
60 INJECTION INTRAVENOUS
Status: DISCONTINUED | OUTPATIENT
Start: 2023-01-01 | End: 2023-01-01 | Stop reason: HOSPADM

## 2023-01-01 RX ORDER — LORAZEPAM 2 MG/ML
0.25 INJECTION INTRAMUSCULAR ONCE AS NEEDED
Status: SHIPPED | OUTPATIENT
Start: 2023-01-01 | End: 2034-08-03

## 2023-01-01 RX ORDER — SODIUM,POTASSIUM PHOSPHATES 280-250MG
2 POWDER IN PACKET (EA) ORAL
Status: DISCONTINUED | OUTPATIENT
Start: 2023-01-01 | End: 2023-01-01

## 2023-01-01 RX ORDER — EPINEPHRINE 0.3 MG/.3ML
0.3 INJECTION SUBCUTANEOUS ONCE AS NEEDED
Status: DISCONTINUED | OUTPATIENT
Start: 2023-01-01 | End: 2023-01-01 | Stop reason: HOSPADM

## 2023-01-01 RX ORDER — HYDROMORPHONE HYDROCHLORIDE 1 MG/ML
0.5 INJECTION, SOLUTION INTRAMUSCULAR; INTRAVENOUS; SUBCUTANEOUS ONCE
Status: COMPLETED | OUTPATIENT
Start: 2023-01-01 | End: 2023-01-01

## 2023-01-01 RX ORDER — OXYCODONE HYDROCHLORIDE 5 MG/1
5 TABLET ORAL EVERY 4 HOURS PRN
Status: DISCONTINUED | OUTPATIENT
Start: 2023-01-01 | End: 2023-01-01 | Stop reason: HOSPADM

## 2023-01-01 RX ORDER — IPRATROPIUM BROMIDE AND ALBUTEROL SULFATE 2.5; .5 MG/3ML; MG/3ML
3 SOLUTION RESPIRATORY (INHALATION) EVERY 6 HOURS
Status: DISCONTINUED | OUTPATIENT
Start: 2023-01-01 | End: 2023-01-01

## 2023-01-01 RX ORDER — MIDAZOLAM HYDROCHLORIDE 1 MG/ML
INJECTION, SOLUTION INTRAMUSCULAR; INTRAVENOUS
Status: DISCONTINUED | OUTPATIENT
Start: 2023-01-01 | End: 2023-01-01

## 2023-01-01 RX ORDER — PHENYLEPHRINE HYDROCHLORIDE 10 MG/ML
INJECTION INTRAVENOUS
Status: DISCONTINUED | OUTPATIENT
Start: 2023-01-01 | End: 2023-01-01

## 2023-01-01 RX ORDER — ACETAMINOPHEN 325 MG/1
650 TABLET ORAL EVERY 8 HOURS PRN
Status: DISCONTINUED | OUTPATIENT
Start: 2023-01-01 | End: 2023-01-01 | Stop reason: HOSPADM

## 2023-01-01 RX ORDER — HYDROMORPHONE HYDROCHLORIDE 1 MG/ML
0.5 INJECTION, SOLUTION INTRAMUSCULAR; INTRAVENOUS; SUBCUTANEOUS EVERY 4 HOURS PRN
Status: DISCONTINUED | OUTPATIENT
Start: 2023-01-01 | End: 2023-01-01

## 2023-01-01 RX ORDER — MEPERIDINE HYDROCHLORIDE 50 MG/ML
12.5 INJECTION INTRAMUSCULAR; INTRAVENOUS; SUBCUTANEOUS ONCE AS NEEDED
Status: COMPLETED | OUTPATIENT
Start: 2023-01-01 | End: 2023-01-01

## 2023-01-01 RX ORDER — BISACODYL 5 MG
5 TABLET, DELAYED RELEASE (ENTERIC COATED) ORAL NIGHTLY
Status: DISCONTINUED | OUTPATIENT
Start: 2023-01-01 | End: 2023-01-01

## 2023-01-01 RX ORDER — ALBUMIN HUMAN 50 G/1000ML
SOLUTION INTRAVENOUS CONTINUOUS PRN
Status: DISCONTINUED | OUTPATIENT
Start: 2023-01-01 | End: 2023-01-01

## 2023-01-01 RX ORDER — CIPROFLOXACIN 2 MG/ML
400 INJECTION, SOLUTION INTRAVENOUS
Status: DISCONTINUED | OUTPATIENT
Start: 2023-01-01 | End: 2023-01-01

## 2023-01-01 RX ORDER — EPHEDRINE SULFATE 50 MG/ML
INJECTION, SOLUTION INTRAVENOUS
Status: DISCONTINUED | OUTPATIENT
Start: 2023-01-01 | End: 2023-01-01

## 2023-01-01 RX ORDER — TAMSULOSIN HYDROCHLORIDE 0.4 MG/1
0.4 CAPSULE ORAL
Status: COMPLETED | OUTPATIENT
Start: 2023-01-01 | End: 2023-01-01

## 2023-01-01 RX ORDER — SUCCINYLCHOLINE CHLORIDE 20 MG/ML
INJECTION INTRAMUSCULAR; INTRAVENOUS
Status: DISCONTINUED | OUTPATIENT
Start: 2023-01-01 | End: 2023-01-01

## 2023-01-01 RX ORDER — HYDROCODONE BITARTRATE AND ACETAMINOPHEN 7.5; 325 MG/1; MG/1
1 TABLET ORAL EVERY 6 HOURS PRN
Status: DISCONTINUED | OUTPATIENT
Start: 2023-01-01 | End: 2023-01-01 | Stop reason: HOSPADM

## 2023-01-01 RX ORDER — HYDROCODONE BITARTRATE AND ACETAMINOPHEN 7.5; 325 MG/1; MG/1
1 TABLET ORAL EVERY 6 HOURS PRN
Qty: 20 TABLET | Refills: 0 | Status: SHIPPED | OUTPATIENT
Start: 2023-01-01 | End: 2023-01-01

## 2023-01-01 RX ORDER — LEVALBUTEROL 1.25 MG/.5ML
1.25 SOLUTION, CONCENTRATE RESPIRATORY (INHALATION) EVERY 8 HOURS
Status: DISCONTINUED | OUTPATIENT
Start: 2023-01-01 | End: 2023-01-01

## 2023-01-01 RX ORDER — HYDROMORPHONE HYDROCHLORIDE 2 MG/ML
0.2 INJECTION, SOLUTION INTRAMUSCULAR; INTRAVENOUS; SUBCUTANEOUS EVERY 5 MIN PRN
Status: DISCONTINUED | OUTPATIENT
Start: 2023-01-01 | End: 2023-01-01 | Stop reason: HOSPADM

## 2023-01-01 RX ORDER — SODIUM CHLORIDE 0.9 % (FLUSH) 0.9 %
10 SYRINGE (ML) INJECTION EVERY 6 HOURS
Status: DISCONTINUED | OUTPATIENT
Start: 2023-01-01 | End: 2023-01-01

## 2023-01-01 RX ORDER — IBUPROFEN 200 MG
16 TABLET ORAL
Status: DISCONTINUED | OUTPATIENT
Start: 2023-01-01 | End: 2023-01-01 | Stop reason: HOSPADM

## 2023-01-01 RX ORDER — ACETAMINOPHEN 10 MG/ML
1000 INJECTION, SOLUTION INTRAVENOUS EVERY 8 HOURS
Status: COMPLETED | OUTPATIENT
Start: 2023-01-01 | End: 2023-01-01

## 2023-01-01 RX ORDER — MAG HYDROX/ALUMINUM HYD/SIMETH 200-200-20
30 SUSPENSION, ORAL (FINAL DOSE FORM) ORAL 4 TIMES DAILY PRN
Status: DISCONTINUED | OUTPATIENT
Start: 2023-01-01 | End: 2023-01-01 | Stop reason: HOSPADM

## 2023-01-01 RX ORDER — POTASSIUM CHLORIDE 7.45 MG/ML
40 INJECTION INTRAVENOUS
Status: DISCONTINUED | OUTPATIENT
Start: 2023-01-01 | End: 2023-01-01 | Stop reason: HOSPADM

## 2023-01-01 RX ORDER — CIPROFLOXACIN 500 MG/1
500 TABLET ORAL EVERY 12 HOURS
Qty: 14 TABLET | Refills: 0 | Status: SHIPPED | OUTPATIENT
Start: 2023-01-01 | End: 2023-01-01 | Stop reason: SDUPTHER

## 2023-01-01 RX ORDER — PROPOFOL 10 MG/ML
INJECTION, EMULSION INTRAVENOUS
Status: DISPENSED
Start: 2023-01-01 | End: 2023-01-01

## 2023-01-01 RX ORDER — ONDANSETRON 2 MG/ML
4 INJECTION INTRAMUSCULAR; INTRAVENOUS EVERY 12 HOURS PRN
Status: DISCONTINUED | OUTPATIENT
Start: 2023-01-01 | End: 2023-01-01 | Stop reason: SDUPTHER

## 2023-01-01 RX ORDER — ONDANSETRON 2 MG/ML
4 INJECTION INTRAMUSCULAR; INTRAVENOUS ONCE AS NEEDED
Status: ACTIVE | OUTPATIENT
Start: 2023-01-01 | End: 2034-08-03

## 2023-01-01 RX ORDER — CIPROFLOXACIN 500 MG/1
500 TABLET ORAL 2 TIMES DAILY
Qty: 10 TABLET | Refills: 0 | Status: SHIPPED | OUTPATIENT
Start: 2023-01-01 | End: 2023-01-01 | Stop reason: SDUPTHER

## 2023-01-01 RX ORDER — DIPHENHYDRAMINE HYDROCHLORIDE 50 MG/ML
12.5 INJECTION INTRAMUSCULAR; INTRAVENOUS EVERY 4 HOURS PRN
Status: DISCONTINUED | OUTPATIENT
Start: 2023-01-01 | End: 2023-01-01

## 2023-01-01 RX ORDER — CALCIUM GLUCONATE 20 MG/ML
2 INJECTION, SOLUTION INTRAVENOUS
Status: DISCONTINUED | OUTPATIENT
Start: 2023-01-01 | End: 2023-01-01 | Stop reason: HOSPADM

## 2023-01-01 RX ORDER — METOCLOPRAMIDE HYDROCHLORIDE 5 MG/ML
10 INJECTION INTRAMUSCULAR; INTRAVENOUS EVERY 10 MIN PRN
Status: DISCONTINUED | OUTPATIENT
Start: 2023-01-01 | End: 2023-01-01 | Stop reason: HOSPADM

## 2023-01-01 RX ORDER — HYDROCODONE BITARTRATE AND ACETAMINOPHEN 500; 5 MG/1; MG/1
TABLET ORAL
Status: DISCONTINUED | OUTPATIENT
Start: 2023-01-01 | End: 2023-01-01 | Stop reason: HOSPADM

## 2023-01-01 RX ORDER — KETOROLAC TROMETHAMINE 30 MG/ML
15 INJECTION, SOLUTION INTRAMUSCULAR; INTRAVENOUS
Status: COMPLETED | OUTPATIENT
Start: 2023-01-01 | End: 2023-01-01

## 2023-01-01 RX ORDER — HYDROMORPHONE HYDROCHLORIDE 2 MG/ML
0.2 INJECTION, SOLUTION INTRAMUSCULAR; INTRAVENOUS; SUBCUTANEOUS EVERY 5 MIN PRN
Status: ACTIVE | OUTPATIENT
Start: 2023-01-01

## 2023-01-01 RX ORDER — LANOLIN ALCOHOL/MO/W.PET/CERES
800 CREAM (GRAM) TOPICAL
Status: DISCONTINUED | OUTPATIENT
Start: 2023-01-01 | End: 2023-01-01 | Stop reason: HOSPADM

## 2023-01-01 RX ORDER — NALOXONE HCL 0.4 MG/ML
0.02 VIAL (ML) INJECTION
Status: DISCONTINUED | OUTPATIENT
Start: 2023-01-01 | End: 2023-01-01

## 2023-01-01 RX ORDER — HYDROCODONE BITARTRATE AND ACETAMINOPHEN 5; 325 MG/1; MG/1
1 TABLET ORAL EVERY 6 HOURS PRN
Qty: 10 TABLET | Refills: 0 | Status: CANCELLED | OUTPATIENT
Start: 2023-01-01 | End: 2023-01-01

## 2023-01-01 RX ORDER — LANOLIN ALCOHOL/MO/W.PET/CERES
800 CREAM (GRAM) TOPICAL
Status: DISCONTINUED | OUTPATIENT
Start: 2023-01-01 | End: 2023-01-01

## 2023-01-01 RX ORDER — DIPHENHYDRAMINE HYDROCHLORIDE 50 MG/ML
25 INJECTION INTRAMUSCULAR; INTRAVENOUS EVERY 6 HOURS PRN
Status: DISCONTINUED | OUTPATIENT
Start: 2023-01-01 | End: 2023-01-01 | Stop reason: HOSPADM

## 2023-01-01 RX ORDER — POTASSIUM CHLORIDE 20 MEQ/1
20 TABLET, EXTENDED RELEASE ORAL ONCE
Status: DISCONTINUED | OUTPATIENT
Start: 2023-01-01 | End: 2023-01-01

## 2023-01-01 RX ORDER — SODIUM CHLORIDE 9 MG/ML
INJECTION, SOLUTION INTRAVENOUS CONTINUOUS
Status: DISCONTINUED | OUTPATIENT
Start: 2023-01-01 | End: 2023-01-01 | Stop reason: HOSPADM

## 2023-01-01 RX ORDER — ENOXAPARIN SODIUM 100 MG/ML
40 INJECTION SUBCUTANEOUS EVERY 24 HOURS
Status: DISCONTINUED | OUTPATIENT
Start: 2023-01-01 | End: 2023-01-01

## 2023-01-01 RX ORDER — LOPERAMIDE HYDROCHLORIDE 2 MG/1
2 CAPSULE ORAL 4 TIMES DAILY PRN
Status: DISCONTINUED | OUTPATIENT
Start: 2023-01-01 | End: 2023-01-01 | Stop reason: HOSPADM

## 2023-01-01 RX ORDER — LANOLIN ALCOHOL/MO/W.PET/CERES
1 CREAM (GRAM) TOPICAL DAILY
Status: DISCONTINUED | OUTPATIENT
Start: 2023-01-01 | End: 2023-01-01 | Stop reason: HOSPADM

## 2023-01-01 RX ORDER — LEVOTHYROXINE SODIUM 125 UG/1
125 TABLET ORAL
Status: DISCONTINUED | OUTPATIENT
Start: 2023-01-01 | End: 2023-01-01 | Stop reason: HOSPADM

## 2023-01-01 RX ORDER — CHLORHEXIDINE GLUCONATE ORAL RINSE 1.2 MG/ML
15 SOLUTION DENTAL 2 TIMES DAILY
Status: DISCONTINUED | OUTPATIENT
Start: 2023-01-01 | End: 2023-01-01 | Stop reason: HOSPADM

## 2023-01-01 RX ORDER — FENTANYL CITRATE 50 UG/ML
25 INJECTION, SOLUTION INTRAMUSCULAR; INTRAVENOUS EVERY 5 MIN PRN
Status: COMPLETED | OUTPATIENT
Start: 2023-01-01 | End: 2023-01-01

## 2023-01-01 RX ORDER — TRAZODONE HYDROCHLORIDE 50 MG/1
50 TABLET ORAL NIGHTLY PRN
Status: DISCONTINUED | OUTPATIENT
Start: 2023-01-01 | End: 2023-01-01 | Stop reason: HOSPADM

## 2023-01-01 RX ORDER — DIPHENHYDRAMINE HYDROCHLORIDE 50 MG/ML
12.5 INJECTION INTRAMUSCULAR; INTRAVENOUS ONCE AS NEEDED
Status: ACTIVE | OUTPATIENT
Start: 2023-01-01 | End: 2034-08-03

## 2023-01-01 RX ORDER — POTASSIUM CHLORIDE 7.45 MG/ML
10 INJECTION INTRAVENOUS
Status: COMPLETED | OUTPATIENT
Start: 2023-01-01 | End: 2023-01-01

## 2023-01-01 RX ORDER — SODIUM CHLORIDE, SODIUM LACTATE, POTASSIUM CHLORIDE, CALCIUM CHLORIDE 600; 310; 30; 20 MG/100ML; MG/100ML; MG/100ML; MG/100ML
INJECTION, SOLUTION INTRAVENOUS CONTINUOUS
Status: DISCONTINUED | OUTPATIENT
Start: 2023-01-01 | End: 2023-01-01

## 2023-01-01 RX ORDER — ONDANSETRON 2 MG/ML
4 INJECTION INTRAMUSCULAR; INTRAVENOUS EVERY 8 HOURS PRN
Status: DISCONTINUED | OUTPATIENT
Start: 2023-01-01 | End: 2023-01-01

## 2023-01-01 RX ORDER — SODIUM CHLORIDE, SODIUM LACTATE, POTASSIUM CHLORIDE, CALCIUM CHLORIDE 600; 310; 30; 20 MG/100ML; MG/100ML; MG/100ML; MG/100ML
500 INJECTION, SOLUTION INTRAVENOUS ONCE
Status: ACTIVE | OUTPATIENT
Start: 2023-01-01

## 2023-01-01 RX ORDER — MAGNESIUM SULFATE HEPTAHYDRATE 40 MG/ML
4 INJECTION, SOLUTION INTRAVENOUS ONCE
Status: COMPLETED | OUTPATIENT
Start: 2023-01-01 | End: 2023-01-01

## 2023-01-01 RX ORDER — GLUCAGON 1 MG
1 KIT INJECTION
Status: DISCONTINUED | OUTPATIENT
Start: 2023-01-01 | End: 2023-01-01 | Stop reason: HOSPADM

## 2023-01-01 RX ORDER — GLUCAGON 1 MG
1 KIT INJECTION
Status: DISCONTINUED | OUTPATIENT
Start: 2023-01-01 | End: 2023-01-01

## 2023-01-01 RX ORDER — IPRATROPIUM BROMIDE AND ALBUTEROL SULFATE 2.5; .5 MG/3ML; MG/3ML
3 SOLUTION RESPIRATORY (INHALATION)
Status: DISCONTINUED | OUTPATIENT
Start: 2023-01-01 | End: 2023-01-01

## 2023-01-01 RX ORDER — VECURONIUM BROMIDE FOR INJECTION 1 MG/ML
INJECTION, POWDER, LYOPHILIZED, FOR SOLUTION INTRAVENOUS
Status: DISCONTINUED | OUTPATIENT
Start: 2023-01-01 | End: 2023-01-01

## 2023-01-01 RX ORDER — PNV NO.95/FERROUS FUM/FOLIC AC 28MG-0.8MG
100 TABLET ORAL DAILY
Status: DISCONTINUED | OUTPATIENT
Start: 2023-01-01 | End: 2023-01-01 | Stop reason: HOSPADM

## 2023-01-01 RX ORDER — HYDRALAZINE HYDROCHLORIDE 20 MG/ML
10 INJECTION INTRAMUSCULAR; INTRAVENOUS EVERY 4 HOURS PRN
Status: DISCONTINUED | OUTPATIENT
Start: 2023-01-01 | End: 2023-01-01 | Stop reason: HOSPADM

## 2023-01-01 RX ORDER — MUPIROCIN 20 MG/G
OINTMENT TOPICAL 2 TIMES DAILY
Status: DISCONTINUED | OUTPATIENT
Start: 2023-01-01 | End: 2023-01-01 | Stop reason: SDUPTHER

## 2023-01-01 RX ORDER — DEXTROSE 40 %
15 GEL (GRAM) ORAL
Status: DISCONTINUED | OUTPATIENT
Start: 2023-01-01 | End: 2023-01-01 | Stop reason: HOSPADM

## 2023-01-01 RX ORDER — CIPROFLOXACIN 2 MG/ML
400 INJECTION, SOLUTION INTRAVENOUS
Status: COMPLETED | OUTPATIENT
Start: 2023-01-01 | End: 2023-01-01

## 2023-01-01 RX ORDER — OXYCODONE HYDROCHLORIDE 5 MG/1
5 TABLET ORAL
Status: DISCONTINUED | OUTPATIENT
Start: 2023-01-01 | End: 2023-01-01 | Stop reason: HOSPADM

## 2023-01-01 RX ORDER — ONDANSETRON 4 MG/1
4 TABLET, ORALLY DISINTEGRATING ORAL EVERY 6 HOURS PRN
Status: DISCONTINUED | OUTPATIENT
Start: 2023-01-01 | End: 2023-01-01 | Stop reason: HOSPADM

## 2023-01-01 RX ORDER — METOCLOPRAMIDE HYDROCHLORIDE 5 MG/ML
10 INJECTION INTRAMUSCULAR; INTRAVENOUS EVERY 6 HOURS
Status: DISCONTINUED | OUTPATIENT
Start: 2023-01-01 | End: 2023-01-01

## 2023-01-01 RX ORDER — BUDESONIDE 0.5 MG/2ML
0.5 INHALANT ORAL EVERY 12 HOURS
Status: DISCONTINUED | OUTPATIENT
Start: 2023-01-01 | End: 2023-01-01

## 2023-01-01 RX ORDER — HYDROMORPHONE HYDROCHLORIDE 2 MG/ML
0.5 INJECTION, SOLUTION INTRAMUSCULAR; INTRAVENOUS; SUBCUTANEOUS
Status: COMPLETED | OUTPATIENT
Start: 2023-01-01 | End: 2023-01-01

## 2023-01-01 RX ORDER — IPRATROPIUM BROMIDE 0.5 MG/2.5ML
0.5 SOLUTION RESPIRATORY (INHALATION) EVERY 6 HOURS PRN
Status: DISCONTINUED | OUTPATIENT
Start: 2023-01-01 | End: 2023-01-01 | Stop reason: HOSPADM

## 2023-01-01 RX ORDER — PHENYLEPHRINE HCL IN 0.9% NACL 1 MG/10 ML
SYRINGE (ML) INTRAVENOUS
Status: DISCONTINUED | OUTPATIENT
Start: 2023-01-01 | End: 2023-01-01

## 2023-01-01 RX ORDER — FLUCONAZOLE 2 MG/ML
200 INJECTION, SOLUTION INTRAVENOUS
Status: DISCONTINUED | OUTPATIENT
Start: 2023-01-01 | End: 2023-01-01 | Stop reason: SDUPTHER

## 2023-01-01 RX ORDER — ACETAMINOPHEN 325 MG/1
650 TABLET ORAL EVERY 6 HOURS PRN
Status: DISCONTINUED | OUTPATIENT
Start: 2023-01-01 | End: 2023-01-01 | Stop reason: HOSPADM

## 2023-01-01 RX ORDER — SODIUM CHLORIDE 0.9 % (FLUSH) 0.9 %
3 SYRINGE (ML) INJECTION EVERY 8 HOURS
Status: ACTIVE | OUTPATIENT
Start: 2023-01-01

## 2023-01-01 RX ORDER — CALCIUM CHLORIDE INJECTION 100 MG/ML
INJECTION, SOLUTION INTRAVENOUS
Status: DISCONTINUED | OUTPATIENT
Start: 2023-01-01 | End: 2023-01-01

## 2023-01-01 RX ORDER — ENOXAPARIN SODIUM 100 MG/ML
40 INJECTION SUBCUTANEOUS EVERY 24 HOURS
Status: DISCONTINUED | OUTPATIENT
Start: 2023-01-01 | End: 2023-01-01 | Stop reason: HOSPADM

## 2023-01-01 RX ORDER — ALBUTEROL SULFATE 2.5 MG/.5ML
2.5 SOLUTION RESPIRATORY (INHALATION) EVERY 6 HOURS PRN
Status: DISCONTINUED | OUTPATIENT
Start: 2023-01-01 | End: 2023-01-01 | Stop reason: HOSPADM

## 2023-01-01 RX ORDER — DIPHENHYDRAMINE HYDROCHLORIDE 50 MG/ML
INJECTION INTRAMUSCULAR; INTRAVENOUS
Status: DISCONTINUED | OUTPATIENT
Start: 2023-01-01 | End: 2023-01-01

## 2023-01-01 RX ORDER — CLONAZEPAM 0.5 MG/1
0.5 TABLET ORAL 2 TIMES DAILY PRN
Status: DISCONTINUED | OUTPATIENT
Start: 2023-01-01 | End: 2023-01-01

## 2023-01-01 RX ORDER — SULFAMETHOXAZOLE AND TRIMETHOPRIM 800; 160 MG/1; MG/1
1 TABLET ORAL 2 TIMES DAILY
Qty: 6 TABLET | Refills: 0 | Status: SHIPPED | OUTPATIENT
Start: 2023-01-01 | End: 2023-01-01

## 2023-01-01 RX ORDER — IBUPROFEN 200 MG
24 TABLET ORAL
Status: DISCONTINUED | OUTPATIENT
Start: 2023-01-01 | End: 2023-01-01 | Stop reason: HOSPADM

## 2023-01-01 RX ORDER — OXYCODONE HYDROCHLORIDE 5 MG/1
5 TABLET ORAL
Status: DISCONTINUED | OUTPATIENT
Start: 2023-01-01 | End: 2023-01-01

## 2023-01-01 RX ORDER — DIPHENOXYLATE HYDROCHLORIDE AND ATROPINE SULFATE 2.5; .025 MG/1; MG/1
1 TABLET ORAL 4 TIMES DAILY
Status: DISCONTINUED | OUTPATIENT
Start: 2023-01-01 | End: 2023-01-01 | Stop reason: HOSPADM

## 2023-01-01 RX ORDER — FLUCONAZOLE 2 MG/ML
200 INJECTION, SOLUTION INTRAVENOUS
Status: DISCONTINUED | OUTPATIENT
Start: 2023-01-01 | End: 2023-01-01

## 2023-01-01 RX ORDER — DIPHENHYDRAMINE HCL 25 MG
25 CAPSULE ORAL EVERY 6 HOURS PRN
Status: DISCONTINUED | OUTPATIENT
Start: 2023-01-01 | End: 2023-01-01

## 2023-01-01 RX ORDER — HYDROMORPHONE HYDROCHLORIDE 1 MG/ML
0.5 INJECTION, SOLUTION INTRAMUSCULAR; INTRAVENOUS; SUBCUTANEOUS
Status: DISCONTINUED | OUTPATIENT
Start: 2023-01-01 | End: 2023-01-01 | Stop reason: HOSPADM

## 2023-01-01 RX ORDER — HYDROCODONE BITARTRATE AND ACETAMINOPHEN 5; 325 MG/1; MG/1
1 TABLET ORAL EVERY 6 HOURS PRN
Qty: 10 TABLET | Refills: 0 | Status: SHIPPED | OUTPATIENT
Start: 2023-01-01 | End: 2023-01-01

## 2023-01-01 RX ORDER — FLUCONAZOLE 2 MG/ML
200 INJECTION, SOLUTION INTRAVENOUS
Status: DISCONTINUED | OUTPATIENT
Start: 2023-01-01 | End: 2023-01-01 | Stop reason: HOSPADM

## 2023-01-01 RX ORDER — SCOLOPAMINE TRANSDERMAL SYSTEM 1 MG/1
1 PATCH, EXTENDED RELEASE TRANSDERMAL
Status: CANCELLED | OUTPATIENT
Start: 2023-01-01

## 2023-01-01 RX ORDER — HYDROCODONE BITARTRATE AND ACETAMINOPHEN 5; 325 MG/1; MG/1
1 TABLET ORAL EVERY 6 HOURS PRN
Qty: 10 TABLET | Refills: 0 | Status: SHIPPED | OUTPATIENT
Start: 2023-01-01 | End: 2023-01-01 | Stop reason: SDUPTHER

## 2023-01-01 RX ORDER — NOREPINEPHRINE BITARTRATE/D5W 4MG/250ML
0-3 PLASTIC BAG, INJECTION (ML) INTRAVENOUS CONTINUOUS
Status: DISCONTINUED | OUTPATIENT
Start: 2023-01-01 | End: 2023-01-01

## 2023-01-01 RX ORDER — SODIUM CHLORIDE 9 MG/ML
INJECTION, SOLUTION INTRAVENOUS CONTINUOUS
Status: CANCELLED | OUTPATIENT
Start: 2023-01-01

## 2023-01-01 RX ORDER — OXYCODONE HYDROCHLORIDE 5 MG/1
5 TABLET ORAL ONCE AS NEEDED
Status: COMPLETED | OUTPATIENT
Start: 2023-01-01 | End: 2023-01-01

## 2023-01-01 RX ORDER — HYDROCODONE BITARTRATE AND ACETAMINOPHEN 5; 325 MG/1; MG/1
1 TABLET ORAL EVERY 6 HOURS PRN
Status: DISCONTINUED | OUTPATIENT
Start: 2023-01-01 | End: 2023-01-01

## 2023-01-01 RX ORDER — HYDROMORPHONE HYDROCHLORIDE 2 MG/ML
0.5 INJECTION, SOLUTION INTRAMUSCULAR; INTRAVENOUS; SUBCUTANEOUS EVERY 4 HOURS PRN
Status: DISCONTINUED | OUTPATIENT
Start: 2023-01-01 | End: 2023-01-01

## 2023-01-01 RX ORDER — FAMOTIDINE 20 MG/1
20 TABLET, FILM COATED ORAL 2 TIMES DAILY
Status: DISCONTINUED | OUTPATIENT
Start: 2023-01-01 | End: 2023-01-01

## 2023-01-01 RX ORDER — POTASSIUM CHLORIDE 20 MEQ/15ML
20 SOLUTION ORAL 2 TIMES DAILY
Qty: 473 ML | Refills: 0 | Status: SHIPPED | OUTPATIENT
Start: 2023-01-01 | End: 2023-01-01

## 2023-01-01 RX ORDER — HYDROCODONE BITARTRATE AND ACETAMINOPHEN 5; 325 MG/1; MG/1
TABLET ORAL
Qty: 90 TABLET | Refills: 0 | Status: SHIPPED | OUTPATIENT
Start: 2023-01-01

## 2023-01-01 RX ORDER — MAG HYDROX/ALUMINUM HYD/SIMETH 200-200-20
30 SUSPENSION, ORAL (FINAL DOSE FORM) ORAL 4 TIMES DAILY PRN
Status: DISCONTINUED | OUTPATIENT
Start: 2023-01-01 | End: 2023-01-01

## 2023-01-01 RX ORDER — HYDROCODONE BITARTRATE AND ACETAMINOPHEN 5; 325 MG/1; MG/1
1 TABLET ORAL EVERY 4 HOURS PRN
Status: DISCONTINUED | OUTPATIENT
Start: 2023-01-01 | End: 2023-01-01

## 2023-01-01 RX ORDER — HYDROCODONE BITARTRATE AND ACETAMINOPHEN 5; 325 MG/1; MG/1
TABLET ORAL
Qty: 90 TABLET | Refills: 0 | Status: SHIPPED | OUTPATIENT
Start: 2023-01-01 | End: 2023-01-01 | Stop reason: SDUPTHER

## 2023-01-01 RX ORDER — PHENAZOPYRIDINE HYDROCHLORIDE 200 MG/1
200 TABLET, FILM COATED ORAL ONCE
Status: DISCONTINUED | OUTPATIENT
Start: 2023-01-01 | End: 2023-01-01 | Stop reason: HOSPADM

## 2023-01-01 RX ORDER — ONDANSETRON 2 MG/ML
4 INJECTION INTRAMUSCULAR; INTRAVENOUS EVERY 6 HOURS PRN
Status: DISCONTINUED | OUTPATIENT
Start: 2023-01-01 | End: 2023-01-01 | Stop reason: HOSPADM

## 2023-01-01 RX ORDER — LEVALBUTEROL INHALATION SOLUTION 0.63 MG/3ML
0.63 SOLUTION RESPIRATORY (INHALATION) EVERY 8 HOURS
Status: DISCONTINUED | OUTPATIENT
Start: 2023-01-01 | End: 2023-01-01

## 2023-01-01 RX ORDER — DIPHENOXYLATE HYDROCHLORIDE AND ATROPINE SULFATE 2.5; .025 MG/1; MG/1
1 TABLET ORAL 4 TIMES DAILY PRN
Status: DISCONTINUED | OUTPATIENT
Start: 2023-01-01 | End: 2023-01-01

## 2023-01-01 RX ORDER — FLUCONAZOLE 2 MG/ML
200 INJECTION, SOLUTION INTRAVENOUS
Status: DISCONTINUED | OUTPATIENT
Start: 2023-01-01 | End: 2023-01-01 | Stop reason: CLARIF

## 2023-01-01 RX ORDER — TEDUGLUTIDE 5 MG/.5ML
0.45 INJECTION, POWDER, LYOPHILIZED, FOR SOLUTION SUBCUTANEOUS DAILY
Qty: 30 KIT | Refills: 2 | Status: SHIPPED | OUTPATIENT
Start: 2023-01-01

## 2023-01-01 RX ORDER — HYDROMORPHONE HYDROCHLORIDE 2 MG/ML
0.2 INJECTION, SOLUTION INTRAMUSCULAR; INTRAVENOUS; SUBCUTANEOUS EVERY 5 MIN PRN
Status: DISCONTINUED | OUTPATIENT
Start: 2023-01-01 | End: 2023-01-01

## 2023-01-01 RX ORDER — CIPROFLOXACIN 2 MG/ML
INJECTION, SOLUTION INTRAVENOUS
Status: DISCONTINUED | OUTPATIENT
Start: 2023-01-01 | End: 2023-01-01

## 2023-01-01 RX ORDER — PROPOFOL 10 MG/ML
0-50 INJECTION, EMULSION INTRAVENOUS CONTINUOUS
Status: DISCONTINUED | OUTPATIENT
Start: 2023-01-01 | End: 2023-01-01

## 2023-01-01 RX ORDER — IPRATROPIUM BROMIDE AND ALBUTEROL SULFATE 2.5; .5 MG/3ML; MG/3ML
3 SOLUTION RESPIRATORY (INHALATION) EVERY 6 HOURS PRN
Status: DISCONTINUED | OUTPATIENT
Start: 2023-01-01 | End: 2023-01-01 | Stop reason: SDUPTHER

## 2023-01-01 RX ORDER — KETOROLAC TROMETHAMINE 30 MG/ML
15 INJECTION, SOLUTION INTRAMUSCULAR; INTRAVENOUS EVERY 6 HOURS PRN
Status: DISPENSED | OUTPATIENT
Start: 2023-01-01 | End: 2023-01-01

## 2023-01-01 RX ORDER — AMOXICILLIN 250 MG
1 CAPSULE ORAL 2 TIMES DAILY
Status: DISCONTINUED | OUTPATIENT
Start: 2023-01-01 | End: 2023-01-01 | Stop reason: HOSPADM

## 2023-01-01 RX ORDER — MAGNESIUM SULFATE HEPTAHYDRATE 40 MG/ML
2 INJECTION, SOLUTION INTRAVENOUS
Status: DISCONTINUED | OUTPATIENT
Start: 2023-01-01 | End: 2023-01-01 | Stop reason: HOSPADM

## 2023-01-01 RX ORDER — PHENAZOPYRIDINE HYDROCHLORIDE 200 MG/1
200 TABLET, FILM COATED ORAL ONCE
Status: COMPLETED | OUTPATIENT
Start: 2023-01-01 | End: 2023-01-01

## 2023-01-01 RX ORDER — PROCHLORPERAZINE EDISYLATE 5 MG/ML
5 INJECTION INTRAMUSCULAR; INTRAVENOUS EVERY 30 MIN PRN
Status: DISCONTINUED | OUTPATIENT
Start: 2023-01-01 | End: 2023-01-01

## 2023-01-01 RX ORDER — HYOSCYAMINE SULFATE 0.5 MG/ML
0.5 INJECTION, SOLUTION SUBCUTANEOUS
Status: COMPLETED | OUTPATIENT
Start: 2023-01-01 | End: 2023-01-01

## 2023-01-01 RX ORDER — IBUPROFEN 600 MG/1
600 TABLET ORAL 4 TIMES DAILY
Status: DISCONTINUED | OUTPATIENT
Start: 2023-01-01 | End: 2023-01-01

## 2023-01-01 RX ORDER — PROMETHAZINE HYDROCHLORIDE 25 MG/ML
INJECTION, SOLUTION INTRAMUSCULAR; INTRAVENOUS
Status: DISCONTINUED | OUTPATIENT
Start: 2023-01-01 | End: 2023-01-01

## 2023-01-01 RX ORDER — POTASSIUM CHLORIDE 20 MEQ/1
40 TABLET, EXTENDED RELEASE ORAL
Status: DISPENSED | OUTPATIENT
Start: 2023-01-01 | End: 2023-01-01

## 2023-01-01 RX ORDER — SCOLOPAMINE TRANSDERMAL SYSTEM 1 MG/1
1 PATCH, EXTENDED RELEASE TRANSDERMAL
Status: DISCONTINUED | OUTPATIENT
Start: 2023-01-01 | End: 2023-01-01 | Stop reason: HOSPADM

## 2023-01-01 RX ORDER — DIPHENHYDRAMINE HYDROCHLORIDE 50 MG/ML
50 INJECTION INTRAMUSCULAR; INTRAVENOUS ONCE AS NEEDED
Status: DISCONTINUED | OUTPATIENT
Start: 2023-01-01 | End: 2023-01-01 | Stop reason: HOSPADM

## 2023-01-01 RX ORDER — NITROFURANTOIN 25; 75 MG/1; MG/1
100 CAPSULE ORAL 2 TIMES DAILY
Qty: 6 CAPSULE | Refills: 0 | Status: SHIPPED | OUTPATIENT
Start: 2023-01-01 | End: 2023-01-01

## 2023-01-01 RX ORDER — DIPHENHYDRAMINE HYDROCHLORIDE 50 MG/ML
12.5 INJECTION INTRAMUSCULAR; INTRAVENOUS EVERY 6 HOURS PRN
Status: DISCONTINUED | OUTPATIENT
Start: 2023-01-01 | End: 2023-01-01 | Stop reason: HOSPADM

## 2023-01-01 RX ORDER — KETOROLAC TROMETHAMINE 30 MG/ML
15 INJECTION, SOLUTION INTRAMUSCULAR; INTRAVENOUS ONCE
Status: COMPLETED | OUTPATIENT
Start: 2023-01-01 | End: 2023-01-01

## 2023-01-01 RX ORDER — SIMETHICONE 80 MG
1 TABLET,CHEWABLE ORAL 3 TIMES DAILY PRN
Status: DISCONTINUED | OUTPATIENT
Start: 2023-01-01 | End: 2023-01-01

## 2023-01-01 RX ORDER — DIPHENHYDRAMINE HYDROCHLORIDE 50 MG/ML
25 INJECTION INTRAMUSCULAR; INTRAVENOUS EVERY 6 HOURS PRN
Status: DISCONTINUED | OUTPATIENT
Start: 2023-01-01 | End: 2023-01-01

## 2023-01-01 RX ORDER — POTASSIUM CHLORIDE 20 MEQ/1
20 TABLET, EXTENDED RELEASE ORAL DAILY
Qty: 30 TABLET | Refills: 11 | Status: SHIPPED | OUTPATIENT
Start: 2023-01-01 | End: 2024-01-30

## 2023-01-01 RX ORDER — LIDOCAINE HYDROCHLORIDE 10 MG/ML
0.5 INJECTION, SOLUTION EPIDURAL; INFILTRATION; INTRACAUDAL; PERINEURAL ONCE
Status: CANCELLED | OUTPATIENT
Start: 2023-01-01 | End: 2023-01-01

## 2023-01-01 RX ORDER — FERROUS SULFATE 325(65) MG
325 TABLET, DELAYED RELEASE (ENTERIC COATED) ORAL EVERY OTHER DAY
Refills: 0
Start: 2023-01-01

## 2023-01-01 RX ORDER — IPRATROPIUM BROMIDE AND ALBUTEROL SULFATE 2.5; .5 MG/3ML; MG/3ML
3 SOLUTION RESPIRATORY (INHALATION) EVERY 6 HOURS PRN
Status: DISCONTINUED | OUTPATIENT
Start: 2023-01-01 | End: 2023-01-01 | Stop reason: HOSPADM

## 2023-01-01 RX ORDER — ONDANSETRON 2 MG/ML
4 INJECTION INTRAMUSCULAR; INTRAVENOUS ONCE
Status: DISCONTINUED | OUTPATIENT
Start: 2023-01-01 | End: 2023-01-01 | Stop reason: HOSPADM

## 2023-01-01 RX ORDER — HYDROMORPHONE HYDROCHLORIDE 1 MG/ML
1 INJECTION, SOLUTION INTRAMUSCULAR; INTRAVENOUS; SUBCUTANEOUS
Status: DISCONTINUED | OUTPATIENT
Start: 2023-01-01 | End: 2023-01-01

## 2023-01-01 RX ORDER — GABAPENTIN 100 MG/1
200 CAPSULE ORAL 2 TIMES DAILY
Status: DISCONTINUED | OUTPATIENT
Start: 2023-01-01 | End: 2023-01-01

## 2023-01-01 RX ORDER — LEVALBUTEROL INHALATION SOLUTION 0.63 MG/3ML
0.63 SOLUTION RESPIRATORY (INHALATION) EVERY 6 HOURS
Status: DISCONTINUED | OUTPATIENT
Start: 2023-01-01 | End: 2023-01-01

## 2023-01-01 RX ORDER — SODIUM CHLORIDE, SODIUM LACTATE, POTASSIUM CHLORIDE, CALCIUM CHLORIDE 600; 310; 30; 20 MG/100ML; MG/100ML; MG/100ML; MG/100ML
INJECTION, SOLUTION INTRAVENOUS CONTINUOUS
Status: CANCELLED | OUTPATIENT
Start: 2023-01-01

## 2023-01-01 RX ORDER — HYDROMORPHONE HYDROCHLORIDE 1 MG/ML
0.5 INJECTION, SOLUTION INTRAMUSCULAR; INTRAVENOUS; SUBCUTANEOUS ONCE
Status: DISCONTINUED | OUTPATIENT
Start: 2023-01-01 | End: 2023-01-01

## 2023-01-01 RX ORDER — DEXTROSE 40 %
30 GEL (GRAM) ORAL
Status: DISCONTINUED | OUTPATIENT
Start: 2023-01-01 | End: 2023-01-01 | Stop reason: HOSPADM

## 2023-01-01 RX ORDER — GUAIFENESIN 600 MG/1
600 TABLET, EXTENDED RELEASE ORAL 2 TIMES DAILY PRN
Qty: 20 TABLET | Refills: 0 | Status: ON HOLD
Start: 2023-01-01 | End: 2023-01-01 | Stop reason: HOSPADM

## 2023-01-01 RX ORDER — CIPROFLOXACIN 2 MG/ML
400 INJECTION, SOLUTION INTRAVENOUS
Status: DISCONTINUED | OUTPATIENT
Start: 2023-01-01 | End: 2023-01-01 | Stop reason: HOSPADM

## 2023-01-01 RX ORDER — HYDROCODONE BITARTRATE AND ACETAMINOPHEN 5; 325 MG/1; MG/1
1 TABLET ORAL EVERY 6 HOURS PRN
Status: DISCONTINUED | OUTPATIENT
Start: 2023-01-01 | End: 2023-01-01 | Stop reason: HOSPADM

## 2023-01-01 RX ORDER — MAGNESIUM SULFATE HEPTAHYDRATE 40 MG/ML
4 INJECTION, SOLUTION INTRAVENOUS
Status: DISCONTINUED | OUTPATIENT
Start: 2023-01-01 | End: 2023-01-01 | Stop reason: HOSPADM

## 2023-01-01 RX ORDER — OXYCODONE HYDROCHLORIDE 5 MG/1
5 TABLET ORAL EVERY 6 HOURS PRN
Status: DISCONTINUED | OUTPATIENT
Start: 2023-01-01 | End: 2023-01-01

## 2023-01-01 RX ORDER — ONDANSETRON 2 MG/ML
4 INJECTION INTRAMUSCULAR; INTRAVENOUS
Status: COMPLETED | OUTPATIENT
Start: 2023-01-01 | End: 2023-01-01

## 2023-01-01 RX ORDER — POTASSIUM CHLORIDE 7.45 MG/ML
80 INJECTION INTRAVENOUS
Status: DISCONTINUED | OUTPATIENT
Start: 2023-01-01 | End: 2023-01-01 | Stop reason: HOSPADM

## 2023-01-01 RX ORDER — BISMUTH SUBSALICYLATE 525 MG/30ML
30 LIQUID ORAL EVERY 6 HOURS PRN
Status: DISCONTINUED | OUTPATIENT
Start: 2023-01-01 | End: 2023-01-01

## 2023-01-01 RX ORDER — HYDROMORPHONE HYDROCHLORIDE 1 MG/ML
0.5 INJECTION, SOLUTION INTRAMUSCULAR; INTRAVENOUS; SUBCUTANEOUS EVERY 6 HOURS PRN
Status: DISCONTINUED | OUTPATIENT
Start: 2023-01-01 | End: 2023-01-01 | Stop reason: HOSPADM

## 2023-01-01 RX ORDER — HYDRALAZINE HYDROCHLORIDE 20 MG/ML
5 INJECTION INTRAMUSCULAR; INTRAVENOUS EVERY 4 HOURS PRN
Status: DISCONTINUED | OUTPATIENT
Start: 2023-01-01 | End: 2023-01-01 | Stop reason: HOSPADM

## 2023-01-01 RX ADMIN — OXYCODONE HYDROCHLORIDE 5 MG: 5 TABLET ORAL at 08:03

## 2023-01-01 RX ADMIN — OCTREOTIDE ACETATE 100 MCG: 100 INJECTION, SOLUTION INTRAVENOUS; SUBCUTANEOUS at 12:08

## 2023-01-01 RX ADMIN — CHLORHEXIDINE GLUCONATE 15 ML: 1.2 RINSE ORAL at 08:08

## 2023-01-01 RX ADMIN — IPRATROPIUM BROMIDE AND ALBUTEROL SULFATE 3 ML: 2.5; .5 SOLUTION RESPIRATORY (INHALATION) at 08:08

## 2023-01-01 RX ADMIN — LEVOTHYROXINE SODIUM 125 MCG: 0.12 TABLET ORAL at 05:02

## 2023-01-01 RX ADMIN — MAGNESIUM SULFATE HEPTAHYDRATE: 500 INJECTION, SOLUTION INTRAMUSCULAR; INTRAVENOUS at 08:08

## 2023-01-01 RX ADMIN — VITAM B12 100 MCG: 100 TAB at 08:02

## 2023-01-01 RX ADMIN — FENTANYL CITRATE 25 MCG: 50 INJECTION, SOLUTION INTRAMUSCULAR; INTRAVENOUS at 11:03

## 2023-01-01 RX ADMIN — IPRATROPIUM BROMIDE AND ALBUTEROL SULFATE 3 ML: 2.5; .5 SOLUTION RESPIRATORY (INHALATION) at 09:07

## 2023-01-01 RX ADMIN — ERAVACYCLINE 90 MG: 50 INJECTION, POWDER, LYOPHILIZED, FOR SOLUTION INTRAVENOUS at 09:08

## 2023-01-01 RX ADMIN — LEVOTHYROXINE SODIUM ANHYDROUS 75 MCG: 100 INJECTION, POWDER, LYOPHILIZED, FOR SOLUTION INTRAVENOUS at 05:08

## 2023-01-01 RX ADMIN — OCTREOTIDE ACETATE 100 MCG: 100 INJECTION, SOLUTION INTRAVENOUS; SUBCUTANEOUS at 05:08

## 2023-01-01 RX ADMIN — KETOROLAC TROMETHAMINE 15 MG: 30 INJECTION, SOLUTION INTRAMUSCULAR at 11:03

## 2023-01-01 RX ADMIN — FENTANYL CITRATE 50 MCG: 50 INJECTION, SOLUTION INTRAMUSCULAR; INTRAVENOUS at 03:07

## 2023-01-01 RX ADMIN — HYDROCODONE BITARTRATE AND ACETAMINOPHEN 1 TABLET: 7.5; 325 TABLET ORAL at 05:02

## 2023-01-01 RX ADMIN — DIPHENHYDRAMINE HYDROCHLORIDE 25 MG: 50 INJECTION, SOLUTION INTRAMUSCULAR; INTRAVENOUS at 03:08

## 2023-01-01 RX ADMIN — DEXTROSE MONOHYDRATE 25 G: 25 INJECTION, SOLUTION INTRAVENOUS at 12:07

## 2023-01-01 RX ADMIN — FENTANYL CITRATE 25 MCG: 50 INJECTION INTRAMUSCULAR; INTRAVENOUS at 12:03

## 2023-01-01 RX ADMIN — SODIUM CHLORIDE, SODIUM GLUCONATE, SODIUM ACETATE, POTASSIUM CHLORIDE AND MAGNESIUM CHLORIDE: 526; 502; 368; 37; 30 INJECTION, SOLUTION INTRAVENOUS at 06:07

## 2023-01-01 RX ADMIN — BUDESONIDE INHALATION 0.5 MG: 0.5 SUSPENSION RESPIRATORY (INHALATION) at 08:08

## 2023-01-01 RX ADMIN — CIPROFLOXACIN 500 MG: 500 TABLET, FILM COATED ORAL at 08:02

## 2023-01-01 RX ADMIN — FENTANYL CITRATE 25 MCG: 50 INJECTION, SOLUTION INTRAMUSCULAR; INTRAVENOUS at 12:02

## 2023-01-01 RX ADMIN — OCTREOTIDE ACETATE 100 MCG: 100 INJECTION, SOLUTION INTRAVENOUS; SUBCUTANEOUS at 03:08

## 2023-01-01 RX ADMIN — DIPHENHYDRAMINE HYDROCHLORIDE 25 MG: 50 INJECTION, SOLUTION INTRAMUSCULAR; INTRAVENOUS at 12:08

## 2023-01-01 RX ADMIN — ONDANSETRON 4 MG: 2 INJECTION INTRAMUSCULAR; INTRAVENOUS at 06:08

## 2023-01-01 RX ADMIN — IPRATROPIUM BROMIDE AND ALBUTEROL SULFATE 3 ML: 2.5; .5 SOLUTION RESPIRATORY (INHALATION) at 07:08

## 2023-01-01 RX ADMIN — GLYCOPYRROLATE 0.2 MG: 0.2 INJECTION, SOLUTION INTRAMUSCULAR; INTRAVITREAL at 12:02

## 2023-01-01 RX ADMIN — HYDROMORPHONE HYDROCHLORIDE 0.5 MG: 2 INJECTION, SOLUTION INTRAMUSCULAR; INTRAVENOUS; SUBCUTANEOUS at 09:03

## 2023-01-01 RX ADMIN — OXYCODONE HYDROCHLORIDE 5 MG: 5 TABLET ORAL at 01:04

## 2023-01-01 RX ADMIN — HYDROMORPHONE HYDROCHLORIDE 1 MG: 1 INJECTION, SOLUTION INTRAMUSCULAR; INTRAVENOUS; SUBCUTANEOUS at 05:08

## 2023-01-01 RX ADMIN — ENOXAPARIN SODIUM 40 MG: 40 INJECTION SUBCUTANEOUS at 04:08

## 2023-01-01 RX ADMIN — HYDROMORPHONE HYDROCHLORIDE 1 MG: 1 INJECTION, SOLUTION INTRAMUSCULAR; INTRAVENOUS; SUBCUTANEOUS at 11:08

## 2023-01-01 RX ADMIN — HYDROMORPHONE HYDROCHLORIDE 1 MG: 1 INJECTION, SOLUTION INTRAMUSCULAR; INTRAVENOUS; SUBCUTANEOUS at 09:08

## 2023-01-01 RX ADMIN — HYDROMORPHONE HYDROCHLORIDE 1 MG: 1 INJECTION, SOLUTION INTRAMUSCULAR; INTRAVENOUS; SUBCUTANEOUS at 03:08

## 2023-01-01 RX ADMIN — ONDANSETRON 4 MG: 2 INJECTION INTRAMUSCULAR; INTRAVENOUS at 08:08

## 2023-01-01 RX ADMIN — OXYCODONE 5 MG: 5 TABLET ORAL at 01:02

## 2023-01-01 RX ADMIN — ERAVACYCLINE 90 MG: 50 INJECTION, POWDER, LYOPHILIZED, FOR SOLUTION INTRAVENOUS at 08:08

## 2023-01-01 RX ADMIN — HYDROMORPHONE HYDROCHLORIDE 1 MG: 1 INJECTION, SOLUTION INTRAMUSCULAR; INTRAVENOUS; SUBCUTANEOUS at 11:07

## 2023-01-01 RX ADMIN — ACETAMINOPHEN 1000 MG: 10 INJECTION, SOLUTION INTRAVENOUS at 01:07

## 2023-01-01 RX ADMIN — HYDROCODONE BITARTRATE AND ACETAMINOPHEN 1 TABLET: 7.5; 325 TABLET ORAL at 11:02

## 2023-01-01 RX ADMIN — ONDANSETRON 4 MG: 2 INJECTION INTRAMUSCULAR; INTRAVENOUS at 09:08

## 2023-01-01 RX ADMIN — POTASSIUM CHLORIDE: 2 INJECTION, SOLUTION, CONCENTRATE INTRAVENOUS at 11:04

## 2023-01-01 RX ADMIN — FLUCONAZOLE 200 MG: 2 INJECTION, SOLUTION INTRAVENOUS at 09:08

## 2023-01-01 RX ADMIN — IPRATROPIUM BROMIDE AND ALBUTEROL SULFATE 3 ML: 2.5; .5 SOLUTION RESPIRATORY (INHALATION) at 08:07

## 2023-01-01 RX ADMIN — LEVOTHYROXINE SODIUM ANHYDROUS 50 MCG: 100 INJECTION, POWDER, LYOPHILIZED, FOR SOLUTION INTRAVENOUS at 05:08

## 2023-01-01 RX ADMIN — ENOXAPARIN SODIUM 40 MG: 40 INJECTION SUBCUTANEOUS at 06:08

## 2023-01-01 RX ADMIN — LIDOCAINE HYDROCHLORIDE 1 ML: 20 JELLY TOPICAL at 12:07

## 2023-01-01 RX ADMIN — BUDESONIDE INHALATION 0.5 MG: 0.5 SUSPENSION RESPIRATORY (INHALATION) at 08:07

## 2023-01-01 RX ADMIN — DEXTROSE 775 MG: 5 SOLUTION INTRAVENOUS at 09:01

## 2023-01-01 RX ADMIN — ENOXAPARIN SODIUM 40 MG: 40 INJECTION SUBCUTANEOUS at 05:07

## 2023-01-01 RX ADMIN — OCTREOTIDE ACETATE 100 MCG: 100 INJECTION, SOLUTION INTRAVENOUS; SUBCUTANEOUS at 01:08

## 2023-01-01 RX ADMIN — MEROPENEM 1 G: 1 INJECTION, POWDER, FOR SOLUTION INTRAVENOUS at 12:07

## 2023-01-01 RX ADMIN — IPRATROPIUM BROMIDE AND ALBUTEROL SULFATE 3 ML: 2.5; .5 SOLUTION RESPIRATORY (INHALATION) at 12:07

## 2023-01-01 RX ADMIN — ENOXAPARIN SODIUM 40 MG: 40 INJECTION SUBCUTANEOUS at 05:08

## 2023-01-01 RX ADMIN — ERAVACYCLINE 90 MG: 50 INJECTION, POWDER, LYOPHILIZED, FOR SOLUTION INTRAVENOUS at 10:08

## 2023-01-01 RX ADMIN — CHLORHEXIDINE GLUCONATE 15 ML: 1.2 RINSE ORAL at 09:08

## 2023-01-01 RX ADMIN — OCTREOTIDE ACETATE 100 MCG: 100 INJECTION, SOLUTION INTRAVENOUS; SUBCUTANEOUS at 11:08

## 2023-01-01 RX ADMIN — HYDROMORPHONE HYDROCHLORIDE 1 MG: 1 INJECTION, SOLUTION INTRAMUSCULAR; INTRAVENOUS; SUBCUTANEOUS at 04:08

## 2023-01-01 RX ADMIN — IPRATROPIUM BROMIDE AND ALBUTEROL SULFATE 3 ML: 2.5; .5 SOLUTION RESPIRATORY (INHALATION) at 01:08

## 2023-01-01 RX ADMIN — SODIUM CHLORIDE, PRESERVATIVE FREE 10 ML: 5 INJECTION INTRAVENOUS at 04:04

## 2023-01-01 RX ADMIN — OCTREOTIDE ACETATE 100 MCG: 100 INJECTION, SOLUTION INTRAVENOUS; SUBCUTANEOUS at 04:08

## 2023-01-01 RX ADMIN — CIPROFLOXACIN 400 MG: 2 INJECTION, SOLUTION INTRAVENOUS at 09:03

## 2023-01-01 RX ADMIN — PANTOPRAZOLE SODIUM 40 MG: 40 INJECTION, POWDER, LYOPHILIZED, FOR SOLUTION INTRAVENOUS at 09:07

## 2023-01-01 RX ADMIN — PROPOFOL 50 MCG/KG/MIN: 10 INJECTION, EMULSION INTRAVENOUS at 09:07

## 2023-01-01 RX ADMIN — HYDROMORPHONE HYDROCHLORIDE 1 MG: 1 INJECTION, SOLUTION INTRAMUSCULAR; INTRAVENOUS; SUBCUTANEOUS at 10:08

## 2023-01-01 RX ADMIN — VECURONIUM BROMIDE 1 MG: 10 INJECTION, POWDER, LYOPHILIZED, FOR SOLUTION INTRAVENOUS at 03:07

## 2023-01-01 RX ADMIN — HYDROMORPHONE HYDROCHLORIDE 1 MG: 1 INJECTION, SOLUTION INTRAMUSCULAR; INTRAVENOUS; SUBCUTANEOUS at 09:07

## 2023-01-01 RX ADMIN — HYDROMORPHONE HYDROCHLORIDE 1 MG: 1 INJECTION, SOLUTION INTRAMUSCULAR; INTRAVENOUS; SUBCUTANEOUS at 08:08

## 2023-01-01 RX ADMIN — PROPOFOL 150 MG: 10 INJECTION, EMULSION INTRAVENOUS at 12:03

## 2023-01-01 RX ADMIN — ONDANSETRON 4 MG: 2 INJECTION INTRAMUSCULAR; INTRAVENOUS at 06:03

## 2023-01-01 RX ADMIN — ROCURONIUM BROMIDE 25 MG: 10 INJECTION, SOLUTION INTRAVENOUS at 01:07

## 2023-01-01 RX ADMIN — VECURONIUM BROMIDE 3 MG: 1 INJECTION, POWDER, LYOPHILIZED, FOR SOLUTION INTRAVENOUS at 04:07

## 2023-01-01 RX ADMIN — Medication 200 MCG: at 02:07

## 2023-01-01 RX ADMIN — CALCIUM GLUCONATE 2 G: 20 INJECTION, SOLUTION INTRAVENOUS at 08:08

## 2023-01-01 RX ADMIN — MEROPENEM 1 G: 1 INJECTION, POWDER, FOR SOLUTION INTRAVENOUS at 11:07

## 2023-01-01 RX ADMIN — HYDROMORPHONE HYDROCHLORIDE 1 MG: 1 INJECTION, SOLUTION INTRAMUSCULAR; INTRAVENOUS; SUBCUTANEOUS at 02:08

## 2023-01-01 RX ADMIN — I.V. FAT EMULSION 250 ML: 20 EMULSION INTRAVENOUS at 10:08

## 2023-01-01 RX ADMIN — DIPHENHYDRAMINE HYDROCHLORIDE 25 MG: 50 INJECTION, SOLUTION INTRAMUSCULAR; INTRAVENOUS at 01:08

## 2023-01-01 RX ADMIN — BUDESONIDE INHALATION 0.5 MG: 0.5 SUSPENSION RESPIRATORY (INHALATION) at 07:08

## 2023-01-01 RX ADMIN — FERROUS SULFATE TAB 325 MG (65 MG ELEMENTAL FE) 1 EACH: 325 (65 FE) TAB at 12:02

## 2023-01-01 RX ADMIN — ONDANSETRON 4 MG: 2 INJECTION INTRAMUSCULAR; INTRAVENOUS at 12:07

## 2023-01-01 RX ADMIN — SODIUM CHLORIDE: 9 INJECTION, SOLUTION INTRAVENOUS at 07:05

## 2023-01-01 RX ADMIN — PALONOSETRON HYDROCHLORIDE 0.25 MG: 0.25 INJECTION INTRAVENOUS at 09:01

## 2023-01-01 RX ADMIN — CALCIUM GLUCONATE 1 G: 20 INJECTION, SOLUTION INTRAVENOUS at 10:07

## 2023-01-01 RX ADMIN — SODIUM CHLORIDE, PRESERVATIVE FREE 10 ML: 5 INJECTION INTRAVENOUS at 12:02

## 2023-01-01 RX ADMIN — DIPHENOXYLATE HYDROCHLORIDE AND ATROPINE SULFATE 1 TABLET: 2.5; .025 TABLET ORAL at 05:08

## 2023-01-01 RX ADMIN — HYDROMORPHONE HYDROCHLORIDE 1 MG: 1 INJECTION, SOLUTION INTRAMUSCULAR; INTRAVENOUS; SUBCUTANEOUS at 12:08

## 2023-01-01 RX ADMIN — ROCURONIUM BROMIDE 50 MG: 10 INJECTION, SOLUTION INTRAVENOUS at 06:07

## 2023-01-01 RX ADMIN — HYDROCODONE BITARTRATE AND ACETAMINOPHEN 1 TABLET: 7.5; 325 TABLET ORAL at 06:02

## 2023-01-01 RX ADMIN — SODIUM CHLORIDE, SODIUM GLUCONATE, SODIUM ACETATE, POTASSIUM CHLORIDE AND MAGNESIUM CHLORIDE: 526; 502; 368; 37; 30 INJECTION, SOLUTION INTRAVENOUS at 01:07

## 2023-01-01 RX ADMIN — HYDROMORPHONE HYDROCHLORIDE 1 MG: 1 INJECTION, SOLUTION INTRAMUSCULAR; INTRAVENOUS; SUBCUTANEOUS at 06:08

## 2023-01-01 RX ADMIN — HYDROCODONE BITARTRATE AND ACETAMINOPHEN 1 TABLET: 5; 325 TABLET ORAL at 02:03

## 2023-01-01 RX ADMIN — ONDANSETRON 4 MG: 2 INJECTION INTRAMUSCULAR; INTRAVENOUS at 11:04

## 2023-01-01 RX ADMIN — SODIUM CHLORIDE: 0.9 INJECTION, SOLUTION INTRAVENOUS at 09:01

## 2023-01-01 RX ADMIN — CIPROFLOXACIN 400 MG: 2 INJECTION, SOLUTION INTRAVENOUS at 08:07

## 2023-01-01 RX ADMIN — CALCIUM GLUCONATE 1 G: 20 INJECTION, SOLUTION INTRAVENOUS at 06:08

## 2023-01-01 RX ADMIN — PHENAZOPYRIDINE 200 MG: 200 TABLET ORAL at 01:04

## 2023-01-01 RX ADMIN — LIDOCAINE HYDROCHLORIDE 75 MG: 20 INJECTION, SOLUTION INTRAVENOUS at 10:03

## 2023-01-01 RX ADMIN — IPRATROPIUM BROMIDE AND ALBUTEROL SULFATE 3 ML: 2.5; .5 SOLUTION RESPIRATORY (INHALATION) at 02:08

## 2023-01-01 RX ADMIN — ONDANSETRON 4 MG: 2 INJECTION INTRAMUSCULAR; INTRAVENOUS at 06:07

## 2023-01-01 RX ADMIN — DEXAMETHASONE SODIUM PHOSPHATE 4 MG: 4 INJECTION, SOLUTION INTRA-ARTICULAR; INTRALESIONAL; INTRAMUSCULAR; INTRAVENOUS; SOFT TISSUE at 11:04

## 2023-01-01 RX ADMIN — FENTANYL CITRATE 50 MCG: 50 INJECTION, SOLUTION INTRAMUSCULAR; INTRAVENOUS at 04:07

## 2023-01-01 RX ADMIN — DIPHENHYDRAMINE HYDROCHLORIDE 25 MG: 50 INJECTION, SOLUTION INTRAMUSCULAR; INTRAVENOUS at 06:08

## 2023-01-01 RX ADMIN — SODIUM CHLORIDE: 9 INJECTION, SOLUTION INTRAVENOUS at 08:07

## 2023-01-01 RX ADMIN — SUCCINYLCHOLINE CHLORIDE 140 MG: 20 INJECTION, SOLUTION INTRAMUSCULAR; INTRAVENOUS at 05:07

## 2023-01-01 RX ADMIN — CALCIUM GLUCONATE 2 G: 20 INJECTION, SOLUTION INTRAVENOUS at 07:08

## 2023-01-01 RX ADMIN — CALCIUM GLUCONATE 1 G: 20 INJECTION, SOLUTION INTRAVENOUS at 04:07

## 2023-01-01 RX ADMIN — DEXAMETHASONE SODIUM PHOSPHATE 4 MG: 4 INJECTION, SOLUTION INTRAMUSCULAR; INTRAVENOUS at 06:07

## 2023-01-01 RX ADMIN — LIDOCAINE HYDROCHLORIDE 60 MG: 20 INJECTION, SOLUTION INTRAVENOUS at 08:05

## 2023-01-01 RX ADMIN — HYDROMORPHONE HYDROCHLORIDE 1 MG: 1 INJECTION, SOLUTION INTRAMUSCULAR; INTRAVENOUS; SUBCUTANEOUS at 01:08

## 2023-01-01 RX ADMIN — LOPERAMIDE HYDROCHLORIDE 2 MG: 2 CAPSULE ORAL at 04:02

## 2023-01-01 RX ADMIN — PHENYLEPHRINE HYDROCHLORIDE 200 MCG: 10 INJECTION INTRAVENOUS at 02:07

## 2023-01-01 RX ADMIN — FLUCONAZOLE 200 MG: 2 INJECTION, SOLUTION INTRAVENOUS at 08:08

## 2023-01-01 RX ADMIN — ONDANSETRON 4 MG: 2 INJECTION INTRAMUSCULAR; INTRAVENOUS at 11:08

## 2023-01-01 RX ADMIN — PHENYLEPHRINE HYDROCHLORIDE 100 MCG: 10 INJECTION INTRAVENOUS at 02:07

## 2023-01-01 RX ADMIN — DIPHENHYDRAMINE HYDROCHLORIDE 12.5 MG: 50 INJECTION INTRAMUSCULAR; INTRAVENOUS at 11:03

## 2023-01-01 RX ADMIN — DIPHENHYDRAMINE HYDROCHLORIDE 12.5 MG: 50 INJECTION, SOLUTION INTRAMUSCULAR; INTRAVENOUS at 01:07

## 2023-01-01 RX ADMIN — PROPOFOL 50 MG: 10 INJECTION, EMULSION INTRAVENOUS at 08:05

## 2023-01-01 RX ADMIN — SODIUM CHLORIDE, SODIUM LACTATE, POTASSIUM CHLORIDE, CALCIUM CHLORIDE AND DEXTROSE MONOHYDRATE: 5; 600; 310; 30; 20 INJECTION, SOLUTION INTRAVENOUS at 02:07

## 2023-01-01 RX ADMIN — FERROUS SULFATE TAB 325 MG (65 MG ELEMENTAL FE) 1 EACH: 325 (65 FE) TAB at 08:03

## 2023-01-01 RX ADMIN — MAGNESIUM SULFATE HEPTAHYDRATE: 500 INJECTION, SOLUTION INTRAMUSCULAR; INTRAVENOUS at 09:08

## 2023-01-01 RX ADMIN — LEVOTHYROXINE SODIUM ANHYDROUS 50 MCG: 100 INJECTION, POWDER, LYOPHILIZED, FOR SOLUTION INTRAVENOUS at 04:08

## 2023-01-01 RX ADMIN — ERAVACYCLINE 90 MG: 50 INJECTION, POWDER, LYOPHILIZED, FOR SOLUTION INTRAVENOUS at 11:08

## 2023-01-01 RX ADMIN — POTASSIUM CHLORIDE 10 MEQ: 7.46 INJECTION, SOLUTION INTRAVENOUS at 09:07

## 2023-01-01 RX ADMIN — CHLORHEXIDINE GLUCONATE 15 ML: 1.2 RINSE ORAL at 09:07

## 2023-01-01 RX ADMIN — HYDROCODONE BITARTRATE AND ACETAMINOPHEN 1 TABLET: 7.5; 325 TABLET ORAL at 12:02

## 2023-01-01 RX ADMIN — PHENYLEPHRINE HYDROCHLORIDE 100 MCG: 10 INJECTION INTRAVENOUS at 11:04

## 2023-01-01 RX ADMIN — SODIUM CHLORIDE: 9 INJECTION, SOLUTION INTRAVENOUS at 03:02

## 2023-01-01 RX ADMIN — HYDROCODONE BITARTRATE AND ACETAMINOPHEN 1 TABLET: 7.5; 325 TABLET ORAL at 03:02

## 2023-01-01 RX ADMIN — ACETAMINOPHEN 1000 MG: 10 INJECTION INTRAVENOUS at 02:07

## 2023-01-01 RX ADMIN — DIPHENOXYLATE HYDROCHLORIDE AND ATROPINE SULFATE 1 TABLET: 2.5; .025 TABLET ORAL at 04:08

## 2023-01-01 RX ADMIN — IOHEXOL 800 ML: 9 SOLUTION ORAL at 10:04

## 2023-01-01 RX ADMIN — ROCURONIUM BROMIDE 20 MG: 10 INJECTION, SOLUTION INTRAVENOUS at 02:07

## 2023-01-01 RX ADMIN — GABAPENTIN 200 MG: 100 CAPSULE ORAL at 08:07

## 2023-01-01 RX ADMIN — GLYCOPYRROLATE 0.6 MG: 0.2 INJECTION, SOLUTION INTRAMUSCULAR; INTRAVITREAL at 05:07

## 2023-01-01 RX ADMIN — ONDANSETRON 4 MG: 2 INJECTION INTRAMUSCULAR; INTRAVENOUS at 04:08

## 2023-01-01 RX ADMIN — SMOFLIPID 250 ML: 6; 6; 5; 3 INJECTION, EMULSION INTRAVENOUS at 08:08

## 2023-01-01 RX ADMIN — CALCIUM GLUCONATE 3 G: 20 INJECTION, SOLUTION INTRAVENOUS at 12:08

## 2023-01-01 RX ADMIN — POTASSIUM BICARBONATE 25 MEQ: 977.5 TABLET, EFFERVESCENT ORAL at 09:03

## 2023-01-01 RX ADMIN — HYDROMORPHONE HYDROCHLORIDE 0.5 MG: 1 INJECTION, SOLUTION INTRAMUSCULAR; INTRAVENOUS; SUBCUTANEOUS at 06:03

## 2023-01-01 RX ADMIN — LEVOTHYROXINE SODIUM 125 MCG: 0.12 TABLET ORAL at 05:03

## 2023-01-01 RX ADMIN — POTASSIUM BICARBONATE 20 MEQ: 391 TABLET, EFFERVESCENT ORAL at 07:03

## 2023-01-01 RX ADMIN — FENTANYL CITRATE 25 MCG: 50 INJECTION, SOLUTION INTRAMUSCULAR; INTRAVENOUS at 01:03

## 2023-01-01 RX ADMIN — LOPERAMIDE HYDROCHLORIDE 2 MG: 2 CAPSULE ORAL at 08:02

## 2023-01-01 RX ADMIN — PANTOPRAZOLE SODIUM 40 MG: 40 INJECTION, POWDER, FOR SOLUTION INTRAVENOUS at 08:08

## 2023-01-01 RX ADMIN — ACETAMINOPHEN 1000 MG: 10 INJECTION, SOLUTION INTRAVENOUS at 06:07

## 2023-01-01 RX ADMIN — FENTANYL CITRATE 50 MCG: 50 INJECTION, SOLUTION INTRAMUSCULAR; INTRAVENOUS at 02:07

## 2023-01-01 RX ADMIN — SODIUM CHLORIDE, SODIUM LACTATE, POTASSIUM CHLORIDE, AND CALCIUM CHLORIDE: .6; .31; .03; .02 INJECTION, SOLUTION INTRAVENOUS at 04:07

## 2023-01-01 RX ADMIN — ONDANSETRON 4 MG: 2 INJECTION INTRAMUSCULAR; INTRAVENOUS at 01:03

## 2023-01-01 RX ADMIN — OCTREOTIDE ACETATE 100 MCG: 100 INJECTION, SOLUTION INTRAVENOUS; SUBCUTANEOUS at 08:08

## 2023-01-01 RX ADMIN — PROPOFOL 150 MG: 10 INJECTION, EMULSION INTRAVENOUS at 12:02

## 2023-01-01 RX ADMIN — HYDROCODONE BITARTRATE AND ACETAMINOPHEN 1 TABLET: 5; 325 TABLET ORAL at 08:03

## 2023-01-01 RX ADMIN — SODIUM CHLORIDE: 9 INJECTION, SOLUTION INTRAVENOUS at 12:02

## 2023-01-01 RX ADMIN — HYDROMORPHONE HYDROCHLORIDE 1 MG: 1 INJECTION, SOLUTION INTRAMUSCULAR; INTRAVENOUS; SUBCUTANEOUS at 12:07

## 2023-01-01 RX ADMIN — FLUCONAZOLE 200 MG: 2 INJECTION, SOLUTION INTRAVENOUS at 01:08

## 2023-01-01 RX ADMIN — HYDROMORPHONE HYDROCHLORIDE 0.2 MG: 2 INJECTION INTRAMUSCULAR; INTRAVENOUS; SUBCUTANEOUS at 07:07

## 2023-01-01 RX ADMIN — HEPARIN 500 UNITS: 100 SYRINGE at 04:04

## 2023-01-01 RX ADMIN — DEXAMETHASONE SODIUM PHOSPHATE 4 MG: 4 INJECTION, SOLUTION INTRA-ARTICULAR; INTRALESIONAL; INTRAMUSCULAR; INTRAVENOUS; SOFT TISSUE at 01:03

## 2023-01-01 RX ADMIN — PROPOFOL 20 MG: 10 INJECTION, EMULSION INTRAVENOUS at 08:05

## 2023-01-01 RX ADMIN — I.V. FAT EMULSION 250 ML: 20 EMULSION INTRAVENOUS at 09:08

## 2023-01-01 RX ADMIN — HYDROMORPHONE HYDROCHLORIDE 1 MG: 1 INJECTION, SOLUTION INTRAMUSCULAR; INTRAVENOUS; SUBCUTANEOUS at 02:07

## 2023-01-01 RX ADMIN — OXYCODONE HYDROCHLORIDE 5 MG: 5 TABLET ORAL at 12:03

## 2023-01-01 RX ADMIN — POTASSIUM BICARBONATE 25 MEQ: 977.5 TABLET, EFFERVESCENT ORAL at 10:03

## 2023-01-01 RX ADMIN — HYDROCODONE BITARTRATE AND ACETAMINOPHEN 1 TABLET: 7.5; 325 TABLET ORAL at 04:02

## 2023-01-01 RX ADMIN — MAGNESIUM SULFATE 2 G: 2 INJECTION INTRAVENOUS at 10:08

## 2023-01-01 RX ADMIN — ACETAMINOPHEN 1000 MG: 10 INJECTION INTRAVENOUS at 06:07

## 2023-01-01 RX ADMIN — FLUCONAZOLE 200 MG: 2 INJECTION, SOLUTION INTRAVENOUS at 09:07

## 2023-01-01 RX ADMIN — PANTOPRAZOLE SODIUM 40 MG: 40 INJECTION, POWDER, FOR SOLUTION INTRAVENOUS at 11:08

## 2023-01-01 RX ADMIN — CHLORHEXIDINE GLUCONATE 15 ML: 1.2 RINSE ORAL at 12:08

## 2023-01-01 RX ADMIN — CALCIUM GLUCONATE 2 G: 20 INJECTION, SOLUTION INTRAVENOUS at 10:08

## 2023-01-01 RX ADMIN — GENTAMICIN SULFATE 80 MG: 80 INJECTION, SOLUTION INTRAVENOUS at 10:03

## 2023-01-01 RX ADMIN — ONDANSETRON 4 MG: 2 INJECTION INTRAMUSCULAR; INTRAVENOUS at 08:07

## 2023-01-01 RX ADMIN — HEPARIN 500 UNITS: 100 SYRINGE at 12:02

## 2023-01-01 RX ADMIN — CHLORHEXIDINE GLUCONATE 15 ML: 1.2 RINSE ORAL at 08:07

## 2023-01-01 RX ADMIN — CALCIUM GLUCONATE 1 G: 20 INJECTION, SOLUTION INTRAVENOUS at 05:07

## 2023-01-01 RX ADMIN — VANCOMYCIN HYDROCHLORIDE 1750 MG: 100 INJECTION, POWDER, LYOPHILIZED, FOR SOLUTION INTRAVENOUS at 06:08

## 2023-01-01 RX ADMIN — FENTANYL CITRATE 50 MCG: 50 INJECTION, SOLUTION INTRAMUSCULAR; INTRAVENOUS at 12:02

## 2023-01-01 RX ADMIN — EPHEDRINE SULFATE 5 MG: 50 INJECTION, SOLUTION INTRAMUSCULAR; INTRAVENOUS; SUBCUTANEOUS at 02:07

## 2023-01-01 RX ADMIN — PANTOPRAZOLE SODIUM 40 MG: 40 INJECTION, POWDER, FOR SOLUTION INTRAVENOUS at 10:08

## 2023-01-01 RX ADMIN — DEXAMETHASONE SODIUM PHOSPHATE 8 MG: 4 INJECTION, SOLUTION INTRA-ARTICULAR; INTRALESIONAL; INTRAMUSCULAR; INTRAVENOUS; SOFT TISSUE at 12:02

## 2023-01-01 RX ADMIN — METOCLOPRAMIDE 10 MG: 5 INJECTION, SOLUTION INTRAMUSCULAR; INTRAVENOUS at 12:07

## 2023-01-01 RX ADMIN — PROPOFOL 150 MG: 10 INJECTION, EMULSION INTRAVENOUS at 11:04

## 2023-01-01 RX ADMIN — SODIUM CHLORIDE, SODIUM LACTATE, POTASSIUM CHLORIDE, AND CALCIUM CHLORIDE: .6; .31; .03; .02 INJECTION, SOLUTION INTRAVENOUS at 01:07

## 2023-01-01 RX ADMIN — SODIUM CHLORIDE, POTASSIUM CHLORIDE, SODIUM LACTATE AND CALCIUM CHLORIDE: 600; 310; 30; 20 INJECTION, SOLUTION INTRAVENOUS at 05:03

## 2023-01-01 RX ADMIN — ONDANSETRON 8 MG: 2 INJECTION INTRAMUSCULAR; INTRAVENOUS at 11:03

## 2023-01-01 RX ADMIN — HYDROMORPHONE HYDROCHLORIDE 1 MG: 1 INJECTION, SOLUTION INTRAMUSCULAR; INTRAVENOUS; SUBCUTANEOUS at 08:07

## 2023-01-01 RX ADMIN — VANCOMYCIN HYDROCHLORIDE 1750 MG: 100 INJECTION, POWDER, LYOPHILIZED, FOR SOLUTION INTRAVENOUS at 01:07

## 2023-01-01 RX ADMIN — CIPROFLOXACIN 400 MG: 2 INJECTION, SOLUTION INTRAVENOUS at 12:02

## 2023-01-01 RX ADMIN — MIDAZOLAM 2 MG: 1 INJECTION INTRAMUSCULAR; INTRAVENOUS at 12:02

## 2023-01-01 RX ADMIN — FENTANYL CITRATE 25 MCG: 50 INJECTION, SOLUTION INTRAMUSCULAR; INTRAVENOUS at 01:04

## 2023-01-01 RX ADMIN — HYDROMORPHONE HYDROCHLORIDE 0.5 MG: 0.5 INJECTION, SOLUTION INTRAMUSCULAR; INTRAVENOUS; SUBCUTANEOUS at 08:07

## 2023-01-01 RX ADMIN — OXYCODONE HYDROCHLORIDE 5 MG: 5 TABLET ORAL at 08:07

## 2023-01-01 RX ADMIN — HYOSCYAMINE SULFATE 0.5 MG: 0.5 INJECTION, SOLUTION SUBCUTANEOUS at 09:02

## 2023-01-01 RX ADMIN — SODIUM CHLORIDE, SODIUM LACTATE, POTASSIUM CHLORIDE, CALCIUM CHLORIDE AND DEXTROSE MONOHYDRATE: 5; 600; 310; 30; 20 INJECTION, SOLUTION INTRAVENOUS at 07:07

## 2023-01-01 RX ADMIN — MUPIROCIN: 20 OINTMENT TOPICAL at 09:07

## 2023-01-01 RX ADMIN — SODIUM CHLORIDE 125 MG: 9 INJECTION, SOLUTION INTRAVENOUS at 12:07

## 2023-01-01 RX ADMIN — EPHEDRINE SULFATE 10 MG: 50 INJECTION, SOLUTION INTRAMUSCULAR; INTRAVENOUS; SUBCUTANEOUS at 04:07

## 2023-01-01 RX ADMIN — SODIUM CHLORIDE, SODIUM GLUCONATE, SODIUM ACETATE, POTASSIUM CHLORIDE AND MAGNESIUM CHLORIDE: 526; 502; 368; 37; 30 INJECTION, SOLUTION INTRAVENOUS at 12:07

## 2023-01-01 RX ADMIN — METOCLOPRAMIDE 10 MG: 5 INJECTION, SOLUTION INTRAMUSCULAR; INTRAVENOUS at 05:07

## 2023-01-01 RX ADMIN — BUDESONIDE INHALATION 0.5 MG: 0.5 SUSPENSION RESPIRATORY (INHALATION) at 07:07

## 2023-01-01 RX ADMIN — PHENYLEPHRINE HYDROCHLORIDE 100 MCG: 10 INJECTION INTRAVENOUS at 05:07

## 2023-01-01 RX ADMIN — CALCIUM GLUCONATE 1 G: 20 INJECTION, SOLUTION INTRAVENOUS at 03:08

## 2023-01-01 RX ADMIN — SMOFLIPID 250 ML: 6; 6; 5; 3 INJECTION, EMULSION INTRAVENOUS at 09:08

## 2023-01-01 RX ADMIN — SODIUM CHLORIDE, SODIUM GLUCONATE, SODIUM ACETATE, POTASSIUM CHLORIDE AND MAGNESIUM CHLORIDE: 526; 502; 368; 37; 30 INJECTION, SOLUTION INTRAVENOUS at 07:07

## 2023-01-01 RX ADMIN — DEXTROSE MONOHYDRATE 25 G: 25 INJECTION, SOLUTION INTRAVENOUS at 08:08

## 2023-01-01 RX ADMIN — DIPHENHYDRAMINE HYDROCHLORIDE 12.5 MG: 50 INJECTION, SOLUTION INTRAMUSCULAR; INTRAVENOUS at 09:07

## 2023-01-01 RX ADMIN — MEROPENEM 1 G: 1 INJECTION, POWDER, FOR SOLUTION INTRAVENOUS at 10:08

## 2023-01-01 RX ADMIN — HYDROMORPHONE HYDROCHLORIDE 1 MG: 1 INJECTION, SOLUTION INTRAMUSCULAR; INTRAVENOUS; SUBCUTANEOUS at 01:07

## 2023-01-01 RX ADMIN — SODIUM CHLORIDE, SODIUM GLUCONATE, SODIUM ACETATE, POTASSIUM CHLORIDE, MAGNESIUM CHLORIDE, SODIUM PHOSPHATE, DIBASIC, AND POTASSIUM PHOSPHATE: .53; .5; .37; .037; .03; .012; .00082 INJECTION, SOLUTION INTRAVENOUS at 12:03

## 2023-01-01 RX ADMIN — ALBUMIN (HUMAN): 12.5 SOLUTION INTRAVENOUS at 02:07

## 2023-01-01 RX ADMIN — VITAM B12 100 MCG: 100 TAB at 09:02

## 2023-01-01 RX ADMIN — DEXTROSE AND SODIUM CHLORIDE: 5; .9 INJECTION, SOLUTION INTRAVENOUS at 12:07

## 2023-01-01 RX ADMIN — IPRATROPIUM BROMIDE AND ALBUTEROL SULFATE 3 ML: 2.5; .5 SOLUTION RESPIRATORY (INHALATION) at 01:07

## 2023-01-01 RX ADMIN — SODIUM CHLORIDE, PRESERVATIVE FREE 10 ML: 5 INJECTION INTRAVENOUS at 05:08

## 2023-01-01 RX ADMIN — MAGNESIUM SULFATE 4 G: 2 INJECTION INTRAVENOUS at 05:03

## 2023-01-01 RX ADMIN — MAGNESIUM SULFATE HEPTAHYDRATE: 500 INJECTION, SOLUTION INTRAMUSCULAR; INTRAVENOUS at 10:08

## 2023-01-01 RX ADMIN — DIPHENHYDRAMINE HYDROCHLORIDE 25 MG: 50 INJECTION, SOLUTION INTRAMUSCULAR; INTRAVENOUS at 09:08

## 2023-01-01 RX ADMIN — SODIUM CHLORIDE, PRESERVATIVE FREE 10 ML: 5 INJECTION INTRAVENOUS at 10:02

## 2023-01-01 RX ADMIN — CALCIUM GLUCONATE 2 G: 20 INJECTION, SOLUTION INTRAVENOUS at 12:08

## 2023-01-01 RX ADMIN — CIPROFLOXACIN 400 MG: 2 INJECTION, SOLUTION INTRAVENOUS at 09:07

## 2023-01-01 RX ADMIN — HYDROMORPHONE HYDROCHLORIDE 1 MG: 1 INJECTION, SOLUTION INTRAMUSCULAR; INTRAVENOUS; SUBCUTANEOUS at 05:07

## 2023-01-01 RX ADMIN — POTASSIUM CHLORIDE 10 MEQ: 7.46 INJECTION, SOLUTION INTRAVENOUS at 07:03

## 2023-01-01 RX ADMIN — I.V. FAT EMULSION 250 ML: 20 EMULSION INTRAVENOUS at 08:08

## 2023-01-01 RX ADMIN — SODIUM CHLORIDE, SODIUM LACTATE, POTASSIUM CHLORIDE, AND CALCIUM CHLORIDE: .6; .31; .03; .02 INJECTION, SOLUTION INTRAVENOUS at 05:07

## 2023-01-01 RX ADMIN — PANTOPRAZOLE SODIUM 40 MG: 40 INJECTION, POWDER, LYOPHILIZED, FOR SOLUTION INTRAVENOUS at 08:08

## 2023-01-01 RX ADMIN — DEXTROSE 775 MG: 5 SOLUTION INTRAVENOUS at 11:01

## 2023-01-01 RX ADMIN — MIDAZOLAM HYDROCHLORIDE 4 MG: 1 INJECTION, SOLUTION INTRAMUSCULAR; INTRAVENOUS at 04:07

## 2023-01-01 RX ADMIN — PEGFILGRASTIM 6 MG: KIT SUBCUTANEOUS at 09:01

## 2023-01-01 RX ADMIN — SODIUM CHLORIDE: 9 INJECTION, SOLUTION INTRAVENOUS at 09:02

## 2023-01-01 RX ADMIN — HYDROMORPHONE HYDROCHLORIDE 1 MG: 1 INJECTION, SOLUTION INTRAMUSCULAR; INTRAVENOUS; SUBCUTANEOUS at 04:07

## 2023-01-01 RX ADMIN — SODIUM CHLORIDE, PRESERVATIVE FREE 10 ML: 5 INJECTION INTRAVENOUS at 09:01

## 2023-01-01 RX ADMIN — GABAPENTIN 200 MG: 100 CAPSULE ORAL at 09:07

## 2023-01-01 RX ADMIN — SODIUM CHLORIDE, SODIUM LACTATE, POTASSIUM CHLORIDE, AND CALCIUM CHLORIDE: .6; .31; .03; .02 INJECTION, SOLUTION INTRAVENOUS at 09:07

## 2023-01-01 RX ADMIN — DEXTROSE AND SODIUM CHLORIDE: 5; .9 INJECTION, SOLUTION INTRAVENOUS at 11:07

## 2023-01-01 RX ADMIN — OXYCODONE HYDROCHLORIDE 5 MG: 5 TABLET ORAL at 05:07

## 2023-01-01 RX ADMIN — DIPHENHYDRAMINE HYDROCHLORIDE 25 MG: 50 INJECTION, SOLUTION INTRAMUSCULAR; INTRAVENOUS at 10:08

## 2023-01-01 RX ADMIN — EPHEDRINE SULFATE 10 MG: 50 INJECTION, SOLUTION INTRAMUSCULAR; INTRAVENOUS; SUBCUTANEOUS at 05:07

## 2023-01-01 RX ADMIN — DEXTROSE MONOHYDRATE 12.5 G: 25 INJECTION, SOLUTION INTRAVENOUS at 01:07

## 2023-01-01 RX ADMIN — FENTANYL CITRATE 25 MCG: 50 INJECTION INTRAMUSCULAR; INTRAVENOUS at 07:07

## 2023-01-01 RX ADMIN — HYDROMORPHONE HYDROCHLORIDE 0.2 MG: 2 INJECTION INTRAMUSCULAR; INTRAVENOUS; SUBCUTANEOUS at 06:07

## 2023-01-01 RX ADMIN — ACETAMINOPHEN 1000 MG: 10 INJECTION INTRAVENOUS at 09:07

## 2023-01-01 RX ADMIN — ROCURONIUM BROMIDE 50 MG: 10 INJECTION, SOLUTION INTRAVENOUS at 02:07

## 2023-01-01 RX ADMIN — PROPOFOL 30 MCG/KG/MIN: 10 INJECTION, EMULSION INTRAVENOUS at 02:07

## 2023-01-01 RX ADMIN — VANCOMYCIN HYDROCHLORIDE 2000 MG: 500 INJECTION, POWDER, LYOPHILIZED, FOR SOLUTION INTRAVENOUS at 02:07

## 2023-01-01 RX ADMIN — LEVOTHYROXINE SODIUM ANHYDROUS 50 MCG: 100 INJECTION, POWDER, LYOPHILIZED, FOR SOLUTION INTRAVENOUS at 06:08

## 2023-01-01 RX ADMIN — BUDESONIDE INHALATION 0.5 MG: 0.5 SUSPENSION RESPIRATORY (INHALATION) at 09:07

## 2023-01-01 RX ADMIN — MUPIROCIN: 20 OINTMENT TOPICAL at 10:07

## 2023-01-01 RX ADMIN — FLUCONAZOLE 200 MG: 2 INJECTION, SOLUTION INTRAVENOUS at 10:08

## 2023-01-01 RX ADMIN — POTASSIUM CHLORIDE 80 MEQ: 7.46 INJECTION, SOLUTION INTRAVENOUS at 06:08

## 2023-01-01 RX ADMIN — LEVOTHYROXINE SODIUM ANHYDROUS 75 MCG: 100 INJECTION, POWDER, LYOPHILIZED, FOR SOLUTION INTRAVENOUS at 05:07

## 2023-01-01 RX ADMIN — SCOPALAMINE 1 PATCH: 1 PATCH, EXTENDED RELEASE TRANSDERMAL at 07:07

## 2023-01-01 RX ADMIN — HYDROMORPHONE HYDROCHLORIDE 0.5 MG: 0.5 INJECTION, SOLUTION INTRAMUSCULAR; INTRAVENOUS; SUBCUTANEOUS at 10:07

## 2023-01-01 RX ADMIN — MEPERIDINE HYDROCHLORIDE 12.5 MG: 50 INJECTION INTRAMUSCULAR; INTRAVENOUS; SUBCUTANEOUS at 01:04

## 2023-01-01 RX ADMIN — CIPROFLOXACIN 400 MG: 2 INJECTION, SOLUTION INTRAVENOUS at 08:03

## 2023-01-01 RX ADMIN — ROCURONIUM BROMIDE 50 MG: 10 INJECTION, SOLUTION INTRAVENOUS at 07:07

## 2023-01-01 RX ADMIN — CALCIUM CHLORIDE 250 MG: 100 INJECTION INTRAVENOUS; INTRAVENTRICULAR at 03:07

## 2023-01-01 RX ADMIN — SODIUM CHLORIDE 1000 ML: 9 INJECTION, SOLUTION INTRAVENOUS at 11:07

## 2023-01-01 RX ADMIN — SODIUM CHLORIDE: 9 INJECTION, SOLUTION INTRAVENOUS at 09:07

## 2023-01-01 RX ADMIN — FLUOROURACIL 4655 MG: 50 INJECTION, SOLUTION INTRAVENOUS at 11:01

## 2023-01-01 RX ADMIN — HYDROMORPHONE HYDROCHLORIDE 0.5 MG: 0.5 INJECTION, SOLUTION INTRAMUSCULAR; INTRAVENOUS; SUBCUTANEOUS at 02:07

## 2023-01-01 RX ADMIN — DOCUSATE SODIUM AND SENNOSIDES 1 TABLET: 8.6; 5 TABLET, FILM COATED ORAL at 09:03

## 2023-01-01 RX ADMIN — OXYCODONE HYDROCHLORIDE 5 MG: 5 TABLET ORAL at 07:07

## 2023-01-01 RX ADMIN — BISACODYL 5 MG: 5 TABLET, COATED ORAL at 09:07

## 2023-01-01 RX ADMIN — CLONAZEPAM 0.5 MG: 0.5 TABLET ORAL at 08:07

## 2023-01-01 RX ADMIN — ACETAMINOPHEN 1000 MG: 10 INJECTION, SOLUTION INTRAVENOUS at 11:03

## 2023-01-01 RX ADMIN — FENTANYL CITRATE 50 MCG: 50 INJECTION, SOLUTION INTRAMUSCULAR; INTRAVENOUS at 11:04

## 2023-01-01 RX ADMIN — ALBUMIN (HUMAN): 12.5 INJECTION, SOLUTION INTRAVENOUS at 03:07

## 2023-01-01 RX ADMIN — TAMSULOSIN HYDROCHLORIDE 0.4 MG: 0.4 CAPSULE ORAL at 08:02

## 2023-01-01 RX ADMIN — HEPARIN 500 UNITS: 100 SYRINGE at 08:06

## 2023-01-01 RX ADMIN — DIPHENOXYLATE HYDROCHLORIDE AND ATROPINE SULFATE 1 TABLET: 2.5; .025 TABLET ORAL at 12:08

## 2023-01-01 RX ADMIN — PANTOPRAZOLE SODIUM 40 MG: 40 INJECTION, POWDER, LYOPHILIZED, FOR SOLUTION INTRAVENOUS at 08:07

## 2023-01-01 RX ADMIN — DIPHENOXYLATE HYDROCHLORIDE AND ATROPINE SULFATE 1 TABLET: 2.5; .025 TABLET ORAL at 03:08

## 2023-01-01 RX ADMIN — ONDANSETRON 4 MG: 2 INJECTION INTRAMUSCULAR; INTRAVENOUS at 09:03

## 2023-01-01 RX ADMIN — HYDROMORPHONE HYDROCHLORIDE 0.5 MG: 0.5 INJECTION, SOLUTION INTRAMUSCULAR; INTRAVENOUS; SUBCUTANEOUS at 09:07

## 2023-01-01 RX ADMIN — IPRATROPIUM BROMIDE 0.5 MG: 0.5 SOLUTION RESPIRATORY (INHALATION) at 12:07

## 2023-01-01 RX ADMIN — CIPROFLOXACIN 400 MG: 2 INJECTION INTRAVENOUS at 12:07

## 2023-01-01 RX ADMIN — PANTOPRAZOLE SODIUM 40 MG: 40 INJECTION, POWDER, LYOPHILIZED, FOR SOLUTION INTRAVENOUS at 09:08

## 2023-01-01 RX ADMIN — MUPIROCIN: 20 OINTMENT TOPICAL at 08:07

## 2023-01-01 RX ADMIN — MAGNESIUM SULFATE HEPTAHYDRATE: 500 INJECTION, SOLUTION INTRAMUSCULAR; INTRAVENOUS at 06:08

## 2023-01-01 RX ADMIN — ONDANSETRON 4 MG: 2 INJECTION INTRAMUSCULAR; INTRAVENOUS at 02:07

## 2023-01-01 RX ADMIN — OCTREOTIDE ACETATE 100 MCG: 100 INJECTION, SOLUTION INTRAVENOUS; SUBCUTANEOUS at 09:08

## 2023-01-01 RX ADMIN — PHENYLEPHRINE HYDROCHLORIDE 100 MCG: 10 INJECTION INTRAVENOUS at 10:03

## 2023-01-01 RX ADMIN — PEGFILGRASTIM 6 MG: 6 INJECTION SUBCUTANEOUS at 10:02

## 2023-01-01 RX ADMIN — SODIUM CHLORIDE, SODIUM LACTATE, POTASSIUM CHLORIDE, CALCIUM CHLORIDE AND DEXTROSE MONOHYDRATE: 5; 600; 310; 30; 20 INJECTION, SOLUTION INTRAVENOUS at 09:07

## 2023-01-01 RX ADMIN — KETOROLAC TROMETHAMINE 10 MG: 30 INJECTION, SOLUTION INTRAMUSCULAR at 06:03

## 2023-01-01 RX ADMIN — TRAZODONE HYDROCHLORIDE 50 MG: 50 TABLET ORAL at 11:07

## 2023-01-01 RX ADMIN — IOHEXOL 75 ML: 350 INJECTION, SOLUTION INTRAVENOUS at 10:04

## 2023-01-01 RX ADMIN — LEVOTHYROXINE SODIUM ANHYDROUS 75 MCG: 100 INJECTION, POWDER, LYOPHILIZED, FOR SOLUTION INTRAVENOUS at 06:08

## 2023-01-01 RX ADMIN — CHLORHEXIDINE GLUCONATE 15 ML: 1.2 RINSE ORAL at 10:08

## 2023-01-01 RX ADMIN — PHENYLEPHRINE HYDROCHLORIDE 100 MCG: 10 INJECTION INTRAVENOUS at 04:07

## 2023-01-01 RX ADMIN — PROMETHAZINE HYDROCHLORIDE 6.25 MG: 25 INJECTION INTRAMUSCULAR; INTRAVENOUS at 11:03

## 2023-01-01 RX ADMIN — DIPHENOXYLATE HYDROCHLORIDE AND ATROPINE SULFATE 1 TABLET: 2.5; .025 TABLET ORAL at 09:08

## 2023-01-01 RX ADMIN — NEOSTIGMINE METHYLSULFATE 5 MG: 1 INJECTION INTRAVENOUS at 05:07

## 2023-01-01 RX ADMIN — POTASSIUM CHLORIDE 40 MEQ: 7.46 INJECTION, SOLUTION INTRAVENOUS at 05:07

## 2023-01-01 RX ADMIN — LIDOCAINE HYDROCHLORIDE 100 MG: 20 INJECTION, SOLUTION INTRAVENOUS at 11:04

## 2023-01-01 RX ADMIN — OXYCODONE HYDROCHLORIDE 5 MG: 5 TABLET ORAL at 01:03

## 2023-01-01 RX ADMIN — HYDROMORPHONE HYDROCHLORIDE 0.5 MG: 2 INJECTION, SOLUTION INTRAMUSCULAR; INTRAVENOUS; SUBCUTANEOUS at 10:02

## 2023-01-01 RX ADMIN — IPRATROPIUM BROMIDE AND ALBUTEROL SULFATE 3 ML: 2.5; .5 SOLUTION RESPIRATORY (INHALATION) at 02:07

## 2023-01-01 RX ADMIN — KETOROLAC TROMETHAMINE 30 MG: 30 INJECTION, SOLUTION INTRAMUSCULAR; INTRAVENOUS at 12:04

## 2023-01-01 RX ADMIN — VANCOMYCIN HYDROCHLORIDE 1750 MG: 100 INJECTION, POWDER, LYOPHILIZED, FOR SOLUTION INTRAVENOUS at 05:08

## 2023-01-01 RX ADMIN — CALCIUM GLUCONATE 1 G: 20 INJECTION, SOLUTION INTRAVENOUS at 04:08

## 2023-01-01 RX ADMIN — LEVALBUTEROL 1.25 MG: 1.25 SOLUTION, CONCENTRATE RESPIRATORY (INHALATION) at 09:07

## 2023-01-01 RX ADMIN — PROPOFOL 10 MG: 10 INJECTION, EMULSION INTRAVENOUS at 08:05

## 2023-01-01 RX ADMIN — KETOROLAC TROMETHAMINE 15 MG: 30 INJECTION, SOLUTION INTRAMUSCULAR; INTRAVENOUS at 09:02

## 2023-01-01 RX ADMIN — LEUCINE, PHENYLALANINE, LYSINE, METHIONINE, ISOLEUCINE, VALINE, HISTIDINE, THREONINE, TRYPTOPHAN, ALANINE, GLYCINE, ARGININE, PROLINE, SERINE, TYROSINE, DEXTROSE: 311; 238; 247; 170; 255; 247; 204; 179; 77; 880; 438; 489; 289; 213; 17; 5 INJECTION INTRAVENOUS at 12:08

## 2023-01-01 RX ADMIN — HYDROMORPHONE HYDROCHLORIDE 0.5 MG: 1 INJECTION, SOLUTION INTRAMUSCULAR; INTRAVENOUS; SUBCUTANEOUS at 10:03

## 2023-01-01 RX ADMIN — ONDANSETRON 4 MG: 2 INJECTION INTRAMUSCULAR; INTRAVENOUS at 10:08

## 2023-01-01 RX ADMIN — FENTANYL CITRATE 50 MCG: 50 INJECTION, SOLUTION INTRAMUSCULAR; INTRAVENOUS at 12:07

## 2023-01-01 RX ADMIN — MIDAZOLAM 2 MG: 1 INJECTION INTRAMUSCULAR; INTRAVENOUS at 10:03

## 2023-01-01 RX ADMIN — LEVOTHYROXINE SODIUM ANHYDROUS 75 MCG: 100 INJECTION, POWDER, LYOPHILIZED, FOR SOLUTION INTRAVENOUS at 06:07

## 2023-01-01 RX ADMIN — ENOXAPARIN SODIUM 40 MG: 40 INJECTION SUBCUTANEOUS at 04:07

## 2023-01-01 RX ADMIN — GLYCOPYRROLATE 0.2 MG: 0.2 INJECTION, SOLUTION INTRAMUSCULAR; INTRAVITREAL at 01:07

## 2023-01-01 RX ADMIN — BISACODYL 5 MG: 5 TABLET, COATED ORAL at 08:07

## 2023-01-01 RX ADMIN — MEROPENEM 1 G: 1 INJECTION, POWDER, FOR SOLUTION INTRAVENOUS at 10:07

## 2023-01-01 RX ADMIN — LIDOCAINE HYDROCHLORIDE 80 MG: 20 INJECTION, SOLUTION INTRAVENOUS at 12:07

## 2023-01-01 RX ADMIN — SODIUM CHLORIDE, SODIUM GLUCONATE, SODIUM ACETATE, POTASSIUM CHLORIDE, MAGNESIUM CHLORIDE, SODIUM PHOSPHATE, DIBASIC, AND POTASSIUM PHOSPHATE: .53; .5; .37; .037; .03; .012; .00082 INJECTION, SOLUTION INTRAVENOUS at 09:04

## 2023-01-01 RX ADMIN — DIPHENHYDRAMINE HYDROCHLORIDE 12.5 MG: 50 INJECTION, SOLUTION INTRAMUSCULAR; INTRAVENOUS at 05:07

## 2023-01-01 RX ADMIN — METOCLOPRAMIDE 10 MG: 5 INJECTION, SOLUTION INTRAMUSCULAR; INTRAVENOUS at 11:07

## 2023-01-01 RX ADMIN — ONDANSETRON 4 MG: 2 INJECTION INTRAMUSCULAR; INTRAVENOUS at 05:08

## 2023-01-01 RX ADMIN — HYDROMORPHONE HYDROCHLORIDE 1 MG: 1 INJECTION, SOLUTION INTRAMUSCULAR; INTRAVENOUS; SUBCUTANEOUS at 06:07

## 2023-01-01 RX ADMIN — VECURONIUM BROMIDE 2 MG: 1 INJECTION, POWDER, LYOPHILIZED, FOR SOLUTION INTRAVENOUS at 03:07

## 2023-01-01 RX ADMIN — HEPARIN 500 UNITS: 100 SYRINGE at 09:01

## 2023-01-01 RX ADMIN — PANITUMUMAB 500 MG: 400 SOLUTION INTRAVENOUS at 11:02

## 2023-01-01 RX ADMIN — PANITUMUMAB 500 MG: 400 SOLUTION INTRAVENOUS at 08:01

## 2023-01-01 RX ADMIN — PEGFILGRASTIM 6 MG: KIT SUBCUTANEOUS at 10:02

## 2023-01-01 RX ADMIN — FERROUS SULFATE TAB 325 MG (65 MG ELEMENTAL FE) 1 EACH: 325 (65 FE) TAB at 08:02

## 2023-01-01 RX ADMIN — MIDAZOLAM HYDROCHLORIDE 2 MG: 1 INJECTION, SOLUTION INTRAMUSCULAR; INTRAVENOUS at 02:07

## 2023-01-01 RX ADMIN — FLUOROURACIL 4655 MG: 50 INJECTION, SOLUTION INTRAVENOUS at 02:02

## 2023-01-01 RX ADMIN — FAMOTIDINE 20 MG: 20 TABLET, FILM COATED ORAL at 09:07

## 2023-01-01 RX ADMIN — ENOXAPARIN SODIUM 40 MG: 40 INJECTION SUBCUTANEOUS at 09:07

## 2023-01-01 RX ADMIN — ONDANSETRON 4 MG: 2 INJECTION INTRAMUSCULAR; INTRAVENOUS at 02:08

## 2023-01-01 RX ADMIN — HYDROMORPHONE HYDROCHLORIDE 0.5 MG: 0.5 INJECTION, SOLUTION INTRAMUSCULAR; INTRAVENOUS; SUBCUTANEOUS at 05:07

## 2023-01-01 RX ADMIN — CALCIUM GLUCONATE 1 G: 20 INJECTION, SOLUTION INTRAVENOUS at 08:07

## 2023-01-01 RX ADMIN — ACETAMINOPHEN 650 MG: 325 TABLET ORAL at 08:07

## 2023-01-01 RX ADMIN — HEPARIN 500 UNITS: 100 SYRINGE at 11:03

## 2023-01-01 RX ADMIN — LEUCOVORIN CALCIUM 775 MG: 350 INJECTION, POWDER, LYOPHILIZED, FOR SUSPENSION INTRAMUSCULAR; INTRAVENOUS at 11:03

## 2023-01-01 RX ADMIN — IOHEXOL 100 ML: 350 INJECTION, SOLUTION INTRAVENOUS at 02:08

## 2023-01-01 RX ADMIN — EPHEDRINE SULFATE 5 MG: 50 INJECTION, SOLUTION INTRAMUSCULAR; INTRAVENOUS; SUBCUTANEOUS at 03:07

## 2023-01-01 RX ADMIN — SODIUM CHLORIDE: 9 INJECTION, SOLUTION INTRAVENOUS at 05:02

## 2023-01-01 RX ADMIN — SODIUM PHOSPHATE, MONOBASIC, MONOHYDRATE AND SODIUM PHOSPHATE, DIBASIC, ANHYDROUS 15 MMOL: 142; 276 INJECTION, SOLUTION INTRAVENOUS at 12:08

## 2023-01-01 RX ADMIN — NOREPINEPHRINE BITARTRATE 0.02 MCG/KG/MIN: 4 INJECTION, SOLUTION INTRAVENOUS at 07:07

## 2023-01-01 RX ADMIN — FLUCONAZOLE 200 MG: 2 INJECTION, SOLUTION INTRAVENOUS at 08:07

## 2023-01-01 RX ADMIN — POTASSIUM CHLORIDE 60 MEQ: 7.46 INJECTION, SOLUTION INTRAVENOUS at 12:08

## 2023-01-01 RX ADMIN — EPHEDRINE SULFATE 10 MG: 50 INJECTION, SOLUTION INTRAMUSCULAR; INTRAVENOUS; SUBCUTANEOUS at 02:07

## 2023-01-01 RX ADMIN — FENTANYL CITRATE 50 MCG: 50 INJECTION, SOLUTION INTRAMUSCULAR; INTRAVENOUS at 10:03

## 2023-01-01 RX ADMIN — FAMOTIDINE 20 MG: 20 TABLET, FILM COATED ORAL at 08:07

## 2023-01-01 RX ADMIN — DIPHENOXYLATE HYDROCHLORIDE AND ATROPINE SULFATE 1 TABLET: 2.5; .025 TABLET ORAL at 08:08

## 2023-01-01 RX ADMIN — HYDROCODONE BITARTRATE AND ACETAMINOPHEN 1 TABLET: 7.5; 325 TABLET ORAL at 09:02

## 2023-01-01 RX ADMIN — HYDROMORPHONE HYDROCHLORIDE 0.5 MG: 1 INJECTION, SOLUTION INTRAMUSCULAR; INTRAVENOUS; SUBCUTANEOUS at 08:02

## 2023-01-01 RX ADMIN — PANTOPRAZOLE SODIUM 40 MG: 40 INJECTION, POWDER, FOR SOLUTION INTRAVENOUS at 09:08

## 2023-01-01 RX ADMIN — PROPOFOL 25 MCG/KG/MIN: 10 INJECTION, EMULSION INTRAVENOUS at 11:07

## 2023-01-01 RX ADMIN — CIPROFLOXACIN 400 MG: 2 INJECTION, SOLUTION INTRAVENOUS at 11:07

## 2023-01-01 RX ADMIN — IPRATROPIUM BROMIDE AND ALBUTEROL SULFATE 3 ML: 2.5; .5 SOLUTION RESPIRATORY (INHALATION) at 07:07

## 2023-01-01 RX ADMIN — FENTANYL CITRATE 25 MCG: 50 INJECTION, SOLUTION INTRAMUSCULAR; INTRAVENOUS at 11:04

## 2023-01-01 RX ADMIN — ONDANSETRON 4 MG: 2 INJECTION INTRAMUSCULAR; INTRAVENOUS at 09:07

## 2023-01-01 RX ADMIN — PROPOFOL 100 MG: 10 INJECTION, EMULSION INTRAVENOUS at 02:07

## 2023-01-01 RX ADMIN — MELATONIN TAB 3 MG 6 MG: 3 TAB at 08:02

## 2023-01-01 RX ADMIN — PEGFILGRASTIM 6 MG: 6 INJECTION SUBCUTANEOUS at 12:03

## 2023-01-01 RX ADMIN — KETOROLAC TROMETHAMINE 15 MG: 30 INJECTION, SOLUTION INTRAMUSCULAR at 01:02

## 2023-01-01 RX ADMIN — FENTANYL CITRATE 25 MCG: 50 INJECTION, SOLUTION INTRAMUSCULAR; INTRAVENOUS at 12:04

## 2023-01-01 RX ADMIN — MELATONIN TAB 3 MG 6 MG: 3 TAB at 11:03

## 2023-01-01 RX ADMIN — POTASSIUM CHLORIDE 60 MEQ: 7.46 INJECTION, SOLUTION INTRAVENOUS at 07:08

## 2023-01-01 RX ADMIN — FENTANYL CITRATE 50 MCG: 50 INJECTION, SOLUTION INTRAMUSCULAR; INTRAVENOUS at 12:03

## 2023-01-01 RX ADMIN — VECURONIUM BROMIDE 1 MG: 10 INJECTION, POWDER, LYOPHILIZED, FOR SOLUTION INTRAVENOUS at 04:07

## 2023-01-01 RX ADMIN — SODIUM CHLORIDE, SODIUM GLUCONATE, SODIUM ACETATE, POTASSIUM CHLORIDE AND MAGNESIUM CHLORIDE: 526; 502; 368; 37; 30 INJECTION, SOLUTION INTRAVENOUS at 10:03

## 2023-01-01 RX ADMIN — HYDROMORPHONE HYDROCHLORIDE 0.5 MG: 0.5 INJECTION, SOLUTION INTRAMUSCULAR; INTRAVENOUS; SUBCUTANEOUS at 11:07

## 2023-01-01 RX ADMIN — LEUCOVORIN CALCIUM 775 MG: 350 INJECTION, POWDER, LYOPHILIZED, FOR SOLUTION INTRAMUSCULAR; INTRAVENOUS at 12:02

## 2023-01-01 RX ADMIN — FLUOROURACIL 4655 MG: 50 INJECTION, SOLUTION INTRAVENOUS at 12:01

## 2023-01-01 RX ADMIN — POTASSIUM CHLORIDE 40 MEQ: 7.46 INJECTION, SOLUTION INTRAVENOUS at 10:08

## 2023-01-01 RX ADMIN — CHLORHEXIDINE GLUCONATE 15 ML: 1.2 RINSE ORAL at 11:08

## 2023-01-01 RX ADMIN — PROPOFOL 150 MG: 10 INJECTION, EMULSION INTRAVENOUS at 10:03

## 2023-01-01 RX ADMIN — SODIUM CHLORIDE, SODIUM GLUCONATE, SODIUM ACETATE, POTASSIUM CHLORIDE, MAGNESIUM CHLORIDE, SODIUM PHOSPHATE, DIBASIC, AND POTASSIUM PHOSPHATE: .53; .5; .37; .037; .03; .012; .00082 INJECTION, SOLUTION INTRAVENOUS at 12:04

## 2023-01-01 RX ADMIN — LEUCINE, PHENYLALANINE, LYSINE, METHIONINE, ISOLEUCINE, VALINE, HISTIDINE, THREONINE, TRYPTOPHAN, ALANINE, GLYCINE, ARGININE, PROLINE, SERINE, TYROSINE, SODIUM ACETATE, DIBASIC POTASSIUM PHOSPHATE, MAGNESIUM CHLORIDE, SODIUM CHLORIDE, CALCIUM CHLORIDE, DEXTROSE
311; 238; 247; 170; 255; 247; 204; 179; 77; 880; 438; 489; 289; 213; 17; 297; 261; 51; 77; 33; 5 INJECTION INTRAVENOUS at 01:07

## 2023-01-01 RX ADMIN — ONDANSETRON 4 MG: 2 INJECTION INTRAMUSCULAR; INTRAVENOUS at 12:02

## 2023-01-01 RX ADMIN — DEXAMETHASONE SODIUM PHOSPHATE 4 MG: 4 INJECTION, SOLUTION INTRA-ARTICULAR; INTRALESIONAL; INTRAMUSCULAR; INTRAVENOUS; SOFT TISSUE at 11:03

## 2023-01-01 RX ADMIN — DIPHENHYDRAMINE HYDROCHLORIDE 12.5 MG: 50 INJECTION, SOLUTION INTRAMUSCULAR; INTRAVENOUS at 07:07

## 2023-01-01 RX ADMIN — POTASSIUM CHLORIDE 20 MEQ: 1500 TABLET, EXTENDED RELEASE ORAL at 08:03

## 2023-01-01 RX ADMIN — PROPOFOL 35 MCG/KG/MIN: 10 INJECTION, EMULSION INTRAVENOUS at 07:07

## 2023-01-01 RX ADMIN — PROPOFOL 50 MCG/KG/MIN: 10 INJECTION, EMULSION INTRAVENOUS at 07:07

## 2023-01-01 RX ADMIN — ACETAMINOPHEN 1000 MG: 10 INJECTION INTRAVENOUS at 12:07

## 2023-01-01 RX ADMIN — DIPHENHYDRAMINE HYDROCHLORIDE 25 MG: 50 INJECTION, SOLUTION INTRAMUSCULAR; INTRAVENOUS at 08:08

## 2023-01-01 RX ADMIN — ONDANSETRON 4 MG: 4 TABLET, ORALLY DISINTEGRATING ORAL at 01:02

## 2023-01-01 RX ADMIN — FAMOTIDINE 20 MG: 20 TABLET, FILM COATED ORAL at 10:07

## 2023-01-01 RX ADMIN — HEPARIN 500 UNITS: 100 SYRINGE at 10:02

## 2023-01-01 RX ADMIN — CIPROFLOXACIN 400 MG: 2 INJECTION, SOLUTION INTRAVENOUS at 10:03

## 2023-01-01 RX ADMIN — OXYCODONE HYDROCHLORIDE 5 MG: 5 TABLET ORAL at 12:07

## 2023-01-01 RX ADMIN — CALCIUM CHLORIDE 500 MG: 100 INJECTION INTRAVENOUS; INTRAVENTRICULAR at 03:07

## 2023-01-01 RX ADMIN — PANITUMUMAB 500 MG: 400 SOLUTION INTRAVENOUS at 10:03

## 2023-01-01 RX ADMIN — POTASSIUM BICARBONATE 35 MEQ: 977.5 TABLET, EFFERVESCENT ORAL at 12:02

## 2023-01-01 RX ADMIN — CIPROFLOXACIN 400 MG: 2 INJECTION, SOLUTION INTRAVENOUS at 11:02

## 2023-01-01 RX ADMIN — HYDROCODONE BITARTRATE AND ACETAMINOPHEN 1 TABLET: 5; 325 TABLET ORAL at 03:03

## 2023-01-01 RX ADMIN — MAGNESIUM SULFATE 2 G: 2 INJECTION INTRAVENOUS at 09:08

## 2023-01-01 RX ADMIN — PALONOSETRON HYDROCHLORIDE 0.25 MG: 0.25 INJECTION INTRAVENOUS at 12:02

## 2023-01-01 RX ADMIN — DIPHENHYDRAMINE HYDROCHLORIDE 12.5 MG: 50 INJECTION, SOLUTION INTRAMUSCULAR; INTRAVENOUS at 02:07

## 2023-01-01 RX ADMIN — I.V. FAT EMULSION 250 ML: 20 EMULSION INTRAVENOUS at 07:08

## 2023-01-01 RX ADMIN — SODIUM CHLORIDE 500 ML: 0.9 INJECTION, SOLUTION INTRAVENOUS at 05:07

## 2023-01-01 RX ADMIN — IRON SUCROSE 200 MG: 20 INJECTION, SOLUTION INTRAVENOUS at 02:07

## 2023-01-01 RX ADMIN — POTASSIUM CHLORIDE 40 MEQ: 7.46 INJECTION, SOLUTION INTRAVENOUS at 06:07

## 2023-01-01 RX ADMIN — ONDANSETRON 4 MG: 2 INJECTION INTRAMUSCULAR; INTRAVENOUS at 12:08

## 2023-01-01 RX ADMIN — CLONAZEPAM 0.5 MG: 0.5 TABLET ORAL at 07:07

## 2023-01-01 RX ADMIN — ROCURONIUM BROMIDE 10 MG: 10 INJECTION, SOLUTION INTRAVENOUS at 05:07

## 2023-01-01 RX ADMIN — SODIUM CHLORIDE, SODIUM LACTATE, POTASSIUM CHLORIDE, AND CALCIUM CHLORIDE 250 ML: .6; .31; .03; .02 INJECTION, SOLUTION INTRAVENOUS at 02:07

## 2023-01-01 RX ADMIN — FENTANYL CITRATE 50 MCG: 50 INJECTION, SOLUTION INTRAMUSCULAR; INTRAVENOUS at 05:07

## 2023-01-01 RX ADMIN — DIPHENOXYLATE HYDROCHLORIDE AND ATROPINE SULFATE 1 TABLET: 2.5; .025 TABLET ORAL at 11:08

## 2023-01-01 RX ADMIN — SODIUM CHLORIDE, PRESERVATIVE FREE 10 ML: 5 INJECTION INTRAVENOUS at 08:06

## 2023-01-01 RX ADMIN — MELATONIN TAB 3 MG 6 MG: 3 TAB at 01:03

## 2023-01-01 RX ADMIN — PROPOFOL 80 MG: 10 INJECTION, EMULSION INTRAVENOUS at 05:07

## 2023-01-01 RX ADMIN — GENTAMICIN SULFATE 80 MG: 80 INJECTION, SOLUTION INTRAVENOUS at 11:04

## 2023-01-01 RX ADMIN — MIDAZOLAM HYDROCHLORIDE 2 MG: 1 INJECTION, SOLUTION INTRAMUSCULAR; INTRAVENOUS at 12:07

## 2023-01-01 RX ADMIN — POTASSIUM CHLORIDE 10 MEQ: 7.46 INJECTION, SOLUTION INTRAVENOUS at 11:07

## 2023-01-01 RX ADMIN — ROCURONIUM BROMIDE 5 MG: 10 INJECTION, SOLUTION INTRAVENOUS at 12:07

## 2023-01-01 RX ADMIN — FLUOROURACIL 4655 MG: 50 INJECTION, SOLUTION INTRAVENOUS at 01:03

## 2023-01-01 RX ADMIN — MIDAZOLAM HYDROCHLORIDE 1 MG: 1 INJECTION, SOLUTION INTRAMUSCULAR; INTRAVENOUS at 05:07

## 2023-01-01 RX ADMIN — TAMSULOSIN HYDROCHLORIDE 0.4 MG: 0.4 CAPSULE ORAL at 10:02

## 2023-01-01 RX ADMIN — POTASSIUM CHLORIDE 10 MEQ: 7.46 INJECTION, SOLUTION INTRAVENOUS at 06:07

## 2023-01-01 RX ADMIN — CALCIUM GLUCONATE 3 G: 20 INJECTION, SOLUTION INTRAVENOUS at 08:08

## 2023-01-01 RX ADMIN — PANITUMUMAB 500 MG: 400 SOLUTION INTRAVENOUS at 10:01

## 2023-01-01 RX ADMIN — PROMETHAZINE HYDROCHLORIDE 12.5 MG: 25 INJECTION INTRAMUSCULAR; INTRAVENOUS at 12:08

## 2023-01-01 RX ADMIN — POTASSIUM BICARBONATE 25 MEQ: 977.5 TABLET, EFFERVESCENT ORAL at 11:03

## 2023-01-01 RX ADMIN — VANCOMYCIN HYDROCHLORIDE 1750 MG: 100 INJECTION, POWDER, LYOPHILIZED, FOR SOLUTION INTRAVENOUS at 02:07

## 2023-01-01 RX ADMIN — LEVALBUTEROL HYDROCHLORIDE 0.63 MG: 0.63 SOLUTION RESPIRATORY (INHALATION) at 12:07

## 2023-01-01 RX ADMIN — DIPHENHYDRAMINE HYDROCHLORIDE 25 MG: 50 INJECTION, SOLUTION INTRAMUSCULAR; INTRAVENOUS at 02:08

## 2023-01-01 RX ADMIN — ERAVACYCLINE 90 MG: 50 INJECTION, POWDER, LYOPHILIZED, FOR SOLUTION INTRAVENOUS at 12:08

## 2023-01-01 RX ADMIN — LIDOCAINE HYDROCHLORIDE 100 MG: 20 INJECTION, SOLUTION INTRAVENOUS at 12:02

## 2023-01-01 RX ADMIN — OXYCODONE HYDROCHLORIDE 5 MG: 5 TABLET ORAL at 05:03

## 2023-01-01 RX ADMIN — KETOROLAC TROMETHAMINE 30 MG: 30 INJECTION, SOLUTION INTRAMUSCULAR; INTRAVENOUS at 11:03

## 2023-01-01 RX ADMIN — LEVALBUTEROL 1.25 MG: 1.25 SOLUTION, CONCENTRATE RESPIRATORY (INHALATION) at 03:07

## 2023-01-01 RX ADMIN — SUCCINYLCHOLINE CHLORIDE 140 MG: 20 INJECTION, SOLUTION INTRAMUSCULAR; INTRAVENOUS at 12:07

## 2023-01-01 RX ADMIN — PALONOSETRON HYDROCHLORIDE 0.25 MG: 0.25 INJECTION INTRAVENOUS at 10:03

## 2023-01-01 RX ADMIN — PHENYLEPHRINE HYDROCHLORIDE 100 MCG: 10 INJECTION INTRAVENOUS at 01:07

## 2023-01-01 RX ADMIN — PROPOFOL 40 MCG/KG/MIN: 10 INJECTION, EMULSION INTRAVENOUS at 02:07

## 2023-01-01 RX ADMIN — Medication 100 MCG: at 03:07

## 2023-01-01 RX ADMIN — CALCIUM GLUCONATE 3 G: 20 INJECTION, SOLUTION INTRAVENOUS at 10:07

## 2023-01-01 RX ADMIN — SODIUM CHLORIDE: 9 INJECTION, SOLUTION INTRAVENOUS at 11:02

## 2023-01-01 RX ADMIN — KETOROLAC TROMETHAMINE 15 MG: 30 INJECTION, SOLUTION INTRAMUSCULAR at 07:02

## 2023-01-01 RX ADMIN — POTASSIUM CHLORIDE 40 MEQ: 7.46 INJECTION, SOLUTION INTRAVENOUS at 08:08

## 2023-01-01 RX ADMIN — PALONOSETRON HYDROCHLORIDE 0.25 MG: 0.25 INJECTION INTRAVENOUS at 11:01

## 2023-01-01 RX ADMIN — CALCIUM CHLORIDE 500 MG: 100 INJECTION INTRAVENOUS; INTRAVENTRICULAR at 04:07

## 2023-01-01 RX ADMIN — SODIUM CHLORIDE, SODIUM GLUCONATE, SODIUM ACETATE, POTASSIUM CHLORIDE AND MAGNESIUM CHLORIDE: 526; 502; 368; 37; 30 INJECTION, SOLUTION INTRAVENOUS at 02:07

## 2023-01-01 RX ADMIN — FENTANYL CITRATE 50 MCG: 50 INJECTION INTRAMUSCULAR; INTRAVENOUS at 05:07

## 2023-01-01 RX ADMIN — PROPOFOL 100 MG: 10 INJECTION, EMULSION INTRAVENOUS at 12:07

## 2023-01-01 RX ADMIN — DEXTROSE MONOHYDRATE 12.5 G: 25 INJECTION, SOLUTION INTRAVENOUS at 12:08

## 2023-01-01 RX ADMIN — LEUCINE, PHENYLALANINE, LYSINE, METHIONINE, ISOLEUCINE, VALINE, HISTIDINE, THREONINE, TRYPTOPHAN, ALANINE, GLYCINE, ARGININE, PROLINE, SERINE, TYROSINE, SODIUM ACETATE, DIBASIC POTASSIUM PHOSPHATE, MAGNESIUM CHLORIDE, SODIUM CHLORIDE, CALCIUM CHLORIDE, DEXTROSE
311; 238; 247; 170; 255; 247; 204; 179; 77; 880; 438; 489; 289; 213; 17; 297; 261; 51; 77; 33; 5 INJECTION INTRAVENOUS at 03:08

## 2023-01-01 RX ADMIN — ROCURONIUM BROMIDE 40 MG: 10 INJECTION, SOLUTION INTRAVENOUS at 06:07

## 2023-01-01 RX ADMIN — DEXAMETHASONE SODIUM PHOSPHATE 8 MG: 4 INJECTION, SOLUTION INTRAMUSCULAR; INTRAVENOUS at 01:07

## 2023-01-01 RX ADMIN — PROPOFOL 35 MCG/KG/MIN: 10 INJECTION, EMULSION INTRAVENOUS at 06:07

## 2023-01-01 RX ADMIN — LIDOCAINE HYDROCHLORIDE 75 MG: 20 INJECTION, SOLUTION INTRAVENOUS at 12:03

## 2023-01-01 RX ADMIN — SODIUM CHLORIDE: 9 INJECTION, SOLUTION INTRAVENOUS at 04:07

## 2023-01-01 RX ADMIN — ALBUMIN (HUMAN): 12.5 SOLUTION INTRAVENOUS at 01:07

## 2023-01-01 RX ADMIN — SODIUM CHLORIDE, SODIUM GLUCONATE, SODIUM ACETATE, POTASSIUM CHLORIDE AND MAGNESIUM CHLORIDE: 526; 502; 368; 37; 30 INJECTION, SOLUTION INTRAVENOUS at 03:07

## 2023-01-01 RX ADMIN — SODIUM CHLORIDE: 9 INJECTION, SOLUTION INTRAVENOUS at 12:07

## 2023-01-01 RX ADMIN — CIPROFLOXACIN 400 MG: 2 INJECTION, SOLUTION INTRAVENOUS at 07:07

## 2023-01-01 RX ADMIN — SMOFLIPID 250 ML: 6; 6; 5; 3 INJECTION, EMULSION INTRAVENOUS at 10:08

## 2023-01-01 RX ADMIN — VITAM B12 100 MCG: 100 TAB at 12:02

## 2023-01-01 RX ADMIN — SODIUM CHLORIDE, PRESERVATIVE FREE 10 ML: 5 INJECTION INTRAVENOUS at 11:03

## 2023-01-01 RX ADMIN — ACETAMINOPHEN 1000 MG: 10 INJECTION, SOLUTION INTRAVENOUS at 11:04

## 2023-01-01 RX ADMIN — SODIUM CHLORIDE, POTASSIUM CHLORIDE, SODIUM LACTATE AND CALCIUM CHLORIDE: 600; 310; 30; 20 INJECTION, SOLUTION INTRAVENOUS at 09:03

## 2023-01-01 RX ADMIN — ONDANSETRON 4 MG: 2 INJECTION INTRAMUSCULAR; INTRAVENOUS at 07:07

## 2023-01-01 RX ADMIN — PANTOPRAZOLE SODIUM 40 MG: 40 INJECTION, POWDER, LYOPHILIZED, FOR SOLUTION INTRAVENOUS at 10:08

## 2023-01-01 RX ADMIN — MIDAZOLAM HYDROCHLORIDE 1 MG: 1 INJECTION, SOLUTION INTRAMUSCULAR; INTRAVENOUS at 07:07

## 2023-01-01 RX ADMIN — HYDROMORPHONE HYDROCHLORIDE 1 MG: 1 INJECTION, SOLUTION INTRAMUSCULAR; INTRAVENOUS; SUBCUTANEOUS at 07:08

## 2023-01-01 RX ADMIN — DEXTROSE AND SODIUM CHLORIDE: 5; .9 INJECTION, SOLUTION INTRAVENOUS at 01:07

## 2023-01-01 RX ADMIN — GABAPENTIN 200 MG: 100 CAPSULE ORAL at 10:07

## 2023-01-05 NOTE — TELEPHONE ENCOUNTER
Outgoing call to patient. Rescheduled her appt to virtual per her request.    ----- Message from Meka Rosenberg sent at 1/5/2023 10:13 AM CST -----  Contact: pt at [phone]  Type: Needs Medical Advice  Who Called:  pt  Best Call Back Number: [phone]   Additional Information: pt is calling the office to see if she can change her visit from in person to virtual. She requesting a call back from the nurse. Please call back and advise.

## 2023-01-06 NOTE — TELEPHONE ENCOUNTER
Returned call as requested. Pt had difficulty with virtual visit. Appt r/s'd.      ----- Message from Luis Womack sent at 1/6/2023  1:26 PM CST -----  Regarding: pt called  Name of Who is Calling: FENG DELUNA [[id number]]      What is the request in detail: pt called requesting a call back a nurse she states her computer went out her for her virtual appt.Please advise      Can the clinic reply by [facility]: no      What Number to Call Back if not in [facility]:[phone]

## 2023-01-09 NOTE — TELEPHONE ENCOUNTER
Called pt as requested. Appt changed to clinic visit d/t difficulties with virtual visit.      ----- Message from Smith Stone sent at 1/9/2023  1:52 PM CST -----  Type: Need Medical Advice  Who Called: FENG Whittaker callback number: [phone]  Additional Info:  Can not long on to her virtual  Please call to further assist, Thanks

## 2023-01-10 NOTE — TELEPHONE ENCOUNTER
This nurse called [employer] to inquire about status of auth for pt's chemo. No answer. Portal msg sent.

## 2023-01-11 NOTE — TELEPHONE ENCOUNTER
Spoke with pt to schedule labs and office visit with MD.      ----- Message from Dorys Benson sent at 1/11/2023  3:18 PM CST -----  Contact: [phone]  Type: Needs Medical Advice  Who Called:  Pt     Best Call Back Number: [phone] (home)     Additional Information: Pt requesting a call back about her chemo.

## 2023-01-17 NOTE — PROGRESS NOTES
Subjective:       Patient ID: Tessa Enriquez is a 67 y.o. female.    Chief Complaint:   rectal CA status post biopsy October 2020 robotic LAR November 9, 2020 by Dr. Wagner  Discontinued folfox due to s/e completed xrt  5/21     works at [employer]    pt went to GI wih rectal bleeding and abd pain scoped  Findings:        The digital rectal exam revealed a firm rectal mass palpated 5 to 6        cm from the anal verge. The mass was non-circumferential and located        predominantly at the posterior bowel wall.        There was evidence of a prior end-to-end colo-rectal anastomosis in        the distal rectum. This was patent and was characterized by        ulceration. The anastomosis was traversed. Mass in association with        the anastomosis has been previously biopsied and confirmed to be        adenocarcinoma. Anastomosis present at approximately 5 cm from anal    pt sent for discussion    MPRESSION:  1. Interval increased size of hypermetabolic presacral mass contiguous with hypermetabolic circumferential wall thickening of the rectum at the level of the anastomotic suture line, consistent with recurrent neoplasm and metastatic disease.  2. Interval development of enlarged, hypermetabolic right iliac chain lymph node consistent with metastatic disease.  3. No additional convincing evidence of metastatic disease.  4. Bilateral sacral ala insufficiency fractures, with corresponding FDG hypermetabolism.  5. Intense FDG uptake throughout the thyroid gland, unchanged and nonspecific.  Pt discussed wt [facility] started folfiri +avastin then from cycle 2 changed to FOLFIRI+ panitumumab  REVIEW OF SYSTEMS:   Admittd with severe side effects to chemotherapy including literally nonstop diarrhea blistering of anal and perineal region.  Tearing and watering eyes and abdominal cramping  Was unable to retain any fluids or solids   SKIN: Denies rash, issues with nails, non-healing sores, bleeding, blotching    skin or  abnormal bruising. Denies new moles or changes to existing moles.      BREASTS: There is no swelling around breasts or nipple discharge.    EYES: Denies eye pain, blurred vision, swelling, redness or discharge.      ENT AND MOUTH: Denies runny nose, stuffiness, sinus trouble or sores. Denies    nosebleeds. Denies, hoarseness, change in voice or swelling in front of the    neck.      CARDIOVASCULAR: Denies chest pain, discomfort or palpitations. Denies neck    swelling or episodes of passing out.      RESPIRATORY: Denies cough, sputum production, blood in sputum, and denies    shortness of breath.      GENITOURINARY: No discharge. No pelvic pain or lumps. No rash around groin or  lesions. No urinary frequency, hesitation, painful urination or blood in    urine. Denies incontinence. No problems with intercourse.      MUSCULOSKELETAL: Denies neck or back pain. Denies weakness in arms or legs,    joint problems or distended inflamed veins in legs. Denies swelling or abnormal  glands.      NEUROLOGICAL: Denies tingling, numbness, altered mentation changes to nerve    function in the face, weakness to one or both of the body. Denies changes to    gait and denies multiple falls or accidents.      PSYCHIATRIC: Denies nervousness, anxiety, hallucinations, depression, suicidal    ideation, trouble sleeping or changes in behavior noticed by family.          Oncology History:  T3 N1 rectal CA diagnosed October 2020 robotic LAR November 2020 December 2020 PET scan negative for metastatic disease   started FOLFOX 2/21 but stopped after 3 cycles and only wants surveillance   had colonoscopy 8/21 NAD  Past Medical History:   Diagnosis Date    Acute hypoxemic respiratory failure 07/09/2018    Arthritis     Cancer     colon    COPD (chronic obstructive pulmonary disease)     History of home oxygen therapy     ONLY PRN AND HASN'T USED IT IN A YEAR    Hyperlipidemia     Thyroid disease      Past Surgical History:   Procedure  Laterality Date    CHOLECYSTECTOMY      COLECTOMY N/A 11/9/2020    Procedure: COLECTOMY;  Surgeon: Brandon Wagner MD;  Location: [facility] OR;  Service: General;  Laterality: N/A;  LOWER- ANTERIOR    COLONOSCOPY N/A 10/23/2020    Procedure: COLONOSCOPY;  Surgeon: Jolynn Ayala MD;  Location: [facility] ENDO;  Service: Endoscopy;  Laterality: N/A;    COLONOSCOPY N/A 8/6/2021    Procedure: COLONOSCOPY;  Surgeon: Jolynn Ayala MD;  Location: [facility] ENDO;  Service: Endoscopy;  Laterality: N/A;    COLONOSCOPY N/A 7/7/2022    Procedure: COLONOSCOPY;  Surgeon: Jolynn Ayala MD;  Location: [facility] ENDO;  Service: Endoscopy;  Laterality: N/A;    ESOPHAGEAL DILATION N/A 6/29/2021    Procedure: DILATION, ESOPHAGUS;  Surgeon: Sourav Baires III, MD;  Location: [facility] ENDO;  Service: Endoscopy;  Laterality: N/A;    ESOPHAGOGASTRODUODENOSCOPY  06/29/2021    Dilation to 57 Fr    ESOPHAGOGASTRODUODENOSCOPY N/A 6/29/2021    Procedure: EGD (ESOPHAGOGASTRODUODENOSCOPY);  Surgeon: Sourav Baires III, MD;  Location: [facility] ENDO;  Service: Endoscopy;  Laterality: N/A;    FLEXIBLE SIGMOIDOSCOPY N/A 8/1/2022    Procedure: SIGMOIDOSCOPY, FLEXIBLE;  Surgeon: Brandon Wagner MD;  Location: [facility] ENDO;  Service: Endoscopy;  Laterality: N/A;    HYSTERECTOMY      INSERTION OF TUNNELED CENTRAL VENOUS CATHETER (CVC) WITH SUBCUTANEOUS PORT N/A 1/11/2021    Procedure: [id number];  Surgeon: Brandon Wagner MD;  Location: [facility] OR;  Service: General;  Laterality: N/A;    INSERTION OF TUNNELED CENTRAL VENOUS CATHETER (CVC) WITH SUBCUTANEOUS PORT Right 9/1/2022    Procedure: [id number];  Surgeon: Brandon Wagner MD;  Location: [facility] OR;  Service: General;  Laterality: Right;  Wants rt side placement    MEDIPORT REMOVAL Left 4/19/2021    Procedure: REMOVAL, CATHETER, CENTRAL VENOUS, TUNNELED, WITH PORT;  Surgeon: Brandon Wagner MD;  Location: [facility] OR;  Service: General;  Laterality: Left;    ROBOT-ASSISTED COLECTOMY N/A  2020    Procedure: ROBOTIC COLECTOMY low anterior resection, possible open;  Surgeon: Brandon Wagner MD;  Location: [facility];  Service: General;  Laterality: N/A;  CONVERTED TO OPEN AT 1503     Family History   Problem Relation Age of Onset    COPD Mother     COPD Father     Prostate cancer Brother     Breast cancer Maternal Grandmother       Social History     Socioeconomic History    Marital status:    Tobacco Use    Smoking status: Former     Packs/day: 2.00     Years: 40.00     Pack years: 80.00     Types: Cigarettes     Quit date: 2017     Years since quittin.5    Smokeless tobacco: Never   Substance and Sexual Activity    Alcohol use: No    Drug use: No    Sexual activity: Not Currently     Partners: Male     Review of patient's allergies indicates:   Allergen Reactions    Ativan [lorazepam] Hives and Itching    Flagyl [metronidazole] Itching and Rash    Pcn [penicillins] Hives, Itching and Rash       Current Outpatient Medications:     albuterol (PROVENTIL) 2.5 mg /3 mL (0.083 %) nebulizer solution, Take 3 mLs (2.5 mg total) by nebulization every 6 (six) hours as needed for Wheezing. Rescue, Disp: 120 each, Rfl: 1    CMPD hydrocortisone 2%- LIDOcaine 3% suppository (RECTAL ROCKET), Place 1 suppository rectally every evening. Insert 1 suppository 3/4 of the way onto rectum. Wet for easier application. Repeat for 3 nights., Disp: 30 suppository, Rfl: 2    cyanocobalamin (VITAMIN B-12) 1000 MCG tablet, Take 100 mcg by mouth once daily., Disp: , Rfl:     diphenoxylate-atropine 2.5-0.025 mg (LOMOTIL) 2.5-0.025 mg per tablet, Take by mouth., Disp: , Rfl:     doxycycline (MONODOX) 100 MG capsule, Take 1 capsule (100 mg total) by mouth 2 (two) times daily., Disp: 14 capsule, Rfl: 0    ferrous sulfate 325 (65 FE) MG EC tablet, Take 325 mg by mouth 2 (two) times daily., Disp: , Rfl:     guaiFENesin (MUCINEX) 600 mg 12 hr tablet, Take 1 tablet (600 mg total) by mouth 2 (two) times daily., Disp: 20  tablet, Rfl: 0    HYDROcodone-acetaminophen (NORCO) 5-325 mg per tablet, Take 1 tablet by mouth every 6 (six) hours as needed for Pain., Disp: 30 tablet, Rfl: 0    levothyroxine (SYNTHROID) 125 MCG tablet, Take 1 tablet (125 mcg total) by mouth before breakfast., Disp: 30 tablet, Rfl: 11    methylPREDNISolone (MEDROL DOSEPACK) 4 mg tablet, use as directed, Disp: 21 each, Rfl: 0    ondansetron (ZOFRAN-ODT) 8 MG TbDL, Take 1 tablet (8 mg total) by mouth every 12 (twelve) hours as needed., Disp: 30 tablet, Rfl: 1    potassium chloride 10% (KAYCIEL) 20 mEq/15 mL oral solution, Take 15 mLs (20 mEq total) by mouth 2 (two) times daily., Disp: 473 mL, Rfl: 0    prochlorperazine (COMPAZINE) 10 MG tablet, Take 1 tablet (10 mg total) by mouth every 6 (six) hours as needed., Disp: 30 tablet, Rfl: 1    traMADoL (ULTRAM) 50 mg tablet, Take 1 tablet (50 mg total) by mouth every 6 (six) hours as needed., Disp: 30 tablet, Rfl: 0    triamcinolone acetonide 0.025% (KENALOG) 0.025 % cream, Apply topically once daily., Disp: 80 g, Rfl: 1  PHYSICAL EXAM:     Wt Readings from Last 3 Encounters:   01/17/23 84 kg (185 lb 3 oz)   12/27/22 81.2 kg (179 lb)   12/14/22 84.1 kg (185 lb 8.3 oz)     Temp Readings from Last 3 Encounters:   01/17/23 97.4 °F (36.3 °C) (Temporal)   12/27/22 98.9 °F (37.2 °C) (Oral)   12/14/22 97.7 °F (36.5 °C)     BP Readings from Last 3 Encounters:   01/17/23 (!) 171/79   12/27/22 (!) 148/87   12/14/22 129/71     Pulse Readings from Last 3 Encounters:   01/17/23 81   12/27/22 96   12/14/22 85     GENERAL: alert; in no apparent distress, , well-built well-nourished   HEAD: normocephalic, atraumatic.  EYES: pupils are equal, round, reactive to light and accommodation. Sclera anicteric. Conjunctiva not injected.   NOSE/THROAT: no nasal erythema or rhinorrhea. Oropharynx pink, without erythema, ulcerations or thrush.   NECK: no cervical motion rigidity; supple with no masses.  CHEST: clear to auscultation bilaterally; no  wheezes, crackles or rubs. Patient is speaking comfortably on room air with normal work of breathing without using accessory muscles of respiration.  CARDIOVASCULAR: regular rate and rhythm; no murmurs, rubs or gallops.  ABDOMEN: soft, nontender, nondistended. Bowel sounds present.   MUSCULOSKELETAL: no tenderness to palpation along the spine or scapulae. Normal range of motion.  NEUROLOGIC: cranial nerves II-XII intact bilaterally. Strength 5/5 in bilateral upper and lower extremities. No sensory deficits appreciated. Reflexes globally intact. No cerebellar signs. Normal gait.  LYMPHATIC: no cervical, supraclavicular or axillary adenopathy appreciated bilaterally.   EXTREMITIES: no clubbing, cyanosis, edema.  SKIN: no erythema, rashes, ulcerations noted  ECOG-0  Lab Results   Component Value Date    WBC 4.49 01/14/2023    HGB 12.9 01/14/2023    HCT 40.2 01/14/2023    MCV 96 01/14/2023     01/14/2023       BMP  Lab Results   Component Value Date     01/14/2023    K 4.3 01/14/2023     01/14/2023    CO2 26 01/14/2023    BUN 9 01/14/2023    CREATININE 0.7 01/14/2023    CALCIUM 9.0 01/14/2023    ANIONGAP 9 01/14/2023    ESTGFRAFRICA >60 05/02/2022    EGFRNONAA >60 05/02/2022      cea 7/14/21 1.8  November 9, 2020  .  Rectum, sigmoid colon and proximal donut (low anterior resection):   - Invasive adenocarcinoma,  invading through the muscularis propria into   pericolorectal tissue   - Pathologic stage pT3, pN1a (see synoptic report)   - Resection margins negative for dysplasia or malignancy (proximal, distal   and radial margins), within 1 mm to radial margin   - One tumor deposit   - Adenocarcinoma involve one out of totally forty seven lymph nodes (1/46 in   part 1, totally 1/47, also see part 2)   - Background colon with diverticulosis associated with hyperpigmented   macrophages, benign   - Proximal donut with benign colon, no dysplasia or malignancy   - Tattoo identified   2.  Colon, distal donut  (excision):   - Benign colon, negative for dysplasia or malignancy   - One lymph node, negative for metastatic carcinoma (0/1)   COLON AND RECTUM: Resection, Including Transanal Disk Excision of Rectal   Neoplasms   Procedure  Low anterior resection   Tumor Site  Rectum    Tumor Location: Straddles the anterior peritoneal reflection   Tumor Size   Greatest dimension (centimeters): 7.0 cm   + Additional dimensions (centimeters): 4.7 x 2.2 cm   Macroscopic Tumor Perforation  Not identified   Macroscopic Intactness of Mesorectum  Near complete   Histologic Type  Adenocarcinoma   Histologic Grade   G2: Moderately differentiated   Tumor Extension  Tumor invades through the muscularis propria into   pericolorectal tissue     Impression:  November 7, 2020 CT abdomen pelvis     1. Rectosigmoid junction mass, unchanged from prior.  No definite evidence of intraluminal contrast extravasation to suggest active gastrointestinal hemorrhage, however please note that arterial phase CT is more sensitive.  2. Colonic diverticulosis without acute diverticulitis.  3. Minimal pneumobilia, unchanged      IMPRESSION:  CT chest lung screen October 2020     1.  3 mm pulmonary nodule identified in the lateral segment of the  right lower lobe.  2.  Focus of groundglass opacity measuring 11 mm in diameter in the  superior segment of the right lower lobe. Short-term follow-up for  this lesion is recommended.  3.  Mild emphysematous lung disease.     LUNG-RADS CATEGORY 3: PROBABLY BENIGN FINDINGS     RECOMMENDATION: Short-term imaging follow-up with 6 month LDCT.    IMPRESSION: 7/21  1. Nonspecific hypermetabolic soft tissue density focus cranial to the rectosigmoid colon suture line, perhaps reflecting postsurgical and or posttreatment or postinflammatory changes. Attention to this area at follow-up imaging is recommended to help differentiate from residual or recurrent neoplasm.  2. Improving postoperative changes in the lower anterior  abdominal wall, with associated multifocal FDG hypermetabolism which is most characteristic of postoperative etiology.  3. Otherwise no convincing evidence of recurrent neoplasm or metastatic disease.IMPRESSION:  1. Interval increased size of hypermetabolic presacral mass contiguous with hypermetabolic circumferential wall thickening of the rectum at the level of the anastomotic suture line, consistent with recurrent neoplasm and metastatic disease.  2. Interval development of enlarged, hypermetabolic right iliac chain lymph node consistent with metastatic disease.  3. No additional convincing evidence of metastatic disease.  4. Bilateral sacral ala insufficiency fractures, with corresponding FDG hypermetabolism.  5. Intense FDG uptake throughout the thyroid gland, unchanged and nonspecific.      Patient Active Problem List   Diagnosis    COPD (chronic obstructive pulmonary disease)    Pure hypercholesterolemia    Acquired hypothyroidism    Rectal bleeding    Rectal cancer    Dysphagia    Malignant neoplasm of colon    B12 deficiency    FENG (iron deficiency anemia)    Hypocalcemia    Chronic diarrhea    Severe obesity (BMI 35.0-39.9) with comorbidity    Secondary and unspecified malignant neoplasm of intra-abdominal lymph nodes    Dehydration    Diarrhea    Chemotherapy-induced neutropenia    Chemotherapy-induced diarrhea   IMPRESSION:pet 12/2020  1. Postoperative changes of rectal colonic resection, with adjacent  FDG avid presacral soft tissue stranding.  2. Adjacent punctate extraluminal foci of free air suggesting a tiny  tract extending towards the adjacent small bowel (possibly  postoperative but suggesting a tiny enterocolonic fistula)  3. Deep subcutaneous soft tissue structure along the ventral abdomen  suggesting a postoperative hematoma/seroma, and less likely abscess.  4. No findings of additional sites of FDG avid disease.      IMPRESSION: 7/21  1. Nonspecific hypermetabolic soft tissue density focus  cranial to the rectosigmoid colon suture line, perhaps reflecting postsurgical and or posttreatment or postinflammatory changes. Attention to this area at follow-up imaging is recommended to help differentiate from residual or recurrent neoplasm.  2. Improving postoperative changes in the lower anterior abdominal wall, with associated multifocal FDG hypermetabolism which is most characteristic of postoperative etiology.  3. Otherwise no convincing evidence of recurrent neoplasm or metastatic disease      Impression:2/2/22   CT CAP  A single 4 mm soft tissue density nodule is noted in the right lower lobe.  No other intrapulmonary masses or nodules are seen.  Follow-up CT in 12 months per of this nodule is recommended.  Coronary artery calcification is noted.  No masses or adenopathy or seen.     Prior cholecystectomy.  Prior hysterectomy.  2.2 cm left ovarian cyst.  Small ventral hernia containing a loop of small bowel without CT evidence of obstruction    Impression:11/22     Presacral/pre coccygeal soft tissue density and fat stranding and circumferential wall thickening of segment of bowel down low in the pelvis at the surgical site are not significantly changed compared to the prior exam.  Prominent lymph node right iliac chain also does not appear significantly changed.  Findings also correspond as seen on prior PET-CT of 08/04/2022 and are suspicious for neoplasm  Assessment and Plan   At original dx 2020  Status post low anterior resection November 9, 2020 for rectal CA T3 N1a   tumor board convened and agreed on xrt that will follow  After completion of chemo  sequentially  therapy   Patient started FOLFOX.  Completed 4 out of 12 planned adjuvant cycles,  Discontinued  Chemo / port removed completed xrt 5/21  Pet:8/22, showing progression   Pt very hesitant T FIRST TO GET PORT OR INFUSIONAL 5 FU/ OXALIPLATIN, WENT TO [facility] ON SECOND OPINION AGREABLE NOW, HAD PORT PLACED   Started  FOLFIRI + avastin    from second cycle changed to FOLFIRI panitumumab   Sig diarrhea with CPT-11, in spite of added lomotil to imodium  decreased dose of CPT-11  to 165 mg/m2 but patient is still hospitalized with severe diarrhea electrolyte imbalance does not want any treatment till further scans are done to look for response prior to embarking I have discussed with patient the risks of this vision however I also showed her that we will remove CPT 11 from her regimen due to severity of side effects see me with PET scan  Scan shows no change 12/22  Will drop cpt -11 and cont with 5fu and vectibix  Pt to take kcl oral doesn't want IV replacen and has done well. K normal this visit   Dc the bolus 5FU  d/w  FENG :Injectafer  completed 8/22 good response  pt to add SL b12 to her twice a month b12 shots    Please ask for nikole  Pt wants to wait till holidays are over to get imaging studies looking for response   b12 def anemia, taking injections q month level checked 10/21 sub therpautic 226 t INSURANCE WONT PAY FOR B12.ok to cont with SL only as levels have improved to 1500  iron deficiency  Pt to try oral for now, will recheck in 8/22 ( may help her chronic diarrhea)  hypocalcemia which seems chronic pt to supplement orally daily wnl this visit  Hypothyroidism, sees DR Mcdermott will adj meds  History of chronic obstructive pulmonary disease, dyslipidemia and hypothyroidism continue with PCP  Doesn't want to be covid vaccinated

## 2023-01-18 NOTE — NURSING
Pt stated she is having some bleeding when she uses the restroom and unsure if its from tumor or hemorrhoids. Pt had office visit with MD Zac yesterday but states she did not mention to MD. Sent message to MD Zac and NEENA Badillo. Charge RN notified. Awaiting response from team.

## 2023-01-18 NOTE — PROGRESS NOTES
Panitumumab dose rounded from 520 mg to 500 mg (within 5% for chemotherapy and 10% for monoclonal antibodies) per rounding protocol; original order 6 mg/kg

## 2023-01-18 NOTE — PLAN OF CARE
Problem: Fatigue  Goal: Improved Activity Tolerance  Outcome: Ongoing, Progressing  Intervention: Promote Improved Energy  Flowsheets (Taken 1/18/2023 7459)  Fatigue Management:   fatigue-related activity identified   frequent rest breaks encouraged   paced activity encouraged  Sleep/Rest Enhancement: relaxation techniques promoted

## 2023-01-30 NOTE — PROGRESS NOTES
Subjective:       Patient ID: Tessa Enriquez is a 67 y.o. female.    Chief Complaint:   rectal CA status post biopsy October 2020 robotic LAR November 9, 2020 by Dr. Wagner  Discontinued folfox due to s/e completed xrt  5/21     works at [employer]    pt went to GI wih rectal bleeding and abd pain scoped  Findings:        The digital rectal exam revealed a firm rectal mass palpated 5 to 6        cm from the anal verge. The mass was non-circumferential and located        predominantly at the posterior bowel wall.        There was evidence of a prior end-to-end colo-rectal anastomosis in        the distal rectum. This was patent and was characterized by        ulceration. The anastomosis was traversed. Mass in association with        the anastomosis has been previously biopsied and confirmed to be        adenocarcinoma. Anastomosis present at approximately 5 cm from anal    pt sent for discussion    MPRESSION:  1. Interval increased size of hypermetabolic presacral mass contiguous with hypermetabolic circumferential wall thickening of the rectum at the level of the anastomotic suture line, consistent with recurrent neoplasm and metastatic disease.  2. Interval development of enlarged, hypermetabolic right iliac chain lymph node consistent with metastatic disease.  3. No additional convincing evidence of metastatic disease.  4. Bilateral sacral ala insufficiency fractures, with corresponding FDG hypermetabolism.  5. Intense FDG uptake throughout the thyroid gland, unchanged and nonspecific.  Pt discussed wt [facility] started folfiri +avastin then from cycle 2 changed to FOLFIRI+ panitumumab  REVIEW OF SYSTEMS:   Admittd with severe side effects to chemotherapy including literally nonstop diarrhea blistering of anal and perineal region.  Tearing and watering eyes and abdominal cramping  Was unable to retain any fluids or solids   Much better since dc of irinotean   SKIN: +rash from vectibix, issues with nails,  sore in  nose , bt not too bad occ bleeding,nose. blotching    skin or abnormal bruising. Denies new moles or changes to existing moles.      BREASTS: There is no swelling around breasts or nipple discharge.    EYES: Denies eye pain, blurred vision, swelling, redness or discharge.      ENT AND MOUTH: Denies runny nose, stuffiness, sinus trouble or sores. Denies    nosebleeds. Denies, hoarseness, change in voice or swelling in front of the    neck.      CARDIOVASCULAR: Denies chest pain, discomfort or palpitations. Denies neck    swelling or episodes of passing out.      RESPIRATORY: Denies cough, sputum production, blood in sputum, and denies    shortness of breath.      GENITOURINARY: No discharge. No pelvic pain or lumps. No rash around groin or  lesions. No urinary frequency, hesitation, painful urination or blood in    urine. Denies incontinence. No problems with intercourse.      MUSCULOSKELETAL: Denies neck or back pain. Denies weakness in arms or legs,    joint problems or distended inflamed veins in legs. Denies swelling or abnormal  glands.      NEUROLOGICAL: Denies tingling, numbness, altered mentation changes to nerve    function in the face, weakness to one or both of the body. Denies changes to    gait and denies multiple falls or accidents.      PSYCHIATRIC: Denies nervousness, anxiety, hallucinations, depression, suicidal    ideation, trouble sleeping or changes in behavior noticed by family.          Oncology History:  T3 N1 rectal CA diagnosed October 2020 robotic LAR November 2020 December 2020 PET scan negative for metastatic disease   started FOLFOX 2/21 but stopped after 3 cycles and only wants surveillance   had colonoscopy 8/21 NAD  Past Medical History:   Diagnosis Date    Acute hypoxemic respiratory failure 07/09/2018    Arthritis     Cancer     colon    COPD (chronic obstructive pulmonary disease)     History of home oxygen therapy     ONLY PRN AND HASN'T USED IT IN A YEAR    Hyperlipidemia      Thyroid disease      Past Surgical History:   Procedure Laterality Date    CHOLECYSTECTOMY      COLECTOMY N/A 11/9/2020    Procedure: COLECTOMY;  Surgeon: Brandon Wagner MD;  Location: [facility] OR;  Service: General;  Laterality: N/A;  LOWER- ANTERIOR    COLONOSCOPY N/A 10/23/2020    Procedure: COLONOSCOPY;  Surgeon: Jolynn Ayala MD;  Location: [facility] ENDO;  Service: Endoscopy;  Laterality: N/A;    COLONOSCOPY N/A 8/6/2021    Procedure: COLONOSCOPY;  Surgeon: Jolynn Ayala MD;  Location: [facility] ENDO;  Service: Endoscopy;  Laterality: N/A;    COLONOSCOPY N/A 7/7/2022    Procedure: COLONOSCOPY;  Surgeon: Jolynn Ayala MD;  Location: [facility] ENDO;  Service: Endoscopy;  Laterality: N/A;    ESOPHAGEAL DILATION N/A 6/29/2021    Procedure: DILATION, ESOPHAGUS;  Surgeon: Sourav aBires III, MD;  Location: [facility];  Service: Endoscopy;  Laterality: N/A;    ESOPHAGOGASTRODUODENOSCOPY  06/29/2021    Dilation to 57 Fr    ESOPHAGOGASTRODUODENOSCOPY N/A 6/29/2021    Procedure: EGD (ESOPHAGOGASTRODUODENOSCOPY);  Surgeon: Sourav Baires III, MD;  Location: [facility];  Service: Endoscopy;  Laterality: N/A;    FLEXIBLE SIGMOIDOSCOPY N/A 8/1/2022    Procedure: SIGMOIDOSCOPY, FLEXIBLE;  Surgeon: Brandon Wagner MD;  Location: [facility] ENDO;  Service: Endoscopy;  Laterality: N/A;    HYSTERECTOMY      INSERTION OF TUNNELED CENTRAL VENOUS CATHETER (CVC) WITH SUBCUTANEOUS PORT N/A 1/11/2021    Procedure: [id number];  Surgeon: Brandon Wagner MD;  Location: [facility] OR;  Service: General;  Laterality: N/A;    INSERTION OF TUNNELED CENTRAL VENOUS CATHETER (CVC) WITH SUBCUTANEOUS PORT Right 9/1/2022    Procedure: [id number];  Surgeon: Brandon Wagner MD;  Location: [facility] OR;  Service: General;  Laterality: Right;  Wants rt side placement    MEDIPORT REMOVAL Left 4/19/2021    Procedure: REMOVAL, CATHETER, CENTRAL VENOUS, TUNNELED, WITH PORT;  Surgeon: Brandon Wagner MD;  Location: [facility] OR;  Service:  General;  Laterality: Left;    ROBOT-ASSISTED COLECTOMY N/A 2020    Procedure: ROBOTIC COLECTOMY low anterior resection, possible open;  Surgeon: Brandon Wagner MD;  Location: [facility];  Service: General;  Laterality: N/A;  CONVERTED TO OPEN AT 1503     Family History   Problem Relation Age of Onset    COPD Mother     COPD Father     Prostate cancer Brother     Breast cancer Maternal Grandmother       Social History     Socioeconomic History    Marital status:    Tobacco Use    Smoking status: Former     Packs/day: 2.00     Years: 40.00     Pack years: 80.00     Types: Cigarettes     Quit date: 2017     Years since quittin.5    Smokeless tobacco: Never   Substance and Sexual Activity    Alcohol use: No    Drug use: No    Sexual activity: Not Currently     Partners: Male     Review of patient's allergies indicates:   Allergen Reactions    Ativan [lorazepam] Hives and Itching    Flagyl [metronidazole] Itching and Rash    Pcn [penicillins] Hives, Itching and Rash       Current Outpatient Medications:     albuterol (PROVENTIL) 2.5 mg /3 mL (0.083 %) nebulizer solution, Take 3 mLs (2.5 mg total) by nebulization every 6 (six) hours as needed for Wheezing. Rescue, Disp: 120 each, Rfl: 1    CMPD hydrocortisone 2%- LIDOcaine 3% suppository (RECTAL ROCKET), Place 1 suppository rectally every evening. Insert 1 suppository 3/4 of the way onto rectum. Wet for easier application. Repeat for 3 nights., Disp: 30 suppository, Rfl: 2    cyanocobalamin (VITAMIN B-12) 1000 MCG tablet, Take 100 mcg by mouth once daily., Disp: , Rfl:     diphenoxylate-atropine 2.5-0.025 mg (LOMOTIL) 2.5-0.025 mg per tablet, Take by mouth., Disp: , Rfl:     doxycycline (MONODOX) 100 MG capsule, Take 1 capsule (100 mg total) by mouth 2 (two) times daily., Disp: 14 capsule, Rfl: 0    ferrous sulfate 325 (65 FE) MG EC tablet, Take 325 mg by mouth 2 (two) times daily., Disp: , Rfl:     guaiFENesin (MUCINEX) 600 mg 12 hr tablet, Take 1  tablet (600 mg total) by mouth 2 (two) times daily., Disp: 20 tablet, Rfl: 0    HYDROcodone-acetaminophen (NORCO) 5-325 mg per tablet, Take 1 tablet by mouth every 6 (six) hours as needed for Pain., Disp: 30 tablet, Rfl: 0    levothyroxine (SYNTHROID) 125 MCG tablet, Take 1 tablet (125 mcg total) by mouth before breakfast., Disp: 30 tablet, Rfl: 11    ondansetron (ZOFRAN-ODT) 8 MG TbDL, Take 1 tablet (8 mg total) by mouth every 12 (twelve) hours as needed., Disp: 30 tablet, Rfl: 1    potassium chloride 10% (KAYCIEL) 20 mEq/15 mL oral solution, Take 15 mLs (20 mEq total) by mouth 2 (two) times daily., Disp: 473 mL, Rfl: 0    prochlorperazine (COMPAZINE) 10 MG tablet, Take 1 tablet (10 mg total) by mouth every 6 (six) hours as needed., Disp: 30 tablet, Rfl: 1    traMADoL (ULTRAM) 50 mg tablet, Take 1 tablet (50 mg total) by mouth every 6 (six) hours as needed., Disp: 30 tablet, Rfl: 0    triamcinolone acetonide 0.025% (KENALOG) 0.025 % cream, Apply topically once daily., Disp: 80 g, Rfl: 1  PHYSICAL EXAM:     Wt Readings from Last 3 Encounters:   01/30/23 83.9 kg (184 lb 15.5 oz)   01/20/23 84.4 kg (186 lb)   01/18/23 84.1 kg (185 lb 6.4 oz)     Temp Readings from Last 3 Encounters:   01/30/23 97.5 °F (36.4 °C) (Skin)   01/20/23 97.7 °F (36.5 °C)   01/18/23 97.5 °F (36.4 °C)     BP Readings from Last 3 Encounters:   01/30/23 (!) 152/76   01/20/23 120/80   01/18/23 (!) 152/81     Pulse Readings from Last 3 Encounters:   01/30/23 110   01/20/23 83   01/18/23 86     GENERAL: alert; in no apparent distress, , well-built well-nourished   HEAD: normocephalic, atraumatic.  EYES: pupils are equal, round, reactive to light and accommodation. Sclera anicteric. Conjunctiva not injected.   NOSE/THROAT: no nasal erythema or rhinorrhea. Oropharynx pink, without erythema, ulcerations or thrush.   NECK: no cervical motion rigidity; supple with no masses.  CHEST: clear to auscultation bilaterally; no wheezes, crackles or rubs. Patient  is speaking comfortably on room air with normal work of breathing without using accessory muscles of respiration.  CARDIOVASCULAR: regular rate and rhythm; no murmurs, rubs or gallops.  ABDOMEN: soft, nontender, nondistended. Bowel sounds present.   MUSCULOSKELETAL: no tenderness to palpation along the spine or scapulae. Normal range of motion.  NEUROLOGIC: cranial nerves II-XII intact bilaterally. Strength 5/5 in bilateral upper and lower extremities. No sensory deficits appreciated. Reflexes globally intact. No cerebellar signs. Normal gait.  LYMPHATIC: no cervical, supraclavicular or axillary adenopathy appreciated bilaterally.   EXTREMITIES: no clubbing, cyanosis, edema.  SKIN: no erythema, rashes, ulcerations noted  ECOG-0  Lab Results   Component Value Date    WBC 14.88 (H) 01/28/2023    HGB 13.5 01/28/2023    HCT 41.2 01/28/2023    MCV 95 01/28/2023     01/28/2023       BMP  Lab Results   Component Value Date     01/28/2023    K 3.6 01/28/2023     01/28/2023    CO2 24 01/28/2023    BUN 7 (L) 01/28/2023    CREATININE 0.7 01/28/2023    CALCIUM 8.9 01/28/2023    ANIONGAP 10 01/28/2023    ESTGFRAFRICA >60 05/02/2022    EGFRNONAA >60 05/02/2022      cea 7/14/21 1.8  November 9, 2020  .  Rectum, sigmoid colon and proximal donut (low anterior resection):   - Invasive adenocarcinoma,  invading through the muscularis propria into   pericolorectal tissue   - Pathologic stage pT3, pN1a (see synoptic report)   - Resection margins negative for dysplasia or malignancy (proximal, distal   and radial margins), within 1 mm to radial margin   - One tumor deposit   - Adenocarcinoma involve one out of totally forty seven lymph nodes (1/46 in   part 1, totally 1/47, also see part 2)   - Background colon with diverticulosis associated with hyperpigmented   macrophages, benign   - Proximal donut with benign colon, no dysplasia or malignancy   - Tattoo identified   2.  Colon, distal donut (excision):   - Benign  colon, negative for dysplasia or malignancy   - One lymph node, negative for metastatic carcinoma (0/1)   COLON AND RECTUM: Resection, Including Transanal Disk Excision of Rectal   Neoplasms   Procedure  Low anterior resection   Tumor Site  Rectum    Tumor Location: Straddles the anterior peritoneal reflection   Tumor Size   Greatest dimension (centimeters): 7.0 cm   + Additional dimensions (centimeters): 4.7 x 2.2 cm   Macroscopic Tumor Perforation  Not identified   Macroscopic Intactness of Mesorectum  Near complete   Histologic Type  Adenocarcinoma   Histologic Grade   G2: Moderately differentiated   Tumor Extension  Tumor invades through the muscularis propria into   pericolorectal tissue     Impression:  November 7, 2020 CT abdomen pelvis     1. Rectosigmoid junction mass, unchanged from prior.  No definite evidence of intraluminal contrast extravasation to suggest active gastrointestinal hemorrhage, however please note that arterial phase CT is more sensitive.  2. Colonic diverticulosis without acute diverticulitis.  3. Minimal pneumobilia, unchanged      IMPRESSION:  CT chest lung screen October 2020     1.  3 mm pulmonary nodule identified in the lateral segment of the  right lower lobe.  2.  Focus of groundglass opacity measuring 11 mm in diameter in the  superior segment of the right lower lobe. Short-term follow-up for  this lesion is recommended.  3.  Mild emphysematous lung disease.     LUNG-RADS CATEGORY 3: PROBABLY BENIGN FINDINGS     RECOMMENDATION: Short-term imaging follow-up with 6 month LDCT.    IMPRESSION: 7/21  1. Nonspecific hypermetabolic soft tissue density focus cranial to the rectosigmoid colon suture line, perhaps reflecting postsurgical and or posttreatment or postinflammatory changes. Attention to this area at follow-up imaging is recommended to help differentiate from residual or recurrent neoplasm.  2. Improving postoperative changes in the lower anterior abdominal wall, with  associated multifocal FDG hypermetabolism which is most characteristic of postoperative etiology.  3. Otherwise no convincing evidence of recurrent neoplasm or metastatic disease.IMPRESSION:  1. Interval increased size of hypermetabolic presacral mass contiguous with hypermetabolic circumferential wall thickening of the rectum at the level of the anastomotic suture line, consistent with recurrent neoplasm and metastatic disease.  2. Interval development of enlarged, hypermetabolic right iliac chain lymph node consistent with metastatic disease.  3. No additional convincing evidence of metastatic disease.  4. Bilateral sacral ala insufficiency fractures, with corresponding FDG hypermetabolism.  5. Intense FDG uptake throughout the thyroid gland, unchanged and nonspecific.      Patient Active Problem List   Diagnosis    COPD (chronic obstructive pulmonary disease)    Pure hypercholesterolemia    Acquired hypothyroidism    Rectal bleeding    Rectal cancer    Dysphagia    Malignant neoplasm of colon    B12 deficiency    FENG (iron deficiency anemia)    Hypocalcemia    Chronic diarrhea    Severe obesity (BMI 35.0-39.9) with comorbidity    Secondary and unspecified malignant neoplasm of intra-abdominal lymph nodes    Dehydration    Diarrhea    Chemotherapy-induced neutropenia    Chemotherapy-induced diarrhea   IMPRESSION:pet 12/2020  1. Postoperative changes of rectal colonic resection, with adjacent  FDG avid presacral soft tissue stranding.  2. Adjacent punctate extraluminal foci of free air suggesting a tiny  tract extending towards the adjacent small bowel (possibly  postoperative but suggesting a tiny enterocolonic fistula)  3. Deep subcutaneous soft tissue structure along the ventral abdomen  suggesting a postoperative hematoma/seroma, and less likely abscess.  4. No findings of additional sites of FDG avid disease.      IMPRESSION: 7/21  1. Nonspecific hypermetabolic soft tissue density focus cranial to the  rectosigmoid colon suture line, perhaps reflecting postsurgical and or posttreatment or postinflammatory changes. Attention to this area at follow-up imaging is recommended to help differentiate from residual or recurrent neoplasm.  2. Improving postoperative changes in the lower anterior abdominal wall, with associated multifocal FDG hypermetabolism which is most characteristic of postoperative etiology.  3. Otherwise no convincing evidence of recurrent neoplasm or metastatic disease      Impression:2/2/22   CT CAP  A single 4 mm soft tissue density nodule is noted in the right lower lobe.  No other intrapulmonary masses or nodules are seen.  Follow-up CT in 12 months per of this nodule is recommended.  Coronary artery calcification is noted.  No masses or adenopathy or seen.     Prior cholecystectomy.  Prior hysterectomy.  2.2 cm left ovarian cyst.  Small ventral hernia containing a loop of small bowel without CT evidence of obstruction    Impression:11/22     Presacral/pre coccygeal soft tissue density and fat stranding and circumferential wall thickening of segment of bowel down low in the pelvis at the surgical site are not significantly changed compared to the prior exam.  Prominent lymph node right iliac chain also does not appear significantly changed.  Findings also correspond as seen on prior PET-CT of 08/04/2022 and are suspicious for neoplasm  Assessment and Plan   At original dx 2020  Status post low anterior resection November 9, 2020 for rectal CA T3 N1a   tumor board convened and agreed on xrt that will follow  After completion of chemo  sequentially  therapy   Patient started FOLFOX.  Completed 4 out of 12 planned adjuvant cycles,  Discontinued  Chemo / port removed completed xrt 5/21  Pet:8/22, showing progression   Pt very hesitant T FIRST TO GET PORT OR INFUSIONAL 5 FU/ OXALIPLATIN, WENT TO [facility] ON SECOND OPINION AGREABLE NOW, HAD PORT PLACED   Started  FOLFIRI + avastin   from second  cycle changed to FOLFIRI panitumumab   Sig diarrhea with CPT-11, in spite of added lomotil to imodium  decreased dose of CPT-11  to 165 mg/m2 but patient is still hospitalized with severe diarrhea electrolyte imbalance does not want any treatment till further scans are done to look for response prior to embarking I have discussed with patient the risks of this vision however I also showed her that we will remove CPT 11 from her regimen due to severity of side effects see me with PET scan  Scan shows no change 12/22  Will drop cpt -11 and cont with 5fu and vectibix   Next scan 3-4/23  Pt to take kcl oral doesn't want IV replacen and has done well. K normal this visit   Dc the bolus 5FU  d/w  FENG :Injectafer  completed 8/22 good response  pt to add SL b12 to her twice a month b12 shots    Please ask for nikole  Pt waited wait till holidays are over to get imaging studies looking for response   b12 def anemia, was taking injections q month level checked 10/21 sub therpautic 226 t INSURANCE WONT PAY FOR B12.ok to cont with SL only as levels have improved to 1500  iron deficiency  Pt to try oral for now, will recheck in 8/22 ( may help her chronic diarrhea)  hypocalcemia which seems chronic pt to supplement orally daily wnl this visit  Hypothyroidism, sees DR Mcdermott will adj meds  History of chronic obstructive pulmonary disease, dyslipidemia and hypothyroidism continue with PCP  Doesn't want to be covid vaccinated

## 2023-02-10 NOTE — PROGRESS NOTES
Subjective:       Patient ID: Tessa Enriquez is a 67 y.o. female.    Chief Complaint:   rectal CA status post biopsy October 2020 robotic LAR November 9, 2020 by Dr. Wagner  Discontinued folfox due to s/e completed xrt  5/21     works at [employer]    pt went to GI wih rectal bleeding and abd pain scoped  Findings:        The digital rectal exam revealed a firm rectal mass palpated 5 to 6        cm from the anal verge. The mass was non-circumferential and located        predominantly at the posterior bowel wall.        There was evidence of a prior end-to-end colo-rectal anastomosis in        the distal rectum. This was patent and was characterized by        ulceration. The anastomosis was traversed. Mass in association with        the anastomosis has been previously biopsied and confirmed to be        adenocarcinoma. Anastomosis present at approximately 5 cm from anal    pt sent for discussion    MPRESSION:  1. Interval increased size of hypermetabolic presacral mass contiguous with hypermetabolic circumferential wall thickening of the rectum at the level of the anastomotic suture line, consistent with recurrent neoplasm and metastatic disease.  2. Interval development of enlarged, hypermetabolic right iliac chain lymph node consistent with metastatic disease.  3. No additional convincing evidence of metastatic disease.  4. Bilateral sacral ala insufficiency fractures, with corresponding FDG hypermetabolism.  5. Intense FDG uptake throughout the thyroid gland, unchanged and nonspecific.  Pt discussed wt [facility] started folfiri +avastin then from cycle 2 changed to FOLFIRI+ panitumumab  REVIEW OF SYSTEMS:   Admittd with severe side effects to chemotherapy including literally nonstop diarrhea blistering of anal and perineal region.  Tearing and watering eyes and abdominal cramping  Was unable to retain any fluids or solids   Much better since dc of irinotean   SKIN: +rash from vectibix, issues with nails,  sore in  nose , bt not too bad occ bleeding,nose. blotching    skin or abnormal bruising. Denies new moles or changes to existing moles.      BREASTS: There is no swelling around breasts or nipple discharge.    EYES: Denies eye pain, blurred vision, swelling, redness or discharge.      ENT AND MOUTH: Denies runny nose, stuffiness, sinus trouble or sores. Denies    nosebleeds. Denies, hoarseness, change in voice or swelling in front of the    neck.      CARDIOVASCULAR: Denies chest pain, discomfort or palpitations. Denies neck    swelling or episodes of passing out.      RESPIRATORY: Denies cough, sputum production, blood in sputum, and denies    shortness of breath.      GENITOURINARY: No discharge. No pelvic pain or lumps. No rash around groin or  lesions. No urinary frequency, hesitation, painful urination or blood in    urine. Denies incontinence. No problems with intercourse.      MUSCULOSKELETAL: Denies neck or back pain. Denies weakness in arms or legs,    joint problems or distended inflamed veins in legs. Denies swelling or abnormal  glands.      NEUROLOGICAL: Denies tingling, numbness, altered mentation changes to nerve    function in the face, weakness to one or both of the body. Denies changes to    gait and denies multiple falls or accidents.      PSYCHIATRIC: Denies nervousness, anxiety, hallucinations, depression, suicidal    ideation, trouble sleeping or changes in behavior noticed by family.          Oncology History:  T3 N1 rectal CA diagnosed October 2020 robotic LAR November 2020 December 2020 PET scan negative for metastatic disease   started FOLFOX 2/21 but stopped after 3 cycles and only wants surveillance   had colonoscopy 8/21 NAD  Past Medical History:   Diagnosis Date    Acute hypoxemic respiratory failure 07/09/2018    Arthritis     Cancer     colon    COPD (chronic obstructive pulmonary disease)     History of home oxygen therapy     ONLY PRN AND HASN'T USED IT IN A YEAR    Hyperlipidemia      Thyroid disease      Past Surgical History:   Procedure Laterality Date    CHOLECYSTECTOMY      COLECTOMY N/A 11/9/2020    Procedure: COLECTOMY;  Surgeon: Brandon Wagner MD;  Location: [facility] OR;  Service: General;  Laterality: N/A;  LOWER- ANTERIOR    COLONOSCOPY N/A 10/23/2020    Procedure: COLONOSCOPY;  Surgeon: Jolynn Ayala MD;  Location: [facility] ENDO;  Service: Endoscopy;  Laterality: N/A;    COLONOSCOPY N/A 8/6/2021    Procedure: COLONOSCOPY;  Surgeon: Jolynn Ayala MD;  Location: [facility] ENDO;  Service: Endoscopy;  Laterality: N/A;    COLONOSCOPY N/A 7/7/2022    Procedure: COLONOSCOPY;  Surgeon: Jolynn Ayala MD;  Location: [facility] ENDO;  Service: Endoscopy;  Laterality: N/A;    ESOPHAGEAL DILATION N/A 6/29/2021    Procedure: DILATION, ESOPHAGUS;  Surgeon: Sourav Baires III, MD;  Location: [facility];  Service: Endoscopy;  Laterality: N/A;    ESOPHAGOGASTRODUODENOSCOPY  06/29/2021    Dilation to 57 Fr    ESOPHAGOGASTRODUODENOSCOPY N/A 6/29/2021    Procedure: EGD (ESOPHAGOGASTRODUODENOSCOPY);  Surgeon: Sourav Baires III, MD;  Location: [facility];  Service: Endoscopy;  Laterality: N/A;    FLEXIBLE SIGMOIDOSCOPY N/A 8/1/2022    Procedure: SIGMOIDOSCOPY, FLEXIBLE;  Surgeon: Brandon Wagner MD;  Location: [facility] ENDO;  Service: Endoscopy;  Laterality: N/A;    HYSTERECTOMY      INSERTION OF TUNNELED CENTRAL VENOUS CATHETER (CVC) WITH SUBCUTANEOUS PORT N/A 1/11/2021    Procedure: [id number];  Surgeon: Brandon Wagner MD;  Location: [facility] OR;  Service: General;  Laterality: N/A;    INSERTION OF TUNNELED CENTRAL VENOUS CATHETER (CVC) WITH SUBCUTANEOUS PORT Right 9/1/2022    Procedure: [id number];  Surgeon: Brandon Wagner MD;  Location: [facility] OR;  Service: General;  Laterality: Right;  Wants rt side placement    MEDIPORT REMOVAL Left 4/19/2021    Procedure: REMOVAL, CATHETER, CENTRAL VENOUS, TUNNELED, WITH PORT;  Surgeon: Brandon Wagner MD;  Location: [facility] OR;  Service:  General;  Laterality: Left;    ROBOT-ASSISTED COLECTOMY N/A 2020    Procedure: ROBOTIC COLECTOMY low anterior resection, possible open;  Surgeon: Brandon Wagner MD;  Location: [facility];  Service: General;  Laterality: N/A;  CONVERTED TO OPEN AT 1503     Family History   Problem Relation Age of Onset    COPD Mother     COPD Father     Prostate cancer Brother     Breast cancer Maternal Grandmother       Social History     Socioeconomic History    Marital status:    Tobacco Use    Smoking status: Former     Packs/day: 2.00     Years: 40.00     Pack years: 80.00     Types: Cigarettes     Quit date: 2017     Years since quittin.5    Smokeless tobacco: Never   Substance and Sexual Activity    Alcohol use: No    Drug use: No    Sexual activity: Not Currently     Partners: Male     Review of patient's allergies indicates:   Allergen Reactions    Ativan [lorazepam] Hives and Itching    Flagyl [metronidazole] Itching and Rash    Pcn [penicillins] Hives, Itching and Rash       Current Outpatient Medications:     albuterol (PROVENTIL) 2.5 mg /3 mL (0.083 %) nebulizer solution, Take 3 mLs (2.5 mg total) by nebulization every 6 (six) hours as needed for Wheezing. Rescue, Disp: 120 each, Rfl: 1    CMPD hydrocortisone 2%- LIDOcaine 3% suppository (RECTAL ROCKET), Place 1 suppository rectally every evening. Insert 1 suppository 3/4 of the way onto rectum. Wet for easier application. Repeat for 3 nights., Disp: 30 suppository, Rfl: 2    cyanocobalamin (VITAMIN B-12) 1000 MCG tablet, Take 100 mcg by mouth once daily., Disp: , Rfl:     diphenoxylate-atropine 2.5-0.025 mg (LOMOTIL) 2.5-0.025 mg per tablet, Take by mouth., Disp: , Rfl:     doxycycline (MONODOX) 100 MG capsule, Take 1 capsule (100 mg total) by mouth 2 (two) times daily., Disp: 14 capsule, Rfl: 0    ferrous sulfate 325 (65 FE) MG EC tablet, Take 325 mg by mouth 2 (two) times daily., Disp: , Rfl:     guaiFENesin (MUCINEX) 600 mg 12 hr tablet, Take 1  tablet (600 mg total) by mouth 2 (two) times daily., Disp: 20 tablet, Rfl: 0    HYDROcodone-acetaminophen (NORCO) 5-325 mg per tablet, Take 1 tablet by mouth every 6 (six) hours as needed for Pain., Disp: 30 tablet, Rfl: 0    levothyroxine (SYNTHROID) 125 MCG tablet, Take 1 tablet (125 mcg total) by mouth before breakfast., Disp: 30 tablet, Rfl: 11    ondansetron (ZOFRAN-ODT) 8 MG TbDL, Take 1 tablet (8 mg total) by mouth every 12 (twelve) hours as needed., Disp: 30 tablet, Rfl: 1    potassium chloride SA (K-DUR,KLOR-CON) 20 MEQ tablet, Take 1 tablet (20 mEq total) by mouth once daily., Disp: 30 tablet, Rfl: 11    prochlorperazine (COMPAZINE) 10 MG tablet, Take 1 tablet (10 mg total) by mouth every 6 (six) hours as needed., Disp: 30 tablet, Rfl: 1    traMADoL (ULTRAM) 50 mg tablet, Take 1 tablet (50 mg total) by mouth every 6 (six) hours as needed., Disp: 30 tablet, Rfl: 0    triamcinolone acetonide 0.025% (KENALOG) 0.025 % cream, Apply topically once daily., Disp: 80 g, Rfl: 1  PHYSICAL EXAM:     Wt Readings from Last 3 Encounters:   02/10/23 83.8 kg (184 lb 11.9 oz)   02/02/23 86.7 kg (191 lb 3.2 oz)   01/31/23 84.5 kg (186 lb 4.8 oz)     Temp Readings from Last 3 Encounters:   02/10/23 97.6 °F (36.4 °C) (Temporal)   02/02/23 98.2 °F (36.8 °C)   01/31/23 98.2 °F (36.8 °C)     BP Readings from Last 3 Encounters:   02/10/23 122/75   02/02/23 123/74   01/31/23 116/78     Pulse Readings from Last 3 Encounters:   02/10/23 96   02/02/23 82   01/31/23 78     GENERAL: alert; in no apparent distress, , well-built well-nourished   HEAD: normocephalic, atraumatic.  EYES: pupils are equal, round, reactive to light and accommodation. Sclera anicteric. Conjunctiva not injected.   NOSE/THROAT: no nasal erythema or rhinorrhea. Oropharynx pink, without erythema, ulcerations or thrush.   NECK: no cervical motion rigidity; supple with no masses.  CHEST: clear to auscultation bilaterally; no wheezes, crackles or rubs. Patient is  speaking comfortably on room air with normal work of breathing without using accessory muscles of respiration.  CARDIOVASCULAR: regular rate and rhythm; no murmurs, rubs or gallops.  ABDOMEN: soft, nontender, nondistended. Bowel sounds present.   MUSCULOSKELETAL: no tenderness to palpation along the spine or scapulae. Normal range of motion.  NEUROLOGIC: cranial nerves II-XII intact bilaterally. Strength 5/5 in bilateral upper and lower extremities. No sensory deficits appreciated. Reflexes globally intact. No cerebellar signs. Normal gait.  LYMPHATIC: no cervical, supraclavicular or axillary adenopathy appreciated bilaterally.   EXTREMITIES: no clubbing, cyanosis, edema.  SKIN: no erythema, rashes, ulcerations noted  ECOG-0  Lab Results   Component Value Date    WBC 13.15 (H) 02/10/2023    HGB 13.8 02/10/2023    HCT 42.4 02/10/2023    MCV 93 02/10/2023     02/10/2023       BMP  Lab Results   Component Value Date     02/10/2023    K 3.7 02/10/2023     02/10/2023    CO2 25 02/10/2023    BUN 8 02/10/2023    CREATININE 0.7 02/10/2023    CALCIUM 9.0 02/10/2023    ANIONGAP 9 02/10/2023    ESTGFRAFRICA >60 05/02/2022    EGFRNONAA >60 05/02/2022      cea 7/14/21 1.8  November 9, 2020  .  Rectum, sigmoid colon and proximal donut (low anterior resection):   - Invasive adenocarcinoma,  invading through the muscularis propria into   pericolorectal tissue   - Pathologic stage pT3, pN1a (see synoptic report)   - Resection margins negative for dysplasia or malignancy (proximal, distal   and radial margins), within 1 mm to radial margin   - One tumor deposit   - Adenocarcinoma involve one out of totally forty seven lymph nodes (1/46 in   part 1, totally 1/47, also see part 2)   - Background colon with diverticulosis associated with hyperpigmented   macrophages, benign   - Proximal donut with benign colon, no dysplasia or malignancy   - Tattoo identified   2.  Colon, distal donut (excision):   - Benign colon,  negative for dysplasia or malignancy   - One lymph node, negative for metastatic carcinoma (0/1)   COLON AND RECTUM: Resection, Including Transanal Disk Excision of Rectal   Neoplasms   Procedure  Low anterior resection   Tumor Site  Rectum    Tumor Location: Straddles the anterior peritoneal reflection   Tumor Size   Greatest dimension (centimeters): 7.0 cm   + Additional dimensions (centimeters): 4.7 x 2.2 cm   Macroscopic Tumor Perforation  Not identified   Macroscopic Intactness of Mesorectum  Near complete   Histologic Type  Adenocarcinoma   Histologic Grade   G2: Moderately differentiated   Tumor Extension  Tumor invades through the muscularis propria into   pericolorectal tissue     Impression:  November 7, 2020 CT abdomen pelvis     1. Rectosigmoid junction mass, unchanged from prior.  No definite evidence of intraluminal contrast extravasation to suggest active gastrointestinal hemorrhage, however please note that arterial phase CT is more sensitive.  2. Colonic diverticulosis without acute diverticulitis.  3. Minimal pneumobilia, unchanged      IMPRESSION:  CT chest lung screen October 2020     1.  3 mm pulmonary nodule identified in the lateral segment of the  right lower lobe.  2.  Focus of groundglass opacity measuring 11 mm in diameter in the  superior segment of the right lower lobe. Short-term follow-up for  this lesion is recommended.  3.  Mild emphysematous lung disease.     LUNG-RADS CATEGORY 3: PROBABLY BENIGN FINDINGS     RECOMMENDATION: Short-term imaging follow-up with 6 month LDCT.    IMPRESSION: 7/21  1. Nonspecific hypermetabolic soft tissue density focus cranial to the rectosigmoid colon suture line, perhaps reflecting postsurgical and or posttreatment or postinflammatory changes. Attention to this area at follow-up imaging is recommended to help differentiate from residual or recurrent neoplasm.  2. Improving postoperative changes in the lower anterior abdominal wall, with associated  multifocal FDG hypermetabolism which is most characteristic of postoperative etiology.  3. Otherwise no convincing evidence of recurrent neoplasm or metastatic disease.IMPRESSION:  1. Interval increased size of hypermetabolic presacral mass contiguous with hypermetabolic circumferential wall thickening of the rectum at the level of the anastomotic suture line, consistent with recurrent neoplasm and metastatic disease.  2. Interval development of enlarged, hypermetabolic right iliac chain lymph node consistent with metastatic disease.  3. No additional convincing evidence of metastatic disease.  4. Bilateral sacral ala insufficiency fractures, with corresponding FDG hypermetabolism.  5. Intense FDG uptake throughout the thyroid gland, unchanged and nonspecific.      Patient Active Problem List   Diagnosis    COPD (chronic obstructive pulmonary disease)    Pure hypercholesterolemia    Acquired hypothyroidism    Rectal bleeding    Rectal cancer    Dysphagia    Malignant neoplasm of colon    B12 deficiency    FENG (iron deficiency anemia)    Hypocalcemia    Chronic diarrhea    Severe obesity (BMI 35.0-39.9) with comorbidity    Secondary and unspecified malignant neoplasm of intra-abdominal lymph nodes    Dehydration    Diarrhea    Chemotherapy-induced neutropenia    Chemotherapy-induced diarrhea   IMPRESSION:pet 12/2020  1. Postoperative changes of rectal colonic resection, with adjacent  FDG avid presacral soft tissue stranding.  2. Adjacent punctate extraluminal foci of free air suggesting a tiny  tract extending towards the adjacent small bowel (possibly  postoperative but suggesting a tiny enterocolonic fistula)  3. Deep subcutaneous soft tissue structure along the ventral abdomen  suggesting a postoperative hematoma/seroma, and less likely abscess.  4. No findings of additional sites of FDG avid disease.      IMPRESSION: 7/21  1. Nonspecific hypermetabolic soft tissue density focus cranial to the rectosigmoid colon  suture line, perhaps reflecting postsurgical and or posttreatment or postinflammatory changes. Attention to this area at follow-up imaging is recommended to help differentiate from residual or recurrent neoplasm.  2. Improving postoperative changes in the lower anterior abdominal wall, with associated multifocal FDG hypermetabolism which is most characteristic of postoperative etiology.  3. Otherwise no convincing evidence of recurrent neoplasm or metastatic disease      Impression:2/2/22   CT CAP  A single 4 mm soft tissue density nodule is noted in the right lower lobe.  No other intrapulmonary masses or nodules are seen.  Follow-up CT in 12 months per of this nodule is recommended.  Coronary artery calcification is noted.  No masses or adenopathy or seen.     Prior cholecystectomy.  Prior hysterectomy.  2.2 cm left ovarian cyst.  Small ventral hernia containing a loop of small bowel without CT evidence of obstruction    Impression:11/22     Presacral/pre coccygeal soft tissue density and fat stranding and circumferential wall thickening of segment of bowel down low in the pelvis at the surgical site are not significantly changed compared to the prior exam.  Prominent lymph node right iliac chain also does not appear significantly changed.  Findings also correspond as seen on prior PET-CT of 08/04/2022 and are suspicious for neoplasm  Assessment and Plan   At original dx 2020  Status post low anterior resection November 9, 2020 for rectal CA T3 N1a   tumor board convened and agreed on xrt that will follow  After completion of chemo  sequentially  therapy   Patient started FOLFOX.  Completed 4 out of 12 planned adjuvant cycles,  Discontinued  Chemo / port removed completed xrt 5/21  Pet:8/22, showing progression   Pt very hesitant T FIRST TO GET PORT OR INFUSIONAL 5 FU/ OXALIPLATIN, WENT TO [facility] ON SECOND OPINION AGREABLE NOW, HAD PORT PLACED   Started  FOLFIRI + avastin   from second cycle changed to  FOLFIRI panitumumab   Sig diarrhea with CPT-11, in spite of added lomotil to imodium  decreased dose of CPT-11  to 165 mg/m2 but patient is still hospitalized with severe diarrhea electrolyte imbalance does not want any treatment till further scans are done to look for response prior to embarking I have discussed with patient the risks of this vision however I also showed her that we will remove CPT 11 from her regimen due to severity of side effects see me with PET scan  Scan shows no change 12/22  Will drop cpt -11 and cont with 5fu and vectibix   Next scan 3-4/23  Pt to take kcl oral doesn't want IV replacen and has done well. K normal this visit   Dc the bolus 5FU  d/w  FENG :Injectafer  completed 8/22 good response  pt to add SL b12 to her twice a month b12 shots    Please ask for nikole  Pt waited wait till holidays are over to get imaging studies looking for response   b12 def anemia, was taking injections q month level checked 10/21 sub therpautic 226 t INSURANCE WONT PAY FOR B12.ok to cont with SL only as levels have improved to 1500  iron deficiency  Pt to try oral for now, will recheck in 8/22 ( may help her chronic diarrhea)  hypocalcemia which seems chronic pt to supplement orally daily wnl this visit  Hypothyroidism, sees DR Mcdermott will adj meds  History of chronic obstructive pulmonary disease, dyslipidemia and hypothyroidism continue with PCP  Doesn't want to be covid vaccinated

## 2023-02-13 NOTE — PLAN OF CARE
Problem: Fatigue  Goal: Improved Activity Tolerance  Outcome: Ongoing, Progressing  Intervention: Promote Improved Energy  Flowsheets (Taken 2/13/2023 1000)  Fatigue Management: frequent rest breaks encouraged  Sleep/Rest Enhancement:   regular sleep/rest pattern promoted   relaxation techniques promoted  Activity Management: Ambulated -L4

## 2023-02-16 NOTE — PLAN OF CARE
Problem: Neutropenia (Chemotherapy Effects)  Goal: Absence of Infection  Outcome: Ongoing, Progressing  Intervention: Prevent Infection and Maximize Resistance  Flowsheets (Taken 2/16/2023 1005)  Infection Prevention:   equipment surfaces disinfected   rest/sleep promoted   personal protective equipment utilized   visitors restricted/screened   hand hygiene promoted

## 2023-02-21 PROBLEM — E87.6 HYPOKALEMIA: Status: ACTIVE | Noted: 2023-01-01

## 2023-02-21 PROBLEM — N20.1 RIGHT URETERAL STONE: Status: ACTIVE | Noted: 2023-01-01

## 2023-02-21 NOTE — PLAN OF CARE
Problem: Adult Inpatient Plan of Care  Goal: Plan of Care Review  Outcome: Ongoing, Progressing     Problem: Adult Inpatient Plan of Care  Goal: Optimal Comfort and Wellbeing  Outcome: Ongoing, Progressing     Problem: Infection  Goal: Absence of Infection Signs and Symptoms  Outcome: Ongoing, Progressing     Problem: Skin Injury Risk Increased  Goal: Skin Health and Integrity  Outcome: Ongoing, Progressing

## 2023-02-21 NOTE — ASSESSMENT & PLAN NOTE
Patient's COPD is controlled currently.  Patient is currently off COPD Pathway. Continue scheduled inhalers Supplemental oxygen as needed and monitor respiratory status closely.        Yes

## 2023-02-21 NOTE — SUBJECTIVE & OBJECTIVE
Past Medical History:   Diagnosis Date    Acute hypoxemic respiratory failure 07/09/2018    Arthritis     Cancer     colon    COPD (chronic obstructive pulmonary disease)     History of home oxygen therapy     ONLY PRN AND HASN'T USED IT IN A YEAR    Hyperlipidemia     Thyroid disease        Past Surgical History:   Procedure Laterality Date    CHOLECYSTECTOMY      COLECTOMY N/A 11/9/2020    Procedure: COLECTOMY;  Surgeon: Brandon Wagner MD;  Location: [facility];  Service: General;  Laterality: N/A;  LOWER- ANTERIOR    COLONOSCOPY N/A 10/23/2020    Procedure: COLONOSCOPY;  Surgeon: Jolynn Ayala MD;  Location: [facility] ENDO;  Service: Endoscopy;  Laterality: N/A;    COLONOSCOPY N/A 8/6/2021    Procedure: COLONOSCOPY;  Surgeon: Jolynn Ayala MD;  Location: [facility] ENDO;  Service: Endoscopy;  Laterality: N/A;    COLONOSCOPY N/A 7/7/2022    Procedure: COLONOSCOPY;  Surgeon: Jolynn Ayala MD;  Location: [facility] ENDO;  Service: Endoscopy;  Laterality: N/A;    ESOPHAGEAL DILATION N/A 6/29/2021    Procedure: DILATION, ESOPHAGUS;  Surgeon: Sourav Baires III, MD;  Location: [facility];  Service: Endoscopy;  Laterality: N/A;    ESOPHAGOGASTRODUODENOSCOPY  06/29/2021    Dilation to 57 Fr    ESOPHAGOGASTRODUODENOSCOPY N/A 6/29/2021    Procedure: EGD (ESOPHAGOGASTRODUODENOSCOPY);  Surgeon: Sourav Baires III, MD;  Location: [facility];  Service: Endoscopy;  Laterality: N/A;    FLEXIBLE SIGMOIDOSCOPY N/A 8/1/2022    Procedure: SIGMOIDOSCOPY, FLEXIBLE;  Surgeon: Brandon Wagner MD;  Location: [facility] ENDO;  Service: Endoscopy;  Laterality: N/A;    HYSTERECTOMY      INSERTION OF TUNNELED CENTRAL VENOUS CATHETER (CVC) WITH SUBCUTANEOUS PORT N/A 1/11/2021    Procedure: [id number];  Surgeon: Brandon Wagner MD;  Location: [facility];  Service: General;  Laterality: N/A;    INSERTION OF TUNNELED CENTRAL VENOUS CATHETER (CVC) WITH SUBCUTANEOUS PORT Right 9/1/2022    Procedure: [id number];  Surgeon: Brandon  LONG Wagner MD;  Location: [facility] OR;  Service: General;  Laterality: Right;  Wants rt side placement    MEDIPORT REMOVAL Left 4/19/2021    Procedure: REMOVAL, CATHETER, CENTRAL VENOUS, TUNNELED, WITH PORT;  Surgeon: Brandon Wagner MD;  Location: [facility] OR;  Service: General;  Laterality: Left;    ROBOT-ASSISTED COLECTOMY N/A 11/9/2020    Procedure: ROBOTIC COLECTOMY low anterior resection, possible open;  Surgeon: Brandon Wagner MD;  Location: [facility] OR;  Service: General;  Laterality: N/A;  CONVERTED TO OPEN AT 1503       Review of patient's allergies indicates:   Allergen Reactions    Ativan [lorazepam] Hives and Itching    Flagyl [metronidazole] Itching and Rash    Pcn [penicillins] Hives, Itching and Rash       No current facility-administered medications on file prior to encounter.     Current Outpatient Medications on File Prior to Encounter   Medication Sig    albuterol (PROVENTIL) 2.5 mg /3 mL (0.083 %) nebulizer solution Take 3 mLs (2.5 mg total) by nebulization every 6 (six) hours as needed for Wheezing. Rescue    CMPD hydrocortisone 2%- LIDOcaine 3% suppository (RECTAL ROCKET) Place 1 suppository rectally every evening. Insert 1 suppository 3/4 of the way onto rectum. Wet for easier application. Repeat for 3 nights.    cyanocobalamin (VITAMIN B-12) 1000 MCG tablet Take 100 mcg by mouth once daily.    diphenoxylate-atropine 2.5-0.025 mg (LOMOTIL) 2.5-0.025 mg per tablet Take by mouth.    doxycycline (MONODOX) 100 MG capsule Take 1 capsule (100 mg total) by mouth 2 (two) times daily.    ferrous sulfate 325 (65 FE) MG EC tablet Take 325 mg by mouth 2 (two) times daily.    guaiFENesin (MUCINEX) 600 mg 12 hr tablet Take 1 tablet (600 mg total) by mouth 2 (two) times daily.    HYDROcodone-acetaminophen (NORCO) 5-325 mg per tablet Take 1 tablet by mouth every 6 (six) hours as needed for Pain.    levothyroxine (SYNTHROID) 125 MCG tablet Take 1 tablet (125 mcg total) by mouth before breakfast.    ondansetron  (ZOFRAN-ODT) 8 MG TbDL Take 1 tablet (8 mg total) by mouth every 12 (twelve) hours as needed.    potassium chloride SA (K-DUR,KLOR-CON) 20 MEQ tablet Take 1 tablet (20 mEq total) by mouth once daily.    prochlorperazine (COMPAZINE) 10 MG tablet Take 1 tablet (10 mg total) by mouth every 6 (six) hours as needed.    traMADoL (ULTRAM) 50 mg tablet Take 1 tablet (50 mg total) by mouth every 6 (six) hours as needed.    triamcinolone acetonide 0.025% (KENALOG) 0.025 % cream Apply topically once daily.     Family History       Problem Relation (Age of Onset)    Breast cancer Maternal Grandmother    COPD Mother, Father    Prostate cancer Brother          Tobacco Use    Smoking status: Former     Packs/day: 2.00     Years: 40.00     Pack years: 80.00     Types: Cigarettes     Quit date: 2017     Years since quittin.6    Smokeless tobacco: Never   Substance and Sexual Activity    Alcohol use: No    Drug use: No    Sexual activity: Not Currently     Partners: Male     Review of Systems   Constitutional:  Negative for chills and fever.   HENT:  Negative for congestion and sore throat.    Respiratory:  Negative for cough, shortness of breath and wheezing.    Cardiovascular:  Negative for chest pain and palpitations.   Gastrointestinal:  Positive for diarrhea and nausea. Negative for abdominal pain and vomiting.   Genitourinary:  Positive for flank pain. Negative for dysuria and frequency.   Musculoskeletal:  Positive for back pain. Negative for neck pain.   Skin:  Positive for pallor. Negative for rash.   Neurological:  Negative for dizziness and syncope.   Psychiatric/Behavioral:  Negative for agitation and confusion.    All other systems reviewed and are negative.  Objective:     Vital Signs (Most Recent):  Temp: 98.2 °F (36.8 °C) (23 0853)  Pulse: 104 (23 1046)  Resp: 20 (23 1039)  BP: (!) 166/79 (23 1046)  SpO2: 97 % (23 1046)   Vital Signs (24h Range):  Temp:  [98.2 °F (36.8 °C)] 98.2  °F (36.8 °C)  Pulse:  [] 104  Resp:  [18-20] 20  SpO2:  [96 %-98 %] 97 %  BP: (149-166)/(77-79) 166/79     Weight: 83.3 kg (183 lb 12.1 oz)  Body mass index is 34.72 kg/m².    Physical Exam  Constitutional:       General: She is not in acute distress.     Appearance: She is well-developed. She is ill-appearing (chronically; undergoing chemo).   HENT:      Head: Normocephalic and atraumatic.      Mouth/Throat:      Mouth: Mucous membranes are dry.   Eyes:      Conjunctiva/sclera: Conjunctivae normal.      Pupils: Pupils are equal, round, and reactive to light.   Cardiovascular:      Rate and Rhythm: Regular rhythm. Tachycardia present.      Heart sounds: Normal heart sounds.   Pulmonary:      Effort: Pulmonary effort is normal. No respiratory distress.      Breath sounds: Normal breath sounds.   Abdominal:      General: Bowel sounds are normal. There is no distension.      Palpations: Abdomen is soft.   Musculoskeletal:         General: Normal range of motion.      Cervical back: Normal range of motion and neck supple.   Skin:     General: Skin is warm and dry.      Capillary Refill: Capillary refill takes 2 to 3 seconds.      Comments: Alopecia     Neurological:      General: No focal deficit present.      Mental Status: She is alert and oriented to person, place, and time.   Psychiatric:         Mood and Affect: Mood normal.         Behavior: Behavior normal.         Thought Content: Thought content normal.         CRANIAL NERVES     CN III, IV, VI   Pupils are equal, round, and reactive to light.     Significant Labs: All pertinent labs within the past 24 hours have been reviewed.  CBC:   Recent Labs   Lab 02/21/23  0952   WBC 19.02*   HGB 13.7   HCT 40.6        CMP:   Recent Labs   Lab 02/21/23  0952      K 3.3*      CO2 21*   *   BUN 8   CREATININE 0.9   CALCIUM 8.8   PROT 6.8   ALBUMIN 3.7   BILITOT 0.6   ALKPHOS 242*   AST 15   ALT 13   ANIONGAP 12       Significant Imaging: I  have reviewed all pertinent imaging results/findings within the past 24 hours.  CT renal stone protocol:    1. 4 mm obstructing distal right ureteral/right UVJ stone with 2nd 4 mm distal right ureteral stone appreciated.  There is associated moderate diffuse right hydroureter, inflammatory change of the right kidney, and severe right hydronephrosis.  2. Bilateral nephrolithiasis  3. Interval development of new small bowel containing midline supraumbilical incisional hernia.  No associated bowel obstruction or bowel inflammatory change.  There is also a smaller fat containing midline ventral abdominal wall hernia.  Additional details are provided above.  4. History of prior LAR with stable presacral soft tissue thickening and stable 18 x 52 mm soft tissue density in the vicinity of the patient's anastomotic staple line in the right posterior hemipelvis.  There is a stable rubi mm short axis right internal iliac lymph node.  No new mass or fluid collection about the patient's anastomosis.  5. 18 mm nodular density projected over the right anterior chest wall on the uppermost slice image of the examination, most likely normal, dense fibroglandular tissue.  A diagnostic mammogram and ultrasound should be considered to exclude a new right breast nodule.  Status post cholecystectomy and hysterectomy

## 2023-02-21 NOTE — ED PROVIDER NOTES
Encounter Date: 2/21/2023    SCRIBE #1 NOTE: Tyrone JONES, jimbo scribing for, and in the presence of,  Bennett San MD.     History     Chief Complaint   Patient presents with    Flank Pain     Rt flank pain/Rt thoracic back pain--s/s x 2 days (reports Hx of acute on chronic diarrhea; Hx of colon CA)     Time seen by provider: 9:00 AM on 02/21/2023    Tessa Enriquez is a 67 y.o. female who presents to the ED with an onset of pain on her right side starting from under the ribs going to her lower back. The pain started suddenly at 4 PM yesterday. Patient reports pain causes nausea at times but denies current nausea. Patient reports last taking Hydrocodone for pain at 11 pm. The patient denies pain with urination, difficulty urinating or any other symptoms at this time. Patient has a PMHx of colon CA s/p colectomy on chemotherapy, and chronic diarrhea. Patient denies hx of kidney stones. Additional PSHx includes hysterectomy, cholecystectomy, and appendectomy.    The history is provided by the patient.   Review of patient's allergies indicates:   Allergen Reactions    Ativan [lorazepam] Hives and Itching    Flagyl [metronidazole] Itching and Rash    Pcn [penicillins] Hives, Itching and Rash     Past Medical History:   Diagnosis Date    Acute hypoxemic respiratory failure 07/09/2018    Arthritis     Cancer     colon    COPD (chronic obstructive pulmonary disease)     History of home oxygen therapy     ONLY PRN AND HASN'T USED IT IN A YEAR    Hyperlipidemia     Thyroid disease      Past Surgical History:   Procedure Laterality Date    CHOLECYSTECTOMY      COLECTOMY N/A 11/9/2020    Procedure: COLECTOMY;  Surgeon: Brandon Wagner MD;  Location: [facility] OR;  Service: General;  Laterality: N/A;  LOWER- ANTERIOR    COLONOSCOPY N/A 10/23/2020    Procedure: COLONOSCOPY;  Surgeon: Jolynn Ayala MD;  Location: [facility];  Service: Endoscopy;  Laterality: N/A;    COLONOSCOPY N/A 8/6/2021    Procedure: COLONOSCOPY;   Surgeon: Jolynn Ayala MD;  Location: [facility] ENDO;  Service: Endoscopy;  Laterality: N/A;    COLONOSCOPY N/A 7/7/2022    Procedure: COLONOSCOPY;  Surgeon: Jolynn Ayala MD;  Location: [facility] ENDO;  Service: Endoscopy;  Laterality: N/A;    ESOPHAGEAL DILATION N/A 6/29/2021    Procedure: DILATION, ESOPHAGUS;  Surgeon: Sourav Baires III, MD;  Location: [facility] ENDO;  Service: Endoscopy;  Laterality: N/A;    ESOPHAGOGASTRODUODENOSCOPY  06/29/2021    Dilation to 57 Fr    ESOPHAGOGASTRODUODENOSCOPY N/A 6/29/2021    Procedure: EGD (ESOPHAGOGASTRODUODENOSCOPY);  Surgeon: Sourav Baires III, MD;  Location: [facility] ENDO;  Service: Endoscopy;  Laterality: N/A;    FLEXIBLE SIGMOIDOSCOPY N/A 8/1/2022    Procedure: SIGMOIDOSCOPY, FLEXIBLE;  Surgeon: Brandon Wagner MD;  Location: [facility] ENDO;  Service: Endoscopy;  Laterality: N/A;    HYSTERECTOMY      INSERTION OF TUNNELED CENTRAL VENOUS CATHETER (CVC) WITH SUBCUTANEOUS PORT N/A 1/11/2021    Procedure: [id number];  Surgeon: Brandon Wagner MD;  Location: [facility] OR;  Service: General;  Laterality: N/A;    INSERTION OF TUNNELED CENTRAL VENOUS CATHETER (CVC) WITH SUBCUTANEOUS PORT Right 9/1/2022    Procedure: [id number];  Surgeon: Brandon Wagner MD;  Location: [facility] OR;  Service: General;  Laterality: Right;  Wants rt side placement    MEDIPORT REMOVAL Left 4/19/2021    Procedure: REMOVAL, CATHETER, CENTRAL VENOUS, TUNNELED, WITH PORT;  Surgeon: Brandon Wagner MD;  Location: [facility] OR;  Service: General;  Laterality: Left;    ROBOT-ASSISTED COLECTOMY N/A 11/9/2020    Procedure: ROBOTIC COLECTOMY low anterior resection, possible open;  Surgeon: Brandon Wagner MD;  Location: [facility] OR;  Service: General;  Laterality: N/A;  CONVERTED TO OPEN AT 1503     Family History   Problem Relation Age of Onset    COPD Mother     COPD Father     Prostate cancer Brother     Breast cancer Maternal Grandmother      Social History     Tobacco Use    Smoking status:  Former     Packs/day: 2.00     Years: 40.00     Pack years: 80.00     Types: Cigarettes     Quit date: 2017     Years since quittin.6    Smokeless tobacco: Never   Substance Use Topics    Alcohol use: No    Drug use: No     Review of Systems   Constitutional:  Negative for activity change, appetite change, chills, fatigue and fever.   Eyes:  Negative for visual disturbance.   Respiratory:  Negative for apnea and shortness of breath.    Cardiovascular:  Negative for chest pain and palpitations.   Gastrointestinal:  Positive for abdominal pain and nausea. Negative for abdominal distention and vomiting.   Genitourinary:  Negative for difficulty urinating and dysuria.   Musculoskeletal:  Positive for back pain. Negative for neck pain.        Pain under ribcage and lower back   Skin:  Negative for pallor and rash.   Allergic/Immunologic: Positive for immunocompromised state.   Neurological:  Negative for headaches.   Hematological:  Does not bruise/bleed easily.   Psychiatric/Behavioral:  Negative for agitation.      Physical Exam     Initial Vitals [23 0853]   BP Pulse Resp Temp SpO2   (!) 149/77 96 18 98.2 °F (36.8 °C) 96 %      MAP       --         Physical Exam    Nursing note and vitals reviewed.  Constitutional: She appears well-developed and well-nourished.   HENT:   Head: Normocephalic and atraumatic.   Eyes: Conjunctivae are normal.   Neck: Neck supple.   Normal range of motion.  Cardiovascular:  Normal rate, regular rhythm and normal heart sounds.     Exam reveals no gallop and no friction rub.       No murmur heard.  Pulmonary/Chest: Effort normal and breath sounds normal. No respiratory distress. She has no wheezes. She has no rhonchi. She has no rales.   Abdominal: Abdomen is soft. She exhibits no distension. There is abdominal tenderness.   RLQ tenderness. Right sided abdominal pain.   No right CVA tenderness.  No left CVA tenderness.   Musculoskeletal:         General: No tenderness. Normal  range of motion.      Cervical back: Normal range of motion and neck supple.     Neurological: She is alert and oriented to person, place, and time.   Skin: Skin is warm and dry. No erythema.   Psychiatric: She has a normal mood and affect.       ED Course   Procedures  Labs Reviewed   CBC W/ AUTO DIFFERENTIAL - Abnormal; Notable for the following components:       Result Value    WBC 19.02 (*)     RDW 15.0 (*)     Immature Granulocytes 1.7 (*)     Gran # (ANC) 16.3 (*)     Immature Grans (Abs) 0.33 (*)     Lymph # 0.9 (*)     Mono # 1.4 (*)     Gran % 85.4 (*)     Lymph % 4.8 (*)     All other components within normal limits   COMPREHENSIVE METABOLIC PANEL - Abnormal; Notable for the following components:    Potassium 3.3 (*)     CO2 21 (*)     Glucose 124 (*)     Alkaline Phosphatase 242 (*)     All other components within normal limits   URINALYSIS, REFLEX TO URINE CULTURE - Abnormal; Notable for the following components:    Protein, UA 1+ (*)     Occult Blood UA Trace (*)     Leukocytes, UA Trace (*)     All other components within normal limits    Narrative:     Specimen Source->Urine   URINALYSIS MICROSCOPIC - Abnormal; Notable for the following components:    WBC, UA 6 (*)     Bacteria Few (*)     All other components within normal limits    Narrative:     Specimen Source->Urine          Imaging Results               CT Renal Stone Study ABD Pelvis WO (Final result)  Result time 02/21/23 10:10:11      Final result by Gina Lake MD (02/21/23 10:10:11)                   Impression:      1. 4 mm obstructing distal right ureteral/right UVJ stone with 2nd 4 mm distal right ureteral stone appreciated.  There is associated moderate diffuse right hydroureter, inflammatory change of the right kidney, and severe right hydronephrosis.  2. Bilateral nephrolithiasis  3. Interval development of new small bowel containing midline supraumbilical incisional hernia.  No associated bowel obstruction or bowel  inflammatory change.  There is also a smaller fat containing midline ventral abdominal wall hernia.  Additional details are provided above.  4. History of prior LAR with stable presacral soft tissue thickening and stable 18 x 52 mm soft tissue density in the vicinity of the patient's anastomotic staple line in the right posterior hemipelvis.  There is a stable rubi mm short axis right internal iliac lymph node.  No new mass or fluid collection about the patient's anastomosis.  5. 18 mm nodular density projected over the right anterior chest wall on the uppermost slice image of the examination, most likely normal, dense fibroglandular tissue.  A diagnostic mammogram and ultrasound should be considered to exclude a new right breast nodule.  Status post cholecystectomy and hysterectomy  6. Other findings as detailed above  This report was flagged in Epic as abnormal.      Electronically signed by: Vazquez Lake MD  Date:    02/21/2023  Time:    10:10               Narrative:    EXAMINATION:  CT RENAL STONE STUDY ABD PELVIS WO    CLINICAL HISTORY:  Flank pain, kidney stone suspected; Unspecified abdominal pain    TECHNIQUE:  3.75 mm contiguous low dose axial images were acquired through the abdomen and pelvis utilizing the CT renal stone study protocol.  No oral contrast material was administered.    COMPARISON:  CT chest, abdomen, and pelvis-12/05/2022 and PET-CT scan-08/04/2022    FINDINGS:  Kidneys, ureters, and bladder: There is asymmetric enlargement of the right kidney and right perinephric fat stranding/fluid.  There is trace fluid within the right anterior pararenal space.  There is an 8 mm stone present in the upper pole collecting system of the right kidney and there is a 6 mm non-obstructing stone present in the lower pole collecting system of the left kidney.  There is severe right hydronephrosis and diffuse right hydroureter to the level of the AA 4 mm right UVJ stone (series 2, image 141.  There is a 2nd 4  mm stone present within the dependent distal right ureter on series 2, image 128.  No left hydronephrosis, and the left ureter is normal in caliber and course without stones.  The urinary bladder is completely decompressed.    Lung bases and mediastinum: There is a small volume of pericardial fluid within the anterior pericardium.  There is an 18 mm nodular density present over the right anterior chest wall within the right breast which may represent normal breast tissue in the lower right breast (series 2, image 1).  Consider additional evaluation with outpatient diagnostic mammography and ultrasound as deemed clinically appropriate.  The lung bases appear clear.    Remaining soft tissues of the abdomen and pelvis: The liver is unremarkable. The gallbladder is surgically absent. The extrahepatic common bile duct is dilated measuring 14 mm, possibly due to previous cholecystectomy. A 9 mm density within the vicinity of the posterior inferior wall of the common bile duct on series 601, image 60 and series 2, image 53 remains unchanged. It is unclear whether this lies within or adjacent to the common bile duct. The spleen, stomach, and pancreas show no significant abnormalities. There is mild fluid/fat stranding present adjacent to the 2nd portion of the duodenum.  The adrenal glands are unremarkable. There is atherosclerotic calcification present within the thoracoabdominal aorta and common iliac arteries.  No abdominal aortic aneurysm.  No bulky periaortic or retroperitoneal lymphadenopathy.    The uterus is surgically absent.  There is a tiny fat containing left inguinal hernia.  There is a 22 mm left adnexal hypodensity, possibly a left ovarian cyst and unchanged when compared with 12/05/2022.  The right ovary is not definitively identified.  There is stable presacral soft tissue/fat stranding present (for example series 2, image 132).  There is an adjacent anastomotic staple line present.  There is an unchanged  18 x 52 mm soft tissue density in the vicinity of the anastomotic staple line within the posterior right hemipelvis on series 2, image 123.  There is an 11 mm short axis right internal iliac lymph node present on series 2, image 119.  No definite new lymphadenopathy in the pelvis.    There is a small bowel containing right paramidline supraumbilical incisional hernia present, new when compared to the prior study.  No associated bowel obstruction or bowel inflammatory change appreciated.  The hernia neck measures approximately 40 x 43 mm on series 2, image 86 and series 602, image 124.  There is a smaller midline fat containing ventral hernia present on series 2, image 72 and series 602 image 122 with the hernia neck measuring approximately 12 x 8 mm.    Bones: There is a grade I anterolisthesis of L4 on L5.  No pars defects.  There is multilevel degenerative change of the thoracolumbar spine in the form of marginal osteophyte formation, disc space narrowing, and degenerative facet arthropathy.  There is a 19 mm stable sclerotic lesion present within the left posterior aspect of L2.  There is ill-defined sclerosis present within the left and right sacral ala, possibly the result of prior radiation therapy.  No new lytic or blastic lesions appreciated in the imaged axial skeleton.  There is bilateral gluteus tendon in degenerative enthesophyte formation.  There is degenerative change of the sacroiliac joints.                                       Medications   potassium chloride SA CR tablet 40 mEq (has no administration in time range)   ciprofloxacin (CIPRO)400mg/200ml D5W IVPB 400 mg (has no administration in time range)   0.9%  NaCl infusion (0 mL/hr Intravenous Stopped 2/21/23 1029)   ketorolac injection 15 mg (15 mg Intravenous Given 2/21/23 0943)   hyoscyamine injection 0.5 mg (0.5 mg Intravenous Given 2/21/23 0944)   HYDROmorphone (PF) injection 0.5 mg (0.5 mg Intravenous Given 2/21/23 1039)   tamsulosin 24 hr  capsule 0.4 mg (0.4 mg Oral Given 2/21/23 1039)     Medical Decision Making:   History:   Old Medical Records: I decided to obtain old medical records.  Clinical Tests:   Lab Tests: Ordered and Reviewed  Radiological Study: Ordered and Reviewed  ED Management:  67-year-old female presents with right flank pain that began yesterday.  She is found to have 2 right 4 mm intraureteral stones.  She has poorly controlled pain and will be admitted for pain control.  I discussed the case with Dr. Kwon.     APC / Resident Notes:   I, Dr. Bennett San III, personally performed the services described in this documentation. All medical record entries made by the scribe were at my direction and in my presence.  I have reviewed the chart and agree that the record reflects my personal performance and is accurate and complete   Scribe Attestation:   Scribe #1: I performed the above scribed service and the documentation accurately describes the services I performed. I attest to the accuracy of the note.                   Clinical Impression:   Final diagnoses:  [R10.9] Right flank pain        ED Disposition Condition    Observation                 Bennett San III, MD  02/21/23 1356

## 2023-02-21 NOTE — H&P
[facility]  History & Physical    Patient Name: Tessa Enriquez  MRN: [id number]  Patient Class: OP- Observation  Admission Date: 2/21/2023  Attending Physician: Nomi Casas MD   Primary Care Provider: Karma Mcdermott MD         Patient information was obtained from patient, past medical records and ER records.     Subjective:     Principal Problem:Right ureteral stone    Chief Complaint:   Chief Complaint   Patient presents with    Flank Pain     Rt flank pain/Rt thoracic back pain--s/s x 2 days (reports Hx of acute on chronic diarrhea; Hx of colon CA)        HPI: Tessa Enriquez is a 66 y/o F with a past medical history significant for colon cancer, COPD, hyperlipidemia, and hypothyroidism presented to the emergency department with complaints of right flank pain.  Pain started at 4:00 p.m. yesterday and has gotten worse.  Is accompanied by nausea, but no fever, shortness of breath, chest pain, vomiting, or diarrhea.  CT abd/pelvis reveals 4 mm obstructing distal right ureteral/right UVJ stone with 2nd 4 mm distal right ureteral stone also seen; there is associated moderate diffuse right hydroureter, inflammatory change of the right kidney, and severe right hydronephrosis.  WBC 19K.  Per report, the urologist on call was contacted and agrees to consult, but no procedure is planned for today.  She required IV dilaudid, IV ketorolac and IV hyoscyamine while in the ED for pain control.  She will be placed in observation for continued monitoring and treatment.            Past Medical History:   Diagnosis Date    Acute hypoxemic respiratory failure 07/09/2018    Arthritis     Cancer     colon    COPD (chronic obstructive pulmonary disease)     History of home oxygen therapy     ONLY PRN AND HASN'T USED IT IN A YEAR    Hyperlipidemia     Thyroid disease        Past Surgical History:   Procedure Laterality Date    CHOLECYSTECTOMY      COLECTOMY N/A 11/9/2020     Procedure: COLECTOMY;  Surgeon: Brandon Wagner MD;  Location: [facility] OR;  Service: General;  Laterality: N/A;  LOWER- ANTERIOR    COLONOSCOPY N/A 10/23/2020    Procedure: COLONOSCOPY;  Surgeon: Jolynn Ayala MD;  Location: [facility] ENDO;  Service: Endoscopy;  Laterality: N/A;    COLONOSCOPY N/A 8/6/2021    Procedure: COLONOSCOPY;  Surgeon: Jolynn Ayala MD;  Location: [facility] ENDO;  Service: Endoscopy;  Laterality: N/A;    COLONOSCOPY N/A 7/7/2022    Procedure: COLONOSCOPY;  Surgeon: Jolynn Ayala MD;  Location: [facility] ENDO;  Service: Endoscopy;  Laterality: N/A;    ESOPHAGEAL DILATION N/A 6/29/2021    Procedure: DILATION, ESOPHAGUS;  Surgeon: Sourav Baires III, MD;  Location: [facility];  Service: Endoscopy;  Laterality: N/A;    ESOPHAGOGASTRODUODENOSCOPY  06/29/2021    Dilation to 57 Fr    ESOPHAGOGASTRODUODENOSCOPY N/A 6/29/2021    Procedure: EGD (ESOPHAGOGASTRODUODENOSCOPY);  Surgeon: Sourav Baires III, MD;  Location: [facility];  Service: Endoscopy;  Laterality: N/A;    FLEXIBLE SIGMOIDOSCOPY N/A 8/1/2022    Procedure: SIGMOIDOSCOPY, FLEXIBLE;  Surgeon: Brandon Wagner MD;  Location: [facility] ENDO;  Service: Endoscopy;  Laterality: N/A;    HYSTERECTOMY      INSERTION OF TUNNELED CENTRAL VENOUS CATHETER (CVC) WITH SUBCUTANEOUS PORT N/A 1/11/2021    Procedure: [id number]-V-CATH;  Surgeon: Brandon Wagner MD;  Location: [facility] OR;  Service: General;  Laterality: N/A;    INSERTION OF TUNNELED CENTRAL VENOUS CATHETER (CVC) WITH SUBCUTANEOUS PORT Right 9/1/2022    Procedure: NPQVMEBOL-JWNK-G-CATH;  Surgeon: Brandon Wagner MD;  Location: [facility] OR;  Service: General;  Laterality: Right;  Wants rt side placement    MEDIPORT REMOVAL Left 4/19/2021    Procedure: REMOVAL, CATHETER, CENTRAL VENOUS, TUNNELED, WITH PORT;  Surgeon: Brandon Wagner MD;  Location: [facility] OR;  Service: General;  Laterality: Left;    ROBOT-ASSISTED COLECTOMY N/A 11/9/2020    Procedure: ROBOTIC COLECTOMY low anterior  resection, possible open;  Surgeon: Brandon Wagner MD;  Location: [facility];  Service: General;  Laterality: N/A;  CONVERTED TO OPEN AT 1503       Review of patient's allergies indicates:   Allergen Reactions    Ativan [lorazepam] Hives and Itching    Flagyl [metronidazole] Itching and Rash    Pcn [penicillins] Hives, Itching and Rash       No current facility-administered medications on file prior to encounter.     Current Outpatient Medications on File Prior to Encounter   Medication Sig    albuterol (PROVENTIL) 2.5 mg /3 mL (0.083 %) nebulizer solution Take 3 mLs (2.5 mg total) by nebulization every 6 (six) hours as needed for Wheezing. Rescue    CMPD hydrocortisone 2%- LIDOcaine 3% suppository (RECTAL ROCKET) Place 1 suppository rectally every evening. Insert 1 suppository 3/4 of the way onto rectum. Wet for easier application. Repeat for 3 nights.    cyanocobalamin (VITAMIN B-12) 1000 MCG tablet Take 100 mcg by mouth once daily.    diphenoxylate-atropine 2.5-0.025 mg (LOMOTIL) 2.5-0.025 mg per tablet Take by mouth.    doxycycline (MONODOX) 100 MG capsule Take 1 capsule (100 mg total) by mouth 2 (two) times daily.    ferrous sulfate 325 (65 FE) MG EC tablet Take 325 mg by mouth 2 (two) times daily.    guaiFENesin (MUCINEX) 600 mg 12 hr tablet Take 1 tablet (600 mg total) by mouth 2 (two) times daily.    HYDROcodone-acetaminophen (NORCO) 5-325 mg per tablet Take 1 tablet by mouth every 6 (six) hours as needed for Pain.    levothyroxine (SYNTHROID) 125 MCG tablet Take 1 tablet (125 mcg total) by mouth before breakfast.    ondansetron (ZOFRAN-ODT) 8 MG TbDL Take 1 tablet (8 mg total) by mouth every 12 (twelve) hours as needed.    potassium chloride SA (K-DUR,KLOR-CON) 20 MEQ tablet Take 1 tablet (20 mEq total) by mouth once daily.    prochlorperazine (COMPAZINE) 10 MG tablet Take 1 tablet (10 mg total) by mouth every 6 (six) hours as needed.    traMADoL (ULTRAM) 50 mg tablet Take 1 tablet (50 mg  total) by mouth every 6 (six) hours as needed.    triamcinolone acetonide 0.025% (KENALOG) 0.025 % cream Apply topically once daily.     Family History       Problem Relation (Age of Onset)    Breast cancer Maternal Grandmother    COPD Mother, Father    Prostate cancer Brother          Tobacco Use    Smoking status: Former     Packs/day: 2.00     Years: 40.00     Pack years: 80.00     Types: Cigarettes     Quit date: 2017     Years since quittin.6    Smokeless tobacco: Never   Substance and Sexual Activity    Alcohol use: No    Drug use: No    Sexual activity: Not Currently     Partners: Male     Review of Systems   Constitutional:  Negative for chills and fever.   HENT:  Negative for congestion and sore throat.    Respiratory:  Negative for cough, shortness of breath and wheezing.    Cardiovascular:  Negative for chest pain and palpitations.   Gastrointestinal:  Positive for diarrhea and nausea. Negative for abdominal pain and vomiting.   Genitourinary:  Positive for flank pain. Negative for dysuria and frequency.   Musculoskeletal:  Positive for back pain. Negative for neck pain.   Skin:  Positive for pallor. Negative for rash.   Neurological:  Negative for dizziness and syncope.   Psychiatric/Behavioral:  Negative for agitation and confusion.    All other systems reviewed and are negative.  Objective:     Vital Signs (Most Recent):  Temp: 98.2 °F (36.8 °C) (23 0853)  Pulse: 104 (23 1046)  Resp: 20 (23 1039)  BP: (!) 166/79 (23 1046)  SpO2: 97 % (23 1046)   Vital Signs (24h Range):  Temp:  [98.2 °F (36.8 °C)] 98.2 °F (36.8 °C)  Pulse:  [] 104  Resp:  [18-20] 20  SpO2:  [96 %-98 %] 97 %  BP: (149-166)/(77-79) 166/79     Weight: 83.3 kg (183 lb 12.1 oz)  Body mass index is 34.72 kg/m².    Physical Exam  Constitutional:       General: She is not in acute distress.     Appearance: She is well-developed. She is ill-appearing (chronically; undergoing chemo).   HENT:       Head: Normocephalic and atraumatic.      Mouth/Throat:      Mouth: Mucous membranes are dry.   Eyes:      Conjunctiva/sclera: Conjunctivae normal.      Pupils: Pupils are equal, round, and reactive to light.   Cardiovascular:      Rate and Rhythm: Regular rhythm. Tachycardia present.      Heart sounds: Normal heart sounds.   Pulmonary:      Effort: Pulmonary effort is normal. No respiratory distress.      Breath sounds: Normal breath sounds.   Abdominal:      General: Bowel sounds are normal. There is no distension.      Palpations: Abdomen is soft.   Musculoskeletal:         General: Normal range of motion.      Cervical back: Normal range of motion and neck supple.   Skin:     General: Skin is warm and dry.      Capillary Refill: Capillary refill takes 2 to 3 seconds.      Comments: Alopecia     Neurological:      General: No focal deficit present.      Mental Status: She is alert and oriented to person, place, and time.   Psychiatric:         Mood and Affect: Mood normal.         Behavior: Behavior normal.         Thought Content: Thought content normal.         CRANIAL NERVES     CN III, IV, VI   Pupils are equal, round, and reactive to light.     Significant Labs: All pertinent labs within the past 24 hours have been reviewed.  CBC:   Recent Labs   Lab 02/21/23  0952   WBC 19.02*   HGB 13.7   HCT 40.6        CMP:   Recent Labs   Lab 02/21/23  0952      K 3.3*      CO2 21*   *   BUN 8   CREATININE 0.9   CALCIUM 8.8   PROT 6.8   ALBUMIN 3.7   BILITOT 0.6   ALKPHOS 242*   AST 15   ALT 13   ANIONGAP 12       Significant Imaging: I have reviewed all pertinent imaging results/findings within the past 24 hours.  CT renal stone protocol:    1. 4 mm obstructing distal right ureteral/right UVJ stone with 2nd 4 mm distal right ureteral stone appreciated.  There is associated moderate diffuse right hydroureter, inflammatory change of the right kidney, and severe right hydronephrosis.  2. Bilateral  nephrolithiasis  3. Interval development of new small bowel containing midline supraumbilical incisional hernia.  No associated bowel obstruction or bowel inflammatory change.  There is also a smaller fat containing midline ventral abdominal wall hernia.  Additional details are provided above.  4. History of prior LAR with stable presacral soft tissue thickening and stable 18 x 52 mm soft tissue density in the vicinity of the patient's anastomotic staple line in the right posterior hemipelvis.  There is a stable rubi mm short axis right internal iliac lymph node.  No new mass or fluid collection about the patient's anastomosis.  5. 18 mm nodular density projected over the right anterior chest wall on the uppermost slice image of the examination, most likely normal, dense fibroglandular tissue.  A diagnostic mammogram and ultrasound should be considered to exclude a new right breast nodule.  Status post cholecystectomy and hysterectomy    Assessment/Plan:     * Right ureteral stone  Consult urology  IV fluids  Flomax  Pain control  Strain all urine  NPO at midnight      Hypokalemia  Repleted; monitor and replete as necessary      FENG (iron deficiency anemia)  Chronic problem.  Stable.  Monitor H/H.  Continue iron supplementation.  Transfuse for hemodynamic instability and/or H/H <7/21        B12 deficiency  Chronic condition; continue vit B 12 supplement      Malignant neoplasm of colon  Undergoing chemotherapy.  Last chemo 2/15 with pegfilgrastim injection 2/16  Followed by Dr. Frank    Acquired hypothyroidism  Patient has chronic hypothyroidism. TFTs reviewed-   Lab Results   Component Value Date    TSH 9.523 (H) 05/02/2022   . Will continue chronic levothyroxine and adjust for and clinical changes.  Check TSH      COPD (chronic obstructive pulmonary disease)  Patient's COPD is controlled currently.  Patient is currently off COPD Pathway. Continue scheduled inhalers Supplemental oxygen as needed and monitor  respiratory status closely.           VTE Risk Mitigation (From admission, onward)    CLAUDETTEs             Brenda Crandall, CEDRICK  Department of Hospital Medicine   [facility] - Emergency Dept

## 2023-02-21 NOTE — PLAN OF CARE
[facility]  Initial Discharge Assessment       Primary Care Provider: Karma Mcdermott MD    Admission Diagnosis: Right flank pain [R10.9]    Admission Date: 2/21/2023  Expected Discharge Date:     Discharge Barriers Identified: None    Payor: [employer] CROSS BLUE SHIELD / Plan: BLUE CONNECT / Product Type: HMO /     Extended Emergency Contact Information  Primary Emergency Contact: Umu Enriquez   [facility]  Home Phone: [phone]  Mobile Phone: [phone]  Relation: Relative   needed? No    Discharge Plan A: Home  Discharge Plan B: Home with family      [id number] - [place] - [address]  [address] [address]  Phone: [phone] Fax: [phone]    SW met with patient at bedside to complete discharge planning assessment.  Patient alert and oriented xs 4.  Patient verified all demographic information on facesheet is correct.  Patient verified PCP is Dr. Mcdermott.  Patient verified primary health insurance is Medicare PART A and secondary BCBS.  Patient with NO home health but has listed DME.  Patient with NO POA or Living Will.  Patient not on dialysis or medication coumadin.  Patient with no 30 day admission.  Patient with no financial issues at this time.  Patient family will provide transportation upon discharge from facility.  Patient independent with ADLs, live with roommate, drives self.      Initial Assessment (most recent)       Adult Discharge Assessment - 02/21/23 1305          Discharge Assessment    Assessment Type Discharge Planning Assessment     Confirmed/corrected address, phone number and insurance Yes     Confirmed Demographics Correct on Facesheet     Source of Information patient     Does patient/caregiver understand observation status Yes     Communicated DERICK with patient/caregiver Yes     People in Home friend(s)     Facility Arrived From: home     Do you expect to return to your current living  situation? Yes     Do you have help at home or someone to help you manage your care at home? Yes     Who are your caregiver(s) and their phone number(s)? family     Prior to hospitilization cognitive status: Alert/Oriented     Current cognitive status: Alert/Oriented     Equipment Currently Used at Home nebulizer     Readmission within 30 days? No     Patient currently being followed by outpatient case management? No     Do you currently have service(s) that help you manage your care at home? No     Do you take prescription medications? Yes     Do you have prescription coverage? Yes     Do you have any problems affording any of your prescribed medications? No     Is the patient taking medications as prescribed? yes     Who is going to help you get home at discharge? family     How do you get to doctors appointments? car, drives self     Are you on dialysis? No     Do you take coumadin? No     Discharge Plan A Home     Discharge Plan B Home with family     DME Needed Upon Discharge  none     Discharge Plan discussed with: Patient     Discharge Barriers Identified None        Physical Activity    On average, how many days per week do you engage in moderate to strenuous exercise (like a brisk walk)? Patient refused     On average, how many minutes do you engage in exercise at this level? Patient refused        Financial Resource Strain    How hard is it for you to pay for the very basics like food, housing, medical care, and heating? Patient refused        Housing Stability    In the last 12 months, was there a time when you were not able to pay the mortgage or rent on time? Patient refused     In the last 12 months, was there a time when you did not have a steady place to sleep or slept in a shelter (including now)? Patient refused        Transportation Needs    In the past 12 months, has lack of transportation kept you from medical appointments or from getting medications? Patient refused     In the past 12 months,  has lack of transportation kept you from meetings, work, or from getting things needed for daily living? Patient refused        Food Insecurity    Within the past 12 months, you worried that your food would run out before you got the money to buy more. Patient refused     Within the past 12 months, the food you bought just didn't last and you didn't have money to get more. Patient refused        Stress    Do you feel stress - tense, restless, nervous, or anxious, or unable to sleep at night because your mind is troubled all the time - these days? Patient refused        Social Connections    In a typical week, how many times do you talk on the phone with family, friends, or neighbors? Patient refused     How often do you get together with friends or relatives? Patient refused     How often do you attend Anglican or Pentecostalism services? Patient refused     Do you belong to any clubs or organizations such as Anglican groups, unions, fraternal or athletic groups, or school groups? Patient refused     How often do you attend meetings of the clubs or organizations you belong to? Patient refused     Are you , , , , never , or living with a partner? Patient refused        Alcohol Use    Q1: How often do you have a drink containing alcohol? Patient refused     Q2: How many drinks containing alcohol do you have on a typical day when you are drinking? Patient refused     Q3: How often do you have six or more drinks on one occasion? Patient refused

## 2023-02-21 NOTE — PLAN OF CARE
02/21/23 1305   RODRIGEZ Message   Medicare Outpatient and Observation Notification regarding financial responsibility Explained to patient/caregiver;Signed/date by patient/caregiver   Date RODRIGEZ was signed 02/21/23   Time RODRIGEZ was signed [id number]

## 2023-02-21 NOTE — ASSESSMENT & PLAN NOTE
Undergoing chemotherapy.  Last chemo 2/15 with pegfilgrastim injection 2/16  Followed by Dr. Frank

## 2023-02-21 NOTE — ASSESSMENT & PLAN NOTE
Patient has chronic hypothyroidism. TFTs reviewed-   Lab Results   Component Value Date    TSH 9.523 (H) 05/02/2022   . Will continue chronic levothyroxine and adjust for and clinical changes.  Check TSH

## 2023-02-21 NOTE — HPI
Tessa Enriquez is a 68 y/o F with a past medical history significant for colon cancer, COPD, hyperlipidemia, and hypothyroidism presented to the emergency department with complaints of right flank pain.  Pain started at 4:00 p.m. yesterday and has gotten worse.  Is accompanied by nausea, but no fever, shortness of breath, chest pain, vomiting, or diarrhea.  CT abd/pelvis reveals 4 mm obstructing distal right ureteral/right UVJ stone with 2nd 4 mm distal right ureteral stone also seen; there is associated moderate diffuse right hydroureter, inflammatory change of the right kidney, and severe right hydronephrosis.  WBC 19K.  Per report, the urologist on call was contacted and agrees to consult, but no procedure is planned for today.  She required IV dilaudid, IV ketorolac and IV hyoscyamine while in the ED for pain control.  She will be placed in observation for continued monitoring and treatment.

## 2023-02-22 NOTE — PLAN OF CARE
POC discussed with pt, pt verbalized understanding. Oriented x4. PIV cdi, fluids infusing. NPO at midnight per orders for stent placement today. Pt ambulated to the restroom with stand by assist. Urine strained, no stones noted. One episode of pain at beginning of shift, completely relieved with prn. Pt states that she believes stone passed in ER when she had an increase in pain because all her pain is gone, and she only feels her chronic back pain. Call light in reach, bed alarm set, safety maintained. No complaints or requests at this time, will continue to monitor.

## 2023-02-22 NOTE — PLAN OF CARE
Phoned rn to get report, informed per johnny sood that pt thinks she passed stone, Dr tenorio notified. New orders received, johnny informed ct is awaiting pt for ct at this time

## 2023-02-22 NOTE — PROGRESS NOTES
[facility] Hospital Medicine  Progress Note    Patient Name: Tessa Enriquez  MRN: [id number]  Patient Class: IP- Inpatient   Admission Date: 2/21/2023  Length of Stay: 1 days  Attending Physician: Nomi Casas MD  Primary Care Provider: Karma Mcdermott MD        Subjective:     Principal Problem:Right ureteral stone        HPI:  Tessa Enriquez is a 68 y/o F with a past medical history significant for colon cancer, COPD, hyperlipidemia, and hypothyroidism presented to the emergency department with complaints of right flank pain.  Pain started at 4:00 p.m. yesterday and has gotten worse.  Is accompanied by nausea, but no fever, shortness of breath, chest pain, vomiting, or diarrhea.  CT abd/pelvis reveals 4 mm obstructing distal right ureteral/right UVJ stone with 2nd 4 mm distal right ureteral stone also seen; there is associated moderate diffuse right hydroureter, inflammatory change of the right kidney, and severe right hydronephrosis.  WBC 19K.  Per report, the urologist on call was contacted and agrees to consult, but no procedure is planned for today.  She required IV dilaudid, IV ketorolac and IV hyoscyamine while in the ED for pain control.  She will be placed in observation for continued monitoring and treatment.            Overview/Hospital Course:  No notes on file    Interval History: seen and examined this morning, no acute changes.  Pain well controlled.      Review of Systems   Constitutional:  Negative for chills and fever.   HENT:  Negative for congestion and sore throat.    Respiratory:  Negative for cough, shortness of breath and wheezing.    Cardiovascular:  Negative for chest pain and palpitations.   Gastrointestinal:  Positive for diarrhea and nausea (improved). Negative for abdominal pain and vomiting.   Genitourinary:  Positive for flank pain (improved). Negative for dysuria and frequency.   Musculoskeletal:  Positive for back pain (improved). Negative for  neck pain.   Skin:  Positive for pallor. Negative for rash.   Neurological:  Negative for dizziness and syncope.   Psychiatric/Behavioral:  Negative for agitation and confusion.    All other systems reviewed and are negative.  Objective:     Vital Signs (Most Recent):  Temp: 99.7 °F (37.6 °C) (02/22/23 1123)  Pulse: 93 (02/22/23 1123)  Resp: 16 (02/22/23 1123)  BP: (!) 156/72 (02/22/23 1123)  SpO2: 96 % (02/22/23 1123)   Vital Signs (24h Range):  Temp:  [97.1 °F (36.2 °C)-99.7 °F (37.6 °C)] 99.7 °F (37.6 °C)  Pulse:  [73-93] 93  Resp:  [15-18] 16  SpO2:  [92 %-99 %] 96 %  BP: (111-156)/(54-73) 156/72     Weight: 83.3 kg (183 lb 12.1 oz)  Body mass index is 34.72 kg/m².    Intake/Output Summary (Last 24 hours) at 2/22/2023 1314  Last data filed at 2/22/2023 1255  Gross per 24 hour   Intake 2794.49 ml   Output 950 ml   Net 1844.49 ml      Physical Exam  Constitutional:       General: She is not in acute distress.     Appearance: She is well-developed. She is ill-appearing (chronically; undergoing chemo).   HENT:      Head: Normocephalic and atraumatic.      Mouth/Throat:      Mouth: Mucous membranes are dry.   Eyes:      Conjunctiva/sclera: Conjunctivae normal.      Pupils: Pupils are equal, round, and reactive to light.   Cardiovascular:      Rate and Rhythm: Regular rhythm. Tachycardia present.      Heart sounds: Normal heart sounds.   Pulmonary:      Effort: Pulmonary effort is normal. No respiratory distress.      Breath sounds: Normal breath sounds.   Abdominal:      General: Bowel sounds are normal. There is no distension.      Palpations: Abdomen is soft.   Musculoskeletal:         General: Normal range of motion.      Cervical back: Normal range of motion and neck supple.   Skin:     General: Skin is warm and dry.      Capillary Refill: Capillary refill takes 2 to 3 seconds.      Comments: Alopecia     Neurological:      General: No focal deficit present.      Mental Status: She is alert and oriented to  person, place, and time.   Psychiatric:         Mood and Affect: Mood normal.         Behavior: Behavior normal.         Thought Content: Thought content normal.       Significant Labs: All pertinent labs within the past 24 hours have been reviewed.  CBC:   Recent Labs   Lab 02/21/23  0952 02/22/23  0517   WBC 19.02* 14.90*   HGB 13.7 11.9*   HCT 40.6 35.9*    239     CMP:   Recent Labs   Lab 02/21/23 0952 02/22/23  0517    140   K 3.3* 3.6    109   CO2 21* 24   * 111*   BUN 8 7*   CREATININE 0.9 0.7   CALCIUM 8.8 8.1*   PROT 6.8  --    ALBUMIN 3.7  --    BILITOT 0.6  --    ALKPHOS 242*  --    AST 15  --    ALT 13  --    ANIONGAP 12 7*       Significant Imaging: I have reviewed all pertinent imaging results/findings within the past 24 hours.      Assessment/Plan:      * Right ureteral stone  Consult urology  IV fluids  Flomax  Pain control  Strain all urine  NPO     Hypokalemia  Repleted; monitor and replete as necessary      FENG (iron deficiency anemia)  Chronic problem.  Stable.  Monitor H/H.  Continue iron supplementation.  Transfuse for hemodynamic instability and/or H/H <7/21        B12 deficiency  Chronic condition; continue vit B 12 supplement      Malignant neoplasm of colon  Undergoing chemotherapy.  Last chemo 2/15 with pegfilgrastim injection 2/16  Followed by Dr. Frank    Acquired hypothyroidism  Patient has chronic hypothyroidism. TFTs reviewed-   Lab Results   Component Value Date    TSH 10.658 (H) 02/21/2023   . Will continue chronic levothyroxine and adjust for and clinical changes.        COPD (chronic obstructive pulmonary disease)  Patient's COPD is controlled currently.  Patient is currently off COPD Pathway. Continue scheduled inhalers Supplemental oxygen as needed and monitor respiratory status closely.           VTE Risk Mitigation (From admission, onward)         Ordered     IP VTE HIGH RISK PATIENT  Once         02/21/23 1239     Place sequential compression device   Until discontinued         02/21/23 1239     Reason for No Pharmacological VTE Prophylaxis  Once        Question:  Reasons:  Answer:  Physician Provided (leave comment)    02/21/23 1239                Discharge Planning   DERICK:      Code Status: Full Code   Is the patient medically ready for discharge?:     Reason for patient still in hospital (select all that apply): Patient trending condition, Treatment and Consult recommendations  Discharge Plan A: Home                  CEDRICK Ovalle  Department of Hospital Medicine   [facility]

## 2023-02-22 NOTE — PLAN OF CARE
02/22/23 0704   Patient Assessment/Suction   Level of Consciousness (AVPU) alert   Respiratory Effort Normal;Unlabored   Expansion/Accessory Muscles/Retractions no retractions;no use of accessory muscles   All Lung Fields Breath Sounds Anterior:;Lateral:   ROHIT Breath Sounds clear   LLL Breath Sounds diminished   RUL Breath Sounds clear   RML Breath Sounds diminished   RLL Breath Sounds diminished   Rhythm/Pattern, Respiratory depth regular;unlabored;pattern regular   Cough Frequency no cough   PRE-TX-O2   Device (Oxygen Therapy) room air   SpO2 96 %   Pulse Oximetry Type Intermittent   $ Pulse Oximetry - Multiple Charge Pulse Oximetry - Multiple   Pulse 80   Resp 16   Aerosol Therapy   $ Aerosol Therapy Charges PRN treatment not required   Respiratory Treatment Status (SVN) PRN treatment not required

## 2023-02-22 NOTE — ANESTHESIA PREPROCEDURE EVALUATION
02/22/2023  Tessa Enriquez is a 67 y.o., female.    Pre-op Assessment    I have reviewed the Patient Summary Reports.    I have reviewed the Nursing Notes. I have reviewed the NPO Status.   I have reviewed the Medications.     Review of Systems  Anesthesia Hx:  No problems with previous Anesthesia  Denies Family Hx of Anesthesia complications.   Denies Personal Hx of Anesthesia complications.   Social:  Former Smoker    Hematology/Oncology:         -- Cancer in past history (colon CA): surgery and chemotherapy    Cardiovascular:   hyperlipidemia ECG has been reviewed.    Pulmonary:   COPD, moderate Acute hypoxemic resp failure  Home O2   Renal/:   renal calculi hydronephrosis   Hepatic/GI:   Colon Ca s/p resection   Musculoskeletal:   Arthritis     Neurological:  Neurology Normal    Endocrine:   Hypothyroidism  Obesity / BMI > 30      Physical Exam  General:  Well nourished and Obesity      Airway/Jaw/Neck:  Airway Findings: Mouth Opening: Normal   Tongue: Normal   General Airway Assessment: Adult Oropharynx Findings:  Mallampati: II  Jaw/Neck Findings:  Neck ROM: Normal ROM      Eyes/Ears/Nose:  Eyes/Ears/Nose Findings:    Dental:  Dental Findings: Dentures, Upper Dentures, Lower Dentures     Chest/Lungs:  Chest/Lungs Findings: Normal Respiratory Rate      Heart/Vascular:  Heart Findings: Rate: Normal  Rhythm: Regular Rhythm        Mental Status:  Mental Status Findings:  Cooperative, Alert and Oriented         Anesthesia Plan  Type of Anesthesia, risks & benefits discussed:  Anesthesia Type:  MAC, Gen Natural Airway, Gen Supraglottic Airway    Patient's Preference:   Plan Factors:          Intra-op Monitoring Plan: Standard ASA Monitors  Intra-op Monitoring Plan Comments:   Post Op Pain Control Plan: multimodal analgesia and IV/PO Opioids PRN  Post Op Pain Control Plan Comments:     Induction:    IV  Beta Blocker:  Patient is not currently on a Beta-Blocker (No further documentation required).       Informed Consent: Informed consent signed with the Patient and all parties understand the risks and agree with anesthesia plan.  All questions answered.  Anesthesia consent signed with patient.  ASA Score: 3     Day of Surgery Review of History & Physical:    H&P Update referred to the surgeon/provider.          Ready For Surgery From Anesthesia Perspective.           Physical Exam  General: Well nourished and Obesity    Airway:  Mallampati: II   Mouth Opening: Normal  Tongue: Normal  Neck ROM: Normal ROM    Dental:  Dentures, Upper Dentures, Lower Dentures    Chest/Lungs:  Normal Respiratory Rate    Heart:  Rate: Normal  Rhythm: Regular Rhythm          Anesthesia Plan  Type of Anesthesia, risks & benefits discussed:    Anesthesia Type: MAC, Gen Natural Airway, Gen Supraglottic Airway  Intra-op Monitoring Plan: Standard ASA Monitors  Post Op Pain Control Plan: multimodal analgesia and IV/PO Opioids PRN  Induction:  IV  Informed Consent: Informed consent signed with the Patient and all parties understand the risks and agree with anesthesia plan.  All questions answered.   ASA Score: 3  Day of Surgery Review of History & Physical: H&P Update referred to the surgeon/provider.    Ready For Surgery From Anesthesia Perspective.       .

## 2023-02-22 NOTE — CONSULTS
[facility] Consult Note:    Tessa Enriquez is a 67 y.o. female who presents for right obstructing urolithiasis.    Patient with a history of colon cancer, COPD and HLD who presented to the emergency department on 02/21/2023 with severe right flank pain beginning 1 day prior.    CT renal stone revealed a 4 mm stone at the right ureterovesical junction with an additional distal right ureteral stone measuring 4 mm immediately proximal with subsequent moderate right hydroureteronephrosis. UA micro with 6 WBC, 1 RBC and few bacteria.     Urology consulted to assist with management.     No prior history of kidney stones. States her pain has improved since the ED. However, repeat CT still shows her stones in place with hydronephrosis.     Denies any fever, chills, gross hematuria, bone pain,  trauma or history of  malignancy.       Urine culture  E. Coli  3/28/22      Past Medical History:   Diagnosis Date    Acute hypoxemic respiratory failure 07/09/2018    Arthritis     Cancer     colon    COPD (chronic obstructive pulmonary disease)     History of home oxygen therapy     ONLY PRN AND HASN'T USED IT IN A YEAR    Hyperlipidemia     Thyroid disease        Past Surgical History:   Procedure Laterality Date    CHOLECYSTECTOMY      COLECTOMY N/A 11/9/2020    Procedure: COLECTOMY;  Surgeon: Brandon Wagner MD;  Location: [facility];  Service: General;  Laterality: N/A;  LOWER- ANTERIOR    COLONOSCOPY N/A 10/23/2020    Procedure: COLONOSCOPY;  Surgeon: Jolynn Ayala MD;  Location: [facility] ENDO;  Service: Endoscopy;  Laterality: N/A;    COLONOSCOPY N/A 8/6/2021    Procedure: COLONOSCOPY;  Surgeon: Joylnn Ayala MD;  Location: [facility] ENDO;  Service: Endoscopy;  Laterality: N/A;    COLONOSCOPY N/A 7/7/2022    Procedure: COLONOSCOPY;  Surgeon: Jolynn Ayala MD;  Location: [facility] ENDO;  Service: Endoscopy;  Laterality: N/A;    ESOPHAGEAL DILATION N/A 6/29/2021    Procedure: DILATION,  ESOPHAGUS;  Surgeon: Sourav Baires III, MD;  Location: [facility] ENDO;  Service: Endoscopy;  Laterality: N/A;    ESOPHAGOGASTRODUODENOSCOPY  06/29/2021    Dilation to 57 Fr    ESOPHAGOGASTRODUODENOSCOPY N/A 6/29/2021    Procedure: EGD (ESOPHAGOGASTRODUODENOSCOPY);  Surgeon: Sourav Baires III, MD;  Location: [facility] ENDO;  Service: Endoscopy;  Laterality: N/A;    FLEXIBLE SIGMOIDOSCOPY N/A 8/1/2022    Procedure: SIGMOIDOSCOPY, FLEXIBLE;  Surgeon: Brandon Wagner MD;  Location: [facility] ENDO;  Service: Endoscopy;  Laterality: N/A;    HYSTERECTOMY      INSERTION OF TUNNELED CENTRAL VENOUS CATHETER (CVC) WITH SUBCUTANEOUS PORT N/A 1/11/2021    Procedure: [id number];  Surgeon: Brandon Wagner MD;  Location: [facility] OR;  Service: General;  Laterality: N/A;    INSERTION OF TUNNELED CENTRAL VENOUS CATHETER (CVC) WITH SUBCUTANEOUS PORT Right 9/1/2022    Procedure: [id number];  Surgeon: Brandon Wagner MD;  Location: [facility] OR;  Service: General;  Laterality: Right;  Wants rt side placement    MEDIPORT REMOVAL Left 4/19/2021    Procedure: REMOVAL, CATHETER, CENTRAL VENOUS, TUNNELED, WITH PORT;  Surgeon: Brandon Wagner MD;  Location: [facility] OR;  Service: General;  Laterality: Left;    ROBOT-ASSISTED COLECTOMY N/A 11/9/2020    Procedure: ROBOTIC COLECTOMY low anterior resection, possible open;  Surgeon: Brandon Wagner MD;  Location: [facility] OR;  Service: General;  Laterality: N/A;  CONVERTED TO OPEN AT 1503       Family History   Problem Relation Age of Onset    COPD Mother     COPD Father     Prostate cancer Brother     Breast cancer Maternal Grandmother        Review of patient's allergies indicates:   Allergen Reactions    Ativan [lorazepam] Hives and Itching    Flagyl [metronidazole] Itching and Rash    Pcn [penicillins] Hives, Itching and Rash       Medications Reviewed: see MAR    PHYSICAL EXAM:    Vitals:    02/22/23 0335   BP: (!) 111/54   Pulse: 74   Resp: 18   Temp: 97.7 °F (36.5 °C)     Body mass  index is 34.72 kg/m².       General: Alert, cooperative, no distress, appears stated age  Abdomen: Soft, non-tender, no CVA tenderness, non-distended      LABS:    Lab Results   Component Value Date    WBC 14.90 (H) 02/22/2023    HGB 11.9 (L) 02/22/2023    HCT 35.9 (L) 02/22/2023    MCV 95 02/22/2023     02/22/2023       BMP  Lab Results   Component Value Date     02/22/2023    K 3.6 02/22/2023     02/22/2023    CO2 24 02/22/2023    BUN 7 (L) 02/22/2023    CREATININE 0.7 02/22/2023    CALCIUM 8.1 (L) 02/22/2023    ANIONGAP 7 (L) 02/22/2023    EGFRNORACEVR >60 02/22/2023         IMAGING:    CT Images independently reviewed by me - Two 4 mm distal right ureteral stones        Assessment/Diagnosis:    Right distal ureteral stones - 4 mm  Right hydronephrosis  Right flank pain    Plans:    - Discussed the etiology and management of the patient's nephrolithiasis. Explained that stone disease is multifactorial and can be secondary to urinary obstruction, urine composition, low urine volume, diet, hypokalemia, DM, hypertension, gout, RTA, UTI and medications. We discussed that stones can be managed with observation, trial of passage, ureteroscopy with laser lithotripsy, ESWL and PCNL. After extensive discussion, patient has decided to proceed with urgent right ureteral stent placement today. All risks, benefits and alternatives discussed at length with patient.

## 2023-02-22 NOTE — ASSESSMENT & PLAN NOTE
Patient has chronic hypothyroidism. TFTs reviewed-   Lab Results   Component Value Date    TSH 10.658 (H) 02/21/2023   . Will continue chronic levothyroxine and adjust for and clinical changes.

## 2023-02-22 NOTE — TRANSFER OF CARE
"Anesthesia Transfer of Care Note    Patient: Tessa Enriquez    Procedure(s) Performed: Procedure(s) (LRB):  CYSTOSCOPY, WITH URETERAL STENT INSERTION (Right)    Patient location: PACU    Anesthesia Type: general    Transport from OR: Transported from OR on 2-3 L/min O2 by NC with adequate spontaneous ventilation    Post pain: adequate analgesia    Post assessment: no apparent anesthetic complications and tolerated procedure well    Post vital signs: stable    Level of consciousness: sedated    Nausea/Vomiting: no nausea/vomiting    Complications: none    Transfer of care protocol was followed      Last vitals:   Visit Vitals  BP (!) 156/72 (BP Location: Right arm, Patient Position: Lying)   Pulse 93   Temp 37.6 °C (99.7 °F) (Temporal)   Resp 16   Ht 5' 1" (1.549 m)   Wt 83.3 kg (183 lb 12.1 oz)   SpO2 96%   Breastfeeding No   BMI 34.72 kg/m²     "

## 2023-02-22 NOTE — PLAN OF CARE
Problem: Adult Inpatient Plan of Care  Goal: Plan of Care Review  Outcome: Ongoing, Progressing     Problem: Adult Inpatient Plan of Care  Goal: Absence of Hospital-Acquired Illness or Injury  Outcome: Ongoing, Progressing     Problem: Adult Inpatient Plan of Care  Goal: Optimal Comfort and Wellbeing  Outcome: Ongoing, Progressing     Problem: Infection  Goal: Absence of Infection Signs and Symptoms  Outcome: Ongoing, Progressing     Problem: Skin Injury Risk Increased  Goal: Skin Health and Integrity  Outcome: Ongoing, Progressing     Problem: Impaired Wound Healing  Goal: Optimal Wound Healing  Outcome: Ongoing, Progressing

## 2023-02-22 NOTE — ANESTHESIA PROCEDURE NOTES
Intubation    Date/Time: 2/22/2023 12:50 PM  Performed by: Juaquin Van CRNA  Authorized by: Juaquin Van CRNA     Intubation:     Induction:  Intravenous    Mask Ventilation:  Easy with oral airway    Attempts:  1    Attempted By:  CRNA    Difficult Airway Encountered?: No      Complications:  None    Airway Device:  Supraglottic airway/LMA    Airway Device Size:  3.0    Style/Cuff Inflation:  Cuffed    Inflation Amount (mL):  10    Secured at:  The lips    Placement Verified By:  Capnometry    Complicating Factors:  None and obesity    Findings Post-Intubation:  BS equal bilateral

## 2023-02-22 NOTE — OP NOTE
[facility]  Operative Note    Date: 02/22/2023    Pre-Op Diagnosis: Right ureteral stones    Post-Op Diagnosis: Same    Procedure(s) Performed:   1. Cystourethroscopy  2. Right retrograde pyelogram  3. Right ureteral stent placement, 6 x 22 JJ stent      Fluoro < 1 hour    Specimen(s): None    Staff Surgeon: Carin Kwon MD    Assistant Surgeon: Carin Kwon Jr, MD    Anesthesia: General    Indications: Tessa Enriquez is a 67 y.o. female with obstructing right distal urolithiasis.     Findings: Right mild hydronephrosis on retrograde with successful stent placement.     Estimated Blood Loss: Minimal    Drains: 6 x 22 cm JJ stent    Procedure in Detail:  After risks, benefits and possible complications of cystoscopy were explained, the patient elected to undergo the procedure and informed consent was obtained. All questions were answered in the nataly-operative area. The patient was transferred to the cystoscopy suite and placed in the supine position.  SCDs were applied and working.  General anesthesia was administered.  Once the patient was adequately sedated, she was placed in the dorsal lithotomy position and prepped and draped in the usual sterile fashion.  Time out was performed, nataly-procedural antibiotics were confirmed.     A rigid cystoscope in a 22 Fr sheath was introduced into the bladder per urethra. This passed easily.  The entire urethra was visualized which showed no masses or strictures.  The right and left ureteral orifices were identified in the normal anatomic position and were seen effluxing clear urine.  Formal cystoscopy was performed which revealed no masses or lesions suspicious for malignancy, no trabeculations, no bladder stones and no bladder diverticula.     The right ureter was then cannulated with a 5F open ended catheter and a retrograde pyelogram performed revealing mild hydronephrosis. The sensor wire was placed into the right kidney and confirmed on fluoroscopy. The 6 x  22 cm stent was placed over the wire into the ureter. The pusher was used to advance the stent. Once in the appropriate position the wire was removed and there was a proximal and distal curl confirmed on fluoroscopy. The bladder was drained, and the patient was removed from lithotomy.      The patient tolerated the procedure well and was transferred to recovery in stable condition.      Disposition:  The patient will follow up with me on 3/7/23 for right ureteroscopy and laser lithotripsy. Ok to discharge from a urologic standpoint.         Carin Kwon Jr, MD

## 2023-02-22 NOTE — SUBJECTIVE & OBJECTIVE
Interval History: seen and examined this morning, no acute changes.  Pain well controlled.      Review of Systems   Constitutional:  Negative for chills and fever.   HENT:  Negative for congestion and sore throat.    Respiratory:  Negative for cough, shortness of breath and wheezing.    Cardiovascular:  Negative for chest pain and palpitations.   Gastrointestinal:  Positive for diarrhea and nausea (improved). Negative for abdominal pain and vomiting.   Genitourinary:  Positive for flank pain (improved). Negative for dysuria and frequency.   Musculoskeletal:  Positive for back pain (improved). Negative for neck pain.   Skin:  Positive for pallor. Negative for rash.   Neurological:  Negative for dizziness and syncope.   Psychiatric/Behavioral:  Negative for agitation and confusion.    All other systems reviewed and are negative.  Objective:     Vital Signs (Most Recent):  Temp: 99.7 °F (37.6 °C) (02/22/23 1123)  Pulse: 93 (02/22/23 1123)  Resp: 16 (02/22/23 1123)  BP: (!) 156/72 (02/22/23 1123)  SpO2: 96 % (02/22/23 1123)   Vital Signs (24h Range):  Temp:  [97.1 °F (36.2 °C)-99.7 °F (37.6 °C)] 99.7 °F (37.6 °C)  Pulse:  [73-93] 93  Resp:  [15-18] 16  SpO2:  [92 %-99 %] 96 %  BP: (111-156)/(54-73) 156/72     Weight: 83.3 kg (183 lb 12.1 oz)  Body mass index is 34.72 kg/m².    Intake/Output Summary (Last 24 hours) at 2/22/2023 1314  Last data filed at 2/22/2023 1255  Gross per 24 hour   Intake 2794.49 ml   Output 950 ml   Net 1844.49 ml      Physical Exam  Constitutional:       General: She is not in acute distress.     Appearance: She is well-developed. She is ill-appearing (chronically; undergoing chemo).   HENT:      Head: Normocephalic and atraumatic.      Mouth/Throat:      Mouth: Mucous membranes are dry.   Eyes:      Conjunctiva/sclera: Conjunctivae normal.      Pupils: Pupils are equal, round, and reactive to light.   Cardiovascular:      Rate and Rhythm: Regular rhythm. Tachycardia present.      Heart sounds:  Normal heart sounds.   Pulmonary:      Effort: Pulmonary effort is normal. No respiratory distress.      Breath sounds: Normal breath sounds.   Abdominal:      General: Bowel sounds are normal. There is no distension.      Palpations: Abdomen is soft.   Musculoskeletal:         General: Normal range of motion.      Cervical back: Normal range of motion and neck supple.   Skin:     General: Skin is warm and dry.      Capillary Refill: Capillary refill takes 2 to 3 seconds.      Comments: Alopecia     Neurological:      General: No focal deficit present.      Mental Status: She is alert and oriented to person, place, and time.   Psychiatric:         Mood and Affect: Mood normal.         Behavior: Behavior normal.         Thought Content: Thought content normal.       Significant Labs: All pertinent labs within the past 24 hours have been reviewed.  CBC:   Recent Labs   Lab 02/21/23  0952 02/22/23  0517   WBC 19.02* 14.90*   HGB 13.7 11.9*   HCT 40.6 35.9*    239     CMP:   Recent Labs   Lab 02/21/23  0952 02/22/23  0517    140   K 3.3* 3.6    109   CO2 21* 24   * 111*   BUN 8 7*   CREATININE 0.9 0.7   CALCIUM 8.8 8.1*   PROT 6.8  --    ALBUMIN 3.7  --    BILITOT 0.6  --    ALKPHOS 242*  --    AST 15  --    ALT 13  --    ANIONGAP 12 7*       Significant Imaging: I have reviewed all pertinent imaging results/findings within the past 24 hours.

## 2023-02-22 NOTE — NURSING
Nurses Note -- 4 Eyes      2/21/2023   6:08 PM      Skin assessed during: Admit      [] No Pressure Injuries Present    []Prevention Measures Documented      [x] Yes- Altered Skin Integrity Present or Discovered   [x] LDA Added if Not in Epic (Describe Wound)   [x] New Altered Skin Integrity was Present on Admit and Documented in LDA   [x] Wound Image Taken    Wound Care Consulted? Yes    Attending Nurse:  Patricia Chavez RN     Second RN/Staff Member: Elissa NASH

## 2023-02-22 NOTE — DISCHARGE INSTRUCTIONS
VERY IMPORTANT - PLEASE READ    Your urologist is Dr. Carin Kwon  Office number: [phone] ([facility])  Address: [address] [place] [address],  (2nd office building on left); [address] (located on 2nd floor).     What Dr. Kwon did today: Cysto, right ureteral stent placement     If you do not hear from us please call clinic to make a post-op appointment.   The following are specific instructions for you, so please read:    The ureter is the tube that drains the kidney (where urine is made). It connects the kidney to the bladder (where urine is stored).     A ureteral stent is a is a soft plastic tube with holes in tube that bypasses anything that obstructs (stone, tumor, scar tissue) the urine from flow from the kidney to the bladder. It is placed by going through the urethra and into the bladder. No incisions are made. Its temporarily inserted into a ureter to help drain urine into the bladder. One end goes in the kidney. The other end goes in the bladder. A coil on each end holds the stent in place. The stent cant be seen from outside the body.          You have a ureteral stent in your RIGHT kidney that was placed on 02/22/2023  -the stent can only remain for a maximum of 3 to 6 months. If the stent remains longer than this you can get recurrent urinary tract infections, the stent can have more stones form along it and urine will not be able to drain and you will lose all function of your kidney requiring a major surgery and a big incision to remove the kidney.      You have a stent that was placed for a stone that was stuck and there is still a stone there, then expect the following:  -You will return in 2-3 weeks to have the stone removed with smaller instruments. This is called ureteroscopy and it will be done while you are asleep (under anesthesia).  -currently your scheduled date for ureteroscopy and stone extraction is ____3/22/22_______. However if you were not given a date, then please call our  office at [phone] and let them know you still have a stent and a stone and need to know what happens next. Remember the stent cannot stay in indefinitely.  -You will likely not see Dr. Kwon again until the time of your next procedure. However, if you have any questions, please feel free to contact our office and the nurse will be able to leave a message for the doctor to answer any questions you may have.  -I will place another stent after the stone extraction/ureteroscopy that will need to be removed a few days to weeks later depending on how much inflammation there is during the procedure.          If you do not receive a follow-up date or further instructions on what is to be done next about the stent, it is your responsibility to make sure that you have follow-up to have your stone or stent removed.     A stent CANNOT REMAIN indefinitely in the kidney. It should not remain if possible more than 3 to 6 months maximum (rarely 1 year if it was placed for cancer).     If you do not follow-up to have your stent removed then you can LOSE YOUR KIDNEY FUNCTION ON THAT SIDE, get RECURRENT URINARY TRACT INFECTIONS,and MORE STONES requiring SURGICAL OPEN removal of your kidney.    You can call our office ([facility] at [phone] and let them know a stent was placed and you need further instructions for follow-up). If there are issues with insurance we will work with you to help you arrange follow-up where it can be removed. Again,  A STENT CANNOT remain indefinitely.       A ureteral stent is left in place for many reasons:  -to keep the ureter open after a procedure as there will be much inflammation and if no stent is left you will experience the same pain as having the stone that caused the obstruction.   -to drain the kidney of infection.  -if the stone is up high, stuck, or you have an infection, the stent will stretch open the small ureter (the tube that drains the kidney) for a few weeks  (usually 2 to 4 weeks) so that we can return and place instruments into this tube to break up and remove the stone.      Ureteral Stent Symptoms can include but are not limited to   - pain in the kidney or bladder with urination during or especially at the end of voiding.   - constant urge to urinate, frequency of urination, severe bladder spasms and severe pain the kidney, especially during urination.  - blood in the urine, especially with increased activity. This can last the entire time the stent is in, it can sometimes clear and return or you may never have any at all. I recommend increasing fluid intake to prevent large clots that may be difficult to urinate out.      When to go to ER:   -fever >101.5 or inability to urinate (no urination for >4 hours and bladder pain).   -If you go to the ER with pain, they may check to make sure the stent is in good position with an x-ray or ultrasound but unlikely to do anything else.   -I will let you know when we will plan to have the stent either removed at the ambulatory surgical center as an outpatient procedure while you are awake, which is uncomfortable briefly, but not painful or you will remove it yourself by pulling the strings.                 all other ROS negative except as per HPI

## 2023-02-22 NOTE — PLAN OF CARE
VSS, pain pill given for chronic back pain, no surgical related pain, tolerated clear liquids without N/V, R stent placement. Ok per Dr. Fall to transfer the pt back to the floor. Pt transported via bed back to room 207. Call light within reach. Pt's son in law at the bedside. Report given to CHARITY Blanchard.

## 2023-02-22 NOTE — ANESTHESIA POSTPROCEDURE EVALUATION
Anesthesia Post Evaluation    Patient: Tessa Enriquez    Procedure(s) Performed: Procedure(s) (LRB):  CYSTOSCOPY, WITH URETERAL STENT INSERTION (Right)    Final Anesthesia Type: general      Patient location during evaluation: PACU  Patient participation: Yes- Able to Participate  Level of consciousness: sedated and awake  Post-procedure vital signs: reviewed and stable  Pain management: adequate  Airway patency: patent    PONV status at discharge: No PONV  Anesthetic complications: no      Cardiovascular status: blood pressure returned to baseline  Respiratory status: spontaneous ventilation  Hydration status: euvolemic  Follow-up not needed.          Vitals Value Taken Time   /72 02/22/23 1409   Temp 36.7 °C (98.1 °F) 02/22/23 1409   Pulse 89 02/22/23 1409   Resp 15 02/22/23 1409   SpO2 96 % 02/22/23 1409         No case tracking events are documented in the log.      Pain/Bautista Score: Pain Rating Prior to Med Admin: 5 (2/22/2023  1:48 PM)  Pain Rating Post Med Admin: 0 (2/21/2023  7:51 PM)  Bautista Score: 10 (2/22/2023  1:55 PM)

## 2023-02-23 PROBLEM — N30.00 ACUTE CYSTITIS: Status: ACTIVE | Noted: 2023-01-01

## 2023-02-23 NOTE — PLAN OF CARE
POC discussed with pt, pt verbalized understanding. Oriented x4. PIV cdi, fluids infusing. Pt ambulated to the restroom with stand by assist. Complaints of chronic back pain, medicated with prn's. Urine yellow with light red. Call light in reach, bed alarm set, safety maintained. No complaints or requests at this time, will continue to monitor.

## 2023-02-23 NOTE — PROGRESS NOTES
[facility] Hospital Medicine  Progress Note    Patient Name: Tessa Enriquez  MRN: [id number]  Patient Class: IP- Inpatient   Admission Date: 2/21/2023  Length of Stay: 2 days  Attending Physician: Nomi Casas MD  Primary Care Provider: Karma Mcdermott MD        Subjective:     Principal Problem:Right ureteral stone        HPI:  Tessa Enriquez is a 66 y/o F with a past medical history significant for colon cancer, COPD, hyperlipidemia, and hypothyroidism presented to the emergency department with complaints of right flank pain.  Pain started at 4:00 p.m. yesterday and has gotten worse.  Is accompanied by nausea, but no fever, shortness of breath, chest pain, vomiting, or diarrhea.  CT abd/pelvis reveals 4 mm obstructing distal right ureteral/right UVJ stone with 2nd 4 mm distal right ureteral stone also seen; there is associated moderate diffuse right hydroureter, inflammatory change of the right kidney, and severe right hydronephrosis.  WBC 19K.  Per report, the urologist on call was contacted and agrees to consult, but no procedure is planned for today.  She required IV dilaudid, IV ketorolac and IV hyoscyamine while in the ED for pain control.  She will be placed in observation for continued monitoring and treatment.            Overview/Hospital Course:  Tessa Enriquez was monitored closely during her hospitalization. She was placed on IV cipro, IVFH and urology was consulted.  On 2/22/23 she underwent a right ureteral stent placement with Dr. Kwon.  Pain was controlled with IV ketorolac and oral narcotics.  WBC's were trended.        Interval History: underwent right ureteral stent placement yesterday.  WBC elevated to 27K this morning.  Will continue IVFH and IV cipro and trend.  Likely dc tomorrow.    Review of Systems   Constitutional:  Negative for chills and fever.   HENT:  Negative for congestion and sore throat.    Respiratory:  Negative for cough, shortness  of breath and wheezing.    Cardiovascular:  Negative for chest pain and palpitations.   Gastrointestinal:  Positive for diarrhea and nausea (improved). Negative for abdominal pain and vomiting.   Genitourinary:  Positive for flank pain (improved) and hematuria. Negative for dysuria and frequency.   Musculoskeletal:  Positive for back pain (improved). Negative for neck pain.   Skin:  Positive for pallor. Negative for rash.   Neurological:  Negative for dizziness and syncope.   Psychiatric/Behavioral:  Negative for agitation and confusion.    All other systems reviewed and are negative.  Objective:     Vital Signs (Most Recent):  Temp: 97.4 °F (36.3 °C) (02/23/23 0731)  Pulse: 81 (02/23/23 0731)  Resp: 16 (02/23/23 0904)  BP: (!) 127/59 (02/23/23 0731)  SpO2: 95 % (02/23/23 0908)   Vital Signs (24h Range):  Temp:  [97.2 °F (36.2 °C)-99.7 °F (37.6 °C)] 97.4 °F (36.3 °C)  Pulse:  [] 81  Resp:  [11-20] 16  SpO2:  [94 %-98 %] 95 %  BP: (107-158)/(55-90) 127/59     Weight: 83.3 kg (183 lb 12.1 oz)  Body mass index is 34.72 kg/m².    Intake/Output Summary (Last 24 hours) at 2/23/2023 1033  Last data filed at 2/23/2023 0913  Gross per 24 hour   Intake 2577.36 ml   Output 750 ml   Net 1827.36 ml      Physical Exam  Constitutional:       General: She is not in acute distress.     Appearance: She is well-developed. She is ill-appearing (chronically; undergoing chemo).   HENT:      Head: Normocephalic and atraumatic.      Mouth/Throat:      Mouth: Mucous membranes are dry.   Eyes:      Conjunctiva/sclera: Conjunctivae normal.      Pupils: Pupils are equal, round, and reactive to light.   Cardiovascular:      Rate and Rhythm: Regular rhythm. Tachycardia present.      Heart sounds: Normal heart sounds.   Pulmonary:      Effort: Pulmonary effort is normal. No respiratory distress.      Breath sounds: Normal breath sounds.   Abdominal:      General: Bowel sounds are normal. There is no distension.      Palpations: Abdomen is  soft.   Musculoskeletal:         General: Normal range of motion.      Cervical back: Normal range of motion and neck supple.   Skin:     General: Skin is warm and dry.      Capillary Refill: Capillary refill takes 2 to 3 seconds.      Comments: Alopecia     Neurological:      General: No focal deficit present.      Mental Status: She is alert and oriented to person, place, and time.   Psychiatric:         Mood and Affect: Mood normal.         Behavior: Behavior normal.         Thought Content: Thought content normal.       Significant Labs: All pertinent labs within the past 24 hours have been reviewed.  CBC:   Recent Labs   Lab 02/22/23  0517 02/23/23  0441   WBC 14.90* 27.45*   HGB 11.9* 11.8*   HCT 35.9* 37.1    274     CMP:   Recent Labs   Lab 02/22/23 0517 02/23/23  0441    138   K 3.6 3.6    109   CO2 24 22*   * 113*   BUN 7* 6*   CREATININE 0.7 0.7   CALCIUM 8.1* 8.3*   ANIONGAP 7* 7*       Significant Imaging: I have reviewed all pertinent imaging results/findings within the past 24 hours.      Assessment/Plan:      * Right ureteral stone  S/p right ureteral stent placement 2/22/23  Continue flomax, IVFH, pain control  Monitor hematuria  F/u with Dr. Kwon OP    Acute cystitis  Unfortunately urine was not cultured  Continue IV cipro  Trend WBC      Hypokalemia  Repleted; monitor and replete as necessary      FENG (iron deficiency anemia)  Chronic problem.  Stable.  Monitor H/H.  Continue iron supplementation.  Transfuse for hemodynamic instability and/or H/H <7/21        B12 deficiency  Chronic condition; continue vit B 12 supplement      Malignant neoplasm of colon  Undergoing chemotherapy.  Last chemo 2/15 with pegfilgrastim injection 2/16  Followed by Dr. Frank    Acquired hypothyroidism  Patient has chronic hypothyroidism. TFTs reviewed-   Lab Results   Component Value Date    TSH 10.658 (H) 02/21/2023   . Will continue chronic levothyroxine and adjust for and clinical  changes.        COPD (chronic obstructive pulmonary disease)  Patient's COPD is controlled currently.  Patient is currently off COPD Pathway. Continue scheduled inhalers Supplemental oxygen as needed and monitor respiratory status closely.           VTE Risk Mitigation (From admission, onward)         Ordered     IP VTE HIGH RISK PATIENT  Once         02/21/23 1239     Place sequential compression device  Until discontinued         02/21/23 1239     Reason for No Pharmacological VTE Prophylaxis  Once        Question:  Reasons:  Answer:  Physician Provided (leave comment)    02/21/23 1239                Discharge Planning   DERICK: 2/23/2023     Code Status: Full Code   Is the patient medically ready for discharge?:     Reason for patient still in hospital (select all that apply): Patient trending condition and Treatment  Discharge Plan A: Home                  CEDRICK Ovalle  Department of Hospital Medicine   [facility]

## 2023-02-23 NOTE — CONSULTS
Wound care consult completed. Patient with MASD to L breat fold cleansed with soap and water and applied triad. There is also shearing noted to the sacral region patient states she itches area a lot and irritates it. There is a small laceration to the bottom portion of the sacral fold. Cleansed area with soap and water and applied a thick layer of traid to fold.           L breast fold     Sacrum

## 2023-02-23 NOTE — NURSING
IV infiltrated and removed. Attempted to start new one but pt told charge nurse she refuses to have another one and wants p.o. antibiotics. NP notified.

## 2023-02-23 NOTE — SUBJECTIVE & OBJECTIVE
Interval History: underwent right ureteral stent placement yesterday.  WBC elevated to 27K this morning.  Will continue IVFH and IV cipro and trend.  Likely dc tomorrow.    Review of Systems   Constitutional:  Negative for chills and fever.   HENT:  Negative for congestion and sore throat.    Respiratory:  Negative for cough, shortness of breath and wheezing.    Cardiovascular:  Negative for chest pain and palpitations.   Gastrointestinal:  Positive for diarrhea and nausea (improved). Negative for abdominal pain and vomiting.   Genitourinary:  Positive for flank pain (improved) and hematuria. Negative for dysuria and frequency.   Musculoskeletal:  Positive for back pain (improved). Negative for neck pain.   Skin:  Positive for pallor. Negative for rash.   Neurological:  Negative for dizziness and syncope.   Psychiatric/Behavioral:  Negative for agitation and confusion.    All other systems reviewed and are negative.  Objective:     Vital Signs (Most Recent):  Temp: 97.4 °F (36.3 °C) (02/23/23 0731)  Pulse: 81 (02/23/23 0731)  Resp: 16 (02/23/23 0904)  BP: (!) 127/59 (02/23/23 0731)  SpO2: 95 % (02/23/23 0908)   Vital Signs (24h Range):  Temp:  [97.2 °F (36.2 °C)-99.7 °F (37.6 °C)] 97.4 °F (36.3 °C)  Pulse:  [] 81  Resp:  [11-20] 16  SpO2:  [94 %-98 %] 95 %  BP: (107-158)/(55-90) 127/59     Weight: 83.3 kg (183 lb 12.1 oz)  Body mass index is 34.72 kg/m².    Intake/Output Summary (Last 24 hours) at 2/23/2023 1033  Last data filed at 2/23/2023 0913  Gross per 24 hour   Intake 2577.36 ml   Output 750 ml   Net 1827.36 ml      Physical Exam  Constitutional:       General: She is not in acute distress.     Appearance: She is well-developed. She is ill-appearing (chronically; undergoing chemo).   HENT:      Head: Normocephalic and atraumatic.      Mouth/Throat:      Mouth: Mucous membranes are dry.   Eyes:      Conjunctiva/sclera: Conjunctivae normal.      Pupils: Pupils are equal, round, and reactive to light.    Cardiovascular:      Rate and Rhythm: Regular rhythm. Tachycardia present.      Heart sounds: Normal heart sounds.   Pulmonary:      Effort: Pulmonary effort is normal. No respiratory distress.      Breath sounds: Normal breath sounds.   Abdominal:      General: Bowel sounds are normal. There is no distension.      Palpations: Abdomen is soft.   Musculoskeletal:         General: Normal range of motion.      Cervical back: Normal range of motion and neck supple.   Skin:     General: Skin is warm and dry.      Capillary Refill: Capillary refill takes 2 to 3 seconds.      Comments: Alopecia     Neurological:      General: No focal deficit present.      Mental Status: She is alert and oriented to person, place, and time.   Psychiatric:         Mood and Affect: Mood normal.         Behavior: Behavior normal.         Thought Content: Thought content normal.       Significant Labs: All pertinent labs within the past 24 hours have been reviewed.  CBC:   Recent Labs   Lab 02/22/23  0517 02/23/23  0441   WBC 14.90* 27.45*   HGB 11.9* 11.8*   HCT 35.9* 37.1    274     CMP:   Recent Labs   Lab 02/22/23  0517 02/23/23  0441    138   K 3.6 3.6    109   CO2 24 22*   * 113*   BUN 7* 6*   CREATININE 0.7 0.7   CALCIUM 8.1* 8.3*   ANIONGAP 7* 7*       Significant Imaging: I have reviewed all pertinent imaging results/findings within the past 24 hours.

## 2023-02-23 NOTE — HOSPITAL COURSE
Tessa Enriquez was monitored closely during her hospitalization. She was placed on IV cipro, [facility] and urology was consulted.  On 2/22/23 she underwent a right ureteral stent placement with Dr. Kwon.  Pain was controlled with IV ketorolac and oral narcotics.  WBC's were trended.  Her WBC increased to 27K, so IV abx were continued.  The following day, WBC 13k; pt remained afebrile.  IV cipro was changed to oral.  She was clear for discharge from the standpoint of urology and hospital medicine.  Pt was concerned because she continued to have hematuria.  She was reassured that this was a normal occurrence; however, she did not feel comfortable with her discharge and wished to appeal.  Case management was notified.  Discharge appeal was denied the following day and she was discharged home.  Discharge instructions reviewed to include return instructions.  She was discharged home in stable condition.

## 2023-02-23 NOTE — ASSESSMENT & PLAN NOTE
S/p right ureteral stent placement 2/22/23  Continue flomax, IVFH, pain control  Monitor hematuria  F/u with Dr. Kwon OP

## 2023-02-23 NOTE — TELEPHONE ENCOUNTER
Returned call per request. Pt states that she is currently admitted d/t kidney stones. She states that a stent was placed yesterday and will be removed in 2 wks. Pt would like to cancel this chemo cycle. NEENA Badillo notified as well as infusion scheduling.      ----- Message from Jack Abreu sent at 2/23/2023  9:27 AM CST -----  Contact: pt at [phone]  Type: Needs Medical Advice  Who Called:  pt  Best Call Back Number: [phone]  Additional Information: pt is calling the office requesting a call back from the nurse regarding her Chemo. Please call back and advise.

## 2023-02-24 NOTE — SUBJECTIVE & OBJECTIVE
"Interval History: no acute events overnight.  See "hospital course"      Review of Systems   Constitutional:  Negative for chills and fever.   HENT:  Negative for congestion and sore throat.    Respiratory:  Negative for cough, shortness of breath and wheezing.    Cardiovascular:  Negative for chest pain and palpitations.   Gastrointestinal:  Positive for diarrhea (chronic) and nausea (improved). Negative for abdominal pain and vomiting.   Genitourinary:  Positive for flank pain (improved) and hematuria. Negative for dysuria and frequency.   Musculoskeletal:  Positive for back pain (improved). Negative for neck pain.   Skin:  Positive for pallor. Negative for rash.   Neurological:  Negative for dizziness and syncope.   Psychiatric/Behavioral:  Negative for agitation and confusion.    All other systems reviewed and are negative.  Objective:     Vital Signs (Most Recent):  Temp: 97.4 °F (36.3 °C) (02/24/23 0834)  Pulse: 75 (02/24/23 0834)  Resp: 20 (02/24/23 0834)  BP: 139/66 (02/24/23 0834)  SpO2: 98 % (02/24/23 0910) Vital Signs (24h Range):  Temp:  [97.2 °F (36.2 °C)-98 °F (36.7 °C)] 97.4 °F (36.3 °C)  Pulse:  [73-97] 75  Resp:  [14-20] 20  SpO2:  [93 %-100 %] 98 %  BP: (109-163)/(57-71) 139/66     Weight: 83.3 kg (183 lb 12.1 oz)  Body mass index is 34.72 kg/m².    Intake/Output Summary (Last 24 hours) at 2/24/2023 1118  Last data filed at 2/24/2023 0635  Gross per 24 hour   Intake 930 ml   Output 2975 ml   Net -2045 ml      Physical Exam  Constitutional:       General: She is not in acute distress.     Appearance: She is well-developed. She is ill-appearing (chronically; undergoing chemo).   HENT:      Head: Normocephalic and atraumatic.      Mouth/Throat:      Mouth: Mucous membranes are dry.   Eyes:      Conjunctiva/sclera: Conjunctivae normal.      Pupils: Pupils are equal, round, and reactive to light.   Cardiovascular:      Rate and Rhythm: Regular rhythm. Tachycardia present.      Heart sounds: Normal heart " sounds.   Pulmonary:      Effort: Pulmonary effort is normal. No respiratory distress.      Breath sounds: Normal breath sounds.   Abdominal:      General: Bowel sounds are normal. There is no distension.      Palpations: Abdomen is soft.   Musculoskeletal:         General: Normal range of motion.      Cervical back: Normal range of motion and neck supple.   Skin:     General: Skin is warm and dry.      Capillary Refill: Capillary refill takes 2 to 3 seconds.      Comments: Alopecia     Neurological:      General: No focal deficit present.      Mental Status: She is alert and oriented to person, place, and time.   Psychiatric:         Mood and Affect: Mood normal.         Behavior: Behavior normal.         Thought Content: Thought content normal.       Significant Labs: All pertinent labs within the past 24 hours have been reviewed.  CBC:   Recent Labs   Lab 02/23/23  0441 02/24/23  0533   WBC 27.45* 13.63*   HGB 11.8* 11.6*   HCT 37.1 36.0*    228     CMP:   Recent Labs   Lab 02/23/23  0441 02/24/23  0533    142   K 3.6 3.2*    112*   CO2 22* 21*   * 101   BUN 6* 7*   CREATININE 0.7 0.6   CALCIUM 8.3* 8.0*   ANIONGAP 7* 9       Significant Imaging: I have reviewed all pertinent imaging results/findings within the past 24 hours.

## 2023-02-24 NOTE — CARE UPDATE
02/24/23 0910   Patient Assessment/Suction   Level of Consciousness (AVPU) alert   PRE-TX-O2   Device (Oxygen Therapy) room air   SpO2 98 %   Pulse Oximetry Type Intermittent   $ Pulse Oximetry - Multiple Charge Pulse Oximetry - Multiple   Incentive Spirometer   $ Incentive Spirometer Charges done independently per patient

## 2023-02-24 NOTE — PLAN OF CARE
Pt clear for DC from CM standpoint. Discharging home.     02/24/23 1043   Final Note   Assessment Type Final Discharge Note   Anticipated Discharge Disposition Home   Hospital Resources/Appts/Education Provided Appointments scheduled and added to AVS

## 2023-02-24 NOTE — PLAN OF CARE
[facility] was notified by Priscilla NASH that pt would like to file an appeal on DC.  SSC visited with pt at bedside and reviewed the appeal process and answered questions, provided the information to file the appeal and was present for the pt as she filed the appeal with [facility].    Appeal ID# [id number]    Fax notification of appeal has been received.    [facility] submitted required documents to [employer] [facility] with upload confirmation ID of #[id number]      SSC following.

## 2023-02-24 NOTE — PROGRESS NOTES
[facility] Hospital Medicine  Progress Note    Patient Name: Tessa Enriquez  MRN: [id number]  Patient Class: IP- Inpatient   Admission Date: 2/21/2023  Length of Stay: 3 days  Attending Physician: Nomi Casas MD  Primary Care Provider: Karma Mcdermott MD        Subjective:     Principal Problem:Right ureteral stone        HPI:  Tessa Enriquez is a 68 y/o F with a past medical history significant for colon cancer, COPD, hyperlipidemia, and hypothyroidism presented to the emergency department with complaints of right flank pain.  Pain started at 4:00 p.m. yesterday and has gotten worse.  Is accompanied by nausea, but no fever, shortness of breath, chest pain, vomiting, or diarrhea.  CT abd/pelvis reveals 4 mm obstructing distal right ureteral/right UVJ stone with 2nd 4 mm distal right ureteral stone also seen; there is associated moderate diffuse right hydroureter, inflammatory change of the right kidney, and severe right hydronephrosis.  WBC 19K.  Per report, the urologist on call was contacted and agrees to consult, but no procedure is planned for today.  She required IV dilaudid, IV ketorolac and IV hyoscyamine while in the ED for pain control.  She will be placed in observation for continued monitoring and treatment.            Overview/Hospital Course:  Tessa Enriquez was monitored closely during her hospitalization. She was placed on IV cipro, IVFH and urology was consulted.  On 2/22/23 she underwent a right ureteral stent placement with Dr. Kwon.  Pain was controlled with IV ketorolac and oral narcotics.  WBC's were trended.  Her WBC increased to 27K, so IV abx were continued.  The following day, WBC 13k; pt remained afebrile.  IV cipro was changed to oral.  She was clear for discharge from the standpoint of urology and hospital medicine.  Pt was concerned because she continued to have hematuria.  She was reassured that this was a normal occurrence; however, she  "did not feel comfortable with her discharge and wished to appeal.  Case management was notified.      Interval History: no acute events overnight.  See "hospital course"      Review of Systems   Constitutional:  Negative for chills and fever.   HENT:  Negative for congestion and sore throat.    Respiratory:  Negative for cough, shortness of breath and wheezing.    Cardiovascular:  Negative for chest pain and palpitations.   Gastrointestinal:  Positive for diarrhea (chronic) and nausea (improved). Negative for abdominal pain and vomiting.   Genitourinary:  Positive for flank pain (improved) and hematuria. Negative for dysuria and frequency.   Musculoskeletal:  Positive for back pain (improved). Negative for neck pain.   Skin:  Positive for pallor. Negative for rash.   Neurological:  Negative for dizziness and syncope.   Psychiatric/Behavioral:  Negative for agitation and confusion.    All other systems reviewed and are negative.  Objective:     Vital Signs (Most Recent):  Temp: 97.4 °F (36.3 °C) (02/24/23 0834)  Pulse: 75 (02/24/23 0834)  Resp: 20 (02/24/23 0834)  BP: 139/66 (02/24/23 0834)  SpO2: 98 % (02/24/23 0910) Vital Signs (24h Range):  Temp:  [97.2 °F (36.2 °C)-98 °F (36.7 °C)] 97.4 °F (36.3 °C)  Pulse:  [73-97] 75  Resp:  [14-20] 20  SpO2:  [93 %-100 %] 98 %  BP: (109-163)/(57-71) 139/66     Weight: 83.3 kg (183 lb 12.1 oz)  Body mass index is 34.72 kg/m².    Intake/Output Summary (Last 24 hours) at 2/24/2023 1118  Last data filed at 2/24/2023 0635  Gross per 24 hour   Intake 930 ml   Output 2975 ml   Net -2045 ml      Physical Exam  Constitutional:       General: She is not in acute distress.     Appearance: She is well-developed. She is ill-appearing (chronically; undergoing chemo).   HENT:      Head: Normocephalic and atraumatic.      Mouth/Throat:      Mouth: Mucous membranes are dry.   Eyes:      Conjunctiva/sclera: Conjunctivae normal.      Pupils: Pupils are equal, round, and reactive to light. "   Cardiovascular:      Rate and Rhythm: Regular rhythm. Tachycardia present.      Heart sounds: Normal heart sounds.   Pulmonary:      Effort: Pulmonary effort is normal. No respiratory distress.      Breath sounds: Normal breath sounds.   Abdominal:      General: Bowel sounds are normal. There is no distension.      Palpations: Abdomen is soft.   Musculoskeletal:         General: Normal range of motion.      Cervical back: Normal range of motion and neck supple.   Skin:     General: Skin is warm and dry.      Capillary Refill: Capillary refill takes 2 to 3 seconds.      Comments: Alopecia     Neurological:      General: No focal deficit present.      Mental Status: She is alert and oriented to person, place, and time.   Psychiatric:         Mood and Affect: Mood normal.         Behavior: Behavior normal.         Thought Content: Thought content normal.       Significant Labs: All pertinent labs within the past 24 hours have been reviewed.  CBC:   Recent Labs   Lab 02/23/23  0441 02/24/23  0533   WBC 27.45* 13.63*   HGB 11.8* 11.6*   HCT 37.1 36.0*    228     CMP:   Recent Labs   Lab 02/23/23  0441 02/24/23  0533    142   K 3.6 3.2*    112*   CO2 22* 21*   * 101   BUN 6* 7*   CREATININE 0.7 0.6   CALCIUM 8.3* 8.0*   ANIONGAP 7* 9       Significant Imaging: I have reviewed all pertinent imaging results/findings within the past 24 hours.      Assessment/Plan:      * Right ureteral stone  S/p right ureteral stent placement 2/22/23  Continue flomax, pain control  Monitor hematuria  F/u with Dr. Kwon OP    Acute cystitis  Unfortunately urine was not cultured  Continue cipro  Trend WBC      Hypokalemia  Repleted; monitor and replete as necessary      FENG (iron deficiency anemia)  Chronic problem.  Stable.  Monitor H/H.  Continue iron supplementation.  Transfuse for hemodynamic instability and/or H/H <7/21        B12 deficiency  Chronic condition; continue vit B 12 supplement      Malignant  neoplasm of colon  Undergoing chemotherapy.  Last chemo 2/15 with pegfilgrastim injection 2/16  Followed by Dr. Frank    Acquired hypothyroidism  Patient has chronic hypothyroidism. TFTs reviewed-   Lab Results   Component Value Date    TSH 10.658 (H) 02/21/2023   . Will continue chronic levothyroxine and adjust for and clinical changes.        COPD (chronic obstructive pulmonary disease)  Patient's COPD is controlled currently.  Patient is currently off COPD Pathway. Continue scheduled inhalers Supplemental oxygen as needed and monitor respiratory status closely.           VTE Risk Mitigation (From admission, onward)         Ordered     IP VTE HIGH RISK PATIENT  Once         02/21/23 1239     Place sequential compression device  Until discontinued         02/21/23 1239     Reason for No Pharmacological VTE Prophylaxis  Once        Question:  Reasons:  Answer:  Physician Provided (leave comment)    02/21/23 1239                Discharge Planning   DERICK: 2/24/2023     Code Status: Full Code   Is the patient medically ready for discharge?:     Reason for patient still in hospital (select all that apply): Other (specify) appealing discharge  Discharge Plan A: Home                  CEDRICK Ovalle  Department of Hospital Medicine   [facility]

## 2023-02-24 NOTE — ASSESSMENT & PLAN NOTE
S/p right ureteral stent placement 2/22/23  Continue flomax, pain control  Monitor hematuria  F/u with Dr. Kwon OP

## 2023-02-24 NOTE — PLAN OF CARE
SSC Reviewed status of appeal ID#[id number].  Current status listed in clinical review.    SSC following.

## 2023-02-24 NOTE — PLAN OF CARE
POC discussed with pt, pt verbalized understanding. Oriented x4. Pt still refusing IV. Pt ambulated to the restroom with stand by assist. No complaints of pain. Urine light red. Call light in reach, bed alarm set, safety maintained. No complaints or requests at this time, will continue to monitor.

## 2023-02-25 PROBLEM — E87.6 HYPOKALEMIA: Status: RESOLVED | Noted: 2023-01-01 | Resolved: 2023-01-01

## 2023-02-25 NOTE — SUBJECTIVE & OBJECTIVE
Interval History: Notes reviewed, no acute events overnight. Patient WBC count WNL today. She is worried today because she is unable to visualize her stent string. She continues to have some hematuria. Informed patient that is expected. Urology cleared patient for discharge. Discharge appeal in process. Hypokalemia resolved. Patient medically cleared for discharge.     Review of Systems   Constitutional:  Negative for chills, fatigue and fever.   Respiratory:  Negative for cough, shortness of breath and wheezing.    Cardiovascular:  Negative for chest pain and palpitations.   Gastrointestinal:  Negative for abdominal distention, abdominal pain, nausea and vomiting.   Genitourinary:  Positive for hematuria.   Musculoskeletal:  Negative for arthralgias and myalgias.   Neurological:  Negative for dizziness, light-headedness and headaches.   Psychiatric/Behavioral:  Negative for behavioral problems and confusion.    All other systems reviewed and are negative.  Objective:     Vital Signs (Most Recent):  Temp: 97.1 °F (36.2 °C) (02/25/23 0803)  Pulse: 88 (02/25/23 0820)  Resp: 20 (02/25/23 0937)  BP: 111/61 (02/25/23 0803)  SpO2: 97 % (02/25/23 0820) Vital Signs (24h Range):  Temp:  [96.2 °F (35.7 °C)-97.2 °F (36.2 °C)] 97.1 °F (36.2 °C)  Pulse:  [63-96] 88  Resp:  [12-20] 20  SpO2:  [95 %-100 %] 97 %  BP: (107-162)/(53-76) 111/61     Weight: 83.3 kg (183 lb 12.1 oz)  Body mass index is 34.72 kg/m².    Intake/Output Summary (Last 24 hours) at 2/25/2023 1150  Last data filed at 2/25/2023 0510  Gross per 24 hour   Intake --   Output 1850 ml   Net -1850 ml      Physical Exam  Vitals and nursing note reviewed.   Constitutional:       General: She is not in acute distress.     Appearance: She is obese.   HENT:      Head: Normocephalic and atraumatic.   Cardiovascular:      Rate and Rhythm: Normal rate and regular rhythm.      Pulses: Normal pulses.      Heart sounds: No murmur heard.    No gallop.   Pulmonary:      Effort:  Pulmonary effort is normal. No respiratory distress.      Breath sounds: No wheezing or rales.   Abdominal:      General: Abdomen is flat. There is no distension.      Palpations: Abdomen is soft.      Tenderness: There is no abdominal tenderness.   Musculoskeletal:         General: No swelling or tenderness. Normal range of motion.   Neurological:      General: No focal deficit present.      Mental Status: She is alert. Mental status is at baseline.      Cranial Nerves: No cranial nerve deficit.      Motor: No weakness.   Psychiatric:         Mood and Affect: Mood normal.       Significant Labs: All pertinent labs within the past 24 hours have been reviewed.  CBC:   Recent Labs   Lab 02/24/23  0533 02/25/23  0738   WBC 13.63* 11.59   HGB 11.6* 11.5*   HCT 36.0* 36.0*    199     CMP:   Recent Labs   Lab 02/24/23  0533 02/25/23  0738    142   K 3.2* 3.7   * 110   CO2 21* 24    97   BUN 7* 8   CREATININE 0.6 0.6   CALCIUM 8.0* 8.2*   PROT  --  5.3*   ALBUMIN  --  2.9*   BILITOT  --  0.3   ALKPHOS  --  104   AST  --  9*   ALT  --  11   ANIONGAP 9 8       Significant Imaging: I have reviewed all pertinent imaging results/findings within the past 24 hours.

## 2023-02-25 NOTE — PROGRESS NOTES
[facility] Hospital Medicine  Progress Note    Patient Name: Tessa Enriquez  MRN: [id number]  Patient Class: IP- Inpatient   Admission Date: 2/21/2023  Length of Stay: 4 days  Attending Physician: Nomi Casas MD  Primary Care Provider: Karma Mcdermott MD        Subjective:     Principal Problem:Right ureteral stone        HPI:  Tessa Enriquez is a 66 y/o F with a past medical history significant for colon cancer, COPD, hyperlipidemia, and hypothyroidism presented to the emergency department with complaints of right flank pain.  Pain started at 4:00 p.m. yesterday and has gotten worse.  Is accompanied by nausea, but no fever, shortness of breath, chest pain, vomiting, or diarrhea.  CT abd/pelvis reveals 4 mm obstructing distal right ureteral/right UVJ stone with 2nd 4 mm distal right ureteral stone also seen; there is associated moderate diffuse right hydroureter, inflammatory change of the right kidney, and severe right hydronephrosis.  WBC 19K.  Per report, the urologist on call was contacted and agrees to consult, but no procedure is planned for today.  She required IV dilaudid, IV ketorolac and IV hyoscyamine while in the ED for pain control.  She will be placed in observation for continued monitoring and treatment.            Overview/Hospital Course:  Tessa Enriquez was monitored closely during her hospitalization. She was placed on IV cipro, IVFH and urology was consulted.  On 2/22/23 she underwent a right ureteral stent placement with Dr. Kwon.  Pain was controlled with IV ketorolac and oral narcotics.  WBC's were trended.  Her WBC increased to 27K, so IV abx were continued.  The following day, WBC 13k; pt remained afebrile.  IV cipro was changed to oral.  She was clear for discharge from the standpoint of urology and hospital medicine.  Pt was concerned because she continued to have hematuria.  She was reassured that this was a normal occurrence; however, she  did not feel comfortable with her discharge and wished to appeal.  Case management was notified.      Interval History: Notes reviewed, no acute events overnight. Patient WBC count WNL today. She is worried today because she is unable to visualize her stent string. She continues to have some hematuria. Informed patient that is expected. Urology cleared patient for discharge. Discharge appeal in process. Hypokalemia resolved. Patient medically cleared for discharge.     Review of Systems   Constitutional:  Negative for chills, fatigue and fever.   Respiratory:  Negative for cough, shortness of breath and wheezing.    Cardiovascular:  Negative for chest pain and palpitations.   Gastrointestinal:  Negative for abdominal distention, abdominal pain, nausea and vomiting.   Genitourinary:  Positive for hematuria.   Musculoskeletal:  Negative for arthralgias and myalgias.   Neurological:  Negative for dizziness, light-headedness and headaches.   Psychiatric/Behavioral:  Negative for behavioral problems and confusion.    All other systems reviewed and are negative.  Objective:     Vital Signs (Most Recent):  Temp: 97.1 °F (36.2 °C) (02/25/23 0803)  Pulse: 88 (02/25/23 0820)  Resp: 20 (02/25/23 0937)  BP: 111/61 (02/25/23 0803)  SpO2: 97 % (02/25/23 0820) Vital Signs (24h Range):  Temp:  [96.2 °F (35.7 °C)-97.2 °F (36.2 °C)] 97.1 °F (36.2 °C)  Pulse:  [63-96] 88  Resp:  [12-20] 20  SpO2:  [95 %-100 %] 97 %  BP: (107-162)/(53-76) 111/61     Weight: 83.3 kg (183 lb 12.1 oz)  Body mass index is 34.72 kg/m².    Intake/Output Summary (Last 24 hours) at 2/25/2023 1150  Last data filed at 2/25/2023 0510  Gross per 24 hour   Intake --   Output 1850 ml   Net -1850 ml      Physical Exam  Vitals and nursing note reviewed.   Constitutional:       General: She is not in acute distress.     Appearance: She is obese.   HENT:      Head: Normocephalic and atraumatic.   Cardiovascular:      Rate and Rhythm: Normal rate and regular rhythm.       Pulses: Normal pulses.      Heart sounds: No murmur heard.    No gallop.   Pulmonary:      Effort: Pulmonary effort is normal. No respiratory distress.      Breath sounds: No wheezing or rales.   Abdominal:      General: Abdomen is flat. There is no distension.      Palpations: Abdomen is soft.      Tenderness: There is no abdominal tenderness.   Musculoskeletal:         General: No swelling or tenderness. Normal range of motion.   Neurological:      General: No focal deficit present.      Mental Status: She is alert. Mental status is at baseline.      Cranial Nerves: No cranial nerve deficit.      Motor: No weakness.   Psychiatric:         Mood and Affect: Mood normal.       Significant Labs: All pertinent labs within the past 24 hours have been reviewed.  CBC:   Recent Labs   Lab 02/24/23  0533 02/25/23  0738   WBC 13.63* 11.59   HGB 11.6* 11.5*   HCT 36.0* 36.0*    199     CMP:   Recent Labs   Lab 02/24/23  0533 02/25/23  0738    142   K 3.2* 3.7   * 110   CO2 21* 24    97   BUN 7* 8   CREATININE 0.6 0.6   CALCIUM 8.0* 8.2*   PROT  --  5.3*   ALBUMIN  --  2.9*   BILITOT  --  0.3   ALKPHOS  --  104   AST  --  9*   ALT  --  11   ANIONGAP 9 8       Significant Imaging: I have reviewed all pertinent imaging results/findings within the past 24 hours.      Assessment/Plan:      * Right ureteral stone  S/p right ureteral stent placement 2/22/23  Continue flomax, pain control  Monitor hematuria  F/u with Dr. Kwon OP    Acute cystitis  Unfortunately urine was not cultured  Continue cipro  WBC back to WNL      FENG (iron deficiency anemia)  Chronic problem.  Stable.  Monitor H/H.  Continue iron supplementation.  Transfuse for hemodynamic instability and/or H/H <7/21        B12 deficiency  Chronic condition; continue vit B 12 supplement      Malignant neoplasm of colon  Undergoing chemotherapy.  Last chemo 2/15 with pegfilgrastim injection 2/16  Followed by Dr. Zac Ramires  hypothyroidism  Patient has chronic hypothyroidism. TFTs reviewed-   Lab Results   Component Value Date    TSH 10.658 (H) 02/21/2023   . Will continue chronic levothyroxine and adjust for and clinical changes.        COPD (chronic obstructive pulmonary disease)  Patient's COPD is controlled currently.  Patient is currently off COPD Pathway. Continue scheduled inhalers Supplemental oxygen as needed and monitor respiratory status closely.           VTE Risk Mitigation (From admission, onward)         Ordered     IP VTE HIGH RISK PATIENT  Once         02/21/23 1239     Place sequential compression device  Until discontinued         02/21/23 1239     Reason for No Pharmacological VTE Prophylaxis  Once        Question:  Reasons:  Answer:  Physician Provided (leave comment)    02/21/23 1239                Discharge Planning   DERICK: 2/24/2023     Code Status: Full Code   Is the patient medically ready for discharge?:     Reason for patient still in hospital (select all that apply): Other (specify) discharge appeal  Discharge Plan A: Home                  Keven Yao PA-C  Department of Hospital Medicine   [facility]

## 2023-02-25 NOTE — NURSING
External string from stent reported by off going nurse and patient.  When pt assessed, string not found. Maribell aFllon NP notified.

## 2023-02-26 NOTE — CARE UPDATE
02/26/23 0721   Patient Assessment/Suction   Level of Consciousness (AVPU) alert   Respiratory Effort Normal;Unlabored   Expansion/Accessory Muscles/Retractions no use of accessory muscles;no retractions;expansion symmetric   All Lung Fields Breath Sounds clear;equal bilaterally   Rhythm/Pattern, Respiratory unlabored;pattern regular;depth regular   Cough Frequency no cough   PRE-TX-O2   Device (Oxygen Therapy) room air   SpO2 96 %   Pulse Oximetry Type Intermittent   $ Pulse Oximetry - Multiple Charge Pulse Oximetry - Multiple   Pulse 86   Resp 18   Aerosol Therapy   $ Aerosol Therapy Charges PRN treatment not required   Respiratory Treatment Status (SVN) PRN treatment not required         Aerosol treatments PRN.

## 2023-02-26 NOTE — PROGRESS NOTES
[facility] Medicine  Progress Note    Patient Name: Tessa Enriquez  MRN: [id number]  Patient Class: IP- Inpatient   Admission Date: 2/21/2023  Length of Stay: 5 days  Attending Physician: Dhruv Qureshi MD  Primary Care Provider: Karma Mcdermott MD        Subjective:     Principal Problem:Right ureteral stone        HPI:  Tessa Enriquez is a 68 y/o F with a past medical history significant for colon cancer, COPD, hyperlipidemia, and hypothyroidism presented to the emergency department with complaints of right flank pain.  Pain started at 4:00 p.m. yesterday and has gotten worse.  Is accompanied by nausea, but no fever, shortness of breath, chest pain, vomiting, or diarrhea.  CT abd/pelvis reveals 4 mm obstructing distal right ureteral/right UVJ stone with 2nd 4 mm distal right ureteral stone also seen; there is associated moderate diffuse right hydroureter, inflammatory change of the right kidney, and severe right hydronephrosis.  WBC 19K.  Per report, the urologist on call was contacted and agrees to consult, but no procedure is planned for today.  She required IV dilaudid, IV ketorolac and IV hyoscyamine while in the ED for pain control.  She will be placed in observation for continued monitoring and treatment.            Overview/Hospital Course:  Tessa Enriquez was monitored closely during her hospitalization. She was placed on IV cipro, IVFH and urology was consulted.  On 2/22/23 she underwent a right ureteral stent placement with Dr. Kwon.  Pain was controlled with IV ketorolac and oral narcotics.  WBC's were trended.  Her WBC increased to 27K, so IV abx were continued.  The following day, WBC 13k; pt remained afebrile.  IV cipro was changed to oral.  She was clear for discharge from the standpoint of urology and hospital medicine.  Pt was concerned because she continued to have hematuria.  She was reassured that this was a normal occurrence; however, she  did not feel comfortable with her discharge and wished to appeal.  Case management was notified.      Interval History: No new complaints. Awaiting discharge appeal. Outpatient abx and prn pain medication sent to pharmacy.     Review of Systems   Constitutional:  Negative for chills, fatigue and fever.   Respiratory:  Negative for cough, shortness of breath and wheezing.    Cardiovascular:  Negative for chest pain and palpitations.   Genitourinary:  Positive for hematuria. Negative for difficulty urinating and dysuria.   Musculoskeletal:  Negative for arthralgias and myalgias.   Psychiatric/Behavioral:  Negative for behavioral problems and confusion.    All other systems reviewed and are negative.  Objective:     Vital Signs (Most Recent):  Temp: 97.1 °F (36.2 °C) (02/26/23 1144)  Pulse: 86 (02/26/23 1144)  Resp: 18 (02/26/23 1144)  BP: 139/64 (02/26/23 1144)  SpO2: (!) 93 % (02/26/23 1144)   Vital Signs (24h Range):  Temp:  [97.1 °F (36.2 °C)-97.4 °F (36.3 °C)] 97.1 °F (36.2 °C)  Pulse:  [62-95] 86  Resp:  [16-20] 18  SpO2:  [93 %-97 %] 93 %  BP: ()/(55-67) 139/64     Weight: 83.3 kg (183 lb 12.1 oz)  Body mass index is 34.72 kg/m².    Intake/Output Summary (Last 24 hours) at 2/26/2023 1500  Last data filed at 2/26/2023 1401  Gross per 24 hour   Intake --   Output 775 ml   Net -775 ml      Physical Exam  Vitals and nursing note reviewed.   Constitutional:       Appearance: She is ill-appearing.   HENT:      Head: Normocephalic and atraumatic.   Cardiovascular:      Rate and Rhythm: Normal rate and regular rhythm.   Pulmonary:      Effort: Pulmonary effort is normal. No respiratory distress.      Breath sounds: Normal breath sounds. No wheezing or rales.   Abdominal:      General: Abdomen is flat. There is no distension.      Palpations: Abdomen is soft.      Tenderness: There is no abdominal tenderness.   Skin:     General: Skin is warm.      Coloration: Skin is not jaundiced.      Findings: No bruising.    Neurological:      General: No focal deficit present.      Mental Status: She is alert. Mental status is at baseline.      Cranial Nerves: No cranial nerve deficit.      Motor: No weakness.   Psychiatric:         Mood and Affect: Mood normal.       Significant Labs: All pertinent labs within the past 24 hours have been reviewed.  CBC:   Recent Labs   Lab 02/25/23  0738   WBC 11.59   HGB 11.5*   HCT 36.0*        CMP:   Recent Labs   Lab 02/25/23  0738      K 3.7      CO2 24   GLU 97   BUN 8   CREATININE 0.6   CALCIUM 8.2*   PROT 5.3*   ALBUMIN 2.9*   BILITOT 0.3   ALKPHOS 104   AST 9*   ALT 11   ANIONGAP 8       Significant Imaging: I have reviewed all pertinent imaging results/findings within the past 24 hours.      Assessment/Plan:      * Right ureteral stone  S/p right ureteral stent placement 2/22/23  Continue flomax, pain control  Monitor hematuria  F/u with Dr. Kwon OP    Acute cystitis  Unfortunately urine was not cultured  Continue cipro  WBC back to WNL      FENG (iron deficiency anemia)  Chronic problem.  Stable.  Monitor H/H.  Continue iron supplementation.  Transfuse for hemodynamic instability and/or H/H <7/21        B12 deficiency  Chronic condition; continue vit B 12 supplement      Malignant neoplasm of colon  Undergoing chemotherapy.  Last chemo 2/15 with pegfilgrastim injection 2/16  Followed by Dr. Frank    Acquired hypothyroidism  Patient has chronic hypothyroidism. TFTs reviewed-   Lab Results   Component Value Date    TSH 10.658 (H) 02/21/2023   . Will continue chronic levothyroxine and adjust for and clinical changes.        COPD (chronic obstructive pulmonary disease)  Patient's COPD is controlled currently.  Patient is currently off COPD Pathway. Continue scheduled inhalers Supplemental oxygen as needed and monitor respiratory status closely.           VTE Risk Mitigation (From admission, onward)         Ordered     IP VTE HIGH RISK PATIENT  Once         02/21/23 6600      Place sequential compression device  Until discontinued         02/21/23 1239     Reason for No Pharmacological VTE Prophylaxis  Once        Question:  Reasons:  Answer:  Physician Provided (leave comment)    02/21/23 1239                Discharge Planning   DERICK: 2/24/2023     Code Status: Full Code   Is the patient medically ready for discharge?:     Reason for patient still in hospital (select all that apply): Pending disposition  Discharge Plan A: Home                  Keven Yao PA-C  Department of Hospital Medicine   [facility]

## 2023-02-26 NOTE — SUBJECTIVE & OBJECTIVE
Interval History: No new complaints. Awaiting discharge appeal. Outpatient abx and prn pain medication sent to pharmacy.     Review of Systems   Constitutional:  Negative for chills, fatigue and fever.   Respiratory:  Negative for cough, shortness of breath and wheezing.    Cardiovascular:  Negative for chest pain and palpitations.   Genitourinary:  Positive for hematuria. Negative for difficulty urinating and dysuria.   Musculoskeletal:  Negative for arthralgias and myalgias.   Psychiatric/Behavioral:  Negative for behavioral problems and confusion.    All other systems reviewed and are negative.  Objective:     Vital Signs (Most Recent):  Temp: 97.1 °F (36.2 °C) (02/26/23 1144)  Pulse: 86 (02/26/23 1144)  Resp: 18 (02/26/23 1144)  BP: 139/64 (02/26/23 1144)  SpO2: (!) 93 % (02/26/23 1144)   Vital Signs (24h Range):  Temp:  [97.1 °F (36.2 °C)-97.4 °F (36.3 °C)] 97.1 °F (36.2 °C)  Pulse:  [62-95] 86  Resp:  [16-20] 18  SpO2:  [93 %-97 %] 93 %  BP: ()/(55-67) 139/64     Weight: 83.3 kg (183 lb 12.1 oz)  Body mass index is 34.72 kg/m².    Intake/Output Summary (Last 24 hours) at 2/26/2023 1500  Last data filed at 2/26/2023 1401  Gross per 24 hour   Intake --   Output 775 ml   Net -775 ml      Physical Exam  Vitals and nursing note reviewed.   Constitutional:       Appearance: She is ill-appearing.   HENT:      Head: Normocephalic and atraumatic.   Cardiovascular:      Rate and Rhythm: Normal rate and regular rhythm.   Pulmonary:      Effort: Pulmonary effort is normal. No respiratory distress.      Breath sounds: Normal breath sounds. No wheezing or rales.   Abdominal:      General: Abdomen is flat. There is no distension.      Palpations: Abdomen is soft.      Tenderness: There is no abdominal tenderness.   Skin:     General: Skin is warm.      Coloration: Skin is not jaundiced.      Findings: No bruising.   Neurological:      General: No focal deficit present.      Mental Status: She is alert. Mental status is  at baseline.      Cranial Nerves: No cranial nerve deficit.      Motor: No weakness.   Psychiatric:         Mood and Affect: Mood normal.       Significant Labs: All pertinent labs within the past 24 hours have been reviewed.  CBC:   Recent Labs   Lab 02/25/23  0738   WBC 11.59   HGB 11.5*   HCT 36.0*        CMP:   Recent Labs   Lab 02/25/23  0738      K 3.7      CO2 24   GLU 97   BUN 8   CREATININE 0.6   CALCIUM 8.2*   PROT 5.3*   ALBUMIN 2.9*   BILITOT 0.3   ALKPHOS 104   AST 9*   ALT 11   ANIONGAP 8       Significant Imaging: I have reviewed all pertinent imaging results/findings within the past 24 hours.

## 2023-02-26 NOTE — PLAN OF CARE
CM received call from [employer]. States appeal decision is in and pt has lost. States last covered day will be today and coverage will end tomorrow at noon. States she already called patient to let her know

## 2023-02-27 NOTE — DISCHARGE SUMMARY
[facility] Medicine  Discharge Summary      Patient Name: Tessa Enriquez  MRN: [id number]  TAE: [id number]  Patient Class: IP- Inpatient  Admission Date: 2/21/2023  Hospital Length of Stay: 5 days  Discharge Date and Time: 2/26/2023  3:20 PM  Attending Physician: No att. providers found   Discharging Provider: CEDRICK Ovalle  Primary Care Provider: Karma Mcdermott MD    Primary Care Team: Networked reference to record PCT     HPI:   Tessa Enriquez is a 68 y/o F with a past medical history significant for colon cancer, COPD, hyperlipidemia, and hypothyroidism presented to the emergency department with complaints of right flank pain.  Pain started at 4:00 p.m. yesterday and has gotten worse.  Is accompanied by nausea, but no fever, shortness of breath, chest pain, vomiting, or diarrhea.  CT abd/pelvis reveals 4 mm obstructing distal right ureteral/right UVJ stone with 2nd 4 mm distal right ureteral stone also seen; there is associated moderate diffuse right hydroureter, inflammatory change of the right kidney, and severe right hydronephrosis.  WBC 19K.  Per report, the urologist on call was contacted and agrees to consult, but no procedure is planned for today.  She required IV dilaudid, IV ketorolac and IV hyoscyamine while in the ED for pain control.  She will be placed in observation for continued monitoring and treatment.            Procedure(s) (LRB):  CYSTOSCOPY, WITH URETERAL STENT INSERTION (Right)      Hospital Course:   Tessa Enriquez was monitored closely during her hospitalization. She was placed on IV cipro, [facility] and urology was consulted.  On 2/22/23 she underwent a right ureteral stent placement with Dr. Kwon.  Pain was controlled with IV ketorolac and oral narcotics.  WBC's were trended.  Her WBC increased to 27K, so IV abx were continued.  The following day, WBC 13k; pt remained afebrile.  IV cipro was changed to oral.  She was clear for  discharge from the standpoint of urology and hospital medicine.  Pt was concerned because she continued to have hematuria.  She was reassured that this was a normal occurrence; however, she did not feel comfortable with her discharge and wished to appeal.  Case management was notified.  Discharge appeal was denied the following day and she was discharged home.  Discharge instructions reviewed to include return instructions.  She was discharged home in stable condition.       Goals of Care Treatment Preferences:  Code Status: Full Code      Consults:   Consults (From admission, onward)        Status Ordering Provider     Inpatient consult to Urology  Once        Provider:  Carin Kwon Jr., MD    Completed NADIR MANZANARES          Pulmonary  COPD (chronic obstructive pulmonary disease)  Patient's COPD is controlled currently.  Patient is currently off COPD Pathway. Continue scheduled inhalers Supplemental oxygen as needed and monitor respiratory status closely.         Renal/  * Right ureteral stone  S/p right ureteral stent placement 2/22/23  Continue flomax, pain control  Monitor hematuria  F/u with Dr. Kwon OP    Acute cystitis  Unfortunately urine was not cultured  Continue cipro  WBC back to WNL      Hypokalemia-resolved as of 2/25/2023  Repleted; monitor and replete as necessary      Oncology  FENG (iron deficiency anemia)  Chronic problem.  Stable.  Monitor H/H.  Continue iron supplementation.  Transfuse for hemodynamic instability and/or H/H <7/21        Malignant neoplasm of colon  Undergoing chemotherapy.  Last chemo 2/15 with pegfilgrastim injection 2/16  Followed by Dr. Frank    Endocrine  B12 deficiency  Chronic condition; continue vit B 12 supplement      Acquired hypothyroidism  Patient has chronic hypothyroidism. TFTs reviewed-   Lab Results   Component Value Date    TSH 10.658 (H) 02/21/2023   . Will continue chronic levothyroxine and adjust for and clinical changes.          Final Active  Diagnoses:    Diagnosis Date Noted POA    PRINCIPAL PROBLEM:  Right ureteral stone [N20.1] 02/21/2023 Yes    Acute cystitis [N30.00] 02/23/2023 Yes    FENG (iron deficiency anemia) [D50.9] 05/04/2022 Yes    B12 deficiency [E53.8] 10/22/2021 Yes    Malignant neoplasm of colon [C18.9] 08/06/2021 Yes    Acquired hypothyroidism [E03.9] 10/15/2020 Yes    COPD (chronic obstructive pulmonary disease) [J44.9] 07/09/2018 Yes      Problems Resolved During this Admission:    Diagnosis Date Noted Date Resolved POA    Hypokalemia [E87.6] 02/21/2023 02/25/2023 Yes       Discharged Condition: stable    Disposition: Home or Self Care    Follow Up:   Follow-up Information     Karma Mcdermott MD Follow up on 3/1/2023.    Specialty: Internal Medicine  Why: hospital follow up at 1:20 PM  Contact information:  [address]  [address]  [place] [address]  [phone]             Carin Kwon Jr, MD Follow up in 11 day(s).    Specialty: Urology  Why: they will contact you to let you know what time to be at the hospital  Contact information:  [address] [place] [address] [address]  [address]  [place] [address]  [phone]                       Patient Instructions:      Ambulatory referral/consult to Outpatient Case Management   Referral Priority: Routine Referral Type: Consultation   Referral Reason: Specialty Services Required   Number of Visits Requested: 1     Diet Adult Regular     Activity as tolerated       Significant Diagnostic Studies: Labs: All labs within the past 24 hours have been reviewed    Pending Diagnostic Studies:     None         Medications:  Reconciled Home Medications:      Medication List      START taking these medications    ciprofloxacin HCl 500 MG tablet  Commonly known as: CIPRO  Take 1 tablet (500 mg total) by mouth every 12 (twelve) hours. for 7 days     HYDROcodone-acetaminophen 7.5-325 mg per tablet  Commonly known as: NORCO  Take 1 tablet by mouth every 6 (six) hours as needed for Pain.  Replaces:  HYDROcodone-acetaminophen 5-325 mg per tablet        CONTINUE taking these medications    albuterol 2.5 mg /3 mL (0.083 %) nebulizer solution  Commonly known as: PROVENTIL  Take 3 mLs (2.5 mg total) by nebulization every 6 (six) hours as needed for Wheezing. Rescue     CMPD HYDROCORTISONE 2%, LIDOCAINE 3% SUPPOSITORY (RECTAL ROCKET)  Place 1 suppository rectally every evening. Insert 1 suppository 3/4 of the way onto rectum. Wet for easier application. Repeat for 3 nights.     cyanocobalamin 1000 MCG tablet  Commonly known as: VITAMIN B-12  Take 100 mcg by mouth once daily.     diphenoxylate-atropine 2.5-0.025 mg 2.5-0.025 mg per tablet  Commonly known as: LOMOTIL  Take by mouth.     doxycycline 100 MG capsule  Commonly known as: MONODOX  Take 1 capsule (100 mg total) by mouth 2 (two) times daily.     ferrous sulfate 325 (65 FE) MG EC tablet  Take 325 mg by mouth 2 (two) times daily.     guaiFENesin 600 mg 12 hr tablet  Commonly known as: MUCINEX  Take 1 tablet (600 mg total) by mouth 2 (two) times daily.     levothyroxine 125 MCG tablet  Commonly known as: SYNTHROID  Take 1 tablet (125 mcg total) by mouth before breakfast.     ondansetron 8 MG Tbdl  Commonly known as: ZOFRAN-ODT  Take 1 tablet (8 mg total) by mouth every 12 (twelve) hours as needed.     potassium chloride SA 20 MEQ tablet  Commonly known as: K-DUR,KLOR-CON  Take 1 tablet (20 mEq total) by mouth once daily.     prochlorperazine 10 MG tablet  Commonly known as: COMPAZINE  Take 1 tablet (10 mg total) by mouth every 6 (six) hours as needed.     traMADoL 50 mg tablet  Commonly known as: ULTRAM  Take 1 tablet (50 mg total) by mouth every 6 (six) hours as needed.     triamcinolone acetonide 0.025% 0.025 % cream  Commonly known as: KENALOG  Apply topically once daily.        STOP taking these medications    HYDROcodone-acetaminophen 5-325 mg per tablet  Commonly known as: NORCO  Replaced by: HYDROcodone-acetaminophen 7.5-325 mg per tablet             Indwelling Lines/Drains at time of discharge:   Lines/Drains/Airways     Drain  Duration                Ureteral Drain/Stent 02/22/23 1315 Right ureter 6 Fr. 5 days                Time spent on the discharge of patient: 36 minutes         CEDRICK Ovalle  Department of Hospital Medicine  [facility]

## 2023-03-03 NOTE — LETTER
March 9, 2023    Tessa Bartolo Zoe  [address] [place] [address]             [facility] [address] [place] [address] Dear Ms CardozoZoe:    Welcome to [facility] Complex Care Management Program.  It was a pleasure talking with you today.  My name is Ginna Chapman RN CCM. I look forward to working with you as your Care Manager.  My goal is to help you function at the healthiest and highest level possible.  You can contact me directly at [phone].    As an [facility] patient, some of the services we may be able to provide include:      Development of an individualized care plan with a Registered Nurse    Connection with a    Connection with available resources and services     Coordinate communication among your care team members    Provide coaching and education    Help you understand your doctors treatment plan   Help you obtain information about your insurance coverage.     All services provided by [facility] Complex Care Managers and other care team members are coordinated with and communicated to your primary care team.    As part of your enrollment, you will be receiving education materials and more information about these services in your [facility] account, by phone or through the mail.  If you do not wish to participate or receive information, please contact our office at [phone].      Sincerely,        Ginna Chapman RN CCM [facility]   Out-patient RN Complex Care Manager

## 2023-03-03 NOTE — PROGRESS NOTES
Outpatient Care Management  Initial Patient Assessment    Patient: Tessa Enriquez  MRN: [id number]  Date of Service: 03/03/2023  Completed by: Ginna Chapman RN  Referral Date: 02/23/2023  Program: High Risk  Status: Ongoing  Effective Dates: 3/8/2023 - present  Responsible Staff: Ginna Chapman RN        Reason for Visit   Patient presents with    OPCM Chart Review    OPCM Enrollment Call    OPCM RN First Assessment Attempt     03/03/23    Initial Assessment     03/08/23    Nursing Assessment     03/08/23    Plan Of Care     03/08/23       Brief Summary:  Tessa Enriquez was referred by CEDRICK Ulloa for right ureteral stone. Patient qualifies for program based on high risk score 73.5%.   Active problem list, medical, surgical and social history reviewed. Active comorbidities include COPD,right ureteral stone, rectal cancer and anemia . Areas of need identified by patient include cancer and kidney stones .   Complex care plan created with patient/caregiver input. By next encounter, patient agrees to go to app on 03/10/23 with Hem/Oncology, labs and urology.   03/03/23-1st attempt to complete initial assessment for Outpatient Care Management. Unavailable at this time. Asked for call back. OPCM RN   03/08/23-Admitted to the hospital on 02/21/23 with complaints of right flank pain. Cystoscopy with ureteral stent insertion on 02/22/23. Discharged home on 02/26/23. On 03/07/23 right ureteroscopy with extraction of two stones. Stent exchanged with strings. Discharged home on 03/07/23. She is having some diarrhea today and is going to take her lomotil. She has some blood from procedure. Pain is a 4 out of 10. She is independent with her ADL's. She is receiving chemo treatments for rectal cancer diagnosed October 2020. She has been receiving chemo treatments since September 2023. Rash on face from chemo. She has flaking and peeling in her private areas that she is going to talk to the provider  about this at her appt on 03/10/23. She is taking diflucan for a yeast infection and Bactrim DS prescribed after her procedure.   Med Rec done    Next steps: Follow up next week - request RN to call weekly   Refer to SW for financial assistance   Mail tips to help you worry less

## 2023-03-07 NOTE — PATIENT INSTRUCTIONS
VERY IMPORTANT - PLEASE READ    Your urologist is Dr. Carin Kwon  Office number: [phone] ([facility])  Address: [address] [place] [address],  (2nd office building on left); [address] (located on 2nd floor).     What Dr. Kwon did today: Cysto, right ureteral stent placement, right ureteroscopy with basket extraction of stone     If you do not hear from us please call clinic to make a post-op appointment.   The following are specific instructions for you, so please read:    The ureter is the tube that drains the kidney (where urine is made). It connects the kidney to the bladder (where urine is stored).     A ureteral stent is a is a soft plastic tube with holes in tube that bypasses anything that obstructs (stone, tumor, scar tissue) the urine from flow from the kidney to the bladder. It is placed by going through the urethra and into the bladder. No incisions are made. Its temporarily inserted into a ureter to help drain urine into the bladder. One end goes in the kidney. The other end goes in the bladder. A coil on each end holds the stent in place. The stent cant be seen from outside the body.          You have a ureteral stent in your RIGHT kidney that was placed on 03/07/2023  -the stent can only remain for a maximum of 3 to 6 months. If the stent remains longer than this you can get recurrent urinary tract infections, the stent can have more stones form along it and urine will not be able to drain and you will lose all function of your kidney requiring a major surgery and a big incision to remove the kidney.         You have a string attached to the stent:  -you can remove the stent on ____3/10/23_____ at 8 am OR YOU CAN RETURN AS A NURSE VISIT FOR STENT REMOVAL.   -it will be attached to the outside of your vagina (if you are a female) or to the penis (if you are male)  -if the stent is partially or accidentally pulled out you will leak urine until the stent is removed because urine is now coming  directly from your kidney and bypassing the urethra. Go ahead and remove it if this happens but expect pain for 24 to 48 hours or more after stent removed, especially if it was removed earlier than expected.  -If you have not heard from anyone about when to pull the string and remove the stent, remove it at two weeks after your surgery by pulling the string. DO NOT cut the string.   -When you remove the string, you should see a small plastic tube about the size of a cocktail straw and about 12 inches long attached to a string that looks like dental floss. If you do not see this, please let call the office and let us know.       If you do not receive a follow-up date or further instructions on what is to be done next about the stent, it is your responsibility to make sure that you have follow-up to have your stone or stent removed.     A stent CANNOT REMAIN indefinitely in the kidney. It should not remain if possible more than 3 to 6 months maximum (rarely 1 year if it was placed for cancer).     If you do not follow-up to have your stent removed then you can LOSE YOUR KIDNEY FUNCTION ON THAT SIDE, get RECURRENT URINARY TRACT INFECTIONS,and MORE STONES requiring SURGICAL OPEN removal of your kidney.    You can call our office ([facility] at [phone] and let them know a stent was placed and you need further instructions for follow-up). If there are issues with insurance we will work with you to help you arrange follow-up where it can be removed. Again,  A STENT CANNOT remain indefinitely.     A ureteral stent is left in place for many reasons:  -to keep the ureter open after a procedure as there will be much inflammation and if no stent is left you will experience the same pain as having the stone that caused the obstruction.   -to drain the kidney of infection.  -if the stone is up high, stuck, or you have an infection, the stent will stretch open the small ureter (the tube that drains the  kidney) for a few weeks (usually 2 to 4 weeks) so that we can return and place instruments into this tube to break up and remove the stone.      Ureteral Stent Symptoms can include but are not limited to   - pain in the kidney or bladder with urination during or especially at the end of voiding.   - constant urge to urinate, frequency of urination, severe bladder spasms and severe pain the kidney, especially during urination.  - blood in the urine, especially with increased activity. This can last the entire time the stent is in, it can sometimes clear and return or you may never have any at all. I recommend increasing fluid intake to prevent large clots that may be difficult to urinate out.      When to go to ER:   -fever >101.5 or inability to urinate (no urination for >4 hours and bladder pain).   -If you go to the ER with pain, they may check to make sure the stent is in good position with an x-ray or ultrasound but unlikely to do anything else.   -I will let you know when we will plan to have the stent either removed at the ambulatory surgical center as an outpatient procedure while you are awake, which is uncomfortable briefly, but not painful or you will remove it yourself by pulling the strings.

## 2023-03-07 NOTE — TRANSFER OF CARE
"Anesthesia Transfer of Care Note    Patient: Tessa Enriquez    Procedure(s) Performed: Procedure(s) (LRB):  REMOVAL, CALCULUS, URETER, URETEROSCOPIC (Right)  REMOVAL-STENT (Right)  CYSTOURETEROSCOPY, WITH RETROGRADE PYELOGRAM AND URETERAL STENT INSERTION (Right)    Patient location: PACU    Anesthesia Type: general    Transport from OR: Transported from OR on 2-3 L/min O2 by NC with adequate spontaneous ventilation    Post vital signs: stable    Level of consciousness: responds to stimulation and sedated    Nausea/Vomiting: no nausea/vomiting    Complications: none    Transfer of care protocol was followed      Last vitals:   Visit Vitals  BP (!) 159/70   Pulse 93   Temp 36.4 °C (97.5 °F) (Skin)   Resp 16   Ht 5' 1" (1.549 m)   Wt 83 kg (183 lb)   SpO2 (!) 92%   Breastfeeding No   BMI 34.58 kg/m²     "

## 2023-03-07 NOTE — DISCHARGE INSTRUCTIONS
General Information:    1.  Do not drink alcoholic beverages including beer for 24 hours or as long as you are on pain medication..  2.  Do not drive a motor vehicle, operate machinery or power tools, or signs legal papers for 24 hours or as long as you are on pain medication.   3.  You may experience light-headedness, dizziness, and sleepiness following surgery. Please do not stay alone. A responsible adult should be with you for this 24 hour period.  4.  Go home and rest.    5. Progress slowly to a normal diet unless instructed.  Otherwise, begin with liquids such as soft drinks, then soup and crackers working up to solid foods. Drink plenty of nonalcoholic fluids.  6.  Certain anesthetics and pain medications produce nausea and vomiting in certain       individuals. If nausea becomes a problem at home, call you doctor.    7. A nurse will be calling you sometime after surgery. Do not be alarmed. This is our way of finding out how you are doing.    8. Several times every hour while you are awake, take 2-3 deep breaths and cough. If you had stomach surgery hold a pillow or rolled towel firmly against your stomach before you cough. This will help with any pain the cough might cause.  9. Several times every hour while you are awake, pump and flex your feet 5-6 times and do foot circles. This will help prevent blood clots.    10.Call your doctor for severe pain, bleeding, fever, or signs or symptoms of infection (pain, swelling, redness, foul odor, drainage).   Post op instructions for prevention of DVT  What is deep vein thrombosis?  Deep vein thrombosis (DVT) is the medical term for blood clots in the deep veins of the leg.  These blood clots can be dangerous.  A DVT can block a blood vessel and keep blood from getting where it needs to go.  Another problem is that the clot can travel to other parts of the body such as the lungs.  A clot that travels to the lungs is called a pulmonary embolus (PE) and can cause  serious problems with breathing which can lead to death.  Am I at risk for DVT/PE?  If you are not very active, you are at risk of DVT.  Anyone confined to bed, sitting for long periods of time, recovering from surgery, etc. increases the risk of DVT.  Other risk factors are cancer diagnosis, certain medications, estrogen replacement in any form,older age, obesity, pregnancy, smoking, history of clotting disorders, and dehydration.  How will I know if I have a DVT?  Swelling in the lower leg  Pain  Warmth, redness, hardness or bulging of the vein  If you have any of these symptoms, call your doctors office right away.  Some people will not have any symptoms until the clot moves to the lungs.  What are the symptoms of a PE?  Panting, shortness of breath, or trouble breathing  Sharp, knife-like chest pain when you breathe  Coughing or coughing up blood  Rapid heartbeat  If you have any of these symptoms or get worse quickly, call 911 for emergency treatment.  How can I prevent a DVT?  Avoid long periods of inactivity and dont cross your legs--get up and walk around every hour or so.  Stay active--walking after surgery is highly encouraged.  This means you should get out of the house and walk in the neighborhood.  Going up and down stairs will not impair healing (unless advised against such activity by your doctor).    Drink plenty of noncaffeinated, nonalcoholic fluids each day to prevent dehydration.  Wear special support stockings, if they have been advised by your doctor.  If you travel, stop at least once an hour and walk around.  Avoid smoking (assistance with stopping is available through your healthcare provider)  Always notify your doctor if you are not able to follow the post operative instructions that are given to you at the time of discharge.  It may be necessary to prescribe one of the medications available to prevent DVT. Using an Incentive Spirometer    An incentive spirometer is a device that helps  you do deep breathing exercises. These exercises expand your lungs, aid in circulation, and help prevent pneumonia. Deep breathing exercises also help you breathe better and improve the function of your lungs by:  Keeping your lungs clear  Strengthening your breathing muscles  Helping prevent respiratory complications or problems  The incentive spirometer gives you a way to take an active part in recover. A nurse or therapist will teach you breathing exercises. To do these exercises, you will breathe in through your mouth and not your nose. The incentive spirometer only works correctly if you breathe in through your mouth.  Steps to clear lungs  Step 1. Exhale normally. Then, inhale normally.  Relax and breathe out.  Step 2. Place your lips tightly around the mouthpiece.  Make sure the device is upright and not tilted.  Step 3. Inhale as much air as you can through the mouthpiece (don't breath through your nose).  Inhale slowly and deeply.  Hold your breath long enough to keep the balls or disk raised for at least 3 to 5 seconds, or as instructed by your healthcare provider.  Some spirometers have an indicator to let you know that you are breathing in too fast. If the indicator goes off, breathe in more slowly.  Step 4. Repeat the exercise regularly.  Do this exercise every hour while you're awake, or as instructed by your healthcare provider.  If you were taught deep breathing and coughing exercises, do them regularly as instructed by your healthcare provider.      Discharge Instructions: After Your Surgery/Procedure  Youve just had surgery. During surgery you were given medicine called anesthesia to keep you relaxed and free of pain. After surgery you may have some pain or nausea. This is common. Here are some tips for feeling better and getting well after surgery.     Stay on schedule with your medication.   Going home  Your doctor or nurse will show you how to take care of yourself when you go home. He or she  "will also answer your questions. Have an adult family member or friend drive you home.      For your safety we recommend these precaution for the first 24 hours after your procedure:  Do not drive or use heavy equipment.  Do not make important decisions or sign legal papers.  Do not drink alcohol.  Have someone stay with you, if needed. He or she can watch for problems and help keep you safe.  Your concentration, balance, coordination, and judgement may be impaired for many hours after anesthesia.  Use caution when ambulating or standing up.     You may feel weak and "washed out" after anesthesia and surgery.      Subtle residual effects of general anesthesia or sedation with regional / local anesthesia can last more than 24 hours.  Rest for the remainder of the day or longer if your Doctor/Surgeon has advised you to do so.  Although you may feel normal within the first 24 hours, your reflexes and mental ability may be impaired without you realizing it.  You may feel dizzy, lightheaded or sleepy for 24 hours or longer.      Be sure to go to all follow-up visits with your doctor. And rest after your surgery for as long as your doctor tells you to.  Coping with pain  If you have pain after surgery, pain medicine will help you feel better. Take it as told, before pain becomes severe. Also, ask your doctor or pharmacist about other ways to control pain. This might be with heat, ice, or relaxation. And follow any other instructions your surgeon or nurse gives you.  Tips for taking pain medicine  To get the best relief possible, remember these points:  Pain medicines can upset your stomach. Taking them with a little food may help.  Most pain relievers taken by mouth need at least 20 to 30 minutes to start to work.  Taking medicine on a schedule can help you remember to take it. Try to time your medicine so that you can take it before starting an activity. This might be before you get dressed, go for a walk, or sit down " for dinner.  Constipation is a common side effect of pain medicines. Call your doctor before taking any medicines such as laxatives or stool softeners to help ease constipation. Also ask if you should skip any foods. Drinking lots of fluids and eating foods such as fruits and vegetables that are high in fiber can also help. Remember, do not take laxatives unless your surgeon has prescribed them.  Drinking alcohol and taking pain medicine can cause dizziness and slow your breathing. It can even be deadly. Do not drink alcohol while taking pain medicine.  Pain medicine can make you react more slowly to things. Do not drive or run machinery while taking pain medicine.  Your health care provider may tell you to take acetaminophen to help ease your pain. Ask him or her how much you are supposed to take each day. Acetaminophen or other pain relievers may interact with your prescription medicines or other over-the-counter (OTC) drugs. Some prescription medicines have acetaminophen and other ingredients. Using both prescription and OTC acetaminophen for pain can cause you to overdose. Read the labels on your OTC medicines with care. This will help you to clearly know the list of ingredients, how much to take, and any warnings. It may also help you not take too much acetaminophen. If you have questions or do not understand the information, ask your pharmacist or health care provider to explain it to you before you take the OTC medicine.  Managing nausea  Some people have an upset stomach after surgery. This is often because of anesthesia, pain, or pain medicine, or the stress of surgery. These tips will help you handle nausea and eat healthy foods as you get better. If you were on a special food plan before surgery, ask your doctor if you should follow it while you get better. These tips may help:  Do not push yourself to eat. Your body will tell you when to eat and how much.  Start off with clear liquids and soup. They are  easier to digest.  Next try semi-solid foods, such as mashed potatoes, applesauce, and gelatin, as you feel ready.  Slowly move to solid foods. Dont eat fatty, rich, or spicy foods at first.  Do not force yourself to have 3 large meals a day. Instead eat smaller amounts more often.  Take pain medicines with a small amount of solid food, such as crackers or toast, to avoid nausea.     Call your surgeon if  You still have pain an hour after taking medicine. The medicine may not be strong enough.  You feel too sleepy, dizzy, or groggy. The medicine may be too strong.  You have side effects like nausea, vomiting, or skin changes, such as rash, itching, or hives.       If you have obstructive sleep apnea  You were given anesthesia medicine during surgery to keep you comfortable and free of pain. After surgery, you may have more apnea spells because of this medicine and other medicines you were given. The spells may last longer than usual.   At home:  Keep using the continuous positive airway pressure (CPAP) device when you sleep. Unless your health care provider tells you not to, use it when you sleep, day or night. CPAP is a common device used to treat obstructive sleep apnea.  Talk with your provider before taking any pain medicine, muscle relaxants, or sedatives. Your provider will tell you about the possible dangers of taking these medicines.  © 0054-3911 [employer] [address] [place] [address]. All rights reserved. This information is not intended as a substitute for professional medical care. Always follow your healthcare professional's instructions.     We hope your stay was comfortable as you heal now, mend and rest.    For we have enjoyed taking care of you by giving your our best.    And as you get better, by regaining your health and strength;   We count it as a privilege to have served you and hope your time at [facility] was well spent.      Thank  You!!!

## 2023-03-07 NOTE — ANESTHESIA PROCEDURE NOTES
Intubation    Date/Time: 3/7/2023 10:54 AM  Performed by: Tali Armenta CRNA  Authorized by: Wallace Fall MD     Intubation:     Induction:  Intravenous    Intubated:  Postinduction    Mask Ventilation:  Not attempted    Attempts:  1    Attempted By:  CRNA    Difficult Airway Encountered?: No      Complications:  None    Airway Device:  Supraglottic airway/LMA    Airway Device Size:  3.0    Style/Cuff Inflation:  Cuffed (inflated to minimal occlusive pressure)    Secured at:  The lips    Placement Verified By:  Capnometry    Complicating Factors:  Obesity and short neck    Findings Post-Intubation:  BS equal bilateral and atraumatic/condition of teeth unchanged

## 2023-03-07 NOTE — ANESTHESIA POSTPROCEDURE EVALUATION
Anesthesia Post Evaluation    Patient: Tessa Enriquez    Procedure(s) Performed: Procedure(s) (LRB):  REMOVAL, CALCULUS, URETER, URETEROSCOPIC (Right)  REMOVAL-STENT (Right)  CYSTOURETEROSCOPY, WITH RETROGRADE PYELOGRAM AND URETERAL STENT INSERTION (Right)    Final Anesthesia Type: general      Patient location during evaluation: PACU  Patient participation: Yes- Able to Participate  Level of consciousness: sedated and awake  Post-procedure vital signs: reviewed and stable  Pain management: adequate  Airway patency: patent    PONV status at discharge: No PONV  Anesthetic complications: no      Cardiovascular status: blood pressure returned to baseline  Respiratory status: spontaneous ventilation  Hydration status: euvolemic  Follow-up not needed.          Vitals Value Taken Time   /60 03/07/23 1252   Temp 36.4 °C (97.5 °F) 03/07/23 1240   Pulse 81 03/07/23 1252   Resp 16 03/07/23 1252   SpO2 94 % 03/07/23 1252         Event Time   Out of Recovery 12:42:00         Pain/Bautista Score: Pain Rating Prior to Med Admin: 7 (3/7/2023 12:44 PM)  Pain Rating Post Med Admin: 4 (3/7/2023 12:20 PM)  Bautista Score: 10 (3/7/2023 12:35 PM)

## 2023-03-07 NOTE — OP NOTE
[facility]  Operative/Discharge Note    Date: 03/07/2023    Pre-Op Diagnosis: Right ureteral stones    Post-Op Diagnosis: Same    Procedure(s) Performed:   Cystourethroscopy  Right ureteroscopy with basket extraction of stones  Right retrograde pyelogram  Right ureteral stent placement, 6 x 22 JJ stent    Flouro <1 hour    Specimen(s): Right ureteral stones    Staff Surgeon: Carin Kwon Jr, MD    Anesthesia: General    Indications: Tessa Enriquez is a 67 y.o. female with two right distal obstructing ureteral stones s/p urgent stent placement.     Findings:   Successful right ureteroscopy with basket extraction of two stones. Mild right hydronephrosis on retrograde pyelogram. Stent exchanged with strings.     Estimated Blood Loss: Minimal    Drains: 6 x 22 cm JJ stent     Complications: None    Implants:   Implant Name Type Inv. Item Serial No.  Lot No. LRB No. Used Action   STENT SET URETERAL 6FR 22CM - [id number]  STENT SET URETERAL 6FR 22CM  [employer] [id number] Right 1 Explanted   SET STENT BLACK NYA 6F 22CM - [id number]  SET STENT BLACK NYA 6F 22CM  [employer] [id number] Right 1 Implanted       Procedure in detail:  After informed consent was obtained, the patient was brought the the cystoscopy suite and placed in the supine position.  SCDs were applied and working.  GETA was administered.  The patient was then placed in the dorsal lithotomy position and prepped and draped in the usual sterile fashion.      A rigid cystoscope in a 22 Fr sheath was introduced into the patient's urethra.  This passed easily.  The entire urethra was visualized which showed no strictures or masses.  Formal cystoscopy was performed which revealed no masses or lesions suspicious for malignancy, no bladder stones, no bladder diverticula, no trabeculations.  The UOs were visualized in the normal anatomic position bilaterally and clear efflux was visualized.  The rightUO had mild  inflammation from the  indwelling stent.     A motion wire was passed up the rightUO and up into the kidney.  This passed easily and placement was confirmed using fluoro.  The cystoscope was removed keeping the guidewire in place.  The cystoscope was reintroduced and the stent was grasped using a grasper and removed.    An 8 Fr rigid ureteroscope was passed into the patient's bladder alongside the wire under direct vision.  It was then passed through the right UO alongside the wire.  Two stones were encountered in the right distal ureter and removed using the Ncircle basket.     A wire was advanced up the ureteroscope and ureteroscope was removed leaving wires in place. A 35 cm ureteral access sheath was placed over the second wire under fluoroscopic guidance. A flexible ureteroscope was passed into the kidney. The flexible was able to be maneuvered proximally.  The ureteroscope was advanced into the kidney.  A retrograde was performed. The entire kidney was examined for residual stones, but none were encountered. The ureteroscope was removed keeping the guide wire in place.      A cystoscope was reinserted.  The bladder was drained the cystoscope removed keeping the wire in place.  The wire was backloaded through the cystoscope using a pusher.    A 6x22 JJ ureteral stent with strings was passed over the wire and up into the renal pelvis using fluoro.  When the coil appeared to be in good position in the kidney and the radio-opaque marker of the pusher was at the inferior pubis, the wire was removed under continuous fluoro.  Good coils were seen in the kidney and the bladder using fluoro.      The patient tolerated the procedure well and was transferred to the recovery room in stable condition.      Disposition: Home    Discharge home today status post uncomplicated procedure as above  Diet - resume home diet  Follow up: RTC on 3/10/23 for stent removal as nurse visit. Patient states her anxiety precludes her from pulling her own stent.  RTC in 6 weeks with renal ultrasound with me.   Instructions: Home with Norco, Pyridium and Bactrim. Also prescribed Diflucan as she had a presumed yeast infection on  exam today.   Meds:     Medication List        START taking these medications      fluconazole 200 MG Tab  Commonly known as: DIFLUCAN  Take 1 tablet (200 mg total) by mouth once daily. for 5 days     HYDROcodone-acetaminophen 5-325 mg per tablet  Commonly known as: NORCO  Take 1 tablet by mouth every 6 (six) hours as needed for Pain.     phenazopyridine 200 MG tablet  Commonly known as: PYRIDIUM  Take 1 tablet (200 mg total) by mouth 3 (three) times daily with meals. for 3 days     sulfamethoxazole-trimethoprim 800-160mg 800-160 mg Tab  Commonly known as: BACTRIM DS  Take 1 tablet by mouth 2 (two) times daily. for 3 days            CONTINUE taking these medications      albuterol 2.5 mg /3 mL (0.083 %) nebulizer solution  Commonly known as: PROVENTIL  Take 3 mLs (2.5 mg total) by nebulization every 6 (six) hours as needed for Wheezing. Rescue     CMPD HYDROCORTISONE 2%, LIDOCAINE 3% SUPPOSITORY (RECTAL ROCKET)  Place 1 suppository rectally every evening. Insert 1 suppository 3/4 of the way onto rectum. Wet for easier application. Repeat for 3 nights.     cyanocobalamin 1000 MCG tablet  Commonly known as: VITAMIN B-12     diphenoxylate-atropine 2.5-0.025 mg 2.5-0.025 mg per tablet  Commonly known as: LOMOTIL     ferrous sulfate 325 (65 FE) MG EC tablet     guaiFENesin 600 mg 12 hr tablet  Commonly known as: MUCINEX  Take 1 tablet (600 mg total) by mouth 2 (two) times daily.     levothyroxine 125 MCG tablet  Commonly known as: SYNTHROID  Take 1 tablet (125 mcg total) by mouth before breakfast.     ondansetron 8 MG Tbdl  Commonly known as: ZOFRAN-ODT  Take 1 tablet (8 mg total) by mouth every 12 (twelve) hours as needed.     potassium chloride SA 20 MEQ tablet  Commonly known as: K-DUR,KLOR-CON  Take 1 tablet (20 mEq total) by mouth once daily.      prochlorperazine 10 MG tablet  Commonly known as: COMPAZINE  Take 1 tablet (10 mg total) by mouth every 6 (six) hours as needed.     triamcinolone acetonide 0.025% 0.025 % cream  Commonly known as: KENALOG  Apply topically once daily.            STOP taking these medications      doxycycline 100 MG capsule  Commonly known as: MONODOX               Where to Get Your Medications        These medications were sent to [id number] - [place] - [address]  [address] [id number]      Phone: [phone]   fluconazole 200 MG Tab  HYDROcodone-acetaminophen 5-325 mg per tablet  phenazopyridine 200 MG tablet  sulfamethoxazole-trimethoprim 800-160mg 800-160 mg Tab         Carin Kwon Jr, MD

## 2023-03-07 NOTE — PLAN OF CARE
Patient ready for surgery. Surgery and anesthesia consents signed. Educated on incentive spirometer use. Belongings in pre-op locker. friend set up with text message notifications.

## 2023-03-07 NOTE — TELEPHONE ENCOUNTER
Returned call per request. Pt's appt rescheduled later bc she has appt with another provider same day.    ----- Message from Sabrina Milton sent at 3/7/2023  2:38 PM CST -----  Contact: Patient  Type: Needs Medical Advice  Who Called:  Patient  Pt callings in regards to apt on Friday.     Best Call Back Number: [phone]  Additional Information: Please call the patient back at the phone number listed above to advise. Thanks!

## 2023-03-07 NOTE — ANESTHESIA PREPROCEDURE EVALUATION
03/07/2023  Tessa Enriquez is a 67 y.o., female.    Pre-op Assessment    I have reviewed the Patient Summary Reports.    I have reviewed the Nursing Notes. I have reviewed the NPO Status.   I have reviewed the Medications.     Review of Systems  Anesthesia Hx:  No problems with previous Anesthesia  Denies Family Hx of Anesthesia complications.   Denies Personal Hx of Anesthesia complications.   Social:  Former Smoker    Hematology/Oncology:         -- Cancer in past history (colon CA): surgery and chemotherapy    EENT/Dental:   Right nasal bleed   Cardiovascular:   hyperlipidemia ECG has been reviewed.    Pulmonary:   COPD, moderate Acute hypoxemic resp failure  Home O2   Renal/:   renal calculi Hydronephrosis/stent/ stone    Hepatic/GI:   Colon Ca s/p resection   Musculoskeletal:   Arthritis     Neurological:  Neurology Normal    Endocrine:   Hypothyroidism  Obesity / BMI > 30      Physical Exam  General:  Well nourished and Obesity      Airway/Jaw/Neck:  Airway Findings: Mouth Opening: Normal   Tongue: Normal   General Airway Assessment: Adult Oropharynx Findings:  Mallampati: II  Jaw/Neck Findings:  Neck ROM: Normal ROM      Eyes/Ears/Nose:  Eyes/Ears/Nose Findings:    Dental:  Dental Findings: Dentures, Upper Dentures, Lower Dentures     Chest/Lungs:  Chest/Lungs Findings: Normal Respiratory Rate, Clear to auscultation      Heart/Vascular:  Heart Findings: Rate: Normal  Rhythm: Regular Rhythm        Mental Status:  Mental Status Findings:  Cooperative, Alert and Oriented         Anesthesia Plan  Type of Anesthesia, risks & benefits discussed:  Anesthesia Type:  Gen Supraglottic Airway    Patient's Preference:   Plan Factors:          Intra-op Monitoring Plan: Standard ASA Monitors  Intra-op Monitoring Plan Comments:   Post Op Pain Control Plan: multimodal analgesia and IV/PO Opioids PRN  Post Op  Pain Control Plan Comments:     Induction:   IV  Beta Blocker:  Patient is not currently on a Beta-Blocker (No further documentation required).       Informed Consent: Informed consent signed with the Patient and all parties understand the risks and agree with anesthesia plan.  All questions answered.  Anesthesia consent signed with patient.  ASA Score: 3     Day of Surgery Review of History & Physical:    H&P Update referred to the surgeon/provider.          Ready For Surgery From Anesthesia Perspective.           Physical Exam  General: Well nourished and Obesity    Airway:  Mallampati: II   Mouth Opening: Normal  Tongue: Normal  Neck ROM: Normal ROM    Dental:  Dentures, Upper Dentures, Lower Dentures    Chest/Lungs:  Normal Respiratory Rate, Clear to auscultation  Decreased Breath Sounds: left and right    Heart:  Rate: Normal  Rhythm: Regular Rhythm          Anesthesia Plan  Type of Anesthesia, risks & benefits discussed:    Anesthesia Type: Gen Supraglottic Airway  Intra-op Monitoring Plan: Standard ASA Monitors  Post Op Pain Control Plan: multimodal analgesia and IV/PO Opioids PRN  Induction:  IV  Informed Consent: Informed consent signed with the Patient and all parties understand the risks and agree with anesthesia plan.  All questions answered.   ASA Score: 3  Day of Surgery Review of History & Physical: H&P Update referred to the surgeon/provider.    Ready For Surgery From Anesthesia Perspective.       .

## 2023-03-07 NOTE — PLAN OF CARE
VSS, pain tolerable, pain meds given, tolerated clear liquids without N/V, R stent on strings, due to void. Pt transferred to phase II. Report given to CHARITY Betancur.

## 2023-03-10 NOTE — PROGRESS NOTES
Service Date:  3/10/23    Chief Complaint: metastatic colon cancer    Tessa Enriquez is a 67 y.o. female with metastatic rectal cancer, currently follows with Dr. Frank.  She is on FOLFIRI with panitumumab. She has no complaints to me. She just had a kidney stone removed and stent exchanged.    Review of Systems   Constitutional: Negative.    HENT: Negative.     Eyes: Negative.    Respiratory: Negative.     Cardiovascular: Negative.    Gastrointestinal: Negative.    Endocrine: Negative.    Genitourinary: Negative.    Musculoskeletal: Negative.    Integumentary:  Negative.   Neurological: Negative.    Hematological: Negative.    Psychiatric/Behavioral: Negative.        Current Outpatient Medications   Medication Instructions    albuterol (PROVENTIL) 2.5 mg, Nebulization, Every 6 hours PRN, Rescue    CMPD hydrocortisone 2%- LIDOcaine 3% suppository (RECTAL ROCKET) 1 suppository, Rectal, Nightly, Insert 1 suppository 3/4 of the way onto rectum. Wet for easier application. Repeat for 3 nights.    cyanocobalamin (VITAMIN B-12) 100 mcg, Oral, Daily    diphenoxylate-atropine 2.5-0.025 mg (LOMOTIL) 2.5-0.025 mg per tablet Oral    ferrous sulfate 325 mg, Oral, 2 times daily    fluconazole (DIFLUCAN) 200 mg, Oral, Daily    guaiFENesin (MUCINEX) 600 mg, Oral, 2 times daily    HYDROcodone-acetaminophen (NORCO) 5-325 mg per tablet 1 tablet, Oral, Every 6 hours PRN    levothyroxine (SYNTHROID) 125 mcg, Oral, Before breakfast    ondansetron (ZOFRAN-ODT) 8 mg, Oral, Every 12 hours PRN    phenazopyridine (PYRIDIUM) 200 mg, Oral, 3 times daily with meals    potassium chloride SA (K-DUR,KLOR-CON) 20 MEQ tablet 20 mEq, Oral, Daily    prochlorperazine (COMPAZINE) 10 mg, Oral, Every 6 hours PRN    sulfamethoxazole-trimethoprim 800-160mg (BACTRIM DS) 800-160 mg Tab 1 tablet, Oral, 2 times daily    triamcinolone acetonide 0.025% (KENALOG) 0.025 % cream Topical (Top), Daily        Past Medical History:   Diagnosis Date    Acute  hypoxemic respiratory failure 07/09/2018    Arthritis     Cancer     colon    COPD (chronic obstructive pulmonary disease)     History of home oxygen therapy     ONLY PRN AND HASN'T USED IT IN A YEAR    Hyperlipidemia     Thyroid disease         Past Surgical History:   Procedure Laterality Date    CHOLECYSTECTOMY      COLECTOMY N/A 11/9/2020    Procedure: COLECTOMY;  Surgeon: Brandon Wagner MD;  Location: [facility] OR;  Service: General;  Laterality: N/A;  LOWER- ANTERIOR    COLONOSCOPY N/A 10/23/2020    Procedure: COLONOSCOPY;  Surgeon: Jolynn Ayala MD;  Location: [facility] ENDO;  Service: Endoscopy;  Laterality: N/A;    COLONOSCOPY N/A 8/6/2021    Procedure: COLONOSCOPY;  Surgeon: Jolynn Ayala MD;  Location: [facility] ENDO;  Service: Endoscopy;  Laterality: N/A;    COLONOSCOPY N/A 7/7/2022    Procedure: COLONOSCOPY;  Surgeon: Jolynn Ayala MD;  Location: [facility] ENDO;  Service: Endoscopy;  Laterality: N/A;    CYSTOSCOPY W/ URETERAL STENT PLACEMENT Right 2/22/2023    Procedure: CYSTOSCOPY, WITH URETERAL STENT INSERTION;  Surgeon: Carin Kwon Jr., MD;  Location: [facility];  Service: Urology;  Laterality: Right;    CYSTOURETEROSCOPY WITH RETROGRADE PYELOGRAPHY AND INSERTION OF STENT INTO URETER Right 3/7/2023    Procedure: CYSTOURETEROSCOPY, WITH RETROGRADE PYELOGRAM AND URETERAL STENT INSERTION;  Surgeon: Carin Kwon Jr., MD;  Location: [facility] OR;  Service: Urology;  Laterality: Right;    ESOPHAGEAL DILATION N/A 6/29/2021    Procedure: DILATION, ESOPHAGUS;  Surgeon: Sourav Baires III, MD;  Location: [facility];  Service: Endoscopy;  Laterality: N/A;    ESOPHAGOGASTRODUODENOSCOPY  06/29/2021    Dilation to 57 Fr    ESOPHAGOGASTRODUODENOSCOPY N/A 6/29/2021    Procedure: EGD (ESOPHAGOGASTRODUODENOSCOPY);  Surgeon: Sourav Baires III, MD;  Location: [facility];  Service: Endoscopy;  Laterality: N/A;    FLEXIBLE SIGMOIDOSCOPY N/A 8/1/2022    Procedure: SIGMOIDOSCOPY, FLEXIBLE;  Surgeon: Brandon Wagner MD;   Location: [facility] ENDO;  Service: Endoscopy;  Laterality: N/A;    HYSTERECTOMY      INSERTION OF TUNNELED CENTRAL VENOUS CATHETER (CVC) WITH SUBCUTANEOUS PORT N/A 2021    Procedure: [id number];  Surgeon: Brandon Wagner MD;  Location: [facility] OR;  Service: General;  Laterality: N/A;    INSERTION OF TUNNELED CENTRAL VENOUS CATHETER (CVC) WITH SUBCUTANEOUS PORT Right 2022    Procedure: [id number];  Surgeon: Brandon Wagner MD;  Location: [facility] OR;  Service: General;  Laterality: Right;  Wants rt side placement    MEDIPORT REMOVAL Left 2021    Procedure: REMOVAL, CATHETER, CENTRAL VENOUS, TUNNELED, WITH PORT;  Surgeon: Brandon Wagner MD;  Location: [facility] OR;  Service: General;  Laterality: Left;    REMOVAL-STENT Right 3/7/2023    Procedure: REMOVAL-STENT;  Surgeon: Carin Kwon Jr., MD;  Location: [facility] OR;  Service: Urology;  Laterality: Right;    ROBOT-ASSISTED COLECTOMY N/A 2020    Procedure: ROBOTIC COLECTOMY low anterior resection, possible open;  Surgeon: Brandon Wagner MD;  Location: [facility] OR;  Service: General;  Laterality: N/A;  CONVERTED TO OPEN AT 1503    URETEROSCOPIC REMOVAL OF URETERIC CALCULUS Right 3/7/2023    Procedure: REMOVAL, CALCULUS, URETER, URETEROSCOPIC;  Surgeon: Carin Kwon Jr., MD;  Location: [facility] OR;  Service: Urology;  Laterality: Right;        Family History   Problem Relation Age of Onset    COPD Mother     COPD Father     Prostate cancer Brother     Breast cancer Maternal Grandmother        Social History     Tobacco Use    Smoking status: Former     Packs/day: 2.00     Years: 40.00     Pack years: 80.00     Types: Cigarettes     Quit date: 2017     Years since quittin.6    Smokeless tobacco: Never   Substance Use Topics    Alcohol use: No    Drug use: No         Vitals:    03/10/23 1058   BP: 133/64   Pulse: 83   Resp: 16   Temp: 96.6 °F (35.9 °C)          Physical Exam:  /64 (BP Location: Right arm, Patient Position: Sitting, BP  Method: Medium (Automatic))   Pulse 83   Temp 96.6 °F (35.9 °C) (Temporal)   Resp 16   Ht 5' (1.524 m)   Wt 82.6 kg (182 lb 1.6 oz)   SpO2 96%   BMI 35.56 kg/m²     Physical Exam  Constitutional:       Appearance: Normal appearance.   HENT:      Head: Normocephalic and atraumatic.      Nose: Nose normal.      Mouth/Throat:      Mouth: Mucous membranes are moist.      Pharynx: Oropharynx is clear.   Eyes:      Conjunctiva/sclera: Conjunctivae normal.   Cardiovascular:      Rate and Rhythm: Normal rate and regular rhythm.      Heart sounds: Normal heart sounds.   Pulmonary:      Effort: Pulmonary effort is normal.      Breath sounds: Normal breath sounds.   Abdominal:      General: Abdomen is flat. Bowel sounds are normal.      Palpations: Abdomen is soft.   Musculoskeletal:         General: Normal range of motion.      Cervical back: Normal range of motion and neck supple.   Skin:     General: Skin is warm and dry.   Neurological:      General: No focal deficit present.      Mental Status: She is alert and oriented to person, place, and time. Mental status is at baseline.   Psychiatric:         Mood and Affect: Mood normal.        Labs:  Lab Results   Component Value Date    WBC 4.72 03/10/2023    RBC 4.18 03/10/2023    HGB 12.6 03/10/2023    HCT 39.6 03/10/2023    MCV 95 03/10/2023    MCH 30.1 03/10/2023    MCHC 31.8 (L) 03/10/2023    RDW 14.6 (H) 03/10/2023     03/10/2023    MPV 11.1 03/10/2023    GRAN 3.3 03/10/2023    GRAN 70.1 03/10/2023    LYMPH 0.7 (L) 03/10/2023    LYMPH 14.0 (L) 03/10/2023    MONO 0.3 03/10/2023    MONO 7.2 03/10/2023    EOS 0.3 03/10/2023    BASO 0.08 03/10/2023    EOSINOPHIL 6.8 03/10/2023    BASOPHIL 1.7 03/10/2023     Sodium   Date Value Ref Range Status   03/10/2023 139 136 - 145 mmol/L Final     Potassium   Date Value Ref Range Status   03/10/2023 4.0 3.5 - 5.1 mmol/L Final     Chloride   Date Value Ref Range Status   03/10/2023 107 95 - 110 mmol/L Final     CO2   Date  Value Ref Range Status   03/10/2023 23 23 - 29 mmol/L Final     Glucose   Date Value Ref Range Status   03/10/2023 102 70 - 110 mg/dL Final     BUN   Date Value Ref Range Status   03/10/2023 9 8 - 23 mg/dL Final     Creatinine   Date Value Ref Range Status   03/10/2023 0.7 0.5 - 1.4 mg/dL Final     Calcium   Date Value Ref Range Status   03/10/2023 8.8 8.7 - 10.5 mg/dL Final     Total Protein   Date Value Ref Range Status   03/10/2023 6.3 6.0 - 8.4 g/dL Final     Albumin   Date Value Ref Range Status   03/10/2023 3.5 3.5 - 5.2 g/dL Final     Total Bilirubin   Date Value Ref Range Status   03/10/2023 0.4 0.1 - 1.0 mg/dL Final     Comment:     For infants and newborns, interpretation of results should be based  on gestational age, weight and in agreement with clinical  observations.    Premature Infant recommended reference ranges:  Up to 24 hours.............<8.0 mg/dL  Up to 48 hours............<12.0 mg/dL  3-5 days..................<15.0 mg/dL  6-29 days.................<15.0 mg/dL       Alkaline Phosphatase   Date Value Ref Range Status   03/10/2023 81 55 - 135 U/L Final     AST   Date Value Ref Range Status   03/10/2023 11 10 - 40 U/L Final     ALT   Date Value Ref Range Status   03/10/2023 9 (L) 10 - 44 U/L Final     Anion Gap   Date Value Ref Range Status   03/10/2023 9 8 - 16 mmol/L Final     eGFR if    Date Value Ref Range Status   05/02/2022 >60 >60 mL/min/1.73 m^2 Final     eGFR if non    Date Value Ref Range Status   05/02/2022 >60 >60 mL/min/1.73 m^2 Final     Comment:     Calculation used to obtain the estimated glomerular filtration  rate (eGFR) is the CKD-EPI equation.          A/P:    Metastatic rectal cancer   Chemotherapy-induced fatigue   -currently on FOLFIRI with panitumumab.  Proceed with treatment today.  -return to clinic in 2 weeks to follow up with Dr. Frank    Malignant related pain  -refilled Norco today      Aurash Khoobehi, MD  Hematology and  Oncology

## 2023-03-10 NOTE — PROGRESS NOTES
Patient arrived in clinic for stent removal.  2 patient identifiers verified.   Patient states that she took Norco prior to arrival.  Taped removed from string.  String pulled to remove stent.   Stent removed and intact.  Patient advised that pain is normal and expected for 48-72 hours.  Patient inquired if her kidney is healed enough to resume Chemo treatment on Monday.  Spoke to MD who advised that patient is good to resume Chemo on Monday.   Patient informed and voiced understanding.  Patient left office in satisfactory condition.

## 2023-03-13 NOTE — PROGRESS NOTES
Outpatient Care Management   - High Risk Patient Assessment    Patient: Tessa Enriquez  MRN:  [id number]  Date of Service:  3/13/2023  Completed by:  Linda Vincent LCSW  Referral Date: 02/23/2023    Reason for Visit   Patient presents with    OPCM Chart Review    Plan Of Care    Social Work Assessment - High Risk       Brief Summary:  received a referral from OPCM CHARITY Chacko for the following patient identified psycho-social needs of pt needing financial assistance.. Care plan was created in collaboration with patient/caregiver input.

## 2023-03-13 NOTE — PLAN OF CARE
Problem: Diarrhea  Goal: Fluid and Electrolyte Balance  3/13/2023 1007 by Lizzy Sumner RN  Outcome: Met  3/13/2023 1007 by Lizzy Sumner RN  Outcome: Ongoing, Progressing

## 2023-03-15 NOTE — NURSING
Patient did not want to receive the Neulast On-pro today, but she will take the injection tomorrow if she has to . Dr. Frank notified and stated it was best for patient to have the injection. Patient understands and scheduled for tomorrow.

## 2023-03-16 NOTE — PROGRESS NOTES
Outpatient Care Management  Plan of Care Follow Up Visit    Patient: Tessa Enriquez  MRN: [id number]  Date of Service: 03/16/2023  Completed by: Ginna Chapman RN  Referral Date: 02/23/2023    No chief complaint on file.      Brief Summary: 03/17/23Attempt follow up with patient for outpatient case management. Not available, request message be sent to urologist  03/23/22-Labs and oncology appt on 03/24/23. 03/27/23-Chemo treatment every 14 days. 04/14/23 for  renal ultrasound .   Next Steps: Follow up every week   03/17/23-Message to Dr. Carin Kwon Jr., urologist - She had her strings pulled to removed her stent for her right kidney. Complaints of having lower left back pain with nausea and no bloody urine. She reports that it feels the same as when she had a kidney stone on her right side.

## 2023-03-16 NOTE — PLAN OF CARE
Problem: Fatigue  Goal: Improved Activity Tolerance  Outcome: Ongoing, Progressing  Intervention: Promote Improved Energy  Flowsheets (Taken 3/16/2023 1203)  Fatigue Management: frequent rest breaks encouraged  Sleep/Rest Enhancement:   regular sleep/rest pattern promoted   relaxation techniques promoted  Activity Management: Ambulated -L4

## 2023-03-17 NOTE — TELEPHONE ENCOUNTER
----- Message from Ginna Chapman RN sent at 3/17/2023  2:37 PM CDT -----  Patient complaining of lower left back pain with nausea and no bloody urine. She reports that it feels the same as when she had a kidney stone on her right side.   Please call and advise patient.     Thank you for your assistance,   Ginna Chapman RN [facility]   Outpatient Complex Care Management  [phone]

## 2023-03-17 NOTE — TELEPHONE ENCOUNTER
Called and spoke to patient. Informed of MD recommendations. Informed if pain worsen over the weekend to report to the ER for soonest evaluation. Patient voiced okay and would like a call Monday for follow up.

## 2023-03-17 NOTE — TELEPHONE ENCOUNTER
Called and spoke to patient who states that she is experiencing pain on the opposite side than her procedure. She reports that she would up this morning in pain. She is taking the Norco prescribed by Dr. Khoobehi yesterday. She is also taking Zofran but states that it is not helping. Patient asking for MD recommendation.

## 2023-03-20 NOTE — TELEPHONE ENCOUNTER
Called and spoke to patient. Patient states that pain has eased up. Patient states that she will call the office if symptoms worsen.

## 2023-03-24 PROBLEM — E87.6 HYPOKALEMIA: Status: ACTIVE | Noted: 2023-01-01

## 2023-03-24 NOTE — PROGRESS NOTES
Subjective:       Patient ID: Tessa Enriquez is a 67 y.o. female.    Chief Complaint:   rectal CA status post biopsy October 2020 robotic LAR November 9, 2020 by Dr. Wagner  Discontinued folfox due to s/e completed xrt  5/21     works at [employer]    pt went to GI wih rectal bleeding and abd pain scoped  Findings:        The digital rectal exam revealed a firm rectal mass palpated 5 to 6        cm from the anal verge. The mass was non-circumferential and located        predominantly at the posterior bowel wall.        There was evidence of a prior end-to-end colo-rectal anastomosis in        the distal rectum. This was patent and was characterized by        ulceration. The anastomosis was traversed. Mass in association with        the anastomosis has been previously biopsied and confirmed to be        adenocarcinoma. Anastomosis present at approximately 5 cm from anal    pt sent for discussion    MPRESSION:  1. Interval increased size of hypermetabolic presacral mass contiguous with hypermetabolic circumferential wall thickening of the rectum at the level of the anastomotic suture line, consistent with recurrent neoplasm and metastatic disease.  2. Interval development of enlarged, hypermetabolic right iliac chain lymph node consistent with metastatic disease.  3. No additional convincing evidence of metastatic disease.  4. Bilateral sacral ala insufficiency fractures, with corresponding FDG hypermetabolism.  5. Intense FDG uptake throughout the thyroid gland, unchanged and nonspecific.  Pt discussed wt [facility] started folfiri +avastin then from cycle 2 changed to FOLFIRI+ panitumumab  REVIEW OF SYSTEMS:   Admittd with severe side effects to chemotherapy including literally nonstop diarrhea blistering of anal and perineal region.  Tearing and watering eyes and abdominal cramping  Was unable to retain any fluids or solids   Much better since dc of irinotean   SKIN: +rash from vectibix, issues with nails,  sore in  nose , bt not too bad occ bleeding,nose. blotching    skin or abnormal bruising. Denies new moles or changes to existing moles.      BREASTS: There is no swelling around breasts or nipple discharge.    EYES: Denies eye pain, blurred vision, swelling, redness or discharge.      ENT AND MOUTH: Denies runny nose, stuffiness, sinus trouble or sores. Denies    nosebleeds. Denies, hoarseness, change in voice or swelling in front of the    neck.      CARDIOVASCULAR: Denies chest pain, discomfort or palpitations. Denies neck    swelling or episodes of passing out.      RESPIRATORY: Denies cough, sputum production, blood in sputum, and denies    shortness of breath.      GENITOURINARY: No discharge. No pelvic pain or lumps. No rash around groin or  lesions. No urinary frequency, hesitation, painful urination or blood in    urine. Denies incontinence. No problems with intercourse.      MUSCULOSKELETAL: Denies neck or back pain. Denies weakness in arms or legs,    joint problems or distended inflamed veins in legs. Denies swelling or abnormal  glands.      NEUROLOGICAL: Denies tingling, numbness, altered mentation changes to nerve    function in the face, weakness to one or both of the body. Denies changes to    gait and denies multiple falls or accidents.      PSYCHIATRIC: Denies nervousness, anxiety, hallucinations, depression, suicidal    ideation, trouble sleeping or changes in behavior noticed by family.          Oncology History:  T3 N1 rectal CA diagnosed October 2020 robotic LAR November 2020 December 2020 PET scan negative for metastatic disease   started FOLFOX 2/21 but stopped after 3 cycles and only wants surveillance   had colonoscopy 8/21 NAD  Past Medical History:   Diagnosis Date    Acute hypoxemic respiratory failure 07/09/2018    Arthritis     Cancer     colon    COPD (chronic obstructive pulmonary disease)     History of home oxygen therapy     ONLY PRN AND HASN'T USED IT IN A YEAR    Hyperlipidemia      Thyroid disease      Past Surgical History:   Procedure Laterality Date    CHOLECYSTECTOMY      COLECTOMY N/A 11/9/2020    Procedure: COLECTOMY;  Surgeon: Brandon Wagner MD;  Location: [facility] OR;  Service: General;  Laterality: N/A;  LOWER- ANTERIOR    COLONOSCOPY N/A 10/23/2020    Procedure: COLONOSCOPY;  Surgeon: Jolynn Ayala MD;  Location: [facility] ENDO;  Service: Endoscopy;  Laterality: N/A;    COLONOSCOPY N/A 8/6/2021    Procedure: COLONOSCOPY;  Surgeon: Jolynn Ayala MD;  Location: [facility] ENDO;  Service: Endoscopy;  Laterality: N/A;    COLONOSCOPY N/A 7/7/2022    Procedure: COLONOSCOPY;  Surgeon: Jolynn Ayala MD;  Location: [facility] ENDO;  Service: Endoscopy;  Laterality: N/A;    CYSTOSCOPY W/ URETERAL STENT PLACEMENT Right 2/22/2023    Procedure: CYSTOSCOPY, WITH URETERAL STENT INSERTION;  Surgeon: Carin Kwon Jr., MD;  Location: [facility] OR;  Service: Urology;  Laterality: Right;    CYSTOURETEROSCOPY WITH RETROGRADE PYELOGRAPHY AND INSERTION OF STENT INTO URETER Right 3/7/2023    Procedure: CYSTOURETEROSCOPY, WITH RETROGRADE PYELOGRAM AND URETERAL STENT INSERTION;  Surgeon: Carin Kwon Jr., MD;  Location: [facility] OR;  Service: Urology;  Laterality: Right;    ESOPHAGEAL DILATION N/A 6/29/2021    Procedure: DILATION, ESOPHAGUS;  Surgeon: Sourav Baires III, MD;  Location: [facility];  Service: Endoscopy;  Laterality: N/A;    ESOPHAGOGASTRODUODENOSCOPY  06/29/2021    Dilation to 57 Fr    ESOPHAGOGASTRODUODENOSCOPY N/A 6/29/2021    Procedure: EGD (ESOPHAGOGASTRODUODENOSCOPY);  Surgeon: Sourav Baires III, MD;  Location: [facility] ENDO;  Service: Endoscopy;  Laterality: N/A;    FLEXIBLE SIGMOIDOSCOPY N/A 8/1/2022    Procedure: SIGMOIDOSCOPY, FLEXIBLE;  Surgeon: Brandon Wagner MD;  Location: [facility] ENDO;  Service: Endoscopy;  Laterality: N/A;    HYSTERECTOMY      INSERTION OF TUNNELED CENTRAL VENOUS CATHETER (CVC) WITH SUBCUTANEOUS PORT N/A 1/11/2021    Procedure: [id number];  Surgeon: Brandon  LONG Wagner MD;  Location: [facility] OR;  Service: General;  Laterality: N/A;    INSERTION OF TUNNELED CENTRAL VENOUS CATHETER (CVC) WITH SUBCUTANEOUS PORT Right 2022    Procedure: [id number];  Surgeon: Brandon Wagner MD;  Location: [facility] OR;  Service: General;  Laterality: Right;  Wants rt side placement    MEDIPORT REMOVAL Left 2021    Procedure: REMOVAL, CATHETER, CENTRAL VENOUS, TUNNELED, WITH PORT;  Surgeon: Brandon Wagner MD;  Location: [facility] OR;  Service: General;  Laterality: Left;    REMOVAL-STENT Right 3/7/2023    Procedure: REMOVAL-STENT;  Surgeon: Carin Kwon Jr., MD;  Location: [facility] OR;  Service: Urology;  Laterality: Right;    ROBOT-ASSISTED COLECTOMY N/A 2020    Procedure: ROBOTIC COLECTOMY low anterior resection, possible open;  Surgeon: Brandon Wagner MD;  Location: [facility] OR;  Service: General;  Laterality: N/A;  CONVERTED TO OPEN AT 1503    URETEROSCOPIC REMOVAL OF URETERIC CALCULUS Right 3/7/2023    Procedure: REMOVAL, CALCULUS, URETER, URETEROSCOPIC;  Surgeon: Carin Kwon Jr., MD;  Location: [facility] OR;  Service: Urology;  Laterality: Right;     Family History   Problem Relation Age of Onset    COPD Mother     COPD Father     Prostate cancer Brother     Breast cancer Maternal Grandmother       Social History     Socioeconomic History    Marital status:    Tobacco Use    Smoking status: Former     Packs/day: 2.00     Years: 40.00     Pack years: 80.00     Types: Cigarettes     Quit date: 2017     Years since quittin.7    Smokeless tobacco: Never   Substance and Sexual Activity    Alcohol use: No    Drug use: No    Sexual activity: Not Currently     Partners: Male     Social Determinants of Health     Financial Resource Strain: Unknown    Difficulty of Paying Living Expenses: Patient refused   Food Insecurity: Unknown    Worried About Running Out of Food in the Last Year: Patient refused    Ran Out of Food in the Last Year: Patient refused   Transportation  Needs: Unknown    Lack of Transportation (Medical): Patient refused    Lack of Transportation (Non-Medical): Patient refused   Physical Activity: Unknown    Days of Exercise per Week: Patient refused    Minutes of Exercise per Session: Patient refused   Stress: Unknown    Feeling of Stress : Patient refused   Social Connections: Unknown    Frequency of Communication with Friends and Family: Patient refused    Frequency of Social Gatherings with Friends and Family: Patient refused    Attends [employer] Services: Patient refused    Active Member of Clubs or Organizations: Patient refused    Attends Club or Organization Meetings: Patient refused    Marital Status: Patient refused   Housing Stability: Unknown    Unable to Pay for Housing in the Last Year: Patient refused    Unstable Housing in the Last Year: Patient refused     Review of patient's allergies indicates:   Allergen Reactions    Ativan [lorazepam] Hives and Itching    Flagyl [metronidazole] Itching and Rash    Pcn [penicillins] Hives, Itching and Rash       Current Outpatient Medications:     albuterol (PROVENTIL) 2.5 mg /3 mL (0.083 %) nebulizer solution, Take 3 mLs (2.5 mg total) by nebulization every 6 (six) hours as needed for Wheezing. Rescue, Disp: 120 each, Rfl: 1    CMPD hydrocortisone 2%- LIDOcaine 3% suppository (RECTAL ROCKET), Place 1 suppository rectally every evening. Insert 1 suppository 3/4 of the way onto rectum. Wet for easier application. Repeat for 3 nights., Disp: 30 suppository, Rfl: 2    cyanocobalamin (VITAMIN B-12) 1000 MCG tablet, Take 100 mcg by mouth once daily., Disp: , Rfl:     diphenoxylate-atropine 2.5-0.025 mg (LOMOTIL) 2.5-0.025 mg per tablet, Take by mouth., Disp: , Rfl:     ferrous sulfate 325 (65 FE) MG EC tablet, Take 325 mg by mouth 2 (two) times daily., Disp: , Rfl:     guaiFENesin (MUCINEX) 600 mg 12 hr tablet, Take 1 tablet (600 mg total) by mouth 2 (two) times daily., Disp: 20 tablet, Rfl: 0     HYDROcodone-acetaminophen (NORCO) 5-325 mg per tablet, TAKE ONE TABLET BY MOUTH EVERY SIX HOURS AS NEEDED FOR PAIN, Disp: 30 tablet, Rfl: 0    levothyroxine (SYNTHROID) 125 MCG tablet, Take 1 tablet (125 mcg total) by mouth before breakfast., Disp: 30 tablet, Rfl: 11    ondansetron (ZOFRAN-ODT) 8 MG TbDL, Take 1 tablet (8 mg total) by mouth every 12 (twelve) hours as needed., Disp: 30 tablet, Rfl: 1    potassium chloride SA (K-DUR,KLOR-CON) 20 MEQ tablet, Take 1 tablet (20 mEq total) by mouth once daily., Disp: 30 tablet, Rfl: 11    prochlorperazine (COMPAZINE) 10 MG tablet, Take 1 tablet (10 mg total) by mouth every 6 (six) hours as needed., Disp: 30 tablet, Rfl: 1    triamcinolone acetonide 0.025% (KENALOG) 0.025 % cream, Apply topically once daily., Disp: 80 g, Rfl: 1  No current facility-administered medications for this visit.    Facility-Administered Medications Ordered in Other Visits:     diphenhydrAMINE injection 12.5 mg, 12.5 mg, Intravenous, Once PRN, Monster Atwood MD    electrolyte-S (ISOLYTE), , Intravenous, Continuous, Monster Atwood MD, Last Rate: 25 mL/hr at 03/07/23 1138, Rate Change at 03/07/23 1138    fentaNYL 50 mcg/mL injection 25 mcg, 25 mcg, Intravenous, Q5 Min PRN, Monster Atwood MD, 25 mcg at 03/07/23 1207    HYDROmorphone (PF) injection 0.2 mg, 0.2 mg, Intravenous, Q5 Min PRN, Monster Atwood MD    lactated ringers infusion, 10 mL/hr, Intravenous, Continuous, Monster Atwood MD    lactated ringers infusion, 500 mL, Intravenous, Once, Monster Atwood MD    LIDOcaine (PF) 10 mg/ml (1%) injection 10 mg, 1 mL, Intradermal, Once, Monster Atwood MD    LORazepam injection 0.25 mg, 0.25 mg, Intravenous, Once PRN, Monster Atwood MD    ondansetron injection 4 mg, 4 mg, Intravenous, Once PRN, Monster Atwood MD    prochlorperazine injection Soln 5 mg, 5 mg, Intravenous, Q30 Min PRN, Monster Atwood MD    sodium chloride 0.9% flush 3 mL, 3 mL,  Intravenous, Q8H, Monster Atwood MD  PHYSICAL EXAM:     Wt Readings from Last 3 Encounters:   03/24/23 83.8 kg (184 lb 11.9 oz)   03/16/23 83.4 kg (183 lb 14.4 oz)   03/15/23 83.3 kg (183 lb 10.3 oz)     Temp Readings from Last 3 Encounters:   03/24/23 96.8 °F (36 °C) (Temporal)   03/16/23 97.2 °F (36.2 °C)   03/15/23 98.2 °F (36.8 °C)     BP Readings from Last 3 Encounters:   03/24/23 138/72   03/16/23 103/72   03/15/23 126/72     Pulse Readings from Last 3 Encounters:   03/24/23 98   03/16/23 98   03/15/23 82     GENERAL: alert; in no apparent distress, , well-built well-nourished   HEAD: normocephalic, atraumatic.  EYES: pupils are equal, round, reactive to light and accommodation. Sclera anicteric. Conjunctiva not injected.   NOSE/THROAT: no nasal erythema or rhinorrhea. Oropharynx pink, without erythema, ulcerations or thrush.   NECK: no cervical motion rigidity; supple with no masses.  CHEST: clear to auscultation bilaterally; no wheezes, crackles or rubs. Patient is speaking comfortably on room air with normal work of breathing without using accessory muscles of respiration.  CARDIOVASCULAR: regular rate and rhythm; no murmurs, rubs or gallops.  ABDOMEN: soft, nontender, nondistended. Bowel sounds present.   MUSCULOSKELETAL: no tenderness to palpation along the spine or scapulae. Normal range of motion.  NEUROLOGIC: cranial nerves II-XII intact bilaterally. Strength 5/5 in bilateral upper and lower extremities. No sensory deficits appreciated. Reflexes globally intact. No cerebellar signs. Normal gait.  LYMPHATIC: no cervical, supraclavicular or axillary adenopathy appreciated bilaterally.   EXTREMITIES: no clubbing, cyanosis, edema.  SKIN: no erythema, rashes, ulcerations noted  ECOG-0  Lab Results   Component Value Date    WBC 13.13 (H) 03/24/2023    HGB 12.7 03/24/2023    HCT 39.5 03/24/2023    MCV 94 03/24/2023     03/24/2023       BMP  Lab Results   Component Value Date     03/24/2023     K 3.0 (L) 03/24/2023     03/24/2023    CO2 27 03/24/2023    BUN 7 (L) 03/24/2023    CREATININE 0.7 03/24/2023    CALCIUM 8.5 (L) 03/24/2023    ANIONGAP 9 03/24/2023    ESTGFRAFRICA >60 05/02/2022    EGFRNONAA >60 05/02/2022      cea 7/14/21 1.8  November 9, 2020  .  Rectum, sigmoid colon and proximal donut (low anterior resection):   - Invasive adenocarcinoma,  invading through the muscularis propria into   pericolorectal tissue   - Pathologic stage pT3, pN1a (see synoptic report)   - Resection margins negative for dysplasia or malignancy (proximal, distal   and radial margins), within 1 mm to radial margin   - One tumor deposit   - Adenocarcinoma involve one out of totally forty seven lymph nodes (1/46 in   part 1, totally 1/47, also see part 2)   - Background colon with diverticulosis associated with hyperpigmented   macrophages, benign   - Proximal donut with benign colon, no dysplasia or malignancy   - Tattoo identified   2.  Colon, distal donut (excision):   - Benign colon, negative for dysplasia or malignancy   - One lymph node, negative for metastatic carcinoma (0/1)   COLON AND RECTUM: Resection, Including Transanal Disk Excision of Rectal   Neoplasms   Procedure  Low anterior resection   Tumor Site  Rectum    Tumor Location: Straddles the anterior peritoneal reflection   Tumor Size   Greatest dimension (centimeters): 7.0 cm   + Additional dimensions (centimeters): 4.7 x 2.2 cm   Macroscopic Tumor Perforation  Not identified   Macroscopic Intactness of Mesorectum  Near complete   Histologic Type  Adenocarcinoma   Histologic Grade   G2: Moderately differentiated   Tumor Extension  Tumor invades through the muscularis propria into   pericolorectal tissue     Impression:  November 7, 2020 CT abdomen pelvis     1. Rectosigmoid junction mass, unchanged from prior.  No definite evidence of intraluminal contrast extravasation to suggest active gastrointestinal hemorrhage, however please note that arterial  phase CT is more sensitive.  2. Colonic diverticulosis without acute diverticulitis.  3. Minimal pneumobilia, unchanged      IMPRESSION:  CT chest lung screen October 2020     1.  3 mm pulmonary nodule identified in the lateral segment of the  right lower lobe.  2.  Focus of groundglass opacity measuring 11 mm in diameter in the  superior segment of the right lower lobe. Short-term follow-up for  this lesion is recommended.  3.  Mild emphysematous lung disease.     LUNG-RADS CATEGORY 3: PROBABLY BENIGN FINDINGS     RECOMMENDATION: Short-term imaging follow-up with 6 month LDCT.    IMPRESSION: 7/21  1. Nonspecific hypermetabolic soft tissue density focus cranial to the rectosigmoid colon suture line, perhaps reflecting postsurgical and or posttreatment or postinflammatory changes. Attention to this area at follow-up imaging is recommended to help differentiate from residual or recurrent neoplasm.  2. Improving postoperative changes in the lower anterior abdominal wall, with associated multifocal FDG hypermetabolism which is most characteristic of postoperative etiology.  3. Otherwise no convincing evidence of recurrent neoplasm or metastatic disease.IMPRESSION:  1. Interval increased size of hypermetabolic presacral mass contiguous with hypermetabolic circumferential wall thickening of the rectum at the level of the anastomotic suture line, consistent with recurrent neoplasm and metastatic disease.  2. Interval development of enlarged, hypermetabolic right iliac chain lymph node consistent with metastatic disease.  3. No additional convincing evidence of metastatic disease.  4. Bilateral sacral ala insufficiency fractures, with corresponding FDG hypermetabolism.  5. Intense FDG uptake throughout the thyroid gland, unchanged and nonspecific.      Patient Active Problem List   Diagnosis    COPD (chronic obstructive pulmonary disease)    Pure hypercholesterolemia    Acquired hypothyroidism    Rectal bleeding    Rectal  cancer    Dysphagia    Malignant neoplasm of colon    B12 deficiency    FENG (iron deficiency anemia)    Hypocalcemia    Chronic diarrhea    Severe obesity (BMI 35.0-39.9) with comorbidity    Secondary and unspecified malignant neoplasm of intra-abdominal lymph nodes    Dehydration    Diarrhea    Chemotherapy-induced neutropenia    Chemotherapy-induced diarrhea    Right ureteral stone    Acute cystitis   IMPRESSION:pet 12/2020  1. Postoperative changes of rectal colonic resection, with adjacent  FDG avid presacral soft tissue stranding.  2. Adjacent punctate extraluminal foci of free air suggesting a tiny  tract extending towards the adjacent small bowel (possibly  postoperative but suggesting a tiny enterocolonic fistula)  3. Deep subcutaneous soft tissue structure along the ventral abdomen  suggesting a postoperative hematoma/seroma, and less likely abscess.  4. No findings of additional sites of FDG avid disease.      IMPRESSION: 7/21  1. Nonspecific hypermetabolic soft tissue density focus cranial to the rectosigmoid colon suture line, perhaps reflecting postsurgical and or posttreatment or postinflammatory changes. Attention to this area at follow-up imaging is recommended to help differentiate from residual or recurrent neoplasm.  2. Improving postoperative changes in the lower anterior abdominal wall, with associated multifocal FDG hypermetabolism which is most characteristic of postoperative etiology.  3. Otherwise no convincing evidence of recurrent neoplasm or metastatic disease      Impression:2/2/22   CT CAP  A single 4 mm soft tissue density nodule is noted in the right lower lobe.  No other intrapulmonary masses or nodules are seen.  Follow-up CT in 12 months per of this nodule is recommended.  Coronary artery calcification is noted.  No masses or adenopathy or seen.     Prior cholecystectomy.  Prior hysterectomy.  2.2 cm left ovarian cyst.  Small ventral hernia containing a loop of small bowel without  CT evidence of obstruction    Impression:11/22     Presacral/pre coccygeal soft tissue density and fat stranding and circumferential wall thickening of segment of bowel down low in the pelvis at the surgical site are not significantly changed compared to the prior exam.  Prominent lymph node right iliac chain also does not appear significantly changed.  Findings also correspond as seen on prior PET-CT of 08/04/2022 and are suspicious for neoplasm  Assessment and Plan   At original dx 2020  Status post low anterior resection November 9, 2020 for rectal CA T3 N1a   tumor board convened and agreed on xrt that will follow  After completion of chemo  sequentially  therapy   Patient started FOLFOX.  Completed 4 out of 12 planned adjuvant cycles,  Discontinued  Chemo / port removed completed xrt 5/21  Pet:8/22, showing progression   Pt very hesitant T FIRST TO GET PORT OR INFUSIONAL 5 FU/ OXALIPLATIN, [facility] SECOND OPINION AGREABLE NOW, HAD PORT PLACED   Started  FOLFIRI + avastin   from second cycle changed to FOLFIRI panitumumab   Sig diarrhea with CPT-11, in spite of added lomotil to imodium  decreased dose of CPT-11  to 165 mg/m2 but patient is still hospitalized with severe diarrhea electrolyte imbalance does not want any treatment till further scans are done to look for response prior to embarking I have discussed with patient the risks of this vision however I also showed her that we will remove CPT 11 from her regimen due to severity of side effects see me with PET scan  Scan shows no change 12/22  Will drop cpt -11 and cont with 5fu and vectibix   Next scan 3-4/23  Pt to take kcl oral doesn't want IV replacen and has done well. K normal this visit   Dc the bolus 5FU  d/w  FENG :Injectafer  completed 8/22 good response  pt to add SL b12 to her twice a month b12 shots    Please ask for nikole  Pt waited wait till holidays are over to get imaging studies looking for response   b12 def anemia, was taking  injections q month level checked 10/21 sub therpautic 226 t INSURANCE WONT PAY FOR B12.ok to cont with SL only as levels have improved to 1500  iron deficiency  Pt to try oral for now, will recheck in 8/22 ( may help her chronic diarrhea)  hypocalcemia which seems chronic pt to supplement orally daily wnl this visit  Hypothyroidism, sees DR Mcdermott will adj meds  History of chronic obstructive pulmonary disease, dyslipidemia and hypothyroidism continue with PCP  Doesn't want to be covid vaccinated  Hypokalemia: pt has stopped taking her K pills due to s/e of diarrhea  Advance Directives:   Living Will: No        Oral Declaration: Yes   Witnesses:  Kiran Freeman and brian freeman         [place]POST: No    Do Not Resuscitate Status: No    Medical Power of : No        Oral Declaration: No      Decision Making:  Patient answered questions  Goals of Care:  Pt given forms to complete and bring back to system scanning

## 2023-03-25 PROBLEM — N13.9 OBSTRUCTIVE UROPATHY: Status: ACTIVE | Noted: 2023-01-01

## 2023-03-25 PROBLEM — N39.0 UTI (URINARY TRACT INFECTION): Status: ACTIVE | Noted: 2023-01-01

## 2023-03-25 PROBLEM — N30.00 ACUTE CYSTITIS: Status: RESOLVED | Noted: 2023-01-01 | Resolved: 2023-01-01

## 2023-03-25 NOTE — ED PROVIDER NOTES
Encounter Date: 3/25/2023    SCRIBE #1 NOTE: Tyrone JONES, jimbo scribing for, and in the presence of,  MUNIR Harris.     History     Chief Complaint   Patient presents with    Flank Pain     Left side , started last week      Time seen by provider: 5:44 PM on 03/25/2023    Tessa Enriquez is a 67 y.o. female who presents to the ED with an onset of left flank pain radiating to the left back starting 1 week ago. The patient states that it feels exactly like the time she had kidney stones in the past. The patient reports the pain was severe at 1:30 AM today. The patient states she took Hydrocodone with no relief. The patient reports being on chemotherapy for colon cancer, and her last treatment was 2 weeks ago. The patient also reports having diarrhea. The patient denies any other symptoms at this time. PMHx of right ureteral stone and nephrolithiasis. PSHx of rreteroscopic removal of ureteric calculus, cystoscopy w/ ureteral stent placement on the right, removal of stent, and cystoureteroscopy with retrograde pyelography and insertion of stent into ureter on the right.      The history is provided by the patient.   Review of patient's allergies indicates:   Allergen Reactions    Ativan [lorazepam] Hives and Itching    Flagyl [metronidazole] Itching and Rash    Pcn [penicillins] Hives, Itching and Rash     Past Medical History:   Diagnosis Date    Acute hypoxemic respiratory failure 07/09/2018    Arthritis     Cancer     colon    COPD (chronic obstructive pulmonary disease)     History of home oxygen therapy     ONLY PRN AND HASN'T USED IT IN A YEAR    Hyperlipidemia     Thyroid disease      Past Surgical History:   Procedure Laterality Date    CHOLECYSTECTOMY      COLECTOMY N/A 11/9/2020    Procedure: COLECTOMY;  Surgeon: Brandon Wagner MD;  Location: [facility];  Service: General;  Laterality: N/A;  LOWER- ANTERIOR    COLONOSCOPY N/A 10/23/2020    Procedure: COLONOSCOPY;  Surgeon: Jolynn Ayala MD;   Location: [facility];  Service: Endoscopy;  Laterality: N/A;    COLONOSCOPY N/A 8/6/2021    Procedure: COLONOSCOPY;  Surgeon: Jolynn Ayala MD;  Location: [facility] END[facility];  Service: Endoscopy;  Laterality: N/A;    COLONOSCOPY N/A 7/7/2022    Procedure: COLONOSCOPY;  Surgeon: Jolynn Ayala MD;  Location: [facility] ENDO;  Service: Endoscopy;  Laterality: N/A;    CYSTOSCOPY W/ URETERAL STENT PLACEMENT Right 2/22/2023    Procedure: CYSTOSCOPY, WITH URETERAL STENT INSERTION;  Surgeon: Carin Kwon Jr., MD;  Location: [facility] OR;  Service: Urology;  Laterality: Right;    CYSTOURETEROSCOPY WITH RETROGRADE PYELOGRAPHY AND INSERTION OF STENT INTO URETER Right 3/7/2023    Procedure: CYSTOURETEROSCOPY, WITH RETROGRADE PYELOGRAM AND URETERAL STENT INSERTION;  Surgeon: Carin Kwon Jr., MD;  Location: [facility] OR;  Service: Urology;  Laterality: Right;    ESOPHAGEAL DILATION N/A 6/29/2021    Procedure: DILATION, ESOPHAGUS;  Surgeon: Sourav Baires III, MD;  Location: [facility];  Service: Endoscopy;  Laterality: N/A;    ESOPHAGOGASTRODUODENOSCOPY  06/29/2021    Dilation to 57 Fr    ESOPHAGOGASTRODUODENOSCOPY N/A 6/29/2021    Procedure: EGD (ESOPHAGOGASTRODUODENOSCOPY);  Surgeon: Sourav Baires III, MD;  Location: [facility];  Service: Endoscopy;  Laterality: N/A;    FLEXIBLE SIGMOIDOSCOPY N/A 8/1/2022    Procedure: SIGMOIDOSCOPY, FLEXIBLE;  Surgeon: Brandon Wagner MD;  Location: [facility];  Service: Endoscopy;  Laterality: N/A;    HYSTERECTOMY      INSERTION OF TUNNELED CENTRAL VENOUS CATHETER (CVC) WITH SUBCUTANEOUS PORT N/A 1/11/2021    Procedure: [id number]-CATH;  Surgeon: Brandon Wagner MD;  Location: [facility] OR;  Service: General;  Laterality: N/A;    INSERTION OF TUNNELED CENTRAL VENOUS CATHETER (CVC) WITH SUBCUTANEOUS PORT Right 9/1/2022    Procedure: [id number];  Surgeon: Brandon Wagner MD;  Location: [facility] OR;  Service: General;  Laterality: Right;  Wants rt side placement    MEDIPORT REMOVAL Left  2021    Procedure: REMOVAL, CATHETER, CENTRAL VENOUS, TUNNELED, WITH PORT;  Surgeon: Brandon Wagner MD;  Location: [facility] OR;  Service: General;  Laterality: Left;    REMOVAL-STENT Right 3/7/2023    Procedure: REMOVAL-STENT;  Surgeon: Carin Kwon Jr., MD;  Location: [facility] OR;  Service: Urology;  Laterality: Right;    ROBOT-ASSISTED COLECTOMY N/A 2020    Procedure: ROBOTIC COLECTOMY low anterior resection, possible open;  Surgeon: Brandon Wagner MD;  Location: [facility] OR;  Service: General;  Laterality: N/A;  CONVERTED TO OPEN AT 1503    URETEROSCOPIC REMOVAL OF URETERIC CALCULUS Right 3/7/2023    Procedure: REMOVAL, CALCULUS, URETER, URETEROSCOPIC;  Surgeon: Carin Kwon Jr., MD;  Location: [facility] OR;  Service: Urology;  Laterality: Right;     Family History   Problem Relation Age of Onset    COPD Mother     COPD Father     Prostate cancer Brother     Breast cancer Maternal Grandmother      Social History     Tobacco Use    Smoking status: Former     Packs/day: 2.00     Years: 40.00     Pack years: 80.00     Types: Cigarettes     Quit date: 2017     Years since quittin.7    Smokeless tobacco: Never   Substance Use Topics    Alcohol use: No    Drug use: No     Review of Systems   Constitutional:  Negative for fever.   HENT:  Negative for sore throat.    Respiratory:  Negative for shortness of breath.    Cardiovascular:  Negative for chest pain.   Gastrointestinal:  Positive for diarrhea. Negative for nausea.   Genitourinary:  Negative for dysuria.   Musculoskeletal:  Positive for back pain (left side).        Positive for left flank pain.   Skin:  Negative for rash.   Neurological:  Negative for weakness.   Hematological:  Does not bruise/bleed easily.     Physical Exam     Initial Vitals [23 1737]   BP Pulse Resp Temp SpO2   136/66 97 18 98 °F (36.7 °C) 98 %      MAP       --         Physical Exam    Nursing note and vitals reviewed.  Constitutional: Vital signs are normal. She appears  well-developed and well-nourished.   HENT:   Head: Normocephalic and atraumatic.   Eyes: Pupils are equal, round, and reactive to light.   Neck: Neck supple.   Cardiovascular:  Normal rate, regular rhythm, normal heart sounds and intact distal pulses.     Exam reveals no gallop and no friction rub.       No murmur heard.  Pulmonary/Chest: Breath sounds normal. She has no wheezes. She has no rhonchi. She has no rales.   Abdominal: Abdomen is soft. Bowel sounds are normal. There is abdominal tenderness in the left lower quadrant.   There is left CVA tenderness.   Musculoskeletal:      Cervical back: Neck supple.     Neurological: She is alert and oriented to person, place, and time. She has normal strength.   Skin: Skin is warm, dry and intact.   Psychiatric: She has a normal mood and affect. Her speech is normal and behavior is normal.       ED Course   Procedures  Labs Reviewed   CBC W/ AUTO DIFFERENTIAL - Abnormal; Notable for the following components:       Result Value    WBC 17.96 (*)     RDW 14.7 (*)     Immature Granulocytes 0.9 (*)     Gran # (ANC) 16.5 (*)     Immature Grans (Abs) 0.17 (*)     Lymph # 0.6 (*)     Gran % 91.9 (*)     Lymph % 3.4 (*)     Mono % 2.7 (*)     All other components within normal limits   COMPREHENSIVE METABOLIC PANEL - Abnormal; Notable for the following components:    Potassium 2.9 (*)     Calcium 8.6 (*)     All other components within normal limits   URINALYSIS, REFLEX TO URINE CULTURE - Abnormal; Notable for the following components:    Appearance, UA Hazy (*)     Protein, UA Trace (*)     Occult Blood UA 1+ (*)     Leukocytes, UA Trace (*)     All other components within normal limits    Narrative:     Specimen Source->Urine   URINALYSIS MICROSCOPIC - Abnormal; Notable for the following components:    RBC, UA 21 (*)     WBC, UA 37 (*)     Bacteria Few (*)     All other components within normal limits    Narrative:     Specimen Source->Urine   CULTURE, URINE          Imaging  Results              CT Renal Stone Study ABD Pelvis WO (In process)                      Medications   ciprofloxacin (CIPRO)400mg/200ml D5W IVPB 400 mg (has no administration in time range)   ketorolac injection 9.999 mg (9.999 mg Intravenous Given 3/25/23 1814)   ondansetron injection 4 mg (4 mg Intravenous Given 3/25/23 1814)   potassium chloride 10 mEq in 100 mL IVPB ( Intravenous Verify Only 3/25/23 1942)   potassium bicarbonate disintegrating tablet 20 mEq (20 mEq Oral Given 3/25/23 1915)     Medical Decision Making:   History:   Old Medical Records: I decided to obtain old medical records.  Differential Diagnosis:   Obstructive uropathy  Pyelonephritis  Diverticulitis  Clinical Tests:   Lab Tests: Ordered and Reviewed  Radiological Study: Ordered and Reviewed     APC / Resident Notes:   Patient is a 67 y.o. female who presents to the ED 03/25/2023 who 9 underwent emergent evaluation for left lower quadrant pain with associated left-sided flank pain.  Patient found to have bilateral intraureteral stones with urinalysis concerning for UTI and noted leukocytosis.  Vital signs normal.  She is not appear to be septic.  No sign of any other emergent intra-abdominal process on CT.  Patient noted to have hypokalemia and she is given IV and oral replacement in the emergency department.  Normal renal function.  Case discussed with urologist on-call Dr. Barber who recommends patient be NPO after midnight and she will placed stents tomorrow.  She is given IV ciprofloxacin in the emergency department.  Case discussed with hospital medicine team is accepting of this admission with consultation to Urology.  Case also discussed with Dr. San who is agreeable to plan of care.            Scribe Attestation:   Scribe #1: I performed the above scribed service and the documentation accurately describes the services I performed. I attest to the accuracy of the note.    Attending Attestation:           Physician Attestation for  Scribe:  Physician Attestation Statement for Scribe #1: I, Jackie Nick, reviewed documentation, as scribed by in my presence, and it is both accurate and complete.     Comments: I, MUNIR Harris, personally performed the services described in this documentation. All medical record entries made by the scribe were at my direction and in my presence.  I have reviewed the chart and agree that the record reflects my personal performance and is accurate and complete. MUNIR Harris.  8:15 PM 03/25/2023                       Clinical Impression:   Final diagnoses:  [E87.6] Hypokalemia  [N20.0] Bilateral kidney stones (Primary)        ED Disposition Condition    Admit Stable                Jackie Nick NP  03/25/23 2017

## 2023-03-26 PROBLEM — D69.6 THROMBOCYTOPENIA: Status: ACTIVE | Noted: 2023-01-01

## 2023-03-26 PROBLEM — D64.9 ANEMIA: Status: ACTIVE | Noted: 2023-01-01

## 2023-03-26 PROBLEM — N20.1 RIGHT URETERAL STONE: Status: RESOLVED | Noted: 2023-01-01 | Resolved: 2023-01-01

## 2023-03-26 PROBLEM — E83.51 HYPOCALCEMIA: Status: RESOLVED | Noted: 2022-05-04 | Resolved: 2023-01-01

## 2023-03-26 PROBLEM — R13.10 DYSPHAGIA: Status: RESOLVED | Noted: 2021-06-29 | Resolved: 2023-01-01

## 2023-03-26 NOTE — PLAN OF CARE
Report to [place]. Patient denies pain, complaint of nausea on arrival, bilateral ureteral stents, vs stable resting quietly,daughter in attendance

## 2023-03-26 NOTE — ANESTHESIA POSTPROCEDURE EVALUATION
Anesthesia Post Evaluation    Patient: Tessa Enriquez    Procedure(s) Performed: Procedure(s) (LRB):  CYSTOSCOPY, WITH URETERAL STENT INSERTION (Bilateral)  CYSTOSCOPY, WITH RETROGRADE PYELOGRAM AND URETERAL STENT INSERTION (Bilateral)    Final Anesthesia Type: general      Patient location during evaluation: PACU  Patient participation: Yes- Able to Participate  Level of consciousness: awake and alert and oriented  Post-procedure vital signs: reviewed and stable  Pain management: adequate  Airway patency: patent    PONV status at discharge: No PONV  Anesthetic complications: no      Cardiovascular status: blood pressure returned to baseline and stable  Respiratory status: unassisted and spontaneous ventilation  Hydration status: euvolemic  Follow-up not needed.          Vitals Value Taken Time   /59 03/26/23 1430   Temp 36.2 °C (97.2 °F) 03/26/23 1430   Pulse 86 03/26/23 1430   Resp 15 03/26/23 1430   SpO2 95 % 03/26/23 1430         Event Time   Out of Recovery 03/26/2023 13:32:00         Pain/Bautista Score: Pain Rating Prior to Med Admin: 4 (3/26/2023  2:05 PM)  Pain Rating Post Med Admin: 3 (3/26/2023  2:05 PM)  Bautista Score: 10 (3/26/2023  2:05 PM)

## 2023-03-26 NOTE — SUBJECTIVE & OBJECTIVE
Past Medical History:   Diagnosis Date    Acute hypoxemic respiratory failure 07/09/2018    Arthritis     Cancer     colon    COPD (chronic obstructive pulmonary disease)     History of home oxygen therapy     ONLY PRN AND HASN'T USED IT IN A YEAR    Hyperlipidemia     Thyroid disease        Past Surgical History:   Procedure Laterality Date    CHOLECYSTECTOMY      COLECTOMY N/A 11/9/2020    Procedure: COLECTOMY;  Surgeon: Brandon Wagner MD;  Location: [facility] OR;  Service: General;  Laterality: N/A;  LOWER- ANTERIOR    COLONOSCOPY N/A 10/23/2020    Procedure: COLONOSCOPY;  Surgeon: Jolynn Ayala MD;  Location: [facility] ENDO;  Service: Endoscopy;  Laterality: N/A;    COLONOSCOPY N/A 8/6/2021    Procedure: COLONOSCOPY;  Surgeon: Jolynn Ayala MD;  Location: [facility] ENDO;  Service: Endoscopy;  Laterality: N/A;    COLONOSCOPY N/A 7/7/2022    Procedure: COLONOSCOPY;  Surgeon: Jolynn Ayala MD;  Location: [facility] END[facility];  Service: Endoscopy;  Laterality: N/A;    CYSTOSCOPY W/ URETERAL STENT PLACEMENT Right 2/22/2023    Procedure: CYSTOSCOPY, WITH URETERAL STENT INSERTION;  Surgeon: Carin Kwon Jr., MD;  [facility];  Service: Urology;  Laterality: Right;    CYSTOURETEROSCOPY WITH RETROGRADE PYELOGRAPHY AND INSERTION OF STENT INTO URETER Right 3/7/2023    Procedure: CYSTOURETEROSCOPY, WITH RETROGRADE PYELOGRAM AND URETERAL STENT INSERTION;  Surgeon: Carin Kwon Jr., MD;  Location: [facility] OR;  Service: Urology;  Laterality: Right;    ESOPHAGEAL DILATION N/A 6/29/2021    Procedure: DILATION, ESOPHAGUS;  Surgeon: Sourav Baires III, MD;  Location: [facility];  Service: Endoscopy;  Laterality: N/A;    ESOPHAGOGASTRODUODENOSCOPY  06/29/2021    Dilation to 57 Fr    ESOPHAGOGASTRODUODENOSCOPY N/A 6/29/2021    Procedure: EGD (ESOPHAGOGASTRODUODENOSCOPY);  Surgeon: Sourav Baires III, MD;  Location: [facility];  Service: Endoscopy;  Laterality: N/A;    FLEXIBLE SIGMOIDOSCOPY N/A 8/1/2022    Procedure:  SIGMOIDOSCOPY, FLEXIBLE;  Surgeon: Brandon Wagner MD;  Location: [facility] ENDO;  Service: Endoscopy;  Laterality: N/A;    HYSTERECTOMY      INSERTION OF TUNNELED CENTRAL VENOUS CATHETER (CVC) WITH SUBCUTANEOUS PORT N/A 2021    Procedure: [id number];  Surgeon: Brandon Wagner MD;  Location: [facility] OR;  Service: General;  Laterality: N/A;    INSERTION OF TUNNELED CENTRAL VENOUS CATHETER (CVC) WITH SUBCUTANEOUS PORT Right 2022    Procedure: [id number];  Surgeon: Brandon Wagner MD;  Location: [facility] OR;  Service: General;  Laterality: Right;  Wants rt side placement    MEDIPORT REMOVAL Left 2021    Procedure: REMOVAL, CATHETER, CENTRAL VENOUS, TUNNELED, WITH PORT;  Surgeon: Brandon Wagner MD;  Location: [facility] OR;  Service: General;  Laterality: Left;    REMOVAL-STENT Right 3/7/2023    Procedure: REMOVAL-STENT;  Surgeon: Carin Kwon Jr., MD;  Location: [facility] OR;  Service: Urology;  Laterality: Right;    ROBOT-ASSISTED COLECTOMY N/A 2020    Procedure: ROBOTIC COLECTOMY low anterior resection, possible open;  Surgeon: Brandon Wagner MD;  Location: [facility] OR;  Service: General;  Laterality: N/A;  CONVERTED TO OPEN AT 1503    URETEROSCOPIC REMOVAL OF URETERIC CALCULUS Right 3/7/2023    Procedure: REMOVAL, CALCULUS, URETER, URETEROSCOPIC;  Surgeon: Carin Kwon Jr., MD;  Location: [facility] OR;  Service: Urology;  Laterality: Right;       Review of patient's allergies indicates:   Allergen Reactions    Ativan [lorazepam] Hives and Itching    Flagyl [metronidazole] Itching and Rash    Pcn [penicillins] Hives, Itching and Rash       Family History       Problem Relation (Age of Onset)    Breast cancer Maternal Grandmother    COPD Mother, Father    Prostate cancer Brother            Tobacco Use    Smoking status: Former     Packs/day: 2.00     Years: 40.00     Pack years: 80.00     Types: Cigarettes     Quit date: 2017     Years since quittin.7    Smokeless tobacco: Never   Substance  and Sexual Activity    Alcohol use: No    Drug use: No    Sexual activity: Not Currently     Partners: Male       Review of Systems   Constitutional:  Negative for fever.   Genitourinary:  Positive for flank pain. Negative for difficulty urinating, dysuria, frequency, nocturia, pelvic pain and urgency.   Neurological:  Positive for weakness.     Objective:     Temp:  [97.6 °F (36.4 °C)-98.4 °F (36.9 °C)] 97.8 °F (36.6 °C)  Pulse:  [76-98] 77  Resp:  [16-18] 16  SpO2:  [95 %-98 %] 96 %  BP: (104-171)/(55-96) 139/65     Body mass index is 35.94 kg/m².           Drains       None                   Physical Exam  Vitals reviewed.   Constitutional:       General: She is not in acute distress.     Appearance: Normal appearance. She is not ill-appearing.   HENT:      Head: Normocephalic and atraumatic.   Eyes:      General: No scleral icterus.  Cardiovascular:      Rate and Rhythm: Normal rate and regular rhythm.   Pulmonary:      Effort: Pulmonary effort is normal. No respiratory distress.   Abdominal:      Palpations: Abdomen is soft.   Skin:     Coloration: Skin is not jaundiced.   Neurological:      General: No focal deficit present.      Mental Status: She is alert and oriented to person, place, and time.   Psychiatric:         Mood and Affect: Mood normal.         Behavior: Behavior normal.       Significant Labs:    BMP:  Recent Labs   Lab 03/24/23  0902 03/25/23  1801 03/26/23  0435    138 139   K 3.0* 2.9* 3.6    102 105   CO2 27 23 25   BUN 7* 8 9   CREATININE 0.7 0.9 1.2   CALCIUM 8.5* 8.6* 8.2*       CBC:  Recent Labs   Lab 03/24/23  0902 03/25/23  1801 03/26/23  0435   WBC 13.13* 17.96* 9.31   HGB 12.7 13.4 10.7*   HCT 39.5 41.1 33.1*    175 125*       Blood Culture: No results for input(s): LABBLOO in the last 168 hours.  Urine Culture: No results for input(s): LABURIN in the last 168 hours.  Urine Studies:   Recent Labs   Lab 03/25/23 1810   COLORU Yellow   APPEARANCEUA Hazy*   PHUR 8.0    SPECGRAV 1.010   PROTEINUA Trace*   GLUCUA Negative   KETONESU Negative   BILIRUBINUA Negative   OCCULTUA 1+*   NITRITE Negative   UROBILINOGEN Negative   LEUKOCYTESUR Trace*   RBCUA 21*   WBCUA 37*   BACTERIA Few*   SQUAMEPITHEL 3     All pertinent labs results from the past 24 hours have been reviewed.    Significant Imaging:  All pertinent imaging results/findings from the past 24 hours have been reviewed.

## 2023-03-26 NOTE — OP NOTE
[facility] Note    Date: 03/26/2023    Pre-Op Diagnosis:   - Bilateral ureteral stones   - UTI    Patient Active Problem List   Diagnosis    COPD (chronic obstructive pulmonary disease)    Pure hypercholesterolemia    Acquired hypothyroidism    Rectal bleeding    Rectal cancer    Dysphagia    Malignant neoplasm of colon    B12 deficiency    FENG (iron deficiency anemia)    Hypocalcemia    Chronic diarrhea    Severe obesity (BMI 35.0-39.9) with comorbidity    Secondary and unspecified malignant neoplasm of intra-abdominal lymph nodes    Dehydration    Diarrhea    Chemotherapy-induced neutropenia    Chemotherapy-induced diarrhea    Right ureteral stone    Hypokalemia    Obstructive uropathy    UTI (urinary tract infection)       Op Diagnosis: same    Procedure(s) Performed:    1. Cystoscopy with bilateral ureteral JJ stent placement  2. Bilateral retrograde pyelogram   3. Fluoroscopy < 1 h    Specimen(s): none    Staff Surgeon:  Sabine Barber MD    Anesthesia: LMA    Indications: Feng Enriquez is a 67 y.o. female with bilateral ureteral stones and concern for UTI.    Findings:    - bilateral ureteral stones seen on fluoro   - RGP confirmed right stone in location of right ureter  - mild hydro seen bilaterally     Estimated Blood Loss: min    Drains:  6 Gambian x 26 cm bilateral JJ ureteral stent without strings    Procedure in Detail:  After risks, benefits and possible complications of the procedure were explained, the patient elected to undergo the procedure and informed consent was obtained. All questions were answered in the nataly-operative area. The patient was transferred to the cystoscopy suite and placed on the fluoroscopy table in the supine position.  SCDs were applied and working. Time out was performed, nataly-procedural antibiotics were given. Anesthesia was administered.  After adequate anesthesia the patient was placed in dorsal lithotomy position and prepped and draped in  the usual sterile fashion.     A rigid cystoscope in a 22 Fr sheath was introduced into the patients bladder per urethra. This passed easily.      Our attention was turned to the patient's right ureteral orifice.  A retrograde pyelogram was performed using a 5 Vietnamese open-ended catheter.  This showed a calcification overlying the course of the distal ureter with mild hydro proximally.  A motion wire was advanced up the right ureteral orifice to the level of the expected renal pelvis.  This was confirmed using fluoroscopy.     We then passed a 6 Fr x 26 cm JJ ureteral stent without strings over the wire to the level of the renal pelvis under direct vision as well as flouroscopy. The guide wire was removed.  A 180 degree coil was observed in the renal pelvis as well as the bladder using fluoroscopy.  A 180 degree coil was also seen using direct visualization in the bladder.     Our attention was then turned to the left. A retrograde pyelogram was performed using a 5 Vietnamese open-ended catheter.  This showed a calcification overlying the course of the proximal ureter with mild hydro proximally.  A motion wire was advanced up the right ureteral orifice to the level of the expected renal pelvis.  This was confirmed using fluoroscopy.     We then passed a 6 Fr x 26 cm JJ ureteral stent without strings over the wire to the level of the renal pelvis under direct vision as well as flouroscopy. The guide wire was removed.  A 180 degree coil was observed in the renal pelvis as well as the bladder using fluoroscopy.  A 180 degree coil was also seen using direct visualization in the bladder.     The patient tolerated the procedure well and was transferred to the recovery room in stable condition.      Disposition: The patient will be transferred back to the floor. Will message Dr. Kwon for stone treatment plan. Follow up cultures. Discharge per hospital medicine.      Sabine Barber MD

## 2023-03-26 NOTE — ASSESSMENT & PLAN NOTE
Culture sent  Pt has pcn allergy from childhood - hives, she states she doesn't want to take a chance with rocephin. Explained the very low risk of cross reactivity.  Explained that if the culture is resistant to flouroquinolones or she develops and BOOM we would have to change abx - she verbalizes understanding

## 2023-03-26 NOTE — TRANSFER OF CARE
Anesthesia Transfer of Care Note    Patient: Tessa Enriquez    Procedure(s) Performed: Procedure(s) (LRB):  CYSTOSCOPY, WITH URETERAL STENT INSERTION (Bilateral)  CYSTOSCOPY, WITH RETROGRADE PYELOGRAM AND URETERAL STENT INSERTION (Bilateral)    Patient location: PACU    Anesthesia Type: general    Transport from OR: Transported from OR on 2-3 L/min O2 by NC with adequate spontaneous ventilation    Post pain: adequate analgesia    Post assessment: no apparent anesthetic complications    Post vital signs: stable    Level of consciousness: awake    Nausea/Vomiting: no nausea/vomiting    Complications: none    Transfer of care protocol was followed      Last vitals:   Visit Vitals  /65 (Patient Position: Sitting)   Pulse 76   Temp 36.7 °C (98.1 °F)   Resp 16   Ht 5' (1.524 m)   Wt 83.5 kg (184 lb)   SpO2 98%   Breastfeeding No   BMI 35.94 kg/m²

## 2023-03-26 NOTE — ASSESSMENT & PLAN NOTE
Admit to med/tele  Consult urology - called per ED NP - plan for surgical intervention in the morning  NPO after MN  toradol  Pain control  IV antiemetics  Monitor renal function with serial chemistries

## 2023-03-26 NOTE — ASSESSMENT & PLAN NOTE
-S/p bilateral ureteral stenting per Urology  -Continue Cipro IV  -PRN analgesics  -Follow up urine culture

## 2023-03-26 NOTE — H&P
"[facility] Medicine  History & Physical    Patient Name: Tessa Enriquez  MRN: [id number]  Patient Class: IP- Inpatient  Admission Date: 3/25/2023  Attending Physician: Dr. Casas  Primary Care Provider: Karma Mcdermott MD         Patient information was obtained from patient, relative(s), past medical records and ER records.     Subjective:     Principal Problem:Obstructive uropathy    Chief Complaint:   Chief Complaint   Patient presents with    Flank Pain     Left side , started last week         HPI: Ms. Enriquez is a 67 year old female with a history of COPD, HLD, hypothyroidism, and colon ca currently on FOLFIRI with panitumumab with last dose 3/10 who presents today with complaints of left sided flank pain beginning last week. It is severe. It is associated with nausea and fatigue. She denies fever, chills, V/D, chest pain, or SOB. She had a right ureteroscopy with basket extraction of two stones. Mild right hydronephrosis on retrograde pyelogram. 6 x 22 cm JJ stent  Stent exchanged with strings with Dr. Kwon on 3/7/23. She was doing well until the left side started causing pain. She tried her home pain medication, but this did not work prompting her presentation. In the ED, urine is significant for microscopic hematuria, bacteruria, and pyuria. Potassium is noted to b 2.9, WBC 17K, and CT abd/pelvis revealed: Left perinephric/periureteric stranding, hydronephrosis, and prominence of the ureter extending to a 5  x 6 x 6 mm stone near the left ureteropelvic junction. Additional 2 mm left calyceal stone. A 3 mm right calyceal stone with additional smaller one. Right-sided hydronephrosis and hydroureter. Right hydronephrosis and hydroureter may be related to a distal ureter stone measuring 4 x 6 x 7 mm." This was discussed with Dr. Barber who plans for surgical intervention tomorrow. Hospital medicine is consulted for admission for further work up and management. "       Past Medical History:   Diagnosis Date    Acute hypoxemic respiratory failure 07/09/2018    Arthritis     Cancer     colon    COPD (chronic obstructive pulmonary disease)     History of home oxygen therapy     ONLY PRN AND HASN'T USED IT IN A YEAR    Hyperlipidemia     Thyroid disease        Past Surgical History:   Procedure Laterality Date    CHOLECYSTECTOMY      COLECTOMY N/A 11/9/2020    Procedure: COLECTOMY;  Surgeon: Brandon Wagner MD;  Location: [facility] OR;  Service: General;  Laterality: N/A;  LOWER- ANTERIOR    COLONOSCOPY N/A 10/23/2020    Procedure: COLONOSCOPY;  Surgeon: Jolynn Ayala MD;  Location: [facility] ENDO;  Service: Endoscopy;  Laterality: N/A;    COLONOSCOPY N/A 8/6/2021    Procedure: COLONOSCOPY;  Surgeon: Jolynn Ayala MD;  Location: [facility] ENDO;  Service: Endoscopy;  Laterality: N/A;    COLONOSCOPY N/A 7/7/2022    Procedure: COLONOSCOPY;  Surgeon: Jolynn Ayala MD;  Location: [facility] ENDO;  Service: Endoscopy;  Laterality: N/A;    CYSTOSCOPY W/ URETERAL STENT PLACEMENT Right 2/22/2023    Procedure: CYSTOSCOPY, WITH URETERAL STENT INSERTION;  Surgeon: Carin Kwon Jr., MD;  Location: [facility];  Service: Urology;  Laterality: Right;    CYSTOURETEROSCOPY WITH RETROGRADE PYELOGRAPHY AND INSERTION OF STENT INTO URETER Right 3/7/2023    Procedure: CYSTOURETEROSCOPY, WITH RETROGRADE PYELOGRAM AND URETERAL STENT INSERTION;  Surgeon: Carin Kwon Jr., MD;  Location: [facility] OR;  Service: Urology;  Laterality: Right;    ESOPHAGEAL DILATION N/A 6/29/2021    Procedure: DILATION, ESOPHAGUS;  Surgeon: Sourav Baires III, MD;  Location: [facility];  Service: Endoscopy;  Laterality: N/A;    ESOPHAGOGASTRODUODENOSCOPY  06/29/2021    Dilation to 57 Fr    ESOPHAGOGASTRODUODENOSCOPY N/A 6/29/2021    Procedure: EGD (ESOPHAGOGASTRODUODENOSCOPY);  Surgeon: Sourav Baires III, MD;  Location: [facility];  Service: Endoscopy;  Laterality: N/A;    FLEXIBLE SIGMOIDOSCOPY N/A 8/1/2022     Procedure: SIGMOIDOSCOPY, FLEXIBLE;  Surgeon: Brandon Wagner MD;  Location: [facility] ENDO;  Service: Endoscopy;  Laterality: N/A;    HYSTERECTOMY      INSERTION OF TUNNELED CENTRAL VENOUS CATHETER (CVC) WITH SUBCUTANEOUS PORT N/A 1/11/2021    Procedure: [id number];  Surgeon: Brandon Wagner MD;  Location: [facility] OR;  Service: General;  Laterality: N/A;    INSERTION OF TUNNELED CENTRAL VENOUS CATHETER (CVC) WITH SUBCUTANEOUS PORT Right 9/1/2022    Procedure: [id number];  Surgeon: Brandon Wagner MD;  Location: [facility] OR;  Service: General;  Laterality: Right;  Wants rt side placement    MEDIPORT REMOVAL Left 4/19/2021    Procedure: REMOVAL, CATHETER, CENTRAL VENOUS, TUNNELED, WITH PORT;  Surgeon: Brandon Wagner MD;  Location: [facility] OR;  Service: General;  Laterality: Left;    REMOVAL-STENT Right 3/7/2023    Procedure: REMOVAL-STENT;  Surgeon: Carin Kwon Jr., MD;  Location: [facility] OR;  Service: Urology;  Laterality: Right;    ROBOT-ASSISTED COLECTOMY N/A 11/9/2020    Procedure: ROBOTIC COLECTOMY low anterior resection, possible open;  Surgeon: Brandon Wagner MD;  Location: [facility] OR;  Service: General;  Laterality: N/A;  CONVERTED TO OPEN AT 1503    URETEROSCOPIC REMOVAL OF URETERIC CALCULUS Right 3/7/2023    Procedure: REMOVAL, CALCULUS, URETER, URETEROSCOPIC;  Surgeon: Carin Kwon Jr., MD;  Location: [facility] OR;  Service: Urology;  Laterality: Right;       Review of patient's allergies indicates:   Allergen Reactions    Ativan [lorazepam] Hives and Itching    Flagyl [metronidazole] Itching and Rash    Pcn [penicillins] Hives, Itching and Rash       Current Facility-Administered Medications on File Prior to Encounter   Medication    diphenhydrAMINE injection 12.5 mg    electrolyte-S (ISOLYTE)    fentaNYL 50 mcg/mL injection 25 mcg    HYDROmorphone (PF) injection 0.2 mg    lactated ringers infusion    lactated ringers infusion    LIDOcaine (PF) 10 mg/ml (1%) injection 10 mg     LORazepam injection 0.25 mg    ondansetron injection 4 mg    prochlorperazine injection Soln 5 mg    sodium chloride 0.9% flush 3 mL     Current Outpatient Medications on File Prior to Encounter   Medication Sig    albuterol (PROVENTIL) 2.5 mg /3 mL (0.083 %) nebulizer solution Take 3 mLs (2.5 mg total) by nebulization every 6 (six) hours as needed for Wheezing. Rescue    CMPD hydrocortisone 2%- LIDOcaine 3% suppository (RECTAL ROCKET) Place 1 suppository rectally every evening. Insert 1 suppository 3/4 of the way onto rectum. Wet for easier application. Repeat for 3 nights.    cyanocobalamin (VITAMIN B-12) 1000 MCG tablet Take 100 mcg by mouth once daily.    diphenoxylate-atropine 2.5-0.025 mg (LOMOTIL) 2.5-0.025 mg per tablet Take by mouth.    ferrous sulfate 325 (65 FE) MG EC tablet Take 325 mg by mouth 2 (two) times daily.    guaiFENesin (MUCINEX) 600 mg 12 hr tablet Take 1 tablet (600 mg total) by mouth 2 (two) times daily.    HYDROcodone-acetaminophen (NORCO) 5-325 mg per tablet TAKE ONE TABLET BY MOUTH EVERY SIX HOURS AS NEEDED FOR PAIN    levothyroxine (SYNTHROID) 125 MCG tablet Take 1 tablet (125 mcg total) by mouth before breakfast.    ondansetron (ZOFRAN-ODT) 8 MG TbDL Take 1 tablet (8 mg total) by mouth every 12 (twelve) hours as needed.    potassium chloride SA (K-DUR,KLOR-CON) 20 MEQ tablet Take 1 tablet (20 mEq total) by mouth once daily.    prochlorperazine (COMPAZINE) 10 MG tablet Take 1 tablet (10 mg total) by mouth every 6 (six) hours as needed.    triamcinolone acetonide 0.025% (KENALOG) 0.025 % cream Apply topically once daily.     Family History       Problem Relation (Age of Onset)    Breast cancer Maternal Grandmother    COPD Mother, Father    Prostate cancer Brother          Tobacco Use    Smoking status: Former     Packs/day: 2.00     Years: 40.00     Pack years: 80.00     Types: Cigarettes     Quit date: 2017     Years since quittin.7    Smokeless tobacco: Never    Substance and Sexual Activity    Alcohol use: No    Drug use: No    Sexual activity: Not Currently     Partners: Male     Review of Systems   Constitutional:  Positive for fever. Negative for activity change, appetite change, chills, diaphoresis, fatigue and unexpected weight change.   HENT:  Negative for congestion, ear pain, facial swelling, hearing loss, sore throat and trouble swallowing.    Eyes:  Negative for pain and discharge.   Respiratory:  Negative for cough, chest tightness, shortness of breath and wheezing.    Cardiovascular:  Negative for chest pain, palpitations and leg swelling.   Gastrointestinal:  Positive for abdominal pain. Negative for blood in stool, diarrhea, nausea and vomiting.   Endocrine: Negative for polydipsia, polyphagia and polyuria.   Genitourinary:  Positive for flank pain. Negative for difficulty urinating, dysuria, frequency and urgency.   Musculoskeletal:  Negative for arthralgias, back pain, joint swelling, neck pain and neck stiffness.   Skin:  Negative for rash and wound.   Allergic/Immunologic: Negative for environmental allergies and immunocompromised state.   Neurological:  Negative for dizziness, seizures, syncope, speech difficulty, weakness, light-headedness, numbness and headaches.   Hematological:  Negative for adenopathy.   Psychiatric/Behavioral:  Negative for sleep disturbance and suicidal ideas. The patient is not nervous/anxious.    All other systems reviewed and are negative.  Objective:     Vital Signs (Most Recent):  Temp: 98 °F (36.7 °C) (03/25/23 1737)  Pulse: 89 (03/25/23 2002)  Resp: 18 (03/25/23 1737)  BP: 134/65 (03/25/23 2002)  SpO2: 95 % (03/25/23 2002) Vital Signs (24h Range):  Temp:  [98 °F (36.7 °C)] 98 °F (36.7 °C)  Pulse:  [89-97] 89  Resp:  [18] 18  SpO2:  [95 %-98 %] 95 %  BP: (134-144)/(65-77) 134/65     Weight: 83.5 kg (184 lb)  Body mass index is 35.94 kg/m².    Physical Exam  Vitals and nursing note reviewed.   Constitutional:        Appearance: She is obese.   HENT:      Head: Normocephalic and atraumatic.      Nose: Nose normal.      Mouth/Throat:      Mouth: Mucous membranes are moist.      Pharynx: Oropharynx is clear.   Eyes:      Extraocular Movements: Extraocular movements intact.      Pupils: Pupils are equal, round, and reactive to light.   Cardiovascular:      Rate and Rhythm: Normal rate and regular rhythm.      Pulses: Normal pulses.   Pulmonary:      Effort: Pulmonary effort is normal.      Breath sounds: Normal breath sounds.   Abdominal:      General: Bowel sounds are normal.      Palpations: Abdomen is soft.      Tenderness: There is no abdominal tenderness. There is left CVA tenderness.   Musculoskeletal:         General: Normal range of motion.      Cervical back: Normal range of motion and neck supple.   Skin:     General: Skin is warm and dry.      Capillary Refill: Capillary refill takes less than 2 seconds.   Neurological:      General: No focal deficit present.      Mental Status: She is alert and oriented to person, place, and time.   Psychiatric:         Mood and Affect: Mood normal.         Behavior: Behavior normal.         CRANIAL NERVES     CN III, IV, VI   Pupils are equal, round, and reactive to light.     Significant Labs: All pertinent labs within the past 24 hours have been reviewed.  CBC:   Recent Labs   Lab 03/24/23  0902 03/25/23  1801   WBC 13.13* 17.96*   HGB 12.7 13.4   HCT 39.5 41.1    175     CMP:   Recent Labs   Lab 03/24/23  0902 03/25/23  1801    138   K 3.0* 2.9*    102   CO2 27 23   GLU 97 103   BUN 7* 8   CREATININE 0.7 0.9   CALCIUM 8.5* 8.6*   PROT 6.1 6.6   ALBUMIN 3.4* 3.7   BILITOT 0.3 0.7   ALKPHOS 123 129   AST 12 16   ALT 12 12   ANIONGAP 9 13     Urine Studies:   Recent Labs   Lab 03/25/23  1810   COLORU Yellow   APPEARANCEUA Hazy*   PHUR 8.0   SPECGRAV 1.010   PROTEINUA Trace*   GLUCUA Negative   KETONESU Negative   BILIRUBINUA Negative   OCCULTUA 1+*   NITRITE Negative    UROBILINOGEN Negative   LEUKOCYTESUR Trace*   RBCUA 21*   WBCUA 37*   BACTERIA Few*   SQUAMEPITHEL 3       Significant Imaging: I have reviewed all pertinent imaging results/findings within the past 24 hours.    Patient Name: FENG DELUNA MRN: [id number]   (Age): 1955 67 Gender: F  Date of Exam: 2023 Accession: [id number]  Referring Physician: KAREN ORDAZ # of Images: [phone]  Ordered As: CT ABDOMEN/PELVIS WO  PROCEDURE INFORMATION:  Exam: CT Abdomen And Pelvis Without Contrast  Exam date and time: 3/25/2023 6:04 PM  Age: 67 years old  Clinical indication: Abdominal pain; Flank; Left  TECHNIQUE:  Imaging protocol: Computed tomography of the abdomen and pelvis without contrast.  Radiation optimization: All CT scans at this facility use at least one of these dose optimization  techniques: automated exposure control; mA and/or kV adjustment per patient size (includes targeted  exams where dose is matched to clinical indication); or iterative reconstruction.  COMPARISON:  CT RENAL STONE STUDY ABD PELVIS WO 2023 10:05 AM  FINDINGS:  Lower thorax: Areas of atelectasis or scarring. Probably no significant change in the small  pericardial effusion.  Liver: No acute findings.  Gallbladder and bile ducts: Prior cholecystectomy with one of the surgical clips appearing to be  adjacent to the common bile duct. Probably no significant change in common bile duct prominence.  Pancreas: No acute findings.  Spleen: No acute findings.  Adrenal glands: No suspicious mass.  Kidneys and ureters: A 3 mm right calyceal stone with additional smaller one. Right-sided  hydronephrosis and hydroureter. Right hydronephrosis and hydroureter may be related to a distal  ureter stone measuring 4 x 6 x 7 mm. Alternatively, the calcification that is felt to represent a ureter  stone may represent a phlebolith causing some mass effect on the distal ureter. Also possible distal  ureter stricture is within the differential. Left  perinephric/periureteric stranding, hydronephrosis, and  prominence of the ureter extending to a 5 x 6 x 6 mm stone near the left ureteropelvic junction.  Additional 2 mm left calyceal stone.  Stomach and bowel: Portion of the stomach nondistended which limits evaluation. Postsurgical  changes near the rectosigmoid colon region. Colonic diverticulosis involving the rectosigmoid region.  Some stranding-increased presacral density near the rectosigmoid colon, which is unchanged from  prior exam, makes it difficult to exclude out superimposed diverticulitis within this region. Increased  colonic stool.  Appendix: No findings to suggest acute appendicitis.  Intraperitoneal space: No large volume pneumoperitoneum.  REGIFNEG Accession: [id number] MRN: [id number]  Preliminary Radiology Report  Page 2 of 3  Vasculature: No abdominal aortic aneurysm. Some calcific atherosclerosis of the vasculature.  Pelvic phleboliths.  Lymph nodes: A right internal iliac node measuring up to 9 mm in short axis.  Urinary bladder: Bladder decompressed which limits evaluation. No bladder stones. The bladder  appears thickened which appearance may be related to the bladder being decompressed versus  findings related to cystitis. In addition, there is some stranding adjacent to the bladder that may  suggest cystitis.  Reproductive: Prior hysterectomy. No significant change in the appearance of the left ovarian cyst.  Bones/joints: Partial sacralization of L5. Grade 1 anterior spondylolisthesis of L4 on L5. No  significant change in the L2 sclerotic focus and the appearance of sacral sclerosis.  Soft tissues: No significant interval change in the 1.8 cm nodular density in the right lateral breast.  Multiple midline abdominal wall hernias as seen on prior exam. One of the more prominent ones  above the umbilicus contains some small bowel. No bowel obstruction.  IMPRESSION:  Left perinephric/periureteric stranding, hydronephrosis, and  prominence of the ureter extending to a 5  x 6 x 6 mm stone near the left ureteropelvic junction. Additional 2 mm left calyceal stone.  A 3 mm right calyceal stone with additional smaller one. Right-sided hydronephrosis and hydroureter.  Right hydronephrosis and hydroureter may be related to a distal ureter stone measuring 4 x 6 x 7  mm. Alternatively, the calcification that is felt to represent a ureter stone may represent a phlebolith  causing some mass effect on the distal ureter. Also possible distal ureter stricture is within the  differential. These findings can be correlated with a delayed IV contrasted study if indicated.  No bladder stones. The bladder appears thickened which appearance may be related to the bladder  being decompressed versus findings related to cystitis. In addition, there is some stranding adjacent  to the bladder that may suggest cystitis.  Multiple midline abdominal wall hernias as seen on prior exam. One of the more prominent ones  above the umbilicus contains some small bowel. No bowel obstruction.  Colonic diverticulosis involving the rectosigmoid region. Some stranding-increased presacral density  near the rectosigmoid colon, which is unchanged from prior exam, makes it difficult to exclude out  superimposed diverticulitis within this region.  Increased colonic stool.  No significant interval change in the 1.8 cm nodular density in the right lateral breast. Recommend  correlation with nonemergent breast imaging if not already done.  Additional findings within the body of the report.  Thank you for allowing us to participate in the care of your patient.  FENG DELUNA Accession: [id number] MRN: [id number]  Preliminary Radiology Report   (QA) DISCREPANCY?  If there is a discrepancy between the preliminary and final interpretation, please notify [facility] via [email or web address].  If you do not have access to our QA portal, call our QA team at  [phone]  CONFIDENTIALITY STATEMENT  This report is intended only for the use of the referring physician, and only in accordance with law, If you received this in error, call [phone]  Page 3 of 3  Dictated and Authenticated by: Etienne Aggarwal MD    Assessment/Plan:     * Obstructive uropathy  Admit to med/tele  Consult urology - called per ED NP - plan for surgical intervention in the morning  NPO after MN  toradol  Pain control  IV antiemetics  Monitor renal function with serial chemistries    UTI (urinary tract infection)  Culture sent  Pt has pcn allergy from childhood - hives, she states she doesn't want to take a chance with rocephin. Explained the very low risk of cross reactivity.  Explained that if the culture is resistant to flouroquinolones or she develops and BOOM we would have to change abx - she verbalizes understanding      Hypokalemia  Replete with oral potassium   Repeat chemistries in AM  Monitor on telemetry      Malignant neoplasm of colon  Last chemo 3/13      Acquired hypothyroidism  Continue levothyroxine      COPD (chronic obstructive pulmonary disease)  No signs of acute exac  Prn duonebs      VTE Risk Mitigation (From admission, onward)         Ordered     enoxaparin injection 40 mg  Daily         03/25/23 2033     IP VTE HIGH RISK PATIENT  Once         03/25/23 2033     Place sequential compression device  Until discontinued         03/25/23 2033                           Negrita Lane NP  Department of Hospital Medicine  [facility]

## 2023-03-26 NOTE — NURSING
Arrives to room S/P Cystoscopy With bilateral Ureteral Stent placements  AAOX 4 vss afebrile denies pain

## 2023-03-26 NOTE — CONSULTS
[facility]  Urology  Consult Note    Patient Name: Tessa Enriquez  MRN: [id number]  Admission Date: 3/25/2023  Hospital Length of Stay: 1   Code Status: Full Code   Attending Provider: Nomi Casas MD   Consulting Provider: Sabine Barber MD  Primary Care Physician: Karma Mcdermott MD  Principal Problem:Obstructive uropathy    Inpatient consult to Urology  Consult performed by: Sabnie Barber MD  Consult ordered by: Negrita Lane NP          Subjective:     HPI:  Tessa Enriquez is a 67 y.o. female with a history of COPD, HLD, hypothyroidism, and colon ca currently on FOLFIRI with panitumumab with last dose 3/10 who presented yesterday to ED with complaints of left sided flank pain beginning last week.    She is s/p right ureteroscopy with Dr. Kwon on 3/7/23 for x 2 right ureteral stones.     CT 3/25/23: 7 mm left proximal ureteral stone, 6 mm right distal stone (seen previously to be within the right lower pole), bilateral hydro.   Labs 3/25/23:  WBC - 17.96, now down to 9.3  Cr - 0.9, now 1.2  UA 21 RBC, 37 WBC, few bacteria    She has been afebrile with stable vitals.   She thinks she may have passed one stone in the ED, but was not straining urine.       Past Medical History:   Diagnosis Date    Acute hypoxemic respiratory failure 07/09/2018    Arthritis     Cancer     colon    COPD (chronic obstructive pulmonary disease)     History of home oxygen therapy     ONLY PRN AND HASN'T USED IT IN A YEAR    Hyperlipidemia     Thyroid disease        Past Surgical History:   Procedure Laterality Date    CHOLECYSTECTOMY      COLECTOMY N/A 11/9/2020    Procedure: COLECTOMY;  Surgeon: Brandon Wagner MD;  Location: [facility] OR;  Service: General;  Laterality: N/A;  LOWER- ANTERIOR    COLONOSCOPY N/A 10/23/2020    Procedure: COLONOSCOPY;  Surgeon: Jolynn Ayala MD;  Location: [facility];  Service: Endoscopy;  Laterality: N/A;    COLONOSCOPY N/A 8/6/2021     Procedure: COLONOSCOPY;  Surgeon: Jolynn Ayala MD;  Location: [facility] ENDO;  Service: Endoscopy;  Laterality: N/A;    COLONOSCOPY N/A 7/7/2022    Procedure: COLONOSCOPY;  Surgeon: Jolynn Ayala MD;  Location: [facility] ENDO;  Service: Endoscopy;  Laterality: N/A;    CYSTOSCOPY W/ URETERAL STENT PLACEMENT Right 2/22/2023    Procedure: CYSTOSCOPY, WITH URETERAL STENT INSERTION;  Surgeon: Carin Kwon Jr., MD;  Location: [facility] OR;  Service: Urology;  Laterality: Right;    CYSTOURETEROSCOPY WITH RETROGRADE PYELOGRAPHY AND INSERTION OF STENT INTO URETER Right 3/7/2023    Procedure: CYSTOURETEROSCOPY, WITH RETROGRADE PYELOGRAM AND URETERAL STENT INSERTION;  Surgeon: Carin Kwon Jr., MD;  Location: [facility] OR;  Service: Urology;  Laterality: Right;    ESOPHAGEAL DILATION N/A 6/29/2021    Procedure: DILATION, ESOPHAGUS;  Surgeon: Sourav Baires III, MD;  Location: [facility];  Service: Endoscopy;  Laterality: N/A;    ESOPHAGOGASTRODUODENOSCOPY  06/29/2021    Dilation to 57 Fr    ESOPHAGOGASTRODUODENOSCOPY N/A 6/29/2021    Procedure: EGD (ESOPHAGOGASTRODUODENOSCOPY);  Surgeon: Sourav Baires III, MD;  Location: [facility] ENDO;  Service: Endoscopy;  Laterality: N/A;    FLEXIBLE SIGMOIDOSCOPY N/A 8/1/2022    Procedure: SIGMOIDOSCOPY, FLEXIBLE;  Surgeon: Brandon Wagner MD;  Location: [facility] ENDO;  Service: Endoscopy;  Laterality: N/A;    HYSTERECTOMY      INSERTION OF TUNNELED CENTRAL VENOUS CATHETER (CVC) WITH SUBCUTANEOUS PORT N/A 1/11/2021    Procedure: [id number];  Surgeon: Brandon Wagner MD;  Location: [facility] OR;  Service: General;  Laterality: N/A;    INSERTION OF TUNNELED CENTRAL VENOUS CATHETER (CVC) WITH SUBCUTANEOUS PORT Right 9/1/2022    Procedure: [id number];  Surgeon: Brandon Wagner MD;  Location: [facility] OR;  Service: General;  Laterality: Right;  Wants rt side placement    MEDIPORT REMOVAL Left 4/19/2021    Procedure: REMOVAL, CATHETER, CENTRAL VENOUS, TUNNELED, WITH PORT;   Surgeon: Brandon Wagner MD;  Location: [facility] OR;  Service: General;  Laterality: Left;    REMOVAL-STENT Right 3/7/2023    Procedure: REMOVAL-STENT;  Surgeon: Carin Kwon Jr., MD;  Location: [facility] OR;  Service: Urology;  Laterality: Right;    ROBOT-ASSISTED COLECTOMY N/A 2020    Procedure: ROBOTIC COLECTOMY low anterior resection, possible open;  Surgeon: Brandon Wagner MD;  Location: [facility] OR;  Service: General;  Laterality: N/A;  CONVERTED TO OPEN AT 1503    URETEROSCOPIC REMOVAL OF URETERIC CALCULUS Right 3/7/2023    Procedure: REMOVAL, CALCULUS, URETER, URETEROSCOPIC;  Surgeon: Carin Kwon Jr., MD;  Location: [facility] OR;  Service: Urology;  Laterality: Right;       Review of patient's allergies indicates:   Allergen Reactions    Ativan [lorazepam] Hives and Itching    Flagyl [metronidazole] Itching and Rash    Pcn [penicillins] Hives, Itching and Rash       Family History       Problem Relation (Age of Onset)    Breast cancer Maternal Grandmother    COPD Mother, Father    Prostate cancer Brother            Tobacco Use    Smoking status: Former     Packs/day: 2.00     Years: 40.00     Pack years: 80.00     Types: Cigarettes     Quit date: 2017     Years since quittin.7    Smokeless tobacco: Never   Substance and Sexual Activity    Alcohol use: No    Drug use: No    Sexual activity: Not Currently     Partners: Male       Review of Systems   Constitutional:  Negative for fever.   Genitourinary:  Positive for flank pain. Negative for difficulty urinating, dysuria, frequency, nocturia, pelvic pain and urgency.   Neurological:  Positive for weakness.     Objective:     Temp:  [97.6 °F (36.4 °C)-98.4 °F (36.9 °C)] 97.8 °F (36.6 °C)  Pulse:  [76-98] 77  Resp:  [16-18] 16  SpO2:  [95 %-98 %] 96 %  BP: (104-171)/(55-96) 139/65     Body mass index is 35.94 kg/m².           Drains       None                   Physical Exam  Vitals reviewed.   Constitutional:       General: She is not in acute  distress.     Appearance: Normal appearance. She is not ill-appearing.   HENT:      Head: Normocephalic and atraumatic.   Eyes:      General: No scleral icterus.  Cardiovascular:      Rate and Rhythm: Normal rate and regular rhythm.   Pulmonary:      Effort: Pulmonary effort is normal. No respiratory distress.   Abdominal:      Palpations: Abdomen is soft.   Skin:     Coloration: Skin is not jaundiced.   Neurological:      General: No focal deficit present.      Mental Status: She is alert and oriented to person, place, and time.   Psychiatric:         Mood and Affect: Mood normal.         Behavior: Behavior normal.       Significant Labs:    BMP:  Recent Labs   Lab 03/24/23  0902 03/25/23  1801 03/26/23  0435    138 139   K 3.0* 2.9* 3.6    102 105   CO2 27 23 25   BUN 7* 8 9   CREATININE 0.7 0.9 1.2   CALCIUM 8.5* 8.6* 8.2*       CBC:  Recent Labs   Lab 03/24/23  0902 03/25/23  1801 03/26/23  0435   WBC 13.13* 17.96* 9.31   HGB 12.7 13.4 10.7*   HCT 39.5 41.1 33.1*    175 125*       Blood Culture: No results for input(s): LABBLOO in the last 168 hours.  Urine Culture: No results for input(s): LABURIN in the last 168 hours.  Urine Studies:   Recent Labs   Lab 03/25/23  1810   COLORU Yellow   APPEARANCEUA Hazy*   PHUR 8.0   SPECGRAV 1.010   PROTEINUA Trace*   GLUCUA Negative   KETONESU Negative   BILIRUBINUA Negative   OCCULTUA 1+*   NITRITE Negative   UROBILINOGEN Negative   LEUKOCYTESUR Trace*   RBCUA 21*   WBCUA 37*   BACTERIA Few*   SQUAMEPITHEL 3     All pertinent labs results from the past 24 hours have been reviewed.    Significant Imaging:  All pertinent imaging results/findings from the past 24 hours have been reviewed.          Assessment and Plan:     * Obstructive uropathy  Tessa Enriquez is a 67 y.o. female with bilateral ureteral stones and possible UTI    - To OR today for left stent placement, will perform right RGP and if hydro/stone appears then will also place right  stent  - Continue abx until cultures finalize  - Will message Dr. Kwon for definitive stone treatment plan         VTE Risk Mitigation (From admission, onward)         Ordered     enoxaparin injection 40 mg  Daily         03/25/23 2033     IP VTE HIGH RISK PATIENT  Once         03/25/23 2033     Place sequential compression device  Until discontinued         03/25/23 2033                Thank you for your consult. I will follow-up with patient. Please contact us if you have any additional questions.    Sabine Barber MD  Urology  [facility]

## 2023-03-26 NOTE — PLAN OF CARE
POC discussed with patient, verbalized understanding. Iv to left arm remains intact and patent with iv fluids infusing without difficulty. Pain well controlled with po pain meds. Voiding without difficulty. Safety measures maintained with side rails up, bed alarm on, slip resistant socks on, call light in reach, pt instructed to call for needs.

## 2023-03-26 NOTE — HOSPITAL COURSE
Tessa Enriquez is a 67 year old female with a past medical history of colon cancer undergoing chemotherapy and followed by Dr. Frank, COPD, hypothyroidism, and obesity who presented with bilateral ureteral kidney stone obstruction with possible UTI. The patient was seen by Dr. Kwon in the past where she had basket extraction of two right sided stones with stenting and subsequent exchange 3/7. Urology performed RPG 3/26 and placed two bilateral ureteral stents without complication. She was on Cipro IV during her course. Urine culture showed multiple organisms. Her pain was well controlled. Her Hgb remained stable. She was to be discharged 3/27/2023, however the patient felt uncomfortable going home, threatening to file a lawsuit against this provider if she were to happen at home. She was monitored another day and was discharged 3/28/2023. She will follow up with Urology and Hematology/Oncology in the outpatient setting.

## 2023-03-26 NOTE — SUBJECTIVE & OBJECTIVE
Past Medical History:   Diagnosis Date    Acute hypoxemic respiratory failure 07/09/2018    Arthritis     Cancer     colon    COPD (chronic obstructive pulmonary disease)     History of home oxygen therapy     ONLY PRN AND HASN'T USED IT IN A YEAR    Hyperlipidemia     Thyroid disease        Past Surgical History:   Procedure Laterality Date    CHOLECYSTECTOMY      COLECTOMY N/A 11/9/2020    Procedure: COLECTOMY;  Surgeon: Brandon Wagner MD;  Location: [facility] OR;  Service: General;  Laterality: N/A;  LOWER- ANTERIOR    COLONOSCOPY N/A 10/23/2020    Procedure: COLONOSCOPY;  Surgeon: Jolynn Ayala MD;  Location: [facility] ENDO;  Service: Endoscopy;  Laterality: N/A;    COLONOSCOPY N/A 8/6/2021    Procedure: COLONOSCOPY;  Surgeon: Jolynn Ayala MD;  Location: [facility] ENDO;  Service: Endoscopy;  Laterality: N/A;    COLONOSCOPY N/A 7/7/2022    Procedure: COLONOSCOPY;  Surgeon: Jolynn Ayala MD;  Location: [facility] ENDO;  Service: Endoscopy;  Laterality: N/A;    CYSTOSCOPY W/ URETERAL STENT PLACEMENT Right 2/22/2023    Procedure: CYSTOSCOPY, WITH URETERAL STENT INSERTION;  Surgeon: Carin Kwon Jr., MD;  Location: [facility];  Service: Urology;  Laterality: Right;    CYSTOURETEROSCOPY WITH RETROGRADE PYELOGRAPHY AND INSERTION OF STENT INTO URETER Right 3/7/2023    Procedure: CYSTOURETEROSCOPY, WITH RETROGRADE PYELOGRAM AND URETERAL STENT INSERTION;  Surgeon: Carin Kwon Jr., MD;  Location: [facility] OR;  Service: Urology;  Laterality: Right;    ESOPHAGEAL DILATION N/A 6/29/2021    Procedure: DILATION, ESOPHAGUS;  Surgeon: Sourav Baires III, MD;  Location: [facility];  Service: Endoscopy;  Laterality: N/A;    ESOPHAGOGASTRODUODENOSCOPY  06/29/2021    Dilation to 57 Fr    ESOPHAGOGASTRODUODENOSCOPY N/A 6/29/2021    Procedure: EGD (ESOPHAGOGASTRODUODENOSCOPY);  Surgeon: Sourav Baires III, MD;  Location: [facility];  Service: Endoscopy;  Laterality: N/A;    FLEXIBLE SIGMOIDOSCOPY N/A 8/1/2022    Procedure:  SIGMOIDOSCOPY, FLEXIBLE;  Surgeon: Brandon Wagner MD;  Location: [facility] ENDO;  Service: Endoscopy;  Laterality: N/A;    HYSTERECTOMY      INSERTION OF TUNNELED CENTRAL VENOUS CATHETER (CVC) WITH SUBCUTANEOUS PORT N/A 1/11/2021    Procedure: [id number];  Surgeon: Brandon Wagner MD;  Location: [facility] OR;  Service: General;  Laterality: N/A;    INSERTION OF TUNNELED CENTRAL VENOUS CATHETER (CVC) WITH SUBCUTANEOUS PORT Right 9/1/2022    Procedure: [id number]-CATH;  Surgeon: Brandon Wagner MD;  Location: [facility] OR;  Service: General;  Laterality: Right;  Wants rt side placement    MEDIPORT REMOVAL Left 4/19/2021    Procedure: REMOVAL, CATHETER, CENTRAL VENOUS, TUNNELED, WITH PORT;  Surgeon: Brandon Wagner MD;  Location: [facility] OR;  Service: General;  Laterality: Left;    REMOVAL-STENT Right 3/7/2023    Procedure: REMOVAL-STENT;  Surgeon: Carin Kwon Jr., MD;  Location: [facility] OR;  Service: Urology;  Laterality: Right;    ROBOT-ASSISTED COLECTOMY N/A 11/9/2020    Procedure: ROBOTIC COLECTOMY low anterior resection, possible open;  Surgeon: Brandon Wagner MD;  Location: [facility] OR;  Service: General;  Laterality: N/A;  CONVERTED TO OPEN AT 1503    URETEROSCOPIC REMOVAL OF URETERIC CALCULUS Right 3/7/2023    Procedure: REMOVAL, CALCULUS, URETER, URETEROSCOPIC;  Surgeon: Carin Kwon Jr., MD;  Location: [facility] OR;  Service: Urology;  Laterality: Right;       Review of patient's allergies indicates:   Allergen Reactions    Ativan [lorazepam] Hives and Itching    Flagyl [metronidazole] Itching and Rash    Pcn [penicillins] Hives, Itching and Rash       Current Facility-Administered Medications on File Prior to Encounter   Medication    diphenhydrAMINE injection 12.5 mg    electrolyte-S (ISOLYTE)    fentaNYL 50 mcg/mL injection 25 mcg    HYDROmorphone (PF) injection 0.2 mg    lactated ringers infusion    lactated ringers infusion    LIDOcaine (PF) 10 mg/ml (1%) injection 10 mg    LORazepam injection 0.25 mg     ondansetron injection 4 mg    prochlorperazine injection Soln 5 mg    sodium chloride 0.9% flush 3 mL     Current Outpatient Medications on File Prior to Encounter   Medication Sig    albuterol (PROVENTIL) 2.5 mg /3 mL (0.083 %) nebulizer solution Take 3 mLs (2.5 mg total) by nebulization every 6 (six) hours as needed for Wheezing. Rescue    CMPD hydrocortisone 2%- LIDOcaine 3% suppository (RECTAL ROCKET) Place 1 suppository rectally every evening. Insert 1 suppository 3/4 of the way onto rectum. Wet for easier application. Repeat for 3 nights.    cyanocobalamin (VITAMIN B-12) 1000 MCG tablet Take 100 mcg by mouth once daily.    diphenoxylate-atropine 2.5-0.025 mg (LOMOTIL) 2.5-0.025 mg per tablet Take by mouth.    ferrous sulfate 325 (65 FE) MG EC tablet Take 325 mg by mouth 2 (two) times daily.    guaiFENesin (MUCINEX) 600 mg 12 hr tablet Take 1 tablet (600 mg total) by mouth 2 (two) times daily.    HYDROcodone-acetaminophen (NORCO) 5-325 mg per tablet TAKE ONE TABLET BY MOUTH EVERY SIX HOURS AS NEEDED FOR PAIN    levothyroxine (SYNTHROID) 125 MCG tablet Take 1 tablet (125 mcg total) by mouth before breakfast.    ondansetron (ZOFRAN-ODT) 8 MG TbDL Take 1 tablet (8 mg total) by mouth every 12 (twelve) hours as needed.    potassium chloride SA (K-DUR,KLOR-CON) 20 MEQ tablet Take 1 tablet (20 mEq total) by mouth once daily.    prochlorperazine (COMPAZINE) 10 MG tablet Take 1 tablet (10 mg total) by mouth every 6 (six) hours as needed.    triamcinolone acetonide 0.025% (KENALOG) 0.025 % cream Apply topically once daily.     Family History       Problem Relation (Age of Onset)    Breast cancer Maternal Grandmother    COPD Mother, Father    Prostate cancer Brother          Tobacco Use    Smoking status: Former     Packs/day: 2.00     Years: 40.00     Pack years: 80.00     Types: Cigarettes     Quit date: 2017     Years since quittin.7    Smokeless tobacco: Never   Substance and Sexual Activity    Alcohol use: No     Drug use: No    Sexual activity: Not Currently     Partners: Male     Review of Systems   Constitutional:  Positive for fever. Negative for activity change, appetite change, chills, diaphoresis, fatigue and unexpected weight change.   HENT:  Negative for congestion, ear pain, facial swelling, hearing loss, sore throat and trouble swallowing.    Eyes:  Negative for pain and discharge.   Respiratory:  Negative for cough, chest tightness, shortness of breath and wheezing.    Cardiovascular:  Negative for chest pain, palpitations and leg swelling.   Gastrointestinal:  Positive for abdominal pain. Negative for blood in stool, diarrhea, nausea and vomiting.   Endocrine: Negative for polydipsia, polyphagia and polyuria.   Genitourinary:  Positive for flank pain. Negative for difficulty urinating, dysuria, frequency and urgency.   Musculoskeletal:  Negative for arthralgias, back pain, joint swelling, neck pain and neck stiffness.   Skin:  Negative for rash and wound.   Allergic/Immunologic: Negative for environmental allergies and immunocompromised state.   Neurological:  Negative for dizziness, seizures, syncope, speech difficulty, weakness, light-headedness, numbness and headaches.   Hematological:  Negative for adenopathy.   Psychiatric/Behavioral:  Negative for sleep disturbance and suicidal ideas. The patient is not nervous/anxious.    All other systems reviewed and are negative.  Objective:     Vital Signs (Most Recent):  Temp: 98 °F (36.7 °C) (03/25/23 1737)  Pulse: 89 (03/25/23 2002)  Resp: 18 (03/25/23 1737)  BP: 134/65 (03/25/23 2002)  SpO2: 95 % (03/25/23 2002) Vital Signs (24h Range):  Temp:  [98 °F (36.7 °C)] 98 °F (36.7 °C)  Pulse:  [89-97] 89  Resp:  [18] 18  SpO2:  [95 %-98 %] 95 %  BP: (134-144)/(65-77) 134/65     Weight: 83.5 kg (184 lb)  Body mass index is 35.94 kg/m².    Physical Exam  Vitals and nursing note reviewed.   Constitutional:       Appearance: She is obese.   HENT:      Head: Normocephalic  and atraumatic.      Nose: Nose normal.      Mouth/Throat:      Mouth: Mucous membranes are moist.      Pharynx: Oropharynx is clear.   Eyes:      Extraocular Movements: Extraocular movements intact.      Pupils: Pupils are equal, round, and reactive to light.   Cardiovascular:      Rate and Rhythm: Normal rate and regular rhythm.      Pulses: Normal pulses.   Pulmonary:      Effort: Pulmonary effort is normal.      Breath sounds: Normal breath sounds.   Abdominal:      General: Bowel sounds are normal.      Palpations: Abdomen is soft.      Tenderness: There is no abdominal tenderness. There is left CVA tenderness.   Musculoskeletal:         General: Normal range of motion.      Cervical back: Normal range of motion and neck supple.   Skin:     General: Skin is warm and dry.      Capillary Refill: Capillary refill takes less than 2 seconds.   Neurological:      General: No focal deficit present.      Mental Status: She is alert and oriented to person, place, and time.   Psychiatric:         Mood and Affect: Mood normal.         Behavior: Behavior normal.         CRANIAL NERVES     CN III, IV, VI   Pupils are equal, round, and reactive to light.     Significant Labs: All pertinent labs within the past 24 hours have been reviewed.  CBC:   Recent Labs   Lab 03/24/23  0902 03/25/23  1801   WBC 13.13* 17.96*   HGB 12.7 13.4   HCT 39.5 41.1    175     CMP:   Recent Labs   Lab 03/24/23  0902 03/25/23  1801    138   K 3.0* 2.9*    102   CO2 27 23   GLU 97 103   BUN 7* 8   CREATININE 0.7 0.9   CALCIUM 8.5* 8.6*   PROT 6.1 6.6   ALBUMIN 3.4* 3.7   BILITOT 0.3 0.7   ALKPHOS 123 129   AST 12 16   ALT 12 12   ANIONGAP 9 13     Urine Studies:   Recent Labs   Lab 03/25/23  1810   COLORU Yellow   APPEARANCEUA Hazy*   PHUR 8.0   SPECGRAV 1.010   PROTEINUA Trace*   GLUCUA Negative   KETONESU Negative   BILIRUBINUA Negative   OCCULTUA 1+*   NITRITE Negative   UROBILINOGEN Negative   LEUKOCYTESUR Trace*   RBCUA 21*    WBCUA 37*   BACTERIA Few*   SQUAMEPITHEL 3       Significant Imaging: I have reviewed all pertinent imaging results/findings within the past 24 hours.    Patient Name: FENG DELUNA MRN: [id number]   (Age): 1955 67 Gender: F  Date of Exam: 2023 Accession: [id number]  Referring Physician: KAREN ORDAZ # of Images: [phone]  Ordered As: CT ABDOMEN/PELVIS WO  PROCEDURE INFORMATION:  Exam: CT Abdomen And Pelvis Without Contrast  Exam date and time: 3/25/2023 6:04 PM  Age: 67 years old  Clinical indication: Abdominal pain; Flank; Left  TECHNIQUE:  Imaging protocol: Computed tomography of the abdomen and pelvis without contrast.  Radiation optimization: All CT scans at this facility use at least one of these dose optimization  techniques: automated exposure control; mA and/or kV adjustment per patient size (includes targeted  exams where dose is matched to clinical indication); or iterative reconstruction.  COMPARISON:  CT RENAL STONE STUDY ABD PELVIS WO 2023 10:05 AM  FINDINGS:  Lower thorax: Areas of atelectasis or scarring. Probably no significant change in the small  pericardial effusion.  Liver: No acute findings.  Gallbladder and bile ducts: Prior cholecystectomy with one of the surgical clips appearing to be  adjacent to the common bile duct. Probably no significant change in common bile duct prominence.  Pancreas: No acute findings.  Spleen: No acute findings.  Adrenal glands: No suspicious mass.  Kidneys and ureters: A 3 mm right calyceal stone with additional smaller one. Right-sided  hydronephrosis and hydroureter. Right hydronephrosis and hydroureter may be related to a distal  ureter stone measuring 4 x 6 x 7 mm. Alternatively, the calcification that is felt to represent a ureter  stone may represent a phlebolith causing some mass effect on the distal ureter. Also possible distal  ureter stricture is within the differential. Left perinephric/periureteric stranding, hydronephrosis,  and  prominence of the ureter extending to a 5 x 6 x 6 mm stone near the left ureteropelvic junction.  Additional 2 mm left calyceal stone.  Stomach and bowel: Portion of the stomach nondistended which limits evaluation. Postsurgical  changes near the rectosigmoid colon region. Colonic diverticulosis involving the rectosigmoid region.  Some stranding-increased presacral density near the rectosigmoid colon, which is unchanged from  prior exam, makes it difficult to exclude out superimposed diverticulitis within this region. Increased  colonic stool.  Appendix: No findings to suggest acute appendicitis.  Intraperitoneal space: No large volume pneumoperitoneum.  FENG DELUNA Accession: [id number] MRN: [id number]  Preliminary Radiology Report  Page 2 of 3  Vasculature: No abdominal aortic aneurysm. Some calcific atherosclerosis of the vasculature.  Pelvic phleboliths.  Lymph nodes: A right internal iliac node measuring up to 9 mm in short axis.  Urinary bladder: Bladder decompressed which limits evaluation. No bladder stones. The bladder  appears thickened which appearance may be related to the bladder being decompressed versus  findings related to cystitis. In addition, there is some stranding adjacent to the bladder that may  suggest cystitis.  Reproductive: Prior hysterectomy. No significant change in the appearance of the left ovarian cyst.  Bones/joints: Partial sacralization of L5. Grade 1 anterior spondylolisthesis of L4 on L5. No  significant change in the L2 sclerotic focus and the appearance of sacral sclerosis.  Soft tissues: No significant interval change in the 1.8 cm nodular density in the right lateral breast.  Multiple midline abdominal wall hernias as seen on prior exam. One of the more prominent ones  above the umbilicus contains some small bowel. No bowel obstruction.  IMPRESSION:  Left perinephric/periureteric stranding, hydronephrosis, and prominence of the ureter extending to a 5  x 6 x 6 mm stone  near the left ureteropelvic junction. Additional 2 mm left calyceal stone.  A 3 mm right calyceal stone with additional smaller one. Right-sided hydronephrosis and hydroureter.  Right hydronephrosis and hydroureter may be related to a distal ureter stone measuring 4 x 6 x 7  mm. Alternatively, the calcification that is felt to represent a ureter stone may represent a phlebolith  causing some mass effect on the distal ureter. Also possible distal ureter stricture is within the  differential. These findings can be correlated with a delayed IV contrasted study if indicated.  No bladder stones. The bladder appears thickened which appearance may be related to the bladder  being decompressed versus findings related to cystitis. In addition, there is some stranding adjacent  to the bladder that may suggest cystitis.  Multiple midline abdominal wall hernias as seen on prior exam. One of the more prominent ones  above the umbilicus contains some small bowel. No bowel obstruction.  Colonic diverticulosis involving the rectosigmoid region. Some stranding-increased presacral density  near the rectosigmoid colon, which is unchanged from prior exam, makes it difficult to exclude out  superimposed diverticulitis within this region.  Increased colonic stool.  No significant interval change in the 1.8 cm nodular density in the right lateral breast. Recommend  correlation with nonemergent breast imaging if not already done.  Additional findings within the body of the report.  Thank you for allowing us to participate in the care of your patient.  FENG DELUNA Accession: [id number] MRN: [id number]  Preliminary Radiology Report   (QA) DISCREPANCY?  If there is a discrepancy between the preliminary and final interpretation, please notify [facility] via [email or web address].  If you do not have access to our QA portal, call our QA team at [phone]  CONFIDENTIALITY STATEMENT  This report is intended only for the use of  the referring physician, and only in accordance with law, If you received this in error, call [phone]  Page 3 of 3  Dictated and Authenticated by: Etienne Aggarwal MD

## 2023-03-26 NOTE — ASSESSMENT & PLAN NOTE
Body mass index is 35.94 kg/m². Morbid obesity complicates all aspects of disease management from diagnostic modalities to treatment.

## 2023-03-26 NOTE — ASSESSMENT & PLAN NOTE
Tessa Bartolo Zoe is a 67 y.o. female with bilateral ureteral stones and possible UTI    - To OR today for left stent placement, will perform right RGP and if hydro/stone appears then will also place right stent  - Continue abx until cultures finalize  - Will message Dr. Kwon for definitive stone treatment plan

## 2023-03-26 NOTE — HPI
Tessa Enriquez is a 67 y.o. female with a history of COPD, HLD, hypothyroidism, and colon ca currently on FOLFIRI with panitumumab with last dose 3/10 who presented yesterday to ED with complaints of left sided flank pain beginning last week.    She is s/p right ureteroscopy with Dr. Kwon on 3/7/23 for x 2 right ureteral stones.     CT 3/25/23: 7 mm left proximal ureteral stone, 6 mm right distal stone (seen previously to be within the right lower pole), bilateral hydro.   Labs 3/25/23:  WBC - 17.96, now down to 9.3  Cr - 0.9, now 1.2  UA 21 RBC, 37 WBC, few bacteria    She has been afebrile with stable vitals.   She thinks she may have passed one stone in the ED, but was not straining urine.

## 2023-03-26 NOTE — PLAN OF CARE
[facility]  Initial Discharge Assessment       Primary Care Provider: Karma Mcdermott MD    Admission Diagnosis: Hypokalemia [E87.6]  Obstructive uropathy [N13.9]  Chest pain [R07.9]  Bilateral kidney stones [N20.0]    Admission Date: 3/25/2023  Expected Discharge Date:     DC assessment completed with pt at bedside. Pt confirms demographics are current. Pt lives at listed address. PCP DR Mcdermott and [facility]. no DME. Denies HD/DM/coumadin. Denies POA/LW. Denies recent admission. Family to provide ride home. Pt active with [facility]. No new needs anticipated at DC. CM following.           Payor: MEDICARE / Plan: MEDICARE A ONLY / Product Type: Government /     Extended Emergency Contact Information  Primary Emergency Contact: Umu Enriquez   [facility]  Home Phone: [phone]  Mobile Phone: [phone]  Relation: Relative   needed? No    Discharge Plan A: Home with family  Discharge Plan B: Home      [id number] - [place] - [address]  [address] [address]  Phone: [phone] Fax: [phone]      Initial Assessment (most recent)       Adult Discharge Assessment - 03/26/23 1138          Discharge Assessment    Assessment Type Discharge Planning Assessment     Confirmed/corrected address, phone number and insurance Yes     Confirmed Demographics Correct on Facesheet     Source of Information patient     People in Home other relative(s)     Do you expect to return to your current living situation? Yes     Do you have help at home or someone to help you manage your care at home? Yes     Prior to hospitilization cognitive status: Alert/Oriented     Current cognitive status: Alert/Oriented     Equipment Currently Used at Home none     Readmission within 30 days? No     Patient currently being followed by outpatient case management? Yes     If yes, name of outpatient case management following: [facility]  outpatient case management     Do you currently have service(s) that help you manage your care at home? No     Do you take prescription medications? Yes     Do you have prescription coverage? Yes     Coverage bcbs     Do you have any problems affording any of your prescribed medications? No     Is the patient taking medications as prescribed? yes     How do you get to doctors appointments? family or friend will provide     Are you on dialysis? No     Do you take coumadin? No     Discharge Plan A Home with family     Discharge Plan B Home     DME Needed Upon Discharge  none

## 2023-03-26 NOTE — NURSING
Nurses Note -- 4 Eyes      3/26/2023   1:03 AM      Skin assessed during: Admit      [] No Pressure Injuries Present    []Prevention Measures Documented      [x] Yes- Altered Skin Integrity Present or Discovered   [x] LDA Added if Not in Epic (Describe Wound)   [x] New Altered Skin Integrity was Present on Admit and Documented in LDA   [x] Wound Image Taken    Wound Care Consulted? Yes    Attending Nurse:  Emilie Paul RN     Second RN/Staff Member:  Bernadette Myers RN

## 2023-03-26 NOTE — PROGRESS NOTES
"[facility] Medicine  Progress Note    Patient Name: Tessa Enriquez  MRN: [id number]  Patient Class: IP- Inpatient   Admission Date: 3/25/2023  Length of Stay: 1 days  Attending Physician: Nomi Casas MD  Primary Care Provider: Karma Mcdermott MD        Subjective:     Principal Problem:Obstructive uropathy        HPI:  Ms. Enriquez is a 67 year old female with a history of COPD, HLD, hypothyroidism, and colon ca currently on FOLFIRI with panitumumab with last dose 3/10 who presents today with complaints of left sided flank pain beginning last week. It is severe. It is associated with nausea and fatigue. She denies fever, chills, V/D, chest pain, or SOB. She had a right ureteroscopy with basket extraction of two stones. Mild right hydronephrosis on retrograde pyelogram. 6 x 22 cm JJ stent  Stent exchanged with strings with Dr. Kwon on 3/7/23. She was doing well until the left side started causing pain. She tried her home pain medication, but this did not work prompting her presentation. In the ED, urine is significant for microscopic hematuria, bacteruria, and pyuria. Potassium is noted to b 2.9, WBC 17K, and CT abd/pelvis revealed: Left perinephric/periureteric stranding, hydronephrosis, and prominence of the ureter extending to a 5  x 6 x 6 mm stone near the left ureteropelvic junction. Additional 2 mm left calyceal stone. A 3 mm right calyceal stone with additional smaller one. Right-sided hydronephrosis and hydroureter. Right hydronephrosis and hydroureter may be related to a distal ureter stone measuring 4 x 6 x 7 mm." This was discussed with Dr. Barber who plans for surgical intervention tomorrow. Hospital medicine is consulted for admission for further work up and management.       Overview/Hospital Course:  Tessa Enriquez is a 67 year old female with a past medical history of colon cancer undergoing chemotherapy and followed by Dr. Frank, COPD, hypothyroidism, and " "obesity who presented with bilateral ureteral kidney stone obstruction with possible UTI. The patient has been seen by Dr. Kwon in the past where she had basket extraction of two right sided stones with stenting and subsequent exchange 3/7. Urology performed RPG 3/26 and placed two bilateral ureteral stents. She is currently on Cipro IV and urine culture is pending.       Interval History: see "Hospital Course"    Review of Systems   Unable to perform ROS: Acuity of condition   Objective:     Vital Signs (Most Recent):  Temp: 98 °F (36.7 °C) (03/26/23 1322)  Pulse: 76 (03/26/23 1205)  Resp: 16 (03/26/23 1121)  BP: 133/65 (03/26/23 1205)  SpO2: 98 % (03/26/23 1205) Vital Signs (24h Range):  Temp:  [97.6 °F (36.4 °C)-98.4 °F (36.9 °C)] 98 °F (36.7 °C)  Pulse:  [76-98] 76  Resp:  [16-18] 16  SpO2:  [95 %-98 %] 98 %  BP: (104-171)/(55-96) 133/65     Weight: 83.5 kg (184 lb)  Body mass index is 35.94 kg/m².    Intake/Output Summary (Last 24 hours) at 3/26/2023 1342  Last data filed at 3/26/2023 1327  Gross per 24 hour   Intake 1680.11 ml   Output --   Net 1680.11 ml      Physical Exam  Vitals and nursing note reviewed.   Constitutional:       Appearance: She is obese.   HENT:      Head: Normocephalic and atraumatic.      Right Ear: External ear normal.      Left Ear: External ear normal.      Nose: Nose normal.      Mouth/Throat:      Pharynx: Oropharynx is clear.   Eyes:      Extraocular Movements: Extraocular movements intact.      Conjunctiva/sclera: Conjunctivae normal.   Cardiovascular:      Rate and Rhythm: Normal rate and regular rhythm.      Pulses: Normal pulses.      Heart sounds: Normal heart sounds.   Pulmonary:      Effort: Pulmonary effort is normal.      Breath sounds: Normal breath sounds.   Abdominal:      General: Bowel sounds are normal.      Palpations: Abdomen is soft.   Musculoskeletal:         General: Normal range of motion.      Cervical back: Normal range of motion and neck supple.   Skin:     " General: Skin is warm and dry.       Significant Labs: All pertinent labs within the past 24 hours have been reviewed.    Significant Imaging: I have reviewed all pertinent imaging results/findings within the past 24 hours.      Assessment/Plan:      * Obstructive uropathy  -S/p bilateral ureteral stenting per Urology  -Continue Cipro IV  -PRN analgesics  -Follow up urine culture    UTI (urinary tract infection)  Complicated.  -Cipro  -Follow up urine culture      Thrombocytopenia  Stable.  -Trend Hgb with CBC      Anemia  Stable.  -Trend Hgb with CBC      Hypokalemia  Replete with oral potassium   Repeat chemistries in AM  Monitor on telemetry      Severe obesity (BMI 35.0-39.9) with comorbidity  Body mass index is 35.94 kg/m². Morbid obesity complicates all aspects of disease management from diagnostic modalities to treatment.      Malignant neoplasm of colon  Followed by Dr. Frank. Currently on FOLFIRI.      Acquired hypothyroidism  -Continue levothyroxine      COPD (chronic obstructive pulmonary disease)  -PRN albuterol and ipratropium      VTE Risk Mitigation (From admission, onward)         Ordered     enoxaparin injection 40 mg  Daily         03/25/23 2033     IP VTE HIGH RISK PATIENT  Once         03/25/23 2033     Place sequential compression device  Until discontinued         03/25/23 2033                Discharge Planning   DERICK: 3/28/2023    Code Status: Full Code   Is the patient medically ready for discharge?:     Reason for patient still in hospital (select all that apply): Patient trending condition, Laboratory test, Treatment and Consult recommendations  Discharge Plan A: Home with family                  Nomi Casas MD  Department of Hospital Medicine   [facility]

## 2023-03-26 NOTE — HPI
"Ms. Enriquez is a 67 year old female with a history of COPD, HLD, hypothyroidism, and colon ca currently on FOLFIRI with panitumumab with last dose 3/10 who presents today with complaints of left sided flank pain beginning last week. It is severe. It is associated with nausea and fatigue. She denies fever, chills, V/D, chest pain, or SOB. She had a right ureteroscopy with basket extraction of two stones. Mild right hydronephrosis on retrograde pyelogram. 6 x 22 cm JJ stent  Stent exchanged with strings with Dr. Kwon on 3/7/23. She was doing well until the left side started causing pain. She tried her home pain medication, but this did not work prompting her presentation. In the ED, urine is significant for microscopic hematuria, bacteruria, and pyuria. Potassium is noted to b 2.9, WBC 17K, and CT abd/pelvis revealed: Left perinephric/periureteric stranding, hydronephrosis, and prominence of the ureter extending to a 5  x 6 x 6 mm stone near the left ureteropelvic junction. Additional 2 mm left calyceal stone. A 3 mm right calyceal stone with additional smaller one. Right-sided hydronephrosis and hydroureter. Right hydronephrosis and hydroureter may be related to a distal ureter stone measuring 4 x 6 x 7 mm." This was discussed with Dr. Barber who plans for surgical intervention tomorrow. Hospital medicine is consulted for admission for further work up and management.   "

## 2023-03-26 NOTE — ANESTHESIA PROCEDURE NOTES
Intubation    Date/Time: 3/26/2023 12:58 PM  Performed by: Armin Molina CRNA  Authorized by: Ramesh Schilling MD     Intubation:     Induction:  Intravenous    Intubated:  Postinduction    Mask Ventilation:  Not attempted    Attempts:  1    Attempted By:  CRNA    Difficult Airway Encountered?: No      Complications:  None    Airway Device:  Supraglottic airway/LMA    Airway Device Size:  3.0    Style/Cuff Inflation:  Cuffed    Secured at:  The lips    Placement Verified By:  Capnometry    Complicating Factors:  None    Findings Post-Intubation:  BS equal bilateral

## 2023-03-26 NOTE — EICU
Intervention Initiated From:  [facility] / EICU    Radha intervened regarding:  Rounding (Video assessment)    Nurse Notified:  No    Doctor Notified:  No    Comments: Video rounds complete. Patient resting in bed with no alarms, or distress noted. Denies needs at this time, and call light within reach.

## 2023-03-26 NOTE — ANESTHESIA PREPROCEDURE EVALUATION
03/26/2023  Tessa Enriquez is a 67 y.o., female.      Pre-op Assessment    I have reviewed the Patient Summary Reports.     I have reviewed the Nursing Notes. I have reviewed the NPO Status.   I have reviewed the Medications.     Review of Systems  Anesthesia Hx:  No problems with previous Anesthesia    Social:  Former Smoker    Hematology/Oncology:        Current/Recent Cancer. (stage 4 colon CA now - on chemo and radiation now) -- Cancer in past history (rectal CA):  chemotherapy, radiation and surgery    Cardiovascular:   hyperlipidemia    Pulmonary:   COPD, moderate Pt is NOT on home O2 and infrequently uses inhalers   Renal/:   Chronic Renal Disease renal calculi    Musculoskeletal:   Arthritis     Neurological:  Neurology Normal    Endocrine:   Hypothyroidism  Obesity / BMI > 30, Morbid Obesity / BMI > 40      Physical Exam  General: Well nourished, Cooperative, Alert and Oriented    Airway:  Mallampati: II   Mouth Opening: Normal  TM Distance: Normal  Neck ROM: Normal ROM    Dental:  Dentures        Anesthesia Plan  Type of Anesthesia, risks & benefits discussed:    Anesthesia Type: Gen ETT, Gen Supraglottic Airway, Gen Natural Airway, MAC  Intra-op Monitoring Plan: Standard ASA Monitors  Post Op Pain Control Plan: multimodal analgesia  Induction:  IV  Airway Plan: Direct, Video and Fiberoptic, Post-Induction  Informed Consent: Informed consent signed with the Patient and all parties understand the risks and agree with anesthesia plan.  All questions answered.   ASA Score: 4 Emergent    Ready For Surgery From Anesthesia Perspective.     .

## 2023-03-27 PROBLEM — D69.6 THROMBOCYTOPENIA: Status: RESOLVED | Noted: 2023-01-01 | Resolved: 2023-01-01

## 2023-03-27 PROBLEM — N13.9 OBSTRUCTIVE UROPATHY: Status: RESOLVED | Noted: 2023-01-01 | Resolved: 2023-01-01

## 2023-03-27 PROBLEM — N20.0 BILATERAL KIDNEY STONES: Status: ACTIVE | Noted: 2023-01-01

## 2023-03-27 NOTE — EICU
Video rounding completed.  Pt awake speaking w/ bedside nurses.  No distress noted.  Infusing LR at 125 cc/hr.  VSS.

## 2023-03-27 NOTE — PLAN OF CARE
Problem: Adult Inpatient Plan of Care  Goal: Plan of Care Review  Outcome: Ongoing, Progressing     Problem: Adult Inpatient Plan of Care  Goal: Patient-Specific Goal (Individualized)  Outcome: Ongoing, Progressing     Problem: Adult Inpatient Plan of Care  Goal: Absence of Hospital-Acquired Illness or Injury  Outcome: Ongoing, Progressing     Problem: Adult Inpatient Plan of Care  Goal: Optimal Comfort and Wellbeing  Outcome: Ongoing, Progressing     Problem: Infection  Goal: Absence of Infection Signs and Symptoms  Outcome: Ongoing, Progressing

## 2023-03-27 NOTE — DISCHARGE SUMMARY
"[facility] Medicine  Discharge Summary      Patient Name: Tessa Enriquez  MRN: [id number]  TAE: [id number]  Patient Class: IP- Inpatient  Admission Date: 3/25/2023  Hospital Length of Stay: 2 days  Discharge Date and Time: No discharge date for patient encounter.  Attending Physician: Nomi Casas MD   Discharging Provider: Nomi Casas MD  Primary Care Provider: Karma Mcdermott MD    Primary Care Team: Networked reference to record PCT     HPI:   Ms. Enriquez is a 67 year old female with a history of COPD, HLD, hypothyroidism, and colon ca currently on FOLFIRI with panitumumab with last dose 3/10 who presents today with complaints of left sided flank pain beginning last week. It is severe. It is associated with nausea and fatigue. She denies fever, chills, V/D, chest pain, or SOB. She had a right ureteroscopy with basket extraction of two stones. Mild right hydronephrosis on retrograde pyelogram. 6 x 22 cm JJ stent  Stent exchanged with strings with Dr. Kwon on 3/7/23. She was doing well until the left side started causing pain. She tried her home pain medication, but this did not work prompting her presentation. In the ED, urine is significant for microscopic hematuria, bacteruria, and pyuria. Potassium is noted to b 2.9, WBC 17K, and CT abd/pelvis revealed: Left perinephric/periureteric stranding, hydronephrosis, and prominence of the ureter extending to a 5  x 6 x 6 mm stone near the left ureteropelvic junction. Additional 2 mm left calyceal stone. A 3 mm right calyceal stone with additional smaller one. Right-sided hydronephrosis and hydroureter. Right hydronephrosis and hydroureter may be related to a distal ureter stone measuring 4 x 6 x 7 mm." This was discussed with Dr. Barber who plans for surgical intervention tomorrow. Hospital medicine is consulted for admission for further work up and management.       Procedure(s) (LRB):  CYSTOSCOPY, WITH URETERAL STENT " INSERTION (Bilateral)  CYSTOSCOPY, WITH RETROGRADE PYELOGRAM AND URETERAL STENT INSERTION (Bilateral)      Hospital Course:   Tessa Enriquez is a 67 year old female with a past medical history of colon cancer undergoing chemotherapy and followed by Dr. Frank, COPD, hypothyroidism, and obesity who presented with bilateral ureteral kidney stone obstruction with possible UTI. The patient was seen by Dr. Kwon in the past where she had basket extraction of two right sided stones with stenting and subsequent exchange 3/7. Urology performed RPG 3/26 and placed two bilateral ureteral stents without complication. She was on Cipro IV during her course. Urine culture showed multiple organisms. She was discharged 3/27/2023 and will complete a two week course of Cipro. She will follow up with Oncology (NEENA Badillo and Dr. Frank) and Urology (Dr. Kwon; notified by Dr. Barber) in the outpatient setting.       Goals of Care Treatment Preferences:  Code Status: Full Code      Consults:   Consults (From admission, onward)        Status Ordering Provider     Inpatient consult to Registered Dietitian/Nutritionist  Once        Provider:  (Not yet assigned)    LAMAR Moreira     Inpatient consult to Urology  Once        Provider:  Sabine Barber MD    Completed GURJIT HUTSON          Pulmonary  COPD (chronic obstructive pulmonary disease)  -PRN albuterol and ipratropium    Renal/  Bilateral kidney stones  -S/p bilateral ureteral stenting 3/27  -Follow up with Dr. Kwon      UTI (urinary tract infection)  Complicated. Multiple organisms on urine culture.  -Cipro for two weeks      Oncology  Anemia  Stable.  -Trend Hgb with CBC      Malignant neoplasm of colon  Followed by Dr. Frank. Currently on FOLFIRI.  -Follow up outpatient      Endocrine  Severe obesity (BMI 35.0-39.9) with comorbidity  Body mass index is 35.94 kg/m². Morbid obesity complicates all aspects of disease management from diagnostic modalities to  treatment.      Acquired hypothyroidism  -Continue levothyroxine        Final Active Diagnoses:    Diagnosis Date Noted POA    UTI (urinary tract infection) [N39.0] 03/25/2023 Yes    Bilateral kidney stones [N20.0] 03/27/2023 Yes    Anemia [D64.9] 03/26/2023 Yes    Severe obesity (BMI 35.0-39.9) with comorbidity [E66.01] 06/01/2022 Yes    Malignant neoplasm of colon [C18.9] 08/06/2021 Yes    Acquired hypothyroidism [E03.9] 10/15/2020 Yes    COPD (chronic obstructive pulmonary disease) [J44.9] 07/09/2018 Yes      Problems Resolved During this Admission:    Diagnosis Date Noted Date Resolved POA    PRINCIPAL PROBLEM:  Obstructive uropathy [N13.9] 03/25/2023 03/27/2023 Yes    Thrombocytopenia [D69.6] 03/26/2023 03/27/2023 Yes       Discharged Condition: stable    Disposition: Home or Self Care    Follow Up:   Follow-up Information     Harmony Badillo NP Follow up on 4/6/2023.    Specialty: Hematology and Oncology  Contact information:  [address] [facility] [address]  [place] [address]  [phone]             Carin Kwon Jr, MD Follow up on 4/19/2023.    Specialty: Urology  Contact information:  [address] [place] [address]   [address]  [place] [address]  [phone]                       Patient Instructions:      Diet Adult Regular     Notify your health care provider if you experience any of the following:  increased confusion or weakness     Notify your health care provider if you experience any of the following:  persistent dizziness, light-headedness, or visual disturbances     Notify your health care provider if you experience any of the following:  severe persistent headache     Notify your health care provider if you experience any of the following:  difficulty breathing or increased cough     Notify your health care provider if you experience any of the following:  severe uncontrolled pain     Notify your health care provider if you experience any of the following:  persistent nausea and vomiting or diarrhea      Notify your health care provider if you experience any of the following:  temperature >100.4     Notify your health care provider if you experience any of the following:  worsening rash     Activity as tolerated       Significant Diagnostic Studies: Labs: All labs within the past 24 hours have been reviewed    Pending Diagnostic Studies:     None         Medications:  Reconciled Home Medications:      Medication List      START taking these medications    ciprofloxacin HCl 500 MG tablet  Commonly known as: CIPRO  Take 1 tablet (500 mg total) by mouth 2 (two) times daily.        CHANGE how you take these medications    ferrous sulfate 325 (65 FE) MG EC tablet  Take 1 tablet (325 mg total) by mouth every other day.  What changed: when to take this     guaiFENesin 600 mg 12 hr tablet  Commonly known as: MUCINEX  Take 1 tablet (600 mg total) by mouth 2 (two) times daily as needed for Congestion.  What changed:   · when to take this  · reasons to take this        CONTINUE taking these medications    albuterol 2.5 mg /3 mL (0.083 %) nebulizer solution  Commonly known as: PROVENTIL  Take 3 mLs (2.5 mg total) by nebulization every 6 (six) hours as needed for Wheezing. Rescue     CMPD HYDROCORTISONE 2%, LIDOCAINE 3% SUPPOSITORY (RECTAL ROCKET)  Place 1 suppository rectally every evening. Insert 1 suppository 3/4 of the way onto rectum. Wet for easier application. Repeat for 3 nights.     cyanocobalamin 1000 MCG tablet  Commonly known as: VITAMIN B-12  Take 100 mcg by mouth once daily.     diphenoxylate-atropine 2.5-0.025 mg 2.5-0.025 mg per tablet  Commonly known as: LOMOTIL  Take by mouth.     HYDROcodone-acetaminophen 5-325 mg per tablet  Commonly known as: NORCO  TAKE ONE TABLET BY MOUTH EVERY SIX HOURS AS NEEDED FOR PAIN     levothyroxine 125 MCG tablet  Commonly known as: SYNTHROID  Take 1 tablet (125 mcg total) by mouth before breakfast.     ondansetron 8 MG Tbdl  Commonly known as: ZOFRAN-ODT  Take 1 tablet (8 mg  total) by mouth every 12 (twelve) hours as needed.     potassium chloride SA 20 MEQ tablet  Commonly known as: K-DUR,KLOR-CON  Take 1 tablet (20 mEq total) by mouth once daily.     prochlorperazine 10 MG tablet  Commonly known as: COMPAZINE  Take 1 tablet (10 mg total) by mouth every 6 (six) hours as needed.     triamcinolone acetonide 0.025% 0.025 % cream  Commonly known as: KENALOG  Apply topically once daily.            Indwelling Lines/Drains at time of discharge:   Lines/Drains/Airways     Drain  Duration                Ureteral Drain/Stent 03/26/23 1314 Left ureter 6 Fr. <1 day         Ureteral Drain/Stent 03/26/23 1315 Right ureter 6 Fr. <1 day                Time spent on the discharge of patient: 32 minutes         Nomi Casas MD  Department of Hospital Medicine  [facility]

## 2023-03-27 NOTE — CONSULTS
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     Cleansed area with soap and water and patted the skin dry, applied a thin layer of triad.     Educated patient on prevention of IASD, encouraged her to keep area clean and dry, to change as soon as she noticed she has had an episode. Educated on wound care regimen. Patient verbalized understanding and stated she had no questions.

## 2023-03-27 NOTE — SUBJECTIVE & OBJECTIVE
"Interval History: see "Hospital Course"    Review of Systems   Constitutional:  Positive for fatigue.   Genitourinary:  Positive for dysuria.   Neurological:  Positive for dizziness.   Objective:     Vital Signs (Most Recent):  Temp: 97.9 °F (36.6 °C) (03/27/23 1123)  Pulse: 87 (03/27/23 1123)  Resp: 18 (03/27/23 1123)  BP: (!) 146/69 (03/27/23 1123)  SpO2: 97 % (03/27/23 1123)   Vital Signs (24h Range):  Temp:  [97.2 °F (36.2 °C)-98.1 °F (36.7 °C)] 97.9 °F (36.6 °C)  Pulse:  [64-90] 87  Resp:  [10-20] 18  SpO2:  [94 %-100 %] 97 %  BP: (101-176)/(52-78) 146/69     Weight: 83.5 kg (184 lb)  Body mass index is 35.94 kg/m².    Intake/Output Summary (Last 24 hours) at 3/27/2023 1328  Last data filed at 3/27/2023 0620  Gross per 24 hour   Intake 2100 ml   Output 1300 ml   Net 800 ml      Physical Exam  Vitals and nursing note reviewed.   Constitutional:       General: She is not in acute distress.     Appearance: She is obese.   HENT:      Head: Normocephalic and atraumatic.      Right Ear: External ear normal.      Left Ear: External ear normal.      Nose: Nose normal.      Mouth/Throat:      Pharynx: Oropharynx is clear.   Eyes:      Extraocular Movements: Extraocular movements intact.      Conjunctiva/sclera: Conjunctivae normal.   Cardiovascular:      Rate and Rhythm: Normal rate and regular rhythm.      Pulses: Normal pulses.      Heart sounds: Normal heart sounds.   Pulmonary:      Effort: Pulmonary effort is normal.      Breath sounds: Normal breath sounds.   Abdominal:      General: Bowel sounds are normal.      Palpations: Abdomen is soft.   Musculoskeletal:         General: Normal range of motion.      Cervical back: Normal range of motion and neck supple.   Skin:     General: Skin is warm and dry.   Neurological:      Mental Status: She is alert. Mental status is at baseline.   Psychiatric:         Mood and Affect: Mood normal.         Behavior: Behavior normal.       Significant Labs: All pertinent labs " within the past 24 hours have been reviewed.    Significant Imaging: I have reviewed all pertinent imaging results/findings within the past 24 hours.

## 2023-03-27 NOTE — CARE UPDATE
03/26/23 1954   Patient Assessment/Suction   Level of Consciousness (AVPU) alert   Respiratory Effort Normal;Unlabored   Expansion/Accessory Muscles/Retractions no retractions;no use of accessory muscles   All Lung Fields Breath Sounds clear   Rhythm/Pattern, Respiratory no shortness of breath reported   PRE-TX-O2   Device (Oxygen Therapy) room air   SpO2 95 %   Pulse Oximetry Type Intermittent   $ Pulse Oximetry - Multiple Charge Pulse Oximetry - Multiple   Aerosol Therapy   $ Aerosol Therapy Charges PRN treatment not required

## 2023-03-27 NOTE — PLAN OF CARE
Nutrition Recs: 3/27  1. Recommend pt diet order is modified to Cardiac, IDDSI 7; avoid spicy/greasy/fried/sugary foods and drinks.   2. Recommend pt receives [place] BID on tray with meals.   3. Weigh weekly.  4. Collaboration of care with medical providers.    Goals:   1. Pt diet order will be modified within 24hrs.   2. Pt will continue to consume 100% of energy needs by RD follow up.  Nutrition Goal Status: new  Communication of RD Recs: other (comment) (POC: Sticky note)  Milan Chance, Registration Eligible, Provisional LDN

## 2023-03-27 NOTE — PLAN OF CARE
CM met with pt at bedside regarding discharge. She does not feel like she is ready for discharge today. Pt with multiple complaints of high blood pressure and dizziness. Pt stated that she lives over an hour away and is concerned about her dizziness when getting out of bed or turning her head. Pt requesting to speak to Dr. Casas again. Dr. Casas to  cancel pts discharge orders and she will discharge home Tuesday 3/28/23. CM following.    03/27/23 0267   Discharge Assessment   Assessment Type Discharge Planning Reassessment

## 2023-03-27 NOTE — PROGRESS NOTES
"[facility] Medicine  Progress Note    Patient Name: Tessa Enriquez  MRN: [id number]  Patient Class: IP- Inpatient   Admission Date: 3/25/2023  Length of Stay: 2 days  Attending Physician: Nomi Casas MD  Primary Care Provider: Karma Mcdermott MD        Subjective:     Principal Problem:Obstructive uropathy        HPI:  Ms. Enriquez is a 67 year old female with a history of COPD, HLD, hypothyroidism, and colon ca currently on FOLFIRI with panitumumab with last dose 3/10 who presents today with complaints of left sided flank pain beginning last week. It is severe. It is associated with nausea and fatigue. She denies fever, chills, V/D, chest pain, or SOB. She had a right ureteroscopy with basket extraction of two stones. Mild right hydronephrosis on retrograde pyelogram. 6 x 22 cm JJ stent  Stent exchanged with strings with Dr. Kwon on 3/7/23. She was doing well until the left side started causing pain. She tried her home pain medication, but this did not work prompting her presentation. In the ED, urine is significant for microscopic hematuria, bacteruria, and pyuria. Potassium is noted to b 2.9, WBC 17K, and CT abd/pelvis revealed: Left perinephric/periureteric stranding, hydronephrosis, and prominence of the ureter extending to a 5  x 6 x 6 mm stone near the left ureteropelvic junction. Additional 2 mm left calyceal stone. A 3 mm right calyceal stone with additional smaller one. Right-sided hydronephrosis and hydroureter. Right hydronephrosis and hydroureter may be related to a distal ureter stone measuring 4 x 6 x 7 mm." This was discussed with Dr. Barber who plans for surgical intervention tomorrow. Hospital medicine is consulted for admission for further work up and management.       Overview/Hospital Course:  Tessa Enriquez is a 67 year old female with a past medical history of colon cancer undergoing chemotherapy and followed by Dr. Frank, COPD, hypothyroidism, and " "obesity who presented with bilateral ureteral kidney stone obstruction with possible UTI. The patient was seen by Dr. Kwon in the past where she had basket extraction of two right sided stones with stenting and subsequent exchange 3/7. Urology performed RPG 3/26 and placed two bilateral ureteral stents without complication. She was on Cipro IV during her course. Urine culture showed multiple organisms. She was to be discharged 3/27/2023, however the patient felt uncomfortable going home, threatening to file a lawsuit against this provider if she were to happen at home. She is currently being monitored another day with plans to tentatively discharge the patient 3/28/2023. PT/OT has been consulted.      Interval History: see "Hospital Course"    Review of Systems   Constitutional:  Positive for fatigue.   Genitourinary:  Positive for dysuria.   Neurological:  Positive for dizziness.   Objective:     Vital Signs (Most Recent):  Temp: 97.9 °F (36.6 °C) (03/27/23 1123)  Pulse: 87 (03/27/23 1123)  Resp: 18 (03/27/23 1123)  BP: (!) 146/69 (03/27/23 1123)  SpO2: 97 % (03/27/23 1123)   Vital Signs (24h Range):  Temp:  [97.2 °F (36.2 °C)-98.1 °F (36.7 °C)] 97.9 °F (36.6 °C)  Pulse:  [64-90] 87  Resp:  [10-20] 18  SpO2:  [94 %-100 %] 97 %  BP: (101-176)/(52-78) 146/69     Weight: 83.5 kg (184 lb)  Body mass index is 35.94 kg/m².    Intake/Output Summary (Last 24 hours) at 3/27/2023 1328  Last data filed at 3/27/2023 0620  Gross per 24 hour   Intake 2100 ml   Output 1300 ml   Net 800 ml      Physical Exam  Vitals and nursing note reviewed.   Constitutional:       General: She is not in acute distress.     Appearance: She is obese.   HENT:      Head: Normocephalic and atraumatic.      Right Ear: External ear normal.      Left Ear: External ear normal.      Nose: Nose normal.      Mouth/Throat:      Pharynx: Oropharynx is clear.   Eyes:      Extraocular Movements: Extraocular movements intact.      Conjunctiva/sclera: " Conjunctivae normal.   Cardiovascular:      Rate and Rhythm: Normal rate and regular rhythm.      Pulses: Normal pulses.      Heart sounds: Normal heart sounds.   Pulmonary:      Effort: Pulmonary effort is normal.      Breath sounds: Normal breath sounds.   Abdominal:      General: Bowel sounds are normal.      Palpations: Abdomen is soft.   Musculoskeletal:         General: Normal range of motion.      Cervical back: Normal range of motion and neck supple.   Skin:     General: Skin is warm and dry.   Neurological:      Mental Status: She is alert. Mental status is at baseline.   Psychiatric:         Mood and Affect: Mood normal.         Behavior: Behavior normal.       Significant Labs: All pertinent labs within the past 24 hours have been reviewed.    Significant Imaging: I have reviewed all pertinent imaging results/findings within the past 24 hours.        Assessment/Plan:      UTI (urinary tract infection)  Complicated. Multiple organisms on urine culture.  -Cipro for two weeks      Bilateral kidney stones  -S/p bilateral ureteral stenting 3/27  -Follow up with Dr. Kwon      Anemia  Stable.  -Trend Hgb with CBC      Hypokalemia  Replete with oral potassium   Repeat chemistries in AM  Monitor on telemetry      Severe obesity (BMI 35.0-39.9) with comorbidity  Body mass index is 35.94 kg/m². Morbid obesity complicates all aspects of disease management from diagnostic modalities to treatment.      Malignant neoplasm of colon  Followed by Dr. Frank. Currently on FOLFIRI.  -Follow up outpatient      Acquired hypothyroidism  -Continue levothyroxine      COPD (chronic obstructive pulmonary disease)  -PRN albuterol and ipratropium      VTE Risk Mitigation (From admission, onward)         Ordered     enoxaparin injection 40 mg  Daily         03/25/23 2033     IP VTE HIGH RISK PATIENT  Once         03/25/23 2033     Place sequential compression device  Until discontinued         03/25/23 2033                Discharge  Planning   DERICK: 3/27/2023     Code Status: Full Code   Is the patient medically ready for discharge?:     Reason for patient still in hospital (select all that apply): Patient trending condition  Discharge Plan A: Home with family                  Nomi Casas MD  Department of Hospital Medicine   [facility]

## 2023-03-27 NOTE — EICU
Intervention Initiated From:  COR / EICU    Radha intervened regarding:  Rounding (Video assessment)    Nurse Notified:  No    Doctor Notified:  No    Comments: Pt on RA sats in 90's.  VSS. AAOx3.  No medications infusing.  No family at bedside.  Pt had no requests or complaints at this time.  NAD observed.

## 2023-03-27 NOTE — NURSING
"Pt refused Lovenox.  KRISTI Casas MD aware and notified. Pt able to verbalize its use "to prevent clots and it goes in my stomach; I don't want it."  "

## 2023-03-27 NOTE — PLAN OF CARE
POC reviewed verbalized understanding. Iv to left arm remains intact and patent with iv fluids infusing without difficulty. Pain well controlled with po pain meds. Voiding without difficulty. Safety measures maintained with side rails up, bed alarm on, slip resistant socks on, call light in reach, pt instructed to call for needs. tolerating regular diet well voiding without difficulty

## 2023-03-27 NOTE — CONSULTS
[facility]  Adult Nutrition  Consult Note    SUMMARY     Recommendations  1. Recommend pt diet order is modified to Cardiac, IDDSI 7; avoid spicy/greasy/fried/sugary foods and drinks.   2. Recommend pt receives [place] BID on tray with meals.   3. Weigh weekly.    Goals:   1. Pt diet order will be modified within 24hrs.   2. Pt will continue to consume 100% of energy needs by RD follow up.  Nutrition Goal Status: new  Communication of RD Recs: other (comment) (POC: Sticky note)    Assessment and Plan    Nutrition Problem  Increased protein needs    Related to (etiology):   Increased demand for nutrition    Signs and Symptoms (as evidenced by):   Incision/skin tear    Interventions(treatment strategy):  1. Recommend pt diet order is modified to Cardiac, IDDSI 7; avoid spicy/greasy/fried/sugary foods and drinks.   2. Recommend pt receives Parish BID on tray with meals.   3. Weigh weekly.  4. Collaboration of care with medical providers.    Nutrition Diagnosis Status:   New       Malnutrition Assessment                                       Reason for Assessment    Reason For Assessment: consult  Diagnosis: other (see comments) (No active principal problem)  Relevant Medical History: COPD, HLD, Malignant neoplasm of colon  Interdisciplinary Rounds: did not attend  General Information Comments: Dietitian covering pt remotely, and not able to reach pt via hospital room phone.   3/27: 67 y.o. female admitted w/o active principal problem ( She was to be discharged 3/27/2023, however the patient felt uncomfortable going home, threatening to file a lawsuit against this provider if she were to happen at home. She is currently being monitored another day with plans to tentatively discharge the patient 3/28/2023. PT/OT has been consulted). Per chart review, the pt has 100% PO intake. NFPE not performed per dietitian covering pt remotely. Last charted weight for pt is 184lbs, BMI 35.94 (obese). Pt is noted to  have incisions to perineum and vagina also, sacral spine skin tear. Per chart review, the pt has Malignant neoplasm of colon and is followed by Dr. Frank. Currently on FOLFIRI. Pt LBM was 3/36, currently on polyethylene glycol bowel regimen. Pt Jalil score is 20 (no risk). All pertinent labs and medications reviewed. Dietitian will continue to monitor PO intake incision/skin tears, and labs.  Nutrition Discharge Planning: Cardiac diet; ( avoid acidic/spicy/greasy foods and sugary foods/beverages)    Wt Readings from Last 15 Encounters:   03/27/23 83.5 kg (184 lb 1.4 oz)   03/24/23 83.8 kg (184 lb 11.9 oz)   03/16/23 83.4 kg (183 lb 14.4 oz)   03/15/23 83.3 kg (183 lb 10.3 oz)   03/13/23 83.3 kg (183 lb 10.3 oz)   03/10/23 82.6 kg (182 lb 1.6 oz)   03/07/23 83 kg (183 lb)   02/21/23 83.3 kg (183 lb 12.1 oz)   02/16/23 85.3 kg (188 lb)   02/15/23 85.4 kg (188 lb 3.2 oz)   02/13/23 85 kg (187 lb 6.4 oz)   02/10/23 83.8 kg (184 lb 11.9 oz)   02/02/23 86.7 kg (191 lb 3.2 oz)   01/31/23 84.5 kg (186 lb 4.8 oz)   01/30/23 83.9 kg (184 lb 15.5 oz)   ]    Nutrition Risk Screen    Nutrition Risk Screen: large or nonhealing wound, burn or pressure injury    Nutrition/Diet History    Spiritual, Cultural Beliefs, Shinto Practices, Values that Affect Care: no  Food Allergies: NKFA  Factors Affecting Nutritional Intake: constipation    Anthropometrics    Temp: 97.3 °F (36.3 °C)  Height: 5' (152.4 cm)  Height (inches): 60 in  Weight: 83.5 kg (184 lb 1.4 oz)  Weight (lb): 184.09 lb  Ideal Body Weight (IBW), Female: 100 lb  % Ideal Body Weight, Female (lb): 184.09 %  BMI (Calculated): 36  BMI Grade: 35 - 39.9 - obesity - grade II       Lab/Procedures/Meds  BMP  Lab Results   Component Value Date     03/27/2023    K 4.0 03/27/2023     03/27/2023    CO2 27 03/27/2023    BUN 9 03/27/2023    CREATININE 0.9 03/27/2023    CALCIUM 8.7 03/27/2023    ANIONGAP 8 03/27/2023    EGFRNORACEVR >60 03/27/2023       Pertinent Labs  Reviewed: reviewed  Pertinent Medications Reviewed: reviewed  Scheduled Meds:   ciprofloxacin  400 mg Intravenous Q12H    enoxaparin  40 mg Subcutaneous Daily    ferrous sulfate  1 tablet Oral Daily    levothyroxine  125 mcg Oral Before breakfast    potassium chloride SA  20 mEq Oral Daily    senna-docusate 8.6-50 mg  1 tablet Oral BID     Continuous Infusions:  PRN Meds:.acetaminophen, albuterol sulfate, HYDROcodone-acetaminophen, ipratropium, melatonin, naloxone, ondansetron, polyethylene glycol, promethazine (PHENERGAN) IVPB, sodium chloride 0.9%    Physical Findings/Assessment         Estimated/Assessed Needs    Weight Used For Calorie Calculations: 45.5 kg (100 lb 3.2 oz) (IBW used per BMI 35.95 (obese))  Energy Calorie Requirements (kcal): 1071-7271 (25-30kcal/kg per Cancer w/ standard treatment.)  Energy Need Method: Kcal/kg  Protein Requirements: 46-55 (1.0-1.2g/kg per older adult vs Cancer)  Weight Used For Protein Calculations: 45.5 kg (100 lb 3.2 oz)  Fluid Requirements (mL): 2209-2264  Estimated Fluid Requirement Method: RDA Method  RDA Method (mL): 1136  CHO Requirement: 142-170.5      Nutrition Prescription Ordered    Current Diet Order: Adult Regular (IDDSI Level 7)    Evaluation of Received Nutrient/Fluid Intake    % Kcal Needs: 100  % Protein Needs: 100  I/O: +1780.1 since admitt  Energy Calories Required: meeting needs  Protein Required: meeting needs  Fluid Required: exceeds needs  Tolerance: tolerating  % Intake of Estimated Energy Needs: 75 - 100 %  % Meal Intake: 75 - 100 %    Nutrition Risk    Level of Risk/Frequency of Follow-up: low x1 weekly      Monitor and Evaluation    Food and Nutrient Intake: energy intake, food and beverage intake  Food and Nutrient Adminstration: diet order  Knowledge/Beliefs/Attitudes: food and nutrition knowledge/skill, beliefs and attitudes  Physical Activity and Function: nutrition-related ADLs and IADLs  Anthropometric Measurements: weight, weight change, body  mass index  Biochemical Data, Medical Tests and Procedures: electrolyte and renal panel, gastrointestinal profile, glucose/endocrine profile, inflammatory profile, lipid profile  Nutrition-Focused Physical Findings: overall appearance, skin       Nutrition Follow-Up    RD Follow-up?: Yes  Milan Chance, Registration Eligible, Provisional LDN

## 2023-03-28 NOTE — PLAN OF CARE
The pt is cleared for discharge home from case management once seen by hospital medicine.    03/28/23 0813   Final Note   Assessment Type Final Discharge Note   Anticipated Discharge Disposition Home   What phone number can be called within the next 1-3 days to see how you are doing after discharge? [phone]   Hospital Resources/Appts/Education Provided Appointments scheduled and added to AVS;Appointments scheduled by Navigator/Coordinator   Post-Acute Status   Discharge Delays None known at this time

## 2023-03-28 NOTE — PLAN OF CARE
Plan of care reviewed with patient. Verbalized understanding. IV intact and patent. No signs of redness or swelling. Tele in place and being monitored. Has right CW port a cath that is not accessed. Pain managed with prn medications. Up to bathroom without difficulty. Safety maintained. Call light in reach and instructed to call for assistance. Will continue to monitor.

## 2023-03-28 NOTE — PT/OT/SLP PROGRESS
Physical Therapy Treatment    Patient Name:  Tessa Enriquez   MRN:  [id number]    Recommendations:     Discharge Recommendations: home  Discharge Equipment Recommendations: none  Barriers to discharge: None    Assessment:     Tessa Enriquez is a 67 y.o. female admitted with a medical diagnosis of Obstructive uropathy.  She presents with the following impairments/functional limitations: weakness, impaired endurance, impaired functional mobility, gait instability, impaired balance, decreased lower extremity function, impaired cardiopulmonary response to activity .  Patient agreeable to PT treatment this afternoon.  Patient presented supine in bed and was able to transfer to sitting and then standing with independence. Patient then ambulated x 250 feet with no AD with SBA.    Rehab Prognosis: Good; patient would benefit from acute skilled PT services to address these deficits and reach maximum level of function.    Recent Surgery: Procedure(s) (LRB):  CYSTOSCOPY, WITH URETERAL STENT INSERTION (Bilateral)  CYSTOSCOPY, WITH RETROGRADE PYELOGRAM AND URETERAL STENT INSERTION (Bilateral) 2 Days Post-Op    Plan:     During this hospitalization, patient to be seen BID to address the identified rehab impairments via gait training, therapeutic activities, therapeutic exercises and progress toward the following goals:    Plan of Care Expires:  04/25/23    Subjective     Chief Complaint: feeling slightly dizzy  Patient/Family Comments/goals: go home as long as my electricity comes back on  Pain/Comfort:         Objective:     Communicated with nurse prior to session.  Patient found supine with bed alarm, peripheral IV, telemetry upon PT entry to room.     General Precautions: Standard, fall  Orthopedic Precautions: N/A  Braces:    Respiratory Status: Room air     Functional Mobility:  Bed Mobility:     Supine to Sit: independence  Sit to Supine: independence  Transfers:     Sit to Stand:  independence with no  AD  Gait: x 250 feet RW SBA      AM-PAC 6 CLICK MOBILITY  Turning over in bed (including adjusting bedclothes, sheets and blankets)?: 4  Sitting down on and standing up from a chair with arms (e.g., wheelchair, bedside commode, etc.): 4  Moving from lying on back to sitting on the side of the bed?: 4  Moving to and from a bed to a chair (including a wheelchair)?: 4  Need to walk in hospital room?: 3  Climbing 3-5 steps with a railing?: 3  Basic Mobility Total Score: 22       Treatment & Education:  Gait training x 250 feet rW sBA    Patient left supine with call button in reach and nurse notified..    GOALS:   Multidisciplinary Problems       Physical Therapy Goals          Problem: Physical Therapy    Goal Priority Disciplines Outcome Goal Variances Interventions   Physical Therapy Goal     PT, PT/OT Ongoing, Progressing     Description: Goals to be met by: 4/3/23     Patient will increase functional independence with mobility by performin. Bed to chair transfer with [place] using No Assistive Device  2. Gait  x 250 feet with [place] using No Assistive Device.                          Time Tracking:     PT Received On: 23  PT Start Time: 1313     PT Stop Time: 1325  PT Total Time (min): 12 min     Billable Minutes: Gait Training 12    Treatment Type: Treatment  PT/PTA: PT     Number of PTA visits since last PT visit: 0     2023

## 2023-03-28 NOTE — PT/OT/SLP EVAL
Physical Therapy Evaluation    Patient Name:  Tessa Enriquez   MRN:  [id number]    Recommendations:     Discharge Recommendations: home   Discharge Equipment Recommendations: none   Barriers to discharge: None    Assessment:     Tessa Enriquez is a 67 y.o. female admitted with a medical diagnosis of Obstructive uropathy.  She presents with the following impairments/functional limitations: weakness, impaired endurance, impaired functional mobility, gait instability, impaired balance, impaired cardiopulmonary response to activity .  Patient agreeable to PT treatment but reported feeling dizzy all during treatment.  Patient presented supine in bed and was able to transfer to sitting and standing with independence.  Patient then ambulated x 250 feet no aD SBA and up/down 3 step right hand rail CGA.    Rehab Prognosis: Good; patient would benefit from acute skilled PT services to address these deficits and reach maximum level of function.    Recent Surgery: Procedure(s) (LRB):  CYSTOSCOPY, WITH URETERAL STENT INSERTION (Bilateral)  CYSTOSCOPY, WITH RETROGRADE PYELOGRAM AND URETERAL STENT INSERTION (Bilateral) 2 Days Post-Op    Plan:     During this hospitalization, patient to be seen BID to address the identified rehab impairments via gait training, therapeutic activities, therapeutic exercises and progress toward the following goals:    Plan of Care Expires:  04/25/23    Subjective     Chief Complaint: feeling dizzy  Patient/Family Comments/goals: go home  Pain/Comfort:       Patients cultural, spiritual, Advent conflicts given the current situation:      Living Environment:  Currently lives with family in a 1 story home with 6 step entry.  Prior to admission, patients level of function was independent.  Equipment used at home: none.  DME owned (not currently used): none.  Upon discharge, patient will have assistance from family.    Objective:     Communicated with nurse prior to session.  Patient  found supine with bed alarm, peripheral IV, telemetry  upon PT entry to room.    General Precautions: Standard, fall  Orthopedic Precautions:N/A   Braces:    Respiratory Status: Room air    Exams:  RLE ROM: WFL  RLE Strength: WFL  LLE ROM: WFL  LLE Strength: WFL    Functional Mobility:  Bed Mobility:     Supine to Sit: independence  Transfers:     Sit to Stand:  independence with no AD  Gait: x 250 feet no aD SBA  Stairs:  Pt ascended/descended 3 stair(s) with No Assistive Device with right handrail with Contact Guard Assistance.       AM-PAC 6 CLICK MOBILITY  Total Score:22       Treatment & Education:  None given    Patient left sitting edge of bed with call button in reach, bed alarm on, and nurse notified.    GOALS:   Multidisciplinary Problems       Physical Therapy Goals          Problem: Physical Therapy    Goal Priority Disciplines Outcome Goal Variances Interventions   Physical Therapy Goal     PT, PT/OT Ongoing, Progressing     Description: Goals to be met by: 4/3/23     Patient will increase functional independence with mobility by performin. Bed to chair transfer with [place] using No Assistive Device  2. Gait  x 250 feet with [place] using No Assistive Device.                          History:     Past Medical History:   Diagnosis Date    Acute hypoxemic respiratory failure 2018    Arthritis     Bilateral kidney stones 3/27/2023    Cancer     colon    COPD (chronic obstructive pulmonary disease)     History of home oxygen therapy     ONLY PRN AND HASN'T USED IT IN A YEAR    Hyperlipidemia     Thyroid disease        Past Surgical History:   Procedure Laterality Date    CHOLECYSTECTOMY      COLECTOMY N/A 2020    Procedure: COLECTOMY;  Surgeon: Brandon Wagner MD;  Location: [facility] OR;  Service: General;  Laterality: N/A;  LOWER- ANTERIOR    COLONOSCOPY N/A 10/23/2020    Procedure: COLONOSCOPY;  Surgeon: Jolynn Ayala MD;  Location: [facility];  Service: Endoscopy;   Laterality: N/A;    COLONOSCOPY N/A 8/6/2021    Procedure: COLONOSCOPY;  Surgeon: Jolynn Ayala MD;  Location: [facility] ENDO;  Service: Endoscopy;  Laterality: N/A;    COLONOSCOPY N/A 7/7/2022    Procedure: COLONOSCOPY;  Surgeon: Jolynn Ayala MD;  Location: [facility] ENDO;  Service: Endoscopy;  Laterality: N/A;    CYSTOSCOPY W/ URETERAL STENT PLACEMENT Right 2/22/2023    Procedure: CYSTOSCOPY, WITH URETERAL STENT INSERTION;  Surgeon: Carin Kwon Jr., MD;  Location: [facility] OR;  Service: Urology;  Laterality: Right;    CYSTOSCOPY W/ URETERAL STENT PLACEMENT Bilateral 3/26/2023    Procedure: CYSTOSCOPY, WITH URETERAL STENT INSERTION;  Surgeon: Sabine Barber MD;  Location: [facility] OR;  Service: Urology;  Laterality: Bilateral;    CYSTOSCOPY WITH URETEROSCOPY, RETROGRADE PYELOGRAPHY, AND INSERTION OF STENT Bilateral 3/26/2023    Procedure: CYSTOSCOPY, WITH RETROGRADE PYELOGRAM AND URETERAL STENT INSERTION;  Surgeon: Sabine Barber MD;  Location: [facility] OR;  Service: Urology;  Laterality: Bilateral;    CYSTOURETEROSCOPY WITH RETROGRADE PYELOGRAPHY AND INSERTION OF STENT INTO URETER Right 3/7/2023    Procedure: CYSTOURETEROSCOPY, WITH RETROGRADE PYELOGRAM AND URETERAL STENT INSERTION;  Surgeon: Carin Kwon Jr., MD;  Location: [facility] OR;  Service: Urology;  Laterality: Right;    ESOPHAGEAL DILATION N/A 6/29/2021    Procedure: DILATION, ESOPHAGUS;  Surgeon: Sourav Baires III, MD;  Location: [facility];  Service: Endoscopy;  Laterality: N/A;    ESOPHAGOGASTRODUODENOSCOPY  06/29/2021    Dilation to 57 Fr    ESOPHAGOGASTRODUODENOSCOPY N/A 6/29/2021    Procedure: EGD (ESOPHAGOGASTRODUODENOSCOPY);  Surgeon: Sourav Baires III, MD;  Location: [facility] ENDO;  Service: Endoscopy;  Laterality: N/A;    FLEXIBLE SIGMOIDOSCOPY N/A 8/1/2022    Procedure: SIGMOIDOSCOPY, FLEXIBLE;  Surgeon: Brandon Wagner MD;  Location: [facility] ENDO;  Service: Endoscopy;  Laterality: N/A;    HYSTERECTOMY      INSERTION OF TUNNELED CENTRAL  VENOUS CATHETER (CVC) WITH SUBCUTANEOUS PORT N/A 1/11/2021    Procedure: [id number];  Surgeon: Brandon Wagner MD;  Location: [facility] OR;  Service: General;  Laterality: N/A;    INSERTION OF TUNNELED CENTRAL VENOUS CATHETER (CVC) WITH SUBCUTANEOUS PORT Right 9/1/2022    Procedure: [id number];  Surgeon: Brandon Wagner MD;  Location: [facility] OR;  Service: General;  Laterality: Right;  Wants rt side placement    MEDIPORT REMOVAL Left 4/19/2021    Procedure: REMOVAL, CATHETER, CENTRAL VENOUS, TUNNELED, WITH PORT;  Surgeon: Brandon Wagner MD;  Location: [facility] OR;  Service: General;  Laterality: Left;    REMOVAL-STENT Right 3/7/2023    Procedure: REMOVAL-STENT;  Surgeon: Carin Kwon Jr., MD;  Location: [facility] OR;  Service: Urology;  Laterality: Right;    ROBOT-ASSISTED COLECTOMY N/A 11/9/2020    Procedure: ROBOTIC COLECTOMY low anterior resection, possible open;  Surgeon: Brandon Wagner MD;  Location: [facility] OR;  Service: General;  Laterality: N/A;  CONVERTED TO OPEN AT 1503    URETEROSCOPIC REMOVAL OF URETERIC CALCULUS Right 3/7/2023    Procedure: REMOVAL, CALCULUS, URETER, URETEROSCOPIC;  Surgeon: Carin Kwon Jr., MD;  Location: [facility] OR;  Service: Urology;  Laterality: Right;       Time Tracking:     PT Received On: 03/28/23  PT Start Time: 0855     PT Stop Time: 0908  PT Total Time (min): 13 min     Billable Minutes: Evaluation 13      03/28/2023

## 2023-03-28 NOTE — PLAN OF CARE
Problem: Physical Therapy  Goal: Physical Therapy Goal  Description: Goals to be met by: 4/3/23     Patient will increase functional independence with mobility by performin. Bed to chair transfer with [place] using No Assistive Device  2. Gait  x 250 feet with [place] using No Assistive Device.     Outcome: Ongoing, Progressing

## 2023-03-28 NOTE — NURSING
Discharge instructions and education reviewed with pt.  Telemetry box and leads removed.  Peripheral IV removed with catheter intact.  Pt to F/U with urology team for planned lithotripsy.  Pt escorted downstairs via wheelchair for discharge home.

## 2023-03-28 NOTE — TELEPHONE ENCOUNTER
Spoke with pt. She states that she is currently hospitalized for kidney stones and stents which will be taken out on 4/14. Dr Frank and NEENA Badillo notified for advise. Orders per NEENA Badillo to hold chemo 1 wk.      ----- Message from Ricky Rodriguez sent at 3/28/2023  9:17 AM CDT -----  Regarding: Return Call  Contact: Patient  Type:  Patient Returning Call    Who Called:Patient  Who Left Message for Patient:office staff  Does the patient know what this is regarding?:unknown  Would the patient rather a call back or a response via [facility]? call  Best Call Back Number:[phone]  Additional Information: Please call patient.

## 2023-03-28 NOTE — PT/OT/SLP EVAL
Occupational Therapy   Evaluation and Discharge Note    Name: Tessa Enriquez  MRN: [id number]  Admitting Diagnosis: Obstructive uropathy  Recent Surgery: Procedure(s) (LRB):  CYSTOSCOPY, WITH URETERAL STENT INSERTION (Bilateral)  CYSTOSCOPY, WITH RETROGRADE PYELOGRAM AND URETERAL STENT INSERTION (Bilateral) 2 Days Post-Op    Recommendations:     Discharge Recommendations: home  Discharge Equipment Recommendations: none  Barriers to discharge:       Assessment:     Tessa Enriquez is a 67 y.o. female with a medical diagnosis of Obstructive uropathy. At this time, patient demonstrates ability to perform ADL's with SBA as a precaution secondary to c/o pain. OT provided instruction in home safety with ADL's/IADL's including review of home set up and DME/AE. Pt verbalized understanding. Pt participated in OT Evaluation, but did not feel that further OT was indicated.    Plan:     During this hospitalization, patient does not require further acute OT services.  Please re-consult if situation changes.    Plan of Care Reviewed with: patient    Subjective     Chief Complaint: Pain  Patient/Family Comments/goals: To get better    Occupational Profile:  Living Environment: Pt lives with roommate in mobile home with 6 UMA with bilateral handrails. Pt has a step in shower and standard toilet.  Previous level of function: Indepedent  Equipment Used at home: none  Assistance upon Discharge: Family nearby    Pain/Comfort:  Pain Rating 1:  (pt reports pain is a 12. Nurse Mckeon advised)  Pain Addressed 1: Nurse notified  Pain Rating Post-Intervention 1: other (see comments) (Pt reports pain remains a 12)    Patients cultural, spiritual, Spiritism conflicts given the current situation:      Objective:     Communicated with: Nurse Mckeon prior to session.  Patient found HOB elevated with bed alarm, peripheral IV, telemetry upon OT entry to room.    General Precautions: Standard, fall  Orthopedic Precautions: N/A  Braces:  N/A  Respiratory Status: Room air     Occupational Performance:    Bed Mobility:    Patient completed Rolling/Turning to Left with  independence  Patient completed Rolling/Turning to Right with independence  Patient completed Scooting/Bridging with independence  Patient completed Supine to Sit with stand by assistance  Patient completed Sit to Supine with stand by assistance    Functional Mobility/Transfers:  Patient completed Sit <> Stand Transfer with stand by assistance  with  no assistive device   Functional Mobility: Pt was able to ambulate in room with no AD and SBA as a precaution secondary to c/o pain.    Activities of Daily Living:  Feeding:  independence    Grooming: independence    Bathing: supervision and stand by assistance as a precaution secondary to c/o pain  Upper Body Dressing: independence    Lower Body Dressing: stand by assistance as a precaution in standing secondary to c/o pain  Toileting: stand by assistance as a precaution for activities in standing secondary to c/o pain.    Cognitive/Visual Perceptual:  Pt alert and oriented    Physical Exam:  Upper Extremity Strength:    -       Right Upper Extremity: WFL  -       Left Upper Extremity: WFL    AMPAC 6 Click ADL:  AMPAC Total Score: 24    Treatment & Education:  OT provided education in role of OT. Pt verbalized understanding, participated in OT Eval, but ultimately did not feel that further OT was indicated.  OT provided instruction in home safety with ADL's/IADL's including review of home set up and DME/AE. Pt verbalized understanding.  OT provided education in calling for assist. Pt verbalized understanding.    Patient left HOB elevated with all lines intact, call button in reach, bed alarm on, and Nurse Linda notified    GOALS:   Multidisciplinary Problems       Occupational Therapy Goals       Not on file                    History:     Past Medical History:   Diagnosis Date    Acute hypoxemic respiratory failure 07/09/2018    Arthritis      Bilateral kidney stones 3/27/2023    Cancer     colon    COPD (chronic obstructive pulmonary disease)     History of home oxygen therapy     ONLY PRN AND HASN'T USED IT IN A YEAR    Hyperlipidemia     Thyroid disease          Past Surgical History:   Procedure Laterality Date    CHOLECYSTECTOMY      COLECTOMY N/A 11/9/2020    Procedure: COLECTOMY;  Surgeon: Brandon Wagner MD;  Location: [facility] OR;  Service: General;  Laterality: N/A;  LOWER- ANTERIOR    COLONOSCOPY N/A 10/23/2020    Procedure: COLONOSCOPY;  Surgeon: Jolynn Ayala MD;  Location: [facility] ENDO;  Service: Endoscopy;  Laterality: N/A;    COLONOSCOPY N/A 8/6/2021    Procedure: COLONOSCOPY;  Surgeon: Jolynn Ayala MD;  Location: [facility] ENDO;  Service: Endoscopy;  Laterality: N/A;    COLONOSCOPY N/A 7/7/2022    Procedure: COLONOSCOPY;  Surgeon: Jolynn yAala MD;  Location: [facility] ENDO;  Service: Endoscopy;  Laterality: N/A;    CYSTOSCOPY W/ URETERAL STENT PLACEMENT Right 2/22/2023    Procedure: CYSTOSCOPY, WITH URETERAL STENT INSERTION;  Surgeon: Carin Kwon Jr., MD;  Location: [facility] OR;  Service: Urology;  Laterality: Right;    CYSTOSCOPY W/ URETERAL STENT PLACEMENT Bilateral 3/26/2023    Procedure: CYSTOSCOPY, WITH URETERAL STENT INSERTION;  Surgeon: Sabine Barber MD;  Location: [facility] OR;  Service: Urology;  Laterality: Bilateral;    CYSTOSCOPY WITH URETEROSCOPY, RETROGRADE PYELOGRAPHY, AND INSERTION OF STENT Bilateral 3/26/2023    Procedure: CYSTOSCOPY, WITH RETROGRADE PYELOGRAM AND URETERAL STENT INSERTION;  Surgeon: Sabine Barber MD;  Location: [facility] OR;  Service: Urology;  Laterality: Bilateral;    CYSTOURETEROSCOPY WITH RETROGRADE PYELOGRAPHY AND INSERTION OF STENT INTO URETER Right 3/7/2023    Procedure: CYSTOURETEROSCOPY, WITH RETROGRADE PYELOGRAM AND URETERAL STENT INSERTION;  Surgeon: Carin Kwon Jr., MD;  Location: [facility] OR;  Service: Urology;  Laterality: Right;    ESOPHAGEAL DILATION N/A 6/29/2021    Procedure:  DILATION, ESOPHAGUS;  Surgeon: Sourav Baires III, MD;  Location: [facility] ENDO;  Service: Endoscopy;  Laterality: N/A;    ESOPHAGOGASTRODUODENOSCOPY  06/29/2021    Dilation to 57 Fr    ESOPHAGOGASTRODUODENOSCOPY N/A 6/29/2021    Procedure: EGD (ESOPHAGOGASTRODUODENOSCOPY);  Surgeon: Sourav Baires III, MD;  Location: [facility] ENDO;  Service: Endoscopy;  Laterality: N/A;    FLEXIBLE SIGMOIDOSCOPY N/A 8/1/2022    Procedure: SIGMOIDOSCOPY, FLEXIBLE;  Surgeon: Brandon Wagner MD;  Location: [facility] ENDO;  Service: Endoscopy;  Laterality: N/A;    HYSTERECTOMY      INSERTION OF TUNNELED CENTRAL VENOUS CATHETER (CVC) WITH SUBCUTANEOUS PORT N/A 1/11/2021    Procedure: [id number];  Surgeon: Brandon Wagner MD;  Location: [facility] OR;  Service: General;  Laterality: N/A;    INSERTION OF TUNNELED CENTRAL VENOUS CATHETER (CVC) WITH SUBCUTANEOUS PORT Right 9/1/2022    Procedure: [id number];  Surgeon: Brandon Wagner MD;  Location: [facility] OR;  Service: General;  Laterality: Right;  Wants rt side placement    MEDIPORT REMOVAL Left 4/19/2021    Procedure: REMOVAL, CATHETER, CENTRAL VENOUS, TUNNELED, WITH PORT;  Surgeon: Brandon Wagner MD;  Location: [facility] OR;  Service: General;  Laterality: Left;    REMOVAL-STENT Right 3/7/2023    Procedure: REMOVAL-STENT;  Surgeon: Carin Kwon Jr., MD;  Location: [facility] OR;  Service: Urology;  Laterality: Right;    ROBOT-ASSISTED COLECTOMY N/A 11/9/2020    Procedure: ROBOTIC COLECTOMY low anterior resection, possible open;  Surgeon: Brandon Wagner MD;  Location: [facility] OR;  Service: General;  Laterality: N/A;  CONVERTED TO OPEN AT 1503    URETEROSCOPIC REMOVAL OF URETERIC CALCULUS Right 3/7/2023    Procedure: REMOVAL, CALCULUS, URETER, URETEROSCOPIC;  Surgeon: Carin Kwon Jr., MD;  Location: [facility] OR;  Service: Urology;  Laterality: Right;       Time Tracking:     OT Date of Treatment: 03/28/23  OT Start Time: 0949  OT Stop Time: 0957  OT Total Time (min): 8 min    Billable  Minutes:Evaluation 8    3/28/2023

## 2023-03-28 NOTE — PLAN OF CARE
Problem: Adult Inpatient Plan of Care  Goal: Patient-Specific Goal (Individualized)  Outcome: Ongoing, Progressing  Goal: Absence of Hospital-Acquired Illness or Injury  Outcome: Ongoing, Progressing  Goal: Optimal Comfort and Wellbeing  Outcome: Ongoing, Progressing  Goal: Readiness for Transition of Care  Outcome: Ongoing, Progressing     Problem: Infection  Goal: Absence of Infection Signs and Symptoms  Outcome: Ongoing, Progressing

## 2023-03-28 NOTE — ASSESSMENT & PLAN NOTE
Body mass index is 35.95 kg/m². Morbid obesity complicates all aspects of disease management from diagnostic modalities to treatment.

## 2023-03-28 NOTE — PLAN OF CARE
Problem: Adult Inpatient Plan of Care  Goal: Plan of Care Review  Outcome: Met  Goal: Patient-Specific Goal (Individualized)  Outcome: Met  Goal: Absence of Hospital-Acquired Illness or Injury  Outcome: Met  Goal: Optimal Comfort and Wellbeing  Outcome: Met  Goal: Readiness for Transition of Care  Outcome: Met     Problem: Infection  Goal: Absence of Infection Signs and Symptoms  Outcome: Met     Problem: Impaired Wound Healing  Goal: Optimal Wound Healing  Outcome: Met

## 2023-03-28 NOTE — DISCHARGE SUMMARY
"[facility] Medicine  Discharge Summary      Patient Name: Tessa Enriquez  MRN: [id number]  TAE: [id number]  Patient Class: IP- Inpatient  Admission Date: 3/25/2023  Hospital Length of Stay: 3 days  Discharge Date and Time: No discharge date for patient encounter.  Attending Physician: Nomi Casas MD   Discharging Provider: Nomi Casas MD  Primary Care Provider: Karma Mcdermott MD    Primary Care Team: Networked reference to record PCT     HPI:   Ms. Enriquez is a 67 year old female with a history of COPD, HLD, hypothyroidism, and colon ca currently on FOLFIRI with panitumumab with last dose 3/10 who presents today with complaints of left sided flank pain beginning last week. It is severe. It is associated with nausea and fatigue. She denies fever, chills, V/D, chest pain, or SOB. She had a right ureteroscopy with basket extraction of two stones. Mild right hydronephrosis on retrograde pyelogram. 6 x 22 cm JJ stent  Stent exchanged with strings with Dr. Kwon on 3/7/23. She was doing well until the left side started causing pain. She tried her home pain medication, but this did not work prompting her presentation. In the ED, urine is significant for microscopic hematuria, bacteruria, and pyuria. Potassium is noted to b 2.9, WBC 17K, and CT abd/pelvis revealed: Left perinephric/periureteric stranding, hydronephrosis, and prominence of the ureter extending to a 5  x 6 x 6 mm stone near the left ureteropelvic junction. Additional 2 mm left calyceal stone. A 3 mm right calyceal stone with additional smaller one. Right-sided hydronephrosis and hydroureter. Right hydronephrosis and hydroureter may be related to a distal ureter stone measuring 4 x 6 x 7 mm." This was discussed with Dr. Barber who plans for surgical intervention tomorrow. Hospital medicine is consulted for admission for further work up and management.       Procedure(s) (LRB):  CYSTOSCOPY, WITH URETERAL STENT " INSERTION (Bilateral)  CYSTOSCOPY, WITH RETROGRADE PYELOGRAM AND URETERAL STENT INSERTION (Bilateral)      Hospital Course:   Tessa Enriquez is a 67 year old female with a past medical history of colon cancer undergoing chemotherapy and followed by Dr. Frank, COPD, hypothyroidism, and obesity who presented with bilateral ureteral kidney stone obstruction with possible UTI. The patient was seen by Dr. Kwon in the past where she had basket extraction of two right sided stones with stenting and subsequent exchange 3/7. Urology performed RPG 3/26 and placed two bilateral ureteral stents without complication. She was on Cipro IV during her course. Urine culture showed multiple organisms. Her pain was well controlled. Her Hgb remained stable. She was to be discharged 3/27/2023, however the patient felt uncomfortable going home, threatening to file a lawsuit against this provider if she were to happen at home. She was monitored another day and was discharged 3/28/2023. She will follow up with Urology and Hematology/Oncology in the outpatient setting.       Goals of Care Treatment Preferences:  Code Status: Full Code      Consults:   Consults (From admission, onward)        Status Ordering Provider     Inpatient consult to Registered Dietitian/Nutritionist  Once        Provider:  (Not yet assigned)    Completed LAMAR SALMERON     Inpatient consult to Urology  Once        Provider:  Sabine Barber MD    Completed GURJIT HUTSON          Pulmonary  COPD (chronic obstructive pulmonary disease)  -PRN albuterol and ipratropium    Renal/  Bilateral kidney stones  -S/p bilateral ureteral stenting 3/27  -Follow up with Dr. Kwon      Hypokalemia  -Scheduled and PRN repletion      UTI (urinary tract infection)  Complicated. Multiple organisms on urine culture.  -Cipro for one week      Oncology  Anemia  Stable.  -Trend Hgb with CBC      Malignant neoplasm of colon  Followed by Dr. Frank. Currently on FOLFIRI.  -Follow up  outpatient      Endocrine  Severe obesity (BMI 35.0-39.9) with comorbidity  Body mass index is 35.95 kg/m². Morbid obesity complicates all aspects of disease management from diagnostic modalities to treatment.      Acquired hypothyroidism  -Continue levothyroxine        Final Active Diagnoses:    Diagnosis Date Noted POA    UTI (urinary tract infection) [N39.0] 03/25/2023 Yes    Bilateral kidney stones [N20.0] 03/27/2023 Yes    Anemia [D64.9] 03/26/2023 Yes    Hypokalemia [E87.6] 03/24/2023 Yes    Severe obesity (BMI 35.0-39.9) with comorbidity [E66.01] 06/01/2022 Yes    Malignant neoplasm of colon [C18.9] 08/06/2021 Yes    Acquired hypothyroidism [E03.9] 10/15/2020 Yes    COPD (chronic obstructive pulmonary disease) [J44.9] 07/09/2018 Yes      Problems Resolved During this Admission:    Diagnosis Date Noted Date Resolved POA    PRINCIPAL PROBLEM:  Obstructive uropathy [N13.9] 03/25/2023 03/27/2023 Yes    Thrombocytopenia [D69.6] 03/26/2023 03/27/2023 Yes       Discharged Condition: stable    Disposition: Home or Self Care    Follow Up:   Follow-up Information     Harmony Badillo NP Follow up on 4/6/2023.    Specialty: Hematology and Oncology  Contact information:  [address]  [address]  [place] [address]  [phone]             Carin Kwon Jr, MD Follow up on 4/19/2023.    Specialty: Urology  Contact information:  [address] [place] [address]   [address]  [place] [address]  [phone]                       Patient Instructions:      Diet Adult Regular     Notify your health care provider if you experience any of the following:  increased confusion or weakness     Notify your health care provider if you experience any of the following:  persistent dizziness, light-headedness, or visual disturbances     Notify your health care provider if you experience any of the following:  severe persistent headache     Notify your health care provider if you experience any of the following:  difficulty breathing or  increased cough     Notify your health care provider if you experience any of the following:  severe uncontrolled pain     Notify your health care provider if you experience any of the following:  persistent nausea and vomiting or diarrhea     Notify your health care provider if you experience any of the following:  temperature >100.4     Notify your health care provider if you experience any of the following:  worsening rash     Activity as tolerated       Significant Diagnostic Studies: Labs: All labs within the past 24 hours have been reviewed    Pending Diagnostic Studies:     None         Medications:  Reconciled Home Medications:      Medication List      START taking these medications    ciprofloxacin HCl 500 MG tablet  Commonly known as: CIPRO  Take 1 tablet (500 mg total) by mouth 2 (two) times daily.        CHANGE how you take these medications    ferrous sulfate 325 (65 FE) MG EC tablet  Take 1 tablet (325 mg total) by mouth every other day.  What changed: when to take this     guaiFENesin 600 mg 12 hr tablet  Commonly known as: MUCINEX  Take 1 tablet (600 mg total) by mouth 2 (two) times daily as needed for Congestion.  What changed:   · when to take this  · reasons to take this        CONTINUE taking these medications    albuterol 2.5 mg /3 mL (0.083 %) nebulizer solution  Commonly known as: PROVENTIL  Take 3 mLs (2.5 mg total) by nebulization every 6 (six) hours as needed for Wheezing. Rescue     CMPD HYDROCORTISONE 2%, LIDOCAINE 3% SUPPOSITORY (RECTAL ROCKET)  Place 1 suppository rectally every evening. Insert 1 suppository 3/4 of the way onto rectum. Wet for easier application. Repeat for 3 nights.     cyanocobalamin 1000 MCG tablet  Commonly known as: VITAMIN B-12  Take 100 mcg by mouth once daily.     diphenoxylate-atropine 2.5-0.025 mg 2.5-0.025 mg per tablet  Commonly known as: LOMOTIL  Take by mouth.     HYDROcodone-acetaminophen 5-325 mg per tablet  Commonly known as: NORCO  TAKE ONE TABLET BY  MOUTH EVERY SIX HOURS AS NEEDED FOR PAIN     levothyroxine 125 MCG tablet  Commonly known as: SYNTHROID  Take 1 tablet (125 mcg total) by mouth before breakfast.     ondansetron 8 MG Tbdl  Commonly known as: ZOFRAN-ODT  Take 1 tablet (8 mg total) by mouth every 12 (twelve) hours as needed.     potassium chloride SA 20 MEQ tablet  Commonly known as: K-DUR,KLOR-CON  Take 1 tablet (20 mEq total) by mouth once daily.     prochlorperazine 10 MG tablet  Commonly known as: COMPAZINE  Take 1 tablet (10 mg total) by mouth every 6 (six) hours as needed.     triamcinolone acetonide 0.025% 0.025 % cream  Commonly known as: KENALOG  Apply topically once daily.            Indwelling Lines/Drains at time of discharge:   Lines/Drains/Airways     Drain  Duration                Ureteral Drain/Stent 03/26/23 1314 Left ureter 6 Fr. 1 day         Ureteral Drain/Stent 03/26/23 1315 Right ureter 6 Fr. 1 day                Time spent on the discharge of patient: 31 minutes         Nomi Casas MD  Department of Hospital Medicine  [facility]

## 2023-03-29 NOTE — PROGRESS NOTES
Outpatient Care Management  Plan of Care Follow Up Visit    Patient: Tessa Enriquez  MRN: [id number]  Date of Service: 03/29/2023  Completed by: Ginna Chapman RN  Referral Date: 02/23/2023    Reason for Visit   Patient presents with    Update Plan Of Care       Brief Summary: DX of Bilateral ureteral stones and stents placed. On 03/26/23.She has a urology appt on 04/11/23. Chemo on hold for one week.   Next Steps: Follow up next week

## 2023-04-03 NOTE — H&P (VIEW-ONLY)
Subjective:       Patient ID: Tessa Enriquez is a 67 y.o. female.    Chief Complaint:   rectal CA status post biopsy October 2020 robotic LAR November 9, 2020 by Dr. Wagner  Discontinued folfox due to s/e completed xrt  5/21     works at [employer]    pt went to GI wih rectal bleeding and abd pain scoped  Findings:        The digital rectal exam revealed a firm rectal mass palpated 5 to 6        cm from the anal verge. The mass was non-circumferential and located        predominantly at the posterior bowel wall.        There was evidence of a prior end-to-end colo-rectal anastomosis in        the distal rectum. This was patent and was characterized by        ulceration. The anastomosis was traversed. Mass in association with        the anastomosis has been previously biopsied and confirmed to be        adenocarcinoma. Anastomosis present at approximately 5 cm from anal    pt sent for discussion    MPRESSION:  1. Interval increased size of hypermetabolic presacral mass contiguous with hypermetabolic circumferential wall thickening of the rectum at the level of the anastomotic suture line, consistent with recurrent neoplasm and metastatic disease.  2. Interval development of enlarged, hypermetabolic right iliac chain lymph node consistent with metastatic disease.  3. No additional convincing evidence of metastatic disease.  4. Bilateral sacral ala insufficiency fractures, with corresponding FDG hypermetabolism.  5. Intense FDG uptake throughout the thyroid gland, unchanged and nonspecific.  Pt discussed wt [facility] started folfiri +avastin then from cycle 2 changed to FOLFIRI+ panitumumab  REVIEW OF SYSTEMS:   Admittd with severe side effects to chemotherapy including literally nonstop diarrhea blistering of anal and perineal region.  Tearing and watering eyes and abdominal cramping  Was unable to retain any fluids or solids   Much better since dc of irinotean   SKIN: +rash from vectibix, issues with nails,  sore in  nose , bt not too bad occ bleeding,nose. blotching    skin or abnormal bruising. Denies new moles or changes to existing moles.      BREASTS: There is no swelling around breasts or nipple discharge.    EYES: Denies eye pain, blurred vision, swelling, redness or discharge.      ENT AND MOUTH: Denies runny nose, stuffiness, sinus trouble or sores. Denies    nosebleeds. Denies, hoarseness, change in voice or swelling in front of the    neck.      CARDIOVASCULAR: Denies chest pain, discomfort or palpitations. Denies neck    swelling or episodes of passing out.      RESPIRATORY: Denies cough, sputum production, blood in sputum, and denies    shortness of breath.      GENITOURINARY: had renal stones and admitted has stents and hematuria    MUSCULOSKELETAL: Denies neck or back pain. Denies weakness in arms or legs,    joint problems or distended inflamed veins in legs. Denies swelling or abnormal  glands.      NEUROLOGICAL: Denies tingling, numbness, altered mentation changes to nerve    function in the face, weakness to one or both of the body. Denies changes to    gait and denies multiple falls or accidents.      PSYCHIATRIC: Denies nervousness, anxiety, hallucinations, depression, suicidal    ideation, trouble sleeping or changes in behavior noticed by family.          Oncology History:  T3 N1 rectal CA diagnosed October 2020 robotic LAR November 2020 December 2020 PET scan negative for metastatic disease   started FOLFOX 2/21 but stopped after 3 cycles and only wants surveillance   had colonoscopy 8/21 NAD  Past Medical History:   Diagnosis Date    Acute hypoxemic respiratory failure 07/09/2018    Arthritis     Bilateral kidney stones 3/27/2023    Cancer     colon    COPD (chronic obstructive pulmonary disease)     History of home oxygen therapy     ONLY PRN AND HASN'T USED IT IN A YEAR    Hyperlipidemia     Thyroid disease      Past Surgical History:   Procedure Laterality Date    CHOLECYSTECTOMY      COLECTOMY N/A  11/9/2020    Procedure: COLECTOMY;  Surgeon: Brandon Wagner MD;  Location: [facility] OR;  Service: General;  Laterality: N/A;  LOWER- ANTERIOR    COLONOSCOPY N/A 10/23/2020    Procedure: COLONOSCOPY;  Surgeon: Jolynn Ayala MD;  Location: [facility] ENDO;  Service: Endoscopy;  Laterality: N/A;    COLONOSCOPY N/A 8/6/2021    Procedure: COLONOSCOPY;  Surgeon: Jolynn Ayala MD;  Location: [facility] ENDO;  Service: Endoscopy;  Laterality: N/A;    COLONOSCOPY N/A 7/7/2022    Procedure: COLONOSCOPY;  Surgeon: Jolynn Ayala MD;  Location: [facility] ENDO;  Service: Endoscopy;  Laterality: N/A;    CYSTOSCOPY W/ URETERAL STENT PLACEMENT Right 2/22/2023    Procedure: CYSTOSCOPY, WITH URETERAL STENT INSERTION;  Surgeon: Carin Kwon Jr., MD;  Location: [facility] OR;  Service: Urology;  Laterality: Right;    CYSTOSCOPY W/ URETERAL STENT PLACEMENT Bilateral 3/26/2023    Procedure: CYSTOSCOPY, WITH URETERAL STENT INSERTION;  Surgeon: Sabine Barber MD;  Location: [facility] OR;  Service: Urology;  Laterality: Bilateral;    CYSTOSCOPY WITH URETEROSCOPY, RETROGRADE PYELOGRAPHY, AND INSERTION OF STENT Bilateral 3/26/2023    Procedure: CYSTOSCOPY, WITH RETROGRADE PYELOGRAM AND URETERAL STENT INSERTION;  Surgeon: Sabine Barber MD;  Location: [facility] OR;  Service: Urology;  Laterality: Bilateral;    CYSTOURETEROSCOPY WITH RETROGRADE PYELOGRAPHY AND INSERTION OF STENT INTO URETER Right 3/7/2023    Procedure: CYSTOURETEROSCOPY, WITH RETROGRADE PYELOGRAM AND URETERAL STENT INSERTION;  Surgeon: Carin Kwon Jr., MD;  Location: [facility] OR;  Service: Urology;  Laterality: Right;    ESOPHAGEAL DILATION N/A 6/29/2021    Procedure: DILATION, ESOPHAGUS;  Surgeon: Sourav Baires III, MD;  Location: [facility];  Service: Endoscopy;  Laterality: N/A;    ESOPHAGOGASTRODUODENOSCOPY  06/29/2021    Dilation to 57 Fr    ESOPHAGOGASTRODUODENOSCOPY N/A 6/29/2021    Procedure: EGD (ESOPHAGOGASTRODUODENOSCOPY);  Surgeon: Sourav Baires III, MD;  Location:  [facility] ENDO;  Service: Endoscopy;  Laterality: N/A;    FLEXIBLE SIGMOIDOSCOPY N/A 2022    Procedure: SIGMOIDOSCOPY, FLEXIBLE;  Surgeon: Brandon Wagner MD;  Location: [facility] ENDO;  Service: Endoscopy;  Laterality: N/A;    HYSTERECTOMY      INSERTION OF TUNNELED CENTRAL VENOUS CATHETER (CVC) WITH SUBCUTANEOUS PORT N/A 2021    Procedure: [id number]-[id number];  Surgeon: Brandon Wagner MD;  Location: [facility] OR;  Service: General;  Laterality: N/A;    INSERTION OF TUNNELED CENTRAL VENOUS CATHETER (CVC) WITH SUBCUTANEOUS PORT Right 2022    Procedure: [id number];  Surgeon: Brandon Wagner MD;  Location: [facility] OR;  Service: General;  Laterality: Right;  Wants rt side placement    MEDIPORT REMOVAL Left 2021    Procedure: REMOVAL, CATHETER, CENTRAL VENOUS, TUNNELED, WITH PORT;  Surgeon: Brandon Wagner MD;  Location: [facility] OR;  Service: General;  Laterality: Left;    REMOVAL-STENT Right 3/7/2023    Procedure: REMOVAL-STENT;  Surgeon: Carin Kwon Jr., MD;  Location: [facility] OR;  Service: Urology;  Laterality: Right;    ROBOT-ASSISTED COLECTOMY N/A 2020    Procedure: ROBOTIC COLECTOMY low anterior resection, possible open;  Surgeon: Brandon Wagner MD;  Location: [facility] OR;  Service: General;  Laterality: N/A;  CONVERTED TO OPEN AT 1503    URETEROSCOPIC REMOVAL OF URETERIC CALCULUS Right 3/7/2023    Procedure: REMOVAL, CALCULUS, URETER, URETEROSCOPIC;  Surgeon: Carin Kwon Jr., MD;  Location: [facility] OR;  Service: Urology;  Laterality: Right;     Family History   Problem Relation Age of Onset    COPD Mother     COPD Father     Prostate cancer Brother     Breast cancer Maternal Grandmother       Social History     Socioeconomic History    Marital status:    Tobacco Use    Smoking status: Former     Packs/day: 2.00     Years: 40.00     Pack years: 80.00     Types: Cigarettes     Quit date: 2017     Years since quittin.7    Smokeless tobacco: Never   Substance and Sexual Activity     Alcohol use: No    Drug use: No    Sexual activity: Not Currently     Partners: Male     Social Determinants of Health     Financial Resource Strain: Unknown    Difficulty of Paying Living Expenses: Patient refused   Food Insecurity: Unknown    Worried About Running Out of Food in the Last Year: Patient refused    Ran Out of Food in the Last Year: Patient refused   Transportation Needs: Unknown    Lack of Transportation (Medical): Patient refused    Lack of Transportation (Non-Medical): Patient refused   Physical Activity: Unknown    Days of Exercise per Week: Patient refused    Minutes of Exercise per Session: Patient refused   Stress: Unknown    Feeling of Stress : Patient refused   Social Connections: Unknown    Frequency of Communication with Friends and Family: Patient refused    Frequency of Social Gatherings with Friends and Family: Patient refused    Attends Mandaeism Services: Patient refused    Active Member of Clubs or Organizations: Patient refused    Attends Club or Organization Meetings: Patient refused    Marital Status: Patient refused   Housing Stability: Unknown    Unable to Pay for Housing in the Last Year: Patient refused    Unstable Housing in the Last Year: Patient refused     Review of patient's allergies indicates:   Allergen Reactions    Ativan [lorazepam] Hives and Itching    Flagyl [metronidazole] Itching and Rash    Pcn [penicillins] Hives, Itching and Rash       Current Outpatient Medications:     albuterol (PROVENTIL) 2.5 mg /3 mL (0.083 %) nebulizer solution, Take 3 mLs (2.5 mg total) by nebulization every 6 (six) hours as needed for Wheezing. Rescue, Disp: 120 each, Rfl: 1    ciprofloxacin HCl (CIPRO) 500 MG tablet, Take 1 tablet (500 mg total) by mouth 2 (two) times daily., Disp: 8 tablet, Rfl: 0    CMPD hydrocortisone 2%- LIDOcaine 3% suppository (RECTAL ROCKET), Place 1 suppository rectally every evening. Insert 1 suppository 3/4 of the way onto rectum. Wet for easier application.  Repeat for 3 nights., Disp: 30 suppository, Rfl: 2    cyanocobalamin (VITAMIN B-12) 1000 MCG tablet, Take 100 mcg by mouth once daily., Disp: , Rfl:     diphenoxylate-atropine 2.5-0.025 mg (LOMOTIL) 2.5-0.025 mg per tablet, Take by mouth., Disp: , Rfl:     ferrous sulfate 325 (65 FE) MG EC tablet, Take 1 tablet (325 mg total) by mouth every other day., Disp: , Rfl: 0    guaiFENesin (MUCINEX) 600 mg 12 hr tablet, Take 1 tablet (600 mg total) by mouth 2 (two) times daily as needed for Congestion., Disp: 20 tablet, Rfl: 0    levothyroxine (SYNTHROID) 125 MCG tablet, Take 1 tablet (125 mcg total) by mouth before breakfast., Disp: 30 tablet, Rfl: 11    ondansetron (ZOFRAN-ODT) 8 MG TbDL, Take 1 tablet (8 mg total) by mouth every 12 (twelve) hours as needed., Disp: 30 tablet, Rfl: 1    potassium chloride SA (K-DUR,KLOR-CON) 20 MEQ tablet, Take 1 tablet (20 mEq total) by mouth once daily., Disp: 30 tablet, Rfl: 11    prochlorperazine (COMPAZINE) 10 MG tablet, Take 1 tablet (10 mg total) by mouth every 6 (six) hours as needed., Disp: 30 tablet, Rfl: 1    triamcinolone acetonide 0.025% (KENALOG) 0.025 % cream, Apply topically once daily., Disp: 80 g, Rfl: 1    HYDROcodone-acetaminophen (NORCO) 5-325 mg per tablet, TAKE ONE TABLET BY MOUTH EVERY SIX HOURS AS NEEDED FOR PAIN, Disp: 90 tablet, Rfl: 0  No current facility-administered medications for this visit.    Facility-Administered Medications Ordered in Other Visits:     diphenhydrAMINE injection 12.5 mg, 12.5 mg, Intravenous, Once PRN, Monster Atwood MD    electrolyte-S (ISOLYTE), , Intravenous, Continuous, Monster Atwood MD, Last Rate: 25 mL/hr at 03/07/23 1138, Rate Change at 03/07/23 1138    fentaNYL 50 mcg/mL injection 25 mcg, 25 mcg, Intravenous, Q5 Min PRN, Monster Atwood MD, 25 mcg at 03/07/23 1207    HYDROmorphone (PF) injection 0.2 mg, 0.2 mg, Intravenous, Q5 Min PRN, Monster Atwood MD    lactated ringers infusion, 10 mL/hr, Intravenous,  Continuous, Monster Atwood MD    lactated ringers infusion, 500 mL, Intravenous, Once, Monster Atwood MD    LIDOcaine (PF) 10 mg/ml (1%) injection 10 mg, 1 mL, Intradermal, Once, Monster Atwood MD    LORazepam injection 0.25 mg, 0.25 mg, Intravenous, Once PRN, Monster Atwood MD    ondansetron injection 4 mg, 4 mg, Intravenous, Once PRN, Monster Atwood MD    prochlorperazine injection Soln 5 mg, 5 mg, Intravenous, Q30 Min PRN, Monster Atwood MD    sodium chloride 0.9% flush 3 mL, 3 mL, Intravenous, Q8H, Monster Atwood MD  PHYSICAL EXAM:     Wt Readings from Last 3 Encounters:   04/03/23 82.3 kg (181 lb 7 oz)   03/27/23 83.5 kg (184 lb 1.4 oz)   03/24/23 83.8 kg (184 lb 11.9 oz)     Temp Readings from Last 3 Encounters:   04/03/23 97 °F (36.1 °C) (Temporal)   03/28/23 97.6 °F (36.4 °C) (Oral)   03/24/23 96.8 °F (36 °C) (Temporal)     BP Readings from Last 3 Encounters:   04/03/23 124/74   03/28/23 123/68   03/24/23 138/72     Pulse Readings from Last 3 Encounters:   04/03/23 91   03/28/23 89   03/24/23 98     GENERAL: alert; in no apparent distress, , well-built well-nourished   HEAD: normocephalic, atraumatic.  EYES: pupils are equal, round, reactive to light and accommodation. Sclera anicteric. Conjunctiva not injected.   NOSE/THROAT: no nasal erythema or rhinorrhea. Oropharynx pink, without erythema, ulcerations or thrush.   NECK: no cervical motion rigidity; supple with no masses.  CHEST: clear to auscultation bilaterally; no wheezes, crackles or rubs. Patient is speaking comfortably on room air with normal work of breathing without using accessory muscles of respiration.  CARDIOVASCULAR: regular rate and rhythm; no murmurs, rubs or gallops.  ABDOMEN: soft, nontender, nondistended. Bowel sounds present.   MUSCULOSKELETAL: no tenderness to palpation along the spine or scapulae. Normal range of motion.  NEUROLOGIC: cranial nerves II-XII intact bilaterally. Strength 5/5 in  bilateral upper and lower extremities. No sensory deficits appreciated. Reflexes globally intact. No cerebellar signs. Normal gait.  LYMPHATIC: no cervical, supraclavicular or axillary adenopathy appreciated bilaterally.   EXTREMITIES: no clubbing, cyanosis, edema.  SKIN: no erythema, rashes, ulcerations noted  ECOG-0  Lab Results   Component Value Date    WBC 6.19 04/01/2023    HGB 12.1 04/01/2023    HCT 37.4 04/01/2023    MCV 94 04/01/2023     04/01/2023       BMP  Lab Results   Component Value Date     04/01/2023    K 3.9 04/01/2023     04/01/2023    CO2 24 04/01/2023    BUN 7 (L) 04/01/2023    CREATININE 0.8 04/01/2023    CALCIUM 8.7 04/01/2023    ANIONGAP 7 (L) 04/01/2023    ESTGFRAFRICA >60 05/02/2022    EGFRNONAA >60 05/02/2022      cea 7/14/21 1.8  November 9, 2020  .  Rectum, sigmoid colon and proximal donut (low anterior resection):   - Invasive adenocarcinoma,  invading through the muscularis propria into   pericolorectal tissue   - Pathologic stage pT3, pN1a (see synoptic report)   - Resection margins negative for dysplasia or malignancy (proximal, distal   and radial margins), within 1 mm to radial margin   - One tumor deposit   - Adenocarcinoma involve one out of totally forty seven lymph nodes (1/46 in   part 1, totally 1/47, also see part 2)   - Background colon with diverticulosis associated with hyperpigmented   macrophages, benign   - Proximal donut with benign colon, no dysplasia or malignancy   - Tattoo identified   2.  Colon, distal donut (excision):   - Benign colon, negative for dysplasia or malignancy   - One lymph node, negative for metastatic carcinoma (0/1)   COLON AND RECTUM: Resection, Including Transanal Disk Excision of Rectal   Neoplasms   Procedure  Low anterior resection   Tumor Site  Rectum    Tumor Location: Straddles the anterior peritoneal reflection   Tumor Size   Greatest dimension (centimeters): 7.0 cm   + Additional dimensions (centimeters): 4.7 x 2.2 cm    Macroscopic Tumor Perforation  Not identified   Macroscopic Intactness of Mesorectum  Near complete   Histologic Type  Adenocarcinoma   Histologic Grade   G2: Moderately differentiated   Tumor Extension  Tumor invades through the muscularis propria into   pericolorectal tissue     Impression:  November 7, 2020 CT abdomen pelvis     1. Rectosigmoid junction mass, unchanged from prior.  No definite evidence of intraluminal contrast extravasation to suggest active gastrointestinal hemorrhage, however please note that arterial phase CT is more sensitive.  2. Colonic diverticulosis without acute diverticulitis.  3. Minimal pneumobilia, unchanged      IMPRESSION:  CT chest lung screen October 2020     1.  3 mm pulmonary nodule identified in the lateral segment of the  right lower lobe.  2.  Focus of groundglass opacity measuring 11 mm in diameter in the  superior segment of the right lower lobe. Short-term follow-up for  this lesion is recommended.  3.  Mild emphysematous lung disease.     LUNG-RADS CATEGORY 3: PROBABLY BENIGN FINDINGS     RECOMMENDATION: Short-term imaging follow-up with 6 month LDCT.    IMPRESSION: 7/21  1. Nonspecific hypermetabolic soft tissue density focus cranial to the rectosigmoid colon suture line, perhaps reflecting postsurgical and or posttreatment or postinflammatory changes. Attention to this area at follow-up imaging is recommended to help differentiate from residual or recurrent neoplasm.  2. Improving postoperative changes in the lower anterior abdominal wall, with associated multifocal FDG hypermetabolism which is most characteristic of postoperative etiology.  3. Otherwise no convincing evidence of recurrent neoplasm or metastatic disease.IMPRESSION:  1. Interval increased size of hypermetabolic presacral mass contiguous with hypermetabolic circumferential wall thickening of the rectum at the level of the anastomotic suture line, consistent with recurrent neoplasm and metastatic  disease.  2. Interval development of enlarged, hypermetabolic right iliac chain lymph node consistent with metastatic disease.  3. No additional convincing evidence of metastatic disease.  4. Bilateral sacral ala insufficiency fractures, with corresponding FDG hypermetabolism.  5. Intense FDG uptake throughout the thyroid gland, unchanged and nonspecific.      Patient Active Problem List   Diagnosis    COPD (chronic obstructive pulmonary disease)    Pure hypercholesterolemia    Acquired hypothyroidism    Rectal bleeding    Rectal cancer    Malignant neoplasm of colon    B12 deficiency    FENG (iron deficiency anemia)    Chronic diarrhea    Severe obesity (BMI 35.0-39.9) with comorbidity    Secondary and unspecified malignant neoplasm of intra-abdominal lymph nodes    Dehydration    Diarrhea    Chemotherapy-induced neutropenia    Chemotherapy-induced diarrhea    Hypokalemia    UTI (urinary tract infection)    Anemia    Bilateral kidney stones   IMPRESSION:pet 12/2020  1. Postoperative changes of rectal colonic resection, with adjacent  FDG avid presacral soft tissue stranding.  2. Adjacent punctate extraluminal foci of free air suggesting a tiny  tract extending towards the adjacent small bowel (possibly  postoperative but suggesting a tiny enterocolonic fistula)  3. Deep subcutaneous soft tissue structure along the ventral abdomen  suggesting a postoperative hematoma/seroma, and less likely abscess.  4. No findings of additional sites of FDG avid disease.      IMPRESSION: 7/21  1. Nonspecific hypermetabolic soft tissue density focus cranial to the rectosigmoid colon suture line, perhaps reflecting postsurgical and or posttreatment or postinflammatory changes. Attention to this area at follow-up imaging is recommended to help differentiate from residual or recurrent neoplasm.  2. Improving postoperative changes in the lower anterior abdominal wall, with associated multifocal FDG hypermetabolism which is most  characteristic of postoperative etiology.  3. Otherwise no convincing evidence of recurrent neoplasm or metastatic disease      Impression:2/2/22   CT CAP  A single 4 mm soft tissue density nodule is noted in the right lower lobe.  No other intrapulmonary masses or nodules are seen.  Follow-up CT in 12 months per of this nodule is recommended.  Coronary artery calcification is noted.  No masses or adenopathy or seen.     Prior cholecystectomy.  Prior hysterectomy.  2.2 cm left ovarian cyst.  Small ventral hernia containing a loop of small bowel without CT evidence of obstruction    Impression:11/22     Presacral/pre coccygeal soft tissue density and fat stranding and circumferential wall thickening of segment of bowel down low in the pelvis at the surgical site are not significantly changed compared to the prior exam.  Prominent lymph node right iliac chain also does not appear significantly changed.  Findings also correspond as seen on prior PET-CT of 08/04/2022 and are suspicious for neoplasm  Assessment and Plan   Rectal ca: original dx 2020  Status post low anterior resection November 9, 2020 for rectal CA T3 N1a   tumor board convened and agreed on xrt that will follow  After completion of chemo  sequentially  therapy   Patient started FOLFOX.  Completed 4 out of 12 planned adjuvant cycles,  Discontinued  Chemo / port removed completed xrt 5/21  Pet:8/22, showing progression   Started  FOLFIRI + avastin   from second cycle changed to FOLFIRI panitumumab based on benefit   Sig diarrhea with CPT-11, in spite of added lomotil to imodium  decreased dose of CPT-11  to 165 mg/m2 but patient is still hospitalized Scan shows no change 12/22   Discontinued cpt -11 and cont with 5fu and vectibix   Next scan 3-4/23   But pt has been admitted with renal stones, has bilateral stents, wants to wait till the stents out 4/11 and only then consider chemo see me 2nd week of aoripl with cbc,c mp, cea ct cap  Dc the bolus 5FU  FENG  :Injectafer  completed 8/22 good response  pt to add SL b12 to her twice a month b12 shots    Please ask for nikole   b12 def anemia, was taking injections q month level checked 10/21 sub therpautic 226 t INSURANCE WONT PAY FOR B12.ok to cont with SL only as levels have improved to 1500  iron deficiency  Pt to try oral for now, will recheck in 8/22 ( may help her chronic diarrhea)  hypocalcemia which seems chronic pt to supplement orally daily wnl this visit  Hypothyroidism, sees DR Mcdermott will adj meds  History of chronic obstructive pulmonary disease, dyslipidemia and hypothyroidism continue with PCP  Doesn't want to be covid vaccinated  Hypokalemia: pt has stopped taking her K pills due to s/e of diarrhea  Advance Care Planning     Date: 04/03/2023    Power of   Pt has submitted ACP documenst  Palliative care ref made

## 2023-04-03 NOTE — PROGRESS NOTES
Subjective:       Patient ID: Tessa Enriquez is a 67 y.o. female.    Chief Complaint:   rectal CA status post biopsy October 2020 robotic LAR November 9, 2020 by Dr. Wagner  Discontinued folfox due to s/e completed xrt  5/21     works at [employer]    pt went to GI wih rectal bleeding and abd pain scoped  Findings:        The digital rectal exam revealed a firm rectal mass palpated 5 to 6        cm from the anal verge. The mass was non-circumferential and located        predominantly at the posterior bowel wall.        There was evidence of a prior end-to-end colo-rectal anastomosis in        the distal rectum. This was patent and was characterized by        ulceration. The anastomosis was traversed. Mass in association with        the anastomosis has been previously biopsied and confirmed to be        adenocarcinoma. Anastomosis present at approximately 5 cm from anal    pt sent for discussion    MPRESSION:  1. Interval increased size of hypermetabolic presacral mass contiguous with hypermetabolic circumferential wall thickening of the rectum at the level of the anastomotic suture line, consistent with recurrent neoplasm and metastatic disease.  2. Interval development of enlarged, hypermetabolic right iliac chain lymph node consistent with metastatic disease.  3. No additional convincing evidence of metastatic disease.  4. Bilateral sacral ala insufficiency fractures, with corresponding FDG hypermetabolism.  5. Intense FDG uptake throughout the thyroid gland, unchanged and nonspecific.  Pt discussed wt [facility] started folfiri +avastin then from cycle 2 changed to FOLFIRI+ panitumumab  REVIEW OF SYSTEMS:   Admittd with severe side effects to chemotherapy including literally nonstop diarrhea blistering of anal and perineal region.  Tearing and watering eyes and abdominal cramping  Was unable to retain any fluids or solids   Much better since dc of irinotean   SKIN: +rash from vectibix, issues with nails,  sore in  nose , bt not too bad occ bleeding,nose. blotching    skin or abnormal bruising. Denies new moles or changes to existing moles.      BREASTS: There is no swelling around breasts or nipple discharge.    EYES: Denies eye pain, blurred vision, swelling, redness or discharge.      ENT AND MOUTH: Denies runny nose, stuffiness, sinus trouble or sores. Denies    nosebleeds. Denies, hoarseness, change in voice or swelling in front of the    neck.      CARDIOVASCULAR: Denies chest pain, discomfort or palpitations. Denies neck    swelling or episodes of passing out.      RESPIRATORY: Denies cough, sputum production, blood in sputum, and denies    shortness of breath.      GENITOURINARY: had renal stones and admitted has stents and hematuria    MUSCULOSKELETAL: Denies neck or back pain. Denies weakness in arms or legs,    joint problems or distended inflamed veins in legs. Denies swelling or abnormal  glands.      NEUROLOGICAL: Denies tingling, numbness, altered mentation changes to nerve    function in the face, weakness to one or both of the body. Denies changes to    gait and denies multiple falls or accidents.      PSYCHIATRIC: Denies nervousness, anxiety, hallucinations, depression, suicidal    ideation, trouble sleeping or changes in behavior noticed by family.          Oncology History:  T3 N1 rectal CA diagnosed October 2020 robotic LAR November 2020 December 2020 PET scan negative for metastatic disease   started FOLFOX 2/21 but stopped after 3 cycles and only wants surveillance   had colonoscopy 8/21 NAD  Past Medical History:   Diagnosis Date    Acute hypoxemic respiratory failure 07/09/2018    Arthritis     Bilateral kidney stones 3/27/2023    Cancer     colon    COPD (chronic obstructive pulmonary disease)     History of home oxygen therapy     ONLY PRN AND HASN'T USED IT IN A YEAR    Hyperlipidemia     Thyroid disease      Past Surgical History:   Procedure Laterality Date    CHOLECYSTECTOMY      COLECTOMY N/A  11/9/2020    Procedure: COLECTOMY;  Surgeon: Brandon Wagner MD;  Location: [facility] OR;  Service: General;  Laterality: N/A;  LOWER- ANTERIOR    COLONOSCOPY N/A 10/23/2020    Procedure: COLONOSCOPY;  Surgeon: Jolynn Ayala MD;  Location: [facility] ENDO;  Service: Endoscopy;  Laterality: N/A;    COLONOSCOPY N/A 8/6/2021    Procedure: COLONOSCOPY;  Surgeon: Jolynn Ayala MD;  Location: [facility] ENDO;  Service: Endoscopy;  Laterality: N/A;    COLONOSCOPY N/A 7/7/2022    Procedure: COLONOSCOPY;  Surgeon: Jolynn Ayala MD;  Location: [facility] ENDO;  Service: Endoscopy;  Laterality: N/A;    CYSTOSCOPY W/ URETERAL STENT PLACEMENT Right 2/22/2023    Procedure: CYSTOSCOPY, WITH URETERAL STENT INSERTION;  Surgeon: Carin Kwon Jr., MD;  Location: [facility] OR;  Service: Urology;  Laterality: Right;    CYSTOSCOPY W/ URETERAL STENT PLACEMENT Bilateral 3/26/2023    Procedure: CYSTOSCOPY, WITH URETERAL STENT INSERTION;  Surgeon: Sabine Barber MD;  Location: [facility] OR;  Service: Urology;  Laterality: Bilateral;    CYSTOSCOPY WITH URETEROSCOPY, RETROGRADE PYELOGRAPHY, AND INSERTION OF STENT Bilateral 3/26/2023    Procedure: CYSTOSCOPY, WITH RETROGRADE PYELOGRAM AND URETERAL STENT INSERTION;  Surgeon: Sabine Barber MD;  Location: [facility] OR;  Service: Urology;  Laterality: Bilateral;    CYSTOURETEROSCOPY WITH RETROGRADE PYELOGRAPHY AND INSERTION OF STENT INTO URETER Right 3/7/2023    Procedure: CYSTOURETEROSCOPY, WITH RETROGRADE PYELOGRAM AND URETERAL STENT INSERTION;  Surgeon: Carin Kwon Jr., MD;  Location: [facility] OR;  Service: Urology;  Laterality: Right;    ESOPHAGEAL DILATION N/A 6/29/2021    Procedure: DILATION, ESOPHAGUS;  Surgeon: Sourav Baires III, MD;  Location: [facility];  Service: Endoscopy;  Laterality: N/A;    ESOPHAGOGASTRODUODENOSCOPY  06/29/2021    Dilation to 57 Fr    ESOPHAGOGASTRODUODENOSCOPY N/A 6/29/2021    Procedure: EGD (ESOPHAGOGASTRODUODENOSCOPY);  Surgeon: Sourav Baires III, MD;  Location:  [facility] ENDO;  Service: Endoscopy;  Laterality: N/A;    FLEXIBLE SIGMOIDOSCOPY N/A 2022    Procedure: SIGMOIDOSCOPY, FLEXIBLE;  Surgeon: Brandon Wagner MD;  Location: [facility] ENDO;  Service: Endoscopy;  Laterality: N/A;    HYSTERECTOMY      INSERTION OF TUNNELED CENTRAL VENOUS CATHETER (CVC) WITH SUBCUTANEOUS PORT N/A 2021    Procedure: [id number];  Surgeon: Brandon Wagner MD;  Location: [facility] OR;  Service: General;  Laterality: N/A;    INSERTION OF TUNNELED CENTRAL VENOUS CATHETER (CVC) WITH SUBCUTANEOUS PORT Right 2022    Procedure: [id number];  Surgeon: Brandon Wagner MD;  Location: [facility] OR;  Service: General;  Laterality: Right;  Wants rt side placement    MEDIPORT REMOVAL Left 2021    Procedure: REMOVAL, CATHETER, CENTRAL VENOUS, TUNNELED, WITH PORT;  Surgeon: Brandon Wagner MD;  Location: [facility] OR;  Service: General;  Laterality: Left;    REMOVAL-STENT Right 3/7/2023    Procedure: REMOVAL-STENT;  Surgeon: Carin Kwon Jr., MD;  Location: [facility] OR;  Service: Urology;  Laterality: Right;    ROBOT-ASSISTED COLECTOMY N/A 2020    Procedure: ROBOTIC COLECTOMY low anterior resection, possible open;  Surgeon: Brandon Wagner MD;  Location: [facility] OR;  Service: General;  Laterality: N/A;  CONVERTED TO OPEN AT 1503    URETEROSCOPIC REMOVAL OF URETERIC CALCULUS Right 3/7/2023    Procedure: REMOVAL, CALCULUS, URETER, URETEROSCOPIC;  Surgeon: Carin Kwon Jr., MD;  Location: [facility] OR;  Service: Urology;  Laterality: Right;     Family History   Problem Relation Age of Onset    COPD Mother     COPD Father     Prostate cancer Brother     Breast cancer Maternal Grandmother       Social History     Socioeconomic History    Marital status:    Tobacco Use    Smoking status: Former     Packs/day: 2.00     Years: 40.00     Pack years: 80.00     Types: Cigarettes     Quit date: 2017     Years since quittin.7    Smokeless tobacco: Never   Substance and Sexual Activity     Alcohol use: No    Drug use: No    Sexual activity: Not Currently     Partners: Male     Social Determinants of Health     Financial Resource Strain: Unknown    Difficulty of Paying Living Expenses: Patient refused   Food Insecurity: Unknown    Worried About Running Out of Food in the Last Year: Patient refused    Ran Out of Food in the Last Year: Patient refused   Transportation Needs: Unknown    Lack of Transportation (Medical): Patient refused    Lack of Transportation (Non-Medical): Patient refused   Physical Activity: Unknown    Days of Exercise per Week: Patient refused    Minutes of Exercise per Session: Patient refused   Stress: Unknown    Feeling of Stress : Patient refused   Social Connections: Unknown    Frequency of Communication with Friends and Family: Patient refused    Frequency of Social Gatherings with Friends and Family: Patient refused    Attends Taoist Services: Patient refused    Active Member of Clubs or Organizations: Patient refused    Attends Club or Organization Meetings: Patient refused    Marital Status: Patient refused   Housing Stability: Unknown    Unable to Pay for Housing in the Last Year: Patient refused    Unstable Housing in the Last Year: Patient refused     Review of patient's allergies indicates:   Allergen Reactions    Ativan [lorazepam] Hives and Itching    Flagyl [metronidazole] Itching and Rash    Pcn [penicillins] Hives, Itching and Rash       Current Outpatient Medications:     albuterol (PROVENTIL) 2.5 mg /3 mL (0.083 %) nebulizer solution, Take 3 mLs (2.5 mg total) by nebulization every 6 (six) hours as needed for Wheezing. Rescue, Disp: 120 each, Rfl: 1    ciprofloxacin HCl (CIPRO) 500 MG tablet, Take 1 tablet (500 mg total) by mouth 2 (two) times daily., Disp: 8 tablet, Rfl: 0    CMPD hydrocortisone 2%- LIDOcaine 3% suppository (RECTAL ROCKET), Place 1 suppository rectally every evening. Insert 1 suppository 3/4 of the way onto rectum. Wet for easier application.  Repeat for 3 nights., Disp: 30 suppository, Rfl: 2    cyanocobalamin (VITAMIN B-12) 1000 MCG tablet, Take 100 mcg by mouth once daily., Disp: , Rfl:     diphenoxylate-atropine 2.5-0.025 mg (LOMOTIL) 2.5-0.025 mg per tablet, Take by mouth., Disp: , Rfl:     ferrous sulfate 325 (65 FE) MG EC tablet, Take 1 tablet (325 mg total) by mouth every other day., Disp: , Rfl: 0    guaiFENesin (MUCINEX) 600 mg 12 hr tablet, Take 1 tablet (600 mg total) by mouth 2 (two) times daily as needed for Congestion., Disp: 20 tablet, Rfl: 0    levothyroxine (SYNTHROID) 125 MCG tablet, Take 1 tablet (125 mcg total) by mouth before breakfast., Disp: 30 tablet, Rfl: 11    ondansetron (ZOFRAN-ODT) 8 MG TbDL, Take 1 tablet (8 mg total) by mouth every 12 (twelve) hours as needed., Disp: 30 tablet, Rfl: 1    potassium chloride SA (K-DUR,KLOR-CON) 20 MEQ tablet, Take 1 tablet (20 mEq total) by mouth once daily., Disp: 30 tablet, Rfl: 11    prochlorperazine (COMPAZINE) 10 MG tablet, Take 1 tablet (10 mg total) by mouth every 6 (six) hours as needed., Disp: 30 tablet, Rfl: 1    triamcinolone acetonide 0.025% (KENALOG) 0.025 % cream, Apply topically once daily., Disp: 80 g, Rfl: 1    HYDROcodone-acetaminophen (NORCO) 5-325 mg per tablet, TAKE ONE TABLET BY MOUTH EVERY SIX HOURS AS NEEDED FOR PAIN, Disp: 90 tablet, Rfl: 0  No current facility-administered medications for this visit.    Facility-Administered Medications Ordered in Other Visits:     diphenhydrAMINE injection 12.5 mg, 12.5 mg, Intravenous, Once PRN, Monster Atwood MD    electrolyte-S (ISOLYTE), , Intravenous, Continuous, Monster Atwood MD, Last Rate: 25 mL/hr at 03/07/23 1138, Rate Change at 03/07/23 1138    fentaNYL 50 mcg/mL injection 25 mcg, 25 mcg, Intravenous, Q5 Min PRN, Monster Atwood MD, 25 mcg at 03/07/23 1207    HYDROmorphone (PF) injection 0.2 mg, 0.2 mg, Intravenous, Q5 Min PRN, Monster Atwood MD    lactated ringers infusion, 10 mL/hr, Intravenous,  Continuous, Monster Atwood MD    lactated ringers infusion, 500 mL, Intravenous, Once, Monster Atwood MD    LIDOcaine (PF) 10 mg/ml (1%) injection 10 mg, 1 mL, Intradermal, Once, Monster Atwood MD    LORazepam injection 0.25 mg, 0.25 mg, Intravenous, Once PRN, Monster Atwood MD    ondansetron injection 4 mg, 4 mg, Intravenous, Once PRN, Monster Atwood MD    prochlorperazine injection Soln 5 mg, 5 mg, Intravenous, Q30 Min PRN, Monster Atwood MD    sodium chloride 0.9% flush 3 mL, 3 mL, Intravenous, Q8H, Monster Atwood MD  PHYSICAL EXAM:     Wt Readings from Last 3 Encounters:   04/03/23 82.3 kg (181 lb 7 oz)   03/27/23 83.5 kg (184 lb 1.4 oz)   03/24/23 83.8 kg (184 lb 11.9 oz)     Temp Readings from Last 3 Encounters:   04/03/23 97 °F (36.1 °C) (Temporal)   03/28/23 97.6 °F (36.4 °C) (Oral)   03/24/23 96.8 °F (36 °C) (Temporal)     BP Readings from Last 3 Encounters:   04/03/23 124/74   03/28/23 123/68   03/24/23 138/72     Pulse Readings from Last 3 Encounters:   04/03/23 91   03/28/23 89   03/24/23 98     GENERAL: alert; in no apparent distress, , well-built well-nourished   HEAD: normocephalic, atraumatic.  EYES: pupils are equal, round, reactive to light and accommodation. Sclera anicteric. Conjunctiva not injected.   NOSE/THROAT: no nasal erythema or rhinorrhea. Oropharynx pink, without erythema, ulcerations or thrush.   NECK: no cervical motion rigidity; supple with no masses.  CHEST: clear to auscultation bilaterally; no wheezes, crackles or rubs. Patient is speaking comfortably on room air with normal work of breathing without using accessory muscles of respiration.  CARDIOVASCULAR: regular rate and rhythm; no murmurs, rubs or gallops.  ABDOMEN: soft, nontender, nondistended. Bowel sounds present.   MUSCULOSKELETAL: no tenderness to palpation along the spine or scapulae. Normal range of motion.  NEUROLOGIC: cranial nerves II-XII intact bilaterally. Strength 5/5 in  bilateral upper and lower extremities. No sensory deficits appreciated. Reflexes globally intact. No cerebellar signs. Normal gait.  LYMPHATIC: no cervical, supraclavicular or axillary adenopathy appreciated bilaterally.   EXTREMITIES: no clubbing, cyanosis, edema.  SKIN: no erythema, rashes, ulcerations noted  ECOG-0  Lab Results   Component Value Date    WBC 6.19 04/01/2023    HGB 12.1 04/01/2023    HCT 37.4 04/01/2023    MCV 94 04/01/2023     04/01/2023       BMP  Lab Results   Component Value Date     04/01/2023    K 3.9 04/01/2023     04/01/2023    CO2 24 04/01/2023    BUN 7 (L) 04/01/2023    CREATININE 0.8 04/01/2023    CALCIUM 8.7 04/01/2023    ANIONGAP 7 (L) 04/01/2023    ESTGFRAFRICA >60 05/02/2022    EGFRNONAA >60 05/02/2022      cea 7/14/21 1.8  November 9, 2020  .  Rectum, sigmoid colon and proximal donut (low anterior resection):   - Invasive adenocarcinoma,  invading through the muscularis propria into   pericolorectal tissue   - Pathologic stage pT3, pN1a (see synoptic report)   - Resection margins negative for dysplasia or malignancy (proximal, distal   and radial margins), within 1 mm to radial margin   - One tumor deposit   - Adenocarcinoma involve one out of totally forty seven lymph nodes (1/46 in   part 1, totally 1/47, also see part 2)   - Background colon with diverticulosis associated with hyperpigmented   macrophages, benign   - Proximal donut with benign colon, no dysplasia or malignancy   - Tattoo identified   2.  Colon, distal donut (excision):   - Benign colon, negative for dysplasia or malignancy   - One lymph node, negative for metastatic carcinoma (0/1)   COLON AND RECTUM: Resection, Including Transanal Disk Excision of Rectal   Neoplasms   Procedure  Low anterior resection   Tumor Site  Rectum    Tumor Location: Straddles the anterior peritoneal reflection   Tumor Size   Greatest dimension (centimeters): 7.0 cm   + Additional dimensions (centimeters): 4.7 x 2.2 cm    Macroscopic Tumor Perforation  Not identified   Macroscopic Intactness of Mesorectum  Near complete   Histologic Type  Adenocarcinoma   Histologic Grade   G2: Moderately differentiated   Tumor Extension  Tumor invades through the muscularis propria into   pericolorectal tissue     Impression:  November 7, 2020 CT abdomen pelvis     1. Rectosigmoid junction mass, unchanged from prior.  No definite evidence of intraluminal contrast extravasation to suggest active gastrointestinal hemorrhage, however please note that arterial phase CT is more sensitive.  2. Colonic diverticulosis without acute diverticulitis.  3. Minimal pneumobilia, unchanged      IMPRESSION:  CT chest lung screen October 2020     1.  3 mm pulmonary nodule identified in the lateral segment of the  right lower lobe.  2.  Focus of groundglass opacity measuring 11 mm in diameter in the  superior segment of the right lower lobe. Short-term follow-up for  this lesion is recommended.  3.  Mild emphysematous lung disease.     LUNG-RADS CATEGORY 3: PROBABLY BENIGN FINDINGS     RECOMMENDATION: Short-term imaging follow-up with 6 month LDCT.    IMPRESSION: 7/21  1. Nonspecific hypermetabolic soft tissue density focus cranial to the rectosigmoid colon suture line, perhaps reflecting postsurgical and or posttreatment or postinflammatory changes. Attention to this area at follow-up imaging is recommended to help differentiate from residual or recurrent neoplasm.  2. Improving postoperative changes in the lower anterior abdominal wall, with associated multifocal FDG hypermetabolism which is most characteristic of postoperative etiology.  3. Otherwise no convincing evidence of recurrent neoplasm or metastatic disease.IMPRESSION:  1. Interval increased size of hypermetabolic presacral mass contiguous with hypermetabolic circumferential wall thickening of the rectum at the level of the anastomotic suture line, consistent with recurrent neoplasm and metastatic  disease.  2. Interval development of enlarged, hypermetabolic right iliac chain lymph node consistent with metastatic disease.  3. No additional convincing evidence of metastatic disease.  4. Bilateral sacral ala insufficiency fractures, with corresponding FDG hypermetabolism.  5. Intense FDG uptake throughout the thyroid gland, unchanged and nonspecific.      Patient Active Problem List   Diagnosis    COPD (chronic obstructive pulmonary disease)    Pure hypercholesterolemia    Acquired hypothyroidism    Rectal bleeding    Rectal cancer    Malignant neoplasm of colon    B12 deficiency    FENG (iron deficiency anemia)    Chronic diarrhea    Severe obesity (BMI 35.0-39.9) with comorbidity    Secondary and unspecified malignant neoplasm of intra-abdominal lymph nodes    Dehydration    Diarrhea    Chemotherapy-induced neutropenia    Chemotherapy-induced diarrhea    Hypokalemia    UTI (urinary tract infection)    Anemia    Bilateral kidney stones   IMPRESSION:pet 12/2020  1. Postoperative changes of rectal colonic resection, with adjacent  FDG avid presacral soft tissue stranding.  2. Adjacent punctate extraluminal foci of free air suggesting a tiny  tract extending towards the adjacent small bowel (possibly  postoperative but suggesting a tiny enterocolonic fistula)  3. Deep subcutaneous soft tissue structure along the ventral abdomen  suggesting a postoperative hematoma/seroma, and less likely abscess.  4. No findings of additional sites of FDG avid disease.      IMPRESSION: 7/21  1. Nonspecific hypermetabolic soft tissue density focus cranial to the rectosigmoid colon suture line, perhaps reflecting postsurgical and or posttreatment or postinflammatory changes. Attention to this area at follow-up imaging is recommended to help differentiate from residual or recurrent neoplasm.  2. Improving postoperative changes in the lower anterior abdominal wall, with associated multifocal FDG hypermetabolism which is most  characteristic of postoperative etiology.  3. Otherwise no convincing evidence of recurrent neoplasm or metastatic disease      Impression:2/2/22   CT CAP  A single 4 mm soft tissue density nodule is noted in the right lower lobe.  No other intrapulmonary masses or nodules are seen.  Follow-up CT in 12 months per of this nodule is recommended.  Coronary artery calcification is noted.  No masses or adenopathy or seen.     Prior cholecystectomy.  Prior hysterectomy.  2.2 cm left ovarian cyst.  Small ventral hernia containing a loop of small bowel without CT evidence of obstruction    Impression:11/22     Presacral/pre coccygeal soft tissue density and fat stranding and circumferential wall thickening of segment of bowel down low in the pelvis at the surgical site are not significantly changed compared to the prior exam.  Prominent lymph node right iliac chain also does not appear significantly changed.  Findings also correspond as seen on prior PET-CT of 08/04/2022 and are suspicious for neoplasm  Assessment and Plan   Rectal ca: original dx 2020  Status post low anterior resection November 9, 2020 for rectal CA T3 N1a   tumor board convened and agreed on xrt that will follow  After completion of chemo  sequentially  therapy   Patient started FOLFOX.  Completed 4 out of 12 planned adjuvant cycles,  Discontinued  Chemo / port removed completed xrt 5/21  Pet:8/22, showing progression   Started  FOLFIRI + avastin   from second cycle changed to FOLFIRI panitumumab based on benefit   Sig diarrhea with CPT-11, in spite of added lomotil to imodium  decreased dose of CPT-11  to 165 mg/m2 but patient is still hospitalized Scan shows no change 12/22   Discontinued cpt -11 and cont with 5fu and vectibix   Next scan 3-4/23   But pt has been admitted with renal stones, has bilateral stents, wants to wait till the stents out 4/11 and only then consider chemo see me 2nd week of aoripl with cbc,c mp, cea ct cap  Dc the bolus 5FU  FENG  :Injectafer  completed 8/22 good response  pt to add SL b12 to her twice a month b12 shots    Please ask for nikole   b12 def anemia, was taking injections q month level checked 10/21 sub therpautic 226 t INSURANCE WONT PAY FOR B12.ok to cont with SL only as levels have improved to 1500  iron deficiency  Pt to try oral for now, will recheck in 8/22 ( may help her chronic diarrhea)  hypocalcemia which seems chronic pt to supplement orally daily wnl this visit  Hypothyroidism, sees DR Mcdermott will adj meds  History of chronic obstructive pulmonary disease, dyslipidemia and hypothyroidism continue with PCP  Doesn't want to be covid vaccinated  Hypokalemia: pt has stopped taking her K pills due to s/e of diarrhea  Advance Care Planning     Date: 04/03/2023    Power of   Pt has submitted ACP documenst  Palliative care ref made

## 2023-04-03 NOTE — Clinical Note
Palliative care ref  Pt wants her stone attended to so delay yamilet till week of 17th and see me with cbc,c mp cea ct cap for clearance  Where is her guardant please bring to me

## 2023-04-05 NOTE — PROGRESS NOTES
Outpatient Care Management  Plan of Care Follow Up Visit    Patient: Tessa Enriquez  MRN: [id number]  Date of Service: 04/05/2023  Completed by: Ginna Chapman RN  Referral Date: 02/23/2023    Reason for Visit   Patient presents with    Update Plan Of Care       Brief Summary: see care plan   Next Steps: follow up

## 2023-04-06 NOTE — TELEPHONE ENCOUNTER
Spoke with Andreea at [facility] sendout lab. This nurse inquired of results for pharmacogenomics panel from 8/15/22. She is unsure, but has asked this nurse to email Domenic Caba (with [facility]). Email sent. Awaiting reply.

## 2023-04-11 NOTE — DISCHARGE INSTRUCTIONS
"Discharge Instructions: After Your Surgery/Procedure  Youve just had surgery. During surgery you were given medicine called anesthesia to keep you relaxed and free of pain. After surgery you may have some pain or nausea. This is common. Here are some tips for feeling better and getting well after surgery.     Stay on schedule with your medication.   Going home  Your doctor or nurse will show you how to take care of yourself when you go home. He or she will also answer your questions. Have an adult family member or friend drive you home.      For your safety we recommend these precaution for the first 24 hours after your procedure:  Do not drive or use heavy equipment.  Do not make important decisions or sign legal papers.  Do not drink alcohol.  Have someone stay with you, if needed. He or she can watch for problems and help keep you safe.  Your concentration, balance, coordination, and judgement may be impaired for many hours after anesthesia.  Use caution when ambulating or standing up.     You may feel weak and "washed out" after anesthesia and surgery.      Subtle residual effects of general anesthesia or sedation with regional / local anesthesia can last more than 24 hours.  Rest for the remainder of the day or longer if your Doctor/Surgeon has advised you to do so.  Although you may feel normal within the first 24 hours, your reflexes and mental ability may be impaired without you realizing it.  You may feel dizzy, lightheaded or sleepy for 24 hours or longer.      Be sure to go to all follow-up visits with your doctor. And rest after your surgery for as long as your doctor tells you to.  Coping with pain  If you have pain after surgery, pain medicine will help you feel better. Take it as told, before pain becomes severe. Also, ask your doctor or pharmacist about other ways to control pain. This might be with heat, ice, or relaxation. And follow any other instructions your surgeon or nurse gives you.  Tips " for taking pain medicine  To get the best relief possible, remember these points:  Pain medicines can upset your stomach. Taking them with a little food may help.  Most pain relievers taken by mouth need at least 20 to 30 minutes to start to work.  Taking medicine on a schedule can help you remember to take it. Try to time your medicine so that you can take it before starting an activity. This might be before you get dressed, go for a walk, or sit down for dinner.  Constipation is a common side effect of pain medicines. Call your doctor before taking any medicines such as laxatives or stool softeners to help ease constipation. Also ask if you should skip any foods. Drinking lots of fluids and eating foods such as fruits and vegetables that are high in fiber can also help. Remember, do not take laxatives unless your surgeon has prescribed them.  Drinking alcohol and taking pain medicine can cause dizziness and slow your breathing. It can even be deadly. Do not drink alcohol while taking pain medicine.  Pain medicine can make you react more slowly to things. Do not drive or run machinery while taking pain medicine.  Your health care provider may tell you to take acetaminophen to help ease your pain. Ask him or her how much you are supposed to take each day. Acetaminophen or other pain relievers may interact with your prescription medicines or other over-the-counter (OTC) drugs. Some prescription medicines have acetaminophen and other ingredients. Using both prescription and OTC acetaminophen for pain can cause you to overdose. Read the labels on your OTC medicines with care. This will help you to clearly know the list of ingredients, how much to take, and any warnings. It may also help you not take too much acetaminophen. If you have questions or do not understand the information, ask your pharmacist or health care provider to explain it to you before you take the OTC medicine.  Managing nausea  Some people have an  upset stomach after surgery. This is often because of anesthesia, pain, or pain medicine, or the stress of surgery. These tips will help you handle nausea and eat healthy foods as you get better. If you were on a special food plan before surgery, ask your doctor if you should follow it while you get better. These tips may help:  Do not push yourself to eat. Your body will tell you when to eat and how much.  Start off with clear liquids and soup. They are easier to digest.  Next try semi-solid foods, such as mashed potatoes, applesauce, and gelatin, as you feel ready.  Slowly move to solid foods. Dont eat fatty, rich, or spicy foods at first.  Do not force yourself to have 3 large meals a day. Instead eat smaller amounts more often.  Take pain medicines with a small amount of solid food, such as crackers or toast, to avoid nausea.     Call your surgeon if  You still have pain an hour after taking medicine. The medicine may not be strong enough.  You feel too sleepy, dizzy, or groggy. The medicine may be too strong.  You have side effects like nausea, vomiting, or skin changes, such as rash, itching, or hives.       If you have obstructive sleep apnea  You were given anesthesia medicine during surgery to keep you comfortable and free of pain. After surgery, you may have more apnea spells because of this medicine and other medicines you were given. The spells may last longer than usual.   At home:  Keep using the continuous positive airway pressure (CPAP) device when you sleep. Unless your health care provider tells you not to, use it when you sleep, day or night. CPAP is a common device used to treat obstructive sleep apnea.  Talk with your provider before taking any pain medicine, muscle relaxants, or sedatives. Your provider will tell you about the possible dangers of taking these medicines.  © 2345-9929 [employer]. [address] [place] [address], [place] [address]. All rights reserved. This information is  not intended as a substitute for professional medical care. Always follow your healthcare professional's instructions.           Using an Incentive Spirometer    An incentive spirometer is a device that helps you do deep breathing exercises. These exercises expand your lungs, aid in circulation, and help prevent pneumonia. Deep breathing exercises also help you breathe better and improve the function of your lungs by:  Keeping your lungs clear  Strengthening your breathing muscles  Helping prevent respiratory complications or problems  The incentive spirometer gives you a way to take an active part in recover. A nurse or therapist will teach you breathing exercises. To do these exercises, you will breathe in through your mouth and not your nose. The incentive spirometer only works correctly if you breathe in through your mouth.  Steps to clear lungs  Step 1. Exhale normally. Then, inhale normally.  Relax and breathe out.  Step 2. Place your lips tightly around the mouthpiece.  Make sure the device is upright and not tilted.  Step 3. Inhale as much air as you can through the mouthpiece (don't breath through your nose).  Inhale slowly and deeply.  Hold your breath long enough to keep the balls or disk raised for at least 3 to 5 seconds, or as instructed by your healthcare provider.  Some spirometers have an indicator to let you know that you are breathing in too fast. If the indicator goes off, breathe in more slowly.  Step 4. Repeat the exercise regularly.  Do this exercise every hour while you're awake, or as instructed by your healthcare provider.  If you were taught deep breathing and coughing exercises, do them regularly as instructed by your healthcare provider.            Post op instructions for prevention of DVT  What is deep vein thrombosis?  Deep vein thrombosis (DVT) is the medical term for blood clots in the deep veins of the leg.  These blood clots can be dangerous.  A DVT can block a blood vessel and  keep blood from getting where it needs to go.  Another problem is that the clot can travel to other parts of the body such as the lungs.  A clot that travels to the lungs is called a pulmonary embolus (PE) and can cause serious problems with breathing which can lead to death.  Am I at risk for DVT/PE?  If you are not very active, you are at risk of DVT.  Anyone confined to bed, sitting for long periods of time, recovering from surgery, etc. increases the risk of DVT.  Other risk factors are cancer diagnosis, certain medications, estrogen replacement in any form,older age, obesity, pregnancy, smoking, history of clotting disorders, and dehydration.  How will I know if I have a DVT?  Swelling in the lower leg  Pain  Warmth, redness, hardness or bulging of the vein  If you have any of these symptoms, call your doctors office right away.  Some people will not have any symptoms until the clot moves to the lungs.  What are the symptoms of a PE?  Panting, shortness of breath, or trouble breathing  Sharp, knife-like chest pain when you breathe  Coughing or coughing up blood  Rapid heartbeat  If you have any of these symptoms or get worse quickly, call 911 for emergency treatment.  How can I prevent a DVT?  Avoid long periods of inactivity and dont cross your legs--get up and walk around every hour or so.  Stay active--walking after surgery is highly encouraged.  This means you should get out of the house and walk in the neighborhood.  Going up and down stairs will not impair healing (unless advised against such activity by your doctor).    Drink plenty of noncaffeinated, nonalcoholic fluids each day to prevent dehydration.  Wear special support stockings, if they have been advised by your doctor.  If you travel, stop at least once an hour and walk around.  Avoid smoking (assistance with stopping is available through your healthcare provider)  Always notify your doctor if you are not able to follow the post operative  instructions that are given to you at the time of discharge.  It may be necessary to prescribe one of the medications available to prevent DVT.           We hope your stay was comfortable as you heal now, mend and rest.    For we have enjoyed taking care of you by giving your our best.    And as you get better, by regaining your health and strength;   We count it as a privilege to have served you and hope your time at [facility] was well spent.      Thank  You!!!

## 2023-04-11 NOTE — ANESTHESIA PROCEDURE NOTES
Intubation    Date/Time: 4/11/2023 11:35 AM  Performed by: Kp Slade CRNA  Authorized by: Joe Massey MD     Intubation:     Induction:  Intravenous    Intubated:  Postinduction    Mask Ventilation:  Not attempted    Attempts:  1    Attempted By:  CRNA    Difficult Airway Encountered?: No      Complications:  None    Airway Device:  Supraglottic airway/LMA    Airway Device Size:  3.0    Secured at:  The lips    Placement Verified By:  Capnometry    Complicating Factors:  None    Findings Post-Intubation:  BS equal bilateral and atraumatic/condition of teeth unchanged

## 2023-04-11 NOTE — PATIENT INSTRUCTIONS
VERY IMPORTANT - PLEASE READ    Your urologist is Dr. Carin Kwon  Office number: [phone] ([facility])  Address: [address] [place] [address],  (2nd office building on left); [address] (located on 2nd floor).     What Dr. Kwon did today: Cysto, bilateral ureteral stent placement, bilateral ureteroscopy     If you do not hear from us please call clinic to make a post-op appointment.   The following are specific instructions for you, so please read:    The ureter is the tube that drains the kidney (where urine is made). It connects the kidney to the bladder (where urine is stored).     A ureteral stent is a is a soft plastic tube with holes in tube that bypasses anything that obstructs (stone, tumor, scar tissue) the urine from flow from the kidney to the bladder. It is placed by going through the urethra and into the bladder. No incisions are made. Its temporarily inserted into a ureter to help drain urine into the bladder. One end goes in the kidney. The other end goes in the bladder. A coil on each end holds the stent in place. The stent cant be seen from outside the body.          You have a ureteral stent in your left and right kidney that was placed on 04/11/2023  -the stent can only remain for a maximum of 3 to 6 months. If the stent remains longer than this you can get recurrent urinary tract infections, the stent can have more stones form along it and urine will not be able to drain and you will lose all function of your kidney requiring a major surgery and a big incision to remove the kidney.       You have a string attached to the stent:  -the stent will be removed in clinic as a nurse visit.         If you do not receive a follow-up date or further instructions on what is to be done next about the stent, it is your responsibility to make sure that you have follow-up to have your stone or stent removed.     A stent CANNOT REMAIN indefinitely in the kidney. It should not remain if possible more than 3  to 6 months maximum (rarely 1 year if it was placed for cancer).     If you do not follow-up to have your stent removed then you can LOSE YOUR KIDNEY FUNCTION ON THAT SIDE, get RECURRENT URINARY TRACT INFECTIONS,and MORE STONES requiring SURGICAL OPEN removal of your kidney.    You can call our office ([facility] at [phone] and let them know a stent was placed and you need further instructions for follow-up). If there are issues with insurance we will work with you to help you arrange follow-up where it can be removed. Again,  A STENT CANNOT remain indefinitely.       A ureteral stent is left in place for many reasons:  -to keep the ureter open after a procedure as there will be much inflammation and if no stent is left you will experience the same pain as having the stone that caused the obstruction.   -to drain the kidney of infection.  -if the stone is up high, stuck, or you have an infection, the stent will stretch open the small ureter (the tube that drains the kidney) for a few weeks (usually 2 to 4 weeks) so that we can return and place instruments into this tube to break up and remove the stone.      Ureteral Stent Symptoms can include but are not limited to   - pain in the kidney or bladder with urination during or especially at the end of voiding.   - constant urge to urinate, frequency of urination, severe bladder spasms and severe pain the kidney, especially during urination.  - blood in the urine, especially with increased activity. This can last the entire time the stent is in, it can sometimes clear and return or you may never have any at all. I recommend increasing fluid intake to prevent large clots that may be difficult to urinate out.      When to go to ER:   -fever >101.5 or inability to urinate (no urination for >4 hours and bladder pain).   -If you go to the ER with pain, they may check to make sure the stent is in good position with an x-ray or ultrasound but unlikely to  do anything else.   -I will let you know when we will plan to have the stent either removed at the ambulatory surgical center as an outpatient procedure while you are awake, which is uncomfortable briefly, but not painful or you will remove it yourself by pulling the strings.

## 2023-04-11 NOTE — OP NOTE
[facility]  Operative/Discharge Note    Date: 04/11/2023    Pre-Op Diagnosis: Bilateral stones    Post-Op Diagnosis: Same    Procedure(s) Performed:   Cystourethroscopy  Right ureteroscopy with basket extraction of stone  Left ureteroscopy with laser lithotripsy and basket extraction of stone fragments  Bilateral retrograde pyelogram  Bilateral ureteral stent placement, 6 x 26 JJ stent    Flouro <1 hour    Specimen(s): Right and left kidney stones    Staff Surgeon: Carin Kwon Jr, MD    Anesthesia: General    Indications: Tessa Enriquez is a 67 y.o. female with bilateral stones s/p ureteral stents.     Findings:   Small fragment in right distal ureter and right lower pole basket extracted. Left proximal ureteral stone and non-obstructing left renal stones lasered and removed. Bilateral retrogrades and bilateral stents placed.     Estimated Blood Loss: Minimal    Drains: 6 x 26 cm JJ stent    Complications: None    Implants:   Implant Name Type Inv. Item Serial No.  Lot No. LRB No. Used Action   STENT URETERAL 26CM X 6FR - [id number]  STENT URETERAL 26CM X 6FR  [employer] [id number] Left 1 Implanted   STENT URETERAL 26CM X 6FR - [id number]  STENT URETERAL 26CM X 6FR  [employer] [id number] Right 1 Implanted       Procedure in detail:  After informed consent was obtained, the patient was brought the the cystoscopy suite and placed in the supine position.  SCDs were applied and working.  GETA was administered.  The patient was then placed in the dorsal lithotomy position and prepped and draped in the usual sterile fashion.      A rigid cystoscope in a 22 Fr sheath was introduced into the patient's urethra.  This passed easily.  The entire urethra was visualized which showed no strictures or masses.  Formal cystoscopy was performed which revealed no masses or lesions suspicious for malignancy, no bladder stones, no bladder diverticula, no trabeculations.  The UOs were visualized in the  normal anatomic position bilaterally and clear efflux was visualized.      A motion wire was passed up the rightUO and up into the kidney.  This passed easily and placement was confirmed using fluoro.  The cystoscope was removed keeping the guidewire in place.  The cystoscope was reintroduced and the stent was grasped using a grasper and removed.    An 8 Fr rigid ureteroscope was passed into the patient's bladder alongside the wire under direct vision.  It was then passed through the right UO alongside the wire.  A stone was encountered in the right distal ureter and extracted with an Ncircle basket. A wire was advanced up the ureteroscope and ureteroscope was removed leaving wires in place.  The flexible ureteroscope was advanced over the wire into the kidney under fluoroscopic guidance. A small stone fragment was seen and removed from the lower pole. Retrograde performed with mild hydronephrosis. Scope removed with wire left in place.     A motion wire was passed up the leftUO and up into the kidney.  This passed easily and placement was confirmed using fluoro.  The cystoscope was removed keeping the guidewire in place.  The cystoscope was reintroduced and the stent was grasped using a grasper and removed.    An 8 Fr rigid ureteroscope was passed into the patient's bladder alongside the wire under direct vision.  It was then passed through the left UO alongside the wire.  A wire was advanced up the ureteroscope and ureteroscope was removed leaving wires in place. A 28 cm ureteral access sheath was placed over the second wire under fluoroscopic guidance. A flexible ureteroscope was passed into the kidney. The flexible was able to be maneuvered proximally.  A stone was encountered in the proximal left ureter. A 275 micron laser fiber was passed through the ureteroscope.  The stone was fragmented using the laser.  The laser fiber was removed and an NCircle basket was introduced through the ureteroscope.  Stone  fragments were removed.    The ureteroscope was advanced into the kidney.  A retrograde was performed. The entire kidney was examined for residual stones and removed if encountered. The ureteroscope was removed keeping the guide wire in place.      A cystoscope was reinserted and the bladder was irrigated to remove the stone fragments.  The bladder was drained the cystoscope removed keeping the wire in place.  The wire was backloaded through the cystoscope using a pusher.    A 6x26 JJ ureteral stent with strings was passed over the wire and up into the renal pelvis using fluoro.  When the coil appeared to be in good position in the kidney and the radio-opaque marker of the pusher was at the inferior pubis, the wire was removed under continuous fluoro.  This was repeated on the right side. Good coils were seen in the kidney and the bladder using fluoro.      The patient tolerated the procedure well and was transferred to the recovery room in stable condition.      Disposition: Home    Discharge home today status post uncomplicated procedure as above  Diet - resume home diet  Follow up: RTC on 4/17/23 for nurse visit stent removal. RTC with me in 6 weeks with renal ultrasound prior.   Instructions: Home with Pyridium, Toradol, Macrobid. Continue Norco.   Meds:     Medication List        START taking these medications      ketorolac 10 mg tablet  Commonly known as: TORADOL  Take 1 tablet (10 mg total) by mouth every 6 (six) hours. for 5 days     nitrofurantoin (macrocrystal-monohydrate) 100 MG capsule  Commonly known as: MACROBID  Take 1 capsule (100 mg total) by mouth 2 (two) times daily. for 3 days     phenazopyridine 200 MG tablet  Commonly known as: PYRIDIUM  Take 1 tablet (200 mg total) by mouth 3 (three) times daily with meals. for 3 days            CONTINUE taking these medications      albuterol 2.5 mg /3 mL (0.083 %) nebulizer solution  Commonly known as: PROVENTIL  Take 3 mLs (2.5 mg total) by nebulization  every 6 (six) hours as needed for Wheezing. Rescue     CMPD HYDROCORTISONE 2%, LIDOCAINE 3% SUPPOSITORY (RECTAL ROCKET)  Place 1 suppository rectally every evening. Insert 1 suppository 3/4 of the way onto rectum. Wet for easier application. Repeat for 3 nights.     diphenoxylate-atropine 2.5-0.025 mg 2.5-0.025 mg per tablet  Commonly known as: LOMOTIL     ferrous sulfate 325 (65 FE) MG EC tablet  Take 1 tablet (325 mg total) by mouth every other day.     HYDROcodone-acetaminophen 5-325 mg per tablet  Commonly known as: NORCO  TAKE ONE TABLET BY MOUTH EVERY SIX HOURS AS NEEDED FOR PAIN     levothyroxine 125 MCG tablet  Commonly known as: SYNTHROID  Take 1 tablet (125 mcg total) by mouth before breakfast.     ondansetron 8 MG Tbdl  Commonly known as: ZOFRAN-ODT  Take 1 tablet (8 mg total) by mouth every 12 (twelve) hours as needed.     potassium chloride SA 20 MEQ tablet  Commonly known as: K-DUR,KLOR-CON  Take 1 tablet (20 mEq total) by mouth once daily.     prochlorperazine 10 MG tablet  Commonly known as: COMPAZINE  Take 1 tablet (10 mg total) by mouth every 6 (six) hours as needed.     triamcinolone acetonide 0.025% 0.025 % cream  Commonly known as: KENALOG  Apply topically once daily.            STOP taking these medications      ciprofloxacin HCl 500 MG tablet  Commonly known as: CIPRO     guaiFENesin 600 mg 12 hr tablet  Commonly known as: MUCINEX               Where to Get Your Medications        These medications were sent to [id number] - [place] [facility] - [address]  [address] [address]      Phone: [phone]   ketorolac 10 mg tablet  nitrofurantoin (macrocrystal-monohydrate) 100 MG capsule  phenazopyridine 200 MG tablet         Carin Kwon Jr, MD

## 2023-04-11 NOTE — ANESTHESIA PREPROCEDURE EVALUATION
04/11/2023  Tessa Enriquez is a 67 y.o., female.      Pre-op Assessment    I have reviewed the Patient Summary Reports.     I have reviewed the Nursing Notes. I have reviewed the NPO Status.   I have reviewed the Medications.     Review of Systems  Anesthesia Hx:  No problems with previous Anesthesia  Denies Family Hx of Anesthesia complications.   Denies Personal Hx of Anesthesia complications.   Social:  Former Smoker    Hematology/Oncology:        Current/Recent Cancer. (stage 4 colon CA now - on chemo and radiation now) -- Cancer in past history (rectal CA):  chemotherapy, radiation and surgery    Cardiovascular:   hyperlipidemia    Pulmonary:   COPD, moderate Pt is NOT on home O2 and infrequently uses inhalers   Renal/:   Chronic Renal Disease renal calculi    Musculoskeletal:   Arthritis     Endocrine:   Hypothyroidism  Obesity / BMI > 30, Morbid Obesity / BMI > 40      Physical Exam  General: Well nourished, Cooperative, Alert and Oriented    Airway:  Mallampati: II   Mouth Opening: Normal  TM Distance: Normal  Neck ROM: Normal ROM    Dental:  Dentures    Chest/Lungs:  Normal Respiratory Rate    Heart:  Rate: Normal  Rhythm: Regular Rhythm        Anesthesia Plan  Type of Anesthesia, risks & benefits discussed:    Anesthesia Type: Gen ETT, Gen Supraglottic Airway  Intra-op Monitoring Plan: Standard ASA Monitors  Post Op Pain Control Plan: multimodal analgesia  Induction:  IV  Airway Plan: Direct and Video, Post-Induction  Informed Consent: Informed consent signed with the Patient and all parties understand the risks and agree with anesthesia plan.  All questions answered.   ASA Score: 3    Ready For Surgery From Anesthesia Perspective.     .

## 2023-04-11 NOTE — TRANSFER OF CARE
Anesthesia Transfer of Care Note    Patient: Tessa Enriquez    Procedure(s) Performed: Procedure(s) (LRB):  REMOVAL, CALCULUS, URETER, URETEROSCOPIC (Bilateral)  CYSTOURETEROSCOPY, WITH HOLMIUM LASER LITHOTRIPSY OF URETERAL CALCULUS AND STENT INSERTION (Bilateral)  REMOVAL-STENT (Bilateral)  PYELOGRAM, RETROGRADE (Bilateral)    Patient location: PACU    Anesthesia Type: general    Transport from OR: Transported from OR on 2-3 L/min O2 by NC with adequate spontaneous ventilation    Post pain: adequate analgesia    Post assessment: no apparent anesthetic complications    Post vital signs: stable    Level of consciousness: sedated and responds to stimulation    Nausea/Vomiting: no nausea/vomiting    Complications: none    Transfer of care protocol was followed      Last vitals:   Visit Vitals  BP (!) 155/75 (BP Location: Left arm, Patient Position: Lying)   Pulse 86   Temp 36.7 °C (98 °F) (Oral)   Resp 20   Ht 5' (1.524 m)   Wt 82.3 kg (181 lb 7 oz)   SpO2 (!) 94%   Breastfeeding No   BMI 35.43 kg/m²

## 2023-04-11 NOTE — PLAN OF CARE
Pt awake, alert, oriented. Vital signs stable. Discharge instructions reviewed with pt and pt's daughter-in-law. Pt and pt's daughter-in-law verbalized understanding. IV removed, catheter intact. Dressing clean, dry, and intact. All belongings returned to patient. Pt transferred to car via wheelchair. Safety maintained.

## 2023-04-12 NOTE — TELEPHONE ENCOUNTER
Called and spoke to patient. Informed per MD if stent was pulled then okay to remove completely. Patient states that she will need to come to the office to remove as she is terrified to remove herself. Informed patient that nurse visit can be scheduled in the AM as patient states it would take 45 minutes to get here and no physician will be in office. Patient states that she will try to get her daughter to remove the stent when she gets home. NV scheduled in the AM for 8:45am if daughter is unable to remove.

## 2023-04-12 NOTE — TELEPHONE ENCOUNTER
Patient is 1 day post op kidney stone removal with bilateral ureteral stent placement. States that she accidentally pulled the string on her stent. She is having increased pain. The stent is not out completely but it is hanging further down. Advised per protocol to speak with her provider. Will route message to f/u with the patient.  Advised the patient to call back with any further questions or if symptoms worsen.        Best contact info is [phone]    Reason for Disposition   [1] Follow-up call from patient regarding patient's clinical status AND [2] information urgent    Protocols used: PCP Call - No Triage-A-AH

## 2023-04-12 NOTE — ANESTHESIA POSTPROCEDURE EVALUATION
Anesthesia Post Evaluation    Patient: Tessa Enriquez    Procedure(s) Performed: Procedure(s) (LRB):  REMOVAL, CALCULUS, URETER, URETEROSCOPIC (Bilateral)  CYSTOURETEROSCOPY, WITH HOLMIUM LASER LITHOTRIPSY OF URETERAL CALCULUS AND STENT INSERTION (Bilateral)  REMOVAL-STENT (Bilateral)  PYELOGRAM, RETROGRADE (Bilateral)    Final Anesthesia Type: general      Patient location during evaluation: PACU  Patient participation: Yes- Able to Participate  Level of consciousness: awake and alert  Post-procedure vital signs: reviewed and stable  Pain management: adequate  Airway patency: patent    PONV status at discharge: No PONV  Anesthetic complications: no      Cardiovascular status: blood pressure returned to baseline  Respiratory status: unassisted  Hydration status: euvolemic  Follow-up not needed.          Vitals Value Taken Time   /64 04/11/23 1430   Temp 36.5 °C (97.7 °F) 04/11/23 1355   Pulse 87 04/11/23 1430   Resp 16 04/11/23 1430   SpO2 95 % 04/11/23 1430         Event Time   Out of Recovery 14:00:00         Pain/Bautista Score: Pain Rating Prior to Med Admin: 6 (4/11/2023  1:40 PM)  Bautista Score: 10 (4/11/2023  1:50 PM)  Modified Bautista Score: 20 (4/11/2023  4:25 PM)

## 2023-04-12 NOTE — TELEPHONE ENCOUNTER
----- Message from Wesley Deshpande sent at 4/12/2023  2:59 PM CDT -----  Contact: self  Type: Needs Medical Advice  Who Called:  pt  Symptoms (please be specific): stint    Best Call Back Number: [phone]    Additional Information: pt states she may have pulled her stint out  Will go to er  Thank you

## 2023-04-12 NOTE — TELEPHONE ENCOUNTER
----- Message from Anson Yates sent at 4/12/2023  8:52 AM CDT -----  Type: Needs Medical Advice  Who Called:  Patient    Best Call Back Number: [phone]  Additional Information: Patient states that she would like a callback regarding questions about her antibiotics.

## 2023-04-12 NOTE — TELEPHONE ENCOUNTER
Patient returned call to office with daughter in law on the phone. Instructed daughter in law how to remove patient sent while on the phone. Daughter removed stent successfully with both ends in tact. Nurse visit canceled.

## 2023-04-12 NOTE — OR NURSING
4/12/23  1500  incoming call from patient who stated the tape holding the bilateral stents strings got caught in her panties and pulled on the stent strings, bleeding running down  her leg.  She is home alone and can't see her strings.  She says she is  urinating fine. She is afraid to pull them out though likely pulled out the stents.  Wants to go to office to have them check her, or present to ER to see if at least one stent is still functional. She is very anxious - she is calling Urology nurse.

## 2023-04-12 NOTE — TELEPHONE ENCOUNTER
Called and spoke to patient. Patient states that she is not taking the Macrobid prescribed as it has too many side effects and one that she is extremely concerned about is the possibility of tongue and throat swelling. Patient states that her mother passed that way and does not want to take anything that can cause that. Patient is asking for Cipro to be sent back in to the pharmacy.

## 2023-04-12 NOTE — PROGRESS NOTES
Very pleased with care given. All staff were very supportive. She states she understands all discharge instructions.  Taking toradol for pain and Percocet as needed.  Urine is clear orange and using pyridium per her report.

## 2023-04-19 NOTE — PROGRESS NOTES
Outpatient Care Management  Plan of Care Follow Up Visit    Patient: Tessa Enriquez  MRN: [id number]  Date of Service: 04/19/2023  Completed by: Ginna Chapman RN  Referral Date: 02/23/2023    Reason for Visit   Patient presents with    OPCM RN Follow Up Call       Brief Summary: Appt for labs and CT of Abd/Pelvis on 04/22/23. Appt with oncology on 04/24/23. She has a urology appt on 05/29/23.  Next Steps: Follow up in two weeks

## 2023-04-24 NOTE — PROGRESS NOTES
Subjective:       Patient ID: Tessa Enriquez is a 67 y.o. female.    Chief Complaint:   rectal CA status post biopsy October 2020 robotic LAR November 9, 2020 by Dr. Wagner     works at [employer]    pt went to [facility] rectal bleeding and abd pain scoped  Findings:        The digital rectal exam revealed a firm rectal mass palpated 5 to 6        cm from the anal verge. The mass was non-circumferential and located        predominantly at the posterior bowel wall.        There was evidence of a prior end-to-end colo-rectal anastomosis in        the distal rectum. This was patent and was characterized by        ulceration. The anastomosis was traversed. Mass in association with        the anastomosis has been previously biopsied and confirmed to be        adenocarcinoma. Anastomosis present at approximately 5 cm from anal     Oncology History:  T3 N1 rectal CA diagnosed October 2020 robotic LAR November 2020 December 2020 PET scan negative for metastatic disease  had colonoscopy 8/21 NAD  tumor board convened and agreed on xrt that will follow  After completion of chemo  sequentially  therapy   Patient started FOLFOX.  Completed 4 out of 12 planned adjuvant cycles,  Discontinued  Chemo / port removed completed xrt 5/21  Pet:8/22, showing progression  Started  FOLFIRI + avastin   from second cycle changed to FOLFIRI panitumumab based on benefit   Sig diarrhea with CPT-11, in spite of added lomotil to imodium  decreased dose of CPT-11  to 165 mg/m2 but patient is still hospitalized Scan shows no change 12/22   Discontinued cpt -11 and cont with 5fu and vectibix  2/23 renal stones. Pt held chemo      ROS:    SKIN: +rash from vectibix, issues with nails,  sore in nose , bt not too bad occ bleeding,nose. blotching    skin or abnormal bruising. Denies new moles or changes to existing moles.      BREASTS: There is no swelling around breasts or nipple discharge.    EYES: Denies eye pain, blurred vision, swelling,  redness or discharge.      ENT AND MOUTH: Denies runny nose, stuffiness, sinus trouble or sores. Denies    nosebleeds. Denies, hoarseness, change in voice or swelling in front of the    neck.      CARDIOVASCULAR: Denies chest pain, discomfort or palpitations. Denies neck    swelling or episodes of passing out.      RESPIRATORY: Denies cough, sputum production, blood in sputum, and denies    shortness of breath.     Diarrhea with use of K p-t refuses to replace po. Rectal bleeding has got better, only some friom hemorrohids    GENITOURINARY: had renal stones and admitted has stents and hematuria this is better now    MUSCULOSKELETAL: Denies neck or back pain. Denies weakness in arms or legs,    joint problems or distended inflamed veins in legs. Denies swelling or abnormal  glands.      NEUROLOGICAL: Denies tingling, numbness, altered mentation changes to nerve    function in the face, weakness to one or both of the body. Denies changes to    gait and denies multiple falls or accidents.      PSYCHIATRIC: Denies nervousness, anxiety, hallucinations, depression, suicidal    ideation, trouble sleeping or changes in behavior noticed by family.            Past Medical History:   Diagnosis Date    Acute hypoxemic respiratory failure 07/09/2018    Arthritis     Bilateral kidney stones 3/27/2023    Cancer     colon    COPD (chronic obstructive pulmonary disease)     History of home oxygen therapy     ONLY PRN AND HASN'T USED IT IN A YEAR    Hyperlipidemia     Thyroid disease      Past Surgical History:   Procedure Laterality Date    CHOLECYSTECTOMY      COLECTOMY N/A 11/9/2020    Procedure: COLECTOMY;  Surgeon: Brandon Wagner MD;  Location: [facility] OR;  Service: General;  Laterality: N/A;  LOWER- ANTERIOR    COLONOSCOPY N/A 10/23/2020    Procedure: COLONOSCOPY;  Surgeon: Jolynn Ayala MD;  Location: [facility];  Service: Endoscopy;  Laterality: N/A;    COLONOSCOPY N/A 8/6/2021    Procedure: COLONOSCOPY;  Surgeon: Jolynn  YUDY Ayala MD;  Location: [facility] ENDO;  Service: Endoscopy;  Laterality: N/A;    COLONOSCOPY N/A 7/7/2022    Procedure: COLONOSCOPY;  Surgeon: Jolynn Ayala MD;  Location: [facility] ENDO;  Service: Endoscopy;  Laterality: N/A;    CYSTOSCOPY W/ URETERAL STENT PLACEMENT Right 2/22/2023    Procedure: CYSTOSCOPY, WITH URETERAL STENT INSERTION;  Surgeon: Carin Kwon Jr., MD;  Location: [facility] OR;  Service: Urology;  Laterality: Right;    CYSTOSCOPY W/ URETERAL STENT PLACEMENT Bilateral 3/26/2023    Procedure: CYSTOSCOPY, WITH URETERAL STENT INSERTION;  Surgeon: Sabine Barber MD;  Location: [facility] OR;  Service: Urology;  Laterality: Bilateral;    CYSTOSCOPY WITH URETEROSCOPY, RETROGRADE PYELOGRAPHY, AND INSERTION OF STENT Bilateral 3/26/2023    Procedure: CYSTOSCOPY, WITH RETROGRADE PYELOGRAM AND URETERAL STENT INSERTION;  Surgeon: Sabine Barber MD;  Location: [facility] OR;  Service: Urology;  Laterality: Bilateral;    CYSTOURETEROSCOPY WITH RETROGRADE PYELOGRAPHY AND INSERTION OF STENT INTO URETER Right 3/7/2023    Procedure: CYSTOURETEROSCOPY, WITH RETROGRADE PYELOGRAM AND URETERAL STENT INSERTION;  Surgeon: Carin Kwon Jr., MD;  Location: [facility] OR;  Service: Urology;  Laterality: Right;    CYSTOURETEROSCOPY, WITH HOLMIUM LASER LITHOTRIPSY OF URETERAL CALCULUS AND STENT INSERTION Bilateral 4/11/2023    Procedure: CYSTOURETEROSCOPY, WITH HOLMIUM LASER LITHOTRIPSY OF URETERAL CALCULUS AND STENT INSERTION;  Surgeon: Carin Kwon Jr., MD;  Location: [facility] OR;  Service: Urology;  Laterality: Bilateral;    ESOPHAGEAL DILATION N/A 6/29/2021    Procedure: DILATION, ESOPHAGUS;  Surgeon: Sourav Baires III, MD;  Location: [facility] ENDO;  Service: Endoscopy;  Laterality: N/A;    ESOPHAGOGASTRODUODENOSCOPY  06/29/2021    Dilation to 57 Fr    ESOPHAGOGASTRODUODENOSCOPY N/A 6/29/2021    Procedure: EGD (ESOPHAGOGASTRODUODENOSCOPY);  Surgeon: Sourav Baires III, MD;  Location: [facility] ENDO;  Service: Endoscopy;  Laterality:  N/A;    FLEXIBLE SIGMOIDOSCOPY N/A 8/1/2022    Procedure: SIGMOIDOSCOPY, FLEXIBLE;  Surgeon: Brandon Wagner MD;  Location: [facility] ENDO;  Service: Endoscopy;  Laterality: N/A;    HYSTERECTOMY      INSERTION OF TUNNELED CENTRAL VENOUS CATHETER (CVC) WITH SUBCUTANEOUS PORT N/A 1/11/2021    Procedure: [id number];  Surgeon: Brandon Wagner MD;  Location: [facility] OR;  Service: General;  Laterality: N/A;    INSERTION OF TUNNELED CENTRAL VENOUS CATHETER (CVC) WITH SUBCUTANEOUS PORT Right 9/1/2022    Procedure: [id number];  Surgeon: Brandon Wagner MD;  Location: [facility] OR;  Service: General;  Laterality: Right;  Wants rt side placement    MEDIPORT REMOVAL Left 4/19/2021    Procedure: REMOVAL, CATHETER, CENTRAL VENOUS, TUNNELED, WITH PORT;  Surgeon: Brandon Wagner MD;  Location: [facility] OR;  Service: General;  Laterality: Left;    REMOVAL-STENT Right 3/7/2023    Procedure: REMOVAL-STENT;  Surgeon: Carin Kwon Jr., MD;  Location: [facility] OR;  Service: Urology;  Laterality: Right;    REMOVAL-STENT Bilateral 4/11/2023    Procedure: REMOVAL-STENT;  Surgeon: Carin Kwon Jr., MD;  Location: [facility] OR;  Service: Urology;  Laterality: Bilateral;    RETROGRADE PYELOGRAPHY Bilateral 4/11/2023    Procedure: PYELOGRAM, RETROGRADE;  Surgeon: Carin Kwon Jr., MD;  Location: [facility] OR;  Service: Urology;  Laterality: Bilateral;    ROBOT-ASSISTED COLECTOMY N/A 11/9/2020    Procedure: ROBOTIC COLECTOMY low anterior resection, possible open;  Surgeon: Brandon Wagner MD;  Location: [facility] OR;  Service: General;  Laterality: N/A;  CONVERTED TO OPEN AT 1503    URETEROSCOPIC REMOVAL OF URETERIC CALCULUS Right 3/7/2023    Procedure: REMOVAL, CALCULUS, URETER, URETEROSCOPIC;  Surgeon: Carin Kwon Jr., MD;  Location: [facility] OR;  Service: Urology;  Laterality: Right;    URETEROSCOPIC REMOVAL OF URETERIC CALCULUS Bilateral 4/11/2023    Procedure: REMOVAL, CALCULUS, URETER, URETEROSCOPIC;  Surgeon: Carin Kwon Jr., MD;   Location: [facility];  Service: Urology;  Laterality: Bilateral;     Family History   Problem Relation Age of Onset    COPD Mother     COPD Father     Prostate cancer Brother     Breast cancer Maternal Grandmother       Social History     Socioeconomic History    Marital status:    Tobacco Use    Smoking status: Former     Packs/day: 2.00     Years: 40.00     Pack years: 80.00     Types: Cigarettes     Quit date: 2017     Years since quittin.7    Smokeless tobacco: Never   Substance and Sexual Activity    Alcohol use: No    Drug use: No    Sexual activity: Not Currently     Partners: Male     Social Determinants of Health     Financial Resource Strain: Unknown    Difficulty of Paying Living Expenses: Patient refused   Food Insecurity: Unknown    Worried About Running Out of Food in the Last Year: Patient refused    Ran Out of Food in the Last Year: Patient refused   Transportation Needs: Unknown    Lack of Transportation (Medical): Patient refused    Lack of Transportation (Non-Medical): Patient refused   Physical Activity: Unknown    Days of Exercise per Week: Patient refused    Minutes of Exercise per Session: Patient refused   Stress: Unknown    Feeling of Stress : Patient refused   Social Connections: Unknown    Frequency of Communication with Friends and Family: Patient refused    Frequency of Social Gatherings with Friends and Family: Patient refused    Attends Judaism Services: Patient refused    Active Member of Clubs or Organizations: Patient refused    Attends Club or Organization Meetings: Patient refused    Marital Status: Patient refused   Housing Stability: Unknown    Unable to Pay for Housing in the Last Year: Patient refused    Unstable Housing in the Last Year: Patient refused     Review of patient's allergies indicates:   Allergen Reactions    Ativan [lorazepam] Hives and Itching    Flagyl [metronidazole] Itching and Rash    Pcn [penicillins] Hives, Itching and Rash       Current  Outpatient Medications:     albuterol (PROVENTIL) 2.5 mg /3 mL (0.083 %) nebulizer solution, Take 3 mLs (2.5 mg total) by nebulization every 6 (six) hours as needed for Wheezing. Rescue, Disp: 120 each, Rfl: 1    CMPD hydrocortisone 2%- LIDOcaine 3% suppository (RECTAL ROCKET), Place 1 suppository rectally every evening. Insert 1 suppository 3/4 of the way onto rectum. Wet for easier application. Repeat for 3 nights., Disp: 30 suppository, Rfl: 2    diphenoxylate-atropine 2.5-0.025 mg (LOMOTIL) 2.5-0.025 mg per tablet, Take by mouth., Disp: , Rfl:     ferrous sulfate 325 (65 FE) MG EC tablet, Take 1 tablet (325 mg total) by mouth every other day., Disp: , Rfl: 0    HYDROcodone-acetaminophen (NORCO) 5-325 mg per tablet, TAKE ONE TABLET BY MOUTH EVERY SIX HOURS AS NEEDED FOR PAIN, Disp: 90 tablet, Rfl: 0    levothyroxine (SYNTHROID) 125 MCG tablet, Take 1 tablet (125 mcg total) by mouth before breakfast., Disp: 30 tablet, Rfl: 11    ondansetron (ZOFRAN-ODT) 8 MG TbDL, Take 1 tablet (8 mg total) by mouth every 12 (twelve) hours as needed., Disp: 30 tablet, Rfl: 1    potassium chloride SA (K-DUR,KLOR-CON) 20 MEQ tablet, Take 1 tablet (20 mEq total) by mouth once daily., Disp: 30 tablet, Rfl: 11    prochlorperazine (COMPAZINE) 10 MG tablet, Take 1 tablet (10 mg total) by mouth every 6 (six) hours as needed., Disp: 30 tablet, Rfl: 1    triamcinolone acetonide 0.025% (KENALOG) 0.025 % cream, Apply topically once daily., Disp: 80 g, Rfl: 1  No current facility-administered medications for this visit.    Facility-Administered Medications Ordered in Other Visits:     diphenhydrAMINE injection 12.5 mg, 12.5 mg, Intravenous, Once PRN, Monster Atwood MD    electrolyte-S (ISOLYTE), , Intravenous, Continuous, Monster Atwood MD, Last Rate: 25 mL/hr at 03/07/23 1138, Rate Change at 03/07/23 1138    fentaNYL 50 mcg/mL injection 25 mcg, 25 mcg, Intravenous, Q5 Min PRN, Monster Atwood MD, 25 mcg at 03/07/23 1207     HYDROmorphone (PF) injection 0.2 mg, 0.2 mg, Intravenous, Q5 Min PRN, Monster Atwood MD    lactated ringers infusion, 10 mL/hr, Intravenous, Continuous, Monster Atwood MD    lactated ringers infusion, 500 mL, Intravenous, Once, Monster Atwood MD    LIDOcaine (PF) 10 mg/ml (1%) injection 10 mg, 1 mL, Intradermal, Once, Monster Atwood MD    LORazepam injection 0.25 mg, 0.25 mg, Intravenous, Once PRN, Monster Atwood MD    ondansetron injection 4 mg, 4 mg, Intravenous, Once PRN, Monster Atwood MD    prochlorperazine injection Soln 5 mg, 5 mg, Intravenous, Q30 Min PRN, Monster Atwood MD    sodium chloride 0.9% flush 3 mL, 3 mL, Intravenous, Q8H, Monster Atwood MD  PHYSICAL EXAM:     Wt Readings from Last 3 Encounters:   04/24/23 84.6 kg (186 lb 8.2 oz)   04/11/23 82.3 kg (181 lb 7 oz)   04/03/23 82.3 kg (181 lb 7 oz)     Temp Readings from Last 3 Encounters:   04/24/23 96.2 °F (35.7 °C) (Temporal)   04/11/23 97.7 °F (36.5 °C)   04/03/23 97 °F (36.1 °C) (Temporal)     BP Readings from Last 3 Encounters:   04/24/23 (!) 158/79   04/11/23 125/64   04/03/23 124/74     Pulse Readings from Last 3 Encounters:   04/24/23 95   04/11/23 87   04/03/23 91     GENERAL: alert; in no apparent distress, , well-built well-nourished   HEAD: normocephalic, atraumatic.  EYES: pupils are equal, round, reactive to light and accommodation. Sclera anicteric. Conjunctiva not injected.   NOSE/THROAT: no nasal erythema or rhinorrhea. Oropharynx pink, without erythema, ulcerations or thrush.   NECK: no cervical motion rigidity; supple with no masses.  CHEST: clear to auscultation bilaterally; no wheezes, crackles or rubs. Patient is speaking comfortably on room air with normal work of breathing without using accessory muscles of respiration.  CARDIOVASCULAR: regular rate and rhythm; no murmurs, rubs or gallops.  ABDOMEN: soft, nontender, nondistended. Bowel sounds present.   MUSCULOSKELETAL: no  tenderness to palpation along the spine or scapulae. Normal range of motion.  NEUROLOGIC: cranial nerves II-XII intact bilaterally. Strength 5/5 in bilateral upper and lower extremities. No sensory deficits appreciated. Reflexes globally intact. No cerebellar signs. Normal gait.   C/o left shoulder pain; from sleeping croojked she iwll let me know if she wants xrays to eval  LYMPHATIC: no cervical, supraclavicular or axillary adenopathy appreciated bilaterally.   EXTREMITIES: no clubbing, cyanosis, edema.  SKIN: no erythema, rashes, ulcerations noted  ECOG-0  Lab Results   Component Value Date    WBC 4.37 04/22/2023    HGB 12.3 04/22/2023    HCT 38.9 04/22/2023    MCV 92 04/22/2023     04/22/2023       BMP  Lab Results   Component Value Date     04/22/2023    K 2.8 (L) 04/22/2023     04/22/2023    CO2 24 04/22/2023    BUN 8 04/22/2023    CREATININE 0.8 04/22/2023    CALCIUM 8.8 04/22/2023    ANIONGAP 11 04/22/2023    ESTGFRAFRICA >60 05/02/2022    EGFRNONAA >60 05/02/2022      cea 7/14/21 1.8  November 9, 2020  .  Rectum, sigmoid colon and proximal donut (low anterior resection):   - Invasive adenocarcinoma,  invading through the muscularis propria into   pericolorectal tissue   - Pathologic stage pT3, pN1a (see synoptic report)   - Resection margins negative for dysplasia or malignancy (proximal, distal   and radial margins), within 1 mm to radial margin   - One tumor deposit   - Adenocarcinoma involve one out of totally forty seven lymph nodes (1/46 in   part 1, totally 1/47, also see part 2)   - Background colon with diverticulosis associated with hyperpigmented   macrophages, benign   - Proximal donut with benign colon, no dysplasia or malignancy   - Tattoo identified   2.  Colon, distal donut (excision):   - Benign colon, negative for dysplasia or malignancy   - One lymph node, negative for metastatic carcinoma (0/1)   COLON AND RECTUM: Resection, Including Transanal Disk Excision of Rectal    Neoplasms   Procedure  Low anterior resection   Tumor Site  Rectum    Tumor Location: Straddles the anterior peritoneal reflection   Tumor Size   Greatest dimension (centimeters): 7.0 cm   + Additional dimensions (centimeters): 4.7 x 2.2 cm   Macroscopic Tumor Perforation  Not identified   Macroscopic Intactness of Mesorectum  Near complete   Histologic Type  Adenocarcinoma   Histologic Grade   G2: Moderately differentiated   Tumor Extension  Tumor invades through the muscularis propria into   pericolorectal tissue     Impression:  November 7, 2020 CT abdomen pelvis     1. Rectosigmoid junction mass, unchanged from prior.  No definite evidence of intraluminal contrast extravasation to suggest active gastrointestinal hemorrhage, however please note that arterial phase CT is more sensitive.  2. Colonic diverticulosis without acute diverticulitis.  3. Minimal pneumobilia, unchanged      IMPRESSION:  CT chest lung screen October 2020     1.  3 mm pulmonary nodule identified in the lateral segment of the  right lower lobe.  2.  Focus of groundglass opacity measuring 11 mm in diameter in the  superior segment of the right lower lobe. Short-term follow-up for  this lesion is recommended.  3.  Mild emphysematous lung disease.     LUNG-RADS CATEGORY 3: PROBABLY BENIGN FINDINGS     RECOMMENDATION: Short-term imaging follow-up with 6 month LDCT.    IMPRESSION: 7/21  1. Nonspecific hypermetabolic soft tissue density focus cranial to the rectosigmoid colon suture line, perhaps reflecting postsurgical and or posttreatment or postinflammatory changes. Attention to this area at follow-up imaging is recommended to help differentiate from residual or recurrent neoplasm.  2. Improving postoperative changes in the lower anterior abdominal wall, with associated multifocal FDG hypermetabolism which is most characteristic of postoperative etiology.  3. Otherwise no convincing evidence of recurrent neoplasm or metastatic  disease.IMPRESSION:  1. Interval increased size of hypermetabolic presacral mass contiguous with hypermetabolic circumferential wall thickening of the rectum at the level of the anastomotic suture line, consistent with recurrent neoplasm and metastatic disease.  2. Interval development of enlarged, hypermetabolic right iliac chain lymph node consistent with metastatic disease.  3. No additional convincing evidence of metastatic disease.  4. Bilateral sacral ala insufficiency fractures, with corresponding FDG hypermetabolism.  5. Intense FDG uptake throughout the thyroid gland, unchanged and nonspecific.    Impression:ct cap 4/23     1 mm left UVJ stone and 2 mm left intrarenal stone without hydronephrosis. On the right there is mild hydronephrosis down to a point of stricture overlying the sacrum the same area as attached mildly dilated small bowel loops consistent with adhesions.  The patient has had rectal surgery with suture material noted. A small ventral hernia is noted containing a loop of small bowel above the umbilicus without evidence obstruction.  Prior cholecystectomy.  Prior hysterectomy.     Patient Active Problem List   Diagnosis    COPD (chronic obstructive pulmonary disease)    Pure hypercholesterolemia    Acquired hypothyroidism    Rectal bleeding    Rectal cancer    Malignant neoplasm of colon    B12 deficiency    FENG (iron deficiency anemia)    Chronic diarrhea    Severe obesity (BMI 35.0-39.9) with comorbidity    Secondary and unspecified malignant neoplasm of intra-abdominal lymph nodes    Dehydration    Diarrhea    Chemotherapy-induced neutropenia    Chemotherapy-induced diarrhea    Hypokalemia    UTI (urinary tract infection)    Anemia    Bilateral kidney stones   IMPRESSION:pet 12/2020  1. Postoperative changes of rectal colonic resection, with adjacent  FDG avid presacral soft tissue stranding.  2. Adjacent punctate extraluminal foci of free air suggesting a tiny  tract extending towards the  adjacent small bowel (possibly  postoperative but suggesting a tiny enterocolonic fistula)  3. Deep subcutaneous soft tissue structure along the ventral abdomen  suggesting a postoperative hematoma/seroma, and less likely abscess.  4. No findings of additional sites of FDG avid disease.      IMPRESSION: 7/21  1. Nonspecific hypermetabolic soft tissue density focus cranial to the rectosigmoid colon suture line, perhaps reflecting postsurgical and or posttreatment or postinflammatory changes. Attention to this area at follow-up imaging is recommended to help differentiate from residual or recurrent neoplasm.  2. Improving postoperative changes in the lower anterior abdominal wall, with associated multifocal FDG hypermetabolism which is most characteristic of postoperative etiology.  3. Otherwise no convincing evidence of recurrent neoplasm or metastatic disease      Impression:2/2/22   CT CAP  A single 4 mm soft tissue density nodule is noted in the right lower lobe.  No other intrapulmonary masses or nodules are seen.  Follow-up CT in 12 months per of this nodule is recommended.  Coronary artery calcification is noted.  No masses or adenopathy or seen.     Prior cholecystectomy.  Prior hysterectomy.  2.2 cm left ovarian cyst.  Small ventral hernia containing a loop of small bowel without CT evidence of obstruction    Impression:11/22     Presacral/pre coccygeal soft tissue density and fat stranding and circumferential wall thickening of segment of bowel down low in the pelvis at the surgical site are not significantly changed compared to the prior exam.  Prominent lymph node right iliac chain also does not appear significantly changed.  Findings also correspond as seen on prior PET-CT of 08/04/2022 and are suspicious for neoplasm  Assessment and Plan       Rectal ca: original dx 2020  Status post low anterior resection November 9, 2020 for rectal CA T3 N1a   tumor board convened and agreed on xrt that will follow   After completion of chemo  sequentially  therapy   Patient started FOLFOX.  Completed 4 out of 12 planned adjuvant cycles,  Discontinued  Chemo / port removed completed xrt 5/21  Pet:8/22, showing progression   Started  FOLFIRI + avastin   from second cycle changed to FOLFIRI panitumumab based on benefit   Sig diarrhea with CPT-11, in spite of added lomotil to imodium  decreased dose of CPT-11  to 165 mg/m2 but patient is still hospitalized Scan shows no change 12/22   Discontinued cpt -11 and cont with 5fu and vectibix Dc the bolus 5FU     4/11/23:But pt has been admitted with renal stones, has bilateral stents, wants to wait till the stents out    4/24/23 : ct showing no dz, can wait to resume chemo , see ovidio  6 weeks with repat scans   Caris in chart     Renal stones: has stents, folows with urology    FENG :Injectafer  completed 8/22 good response         b12 def anemia, was taking injections q month level checked 10/21 sub therpautic 226 t pt to add SL b12 to her twice a month b12 shots INSURANCE WONT PAY FOR B12.ok to cont with SL only as levels have improved to 1500    hypocalcemia which seems chronic pt to supplement orally daily wnl this visit    Hypothyroidism, sees DR Mcdermott will adj meds  History of chronic obstructive pulmonary disease, dyslipidemia and hypothyroidism continue with PCP  Doesn't want to be covid vaccinated    Hypokalemia: pt has stopped taking her K pills due to s/e of diarrhea replace IV today      Advance Care Planning     Date: 04/03/2023    Power of   Pt has submitted ACP documenst  Palliative care ref made

## 2023-04-24 NOTE — PLAN OF CARE
Problem: Diarrhea  Goal: Fluid and Electrolyte Balance  4/24/2023 1127 by Lizzy Sumner RN  Outcome: Met  4/24/2023 1127 by Lizzy Sumner RN  Outcome: Ongoing, Progressing

## 2023-04-24 NOTE — Clinical Note
Hold chemo for 6 weeks see me in 6 weeks with cbc, cmp, cea and ct cap,  see lynn in 2 weeks with cmp for K correction, give her IV K today and tomorrow orders are in

## 2023-04-25 NOTE — TELEPHONE ENCOUNTER
----- Message from Ansno Yates sent at 4/25/2023  7:57 AM CDT -----  Type:  RX Refill Request    Who Called:  Patient  Refill or New Rx:  New  RX Name and Strength:  HYDROcodone-acetaminophen (NORCO) 5-325 mg per tablet      How is the patient currently taking it? (ex. 1XDay):  4XDay  Is this a 30 day or 90 day RX:  90 Tablets    Preferred Pharmacy with phone number:    [facility]-- Please add to file   [address] [place] [address] [phone]    Local or Mail Order:  Local  Ordering Provider:  Zac  Best Call Back Number:  [phone]  Additional Information:

## 2023-04-26 NOTE — TELEPHONE ENCOUNTER
----- Message from Isiahdesiree Kady Gonzalez sent at 4/26/2023  9:41 AM CDT -----  Type:  RX Refill Request    Who Called:  pt  Refill or New Rx:  refill  RX Name and Strength:  HYDROcodone-acetaminophen (NORCO) 5-325 mg per tablet  How is the patient currently taking it? (ex. 1XDay):  as directed  Is this a 30 day or 90 day RX:  90  Preferred Pharmacy with phone number:  pharm  Ordering Provider:  Amandeep Whittaker Call Back Number:  [phone]  Additional Information:  pt stated she was supposed to get a refill on RX and hasn't heard anything back. She stated she is out of town and provided the pharm info she needs it sent to. Please call back to advise ASAP, thanks!

## 2023-04-27 NOTE — TELEPHONE ENCOUNTER
----- Message from Wallace Mosqueda sent at 4/27/2023  9:25 AM CDT -----  Regarding: med refill  Contact: Tessa at  [phone]  Type:  Pharmacy Calling to Clarify an RX    Name of Caller:  Tessa    [facility] [place]  Address: [address]  [place] [address]  Phone: [phone]    Prescription Name:    HYDROcodone-acetaminophen (NORCO) 5-325 mg per tablet 90 tablet 0 4/26/2023  Sig: TAKE ONE TABLET BY MOUTH EVERY SIX HOURS AS NEEDED FOR PAIN   Sent to pharmacy as: HYDROcodone-acetaminophen (NORCO) 5-325 mg per tablet   Earliest Fill Date: 4/26/2023   Notes to Pharmacy: Quantity prescribed more than 7 day supply? Yes medically neccessary   E-Prescribing Status: Receipt confirmed by pharmacy (4/26/2023 10:49 AM CDT)     What do they need to clarify?:  Med is at Wrong Pharm    Best Call Back Number:  [phone]    Additional Information:  pt is out of [place] for family illness

## 2023-05-03 NOTE — PROGRESS NOTES
Outpatient Care Management  Plan of Care Follow Up Visit    Patient: Tessa Enriquez  MRN: [id number]  Date of Service: 05/03/2023  Completed by: Ginna Chapman RN  Referral Date: 02/23/2023    No chief complaint on file.      Brief Summary: CT on 04/03/23. Appt with oncologist on 04/24/23. CT showing no dz. Holding chemo for 6 weeks then another CT. Tessa is in [place] at this time with family.   Next Steps: Follow up in three weeks in or around

## 2023-05-09 NOTE — PROGRESS NOTES
Subjective:       Patient ID: Tessa Enriquez is a 67 y.o. female.    Chief Complaint:   rectal CA status post biopsy October 2020 robotic LAR November 9, 2020 by Dr. Wagner     works at [employer]    pt went to [facility] rectal bleeding and abd pain scoped  Findings:        The digital rectal exam revealed a firm rectal mass palpated 5 to 6        cm from the anal verge. The mass was non-circumferential and located        predominantly at the posterior bowel wall.        There was evidence of a prior end-to-end colo-rectal anastomosis in        the distal rectum. This was patent and was characterized by        ulceration. The anastomosis was traversed. Mass in association with        the anastomosis has been previously biopsied and confirmed to be        adenocarcinoma. Anastomosis present at approximately 5 cm from anal     Oncology History:  T3 N1 rectal CA diagnosed October 2020 robotic LAR November 2020 December 2020 PET scan negative for metastatic disease  had colonoscopy 8/21 NAD  tumor board convened and agreed on xrt that will follow  After completion of chemo  sequentially  therapy   Patient started FOLFOX.  Completed 4 out of 12 planned adjuvant cycles,  Discontinued  Chemo / port removed completed xrt 5/21  Pet:8/22, showing progression  Started  FOLFIRI + avastin   from second cycle changed to FOLFIRI panitumumab based on benefit   Sig diarrhea with CPT-11, in spite of added lomotil to imodium  decreased dose of CPT-11  to 165 mg/m2 but patient is still hospitalized Scan shows no change 12/22   Discontinued cpt -11 and cont with 5fu and vectibix  2/23 renal stones. Pt held chemo    5/9/2023:  She returns today follow-up and is without complaint.  She is currently on chemo holiday as her last scan showed no evidence of disease.  She is scheduled for repeat scan MD follow-up in 4 weeks.  At her previous visit her potassium was noted to be 2.8.  She is here for repeat CMP    Review of Systems    Constitutional: Negative.    HENT: Negative.     Eyes: Negative.    Respiratory: Negative.     Cardiovascular: Negative.    Gastrointestinal: Negative.    Genitourinary: Negative.    Musculoskeletal: Negative.    Skin: Negative.    Neurological: Negative.    Endo/Heme/Allergies: Negative.    Psychiatric/Behavioral: Negative.          Past Medical History:   Diagnosis Date    Acute hypoxemic respiratory failure 07/09/2018    Arthritis     Bilateral kidney stones 3/27/2023    Cancer     colon    COPD (chronic obstructive pulmonary disease)     History of home oxygen therapy     ONLY PRN AND HASN'T USED IT IN A YEAR    Hyperlipidemia     Thyroid disease      Past Surgical History:   Procedure Laterality Date    CHOLECYSTECTOMY      COLECTOMY N/A 11/9/2020    Procedure: COLECTOMY;  Surgeon: Brandon Wagner MD;  Location: [facility] OR;  Service: General;  Laterality: N/A;  LOWER- ANTERIOR    COLONOSCOPY N/A 10/23/2020    Procedure: COLONOSCOPY;  Surgeon: Jolynn Ayala MD;  Location: [facility] ENDO;  Service: Endoscopy;  Laterality: N/A;    COLONOSCOPY N/A 8/6/2021    Procedure: COLONOSCOPY;  Surgeon: Jolynn Ayala MD;  Location: [facility] ENDO;  Service: Endoscopy;  Laterality: N/A;    COLONOSCOPY N/A 7/7/2022    Procedure: COLONOSCOPY;  Surgeon: Jolynn Ayala MD;  Location: [facility] ENDO;  Service: Endoscopy;  Laterality: N/A;    CYSTOSCOPY W/ URETERAL STENT PLACEMENT Right 2/22/2023    Procedure: CYSTOSCOPY, WITH URETERAL STENT INSERTION;  Surgeon: Carin Kwon Jr., MD;  Location: [facility] OR;  Service: Urology;  Laterality: Right;    CYSTOSCOPY W/ URETERAL STENT PLACEMENT Bilateral 3/26/2023    Procedure: CYSTOSCOPY, WITH URETERAL STENT INSERTION;  Surgeon: Sabine Barber MD;  Location: [facility] OR;  Service: Urology;  Laterality: Bilateral;    CYSTOSCOPY WITH URETEROSCOPY, RETROGRADE PYELOGRAPHY, AND INSERTION OF STENT Bilateral 3/26/2023    Procedure: CYSTOSCOPY, WITH RETROGRADE PYELOGRAM AND URETERAL STENT INSERTION;   Surgeon: Sabine Barber MD;  Location: [facility] OR;  Service: Urology;  Laterality: Bilateral;    CYSTOURETEROSCOPY WITH RETROGRADE PYELOGRAPHY AND INSERTION OF STENT INTO URETER Right 3/7/2023    Procedure: CYSTOURETEROSCOPY, WITH RETROGRADE PYELOGRAM AND URETERAL STENT INSERTION;  Surgeon: Carin Kwon Jr., MD;  Location: [facility] OR;  Service: Urology;  Laterality: Right;    CYSTOURETEROSCOPY, WITH HOLMIUM LASER LITHOTRIPSY OF URETERAL CALCULUS AND STENT INSERTION Bilateral 4/11/2023    Procedure: CYSTOURETEROSCOPY, WITH HOLMIUM LASER LITHOTRIPSY OF URETERAL CALCULUS AND STENT INSERTION;  Surgeon: Carin Kwon Jr., MD;  Location: [facility] OR;  Service: Urology;  Laterality: Bilateral;    ESOPHAGEAL DILATION N/A 6/29/2021    Procedure: DILATION, ESOPHAGUS;  Surgeon: Sourav Baires III, MD;  Location: [facility];  Service: Endoscopy;  Laterality: N/A;    ESOPHAGOGASTRODUODENOSCOPY  06/29/2021    Dilation to 57 Fr    ESOPHAGOGASTRODUODENOSCOPY N/A 6/29/2021    Procedure: EGD (ESOPHAGOGASTRODUODENOSCOPY);  Surgeon: Sourav Baires III, MD;  Location: [facility] ENDO;  Service: Endoscopy;  Laterality: N/A;    FLEXIBLE SIGMOIDOSCOPY N/A 8/1/2022    Procedure: SIGMOIDOSCOPY, FLEXIBLE;  Surgeon: Brandon Wagner MD;  Location: [facility] ENDO;  Service: Endoscopy;  Laterality: N/A;    HYSTERECTOMY      INSERTION OF TUNNELED CENTRAL VENOUS CATHETER (CVC) WITH SUBCUTANEOUS PORT N/A 1/11/2021    Procedure: [id number];  Surgeon: Brandon Wagner MD;  Location: [facility] OR;  Service: General;  Laterality: N/A;    INSERTION OF TUNNELED CENTRAL VENOUS CATHETER (CVC) WITH SUBCUTANEOUS PORT Right 9/1/2022    Procedure: [id number];  Surgeon: Brandon Wagner MD;  Location: [facility] OR;  Service: General;  Laterality: Right;  Wants rt side placement    MEDIPORT REMOVAL Left 4/19/2021    Procedure: REMOVAL, CATHETER, CENTRAL VENOUS, TUNNELED, WITH PORT;  Surgeon: Brandon Wagner MD;  Location: [facility] OR;  Service: General;   Laterality: Left;    REMOVAL-STENT Right 3/7/2023    Procedure: REMOVAL-STENT;  Surgeon: Carin Kwon Jr., MD;  Location: [facility] OR;  Service: Urology;  Laterality: Right;    REMOVAL-STENT Bilateral 2023    Procedure: REMOVAL-STENT;  Surgeon: Carin Kwon Jr., MD;  Location: [facility] OR;  Service: Urology;  Laterality: Bilateral;    RETROGRADE PYELOGRAPHY Bilateral 2023    Procedure: PYELOGRAM, RETROGRADE;  Surgeon: Carin Kwon Jr., MD;  Location: [facility] OR;  Service: Urology;  Laterality: Bilateral;    ROBOT-ASSISTED COLECTOMY N/A 2020    Procedure: ROBOTIC COLECTOMY low anterior resection, possible open;  Surgeon: Brandon Wagner MD;  Location: [facility];  Service: General;  Laterality: N/A;  CONVERTED TO OPEN AT 1503    URETEROSCOPIC REMOVAL OF URETERIC CALCULUS Right 3/7/2023    Procedure: REMOVAL, CALCULUS, URETER, URETEROSCOPIC;  Surgeon: Carin Kwon Jr., MD;  Location: [facility] OR;  Service: Urology;  Laterality: Right;    URETEROSCOPIC REMOVAL OF URETERIC CALCULUS Bilateral 2023    Procedure: REMOVAL, CALCULUS, URETER, URETEROSCOPIC;  Surgeon: Carin Kwon Jr., MD;  Location: [facility] OR;  Service: Urology;  Laterality: Bilateral;     Family History   Problem Relation Age of Onset    COPD Mother     COPD Father     Prostate cancer Brother     Breast cancer Maternal Grandmother       Social History     Socioeconomic History    Marital status:    Tobacco Use    Smoking status: Former     Packs/day: 2.00     Years: 40.00     Pack years: 80.00     Types: Cigarettes     Quit date: 2017     Years since quittin.8    Smokeless tobacco: Never   Substance and Sexual Activity    Alcohol use: No    Drug use: No    Sexual activity: Not Currently     Partners: Male     Social Determinants of Health     Financial Resource Strain: Unknown    Difficulty of Paying Living Expenses: Patient refused   Food Insecurity: Unknown    Worried About Running Out of Food in the Last Year: Patient refused     Ran Out of Food in the Last Year: Patient refused   Transportation Needs: Unknown    Lack of Transportation (Medical): Patient refused    Lack of Transportation (Non-Medical): Patient refused   Physical Activity: Unknown    Days of Exercise per Week: Patient refused    Minutes of Exercise per Session: Patient refused   Stress: Unknown    Feeling of Stress : Patient refused   Social Connections: Unknown    Frequency of Communication with Friends and Family: Patient refused    Frequency of Social Gatherings with Friends and Family: Patient refused    Attends Sikhism Services: Patient refused    Active Member of Clubs or Organizations: Patient refused    Attends Club or Organization Meetings: Patient refused    Marital Status: Patient refused   Housing Stability: Unknown    Unable to Pay for Housing in the Last Year: Patient refused    Unstable Housing in the Last Year: Patient refused     Review of patient's allergies indicates:   Allergen Reactions    Ativan [lorazepam] Hives and Itching    Flagyl [metronidazole] Itching and Rash    Pcn [penicillins] Hives, Itching and Rash       Current Outpatient Medications:     albuterol (PROVENTIL) 2.5 mg /3 mL (0.083 %) nebulizer solution, Take 3 mLs (2.5 mg total) by nebulization every 6 (six) hours as needed for Wheezing. Rescue, Disp: 120 each, Rfl: 1    CMPD hydrocortisone 2%- LIDOcaine 3% suppository (RECTAL ROCKET), Place 1 suppository rectally every evening. Insert 1 suppository 3/4 of the way onto rectum. Wet for easier application. Repeat for 3 nights., Disp: 30 suppository, Rfl: 2    diphenoxylate-atropine 2.5-0.025 mg (LOMOTIL) 2.5-0.025 mg per tablet, Take by mouth., Disp: , Rfl:     ferrous sulfate 325 (65 FE) MG EC tablet, Take 1 tablet (325 mg total) by mouth every other day., Disp: , Rfl: 0    HYDROcodone-acetaminophen (NORCO) 5-325 mg per tablet, TAKE ONE TABLET BY MOUTH EVERY SIX HOURS AS NEEDED FOR PAIN, Disp: 90 tablet, Rfl: 0    ondansetron (ZOFRAN-ODT) 8  MG TbDL, Take 1 tablet (8 mg total) by mouth every 12 (twelve) hours as needed., Disp: 30 tablet, Rfl: 1    potassium chloride SA (K-DUR,KLOR-CON) 20 MEQ tablet, Take 1 tablet (20 mEq total) by mouth once daily., Disp: 30 tablet, Rfl: 11    prochlorperazine (COMPAZINE) 10 MG tablet, Take 1 tablet (10 mg total) by mouth every 6 (six) hours as needed., Disp: 30 tablet, Rfl: 1    triamcinolone acetonide 0.025% (KENALOG) 0.025 % cream, Apply topically once daily., Disp: 80 g, Rfl: 1    levothyroxine (SYNTHROID) 125 MCG tablet, Take 1 tablet (125 mcg total) by mouth before breakfast., Disp: 30 tablet, Rfl: 11  No current facility-administered medications for this visit.    Facility-Administered Medications Ordered in Other Visits:     diphenhydrAMINE injection 12.5 mg, 12.5 mg, Intravenous, Once PRN, Monster Atwood MD    electrolyte-S (ISOLYTE), , Intravenous, Continuous, Monster Atwood MD, Last Rate: 25 mL/hr at 03/07/23 1138, Rate Change at 03/07/23 1138    fentaNYL 50 mcg/mL injection 25 mcg, 25 mcg, Intravenous, Q5 Min PRN, Monster Atwood MD, 25 mcg at 03/07/23 1207    HYDROmorphone (PF) injection 0.2 mg, 0.2 mg, Intravenous, Q5 Min PRN, Monster Atwood MD    lactated ringers infusion, 10 mL/hr, Intravenous, Continuous, Monster Atwood MD    lactated ringers infusion, 500 mL, Intravenous, Once, Monster Atwood MD    LIDOcaine (PF) 10 mg/ml (1%) injection 10 mg, 1 mL, Intradermal, Once, Monster Atwood MD    LORazepam injection 0.25 mg, 0.25 mg, Intravenous, Once PRN, Monster Atwood MD    ondansetron injection 4 mg, 4 mg, Intravenous, Once PRN, Monster Atwood MD    prochlorperazine injection Soln 5 mg, 5 mg, Intravenous, Q30 Min PRN, Monster Atwood MD    sodium chloride 0.9% flush 3 mL, 3 mL, Intravenous, Q8H, Monster Atwood MD  PHYSICAL EXAM:     Wt Readings from Last 3 Encounters:   05/09/23 85 kg (187 lb 6.3 oz)   04/24/23 84.7 kg (186 lb 11.2 oz)    04/24/23 84.6 kg (186 lb 8.2 oz)     Temp Readings from Last 3 Encounters:   05/09/23 97.4 °F (36.3 °C) (Temporal)   04/24/23 98 °F (36.7 °C)   04/24/23 96.2 °F (35.7 °C) (Temporal)     BP Readings from Last 3 Encounters:   05/09/23 (!) 153/86   04/24/23 (!) 162/90   04/24/23 (!) 158/79     Pulse Readings from Last 3 Encounters:   05/09/23 93   04/24/23 92   04/24/23 95     GENERAL: alert; in no apparent distress, , well-built well-nourished   HEAD: normocephalic, atraumatic.  EYES: pupils are equal, round, reactive to light and accommodation. Sclera anicteric. Conjunctiva not injected.   NOSE/THROAT: no nasal erythema or rhinorrhea. Oropharynx pink, without erythema, ulcerations or thrush.   NECK: no cervical motion rigidity; supple with no masses.  CHEST: clear to auscultation bilaterally; no wheezes, crackles or rubs. Patient is speaking comfortably on room air with normal work of breathing without using accessory muscles of respiration.  CARDIOVASCULAR: regular rate and rhythm; no murmurs, rubs or gallops.  ABDOMEN: soft, nontender, nondistended. Bowel sounds present.   MUSCULOSKELETAL: no tenderness to palpation along the spine or scapulae. Normal range of motion.  NEUROLOGIC: cranial nerves II-XII intact bilaterally. Strength 5/5 in bilateral upper and lower extremities. No sensory deficits appreciated. Reflexes globally intact. No cerebellar signs. Normal gait.   C/o left shoulder pain; from sleeping croojked she iwll let me know if she wants xrays to eval  LYMPHATIC: no cervical, supraclavicular or axillary adenopathy appreciated bilaterally.   EXTREMITIES: no clubbing, cyanosis, edema.  SKIN: no erythema, rashes, ulcerations noted  ECOG-0  Lab Results   Component Value Date    WBC 4.37 04/22/2023    HGB 12.3 04/22/2023    HCT 38.9 04/22/2023    MCV 92 04/22/2023     04/22/2023       BMP  Lab Results   Component Value Date     05/08/2023    K 4.7 05/08/2023     05/08/2023    CO2 22 (L)  05/08/2023    BUN 9 05/08/2023    CREATININE 0.8 05/08/2023    CALCIUM 8.9 05/08/2023    ANIONGAP 10 05/08/2023    ESTGFRAFRICA >60 05/02/2022    EGFRNONAA >60 05/02/2022      cea 7/14/21 1.8  November 9, 2020  .  Rectum, sigmoid colon and proximal donut (low anterior resection):   - Invasive adenocarcinoma,  invading through the muscularis propria into   pericolorectal tissue   - Pathologic stage pT3, pN1a (see synoptic report)   - Resection margins negative for dysplasia or malignancy (proximal, distal   and radial margins), within 1 mm to radial margin   - One tumor deposit   - Adenocarcinoma involve one out of totally forty seven lymph nodes (1/46 in   part 1, totally 1/47, also see part 2)   - Background colon with diverticulosis associated with hyperpigmented   macrophages, benign   - Proximal donut with benign colon, no dysplasia or malignancy   - Tattoo identified   2.  Colon, distal donut (excision):   - Benign colon, negative for dysplasia or malignancy   - One lymph node, negative for metastatic carcinoma (0/1)   COLON AND RECTUM: Resection, Including Transanal Disk Excision of Rectal   Neoplasms   Procedure  Low anterior resection   Tumor Site  Rectum    Tumor Location: Straddles the anterior peritoneal reflection   Tumor Size   Greatest dimension (centimeters): 7.0 cm   + Additional dimensions (centimeters): 4.7 x 2.2 cm   Macroscopic Tumor Perforation  Not identified   Macroscopic Intactness of Mesorectum  Near complete   Histologic Type  Adenocarcinoma   Histologic Grade   G2: Moderately differentiated   Tumor Extension  Tumor invades through the muscularis propria into   pericolorectal tissue     Impression:  November 7, 2020 CT abdomen pelvis     1. Rectosigmoid junction mass, unchanged from prior.  No definite evidence of intraluminal contrast extravasation to suggest active gastrointestinal hemorrhage, however please note that arterial phase CT is more sensitive.  2. Colonic diverticulosis without  acute diverticulitis.  3. Minimal pneumobilia, unchanged      IMPRESSION:  CT chest lung screen October 2020     1.  3 mm pulmonary nodule identified in the lateral segment of the  right lower lobe.  2.  Focus of groundglass opacity measuring 11 mm in diameter in the  superior segment of the right lower lobe. Short-term follow-up for  this lesion is recommended.  3.  Mild emphysematous lung disease.     LUNG-RADS CATEGORY 3: PROBABLY BENIGN FINDINGS     RECOMMENDATION: Short-term imaging follow-up with 6 month LDCT.    IMPRESSION: 7/21  1. Nonspecific hypermetabolic soft tissue density focus cranial to the rectosigmoid colon suture line, perhaps reflecting postsurgical and or posttreatment or postinflammatory changes. Attention to this area at follow-up imaging is recommended to help differentiate from residual or recurrent neoplasm.  2. Improving postoperative changes in the lower anterior abdominal wall, with associated multifocal FDG hypermetabolism which is most characteristic of postoperative etiology.  3. Otherwise no convincing evidence of recurrent neoplasm or metastatic disease.IMPRESSION:  1. Interval increased size of hypermetabolic presacral mass contiguous with hypermetabolic circumferential wall thickening of the rectum at the level of the anastomotic suture line, consistent with recurrent neoplasm and metastatic disease.  2. Interval development of enlarged, hypermetabolic right iliac chain lymph node consistent with metastatic disease.  3. No additional convincing evidence of metastatic disease.  4. Bilateral sacral ala insufficiency fractures, with corresponding FDG hypermetabolism.  5. Intense FDG uptake throughout the thyroid gland, unchanged and nonspecific.    Impression:ct cap 4/23     1 mm left UVJ stone and 2 mm left intrarenal stone without hydronephrosis. On the right there is mild hydronephrosis down to a point of stricture overlying the sacrum the same area as attached mildly dilated  small bowel loops consistent with adhesions.  The patient has had rectal surgery with suture material noted. A small ventral hernia is noted containing a loop of small bowel above the umbilicus without evidence obstruction.  Prior cholecystectomy.  Prior hysterectomy.     Patient Active Problem List   Diagnosis    COPD (chronic obstructive pulmonary disease)    Pure hypercholesterolemia    Acquired hypothyroidism    Rectal bleeding    Rectal cancer    Malignant neoplasm of colon    B12 deficiency    FENG (iron deficiency anemia)    Chronic diarrhea    Severe obesity (BMI 35.0-39.9) with comorbidity    Secondary and unspecified malignant neoplasm of intra-abdominal lymph nodes    Dehydration    Diarrhea    Chemotherapy-induced neutropenia    Chemotherapy-induced diarrhea    Hypokalemia    UTI (urinary tract infection)    Anemia    Bilateral kidney stones   IMPRESSION:pet 12/2020  1. Postoperative changes of rectal colonic resection, with adjacent  FDG avid presacral soft tissue stranding.  2. Adjacent punctate extraluminal foci of free air suggesting a tiny  tract extending towards the adjacent small bowel (possibly  postoperative but suggesting a tiny enterocolonic fistula)  3. Deep subcutaneous soft tissue structure along the ventral abdomen  suggesting a postoperative hematoma/seroma, and less likely abscess.  4. No findings of additional sites of FDG avid disease.      IMPRESSION: 7/21  1. Nonspecific hypermetabolic soft tissue density focus cranial to the rectosigmoid colon suture line, perhaps reflecting postsurgical and or posttreatment or postinflammatory changes. Attention to this area at follow-up imaging is recommended to help differentiate from residual or recurrent neoplasm.  2. Improving postoperative changes in the lower anterior abdominal wall, with associated multifocal FDG hypermetabolism which is most characteristic of postoperative etiology.  3. Otherwise no convincing evidence of recurrent  neoplasm or metastatic disease      Impression:2/2/22   CT CAP  A single 4 mm soft tissue density nodule is noted in the right lower lobe.  No other intrapulmonary masses or nodules are seen.  Follow-up CT in 12 months per of this nodule is recommended.  Coronary artery calcification is noted.  No masses or adenopathy or seen.     Prior cholecystectomy.  Prior hysterectomy.  2.2 cm left ovarian cyst.  Small ventral hernia containing a loop of small bowel without CT evidence of obstruction    Impression:11/22     Presacral/pre coccygeal soft tissue density and fat stranding and circumferential wall thickening of segment of bowel down low in the pelvis at the surgical site are not significantly changed compared to the prior exam.  Prominent lymph node right iliac chain also does not appear significantly changed.  Findings also correspond as seen on prior PET-CT of 08/04/2022 and are suspicious for neoplasm  Assessment and Plan       Rectal ca: original dx 2020  Status post low anterior resection November 9, 2020 for rectal CA T3 N1a   tumor board convened and agreed on xrt that will follow  After completion of chemo  sequentially  therapy   Patient started FOLFOX.  Completed 4 out of 12 planned adjuvant cycles,  Discontinued  Chemo / port removed completed xrt 5/21  Pet:8/22, showing progression   Started  FOLFIRI + avastin   from second cycle changed to FOLFIRI panitumumab based on benefit   Sig diarrhea with CPT-11, in spite of added lomotil to imodium  decreased dose of CPT-11  to 165 mg/m2 but patient is still hospitalized Scan shows no change 12/22   Discontinued cpt -11 and cont with 5fu and vectibix Dc the bolus 5FU     4/11/23:But pt has been admitted with renal stones, has bilateral stents, wants to wait till the stents out    4/24/23 : ct showing no dz, can wait to resume chemo , see ovidio  6 weeks with repat scans   Kamilah in chart     Renal stones: has stents, folows with urology    FENG :Injectafer   completed 8/22 good response         b12 def anemia, was taking injections q month level checked 10/21 sub therpautic 226 t pt to add SL b12 to her twice a month b12 shots INSURANCE WONT PAY FOR B12.ok to cont with SL only as levels have improved to 1500    hypocalcemia which seems chronic pt to supplement orally daily wnl this visit    Hypothyroidism, sees DR Mcdermott will adj meds  History of chronic obstructive pulmonary disease, dyslipidemia and hypothyroidism continue with PCP  Doesn't want to be covid vaccinated    5/9/2023:  Her potassium is normal at today's visit.  She is feeling extremely well as recently returned from a trip from [place].  During her chemo holiday she is wanting to go see her granddaughter in [place].  I advised her to keep her follow-up & scan appointment with MD. all questions and concerns were addressed and answered.

## 2023-05-15 NOTE — TELEPHONE ENCOUNTER
Call placed to MsBola Zoe in regards to message received. She stated she is having mucus in her stool and is concerned. Offered her an appt on 5/24 at 3p. She accepted. No further issues noted.

## 2023-05-15 NOTE — TELEPHONE ENCOUNTER
----- Message from Yesenia Wallace sent at 5/15/2023 10:51 AM CDT -----  Contact: patient  Type:  Needs Medical Advice    Who Called:  patient     Symptoms (please be specific):  stomach issues      How long has patient had these symptoms:      Pharmacy name and phone #:      Would the patient rather a call back or a response via [facility]? Call     Best Call Back Number: [phone] (home)      Additional Information:  Patient would like to speak with the nurse in regards to some stomach issues that she is having.     Please call to advise

## 2023-05-24 NOTE — PROGRESS NOTES
[facility]    CC: Diarrhea, Rectal cancer    HPI 67 y.o. female well known to me, unfortunately with large, recurrent, rectosigmoid cancer diagnosed August 2022, presents for follow up of frequent, watery diarrhea and incontinence. She also notes constipation and we discussed that she likely is having seepage around known obstructive rectosigmoid cancer. She completed a second cycle of FOLFIRI and is followed by Dr. Frank.    Past Medical History:   Diagnosis Date    Acute hypoxemic respiratory failure 07/09/2018    Arthritis     Bilateral kidney stones 3/27/2023    Cancer     colon    COPD (chronic obstructive pulmonary disease)     History of home oxygen therapy     ONLY PRN AND HASN'T USED IT IN A YEAR    Hyperlipidemia     Thyroid disease        Allergies and Medications reviewed     Review of Systems  General ROS: negative for - chills, fever or weight loss  Cardiovascular ROS: no chest pain or dyspnea on exertion  Gastrointestinal ROS: + fecal incontinence, mild lower abdominal pain, no vomiting    Physical Examination  BP (!) 151/90   Pulse 96   Ht 5' (1.524 m)   Wt 86 kg (189 lb 9.5 oz)   BMI 37.03 kg/m²   General appearance: alert, cooperative, no distress  HENT: Normocephalic, atraumatic, neck symmetrical, no nasal discharge, sclera anicteric   Lungs: clear to auscultation bilaterally, symmetric chest wall expansion bilaterally  Heart: regular rate and rhythm without rub; no displacement of the PMI   Abdomen: soft, obese, nontender,nondistended, BS active  Extremities: extremities symmetric; no clubbing, cyanosis, or edema        Labs:  April 4/22/23- CEA < 1.7    Imaging:  CT chest/abdomen/pelvis April 2023 was independently visualized and reviewed by me and showed .1 mm left UVJ stone and 2 mm left intrarenal stone without hydronephrosis. On the right there is mild hydronephrosis down to a point of stricture overlying the sacrum the same area as attached mildly dilated small bowel  loops consistent with adhesions.  The patient has had rectal surgery with suture material noted. A small ventral hernia is noted containing a loop of small bowel above the umbilicus without evidence obstruction.  Prior cholecystectomy.  Prior hysterectomy.    Assessment:   67 y.o. 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     Plan:  -Schedule colonoscopy  -Begin Miralax 1 capful and Magnesium oxide 400 mg daily       Jolynn Ayala MD  [facility]  [address], [address]  [place] [address]  Office: [phone]  Fax: [phone]

## 2023-05-25 NOTE — PROGRESS NOTES
Outpatient Care Management  Plan of Care Follow Up Visit    Patient: Tessa Enriquez  MRN: [id number]  Date of Service: 05/25/2023  Completed by: Ginna Chapman RN  Referral Date: 02/23/2023    No chief complaint on file.      Brief Summary: She had an appt with GI on 05/24/23.Colonoscopy scheduled for 05/31/23. CT scan on 06/03/23.  Next Steps: Follow up in three weeks

## 2023-05-26 NOTE — PROGRESS NOTES
[facility] Established Patient/H&P:    Tessa Enriquez is a 67 y.o. female who presents for follow up for right obstructing urolithiasis.     Patient with a history of colon cancer s/p XRT and chemo, COPD and HLD who presented to the emergency department on 02/21/2023 with severe right flank pain beginning 1 day prior.     CT renal stone revealed a 4 mm stone at the right ureterovesical junction with an additional distal right ureteral stone measuring 4 mm immediately proximal with subsequent moderate right hydroureteronephrosis. UA micro with 6 WBC, 1 RBC and few bacteria.      Urology consulted to assist with management.      No prior history of kidney stones. States her pain has improved since the ED. However, repeat CT still shows her stones in place with hydronephrosis.       Interval History    5/26/23: She underwent cystoscopy and right stent placement on 2/22/23. Subsequently underwent right ureteroscopy with basket extraction of stones and stent exchange on 3/7/23. Presented back to the ED with pain on 3/25/23. CT showed a 7 mm left proximal ureteral stone and 6 mm distal right stone (previously in lower pole) with bilateral hydronephrosis. Stents placed bilaterally on 3/26/23 with Dr. Barber. She is now bilateral ureteroscopy and stent placement on 4/1123. Stents removed at home. Post-op ultrasound on 5/24/23 with moderate right hydro. Doing well with no urologic complaints.      Denies any fever, chills, gross hematuria, bone pain,  trauma or history of  malignancy.         Urine culture  E. Coli              3/28/22    Past Medical History:   Diagnosis Date    Acute hypoxemic respiratory failure 07/09/2018    Arthritis     Bilateral kidney stones 3/27/2023    Cancer     colon    COPD (chronic obstructive pulmonary disease)     History of home oxygen therapy     ONLY PRN AND HASN'T USED IT IN A YEAR    Hyperlipidemia     Thyroid disease        Past Surgical History:    Procedure Laterality Date    CHOLECYSTECTOMY      COLECTOMY N/A 11/9/2020    Procedure: COLECTOMY;  Surgeon: Brandon Wagner MD;  Location: [facility] OR;  Service: General;  Laterality: N/A;  LOWER- ANTERIOR    COLONOSCOPY N/A 10/23/2020    Procedure: COLONOSCOPY;  Surgeon: Jolynn Ayala MD;  Location: [facility] ENDO;  Service: Endoscopy;  Laterality: N/A;    COLONOSCOPY N/A 8/6/2021    Procedure: COLONOSCOPY;  Surgeon: Jolynn Ayala MD;  Location: [facility] ENDO;  Service: Endoscopy;  Laterality: N/A;    COLONOSCOPY N/A 7/7/2022    Procedure: COLONOSCOPY;  Surgeon: Jolynn Ayala MD;  Location: [facility] ENDO;  Service: Endoscopy;  Laterality: N/A;    CYSTOSCOPY W/ URETERAL STENT PLACEMENT Right 2/22/2023    Procedure: CYSTOSCOPY, WITH URETERAL STENT INSERTION;  Surgeon: Carin Kwon Jr., MD;  Location: [facility] OR;  Service: Urology;  Laterality: Right;    CYSTOSCOPY W/ URETERAL STENT PLACEMENT Bilateral 3/26/2023    Procedure: CYSTOSCOPY, WITH URETERAL STENT INSERTION;  Surgeon: Sabine Barber MD;  Location: [facility] OR;  Service: Urology;  Laterality: Bilateral;    CYSTOSCOPY WITH URETEROSCOPY, RETROGRADE PYELOGRAPHY, AND INSERTION OF STENT Bilateral 3/26/2023    Procedure: CYSTOSCOPY, WITH RETROGRADE PYELOGRAM AND URETERAL STENT INSERTION;  Surgeon: Sabine Barber MD;  Location: [facility] OR;  Service: Urology;  Laterality: Bilateral;    CYSTOURETEROSCOPY WITH RETROGRADE PYELOGRAPHY AND INSERTION OF STENT INTO URETER Right 3/7/2023    Procedure: CYSTOURETEROSCOPY, WITH RETROGRADE PYELOGRAM AND URETERAL STENT INSERTION;  Surgeon: Carin Kwon Jr., MD;  Location: [facility] OR;  Service: Urology;  Laterality: Right;    CYSTOURETEROSCOPY, WITH HOLMIUM LASER LITHOTRIPSY OF URETERAL CALCULUS AND STENT INSERTION Bilateral 4/11/2023    Procedure: CYSTOURETEROSCOPY, WITH HOLMIUM LASER LITHOTRIPSY OF URETERAL CALCULUS AND STENT INSERTION;  Surgeon: Carin Kwon Jr., MD;  Location: [facility] OR;  Service: Urology;  Laterality:  Bilateral;    ESOPHAGEAL DILATION N/A 6/29/2021    Procedure: DILATION, ESOPHAGUS;  Surgeon: Sourav Baires III, MD;  Location: [facility] ENDO;  Service: Endoscopy;  Laterality: N/A;    ESOPHAGOGASTRODUODENOSCOPY  06/29/2021    Dilation to 57 Fr    ESOPHAGOGASTRODUODENOSCOPY N/A 6/29/2021    Procedure: EGD (ESOPHAGOGASTRODUODENOSCOPY);  Surgeon: Sourav Baires III, MD;  Location: [facility] ENDO;  Service: Endoscopy;  Laterality: N/A;    FLEXIBLE SIGMOIDOSCOPY N/A 8/1/2022    Procedure: SIGMOIDOSCOPY, FLEXIBLE;  Surgeon: Brandon Wagner MD;  Location: [facility] ENDO;  Service: Endoscopy;  Laterality: N/A;    HYSTERECTOMY      INSERTION OF TUNNELED CENTRAL VENOUS CATHETER (CVC) WITH SUBCUTANEOUS PORT N/A 1/11/2021    Procedure: [id number];  Surgeon: Brandon Wagner MD;  Location: [facility] OR;  Service: General;  Laterality: N/A;    INSERTION OF TUNNELED CENTRAL VENOUS CATHETER (CVC) WITH SUBCUTANEOUS PORT Right 9/1/2022    Procedure: [id number]-CATH;  Surgeon: Brandon Wagner MD;  Location: [facility] OR;  Service: General;  Laterality: Right;  Wants rt side placement    MEDIPORT REMOVAL Left 4/19/2021    Procedure: REMOVAL, CATHETER, CENTRAL VENOUS, TUNNELED, WITH PORT;  Surgeon: Brandon Wagner MD;  Location: [facility] OR;  Service: General;  Laterality: Left;    REMOVAL-STENT Right 3/7/2023    Procedure: REMOVAL-STENT;  Surgeon: Carin Kwon Jr., MD;  Location: [facility] OR;  Service: Urology;  Laterality: Right;    REMOVAL-STENT Bilateral 4/11/2023    Procedure: REMOVAL-STENT;  Surgeon: Carin Kwon Jr., MD;  Location: [facility] OR;  Service: Urology;  Laterality: Bilateral;    RETROGRADE PYELOGRAPHY Bilateral 4/11/2023    Procedure: PYELOGRAM, RETROGRADE;  Surgeon: Carin Kwon Jr., MD;  Location: [facility] OR;  Service: Urology;  Laterality: Bilateral;    ROBOT-ASSISTED COLECTOMY N/A 11/9/2020    Procedure: ROBOTIC COLECTOMY low anterior resection, possible open;  Surgeon: Brandon Wagner MD;  Location: [facility] OR;   Service: General;  Laterality: N/A;  CONVERTED TO OPEN AT 1503    URETEROSCOPIC REMOVAL OF URETERIC CALCULUS Right 3/7/2023    Procedure: REMOVAL, CALCULUS, URETER, URETEROSCOPIC;  Surgeon: Carin Kwon Jr., MD;  Location: [facility] OR;  Service: Urology;  Laterality: Right;    URETEROSCOPIC REMOVAL OF URETERIC CALCULUS Bilateral 4/11/2023    Procedure: REMOVAL, CALCULUS, URETER, URETEROSCOPIC;  Surgeon: Carin Kwon Jr., MD;  Location: [facility] OR;  Service: Urology;  Laterality: Bilateral;       Review of patient's allergies indicates:   Allergen Reactions    Ativan [lorazepam] Hives and Itching    Flagyl [metronidazole] Itching and Rash    Pcn [penicillins] Hives, Itching and Rash       Medications Reviewed: see MAR      FOCUSED PHYSICAL EXAM:    Vitals:    05/26/23 0850   BP: (!) 160/84   Pulse: 87     Body mass index is 36.91 kg/m². Weight: 85.7 kg (189 lb) Height: 5' (152.4 cm)       General: Alert, cooperative, no distress, appears stated age  Abdomen: Soft, non-tender, no CVA tenderness, non-distended      LABS:    No results found for this or any previous visit (from the past 336 hour(s)).        Assessment/Diagnosis:    1. Other hydronephrosis  US Retroperitoneal Complete      2. Kidney stone            Plans:    - I spent 30 minutes of the day of this encounter preparing for, treating and managing this patient. Extensive discussion with patient regarding the etiology and management of nephrolithiasis. We discussed the importance of decreased sodium intake and decreased animal protein, as well as an increase H2O intake for stone prevention.   - Repeat renal ultrasound in 3 months. Follow up contingent on results.

## 2023-05-31 NOTE — TRANSFER OF CARE
"Anesthesia Transfer of Care Note    Patient: Tessa Enriquez    Procedure(s) Performed: Procedure(s) (LRB):  COLONOSCOPY (N/A)    Patient location: PACU    Anesthesia Type: general    Transport from OR: Transported from OR on room air with adequate spontaneous ventilation    Post pain: adequate analgesia    Post assessment: no apparent anesthetic complications    Post vital signs: stable    Level of consciousness: awake and alert    Nausea/Vomiting: no nausea/vomiting    Complications: none    Transfer of care protocol was followed      Last vitals:   Visit Vitals  BP (!) 184/85 (BP Location: Left arm, Patient Position: Lying)   Pulse 86   Temp 36.5 °C (97.7 °F) (Skin)   Resp 18   Ht 5' 1" (1.549 m)   Wt 81.6 kg (180 lb)   SpO2 98%   Breastfeeding No   BMI 34.01 kg/m²     "

## 2023-05-31 NOTE — ANESTHESIA PREPROCEDURE EVALUATION
05/31/2023  Tessa Enriquez is a 67 y.o., female.      Pre-op Assessment    I have reviewed the NPO Status.   I have reviewed the Medications.     Review of Systems  Anesthesia Hx:  No problems with previous Anesthesia    Social:  Non-Smoker    Cardiovascular:   Exercise tolerance: good    Pulmonary:   COPD    Renal/:   Chronic Renal Disease renal calculi    Neurological:  Neurology Normal    Endocrine:   Hypothyroidism  Obesity / BMI > 30      Physical Exam  General: Well nourished        Anesthesia Plan  Type of Anesthesia, risks & benefits discussed:    Anesthesia Type: Gen Natural Airway  Intra-op Monitoring Plan: Standard ASA Monitors  Induction:  IV  Informed Consent: Informed consent signed with the Patient and all parties understand the risks and agree with anesthesia plan.  All questions answered. Patient consented to blood products? No  ASA Score: 3    Ready For Surgery From Anesthesia Perspective.     .

## 2023-05-31 NOTE — ANESTHESIA POSTPROCEDURE EVALUATION
Anesthesia Post Evaluation    Patient: Tessa Enriquez    Procedure(s) Performed: Procedure(s) (LRB):  COLONOSCOPY (N/A)    Final Anesthesia Type: general      Patient location during evaluation: PACU  Patient participation: Yes- Able to Participate  Level of consciousness: awake and alert and oriented  Post-procedure vital signs: reviewed and stable  Pain management: adequate  Airway patency: patent    PONV status at discharge: No PONV  Anesthetic complications: no      Cardiovascular status: blood pressure returned to baseline  Respiratory status: unassisted, spontaneous ventilation and room air  Hydration status: euvolemic  Follow-up not needed.          Vitals Value Taken Time   /62 05/31/23 0855   Temp 36.6 05/31/23 0901   Pulse 76 05/31/23 0855   Resp 16 05/31/23 0855   SpO2 93 % 05/31/23 0855         No case tracking events are documented in the log.      Pain/Bautista Score: Bautista Score: 9 (5/31/2023  8:55 AM)

## 2023-05-31 NOTE — H&P
"[facility]    CC: Irregular bowel habits, h/o colon cancer    HPI 67 y.o. female presents for irregular bowel habits and irregular bowel habits. Last colonsocopy August 2022 with recurrent cancer at anastomotic site    Past Medical History:   Diagnosis Date    Acute hypoxemic respiratory failure 07/09/2018    Arthritis     Bilateral kidney stones 3/27/2023    Cancer     colon    COPD (chronic obstructive pulmonary disease)     History of home oxygen therapy     ONLY PRN AND HASN'T USED IT IN A YEAR    Hyperlipidemia     Thyroid disease        Allergies and Medications reviewed     Review of Systems  General ROS: negative for - chills, fever or weight loss  Cardiovascular ROS: no chest pain or dyspnea on exertion  Gastrointestinal ROS: + irregular bowel habits    Physical Examination  BP (!) 184/85 (BP Location: Left arm, Patient Position: Lying)   Pulse 86   Temp 97.7 °F (36.5 °C) (Skin)   Resp 18   Ht 5' 1" (1.549 m)   Wt 81.6 kg (180 lb)   SpO2 98%   Breastfeeding No   BMI 34.01 kg/m²   General appearance: alert, cooperative, no distress  HENT: Normocephalic, atraumatic, neck symmetrical, no nasal discharge, sclera anicteric   Lungs: clear to auscultation bilaterally, symmetric chest wall expansion bilaterally  Heart: regular rate and rhythm without rub; no displacement of the PMI   Abdomen: soft  Extremities: extremities symmetric; no clubbing, cyanosis, or edema        Labs:  Lab Results   Component Value Date    WBC 4.37 04/22/2023    HGB 12.3 04/22/2023    HCT 38.9 04/22/2023    MCV 92 04/22/2023     04/22/2023           Assessment:   67 y.o. female presents for colonoscopy    Plan:  -proceed to colonoscopy     Jolynn Ayala MD  [facility]  [address], [address]  [place] [address]  Office: [phone]  Fax: [phone]   "

## 2023-05-31 NOTE — [employer] PATIENT INSTRUCTIONS
Discharge Summary/Instructions after an Endoscopic Procedure  Patient Name: Tessa Enriquez  Patient MRN: [id number]  Patient YOB: 1955  Wednesday, May 31, 2023  Jolynn Ayala MD  Dear patient,  As a result of recent federal legislation (The Federal Cures Act), you may   receive lab or pathology results from your procedure in your [facility]   account before your physician is able to contact you. Your physician or   their representative will relay the results to you with their   recommendations at their soonest availability.  Thank you,  RESTRICTIONS:  During your procedure today, you received medications for sedation.  These   medications may affect your judgment, balance and coordination.  Therefore,   for 24 hours, you have the following restrictions:   - DO NOT drive a car, operate machinery, make legal/financial decisions,   sign important papers or drink alcohol.    ACTIVITY:  Today: no heavy lifting, straining or running due to procedural   sedation/anesthesia.  The following day: return to full activity including work.  DIET:  Eat and drink normally unless instructed otherwise.     TREATMENT FOR COMMON SIDE EFFECTS:  - Mild abdominal pain, nausea, belching, bloating or excessive gas:  rest,   eat lightly and use a heating pad.  - Sore Throat: treat with throat lozenges and/or gargle with warm salt   water.  - Because air was used during the procedure, expelling large amounts of air   from your rectum or belching is normal.  - If a bowel prep was taken, you may not have a bowel movement for 1-3 days.    This is normal.  SYMPTOMS TO WATCH FOR AND REPORT TO YOUR PHYSICIAN:  1. Abdominal pain or bloating, other than gas cramps.  2. Chest pain.  3. Back pain.  4. Signs of infection such as: chills or fever occurring within 24 hours   after the procedure.  5. Rectal bleeding, which would show as bright red, maroon, or black stools.   (A tablespoon of blood from the rectum is not serious,  especially if   hemorrhoids are present.)  6. Vomiting.  7. Weakness or dizziness.  GO DIRECTLY TO THE NEAREST EMERGENCY ROOM IF YOU HAVE ANY OF THE FOLLOWING:      Difficulty breathing              Chills and/or fever over 101 F   Persistent vomiting and/or vomiting blood   Severe abdominal pain   Severe chest pain   Black, tarry stools   Bleeding- more than one tablespoon   Any other symptom or condition that you feel may need urgent attention  Your doctor recommends these additional instructions:  If any biopsies were taken, your doctors clinic will contact you in 1 to 2   weeks with any results.  - Discharge patient to home (with escort).   - Patient has a contact number available for emergencies.  The signs and   symptoms of potential delayed complications were discussed with the   patient.  Return to normal activities tomorrow.  Written discharge   instructions were provided to the patient.   - Resume previous diet.   - Continue present medications.   - Await pathology results.   - Repeat colonoscopy date to be determined after pending pathology results   are reviewed for surveillance based on pathology results.   - Refer to a colo-rectal surgeon.   -Miralax BID  For questions, problems or results please call your physician - Jolynn Ayala MD at Work:  [phone].  [facility], EMERGENCY ROOM PHONE NUMBER: [phone]  IF A COMPLICATION OR EMERGENCY SITUATION ARISES AND YOU ARE UNABLE TO REACH   YOUR PHYSICIAN - GO DIRECTLY TO THE EMERGENCY ROOM.  Jolynn Ayala MD  5/31/2023 9:02:11 AM  This report has been verified and signed electronically.  Dear patient,  As a result of recent federal legislation (The Federal Cures Act), you may   receive lab or pathology results from your procedure in your [facility]   account before your physician is able to contact you. Your physician or   their representative will relay the results to you with their   recommendations at their  soonest availability.  Thank you,  [employer]OVATION

## 2023-05-31 NOTE — TELEPHONE ENCOUNTER
----- Message from Sabrina Milton sent at 5/31/2023  2:56 PM CDT -----  Contact: Patient  Type:  Patient Needs Advice    Who Called:  Patient  What is this regarding?:  Pt had a colonoscopy done and she is having burning and stinging. She wants to make sure this is normal.  Best Call Back Number:  [phone]  Additional Information:  Please call the patient back at the phone number listed above to advise. Thank you!

## 2023-05-31 NOTE — TELEPHONE ENCOUNTER
Call placed to Ms. Zarate in regards to message received. I I advised her per DR. Ayala that her symptoms are normal. She verbalized understanding. No further issues noted.

## 2023-06-01 NOTE — TELEPHONE ENCOUNTER
I called patient to schedule her with Dr. Wagner on Tuesday, 6/6/23 @ 4pm in [place], but told her to come in @ 1pm.  Anurag

## 2023-06-01 NOTE — TELEPHONE ENCOUNTER
Returned pt's call per request. Pt states that she would like to postpone CT scan, labs, and f/u with Dr Frank to after she sees Dr Wagner next wk. Infusion notified to r/s chemo appt.      ----- Message from Ricky Rodriguez sent at 6/1/2023  9:32 AM CDT -----  Regarding: Return Call  Contact: Patient  Type:  Patient Returning Call    Who Called:Patient  Who Left Message for Patient:office staff  Does the patient know what this is regarding?:upcoming appointment  Would the patient rather a call back or a response via [facility]? call  Best Call Back Number:[phone]  Additional Information: Please call patient.

## 2023-06-01 NOTE — PROGRESS NOTES
67-year-old female with whom I am familiar.  Patient had rectosigmoid resection for colorectal cancer performed in November of 2020.  1 of 46 nodes were positive for cancer.  She initially was started on FOLFOX but tolerated it poorly.  She only completed 3 treatments.  Following this she had radiation therapy which she did complete in a staggered fashion.  In August of last year she was found to have an anastomotic recurrence.  A port was placed in September by me.  She was started on FOLFIRI and Avastin.  Which was ultimately changed to FOLFIRI and Vectibix.  In February this year her chemo was held.  In April she would a CT scan of the chest abdomen and pelvis.  There was no definitive evidence of recurrent disease.  However there was finding of hydronephrosis on the right side with a stricture over the sacrum and what was likely the surgical field.  She had a recent colonoscopy on May 31st demonstrating adenocarcinoma at the anastomosis site.  Continues to note diarrhea and symptoms consistent with tenesmus.'    Lab Results   Component Value Date    CEA <1.7 04/22/2023     A/P:  Anastomotic recurrence    Discussion with patient.  She does have an anastomotic recurrence.  Recommend proceeding with a PET-CT scan to confirm no evidence of further metastatic spread.  Will make further recommendations based on these results.

## 2023-06-07 NOTE — TELEPHONE ENCOUNTER
I called and spoke to Violeta @ [facility] Radiology Scheduling and she'll call the patient to schedule the PET CT.  Anurag

## 2023-06-07 NOTE — TELEPHONE ENCOUNTER
PET CT Scan is scheduled on Wednesday, 6/14/23 @ [facility] and patient has been notified by Violeta Soto.  Anurag

## 2023-06-09 NOTE — TELEPHONE ENCOUNTER
This nurse called pt to verify that she is holding all chemo until after PET scan. Pt confirmed. Dr Frank is aware.

## 2023-06-12 NOTE — PLAN OF CARE
Problem: Fatigue  Goal: Improved Activity Tolerance  Intervention: Promote Improved Energy  Flowsheets (Taken 6/12/2023 0811)  Fatigue Management: fatigue-related activity identified  Activity Management: Ambulated -L4

## 2023-06-13 NOTE — TELEPHONE ENCOUNTER
----- Message from Elma Cristina sent at 6/13/2023 10:44 AM CDT -----  Regarding: ##NEED NEW PET SCAN ORDER##  Good Morning,     The above mentioned patient is scheduled for a Pet Scan on Wednesday, June 13, 2023. The diagnosis code G89.3 provided on the order does not meet medical necessity and may not be covered by the patient's insurance. A new order must be provided prior to the patient's appointment.  Please advise if the patient needs to be rescheduled or cancelled.  If I can be of any assistance, please call me at [phone].        Thanks,     Dariana Cristina  [facility] Pre-Service

## 2023-06-15 NOTE — TELEPHONE ENCOUNTER
----- Message from Ginna Chapman RN sent at 6/15/2023 10:33 AM CDT -----  Patient had PET CT on 06/14/23. She would like an appt to review results and next steps.   Please call and advise patient.      Thank you for your assistance,   Ginna Chapman RN [facility]   Outpatient Complex Care Management  [phone]

## 2023-06-15 NOTE — TELEPHONE ENCOUNTER
I called patient to schedule her to see Dr. Wagner on Tuesday, 6/27/23 @ 1:30pm in [place] to discuss her CT PET Scan test results and surgery.  Patient has an appointment with Dr. Frank on Tuesday, 6/20/23 @ 10:20am and she'll discuss waiting on having Chemo till after the surgery.  Anurag

## 2023-06-15 NOTE — PROGRESS NOTES
Outpatient Care Management  Plan of Care Follow Up Visit    Patient: Tessa Enriquez  MRN: [id number]  Date of Service: 06/15/2023  Completed by: Ginna Chapman RN  Referral Date: 02/23/2023    Reason for Visit   Patient presents with    OPCM RN Follow Up Call       Brief Summary: Urology appt on 05/26/23. Appt with Dr Wagner on 06/06/23. PET CT for rectal cancer on 06/14/23. Appt with oncology on 06/20/23 with labs.Reviewed healthy diet.   Next Steps: Follow up in three weeks with possible closure if no new needs on or around 07/06/23  Message to Dr Wagner for appt to review PET CT on 06/14/23 and next steps

## 2023-06-20 NOTE — PROGRESS NOTES
Subjective:       Patient ID: Tessa Enriquez is a 67 y.o. female.    Chief Complaint:   rectal CA status post biopsy October 2020 robotic LAR November 9, 2020 by Dr. Wagner     works at [employer]    pt went to [facility] rectal bleeding and abd pain scoped  Findings:        The digital rectal exam revealed a firm rectal mass palpated 5 to 6        cm from the anal verge. The mass was non-circumferential and located        predominantly at the posterior bowel wall.        There was evidence of a prior end-to-end colo-rectal anastomosis in        the distal rectum. This was patent and was characterized by        ulceration. The anastomosis was traversed. Mass in association with        the anastomosis has been previously biopsied and confirmed to be        adenocarcinoma. Anastomosis present at approximately 5 cm from anal     Oncology History:  T3 N1 rectal CA diagnosed October 2020 robotic LAR November 2020 December 2020 PET scan negative for metastatic disease  had colonoscopy 8/21 NAD  tumor board convened and agreed on xrt that will follow  After completion of chemo  sequentially  therapy   Patient started FOLFOX.  Completed 4 out of 12 planned adjuvant cycles,  Discontinued  Chemo / port removed completed xrt 5/21  Pet:8/22, showing progression  Started  FOLFIRI + avastin   from second cycle changed to FOLFIRI panitumumab based on benefit   Sig diarrhea with CPT-11, in spite of added lomotil to imodium  decreased dose of CPT-11  to 165 mg/m2 but patient is still hospitalized Scan shows no change 12/22   Discontinued cpt -11 and cont with 5fu and vectibix  2/23 renal stones. Pt held chemo      ROS:    SKIN: +rash from vectibix, issues with nails,  sore in nose , bt not too bad occ bleeding,nose. blotching    skin or abnormal bruising. Denies new moles or changes to existing moles.      BREASTS: There is no swelling around breasts or nipple discharge.    EYES: Denies eye pain, blurred vision, swelling,  redness or discharge.      ENT AND MOUTH: Denies runny nose, stuffiness, sinus trouble or sores. Denies    nosebleeds. Denies, hoarseness, change in voice or swelling in front of the    neck.      CARDIOVASCULAR: Denies chest pain, discomfort or palpitations. Denies neck    swelling or episodes of passing out.      RESPIRATORY: Denies cough, sputum production, blood in sputum, and denies    shortness of breath.     Diarrhea with use of K p-t refuses to replace po. Rectal bleeding has got better, only some friom hemorrohids    GENITOURINARY: had renal stones and admitted has stents and hematuria this is better now    MUSCULOSKELETAL: Denies neck or back pain. Denies weakness in arms or legs,    joint problems or distended inflamed veins in legs. Denies swelling or abnormal  glands.      NEUROLOGICAL: Denies tingling, numbness, altered mentation changes to nerve    function in the face, weakness to one or both of the body. Denies changes to    gait and denies multiple falls or accidents.      PSYCHIATRIC: Denies nervousness, anxiety, hallucinations, depression, suicidal    ideation, trouble sleeping or changes in behavior noticed by family.            Past Medical History:   Diagnosis Date    Acute hypoxemic respiratory failure 07/09/2018    Arthritis     Bilateral kidney stones 3/27/2023    Cancer     colon    COPD (chronic obstructive pulmonary disease)     History of home oxygen therapy     ONLY PRN AND HASN'T USED IT IN A YEAR    Hyperlipidemia     Thyroid disease      Past Surgical History:   Procedure Laterality Date    CHOLECYSTECTOMY      COLECTOMY N/A 11/9/2020    Procedure: COLECTOMY;  Surgeon: Brandon Wagner MD;  Location: [facility] OR;  Service: General;  Laterality: N/A;  LOWER- ANTERIOR    COLONOSCOPY N/A 10/23/2020    Procedure: COLONOSCOPY;  Surgeon: Jolynn Ayala MD;  Location: [facility];  Service: Endoscopy;  Laterality: N/A;    COLONOSCOPY N/A 8/6/2021    Procedure: COLONOSCOPY;  Surgeon: Jolynn  YUDY Ayala MD;  Location: [facility] ENDO;  Service: Endoscopy;  Laterality: N/A;    COLONOSCOPY N/A 7/7/2022    Procedure: COLONOSCOPY;  Surgeon: Jolynn Ayala MD;  Location: [facility] ENDO;  Service: Endoscopy;  Laterality: N/A;    COLONOSCOPY N/A 5/31/2023    Procedure: COLONOSCOPY;  Surgeon: Jolynn Ayala MD;  Location: [facility] ENDO;  Service: Endoscopy;  Laterality: N/A;    CYSTOSCOPY W/ URETERAL STENT PLACEMENT Right 2/22/2023    Procedure: CYSTOSCOPY, WITH URETERAL STENT INSERTION;  Surgeon: Carin Kwon Jr., MD;  Location: [facility] OR;  Service: Urology;  Laterality: Right;    CYSTOSCOPY W/ URETERAL STENT PLACEMENT Bilateral 3/26/2023    Procedure: CYSTOSCOPY, WITH URETERAL STENT INSERTION;  Surgeon: Sabine Barber MD;  Location: [facility] OR;  Service: Urology;  Laterality: Bilateral;    CYSTOSCOPY WITH URETEROSCOPY, RETROGRADE PYELOGRAPHY, AND INSERTION OF STENT Bilateral 3/26/2023    Procedure: CYSTOSCOPY, WITH RETROGRADE PYELOGRAM AND URETERAL STENT INSERTION;  Surgeon: Sabine Barber MD;  Location: [facility] OR;  Service: Urology;  Laterality: Bilateral;    CYSTOURETEROSCOPY WITH RETROGRADE PYELOGRAPHY AND INSERTION OF STENT INTO URETER Right 3/7/2023    Procedure: CYSTOURETEROSCOPY, WITH RETROGRADE PYELOGRAM AND URETERAL STENT INSERTION;  Surgeon: Carin Kwon Jr., MD;  Location: [facility] OR;  Service: Urology;  Laterality: Right;    CYSTOURETEROSCOPY, WITH HOLMIUM LASER LITHOTRIPSY OF URETERAL CALCULUS AND STENT INSERTION Bilateral 4/11/2023    Procedure: CYSTOURETEROSCOPY, WITH HOLMIUM LASER LITHOTRIPSY OF URETERAL CALCULUS AND STENT INSERTION;  Surgeon: Carin Kwon Jr., MD;  Location: [facility] OR;  Service: Urology;  Laterality: Bilateral;    ESOPHAGEAL DILATION N/A 6/29/2021    Procedure: DILATION, ESOPHAGUS;  Surgeon: Sourav Baires III, MD;  Location: [facility] ENDO;  Service: Endoscopy;  Laterality: N/A;    ESOPHAGOGASTRODUODENOSCOPY  06/29/2021    Dilation to 57 Fr    ESOPHAGOGASTRODUODENOSCOPY N/A  6/29/2021    Procedure: EGD (ESOPHAGOGASTRODUODENOSCOPY);  Surgeon: Sourva Baires III, MD;  Location: [facility] ENDO;  Service: Endoscopy;  Laterality: N/A;    FLEXIBLE SIGMOIDOSCOPY N/A 8/1/2022    Procedure: SIGMOIDOSCOPY, FLEXIBLE;  Surgeon: Brandon Wagner MD;  Location: [facility] ENDO;  Service: Endoscopy;  Laterality: N/A;    HYSTERECTOMY      INSERTION OF TUNNELED CENTRAL VENOUS CATHETER (CVC) WITH SUBCUTANEOUS PORT N/A 1/11/2021    Procedure: [id number];  Surgeon: Brandon Wagner MD;  Location: [facility] OR;  Service: General;  Laterality: N/A;    INSERTION OF TUNNELED CENTRAL VENOUS CATHETER (CVC) WITH SUBCUTANEOUS PORT Right 9/1/2022    Procedure: [id number];  Surgeon: Brandon Wagner MD;  Location: [facility] OR;  Service: General;  Laterality: Right;  Wants rt side placement    MEDIPORT REMOVAL Left 4/19/2021    Procedure: REMOVAL, CATHETER, CENTRAL VENOUS, TUNNELED, WITH PORT;  Surgeon: Brandon Wagner MD;  Location: [facility] OR;  Service: General;  Laterality: Left;    REMOVAL-STENT Right 3/7/2023    Procedure: REMOVAL-STENT;  Surgeon: Carin Kwon Jr., MD;  Location: [facility] OR;  Service: Urology;  Laterality: Right;    REMOVAL-STENT Bilateral 4/11/2023    Procedure: REMOVAL-STENT;  Surgeon: Carin Kwon Jr., MD;  Location: [facility] OR;  Service: Urology;  Laterality: Bilateral;    RETROGRADE PYELOGRAPHY Bilateral 4/11/2023    Procedure: PYELOGRAM, RETROGRADE;  Surgeon: Carin Kwon Jr., MD;  Location: [facility] OR;  Service: Urology;  Laterality: Bilateral;    ROBOT-ASSISTED COLECTOMY N/A 11/9/2020    Procedure: ROBOTIC COLECTOMY low anterior resection, possible open;  Surgeon: Brandon Wagner MD;  Location: [facility] OR;  Service: General;  Laterality: N/A;  CONVERTED TO OPEN AT 1503    URETEROSCOPIC REMOVAL OF URETERIC CALCULUS Right 3/7/2023    Procedure: REMOVAL, CALCULUS, URETER, URETEROSCOPIC;  Surgeon: Carin Kwon Jr., MD;  Location: [facility] OR;  Service: Urology;  Laterality: Right;     URETEROSCOPIC REMOVAL OF URETERIC CALCULUS Bilateral 2023    Procedure: REMOVAL, CALCULUS, URETER, URETEROSCOPIC;  Surgeon: Carin Kwon Jr., MD;  Location: [facility];  Service: Urology;  Laterality: Bilateral;     Family History   Problem Relation Age of Onset    COPD Mother     COPD Father     Prostate cancer Brother     Breast cancer Maternal Grandmother       Social History     Socioeconomic History    Marital status:    Tobacco Use    Smoking status: Former     Packs/day: 2.00     Years: 40.00     Pack years: 80.00     Types: Cigarettes     Quit date: 2017     Years since quittin.9    Smokeless tobacco: Never   Substance and Sexual Activity    Alcohol use: No    Drug use: No    Sexual activity: Not Currently     Partners: Male     Social Determinants of Health     Financial Resource Strain: Unknown    Difficulty of Paying Living Expenses: Patient refused   Food Insecurity: Unknown    Worried About Running Out of Food in the Last Year: Patient refused    Ran Out of Food in the Last Year: Patient refused   Transportation Needs: Unknown    Lack of Transportation (Medical): Patient refused    Lack of Transportation (Non-Medical): Patient refused   Physical Activity: Unknown    Days of Exercise per Week: Patient refused    Minutes of Exercise per Session: Patient refused   Stress: Unknown    Feeling of Stress : Patient refused   Social Connections: Unknown    Frequency of Communication with Friends and Family: Patient refused    Frequency of Social Gatherings with Friends and Family: Patient refused    Attends Religion Services: Patient refused    Active Member of Clubs or Organizations: Patient refused    Attends Club or Organization Meetings: Patient refused    Marital Status: Patient refused   Housing Stability: Unknown    Unable to Pay for Housing in the Last Year: Patient refused    Unstable Housing in the Last Year: Patient refused     Review of patient's allergies indicates:   Allergen  Reactions    Ativan [lorazepam] Hives and Itching    Flagyl [metronidazole] Itching and Rash    Pcn [penicillins] Hives, Itching and Rash       Current Outpatient Medications:     albuterol (PROVENTIL) 2.5 mg /3 mL (0.083 %) nebulizer solution, Take 3 mLs (2.5 mg total) by nebulization every 6 (six) hours as needed for Wheezing. Rescue, Disp: 120 each, Rfl: 1    CMPD hydrocortisone 2%- LIDOcaine 3% suppository (RECTAL ROCKET), Place 1 suppository rectally every evening. Insert 1 suppository 3/4 of the way onto rectum. Wet for easier application. Repeat for 3 nights., Disp: 30 suppository, Rfl: 2    diphenoxylate-atropine 2.5-0.025 mg (LOMOTIL) 2.5-0.025 mg per tablet, Take by mouth., Disp: , Rfl:     ferrous sulfate 325 (65 FE) MG EC tablet, Take 1 tablet (325 mg total) by mouth every other day., Disp: , Rfl: 0    HYDROcodone-acetaminophen (NORCO) 5-325 mg per tablet, TAKE ONE TABLET BY MOUTH EVERY SIX HOURS AS NEEDED FOR PAIN, Disp: 90 tablet, Rfl: 0    levothyroxine (SYNTHROID) 125 MCG tablet, Take 1 tablet (125 mcg total) by mouth before breakfast., Disp: 30 tablet, Rfl: 11    ondansetron (ZOFRAN-ODT) 8 MG TbDL, Take 1 tablet (8 mg total) by mouth every 12 (twelve) hours as needed., Disp: 30 tablet, Rfl: 1    potassium chloride SA (K-DUR,KLOR-CON) 20 MEQ tablet, Take 1 tablet (20 mEq total) by mouth once daily., Disp: 30 tablet, Rfl: 11    prochlorperazine (COMPAZINE) 10 MG tablet, Take 1 tablet (10 mg total) by mouth every 6 (six) hours as needed., Disp: 30 tablet, Rfl: 1    triamcinolone acetonide 0.025% (KENALOG) 0.025 % cream, Apply topically once daily., Disp: 80 g, Rfl: 1  No current facility-administered medications for this visit.    Facility-Administered Medications Ordered in Other Visits:     diphenhydrAMINE injection 12.5 mg, 12.5 mg, Intravenous, Once PRN, Monster Atwood MD    electrolyte-S (ISOLYTE), , Intravenous, Continuous, Monster Atwood MD, Last Rate: 25 mL/hr at 03/07/23 1138, Rate  Change at 03/07/23 1138    fentaNYL 50 mcg/mL injection 25 mcg, 25 mcg, Intravenous, Q5 Min PRN, Monster Atwood MD, 25 mcg at 03/07/23 1207    HYDROmorphone (PF) injection 0.2 mg, 0.2 mg, Intravenous, Q5 Min PRN, Monster Atwood MD    lactated ringers infusion, 10 mL/hr, Intravenous, Continuous, Monster Atwood MD    lactated ringers infusion, 500 mL, Intravenous, Once, Monster Atwood MD    LIDOcaine (PF) 10 mg/ml (1%) injection 10 mg, 1 mL, Intradermal, Once, Monster Atwood MD    LORazepam injection 0.25 mg, 0.25 mg, Intravenous, Once PRN, Monster Atwood MD    ondansetron injection 4 mg, 4 mg, Intravenous, Once PRN, Monster Atwood MD    prochlorperazine injection Soln 5 mg, 5 mg, Intravenous, Q30 Min PRN, Monster Atwood MD    sodium chloride 0.9% flush 3 mL, 3 mL, Intravenous, Q8H, Monster Atwood MD  PHYSICAL EXAM:     Wt Readings from Last 3 Encounters:   06/20/23 88.5 kg (195 lb 1.7 oz)   06/14/23 88.5 kg (195 lb)   06/12/23 88.5 kg (195 lb)     Temp Readings from Last 3 Encounters:   06/20/23 97.5 °F (36.4 °C) (Temporal)   06/12/23 97.4 °F (36.3 °C)   06/06/23 98 °F (36.7 °C) (Oral)     BP Readings from Last 3 Encounters:   06/20/23 (!) 170/82   06/12/23 (!) 143/80   06/06/23 (!) 146/75     Pulse Readings from Last 3 Encounters:   06/20/23 78   06/12/23 82   06/06/23 84     GENERAL: alert; in no apparent distress, , well-built well-nourished   HEAD: normocephalic, atraumatic.  EYES: pupils are equal, round, reactive to light and accommodation. Sclera anicteric. Conjunctiva not injected.   NOSE/THROAT: no nasal erythema or rhinorrhea. Oropharynx pink, without erythema, ulcerations or thrush.   NECK: no cervical motion rigidity; supple with no masses.  CHEST: clear to auscultation bilaterally; no wheezes, crackles or rubs. Patient is speaking comfortably on room air with normal work of breathing without using accessory muscles of respiration.  CARDIOVASCULAR:  regular rate and rhythm; no murmurs, rubs or gallops.  ABDOMEN: soft, nontender, nondistended. Bowel sounds present.   MUSCULOSKELETAL: no tenderness to palpation along the spine or scapulae. Normal range of motion.  NEUROLOGIC: cranial nerves II-XII intact bilaterally. Strength 5/5 in bilateral upper and lower extremities. No sensory deficits appreciated. Reflexes globally intact. No cerebellar signs. Normal gait.   C/o left shoulder pain; from sleeping croojked she iwll let me know if she wants xrays to eval  LYMPHATIC: no cervical, supraclavicular or axillary adenopathy appreciated bilaterally.   EXTREMITIES: no clubbing, cyanosis, edema.  SKIN: no erythema, rashes, ulcerations noted  ECOG-0  Lab Results   Component Value Date    WBC 4.07 06/19/2023    HGB 12.8 06/19/2023    HCT 40.2 06/19/2023    MCV 90 06/19/2023     06/19/2023       BMP  Lab Results   Component Value Date     06/19/2023    K 4.2 06/19/2023     06/19/2023    CO2 23 06/19/2023    BUN 14 06/19/2023    CREATININE 0.8 06/19/2023    CALCIUM 8.7 06/19/2023    ANIONGAP 11 06/19/2023    ESTGFRAFRICA >60 05/02/2022    EGFRNONAA >60 05/02/2022      cea 7/14/21 1.8  November 9, 2020  .  Rectum, sigmoid colon and proximal donut (low anterior resection):   - Invasive adenocarcinoma,  invading through the muscularis propria into   pericolorectal tissue   - Pathologic stage pT3, pN1a (see synoptic report)   - Resection margins negative for dysplasia or malignancy (proximal, distal   and radial margins), within 1 mm to radial margin   - One tumor deposit   - Adenocarcinoma involve one out of totally forty seven lymph nodes (1/46 in   part 1, totally 1/47, also see part 2)   - Background colon with diverticulosis associated with hyperpigmented   macrophages, benign   - Proximal donut with benign colon, no dysplasia or malignancy   - Tattoo identified   2.  Colon, distal donut (excision):   - Benign colon, negative for dysplasia or malignancy    - One lymph node, negative for metastatic carcinoma (0/1)   COLON AND RECTUM: Resection, Including Transanal Disk Excision of Rectal   Neoplasms   Procedure  Low anterior resection   Tumor Site  Rectum    Tumor Location: Straddles the anterior peritoneal reflection   Tumor Size   Greatest dimension (centimeters): 7.0 cm   + Additional dimensions (centimeters): 4.7 x 2.2 cm   Macroscopic Tumor Perforation  Not identified   Macroscopic Intactness of Mesorectum  Near complete   Histologic Type  Adenocarcinoma   Histologic Grade   G2: Moderately differentiated   Tumor Extension  Tumor invades through the muscularis propria into   pericolorectal tissue     Impression:  November 7, 2020 CT abdomen pelvis     1. Rectosigmoid junction mass, unchanged from prior.  No definite evidence of intraluminal contrast extravasation to suggest active gastrointestinal hemorrhage, however please note that arterial phase CT is more sensitive.  2. Colonic diverticulosis without acute diverticulitis.  3. Minimal pneumobilia, unchanged      IMPRESSION:  CT chest lung screen October 2020     1.  3 mm pulmonary nodule identified in the lateral segment of the  right lower lobe.  2.  Focus of groundglass opacity measuring 11 mm in diameter in the  superior segment of the right lower lobe. Short-term follow-up for  this lesion is recommended.  3.  Mild emphysematous lung disease.     LUNG-RADS CATEGORY 3: PROBABLY BENIGN FINDINGS     RECOMMENDATION: Short-term imaging follow-up with 6 month LDCT.    IMPRESSION: 7/21  1. Nonspecific hypermetabolic soft tissue density focus cranial to the rectosigmoid colon suture line, perhaps reflecting postsurgical and or posttreatment or postinflammatory changes. Attention to this area at follow-up imaging is recommended to help differentiate from residual or recurrent neoplasm.  2. Improving postoperative changes in the lower anterior abdominal wall, with associated multifocal FDG hypermetabolism which is  most characteristic of postoperative etiology.  3. Otherwise no convincing evidence of recurrent neoplasm or metastatic disease.IMPRESSION:  1. Interval increased size of hypermetabolic presacral mass contiguous with hypermetabolic circumferential wall thickening of the rectum at the level of the anastomotic suture line, consistent with recurrent neoplasm and metastatic disease.  2. Interval development of enlarged, hypermetabolic right iliac chain lymph node consistent with metastatic disease.  3. No additional convincing evidence of metastatic disease.  4. Bilateral sacral ala insufficiency fractures, with corresponding FDG hypermetabolism.  5. Intense FDG uptake throughout the thyroid gland, unchanged and nonspecific.    Impression:ct cap 4/23     1 mm left UVJ stone and 2 mm left intrarenal stone without hydronephrosis. On the right there is mild hydronephrosis down to a point of stricture overlying the sacrum the same area as attached mildly dilated small bowel loops consistent with adhesions.  The patient has had rectal surgery with suture material noted. A small ventral hernia is noted containing a loop of small bowel above the umbilicus without evidence obstruction.  Prior cholecystectomy.  Prior hysterectomy.     Patient Active Problem List   Diagnosis    COPD (chronic obstructive pulmonary disease)    Pure hypercholesterolemia    Acquired hypothyroidism    Rectal bleeding    Rectal cancer    Malignant neoplasm of colon    B12 deficiency    FENG (iron deficiency anemia)    Chronic diarrhea    Severe obesity (BMI 35.0-39.9) with comorbidity    Secondary and unspecified malignant neoplasm of intra-abdominal lymph nodes    Dehydration    Diarrhea    Chemotherapy-induced neutropenia    Chemotherapy-induced diarrhea    Hypokalemia    UTI (urinary tract infection)    Anemia    Bilateral kidney stones   IMPRESSION:pet 12/2020  1. Postoperative changes of rectal colonic resection, with adjacent  FDG avid presacral  soft tissue stranding.  2. Adjacent punctate extraluminal foci of free air suggesting a tiny  tract extending towards the adjacent small bowel (possibly  postoperative but suggesting a tiny enterocolonic fistula)  3. Deep subcutaneous soft tissue structure along the ventral abdomen  suggesting a postoperative hematoma/seroma, and less likely abscess.  4. No findings of additional sites of FDG avid disease.      IMPRESSION: 7/21  1. Nonspecific hypermetabolic soft tissue density focus cranial to the rectosigmoid colon suture line, perhaps reflecting postsurgical and or posttreatment or postinflammatory changes. Attention to this area at follow-up imaging is recommended to help differentiate from residual or recurrent neoplasm.  2. Improving postoperative changes in the lower anterior abdominal wall, with associated multifocal FDG hypermetabolism which is most characteristic of postoperative etiology.  3. Otherwise no convincing evidence of recurrent neoplasm or metastatic disease      Impression:2/2/22   CT CAP  A single 4 mm soft tissue density nodule is noted in the right lower lobe.  No other intrapulmonary masses or nodules are seen.  Follow-up CT in 12 months per of this nodule is recommended.  Coronary artery calcification is noted.  No masses or adenopathy or seen.     Prior cholecystectomy.  Prior hysterectomy.  2.2 cm left ovarian cyst.  Small ventral hernia containing a loop of small bowel without CT evidence of obstruction    Impression:11/22     Presacral/pre coccygeal soft tissue density and fat stranding and circumferential wall thickening of segment of bowel down low in the pelvis at the surgical site are not significantly changed compared to the prior exam.  Prominent lymph node right iliac chain also does not appear significantly changed.  Findings also correspond as seen on prior PET-CT of 08/04/2022 and are suspicious for neoplasm    IMPRESSION: 6/23  Decreased size and FDG activity in the  presacral mass at the sigmoid rectal anastomosis     Slight decrease in size and FDG activity in the enlarged right iliac lymph node     Improvement of the sacral insufficiency fractures with mild FDG activity     No new metastatic disease  Assessment and Plan       Rectal ca: original dx 2020  Status post low anterior resection November 9, 2020 for rectal CA T3 N1a   tumor board convened and agreed on xrt that will follow  After completion of chemo  sequentially  therapy   Patient started FOLFOX.  Completed 4 out of 12 planned adjuvant cycles,  Discontinued  Chemo / port removed completed xrt 5/21  Pet:8/22, showing progression   Started  FOLFIRI + avastin   from second cycle changed to FOLFIRI panitumumab based on benefit   Sig diarrhea with CPT-11, in spite of added lomotil to imodium  decreased dose of CPT-11  to 165 mg/m2 but patient is still hospitalized Scan shows no change 12/22   Discontinued cpt -11 and cont with 5fu and vectibix Dc the bolus 5FU     4/11/23:But pt has been admitted with renal stones, has bilateral stents, wants to wait till the stents out    4/24/23 : ct showing no dz, can wait to resume chemo ,  6/20/23: ct st6ill  ot showing worsening dz  see ovidio  3 months with repeat scans. Seeing DR Wagner next week and I will see her week after to see if rescetable   Caris in chart     Renal stones: has stents, folows with urology    FENG :Injectafer  completed 8/22 good response         b12 def anemia, was taking injections q month level checked 10/21 sub therpautic 226 t pt to add SL b12 to her twice a month b12 shots INSURANCE WONT PAY FOR B12.ok to cont with SL only as levels have improved to 1500    hypocalcemia which seems chronic pt to supplement orally daily wnl this visit    Hypothyroidism, sees DR Mcdermott will adj meds  History of chronic obstructive pulmonary disease, dyslipidemia and hypothyroidism continue with PCP  Doesn't want to be covid vaccinated    Hypokalemia: pt has stopped taking  her K pills due to s/e of diarrhea replace IV today      Advance Care Planning     Date: 04/03/2023    Power of   Pt has submitted ACP documenst  Palliative care ref made

## 2023-06-26 PROBLEM — N39.0 UTI (URINARY TRACT INFECTION): Status: RESOLVED | Noted: 2023-01-01 | Resolved: 2023-01-01

## 2023-06-27 NOTE — PROGRESS NOTES
Subjective     Patient ID: Tessa Enriquez is a 67 y.o. female.    Chief Complaint: Follow-up (F/u CT PET Scan and discuss sx)    HPI  year old female with whom I am familiar.  She would a rectosigmoid resection for colorectal cancer November of 2020.  One a 46 nodes were positive.  She was started on FOLFOX initially but tolerated poorly she only completed 3 treatments.  She then had radiation therapy which she was ultimately able to complete but in a staggered fashion.  In August of 2022 she was found to have an anastomotic recurrence.  She started on FOLFIRI and Avastin in September which was ultimately changed to FOLFIRI and Vectibix.   In May of this year she had a colonoscopy demonstrating adenocarcinoma at the anastomosis site.  She continues to struggle with diarrhea and tenesmus.  She had a PET-CT scan performed on June 14th which demonstrated decreased in size and FDG EEG activity of the mass at the soft at the suture line as well as decreased uptake and size of an enlarged hypermetabolic node in the right iliac chain.  Patient presents to discuss management.    Review of Systems   Constitutional:  Negative for activity change and appetite change.   Gastrointestinal:  Positive for blood in stool and diarrhea. Negative for nausea and fecal incontinence.   Musculoskeletal:  Negative for arthralgias.   Hematological:  Negative for adenopathy.   Psychiatric/Behavioral:  Negative for agitation and decreased concentration.         Objective     Physical Exam  Vitals reviewed.   Cardiovascular:      Rate and Rhythm: Normal rate.      Pulses: Normal pulses.   Pulmonary:      Effort: Pulmonary effort is normal.   Abdominal:      General: There is no distension.      Tenderness: There is no abdominal tenderness.      Hernia: A hernia is present.   Musculoskeletal:      Cervical back: Normal range of motion.   Neurological:      Mental Status: She is alert.   Psychiatric:         Mood and Affect: Mood normal.      Path:    1. Colon mass, biopsy:         -  Invasive moderately differentiated adenocarcinoma     Assessment and Plan     Recurrecnt rectosigmoid cancer        Discussion with patient.  She does have recurrent cancer at the anastomotic site.  There has been no further spread and appears to have had decrease in size of the previous PET avid node in the right iliac chain.  Explained to patient that would recommend resection of the mass as well as the large iliac node.  I am hopeful that anastomosis will be able to be performed however I am uncertain as to the quality of her bowel function long term.  Depending on the amount of inflammation and scarring present in the rectum may have to proceed with a permanent colostomy.  Discussed with patient that if surgery in the anastomosis is performed she will likely require diverting loop ileostomy given the inflammatory nature of the area as well as previous radiation.  Additionally will plan on bilateral ureteral stents to be placed the time of surgery given hydronephrosis that is present.  Lengthy discussion regarding risks of surgery.  Informed consent has been obtained.  Surgery will be scheduled for July 24th.         No follow-ups on file.

## 2023-06-27 NOTE — PATIENT INSTRUCTIONS
Surgery is scheduled for 7/24/23 arrival time will be given by the the preop nurse.  You may receive two calls from the Preop Nurses one a few days before surgery the second the day before surgery with the arrival time.  The preop nurse will call you from [phone]  Nothing to eat or drink after midnight.  Someone to drive you home if you are same day surgery.    THE PREOP NURSE WILL CALL, SOMETIMES AS LATE AS 4 or 5 PM IN THE AFTERNOON THE DAY BEFORE SURGERY.    Shower the night before and the morning of your procedure with Chlorhexidine Surgical Scrub also known as Hibiclens , can be purchased at most Pharmacy's no prescription needed.    Special Instruction:     Your procedure/surgery is scheduled at the [facility] in [address] Amin.     Bowel Prep for Colonoscopy/Surgery  Purchase:  Dulcolax Pills (Laxative) 4 tablets  14 dose bottle of Miralax Powder  64 ounces of Gatorade or Powerade    The day before your procedure no solid foods, clear liquids only to include water, Gatorade,Powerade.  Coffee no creamer  Soft Drinks  Popsicles   Jello   Abhinav Aid  Chicken or beef broth  Apple juice, white grape juice, Tea,   Hard Candy    NO RED OR PURPLE COLORED LIQUIDS, JELLO, POPSCILES.  Try to Avoid these foods 7 days before your Procedure:  beans, peas, corn, nuts, popcorn and tomatoes.  Try not to use Advil or Ibuprofen, Non-Steroidal Anti-inflammatories such as Aleve for 7 days before your colonoscopy, no fish oil for 5 days before the procedure.    Mix the entire 14 dose bottle of Miralax into  64 ounces of Gatorade into a large pitcher, stir and chill until ready to use.  The day before procedure starting at noon take all 4 Dulcolax tablets followed by clear liquids until 2pm.  Keep in mind to drink plenty of fluids this is how the laxatives work, plenty fluids.  After 2pm no later than 4pm start drinkg the Miralax/Gatorade mix 8 ounces at a time over a 2-4 hour period until  completing the entire 64 ounces, one glass every 15 to 30 minutes.  Keep sipping clear liquids between each dose  Contact your Diabetes doctor or Endocrinologist for detailed instruction regarding your insulin or oral diabetes medication.     Contact Dr. Wagner if you have any questions, [phone]    Be Sure to Notify Your Doctor if You are on a Prescription Blood Thinner.         Contact Marito Graham LPN for questions are concerns. [phone]

## 2023-06-29 NOTE — TELEPHONE ENCOUNTER
I called patient and she stated that she just made her payment to [employer] for July 2023.  She will call [employer] to verify that they received her payment and she'll let me know.  Anurag

## 2023-06-29 NOTE — TELEPHONE ENCOUNTER
Patient called me back to inform me that she called [employer] and they will reinstate her insurance.  It had something to do with her Social Security number and Medicare.  She said that give it 3 days and then resubmit the authorization for her surgery on 7/24/23.  Anurag

## 2023-06-29 NOTE — TELEPHONE ENCOUNTER
I sent a message to [place]. CHARITY to inform her that patient called back about her BC/BS being reinstated and to give it 3 days to send another authorization for her surgery on 7/24/23.  Anurag

## 2023-06-29 NOTE — TELEPHONE ENCOUNTER
ROSA for [employer] [place]. RN to inform her that the patient just made her payment to [employer] yesterday for 7/2023.  She'll call [employer] to verify that she has coverage for July 2023 and she'll let us know.  Anurag

## 2023-06-30 NOTE — TELEPHONE ENCOUNTER
----- Message from Anurag Ellis MA sent at 6/29/2023  3:51 PM CDT -----  Regarding: BC/BS being reinstated  MaritoMrs. Zarate called [employer] and they're going to reinstate her insurance.  She said that it had something to do with her SS# and Medicare.  They said to wait about 3 days to resend for the authorization for her surgery on 7/24/23.  I sent a message to [employer] [place]. CHARITY to let her know.  Thanks, Anurag

## 2023-07-03 NOTE — PROGRESS NOTES
Subjective:       Patient ID: Tessa Enriquez is a 67 y.o. female.    Chief Complaint:   rectal CA status post biopsy October 2020 robotic LAR November 9, 2020 by Dr. Wagner     works at [employer]    pt went to [facility] rectal bleeding and abd pain scoped  Findings:        The digital rectal exam revealed a firm rectal mass palpated 5 to 6        cm from the anal verge. The mass was non-circumferential and located        predominantly at the posterior bowel wall.        There was evidence of a prior end-to-end colo-rectal anastomosis in        the distal rectum. This was patent and was characterized by        ulceration. The anastomosis was traversed. Mass in association with        the anastomosis has been previously biopsied and confirmed to be        adenocarcinoma. Anastomosis present at approximately 5 cm from anal     Oncology History:  T3 N1 rectal CA diagnosed October 2020 robotic LAR November 2020 December 2020 PET scan negative for metastatic disease  had colonoscopy 8/21 NAD  tumor board convened and agreed on xrt that will follow  After completion of chemo  sequentially  therapy   Patient started FOLFOX.  Completed 4 out of 12 planned adjuvant cycles,  Discontinued  Chemo / port removed completed xrt 5/21  Pet:8/22, showing progression  Started  FOLFIRI + avastin   from second cycle changed to FOLFIRI panitumumab based on benefit   Sig diarrhea with CPT-11, in spite of added lomotil to imodium  decreased dose of CPT-11  to 165 mg/m2 but patient is still hospitalized Scan shows no change 12/22   Discontinued cpt -11 and cont with 5fu and vectibix  2/23 renal stones. Pt held chemo      ROS:    SKIN: +rash from vectibix, issues with nails,  sore in nose , bt not too bad occ bleeding,nose. blotching    skin or abnormal bruising. Denies new moles or changes to existing moles.      BREASTS: There is no swelling around breasts or nipple discharge.    EYES: Denies eye pain, blurred vision, swelling,  redness or discharge.      ENT AND MOUTH: Denies runny nose, stuffiness, sinus trouble or sores. Denies    nosebleeds. Denies, hoarseness, change in voice or swelling in front of the    neck.      CARDIOVASCULAR: Denies chest pain, discomfort or palpitations. Denies neck    swelling or episodes of passing out.      RESPIRATORY: Denies cough, sputum production, blood in sputum, and denies    shortness of breath.     Diarrhea with use of K p-t refuses to replace po. Rectal bleeding has got better, only some friom hemorrohids    GENITOURINARY: had renal stones and admitted has stents and hematuria this is better now    MUSCULOSKELETAL: Denies neck or back pain. Denies weakness in arms or legs,    joint problems or distended inflamed veins in legs. Denies swelling or abnormal  glands.      NEUROLOGICAL: Denies tingling, numbness, altered mentation changes to nerve    function in the face, weakness to one or both of the body. Denies changes to    gait and denies multiple falls or accidents.      PSYCHIATRIC: Denies nervousness, anxiety, hallucinations, depression, suicidal    ideation, trouble sleeping or changes in behavior noticed by family.            Past Medical History:   Diagnosis Date    Acute hypoxemic respiratory failure 07/09/2018    Arthritis     Bilateral kidney stones 3/27/2023    Cancer     colon    COPD (chronic obstructive pulmonary disease)     History of home oxygen therapy     ONLY PRN AND HASN'T USED IT IN A YEAR    Hyperlipidemia     Thyroid disease      Past Surgical History:   Procedure Laterality Date    CHOLECYSTECTOMY      COLECTOMY N/A 11/9/2020    Procedure: COLECTOMY;  Surgeon: Brandon Wagner MD;  Location: [facility] OR;  Service: General;  Laterality: N/A;  LOWER- ANTERIOR    COLONOSCOPY N/A 10/23/2020    Procedure: COLONOSCOPY;  Surgeon: Jolynn Ayala MD;  Location: [facility];  Service: Endoscopy;  Laterality: N/A;    COLONOSCOPY N/A 8/6/2021    Procedure: COLONOSCOPY;  Surgeon: Jolynn  YUDY Ayala MD;  Location: [facility] ENDO;  Service: Endoscopy;  Laterality: N/A;    COLONOSCOPY N/A 7/7/2022    Procedure: COLONOSCOPY;  Surgeon: Jolynn Ayala MD;  Location: [facility] ENDO;  Service: Endoscopy;  Laterality: N/A;    COLONOSCOPY N/A 5/31/2023    Procedure: COLONOSCOPY;  Surgeon: Jolynn Ayala MD;  Location: [facility] ENDO;  Service: Endoscopy;  Laterality: N/A;    CYSTOSCOPY W/ URETERAL STENT PLACEMENT Right 2/22/2023    Procedure: CYSTOSCOPY, WITH URETERAL STENT INSERTION;  Surgeon: Carin Kwon Jr., MD;  Location: [facility] OR;  Service: Urology;  Laterality: Right;    CYSTOSCOPY W/ URETERAL STENT PLACEMENT Bilateral 3/26/2023    Procedure: CYSTOSCOPY, WITH URETERAL STENT INSERTION;  Surgeon: Sabine Barber MD;  Location: [facility] OR;  Service: Urology;  Laterality: Bilateral;    CYSTOSCOPY WITH URETEROSCOPY, RETROGRADE PYELOGRAPHY, AND INSERTION OF STENT Bilateral 3/26/2023    Procedure: CYSTOSCOPY, WITH RETROGRADE PYELOGRAM AND URETERAL STENT INSERTION;  Surgeon: Sabine Barber MD;  Location: [facility] OR;  Service: Urology;  Laterality: Bilateral;    CYSTOURETEROSCOPY WITH RETROGRADE PYELOGRAPHY AND INSERTION OF STENT INTO URETER Right 3/7/2023    Procedure: CYSTOURETEROSCOPY, WITH RETROGRADE PYELOGRAM AND URETERAL STENT INSERTION;  Surgeon: Carin Kwon Jr., MD;  Location: [facility] OR;  Service: Urology;  Laterality: Right;    CYSTOURETEROSCOPY, WITH HOLMIUM LASER LITHOTRIPSY OF URETERAL CALCULUS AND STENT INSERTION Bilateral 4/11/2023    Procedure: CYSTOURETEROSCOPY, WITH HOLMIUM LASER LITHOTRIPSY OF URETERAL CALCULUS AND STENT INSERTION;  Surgeon: Carin Kwon Jr., MD;  Location: [facility] OR;  Service: Urology;  Laterality: Bilateral;    ESOPHAGEAL DILATION N/A 6/29/2021    Procedure: DILATION, ESOPHAGUS;  Surgeon: Sourav Baires III, MD;  Location: [facility] ENDO;  Service: Endoscopy;  Laterality: N/A;    ESOPHAGOGASTRODUODENOSCOPY  06/29/2021    Dilation to 57 Fr    ESOPHAGOGASTRODUODENOSCOPY N/A  6/29/2021    Procedure: EGD (ESOPHAGOGASTRODUODENOSCOPY);  Surgeon: Sourav Baires III, MD;  Location: [facility] ENDO;  Service: Endoscopy;  Laterality: N/A;    FLEXIBLE SIGMOIDOSCOPY N/A 8/1/2022    Procedure: SIGMOIDOSCOPY, FLEXIBLE;  Surgeon: Brandon Wagner MD;  Location: [facility] ENDO;  Service: Endoscopy;  Laterality: N/A;    HYSTERECTOMY      INSERTION OF TUNNELED CENTRAL VENOUS CATHETER (CVC) WITH SUBCUTANEOUS PORT N/A 1/11/2021    Procedure: [id number];  Surgeon: Brandon Wagner MD;  Location: [facility] OR;  Service: General;  Laterality: N/A;    INSERTION OF TUNNELED CENTRAL VENOUS CATHETER (CVC) WITH SUBCUTANEOUS PORT Right 9/1/2022    Procedure: [id number];  Surgeon: Brandon Wagner MD;  Location: [facility] OR;  Service: General;  Laterality: Right;  Wants rt side placement    MEDIPORT REMOVAL Left 4/19/2021    Procedure: REMOVAL, CATHETER, CENTRAL VENOUS, TUNNELED, WITH PORT;  Surgeon: Brandon Wagner MD;  Location: [facility] OR;  Service: General;  Laterality: Left;    REMOVAL-STENT Right 3/7/2023    Procedure: REMOVAL-STENT;  Surgeon: Carin Kwon Jr., MD;  Location: [facility] OR;  Service: Urology;  Laterality: Right;    REMOVAL-STENT Bilateral 4/11/2023    Procedure: REMOVAL-STENT;  Surgeon: Carin Kwon Jr., MD;  Location: [facility] OR;  Service: Urology;  Laterality: Bilateral;    RETROGRADE PYELOGRAPHY Bilateral 4/11/2023    Procedure: PYELOGRAM, RETROGRADE;  Surgeon: Carin Kwon Jr., MD;  Location: [facility] OR;  Service: Urology;  Laterality: Bilateral;    ROBOT-ASSISTED COLECTOMY N/A 11/9/2020    Procedure: ROBOTIC COLECTOMY low anterior resection, possible open;  Surgeon: Brandon Wagner MD;  Location: [facility] OR;  Service: General;  Laterality: N/A;  CONVERTED TO OPEN AT 1503    URETEROSCOPIC REMOVAL OF URETERIC CALCULUS Right 3/7/2023    Procedure: REMOVAL, CALCULUS, URETER, URETEROSCOPIC;  Surgeon: Carin Kwon Jr., MD;  Location: [facility] OR;  Service: Urology;  Laterality: Right;     URETEROSCOPIC REMOVAL OF URETERIC CALCULUS Bilateral 2023    Procedure: REMOVAL, CALCULUS, URETER, URETEROSCOPIC;  Surgeon: Carin Kwon Jr., MD;  [facility];  Service: Urology;  Laterality: Bilateral;     Family History   Problem Relation Age of Onset    COPD Mother     COPD Father     Prostate cancer Brother     Breast cancer Maternal Grandmother       Social History     Socioeconomic History    Marital status:    Tobacco Use    Smoking status: Former     Packs/day: 2.00     Years: 40.00     Pack years: 80.00     Types: Cigarettes     Quit date: 2017     Years since quittin.9    Smokeless tobacco: Never   Substance and Sexual Activity    Alcohol use: No    Drug use: No    Sexual activity: Not Currently     Partners: Male     Social Determinants of Health     Financial Resource Strain: Unknown    Difficulty of Paying Living Expenses: Patient refused   Food Insecurity: Unknown    Worried About Running Out of Food in the Last Year: Patient refused    Ran Out of Food in the Last Year: Patient refused   Transportation Needs: Unknown    Lack of Transportation (Medical): Patient refused    Lack of Transportation (Non-Medical): Patient refused   Physical Activity: Unknown    Days of Exercise per Week: Patient refused    Minutes of Exercise per Session: Patient refused   Stress: Unknown    Feeling of Stress : Patient refused   Social Connections: Unknown    Frequency of Communication with Friends and Family: Patient refused    Frequency of Social Gatherings with Friends and Family: Patient refused    Attends Taoist Services: Patient refused    Active Member of Clubs or Organizations: Patient refused    Attends Club or Organization Meetings: Patient refused    Marital Status: Patient refused   Housing Stability: Unknown    Unable to Pay for Housing in the Last Year: Patient refused    Unstable Housing in the Last Year: Patient refused     Review of patient's allergies indicates:   Allergen  Reactions    Ativan [lorazepam] Hives and Itching    Flagyl [metronidazole] Itching and Rash    Pcn [penicillins] Hives, Itching and Rash       Current Outpatient Medications:     albuterol (PROVENTIL) 2.5 mg /3 mL (0.083 %) nebulizer solution, Take 3 mLs (2.5 mg total) by nebulization every 6 (six) hours as needed for Wheezing. Rescue, Disp: 120 each, Rfl: 1    CMPD hydrocortisone 2%- LIDOcaine 3% suppository (RECTAL ROCKET), Place 1 suppository rectally every evening. Insert 1 suppository 3/4 of the way onto rectum. Wet for easier application. Repeat for 3 nights., Disp: 30 suppository, Rfl: 2    diphenoxylate-atropine 2.5-0.025 mg (LOMOTIL) 2.5-0.025 mg per tablet, Take by mouth., Disp: , Rfl:     ferrous sulfate 325 (65 FE) MG EC tablet, Take 1 tablet (325 mg total) by mouth every other day., Disp: , Rfl: 0    HYDROcodone-acetaminophen (NORCO) 5-325 mg per tablet, TAKE ONE TABLET BY MOUTH EVERY SIX HOURS AS NEEDED FOR PAIN, Disp: 90 tablet, Rfl: 0    levothyroxine (SYNTHROID) 125 MCG tablet, Take 1 tablet (125 mcg total) by mouth before breakfast., Disp: 30 tablet, Rfl: 11    ondansetron (ZOFRAN-ODT) 8 MG TbDL, Take 1 tablet (8 mg total) by mouth every 12 (twelve) hours as needed., Disp: 30 tablet, Rfl: 1    potassium chloride SA (K-DUR,KLOR-CON) 20 MEQ tablet, Take 1 tablet (20 mEq total) by mouth once daily., Disp: 30 tablet, Rfl: 11    prochlorperazine (COMPAZINE) 10 MG tablet, Take 1 tablet (10 mg total) by mouth every 6 (six) hours as needed., Disp: 30 tablet, Rfl: 1    triamcinolone acetonide 0.025% (KENALOG) 0.025 % cream, Apply topically once daily., Disp: 80 g, Rfl: 1  No current facility-administered medications for this visit.    Facility-Administered Medications Ordered in Other Visits:     diphenhydrAMINE injection 12.5 mg, 12.5 mg, Intravenous, Once PRN, Monster Atwood MD    electrolyte-S (ISOLYTE), , Intravenous, Continuous, Monster Atwood MD, Last Rate: 25 mL/hr at 03/07/23 1138, Rate  Change at 03/07/23 1138    fentaNYL 50 mcg/mL injection 25 mcg, 25 mcg, Intravenous, Q5 Min PRN, Monster Atwood MD, 25 mcg at 03/07/23 1207    HYDROmorphone (PF) injection 0.2 mg, 0.2 mg, Intravenous, Q5 Min PRN, Monster Atwood MD    lactated ringers infusion, 10 mL/hr, Intravenous, Continuous, Monster Atwood MD    lactated ringers infusion, 500 mL, Intravenous, Once, Monster Atwood MD    LIDOcaine (PF) 10 mg/ml (1%) injection 10 mg, 1 mL, Intradermal, Once, Monster Atwood MD    LORazepam injection 0.25 mg, 0.25 mg, Intravenous, Once PRN, Monster Atwood MD    ondansetron injection 4 mg, 4 mg, Intravenous, Once PRN, Monster Atwood MD    prochlorperazine injection Soln 5 mg, 5 mg, Intravenous, Q30 Min PRN, Monster Atwood MD    sodium chloride 0.9% flush 3 mL, 3 mL, Intravenous, Q8H, Monster Atwood MD  PHYSICAL EXAM:     Wt Readings from Last 3 Encounters:   07/03/23 89.3 kg (196 lb 13.9 oz)   06/27/23 89.7 kg (197 lb 12 oz)   06/20/23 88.5 kg (195 lb 1.7 oz)     Temp Readings from Last 3 Encounters:   07/03/23 97.5 °F (36.4 °C) (Temporal)   06/27/23 98.1 °F (36.7 °C) (Temporal)   06/20/23 97.5 °F (36.4 °C) (Temporal)     BP Readings from Last 3 Encounters:   07/03/23 (!) 143/79   06/27/23 (!) 185/86   06/20/23 (!) 170/82     Pulse Readings from Last 3 Encounters:   07/03/23 84   06/27/23 84   06/20/23 78     GENERAL: alert; in no apparent distress, , well-built well-nourished   HEAD: normocephalic, atraumatic.  EYES: pupils are equal, round, reactive to light and accommodation. Sclera anicteric. Conjunctiva not injected.   NOSE/THROAT: no nasal erythema or rhinorrhea. Oropharynx pink, without erythema, ulcerations or thrush.   NECK: no cervical motion rigidity; supple with no masses.  CHEST: clear to auscultation bilaterally; no wheezes, crackles or rubs. Patient is speaking comfortably on room air with normal work of breathing without using accessory muscles of  respiration.  CARDIOVASCULAR: regular rate and rhythm; no murmurs, rubs or gallops.  ABDOMEN: soft, nontender, nondistended. Bowel sounds present.   MUSCULOSKELETAL: no tenderness to palpation along the spine or scapulae. Normal range of motion.  NEUROLOGIC: cranial nerves II-XII intact bilaterally. Strength 5/5 in bilateral upper and lower extremities. No sensory deficits appreciated. Reflexes globally intact. No cerebellar signs. Normal gait.   C/o left shoulder pain; from sleeping croojked she iwll let me know if she wants xrays to eval  LYMPHATIC: no cervical, supraclavicular or axillary adenopathy appreciated bilaterally.   EXTREMITIES: no clubbing, cyanosis, edema.  SKIN: no erythema, rashes, ulcerations noted  ECOG-0  Lab Results   Component Value Date    WBC 4.07 06/19/2023    HGB 12.8 06/19/2023    HCT 40.2 06/19/2023    MCV 90 06/19/2023     06/19/2023       BMP  Lab Results   Component Value Date     06/19/2023    K 4.2 06/19/2023     06/19/2023    CO2 23 06/19/2023    BUN 14 06/19/2023    CREATININE 0.8 06/19/2023    CALCIUM 8.7 06/19/2023    ANIONGAP 11 06/19/2023    ESTGFRAFRICA >60 05/02/2022    EGFRNONAA >60 05/02/2022      cea 7/14/21 1.8  November 9, 2020  .  Rectum, sigmoid colon and proximal donut (low anterior resection):   - Invasive adenocarcinoma,  invading through the muscularis propria into   pericolorectal tissue   - Pathologic stage pT3, pN1a (see synoptic report)   - Resection margins negative for dysplasia or malignancy (proximal, distal   and radial margins), within 1 mm to radial margin   - One tumor deposit   - Adenocarcinoma involve one out of totally forty seven lymph nodes (1/46 in   part 1, totally 1/47, also see part 2)   - Background colon with diverticulosis associated with hyperpigmented   macrophages, benign   - Proximal donut with benign colon, no dysplasia or malignancy   - Tattoo identified   2.  Colon, distal donut (excision):   - Benign colon,  negative for dysplasia or malignancy   - One lymph node, negative for metastatic carcinoma (0/1)   COLON AND RECTUM: Resection, Including Transanal Disk Excision of Rectal   Neoplasms   Procedure  Low anterior resection   Tumor Site  Rectum    Tumor Location: Straddles the anterior peritoneal reflection   Tumor Size   Greatest dimension (centimeters): 7.0 cm   + Additional dimensions (centimeters): 4.7 x 2.2 cm   Macroscopic Tumor Perforation  Not identified   Macroscopic Intactness of Mesorectum  Near complete   Histologic Type  Adenocarcinoma   Histologic Grade   G2: Moderately differentiated   Tumor Extension  Tumor invades through the muscularis propria into   pericolorectal tissue     Impression:  November 7, 2020 CT abdomen pelvis     1. Rectosigmoid junction mass, unchanged from prior.  No definite evidence of intraluminal contrast extravasation to suggest active gastrointestinal hemorrhage, however please note that arterial phase CT is more sensitive.  2. Colonic diverticulosis without acute diverticulitis.  3. Minimal pneumobilia, unchanged      IMPRESSION:  CT chest lung screen October 2020     1.  3 mm pulmonary nodule identified in the lateral segment of the  right lower lobe.  2.  Focus of groundglass opacity measuring 11 mm in diameter in the  superior segment of the right lower lobe. Short-term follow-up for  this lesion is recommended.  3.  Mild emphysematous lung disease.     LUNG-RADS CATEGORY 3: PROBABLY BENIGN FINDINGS     RECOMMENDATION: Short-term imaging follow-up with 6 month LDCT.    IMPRESSION: 7/21  1. Nonspecific hypermetabolic soft tissue density focus cranial to the rectosigmoid colon suture line, perhaps reflecting postsurgical and or posttreatment or postinflammatory changes. Attention to this area at follow-up imaging is recommended to help differentiate from residual or recurrent neoplasm.  2. Improving postoperative changes in the lower anterior abdominal wall, with associated  multifocal FDG hypermetabolism which is most characteristic of postoperative etiology.  3. Otherwise no convincing evidence of recurrent neoplasm or metastatic disease.IMPRESSION:  1. Interval increased size of hypermetabolic presacral mass contiguous with hypermetabolic circumferential wall thickening of the rectum at the level of the anastomotic suture line, consistent with recurrent neoplasm and metastatic disease.  2. Interval development of enlarged, hypermetabolic right iliac chain lymph node consistent with metastatic disease.  3. No additional convincing evidence of metastatic disease.  4. Bilateral sacral ala insufficiency fractures, with corresponding FDG hypermetabolism.  5. Intense FDG uptake throughout the thyroid gland, unchanged and nonspecific.    Impression:ct cap 4/23     1 mm left UVJ stone and 2 mm left intrarenal stone without hydronephrosis. On the right there is mild hydronephrosis down to a point of stricture overlying the sacrum the same area as attached mildly dilated small bowel loops consistent with adhesions.  The patient has had rectal surgery with suture material noted. A small ventral hernia is noted containing a loop of small bowel above the umbilicus without evidence obstruction.  Prior cholecystectomy.  Prior hysterectomy.     Patient Active Problem List   Diagnosis    COPD (chronic obstructive pulmonary disease)    Pure hypercholesterolemia    Acquired hypothyroidism    Rectal bleeding    Rectal cancer    Malignant neoplasm of colon    B12 deficiency    FENG (iron deficiency anemia)    Chronic diarrhea    Severe obesity (BMI 35.0-39.9) with comorbidity    Secondary and unspecified malignant neoplasm of intra-abdominal lymph nodes    Dehydration    Diarrhea    Chemotherapy-induced neutropenia    Chemotherapy-induced diarrhea    Hypokalemia    Anemia    Bilateral kidney stones   IMPRESSION:pet 12/2020  1. Postoperative changes of rectal colonic resection, with adjacent  FDG avid  presacral soft tissue stranding.  2. Adjacent punctate extraluminal foci of free air suggesting a tiny  tract extending towards the adjacent small bowel (possibly  postoperative but suggesting a tiny enterocolonic fistula)  3. Deep subcutaneous soft tissue structure along the ventral abdomen  suggesting a postoperative hematoma/seroma, and less likely abscess.  4. No findings of additional sites of FDG avid disease.      IMPRESSION: 7/21  1. Nonspecific hypermetabolic soft tissue density focus cranial to the rectosigmoid colon suture line, perhaps reflecting postsurgical and or posttreatment or postinflammatory changes. Attention to this area at follow-up imaging is recommended to help differentiate from residual or recurrent neoplasm.  2. Improving postoperative changes in the lower anterior abdominal wall, with associated multifocal FDG hypermetabolism which is most characteristic of postoperative etiology.  3. Otherwise no convincing evidence of recurrent neoplasm or metastatic disease      Impression:2/2/22   CT CAP  A single 4 mm soft tissue density nodule is noted in the right lower lobe.  No other intrapulmonary masses or nodules are seen.  Follow-up CT in 12 months per of this nodule is recommended.  Coronary artery calcification is noted.  No masses or adenopathy or seen.     Prior cholecystectomy.  Prior hysterectomy.  2.2 cm left ovarian cyst.  Small ventral hernia containing a loop of small bowel without CT evidence of obstruction    Impression:11/22     Presacral/pre coccygeal soft tissue density and fat stranding and circumferential wall thickening of segment of bowel down low in the pelvis at the surgical site are not significantly changed compared to the prior exam.  Prominent lymph node right iliac chain also does not appear significantly changed.  Findings also correspond as seen on prior PET-CT of 08/04/2022 and are suspicious for neoplasm    IMPRESSION: 6/23  Decreased size and FDG activity in  the presacral mass at the sigmoid rectal anastomosis     Slight decrease in size and FDG activity in the enlarged right iliac lymph node     Improvement of the sacral insufficiency fractures with mild FDG activity     No new metastatic disease  Assessment and Plan       Rectal ca: original dx 2020  Status post low anterior resection November 9, 2020 for rectal CA T3 N1a   tumor board convened and agreed on xrt that will follow  After completion of chemo  sequentially  therapy   Patient started FOLFOX.  Completed 4 out of 12 planned adjuvant cycles,  Discontinued  Chemo / port removed completed xrt 5/21  Pet:8/22, showing progression   Started  FOLFIRI + avastin   from second cycle changed to FOLFIRI panitumumab based on benefit   Sig diarrhea with CPT-11, in spite of added lomotil to imodium  decreased dose of CPT-11  to 165 mg/m2 but patient is still hospitalized Scan shows no change 12/22   Discontinued cpt -11 and cont with 5fu and vectibix Dc the bolus 5FU     4/11/23:But pt has been admitted with renal stones, has bilateral stents, wants to wait till the stents out    4/24/23 : ct showing no dz, can wait to resume chemo ,  6/20/23: ct st6ill  not showing worsening dz  see ovidio  3 months with repeat scans. saw DR Wagner  resction planned 7/24/23, will see her back with path report, temitope 1st Caris in chart     Renal stones: has stents, folows with urology    FENG :Injectafer  completed 8/22 good response         b12 def anemia, was taking injections q month level checked 10/21 sub therpautic 226 t pt to add SL b12 to her twice a month b12 shots INSURANCE WONT PAY FOR B12.ok to cont with SL only as levels have improved to 1500    hypocalcemia which seems chronic pt to supplement orally daily wnl this visit    Hypothyroidism, sees DR Mcdermott will adj meds  History of chronic obstructive pulmonary disease, dyslipidemia and hypothyroidism continue with PCP  Doesn't want to be covid vaccinated    Hypokalemia: pt has  stopped taking her K pills due to s/e of diarrhea replace IV today      Advance Care Planning     Date: 04/03/2023    Power of   Pt has submitted ACP documenst  Palliative care ref made

## 2023-07-06 NOTE — TELEPHONE ENCOUNTER
"----- Message from Teresita Jackson sent at 7/6/2023  3:54 PM CDT -----  Contact: pt  Pt has updated their insurance info and it has e-verified and wants to know if everything is a "go" for her procedure.  Please call her at [phone] to advise  Thanks      "

## 2023-07-13 NOTE — PROGRESS NOTES
Outpatient Care Management  Plan of Care Follow Up Visit    Patient: Tessa Enriquez  MRN: [id number]  Date of Service: 07/13/2023  Completed by: Ginna Chapman RN  Referral Date: 02/23/2023    Reason for Visit   Patient presents with    OPCM RN Follow Up Call       Brief Summary: Surgery with Dr Wagner on 07/24/2023. She has an appt with oncology on 08/01/23 for next steps after surgery.   Next Steps: Follow up in three weeks per her request on 08/03/23  Possible closure if no new needs

## 2023-07-20 NOTE — DISCHARGE INSTRUCTIONS
To confirm, Your doctor has instructed you that surgery is scheduled for: 7/24/23 with Dr. Wagner    Please report to [facility], Registration the morning of surgery. You must check-in and receive a wristband before going to your procedure.  [address] [place] [address] [place] [address]    Pre-Op will call the FRIDAYprior to surgery between 1:00 and 6:00 PM with the final arrival time.  Phone number: [phone]    PLEASE NOTE:  The surgery schedule has many variables which may affect the time of your surgery case.  Family members should be available if your surgery time changes.  Plan to be here the day of your procedure between 4-6 hours.    MEDICATIONS:  TAKE ONLY THESE MEDICATIONS WITH A SMALL SIP OF WATER THE MORNING OF YOUR PROCEDURE:  HYDROCODONE IF NEEDED, ZOFRAN IF NEEDED, LEVOTHYROXINE      DO NOT TAKE THESE MEDICATIONS 5-7 DAYS PRIOR to your procedure or per your surgeon's request:   ASPIRIN, ALEVE, ADVIL, IBUPROFEN, FISH OIL VITAMIN E, HERBALS  (May take Tylenol)    ONLY if you are prescribed any types of blood thinners such as:  Aspirin, Coumadin, Plavix, Pradaxa, Xarelto, Aggrenox, Effient, Eliquis, Savasya, Brilinta, or any other, ask your surgeon whether you should stop taking them and how long before surgery you should stop.  You may also need to verify with the prescribing physician if it is ok to stop your medication.      INSTRUCTIONS IMPORTANT!!  Do not eat or drink anything between midnight and the time of your procedure- this includes gum, mints, and candy.  Do not smoke or drink alcoholic beverages 24 hours prior to your procedure.  Shower the night before AND the morning of your procedure with a Chlorhexidine wash such as Hibiclens or Dial antibacterial soap from the neck down.  Do not get it on your face or in your eyes.  You may use your own shampoo and face wash. This helps your skin to be as bacteria free as possible.    If you wear contact lenses, dentures, hearing aids  or glasses, bring a container to put them in during surgery and give to a family member for safe keeping.  Please leave all jewelry, piercing's and valuables at home. You must remove your false eyelashes prior to surgery.    DO NOT remove hair from the surgery site.  Do not shave the incision site unless you are given specific instructions to do so.    ONLY if you have been diagnosed with sleep apnea please bring your C-PAP machine.  ONLY if you wear home oxygen please bring your portable oxygen tank the day of your procedure.  ONLY if you have a history of OPEN HEART SURGERY you will need a clearance from your Cardiologist per Anesthesia.      ONLY for patients requiring bowel prep, written instructions will be given by your doctor's office.  ONLY if you have a neuro stimulator, please bring the controller with you the morning of surgery  ONLY if a type and screen test is needed before surgery, please return:  If your doctor has scheduled you for an overnight stay, bring a small overnight bag with any personal items you need.  Make arrangements in advance for transportation home by a responsible adult.  It is not safe to drive a vehicle during the 24 hours after anesthesia.          All  facilities and properties are tobacco free.  Smoking is NOT allowed.   If you have any questions about these instructions, call Pre-Op Admit  Nursing at [phone] or the Pre-Op Day Surgery Unit at [phone].

## 2023-07-24 NOTE — OP NOTE
Date of procedure:  July 24, 2023     Staff surgeon:  Dr. Brandon Wagner     Assistant: Lisette Delgadillo RNFA    Preoperative diagnosis: Recurrent rectal cancer in the mid rectum     Postoperative diagnosis:  The same     Procedure:  Placement of bilateral ureteral catheter was by Dr. Mijares     Exploratory laparotomy   Extensive lysis of adhesions taking over 1 hour   Completion proctectomy/abdominal perineal resection 22 modifier  End colostom  Small-bowel resection  Excision of right iliac chain lymph node   Primary closure of incisional hernia    Anesthesia:  General endotracheal anesthesia    Indication for procedure:  67-year-old female presented with recurrent rectal cancer in the mid rectum.  Patient's original surgery was in November of 2020.  Patient did not complete recommended chemotherapy initially.  She did ultimately receive radiation therapy postoperatively.  She presented with recurrent cancer in July of 2022.  At this time patient was willing to complete adjuvant chemotherapy.  Following completion of chemotherapy there remained a PET avid lesion at the anastomosis as well as iliac chain node.  Patient was scheduled for exploratory laparotomy with resection and possible anastomosis versus end-colostomy.  Extensive discussion was held with the patient regarding the strong possibility and likelihood of an end colostomy.  She was scheduled for surgery on the above-mentioned date.      Description of procedure:  Following signing informed consent she was taken to the operating room placed in supine position.  General endotracheal anesthesia was administered.  She was placed in lithotomy position.  Urology is present places bilateral ureter ureteral catheters.  At this point the abdomen perineum were prepped and draped in appropriate time-out procedure was performed.  A generous midline incision was made through the abdominal wall carried out the deep dermal tissue.  In the upper portion of the  incision I extend my incision down to the fascia and enter the abdominal cavity.  I reduced fat containing omental incisional hernia at the level of the umbilicus uneventfully.  The transverse colon was identified.  The splenic flexure had been released at her original surgery.  A tattoo was present from previous endoscopy with no palpable abnormalities.  There were extensive interloop adhesions and adhesions to the anterior abdominal wall which take over an hour to dissected mobilized.  There is a loop of the mid jejunum which reaches down to the pelvis in his stuck to intense fibrotic inflammatory reaction at the anastomosis.  The small bowel in this area is thickened and inflamed likely from radiation.  Ultimately with much care and time I am able to released this adhesion from the pelvic sidewall.  His mass this inflamed tissue was stuck down to the posterior pelvic sidewall as well as the right pelvic sidewall.  This bowel was quite thickened and obviously inflamed.  There was a very small enterotomy in this area given the state of the small bowel decision was made to resect approximately 6 in of the small bowel.  I make an enterotomy and a limb of small bowel proximal to this as well as a limb distal to this and fashioned create a side-to-side anastomosis.  The mesentery of the resecting small-bowel was ligated.  I then staple across the common enterotomy without event.  This tissue was sent off the field and labeled small bowel.  I next turned my attention to the recurrent pelvic mass in the right mid rectum.  I mobilized omental adhesions dissect down to the pelvis.  The descending colon which was used to create the previous record colorectal anastomosis was stuck down to sacral promontory and ultimately able to releases in attempt to make a dissection down around the mesorectum.  As expected this tissue was extremely thickened and inflamed.  Ultimately the tissue does come apart at the anastomosis.  I begin  to mobilize the mid rectum extending distally.  There is a plane of dissection that appears free along the left pelvic sidewall and anteriorly.  I do freed this without too much difficulty.  However the posterior portion of the rectum in the right pelvic sidewall are essentially plastered to the bony prominences of the pelvis and a clear plane of dissection was unable to be delineated.  Attempting to create a plane multiple injuries were made to the mid rectum extending distally.  Multiple attempts were used to place dilated to the rectum to help define the plans dissection and is mentioned previously do am able to dissect below the mass in the left pelvic sidewall and along the anterior plane to the left of midline.  The right lateral and right posterior walls are completely plastered the to the coccyx in the right lateral pelvic sidewall.  Patient's pelvic anatomy also as the difficulty of the case as she was a very deep narrow pelvis which inhibits visualization of the rectum.  Given inability to make progress from above decision was made to perform an APR which patient had been warned about the possibility.  Intersphincteric APR was performed.  I make a circular incision in the intersphincteric plane.  I dissect posteriorly anteriorly and then on the lateral sides bilaterally.  I entered the plane on the left anterior and ultimately dissect along the posterior wall.  I still encounter significant difficulty freeing the posterior wall of the rectum in the right lateral wall of the rectum from the bony prominences and bony pelvic structure.  Ultimately able to achieve this and have removed all grossly visible rectum.  I irrigate ensure adequate hemostasis and the perineum was closed in multiple layers with Vicryl sutures.  I then turned my attention to the right pelvic sidewall where there is a palpable lymph node that does correspond to the visible lesion on the CT scan.  Able to dissect along the iliac chain and  identify this lymph node and remove it without event.  This was a firm lymph node and measures a proximally 2 cm.  I then run the small bowel and ensure there were no injuries to the small bowel.  There were 2 serosal injuries were closed primarily.  No full-thickness injuries were seen.  A do staple across the previous anastomosis and remove proximally 2 in of the descending colon.  No visible lesions were seen.  The colon was mobilized to reach out for colostomy.  A make a circular incision in the left abdominal wall to the left of midline just below the level of the umbilicus.  Dissect down to the rectus muscle make a vertical incision through the fascia.  The underlying rectus muscle was divided and the posterior wall was divided.  It was fit such that 2 fingerbreadths can easily passed.  I then bring the colostomy through this placing Seprafilm around it.  A 19 Sinhala fluted drain was placed in the right mid abdomen down to the pelvis.  I then closed the fascia with a running heavy PDS suture.  Skin incisions closed with 4-0 Monocryl.  The colostomy was then matured in his many Allison fashion.  The patient was extubated taken to recovery room in stable condition.  There were no immediate complications.  Blood loss was 650 cc.

## 2023-07-24 NOTE — ANESTHESIA PREPROCEDURE EVALUATION
07/24/2023  Tessa Enriquez is a 67 y.o., female.      Pre-op Assessment    I have reviewed the Patient Summary Reports.     I have reviewed the Nursing Notes. I have reviewed the NPO Status.   I have reviewed the Medications.     Review of Systems  Anesthesia Hx:  No problems with previous Anesthesia    Social:  Former Smoker    Hematology/Oncology:        Current/Recent Cancer. (bowel CA)   Cardiovascular:   hyperlipidemia    Pulmonary:   COPD, mild Acute hypoxemic resp failure  Home O2 but pt doesn't use it and feels well today   Renal/:   Chronic Renal Disease renal calculi    Musculoskeletal:   Arthritis     Neurological:  Neurology Normal    Endocrine:   Hypothyroidism  Obesity / BMI > 30      Physical Exam  General: Well nourished, Cooperative, Alert and Oriented    Airway:  Mallampati: II   Mouth Opening: Normal  TM Distance: Normal  Neck ROM: Normal ROM        Anesthesia Plan  Type of Anesthesia, risks & benefits discussed:    Anesthesia Type: Gen ETT, Gen Supraglottic Airway, Gen Natural Airway, MAC  Intra-op Monitoring Plan: Standard ASA Monitors  Post Op Pain Control Plan: multimodal analgesia  Induction:  IV  Airway Plan: Direct, Video and Fiberoptic, Post-Induction  Informed Consent: Informed consent signed with the Patient and all parties understand the risks and agree with anesthesia plan.  All questions answered.   ASA Score: 3    Ready For Surgery From Anesthesia Perspective.     .

## 2023-07-24 NOTE — ASSESSMENT & PLAN NOTE
Patient with recurrent rectosigmoid cancer.  Plan for surgery with Dr. Wagner today.  Monitor H&H  Pain and nausea control.

## 2023-07-24 NOTE — HPI
Ms. Enriquez is a 67-years-old female who presented as a direct admission from [facility] for ICU level of care postoperatively.  Patient with a history of recurrent colorectal adenocarcinoma, status post neoadjuvant radiation and chemotherapy, followed by oncologist Dr. Frank.  On 07/24 she underwent exploratory laparotomy with extensive lysis of adhesion, proctectomy with creation of end colostomy by Dr. Wagner and she was admitted to hospital medicine postoperatively.  Postop course complicated by ongoing sinus tachycardia with anemia, status post PRBC transfusion and acute kidney injury, seen by Nephrology.  Earlier today ostomy noted to be dusky with concern for ischemia, she was taken back to the OR and underwent exploratory laparotomy with small-bowel resection, as per OR note, large area of small bowel resected with four areas of ischemia.  She was intubated for surgery and remains intubated postoperatively.  Reported history of COPD, hypothyroidism, nephrolithiasis status post bilateral ureteral stenting.  She is currently sedated on propofol, orally intubated on mechanical ventilation.  Repeat labs have been submitted.  As per ICU RN plans for possible repeat surgery.  Plan of care reviewed with nursing.  No visitors at bedside.

## 2023-07-24 NOTE — TRANSFER OF CARE
"Anesthesia Transfer of Care Note    Patient: Tessa Enriquez    Procedure(s) Performed: Procedure(s) (LRB):  LAPAROTOMY, EXPLORATORY (N/A)  COLECTOMY, PARTIAL (N/A)  CYSTOSCOPY, WITH URETERAL STENT INSERTION (Bilateral)  PROCTECTOMY, COMPLETION (N/A)  CREATION, COLOSTOMY (N/A)    Patient location: PACU    Anesthesia Type: general    Transport from OR: Transported from OR on 6-10 L/min O2 by face mask with adequate spontaneous ventilation    Post pain: adequate analgesia    Post assessment: no apparent anesthetic complications    Post vital signs: stable    Level of consciousness: awake and alert (opens eyes and follows simple commands)    Nausea/Vomiting: no nausea/vomiting    Complications: none    Transfer of care protocol was followed      Last vitals:   Visit Vitals  BP (!) 149/68 (BP Location: Right arm, Patient Position: Lying)   Pulse 77   Temp 36.7 °C (98 °F) (Oral)   Resp 18   Ht 5' 1" (1.549 m)   Wt 89.4 kg (197 lb)   SpO2 96%   Breastfeeding No   BMI 37.22 kg/m²     "

## 2023-07-24 NOTE — PLAN OF CARE
Pre-op complete. Family at bedside. Text notifications initiated. Pt denies need for safe. Belongings with sister.

## 2023-07-24 NOTE — OP NOTE
[facility]   Operative Note    Date: 07/24/2023    Pre-Op Diagnosis: kd stones, R hydro, colon cancer    Post-Op Diagnosis: same    Procedure(s) Performed:   1.  Cystoscopy with bilateral ureteral catheter placement    Specimen(s): none    Staff Surgeon: Adrienne Mijares MD    Anesthesia: General endotracheal anesthesia    Indications: Tessa Enriquez is a 67 y.o. female with above.  Dr Wagner has requested intra-operative ureteral catheters to help protect the ureters and allow for early intra-operative identification and repair of any injuries.     Pet scan       Findings: no hydronephrotic drip seen from right    Estimated Blood Loss: min    Drains:   1.  bilateral 5 Fr ureteral catheters  2.  16 Fr colin catheter    Procedure in Detail: After informed consent was obtained, the patient was taken to the operating suite and prepped and draped in normal standard sterile fashion. Prophylactic IV  antibiotics were administered per the primary surgeon preference.      A 22 Georgian cystoscope was inserted into the urethra and formal cystourethroscopy was performed. The right and left ureteral orifices were in the normal anatomic position. There were no mucosal abnormalities. See findings above for further details.     A 0.38 sensor tip wire was inserted into the left  ureteral orifice and advanced to the level of the renal pelvis based on feel. A 5 Georgian ureteral catheter was then inserted over the guide wire and the wire was removed.     A 0.38 sensor tip wire was inserted into the right ureteral orifice and advanced to the level of the right  renal pelvis. A 5 Georgian ureteral catheter was advanced over the wire and the wire was removed. The cystoscope was then removed and a 16 Georgian Colin was inserted and the balloon was filled with 10cc of sterile water. The ureteral catheters were secured in the standard fashion. There were no complications with the procedure. The case was then  turned over to the primary surgeon.     Plan:   Ureteral catheters to be removed at case, if not, will be need to be notified by nurses or surgeon.   Renal scan in 2 weeks to eval drainage and function  If obstructed may need ureteral balloon dilation and stent but will defer to   F/u with  after

## 2023-07-24 NOTE — ANESTHESIA PROCEDURE NOTES
Intubation    Date/Time: 7/24/2023 12:56 PM  Performed by: Reid Pearl Jr., CRNA  Authorized by: Ramesh Schilling MD     Intubation:     Induction:  Intravenous    Intubated:  Postinduction    Mask Ventilation:  Easy with oral airway    Attempts:  1    Attempted By:  Student    Method of Intubation:  Video laryngoscopy    Blade:  Cassidy 3    Laryngeal View Grade: Grade I - full view of cords      Difficult Airway Encountered?: No      Complications:  None    Airway Device:  Oral endotracheal tube    Airway Device Size:  7.0    Style/Cuff Inflation:  Cuffed (inflated to minimal occlusive pressure)    Inflation Amount (mL):  4    Tube secured:  21    Secured at:  The lips    Placement Verified By:  Capnometry    Complicating Factors:  Oropharyngeal edema or fat    Findings Post-Intubation:  BS equal bilateral and atraumatic/condition of teeth unchanged

## 2023-07-24 NOTE — SUBJECTIVE & OBJECTIVE
Past Medical History:   Diagnosis Date    Acute hypoxemic respiratory failure 07/09/2018    Arthritis     Bilateral kidney stones 3/27/2023    Cancer     colon    COPD (chronic obstructive pulmonary disease)     History of home oxygen therapy     ONLY PRN AND HASN'T USED IT IN A YEAR    Hyperlipidemia     Thyroid disease        Past Surgical History:   Procedure Laterality Date    CHOLECYSTECTOMY      COLECTOMY N/A 11/9/2020    Procedure: COLECTOMY;  Surgeon: Brandon Wagner MD;  Location: [facility] OR;  Service: General;  Laterality: N/A;  LOWER- ANTERIOR    COLONOSCOPY N/A 10/23/2020    Procedure: COLONOSCOPY;  Surgeon: Jolynn Ayala MD;  Location: [facility] ENDO;  Service: Endoscopy;  Laterality: N/A;    COLONOSCOPY N/A 8/6/2021    Procedure: COLONOSCOPY;  Surgeon: Jolynn Ayala MD;  Location: [facility] ENDO;  Service: Endoscopy;  Laterality: N/A;    COLONOSCOPY N/A 7/7/2022    Procedure: COLONOSCOPY;  Surgeon: Jolynn Ayala MD;  Location: [facility] ENDO;  Service: Endoscopy;  Laterality: N/A;    COLONOSCOPY N/A 5/31/2023    Procedure: COLONOSCOPY;  Surgeon: Jolynn Ayala MD;  Location: [facility] ENDO;  Service: Endoscopy;  Laterality: N/A;    CYSTOSCOPY W/ URETERAL STENT PLACEMENT Right 2/22/2023    Procedure: CYSTOSCOPY, WITH URETERAL STENT INSERTION;  Surgeon: Carin Kwon Jr., MD;  Location: [facility] OR;  Service: Urology;  Laterality: Right;    CYSTOSCOPY W/ URETERAL STENT PLACEMENT Bilateral 3/26/2023    Procedure: CYSTOSCOPY, WITH URETERAL STENT INSERTION;  Surgeon: Sabine Barber MD;  Location: [facility] OR;  Service: Urology;  Laterality: Bilateral;    CYSTOSCOPY WITH URETEROSCOPY, RETROGRADE PYELOGRAPHY, AND INSERTION OF STENT Bilateral 3/26/2023    Procedure: CYSTOSCOPY, WITH RETROGRADE PYELOGRAM AND URETERAL STENT INSERTION;  Surgeon: Sabine Barber MD;  Location: [facility] OR;  Service: Urology;  Laterality: Bilateral;    CYSTOURETEROSCOPY WITH RETROGRADE PYELOGRAPHY AND INSERTION OF STENT INTO URETER  Right 3/7/2023    Procedure: CYSTOURETEROSCOPY, WITH RETROGRADE PYELOGRAM AND URETERAL STENT INSERTION;  Surgeon: Carin Kwon Jr., MD;  Location: [facility] OR;  Service: Urology;  Laterality: Right;    CYSTOURETEROSCOPY, WITH HOLMIUM LASER LITHOTRIPSY OF URETERAL CALCULUS AND STENT INSERTION Bilateral 4/11/2023    Procedure: CYSTOURETEROSCOPY, WITH HOLMIUM LASER LITHOTRIPSY OF URETERAL CALCULUS AND STENT INSERTION;  Surgeon: Carin Kwon Jr., MD;  Location: [facility] OR;  Service: Urology;  Laterality: Bilateral;    ESOPHAGEAL DILATION N/A 6/29/2021    Procedure: DILATION, ESOPHAGUS;  Surgeon: Sourav Baires III, MD;  Location: [facility] ENDO;  Service: Endoscopy;  Laterality: N/A;    ESOPHAGOGASTRODUODENOSCOPY  06/29/2021    Dilation to 57 Fr    ESOPHAGOGASTRODUODENOSCOPY N/A 6/29/2021    Procedure: EGD (ESOPHAGOGASTRODUODENOSCOPY);  Surgeon: Sourav Baires III, MD;  Location: [facility] ENDO;  Service: Endoscopy;  Laterality: N/A;    FLEXIBLE SIGMOIDOSCOPY N/A 8/1/2022    Procedure: SIGMOIDOSCOPY, FLEXIBLE;  Surgeon: Brandon Wagner MD;  Location: [facility] ENDO;  Service: Endoscopy;  Laterality: N/A;    HYSTERECTOMY      INSERTION OF TUNNELED CENTRAL VENOUS CATHETER (CVC) WITH SUBCUTANEOUS PORT N/A 1/11/2021    Procedure: [id number];  Surgeon: Brandon Wagner MD;  Location: [facility] OR;  Service: General;  Laterality: N/A;    INSERTION OF TUNNELED CENTRAL VENOUS CATHETER (CVC) WITH SUBCUTANEOUS PORT Right 9/1/2022    Procedure: [id number]-CATH;  Surgeon: Brandon Wagner MD;  Location: [facility] OR;  Service: General;  Laterality: Right;  Wants rt side placement    MEDIPORT REMOVAL Left 4/19/2021    Procedure: REMOVAL, CATHETER, CENTRAL VENOUS, TUNNELED, WITH PORT;  Surgeon: Brandon Wagner MD;  Location: [facility] OR;  Service: General;  Laterality: Left;    REMOVAL-STENT Right 3/7/2023    Procedure: REMOVAL-STENT;  Surgeon: Carin Kwon Jr., MD;  Location: [facility] OR;  Service: Urology;  Laterality: Right;     REMOVAL-STENT Bilateral 4/11/2023    Procedure: REMOVAL-STENT;  Surgeon: Carin Kwon Jr., MD;  Location: [facility] OR;  Service: Urology;  Laterality: Bilateral;    RETROGRADE PYELOGRAPHY Bilateral 4/11/2023    Procedure: PYELOGRAM, RETROGRADE;  Surgeon: Carin Kwon Jr., MD;  Location: [facility] OR;  Service: Urology;  Laterality: Bilateral;    ROBOT-ASSISTED COLECTOMY N/A 11/9/2020    Procedure: ROBOTIC COLECTOMY low anterior resection, possible open;  Surgeon: Brandon Wagner MD;  Location: [facility] OR;  Service: General;  Laterality: N/A;  CONVERTED TO OPEN AT 1503    URETEROSCOPIC REMOVAL OF URETERIC CALCULUS Right 3/7/2023    Procedure: REMOVAL, CALCULUS, URETER, URETEROSCOPIC;  Surgeon: Carin Kwon Jr., MD;  Location: [facility] OR;  Service: Urology;  Laterality: Right;    URETEROSCOPIC REMOVAL OF URETERIC CALCULUS Bilateral 4/11/2023    Procedure: REMOVAL, CALCULUS, URETER, URETEROSCOPIC;  Surgeon: Carin Kwon Jr., MD;  Location: [facility] OR;  Service: Urology;  Laterality: Bilateral;       Review of patient's allergies indicates:   Allergen Reactions    Ativan [lorazepam] Hives and Itching    Flagyl [metronidazole] Itching and Rash    Pcn [penicillins] Hives, Itching and Rash       Current Facility-Administered Medications on File Prior to Encounter   Medication    diphenhydrAMINE injection 12.5 mg    electrolyte-S (ISOLYTE)    fentaNYL 50 mcg/mL injection 25 mcg    HYDROmorphone (PF) injection 0.2 mg    lactated ringers infusion    lactated ringers infusion    LIDOcaine (PF) 10 mg/ml (1%) injection 10 mg    LORazepam injection 0.25 mg    ondansetron injection 4 mg    prochlorperazine injection Soln 5 mg    sodium chloride 0.9% flush 3 mL     Current Outpatient Medications on File Prior to Encounter   Medication Sig    diphenoxylate-atropine 2.5-0.025 mg (LOMOTIL) 2.5-0.025 mg per tablet Take by mouth.    ferrous sulfate 325 (65 FE) MG EC tablet Take 1 tablet (325 mg total) by mouth every other day.     HYDROcodone-acetaminophen (NORCO) 5-325 mg per tablet TAKE ONE TABLET BY MOUTH EVERY SIX HOURS AS NEEDED FOR PAIN    levothyroxine (SYNTHROID) 125 MCG tablet Take 1 tablet (125 mcg total) by mouth before breakfast.    ondansetron (ZOFRAN-ODT) 8 MG TbDL Take 1 tablet (8 mg total) by mouth every 12 (twelve) hours as needed.    potassium chloride SA (K-DUR,KLOR-CON) 20 MEQ tablet Take 1 tablet (20 mEq total) by mouth once daily.    albuterol (PROVENTIL) 2.5 mg /3 mL (0.083 %) nebulizer solution Take 3 mLs (2.5 mg total) by nebulization every 6 (six) hours as needed for Wheezing. Rescue (Patient not taking: Reported on 2023)    CMPD hydrocortisone 2%- LIDOcaine 3% suppository (RECTAL ROCKET) Place 1 suppository rectally every evening. Insert 1 suppository 3/4 of the way onto rectum. Wet for easier application. Repeat for 3 nights.    prochlorperazine (COMPAZINE) 10 MG tablet Take 1 tablet (10 mg total) by mouth every 6 (six) hours as needed.    triamcinolone acetonide 0.025% (KENALOG) 0.025 % cream Apply topically once daily.     Family History       Problem Relation (Age of Onset)    Breast cancer Maternal Grandmother    COPD Mother, Father    Prostate cancer Brother          Tobacco Use    Smoking status: Former     Packs/day: 2.00     Years: 40.00     Pack years: 80.00     Types: Cigarettes     Quit date: 2017     Years since quittin.0    Smokeless tobacco: Never   Substance and Sexual Activity    Alcohol use: No    Drug use: No    Sexual activity: Not Currently     Partners: Male     Review of Systems   Constitutional:  Negative for chills and fever.   HENT:  Negative for congestion and rhinorrhea.    Respiratory:  Negative for cough, shortness of breath and wheezing.    Cardiovascular:  Negative for chest pain, palpitations and leg swelling.   Gastrointestinal:  Positive for diarrhea. Negative for abdominal distention, abdominal pain, nausea and vomiting.   Endocrine: Negative for cold intolerance and  heat intolerance.   Genitourinary:  Negative for dysuria and urgency.   Musculoskeletal:  Negative for arthralgias and neck stiffness.   Skin:  Negative for rash and wound.   Neurological:  Negative for dizziness and weakness.   Objective:     Vital Signs (Most Recent):  Temp: 98 °F (36.7 °C) (07/24/23 0655)  Pulse: 77 (07/24/23 0655)  Resp: 18 (07/24/23 0800)  BP: (!) 149/68 (07/24/23 0655)  SpO2: 96 % (07/24/23 0655) Vital Signs (24h Range):  Temp:  [98 °F (36.7 °C)] 98 °F (36.7 °C)  Pulse:  [77] 77  Resp:  [18] 18  SpO2:  [96 %] 96 %  BP: (149)/(68) 149/68     Weight: 89.4 kg (197 lb)  Body mass index is 37.22 kg/m².     Physical Exam  Constitutional:       General: She is not in acute distress.     Appearance: She is well-developed.   HENT:      Head: Normocephalic and atraumatic.   Cardiovascular:      Rate and Rhythm: Normal rate and regular rhythm.      Heart sounds: No murmur heard.  Pulmonary:      Effort: Pulmonary effort is normal. No respiratory distress.      Breath sounds: Normal breath sounds. No wheezing or rales.   Abdominal:      General: Bowel sounds are normal. There is no distension.      Palpations: Abdomen is soft.      Tenderness: There is no abdominal tenderness.      Hernia: A hernia is present.   Musculoskeletal:         General: Normal range of motion.   Skin:     General: Skin is warm and dry.      Findings: No rash.   Neurological:      Mental Status: She is alert and oriented to person, place, and time.      Cranial Nerves: No cranial nerve deficit.   Psychiatric:         Behavior: Behavior normal.              Significant Labs: All pertinent labs within the past 24 hours have been reviewed.    Significant Imaging: I have reviewed all pertinent imaging results/findings within the past 24 hours.

## 2023-07-24 NOTE — BRIEF OP NOTE
[facility]  Brief Operative Note    Surgery Date: 7/24/2023     Surgeon(s) and Role:  Panel 1:     * Brandon Wagner MD - Primary  Panel 2:     * dArienne Mijares MD - Primary    Assisting Surgeon: None    Pre-op Diagnosis:  Recurrent Rectal cancer    Post-op Diagnosis:  Recurrent rectal cancer    Procedure:   Exploratory laparotomy  Extensive lysis of adhesions taking over an hour  Completion proctectomy with end colostomy (APR)- 22 modifier  Excision of right iliac chain lymph node  Small bowel resection        Anesthesia: General    Operative Findings:   Extensive adhesions taking over an hour to lyse.    FIrm fibrotic malignant anastomotic recurrence in mid rectum.  Stuck down to posterior pelvic wall and R pelvic side wall  Extensive inflammatory changes from pelvic recurrence and radiation.  Completion proctectomy performed without event.  Small bowel resection.  Excision or Right iliac chain node.          Estimated Blood Loss: 650 mL         Specimens:   Specimen (24h ago, onward)       Start     Ordered    07/24/23 1746  Specimen to Pathology - Surgery  Once        Comments: Pre-op Diagnosis: Rectosigmoid cancer [C19]Procedure(s):LAPAROTOMY, EXPLORATORYCOLECTOMY, PARTIALCYSTOSCOPY, WITH URETERAL STENT INSERTION Number of specimens: 1Name of specimens: 1. LYMPH NODES2.DISTAL RECTUM3.SMALL BOWEL4.PROXIMAL COLON     Question:  Release to patient  Answer:  Immediate    07/24/23 6023

## 2023-07-24 NOTE — CONSULTS
[facility]   Staff: MD Maryana  Group:Maryana/Lluvia/Doyle/Yoav/Elisabeth  PCP: Karma Mcdermott MD  Date of Service: 07/24/2023      Patient with a history of colon cancer s/p XRT and chemo, COPD and HLD. T3 N1 rectal CA diagnosed October 2020 robotic LAR November 2020  Ctrss 2/22/23 R 4mm uvj mm stone, 4mm distal R ureteral stone. RUP 8mm stone. LLP 7mm stone      Cysto R stent by  2/22/23 for 2 right ureteral stones  Cysto R urs 3/7/23 by  of 2 R ureteral stones  Ctrss 3/25/23 7mm L proximal stone and 6mm distal R ureteral stone (previously in lower pole)    Cysto B stents by  on 3/26/23 for b ureteral stone  Cysto B ureteroscopy by  on 4/11/23 4/22/23 ctrss 1mm L uvj stone and 2mm L intarenal stones with mild hydro to point overlying the sacrum. At this point the patient has several surgical clips and small bowel loops attached to the retroperitoneum felt consistent with adhesion and stricture of the ureter.     5/24/23 rbus showed moderate R hydro  5/26/23 saw  in clinic and he ordered a f/u rbus for 3 months later  6/14/23 pet ct stable R hydro. Decreased size and FDG activity in the presacral mass at the sigmoid rectal anastomosis. Slight decrease in size and FDG activity in the enlarged right iliac lymph node. No stone sin kidneys seen    Today she had cysto b ureteral catheters by me prior to colon resection. Cysto showed no hydronephrotic drip of right kidney. Urine sent from cystoscope after ureteral catheters placed      Urine history:       Component       Urine Culture, Routine   Latest Ref Rng & Units          3/25/2023       6:10 PM clinically necessary.   3/25/2023       6:10 PM Multiple organisms isolated. None in predominance.  Repeat if   3/28/2022       ESCHERICHIA COLI (A) . . .       Past Medical History:   Diagnosis Date    Acute hypoxemic respiratory failure 07/09/2018    Arthritis     Bilateral kidney stones 3/27/2023     Cancer     colon    COPD (chronic obstructive pulmonary disease)     History of home oxygen therapy     ONLY PRN AND HASN'T USED IT IN A YEAR    Hyperlipidemia     Thyroid disease      Past Surgical History:   Procedure Laterality Date    CHOLECYSTECTOMY      COLECTOMY N/A 11/9/2020    Procedure: COLECTOMY;  Surgeon: Brandon Wagner MD;  Location: [facility] OR;  Service: General;  Laterality: N/A;  LOWER- ANTERIOR    COLONOSCOPY N/A 10/23/2020    Procedure: COLONOSCOPY;  Surgeon: Jolynn Ayala MD;  Location: [facility] ENDO;  Service: Endoscopy;  Laterality: N/A;    COLONOSCOPY N/A 8/6/2021    Procedure: COLONOSCOPY;  Surgeon: Jolynn Ayala MD;  Location: [facility] ENDO;  Service: Endoscopy;  Laterality: N/A;    COLONOSCOPY N/A 7/7/2022    Procedure: COLONOSCOPY;  Surgeon: Jolynn Ayala MD;  Location: [facility] ENDO;  Service: Endoscopy;  Laterality: N/A;    COLONOSCOPY N/A 5/31/2023    Procedure: COLONOSCOPY;  Surgeon: Jolynn Ayala MD;  Location: [facility] ENDO;  Service: Endoscopy;  Laterality: N/A;    CYSTOSCOPY W/ URETERAL STENT PLACEMENT Right 2/22/2023    Procedure: CYSTOSCOPY, WITH URETERAL STENT INSERTION;  Surgeon: Carin Kwon Jr., MD;  Location: [facility] OR;  Service: Urology;  Laterality: Right;    CYSTOSCOPY W/ URETERAL STENT PLACEMENT Bilateral 3/26/2023    Procedure: CYSTOSCOPY, WITH URETERAL STENT INSERTION;  Surgeon: Sabine Barber MD;  Location: [facility] OR;  Service: Urology;  Laterality: Bilateral;    CYSTOSCOPY WITH URETEROSCOPY, RETROGRADE PYELOGRAPHY, AND INSERTION OF STENT Bilateral 3/26/2023    Procedure: CYSTOSCOPY, WITH RETROGRADE PYELOGRAM AND URETERAL STENT INSERTION;  Surgeon: Sabine Barber MD;  Location: [facility] OR;  Service: Urology;  Laterality: Bilateral;    CYSTOURETEROSCOPY WITH RETROGRADE PYELOGRAPHY AND INSERTION OF STENT INTO URETER Right 3/7/2023    Procedure: CYSTOURETEROSCOPY, WITH RETROGRADE PYELOGRAM AND URETERAL STENT INSERTION;  Surgeon: Carin Kwon Jr., MD;   Location: [facility] OR;  Service: Urology;  Laterality: Right;    CYSTOURETEROSCOPY, WITH HOLMIUM LASER LITHOTRIPSY OF URETERAL CALCULUS AND STENT INSERTION Bilateral 4/11/2023    Procedure: CYSTOURETEROSCOPY, WITH HOLMIUM LASER LITHOTRIPSY OF URETERAL CALCULUS AND STENT INSERTION;  Surgeon: Carin Kwon Jr., MD;  Location: [facility] OR;  Service: Urology;  Laterality: Bilateral;    ESOPHAGEAL DILATION N/A 6/29/2021    Procedure: DILATION, ESOPHAGUS;  Surgeon: Sourav Baires III, MD;  Location: [facility] ENDO;  Service: Endoscopy;  Laterality: N/A;    ESOPHAGOGASTRODUODENOSCOPY  06/29/2021    Dilation to 57 Fr    ESOPHAGOGASTRODUODENOSCOPY N/A 6/29/2021    Procedure: EGD (ESOPHAGOGASTRODUODENOSCOPY);  Surgeon: Sourav Baires III, MD;  Location: [facility] ENDO;  Service: Endoscopy;  Laterality: N/A;    FLEXIBLE SIGMOIDOSCOPY N/A 8/1/2022    Procedure: SIGMOIDOSCOPY, FLEXIBLE;  Surgeon: Brandon Wagner MD;  Location: [facility] ENDO;  Service: Endoscopy;  Laterality: N/A;    HYSTERECTOMY      INSERTION OF TUNNELED CENTRAL VENOUS CATHETER (CVC) WITH SUBCUTANEOUS PORT N/A 1/11/2021    Procedure: [id number]-CATH;  Surgeon: Brandon Wagner MD;  Location: [facility] OR;  Service: General;  Laterality: N/A;    INSERTION OF TUNNELED CENTRAL VENOUS CATHETER (CVC) WITH SUBCUTANEOUS PORT Right 9/1/2022    Procedure: [id number]-CATH;  Surgeon: Brandon Wagner MD;  Location: [facility] OR;  Service: General;  Laterality: Right;  Wants rt side placement    MEDIPORT REMOVAL Left 4/19/2021    Procedure: REMOVAL, CATHETER, CENTRAL VENOUS, TUNNELED, WITH PORT;  Surgeon: Brandon Wagner MD;  Location: [facility] OR;  Service: General;  Laterality: Left;    REMOVAL-STENT Right 3/7/2023    Procedure: REMOVAL-STENT;  Surgeon: Carin Kwon Jr., MD;  Location: [facility] OR;  Service: Urology;  Laterality: Right;    REMOVAL-STENT Bilateral 4/11/2023    Procedure: REMOVAL-STENT;  Surgeon: Carin Kwon Jr., MD;  Location: [facility] OR;  Service: Urology;   Laterality: Bilateral;    RETROGRADE PYELOGRAPHY Bilateral 4/11/2023    Procedure: PYELOGRAM, RETROGRADE;  Surgeon: Carin Kwon Jr., MD;  Location: [facility] OR;  Service: Urology;  Laterality: Bilateral;    ROBOT-ASSISTED COLECTOMY N/A 11/9/2020    Procedure: ROBOTIC COLECTOMY low anterior resection, possible open;  Surgeon: Brandon Wagner MD;  Location: [facility] OR;  Service: General;  Laterality: N/A;  CONVERTED TO OPEN AT 1503    URETEROSCOPIC REMOVAL OF URETERIC CALCULUS Right 3/7/2023    Procedure: REMOVAL, CALCULUS, URETER, URETEROSCOPIC;  Surgeon: Carin Kwon Jr., MD;  Location: [facility] OR;  Service: Urology;  Laterality: Right;    URETEROSCOPIC REMOVAL OF URETERIC CALCULUS Bilateral 4/11/2023    Procedure: REMOVAL, CALCULUS, URETER, URETEROSCOPIC;  Surgeon: Carin Kwon Jr., MD;  Location: [facility];  Service: Urology;  Laterality: Bilateral;     Vitals:    07/24/23 0800   BP:    Pulse:    Resp: 18   Temp:        General:wdwn  in NAD  Neurologic: CN grossly normal. Normal sensation.   Psychiatric: awake, alert and oriented x 3. Mood and affect Normal. Cooperative.  Eyes: PERRLA, normal conjunctiva  Respiratory: no increased work on breathing. No wheezing.   Cardiovascular: No obvious extremity edema. Warm and well perfused.  GI: no obvious stomach distension  Musculoskeletal: normal  range of motion of bilateral upper extremities. Normal muscle strength and tone.  Skin: no obvious rashes or lesions. No tightening of skin noted.    Pertinent  exam in HPI      Recent Labs   Lab 04/22/23  0904 06/19/23  1118 07/21/23  1439   WBC 4.37 4.07 4.17   Hemoglobin 12.3 12.8 12.1   Hematocrit 38.9 40.2 37.4   Platelets 248 213 218   ]  Recent Labs   Lab 11/15/22  0545 11/26/22  1045 03/26/23  0435 03/27/23  0439 03/28/23  0457 04/01/23  0953 05/08/23  1002 06/19/23  1118 07/21/23  1439   Sodium 138   < > 139 140 140   < > 141 140 139   Potassium 3.3 L   < > 3.6 4.0 3.4 L   < > 4.7 4.2 4.5   Chloride 107   < > 105 105 106    < > 109 106 106   CO2 22 L   < > 25 27 24   < > 22 L 23 24   BUN 4 L   < > 9 9 9   < > 9 14 14   Creatinine 0.7   < > 1.2 0.9 0.7   < > 0.8 0.8 1.0   Glucose 117 H   < > 99 123 H 112 H   < > 105 96 90   Calcium 8.6 L   < > 8.2 L 8.7 8.4 L   < > 8.9 8.7 8.8   Magnesium 2.0   < > 1.4 L 2.4 1.6  --   --   --   --    Phosphorus 2.7  --   --  3.0 4.6 H  --   --   --   --    Alkaline Phosphatase 105   < > 93 107 93   < > 88 75 75   Total Protein 5.8 L   < > 5.0 L 5.9 L 5.5 L   < > 6.8 6.9 7.2   Albumin 3.2 L   < > 2.7 L 3.2 L 2.8 L   < > 3.6 3.6 3.8   Total Bilirubin 0.4   < > 0.6 0.4 0.4   < > 0.4 0.4 0.4   AST 9 L   < > 12 10 10   < > 11 11 13   ALT 9 L   < > 11 11 9 L   < > 13 12 9 L    < > = values in this interval not displayed.   ]    Lab Results   Component Value Date    HGBA1C 5.6 10/06/2020       Tessa Enriquez is a 67 y.o. female  1. Rectosigmoid cancer      R hydroureteronephrosis secondary to ureteral stricture from radiation or stones    Plan:  Renal scan to eval drainage and function of R kidney in a month with rbus orderd by   F/u with  after rbus and renal scan (he saw her may 2024 and wanted 3 months rbus and f/u - no f/u in computer currently)

## 2023-07-24 NOTE — H&P
[facility] Medicine  History & Physical    Patient Name: Tessa Enriquez  MRN: [id number]  Patient Class: IP- Surgery Admit  Admission Date: 7/24/2023  Attending Physician: Maria Elena Vang MD  Primary Care Provider: Karma Mcdermott MD         Patient information was obtained from patient, relative(s) and past medical records.     Subjective:     Principal Problem:Malignant neoplasm of colon    Chief Complaint: No chief complaint on file.       HPI: Patient is a 67-year-old female with history of COPD, hypothyroidism, rectosigmoid cancer who was seen today prior to surgery with Dr. Wagner for recurrent rectosigmoid cancer.  Patient has been having diarrhea and rectal bleeding.  She denies any fever, chills, shortness of breath, chest pain, abdominal pain, bladder complaints.  She does currently have a headache that she attributes to being NPO and not having coffee this morning.  She is accompanied by family.  She is seen in the preop area.  All questions answered.      Past Medical History:   Diagnosis Date    Acute hypoxemic respiratory failure 07/09/2018    Arthritis     Bilateral kidney stones 3/27/2023    Cancer     colon    COPD (chronic obstructive pulmonary disease)     History of home oxygen therapy     ONLY PRN AND HASN'T USED IT IN A YEAR    Hyperlipidemia     Thyroid disease        Past Surgical History:   Procedure Laterality Date    CHOLECYSTECTOMY      COLECTOMY N/A 11/9/2020    Procedure: COLECTOMY;  Surgeon: Brandon Wagner MD;  Location: [facility];  Service: General;  Laterality: N/A;  LOWER- ANTERIOR    COLONOSCOPY N/A 10/23/2020    Procedure: COLONOSCOPY;  Surgeon: Jolynn Ayala MD;  Location: [facility];  Service: Endoscopy;  Laterality: N/A;    COLONOSCOPY N/A 8/6/2021    Procedure: COLONOSCOPY;  Surgeon: Jolynn Ayala MD;  Location: [facility];  Service: Endoscopy;  Laterality: N/A;    COLONOSCOPY N/A 7/7/2022    Procedure:  COLONOSCOPY;  Surgeon: Jolynn Ayala MD;  Location: [facility] ENDO;  Service: Endoscopy;  Laterality: N/A;    COLONOSCOPY N/A 5/31/2023    Procedure: COLONOSCOPY;  Surgeon: Jolynn Ayala MD;  Location: [facility] ENDO;  Service: Endoscopy;  Laterality: N/A;    CYSTOSCOPY W/ URETERAL STENT PLACEMENT Right 2/22/2023    Procedure: CYSTOSCOPY, WITH URETERAL STENT INSERTION;  Surgeon: Carin Kwon Jr., MD;  Location: [facility] OR;  Service: Urology;  Laterality: Right;    CYSTOSCOPY W/ URETERAL STENT PLACEMENT Bilateral 3/26/2023    Procedure: CYSTOSCOPY, WITH URETERAL STENT INSERTION;  Surgeon: Sabine Barber MD;  Location: [facility] OR;  Service: Urology;  Laterality: Bilateral;    CYSTOSCOPY WITH URETEROSCOPY, RETROGRADE PYELOGRAPHY, AND INSERTION OF STENT Bilateral 3/26/2023    Procedure: CYSTOSCOPY, WITH RETROGRADE PYELOGRAM AND URETERAL STENT INSERTION;  Surgeon: Sabine Barber MD;  Location: [facility] OR;  Service: Urology;  Laterality: Bilateral;    CYSTOURETEROSCOPY WITH RETROGRADE PYELOGRAPHY AND INSERTION OF STENT INTO URETER Right 3/7/2023    Procedure: CYSTOURETEROSCOPY, WITH RETROGRADE PYELOGRAM AND URETERAL STENT INSERTION;  Surgeon: Carin Kwon Jr., MD;  Location: [facility] OR;  Service: Urology;  Laterality: Right;    CYSTOURETEROSCOPY, WITH HOLMIUM LASER LITHOTRIPSY OF URETERAL CALCULUS AND STENT INSERTION Bilateral 4/11/2023    Procedure: CYSTOURETEROSCOPY, WITH HOLMIUM LASER LITHOTRIPSY OF URETERAL CALCULUS AND STENT INSERTION;  Surgeon: Carin Kwon Jr., MD;  Location: [facility] OR;  Service: Urology;  Laterality: Bilateral;    ESOPHAGEAL DILATION N/A 6/29/2021    Procedure: DILATION, ESOPHAGUS;  Surgeon: Sourav Baires III, MD;  Location: [facility] ENDO;  Service: Endoscopy;  Laterality: N/A;    ESOPHAGOGASTRODUODENOSCOPY  06/29/2021    Dilation to 57 Fr    ESOPHAGOGASTRODUODENOSCOPY N/A 6/29/2021    Procedure: EGD (ESOPHAGOGASTRODUODENOSCOPY);  Surgeon: Sourav Baires III, MD;  Location: [facility]  ENDO;  Service: Endoscopy;  Laterality: N/A;    FLEXIBLE SIGMOIDOSCOPY N/A 8/1/2022    Procedure: SIGMOIDOSCOPY, FLEXIBLE;  Surgeon: Brandon Wagner MD;  Location: [facility] ENDO;  Service: Endoscopy;  Laterality: N/A;    HYSTERECTOMY      INSERTION OF TUNNELED CENTRAL VENOUS CATHETER (CVC) WITH SUBCUTANEOUS PORT N/A 1/11/2021    Procedure: [id number];  Surgeon: Brandon Wagner MD;  Location: [facility] OR;  Service: General;  Laterality: N/A;    INSERTION OF TUNNELED CENTRAL VENOUS CATHETER (CVC) WITH SUBCUTANEOUS PORT Right 9/1/2022    Procedure: [id number];  Surgeon: Brandon Wagner MD;  Location: [facility] OR;  Service: General;  Laterality: Right;  Wants rt side placement    MEDIPORT REMOVAL Left 4/19/2021    Procedure: REMOVAL, CATHETER, CENTRAL VENOUS, TUNNELED, WITH PORT;  Surgeon: Brandon Wagner MD;  Location: [facility] OR;  Service: General;  Laterality: Left;    REMOVAL-STENT Right 3/7/2023    Procedure: REMOVAL-STENT;  Surgeon: Carin Kwon Jr., MD;  Location: [facility] OR;  Service: Urology;  Laterality: Right;    REMOVAL-STENT Bilateral 4/11/2023    Procedure: REMOVAL-STENT;  Surgeon: Carin Kwon Jr., MD;  Location: [facility] OR;  Service: Urology;  Laterality: Bilateral;    RETROGRADE PYELOGRAPHY Bilateral 4/11/2023    Procedure: PYELOGRAM, RETROGRADE;  Surgeon: Carin Kwon Jr., MD;  Location: [facility] OR;  Service: Urology;  Laterality: Bilateral;    ROBOT-ASSISTED COLECTOMY N/A 11/9/2020    Procedure: ROBOTIC COLECTOMY low anterior resection, possible open;  Surgeon: Brandon Wagner MD;  Location: [facility] OR;  Service: General;  Laterality: N/A;  CONVERTED TO OPEN AT 1503    URETEROSCOPIC REMOVAL OF URETERIC CALCULUS Right 3/7/2023    Procedure: REMOVAL, CALCULUS, URETER, URETEROSCOPIC;  Surgeon: Carin Kwon Jr., MD;  Location: [facility] OR;  Service: Urology;  Laterality: Right;    URETEROSCOPIC REMOVAL OF URETERIC CALCULUS Bilateral 4/11/2023    Procedure: REMOVAL, CALCULUS, URETER,  URETEROSCOPIC;  Surgeon: Carin Kwon Jr., MD;  Location: [facility];  Service: Urology;  Laterality: Bilateral;       Review of patient's allergies indicates:   Allergen Reactions    Ativan [lorazepam] Hives and Itching    Flagyl [metronidazole] Itching and Rash    Pcn [penicillins] Hives, Itching and Rash       Current Facility-Administered Medications on File Prior to Encounter   Medication    diphenhydrAMINE injection 12.5 mg    electrolyte-S (ISOLYTE)    fentaNYL 50 mcg/mL injection 25 mcg    HYDROmorphone (PF) injection 0.2 mg    lactated ringers infusion    lactated ringers infusion    LIDOcaine (PF) 10 mg/ml (1%) injection 10 mg    LORazepam injection 0.25 mg    ondansetron injection 4 mg    prochlorperazine injection Soln 5 mg    sodium chloride 0.9% flush 3 mL     Current Outpatient Medications on File Prior to Encounter   Medication Sig    diphenoxylate-atropine 2.5-0.025 mg (LOMOTIL) 2.5-0.025 mg per tablet Take by mouth.    ferrous sulfate 325 (65 FE) MG EC tablet Take 1 tablet (325 mg total) by mouth every other day.    HYDROcodone-acetaminophen (NORCO) 5-325 mg per tablet TAKE ONE TABLET BY MOUTH EVERY SIX HOURS AS NEEDED FOR PAIN    levothyroxine (SYNTHROID) 125 MCG tablet Take 1 tablet (125 mcg total) by mouth before breakfast.    ondansetron (ZOFRAN-ODT) 8 MG TbDL Take 1 tablet (8 mg total) by mouth every 12 (twelve) hours as needed.    potassium chloride SA (K-DUR,KLOR-CON) 20 MEQ tablet Take 1 tablet (20 mEq total) by mouth once daily.    albuterol (PROVENTIL) 2.5 mg /3 mL (0.083 %) nebulizer solution Take 3 mLs (2.5 mg total) by nebulization every 6 (six) hours as needed for Wheezing. Rescue (Patient not taking: Reported on 7/20/2023)    CMPD hydrocortisone 2%- LIDOcaine 3% suppository (RECTAL ROCKET) Place 1 suppository rectally every evening. Insert 1 suppository 3/4 of the way onto rectum. Wet for easier application. Repeat for 3 nights.    prochlorperazine (COMPAZINE)  10 MG tablet Take 1 tablet (10 mg total) by mouth every 6 (six) hours as needed.    triamcinolone acetonide 0.025% (KENALOG) 0.025 % cream Apply topically once daily.     Family History       Problem Relation (Age of Onset)    Breast cancer Maternal Grandmother    COPD Mother, Father    Prostate cancer Brother          Tobacco Use    Smoking status: Former     Packs/day: 2.00     Years: 40.00     Pack years: 80.00     Types: Cigarettes     Quit date: 2017     Years since quittin.0    Smokeless tobacco: Never   Substance and Sexual Activity    Alcohol use: No    Drug use: No    Sexual activity: Not Currently     Partners: Male     Review of Systems   Constitutional:  Negative for chills and fever.   HENT:  Negative for congestion and rhinorrhea.    Respiratory:  Negative for cough, shortness of breath and wheezing.    Cardiovascular:  Negative for chest pain, palpitations and leg swelling.   Gastrointestinal:  Positive for diarrhea. Negative for abdominal distention, abdominal pain, nausea and vomiting.   Endocrine: Negative for cold intolerance and heat intolerance.   Genitourinary:  Negative for dysuria and urgency.   Musculoskeletal:  Negative for arthralgias and neck stiffness.   Skin:  Negative for rash and wound.   Neurological:  Negative for dizziness and weakness.   Objective:     Vital Signs (Most Recent):  Temp: 98 °F (36.7 °C) (23 0655)  Pulse: 77 (23 0655)  Resp: 18 (23 0800)  BP: (!) 149/68 (23 0655)  SpO2: 96 % (23 0655) Vital Signs (24h Range):  Temp:  [98 °F (36.7 °C)] 98 °F (36.7 °C)  Pulse:  [77] 77  Resp:  [18] 18  SpO2:  [96 %] 96 %  BP: (149)/(68) 149/68     Weight: 89.4 kg (197 lb)  Body mass index is 37.22 kg/m².     Physical Exam  Constitutional:       General: She is not in acute distress.     Appearance: She is well-developed.   HENT:      Head: Normocephalic and atraumatic.   Cardiovascular:      Rate and Rhythm: Normal rate and regular rhythm.       Heart sounds: No murmur heard.  Pulmonary:      Effort: Pulmonary effort is normal. No respiratory distress.      Breath sounds: Normal breath sounds. No wheezing or rales.   Abdominal:      General: Bowel sounds are normal. There is no distension.      Palpations: Abdomen is soft.      Tenderness: There is no abdominal tenderness.      Hernia: A hernia is present.   Musculoskeletal:         General: Normal range of motion.   Skin:     General: Skin is warm and dry.      Findings: No rash.   Neurological:      Mental Status: She is alert and oriented to person, place, and time.      Cranial Nerves: No cranial nerve deficit.   Psychiatric:         Behavior: Behavior normal.              Significant Labs: All pertinent labs within the past 24 hours have been reviewed.    Significant Imaging: I have reviewed all pertinent imaging results/findings within the past 24 hours.    Assessment/Plan:     * Malignant neoplasm of colon  Patient with recurrent rectosigmoid cancer.  Plan for surgery with Dr. Wagner today.  Monitor H&H  Pain and nausea control.        Severe obesity (BMI 35.0-39.9) with comorbidity  Body mass index is 37.22 kg/m². Morbid obesity complicates all aspects of disease management from diagnostic modalities to treatment. Weight loss encouraged and health benefits explained to patient.         Acquired hypothyroidism  Patient has chronic hypothyroidism. TFTs reviewed-   Lab Results   Component Value Date    TSH 10.658 (H) 02/21/2023   . Will continue chronic levothyroxine and adjust for and clinical changes.      VTE Risk Mitigation (From admission, onward)         Ordered     IP VTE HIGH RISK PATIENT  Once         07/24/23 0621     Place sequential compression device  Until discontinued         07/24/23 0621     Place OLIVA hose  Until discontinued         07/24/23 0621                           Maria Elena Vang MD  Department of Hospital Medicine  [facility]

## 2023-07-24 NOTE — ASSESSMENT & PLAN NOTE
Body mass index is 37.22 kg/m². Morbid obesity complicates all aspects of disease management from diagnostic modalities to treatment. Weight loss encouraged and health benefits explained to patient.

## 2023-07-25 NOTE — PLAN OF CARE
Dr Massey called and updated 50 cc out of colin  called  7.7  24.5 all b/p given to him no  new orders at this time   ok to transfer to room dr Vang made aware of pt location   at 1815    50 cc out of rhina 1930   Released from pacu per anesthesia for room  316 exparell bracelet on pain a 5/10 from 9/10 colostomy intact no drainage noted WOund care nurse Danii James made aware  nataly pad intact no drainage noted from proctectomy part of surgery circled dime size drainage dry on island dsg on abd abd soft no bowel sounds noted   Sr up x 2 cb in reach bed low and locked scd on and running

## 2023-07-25 NOTE — PLAN OF CARE
[facility] - Med/Surg  Initial Discharge Assessment       Primary Care Provider: Karma Mcdermott MD    Admission Diagnosis: Rectosigmoid cancer [C19]  Rectal cancer [C20]    Admission Date: 7/24/2023  Expected Discharge Date: 7/26/2023    Transition of Care Barriers: None    Payor: BLUE CROSS BLUE SHIELD / Plan: BLUE CONNECT / Product Type: HMO /     Extended Emergency Contact Information  Primary Emergency Contact: Umu Enriquez   [facility]  Home Phone: [phone]  Mobile Phone: [phone]  Relation: Relative   needed? No  Secondary Emergency Contact: Cata Young  Mobile Phone: [phone]  Relation: Sister    Discharge Plan A: Home  Discharge Plan B: Home with family      [id number] - [place] - [address]  [address] [address]  Phone: [phone] Fax: [phone]    [id number] - [place], [place] - [address]  [address]  [facility] [address]  Phone: [phone] Fax: [phone]    SW met with patient at bedside to complete discharge planning assessment.  Patient alert and oriented xs 4.  Patient verified all demographic information on facesheet is correct.  Patient verified PCP is Dr. Mcdermott.  Patient verified primary health insurance is Medicare PART A Only and secondary is BCBS.  Patient with NO home health but has listed DME.  Patient with NO POA or Living Will.  Patient not on dialysis or medication coumadin.  Patient with no 30 day admission.  Patient with no financial issues at this time.  Patient family will provide transportation upon discharge from facility.  Patient independent with ADLs, live with roommate (name not given), drives self.      Initial Assessment (most recent)       Adult Discharge Assessment - 07/25/23 1117          Discharge Assessment    Assessment Type Discharge Planning Assessment     Confirmed/corrected address, phone number and insurance Yes     Confirmed Demographics  Correct on Facesheet     Source of Information patient     Does patient/caregiver understand observation status Yes     Communicated DERICK with patient/caregiver Yes     People in Home --   roommate    Facility Arrived From: home     Do you expect to return to your current living situation? Yes     Do you have help at home or someone to help you manage your care at home? Yes     Who are your caregiver(s) and their phone number(s)? self     Prior to hospitilization cognitive status: Alert/Oriented     Current cognitive status: Alert/Oriented     Equipment Currently Used at Home nebulizer     Readmission within 30 days? No     Patient currently being followed by outpatient case management? No     Do you currently have service(s) that help you manage your care at home? No     Do you take prescription medications? Yes     Do you have prescription coverage? Yes     Do you have any problems affording any of your prescribed medications? No     Is the patient taking medications as prescribed? yes     Who is going to help you get home at discharge? sister     How do you get to doctors appointments? car, drives self     Are you on dialysis? No     Do you take coumadin? No     Discharge Plan A Home     Discharge Plan B Home with family     DME Needed Upon Discharge  none     Discharge Plan discussed with: Patient     Transition of Care Barriers None

## 2023-07-25 NOTE — ANESTHESIA POSTPROCEDURE EVALUATION
Anesthesia Post Evaluation    Patient: Tessa Enriquez    Procedure(s) Performed: Procedure(s) (LRB):  LAPAROTOMY, EXPLORATORY (N/A)  COLECTOMY, PARTIAL (N/A)  CYSTOSCOPY, WITH URETERAL STENT INSERTION (Bilateral)  PROCTECTOMY, COMPLETION (N/A)  CREATION, COLOSTOMY (N/A)    Final Anesthesia Type: general      Patient location during evaluation: PACU  Patient participation: Yes- Able to Participate  Level of consciousness: awake and alert  Post-procedure vital signs: reviewed and stable  Pain management: adequate  Airway patency: patent    PONV status at discharge: No PONV  Anesthetic complications: no      Cardiovascular status: blood pressure returned to baseline and hypotensive  Respiratory status: unassisted  Hydration status: euvolemic  Follow-up not needed.          Vitals Value Taken Time   BP 83/53 07/24/23 1925   Temp 36.6 °C (97.9 °F) 07/24/23 1925   Pulse 89 07/24/23 1925   Resp 17 07/24/23 1925   SpO2 98 % 07/24/23 1925         Event Time   Out of Recovery 19:20:00         Pain/Bautista Score: Pain Rating Prior to Med Admin: 8 (7/24/2023  7:05 PM)  Pain Rating Post Med Admin: 5 (7/24/2023  7:10 PM)  Bautista Score: 7 (7/24/2023  7:10 PM)

## 2023-07-25 NOTE — PT/OT/SLP EVAL
Physical Therapy Evaluation    Patient Name:  Tessa Enriquez   MRN:  [id number]    Recommendations:     Discharge Recommendations: home health PT   Discharge Equipment Recommendations: walker, rolling   Barriers to discharge: None    Assessment:     Tessa Enriquez is a 67 y.o. female admitted with a medical diagnosis of Malignant neoplasm of colon.  She presents with the following impairments/functional limitations: weakness, impaired endurance, impaired functional mobility, gait instability, impaired balance, impaired cognition, decreased lower extremity function, decreased safety awareness, pain, impaired cardiopulmonary response to activity .    Pt seen supine in bed, sleepy- opens eyes with stimulation. Sister and nurse Emily stated pt was asking to get OOB earlier and is willing. Pt completed LE's exercises in supine. Mod assist to sit EOB with c/o increased abdominal pain. Pt burping several times. Pt stood and took few steps to chair- c/o weakness- BP taken 122/76 with SAT 96%. Pt allowed to recline in chair.  Pt to benefit from continued therapies HHPT    Rehab Prognosis: Fair; patient would benefit from acute skilled PT services to address these deficits and reach maximum level of function.    Recent Surgery: Procedure(s) (LRB):  LAPAROTOMY, EXPLORATORY (N/A)  COLECTOMY, PARTIAL (N/A)  CYSTOSCOPY, WITH URETERAL STENT INSERTION (Bilateral)  PROCTECTOMY, COMPLETION (N/A)  CREATION, COLOSTOMY (N/A) 1 Day Post-Op    Plan:     During this hospitalization, patient to be seen daily to address the identified rehab impairments via gait training, therapeutic activities, therapeutic exercises and progress toward the following goals:    Plan of Care Expires:  08/30/23    Subjective   Sister stated pt was previously indep  Pt wanting to return to bed- explained importance of early mobilization  Chief Complaint: abdominal incisional pain/weakness  Patient/Family Comments/goals: none  stated  Pain/Comfort:  Pain Rating 1:  (not rated)  Location 1: abdomen  Pain Addressed 1: Pre-medicate for activity, Reposition, Cessation of Activity, Nurse notified, Distraction    Patients cultural, spiritual, Roman Catholic conflicts given the current situation:      Living Environment:  Home with room mate  Prior to admission, patients level of function was independent.  Equipment used at home: none.  DME owned (not currently used): none.  Upon discharge, patient will have assistance from sister.    Objective:     Communicated with nurse Diaz prior to session.  Patient found HOB elevated with JUANITA drain, bed alarm, oxygen, colin catheter, colostomy  upon PT entry to room.    General Precautions: Standard, fall  Orthopedic Precautions:N/A   Braces: N/A  Respiratory Status: Nasal cannula, flow 2 L/min    Exams:  Postural Exam:  Patient presented with the following abnormalities:    -       Rounded shoulders  -       Forward head  -       BMI 37  RLE ROM: WFL  RLE Strength: Deficits: 3/5  LLE ROM: WFL  LLE Strength: Deficits: 3/5    Functional Mobility:  Bed Mobility:     Scooting: moderate assistance  Supine to Sit: moderate assistance  Transfers:     Sit to Stand:  moderate assistance with no AD  Bed to Chair: moderate assistance with  no AD  using  Stand Pivot few steps to chair      AM-PAC 6 CLICK MOBILITY  Total Score:13       Treatment & Education:  Patient was educated on the importance of OOB activity and functional mobility to negate negative effects of prolonged bed rest during hospitalization, safe transfers and ambulation, and D/C planning   Thera ex with AP,HS  EOB sitting with extra time- resistive and wanting return to bed due to increased abdominal incisional pain. Lots of encouragement    Patient left up in chair with all lines intact, call button in reach, chair alarm on, nurse Diaz notified, and sister/friend present.    GOALS:   Multidisciplinary Problems       Physical Therapy Goals           Problem: Physical Therapy    Goal Priority Disciplines Outcome Goal Variances Interventions   Physical Therapy Goal     PT, PT/OT Ongoing, Progressing     Description: Goals to be met by: 2023     Patient will increase functional independence with mobility by performin. Supine to sit with Contact Guard Assistance  2. Sit to stand transfer with Contact Guard Assistance  3. Bed to chair transfer with Contact Guard Assistance using Rolling Walker  4. Gait  x 250 feet with Contact Guard Assistance using Rolling Walker.   5. Lower extremity exercise program x20 reps                        History:     Past Medical History:   Diagnosis Date    Acute hypoxemic respiratory failure 2018    Arthritis     Bilateral kidney stones 3/27/2023    Cancer     colon    COPD (chronic obstructive pulmonary disease)     History of home oxygen therapy     ONLY PRN AND HASN'T USED IT IN A YEAR    Hyperlipidemia     Thyroid disease        Past Surgical History:   Procedure Laterality Date    CHOLECYSTECTOMY      COLECTOMY N/A 2020    Procedure: COLECTOMY;  Surgeon: Brandon Wagner MD;  Location: [facility] OR;  Service: General;  Laterality: N/A;  LOWER- ANTERIOR    COLONOSCOPY N/A 10/23/2020    Procedure: COLONOSCOPY;  Surgeon: Jolynn Ayala MD;  Location: [facility];  Service: Endoscopy;  Laterality: N/A;    COLONOSCOPY N/A 2021    Procedure: COLONOSCOPY;  Surgeon: Jolynn Ayala MD;  Location: [facility] ENDO;  Service: Endoscopy;  Laterality: N/A;    COLONOSCOPY N/A 2022    Procedure: COLONOSCOPY;  Surgeon: Jolynn Ayala MD;  Location: [facility] ENDO;  Service: Endoscopy;  Laterality: N/A;    COLONOSCOPY N/A 2023    Procedure: COLONOSCOPY;  Surgeon: Jolynn Ayala MD;  Location: [facility] ENDO;  Service: Endoscopy;  Laterality: N/A;    COLOSTOMY N/A 2023    Procedure: CREATION, COLOSTOMY;  Surgeon: Brandon Wagner MD;  Location: [facility];  Service: General;  Laterality: N/A;    COMPLETION  PROCTECTOMY N/A 7/24/2023    Procedure: PROCTECTOMY, COMPLETION;  Surgeon: Brandon Wagner MD;  Location: [facility];  Service: General;  Laterality: N/A;    CYSTOSCOPY W/ URETERAL STENT PLACEMENT Right 2/22/2023    Procedure: CYSTOSCOPY, WITH URETERAL STENT INSERTION;  Surgeon: Carin Kwon Jr., MD;  Location: [facility] OR;  Service: Urology;  Laterality: Right;    CYSTOSCOPY W/ URETERAL STENT PLACEMENT Bilateral 3/26/2023    Procedure: CYSTOSCOPY, WITH URETERAL STENT INSERTION;  Surgeon: Sabine Barber MD;  Location: [facility];  Service: Urology;  Laterality: Bilateral;    CYSTOSCOPY W/ URETERAL STENT PLACEMENT Bilateral 7/24/2023    Procedure: CYSTOSCOPY, WITH URETERAL STENT INSERTION;  Surgeon: Adrienne Mijares MD;  Location: [facility];  Service: Urology;  Laterality: Bilateral;  bassem placing stents 1st    CYSTOSCOPY WITH URETEROSCOPY, RETROGRADE PYELOGRAPHY, AND INSERTION OF STENT Bilateral 3/26/2023    Procedure: CYSTOSCOPY, WITH RETROGRADE PYELOGRAM AND URETERAL STENT INSERTION;  Surgeon: Sabine Barber MD;  Location: [facility];  Service: Urology;  Laterality: Bilateral;    CYSTOURETEROSCOPY WITH RETROGRADE PYELOGRAPHY AND INSERTION OF STENT INTO URETER Right 3/7/2023    Procedure: CYSTOURETEROSCOPY, WITH RETROGRADE PYELOGRAM AND URETERAL STENT INSERTION;  Surgeon: Carin Kwon Jr., MD;  Location: [facility];  Service: Urology;  Laterality: Right;    CYSTOURETEROSCOPY, WITH HOLMIUM LASER LITHOTRIPSY OF URETERAL CALCULUS AND STENT INSERTION Bilateral 4/11/2023    Procedure: CYSTOURETEROSCOPY, WITH HOLMIUM LASER LITHOTRIPSY OF URETERAL CALCULUS AND STENT INSERTION;  Surgeon: Carin Kwon Jr., MD;  Location: [facility] OR;  Service: Urology;  Laterality: Bilateral;    ESOPHAGEAL DILATION N/A 6/29/2021    Procedure: DILATION, ESOPHAGUS;  Surgeon: Sourav Baires III, MD;  Location: [facility] ENDO;  Service: Endoscopy;  Laterality: N/A;    ESOPHAGOGASTRODUODENOSCOPY  06/29/2021    Dilation to 57 Fr     ESOPHAGOGASTRODUODENOSCOPY N/A 6/29/2021    Procedure: EGD (ESOPHAGOGASTRODUODENOSCOPY);  Surgeon: Sourav Baires III, MD;  Location: [facility] ENDO;  Service: Endoscopy;  Laterality: N/A;    FLEXIBLE SIGMOIDOSCOPY N/A 8/1/2022    Procedure: SIGMOIDOSCOPY, FLEXIBLE;  Surgeon: Brandon Wagner MD;  Location: [facility] ENDO;  Service: Endoscopy;  Laterality: N/A;    HYSTERECTOMY      INSERTION OF TUNNELED CENTRAL VENOUS CATHETER (CVC) WITH SUBCUTANEOUS PORT N/A 1/11/2021    Procedure: [id number];  Surgeon: Brandon Wagner MD;  Location: [facility] OR;  Service: General;  Laterality: N/A;    INSERTION OF TUNNELED CENTRAL VENOUS CATHETER (CVC) WITH SUBCUTANEOUS PORT Right 9/1/2022    Procedure: [id number];  Surgeon: Brandon Wagner MD;  Location: [facility] OR;  Service: General;  Laterality: Right;  Wants rt side placement    LAPAROTOMY, EXPLORATORY N/A 7/24/2023    Procedure: LAPAROTOMY, EXPLORATORY;  Surgeon: Brandon Wagner MD;  Location: [facility];  Service: General;  Laterality: N/A;  case must go 1st-chamberlain placing stents    MEDIPORT REMOVAL Left 4/19/2021    Procedure: REMOVAL, CATHETER, CENTRAL VENOUS, TUNNELED, WITH PORT;  Surgeon: Brandon Wagner MD;  Location: [facility] OR;  Service: General;  Laterality: Left;    REMOVAL-STENT Right 3/7/2023    Procedure: REMOVAL-STENT;  Surgeon: Carin Kwon Jr., MD;  Location: [facility] OR;  Service: Urology;  Laterality: Right;    REMOVAL-STENT Bilateral 4/11/2023    Procedure: REMOVAL-STENT;  Surgeon: Carin Kwon Jr., MD;  Location: [facility] OR;  Service: Urology;  Laterality: Bilateral;    RETROGRADE PYELOGRAPHY Bilateral 4/11/2023    Procedure: PYELOGRAM, RETROGRADE;  Surgeon: Carin Kwon Jr., MD;  Location: [facility] OR;  Service: Urology;  Laterality: Bilateral;    ROBOT-ASSISTED COLECTOMY N/A 11/9/2020    Procedure: ROBOTIC COLECTOMY low anterior resection, possible open;  Surgeon: Brandon Wagner MD;  Location: [facility] OR;  Service: General;  Laterality: N/A;   CONVERTED TO OPEN AT 1503    SUBTOTAL COLECTOMY N/A 7/24/2023    Procedure: COLECTOMY, PARTIAL;  Surgeon: Brandon Wagner MD;  Location: [facility];  Service: General;  Laterality: N/A;  with colorectal Anastamosis    URETEROSCOPIC REMOVAL OF URETERIC CALCULUS Right 3/7/2023    Procedure: REMOVAL, CALCULUS, URETER, URETEROSCOPIC;  Surgeon: Carin Kwon Jr., MD;  Location: [facility];  Service: Urology;  Laterality: Right;    URETEROSCOPIC REMOVAL OF URETERIC CALCULUS Bilateral 4/11/2023    Procedure: REMOVAL, CALCULUS, URETER, URETEROSCOPIC;  Surgeon: Carin Kwon Jr., MD;  Location: [facility];  Service: Urology;  Laterality: Bilateral;       Time Tracking:     PT Received On: 07/25/23  PT Start Time: 1448     PT Stop Time: 1517  PT Total Time (min): 29 min     Billable Minutes: Evaluation 10 and Therapeutic Activity 19      07/25/2023

## 2023-07-25 NOTE — NURSING
1st unit of RBC infusing as ordered. BP 83/53, map 64. Patient remains drowsy, yet awakens easily. Complains of feeling tired. Patient seen by NEENA Mcdermott at bedside, assessed patient.

## 2023-07-25 NOTE — NURSING
Medicated with Oxycodone for complaints of abdominal discomfort. Complaining of nausea, Zofran IVP as ordered. States nausea easing up. Continues to complain of abdominal pain. Will continue to monitor.

## 2023-07-25 NOTE — TELEPHONE ENCOUNTER
----- Message from Adrienne Mijares MD sent at 7/24/2023  5:14 PM CDT -----  Please scheule renal scan and rbus in a month and have ptu f/u with  soon after to discuss relts

## 2023-07-25 NOTE — PLAN OF CARE
Adrienne Combs Np made aware of updated hh resulted and included Harmony Fitch rn in secure chat to all info updated on pt

## 2023-07-25 NOTE — PROGRESS NOTES
POD 1 s/p ex lap with APR  REsting.  Having abd pain  NO n/v.  No ostomy ouptut  Worked with physical therapy today    Wt Readings from Last 3 Encounters:   07/24/23 89.4 kg (197 lb)   07/03/23 89.3 kg (196 lb 13.9 oz)   06/27/23 89.7 kg (197 lb 12 oz)     Temp Readings from Last 3 Encounters:   07/25/23 97.1 °F (36.2 °C)   07/03/23 97.5 °F (36.4 °C) (Temporal)   06/27/23 98.1 °F (36.7 °C) (Temporal)     BP Readings from Last 3 Encounters:   07/25/23 119/69   07/03/23 (!) 143/79   06/27/23 (!) 185/86     Pulse Readings from Last 3 Encounters:   07/25/23 106   07/03/23 84   06/27/23 84     AAOx3  Soft/nd/appt ttp  JUANITA serosang  Soft/nd/appt ttp  Ostomy pink and viable    Lab Results   Component Value Date    WBC 7.53 07/25/2023    HGB 12.1 07/25/2023    HCT 38.9 07/25/2023    MCV 87 07/25/2023     07/25/2023       BMP  Lab Results   Component Value Date     07/25/2023    K 3.4 (L) 07/25/2023     07/25/2023    CO2 18 (L) 07/25/2023    BUN 14 07/25/2023    CREATININE 1.2 07/25/2023    CALCIUM 7.0 (L) 07/25/2023    ANIONGAP 15 07/25/2023    EGFRNORACEVR 50 (A) 07/25/2023     A/P:  s/p APR    Doing well  Adv to full liquid diet  Ambulate  Aggressive IS

## 2023-07-25 NOTE — PROGRESS NOTES
[facility] Medicine  Progress Note    Patient Name: Tessa Enriquez  MRN: [id number]  Patient Class: IP- Inpatient   Admission Date: 7/24/2023  Length of Stay: 1 days  Attending Physician: Maria Elena Vang MD  Primary Care Provider: Karma Mcdermott MD        Subjective:     Principal Problem:Malignant neoplasm of colon        HPI:  Patient is a 67-year-old female with history of COPD, hypothyroidism, rectosigmoid cancer who was seen today prior to surgery with Dr. Wagner for recurrent rectosigmoid cancer.  Patient has been having diarrhea and rectal bleeding.  She denies any fever, chills, shortness of breath, chest pain, abdominal pain, bladder complaints.  She does currently have a headache that she attributes to being NPO and not having coffee this morning.  She is accompanied by family.  She is seen in the preop area.  All questions answered.      Overview/Hospital Course:  No notes on file    Interval History:  Patient seen and examined.  Received 2 units PRBCs overnight.  Hemoglobin 12.1 this morning.  Complains of pain.    Review of Systems   Respiratory:  Negative for shortness of breath.    Cardiovascular:  Negative for chest pain.   Gastrointestinal:  Positive for abdominal distention and abdominal pain. Negative for nausea and vomiting.   Objective:     Vital Signs (Most Recent):  Temp: 97.2 °F (36.2 °C) (07/25/23 0754)  Pulse: 106 (07/25/23 0754)  Resp: 20 (07/25/23 1134)  BP: (!) 142/75 (07/25/23 0754)  SpO2: 95 % (07/25/23 0754) Vital Signs (24h Range):  Temp:  [97.2 °F (36.2 °C)-98.1 °F (36.7 °C)] 97.2 °F (36.2 °C)  Pulse:  [] 106  Resp:  [14-22] 20  SpO2:  [93 %-100 %] 95 %  BP: ()/(49-75) 142/75     Weight: 89.4 kg (197 lb)  Body mass index is 37.22 kg/m².    Intake/Output Summary (Last 24 hours) at 7/25/2023 1137  Last data filed at 7/25/2023 0815  Gross per 24 hour   Intake 2767.2 ml   Output 1832 ml   Net 935.2 ml         Physical  Exam  Constitutional:       General: She is not in acute distress.     Appearance: She is well-developed.   HENT:      Head: Normocephalic and atraumatic.   Cardiovascular:      Rate and Rhythm: Normal rate and regular rhythm.      Heart sounds: No murmur heard.  Pulmonary:      Effort: Pulmonary effort is normal. No respiratory distress.      Breath sounds: Normal breath sounds. No wheezing or rales.   Abdominal:      Palpations: Abdomen is soft.      Tenderness: There is abdominal tenderness.      Comments: Bandages clean/dry/intact.  Ostomy in place with pink mucosa.  Drain in place   Musculoskeletal:         General: Normal range of motion.   Skin:     General: Skin is warm and dry.      Findings: No rash.   Neurological:      Mental Status: She is alert and oriented to person, place, and time.      Cranial Nerves: No cranial nerve deficit.   Psychiatric:         Behavior: Behavior normal.           Significant Labs: All pertinent labs within the past 24 hours have been reviewed.    Significant Imaging: I have reviewed all pertinent imaging results/findings within the past 24 hours.      Assessment/Plan:      * Malignant neoplasm of colon  Patient with recurrent rectosigmoid cancer.  Status post surgery with Dr. Wagner 7/24  Monitor H&H  Pain and nausea control.        Severe obesity (BMI 35.0-39.9) with comorbidity  Body mass index is 37.22 kg/m². Morbid obesity complicates all aspects of disease management from diagnostic modalities to treatment. Weight loss encouraged and health benefits explained to patient.         Acquired hypothyroidism  Patient has chronic hypothyroidism. TFTs reviewed-   Lab Results   Component Value Date    TSH 10.658 (H) 02/21/2023   . Will continue chronic levothyroxine and adjust for and clinical changes.        VTE Risk Mitigation (From admission, onward)         Ordered     IP VTE HIGH RISK PATIENT  Once         07/24/23 2015     Place sequential compression device  Until  discontinued         07/24/23 2015     enoxaparin injection 40 mg  Daily         07/24/23 2015                Discharge Planning   DERICK: 7/26/2023     Code Status: Full Code   Is the patient medically ready for discharge?:     Reason for patient still in hospital (select all that apply): Patient trending condition and Treatment  Discharge Plan A: Home                  Maria Elena Vang MD  Department of Hospital Medicine   [facility]/Surg

## 2023-07-25 NOTE — PLAN OF CARE
POC discussed with patient, verbalized understanding. Patient with uneventful night, slept off and on between care. 2 units of RBC received, tolerated well. Medicated with Zofran for complaints of nausea without emesis. Surgical abdominal dressing CDI with JUANITA. Colostomy with minimal bloody drainage. IVF infusing. Benadryl for complaints of itching to hands. Velasco with blood tinged urine. Tolerating diet. 02 @ 2L/NC. IS encouraged. SCD in place. Medicated with IV/Oral pain medication, relief obtained.

## 2023-07-25 NOTE — PLAN OF CARE
Problem: Physical Therapy  Goal: Physical Therapy Goal  Description: Goals to be met by: 2023     Patient will increase functional independence with mobility by performin. Supine to sit with Contact Guard Assistance  2. Sit to stand transfer with Contact Guard Assistance  3. Bed to chair transfer with Contact Guard Assistance using Rolling Walker  4. Gait  x 250 feet with Contact Guard Assistance using Rolling Walker.   5. Lower extremity exercise program x20 reps   Outcome: Ongoing, Progressing   PT eval and treat. Pt sleepy- had Benadryl earlier. Mod assist mobility and OOB chair few steps. Pt with JUANITA drain/O2/colin. HHPT. Sister and friend at bedside

## 2023-07-25 NOTE — CARE UPDATE
07/24/23 2004   Patient Assessment/Suction   Level of Consciousness (AVPU) responds to voice   Respiratory Effort Unlabored   Rhythm/Pattern, Respiratory pattern regular;unlabored   Cough Frequency no cough   PRE-TX-O2   Device (Oxygen Therapy) nasal cannula   $ Is the patient on Low Flow Oxygen? Yes   Flow (L/min) 2   SpO2 96 %   Pulse Oximetry Type Intermittent   $ Pulse Oximetry - Multiple Charge Pulse Oximetry - Multiple   Pulse 82   Resp 16   Incentive Spirometer   $ Incentive Spirometer Charges unable to perform

## 2023-07-25 NOTE — ASSESSMENT & PLAN NOTE
Patient with recurrent rectosigmoid cancer.  Status post surgery with Dr. Wagner 7/24  Monitor H&H  Pain and nausea control.

## 2023-07-25 NOTE — SIGNIFICANT EVENT
[facility] Medicine AI Alert Note      Admit Date: 7/24/2023  LOS: 0  Code Status: Prior   CC: Malignant neoplasm of colon  Date of Consult: 07/24/2023  Attending Physician: Brandon Wagner MD  Primary Service: Networked reference to record PCT     SUBJECTIVE:     Interval history: AI alert received due to hypotension. Patient hypotensive post op and found to have hemoglobin 7.7 (prior reading 12.1). Patient seen and examined at bedside. No complaints and mentation intact.       OBJECTIVE:     Vital Signs (Most Recent):  Temp: 97.4 °F (36.3 °C) (07/24/23 2034)  Pulse: 92 (07/24/23 2214)  Resp: 18 (07/24/23 2214)  BP: (!) 106/55 (07/24/23 2214)  SpO2: 100 % (07/24/23 2214) Vital Signs (24h Range):  Temp:  [97.4 °F (36.3 °C)-98.1 °F (36.7 °C)] 97.4 °F (36.3 °C)  Pulse:  [] 92  Resp:  [14-22] 18  SpO2:  [93 %-100 %] 100 %  BP: ()/(49-68) 106/55     I & O (24h):    Intake/Output Summary (Last 24 hours) at 7/24/2023 2242  Last data filed at 7/24/2023 1915  Gross per 24 hour   Intake 1200 ml   Output 1097 ml   Net 103 ml        Physical Exam: Physical Exam   Constitutional:   Lethargic post anesthesia   Cardiovascular: Normal rate, regular rhythm and normal pulses. Pulmonary:      Effort: Pulmonary effort is normal.      Breath sounds: Normal breath sounds.     Neurological: She is alert. GCS eye subscore is 4. GCS verbal subscore is 5. GCS motor subscore is 6.     Lines/Drains/Airway:  Port A Cath Single Lumen Atrial Right (Active)          Closed/Suction Drain 07/24/23 1719 Right RUQ Bulb 19 Fr. (Active)   Dressing Type Gauze 07/24/23 1915   Dressing Status Clean;Dry;Intact 07/24/23 1915   Dressing Intervention Integrity maintained 07/24/23 1915   Drainage Bloody 07/24/23 1915   Status To bulb suction 07/24/23 1915   Output (mL) 50 mL 07/24/23 1809            Colostomy 07/24/23 1649 LLQ (Active)   Stomal Appliance 2 piece;Clean;Dry;Intact 07/24/23 1915   Stoma Appearance round;red 07/24/23 1915   Site  "Assessment Intact;Clean 07/24/23 1915   Fluid Instilled (ml) 0 ml 07/24/23 1915   Output (mL) 0 mL 07/24/23 1809            Urethral Catheter 07/24/23 1315 Silicone 18 Fr. (Active)   Site Assessment Clean;Intact 07/24/23 1915   Collection Container Urimeter 07/24/23 1915   Reason for Continuing Urinary Catheterization Post operative 07/24/23 1915   CAUTI Prevention Bundle Securement Device in place with 1" slack;Intact seal between catheter & drainage tubing;Drainage bag/urimeter not overfilled (<2/3 full);No dependent loops or kinks;Sheeting clip in use;Drainage bag/urimeter off the floor;Drainage bag/urimeter below bladder 07/24/23 1809   Output (mL) 50 mL 07/24/23 1915            Ureteral Drain/Stent 04/11/23 0100 Left ureter 6 Fr. (Active)            Ureteral Drain/Stent 04/11/23 1231 Right ureter 6 Fr. (Active)        Nutrition:  Current Diet Order   Procedures    Diet clear liquid         Labs/Imaging- All pertinent labs within the past 24 hours have been reviewed.  BMP: No results for input(s): GLU, NA, K, CL, CO2, BUN, CREATININE, CALCIUM, MG in the last 48 hours.  CBC:   Recent Labs   Lab 07/24/23  1831   HGB 7.7*   HCT 24.5*         ASSESSMENT/PLAN:     Active Hospital Problems    Diagnosis    *Malignant neoplasm of colon    Severe obesity (BMI 35.0-39.9) with comorbidity    Acquired hypothyroidism          Continue with blood transfusion      Uninterrupted Critical Care time (not including procedures): 45 min    "

## 2023-07-25 NOTE — SUBJECTIVE & OBJECTIVE
Interval History:  Patient seen and examined.  Received 2 units PRBCs overnight.  Hemoglobin 12.1 this morning.  Complains of pain.    Review of Systems   Respiratory:  Negative for shortness of breath.    Cardiovascular:  Negative for chest pain.   Gastrointestinal:  Positive for abdominal distention and abdominal pain. Negative for nausea and vomiting.   Objective:     Vital Signs (Most Recent):  Temp: 97.2 °F (36.2 °C) (07/25/23 0754)  Pulse: 106 (07/25/23 0754)  Resp: 20 (07/25/23 1134)  BP: (!) 142/75 (07/25/23 0754)  SpO2: 95 % (07/25/23 0754) Vital Signs (24h Range):  Temp:  [97.2 °F (36.2 °C)-98.1 °F (36.7 °C)] 97.2 °F (36.2 °C)  Pulse:  [] 106  Resp:  [14-22] 20  SpO2:  [93 %-100 %] 95 %  BP: ()/(49-75) 142/75     Weight: 89.4 kg (197 lb)  Body mass index is 37.22 kg/m².    Intake/Output Summary (Last 24 hours) at 7/25/2023 1137  Last data filed at 7/25/2023 0815  Gross per 24 hour   Intake 2767.2 ml   Output 1832 ml   Net 935.2 ml         Physical Exam  Constitutional:       General: She is not in acute distress.     Appearance: She is well-developed.   HENT:      Head: Normocephalic and atraumatic.   Cardiovascular:      Rate and Rhythm: Normal rate and regular rhythm.      Heart sounds: No murmur heard.  Pulmonary:      Effort: Pulmonary effort is normal. No respiratory distress.      Breath sounds: Normal breath sounds. No wheezing or rales.   Abdominal:      Palpations: Abdomen is soft.      Tenderness: There is abdominal tenderness.      Comments: Bandages clean/dry/intact.  Ostomy in place with pink mucosa.  Drain in place   Musculoskeletal:         General: Normal range of motion.   Skin:     General: Skin is warm and dry.      Findings: No rash.   Neurological:      Mental Status: She is alert and oriented to person, place, and time.      Cranial Nerves: No cranial nerve deficit.   Psychiatric:         Behavior: Behavior normal.           Significant Labs: All pertinent labs within the  past 24 hours have been reviewed.    Significant Imaging: I have reviewed all pertinent imaging results/findings within the past 24 hours.

## 2023-07-25 NOTE — NURSING
Spoke with Dr. Wagner. Received order to transfuse another unit after completed unit for a total of 2 units. Request 2 units to be available at all times.

## 2023-07-26 PROBLEM — R65.20 SEVERE SEPSIS: Status: ACTIVE | Noted: 2023-01-01

## 2023-07-26 PROBLEM — A41.9 SEVERE SEPSIS: Status: ACTIVE | Noted: 2023-01-01

## 2023-07-26 PROBLEM — N17.9 AKI (ACUTE KIDNEY INJURY): Status: ACTIVE | Noted: 2023-01-01

## 2023-07-26 NOTE — NURSING
ABG completed. Blood work drawn from L upper arm MID line. Patient remains intermittently confused. VS stable. HR remains elevated 120's. Afebrile. Velasco remains with blood tinged urine. 94% 2L/NC.

## 2023-07-26 NOTE — PLAN OF CARE
Problem: Adult Inpatient Plan of Care  Goal: Plan of Care Review  Outcome: Ongoing, Progressing       Problem: Adult Inpatient Plan of Care  Goal: Optimal Comfort and Wellbeing  Outcome: Ongoing, Progressing     POC and meds reviewed with pt, verbalized understanding. Drowsy at times but awakens appropriately. Oriented x4. VSS. Meds given per MAR. IV site CDI. IVF infused as ordered. Persistent complaints of pain managed with prn IV and oral medication, mild relief obtained. Velasco in place and draining. Family at bedside for most of shift. PRN benadryl given for rash, rash improving. Worked with PT this shift. No other complaints of pain. Safety maintained. Needs attended to.

## 2023-07-26 NOTE — PROCEDURES
"Tessa Enriquez is a 67 y.o. female patient.    Temp: 97.4 °F (36.3 °C) (07/25/23 2002)  Pulse: (!) 121 (07/25/23 2002)  Resp: (!) 22 (07/25/23 2136)  BP: (!) 151/79 (07/25/23 2002)  SpO2: 96 % (07/25/23 2002)  Weight: 89.4 kg (197 lb) (07/24/23 0655)  Height: 5' 1" (154.9 cm) (07/24/23 0655)    PICC  Date/Time: 7/25/2023 11:26 PM  Consent Done: Yes  Time out: Immediately prior to procedure a time out was called to verify the correct patient, procedure, equipment, support staff and site/side marked as required  Indications: med administration and vascular access  Anesthesia: local infiltration  Local anesthetic: lidocaine 1% without epinephrine  Anesthetic Total (mL): 2  Preparation: skin prepped with ChloraPrep  Skin prep agent dried: skin prep agent completely dried prior to procedure  Sterile barriers: all five maximum sterile barriers used - cap, mask, sterile gown, sterile gloves, and large sterile sheet  Hand hygiene: hand hygiene performed prior to central venous catheter insertion  Location details: left brachial  Catheter type: single lumen  Catheter size: 4 Fr  Catheter Length: 10cm    Ultrasound guidance: yes  Vessel Caliber: small and patent, compressibility normal  Vascular Doppler: not done  Needle advanced into vessel with real time Ultrasound guidance.  Sterile sheath used.  no esophageal manometryNumber of attempts: 1  Post-procedure: blood return through all ports, chlorhexidine patch and sterile dressing applied  Estimated blood loss (mL): 0  Specimens: No  Implants: No  Assessment: successful placement  Complications: none        Name CHARITY Pizano  7/25/2023    "

## 2023-07-26 NOTE — PLAN OF CARE
POC discussed with patient, verbalized understanding. Patient slept off and on between care. Complained of back discomfort frequently, repositioned frequently with pillows. Medicated with Dilaudid for complaints of abdominal surgical pain, relief obtained. Velasco remains with blood tinged urine. Abdominal surgical dressing JUANITA ANTONIO. SCD in place. ST on telemetry. IS encouraged use. 02 @2L/NC. VS stable.

## 2023-07-26 NOTE — CONSULTS
"                                                    Food & Nutrition                                                           Education     Diet Education: Colostomy Education  Time Spent: 15 minutes  Learners: Patient and family member        Nutrition Education provided with handouts: yes        Comments: Patient admitted with colon CA s/p diarrhea and rectal bleeding. POD 2 colostomy creation. PMH colon CA s/p chemo, HLD, thyroid disease. Pt says that she has, "been through all this before," and knows what she is supposed to eat. I assumed she meant herself but wound care RN tells me a close family member has an ostomy and she had helped with his care. I reviewed ostomy diet education including the importance of hydration, avoiding drinks high in added sugars, and avoiding foods that would block ostomy cause worsening diarrhea etc... Packets given to patient. She had no questions. Was uncomfortable and did not want to discuss in further depth at the time of the interview-will review at f/u.      Assessment: NFPE WDL 7/26/23. Good appetite PTA. Wt gain. Tolerated a few sips of water today-lunch tray held.  All questions and concerns answered. Dietitian's contact information provided.         Follow-Up: yes     Please Re-consult as needed           Thanks!          "

## 2023-07-26 NOTE — NURSING
Primary nurse informed me ( resource nurse) pt been tachycardia sustaining 's, pt alert and oriented, denies any SOB and chest pain. On 2L nasal cannula sat 93%, mild tachypnic 22 bpm. Noted JUANITA drain, abdominal midline dressing no drainage noted, intact and dry, colostomy bag draining tea colored fluid. EKG done and repeat lactate collected. Will monitor.

## 2023-07-26 NOTE — NURSING
Patient repositioned in bed, pulling gown off. Oriented, yet continues to be slightly confused at times. -130. Urine output 300cc for shift. Patient noted with shortness of breath with exertion, sats 94% 2L. EKG and repeat Lactic ordered per Resource nurse. Notified NP Casa, no further orders at this time.

## 2023-07-26 NOTE — PT/OT/SLP PROGRESS
Physical Therapy      Patient Name:  Tessa Enriquez   MRN:  [id number]    Patient with increased lactic acid and transferred from 316 to 224. Nurse Danii requesting to hold off PT this am. Will re attempt in pm.

## 2023-07-26 NOTE — CONSULTS
INPATIENT NEPHROLOGY CONSULT   [facility] NEPHROLOGY    Tessa Enriquez  07/26/2023    Reason for consultation:    Acute kidney injury    Chief Complaint: No chief complaint on file.         History of Present Illness:    Per H and P     Patient is a 67-year-old female with history of COPD, hypothyroidism, rectosigmoid cancer who was seen today prior to surgery with Dr. Wagner for recurrent rectosigmoid cancer.  Patient has been having diarrhea and rectal bleeding.  She denies any fever, chills, shortness of breath, chest pain, abdominal pain, bladder complaints.  She does currently have a headache that she attributes to being NPO and not having coffee this morning.  She is accompanied by family.       7/26  Consulted for BOOM.  Pt POD 2 s/p ex lap with extensive lysis of adhesions, bilateral ureteral stent insertion, and partial colectomy with colostomy placement.  She has developed oliguric with boom    Plan of Care:       Assessment:    Acute kidney injury secondary to hemodynamically mediated renal injury s/p prolonged episode of hypotension  --ua with  micro  --Avoid NSAIDS, Jacobs II inhibitors, and other non-essential nephrotoxic agents  (NO MORE IBUPROFEN)   --urine Na, urea, cr  --keep map above 55  --renal dose medication for crcl 10-50.  If oliguria persists renal dose for crcl 10  --strict I/Os  --maintain colin  --continue iv fluids  --at risk for contrast associate BOOM  --risk of need for dialysis discussed with the patient and her daughter     I   discussed the risks and benefits of hemodialysis with the patient and her daughter..  All of their questions were answered.  Risks include low blood pressure, stroke, heart attack, seizure, infection, nausea, delirium, and death.    They expressed understanding.  No acute need for RRT at this time.  Will reassess daily       Metabolic acidosis/lactic acidosis  --continue iv fluids    Hyponatremia  --avoid hypotonic iv piggy backs  --trend  --urine osm,  na  --uric acid    Anemia  --iron panel  --erythropoietin stimulating agent if iron stores adequate  --pRBC per primary    Hypotension  --judicious opiates  --iv fluids  --bp 150s/80s ok for now             Thank you for allowing us to participate in this patient's care. We will continue to follow.    Vital Signs:  Temp Readings from Last 3 Encounters:   07/26/23 98 °F (36.7 °C) (Oral)   07/03/23 97.5 °F (36.4 °C) (Temporal)   06/27/23 98.1 °F (36.7 °C) (Temporal)       Pulse Readings from Last 3 Encounters:   07/26/23 (!) 128   07/03/23 84   06/27/23 84       BP Readings from Last 3 Encounters:   07/26/23 114/84   07/03/23 (!) 143/79   06/27/23 (!) 185/86       Weight:  Wt Readings from Last 3 Encounters:   07/24/23 89.4 kg (197 lb)   07/03/23 89.3 kg (196 lb 13.9 oz)   06/27/23 89.7 kg (197 lb 12 oz)       Past Medical & Surgical History:  Past Medical History:   Diagnosis Date    Acute hypoxemic respiratory failure 07/09/2018    Arthritis     Bilateral kidney stones 3/27/2023    Cancer     colon    COPD (chronic obstructive pulmonary disease)     History of home oxygen therapy     ONLY PRN AND HASN'T USED IT IN A YEAR    Hyperlipidemia     Thyroid disease        Past Surgical History:   Procedure Laterality Date    CHOLECYSTECTOMY      COLECTOMY N/A 11/9/2020    Procedure: COLECTOMY;  Surgeon: Brandon Wagner MD;  Location: [facility];  Service: General;  Laterality: N/A;  LOWER- ANTERIOR    COLONOSCOPY N/A 10/23/2020    Procedure: COLONOSCOPY;  Surgeon: Jolynn Ayala MD;  Location: [facility];  Service: Endoscopy;  Laterality: N/A;    COLONOSCOPY N/A 8/6/2021    Procedure: COLONOSCOPY;  Surgeon: Jolynn Ayala MD;  Location: [facility];  Service: Endoscopy;  Laterality: N/A;    COLONOSCOPY N/A 7/7/2022    Procedure: COLONOSCOPY;  Surgeon: Jolynn Ayala MD;  Location: [facility] ENDO;  Service: Endoscopy;  Laterality: N/A;    COLONOSCOPY N/A 5/31/2023    Procedure: COLONOSCOPY;  Surgeon: Jolynn Ayala  MD;  Location: [facility] ENDO;  Service: Endoscopy;  Laterality: N/A;    COLOSTOMY N/A 7/24/2023    Procedure: CREATION, COLOSTOMY;  Surgeon: Brandon Wagner MD;  Location: [facility] OR;  Service: General;  Laterality: N/A;    COMPLETION PROCTECTOMY N/A 7/24/2023    Procedure: PROCTECTOMY, COMPLETION;  Surgeon: Brandon Wagner MD;  Location: [facility] OR;  Service: General;  Laterality: N/A;    CYSTOSCOPY W/ URETERAL STENT PLACEMENT Right 2/22/2023    Procedure: CYSTOSCOPY, WITH URETERAL STENT INSERTION;  Surgeon: Carin Kwon Jr., MD;  Location: [facility] OR;  Service: Urology;  Laterality: Right;    CYSTOSCOPY W/ URETERAL STENT PLACEMENT Bilateral 3/26/2023    Procedure: CYSTOSCOPY, WITH URETERAL STENT INSERTION;  Surgeon: Sabine Barber MD;  Location: [facility] OR;  Service: Urology;  Laterality: Bilateral;    CYSTOSCOPY W/ URETERAL STENT PLACEMENT Bilateral 7/24/2023    Procedure: CYSTOSCOPY, WITH URETERAL STENT INSERTION;  Surgeon: Adrienne Mijares MD;  Location: [facility] OR;  Service: Urology;  Laterality: Bilateral;  bassem placing stents 1st    CYSTOSCOPY WITH URETEROSCOPY, RETROGRADE PYELOGRAPHY, AND INSERTION OF STENT Bilateral 3/26/2023    Procedure: CYSTOSCOPY, WITH RETROGRADE PYELOGRAM AND URETERAL STENT INSERTION;  Surgeon: Sabine Barber MD;  Location: [facility] OR;  Service: Urology;  Laterality: Bilateral;    CYSTOURETEROSCOPY WITH RETROGRADE PYELOGRAPHY AND INSERTION OF STENT INTO URETER Right 3/7/2023    Procedure: CYSTOURETEROSCOPY, WITH RETROGRADE PYELOGRAM AND URETERAL STENT INSERTION;  Surgeon: Carin Kwon Jr., MD;  Location: [facility] OR;  Service: Urology;  Laterality: Right;    CYSTOURETEROSCOPY, WITH HOLMIUM LASER LITHOTRIPSY OF URETERAL CALCULUS AND STENT INSERTION Bilateral 4/11/2023    Procedure: CYSTOURETEROSCOPY, WITH HOLMIUM LASER LITHOTRIPSY OF URETERAL CALCULUS AND STENT INSERTION;  Surgeon: Carin Kwon Jr., MD;  Location: [facility] OR;  Service: Urology;  Laterality: Bilateral;    ESOPHAGEAL  DILATION N/A 6/29/2021    Procedure: DILATION, ESOPHAGUS;  Surgeon: Sourav Baires III, MD;  Location: [facility] ENDO;  Service: Endoscopy;  Laterality: N/A;    ESOPHAGOGASTRODUODENOSCOPY  06/29/2021    Dilation to 57 Fr    ESOPHAGOGASTRODUODENOSCOPY N/A 6/29/2021    Procedure: EGD (ESOPHAGOGASTRODUODENOSCOPY);  Surgeon: Sourav Baires III, MD;  Location: [facility] ENDO;  Service: Endoscopy;  Laterality: N/A;    FLEXIBLE SIGMOIDOSCOPY N/A 8/1/2022    Procedure: SIGMOIDOSCOPY, FLEXIBLE;  Surgeon: Brandon Wagner MD;  Location: [facility] ENDO;  Service: Endoscopy;  Laterality: N/A;    HYSTERECTOMY      INSERTION OF TUNNELED CENTRAL VENOUS CATHETER (CVC) WITH SUBCUTANEOUS PORT N/A 1/11/2021    Procedure: [id number];  Surgeon: Brandon Wagner MD;  Location: [facility] OR;  Service: General;  Laterality: N/A;    INSERTION OF TUNNELED CENTRAL VENOUS CATHETER (CVC) WITH SUBCUTANEOUS PORT Right 9/1/2022    Procedure: [id number];  Surgeon: Brandon Wagner MD;  Location: [facility] OR;  Service: General;  Laterality: Right;  Wants rt side placement    LAPAROTOMY, EXPLORATORY N/A 7/24/2023    Procedure: LAPAROTOMY, EXPLORATORY;  Surgeon: Brandon Wagner MD;  Location: [facility] OR;  Service: General;  Laterality: N/A;  case must go [facility] placing stents    MEDIPORT REMOVAL Left 4/19/2021    Procedure: REMOVAL, CATHETER, CENTRAL VENOUS, TUNNELED, WITH PORT;  Surgeon: Brandon Wagner MD;  Location: [facility] OR;  Service: General;  Laterality: Left;    REMOVAL-STENT Right 3/7/2023    Procedure: REMOVAL-STENT;  Surgeon: Carin Kwon Jr., MD;  Location: [facility] OR;  Service: Urology;  Laterality: Right;    REMOVAL-STENT Bilateral 4/11/2023    Procedure: REMOVAL-STENT;  Surgeon: Carin Kwon Jr., MD;  Location: [facility] OR;  Service: Urology;  Laterality: Bilateral;    RETROGRADE PYELOGRAPHY Bilateral 4/11/2023    Procedure: PYELOGRAM, RETROGRADE;  Surgeon: Carin Kwon Jr., MD;  Location: [facility];  Service: Urology;   Laterality: Bilateral;    ROBOT-ASSISTED COLECTOMY N/A 2020    Procedure: ROBOTIC COLECTOMY low anterior resection, possible open;  Surgeon: Brandon Wagner MD;  Location: [facility];  Service: General;  Laterality: N/A;  CONVERTED TO OPEN AT 1503    SUBTOTAL COLECTOMY N/A 2023    Procedure: COLECTOMY, PARTIAL;  Surgeon: Brandon Wagner MD;  Location: [facility];  Service: General;  Laterality: N/A;  with colorectal Anastamosis    URETEROSCOPIC REMOVAL OF URETERIC CALCULUS Right 3/7/2023    Procedure: REMOVAL, CALCULUS, URETER, URETEROSCOPIC;  Surgeon: Carin Kwon Jr., MD;  Location: [facility];  Service: Urology;  Laterality: Right;    URETEROSCOPIC REMOVAL OF URETERIC CALCULUS Bilateral 2023    Procedure: REMOVAL, CALCULUS, URETER, URETEROSCOPIC;  Surgeon: Carin Kwon Jr., MD;  Location: [facility];  Service: Urology;  Laterality: Bilateral;       Past Social History:  Social History     Socioeconomic History    Marital status:    Tobacco Use    Smoking status: Former     Packs/day: 2.00     Years: 40.00     Pack years: 80.00     Types: Cigarettes     Quit date: 2017     Years since quittin.0    Smokeless tobacco: Never   Substance and Sexual Activity    Alcohol use: No    Drug use: No    Sexual activity: Not Currently     Partners: Male     Social Determinants of Health     Financial Resource Strain: Unknown    Difficulty of Paying Living Expenses: Patient refused   Food Insecurity: Unknown    Worried About Running Out of Food in the Last Year: Patient refused    Ran Out of Food in the Last Year: Patient refused   Transportation Needs: Unknown    Lack of Transportation (Medical): Patient refused    Lack of Transportation (Non-Medical): Patient refused   Physical Activity: Unknown    Days of Exercise per Week: Patient refused    Minutes of Exercise per Session: Patient refused   Stress: Unknown    Feeling of Stress : Patient refused   Social Connections: Unknown    Frequency of  Communication with Friends and Family: Patient refused    Frequency of Social Gatherings with Friends and Family: Patient refused    Attends Religion Services: Patient refused    Active Member of Clubs or Organizations: Patient refused    Attends Club or Organization Meetings: Patient refused    Marital Status: Patient refused   Housing Stability: Unknown    Unable to Pay for Housing in the Last Year: Patient refused    Unstable Housing in the Last Year: Patient refused       Medications:  Current Facility-Administered Medications on File Prior to Encounter   Medication Dose Route Frequency Provider Last Rate Last Admin    diphenhydrAMINE injection 12.5 mg  12.5 mg Intravenous Once PRN Monster Atwood MD        electrolyte-S (ISOLYTE)   Intravenous Continuous Monster Atwood MD 25 mL/hr at 03/07/23 1138 New Bag at 07/24/23 1804    fentaNYL 50 mcg/mL injection 25 mcg  25 mcg Intravenous Q5 Min PRN Monster Atwood MD   25 mcg at 03/07/23 1207    HYDROmorphone (PF) injection 0.2 mg  0.2 mg Intravenous Q5 Min PRN Monster Atwood MD        lactated ringers infusion  10 mL/hr Intravenous Continuous Monster Atwood MD        lactated ringers infusion  500 mL Intravenous Once Monster Atwood MD        LIDOcaine (PF) 10 mg/ml (1%) injection 10 mg  1 mL Intradermal Once Monster Atwood MD        LORazepam injection 0.25 mg  0.25 mg Intravenous Once PRN Monster Atwood MD        ondansetron injection 4 mg  4 mg Intravenous Once PRN Monster Atwood MD        sodium chloride 0.9% flush 3 mL  3 mL Intravenous Q8H Monster Atwood MD        [DISCONTINUED] prochlorperazine injection Soln 5 mg  5 mg Intravenous Q30 Min PRN Monster Atwood MD         Current Outpatient Medications on File Prior to Encounter   Medication Sig Dispense Refill    diphenoxylate-atropine 2.5-0.025 mg (LOMOTIL) 2.5-0.025 mg per tablet Take by mouth.      ferrous sulfate 325 (65 FE) MG EC tablet Take 1  tablet (325 mg total) by mouth every other day.  0    HYDROcodone-acetaminophen (NORCO) 5-325 mg per tablet TAKE ONE TABLET BY MOUTH EVERY SIX HOURS AS NEEDED FOR PAIN 90 tablet 0    levothyroxine (SYNTHROID) 125 MCG tablet Take 1 tablet (125 mcg total) by mouth before breakfast. 30 tablet 11    ondansetron (ZOFRAN-ODT) 8 MG TbDL Take 1 tablet (8 mg total) by mouth every 12 (twelve) hours as needed. 30 tablet 1    potassium chloride SA (K-DUR,KLOR-CON) 20 MEQ tablet Take 1 tablet (20 mEq total) by mouth once daily. 30 tablet 11    albuterol (PROVENTIL) 2.5 mg /3 mL (0.083 %) nebulizer solution Take 3 mLs (2.5 mg total) by nebulization every 6 (six) hours as needed for Wheezing. Rescue (Patient not taking: Reported on 7/20/2023) 120 each 1    CMPD hydrocortisone 2%- LIDOcaine 3% suppository (RECTAL ROCKET) Place 1 suppository rectally every evening. Insert 1 suppository 3/4 of the way onto rectum. Wet for easier application. Repeat for 3 nights. 30 suppository 2    prochlorperazine (COMPAZINE) 10 MG tablet Take 1 tablet (10 mg total) by mouth every 6 (six) hours as needed. 30 tablet 1    triamcinolone acetonide 0.025% (KENALOG) 0.025 % cream Apply topically once daily. 80 g 1     Scheduled Meds:   acetaminophen  1,000 mg Intravenous Q8H    bisacodyL  5 mg Oral QHS    ciprofloxacin  400 mg Intravenous Q12H    enoxparin  40 mg Subcutaneous Daily    famotidine  20 mg Oral BID    gabapentin  200 mg Oral BID    metoclopramide HCl  10 mg Intravenous Q6H    mupirocin   Nasal BID     Continuous Infusions:   sodium chloride 0.9% 125 mL/hr at 07/26/23 1356     PRN Meds:.sodium chloride, diphenhydrAMINE, HYDROmorphone, magnesium oxide, magnesium oxide, naloxone, ondansetron, oxyCODONE, potassium bicarbonate, potassium bicarbonate, potassium bicarbonate, potassium, sodium phosphates, potassium, sodium phosphates, potassium, sodium phosphates, promethazine (PHENERGAN) IVPB, simethicone    Allergies:  Ativan [lorazepam], Flagyl  "[metronidazole], and Pcn [penicillins]    Past Family History:  Reviewed; refer to Hospitalist Admission Note    Review of Systems:  Review of Systems - All 14 systems reviewed and negative, except as noted in HPI    Physical Exam:    /84   Pulse (!) 128   Temp 98 °F (36.7 °C) (Oral)   Resp 18   Ht 5' 1" (1.549 m)   Wt 89.4 kg (197 lb)   SpO2 96%   Breastfeeding No   BMI 37.22 kg/m²     General Appearance:    No distress   Head:    Normocephalic, without obvious abnormality, atraumatic   Eyes:    PER, conjunctiva/corneas clear, EOM's intact in both eyes        Throat:   Lips, mucosa, and tongue normal; teeth and gums normal   Back:     Symmetric, no curvature, ROM normal, no CVA tenderness   Lungs:     Clear to auscultation bilaterally, respirations unlabored   Chest wall:    No tenderness or deformity   Heart:    tachycardic   Abdomen:     Tender.  Bandages in place.  Drain in place   Extremities:   Extremities normal, atraumatic, no cyanosis or edema   Pulses:   2+ and symmetric all extremities   MSK:   No joint or muscle swelling, tenderness or deformity   Skin:   Skin color, texture, turgor normal, no rashes or lesions   Neurologic:   Mildly confused.   No flap     Results:  Lab Results   Component Value Date     (L) 07/26/2023    K 4.5 07/26/2023     07/26/2023    CO2 20 (L) 07/26/2023    BUN 30 (H) 07/26/2023    CREATININE 1.8 (H) 07/26/2023    CALCIUM 7.6 (L) 07/26/2023    ANIONGAP 9 07/26/2023    ESTGFRAFRICA >60 05/02/2022    EGFRNONAA >60 05/02/2022       Lab Results   Component Value Date    CALCIUM 7.6 (L) 07/26/2023    PHOS 4.6 (H) 03/28/2023       Recent Labs   Lab 07/26/23  0448   WBC 9.15   RBC 3.53*   HGB 9.5*   HCT 29.4*      MCV 83   MCH 26.9*   MCHC 32.3           I have personally reviewed pertinent radiological imaging and reports.    Patient care was time spent personally by me on the following activities:   Obtaining a history  Examination of " patient.  Providing medical care at the patients bedside.  Developing a treatment plan with patient or surrogate and bedside caregivers  Ordering and reviewing laboratory studies, radiographic studies, pulse oximetry.  Ordering and performing treatments and interventions.  Evaluation of patient's response to treatment.  Discussions with consultants while on the unit and immediately available to the patient.  Re-evaluation of the patient's condition.  Documentation in the medical record.     Lion Barreto MD  Nephrology  [facility]  [phone]

## 2023-07-26 NOTE — PT/OT/SLP PROGRESS
Physical Therapy      Patient Name:  Tessa Enriquez   MRN:  [id number]    PT re attempted. Per nurse Danii, pt  tachycardic/septic/fatigued. PT hold for today

## 2023-07-26 NOTE — SUBJECTIVE & OBJECTIVE
Interval History:  Patient seen and examined.  Sustained sinus tachycardia.  Discussed with Dr. Wagner.  We will bolus 1 L normal saline and switch fluids to normal saline.  Added ciprofloxacin.  We will trend lactic acid.  Consult with Nephrology for BOOM and ordered strict intake and output.  KUB and chest x-ray obtained and are unremarkable.  Patient reports abdominal pain.  She is mildly confused but is able to answer orientation questions.    Review of Systems   Respiratory:  Negative for shortness of breath.    Cardiovascular:  Negative for chest pain.   Gastrointestinal:  Positive for abdominal distention and abdominal pain. Negative for nausea and vomiting.   Psychiatric/Behavioral:  Positive for confusion.    Objective:     Vital Signs (Most Recent):  Temp: 98 °F (36.7 °C) (07/26/23 0730)  Pulse: (!) 124 (07/26/23 0730)  Resp: 20 (07/26/23 0849)  BP: 112/82 (07/26/23 0730)  SpO2: 97 % (07/26/23 0730) Vital Signs (24h Range):  Temp:  [96.1 °F (35.6 °C)-98.6 °F (37 °C)] 98 °F (36.7 °C)  Pulse:  [] 124  Resp:  [16-22] 20  SpO2:  [93 %-97 %] 97 %  BP: (112-151)/(61-82) 112/82     Weight: 89.4 kg (197 lb)  Body mass index is 37.22 kg/m².    Intake/Output Summary (Last 24 hours) at 7/26/2023 1110  Last data filed at 7/26/2023 0607  Gross per 24 hour   Intake 3304.03 ml   Output 715 ml   Net 2589.03 ml         Physical Exam  Constitutional:       General: She is not in acute distress.     Appearance: She is well-developed.   HENT:      Head: Normocephalic and atraumatic.   Cardiovascular:      Rate and Rhythm: Regular rhythm. Tachycardia present.      Heart sounds: No murmur heard.  Pulmonary:      Effort: Pulmonary effort is normal. No respiratory distress.      Breath sounds: Normal breath sounds. No wheezing or rales.   Abdominal:      Palpations: Abdomen is soft.      Tenderness: There is abdominal tenderness.      Comments: Bandages clean/dry/intact.  Ostomy in place with pink mucosa.  Drain in place    Musculoskeletal:         General: Normal range of motion.   Skin:     General: Skin is warm and dry.      Findings: No rash.   Neurological:      Mental Status: She is alert and oriented to person, place, and time.      Cranial Nerves: No cranial nerve deficit.      Comments: Confusion noted   Psychiatric:         Behavior: Behavior normal.           Significant Labs: All pertinent labs within the past 24 hours have been reviewed.    Significant Imaging: I have reviewed all pertinent imaging results/findings within the past 24 hours.

## 2023-07-26 NOTE — PROGRESS NOTES
[facility] Medicine  Progress Note    Patient Name: Tessa Enriquez  MRN: [id number]  Patient Class: IP- Inpatient   Admission Date: 7/24/2023  Length of Stay: 2 days  Attending Physician: Maria Elena Vang MD  Primary Care Provider: Karma Mcdermott MD        Subjective:     Principal Problem:Malignant neoplasm of colon        HPI:  Patient is a 67-year-old female with history of COPD, hypothyroidism, rectosigmoid cancer who was seen today prior to surgery with Dr. Wagner for recurrent rectosigmoid cancer.  Patient has been having diarrhea and rectal bleeding.  She denies any fever, chills, shortness of breath, chest pain, abdominal pain, bladder complaints.  She does currently have a headache that she attributes to being NPO and not having coffee this morning.  She is accompanied by family.  She is seen in the preop area.  All questions answered.      Overview/Hospital Course:  No notes on file    Interval History:  Patient seen and examined.  Sustained sinus tachycardia.  Discussed with Dr. Wagner.  We will bolus 1 L normal saline and switch fluids to normal saline.  Added ciprofloxacin.  We will trend lactic acid.  Consult with Nephrology for BOOM and ordered strict intake and output.  KUB and chest x-ray obtained and are unremarkable.  Patient reports abdominal pain.  She is mildly confused but is able to answer orientation questions.    Review of Systems   Respiratory:  Negative for shortness of breath.    Cardiovascular:  Negative for chest pain.   Gastrointestinal:  Positive for abdominal distention and abdominal pain. Negative for nausea and vomiting.   Psychiatric/Behavioral:  Positive for confusion.    Objective:     Vital Signs (Most Recent):  Temp: 98 °F (36.7 °C) (07/26/23 0730)  Pulse: (!) 124 (07/26/23 0730)  Resp: 20 (07/26/23 0849)  BP: 112/82 (07/26/23 0730)  SpO2: 97 % (07/26/23 0730) Vital Signs (24h Range):  Temp:  [96.1 °F (35.6 °C)-98.6 °F (37 °C)] 98 °F (36.7  °C)  Pulse:  [] 124  Resp:  [16-22] 20  SpO2:  [93 %-97 %] 97 %  BP: (112-151)/(61-82) 112/82     Weight: 89.4 kg (197 lb)  Body mass index is 37.22 kg/m².    Intake/Output Summary (Last 24 hours) at 7/26/2023 1110  Last data filed at 7/26/2023 0607  Gross per 24 hour   Intake 3304.03 ml   Output 715 ml   Net 2589.03 ml         Physical Exam  Constitutional:       General: She is not in acute distress.     Appearance: She is well-developed.   HENT:      Head: Normocephalic and atraumatic.   Cardiovascular:      Rate and Rhythm: Regular rhythm. Tachycardia present.      Heart sounds: No murmur heard.  Pulmonary:      Effort: Pulmonary effort is normal. No respiratory distress.      Breath sounds: Normal breath sounds. No wheezing or rales.   Abdominal:      Palpations: Abdomen is soft.      Tenderness: There is abdominal tenderness.      Comments: Bandages clean/dry/intact.  Ostomy in place with pink mucosa.  Drain in place   Musculoskeletal:         General: Normal range of motion.   Skin:     General: Skin is warm and dry.      Findings: No rash.   Neurological:      Mental Status: She is alert and oriented to person, place, and time.      Cranial Nerves: No cranial nerve deficit.      Comments: Confusion noted   Psychiatric:         Behavior: Behavior normal.           Significant Labs: All pertinent labs within the past 24 hours have been reviewed.    Significant Imaging: I have reviewed all pertinent imaging results/findings within the past 24 hours.      Assessment/Plan:      * Malignant neoplasm of colon  Patient with recurrent rectosigmoid cancer.  Status post surgery with Dr. Wagner 7/24  Monitor H&H  Pain and nausea control.        Severe sepsis  This patient does not have evidence of infective focus  My overall impression is sepsis.  Source: Unknown  Antibiotics given-   Antibiotics (72h ago, onward)    Start     Stop Route Frequency Ordered    07/26/23 0900  ciprofloxacin (CIPRO)400mg/200ml D5W IVPB  400 mg         -- IV Every 12 hours (non-standard times) 07/26/23 0745    07/24/23 2100  mupirocin 2 % ointment         07/29 2059 Nasl 2 times daily 07/24/23 2015        Latest lactate reviewed-  Recent Labs   Lab 07/26/23  0136 07/26/23  0659   LACTATE 2.7* 4.3*     Organ dysfunction indicated by Acute kidney injury and Encephalopathy    Bolus 1 L normal saline.  Start ciprofloxacin  Trend lactic acid    BOOM (acute kidney injury)  Patient with acute kidney injury/acute renal failureAKI is currently worsening. Baseline creatinine 1-1.2 - Labs reviewed- Renal function/electrolytes with Estimated Creatinine Clearance: 31.7 mL/min (A) (based on SCr of 1.8 mg/dL (H)). according to latest data. Monitor urine output and serial BMP and adjust therapy as needed. Avoid nephrotoxins and renally dose meds for GFR listed above.    Continue Velasco.  Consult with Nephrology.  Continue fluids.  Stop NSAIDs    Severe obesity (BMI 35.0-39.9) with comorbidity  Body mass index is 37.22 kg/m². Morbid obesity complicates all aspects of disease management from diagnostic modalities to treatment. Weight loss encouraged and health benefits explained to patient.         Acquired hypothyroidism  Patient has chronic hypothyroidism. TFTs reviewed-   Lab Results   Component Value Date    TSH 10.658 (H) 02/21/2023   . Will continue chronic levothyroxine and adjust for and clinical changes.        VTE Risk Mitigation (From admission, onward)         Ordered     IP VTE HIGH RISK PATIENT  Once         07/24/23 2015     Place sequential compression device  Until discontinued         07/24/23 2015     enoxaparin injection 40 mg  Daily         07/24/23 2015                Discharge Planning   DERICK: 7/31/2023     Code Status: Full Code   Is the patient medically ready for discharge?:     Reason for patient still in hospital (select all that apply): Patient trending condition and Treatment  Discharge Plan A: Home                  Maria Elena Vang  MD  Department of Hospital Medicine   [facility]/Surg

## 2023-07-26 NOTE — CONSULTS
Consulted for new ostomy education and care. Patient family at bedside at this time. Removed colostomy bag and cleaned skin and stoma area with warm wash cloth and patted dry. Stoma is dusky in color but is plump with some swelling noted. There is scant bloody drainage noted in bag prior to removal. Measured stoma at 35mm x 45mm oval. Fabiana stoma skin is intact with some bruising noted at the edge of the stoma. Midline incision is intact with bruising noted to the upper incision site. Applied new ostomy appliance and midline incision. Also changed the dressing to the JUANITA drain site due to dried drainage noted on the gauze. Ostomy educational supplies given to patient and family at the bedside. Had family measure and cut ostomy bags for practice which was measured to the patients stoma opening size. Left educational materials at bedside after reviewing the material. Ostomy nurse to follow up throughout hospital stay for continued education.       Stoma dusky and odorous and is plump and swollen. Mid line incision is intact with steri strips.

## 2023-07-26 NOTE — PROGRESS NOTES
Pt seen and examined.  Resting in bed.  Events over night noted.  Sinus tach persistent  Pt notes abd pain.  Denies n/v.  No significant ostomy output.  UOP concentrated.     Wt Readings from Last 3 Encounters:   07/24/23 89.4 kg (197 lb)   07/03/23 89.3 kg (196 lb 13.9 oz)   06/27/23 89.7 kg (197 lb 12 oz)     Temp Readings from Last 3 Encounters:   07/26/23 98 °F (36.7 °C) (Oral)   07/03/23 97.5 °F (36.4 °C) (Temporal)   06/27/23 98.1 °F (36.7 °C) (Temporal)     BP Readings from Last 3 Encounters:   07/26/23 112/82   07/03/23 (!) 143/79   06/27/23 (!) 185/86     Pulse Readings from Last 3 Encounters:   07/26/23 (!) 124   07/03/23 84   06/27/23 84     Awake, oriented to person, place and time.  SOmewhat inappropriate conversation  Sinus tach  Coarse BS  Soft/dist/ hypoactive BS.  Tenderness on deep palpation.  NO peritoneal signs    Lab Results   Component Value Date    WBC 9.15 07/26/2023    HGB 9.5 (L) 07/26/2023    HCT 29.4 (L) 07/26/2023    MCV 83 07/26/2023     07/26/2023       BMP  Lab Results   Component Value Date     (L) 07/26/2023    K 4.5 07/26/2023     07/26/2023    CO2 20 (L) 07/26/2023    BUN 30 (H) 07/26/2023    CREATININE 1.8 (H) 07/26/2023    CALCIUM 7.6 (L) 07/26/2023    ANIONGAP 9 07/26/2023    EGFRNORACEVR 31 (A) 07/26/2023     A/P: s/p ex lap with SBR and APR  Agree with change in fluids and tx to telemetry  Would consider a 1 L bolus of NS  Adjust pain meds.   Aggressive IS  Check abd xray and Cxr

## 2023-07-26 NOTE — AI DETERIORATION ALERT
Artificial Intelligence Notification  [facility] [address] [place] [address]    This documentation was triggered by an Artificial Intelligence Notification:    Admit Date: 2023   LOS: 2  Code Status: Full Code  : 1955  Age: 67 y.o.  Weight:   Wt Readings from Last 1 Encounters:   23 89.4 kg (197 lb)        Sex: female  Bed: 316/316 A  MRN: [id number]  Attending Physician: Maria Elena Vang MD     Date of Alert: 2023              Vitals:    23 0642   BP: 116/67   Pulse: (!) 126   Resp: (!) 22   Temp: 97.4 °F (36.3 °C)     SpO2: (!) 93 %      Patient Condition:  Patient seen and examined.  She is been tachycardic overnight.  Lactic acid is trending up.  Most recent is 4.3.  She reports that she is feeling okay other than abdominal pain.  Fluids changed from D5 to normal saline.  Added ciprofloxacin.  We will trend lactate.  Allergies to penicillin and Flagyl noted.  Reached out to surgeon to update.  Full note to follow      Addendum: talked to Dr. Wagner. Will order KUB. Strict intake and output.  Nephrology consult added.

## 2023-07-26 NOTE — RESPIRATORY THERAPY
Latest Reference Range & Units 07/26/23 01:54   POC PH 7.35 - 7.45  7.413   POC PCO2 35 - 45 mmHg 28.7 (LL)   POC PO2 80 - 100 mmHg 84   POC HCO3 24 - 28 mmol/L 18.3 (L)   POC SATURATED O2 95 - 100 % 97   Sample  ARTERIAL   POC TCO2 23 - 27 mmol/L 19 (L)   POC BE -2 to 2 mmol/L -6   Flow  2   DelSys  Nasal Can   Site  RR   Mode  SPONT   Rate  20   (LL): Data is critically low  (L): Data is abnormally low    Results given NP JOSE Cormier

## 2023-07-26 NOTE — NURSING
SN called pts family member Cata and notified pt will be going to room 224. Cata states she will be here in about an hour and 1/2.

## 2023-07-26 NOTE — CARE UPDATE
07/25/23 1926   Patient Assessment/Suction   Level of Consciousness (AVPU) alert   Respiratory Effort Unlabored   PRE-TX-O2   Device (Oxygen Therapy) nasal cannula   $ Is the patient on Low Flow Oxygen? Yes   Flow (L/min) 2   SpO2 (!) 94 %   Pulse Oximetry Type Intermittent   $ Pulse Oximetry - Multiple Charge Pulse Oximetry - Multiple   Pulse (!) 111   Resp 20   Incentive Spirometer   $ Incentive Spirometer Charges proper technique demonstrated;done with encouragement   Administration (IS) proper technique demonstrated   Number of Repetitions (IS) 5   Level Incentive Spirometer (mL) 2000   Patient Tolerance (IS) good;no adverse signs/symptoms present

## 2023-07-26 NOTE — DISCHARGE INSTRUCTIONS
Ostomy Care:  Empty ostomy bag before going to bed at night.  Empty ostomy bag when up to the bathroom to urinate during the night.   Empty the ostomy bag when it is half full to avoid overfilled bag which may cause the ostomy to leak.    Ostomy Change twice a week or every 5 to 7 days if a good seal is obtained and no leaking occurs. NOTE a scheduled bag change should be first thing in the morning before the patient eats or drinks anything to avoid leakage while changing the device.   1. Gather supplies to change the ostomy such as a large towel, wet wash cloths, sting free adhesive remover, sting free skin barrier, strip paste(caulking), ostomy bag and ostomy scissors.  2. Apply sting free adhesive remover along the top of the ostomy bag while pealing back the bag from top to bottom and then throw this bag into the garbage.  3. Clean skin around the stoma with wash cloth. May use mild soap if needed and rinse and then dry this area.   4. Apply sting free skin barrier to the skin surrounding the stoma  5. Apply stoma stick paste to the skin at the base of the stoma   6. Apply cut bag to the area attaching this to the stoma strip paste and the skin.  7. Hold your hand over the ostomy adhesive area for 5 minutes to help obtain a good seal.     When to call your surgeon:  1. If there is no output from your stoma  2. If there is a large amount of bright red blood noted in the bag  3. If you experience severe pain that is a new pain around your stoma

## 2023-07-26 NOTE — NURSING
Lactate acid is 4.3, Dr. Vang is aware with orders. Pt transferred to SDU accompanied by nurse also all pt belongings at the bedside, including pt purse,,phone, gray suitcase leather black jacket and denture. Will continue to monitor.    21.05

## 2023-07-26 NOTE — NURSING
Dr. Vang on floor to assess patient. Notified MD of Lactic acid of 4.3. New orders placed. Sepsis protocol initiated. MD placed order for patient to be moved to step down for closer monitoring.

## 2023-07-26 NOTE — ASSESSMENT & PLAN NOTE
Patient with acute kidney injury/acute renal failureAKI is currently worsening. Baseline creatinine 1-1.2 - Labs reviewed- Renal function/electrolytes with Estimated Creatinine Clearance: 31.7 mL/min (A) (based on SCr of 1.8 mg/dL (H)). according to latest data. Monitor urine output and serial BMP and adjust therapy as needed. Avoid nephrotoxins and renally dose meds for GFR listed above.    Continue Velasco.  Consult with Nephrology.  Continue fluids.  Stop NSAIDs

## 2023-07-27 PROBLEM — E03.9 ACQUIRED HYPOTHYROIDISM: Chronic | Status: ACTIVE | Noted: 2020-10-15

## 2023-07-27 PROBLEM — E87.1 HYPONATREMIA: Status: ACTIVE | Noted: 2023-01-01

## 2023-07-27 PROBLEM — Z99.11 ON MECHANICALLY ASSISTED VENTILATION: Status: ACTIVE | Noted: 2023-01-01

## 2023-07-27 PROBLEM — E44.0 MALNUTRITION OF MODERATE DEGREE: Chronic | Status: ACTIVE | Noted: 2023-01-01

## 2023-07-27 PROBLEM — R00.0 SINUS TACHYCARDIA: Status: ACTIVE | Noted: 2023-01-01

## 2023-07-27 PROBLEM — K55.029 ISCHEMIC NECROSIS OF SMALL BOWEL: Status: ACTIVE | Noted: 2023-01-01

## 2023-07-27 NOTE — AI DETERIORATION ALERT
Artificial Intelligence Notification  [address]  [facility] [address]    This documentation was triggered by an Artificial Intelligence Notification:    Discussed with nurse, no clinical changes. Continue current plan of care

## 2023-07-27 NOTE — SUBJECTIVE & OBJECTIVE
Interval History:  Patient seen and examined.  Still with sinus tachycardia.  Hemoglobin is 8.3 today.  Creatinine is 1.8.  Discussed with Dr. Wagner and he recommends transfusing a unit of PRBCs, PRBCs ordered.  If heart rate remains elevated after blood transfusion will check V/Q scan.  Patient reports feeling better today.  Is less confused and complaining of less pain.  Some wheezing on exam, Xopenex ordered    Review of Systems   Respiratory:  Negative for shortness of breath.    Cardiovascular:  Negative for chest pain.   Gastrointestinal:  Positive for abdominal distention and abdominal pain. Negative for nausea and vomiting.   Psychiatric/Behavioral:  Negative for confusion.    Objective:     Vital Signs (Most Recent):  Temp: 97.4 °F (36.3 °C) (07/27/23 1049)  Pulse: (!) 120 (07/27/23 1049)  Resp: 13 (07/27/23 1049)  BP: (!) 153/62 (07/27/23 1049)  SpO2: 100 % (07/27/23 1049) Vital Signs (24h Range):  Temp:  [96.8 °F (36 °C)-98.4 °F (36.9 °C)] 97.4 °F (36.3 °C)  Pulse:  [114-136] 120  Resp:  [12-37] 13  SpO2:  [95 %-100 %] 100 %  BP: ()/() 153/62     Weight: 89.4 kg (197 lb)  Body mass index is 37.22 kg/m².    Intake/Output Summary (Last 24 hours) at 7/27/2023 1125  Last data filed at 7/27/2023 0907  Gross per 24 hour   Intake 2898.38 ml   Output 855 ml   Net 2043.38 ml           Physical Exam  Constitutional:       General: She is not in acute distress.     Appearance: She is well-developed.   HENT:      Head: Normocephalic and atraumatic.   Cardiovascular:      Rate and Rhythm: Regular rhythm. Tachycardia present.      Heart sounds: No murmur heard.  Pulmonary:      Effort: Pulmonary effort is normal. No respiratory distress.      Breath sounds: Wheezing present. No rales.   Abdominal:      Palpations: Abdomen is soft.      Tenderness: There is abdominal tenderness.      Comments: Bandages clean/dry/intact.  Ostomy in place with pink mucosa.  Drain in place   Musculoskeletal:         General:  Normal range of motion.   Skin:     General: Skin is warm and dry.      Findings: No rash.   Neurological:      Mental Status: She is alert and oriented to person, place, and time. Mental status is at baseline.      Cranial Nerves: No cranial nerve deficit.   Psychiatric:         Behavior: Behavior normal.           Significant Labs: All pertinent labs within the past 24 hours have been reviewed.    Significant Imaging: I have reviewed all pertinent imaging results/findings within the past 24 hours.

## 2023-07-27 NOTE — ASSESSMENT & PLAN NOTE
Patient has chronic hypothyroidism. TFTs reviewed-   Lab Results   Component Value Date    TSH 10.658 (H) 02/21/2023   . Will continue chronic levothyroxine and adjust for and clinical changes.  T4 is low.  Will increase Synthroid

## 2023-07-27 NOTE — AI DETERIORATION ALERT
Artificial Intelligence Notification  [address] [place] [address]    This documentation was triggered by an Artificial Intelligence Notification:    Admit Date: 2023   LOS: 3  Code Status: Full Code  : 1955  Age: 67 y.o.  Weight:   Wt Readings from Last 1 Encounters:   23 89.4 kg (197 lb)        Sex: female  Bed: 224/224 A  MRN: [id number]  Attending Physician: Maria Elena Vang MD     Date of Alert: 2023  Time AI Alert Received: 0103            Vitals:    23 2353   BP:    Pulse:    Resp: 20   Temp:      SpO2: 100 %      Artificial Intelligence alert discussed with Provider:     Name: scott brush   Date/Time of Provider Notification:       Patient Condition: resting comfortably and in no acute distress. Updated vital signs did show tachycardia, which has been her baseline. She is feeling much better now than on admission

## 2023-07-27 NOTE — EICU
Video rounding completed.  Pt resting quiet w/ eyes closed.  No distress noted.  Infusing NS at 125 cc/hr.  B/P 156/90, , RR 25, POx 100%.

## 2023-07-27 NOTE — PT/OT/SLP PROGRESS
Physical Therapy      Patient Name:  Tessa Enriquez   MRN:  [id number]    PT attempted in am but transfusing blood..

## 2023-07-27 NOTE — PROGRESS NOTES
PT seen and examined.  More alert today and with improvement in abd pain.  Remains tachycardic but less.  BP stable  Having good UOP and CR unchanged today    .  Wt Readings from Last 3 Encounters:   07/24/23 89.4 kg (197 lb)   07/03/23 89.3 kg (196 lb 13.9 oz)   06/27/23 89.7 kg (197 lb 12 oz)     Temp Readings from Last 3 Encounters:   07/27/23 97.9 °F (36.6 °C)   07/03/23 97.5 °F (36.4 °C) (Temporal)   06/27/23 98.1 °F (36.7 °C) (Temporal)     BP Readings from Last 3 Encounters:   07/27/23 (!) 171/71   07/03/23 (!) 143/79   06/27/23 (!) 185/86     Pulse Readings from Last 3 Encounters:   07/27/23 (!) 123   07/03/23 84   06/27/23 84     AAOx3  Sinus  Soft/slight dist  Ostomy dusky today and findings suggestive of ischemia    A/P: Ischemic ostomy  To OR today for colostomy revision.  Suspect this will require laparotomy and possible resection of portion of colon. Suspect this to be etiology of her ARF, tachy.

## 2023-07-27 NOTE — PROGRESS NOTES
[facility] Medicine  Progress Note    Patient Name: Tessa Enriquez  MRN: [id number]  Patient Class: IP- Inpatient   Admission Date: 7/24/2023  Length of Stay: 3 days  Attending Physician: Maria Elena Vang MD  Primary Care Provider: Karma Mcdermott MD        Subjective:     Principal Problem:Malignant neoplasm of colon        HPI:  Patient is a 67-year-old female with history of COPD, hypothyroidism, rectosigmoid cancer who was seen today prior to surgery with Dr. Wagner for recurrent rectosigmoid cancer.  Patient has been having diarrhea and rectal bleeding.  She denies any fever, chills, shortness of breath, chest pain, abdominal pain, bladder complaints.  She does currently have a headache that she attributes to being NPO and not having coffee this morning.  She is accompanied by family.  She is seen in the preop area.  All questions answered.      Overview/Hospital Course:  No notes on file    Interval History:  Patient seen and examined.  Still with sinus tachycardia.  Hemoglobin is 8.3 today.  Creatinine is 1.8.  Discussed with Dr. Wagner and he recommends transfusing a unit of PRBCs, PRBCs ordered.  If heart rate remains elevated after blood transfusion will check V/Q scan.  Patient reports feeling better today.  Is less confused and complaining of less pain.  Some wheezing on exam, Xopenex ordered    Review of Systems   Respiratory:  Negative for shortness of breath.    Cardiovascular:  Negative for chest pain.   Gastrointestinal:  Positive for abdominal distention and abdominal pain. Negative for nausea and vomiting.   Psychiatric/Behavioral:  Negative for confusion.    Objective:     Vital Signs (Most Recent):  Temp: 97.4 °F (36.3 °C) (07/27/23 1049)  Pulse: (!) 120 (07/27/23 1049)  Resp: 13 (07/27/23 1049)  BP: (!) 153/62 (07/27/23 1049)  SpO2: 100 % (07/27/23 1049) Vital Signs (24h Range):  Temp:  [96.8 °F (36 °C)-98.4 °F (36.9 °C)] 97.4 °F (36.3 °C)  Pulse:  [114-136]  120  Resp:  [12-37] 13  SpO2:  [95 %-100 %] 100 %  BP: ()/() 153/62     Weight: 89.4 kg (197 lb)  Body mass index is 37.22 kg/m².    Intake/Output Summary (Last 24 hours) at 7/27/2023 1125  Last data filed at 7/27/2023 0907  Gross per 24 hour   Intake 2898.38 ml   Output 855 ml   Net 2043.38 ml           Physical Exam  Constitutional:       General: She is not in acute distress.     Appearance: She is well-developed.   HENT:      Head: Normocephalic and atraumatic.   Cardiovascular:      Rate and Rhythm: Regular rhythm. Tachycardia present.      Heart sounds: No murmur heard.  Pulmonary:      Effort: Pulmonary effort is normal. No respiratory distress.      Breath sounds: Wheezing present. No rales.   Abdominal:      Palpations: Abdomen is soft.      Tenderness: There is abdominal tenderness.      Comments: Bandages clean/dry/intact.  Ostomy in place with pink mucosa.  Drain in place   Musculoskeletal:         General: Normal range of motion.   Skin:     General: Skin is warm and dry.      Findings: No rash.   Neurological:      Mental Status: She is alert and oriented to person, place, and time. Mental status is at baseline.      Cranial Nerves: No cranial nerve deficit.   Psychiatric:         Behavior: Behavior normal.           Significant Labs: All pertinent labs within the past 24 hours have been reviewed.    Significant Imaging: I have reviewed all pertinent imaging results/findings within the past 24 hours.      Assessment/Plan:      * Malignant neoplasm of colon  Patient with recurrent rectosigmoid cancer.  Status post surgery with Dr. Wagner 7/24  Monitor H&H  Pain and nausea control.        Severe sepsis  This patient does not have evidence of infective focus  My overall impression is sepsis.  Source: Unknown  Antibiotics given-   Antibiotics (72h ago, onward)    Start     Stop Route Frequency Ordered    07/26/23 0900  ciprofloxacin (CIPRO)400mg/200ml D5W IVPB 400 mg         -- IV Every 12 hours  (non-standard times) 07/26/23 0745    07/24/23 2100  mupirocin 2 % ointment         07/29 2059 Nasl 2 times daily 07/24/23 2015        Latest lactate reviewed-  Recent Labs   Lab 07/26/23  1112 07/26/23  1502 07/26/23 2031   LACTATE 2.1 2.2 2.3*     Organ dysfunction indicated by Acute kidney injury and Encephalopathy    Bolus 1 L normal saline.  Start ciprofloxacin  Trend lactic acid    BOOM (acute kidney injury)  Patient with acute kidney injury/acute renal failureAKI is currently stable. Baseline creatinine 1-1.2 - Labs reviewed- Renal function/electrolytes with Estimated Creatinine Clearance: 31.7 mL/min (A) (based on SCr of 1.8 mg/dL (H)). according to latest data. Monitor urine output and serial BMP and adjust therapy as needed. Avoid nephrotoxins and renally dose meds for GFR listed above.    Continue Velasco.  Nephrology following.  Transfuse PRBCs today for anemia    Severe obesity (BMI 35.0-39.9) with comorbidity  Body mass index is 37.22 kg/m². Morbid obesity complicates all aspects of disease management from diagnostic modalities to treatment. Weight loss encouraged and health benefits explained to patient.         Acquired hypothyroidism  Patient has chronic hypothyroidism. TFTs reviewed-   Lab Results   Component Value Date    TSH 10.658 (H) 02/21/2023   . Will continue chronic levothyroxine and adjust for and clinical changes.  T4 is low.  Will increase Synthroid      VTE Risk Mitigation (From admission, onward)         Ordered     IP VTE HIGH RISK PATIENT  Once         07/24/23 2015     Place sequential compression device  Until discontinued         07/24/23 2015     enoxaparin injection 40 mg  Daily         07/24/23 2015                Discharge Planning   DERICK: 7/31/2023     Code Status: Full Code   Is the patient medically ready for discharge?:     Reason for patient still in hospital (select all that apply): Patient trending condition and Treatment  Discharge Plan A: Home                  Maria Elena  MD Taj  Department of Hospital Medicine   [facility]/Surg

## 2023-07-27 NOTE — PHYSICIAN QUERY
PT Name: Tessa Enriquez  MR #: [id number]    DOCUMENTATION CLARIFICATION      CDS/: Zaira Bautista               Contact information:    This form is a permanent document in the medical record.      Query Date: July 27, 2023    By submitting this query, we are merely seeking further clarification of documentation. Please utilize your independent clinical judgment when addressing the question(s) below.    The Medical Record contains the following:   Indicators  Supporting Clinical Findings Location in Medical Record    Anemia documented Anemia  --iron panel reveals iron deficiency   Iron Profile 07/27/2023 04:15     Iron (ug/dL) 6L   Iron Percent Saturation (%) 4L   Total Iron Binding Cap. (ug/dL) 158L   Transferrin (mg/dL) 113L    Nephrology note 7/27    H&H H/H 12.1/38.9 after transfusions on 7/25  9.7/30.1 on 7/26 @ 0136  9.5/29.4 on 7/26 @ 0448  8.3/26.3 on 7/27 @ 0415    Discussed with Dr. Wagner and he recommends transfusing  a unit of PRBCs, PRBCs ordered  Labs [facility]          Hospitalist's progress note 7/27    Procedure/Surgery Performed/EBL Exploratory laparotomy  Extensive lysis of adhesions taking over 1 hour  Completion proctectomy/abdominal perineal resection 22 modifier  End colostom  Small-bowel resection  Excision of right iliac chain lymph node  Primary closure of incisional hernia     Blood loss was 650 cc.      OR note 7/24    Acute/Chronic illness Rectosigmoid cancer, TSESA     Other AI alert received due to hypotension. Patient hypotensive post op and found to have hemoglobin 7.7 (prior reading 12.1).    Continue with blood transfusion  Lab 07/24/23  1831   HGB 7.7*   HCT 24.5*    Event 7/24     Provider, please clarify If there is another type of anemia being treated this admission in addition to iron deficiency anemia below. SHARIFA ALL THAT APPLY.  [   ] Acute blood loss anemia    [   ] Anemia due to neoplastic disease   [   ] Anemia due to chronic disease (please specify):        [  x ]  Anemia, unspecified    [   ] ONLY IRON DEFICIENCY ANEMIA (FENG)   Present on admission (POA) status:   [   ] Yes (Y)   [   ] No (N)   [   ] Documentation insufficient to determine if condition is POA (U)   [  ] Clinically Undetermined (W)          Please document in your progress notes daily for the duration of treatment, until resolved, and include in your discharge summary.

## 2023-07-27 NOTE — PLAN OF CARE
Patient with audible wheezing. O2 sat 95% on 2L n/c. Remains ST 120s. Notified Dr. Vang. New order received.

## 2023-07-27 NOTE — PLAN OF CARE
Problem: Infection  Goal: Absence of Infection Signs and Symptoms  Outcome: Ongoing, Progressing     Problem: Adult Inpatient Plan of Care  Goal: Plan of Care Review  Outcome: Ongoing, Progressing     Problem: Adult Inpatient Plan of Care  Goal: Patient-Specific Goal (Individualized)  Outcome: Ongoing, Progressing     Problem: Adult Inpatient Plan of Care  Goal: Absence of Hospital-Acquired Illness or Injury  Outcome: Ongoing, Progressing     Problem: Adult Inpatient Plan of Care  Goal: Optimal Comfort and Wellbeing  Outcome: Ongoing, Progressing     Problem: Adult Inpatient Plan of Care  Goal: Readiness for Transition of Care  Outcome: Ongoing, Progressing     Problem: Adjustment to Illness (Sepsis/Septic Shock)  Goal: Optimal Coping  Outcome: Ongoing, Progressing     Problem: Bleeding (Sepsis/Septic Shock)  Goal: Absence of Bleeding  Outcome: Ongoing, Progressing     Problem: Glycemic Control Impaired (Sepsis/Septic Shock)  Goal: Blood Glucose Level Within Desired Range  Outcome: Ongoing, Progressing     Problem: Infection Progression (Sepsis/Septic Shock)  Goal: Absence of Infection Signs and Symptoms  Outcome: Ongoing, Progressing     Problem: Nutrition Impaired (Sepsis/Septic Shock)  Goal: Optimal Nutrition Intake  Outcome: Ongoing, Progressing     Problem: Fluid and Electrolyte Imbalance (Acute Kidney Injury/Impairment)  Goal: Fluid and Electrolyte Balance  Outcome: Ongoing, Progressing     Problem: Oral Intake Inadequate (Acute Kidney Injury/Impairment)  Goal: Optimal Nutrition Intake  Outcome: Ongoing, Progressing     Problem: Renal Function Impairment (Acute Kidney Injury/Impairment)  Goal: Effective Renal Function  Outcome: Ongoing, Progressing     Problem: Fall Injury Risk  Goal: Absence of Fall and Fall-Related Injury  Outcome: Ongoing, Progressing     Problem: Skin Injury Risk Increased  Goal: Skin Health and Integrity  Outcome: Ongoing, Progressing

## 2023-07-27 NOTE — ASSESSMENT & PLAN NOTE
This patient does not have evidence of infective focus  My overall impression is sepsis.  Source: Unknown  Antibiotics given-   Antibiotics (72h ago, onward)    Start     Stop Route Frequency Ordered    07/26/23 0900  ciprofloxacin (CIPRO)400mg/200ml D5W IVPB 400 mg         -- IV Every 12 hours (non-standard times) 07/26/23 0745    07/24/23 2100  mupirocin 2 % ointment         07/29 2059 Nasl 2 times daily 07/24/23 2015        Latest lactate reviewed-  Recent Labs   Lab 07/26/23  1112 07/26/23  1502 07/26/23  2031   LACTATE 2.1 2.2 2.3*     Organ dysfunction indicated by Acute kidney injury and Encephalopathy    Bolus 1 L normal saline.  Start ciprofloxacin  Trend lactic acid

## 2023-07-27 NOTE — CONSULTS
INPATIENT NEPHROLOGY CONSULT   [facility] NEPHROLOGY    Tessa Enriquez  07/27/2023    Reason for consultation:    Acute kidney injury    Chief Complaint: No chief complaint on file.         History of Present Illness:    Per H and P     Patient is a 67-year-old female with history of COPD, hypothyroidism, rectosigmoid cancer who was seen today prior to surgery with Dr. Wagner for recurrent rectosigmoid cancer.  Patient has been having diarrhea and rectal bleeding.  She denies any fever, chills, shortness of breath, chest pain, abdominal pain, bladder complaints.  She does currently have a headache that she attributes to being NPO and not having coffee this morning.  She is accompanied by family.       7/26  Consulted for BOOM.  Pt POD 2 s/p ex lap with extensive lysis of adhesions, bilateral ureteral stent insertion, and partial colectomy with colostomy placement.  She has developed oliguric with boom    7/27 Afebrile.  Tachycardic.  680 cc uop    Plan of Care:       Assessment:    Acute kidney injury secondary to hemodynamically mediated renal injury s/p prolonged episode of hypotension  --ua with  micro without casts   --Avoid NSAIDS, Jacobs II inhibitors, and other non-essential nephrotoxic agents  (NO MORE IBUPROFEN)   --urine Na sodium very low implicating prerenal pathology  --keep map above 55  --renal dose medication for crcl 10-50.  If oliguria persists renal dose for crcl 10  --strict I/Os  --maintain colin  --continue iv fluids  --urine eosinophils negative  --at risk for contrast associate BOOM  --risk of need for dialysis discussed with the patient and her daughter     I   discussed the risks and benefits of hemodialysis with the patient and her daughter..  All of their questions were answered.  Risks include low blood pressure, stroke, heart attack, seizure, infection, nausea, delirium, and death.    They expressed understanding.  No acute need for RRT at this time.  Will reassess daily        Metabolic acidosis/lactic acidosis  --continue iv fluids    Hyponatremia  --avoid hypotonic iv piggy backs  --trend  --urine osm concentrating implicating ADH effect  --uric acid is low consistent                                                                                                                                                                                                                                                                                                                      Anemia  --iron panel reveals iron deficiency  --venofer  --erythropoietin stimulating agent if iron stores adequate  --pRBC per primary    Hypotension  --judicious opiates  --iv fluids  --bp 150s/80s ok for now    Sinus tachycardic  --getting pRBC today  --tsh  --free T4  --consider VQ scan if persistent after transfusion             Thank you for allowing us to participate in this patient's care. We will continue to follow.    Vital Signs:  Temp Readings from Last 3 Encounters:   07/27/23 98.1 °F (36.7 °C) (Axillary)   07/03/23 97.5 °F (36.4 °C) (Temporal)   06/27/23 98.1 °F (36.7 °C) (Temporal)       Pulse Readings from Last 3 Encounters:   07/27/23 (!) 117   07/03/23 84   06/27/23 84       BP Readings from Last 3 Encounters:   07/27/23 (!) 160/84   07/03/23 (!) 143/79   06/27/23 (!) 185/86       Weight:  Wt Readings from Last 3 Encounters:   07/24/23 89.4 kg (197 lb)   07/03/23 89.3 kg (196 lb 13.9 oz)   06/27/23 89.7 kg (197 lb 12 oz)       Past Medical & Surgical History:  Past Medical History:   Diagnosis Date    Acute hypoxemic respiratory failure 07/09/2018    Arthritis     Bilateral kidney stones 3/27/2023    Cancer     colon    COPD (chronic obstructive pulmonary disease)     History of home oxygen therapy     ONLY PRN AND HASN'T USED IT IN A YEAR    Hyperlipidemia     Thyroid disease        Past Surgical History:   Procedure Laterality Date    CHOLECYSTECTOMY      COLECTOMY N/A 11/9/2020    Procedure:  COLECTOMY;  Surgeon: Brandon Wagner MD;  Location: [facility] OR;  Service: General;  Laterality: N/A;  LOWER- ANTERIOR    COLONOSCOPY N/A 10/23/2020    Procedure: COLONOSCOPY;  Surgeon: Jolynn Ayala MD;  Location: [facility] ENDO;  Service: Endoscopy;  Laterality: N/A;    COLONOSCOPY N/A 8/6/2021    Procedure: COLONOSCOPY;  Surgeon: Jolynn Ayala MD;  Location: [facility] ENDO;  Service: Endoscopy;  Laterality: N/A;    COLONOSCOPY N/A 7/7/2022    Procedure: COLONOSCOPY;  Surgeon: Jolynn Ayala MD;  Location: [facility] ENDO;  Service: Endoscopy;  Laterality: N/A;    COLONOSCOPY N/A 5/31/2023    Procedure: COLONOSCOPY;  Surgeon: Jolynn Ayala MD;  Location: [facility] ENDO;  Service: Endoscopy;  Laterality: N/A;    COLOSTOMY N/A 7/24/2023    Procedure: CREATION, COLOSTOMY;  Surgeon: Brandon Wagner MD;  Location: [facility] OR;  Service: General;  Laterality: N/A;    COMPLETION PROCTECTOMY N/A 7/24/2023    Procedure: PROCTECTOMY, COMPLETION;  Surgeon: Brandon Wagner MD;  Location: [facility] OR;  Service: General;  Laterality: N/A;    CYSTOSCOPY W/ URETERAL STENT PLACEMENT Right 2/22/2023    Procedure: CYSTOSCOPY, WITH URETERAL STENT INSERTION;  Surgeon: Carin Kwon Jr., MD;  Location: [facility] OR;  Service: Urology;  Laterality: Right;    CYSTOSCOPY W/ URETERAL STENT PLACEMENT Bilateral 3/26/2023    Procedure: CYSTOSCOPY, WITH URETERAL STENT INSERTION;  Surgeon: Sabine Barber MD;  Location: [facility] OR;  Service: Urology;  Laterality: Bilateral;    CYSTOSCOPY W/ URETERAL STENT PLACEMENT Bilateral 7/24/2023    Procedure: CYSTOSCOPY, WITH URETERAL STENT INSERTION;  Surgeon: Adrienne Mijares MD;  Location: [facility] OR;  Service: Urology;  Laterality: Bilateral;  bassem placing stents 1st    CYSTOSCOPY WITH URETEROSCOPY, RETROGRADE PYELOGRAPHY, AND INSERTION OF STENT Bilateral 3/26/2023    Procedure: CYSTOSCOPY, WITH RETROGRADE PYELOGRAM AND URETERAL STENT INSERTION;  Surgeon: Sabine Barber MD;  Location: [facility] OR;   Service: Urology;  Laterality: Bilateral;    CYSTOURETEROSCOPY WITH RETROGRADE PYELOGRAPHY AND INSERTION OF STENT INTO URETER Right 3/7/2023    Procedure: CYSTOURETEROSCOPY, WITH RETROGRADE PYELOGRAM AND URETERAL STENT INSERTION;  Surgeon: Carin Kwon Jr., MD;  Location: [facility];  Service: Urology;  Laterality: Right;    CYSTOURETEROSCOPY, WITH HOLMIUM LASER LITHOTRIPSY OF URETERAL CALCULUS AND STENT INSERTION Bilateral 4/11/2023    Procedure: CYSTOURETEROSCOPY, WITH HOLMIUM LASER LITHOTRIPSY OF URETERAL CALCULUS AND STENT INSERTION;  Surgeon: Carin Kwon Jr., MD;  Location: [facility] OR;  Service: Urology;  Laterality: Bilateral;    ESOPHAGEAL DILATION N/A 6/29/2021    Procedure: DILATION, ESOPHAGUS;  Surgeon: Sourav Baires III, MD;  Location: [facility];  Service: Endoscopy;  Laterality: N/A;    ESOPHAGOGASTRODUODENOSCOPY  06/29/2021    Dilation to 57 Fr    ESOPHAGOGASTRODUODENOSCOPY N/A 6/29/2021    Procedure: EGD (ESOPHAGOGASTRODUODENOSCOPY);  Surgeon: Sourav Baires III, MD;  Location: [facility];  Service: Endoscopy;  Laterality: N/A;    FLEXIBLE SIGMOIDOSCOPY N/A 8/1/2022    Procedure: SIGMOIDOSCOPY, FLEXIBLE;  Surgeon: Brandon Wagner MD;  Location: [facility];  Service: Endoscopy;  Laterality: N/A;    HYSTERECTOMY      INSERTION OF TUNNELED CENTRAL VENOUS CATHETER (CVC) WITH SUBCUTANEOUS PORT N/A 1/11/2021    Procedure: [id number];  Surgeon: Brandon Wagner MD;  Location: [facility];  Service: General;  Laterality: N/A;    INSERTION OF TUNNELED CENTRAL VENOUS CATHETER (CVC) WITH SUBCUTANEOUS PORT Right 9/1/2022    Procedure: [id number];  Surgeon: Brandon Wagner MD;  Location: [facility] OR;  Service: General;  Laterality: Right;  Wants rt side placement    LAPAROTOMY, EXPLORATORY N/A 7/24/2023    Procedure: LAPAROTOMY, EXPLORATORY;  Surgeon: Brandon Wagner MD;  Location: [facility];  Service: General;  Laterality: N/A;  case must go 1st-chamberlain placing stents    MEDIPORT REMOVAL  Left 2021    Procedure: REMOVAL, CATHETER, CENTRAL VENOUS, TUNNELED, WITH PORT;  Surgeon: Brandon Wagner MD;  Location: [facility] OR;  Service: General;  Laterality: Left;    REMOVAL-STENT Right 3/7/2023    Procedure: REMOVAL-STENT;  Surgeon: Carin Kwon Jr., MD;  Location: [facility] OR;  Service: Urology;  Laterality: Right;    REMOVAL-STENT Bilateral 2023    Procedure: REMOVAL-STENT;  Surgeon: Carin Kwon Jr., MD;  Location: [facility] OR;  Service: Urology;  Laterality: Bilateral;    RETROGRADE PYELOGRAPHY Bilateral 2023    Procedure: PYELOGRAM, RETROGRADE;  Surgeon: Carin Kwon Jr., MD;  Location: [facility] OR;  Service: Urology;  Laterality: Bilateral;    ROBOT-ASSISTED COLECTOMY N/A 2020    Procedure: ROBOTIC COLECTOMY low anterior resection, possible open;  Surgeon: Brandon Wagner MD;  Location: [facility] OR;  Service: General;  Laterality: N/A;  CONVERTED TO OPEN AT 1503    SUBTOTAL COLECTOMY N/A 2023    Procedure: COLECTOMY, PARTIAL;  Surgeon: Brandon Wagner MD;  Location: [facility] OR;  Service: General;  Laterality: N/A;  with colorectal Anastamosis    URETEROSCOPIC REMOVAL OF URETERIC CALCULUS Right 3/7/2023    Procedure: REMOVAL, CALCULUS, URETER, URETEROSCOPIC;  Surgeon: Carin Kwon Jr., MD;  Location: [facility] OR;  Service: Urology;  Laterality: Right;    URETEROSCOPIC REMOVAL OF URETERIC CALCULUS Bilateral 2023    Procedure: REMOVAL, CALCULUS, URETER, URETEROSCOPIC;  Surgeon: Carin Kwon Jr., MD;  Location: [facility] OR;  Service: Urology;  Laterality: Bilateral;       Past Social History:  Social History     Socioeconomic History    Marital status:    Tobacco Use    Smoking status: Former     Packs/day: 2.00     Years: 40.00     Pack years: 80.00     Types: Cigarettes     Quit date: 2017     Years since quittin.0    Smokeless tobacco: Never   Substance and Sexual Activity    Alcohol use: No    Drug use: No    Sexual activity: Not Currently     Partners: Male     Social  Determinants of Health     Financial Resource Strain: Unknown    Difficulty of Paying Living Expenses: Patient refused   Food Insecurity: Unknown    Worried About Running Out of Food in the Last Year: Patient refused    Ran Out of Food in the Last Year: Patient refused   Transportation Needs: Unknown    Lack of Transportation (Medical): Patient refused    Lack of Transportation (Non-Medical): Patient refused   Physical Activity: Unknown    Days of Exercise per Week: Patient refused    Minutes of Exercise per Session: Patient refused   Stress: Unknown    Feeling of Stress : Patient refused   Social Connections: Unknown    Frequency of Communication with Friends and Family: Patient refused    Frequency of Social Gatherings with Friends and Family: Patient refused    Attends Spiritism Services: Patient refused    Active Member of Clubs or Organizations: Patient refused    Attends Club or Organization Meetings: Patient refused    Marital Status: Patient refused   Housing Stability: Unknown    Unable to Pay for Housing in the Last Year: Patient refused    Unstable Housing in the Last Year: Patient refused       Medications:  Current Facility-Administered Medications on File Prior to Encounter   Medication Dose Route Frequency Provider Last Rate Last Admin    diphenhydrAMINE injection 12.5 mg  12.5 mg Intravenous Once PRN Monster Atwood MD        electrolyte-S (ISOLYTE)   Intravenous Continuous Monster Atwood MD 25 mL/hr at 03/07/23 1138 New Bag at 07/24/23 1804    fentaNYL 50 mcg/mL injection 25 mcg  25 mcg Intravenous Q5 Min PRN Monster Atwood MD   25 mcg at 03/07/23 1207    HYDROmorphone (PF) injection 0.2 mg  0.2 mg Intravenous Q5 Min PRN Monster Atwood MD        lactated ringers infusion  10 mL/hr Intravenous Continuous Monster Atwood MD        lactated ringers infusion  500 mL Intravenous Once Monster Atwood MD        LIDOcaine (PF) 10 mg/ml (1%) injection 10 mg  1 mL Intradermal  Once Monster Atwood MD        LORazepam injection 0.25 mg  0.25 mg Intravenous Once PRN Monster Atwood MD        ondansetron injection 4 mg  4 mg Intravenous Once PRN Monster Atwood MD        sodium chloride 0.9% flush 3 mL  3 mL Intravenous Q8H Monster Atwood MD        [DISCONTINUED] prochlorperazine injection Soln 5 mg  5 mg Intravenous Q30 Min PRN Monster Atwood MD         Current Outpatient Medications on File Prior to Encounter   Medication Sig Dispense Refill    diphenoxylate-atropine 2.5-0.025 mg (LOMOTIL) 2.5-0.025 mg per tablet Take by mouth.      ferrous sulfate 325 (65 FE) MG EC tablet Take 1 tablet (325 mg total) by mouth every other day.  0    HYDROcodone-acetaminophen (NORCO) 5-325 mg per tablet TAKE ONE TABLET BY MOUTH EVERY SIX HOURS AS NEEDED FOR PAIN 90 tablet 0    levothyroxine (SYNTHROID) 125 MCG tablet Take 1 tablet (125 mcg total) by mouth before breakfast. 30 tablet 11    ondansetron (ZOFRAN-ODT) 8 MG TbDL Take 1 tablet (8 mg total) by mouth every 12 (twelve) hours as needed. 30 tablet 1    potassium chloride SA (K-DUR,KLOR-CON) 20 MEQ tablet Take 1 tablet (20 mEq total) by mouth once daily. 30 tablet 11    albuterol (PROVENTIL) 2.5 mg /3 mL (0.083 %) nebulizer solution Take 3 mLs (2.5 mg total) by nebulization every 6 (six) hours as needed for Wheezing. Rescue (Patient not taking: Reported on 7/20/2023) 120 each 1    CMPD hydrocortisone 2%- LIDOcaine 3% suppository (RECTAL ROCKET) Place 1 suppository rectally every evening. Insert 1 suppository 3/4 of the way onto rectum. Wet for easier application. Repeat for 3 nights. 30 suppository 2    prochlorperazine (COMPAZINE) 10 MG tablet Take 1 tablet (10 mg total) by mouth every 6 (six) hours as needed. 30 tablet 1    triamcinolone acetonide 0.025% (KENALOG) 0.025 % cream Apply topically once daily. 80 g 1     Scheduled Meds:   bisacodyL  5 mg Oral QHS    ciprofloxacin  400 mg Intravenous Q12H    enoxparin  40 mg  "Subcutaneous Daily    famotidine  20 mg Oral BID    gabapentin  200 mg Oral BID    metoclopramide HCl  10 mg Intravenous Q6H    mupirocin   Nasal BID     Continuous Infusions:   sodium chloride 0.9% 125 mL/hr at 07/27/23 0431     PRN Meds:.sodium chloride, sodium chloride, clonazePAM, diphenhydrAMINE, HYDROmorphone, magnesium oxide, magnesium oxide, naloxone, ondansetron, oxyCODONE, potassium bicarbonate, potassium bicarbonate, potassium bicarbonate, potassium, sodium phosphates, potassium, sodium phosphates, potassium, sodium phosphates, promethazine (PHENERGAN) IVPB, simethicone    Allergies:  Ativan [lorazepam], Flagyl [metronidazole], and Pcn [penicillins]    Past Family History:  Reviewed; refer to Hospitalist Admission Note    Review of Systems:  Review of Systems - All 14 systems reviewed and negative, except as noted in HPI    Physical Exam:    BP (!) 160/84   Pulse (!) 117   Temp 98.1 °F (36.7 °C) (Axillary)   Resp 20   Ht 5' 1" (1.549 m)   Wt 89.4 kg (197 lb)   SpO2 100%   Breastfeeding No   BMI 37.22 kg/m²     General Appearance:    No distress   Head:    Normocephalic, without obvious abnormality, atraumatic   Eyes:    PER, conjunctiva/corneas clear, EOM's intact in both eyes        Throat:   Lips, mucosa, and tongue normal; teeth and gums normal   Back:     Symmetric, no curvature, ROM normal, no CVA tenderness   Lungs:     Clear to auscultation bilaterally, respirations unlabored   Chest wall:    No tenderness or deformity   Heart:    tachycardic   Abdomen:     Tender.  Bandages in place.  Drain in place   Extremities:   Extremities normal, atraumatic, no cyanosis or edema   Pulses:   2+ and symmetric all extremities   MSK:   No joint or muscle swelling, tenderness or deformity   Skin:   Skin color, texture, turgor normal, no rashes or lesions   Neurologic:   Mildly confused.   No flap     Results:  Lab Results   Component Value Date     (L) 07/27/2023    K 4.8 07/27/2023     " 07/27/2023    CO2 19 (L) 07/27/2023    BUN 36 (H) 07/27/2023    CREATININE 1.8 (H) 07/27/2023    CALCIUM 7.4 (L) 07/27/2023    ANIONGAP 10 07/27/2023    ESTGFRAFRICA >60 05/02/2022    EGFRNONAA >60 05/02/2022       Lab Results   Component Value Date    CALCIUM 7.4 (L) 07/27/2023    PHOS 4.0 07/27/2023       Recent Labs   Lab 07/27/23  0415   WBC 4.77   RBC 3.10*   HGB 8.3*   HCT 26.3*      MCV 85   MCH 26.8*   MCHC 31.6*             I have personally reviewed pertinent radiological imaging and reports.    Patient care was time spent personally by me on the following activities:   Obtaining a history  Examination of patient.  Providing medical care at the patients bedside.  Developing a treatment plan with patient or surrogate and bedside caregivers  Ordering and reviewing laboratory studies, radiographic studies, pulse oximetry.  Ordering and performing treatments and interventions.  Evaluation of patient's response to treatment.  Discussions with consultants while on the unit and immediately available to the patient.  Re-evaluation of the patient's condition.  Documentation in the medical record.     Lion Barreto MD  Nephrology  [facility] [place]  [phone]

## 2023-07-27 NOTE — ANESTHESIA PROCEDURE NOTES
Intubation    Date/Time: 7/27/2023 5:59 PM  Performed by: Casey Walsh CRNA  Authorized by: Kahlil Guardado MD     Intubation:     Induction:  Rapid sequence induction    Intubated:  Postinduction    Mask Ventilation:  Easy mask    Attempts:  1    Attempted By:  CRNA    Method of Intubation:  Video laryngoscopy    Blade:  Cassidy 3    Laryngeal View Grade: Grade I - full view of cords      Difficult Airway Encountered?: No      Complications:  None    Airway Device:  Oral endotracheal tube    Airway Device Size:  7.5    Style/Cuff Inflation:  Cuffed    Inflation Amount (mL):  4    Tube secured:  22    Secured at:  The lips    Placement Verified By:  Capnometry    Complicating Factors:  None    Findings Post-Intubation:  BS equal bilateral

## 2023-07-27 NOTE — EICU
Intervention Initiated From:  [facility] / EICU    Radha intervened regarding:  Rounding (Video assessment)      Comments: Resting in bed.  No distress.  RN at the bedside.  Video rounding complete.

## 2023-07-27 NOTE — ASSESSMENT & PLAN NOTE
Patient with acute kidney injury/acute renal failureAKI is currently stable. Baseline creatinine 1-1.2 - Labs reviewed- Renal function/electrolytes with Estimated Creatinine Clearance: 31.7 mL/min (A) (based on SCr of 1.8 mg/dL (H)). according to latest data. Monitor urine output and serial BMP and adjust therapy as needed. Avoid nephrotoxins and renally dose meds for GFR listed above.    Continue Velasco.  Nephrology following.  Transfuse PRBCs today for anemia

## 2023-07-27 NOTE — PLAN OF CARE
Problem: Infection  Goal: Absence of Infection Signs and Symptoms  Outcome: Ongoing, Progressing     Problem: Adult Inpatient Plan of Care  Goal: Plan of Care Review  Outcome: Ongoing, Progressing     Problem: Adult Inpatient Plan of Care  Goal: Patient-Specific Goal (Individualized)  Outcome: Ongoing, Progressing     Problem: Adult Inpatient Plan of Care  Goal: Absence of Hospital-Acquired Illness or Injury  Outcome: Ongoing, Progressing     Problem: Adult Inpatient Plan of Care  Goal: Optimal Comfort and Wellbeing  Outcome: Ongoing, Progressing     Problem: Adult Inpatient Plan of Care  Goal: Readiness for Transition of Care  Outcome: Ongoing, Progressing     Problem: Adjustment to Illness (Sepsis/Septic Shock)  Goal: Optimal Coping  Outcome: Ongoing, Progressing     Problem: Bleeding (Sepsis/Septic Shock)  Goal: Absence of Bleeding  Outcome: Ongoing, Progressing     Problem: Glycemic Control Impaired (Sepsis/Septic Shock)  Goal: Blood Glucose Level Within Desired Range  Outcome: Ongoing, Progressing     Problem: Infection Progression (Sepsis/Septic Shock)  Goal: Absence of Infection Signs and Symptoms  Outcome: Ongoing, Progressing     Problem: Nutrition Impaired (Sepsis/Septic Shock)  Goal: Optimal Nutrition Intake  Outcome: Ongoing, Progressing     Problem: Fluid and Electrolyte Imbalance (Acute Kidney Injury/Impairment)  Goal: Fluid and Electrolyte Balance  Outcome: Ongoing, Progressing     Problem: Oral Intake Inadequate (Acute Kidney Injury/Impairment)  Goal: Optimal Nutrition Intake  Outcome: Ongoing, Progressing     Problem: Renal Function Impairment (Acute Kidney Injury/Impairment)  Goal: Effective Renal Function  Outcome: Ongoing, Progressing     Problem: Fall Injury Risk  Goal: Absence of Fall and Fall-Related Injury  Outcome: Ongoing, Progressing     Problem: Skin Injury Risk Increased  Goal: Skin Health and Integrity  Outcome: Ongoing, Progressing    Patient resting in bed. Nasal cannula 2L  tolerated well. Plan of care reviewed with patient with verbalized understanding. Call light is in reach, bed in lowest position and bed alarm active. Will continue to monitor.

## 2023-07-27 NOTE — ANESTHESIA PROCEDURE NOTES
Arterial    Diagnosis: sepsis    Patient location during procedure: done in OR  Procedure start time: 7/27/2023 6:02 PM  Timeout: 7/27/2023 6:01 PM  Procedure end time: 7/27/2023 6:06 PM    Staffing  Authorizing Provider: Kahlil Guardado MD  Performing Provider: Kahlil Guardado MD    Anesthesiologist was present at the time of the procedure.    Preanesthetic Checklist  Completed: patient identified, IV checked, site marked, risks and benefits discussed, surgical consent, monitors and equipment checked, pre-op evaluation, timeout performed and anesthesia consent givenArterial  Skin Prep: chlorhexidine gluconate  Local Infiltration: none  Orientation: right  Location: radial    Catheter Size: 20 G  Catheter placement by Ultrasound guidance. Heme positive aspiration all ports.   Vessel Caliber: small, patent, compressibility normal  Vascular Doppler:  not done  Needle advanced into vessel with real time Ultrasound guidance.Insertion Attempts: 2  Assessment  Dressing: secured with tape and tegaderm

## 2023-07-27 NOTE — PHYSICIAN QUERY
PT Name: Tessa Enriquez  MR #: [id number]    DOCUMENTATION CLARIFICATION     CDS/: Zaira Bautista               Contact information:[phone] or through epic messenger  This form is a permanent document in the medical record.     Query Date: July 27, 2023    By submitting this query, we are merely seeking further clarification of documentation. Please utilize your independent clinical judgment when addressing the question(s) below.    The Medical Record contains the following:   Indicators   Supporting Clinical Findings Location in Medical Record    AMS, Confusion,  LOC, etc.  Psychiatric/Behavioral:  Positive for confusion.  Comments:  Confusion noted  Organ dysfunction indicated by Acute kidney injury and Encephalopathy Hospitalist's progress note 7/26 and 7/27    Acute/Chronic Illness Rectosigmoid Cancer sp Resection, Severe Sepsis, Hypotension, Anemia     Other  SOmewhat inappropriate conversation  Dr Wagner's progress note 7/26     The noted clinical guidelines are only system guidelines and do not replace the providers clinical judgment.    Provider, please specify the type of encephalopathy associated with above clinical findings.  [ x  ] Metabolic Encephalopathy - Due to electrolyte imbalance, metabolic derangements, or infectious processes, includes Septic Encephalopathy, Uremic Encephalopathy   [   ] Toxic Encephalopathy - Due to drugs, chemicals, or other toxic substances   [   ] Encephalopathy, unspecified      [   ] Other Encephalopathy (please specify):          Please document in your progress notes daily for the duration of treatment until resolved, and include in your discharge summary.

## 2023-07-28 PROBLEM — E83.51 HYPOCALCEMIA: Status: ACTIVE | Noted: 2023-01-01

## 2023-07-28 NOTE — CONSULTS
Pulmonary/Critical Care Consult      PATIENT NAME: Tessa Enriquez  MRN: [id number]  TODAY'S DATE: 2023  6:25 AM  ADMIT DATE: 2023  AGE: 67 y.o. : 1955    CONSULT REQUESTED BY: Chapincito Nick, *    REASON FOR CONSULT:   Critical care management    HPI:  The patient is a 67 year old female with recurrent adenocarcinoma of the colon.  She had another resection with formation of a colostomy on .  She had persistent tachycardia, abdominal pain and BOOM following this.  She was noted to have an ischemic stoma yesterday and was brought back to the OR where multiple areas of ischemic small bowel were found.  The patient's abdomen was closed at the skin level with plans to return to the OR today.  The patient is maintained on the ventilator. She is requiring 10 PEEP and 55% FiO2     REVIEW OF SYSTEMS  Unobtainable    ALLERGIES  Review of patient's allergies indicates:   Allergen Reactions    Ativan [lorazepam] Hives and Itching    Flagyl [metronidazole] Itching and Rash    Pcn [penicillins] Hives, Itching and Rash       INPATIENT SCHEDULED MEDICATIONS   chlorhexidine  15 mL Mouth/Throat BID    enoxparin  40 mg Subcutaneous Daily    famotidine (PF)  20 mg Intravenous Daily    ipratropium  0.5 mg Nebulization Q6H    levalbuterol  0.63 mg Nebulization Q6H    levothyroxine  75 mcg Intravenous Before breakfast    meropenem (MERREM) IVPB  1 g Intravenous Q12H    propofoL          lactated ringers 125 mL/hr at 23 0427    NORepinephrine bitartrate-D5W      propofoL 40 mcg/kg/min (23 0223)       MEDICAL AND SURGICAL HISTORY  Past Medical History:   Diagnosis Date    Acute hypoxemic respiratory failure 2018    Arthritis     Bilateral kidney stones 3/27/2023    Cancer     colon    COPD (chronic obstructive pulmonary disease)     History of home oxygen therapy     ONLY PRN AND HASN'T USED IT IN A YEAR    Hyperlipidemia     Thyroid disease      Past Surgical History:   Procedure  Laterality Date    CHOLECYSTECTOMY      COLECTOMY N/A 11/9/2020    Procedure: COLECTOMY;  Surgeon: Brandon Wagner MD;  Location: [facility] OR;  Service: General;  Laterality: N/A;  LOWER- ANTERIOR    COLONOSCOPY N/A 10/23/2020    Procedure: COLONOSCOPY;  Surgeon: Jolynn Ayala MD;  Location: [facility] ENDO;  Service: Endoscopy;  Laterality: N/A;    COLONOSCOPY N/A 8/6/2021    Procedure: COLONOSCOPY;  Surgeon: Jolynn Ayala MD;  Location: [facility] ENDO;  Service: Endoscopy;  Laterality: N/A;    COLONOSCOPY N/A 7/7/2022    Procedure: COLONOSCOPY;  Surgeon: Jolynn Ayala MD;  Location: [facility] ENDO;  Service: Endoscopy;  Laterality: N/A;    COLONOSCOPY N/A 5/31/2023    Procedure: COLONOSCOPY;  Surgeon: Jolynn Ayala MD;  Location: [facility] ENDO;  Service: Endoscopy;  Laterality: N/A;    COLOSTOMY N/A 7/24/2023    Procedure: CREATION, COLOSTOMY;  Surgeon: Brandon Wagner MD;  Location: [facility] OR;  Service: General;  Laterality: N/A;    COMPLETION PROCTECTOMY N/A 7/24/2023    Procedure: PROCTECTOMY, COMPLETION;  Surgeon: Branodn Wagner MD;  Location: [facility] OR;  Service: General;  Laterality: N/A;    CYSTOSCOPY W/ URETERAL STENT PLACEMENT Right 2/22/2023    Procedure: CYSTOSCOPY, WITH URETERAL STENT INSERTION;  Surgeon: Carin Kwon Jr., MD;  Location: [facility] OR;  Service: Urology;  Laterality: Right;    CYSTOSCOPY W/ URETERAL STENT PLACEMENT Bilateral 3/26/2023    Procedure: CYSTOSCOPY, WITH URETERAL STENT INSERTION;  Surgeon: Sabine Barber MD;  Location: [facility] OR;  Service: Urology;  Laterality: Bilateral;    CYSTOSCOPY W/ URETERAL STENT PLACEMENT Bilateral 7/24/2023    Procedure: CYSTOSCOPY, WITH URETERAL STENT INSERTION;  Surgeon: Adrienne Mijares MD;  Location: [facility] OR;  Service: Urology;  Laterality: Bilateral;  bassem placing stents 1st    CYSTOSCOPY WITH URETEROSCOPY, RETROGRADE PYELOGRAPHY, AND INSERTION OF STENT Bilateral 3/26/2023    Procedure: CYSTOSCOPY, WITH RETROGRADE PYELOGRAM AND  URETERAL STENT INSERTION;  Surgeon: Sabine Barber MD;  Location: [facility] OR;  Service: Urology;  Laterality: Bilateral;    CYSTOURETEROSCOPY WITH RETROGRADE PYELOGRAPHY AND INSERTION OF STENT INTO URETER Right 3/7/2023    Procedure: CYSTOURETEROSCOPY, WITH RETROGRADE PYELOGRAM AND URETERAL STENT INSERTION;  Surgeon: Carin Kwon Jr., MD;  Location: [facility] OR;  Service: Urology;  Laterality: Right;    CYSTOURETEROSCOPY, WITH HOLMIUM LASER LITHOTRIPSY OF URETERAL CALCULUS AND STENT INSERTION Bilateral 4/11/2023    Procedure: CYSTOURETEROSCOPY, WITH HOLMIUM LASER LITHOTRIPSY OF URETERAL CALCULUS AND STENT INSERTION;  Surgeon: Carin Kwon Jr., MD;  Location: [facility] OR;  Service: Urology;  Laterality: Bilateral;    ESOPHAGEAL DILATION N/A 6/29/2021    Procedure: DILATION, ESOPHAGUS;  Surgeon: Sourav Baires III, MD;  Location: [facility];  Service: Endoscopy;  Laterality: N/A;    ESOPHAGOGASTRODUODENOSCOPY  06/29/2021    Dilation to 57 Fr    ESOPHAGOGASTRODUODENOSCOPY N/A 6/29/2021    Procedure: EGD (ESOPHAGOGASTRODUODENOSCOPY);  Surgeon: Sourav Baires III, MD;  Location: [facility];  Service: Endoscopy;  Laterality: N/A;    FLEXIBLE SIGMOIDOSCOPY N/A 8/1/2022    Procedure: SIGMOIDOSCOPY, FLEXIBLE;  Surgeon: Brandon Wagner MD;  Location: [facility] ENDO;  Service: Endoscopy;  Laterality: N/A;    HYSTERECTOMY      INSERTION OF TUNNELED CENTRAL VENOUS CATHETER (CVC) WITH SUBCUTANEOUS PORT N/A 1/11/2021    Procedure: [id number];  Surgeon: Brandon Wagner MD;  Location: [facility] OR;  Service: General;  Laterality: N/A;    INSERTION OF TUNNELED CENTRAL VENOUS CATHETER (CVC) WITH SUBCUTANEOUS PORT Right 9/1/2022    Procedure: [id number];  Surgeon: Brandon Wagner MD;  Location: [facility] OR;  Service: General;  Laterality: Right;  Wants rt side placement    LAPAROTOMY, EXPLORATORY N/A 7/24/2023    Procedure: LAPAROTOMY, EXPLORATORY;  Surgeon: Brandon Wagner MD;  Location: [facility];  Service: General;   Laterality: N/A;  case must go 1st-chamberlain placing stents    MEDIPORT REMOVAL Left 2021    Procedure: REMOVAL, CATHETER, CENTRAL VENOUS, TUNNELED, WITH PORT;  Surgeon: Brandon Wagner MD;  Location: [facility];  Service: General;  Laterality: Left;    REMOVAL-STENT Right 3/7/2023    Procedure: REMOVAL-STENT;  Surgeon: Carin Kwon Jr., MD;  Location: [facility] OR;  Service: Urology;  Laterality: Right;    REMOVAL-STENT Bilateral 2023    Procedure: REMOVAL-STENT;  Surgeon: Carin Kwon Jr., MD;  Location: [facility] OR;  Service: Urology;  Laterality: Bilateral;    RETROGRADE PYELOGRAPHY Bilateral 2023    Procedure: PYELOGRAM, RETROGRADE;  Surgeon: Carin Kwon Jr., MD;  Location: [facility];  Service: Urology;  Laterality: Bilateral;    ROBOT-ASSISTED COLECTOMY N/A 2020    Procedure: ROBOTIC COLECTOMY low anterior resection, possible open;  Surgeon: Brandon Wagner MD;  Location: [facility];  Service: General;  Laterality: N/A;  CONVERTED TO OPEN AT 1503    SUBTOTAL COLECTOMY N/A 2023    Procedure: COLECTOMY, PARTIAL;  Surgeon: Brandon Wagner MD;  Location: [facility];  Service: General;  Laterality: N/A;  with colorectal Anastamosis    URETEROSCOPIC REMOVAL OF URETERIC CALCULUS Right 3/7/2023    Procedure: REMOVAL, CALCULUS, URETER, URETEROSCOPIC;  Surgeon: Carin Kwon Jr., MD;  Location: [facility];  Service: Urology;  Laterality: Right;    URETEROSCOPIC REMOVAL OF URETERIC CALCULUS Bilateral 2023    Procedure: REMOVAL, CALCULUS, URETER, URETEROSCOPIC;  Surgeon: Carin Kwon Jr., MD;  Location: [facility];  Service: Urology;  Laterality: Bilateral;       ALCOHOL, TOBACCO AND DRUG USE  Social History     Tobacco Use   Smoking Status Former    Packs/day: 2.00    Years: 40.00    Pack years: 80.00    Types: Cigarettes    Quit date: 2017    Years since quittin.0   Smokeless Tobacco Never     Social History     Substance and Sexual Activity   Alcohol Use No     Social History     Substance  and Sexual Activity   Drug Use No       FAMILY HISTORY  Family History   Problem Relation Age of Onset    COPD Mother     COPD Father     Prostate cancer Brother     Breast cancer Maternal Grandmother        VITAL SIGNS (MOST RECENT)  Temp: 98.8 °F (37.1 °C) (07/28/23 0301)  Pulse: (!) 119 (07/28/23 0600)  Resp: (!) 25 (07/28/23 0600)  BP: (!) 86/55 (07/28/23 0600)  SpO2: (!) 94 % (07/28/23 0600)    INTAKE AND OUTPUT (LAST 24 HOURS):  Intake/Output Summary (Last 24 hours) at 7/28/2023 0625  Last data filed at 7/28/2023 0531  Gross per 24 hour   Intake 1742.11 ml   Output 2034 ml   Net -291.89 ml       WEIGHT  Wt Readings from Last 1 Encounters:   07/24/23 89.4 kg (197 lb)       PHYSICAL EXAM  GENERAL: Older patient resting on the ventilator.  HEENT: Pupils equal and reactive. Extraocular movements intact. Nose intact. Pharynx intubated with ETT and OGT.  NECK: Supple.   HEART: Regular rate and rhythm. No murmur or gallop auscultated.  LUNGS: Clear to auscultation and percussion. Lung excursion symmetrical. No change in fremitus. No adventitial noises.  ABDOMEN: Bowel sounds present. Non-tender, no masses palpated.  : Normal anatomy.Velasco with yellow urine.  EXTREMITIES: Normal muscle tone and joint movement, no cyanosis or clubbing.   LYMPHATICS: No adenopathy palpated, no edema.  SKIN: Dry, intact, mottled feet and lower legs.  NEURO: Cranial nerves II-XII intact. Motor strength 5/5 bilaterally, upper and lower extremities.  PSYCH: Appropriate affect      CBC LAST (LAST 24 HOURS)  Recent Labs   Lab 07/28/23  0359 07/28/23  0414   WBC 2.04*  --    RBC 3.42*  --    HGB 9.6*  --    HCT 28.6* 28*   MCV 84  --    MCH 28.1  --    MCHC 33.6  --    RDW 17.9*  --      --    MPV 11.5  --    GRAN 84.3*  1.7*  --    LYMPH 9.3*  0.2*  --    MONO 4.9  0.1*  --    BASO 0.03  --    NRBC 0  --        CHEMISTRY LAST (LAST 24 HOURS)  Recent Labs   Lab 07/28/23  0359 07/28/23  0414   *  --    K 4.0  --      --     CO2 18*  --    ANIONGAP 8  --    BUN 28*  --    CREATININE 1.3  --    *  --    CALCIUM 6.8*  --    PH  --  7.369   MG 1.9  --    ALBUMIN 1.6*  --    PROT 4.4*  --    ALKPHOS 54*  --    ALT 27  --    AST 39  --    BILITOT 1.1*  --            CARDIAC PROFILE (LAST 24 HOURS)  Recent Labs   Lab 07/27/23 2104   BNP 25   TROPONINIHS 12.4       LAST 7 DAYS MICROBIOLOGY   Microbiology Results (last 7 days)       Procedure Component Value Units Date/Time    Culture, Respiratory with Gram Stain [[id number]] Collected: 07/27/23 2350    Order Status: Sent Specimen: Respiratory from Tracheal Aspirate Updated: 07/27/23 2355            MOST RECENT IMAGING  X-Ray Chest AP Portable  EXAM: XR Chest, 1 View  CLINICAL HISTORY: 67 years old, Female; ET tube repositioning; ET tube repositioning  TECHNIQUE: Frontal view of the chest.  COMPARISON: 11:07 PM    FINDINGS:  Lungs: Left basilar opacity.  Pleural space: Probable small left pleural effusion.  Heart: No cardiomegaly.  Mediastinum: No acute findings.  Bones/joints: No acute findings.  Tubes, lines and devices: ET tube 4.2 cm above the anjel. NG tube projecting in the proximal stomach, though side-port above the gastroesophageal junction. Right portacatheter in the upper SVC.    IMPRESSION:  1. ET tube 4.2 cm above the anjel.  2. Left basilar atelectasis/infiltrate with pleural effusion.  3. NG tube projecting in the proximal stomach, though side-port above the gastroesophageal junction. Suggest advancing by 6 cm.    Electronically signed by:  Abdulaziz Holman MD  7/28/2023 12:52 AM CDT Workstation: [id number]      CURRENT VISIT EKG  Results for orders placed or performed during the hospital encounter of 07/24/23   EKG 12-lead    Narrative    Test Reason : R00.0,    Vent. Rate : 123 BPM     Atrial Rate : 123 BPM     P-R Int : 114 ms          QRS Dur : 068 ms      QT Int : 340 ms       P-R-T Axes : 060 020 071 degrees     QTc Int : 486 ms    Sinus tachycardia  Low voltage  QRS  Borderline Abnormal ECG  When compared with ECG of 26-JUL-2023 07:13,  Nonspecific T wave abnormality now evident in Anterior leads    Referred By: [email or web address]   SELF           Confirmed By:        ECHOCARDIOGRAM RESULTS  No results found for this or any previous visit.        VENTILATOR INFORMATION  Vent Mode: A/C  Oxygen Concentration (%):  [2-60] 55  Resp Rate Total:  [24 br/min-33 br/min] 29 br/min  Vt Set:  [400 mL-500 mL] 420 mL  PEEP/CPAP:  [5 cmH20-10 cmH20] 10 cmH20  Mean Airway Pressure:  [15 clU14-18 cmH20] 16 cmH20           LAST ARTERIAL BLOOD GAS  ABG  Recent Labs   Lab 07/28/23  0414   PH 7.369   PO2 78*   PCO2 29.1*   HCO3 16.8*   BE -9       IMPRESSION AND PLAN  Ischemic bowel with resection  - plans to return to the OR today at 1:30 p.m.  - Merrem started by hospitalist  Metastatic adenocarcinoma, resected again  - Dr. Frank is her oncologist  COPD  - will change bronchodilators to duoneb  - add budesonide  Acute on chronic hypoxic respiratory failure with mechanical ventilation  - wean FiO2 as tolerated  - daily SAT/SBT after today's surgery  Left lower lobe atelectasis  - continue adequate PEEP  Left pleural effusion  Neutropenia  - improving  Anemia  - acute blood loss, expected  - transfused yesterday  Normal anion gap metabolic acidosis  Mild hyperglycemia  - trend  Severe hypoalbuminemia/morbid obesity  Hypothyroidism  - receiving IV Synthroid  Acute kidney injury  - improved  - continue IV fluids with abdominal surgery and 3rd spacing    Discussed with nursing and Respiratory  SCDs for DVT prophylaxis  Protonix for GI prophylaxis    Critical care time spent reviewing the chart, examining the patient, reviewing the labs, reviewing the radiological findings, discussing care with nursing, physicians, and respiratory and creating the note and  has been greater than 35 minutes      Tika Nathan MD  Date of Service: 07/28/2023  6:25 AM

## 2023-07-28 NOTE — ANESTHESIA PREPROCEDURE EVALUATION
07/28/2023  Tessa Bartolo Enriquez is a 67 y.o., female.      Patient Active Problem List   Diagnosis    COPD (chronic obstructive pulmonary disease)    Pure hypercholesterolemia    Hypothyroidism with abnormal TFTs    Rectal bleeding    Rectal cancer    Malignant neoplasm of colon    B12 deficiency    TESSA (iron deficiency anemia)    Chronic diarrhea    Severe obesity (BMI 35.0-39.9) with comorbidity    Secondary and unspecified malignant neoplasm of intra-abdominal lymph nodes    Dehydration    Diarrhea    Chemotherapy-induced neutropenia    Chemotherapy-induced diarrhea    Hypokalemia    Anemia    Bilateral kidney stones    BOOM (acute kidney injury)    Severe sepsis    Ischemic necrosis of small bowel    On mechanically assisted ventilation    Malnutrition of moderate degree    Hyponatremia    Sinus tachycardia    Hypocalcemia       Past Surgical History:   Procedure Laterality Date    CHOLECYSTECTOMY      COLECTOMY N/A 11/9/2020    Procedure: COLECTOMY;  Surgeon: Brandon Wagner MD;  Location: [facility];  Service: General;  Laterality: N/A;  LOWER- ANTERIOR    COLONOSCOPY N/A 10/23/2020    Procedure: COLONOSCOPY;  Surgeon: Jolynn Ayala MD;  Location: [facility];  Service: Endoscopy;  Laterality: N/A;    COLONOSCOPY N/A 8/6/2021    Procedure: COLONOSCOPY;  Surgeon: Jolynn Ayala MD;  Location: [facility];  Service: Endoscopy;  Laterality: N/A;    COLONOSCOPY N/A 7/7/2022    Procedure: COLONOSCOPY;  Surgeon: Jolynn Ayala MD;  Location: [facility] ENDO;  Service: Endoscopy;  Laterality: N/A;    COLONOSCOPY N/A 5/31/2023    Procedure: COLONOSCOPY;  Surgeon: Jolynn Ayala MD;  Location: [facility] ENDO;  Service: Endoscopy;  Laterality: N/A;    COLOSTOMY N/A 7/24/2023    Procedure: CREATION, COLOSTOMY;  Surgeon: Brandon Wagner MD;  Location: [facility];  Service: General;   Laterality: N/A;    COMPLETION PROCTECTOMY N/A 7/24/2023    Procedure: PROCTECTOMY, COMPLETION;  Surgeon: Brandon Wagner MD;  Location: [facility] OR;  Service: General;  Laterality: N/A;    CYSTOSCOPY W/ URETERAL STENT PLACEMENT Right 2/22/2023    Procedure: CYSTOSCOPY, WITH URETERAL STENT INSERTION;  Surgeon: Carin Kwon Jr., MD;  Location: [facility] OR;  Service: Urology;  Laterality: Right;    CYSTOSCOPY W/ URETERAL STENT PLACEMENT Bilateral 3/26/2023    Procedure: CYSTOSCOPY, WITH URETERAL STENT INSERTION;  Surgeon: Sabine Barber MD;  Location: [facility] OR;  Service: Urology;  Laterality: Bilateral;    CYSTOSCOPY W/ URETERAL STENT PLACEMENT Bilateral 7/24/2023    Procedure: CYSTOSCOPY, WITH URETERAL STENT INSERTION;  Surgeon: Adrienne Mijares MD;  Location: [facility];  Service: Urology;  Laterality: Bilateral;  chamberlain placing stents 1st    CYSTOSCOPY WITH URETEROSCOPY, RETROGRADE PYELOGRAPHY, AND INSERTION OF STENT Bilateral 3/26/2023    Procedure: CYSTOSCOPY, WITH RETROGRADE PYELOGRAM AND URETERAL STENT INSERTION;  Surgeon: Sabine Barber MD;  Location: [facility] OR;  Service: Urology;  Laterality: Bilateral;    CYSTOURETEROSCOPY WITH RETROGRADE PYELOGRAPHY AND INSERTION OF STENT INTO URETER Right 3/7/2023    Procedure: CYSTOURETEROSCOPY, WITH RETROGRADE PYELOGRAM AND URETERAL STENT INSERTION;  Surgeon: Carin Kwon Jr., MD;  Location: [facility] OR;  Service: Urology;  Laterality: Right;    CYSTOURETEROSCOPY, WITH HOLMIUM LASER LITHOTRIPSY OF URETERAL CALCULUS AND STENT INSERTION Bilateral 4/11/2023    Procedure: CYSTOURETEROSCOPY, WITH HOLMIUM LASER LITHOTRIPSY OF URETERAL CALCULUS AND STENT INSERTION;  Surgeon: Carin Kwon Jr., MD;  Location: [facility] OR;  Service: Urology;  Laterality: Bilateral;    ESOPHAGEAL DILATION N/A 6/29/2021    Procedure: DILATION, ESOPHAGUS;  Surgeon: Sourav Baires III, MD;  Location: [facility] ENDO;  Service: Endoscopy;  Laterality: N/A;    ESOPHAGOGASTRODUODENOSCOPY   06/29/2021    Dilation to 57 Fr    ESOPHAGOGASTRODUODENOSCOPY N/A 6/29/2021    Procedure: EGD (ESOPHAGOGASTRODUODENOSCOPY);  Surgeon: Sourav Baires III, MD;  Location: [facility] ENDO;  Service: Endoscopy;  Laterality: N/A;    FLEXIBLE SIGMOIDOSCOPY N/A 8/1/2022    Procedure: SIGMOIDOSCOPY, FLEXIBLE;  Surgeon: Brandon Wagner MD;  Location: [facility] ENDO;  Service: Endoscopy;  Laterality: N/A;    HYSTERECTOMY      INSERTION OF TUNNELED CENTRAL VENOUS CATHETER (CVC) WITH SUBCUTANEOUS PORT N/A 1/11/2021    Procedure: [id number];  Surgeon: Brandon Wagner MD;  Location: [facility] OR;  Service: General;  Laterality: N/A;    INSERTION OF TUNNELED CENTRAL VENOUS CATHETER (CVC) WITH SUBCUTANEOUS PORT Right 9/1/2022    Procedure: [id number];  Surgeon: Brandon Wagner MD;  Location: [facility] OR;  Service: General;  Laterality: Right;  Wants rt side placement    LAPAROTOMY, EXPLORATORY N/A 7/24/2023    Procedure: LAPAROTOMY, EXPLORATORY;  Surgeon: Brandon Wagner MD;  Location: [facility] OR;  Service: General;  Laterality: N/A;  case must go 1st-chamberlain placing stents    MEDIPORT REMOVAL Left 4/19/2021    Procedure: REMOVAL, CATHETER, CENTRAL VENOUS, TUNNELED, WITH PORT;  Surgeon: Brandon Wagner MD;  Location: [facility] OR;  Service: General;  Laterality: Left;    REMOVAL-STENT Right 3/7/2023    Procedure: REMOVAL-STENT;  Surgeon: Carin Kwon Jr., MD;  Location: [facility] OR;  Service: Urology;  Laterality: Right;    REMOVAL-STENT Bilateral 4/11/2023    Procedure: REMOVAL-STENT;  Surgeon: Carin Kwon Jr., MD;  Location: [facility] OR;  Service: Urology;  Laterality: Bilateral;    RETROGRADE PYELOGRAPHY Bilateral 4/11/2023    Procedure: PYELOGRAM, RETROGRADE;  Surgeon: Carin Kwon Jr., MD;  Location: [facility] OR;  Service: Urology;  Laterality: Bilateral;    ROBOT-ASSISTED COLECTOMY N/A 11/9/2020    Procedure: ROBOTIC COLECTOMY low anterior resection, possible open;  Surgeon: Brandon Wagner MD;  Location: [facility]  OR;  Service: General;  Laterality: N/A;  CONVERTED TO OPEN AT 1503    SUBTOTAL COLECTOMY N/A 7/24/2023    Procedure: COLECTOMY, PARTIAL;  Surgeon: Brandon Wagner MD;  Location: [facility] OR;  Service: General;  Laterality: N/A;  with colorectal Anastamosis    URETEROSCOPIC REMOVAL OF URETERIC CALCULUS Right 3/7/2023    Procedure: REMOVAL, CALCULUS, URETER, URETEROSCOPIC;  Surgeon: Carin Kwon Jr., MD;  Location: [facility] OR;  Service: Urology;  Laterality: Right;    URETEROSCOPIC REMOVAL OF URETERIC CALCULUS Bilateral 4/11/2023    Procedure: REMOVAL, CALCULUS, URETER, URETEROSCOPIC;  Surgeon: Carin Kwon Jr., MD;  Location: [facility] OR;  Service: Urology;  Laterality: Bilateral;        Tobacco Use:  The patient  reports that she quit smoking about 6 years ago. Her smoking use included cigarettes. She has a 80.00 pack-year smoking history. She has never used smokeless tobacco.     Results for orders placed or performed during the hospital encounter of 07/24/23   EKG 12-lead    Collection Time: 07/28/23  4:14 AM    Narrative    Test Reason : R00.0,    Vent. Rate : 123 BPM     Atrial Rate : 123 BPM     P-R Int : 114 ms          QRS Dur : 068 ms      QT Int : 340 ms       P-R-T Axes : 060 020 071 degrees     QTc Int : 486 ms    Sinus tachycardia  Low voltage QRS  Borderline Abnormal ECG  When compared with ECG of 26-JUL-2023 07:13,  Nonspecific T wave abnormality now evident in Anterior leads    Referred By: [email or web address]   SELF           Confirmed By:              Lab Results   Component Value Date    WBC 2.04 (L) 07/28/2023    HGB 9.6 (L) 07/28/2023    HCT 28 (L) 07/28/2023    MCV 84 07/28/2023     07/28/2023     BMP  Lab Results   Component Value Date     (L) 07/28/2023    K 4.0 07/28/2023     07/28/2023    CO2 18 (L) 07/28/2023    BUN 28 (H) 07/28/2023    CREATININE 1.3 07/28/2023    CALCIUM 6.8 (LL) 07/28/2023    ANIONGAP 8 07/28/2023     (H) 07/28/2023     (H) 07/27/2023      07/27/2023       No results found for this or any previous visit.            Pre-op Assessment    I have reviewed the Patient Summary Reports.     I have reviewed the Nursing Notes. I have reviewed the NPO Status.   I have reviewed the Medications.     Review of Systems  Anesthesia Hx:  No problems with previous Anesthesia  Denies Family Hx of Anesthesia complications.   Denies Personal Hx of Anesthesia complications.   Social:  Former Smoker    Hematology/Oncology:         -- Anemia: Current/Recent Cancer. (hx of colo-rectal cancer, prior surgery and chemo, new rectal mass/bleeding PTA )   Cardiovascular:   Dysrhythmias (tachycardia) hyperlipidemia    Pulmonary:   COPD (home O2 prn, no recent exacerbations prior to this admission), moderate [facility] with +10 PEEP  L pleural effusion noted   Renal/:   Chronic renal disease: BOOM this admission, slight improvement over past 48 hours. renal calculi (hx bilat kidney stones)    Hepatic/GI:  Hepatic/GI Normal Ischemic bowel, recent ex lap with resection   Musculoskeletal:  Musculoskeletal Normal    Neurological:  Neurology Normal    Endocrine:   Hypothyroidism (on meds)        Physical Exam  General: Unconscious    Airway:  Mallampati: III / II  Mouth Opening: Normal  TM Distance: Normal  Tongue: Normal  Neck ROM: Normal ROM  Pre-Existing Airway: Oral Endotracheal tube    Chest/Lungs:  Clear to auscultation    Heart:  Rate: Normal  Rhythm: Regular Rhythm  Sounds: Normal    Abdomen:  Normal, Soft, Nontender        Anesthesia Plan  Type of Anesthesia, risks & benefits discussed:    Anesthesia Type: Gen ETT  Intra-op Monitoring Plan: Standard ASA Monitors  Post Op Pain Control Plan: multimodal analgesia and IV/PO Opioids PRN  Induction:  IV  Airway Plan: Video, Post-Induction  Informed Consent: Informed consent signed with the Patient and all parties understand the risks and agree with anesthesia plan.  All questions answered.   ASA  Score: 4 Emergent  Anesthesia Plan Notes:     GETA - pt intubated on vent in ICU  TLC   Zofran  Plan to keep intubated post-op    Ready For Surgery From Anesthesia Perspective.     .

## 2023-07-28 NOTE — TRANSFER OF CARE
"Anesthesia Transfer of Care Note    Patient: Tessa Enriquez    Procedure(s) Performed: Procedure(s) (LRB):  LAPAROTOMY, EXPLORATORY (N/A)    Patient location: ICU    Anesthesia Type: general    Transport from OR: Continuous SpO2 monitoring in transport. Continuous ECG monitoring in transport. Upon arrival to PACU/ICU, patient attached to ventilator and auscultated to confirm bilateral breath sounds and adequate TV. Transported from OR intubated on 100% O2 by AMBU with adequate controlled ventilation. Transported from OR intubated on 100% O2 by AMBU with assisted ventilation    Post pain: adequate analgesia    Post assessment: no apparent anesthetic complications and tolerated procedure well    Post vital signs: stable    Level of consciousness: responds to stimulation    Nausea/Vomiting: no nausea/vomiting    Complications: none    Transfer of care protocol was followed      Last vitals:   Visit Vitals  BP (!) 99/54   Pulse (!) 131   Temp 37.6 °C (99.7 °F) (Axillary)   Resp (!) 0   Ht 5' 1" (1.549 m)   Wt 89.4 kg (197 lb)   SpO2 98%   Breastfeeding No   BMI 37.22 kg/m²     "

## 2023-07-28 NOTE — H&P
Former[facility]  History & Physical    Patient Name: Tessa Enriquez  MRN: [id number]  Patient Class: IP- Inpatient  Admission Date: 7/24/2023  Attending Physician: Chapincito Nick, *   Primary Care Provider: Karma Mcdermott MD         Patient information was obtained from past medical records and referring provider .     Subjective:     Principal Problem:Ischemic necrosis of small bowel    Chief Complaint:   Chief Complaint   Patient presents with    Post-op Problem        HPI: Ms. Enriquez is a 67-years-old female who presented as a direct admission from [facility] for ICU level of care postoperatively.  Patient with a history of recurrent colorectal adenocarcinoma, status post neoadjuvant radiation and chemotherapy, followed by oncologist Dr. Frank.  On 07/24 she underwent exploratory laparotomy with extensive lysis of adhesion, proctectomy with creation of end colostomy by Dr. Wagner and she was admitted to hospital medicine postoperatively.  Postop course complicated by ongoing sinus tachycardia with anemia, status post PRBC transfusion and acute kidney injury, seen by Nephrology.  Earlier today ostomy noted to be dusky with concern for ischemia, she was taken back to the OR and underwent exploratory laparotomy with small-bowel resection, as per OR note, large area of small bowel resected with four areas of ischemia.  She was intubated for surgery and remains intubated postoperatively.  Reported history of COPD, hypothyroidism, nephrolithiasis status post bilateral ureteral stenting.  She is currently sedated on propofol, orally intubated on mechanical ventilation.  Repeat labs have been submitted.  As per ICU RN plans for possible repeat surgery.  Plan of care reviewed with nursing.  No visitors at bedside.      No new subjective & objective note has been filed under this hospital service since the last note was generated.    Assessment/Plan:     *  Ischemic necrosis of small bowel  OR note 7/27 with extensive area of small bowel necrosis   Possible etiology for ongoing tachycardia/sepsis   IV fluid hydration  Broaden antibiotics to meropenem   Lactic acid ordered and pending   Serial abdominal examination   Appreciate general surgery input      On mechanically assisted ventilation  Intubated for repeat procedure earlier today and remains on mechanical ventilation   Continue oral intubation with mechanical ventilation  P.r.n. ABG and chest x-ray, chest x-ray in a.m.  Famotidine for GI prophylaxis  Ventilator precautions   Pulmonary consulted for management      Sinus tachycardia  Multifactorial in the setting of acute medical conditions including dehydration, pain, postop, anemia  Telemetry monitoring   Replace thyroid as outlined, add on magnesium level      Hyponatremia  Monitor with IV fluid hydration     Malnutrition of moderate degree  Albumin 1.6 in the setting of extensive bowel surgery   Currently NPO, if prolonged consider parenteral nutrition    Severe sepsis  Ongoing sinus tachycardia and now repeat labs with leukopenia  Extensive area of small bowel necrosis   Escalate antibiotics to meropenem and following clinically  Lactic acid in progress   Monitor blood pressure, aim for map greater than 65    BOOM (acute kidney injury)  Postop course complicated by acute kidney injury, creatinine today 1.8, repeat labs pending   History of nephrolithiasis status post bilateral ureteral stenting   Continue IV fluid hydration   Keep Velasco catheter in place, monitoring urine output  Renally dosing all medications and avoiding nephrotoxin drugs   Appreciate nephrology input    Bilateral kidney stones  Seen by Dr. Mijares this admission, followed outpatient by Dr. Kwon   Status post bilateral stent placement      Severe obesity (BMI 35.0-39.9) with comorbidity  Body mass index is 37.22 kg/m². Morbid obesity complicates all aspects of disease management from  diagnostic modalities to treatment. Weight loss encouraged and health benefits explained to patient.         FENG (iron deficiency anemia)  Worsening postoperatively, status post PRBC transfusion   Repeat anemia labs including B12, folic acid, iron studies  Transfuse as needed      Malignant neoplasm of colon  History of recurrent colorectal adenocarcinoma  See small-bowel ischemia  Did receive adjuvant chemoradiation, followed by Dr. Frank    Hypothyroidism with abnormal TFTs  Recent TSH elevated at 23, as per chart review elevated 2 years previous, unclear if compliance   Given current NPO status with bowel surgery changed to IV levothyroxine    COPD (chronic obstructive pulmonary disease)  No evidence of acute exacerbation   P.r.n. ABG and chest x-ray  Scheduled breathing treatments        VTE Risk Mitigation (From admission, onward)           Ordered     IP VTE HIGH RISK PATIENT  Once         07/24/23 2015     enoxaparin injection 40 mg  Daily         07/24/23 2015                               Ayla Gracia MD  Department of Hospital Medicine  Formerly [facility], [facility]–1983

## 2023-07-28 NOTE — CARE UPDATE
07/27/23 2207   PRE-TX-O2   Oxygen Concentration (%) 50   SpO2 (!) 93 %   Pulse 108   Resp 17        Airway - Non-Surgical 07/27/23   Placement Date: 07/27/23   Present Prior to Hospital Arrival?: Yes  Airway Device Size: 7.5  Style: Cuffed  Cuff Inflation: Minimal occlusive pressure  Cuff Inflated With: Air  Placement Verified By: Auscultation;Chest X-ray  Depth of Insertion (cm): ...   Secured at (S)  22 cm  (withdrew tube 2cm per Radiologist/ XRAY)   Vent Select   Charged w/in last 24h YES   Preset Conventional Ventilator Settings   Ventilation Type VC   Vent Mode A/C   Set Rate 24 BPM   Vt Set 420 mL   PEEP/CPAP 10 cmH20   Peak Flow 60 L/min   Peak End Inspiratory Pressure 22 cmH20   I-Trigger Type  V-TRIG   Trigger Sensitivity Flow/I-Trigger 3 L/min   Patient Ventilator Parameters   Resp Rate Total 24 br/min   Peak Airway Pressure 28 cmH20   Mean Airway Pressure 15 cmH20   Plateau Pressure 0 cmH20   Exhaled Vt 427 mL   Total Ve 10.2 L/m   I:E Ratio Measured 1:2.30   Auto PEEP 0 cmH20   Conventional Ventilator Alarms   Resp Rate High Alarm 40 br/min   Press High Alarm 40 cmH2O   Apnea Rate 10   Apnea Volume (mL) 0 mL   Apnea Oxygen Concentration  100   Apnea Flow Rate (L/min) 55   T Apnea 20 sec(s)   Ready to Wean/Extubation Screen   FIO2<=50 (chart decimal) 0.5   MV<16L (chart vol.) 10.2   PEEP <=8 (chart #) (!) 10   Ready to Wean Parameters   F/VT Ratio<105 (RSBI) (!) 39.81

## 2023-07-28 NOTE — PT/OT/SLP PROGRESS
Physical Therapy      Patient Name:  Tessa Enriquez   MRN:  [id number]    Patient transferred to [facility] main post repeat colon resection and requiring ICU/vent.

## 2023-07-28 NOTE — ASSESSMENT & PLAN NOTE
Albumin 1.6 in the setting of extensive bowel surgery   Currently NPO, if prolonged consider parenteral nutrition

## 2023-07-28 NOTE — PROGRESS NOTES
Former[facility] Medicine  Progress Note    Patient name: Tessa Enriquez  MRN: [id number]  Admit Date: 7/24/2023   LOS: 4 days     SUBJECTIVE:     Principal problem: Ischemic necrosis of small bowel    HPI: Ms. Enriquez is a 67-years-old female who presented as a direct admission from [facility] for ICU level of care postoperatively.  Patient with a history of recurrent colorectal adenocarcinoma, status post neoadjuvant radiation and chemotherapy, followed by oncologist Dr. Frank.  On 07/24 she underwent exploratory laparotomy with extensive lysis of adhesion, proctectomy with creation of end colostomy by Dr. Wagner and she was admitted to hospital medicine postoperatively.  Postop course complicated by ongoing sinus tachycardia with anemia, status post PRBC transfusion and acute kidney injury, seen by Nephrology.  Earlier today ostomy noted to be dusky with concern for ischemia, she was taken back to the OR and underwent exploratory laparotomy with small-bowel resection, as per OR note, large area of small bowel resected with four areas of ischemia.  She was intubated for surgery and remains intubated postoperatively.  Reported history of COPD, hypothyroidism, nephrolithiasis status post bilateral ureteral stenting.  She is currently sedated on propofol, orally intubated on mechanical ventilation.      7/28: Patient remained on the vent. HR elevated 110-120, sinus tachy.for OR 1.30 pm today.     Scheduled Meds:   albuterol-ipratropium  3 mL Nebulization Q6H    budesonide  0.5 mg Nebulization Q12H    chlorhexidine  15 mL Mouth/Throat BID    levothyroxine  75 mcg Intravenous Before breakfast    meropenem (MERREM) IVPB  1 g Intravenous Q12H    pantoprazole  40 mg Intravenous Daily     Continuous Infusions:   dextrose 5 % and 0.9 % NaCl 100 mL/hr at 07/28/23 1152    lactated ringers Stopped (07/28/23 1153)    NORepinephrine bitartrate-D5W Stopped (07/28/23 6215)    propofoL  30 mcg/kg/min (07/28/23 0800)     PRN Meds:acetaminophen, calcium gluconate IVPB, calcium gluconate IVPB, calcium gluconate IVPB, HYDROmorphone, magnesium sulfate IVPB, magnesium sulfate IVPB, ondansetron, potassium chloride **AND** potassium chloride **AND** potassium chloride, sodium phosphate IVPB, sodium phosphate IVPB, sodium phosphate IVPB    Review of patient's allergies indicates:   Allergen Reactions    Ativan [lorazepam] Hives and Itching    Flagyl [metronidazole] Itching and Rash    Pcn [penicillins] Hives, Itching and Rash       Review of Systems  As per subjective    OBJECTIVE:     Vital Signs (Most Recent)  Temp: 99.7 °F (37.6 °C) (07/28/23 1158)  Pulse: (!) 121 (07/28/23 1200)  Resp: 18 (07/28/23 1200)  BP: (!) 88/46 (07/28/23 1200)  SpO2: (!) 94 % (07/28/23 1200)    Vital Signs Range (Last 24H):  Temp:  [97.5 °F (36.4 °C)-99.7 °F (37.6 °C)]   Pulse:  [106-131]   Resp:  [9-30]   BP: ()/(39-87)   SpO2:  [89 %-100 %]   Arterial Line BP: ()/(46-84)     I & O (Last 24H):  Intake/Output Summary (Last 24 hours) at 7/28/2023 1359  Last data filed at 7/28/2023 1231  Gross per 24 hour   Intake 2299.47 ml   Output 2429 ml   Net -129.53 ml       Physical Exam:  General: Patient resting comfortably in no acute distress. Appears as stated age. Calm  Eyes: EOM intact. No conjunctivae injection. No scleral icterus.  ENT: Hearing grossly intact. No discharge from ears. No nasal discharge.   CVS: RRR. No LE edema BL.  Lungs: CTA BL, no wheezing or crackles. Good breath sounds. No accessory muscle use. No acute respiratory distress  Neuro: Alert. GCS 15. Cranial nerves grossly intact. Moves all extremities equally. Follows commands. Responds appropriately     Laboratory:  All pertinent labs within the past 24 hours have been reviewed.  CBC:   Recent Labs   Lab 07/27/23  0415 07/27/23  2104 07/27/23  2105 07/27/23  2224 07/28/23  0359 07/28/23 0414   WBC 4.77 1.14*  --   --  2.04*  --    HGB 8.3* 8.9*  --    --  9.6*  --    HCT 26.3* 26.6*   < > 26* 28.6* 28*    125*  --   --  157  --     < > = values in this interval not displayed.     CMP:   Recent Labs   Lab 07/27/23  0415 07/27/23  2104 07/28/23  0359   * 132* 131*   K 4.8 3.7 4.0    105 105   CO2 19* 19* 18*    123* 115*   BUN 36* 29* 28*   CREATININE 1.8* 1.2 1.3   CALCIUM 7.4* 5.6* 6.8*   PROT  --  3.7* 4.4*   ALBUMIN 2.1* 1.6* 1.6*   BILITOT  --  1.3* 1.1*   ALKPHOS  --  50* 54*   AST  --  43* 39   ALT  --  30 27   ANIONGAP 10 8 8       Microbiology Results (last 7 days)       Procedure Component Value Units Date/Time    Culture, Respiratory with Gram Stain [[id number]] Collected: 07/27/23 2350    Order Status: Sent Specimen: Respiratory from Tracheal Aspirate Updated: 07/27/23 2355             Diagnostic Results:      ASSESSMENT/PLAN:     Active Hospital Problems    Diagnosis  POA    *Ischemic necrosis of small bowel [K55.029]  Yes    Hypocalcemia [E83.51]  Yes    On mechanically assisted ventilation [Z99.11]  Not Applicable    Malnutrition of moderate degree [E44.0]  Yes     Chronic    Hyponatremia [E87.1]  Yes    Sinus tachycardia [R00.0]  Yes    BOOM (acute kidney injury) [N17.9]  No    Severe sepsis [A41.9, R65.20]  No    Bilateral kidney stones [N20.0]  Yes    Severe obesity (BMI 35.0-39.9) with comorbidity [E66.01]  Yes    FENG (iron deficiency anemia) [D50.9]  Yes     In setting of chronic blood loss from colorectal cancer.      Malignant neoplasm of colon [C18.9]  Yes    Hypothyroidism with abnormal TFTs [E03.9]  Yes     Chronic    COPD (chronic obstructive pulmonary disease) [J44.9]  Yes      Resolved Hospital Problems   No resolved problems to display.         Ischemic necrosis of small bowel  OR note 7/27 with extensive area of small bowel necrosis   - cont meropenem   - for OR today      Acute on chronic hypoxic respiratory failure   On mechanically assisted ventilation  Continue oral intubation with mechanical  ventilation  P.r.n. ABG and chest x-ray, chest x-ray in a.m.  Famotidine for GI prophylaxis  Ventilator precautions   Pulmonary consulted for management      Hypocalcemia  - follow ionized calcium   - correct if low      Sinus tachycardia  Multifactorial in the setting of acute medical conditions including dehydration, pain, postop, anemia  Mag and K normal      Hyponatremia  Monitor with IV fluid hydration      Malnutrition of moderate degree  Albumin 1.6 in the setting of extensive bowel surgery   Currently NPO, if prolonged consider parenteral nutrition     Severe sepsis  Ongoing sinus tachycardia and now repeat labs with leukopenia  Extensive area of small bowel necrosis   Escalate antibiotics to meropenem and following clinically  Lactic acid in progress   Monitor blood pressure, aim for map greater than 65     BOOM (acute kidney injury)  Postop course complicated by acute kidney injury, creatinine 1.8 >> improved to 1.3   History of nephrolithiasis status post bilateral ureteral stenting   Continue IV fluid hydration   Keep Velasco catheter in place, monitoring urine output  Renally dosing all medications and avoiding nephrotoxin drugs   Appreciate nephrology input     Bilateral kidney stones  Seen by Dr. Mijares this admission, followed outpatient by Dr. Kwon   Status post bilateral stent placement     Obesity (BMI 35.0-39.9) with comorbidity  Body mass index is 37.22 kg/m². Morbid obesity complicates all aspects of disease management from diagnostic modalities to treatment. Weight loss encouraged and health benefits explained to patient.                   FENG (iron deficiency anemia)  Worsening postoperatively, status post PRBC transfusion   Normal  B12, folic acid  iron studies suggest iron deficiency   Transfuse as needed        Malignant neoplasm of colon  History of recurrent colorectal adenocarcinoma  See small-bowel ischemia  Did receive adjuvant chemoradiation, followed by Dr. Frank      Hypothyroidism with abnormal TFTs  Recent TSH elevated at 23, as per chart review elevated 2 years previous, unclear if compliance   Given current NPO status with bowel surgery changed to IV levothyroxine     COPD (chronic obstructive pulmonary disease)  No evidence of acute exacerbation   P.r.n. ABG and chest x-ray  Scheduled breathing treatments      VTE Risk Mitigation (From admission, onward)           Ordered     Place sequential compression device  Until discontinued         07/28/23 0724     IP VTE HIGH RISK PATIENT  Once         07/24/23 2015                          Department Hospital Medicine  [facility]  Melissa Pereira MD  Date of service: 07/28/2023

## 2023-07-28 NOTE — ASSESSMENT & PLAN NOTE
Multifactorial in the setting of acute medical conditions including dehydration, pain, postop, anemia  Telemetry monitoring   Replace thyroid as outlined, add on magnesium level

## 2023-07-28 NOTE — PLAN OF CARE
07/28/23 0720   Patient Assessment/Suction   Level of Consciousness (AVPU) responds to pain   Respiratory Effort Unlabored;Normal   Expansion/Accessory Muscles/Retractions no use of accessory muscles   All Lung Fields Breath Sounds diminished   Rhythm/Pattern, Respiratory assisted mechanically   Cough Frequency with stimulation   $ Suction Charges Inline Suction Procedure Stat Charge   Secretions Amount small   Secretions Color yellow;white   Secretions Characteristics thick   Skin Integrity   $ Wound Care Tech Time 15 min   Area Observed Upper lip;Lower lip   Skin Appearance without discoloration   PRE-TX-O2   Device (Oxygen Therapy) ventilator   $ Is the patient on Low Flow Oxygen? Yes   Oxygen Concentration (%) 55   SpO2 96 %   Pulse Oximetry Type Continuous   $ Pulse Oximetry - Multiple Charge Pulse Oximetry - Multiple   Pulse (!) 120   Resp (!) 23   Aerosol Therapy   $ Aerosol Therapy Charges Aerosol Treatment   Daily Review of Necessity (SVN) completed   Respiratory Treatment Status (SVN) given   Treatment Route (SVN) ventilator;in-line   Patient Position (SVN) HOB elevated   Post Treatment Assessment (SVN) increased aeration   Signs of Intolerance (SVN) none        Airway - Non-Surgical 07/27/23   Placement Date: 07/27/23   Present Prior to Hospital Arrival?: Yes  Airway Device Size: 7.5  Style: Cuffed  Cuff Inflation: Minimal occlusive pressure  Cuff Inflated With: Air  Placement Verified By: Auscultation;Chest X-ray  Depth of Insertion (cm): ...   Secured at 19 cm   Measured At Lips   Secured Location Left   Secured by Commercial tube narayanan   Bite Block secure and patent   Site Condition Dry;Cool   Status Intact;Secured;Patent   Vent Select   Conventional Vent Y   $ Ventilator Subsequent 1   Charged w/in last 24h YES   Preset Conventional Ventilator Settings   Vent ID [id number]   Vent Type    Ventilation Type VC   Vent Mode A/C   Humidity HME   Set Rate 28 BPM   Vt Set 420 mL   PEEP/CPAP 10 cmH20   Peak  Flow 60 L/min   Peak End Inspiratory Pressure 22 cmH20   I-Trigger Type  V-TRIG   Trigger Sensitivity Flow/I-Trigger 3 L/min   Patient Ventilator Parameters   Resp Rate Total 31 br/min   Peak Airway Pressure 28 cmH20   Mean Airway Pressure 17 cmH20   Plateau Pressure 0 cmH20   Exhaled Vt 515 mL   Total Ve 14.6 L/m   I:E Ratio Measured 1:1.80   Auto PEEP 0 cmH20   Conventional Ventilator Alarms   Resp Rate High Alarm 40 br/min   Press High Alarm 40 cmH2O   Apnea Rate 10   Apnea Volume (mL) 0 mL   Apnea Oxygen Concentration  100   Apnea Flow Rate (L/min) 55   T Apnea 20 sec(s)   IHI Ventilator Associated Pneumonia Bundle (Required)   Oral Care Mouth swabbed;Mouth suctioned;Suction toothette;with half strength hydrogen peroxide   Vent Circut Breaks Minimized Yes   Ready to Wean/Extubation Screen   FIO2<=50 (chart decimal) (!) 0.55   MV<16L (chart vol.) 14.6   PEEP <=8 (chart #) (!) 10   Ready to Wean Parameters   F/VT Ratio<105 (RSBI) (!) 44.66   Vital Capacity   Vital Capacity (mL) 0   Education   $ Education Bronchodilator;15 min

## 2023-07-28 NOTE — NURSING
Notified Dr. Gracia of pt BP of 92/39(57). New orders to give 250cc bolus of LR over 30 minutes. Will continue to monitor.

## 2023-07-28 NOTE — PLAN OF CARE
Problem: Oral Intake Inadequate  Goal: Improved Oral Intake  Outcome: Ongoing, Not Progressing  Intervention: Promote and Optimize Oral Intake  Flowsheets (Taken 7/28/2023 1433)  Oral Nutrition Promotion: other (see comments)     Problem: Skin Injury Risk Increased  Goal: Skin Health and Integrity  Intervention: Promote and Optimize Oral Intake  Flowsheets (Taken 7/28/2023 1433)  Oral Nutrition Promotion: other (see comments)   Provide diet when medically feasible. If unable to advance with 48-72 hrs recommend nutrition support. RD available for recommendations.

## 2023-07-28 NOTE — ASSESSMENT & PLAN NOTE
Recent TSH elevated at 23, as per chart review elevated 2 years previous, unclear if compliance   Given current NPO status with bowel surgery changed to IV levothyroxine

## 2023-07-28 NOTE — CARE UPDATE
07/28/23 0414   Patient Assessment/Suction   Level of Consciousness (AVPU) responds to voice   Respiratory Effort Normal;Unlabored   PRE-TX-O2   Device (Oxygen Therapy) ventilator   Oxygen Concentration (%) 55   SpO2 99 %   Pulse Oximetry Type Continuous   Pulse (!) 123   Resp (!) 23        Airway - Non-Surgical 07/27/23   Placement Date: 07/27/23   Present Prior to Hospital Arrival?: Yes  Airway Device Size: 7.5  Style: Cuffed  Cuff Inflation: Minimal occlusive pressure  Cuff Inflated With: Air  Placement Verified By: Auscultation;Chest X-ray  Depth of Insertion (cm): ...   Secured at 19 cm   Measured At Lips   Secured Location Left   Secured by [employer] tube narayanan   Bite Block left;secure and patent   Site Condition Cool;Dry   Status Intact;Secured;Patent   Vent Select   Conventional Vent Y   Charged w/in last 24h YES   Preset Conventional Ventilator Settings   Vent ID [id number]   Vent Type    Ventilation Type VC   Vent Mode A/C   Humidity HME   Set Rate 28 BPM   Vt Set 420 mL   PEEP/CPAP 10 cmH20   Peak Flow 60 L/min   Peak End Inspiratory Pressure 22 cmH20   I-Trigger Type  V-TRIG   Trigger Sensitivity Flow/I-Trigger 3 L/min   Patient Ventilator Parameters   Resp Rate Total 30 br/min   Peak Airway Pressure 28 cmH20   Mean Airway Pressure 16 cmH20   Plateau Pressure 0 cmH20   Exhaled Vt 560 mL   Total Ve 12.4 L/m   I:E Ratio Measured 1:1.80   Auto PEEP 0 cmH20   Conventional Ventilator Alarms   Alarms On Y   Resp Rate High Alarm 40 br/min   Press High Alarm 40 cmH2O   Apnea Rate 10   Apnea Volume (mL) 0 mL   Apnea Oxygen Concentration  100   Apnea Flow Rate (L/min) 55   T Apnea 20 sec(s)   IHI Ventilator Associated Pneumonia Bundle (Required)   Daily Awakening Trials Performed No   Daily Assessment of Readiness to Extubate No (Comment)   Head of Bed Elevated  HOB 30   Vent Circut Breaks Minimized Yes   Ready to Wean/Extubation Screen   FIO2<=50 (chart decimal) (!) 0.55   MV<16L (chart vol.) 12.4   PEEP <=8  (chart #) (!) 10   Ready to Wean Parameters   F/VT Ratio<105 (RSBI) (!) 41.07   Labs   $ Was an ABG obtained? Arterial Blood Draw from Existing Line;ISTAT - Blood gas;ISTAT - Calcium;ISTAT - Hematocrit;ISTAT - Potassium;ISTAT - Sodium   $ Labs Tech Time 15 min   Critical Value Communication   Date Result Received 07/28/23   Time Result Received 0416   Resulting Department of Critical Value resp   Who communicated critical value from resulting department? cbabb rrt   Critical Test #1 hco3   Critical Test #1 Result 16.8   Critical Test #2 co2   Critical Test #2 Result 29.1   Name of Notified Physician/Designee maddy del rio RN   Date Notified 07/28/23   Time Notified 0145   Read Back Verification Yes     SBT not done at this time due to increased PEEP requirements and pt is to return to OR for further intervention this am.

## 2023-07-28 NOTE — ASSESSMENT & PLAN NOTE
History of recurrent colorectal adenocarcinoma  See small-bowel ischemia  Did receive adjuvant chemoradiation, followed by Dr. Frank

## 2023-07-28 NOTE — ASSESSMENT & PLAN NOTE
OR note 7/27 with extensive area of small bowel necrosis   Possible etiology for ongoing tachycardia/sepsis   IV fluid hydration  Broaden antibiotics to meropenem   Lactic acid ordered and pending   Serial abdominal examination   Appreciate general surgery input

## 2023-07-28 NOTE — ASSESSMENT & PLAN NOTE
Ongoing sinus tachycardia and now repeat labs with leukopenia  Extensive area of small bowel necrosis   Deescalate antibiotics to meropenem and following clinically  Lactic acid in progress   Monitor blood pressure, aim for map greater than 65   Secured with [facility] security

## 2023-07-28 NOTE — ASSESSMENT & PLAN NOTE
Postop course complicated by acute kidney injury, creatinine today 1.8, repeat labs pending   History of nephrolithiasis status post bilateral ureteral stenting   Continue IV fluid hydration   Keep Vealsco catheter in place, monitoring urine output  Renally dosing all medications and avoiding nephrotoxin drugs   Appreciate nephrology input

## 2023-07-28 NOTE — SUBJECTIVE & OBJECTIVE
Past Medical History:   Diagnosis Date    Acute hypoxemic respiratory failure 07/09/2018    Arthritis     Bilateral kidney stones 3/27/2023    Cancer     colon    COPD (chronic obstructive pulmonary disease)     History of home oxygen therapy     ONLY PRN AND HASN'T USED IT IN A YEAR    Hyperlipidemia     Thyroid disease        Past Surgical History:   Procedure Laterality Date    CHOLECYSTECTOMY      COLECTOMY N/A 11/9/2020    Procedure: COLECTOMY;  Surgeon: Brandon Wagner MD;  Location: [facility] OR;  Service: General;  Laterality: N/A;  LOWER- ANTERIOR    COLONOSCOPY N/A 10/23/2020    Procedure: COLONOSCOPY;  Surgeon: Jolynn Ayala MD;  Location: [facility] ENDO;  Service: Endoscopy;  Laterality: N/A;    COLONOSCOPY N/A 8/6/2021    Procedure: COLONOSCOPY;  Surgeon: Jolynn Ayala MD;  Location: [facility] ENDO;  Service: Endoscopy;  Laterality: N/A;    COLONOSCOPY N/A 7/7/2022    Procedure: COLONOSCOPY;  Surgeon: Jolynn Ayala MD;  Location: [facility] ENDO;  Service: Endoscopy;  Laterality: N/A;    COLONOSCOPY N/A 5/31/2023    Procedure: COLONOSCOPY;  Surgeon: Jolynn Ayala MD;  Location: [facility] ENDO;  Service: Endoscopy;  Laterality: N/A;    COLOSTOMY N/A 7/24/2023    Procedure: CREATION, COLOSTOMY;  Surgeon: Brandon Wagner MD;  Location: [facility] OR;  Service: General;  Laterality: N/A;    COMPLETION PROCTECTOMY N/A 7/24/2023    Procedure: PROCTECTOMY, COMPLETION;  Surgeon: Brandon Wagner MD;  Location: [facility] OR;  Service: General;  Laterality: N/A;    CYSTOSCOPY W/ URETERAL STENT PLACEMENT Right 2/22/2023    Procedure: CYSTOSCOPY, WITH URETERAL STENT INSERTION;  Surgeon: Carin Kwon Jr., MD;  Location: [facility] OR;  Service: Urology;  Laterality: Right;    CYSTOSCOPY W/ URETERAL STENT PLACEMENT Bilateral 3/26/2023    Procedure: CYSTOSCOPY, WITH URETERAL STENT INSERTION;  Surgeon: Sabine Barber MD;  Location: [facility] OR;  Service: Urology;  Laterality: Bilateral;    CYSTOSCOPY W/ URETERAL STENT PLACEMENT  Bilateral 7/24/2023    Procedure: CYSTOSCOPY, WITH URETERAL STENT INSERTION;  Surgeon: Adrienne Mijares MD;  Location: [facility] OR;  Service: Urology;  Laterality: Bilateral;  bassem placing stents 1st    CYSTOSCOPY WITH URETEROSCOPY, RETROGRADE PYELOGRAPHY, AND INSERTION OF STENT Bilateral 3/26/2023    Procedure: CYSTOSCOPY, WITH RETROGRADE PYELOGRAM AND URETERAL STENT INSERTION;  Surgeon: Sabine Barber MD;  Location: [facility] OR;  Service: Urology;  Laterality: Bilateral;    CYSTOURETEROSCOPY WITH RETROGRADE PYELOGRAPHY AND INSERTION OF STENT INTO URETER Right 3/7/2023    Procedure: CYSTOURETEROSCOPY, WITH RETROGRADE PYELOGRAM AND URETERAL STENT INSERTION;  Surgeon: Carin Kwon Jr., MD;  Location: [facility] OR;  Service: Urology;  Laterality: Right;    CYSTOURETEROSCOPY, WITH HOLMIUM LASER LITHOTRIPSY OF URETERAL CALCULUS AND STENT INSERTION Bilateral 4/11/2023    Procedure: CYSTOURETEROSCOPY, WITH HOLMIUM LASER LITHOTRIPSY OF URETERAL CALCULUS AND STENT INSERTION;  Surgeon: Carin Kwon Jr., MD;  Location: [facility] OR;  Service: Urology;  Laterality: Bilateral;    ESOPHAGEAL DILATION N/A 6/29/2021    Procedure: DILATION, ESOPHAGUS;  Surgeon: Sourav Baires III, MD;  Location: [facility];  Service: Endoscopy;  Laterality: N/A;    ESOPHAGOGASTRODUODENOSCOPY  06/29/2021    Dilation to 57 Fr    ESOPHAGOGASTRODUODENOSCOPY N/A 6/29/2021    Procedure: EGD (ESOPHAGOGASTRODUODENOSCOPY);  Surgeon: Sourav Baires III, MD;  Location: [facility];  Service: Endoscopy;  Laterality: N/A;    FLEXIBLE SIGMOIDOSCOPY N/A 8/1/2022    Procedure: SIGMOIDOSCOPY, FLEXIBLE;  Surgeon: Brandon Wagner MD;  Location: [facility] ENDO;  Service: Endoscopy;  Laterality: N/A;    HYSTERECTOMY      INSERTION OF TUNNELED CENTRAL VENOUS CATHETER (CVC) WITH SUBCUTANEOUS PORT N/A 1/11/2021    Procedure: [id number];  Surgeon: Brandon Wagner MD;  Location: [facility] OR;  Service: General;  Laterality: N/A;    INSERTION OF TUNNELED  CENTRAL VENOUS CATHETER (CVC) WITH SUBCUTANEOUS PORT Right 9/1/2022    Procedure: [id number];  Surgeon: Brandon Wagner MD;  Location: [facility] OR;  Service: General;  Laterality: Right;  Wants rt side placement    LAPAROTOMY, EXPLORATORY N/A 7/24/2023    Procedure: LAPAROTOMY, EXPLORATORY;  Surgeon: Brandon Wagner MD;  Location: [facility] OR;  Service: General;  Laterality: N/A;  case must go 1st-chamberlain placing stents    MEDIPORT REMOVAL Left 4/19/2021    Procedure: REMOVAL, CATHETER, CENTRAL VENOUS, TUNNELED, WITH PORT;  Surgeon: Brandon Wagner MD;  Location: [facility] OR;  Service: General;  Laterality: Left;    REMOVAL-STENT Right 3/7/2023    Procedure: REMOVAL-STENT;  Surgeon: Carin Kwon Jr., MD;  Location: [facility] OR;  Service: Urology;  Laterality: Right;    REMOVAL-STENT Bilateral 4/11/2023    Procedure: REMOVAL-STENT;  Surgeon: Carin Kwon Jr., MD;  Location: [facility] OR;  Service: Urology;  Laterality: Bilateral;    RETROGRADE PYELOGRAPHY Bilateral 4/11/2023    Procedure: PYELOGRAM, RETROGRADE;  Surgeon: Carin Kwon Jr., MD;  Location: [facility] OR;  Service: Urology;  Laterality: Bilateral;    ROBOT-ASSISTED COLECTOMY N/A 11/9/2020    Procedure: ROBOTIC COLECTOMY low anterior resection, possible open;  Surgeon: Brandon Wagner MD;  Location: [facility] OR;  Service: General;  Laterality: N/A;  CONVERTED TO OPEN AT 1503    SUBTOTAL COLECTOMY N/A 7/24/2023    Procedure: COLECTOMY, PARTIAL;  Surgeon: Brandon Wagner MD;  Location: [facility] OR;  Service: General;  Laterality: N/A;  with colorectal Anastamosis    URETEROSCOPIC REMOVAL OF URETERIC CALCULUS Right 3/7/2023    Procedure: REMOVAL, CALCULUS, URETER, URETEROSCOPIC;  Surgeon: Carin Kwon Jr., MD;  Location: [facility] OR;  Service: Urology;  Laterality: Right;    URETEROSCOPIC REMOVAL OF URETERIC CALCULUS Bilateral 4/11/2023    Procedure: REMOVAL, CALCULUS, URETER, URETEROSCOPIC;  Surgeon: Carin Kwon Jr., MD;  Location: [facility] OR;  Service: Urology;   Laterality: Bilateral;       Review of patient's allergies indicates:   Allergen Reactions    Ativan [lorazepam] Hives and Itching    Flagyl [metronidazole] Itching and Rash    Pcn [penicillins] Hives, Itching and Rash       Current Facility-Administered Medications on File Prior to Encounter   Medication    diphenhydrAMINE injection 12.5 mg    electrolyte-S (ISOLYTE)    [COMPLETED] fentaNYL 50 mcg/mL injection 25 mcg    HYDROmorphone (PF) injection 0.2 mg    lactated ringers infusion    lactated ringers infusion    LIDOcaine (PF) 10 mg/ml (1%) injection 10 mg    LORazepam injection 0.25 mg    ondansetron injection 4 mg    sodium chloride 0.9% flush 3 mL     Current Outpatient Medications on File Prior to Encounter   Medication Sig    diphenoxylate-atropine 2.5-0.025 mg (LOMOTIL) 2.5-0.025 mg per tablet Take by mouth.    ferrous sulfate 325 (65 FE) MG EC tablet Take 1 tablet (325 mg total) by mouth every other day.    HYDROcodone-acetaminophen (NORCO) 5-325 mg per tablet TAKE ONE TABLET BY MOUTH EVERY SIX HOURS AS NEEDED FOR PAIN    levothyroxine (SYNTHROID) 125 MCG tablet Take 1 tablet (125 mcg total) by mouth before breakfast.    ondansetron (ZOFRAN-ODT) 8 MG TbDL Take 1 tablet (8 mg total) by mouth every 12 (twelve) hours as needed.    potassium chloride SA (K-DUR,KLOR-CON) 20 MEQ tablet Take 1 tablet (20 mEq total) by mouth once daily.    albuterol (PROVENTIL) 2.5 mg /3 mL (0.083 %) nebulizer solution Take 3 mLs (2.5 mg total) by nebulization every 6 (six) hours as needed for Wheezing. Rescue (Patient not taking: Reported on 7/20/2023)    CMPD hydrocortisone 2%- LIDOcaine 3% suppository (RECTAL ROCKET) Place 1 suppository rectally every evening. Insert 1 suppository 3/4 of the way onto rectum. Wet for easier application. Repeat for 3 nights.    prochlorperazine (COMPAZINE) 10 MG tablet Take 1 tablet (10 mg total) by mouth every 6 (six) hours as needed.    triamcinolone acetonide 0.025% (KENALOG) 0.025 % cream  Apply topically once daily.     Family History       Problem Relation (Age of Onset)    Breast cancer Maternal Grandmother    COPD Mother, Father    Prostate cancer Brother          Tobacco Use    Smoking status: Former     Packs/day: 2.00     Years: 40.00     Pack years: 80.00     Types: Cigarettes     Quit date: 2017     Years since quittin.0    Smokeless tobacco: Never   Substance and Sexual Activity    Alcohol use: No    Drug use: No    Sexual activity: Not Currently     Partners: Male     Review of Systems   Unable to perform ROS: Intubated   Objective:     Vital Signs (Most Recent):  Temp: 97.5 °F (36.4 °C) (23)  Pulse: (!) 114 (23)  Resp: 16 (23)  BP: (!) 111/53 (23)  SpO2: 99 % (23) Vital Signs (24h Range):  Temp:  [96.8 °F (36 °C)-98.6 °F (37 °C)] 97.5 °F (36.4 °C)  Pulse:  [114-131] 114  Resp:  [12-30] 16  SpO2:  [89 %-100 %] 99 %  BP: ()/(45-87) 111/53  Arterial Line BP: (146-176)/(54-84) 156/68     Weight: 89.4 kg (197 lb)  Body mass index is 37.22 kg/m².     Physical Exam  Vitals and nursing note reviewed.   Constitutional:       Comments: Critically ill appearing, lying in bed   HENT:      Head: Normocephalic and atraumatic.      Mouth/Throat:      Comments: Orally intubated with OG tube in place  Eyes:      General:         Right eye: No discharge.         Left eye: No discharge.      Conjunctiva/sclera: Conjunctivae normal.      Comments: Pupils about 2 mm and reactive bilaterally   Cardiovascular:      Rate and Rhythm: Regular rhythm. Tachycardia present.      Comments: No lower extremity edema  Pulmonary:      Comments: Orally intubated on mechanical ventilation FiO2 50 with 10 of PEEP, mechanical breath sounds anteriorly, no wheeze anterior auscultation  Abdominal:      Comments: Midline surgical dressing, clean dry intact, JUANITA drain in place with serosanguineous output, no significant bowel sounds   Genitourinary:     Comments:  Velasco catheter in place with urine draining  Skin:     Comments: Left upper extremity midline in place, right radial arterial line in place   Neurological:      Comments: Pupils equal and reactive.  Currently sedated on propofol   Psychiatric:      Comments: Unable to evaluate              Significant Labs: BMP:   Recent Labs   Lab 07/27/23  0415      *   K 4.8      CO2 19*   BUN 36*   CREATININE 1.8*   CALCIUM 7.4*     CBC:   Recent Labs   Lab 07/26/23 0448 07/27/23 0415 07/27/23 2104 07/27/23 2105   WBC 9.15 4.77 1.14*  --    HGB 9.5* 8.3* 8.9*  --    HCT 29.4* 26.3* 26.6* 26*    185 125*  --      CMP:   Recent Labs   Lab 07/26/23  0136 07/26/23 0448 07/27/23 0415   * 132* 132*   K 4.4 4.5 4.8    103 103   CO2 19* 20* 19*   * 144* 108   BUN 27* 30* 36*   CREATININE 1.6* 1.8* 1.8*   CALCIUM 7.5* 7.6* 7.4*   PROT 5.1*  --   --    ALBUMIN 2.6*  --  2.1*   BILITOT 0.9  --   --    ALKPHOS 62  --   --    AST 39  --   --    ALT 32  --   --    ANIONGAP 10 9 10     Cardiac Markers:   Recent Labs   Lab 07/27/23  0415   BNP 52     Coagulation: No results for input(s): PT, INR, APTT in the last 48 hours.  Lactic Acid:   Recent Labs   Lab 07/26/23  1502 07/26/23  2031 07/27/23  2104   LACTATE 2.2 2.3* 0.9     Magnesium: No results for input(s): MG in the last 48 hours.  POCT Glucose:   Recent Labs   Lab 07/26/23  0802 07/26/23  1454 07/26/23  1631   POCTGLUCOSE 98 98 79     Troponin: No results for input(s): TROPONINI, TROPONINIHS in the last 48 hours.  TSH:   Recent Labs   Lab 07/27/23 0415   TSH 23.868*     Urine Culture: No results for input(s): LABURIN in the last 48 hours.  Urine Studies:   Recent Labs   Lab 07/26/23  1517   COLORU Red*   APPEARANCEUA Cloudy*   PHUR SEE COMMENT   SPECGRAV SEE COMMENT   PROTEINUA SEE COMMENT   GLUCUA SEE COMMENT   KETONESU SEE COMMENT   BILIRUBINUA SEE COMMENT   OCCULTUA SEE COMMENT   NITRITE SEE COMMENT   UROBILINOGEN SEE COMMENT    LEUKOCYTESUR SEE COMMENT   RBCUA >100*   WBCUA 6*   BACTERIA Moderate*   SQUAMEPITHEL 1       Significant Imaging: I have reviewed all pertinent imaging results/findings within the past 24 hours.    X-Ray Chest 1 View    Result Date: 7/27/2023  CLINICAL INDICATION: ET tube placement. TECHNIQUE: A single portable AP  view was obtained of the chest at 2122 hours. COMPARISON:  None available CORRELATION:  None available FINDINGS: The cardiomediastinal  silhouette is enlarged. The lungs there is a congestive change. Left basilar airspace and pleural disease. No right-sided effusion. No pneumothorax. The right subclavian power injectable port catheter tip projects over high SVC, adequate.. Endotracheal tube tip is less than 1 cm above the anjel. This would benefit from being withdrawn 2 cm. Nasogastric tube tip projects over the stomach, sidehole at GE junction. This is satisfactory for aspiration only. If it is desired for the sidehole to be within the stomach, the device needs to be advanced approximately 8 cm. IMPRESSION: Hardware as described. Critical value notification system has been initiated Electronically signed by:  Hugo Restrepo MD  7/27/2023 9:38 PM CDT Workstation: [id number]    X-Ray Abdomen AP 1 View    Result Date: 7/26/2023  EXAMINATION: XR ABDOMEN AP 1 VIEW CLINICAL HISTORY: tachycardia; TECHNIQUE: AP View(s) of the abdomen was performed. COMPARISON: CT abdomen pelvis of April 22 23 FINDINGS: The bowel gas pattern is within normal limits.  Free air is not identified.  There is a surgical drain identified in the pelvis and correlation with the history is recommended.  No masses are identified.     Surgical drain noted in the pelvis otherwise negative abdomen x-ray Electronically signed by: Ramesh Bar MD Date:    07/26/2023 Time:    08:58    X-Ray Chest AP Portable    Result Date: 7/26/2023  EXAMINATION: XR CHEST AP PORTABLE CLINICAL HISTORY: tachycardic; TECHNIQUE: Single frontal view of the  chest was performed. COMPARISON: Chest x-ray of March 25, 2023 FINDINGS: A venous Port-A-Cath is noted on the right with tip of the central line ending in the superior vena cava.  This x-rays taken in a apical lordotic projection and therefore the port of the Port-A-Cath position is questioned.  The cardiac size is within normal limits.  The lung fields are clear without mass or infiltrate.  No pneumothorax or pleural effusion noted.     Venous Port-A-Cath on the right.  The position of the port is questioned.  The central line ends in the superior vena cava. Electronically signed by: Ramesh Bar MD Date:    07/26/2023 Time:    09:02

## 2023-07-28 NOTE — PROGRESS NOTES
"[facility]  Adult Nutrition   Progress Note (Initial Assessment)     SUMMARY     Recommendations  Recommendation/Intervention: 1. If unable to extubate and advance diet with 48 hrs, consider Clinimix E @ 75mL/hr to provide 612 kcals, 77gm protein. 2.) RD to monitor diet/nutrition support initiation, labs, and weight to provide recommendations PRN.  Goals: Pt to receive nutrition within 48hr.  Nutrition Goal Status: new    Dietitian Rounds Brief  Pt is s/p small bowel rescetion. Currently vented and is on propofol 16.1 ml/hr and being titrated down.(providing 425 calories at current rate.     Diet order:   Current Diet Order: NPO                 Evaluation of Received Nutrient/Fluid Intake  Energy Calories Required: not meeting needs  Protein Required: not meeting needs  Fluid Required: not meeting needs  Comments:      % Intake of Estimated Energy Needs: 0 - 25 %  % Meal Intake: 0 - 25 %      Intake/Output Summary (Last 24 hours) at 7/28/2023 1432  Last data filed at 7/28/2023 1231  Gross per 24 hour   Intake 2299.47 ml   Output 2429 ml   Net -129.53 ml        Anthropometrics  Temp: 99.7 °F (37.6 °C)  Height Method: Stated  Height: 5' 1" (154.9 cm)  Height (inches): 61 in  Weight Method: Standard Scale  Weight: 89.4 kg (197 lb)  Weight (lb): 197 lb  Ideal Body Weight (IBW), Female: 105 lb  % Ideal Body Weight, Female (lb): 187.62 %  BMI (Calculated): 37.2  BMI Grade: 35 - 39.9 - obesity - grade II       Estimated/Assessed Needs  Weight Used For Calorie Calculations: 89.5 kg (197 lb 5 oz)  Energy Calorie Requirements (kcal): 979-1246 (11-14 kcal/ kg bw)  Energy Need Method: Kcal/kg, [place] (modified)  Protein Requirements: 72-96 (1.5-2.0 g/ 48 kg IBW)  Weight Used For Protein Calculations: 48 kg (105 lb 13.1 oz)     Estimated Fluid Requirement Method: RDA Method  RDA Method (mL): 979       Reason for Assessment  Reason For Assessment: identified at risk by screening criteria  Relevant Medical " History: COPD, hypothyroidism, rectosigmoid cancer, s/p small bowel resection    Nutrition/Diet History  Spiritual, Cultural Beliefs, Mandaen Practices, Values that Affect Care: no  Food Allergies: NKFA  Factors Affecting Nutritional Intake: NPO, on mechanical ventilation    Nutrition Risk Screen  Nutrition Risk Screen: no indicators present     MST Score: 0  Have you recently lost weight without trying?: No  Weight loss score: 0  Have you been eating poorly because of a decreased appetite?: No  Appetite score: 0       Weight History:  Wt Readings from Last 10 Encounters:   07/24/23 89.4 kg (197 lb)   07/03/23 89.3 kg (196 lb 13.9 oz)   06/27/23 89.7 kg (197 lb 12 oz)   06/20/23 88.5 kg (195 lb 1.7 oz)   06/14/23 88.5 kg (195 lb)   06/12/23 88.5 kg (195 lb)   06/06/23 87.4 kg (192 lb 10.9 oz)   05/31/23 81.6 kg (180 lb)   05/26/23 85.7 kg (189 lb)   05/24/23 86 kg (189 lb 9.5 oz)        Lab/Procedures/Meds: Pertinent Labs/Meds Reviewed    Medications:Pertinent Medications Reviewed  Scheduled Meds:   albuterol-ipratropium  3 mL Nebulization Q6H    budesonide  0.5 mg Nebulization Q12H    chlorhexidine  15 mL Mouth/Throat BID    levothyroxine  75 mcg Intravenous Before breakfast    meropenem (MERREM) IVPB  1 g Intravenous Q12H    pantoprazole  40 mg Intravenous Daily     Continuous Infusions:   dextrose 5 % and 0.9 % NaCl 100 mL/hr at 07/28/23 1152    lactated ringers Stopped (07/28/23 1153)    NORepinephrine bitartrate-D5W Stopped (07/28/23 0515)    propofoL 30 mcg/kg/min (07/28/23 0800)     PRN Meds:.acetaminophen, calcium gluconate IVPB, calcium gluconate IVPB, calcium gluconate IVPB, HYDROmorphone, magnesium sulfate IVPB, magnesium sulfate IVPB, ondansetron, potassium chloride **AND** potassium chloride **AND** potassium chloride, sodium phosphate IVPB, sodium phosphate IVPB, sodium phosphate IVPB    Labs: Pertinent Labs Reviewed  Clinical Chemistry:  Recent Labs   Lab 07/27/23  0415 07/27/23  0415 07/27/23  2103  07/28/23  0359   *  --  132* 131*   K 4.8  --  3.7 4.0     --  105 105   CO2 19*  --  19* 18*     --  123* 115*   BUN 36*  --  29* 28*   CREATININE 1.8*  --  1.2 1.3   CALCIUM 7.4*  --  5.6* 6.8*   PROT  --   --  3.7* 4.4*   ALBUMIN 2.1*  --  1.6* 1.6*   BILITOT  --   --  1.3* 1.1*   ALKPHOS  --   --  50* 54*   AST  --   --  43* 39   ALT  --   --  30 27   ANIONGAP 10  --  8 8   MG  --    < > 1.9 1.9   PHOS 4.0  --  4.4  --     < > = values in this interval not displayed.     CBC:   Recent Labs   Lab 07/28/23 0359 07/28/23 0414   WBC 2.04*  --    RBC 3.42*  --    HGB 9.6*  --    HCT 28.6* 28*     --    MCV 84  --    MCH 28.1  --    MCHC 33.6  --      Lipid Panel:  No results for input(s): CHOL, HDL, LDLCALC, TRIG, CHOLHDL in the last 168 hours.  Cardiac Profile:  Recent Labs   Lab 07/27/23  0415 07/27/23  2104   BNP 52 25     Inflammatory Labs:  No results for input(s): CRP in the last 168 hours.  Diabetes:  Recent Labs   Lab 07/26/23  0802 07/26/23  1454 07/26/23  1631   POCTGLUCOSE 98 98 79     Thyroid & Parathyroid:  Recent Labs   Lab 07/27/23 0415   TSH 23.868*   FREET4 <0.40*       Monitor and Evaluation  Food and Nutrient Intake: energy intake, food and beverage intake  Food and Nutrient Adminstration: diet order, enteral and parenteral nutrition administration  Anthropometric Measurements: height/length, weight, weight change  Nutrition-Focused Physical Findings: overall appearance     Nutrition Risk        Nutrition Follow-Up  RD Follow-up?: Yes      Nancy Johnson, MILLIE 07/28/2023 2:32 PM

## 2023-07-28 NOTE — CARE UPDATE
07/27/23 1950   Patient Assessment/Suction   Level of Consciousness (AVPU) unresponsive   PRE-TX-O2   SpO2 (!) 89 %   Pulse (!) 116   Resp 16   Airway Safety   Is Ambu Bag and Mask with Patient? Yes, Adult Ambu Bag and Mask   Vent Select   Conventional Vent Y   $ Ventilator Initial 1   Charged w/in last 24h YES   Preset Conventional Ventilator Settings   Vent Type Parapac   Vent Mode SIMV   Set Rate 14 BPM   Vt Set 500 mL   PEEP/CPAP 5 cmH20   Ready to Wean/Extubation Screen   PEEP <=8 (chart #) 5     Pt placed on transport vent in RR. ETT 7.5 secured with commercial tube narayanan @ 23 cm R teeth. BBS heard. Site clean, dry no redness

## 2023-07-28 NOTE — CARE UPDATE
07/28/23 0003   Patient Assessment/Suction   Level of Consciousness (AVPU) unresponsive   Respiratory Effort Unlabored;Normal   $ Suction Charges Inline Suction Procedure Stat Charge   Secretions Amount small   Secretions Color yellow   Secretions Characteristics thick   PRE-TX-O2   Device (Oxygen Therapy) ventilator   Oxygen Concentration (%) 60   SpO2 99 %   Pulse Oximetry Type Continuous   Pulse 106   Resp (!) 28   BP (!) 106/50   Aerosol Therapy   $ Aerosol Therapy Charges Aerosol Treatment   Daily Review of Necessity (SVN) completed   Respiratory Treatment Status (SVN) given   Treatment Route (SVN) in-line;ventilator   Patient Position (SVN) HOB elevated;semi-Sanderson's   Post Treatment Assessment (SVN) increased aeration   Signs of Intolerance (SVN) none        Airway - Non-Surgical 07/27/23   Placement Date: 07/27/23   Present Prior to Hospital Arrival?: Yes  Airway Device Size: 7.5  Style: Cuffed  Cuff Inflation: Minimal occlusive pressure  Cuff Inflated With: Air  Placement Verified By: Auscultation;Chest X-ray  Depth of Insertion (cm): ...   Secured at (S)  19 cm  (adjusted per radiologist/ XRAY)   Measured At Lips   Secured Location Center   Secured by Commercial tube narayanan   Bite Block center;secure and patent   Site Condition Cool;Dry   Status Intact;Secured;Patent   Site Assessment Clean;Dry;No bleeding   Vent Select   Conventional Vent Y   Charged w/in last 24h YES   Preset Conventional Ventilator Settings   Vent ID [id number]   Vent Type    Ventilation Type VC   Vent Mode A/C   Humidity HME   Set Rate 28 BPM   Vt Set 420 mL   PEEP/CPAP 10 cmH20   Peak Flow 60 L/min   Peak End Inspiratory Pressure 22 cmH20   I-Trigger Type  V-TRIG   Trigger Sensitivity Flow/I-Trigger 3 L/min   Patient Ventilator Parameters   Resp Rate Total 28 br/min   Peak Airway Pressure 28 cmH20   Mean Airway Pressure 16 cmH20   Plateau Pressure 0 cmH20   Exhaled Vt 481 mL   Total Ve 12.3 L/m   I:E Ratio Measured 1:1.80   Auto PEEP  "0 cmH20   Conventional Ventilator Alarms   Alarms On Y   Resp Rate High Alarm 40 br/min   Press High Alarm 40 cmH2O   Apnea Rate 10   Apnea Volume (mL) 0 mL   Apnea Oxygen Concentration  100   Apnea Flow Rate (L/min) 55   T Apnea 20 sec(s)   IHI Ventilator Associated Pneumonia Bundle (Required)   Daily Awakening Trials Performed Not applicable   Daily Assessment of Readiness to Extubate No (Comment)   Head of Bed Elevated  HOB 30   Oral Care Teeth brushed;Lip moisturizer applied;Mouth suctioned;with mouthwash   Vent Circut Breaks Minimized Yes   Ready to Wean/Extubation Screen   FIO2<=50 (chart decimal) (!) 0.6   MV<16L (chart vol.) 12.3   PEEP <=8 (chart #) (!) 10   Ready to Wean Parameters   F/VT Ratio<105 (RSBI) (!) 58.21   IBW/VT Calculations   Height 5' 1" (1.549 m)   IBW/kg (Calculated) Female 47.8 kg   Low Range Vt 4cc/kg FEMALE 191.2 mL   Low Range Vt 6cc/kg FEMALE 286.8 mL   Adult Moderate Range vt 8cc/kg FEMALE 382.4 mL   Adult High Range Vt 10cc/kg FEMALE 478 mL   Current VT Set/IBW (Calculated) FEMALE 8.79 ML\Kg     "

## 2023-07-28 NOTE — BRIEF OP NOTE
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  4 separate area of necrotic small intestine removed.  Tip of colon resection.  Approximately 75 cc's of small bowel remained with ileocecal valve intact.      Estimated Blood Loss: 50 cc's      Estimated Blood Loss has been documented.         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 SMALL BOWEL 2. COLON     Question:  Release to patient  Answer:  Immediate    07/27/23 1933                    [id number]

## 2023-07-28 NOTE — ASSESSMENT & PLAN NOTE
Worsening postoperatively, status post PRBC transfusion   Repeat anemia labs including B12, folic acid, iron studies  Transfuse as needed

## 2023-07-28 NOTE — SUBJECTIVE & OBJECTIVE
Past Medical History:   Diagnosis Date    Acute hypoxemic respiratory failure 07/09/2018    Arthritis     Bilateral kidney stones 3/27/2023    Cancer     colon    COPD (chronic obstructive pulmonary disease)     History of home oxygen therapy     ONLY PRN AND HASN'T USED IT IN A YEAR    Hyperlipidemia     Thyroid disease        Past Surgical History:   Procedure Laterality Date    CHOLECYSTECTOMY      COLECTOMY N/A 11/9/2020    Procedure: COLECTOMY;  Surgeon: Brandon Wagner MD;  Location: [facility] OR;  Service: General;  Laterality: N/A;  LOWER- ANTERIOR    COLONOSCOPY N/A 10/23/2020    Procedure: COLONOSCOPY;  Surgeon: Jolynn Ayala MD;  Location: [facility] ENDO;  Service: Endoscopy;  Laterality: N/A;    COLONOSCOPY N/A 8/6/2021    Procedure: COLONOSCOPY;  Surgeon: Jolynn Ayala MD;  Location: [facility] ENDO;  Service: Endoscopy;  Laterality: N/A;    COLONOSCOPY N/A 7/7/2022    Procedure: COLONOSCOPY;  Surgeon: Jolynn Ayala MD;  Location: [facility] ENDO;  Service: Endoscopy;  Laterality: N/A;    COLONOSCOPY N/A 5/31/2023    Procedure: COLONOSCOPY;  Surgeon: Jolynn Ayala MD;  Location: [facility] ENDO;  Service: Endoscopy;  Laterality: N/A;    COLOSTOMY N/A 7/24/2023    Procedure: CREATION, COLOSTOMY;  Surgeon: Brandon Wagner MD;  Location: [facility] OR;  Service: General;  Laterality: N/A;    COMPLETION PROCTECTOMY N/A 7/24/2023    Procedure: PROCTECTOMY, COMPLETION;  Surgeon: Brandon Wagner MD;  Location: [facility] OR;  Service: General;  Laterality: N/A;    CYSTOSCOPY W/ URETERAL STENT PLACEMENT Right 2/22/2023    Procedure: CYSTOSCOPY, WITH URETERAL STENT INSERTION;  Surgeon: Carin Kwon Jr., MD;  Location: [facility] OR;  Service: Urology;  Laterality: Right;    CYSTOSCOPY W/ URETERAL STENT PLACEMENT Bilateral 3/26/2023    Procedure: CYSTOSCOPY, WITH URETERAL STENT INSERTION;  Surgeon: Sabine Barber MD;  Location: [facility] OR;  Service: Urology;  Laterality: Bilateral;    CYSTOSCOPY W/ URETERAL STENT PLACEMENT  Bilateral 7/24/2023    Procedure: CYSTOSCOPY, WITH URETERAL STENT INSERTION;  Surgeon: Adrienne Mijares MD;  Location: [facility] OR;  Service: Urology;  Laterality: Bilateral;  bassem placing stents 1st    CYSTOSCOPY WITH URETEROSCOPY, RETROGRADE PYELOGRAPHY, AND INSERTION OF STENT Bilateral 3/26/2023    Procedure: CYSTOSCOPY, WITH RETROGRADE PYELOGRAM AND URETERAL STENT INSERTION;  Surgeon: Sabine Barber MD;  Location: [facility] OR;  Service: Urology;  Laterality: Bilateral;    CYSTOURETEROSCOPY WITH RETROGRADE PYELOGRAPHY AND INSERTION OF STENT INTO URETER Right 3/7/2023    Procedure: CYSTOURETEROSCOPY, WITH RETROGRADE PYELOGRAM AND URETERAL STENT INSERTION;  Surgeon: Carin Kwon Jr., MD;  Location: [facility] OR;  Service: Urology;  Laterality: Right;    CYSTOURETEROSCOPY, WITH HOLMIUM LASER LITHOTRIPSY OF URETERAL CALCULUS AND STENT INSERTION Bilateral 4/11/2023    Procedure: CYSTOURETEROSCOPY, WITH HOLMIUM LASER LITHOTRIPSY OF URETERAL CALCULUS AND STENT INSERTION;  Surgeon: Carin Kwon Jr., MD;  Location: [facility] OR;  Service: Urology;  Laterality: Bilateral;    ESOPHAGEAL DILATION N/A 6/29/2021    Procedure: DILATION, ESOPHAGUS;  Surgeon: Sourav Baires III, MD;  Location: [facility];  Service: Endoscopy;  Laterality: N/A;    ESOPHAGOGASTRODUODENOSCOPY  06/29/2021    Dilation to 57 Fr    ESOPHAGOGASTRODUODENOSCOPY N/A 6/29/2021    Procedure: EGD (ESOPHAGOGASTRODUODENOSCOPY);  Surgeon: Sourav Baries III, MD;  Location: [facility];  Service: Endoscopy;  Laterality: N/A;    FLEXIBLE SIGMOIDOSCOPY N/A 8/1/2022    Procedure: SIGMOIDOSCOPY, FLEXIBLE;  Surgeon: Brandon Wagner MD;  Location: [facility] ENDO;  Service: Endoscopy;  Laterality: N/A;    HYSTERECTOMY      INSERTION OF TUNNELED CENTRAL VENOUS CATHETER (CVC) WITH SUBCUTANEOUS PORT N/A 1/11/2021    Procedure: [id number];  Surgeon: Brandon Wagner MD;  Location: [facility] OR;  Service: General;  Laterality: N/A;    INSERTION OF TUNNELED  CENTRAL VENOUS CATHETER (CVC) WITH SUBCUTANEOUS PORT Right 9/1/2022    Procedure: [id number];  Surgeon: Brandon Wagner MD;  Location: [facility] OR;  Service: General;  Laterality: Right;  Wants rt side placement    LAPAROTOMY, EXPLORATORY N/A 7/24/2023    Procedure: LAPAROTOMY, EXPLORATORY;  Surgeon: Brandon Wagner MD;  Location: [facility] OR;  Service: General;  Laterality: N/A;  case must go 1st-chamberlain placing stents    MEDIPORT REMOVAL Left 4/19/2021    Procedure: REMOVAL, CATHETER, CENTRAL VENOUS, TUNNELED, WITH PORT;  Surgeon: Brandon Wagner MD;  Location: [facility] OR;  Service: General;  Laterality: Left;    REMOVAL-STENT Right 3/7/2023    Procedure: REMOVAL-STENT;  Surgeon: Carin Kwon Jr., MD;  Location: [facility] OR;  Service: Urology;  Laterality: Right;    REMOVAL-STENT Bilateral 4/11/2023    Procedure: REMOVAL-STENT;  Surgeon: Carin Kwon Jr., MD;  Location: [facility] OR;  Service: Urology;  Laterality: Bilateral;    RETROGRADE PYELOGRAPHY Bilateral 4/11/2023    Procedure: PYELOGRAM, RETROGRADE;  Surgeon: Carin Kwon Jr., MD;  Location: [facility] OR;  Service: Urology;  Laterality: Bilateral;    ROBOT-ASSISTED COLECTOMY N/A 11/9/2020    Procedure: ROBOTIC COLECTOMY low anterior resection, possible open;  Surgeon: Brandon Wagner MD;  Location: [facility] OR;  Service: General;  Laterality: N/A;  CONVERTED TO OPEN AT 1503    SUBTOTAL COLECTOMY N/A 7/24/2023    Procedure: COLECTOMY, PARTIAL;  Surgeon: Brandon Wagner MD;  Location: [facility] OR;  Service: General;  Laterality: N/A;  with colorectal Anastamosis    URETEROSCOPIC REMOVAL OF URETERIC CALCULUS Right 3/7/2023    Procedure: REMOVAL, CALCULUS, URETER, URETEROSCOPIC;  Surgeon: Carin Kwon Jr., MD;  Location: [facility] OR;  Service: Urology;  Laterality: Right;    URETEROSCOPIC REMOVAL OF URETERIC CALCULUS Bilateral 4/11/2023    Procedure: REMOVAL, CALCULUS, URETER, URETEROSCOPIC;  Surgeon: Carin Kwon Jr., MD;  Location: [facility] OR;  Service: Urology;   Laterality: Bilateral;       Review of patient's allergies indicates:   Allergen Reactions    Ativan [lorazepam] Hives and Itching    Flagyl [metronidazole] Itching and Rash    Pcn [penicillins] Hives, Itching and Rash       Current Facility-Administered Medications on File Prior to Encounter   Medication    diphenhydrAMINE injection 12.5 mg    electrolyte-S (ISOLYTE)    [COMPLETED] fentaNYL 50 mcg/mL injection 25 mcg    HYDROmorphone (PF) injection 0.2 mg    lactated ringers infusion    lactated ringers infusion    LIDOcaine (PF) 10 mg/ml (1%) injection 10 mg    LORazepam injection 0.25 mg    ondansetron injection 4 mg    sodium chloride 0.9% flush 3 mL     Current Outpatient Medications on File Prior to Encounter   Medication Sig    diphenoxylate-atropine 2.5-0.025 mg (LOMOTIL) 2.5-0.025 mg per tablet Take by mouth.    ferrous sulfate 325 (65 FE) MG EC tablet Take 1 tablet (325 mg total) by mouth every other day.    HYDROcodone-acetaminophen (NORCO) 5-325 mg per tablet TAKE ONE TABLET BY MOUTH EVERY SIX HOURS AS NEEDED FOR PAIN    levothyroxine (SYNTHROID) 125 MCG tablet Take 1 tablet (125 mcg total) by mouth before breakfast.    ondansetron (ZOFRAN-ODT) 8 MG TbDL Take 1 tablet (8 mg total) by mouth every 12 (twelve) hours as needed.    potassium chloride SA (K-DUR,KLOR-CON) 20 MEQ tablet Take 1 tablet (20 mEq total) by mouth once daily.    albuterol (PROVENTIL) 2.5 mg /3 mL (0.083 %) nebulizer solution Take 3 mLs (2.5 mg total) by nebulization every 6 (six) hours as needed for Wheezing. Rescue (Patient not taking: Reported on 7/20/2023)    CMPD hydrocortisone 2%- LIDOcaine 3% suppository (RECTAL ROCKET) Place 1 suppository rectally every evening. Insert 1 suppository 3/4 of the way onto rectum. Wet for easier application. Repeat for 3 nights.    prochlorperazine (COMPAZINE) 10 MG tablet Take 1 tablet (10 mg total) by mouth every 6 (six) hours as needed.    triamcinolone acetonide 0.025% (KENALOG) 0.025 % cream  Apply topically once daily.     Family History       Problem Relation (Age of Onset)    Breast cancer Maternal Grandmother    COPD Mother, Father    Prostate cancer Brother          Tobacco Use    Smoking status: Former     Packs/day: 2.00     Years: 40.00     Pack years: 80.00     Types: Cigarettes     Quit date: 2017     Years since quittin.0    Smokeless tobacco: Never   Substance and Sexual Activity    Alcohol use: No    Drug use: No    Sexual activity: Not Currently     Partners: Male     Review of Systems   Unable to perform ROS: Intubated   Objective:     Vital Signs (Most Recent):  Temp: 98.8 °F (37.1 °C) (23)  Pulse: (!) 118 (23)  Resp: (!) 29 (23)  BP: (!) 95/45 (23)  SpO2: 99 % (23) Vital Signs (24h Range):  Temp:  [97.1 °F (36.2 °C)-98.8 °F (37.1 °C)] 98.8 °F (37.1 °C)  Pulse:  [106-131] 118  Resp:  [12-30] 29  SpO2:  [89 %-100 %] 99 %  BP: ()/(39-87) 95/45  Arterial Line BP: ()/(46-84) 106/49     Weight: 89.4 kg (197 lb)  Body mass index is 37.22 kg/m².     Physical Exam  Vitals and nursing note reviewed.   Constitutional:       Comments: Critically ill appearing, lying in bed   HENT:      Head: Normocephalic and atraumatic.      Mouth/Throat:      Comments: Orally intubated with OG tube in place  Eyes:      General:         Right eye: No discharge.         Left eye: No discharge.      Conjunctiva/sclera: Conjunctivae normal.      Comments: Pupils about 2 mm and reactive bilaterally   Cardiovascular:      Rate and Rhythm: Regular rhythm. Tachycardia present.      Comments: No lower extremity edema  Pulmonary:      Comments: Orally intubated on mechanical ventilation FiO2 50 with 10 of PEEP, mechanical breath sounds anteriorly, no wheeze anterior auscultation  Abdominal:      Comments: Midline surgical dressing, clean dry intact, JUANITA drain in place with serosanguineous output, no significant bowel sounds   Genitourinary:      Comments: Velasco catheter in place with urine draining  Skin:     Comments: Left upper extremity midline in place, right radial arterial line in place   Neurological:      Comments: Pupils equal and reactive.  Currently sedated on propofol   Psychiatric:      Comments: Unable to evaluate              Significant Labs: BMP:   Recent Labs   Lab 07/27/23 2104   *   *   K 3.7      CO2 19*   BUN 29*   CREATININE 1.2   CALCIUM 5.6*   MG 1.9       CBC:   Recent Labs   Lab 07/26/23 0448 07/27/23 0415 07/27/23 2104 07/27/23 2105 07/27/23  2224   WBC 9.15 4.77 1.14*  --   --    HGB 9.5* 8.3* 8.9*  --   --    HCT 29.4* 26.3* 26.6* 26* 26*    185 125*  --   --        CMP:   Recent Labs   Lab 07/26/23 0448 07/27/23 0415 07/27/23 2104   * 132* 132*   K 4.5 4.8 3.7    103 105   CO2 20* 19* 19*   * 108 123*   BUN 30* 36* 29*   CREATININE 1.8* 1.8* 1.2   CALCIUM 7.6* 7.4* 5.6*   PROT  --   --  3.7*   ALBUMIN  --  2.1* 1.6*   BILITOT  --   --  1.3*   ALKPHOS  --   --  50*   AST  --   --  43*   ALT  --   --  30   ANIONGAP 9 10 8       Cardiac Markers:   Recent Labs   Lab 07/27/23 2104   BNP 25       Coagulation: No results for input(s): PT, INR, APTT in the last 48 hours.  Lactic Acid:   Recent Labs   Lab 07/26/23  1502 07/26/23  2031 07/27/23 2104   LACTATE 2.2 2.3* 0.9       Magnesium:   Recent Labs   Lab 07/27/23 2104   MG 1.9     POCT Glucose:   Recent Labs   Lab 07/26/23  0802 07/26/23  1454 07/26/23  1631   POCTGLUCOSE 98 98 79       Troponin:   Recent Labs   Lab 07/27/23 2104   TROPONINIHS 12.4     TSH:   Recent Labs   Lab 07/27/23 0415   TSH 23.868*       Urine Culture: No results for input(s): LABURIN in the last 48 hours.  Urine Studies:   Recent Labs   Lab 07/26/23  1517   COLORU Red*   APPEARANCEUA Cloudy*   PHUR SEE COMMENT   SPECGRAV SEE COMMENT   PROTEINUA SEE COMMENT   GLUCUA SEE COMMENT   KETONESU SEE COMMENT   BILIRUBINUA SEE COMMENT   OCCULTUA SEE COMMENT    NITRITE SEE COMMENT   UROBILINOGEN SEE COMMENT   LEUKOCYTESUR SEE COMMENT   RBCUA >100*   WBCUA 6*   BACTERIA Moderate*   SQUAMEPITHEL 1         Significant Imaging: I have reviewed all pertinent imaging results/findings within the past 24 hours.    X-Ray Chest 1 View    Result Date: 7/27/2023  CLINICAL INDICATION: ET tube placement. TECHNIQUE: A single portable AP  view was obtained of the chest at 2122 hours. COMPARISON:  None available CORRELATION:  None available FINDINGS: The cardiomediastinal  silhouette is enlarged. The lungs there is a congestive change. Left basilar airspace and pleural disease. No right-sided effusion. No pneumothorax. The right subclavian power injectable port catheter tip projects over high SVC, adequate.. Endotracheal tube tip is less than 1 cm above the anjel. This would benefit from being withdrawn 2 cm. Nasogastric tube tip projects over the stomach, sidehole at GE junction. This is satisfactory for aspiration only. If it is desired for the sidehole to be within the stomach, the device needs to be advanced approximately 8 cm. IMPRESSION: Hardware as described. Critical value notification system has been initiated Electronically signed by:  Hugo Restrepo MD  7/27/2023 9:38 PM CDT Workstation: [id number]    X-Ray Abdomen AP 1 View    Result Date: 7/26/2023  EXAMINATION: XR ABDOMEN AP 1 VIEW CLINICAL HISTORY: tachycardia; TECHNIQUE: AP View(s) of the abdomen was performed. COMPARISON: CT abdomen pelvis of April 22 23 FINDINGS: The bowel gas pattern is within normal limits.  Free air is not identified.  There is a surgical drain identified in the pelvis and correlation with the history is recommended.  No masses are identified.     Surgical drain noted in the pelvis otherwise negative abdomen x-ray Electronically signed by: Ramesh Bar MD Date:    07/26/2023 Time:    08:58    X-Ray Chest AP Portable    Result Date: 7/26/2023  EXAMINATION: XR CHEST AP PORTABLE CLINICAL HISTORY:  tachycardic; TECHNIQUE: Single frontal view of the chest was performed. COMPARISON: Chest x-ray of March 25, 2023 FINDINGS: A venous Port-A-Cath is noted on the right with tip of the central line ending in the superior vena cava.  This x-rays taken in a apical lordotic projection and therefore the port of the Port-A-Cath position is questioned.  The cardiac size is within normal limits.  The lung fields are clear without mass or infiltrate.  No pneumothorax or pleural effusion noted.     Venous Port-A-Cath on the right.  The position of the port is questioned.  The central line ends in the superior vena cava. Electronically signed by: Ramesh Bar MD Date:    07/26/2023 Time:    09:02

## 2023-07-28 NOTE — PLAN OF CARE
2035 [facility] here to transfer patient to room 2212 @ [facility]. Patient transferred on portable vent, /rate 14/PEEP 5/Fio2 50%. Propofol infusing @ 50mc/kg/min. NAD noted. VSS. Family notified. No belongings with patient.

## 2023-07-28 NOTE — BRIEF OP NOTE
[facility]  Brief Operative Note    SUMMARY     Surgery Date: 7/28/2023     Surgeon(s) and Role:     * Brandon Wagner MD - Primary     * Reji Amos III, MD - Assisting        Pre-op Diagnosis:  Rectal cancer [C20]    Post-op Diagnosis:  Post-Op Diagnosis Codes:     * Rectal cancer [C20]    Procedure(s) (LRB):  LAPAROTOMY, EXPLORATORY (N/A)  Small bowel resection with anastomosis x2   Creation of colostomy    Anesthesia: General    Operative Findings: patchy area of necrosis. REsected    Estimated Blood Loss: 15 cc's    Estimated Blood Loss has been documented.         Specimens:   Specimen (24h ago, onward)       Start     Ordered    07/28/23 1613  Specimen to Pathology - Surgery  Once        Comments: Pre-op Diagnosis: Rectal cancer [C20]Procedure(s):LAPAROTOMY, EXPLORATORY Number of specimens: 1Name of specimens: 1. 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 SMALL BOWEL 2. COLON     Question:  Release to patient  Answer:  Immediate    07/27/23 1933                    [id number]

## 2023-07-28 NOTE — ANESTHESIA PROCEDURE NOTES
Central Line    Diagnosis: Inadequate IV access  Patient location during procedure: done in OR  Timeout: 7/28/2023 4:41 PM  Procedure end time: 7/28/2023 4:59 PM    Staffing  Authorizing Provider: Antonio Marcus MD  Performing Provider: Antonio Marcus MD    Staffing  Performed: anesthesiologist   Anesthesiologist: Antonio Marcus MD  Anesthesiologist was present at the time of the procedure.  Preanesthetic Checklist  Completed: patient identified, risks and benefits discussed, monitors and equipment checked, pre-op evaluation, timeout performed and anesthesia consent given  Indication   Indication: vascular access, med administration     Anesthesia   local infiltration    Central Line   Skin Prep: skin prepped with ChloraPrep, skin prep agent completely dried prior to procedure  Sterile Barriers Followed: Yes    All five maximal barriers used- gloves, gown, cap, mask, and large sterile sheet    hand hygiene performed prior to central venous catheter insertion  Location: right internal jugular.   Catheter type: triple lumen  Catheter Size: 7 Fr  Inserted Catheter Length: 14 cm  Ultrasound: vascular probe with ultrasound   Vessel Caliber: medium, patent  Vascular Doppler:  not done, compressibility normal  Needle advanced into vessel with real time Ultrasound guidance.  Guidewire confirmed in vessel.  sterile gel and probe cover used in ultrasound-guided central venous catheter insertion  Manometry: none  Insertion Attempts: 1   Securement:line sutured, chlorhexidine patch, sterile dressing applied and blood return through all ports    Post-Procedure   X-Ray: no pneumothorax on x-ray, placement verified by x-ray, tip termination and successful placement  Tip termination comments: SVC   Adverse Events:none      Guidewire Guidewire removed intact. Guidewire removed intact, verified with nurse.  Additional Notes  Right internal jugular triple-lumen catheter placed without difficulty using ultrasound  guidance.    Patient tolerated the procedure well.    Chest x-ray obtained in ICU shows line in good position and no evidence of line related complications.    Okay to use right internal jugular triple-lumen catheter.  Please re-consult if needed.

## 2023-07-28 NOTE — H&P
[facility]  History & Physical    Patient Name: Tessa Enriquez  MRN: [id number]  Patient Class: IP- Inpatient  Admission Date: 7/24/2023  Attending Physician: Chapincito Nick, *   Primary Care Provider: Karma Mcdermott MD  Date of service: 07/27/2023       Patient information was obtained from past medical records and referring care team.     Subjective:     Principal Problem:Ischemic necrosis of small bowel    Chief Complaint:   Chief Complaint   Patient presents with    Post-op Problem        HPI: Ms. Enriquez is a 67-years-old female who presented as a direct admission from [facility] for ICU level of care postoperatively.  Patient with a history of recurrent colorectal adenocarcinoma, status post neoadjuvant radiation and chemotherapy, followed by oncologist Dr. Frank.  On 07/24 she underwent exploratory laparotomy with extensive lysis of adhesion, proctectomy with creation of end colostomy by Dr. Wagner and she was admitted to hospital medicine postoperatively.  Postop course complicated by ongoing sinus tachycardia with anemia, status post PRBC transfusion and acute kidney injury, seen by Nephrology.  Earlier today ostomy noted to be dusky with concern for ischemia, she was taken back to the OR and underwent exploratory laparotomy with small-bowel resection, as per OR note, large area of small bowel resected with four areas of ischemia.  She was intubated for surgery and remains intubated postoperatively.  Reported history of COPD, hypothyroidism, nephrolithiasis status post bilateral ureteral stenting.  She is currently sedated on propofol, orally intubated on mechanical ventilation.  Repeat labs have been submitted.  As per ICU RN plans for possible repeat surgery.  Plan of care reviewed with nursing.  No visitors at bedside.      Past Medical History:   Diagnosis Date    Acute hypoxemic respiratory failure 07/09/2018    Arthritis      Bilateral kidney stones 3/27/2023    Cancer     colon    COPD (chronic obstructive pulmonary disease)     History of home oxygen therapy     ONLY PRN AND HASN'T USED IT IN A YEAR    Hyperlipidemia     Thyroid disease        Past Surgical History:   Procedure Laterality Date    CHOLECYSTECTOMY      COLECTOMY N/A 11/9/2020    Procedure: COLECTOMY;  Surgeon: Brandon Wagner MD;  Location: [facility] OR;  Service: General;  Laterality: N/A;  LOWER- ANTERIOR    COLONOSCOPY N/A 10/23/2020    Procedure: COLONOSCOPY;  Surgeon: Jolynn Ayala MD;  Location: [facility] ENDO;  Service: Endoscopy;  Laterality: N/A;    COLONOSCOPY N/A 8/6/2021    Procedure: COLONOSCOPY;  Surgeon: Jolynn Ayala MD;  Location: [facility] ENDO;  Service: Endoscopy;  Laterality: N/A;    COLONOSCOPY N/A 7/7/2022    Procedure: COLONOSCOPY;  Surgeon: Jolynn Ayala MD;  Location: [facility] ENDO;  Service: Endoscopy;  Laterality: N/A;    COLONOSCOPY N/A 5/31/2023    Procedure: COLONOSCOPY;  Surgeon: Jolynn Ayala MD;  Location: [facility] ENDO;  Service: Endoscopy;  Laterality: N/A;    COLOSTOMY N/A 7/24/2023    Procedure: CREATION, COLOSTOMY;  Surgeon: Brandon Wagner MD;  Location: [facility] OR;  Service: General;  Laterality: N/A;    COMPLETION PROCTECTOMY N/A 7/24/2023    Procedure: PROCTECTOMY, COMPLETION;  Surgeon: Brandon Wagner MD;  Location: [facility] OR;  Service: General;  Laterality: N/A;    CYSTOSCOPY W/ URETERAL STENT PLACEMENT Right 2/22/2023    Procedure: CYSTOSCOPY, WITH URETERAL STENT INSERTION;  Surgeon: Carin Kwon Jr., MD;  Location: [facility] OR;  Service: Urology;  Laterality: Right;    CYSTOSCOPY W/ URETERAL STENT PLACEMENT Bilateral 3/26/2023    Procedure: CYSTOSCOPY, WITH URETERAL STENT INSERTION;  Surgeon: Sabine Barber MD;  Location: [facility] OR;  Service: Urology;  Laterality: Bilateral;    CYSTOSCOPY W/ URETERAL STENT PLACEMENT Bilateral 7/24/2023    Procedure: CYSTOSCOPY, WITH URETERAL STENT INSERTION;  Surgeon: Adrienne WHITING  MD Maryana;  Location: [facility] OR;  Service: Urology;  Laterality: Bilateral;  calosin placing stents 1st    CYSTOSCOPY WITH URETEROSCOPY, RETROGRADE PYELOGRAPHY, AND INSERTION OF STENT Bilateral 3/26/2023    Procedure: CYSTOSCOPY, WITH RETROGRADE PYELOGRAM AND URETERAL STENT INSERTION;  Surgeon: Sabine Barber MD;  Location: [facility] OR;  Service: Urology;  Laterality: Bilateral;    CYSTOURETEROSCOPY WITH RETROGRADE PYELOGRAPHY AND INSERTION OF STENT INTO URETER Right 3/7/2023    Procedure: CYSTOURETEROSCOPY, WITH RETROGRADE PYELOGRAM AND URETERAL STENT INSERTION;  Surgeon: Carin Kwon Jr., MD;  Location: [facility] OR;  Service: Urology;  Laterality: Right;    CYSTOURETEROSCOPY, WITH HOLMIUM LASER LITHOTRIPSY OF URETERAL CALCULUS AND STENT INSERTION Bilateral 4/11/2023    Procedure: CYSTOURETEROSCOPY, WITH HOLMIUM LASER LITHOTRIPSY OF URETERAL CALCULUS AND STENT INSERTION;  Surgeon: Carin Kwon Jr., MD;  Location: [facility] OR;  Service: Urology;  Laterality: Bilateral;    ESOPHAGEAL DILATION N/A 6/29/2021    Procedure: DILATION, ESOPHAGUS;  Surgeon: Sourav Baires III, MD;  Location: [facility];  Service: Endoscopy;  Laterality: N/A;    ESOPHAGOGASTRODUODENOSCOPY  06/29/2021    Dilation to 57 Fr    ESOPHAGOGASTRODUODENOSCOPY N/A 6/29/2021    Procedure: EGD (ESOPHAGOGASTRODUODENOSCOPY);  Surgeon: Sourav Baires III, MD;  Location: [facility] ENDO;  Service: Endoscopy;  Laterality: N/A;    FLEXIBLE SIGMOIDOSCOPY N/A 8/1/2022    Procedure: SIGMOIDOSCOPY, FLEXIBLE;  Surgeon: Brandon Wagner MD;  Location: [facility] ENDO;  Service: Endoscopy;  Laterality: N/A;    HYSTERECTOMY      INSERTION OF TUNNELED CENTRAL VENOUS CATHETER (CVC) WITH SUBCUTANEOUS PORT N/A 1/11/2021    Procedure: [id number];  Surgeon: Brandon Wagner MD;  Location: [facility];  Service: General;  Laterality: N/A;    INSERTION OF TUNNELED CENTRAL VENOUS CATHETER (CVC) WITH SUBCUTANEOUS PORT Right 9/1/2022    Procedure:  [id number];  Surgeon: Brandon Wagner MD;  Location: [facility] OR;  Service: General;  Laterality: Right;  Wants rt side placement    LAPAROTOMY, EXPLORATORY N/A 7/24/2023    Procedure: LAPAROTOMY, EXPLORATORY;  Surgeon: Brandon Wagner MD;  Location: [facility] OR;  Service: General;  Laterality: N/A;  case must go 1st-chamberlain placing stents    MEDIPORT REMOVAL Left 4/19/2021    Procedure: REMOVAL, CATHETER, CENTRAL VENOUS, TUNNELED, WITH PORT;  Surgeon: Brandon Wagner MD;  Location: [facility] OR;  Service: General;  Laterality: Left;    REMOVAL-STENT Right 3/7/2023    Procedure: REMOVAL-STENT;  Surgeon: Carin Kwon Jr., MD;  Location: [facility] OR;  Service: Urology;  Laterality: Right;    REMOVAL-STENT Bilateral 4/11/2023    Procedure: REMOVAL-STENT;  Surgeon: Carin Kwon Jr., MD;  Location: [facility] OR;  Service: Urology;  Laterality: Bilateral;    RETROGRADE PYELOGRAPHY Bilateral 4/11/2023    Procedure: PYELOGRAM, RETROGRADE;  Surgeon: Carin Kwon Jr., MD;  Location: [facility] OR;  Service: Urology;  Laterality: Bilateral;    ROBOT-ASSISTED COLECTOMY N/A 11/9/2020    Procedure: ROBOTIC COLECTOMY low anterior resection, possible open;  Surgeon: Brandon Wagner MD;  Location: [facility] OR;  Service: General;  Laterality: N/A;  CONVERTED TO OPEN AT 1503    SUBTOTAL COLECTOMY N/A 7/24/2023    Procedure: COLECTOMY, PARTIAL;  Surgeon: Brandon Wagner MD;  Location: [facility] OR;  Service: General;  Laterality: N/A;  with colorectal Anastamosis    URETEROSCOPIC REMOVAL OF URETERIC CALCULUS Right 3/7/2023    Procedure: REMOVAL, CALCULUS, URETER, URETEROSCOPIC;  Surgeon: Carin Kwon Jr., MD;  Location: [facility] OR;  Service: Urology;  Laterality: Right;    URETEROSCOPIC REMOVAL OF URETERIC CALCULUS Bilateral 4/11/2023    Procedure: REMOVAL, CALCULUS, URETER, URETEROSCOPIC;  Surgeon: Carin Kwon Jr., MD;  Location: [facility] OR;  Service: Urology;  Laterality: Bilateral;       Review of patient's allergies indicates:    Allergen Reactions    Ativan [lorazepam] Hives and Itching    Flagyl [metronidazole] Itching and Rash    Pcn [penicillins] Hives, Itching and Rash       Current Facility-Administered Medications on File Prior to Encounter   Medication    diphenhydrAMINE injection 12.5 mg    electrolyte-S (ISOLYTE)    [COMPLETED] fentaNYL 50 mcg/mL injection 25 mcg    HYDROmorphone (PF) injection 0.2 mg    lactated ringers infusion    lactated ringers infusion    LIDOcaine (PF) 10 mg/ml (1%) injection 10 mg    LORazepam injection 0.25 mg    ondansetron injection 4 mg    sodium chloride 0.9% flush 3 mL     Current Outpatient Medications on File Prior to Encounter   Medication Sig    diphenoxylate-atropine 2.5-0.025 mg (LOMOTIL) 2.5-0.025 mg per tablet Take by mouth.    ferrous sulfate 325 (65 FE) MG EC tablet Take 1 tablet (325 mg total) by mouth every other day.    HYDROcodone-acetaminophen (NORCO) 5-325 mg per tablet TAKE ONE TABLET BY MOUTH EVERY SIX HOURS AS NEEDED FOR PAIN    levothyroxine (SYNTHROID) 125 MCG tablet Take 1 tablet (125 mcg total) by mouth before breakfast.    ondansetron (ZOFRAN-ODT) 8 MG TbDL Take 1 tablet (8 mg total) by mouth every 12 (twelve) hours as needed.    potassium chloride SA (K-DUR,KLOR-CON) 20 MEQ tablet Take 1 tablet (20 mEq total) by mouth once daily.    albuterol (PROVENTIL) 2.5 mg /3 mL (0.083 %) nebulizer solution Take 3 mLs (2.5 mg total) by nebulization every 6 (six) hours as needed for Wheezing. Rescue (Patient not taking: Reported on 7/20/2023)    CMPD hydrocortisone 2%- LIDOcaine 3% suppository (RECTAL ROCKET) Place 1 suppository rectally every evening. Insert 1 suppository 3/4 of the way onto rectum. Wet for easier application. Repeat for 3 nights.    prochlorperazine (COMPAZINE) 10 MG tablet Take 1 tablet (10 mg total) by mouth every 6 (six) hours as needed.    triamcinolone acetonide 0.025% (KENALOG) 0.025 % cream Apply topically once daily.     Family History        Problem Relation (Age of Onset)    Breast cancer Maternal Grandmother    COPD Mother, Father    Prostate cancer Brother          Tobacco Use    Smoking status: Former     Packs/day: 2.00     Years: 40.00     Pack years: 80.00     Types: Cigarettes     Quit date: 2017     Years since quittin.0    Smokeless tobacco: Never   Substance and Sexual Activity    Alcohol use: No    Drug use: No    Sexual activity: Not Currently     Partners: Male     Review of Systems   Unable to perform ROS: Intubated   Objective:     Vital Signs (Most Recent):  Temp: 98.8 °F (37.1 °C) (23)  Pulse: (!) 118 (23)  Resp: (!) 29 (23)  BP: (!) 95/45 (23)  SpO2: 99 % (23) Vital Signs (24h Range):  Temp:  [97.1 °F (36.2 °C)-98.8 °F (37.1 °C)] 98.8 °F (37.1 °C)  Pulse:  [106-131] 118  Resp:  [12-30] 29  SpO2:  [89 %-100 %] 99 %  BP: ()/(39-87) 95/45  Arterial Line BP: ()/(46-84) 106/49     Weight: 89.4 kg (197 lb)  Body mass index is 37.22 kg/m².     Physical Exam  Vitals and nursing note reviewed.   Constitutional:       Comments: Critically ill appearing, lying in bed   HENT:      Head: Normocephalic and atraumatic.      Mouth/Throat:      Comments: Orally intubated with OG tube in place  Eyes:      General:         Right eye: No discharge.         Left eye: No discharge.      Conjunctiva/sclera: Conjunctivae normal.      Comments: Pupils about 2 mm and reactive bilaterally   Cardiovascular:      Rate and Rhythm: Regular rhythm. Tachycardia present.      Comments: No lower extremity edema  Pulmonary:      Comments: Orally intubated on mechanical ventilation FiO2 50 with 10 of PEEP, mechanical breath sounds anteriorly, no wheeze anterior auscultation  Abdominal:      Comments: Midline surgical dressing, clean dry intact, JUANITA drain in place with serosanguineous output, no significant bowel sounds   Genitourinary:     Comments: Velasco catheter in place with urine  draining  Skin:     Comments: Left upper extremity midline in place, right radial arterial line in place   Neurological:      Comments: Pupils equal and reactive.  Currently sedated on propofol   Psychiatric:      Comments: Unable to evaluate              Significant Labs: BMP:   Recent Labs   Lab 07/27/23 2104   *   *   K 3.7      CO2 19*   BUN 29*   CREATININE 1.2   CALCIUM 5.6*   MG 1.9       CBC:   Recent Labs   Lab 07/26/23 0448 07/27/23 0415 07/27/23 2104 07/27/23 2105 07/27/23  2224   WBC 9.15 4.77 1.14*  --   --    HGB 9.5* 8.3* 8.9*  --   --    HCT 29.4* 26.3* 26.6* 26* 26*    185 125*  --   --        CMP:   Recent Labs   Lab 07/26/23 0448 07/27/23 0415 07/27/23 2104   * 132* 132*   K 4.5 4.8 3.7    103 105   CO2 20* 19* 19*   * 108 123*   BUN 30* 36* 29*   CREATININE 1.8* 1.8* 1.2   CALCIUM 7.6* 7.4* 5.6*   PROT  --   --  3.7*   ALBUMIN  --  2.1* 1.6*   BILITOT  --   --  1.3*   ALKPHOS  --   --  50*   AST  --   --  43*   ALT  --   --  30   ANIONGAP 9 10 8       Cardiac Markers:   Recent Labs   Lab 07/27/23 2104   BNP 25       Coagulation: No results for input(s): PT, INR, APTT in the last 48 hours.  Lactic Acid:   Recent Labs   Lab 07/26/23  1502 07/26/23  2031 07/27/23 2104   LACTATE 2.2 2.3* 0.9       Magnesium:   Recent Labs   Lab 07/27/23 2104   MG 1.9     POCT Glucose:   Recent Labs   Lab 07/26/23  0802 07/26/23  1454 07/26/23  1631   POCTGLUCOSE 98 98 79       Troponin:   Recent Labs   Lab 07/27/23 2104   TROPONINIHS 12.4     TSH:   Recent Labs   Lab 07/27/23 0415   TSH 23.868*       Urine Culture: No results for input(s): LABURIN in the last 48 hours.  Urine Studies:   Recent Labs   Lab 07/26/23  1517   COLORU Red*   APPEARANCEUA Cloudy*   PHUR SEE COMMENT   SPECGRAV SEE COMMENT   PROTEINUA SEE COMMENT   GLUCUA SEE COMMENT   KETONESU SEE COMMENT   BILIRUBINUA SEE COMMENT   OCCULTUA SEE COMMENT   NITRITE SEE COMMENT   UROBILINOGEN SEE COMMENT    LEUKOCYTESUR SEE COMMENT   RBCUA >100*   WBCUA 6*   BACTERIA Moderate*   SQUAMEPITHEL 1         Significant Imaging: I have reviewed all pertinent imaging results/findings within the past 24 hours.    X-Ray Chest 1 View    Result Date: 7/27/2023  CLINICAL INDICATION: ET tube placement. TECHNIQUE: A single portable AP  view was obtained of the chest at 2122 hours. COMPARISON:  None available CORRELATION:  None available FINDINGS: The cardiomediastinal  silhouette is enlarged. The lungs there is a congestive change. Left basilar airspace and pleural disease. No right-sided effusion. No pneumothorax. The right subclavian power injectable port catheter tip projects over high SVC, adequate.. Endotracheal tube tip is less than 1 cm above the anjel. This would benefit from being withdrawn 2 cm. Nasogastric tube tip projects over the stomach, sidehole at GE junction. This is satisfactory for aspiration only. If it is desired for the sidehole to be within the stomach, the device needs to be advanced approximately 8 cm. IMPRESSION: Hardware as described. Critical value notification system has been initiated Electronically signed by:  Hugo Restrepo MD  7/27/2023 9:38 PM CDT Workstation: [id number]    X-Ray Abdomen AP 1 View    Result Date: 7/26/2023  EXAMINATION: XR ABDOMEN AP 1 VIEW CLINICAL HISTORY: tachycardia; TECHNIQUE: AP View(s) of the abdomen was performed. COMPARISON: CT abdomen pelvis of April 22 23 FINDINGS: The bowel gas pattern is within normal limits.  Free air is not identified.  There is a surgical drain identified in the pelvis and correlation with the history is recommended.  No masses are identified.     Surgical drain noted in the pelvis otherwise negative abdomen x-ray Electronically signed by: Ramesh Bar MD Date:    07/26/2023 Time:    08:58    X-Ray Chest AP Portable    Result Date: 7/26/2023  EXAMINATION: XR CHEST AP PORTABLE CLINICAL HISTORY: tachycardic; TECHNIQUE: Single frontal view of the  chest was performed. COMPARISON: Chest x-ray of March 25, 2023 FINDINGS: A venous Port-A-Cath is noted on the right with tip of the central line ending in the superior vena cava.  This x-rays taken in a apical lordotic projection and therefore the port of the Port-A-Cath position is questioned.  The cardiac size is within normal limits.  The lung fields are clear without mass or infiltrate.  No pneumothorax or pleural effusion noted.     Venous Port-A-Cath on the right.  The position of the port is questioned.  The central line ends in the superior vena cava. Electronically signed by: Ramesh Bar MD Date:    07/26/2023 Time:    09:02       Assessment/Plan:     * Ischemic necrosis of small bowel  OR note 7/27 with extensive area of small bowel necrosis   Possible etiology for ongoing tachycardia/sepsis   IV fluid hydration  Broaden antibiotics to meropenem   Lactic acid ordered and pending   Serial abdominal examination   Appreciate general surgery input      On mechanically assisted ventilation  Intubated for repeat procedure earlier today and remains on mechanical ventilation   Continue oral intubation with mechanical ventilation  P.r.n. ABG and chest x-ray, chest x-ray in a.m.  Famotidine for GI prophylaxis  Ventilator precautions   Pulmonary consulted for management      Hypocalcemia  Corrected calcium remains low.  Ordered IV replacement and trending.    Sinus tachycardia  Multifactorial in the setting of acute medical conditions including dehydration, pain, postop, anemia  Telemetry monitoring   Replace thyroid as outlined, add on magnesium level      Hyponatremia  Monitor with IV fluid hydration     Malnutrition of moderate degree  Albumin 1.6 in the setting of extensive bowel surgery   Currently NPO, if prolonged consider parenteral nutrition    Severe sepsis  Ongoing sinus tachycardia and now repeat labs with leukopenia  Extensive area of small bowel necrosis   Escalate antibiotics to meropenem and  following clinically  Lactic acid in progress   Monitor blood pressure, aim for map greater than 65    BOOM (acute kidney injury)  Postop course complicated by acute kidney injury, creatinine today 1.8, repeat labs pending   History of nephrolithiasis status post bilateral ureteral stenting   Continue IV fluid hydration   Keep Velasco catheter in place, monitoring urine output  Renally dosing all medications and avoiding nephrotoxin drugs   Appreciate nephrology input    Bilateral kidney stones  Seen by Dr. Mijares this admission, followed outpatient by Dr. Kwon   Status post bilateral stent placement      Severe obesity (BMI 35.0-39.9) with comorbidity  Body mass index is 37.22 kg/m². Morbid obesity complicates all aspects of disease management from diagnostic modalities to treatment. Weight loss encouraged and health benefits explained to patient.         FENG (iron deficiency anemia)  Worsening postoperatively, status post PRBC transfusion   Repeat anemia labs including B12, folic acid, iron studies  Transfuse as needed      Malignant neoplasm of colon  History of recurrent colorectal adenocarcinoma  See small-bowel ischemia  Did receive adjuvant chemoradiation, followed by Dr. Frank    Hypothyroidism with abnormal TFTs  Recent TSH elevated at 23, as per chart review elevated 2 years previous, unclear if compliance   Given current NPO status with bowel surgery changed to IV levothyroxine    COPD (chronic obstructive pulmonary disease)  No evidence of acute exacerbation   P.r.n. ABG and chest x-ray  Scheduled breathing treatments        VTE Risk Mitigation (From admission, onward)         Ordered     IP VTE HIGH RISK PATIENT  Once         07/24/23 2015     enoxaparin injection 40 mg  Daily         07/24/23 2015                           Ayla Gracia MD  Department of Hospital Medicine  [facility]

## 2023-07-28 NOTE — PLAN OF CARE
07/27/23 2125   Patient Assessment/Suction   Level of Consciousness (AVPU) unresponsive   Respiratory Effort Unlabored   Expansion/Accessory Muscles/Retractions no use of accessory muscles   All Lung Fields Breath Sounds clear;diminished;equal bilaterally   Rhythm/Pattern, Respiratory assisted mechanically   $ Suction Charges Inline Suction Procedure Stat Charge;Sputum Collection;Close Suction System Equipment   Secretions Amount moderate   Secretions Color yellow   Secretions Characteristics thin   Sputum Collection sample obtained per suctioning   Skin Integrity   $ Wound Care Tech Time 15 min   Area Observed Upper lip;Lower lip;Corner lip   Skin Appearance without discoloration   PRE-TX-O2   Device (Oxygen Therapy) ventilator   $ Is the patient on Low Flow Oxygen? Yes   Oxygen Concentration (%) 50   SpO2 (!) 93 %   Pulse Oximetry Type Continuous   $ Pulse Oximetry - Multiple Charge Pulse Oximetry - Multiple   Pulse 108   Resp 13        Airway - Non-Surgical 07/27/23   Placement Date: 07/27/23   Present Prior to Hospital Arrival?: Yes  Airway Device Size: 7.5  Style: Cuffed  Cuff Inflation: Minimal occlusive pressure  Cuff Inflated With: Air  Placement Verified By: Auscultation;Chest X-ray  Depth of Insertion (cm): ...   Secured at 22 cm   Measured At Lips   Secured Location Center   Secured by Commercial tube narayanan   Bite Block center;secure and patent   Site Condition Cool;Dry   Status Intact;Secured;Patent   Site Assessment Clean;Dry;No bleeding;No drainage   Cuff Pressure   (mlt- checked with TruCuff)   Airway Safety   Is Ambu Bag and Mask with Patient? Yes, Adult Ambu Bag and Mask   Suction set is at the bedside? Yes   Vent Select   Conventional Vent Y   Intubation? Transfer In   Does the patient have an artificial airway? Yes   Ventilator Initiated Yes   $ Ventilator Initial 1   Charged w/in last 24h YES   Preset Conventional Ventilator Settings   Vent ID [id number]   Vent Type    Ventilation Type VC    Vent Mode A/C   Humidity HME   Set Rate 24 BPM   Vt Set 420 mL   PEEP/CPAP 10 cmH20   Peak Flow 55 L/min   Peak End Inspiratory Pressure 23 cmH20   I-Trigger Type  V-TRIG   Trigger Sensitivity Flow/I-Trigger 3 L/min   Patient Ventilator Parameters   Resp Rate Total 24 br/min   Peak Airway Pressure 28 cmH20   Mean Airway Pressure 16 cmH20   Plateau Pressure 0 cmH20   Exhaled Vt 424 mL   Total Ve 10.2 L/m   I:E Ratio Measured 1:2.00   Auto PEEP 0 cmH20   Conventional Ventilator Alarms   Alarms On Y   Resp Rate High Alarm 40 br/min   Press High Alarm 40 cmH2O   Apnea Rate 10   Apnea Volume (mL) 0 mL   Apnea Oxygen Concentration  100   Apnea Flow Rate (L/min) 55   T Apnea 20 sec(s)   IHI Ventilator Associated Pneumonia Bundle (Required)   Oral Care Mouth suctioned   Vent Circut Breaks Minimized Yes   Ready to Wean/Extubation Screen   FIO2<=50 (chart decimal) 0.5   MV<16L (chart vol.) 10.2   PEEP <=8 (chart #) (!) 10   Ready to Wean Parameters   F/VT Ratio<105 (RSBI) (!) 30.66   Labs   $ Was an ABG obtained? ISTAT - Blood gas;ISTAT - Calcium;ISTAT - Hematocrit;ISTAT - Potassium;ISTAT - Sodium;Arterial Blood Draw from Existing Line   $ Labs Tech Time 15 min   Critical Value Communication   Date Result Received 07/27/23   Time Result Received 2105   Resulting Department of Critical Value resp   Who communicated critical value from resulting department? maddy wendie RRT   Critical Test #1 ph   Critical Test #1 Result 7.25   Critical Test #2 co2   Critical Test #2 Result 41   Critical Test #3  po2   Critical Test #3 Result 68   Critical Test #4 hco3   Critical Test #4 Result 18.2   Name of Notified Physician/Designee MADDY Scott RN, MERE Nathan MD   Date Notified 07/27/23   Time Notified 2119   Read Back Verification Yes   Physician Directive rate 24 Peep 10 fio2 50   Respiratory Evaluation   $ Care Plan Tech Time 15 min   $ Eval/Re-eval Charges Evaluation   Evaluation For New Orders     Pt txfer from [facility]. Abg done  on arrival. Sputum collected. Adjusted settings per MD,   Pt resting comfortably on ventilator at this time.

## 2023-07-28 NOTE — PROGRESS NOTES
PT seen and examined.  NO acute events overnight  Hemodynamics remain stable  Will plan to go back to OR today for evaluation and possible anastomosis

## 2023-07-28 NOTE — PROGRESS NOTES
INPATIENT NEPHROLOGY CONSULT   [facility] NEPHROLOGY    Tessa Enriquez  07/28/2023    Reason for consultation:  Acute kidney injury    Chief Complaint:   Chief Complaint   Patient presents with    Post-op Problem     History of Present Illness:    67 year old female with recurrent adenocarcinoma of the colon.  She had another resection with formation of a colostomy on 7/24.  She had persistent tachycardia, abdominal pain and BOOM following this.  She was noted to have an ischemic stoma and was brought back to the OR where multiple areas of ischemic small bowel were found.    7/26  Consulted for BOOM.  Pt POD 2 s/p ex lap with extensive lysis of adhesions, bilateral ureteral stent insertion, and partial colectomy with colostomy placement. She has developed oliguric with boom.    7/27 Afebrile.  Tachycardic.  680 cc uop    7/28 VSS, monitor. UO is improving.    Plan of Care:    Acute kidney injury secondary to hemodynamically mediated renal injury s/p prolonged episode of hypotension  --ua with  micro without casts   --avoid NSAIDS, Jacobs II inhibitors, and other non-essential nephrotoxic agents  (NO MORE IBUPROFEN)   --urine Na sodium very low implicating prerenal pathology  --keep map above 55  --renal dose medication for crcl 10-50.  If oliguria persists renal dose for crcl 10  --strict I/Os  --maintain colin  --continue iv fluids  --urine eosinophils negative  --at risk for contrast associate BOOM     Dr. Barreto discussed the risks and benefits of hemodialysis with the patient and her daughter. All of their questions were answered.Risks include low blood pressure, stroke, heart attack, seizure, infection, nausea, delirium, and death.    They expressed understanding.  No acute need for RRT at this time.  Will reassess daily    Metabolic acidosis/lactic acidosis  --continue iv fluids    Hyponatremia  --avoid hypotonic iv piggy backs  --trend  --urine osm concentrating implicating ADH effect.                                                                                                                                                                                                                                                                                                                    Anemia  --iron panel reveals iron deficiency  --venofer  --erythropoietin stimulating agent if iron stores adequate  --pRBC per primary    Hypotension with sinus tachycardia  --judicious opiates  --iv fluids    Thank you for allowing us to participate in this patient's care. We will continue to follow.    Vital Signs:  Temp Readings from Last 3 Encounters:   07/28/23 99.7 °F (37.6 °C) (Axillary)   07/03/23 97.5 °F (36.4 °C) (Temporal)   06/27/23 98.1 °F (36.7 °C) (Temporal)       Pulse Readings from Last 3 Encounters:   07/28/23 (!) 120   07/03/23 84   06/27/23 84       BP Readings from Last 3 Encounters:   07/28/23 100/61   07/03/23 (!) 143/79   06/27/23 (!) 185/86       Weight:  Wt Readings from Last 3 Encounters:   07/24/23 89.4 kg (197 lb)   07/03/23 89.3 kg (196 lb 13.9 oz)   06/27/23 89.7 kg (197 lb 12 oz)       Past Medical & Surgical History:  Past Medical History:   Diagnosis Date    Acute hypoxemic respiratory failure 07/09/2018    Arthritis     Bilateral kidney stones 3/27/2023    Cancer     colon    COPD (chronic obstructive pulmonary disease)     History of home oxygen therapy     ONLY PRN AND HASN'T USED IT IN A YEAR    Hyperlipidemia     Thyroid disease        Past Surgical History:   Procedure Laterality Date    CHOLECYSTECTOMY      COLECTOMY N/A 11/9/2020    Procedure: COLECTOMY;  Surgeon: Brandon Wagner MD;  Location: [facility] OR;  Service: General;  Laterality: N/A;  LOWER- ANTERIOR    COLONOSCOPY N/A 10/23/2020    Procedure: COLONOSCOPY;  Surgeon: Jolynn Ayala MD;  Location: [facility];  Service: Endoscopy;  Laterality: N/A;    COLONOSCOPY N/A 8/6/2021    Procedure: COLONOSCOPY;  Surgeon: Jolynn Ayala MD;   Location: [facility] ENDO;  Service: Endoscopy;  Laterality: N/A;    COLONOSCOPY N/A 7/7/2022    Procedure: COLONOSCOPY;  Surgeon: Jolynn Ayala MD;  Location: [facility] ENDO;  Service: Endoscopy;  Laterality: N/A;    COLONOSCOPY N/A 5/31/2023    Procedure: COLONOSCOPY;  Surgeon: Jolynn Ayala MD;  Location: [facility] ENDO;  Service: Endoscopy;  Laterality: N/A;    COLOSTOMY N/A 7/24/2023    Procedure: CREATION, COLOSTOMY;  Surgeon: Brandon Wagner MD;  Location: [facility] OR;  Service: General;  Laterality: N/A;    COMPLETION PROCTECTOMY N/A 7/24/2023    Procedure: PROCTECTOMY, COMPLETION;  Surgeon: Brandon Wagner MD;  Location: [facility] OR;  Service: General;  Laterality: N/A;    CYSTOSCOPY W/ URETERAL STENT PLACEMENT Right 2/22/2023    Procedure: CYSTOSCOPY, WITH URETERAL STENT INSERTION;  Surgeon: Carin Kwon Jr., MD;  Location: [facility] OR;  Service: Urology;  Laterality: Right;    CYSTOSCOPY W/ URETERAL STENT PLACEMENT Bilateral 3/26/2023    Procedure: CYSTOSCOPY, WITH URETERAL STENT INSERTION;  Surgeon: Sabine Barber MD;  Location: [facility] OR;  Service: Urology;  Laterality: Bilateral;    CYSTOSCOPY W/ URETERAL STENT PLACEMENT Bilateral 7/24/2023    Procedure: CYSTOSCOPY, WITH URETERAL STENT INSERTION;  Surgeon: Adrienne Mijares MD;  Location: [facility] OR;  Service: Urology;  Laterality: Bilateral;  bassem placing stents 1st    CYSTOSCOPY WITH URETEROSCOPY, RETROGRADE PYELOGRAPHY, AND INSERTION OF STENT Bilateral 3/26/2023    Procedure: CYSTOSCOPY, WITH RETROGRADE PYELOGRAM AND URETERAL STENT INSERTION;  Surgeon: Sabine Barber MD;  Location: [facility] OR;  Service: Urology;  Laterality: Bilateral;    CYSTOURETEROSCOPY WITH RETROGRADE PYELOGRAPHY AND INSERTION OF STENT INTO URETER Right 3/7/2023    Procedure: CYSTOURETEROSCOPY, WITH RETROGRADE PYELOGRAM AND URETERAL STENT INSERTION;  Surgeon: Carin Kwon Jr., MD;  Location: [facility] OR;  Service: Urology;  Laterality: Right;    CYSTOURETEROSCOPY, WITH HOLMIUM  LASER LITHOTRIPSY OF URETERAL CALCULUS AND STENT INSERTION Bilateral 4/11/2023    Procedure: CYSTOURETEROSCOPY, WITH HOLMIUM LASER LITHOTRIPSY OF URETERAL CALCULUS AND STENT INSERTION;  Surgeon: Carin Kwon Jr., MD;  Location: [facility] OR;  Service: Urology;  Laterality: Bilateral;    ESOPHAGEAL DILATION N/A 6/29/2021    Procedure: DILATION, ESOPHAGUS;  Surgeon: Sourav Baires III, MD;  Location: [facility] ENDO;  Service: Endoscopy;  Laterality: N/A;    ESOPHAGOGASTRODUODENOSCOPY  06/29/2021    Dilation to 57 Fr    ESOPHAGOGASTRODUODENOSCOPY N/A 6/29/2021    Procedure: EGD (ESOPHAGOGASTRODUODENOSCOPY);  Surgeon: Sourav Baires III, MD;  Location: [facility] ENDO;  Service: Endoscopy;  Laterality: N/A;    FLEXIBLE SIGMOIDOSCOPY N/A 8/1/2022    Procedure: SIGMOIDOSCOPY, FLEXIBLE;  Surgeon: Brandon Wagner MD;  Location: [facility] ENDO;  Service: Endoscopy;  Laterality: N/A;    HYSTERECTOMY      INSERTION OF TUNNELED CENTRAL VENOUS CATHETER (CVC) WITH SUBCUTANEOUS PORT N/A 1/11/2021    Procedure: [id number];  Surgeon: Brandon Wagner MD;  Location: [facility] OR;  Service: General;  Laterality: N/A;    INSERTION OF TUNNELED CENTRAL VENOUS CATHETER (CVC) WITH SUBCUTANEOUS PORT Right 9/1/2022    Procedure: [id number];  Surgeon: Brandon Wagner MD;  Location: [facility] OR;  Service: General;  Laterality: Right;  Wants rt side placement    LAPAROTOMY, EXPLORATORY N/A 7/24/2023    Procedure: LAPAROTOMY, EXPLORATORY;  Surgeon: Brandon Wagner MD;  Location: [facility] OR;  Service: General;  Laterality: N/A;  case must go 1st-chamberlain placing stents    MEDIPORT REMOVAL Left 4/19/2021    Procedure: REMOVAL, CATHETER, CENTRAL VENOUS, TUNNELED, WITH PORT;  Surgeon: Brandon Wagner MD;  Location: [facility] OR;  Service: General;  Laterality: Left;    REMOVAL-STENT Right 3/7/2023    Procedure: REMOVAL-STENT;  Surgeon: Carin Kwon Jr., MD;  Location: [facility] OR;  Service: Urology;  Laterality: Right;    REMOVAL-STENT Bilateral  2023    Procedure: REMOVAL-STENT;  Surgeon: Carin Kwon Jr., MD;  Location: [facility] OR;  Service: Urology;  Laterality: Bilateral;    RETROGRADE PYELOGRAPHY Bilateral 2023    Procedure: PYELOGRAM, RETROGRADE;  Surgeon: Carin Kwon Jr., MD;  Location: [facility];  Service: Urology;  Laterality: Bilateral;    ROBOT-ASSISTED COLECTOMY N/A 2020    Procedure: ROBOTIC COLECTOMY low anterior resection, possible open;  Surgeon: Brandon Wagner MD;  Location: [facility];  Service: General;  Laterality: N/A;  CONVERTED TO OPEN AT 1503    SUBTOTAL COLECTOMY N/A 2023    Procedure: COLECTOMY, PARTIAL;  Surgeon: Brandon Wagner MD;  Location: [facility];  Service: General;  Laterality: N/A;  with colorectal Anastamosis    URETEROSCOPIC REMOVAL OF URETERIC CALCULUS Right 3/7/2023    Procedure: REMOVAL, CALCULUS, URETER, URETEROSCOPIC;  Surgeon: Carin Kwon Jr., MD;  Location: [facility];  Service: Urology;  Laterality: Right;    URETEROSCOPIC REMOVAL OF URETERIC CALCULUS Bilateral 2023    Procedure: REMOVAL, CALCULUS, URETER, URETEROSCOPIC;  Surgeon: Carin Kwon Jr., MD;  Location: [facility];  Service: Urology;  Laterality: Bilateral;       Past Social History:  Social History     Socioeconomic History    Marital status:    Tobacco Use    Smoking status: Former     Packs/day: 2.00     Years: 40.00     Pack years: 80.00     Types: Cigarettes     Quit date: 2017     Years since quittin.0    Smokeless tobacco: Never   Substance and Sexual Activity    Alcohol use: No    Drug use: No    Sexual activity: Not Currently     Partners: Male     Social Determinants of Health     Financial Resource Strain: Unknown    Difficulty of Paying Living Expenses: Patient refused   Food Insecurity: Unknown    Worried About Running Out of Food in the Last Year: Patient refused    Ran Out of Food in the Last Year: Patient refused   Transportation Needs: Unknown    Lack of Transportation (Medical): Patient refused     Lack of Transportation (Non-Medical): Patient refused   Physical Activity: Unknown    Days of Exercise per Week: Patient refused    Minutes of Exercise per Session: Patient refused   Stress: Unknown    Feeling of Stress : Patient refused   Social Connections: Unknown    Frequency of Communication with Friends and Family: Patient refused    Frequency of Social Gatherings with Friends and Family: Patient refused    Attends Spiritism Services: Patient refused    Active Member of Clubs or Organizations: Patient refused    Attends Club or Organization Meetings: Patient refused    Marital Status: Patient refused   Housing Stability: Unknown    Unable to Pay for Housing in the Last Year: Patient refused    Unstable Housing in the Last Year: Patient refused       Medications:  Current Facility-Administered Medications on File Prior to Encounter   Medication Dose Route Frequency Provider Last Rate Last Admin    diphenhydrAMINE injection 12.5 mg  12.5 mg Intravenous Once PRN Monster Atwood MD        electrolyte-S (ISOLYTE)   Intravenous Continuous Monster Atwood MD 25 mL/hr at 03/07/23 1138 New Bag at 07/24/23 1804    [COMPLETED] fentaNYL 50 mcg/mL injection 25 mcg  25 mcg Intravenous Q5 Min PRN Monster Atwood MD   25 mcg at 07/27/23 1936    HYDROmorphone (PF) injection 0.2 mg  0.2 mg Intravenous Q5 Min PRN Monster Atwood MD        lactated ringers infusion  10 mL/hr Intravenous Continuous Monster Atwood MD        lactated ringers infusion  500 mL Intravenous Once Monster Atwood MD        LIDOcaine (PF) 10 mg/ml (1%) injection 10 mg  1 mL Intradermal Once Monster Atwood MD        LORazepam injection 0.25 mg  0.25 mg Intravenous Once PRN Monster Atwood MD        ondansetron injection 4 mg  4 mg Intravenous Once PRN Monster Atwood MD        sodium chloride 0.9% flush 3 mL  3 mL Intravenous Q8H Monster Atwood MD         Current Outpatient Medications on File Prior to  Encounter   Medication Sig Dispense Refill    diphenoxylate-atropine 2.5-0.025 mg (LOMOTIL) 2.5-0.025 mg per tablet Take by mouth.      ferrous sulfate 325 (65 FE) MG EC tablet Take 1 tablet (325 mg total) by mouth every other day.  0    HYDROcodone-acetaminophen (NORCO) 5-325 mg per tablet TAKE ONE TABLET BY MOUTH EVERY SIX HOURS AS NEEDED FOR PAIN 90 tablet 0    levothyroxine (SYNTHROID) 125 MCG tablet Take 1 tablet (125 mcg total) by mouth before breakfast. 30 tablet 11    ondansetron (ZOFRAN-ODT) 8 MG TbDL Take 1 tablet (8 mg total) by mouth every 12 (twelve) hours as needed. 30 tablet 1    potassium chloride SA (K-DUR,KLOR-CON) 20 MEQ tablet Take 1 tablet (20 mEq total) by mouth once daily. 30 tablet 11    albuterol (PROVENTIL) 2.5 mg /3 mL (0.083 %) nebulizer solution Take 3 mLs (2.5 mg total) by nebulization every 6 (six) hours as needed for Wheezing. Rescue (Patient not taking: Reported on 7/20/2023) 120 each 1    CMPD hydrocortisone 2%- LIDOcaine 3% suppository (RECTAL ROCKET) Place 1 suppository rectally every evening. Insert 1 suppository 3/4 of the way onto rectum. Wet for easier application. Repeat for 3 nights. 30 suppository 2    prochlorperazine (COMPAZINE) 10 MG tablet Take 1 tablet (10 mg total) by mouth every 6 (six) hours as needed. 30 tablet 1    triamcinolone acetonide 0.025% (KENALOG) 0.025 % cream Apply topically once daily. 80 g 1     Scheduled Meds:   albuterol-ipratropium  3 mL Nebulization Q6H    budesonide  0.5 mg Nebulization Q12H    chlorhexidine  15 mL Mouth/Throat BID    levothyroxine  75 mcg Intravenous Before breakfast    meropenem (MERREM) IVPB  1 g Intravenous Q12H    pantoprazole  40 mg Intravenous Daily    propofoL         Continuous Infusions:   lactated ringers 125 mL/hr at 07/28/23 0427    NORepinephrine bitartrate-D5W Stopped (07/28/23 0515)    propofoL 35 mcg/kg/min (07/28/23 0723)     PRN Meds:.acetaminophen, calcium gluconate IVPB, calcium gluconate IVPB, calcium  "gluconate IVPB, HYDROmorphone, magnesium sulfate IVPB, magnesium sulfate IVPB, ondansetron, potassium chloride **AND** potassium chloride **AND** potassium chloride, sodium phosphate IVPB, sodium phosphate IVPB, sodium phosphate IVPB    Allergies:  Ativan [lorazepam], Flagyl [metronidazole], and Pcn [penicillins]    Past Family History:  Reviewed; refer to Hospitalist Admission Note    Review of Systems:  Review of Systems - All 14 systems reviewed and negative, except as noted in HPI    Physical Exam:    /61   Pulse (!) 120   Temp 99.7 °F (37.6 °C) (Axillary)   Resp (!) 24   Ht 5' 1" (1.549 m)   Wt 89.4 kg (197 lb)   SpO2 95%   Breastfeeding No   BMI 37.22 kg/m²     General Appearance:    No distress   Head:    Normocephalic, without obvious abnormality, atraumatic   Eyes:    PER, conjunctiva/corneas clear, EOM's intact in both eyes        Throat:   Lips, mucosa, and tongue normal; teeth and gums normal   Back:     Symmetric, no curvature, ROM normal, no CVA tenderness   Lungs:     Clear to auscultation bilaterally, respirations unlabored   Chest wall:    No tenderness or deformity   Heart:    tachycardic   Abdomen:     Tender.  Bandages in place.  Drain in place   Extremities:   Extremities normal, atraumatic, no cyanosis or edema   Pulses:   2+ and symmetric all extremities   MSK:   No joint or muscle swelling, tenderness or deformity   Skin:   Skin color, texture, turgor normal, no rashes or lesions   Neurologic:   Mildly confused.   No flap     Results:  Recent Labs   Lab 07/27/23 0415 07/27/23 2104 07/28/23  0359   * 132* 131*   K 4.8 3.7 4.0    105 105   CO2 19* 19* 18*   BUN 36* 29* 28*   CREATININE 1.8* 1.2 1.3    123* 115*       Recent Labs   Lab 07/27/23 0415 07/27/23 2104 07/28/23  0359   CALCIUM 7.4* 5.6* 6.8*   ALBUMIN 2.1* 1.6* 1.6*   PHOS 4.0 4.4  --    MG  --  1.9 1.9             Recent Labs   Lab 07/26/23  0035 07/26/23  0802 07/26/23  1454 07/26/23  1631 "   POCTGLUCOSE 136* 98 98 79       Recent Labs   Lab 10/06/20  0924   Hemoglobin A1C 5.6       Recent Labs   Lab 07/27/23  0415 07/27/23 2104 07/27/23 2105 07/27/23 2224 07/28/23 0359 07/28/23 0414   WBC 4.77 1.14*  --   --  2.04*  --    HGB 8.3* 8.9*  --   --  9.6*  --    HCT 26.3* 26.6*   < > 26* 28.6* 28*    125*  --   --  157  --    MCV 85 85  --   --  84  --    MCHC 31.6* 33.5  --   --  33.6  --    MONO 9.0 4.0  --   --  4.9  0.1*  --     < > = values in this interval not displayed.       Recent Labs   Lab 07/26/23  0136 07/27/23 0415 07/27/23 2104 07/28/23 0359   BILITOT 0.9  --  1.3* 1.1*   PROT 5.1*  --  3.7* 4.4*   ALBUMIN 2.6* 2.1* 1.6* 1.6*   ALKPHOS 62  --  50* 54*   ALT 32  --  30 27   AST 39  --  43* 39       Recent Labs   Lab 02/21/23  0910 03/25/23  1810 07/24/23 2055 07/26/23  1517   Color, UA Yellow Yellow Yellow Red A   Appearance, UA Clear Hazy A Clear Cloudy A   pH, UA 6.0 8.0 7.0 SEE COMMENT   Specific Gravity, UA 1.010 1.010 1.015 SEE COMMENT   Protein, UA 1+ A Trace A Negative SEE COMMENT   Glucose, UA Negative Negative Negative SEE COMMENT   Ketones, UA Negative Negative Negative SEE COMMENT   Urobilinogen, UA Negative Negative Negative SEE COMMENT   Bilirubin (UA) Negative Negative Negative SEE COMMENT   Occult Blood UA Trace A 1+ A Trace A SEE COMMENT   Nitrite, UA Negative Negative Negative SEE COMMENT   RBC, UA 1 21 H  --  >100 H   WBC, UA 6 H 37 H  --  6 H   Bacteria Few A Few A  --  Moderate A   Hyaline Casts, UA 0  --   --   --        Recent Labs   Lab 07/27/23 2105 07/27/23 2224 07/28/23 0414   POC PH 7.247 LL 7.285 LL 7.369   POC PCO2 41.8 36.4 29.1 L   POC HCO3 18.2 L 17.3 L 16.8 L   POC PO2 68 L 62 L 78 L   POC SATURATED O2 90 L 89 L 95   POC BE -9 -9 -9   Sample ARTERIAL ARTERIAL ARTERIAL       Microbiology Results (last 7 days)       Procedure Component Value Units Date/Time    Culture, Respiratory with Gram Stain [[id number]] Collected: 07/27/23 1629    Order  Status: Sent Specimen: Respiratory from Tracheal Aspirate Updated: 07/27/23 1330          Patient care was time spent personally by me on the following activities:   Obtaining a history  Examination of patient.  Providing medical care at the patients bedside.  Developing a treatment plan with patient or surrogate and bedside caregivers  Ordering and reviewing laboratory studies, radiographic studies, pulse oximetry.  Ordering and performing treatments and interventions.  Evaluation of patient's response to treatment.  Discussions with consultants while on the unit and immediately available to the patient.  Re-evaluation of the patient's condition.  Documentation in the medical record.     Roscoe Silva MD  Nephrology  [facility]  [phone]

## 2023-07-28 NOTE — ASSESSMENT & PLAN NOTE
Intubated for repeat procedure earlier today and remains on mechanical ventilation   Continue oral intubation with mechanical ventilation  P.r.n. ABG and chest x-ray, chest x-ray in a.m.  Famotidine for GI prophylaxis  Ventilator precautions   Pulmonary consulted for management

## 2023-07-28 NOTE — NURSING
Notified Dr. Gracia of BP 80/58(64). New orders to give 500cc bolus IVF and start levophed if MAP <60 after fluids are completed. Will continue to monitor.

## 2023-07-28 NOTE — ASSESSMENT & PLAN NOTE
Seen by Dr. Mijares this admission, followed outpatient by Dr. Kwon   Status post bilateral stent placement

## 2023-07-28 NOTE — TRANSFER OF CARE
"Anesthesia Transfer of Care Note    Patient: Tessa Enriquez    Procedure(s) Performed: Procedure(s) (LRB):  LAPAROTOMY, EXPLORATORY (N/A)    Patient location: PACU    Transport from OR: Transported from OR intubated on 100% O2 by AMBU with adequate controlled ventilation    Post pain: adequate analgesia    Post assessment: tolerated procedure well and no apparent anesthetic complications    Post vital signs: stable    Level of consciousness: sedated    Nausea/Vomiting: vomiting (OG tube)    Complications: none    Transfer of care protocol was followed      Last vitals:   Visit Vitals  /87 (BP Location: Left leg, Patient Position: Lying)   Pulse (!) 128   Temp 37 °C (98.6 °F) (Oral)   Resp (!) 22   Ht 5' 1" (1.549 m)   Wt 89.4 kg (197 lb)   SpO2 98%   Breastfeeding No   BMI 37.22 kg/m²     " unable to assess

## 2023-07-28 NOTE — ASSESSMENT & PLAN NOTE
Ongoing sinus tachycardia and now repeat labs with leukopenia  Extensive area of small bowel necrosis   Escalate antibiotics to meropenem and following clinically  Lactic acid in progress   Monitor blood pressure, aim for map greater than 65

## 2023-07-29 NOTE — PLAN OF CARE
Problem: Infection  Goal: Absence of Infection Signs and Symptoms  Outcome: Ongoing, Progressing     Problem: Adult Inpatient Plan of Care  Goal: Plan of Care Review  Outcome: Ongoing, Progressing  Goal: Patient-Specific Goal (Individualized)  Outcome: Ongoing, Progressing  Goal: Absence of Hospital-Acquired Illness or Injury  Outcome: Ongoing, Progressing  Goal: Optimal Comfort and Wellbeing  Outcome: Ongoing, Progressing  Goal: Readiness for Transition of Care  Outcome: Ongoing, Progressing     Problem: Adjustment to Illness (Sepsis/Septic Shock)  Goal: Optimal Coping  Outcome: Ongoing, Progressing     Problem: Bleeding (Sepsis/Septic Shock)  Goal: Absence of Bleeding  Outcome: Ongoing, Progressing     Problem: Glycemic Control Impaired (Sepsis/Septic Shock)  Goal: Blood Glucose Level Within Desired Range  Outcome: Ongoing, Progressing     Problem: Infection Progression (Sepsis/Septic Shock)  Goal: Absence of Infection Signs and Symptoms  Outcome: Ongoing, Progressing     Problem: Nutrition Impaired (Sepsis/Septic Shock)  Goal: Optimal Nutrition Intake  Outcome: Ongoing, Progressing     Problem: Fluid and Electrolyte Imbalance (Acute Kidney Injury/Impairment)  Goal: Fluid and Electrolyte Balance  Outcome: Ongoing, Progressing     Problem: Oral Intake Inadequate (Acute Kidney Injury/Impairment)  Goal: Optimal Nutrition Intake  Outcome: Ongoing, Progressing     Problem: Renal Function Impairment (Acute Kidney Injury/Impairment)  Goal: Effective Renal Function  Outcome: Ongoing, Progressing     Problem: Fall Injury Risk  Goal: Absence of Fall and Fall-Related Injury  Outcome: Ongoing, Progressing     Problem: Skin Injury Risk Increased  Goal: Skin Health and Integrity  Outcome: Ongoing, Progressing     Problem: Communication Impairment (Mechanical Ventilation, Invasive)  Goal: Effective Communication  Outcome: Ongoing, Progressing     Problem: Device-Related Complication Risk (Mechanical Ventilation,  Invasive)  Goal: Optimal Device Function  Outcome: Ongoing, Progressing     Problem: Inability to Wean (Mechanical Ventilation, Invasive)  Goal: Mechanical Ventilation Liberation  Outcome: Ongoing, Progressing     Problem: Nutrition Impairment (Mechanical Ventilation, Invasive)  Goal: Optimal Nutrition Delivery  Outcome: Ongoing, Progressing     Problem: Skin and Tissue Injury (Mechanical Ventilation, Invasive)  Goal: Absence of Device-Related Skin and Tissue Injury  Outcome: Ongoing, Progressing     Problem: Ventilator-Induced Lung Injury (Mechanical Ventilation, Invasive)  Goal: Absence of Ventilator-Induced Lung Injury  Outcome: Ongoing, Progressing     Problem: Communication Impairment (Artificial Airway)  Goal: Effective Communication  Outcome: Ongoing, Progressing     Problem: Device-Related Complication Risk (Artificial Airway)  Goal: Optimal Device Function  Outcome: Ongoing, Progressing     Problem: Skin and Tissue Injury (Artificial Airway)  Goal: Absence of Device-Related Skin or Tissue Injury  Outcome: Ongoing, Progressing     Problem: Noninvasive Ventilation Acute  Goal: Effective Unassisted Ventilation and Oxygenation  Outcome: Ongoing, Progressing     Problem: Restraint, Nonbehavioral (Nonviolent)  Goal: Absence of Harm or Injury  Outcome: Ongoing, Progressing     Problem: Oral Intake Inadequate  Goal: Improved Oral Intake  Outcome: Ongoing, Progressing

## 2023-07-29 NOTE — OP NOTE
Date of procedure:  July 27th 2023     Staff surgeon:  Dr. Brandon Wagner     Assistant: Lisette Delgadillo RNFA    Preoperative diagnosis:  Ischemic colostomy     Postoperative diagnosis:  1. Ischemic colostomy   Diffuse segmental ischemic bowel    Procedure:  Exploratory laparotomy  Extensive small bowel resection of multiple segments   Takedown of colostomy with resection of ischemic colostomy tip     Anesthesia: General endotracheal anesthesia     Indication for procedure:  67-year-old female with whom I was familiar.  She is postoperative day 3 status post exploratory laparotomy with completion proctectomy an APR.  Developed tachycardia with slight decrease in renal function on postoperative day 2.  Renal function remained stable but tachycardia persisted into postoperative day 3.  On exam postop day 3 there is he ischemia present of the colostomy.  This was not the case the morning of postoperative day 2.  Given these findings recommendation was to proceed with surgical exploration     Description of procedure:  Following signing informed consent she was taken the operating  room and placed in supine position.  General endotracheal anesthesia was administered.  The abdomen is prepped and draped standard fashion.  A time-out procedure was performed.  Having performed time-out procedure midline incision was opened uneventfully.  This carried down to the fascia.  The fascial sutures were released in the abdominal cavity was entered.  Immediately upon entering the abdominal cavity there is evidence of catastrophic events within the abdominal cavity.  There is diffuse he ischemia noted throughout multiple portions of the small bowel.  There were multiple sharp areas of demarcation between viable bowel and ischemic bowel.  Prior to proceeding the colostomy was taken down the colon does appear viable the distal 2 in of the colon at the colostomy were resected and sent to pathology for evaluation.  I then turned my  attention to the abdominal cavity.  The cecum and terminal ileum are identified.  The bowel was run proximally.  As mentioned there are multiple areas of small bowel that demonstrated he necrosis.  The this was characterized by boggy full-thickness necrosis of the small bowel and multiple segmental areas.  The distal 15 cm of the terminal ileum going into the ileocecal valve is viable.  I then resect a long segment of terminal ileum.  There is then a segment identified in the proximal ileum that appears dusky.  There were segments this area which are viable and segments that are not.  This piece was left intact.  I staple proximal and distal to this.  I then resected another long segment of necrotic tissue to a point in the mid ileum were staple across sent the specimen off the table.  I leave a proximally 2530 cm of small bowel intact in this position.  I then removed another segmental small-bowel between the sigmoid segment in the area where the patient's entero enterotomy was made at the time of her original surgery.  Having removed the other obvious frankly necrotic areas I turned my attention to the bowel.  There is appreciation of dusky and bogginess noted at the site of her previous jejunojejunostomy.  This is mostly along the staple line.  The distal tip a proximally 2 cm distal to the staple line also does appear dusky.  At this point I irrigate the abdominal cavity with warm saline.  Decision was made against any reestablishment of continuity.  I do measure the small bowel at this time there is a proximally 70-75 cm of small bowel present.  The fascia was closed loosely the skin incision was stapled shut.  Patient is left intubated paralyzed with plans to transfer to another facility.  Plans were also made to take her back to the operating room the following day.  Blood loss was 50 cc

## 2023-07-29 NOTE — OP NOTE
Date of procedure:  July 28th     Staff surgeon:  Dr. Brandon Wagner     Assistant: Dr Bryan Amos    Preoperative diagnosis: Bowel discontinuity and ischemic bowel     Postoperative diagnosis:  The same    Procedure:  Exploratory laparotomy  Small-bowel resection   Creation of multiple small-bowel anastomosis   Creation of an end ileostomy     Anesthesia: General endotracheal anesthesia    Indication for procedure:  This is a 67-year-old female who was postoperative day 4 status post an APR with colostomy.  She unfortunately had a catastrophic events leading to exploratory surgery on postoperative day 3 where she had multiple areas of frankly ischemic necrotic bowel.  She was left in discontinuity is scheduled for take back exploratory laparotomy on the above-mentioned date.      Description of procedure:  Following signing informed consent by patient's family she was taken to the operating room and placed supine position.  Patient had already been intubated.  General anesthesia was administered.  The abdomen is prepped and draped standard fashion and appropriate time-out procedure was performed.  Having performed time-out procedure the midline incision was opened uneventfully.  The fascial sutures were released the abdominal cavity was entered.  Immediately entered abdominal cavity there is a segment of omentum that does appear dusky frankly necrotic.  This is resected uneventfully.  I next turned my attention to the bowel.  In the terminal the colon itself remains viable with no signs of ischemia.  This is encouraging.  The small bowel was evaluated.  Generally speaking the 4 segments that were left in place from surgery the day before all appear healthy and viable.  The segments that are left involve the ligament of Treitz to an area just beyond previous jejunojejunostomy.  The next segment was relatively long segment of healthy bowel with no signs of ischemia throughout.  There is a 3rd segment measuring  approximately 5-10 cm with multiple skip areas of segmental full-thickness necrosis present.  Then there is a segment connected to the terminal ileum to the ileocecal valve which appears healthy and viable.  The short-segment mentioned does 3.  With multiple segmental areas has too many areas of necrosis to make it worth attempting to resect and perform an anastomosis.  This was divided uneventfully sent to off the field to pathology.  Next attention was paid to the duodenum and jejunum in the area of the patient's previous changes jejunal jejunostomy.  Unfortunately distal to the anastomosis there is a area 2 cm in width sharply demarcated which does demonstrate full-thickness necrosis and ischemia.  This would need to be divided.  It was a proximally 2-3 cm distal to the anastomosis.  Also at the anastomosis there does appear to be necrosis and thickness and bogginess associated with the anastomosis.  I resect the remainder of the that distal limb to a point just proximal to the anastomosis.  There was good blood flow noted coming from the mesentery.  The the serosa of the bowel appears healthy.  Next this limb of bowel was aligned to the longer segment of jejunum and ileum and connected a side-to-side fashion.  The common enterotomy was oversewn with multiple sutures without event.  I then connected the distal limb of this small-bowel to the terminal ileum them again in a side-to-side fashion.  Each anastomosis was made by making enterotomy just beyond the staple line.  With the limbs aligned a common side-to-side anastomosis was then created.  In each common enterotomy was then oversewn with multiple interrupted sutures.  Both anastomosis appear patent and demonstrate signs of viability.  The inside of the mucosa on each of these areas appeared healthy with no signs of ischemia.  Having made these anastomosis I irrigate ensure adequate hemostasis.  Next I do mobilize the transverse colon that would be used for  end-colostomy mobilize this slightly more.  I remove omentum from the transverse colon.  I am able to bring the transverse colon out through the previous ostomy site event.  I leave a large drain in place into the pelvis.  The fascia was then closed with a heavy PDS suture.  The skin incision was closed with .  Staples.  With staples.  Having closed the skin incision I open the colostomy just below the staple line.  The mature and end-colostomy without event.  Patient was taken back to the recovery room in critical condition.  She was left intubated.  At the end of the procedure she had a proximally 50 cm of small bowel left.  The ileocecal valve was left in fact.  Discussion with patient family regarding the critical nature of her condition.

## 2023-07-29 NOTE — PROGRESS NOTES
Pulmonary/Critical Care Consult      PATIENT NAME: Tessa Enriquez  MRN: [id number]  TODAY'S DATE: 2023  6:25 AM  ADMIT DATE: 2023  AGE: 67 y.o. : 1955    CONSULT REQUESTED BY: Melissa Pereira MD    REASON FOR CONSULT:   Critical care management    HPI:  The patient is a 67 year old female with recurrent adenocarcinoma of the colon.  She had another resection with formation of a colostomy on .  She had persistent tachycardia, abdominal pain and BOOM following this.  She was noted to have an ischemic stoma yesterday and was brought back to the OR where multiple areas of ischemic small bowel were found.  The patient's abdomen was closed at the skin level with plans to return to the OR today.  The patient is maintained on the ventilator. She is requiring 10 PEEP and 55% FiO2     23: Doing well on SAT SBT today with good cough on command and more than adequate ventilation on PS 5/8. Extubating now to NC, as the patient has a good cough and does not use inhalers at home. Is having moderate yellow respi secretions.    REVIEW OF SYSTEMS  Uncomfortable due to ETT.    ALLERGIES  Review of patient's allergies indicates:   Allergen Reactions    Ativan [lorazepam] Hives and Itching    Flagyl [metronidazole] Itching and Rash    Pcn [penicillins] Hives, Itching and Rash       INPATIENT SCHEDULED MEDICATIONS   albuterol-ipratropium  3 mL Nebulization Q6H    budesonide  0.5 mg Nebulization Q12H    chlorhexidine  15 mL Mouth/Throat BID    enoxparin  40 mg Subcutaneous Q24H (prophylaxis, 1700)    fluconazole (DIFLUCAN) IV (PEDS and ADULTS)  200 mg Intravenous Q24H    levothyroxine  75 mcg Intravenous Before breakfast    meropenem (MERREM) IVPB  1 g Intravenous Q12H    pantoprazole  40 mg Intravenous Daily      dextrose 5 % and 0.9 % NaCl 100 mL/hr at 23 1100    lactated ringers Stopped (23 1153)    NORepinephrine bitartrate-D5W Stopped (23 1103)    propofoL Stopped (23  1105)       MEDICAL AND SURGICAL HISTORY  Past Medical History:   Diagnosis Date    Acute hypoxemic respiratory failure 07/09/2018    Arthritis     Bilateral kidney stones 3/27/2023    Cancer     colon    COPD (chronic obstructive pulmonary disease)     History of home oxygen therapy     ONLY PRN AND HASN'T USED IT IN A YEAR    Hyperlipidemia     Thyroid disease      Past Surgical History:   Procedure Laterality Date    CHOLECYSTECTOMY      COLECTOMY N/A 11/9/2020    Procedure: COLECTOMY;  Surgeon: Brandon Wagner MD;  Location: [facility] OR;  Service: General;  Laterality: N/A;  LOWER- ANTERIOR    COLONOSCOPY N/A 10/23/2020    Procedure: COLONOSCOPY;  Surgeon: Jolynn Ayala MD;  Location: [facility] ENDO;  Service: Endoscopy;  Laterality: N/A;    COLONOSCOPY N/A 8/6/2021    Procedure: COLONOSCOPY;  Surgeon: Jolynn Ayala MD;  Location: [facility] ENDO;  Service: Endoscopy;  Laterality: N/A;    COLONOSCOPY N/A 7/7/2022    Procedure: COLONOSCOPY;  Surgeon: Jolynn Ayala MD;  Location: [facility] ENDO;  Service: Endoscopy;  Laterality: N/A;    COLONOSCOPY N/A 5/31/2023    Procedure: COLONOSCOPY;  Surgeon: Jolynn Ayala MD;  Location: [facility] ENDO;  Service: Endoscopy;  Laterality: N/A;    COLOSTOMY N/A 7/24/2023    Procedure: CREATION, COLOSTOMY;  Surgeon: Brandon Wagner MD;  Location: [facility] OR;  Service: General;  Laterality: N/A;    COMPLETION PROCTECTOMY N/A 7/24/2023    Procedure: PROCTECTOMY, COMPLETION;  Surgeon: Brandon Wagner MD;  Location: [facility] OR;  Service: General;  Laterality: N/A;    CYSTOSCOPY W/ URETERAL STENT PLACEMENT Right 2/22/2023    Procedure: CYSTOSCOPY, WITH URETERAL STENT INSERTION;  Surgeon: Carin Kwon Jr., MD;  Location: [facility] OR;  Service: Urology;  Laterality: Right;    CYSTOSCOPY W/ URETERAL STENT PLACEMENT Bilateral 3/26/2023    Procedure: CYSTOSCOPY, WITH URETERAL STENT INSERTION;  Surgeon: Sabine Barber MD;  Location: [facility] OR;  Service: Urology;  Laterality: Bilateral;     CYSTOSCOPY W/ URETERAL STENT PLACEMENT Bilateral 7/24/2023    Procedure: CYSTOSCOPY, WITH URETERAL STENT INSERTION;  Surgeon: Adrienne Mijares MD;  Location: [facility];  Service: Urology;  Laterality: Bilateral;  bassem placing stents 1st    CYSTOSCOPY WITH URETEROSCOPY, RETROGRADE PYELOGRAPHY, AND INSERTION OF STENT Bilateral 3/26/2023    Procedure: CYSTOSCOPY, WITH RETROGRADE PYELOGRAM AND URETERAL STENT INSERTION;  Surgeon: Sabine Barber MD;  Location: [facility] OR;  Service: Urology;  Laterality: Bilateral;    CYSTOURETEROSCOPY WITH RETROGRADE PYELOGRAPHY AND INSERTION OF STENT INTO URETER Right 3/7/2023    Procedure: CYSTOURETEROSCOPY, WITH RETROGRADE PYELOGRAM AND URETERAL STENT INSERTION;  Surgeon: Carin Kwon Jr., MD;  Location: [facility];  Service: Urology;  Laterality: Right;    CYSTOURETEROSCOPY, WITH HOLMIUM LASER LITHOTRIPSY OF URETERAL CALCULUS AND STENT INSERTION Bilateral 4/11/2023    Procedure: CYSTOURETEROSCOPY, WITH HOLMIUM LASER LITHOTRIPSY OF URETERAL CALCULUS AND STENT INSERTION;  Surgeon: Carin Kwon Jr., MD;  Location: [facility];  Service: Urology;  Laterality: Bilateral;    ESOPHAGEAL DILATION N/A 6/29/2021    Procedure: DILATION, ESOPHAGUS;  Surgeon: Sourav Baires III, MD;  Location: [facility];  Service: Endoscopy;  Laterality: N/A;    ESOPHAGOGASTRODUODENOSCOPY  06/29/2021    Dilation to 57 Fr    ESOPHAGOGASTRODUODENOSCOPY N/A 6/29/2021    Procedure: EGD (ESOPHAGOGASTRODUODENOSCOPY);  Surgeon: Sourav Baires III, MD;  Location: [facility];  Service: Endoscopy;  Laterality: N/A;    EXCISION, SMALL INTESTINE N/A 7/27/2023    Procedure: EXCISION, SMALL INTESTINE;  Surgeon: Brandon Wagner MD;  Location: [facility];  Service: General;  Laterality: N/A;    FLEXIBLE SIGMOIDOSCOPY N/A 8/1/2022    Procedure: SIGMOIDOSCOPY, FLEXIBLE;  Surgeon: Brandon Wagner MD;  Location: [facility];  Service: Endoscopy;  Laterality: N/A;    HYSTERECTOMY      INSERTION OF TUNNELED CENTRAL  VENOUS CATHETER (CVC) WITH SUBCUTANEOUS PORT N/A 1/11/2021    Procedure: [id number]-CATH;  Surgeon: Brandon Wagner MD;  Location: [facility] OR;  Service: General;  Laterality: N/A;    INSERTION OF TUNNELED CENTRAL VENOUS CATHETER (CVC) WITH SUBCUTANEOUS PORT Right 9/1/2022    Procedure: [id number]-V-CATH;  Surgeon: Brandon Wagner MD;  Location: [facility] OR;  Service: General;  Laterality: Right;  Wants rt side placement    LAPAROTOMY, EXPLORATORY N/A 7/24/2023    Procedure: LAPAROTOMY, EXPLORATORY;  Surgeon: Brandon Wagner MD;  Location: [facility] OR;  Service: General;  Laterality: N/A;  case must go 1st-chamberlain placing stents    LAPAROTOMY, EXPLORATORY N/A 7/27/2023    Procedure: LAPAROTOMY, EXPLORATORY;  Surgeon: Brandon Wagner MD;  Location: [facility] OR;  Service: General;  Laterality: N/A;    MEDIPORT REMOVAL Left 4/19/2021    Procedure: REMOVAL, CATHETER, CENTRAL VENOUS, TUNNELED, WITH PORT;  Surgeon: Brandon Wagner MD;  Location: [facility] OR;  Service: General;  Laterality: Left;    REMOVAL-STENT Right 3/7/2023    Procedure: REMOVAL-STENT;  Surgeon: Carin Kwon Jr., MD;  Location: [facility] OR;  Service: Urology;  Laterality: Right;    REMOVAL-STENT Bilateral 4/11/2023    Procedure: REMOVAL-STENT;  Surgeon: Carin Kwon Jr., MD;  Location: [facility] OR;  Service: Urology;  Laterality: Bilateral;    RETROGRADE PYELOGRAPHY Bilateral 4/11/2023    Procedure: PYELOGRAM, RETROGRADE;  Surgeon: Carin Kwon Jr., MD;  Location: [facility] OR;  Service: Urology;  Laterality: Bilateral;    ROBOT-ASSISTED COLECTOMY N/A 11/9/2020    Procedure: ROBOTIC COLECTOMY low anterior resection, possible open;  Surgeon: Brandon Wagner MD;  Location: [facility] OR;  Service: General;  Laterality: N/A;  CONVERTED TO OPEN AT 1503    SUBTOTAL COLECTOMY N/A 7/24/2023    Procedure: COLECTOMY, PARTIAL;  Surgeon: Brandon Wagner MD;  Location: [facility] OR;  Service: General;  Laterality: N/A;  with colorectal Anastamosis    URETEROSCOPIC REMOVAL OF  URETERIC CALCULUS Right 3/7/2023    Procedure: REMOVAL, CALCULUS, URETER, URETEROSCOPIC;  Surgeon: Carin Kwon Jr., MD;  Location: [facility] OR;  Service: Urology;  Laterality: Right;    URETEROSCOPIC REMOVAL OF URETERIC CALCULUS Bilateral 2023    Procedure: REMOVAL, CALCULUS, URETER, URETEROSCOPIC;  Surgeon: Carin Kwon Jr., MD;  Location: [facility] OR;  Service: Urology;  Laterality: Bilateral;       ALCOHOL, TOBACCO AND DRUG USE  Social History     Tobacco Use   Smoking Status Former    Current packs/day: 0.00    Average packs/day: 2.0 packs/day for 40.0 years (80.0 ttl pk-yrs)    Types: Cigarettes    Start date: 1977    Quit date: 2017    Years since quittin.0   Smokeless Tobacco Never     Social History     Substance and Sexual Activity   Alcohol Use No     Social History     Substance and Sexual Activity   Drug Use No       FAMILY HISTORY  Family History   Problem Relation Age of Onset    COPD Mother     COPD Father     Prostate cancer Brother     Breast cancer Maternal Grandmother        VITAL SIGNS (MOST RECENT)  Temp: 98.6 °F (37 °C) (23 1139)  Pulse: (!) 115 (23 1139)  Resp: 17 (23 1139)  BP: (!) 116/55 (23 1100)  SpO2: 96 % (23 1139)    INTAKE AND OUTPUT (LAST 24 HOURS):  Intake/Output Summary (Last 24 hours) at 2023 1157  Last data filed at 2023 1116  Gross per 24 hour   Intake 3996 ml   Output 2959 ml   Net 1037 ml       WEIGHT  Wt Readings from Last 1 Encounters:   23 89.4 kg (197 lb)       PHYSICAL EXAM  GENERAL: awake and alert off sedation on vent  HEENT: Pupils equal and reactive. Extraocular movements intact. Nose intact. Pharynx intubated with ETT and OGT.  NECK: Supple.   HEART: Regular rate and rhythm. No murmur or gallop auscultated.  LUNGS: Clear to auscultation. Lung excursion symmetrical.   ABDOMEN: Bowel sounds present. Non-tender, no masses palpated.  : Normal anatomy. Velasco with yellow urine.  EXTREMITIES: Normal muscle tone  and joint movement, no cyanosis or clubbing.   LYMPHATICS: No adenopathy palpated, no edema.  SKIN: Dry, intact.  NEURO: No gross motor or cognitive deficit while assessed on vent.  PSYCH: Appropriate affect      CBC LAST (LAST 24 HOURS)  Recent Labs   Lab 07/29/23 0421 07/29/23 0528   WBC 5.89  --    RBC 2.39*  --    HGB 6.8*  --    HCT 20.1* 19*   MCV 84  --    MCH 28.5  --    MCHC 33.8  --    RDW 18.3*  --    *  --    MPV 11.2  --    GRAN 93.2*  5.5  --    LYMPH 2.2*  0.1*  --    MONO 3.4*  0.2*  --    BASO 0.04  --    NRBC 0  --        CHEMISTRY LAST (LAST 24 HOURS)  Recent Labs   Lab 07/29/23 0421 07/29/23 0528   *  --    K 3.5  --      --    CO2 19*  --    ANIONGAP 5*  --    BUN 21  --    CREATININE 1.1  --    *  --    CALCIUM 7.2*  --    PH  --  7.458*   MG 2.0  --    ALBUMIN 1.6*  --    PROT 4.1*  --    ALKPHOS 39*  --    ALT 20  --    AST 25  --    BILITOT 1.1*  --            CARDIAC PROFILE (LAST 24 HOURS)  Recent Labs   Lab 07/27/23  2104   BNP 25   TROPONINIHS 12.4       LAST 7 DAYS MICROBIOLOGY   Microbiology Results (last 7 days)       Procedure Component Value Units Date/Time    Culture, Respiratory with Gram Stain [[id number]]  (Abnormal) Collected: 07/27/23 2350    Order Status: Completed Specimen: Respiratory from Tracheal Aspirate Updated: 07/29/23 1034     Respiratory Culture STAPHYLOCOCCUS AUREUS  Moderate  Susceptibility pending       Gram Stain (Respiratory) <10 epithelial cells per low power field.     Gram Stain (Respiratory) Few WBC's     Gram Stain (Respiratory) Moderate Gram positive cocci            MOST RECENT IMAGING  X-Ray Chest AP Portable  EXAM: XR CHEST 1 VIEW    DATE: 7/28/2023 5:12 PM CDT    INDICATION: Line placement    COMPARISON: X-ray chest July 28, 2023    TECHNIQUE: AP chest.    FINDINGS:    Lines, tubes and hardware: Endotracheal tube tip projects approximately 3 cm above the anjel. Right internal jugular central venous catheter tip  overlies the superior vena cava. A gastric suction tube passes below the diaphragm.  CT compatible Port-A-Cath in the right chest with tip terminating in the SVC.    Lungs and pleura: Pulmonary vascularity is normal. Scattered platelike atelectasis is present. Redemonstrated left base opacity. Unchanged left-sided effusion. No perceptible pneumothorax.    Heart and mediastinum: The heart size is normal for technique. The mediastinal contours are normal.    Bones and soft tissues: No acute abnormality. Age-related degenerative findings.    IMPRESSION:  1.  Lines and tubes as above.  2.  Persistent left base opacity and effusion.    Electronically signed by:  Memo Ferreira DO  7/28/2023 5:47 PM CDT Workstation: [id number]  X-Ray Chest AP Portable  CLINICAL HISTORY:  67 years (1955) Female ventilator Ventilator    TECHNIQUE:  Portable AP radiograph the chest. One view.    COMPARISON:  Radiograph from July 27, 2023.    FINDINGS:  There is a focal opacity at the left lung base, characteristic of a combination of a small left pleural effusion and associated atelectasis/consolidation. The right lung is essentially clear noting a tiny bandlike area of atelectasis versus scarring in the right midlung zone. No pneumothorax is identified. The heart is normal in size. Atheromatous calcifications are seen at the aortic arch. Osseous structures appear unchanged. The visualized upper abdomen is unremarkable.    Lines and tubes: Endotracheal tube with tip projecting 4 cm from the anjel. Right-sided subclavian medical infusion port with tip at the level of the SVC. Enteric tube with tip projecting of the body the stomach.    IMPRESSION:  Unchanged radiograph the chest when accounting for differences in technique.    .    Electronically signed by:  Pierre Barragan MD  7/28/2023 8:12 AM CDT Workstation: [id number]  X-Ray Chest AP Portable  EXAM: XR Chest, 1 View  CLINICAL HISTORY: 67 years old, Female; ET tube  repositioning; ET tube repositioning  TECHNIQUE: Frontal view of the chest.  COMPARISON: 11:07 PM    FINDINGS:  Lungs: Left basilar opacity.  Pleural space: Probable small left pleural effusion.  Heart: No cardiomegaly.  Mediastinum: No acute findings.  Bones/joints: No acute findings.  Tubes, lines and devices: ET tube 4.2 cm above the anjel. NG tube projecting in the proximal stomach, though side-port above the gastroesophageal junction. Right portacatheter in the upper SVC.    IMPRESSION:  1. ET tube 4.2 cm above the anjel.  2. Left basilar atelectasis/infiltrate with pleural effusion.  3. NG tube projecting in the proximal stomach, though side-port above the gastroesophageal junction. Suggest advancing by 6 cm.    Electronically signed by:  Abdulaziz Holman MD  7/28/2023 12:52 AM CDT Workstation: [id number]      CURRENT VISIT EKG  Results for orders placed or performed during the hospital encounter of 07/24/23   EKG 12-lead    Narrative    Test Reason : R00.0,    Vent. Rate : 123 BPM     Atrial Rate : 123 BPM     P-R Int : 114 ms          QRS Dur : 068 ms      QT Int : 340 ms       P-R-T Axes : 060 020 071 degrees     QTc Int : 486 ms    Sinus tachycardia  Low voltage QRS  Borderline Abnormal ECG  When compared with ECG of 26-JUL-2023 07:13,  Nonspecific T wave abnormality now evident in Anterior leads    Referred By: [email or web address]   SELF           Confirmed By:        ECHOCARDIOGRAM RESULTS  No results found for this or any previous visit.        VENTILATOR INFORMATION  Vent Mode: Spont  Oxygen Concentration (%):  [40-88] 40  Resp Rate Total:  [18 br/min-35 br/min] 20 br/min  Vt Set:  [420 mL] 420 mL  PEEP/CPAP:  [8 cmH20-10 cmH20] 8 cmH20  Pressure Support:  [5 cmH20] 5 cmH20  Mean Airway Pressure:  [10 tgS16-71 cmH20] 10 cmH20           LAST ARTERIAL BLOOD GAS  ABG  Recent Labs   Lab 07/29/23  0528   PH 7.458*   PO2 270*   PCO2 29.6*   HCO3 20.9*   BE -3       IMPRESSION AND PLAN  Ischemic bowel with  resection  S/p 3 abdominal surgeries, extensive bowel resection, and ostomy.  - Merrem started by hospitalist    Metastatic adenocarcinoma, resected again  Dr. Frank is her oncologist    COPD  - duoneb  - budesonide    Acute on chronic hypoxic respiratory failure with mechanical ventilation  Improved, passing SBT.  - extubating now    Pneumonia of left lower lobe due to S. Aureus  - vanc started   - f/u trach aspirate culture susceptibilities     Left lower lobe atelectasis  - continue adequate PEEP    Left pleural effusion, likely related to peritoneal inflammation  - if fevers or other signs of infection despite appropriate Abx, will assess for need for thoracentesis for possible empyema    Neutropenia  - improving    Anemia  Thrombocytopenia  acute blood loss, expected  - PRBCs transfused on 7/27 and 7/29    Normal anion gap metabolic acidosis  Mild hyperglycemia  - trend    Severe hypoalbuminemia/morbid obesity  Hypothyroidism  - receiving IV Synthroid    Acute kidney injury  improved  - continue IV fluids with abdominal surgery and 3rd spacing  - appreciate nephrology recs     Discussed with nursing and Respiratory  SCDs for DVT prophylaxis  Protonix for GI prophylaxis    Upon my evaluation, this patient had a high probability of imminent or life-threatening deterioration, which required my direct attention, intervention, and personal management.    I have personally provided at least 35 minutes of critical care time exclusive of time spent on separately billable procedures. Over 50% of the time of this encounter was spent in direct care at the bedside. Time includes review of laboratory data, radiology results, discussion with consultants, and monitoring for potential decompensation. Interventions were performed as documented above.    Ari Mcdermott MD  Pulmonary and Critical Care Medicine  [facility]  Date of Service: 07/29/2023  11:58 AM

## 2023-07-29 NOTE — PROGRESS NOTES
Pt seen and examined. REsting comfortably in bed.  REcently extubated.  OFf of pressors.  UOP remains brisk  H/H decreased.  REcieving 1u PRBC.  Would keep ahead one unit    Wt Readings from Last 3 Encounters:   07/24/23 89.4 kg (197 lb)   07/03/23 89.3 kg (196 lb 13.9 oz)   06/27/23 89.7 kg (197 lb 12 oz)     Temp Readings from Last 3 Encounters:   07/29/23 98.6 °F (37 °C) (Oral)   07/03/23 97.5 °F (36.4 °C) (Temporal)   06/27/23 98.1 °F (36.7 °C) (Temporal)     BP Readings from Last 3 Encounters:   07/29/23 (!) 116/55   07/03/23 (!) 143/79   06/27/23 (!) 185/86     Pulse Readings from Last 3 Encounters:   07/29/23 (!) 117   07/03/23 84   06/27/23 84     Awake  Sinus tach  Soft/nd/appt ttp  Ostomy pink and viable  JUANITA serosang    Lab Results   Component Value Date    WBC 5.89 07/29/2023    HGB 6.8 (LL) 07/29/2023    HCT 19 (LL) 07/29/2023    MCV 84 07/29/2023     (L) 07/29/2023       BMP  Lab Results   Component Value Date     (L) 07/29/2023    K 3.5 07/29/2023     07/29/2023    CO2 19 (L) 07/29/2023    BUN 21 07/29/2023    CREATININE 1.1 07/29/2023    CALCIUM 7.2 (L) 07/29/2023    ANIONGAP 5 (L) 07/29/2023    EGFRNORACEVR 55.8 (A) 07/29/2023     A/P: s/p ex lap with APR, resection of small bowel secondary to wide spread ischemia in scattered pattern.  Pt with 50 ccs of small bowel  Cont supportimve care  Will start TPN next week  Keep npo except for ice chips

## 2023-07-29 NOTE — PROGRESS NOTES
VANCOMYCIN PHARMACOKINETIC NOTE:  Vancomycin Day # 1    Objective/Assessment:    Diagnosis/Indication for Vancomycin: Pneumonia     67 y.o., female; Actual Body Weight = 89.4 kg (197 lb).    The patient has the following labs:  7/29/2023 Estimated Creatinine Clearance: 51.8 mL/min (based on SCr of 1.1 mg/dL). Lab Results   Component Value Date    BUN 21 07/29/2023     Lab Results   Component Value Date    WBC 5.89 07/29/2023          Plan:  Adjust vancomycin dose and/or frequency based on the patient's actual weight and renal function:  Initiate Vancomycin 2000 mg IV once, the 1750 mg every 24 hours.  Orders have been entered into patient's chart.    Vancomycin dose = 19 mg/kg actual body weight    Vancomycin trough level has been ordered for 8/1 at 13:00..    Pharmacy will manage vancomycin therapy, monitor serum vancomycin levels, monitor renal function and adjust regimen as necessary.    Thank you for allowing us to participate in this patient's care.     Geovanna Ramirez 7/29/2023 1:14 PM  Department of Pharmacy  Ext [phone]

## 2023-07-29 NOTE — CARE UPDATE
07/28/23 2000   Patient Assessment/Suction   Level of Consciousness (AVPU) responds to pain   Respiratory Effort Unlabored   Expansion/Accessory Muscles/Retractions no use of accessory muscles   All Lung Fields Breath Sounds diminished;wheezes, expiratory   Rhythm/Pattern, Respiratory assisted mechanically   Cough Frequency with stimulation   Cough Type assisted   Suction Method oral;tracheal   PRE-TX-O2   Device (Oxygen Therapy) ventilator   $ Is the patient on Low Flow Oxygen? Yes   Oxygen Concentration (%) 85   SpO2 99 %   Pulse Oximetry Type Continuous   $ Pulse Oximetry - Multiple Charge Pulse Oximetry - Multiple   Pulse 104   Resp (!) 28   BP (!) 97/45   Positioning   Head of Bed (HOB) Positioning HOB elevated;HOB at 30-45 degrees   Aerosol Therapy   $ Aerosol Therapy Charges Aerosol Treatment   Daily Review of Necessity (SVN) completed   Respiratory Treatment Status (SVN) given   Treatment Route (SVN) in-line   Patient Position (SVN) semi-Sanderson's   Post Treatment Assessment (SVN) breath sounds improved   Signs of Intolerance (SVN) none   Breath Sounds Post-Respiratory Treatment   Throughout All Fields Post-Treatment All Fields   Throughout All Fields Post-Treatment aeration increased   Post-treatment Heart Rate (beats/min) 112   Post-treatment Resp Rate (breaths/min) 28   Vent Select   Conventional Vent Y   Charged w/in last 24h YES   Preset Conventional Ventilator Settings   Vent Type    Ventilation Type VC   Vent Mode A/C   Humidity HME   Set Rate 28 BPM   Vt Set 420 mL   PEEP/CPAP 10 cmH20   Peak Flow 65 L/min   Peak End Inspiratory Pressure 20 cmH20   I-Trigger Type  V-TRIG   Trigger Sensitivity Flow/I-Trigger 3 L/min   Patient Ventilator Parameters   Resp Rate Total 28 br/min   Peak Airway Pressure 28 cmH20   Mean Airway Pressure 15 cmH20   Plateau Pressure 0 cmH20   Exhaled Vt 423 mL   Total Ve 12 L/m   I:E Ratio Measured 1:2.10   Auto PEEP 0 cmH20   Conventional Ventilator Alarms   Alarms On  Y   Resp Rate High Alarm 40 br/min   Press High Alarm 40 cmH2O   Apnea Rate 10   Apnea Volume (mL) 0 mL   Apnea Oxygen Concentration  100   Apnea Flow Rate (L/min) 55   T Apnea 20 sec(s)   Ready to Wean/Extubation Screen   FIO2<=50 (chart decimal) (!) 0.85   MV<16L (chart vol.) 12   PEEP <=8 (chart #) (!) 10   Ready to Wean Parameters   F/VT Ratio<105 (RSBI) (!) 66.19   Vital Capacity   Vital Capacity (mL) 0   Respiratory Evaluation   $ Care Plan Tech Time 15 min   $ Eval/Re-eval Charges Re-evaluation

## 2023-07-29 NOTE — CARE UPDATE
07/29/23 0723   Patient Assessment/Suction   Respiratory Effort Unlabored   All Lung Fields Breath Sounds clear;diminished   Suction Method tracheal   $ Suction Charges Inline Suction Procedure Stat Charge   Secretions Amount small   Secretions Color yellow   Secretions Characteristics thick   PRE-TX-O2   Device (Oxygen Therapy) ventilator   $ Is the patient on Low Flow Oxygen? Yes   SpO2 99 %   Pulse Oximetry Type Continuous   $ Pulse Oximetry - Multiple Charge Pulse Oximetry - Multiple   Pulse 93   Resp (!) 27   Positioning   Head of Bed (HOB) Positioning HOB at 30-45 degrees   Aerosol Therapy   $ Aerosol Therapy Charges Aerosol Treatment  (duoneb)   Daily Review of Necessity (SVN) completed   Respiratory Treatment Status (SVN) given   Treatment Route (SVN) in-line   Patient Position (SVN) semi-Sanderson's   Post Treatment Assessment (SVN) increased aeration   Signs of Intolerance (SVN) none   Breath Sounds Post-Respiratory Treatment   Throughout All Fields Post-Treatment aeration increased   Post-treatment Heart Rate (beats/min) 95   Post-treatment Resp Rate (breaths/min) 26        Airway - Non-Surgical 07/27/23   Placement Date: 07/27/23   Present Prior to Hospital Arrival?: Yes  Airway Device Size: 7.5  Style: Cuffed  Cuff Inflation: Minimal occlusive pressure  Cuff Inflated With: Air  Placement Verified By: Auscultation;Chest X-ray  Depth of Insertion (cm): ...   Secured at 19 cm   Measured At Lips   Secured Location Center   Secured by Commercial tube narayanan   Bite Block center   Cuff Pressure   (mlt)   Airway Safety   Is Ambu Bag and Mask with Patient? Yes, Adult Ambu Bag and Mask   ETT Size 7.5   Vent Select   $ Ventilator Subsequent 1   Charged w/in last 24h YES   Preset Conventional Ventilator Settings   Vent Type    Education   $ Education Bronchodilator;15 min   Respiratory Evaluation   $ Care Plan Tech Time 15 min

## 2023-07-29 NOTE — PROGRESS NOTES
INPATIENT NEPHROLOGY CONSULT   [facility] NEPHROLOGY    Tessa Enriquez  07/29/2023    Reason for consultation:  Acute kidney injury    Chief Complaint:   Chief Complaint   Patient presents with    Post-op Problem     History of Present Illness:    67 year old female with recurrent adenocarcinoma of the colon.  She had another resection with formation of a colostomy on 7/24.  She had persistent tachycardia, abdominal pain and BOOM following this.  She was noted to have an ischemic stoma and was brought back to the OR where multiple areas of ischemic small bowel were found.    7/26  Consulted for BOOM.  Pt POD 2 s/p ex lap with extensive lysis of adhesions, bilateral ureteral stent insertion, and partial colectomy with colostomy placement. She has developed oliguric with boom.  7/27 Afebrile.  Tachycardic.  680 cc uop  7/28 VSS, monitor. UO is improving.  7/29 VSS, pressors, s/p re-operation 7/28 with small bowel resection for patchy area of necrosis and colostomy creation. Hgb again low this AM., will be transfused. UO seem decent, on pressors.     Plan of Care:    BOOM 2/2 hemodynamically mediated renal injury s/p prolonged episode of hypotension  Acidosis  --avoid NSAIDS, Jacobs II inhibitors, and other non-essential nephrotoxic agents  (NO MORE IBUPROFEN)   --keep map above 55  --strict I/Os  --maintain colin    Hyponatremia  --avoid hypotonic iv piggy backs  --urine osm concentrating implicating ADH effect.                                                                                                                                                                                                                                                                                                                   Anemia  --iron panel reveals iron deficiency  --pRBC transfusions per primary    Hypotension with sinus tachycardia  --judicious opiates  --iv fluids    Thank you for allowing us to participate in this  patient's care. We will continue to follow.    Vital Signs:  Temp Readings from Last 3 Encounters:   07/29/23 99.3 °F (37.4 °C) (Oral)   07/03/23 97.5 °F (36.4 °C) (Temporal)   06/27/23 98.1 °F (36.7 °C) (Temporal)       Pulse Readings from Last 3 Encounters:   07/29/23 96   07/03/23 84   06/27/23 84       BP Readings from Last 3 Encounters:   07/29/23 (!) 93/55   07/03/23 (!) 143/79   06/27/23 (!) 185/86       Weight:  Wt Readings from Last 3 Encounters:   07/24/23 89.4 kg (197 lb)   07/03/23 89.3 kg (196 lb 13.9 oz)   06/27/23 89.7 kg (197 lb 12 oz)       Past Medical & Surgical History:  Past Medical History:   Diagnosis Date    Acute hypoxemic respiratory failure 07/09/2018    Arthritis     Bilateral kidney stones 3/27/2023    Cancer     colon    COPD (chronic obstructive pulmonary disease)     History of home oxygen therapy     ONLY PRN AND HASN'T USED IT IN A YEAR    Hyperlipidemia     Thyroid disease        Past Surgical History:   Procedure Laterality Date    CHOLECYSTECTOMY      COLECTOMY N/A 11/9/2020    Procedure: COLECTOMY;  Surgeon: Brandon Wanger MD;  Location: [facility];  Service: General;  Laterality: N/A;  LOWER- ANTERIOR    COLONOSCOPY N/A 10/23/2020    Procedure: COLONOSCOPY;  Surgeon: Jolynn Ayala MD;  Location: [facility];  Service: Endoscopy;  Laterality: N/A;    COLONOSCOPY N/A 8/6/2021    Procedure: COLONOSCOPY;  Surgeon: Jolynn Ayala MD;  Location: [facility] ENDO;  Service: Endoscopy;  Laterality: N/A;    COLONOSCOPY N/A 7/7/2022    Procedure: COLONOSCOPY;  Surgeon: Jolynn Ayala MD;  Location: [facility] ENDO;  Service: Endoscopy;  Laterality: N/A;    COLONOSCOPY N/A 5/31/2023    Procedure: COLONOSCOPY;  Surgeon: Jolynn Ayala MD;  Location: [facility];  Service: Endoscopy;  Laterality: N/A;    COLOSTOMY N/A 7/24/2023    Procedure: CREATION, COLOSTOMY;  Surgeon: Brandon Wagner MD;  Location: [facility];  Service: General;  Laterality: N/A;    COMPLETION PROCTECTOMY N/A 7/24/2023     Procedure: PROCTECTOMY, COMPLETION;  Surgeon: Brandon Wagner MD;  Location: [facility];  Service: General;  Laterality: N/A;    CYSTOSCOPY W/ URETERAL STENT PLACEMENT Right 2/22/2023    Procedure: CYSTOSCOPY, WITH URETERAL STENT INSERTION;  Surgeon: Carin Kwon Jr., MD;  Location: [facility] OR;  Service: Urology;  Laterality: Right;    CYSTOSCOPY W/ URETERAL STENT PLACEMENT Bilateral 3/26/2023    Procedure: CYSTOSCOPY, WITH URETERAL STENT INSERTION;  Surgeon: Sabine Barber MD;  Location: [facility];  Service: Urology;  Laterality: Bilateral;    CYSTOSCOPY W/ URETERAL STENT PLACEMENT Bilateral 7/24/2023    Procedure: CYSTOSCOPY, WITH URETERAL STENT INSERTION;  Surgeon: Adrienne Mijares MD;  Location: [facility];  Service: Urology;  Laterality: Bilateral;  chamberlain placing stents 1st    CYSTOSCOPY WITH URETEROSCOPY, RETROGRADE PYELOGRAPHY, AND INSERTION OF STENT Bilateral 3/26/2023    Procedure: CYSTOSCOPY, WITH RETROGRADE PYELOGRAM AND URETERAL STENT INSERTION;  Surgeon: Sabine Barber MD;  Location: [facility];  Service: Urology;  Laterality: Bilateral;    CYSTOURETEROSCOPY WITH RETROGRADE PYELOGRAPHY AND INSERTION OF STENT INTO URETER Right 3/7/2023    Procedure: CYSTOURETEROSCOPY, WITH RETROGRADE PYELOGRAM AND URETERAL STENT INSERTION;  Surgeon: Carin Kwon Jr., MD;  Location: [facility];  Service: Urology;  Laterality: Right;    CYSTOURETEROSCOPY, WITH HOLMIUM LASER LITHOTRIPSY OF URETERAL CALCULUS AND STENT INSERTION Bilateral 4/11/2023    Procedure: CYSTOURETEROSCOPY, WITH HOLMIUM LASER LITHOTRIPSY OF URETERAL CALCULUS AND STENT INSERTION;  Surgeon: Carin Kwon Jr., MD;  Location: [facility];  Service: Urology;  Laterality: Bilateral;    ESOPHAGEAL DILATION N/A 6/29/2021    Procedure: DILATION, ESOPHAGUS;  Surgeon: Sourav Baires III, MD;  Location: [facility];  Service: Endoscopy;  Laterality: N/A;    ESOPHAGOGASTRODUODENOSCOPY  06/29/2021    Dilation to 57 Fr    ESOPHAGOGASTRODUODENOSCOPY N/A  6/29/2021    Procedure: EGD (ESOPHAGOGASTRODUODENOSCOPY);  Surgeon: Sourav Baires III, MD;  Location: [facility] ENDO;  Service: Endoscopy;  Laterality: N/A;    EXCISION, SMALL INTESTINE N/A 7/27/2023    Procedure: EXCISION, SMALL INTESTINE;  Surgeon: Brandon Wagner MD;  Location: [facility] OR;  Service: General;  Laterality: N/A;    FLEXIBLE SIGMOIDOSCOPY N/A 8/1/2022    Procedure: SIGMOIDOSCOPY, FLEXIBLE;  Surgeon: Brandon Wagner MD;  Location: [facility] ENDO;  Service: Endoscopy;  Laterality: N/A;    HYSTERECTOMY      INSERTION OF TUNNELED CENTRAL VENOUS CATHETER (CVC) WITH SUBCUTANEOUS PORT N/A 1/11/2021    Procedure: [id number];  Surgeon: Brandon Wagner MD;  Location: [facility] OR;  Service: General;  Laterality: N/A;    INSERTION OF TUNNELED CENTRAL VENOUS CATHETER (CVC) WITH SUBCUTANEOUS PORT Right 9/1/2022    Procedure: [id number];  Surgeon: Brandon Wagner MD;  Location: [facility] OR;  Service: General;  Laterality: Right;  Wants rt side placement    LAPAROTOMY, EXPLORATORY N/A 7/24/2023    Procedure: LAPAROTOMY, EXPLORATORY;  Surgeon: Brandon Wagner MD;  Location: [facility] OR;  Service: General;  Laterality: N/A;  case must go 1st-chamberlain placing stents    LAPAROTOMY, EXPLORATORY N/A 7/27/2023    Procedure: LAPAROTOMY, EXPLORATORY;  Surgeon: Brandon Wagner MD;  Location: [facility] OR;  Service: General;  Laterality: N/A;    MEDIPORT REMOVAL Left 4/19/2021    Procedure: REMOVAL, CATHETER, CENTRAL VENOUS, TUNNELED, WITH PORT;  Surgeon: Brandon Wagner MD;  Location: [facility] OR;  Service: General;  Laterality: Left;    REMOVAL-STENT Right 3/7/2023    Procedure: REMOVAL-STENT;  Surgeon: Carin Kwon Jr., MD;  Location: [facility] OR;  Service: Urology;  Laterality: Right;    REMOVAL-STENT Bilateral 4/11/2023    Procedure: REMOVAL-STENT;  Surgeon: Carin Kwon Jr., MD;  Location: [facility] OR;  Service: Urology;  Laterality: Bilateral;    RETROGRADE PYELOGRAPHY Bilateral 4/11/2023    Procedure: PYELOGRAM,  RETROGRADE;  Surgeon: Carin Kwon Jr., MD;  Location: [facility];  Service: Urology;  Laterality: Bilateral;    ROBOT-ASSISTED COLECTOMY N/A 2020    Procedure: ROBOTIC COLECTOMY low anterior resection, possible open;  Surgeon: Brandon Wagner MD;  Location: [facility];  Service: General;  Laterality: N/A;  CONVERTED TO OPEN AT 1503    SUBTOTAL COLECTOMY N/A 2023    Procedure: COLECTOMY, PARTIAL;  Surgeon: Brandon Wagner MD;  Location: [facility];  Service: General;  Laterality: N/A;  with colorectal Anastamosis    URETEROSCOPIC REMOVAL OF URETERIC CALCULUS Right 3/7/2023    Procedure: REMOVAL, CALCULUS, URETER, URETEROSCOPIC;  Surgeon: Carin Kwon Jr., MD;  Location: [facility];  Service: Urology;  Laterality: Right;    URETEROSCOPIC REMOVAL OF URETERIC CALCULUS Bilateral 2023    Procedure: REMOVAL, CALCULUS, URETER, URETEROSCOPIC;  Surgeon: Carin Kwon Jr., MD;  Location: [facility];  Service: Urology;  Laterality: Bilateral;       Past Social History:  Social History     Socioeconomic History    Marital status:    Tobacco Use    Smoking status: Former     Current packs/day: 0.00     Average packs/day: 2.0 packs/day for 40.0 years (80.0 ttl pk-yrs)     Types: Cigarettes     Start date: 1977     Quit date: 2017     Years since quittin.0    Smokeless tobacco: Never   Substance and Sexual Activity    Alcohol use: No    Drug use: No    Sexual activity: Not Currently     Partners: Male     Social Determinants of Health     Financial Resource Strain: Unknown (2023)    Overall Financial Resource Strain (CARDIA)     Difficulty of Paying Living Expenses: Patient refused   Food Insecurity: Unknown (2023)    Hunger Vital Sign     Worried About Running Out of Food in the Last Year: Patient refused     Ran Out of Food in the Last Year: Patient refused   Transportation Needs: Unknown (2023)    PRAPARE - Transportation     Lack of Transportation (Medical): Patient refused     Lack of  Transportation (Non-Medical): Patient refused   Physical Activity: Unknown (2/21/2023)    Exercise Vital Sign     Days of Exercise per Week: Patient refused     Minutes of Exercise per Session: Patient refused   Stress: Unknown (2/21/2023)    [facility] - Occupational Stress Questionnaire     Feeling of Stress : Patient refused   Social Connections: Unknown (2/21/2023)    Social Connection and Isolation Panel [NHANES]     Frequency of Communication with Friends and Family: Patient refused     Frequency of Social Gatherings with Friends and Family: Patient refused     Attends Holiness Services: Patient refused     Active Member of Clubs or Organizations: Patient refused     Attends Club or Organization Meetings: Patient refused     Marital Status: Patient refused   Housing Stability: Unknown (2/21/2023)    Housing Stability Vital Sign     Unable to Pay for Housing in the Last Year: Patient refused     Unstable Housing in the Last Year: 3       Medications:  Current Facility-Administered Medications on File Prior to Encounter   Medication Dose Route Frequency Provider Last Rate Last Admin    diphenhydrAMINE injection 12.5 mg  12.5 mg Intravenous Once PRN Monster Atwood MD        electrolyte-S (ISOLYTE)   Intravenous Continuous Monster Atwood MD 25 mL/hr at 03/07/23 1138 New Bag at 07/24/23 1804    HYDROmorphone (PF) injection 0.2 mg  0.2 mg Intravenous Q5 Min PRN Monster Atwood MD        lactated ringers infusion  10 mL/hr Intravenous Continuous Monster Atwood MD        lactated ringers infusion  500 mL Intravenous Once Monster Atwood MD        LIDOcaine (PF) 10 mg/ml (1%) injection 10 mg  1 mL Intradermal Once Monster Atwood MD        LORazepam injection 0.25 mg  0.25 mg Intravenous Once PRN Monster Atwood MD        ondansetron injection 4 mg  4 mg Intravenous Once PRN Monster Atwood MD        sodium chloride 0.9% flush 3 mL  3 mL  Intravenous Q8H Monster Atwood MD         Current Outpatient Medications on File Prior to Encounter   Medication Sig Dispense Refill    diphenoxylate-atropine 2.5-0.025 mg (LOMOTIL) 2.5-0.025 mg per tablet Take by mouth.      ferrous sulfate 325 (65 FE) MG EC tablet Take 1 tablet (325 mg total) by mouth every other day.  0    HYDROcodone-acetaminophen (NORCO) 5-325 mg per tablet TAKE ONE TABLET BY MOUTH EVERY SIX HOURS AS NEEDED FOR PAIN 90 tablet 0    levothyroxine (SYNTHROID) 125 MCG tablet Take 1 tablet (125 mcg total) by mouth before breakfast. 30 tablet 11    ondansetron (ZOFRAN-ODT) 8 MG TbDL Take 1 tablet (8 mg total) by mouth every 12 (twelve) hours as needed. 30 tablet 1    potassium chloride SA (K-DUR,KLOR-CON) 20 MEQ tablet Take 1 tablet (20 mEq total) by mouth once daily. 30 tablet 11    albuterol (PROVENTIL) 2.5 mg /3 mL (0.083 %) nebulizer solution Take 3 mLs (2.5 mg total) by nebulization every 6 (six) hours as needed for Wheezing. Rescue (Patient not taking: Reported on 7/20/2023) 120 each 1    CMPD hydrocortisone 2%- LIDOcaine 3% suppository (RECTAL ROCKET) Place 1 suppository rectally every evening. Insert 1 suppository 3/4 of the way onto rectum. Wet for easier application. Repeat for 3 nights. 30 suppository 2    prochlorperazine (COMPAZINE) 10 MG tablet Take 1 tablet (10 mg total) by mouth every 6 (six) hours as needed. 30 tablet 1    triamcinolone acetonide 0.025% (KENALOG) 0.025 % cream Apply topically once daily. 80 g 1     Scheduled Meds:   albuterol-ipratropium  3 mL Nebulization Q6H    budesonide  0.5 mg Nebulization Q12H    chlorhexidine  15 mL Mouth/Throat BID    enoxparin  40 mg Subcutaneous Q24H (prophylaxis, 1700)    fluconazole (DIFLUCAN) IV (PEDS and ADULTS)  200 mg Intravenous Q24H    levothyroxine  75 mcg Intravenous Before breakfast    meropenem (MERREM) IVPB  1 g Intravenous Q12H    pantoprazole  40 mg Intravenous Daily     Continuous Infusions:   dextrose 5 % and 0.9 %  "NaCl 100 mL/hr at 07/29/23 0102    lactated ringers Stopped (07/28/23 1153)    NORepinephrine bitartrate-D5W 0.01 mcg/kg/min (07/29/23 0821)    propofoL 35 mcg/kg/min (07/29/23 0636)     PRN Meds:.0.9%  NaCl infusion (for blood administration), acetaminophen, calcium gluconate IVPB, calcium gluconate IVPB, calcium gluconate IVPB, HYDROmorphone, magnesium sulfate IVPB, magnesium sulfate IVPB, ondansetron, potassium chloride **AND** potassium chloride **AND** potassium chloride, sodium phosphate 15 mmol in dextrose 5 % (D5W) 250 mL IVPB, sodium phosphate 20.01 mmol in dextrose 5 % (D5W) 250 mL IVPB, sodium phosphate 30 mmol in dextrose 5 % (D5W) 250 mL IVPB    Allergies:  Ativan [lorazepam], Flagyl [metronidazole], and Pcn [penicillins]    Past Family History:  Reviewed; refer to Hospitalist Admission Note    Review of Systems:  Review of Systems - All 14 systems reviewed and negative, except as noted in HPI    Physical Exam:    BP (!) 93/55   Pulse 96   Temp 99.3 °F (37.4 °C) (Oral)   Resp (!) 29   Ht 5' 1" (1.549 m)   Wt 89.4 kg (197 lb)   SpO2 98%   Breastfeeding No   BMI 37.22 kg/m²     General Appearance:    No distress   Head:    Normocephalic, without obvious abnormality, atraumatic   Eyes:    PER, conjunctiva/corneas clear, EOM's intact in both eyes        Throat:   Lips, mucosa, and tongue normal; teeth and gums normal   Back:     Symmetric, no curvature, ROM normal, no CVA tenderness   Lungs:     Clear to auscultation bilaterally, respirations unlabored   Chest wall:    No tenderness or deformity   Heart:    tachycardic   Abdomen:     Tender.  Bandages in place.  Drain in place   Extremities:   Extremities normal, atraumatic, no cyanosis or edema   Pulses:   2+ and symmetric all extremities   MSK:   No joint or muscle swelling, tenderness or deformity   Skin:   Skin color, texture, turgor normal, no rashes or lesions   Neurologic:   Mildly confused.   No flap     Results:  Recent Labs   Lab " 07/28/23 0359 07/28/23 1346 07/29/23  0421   * 132* 134*   K 4.0 3.9 3.5    107 110   CO2 18* 17* 19*   BUN 28* 27* 21   CREATININE 1.3 1.4 1.1   * 124* 189*         Recent Labs   Lab 07/27/23 0415 07/27/23 2104 07/28/23 0359 07/28/23  1346 07/29/23  0421   CALCIUM 7.4* 5.6* 6.8* 6.6* 7.2*   ALBUMIN 2.1* 1.6* 1.6* 1.5* 1.6*   PHOS 4.0 4.4  --   --   --    MG  --  1.9 1.9  --  2.0               Recent Labs   Lab 07/26/23  0035 07/26/23  0802 07/26/23  1454 07/26/23  1631   POCTGLUCOSE 136* 98 98 79         Recent Labs   Lab 10/06/20  0924   Hemoglobin A1C 5.6         Recent Labs   Lab 07/27/23 2104 07/27/23 2105 07/28/23 0359 07/28/23 0414 07/29/23  0421 07/29/23  0528   WBC 1.14*  --  2.04*  --  5.89  --    HGB 8.9*  --  9.6*  --  6.8*  --    HCT 26.6*   < > 28.6* 28* 20.1* 19*   *  --  157  --  117*  --    MCV 85  --  84  --  84  --    MCHC 33.5  --  33.6  --  33.8  --    MONO 4.0  --  4.9  0.1*  --  3.4*  0.2*  --    EOSINOPHIL 0.0  --  0.0  --  0.0  --     < > = values in this interval not displayed.         Recent Labs   Lab 07/28/23 0359 07/28/23 1346 07/29/23  0421   BILITOT 1.1* 1.1* 1.1*   PROT 4.4* 4.1* 4.1*   ALBUMIN 1.6* 1.5* 1.6*   ALKPHOS 54* 53* 39*   ALT 27 24 20   AST 39 36 25         Recent Labs   Lab 02/21/23  0910 03/25/23  1810 07/24/23 2055 07/26/23  1517   Color, UA Yellow Yellow Yellow Red A   Appearance, UA Clear Hazy A Clear Cloudy A   pH, UA 6.0 8.0 7.0 SEE COMMENT   Specific Gravity, UA 1.010 1.010 1.015 SEE COMMENT   Protein, UA 1+ A Trace A Negative SEE COMMENT   Glucose, UA Negative Negative Negative SEE COMMENT   Ketones, UA Negative Negative Negative SEE COMMENT   Urobilinogen, UA Negative Negative Negative SEE COMMENT   Bilirubin (UA) Negative Negative Negative SEE COMMENT   Occult Blood UA Trace A 1+ A Trace A SEE COMMENT   Nitrite, UA Negative Negative Negative SEE COMMENT   RBC, UA 1 21 H  --  >100 H   WBC, UA 6 H 37 H  --  6 H   Bacteria  Few A Few A  --  Moderate A   Hyaline Casts, UA 0  --   --   --          Recent Labs   Lab 07/27/23  2224 07/28/23  0414 07/29/23  0528   POC PH 7.285 LL 7.369 7.458 H   POC PCO2 36.4 29.1 L 29.6 L   POC HCO3 17.3 L 16.8 L 20.9 L   POC PO2 62 L 78 L 270 H   POC SATURATED O2 89 L 95 100   POC BE -9 -9 -3   Sample ARTERIAL ARTERIAL ARTERIAL         Microbiology Results (last 7 days)       Procedure Component Value Units Date/Time    Culture, Respiratory with Gram Stain [[id number]] Collected: 07/27/23 2350    Order Status: Sent Specimen: Respiratory from Tracheal Aspirate Updated: 07/27/23 2355          Patient care was time spent personally by me on the following activities:   Obtaining a history  Examination of patient.  Providing medical care at the patients bedside.  Developing a treatment plan with patient or surrogate and bedside caregivers  Ordering and reviewing laboratory studies, radiographic studies, pulse oximetry.  Ordering and performing treatments and interventions.  Evaluation of patient's response to treatment.  Discussions with consultants while on the unit and immediately available to the patient.  Re-evaluation of the patient's condition.  Documentation in the medical record.     Roscoe Silva MD  Nephrology  [facility]  [phone]

## 2023-07-29 NOTE — PROGRESS NOTES
[facility] Medicine  Progress Note    Patient name: Tessa Enriquez  MRN: [id number]  Admit Date: 7/24/2023   LOS: 5 days     SUBJECTIVE:     Principal problem: Ischemic necrosis of small bowel    HPI: Ms. Enriquez is a 67-years-old female who presented as a direct admission from [facility] for ICU level of care postoperatively.  Patient with a history of recurrent colorectal adenocarcinoma, status post neoadjuvant radiation and chemotherapy, followed by oncologist Dr. Frank.  On 07/24 she underwent exploratory laparotomy with extensive lysis of adhesion, proctectomy with creation of end colostomy by Dr. Wagner and she was admitted to hospital medicine postoperatively.  Postop course complicated by ongoing sinus tachycardia with anemia, status post PRBC transfusion and acute kidney injury, seen by Nephrology.  Earlier today ostomy noted to be dusky with concern for ischemia, she was taken back to the OR and underwent exploratory laparotomy with small-bowel resection, as per OR note, large area of small bowel resected with four areas of ischemia.  She was intubated for surgery and remains intubated postoperatively.  Reported history of COPD, hypothyroidism, nephrolithiasis status post bilateral ureteral stenting.  She is currently sedated on propofol, orally intubated on mechanical ventilation.      7/28: Patient remained on the vent. HR elevated 110-120, sinus tachy.for OR again  7/29: Patient was extubated. Hb down to 6.8 and getting a unit of blood.Cr improved. UOP marginal but improving     Scheduled Meds:   albuterol-ipratropium  3 mL Nebulization Q6H    budesonide  0.5 mg Nebulization Q12H    chlorhexidine  15 mL Mouth/Throat BID    enoxparin  40 mg Subcutaneous Q24H (prophylaxis, 1700)    fluconazole (DIFLUCAN) IV (PEDS and ADULTS)  200 mg Intravenous Q24H    levothyroxine  75 mcg Intravenous Before breakfast    meropenem (MERREM) IVPB  1 g Intravenous Q12H     pantoprazole  40 mg Intravenous Daily    [START ON 7/30/2023] vancomycin (VANCOCIN) IV (PEDS and ADULTS)  1,750 mg Intravenous Q24H    vancomycin (VANCOCIN) IV (PEDS and ADULTS)  2,000 mg Intravenous Once     Continuous Infusions:   dextrose 5 % and 0.9 % NaCl 100 mL/hr at 07/29/23 1100    lactated ringers Stopped (07/28/23 1153)    NORepinephrine bitartrate-D5W Stopped (07/29/23 1103)    propofoL Stopped (07/29/23 1105)     PRN Meds:0.9%  NaCl infusion (for blood administration), acetaminophen, calcium gluconate IVPB, calcium gluconate IVPB, calcium gluconate IVPB, HYDROmorphone, magnesium sulfate IVPB, magnesium sulfate IVPB, ondansetron, potassium chloride **AND** potassium chloride **AND** potassium chloride, sodium phosphate 15 mmol in dextrose 5 % (D5W) 250 mL IVPB, sodium phosphate 20.01 mmol in dextrose 5 % (D5W) 250 mL IVPB, sodium phosphate 30 mmol in dextrose 5 % (D5W) 250 mL IVPB, Pharmacy to dose Vancomycin consult **AND** vancomycin - pharmacy to dose    Review of patient's allergies indicates:   Allergen Reactions    Ativan [lorazepam] Hives and Itching    Flagyl [metronidazole] Itching and Rash    Pcn [penicillins] Hives, Itching and Rash       Review of Systems  As per subjective    OBJECTIVE:     Vital Signs (Most Recent)  Temp: 98.6 °F (37 °C) (07/29/23 1139)  Pulse: 109 (07/29/23 1345)  Resp: 15 (07/29/23 1345)  BP: 125/62 (07/29/23 1330)  SpO2: 95 % (07/29/23 1345)    Vital Signs Range (Last 24H):  Temp:  [98 °F (36.7 °C)-99.3 °F (37.4 °C)]   Pulse:  []   Resp:  [0-30]   BP: ()/(45-62)   SpO2:  [94 %-100 %]   Arterial Line BP: ()/(-1-66)     I & O (Last 24H):  Intake/Output Summary (Last 24 hours) at 7/29/2023 1347  Last data filed at 7/29/2023 1209  Gross per 24 hour   Intake 3871.55 ml   Output 2984 ml   Net 887.55 ml         Physical Exam:  General: Patient resting comfortably in no acute distress. Appears as stated age. Calm  Eyes: EOM intact. No conjunctivae injection. No  scleral icterus.  ENT: Hearing grossly intact. No discharge from ears. No nasal discharge.   CVS: RRR. No LE edema BL.  Lungs: CTA BL, no wheezing or crackles. Good breath sounds. No accessory muscle use. No acute respiratory distress  Neuro: Alert. GCS 15. Cranial nerves grossly intact. Moves all extremities equally. Follows commands. Responds appropriately     Laboratory:  All pertinent labs within the past 24 hours have been reviewed.  CBC:   Recent Labs   Lab 07/27/23  2104 07/27/23  2105 07/28/23  0359 07/28/23  0414 07/29/23  0421 07/29/23  0528   WBC 1.14*  --  2.04*  --  5.89  --    HGB 8.9*  --  9.6*  --  6.8*  --    HCT 26.6*   < > 28.6* 28* 20.1* 19*   *  --  157  --  117*  --     < > = values in this interval not displayed.       CMP:   Recent Labs   Lab 07/28/23  0359 07/28/23  1346 07/29/23  0421   * 132* 134*   K 4.0 3.9 3.5    107 110   CO2 18* 17* 19*   * 124* 189*   BUN 28* 27* 21   CREATININE 1.3 1.4 1.1   CALCIUM 6.8* 6.6* 7.2*   PROT 4.4* 4.1* 4.1*   ALBUMIN 1.6* 1.5* 1.6*   BILITOT 1.1* 1.1* 1.1*   ALKPHOS 54* 53* 39*   AST 39 36 25   ALT 27 24 20   ANIONGAP 8 8 5*         Microbiology Results (last 7 days)       Procedure Component Value Units Date/Time    Culture, Respiratory with Gram Stain [[id number]]  (Abnormal) Collected: 07/27/23 2350    Order Status: Completed Specimen: Respiratory from Tracheal Aspirate Updated: 07/29/23 1034     Respiratory Culture STAPHYLOCOCCUS AUREUS  Moderate  Susceptibility pending       Gram Stain (Respiratory) <10 epithelial cells per low power field.     Gram Stain (Respiratory) Few WBC's     Gram Stain (Respiratory) Moderate Gram positive cocci             Diagnostic Results:      ASSESSMENT/PLAN:     Active Hospital Problems    Diagnosis  POA    *Ischemic necrosis of small bowel [K55.029]  Yes    Hypocalcemia [E83.51]  Yes    On mechanically assisted ventilation [Z99.11]  Not Applicable    Malnutrition of moderate degree [E44.0]   Yes     Chronic    Hyponatremia [E87.1]  Yes    Sinus tachycardia [R00.0]  Yes    BOOM (acute kidney injury) [N17.9]  No    Severe sepsis [A41.9, R65.20]  No    Bilateral kidney stones [N20.0]  Yes    Severe obesity (BMI 35.0-39.9) with comorbidity [E66.01]  Yes    FENG (iron deficiency anemia) [D50.9]  Yes     In setting of chronic blood loss from colorectal cancer.      Malignant neoplasm of colon [C18.9]  Yes    Hypothyroidism with abnormal TFTs [E03.9]  Yes     Chronic    COPD (chronic obstructive pulmonary disease) [J44.9]  Yes      Resolved Hospital Problems   No resolved problems to display.         Ischemic necrosis of small bowel  OR note 7/27 with extensive area of small bowel necrosis s/p ex lap and small bowel resection and anastomosis X 2 with colostomy creation 7/28  - cont meropenem      Acute on chronic hypoxic respiratory failure: extubated today   Left lower lobe pneumonia with left pleural effusion   Hx of COPD   - Follow trach aspirate cultures  - Cont meropenum and Vancomycin  - MRSA screening   - if fevers or other signs of infection despite appropriate Abx, will assess for need for thoracentesis for possible empyema  - Cont breathing Rx     Acute blood loss anemia   Expected after multiple surgeries   - receiving 1 unit PRBC   - start iron supplements       Hypocalcemia  Ionized calcium improved with supplements      Sinus tachycardia  Multifactorial in the setting of acute medical conditions including dehydration, pain, postop, anemia  Mag and K normal      Hyponatremia  Monitor with IV fluid hydration, improved to 134 today      Malnutrition of moderate degree  Albumin 1.6 in the setting of extensive bowel surgery   Currently NPO, if prolonged consider parenteral nutrition     Severe sepsis  Ongoing sinus tachycardia and now repeat labs with leukopenia  Extensive area of small bowel necrosis   Escalate antibiotics to meropenem and following clinically  Monitor blood pressure, aim for map  greater than 65     BOOM (acute kidney injury)  Postop course complicated by acute kidney injury, creatinine 1.8 >> improved to 1.3   History of nephrolithiasis status post bilateral ureteral stenting   Continue IV fluid hydration   Keep Velasco catheter in place, monitoring urine output  Renally dosing all medications and avoiding nephrotoxin drugs   Appreciate nephrology input     Bilateral kidney stones  Seen by Dr. Mijares this admission, followed outpatient by Dr. Kwon   Status post bilateral stent placement     Obesity (BMI 35.0-39.9) with comorbidity  Body mass index is 37.22 kg/m². Morbid obesity complicates all aspects of disease management from diagnostic modalities to treatment. Weight loss encouraged and health benefits explained to patient.             Malignant neoplasm of colon  History of recurrent colorectal adenocarcinoma  See small-bowel ischemia  Did receive adjuvant chemoradiation, followed by Dr. Frank     Hypothyroidism with abnormal TFTs  Recent TSH elevated at 23, as per chart review elevated 2 years previous, unclear if compliance   Given current NPO status with bowel surgery changed to IV levothyroxine      VTE Risk Mitigation (From admission, onward)           Ordered     enoxaparin injection 40 mg  Every 24 hours         07/28/23 1849     Place sequential compression device  Until discontinued         07/28/23 0724     IP VTE HIGH RISK PATIENT  Once         07/24/23 2015                          Department Hospital Medicine  [facility]  Melissa Pereira MD  Date of service: 07/29/2023

## 2023-07-29 NOTE — PROGRESS NOTES
Postop day 1.  Patient is status post emergency ex lap.  Patient remains intubated this morning.  She appears comfortable on a propofol drip.  She is on low-dose Levophed.  We will follow the patient again tomorrow for her postop anesthetic visit.

## 2023-07-30 NOTE — PLAN OF CARE
Problem: Physical Therapy  Goal: Physical Therapy Goal  Description: Goals to be met by: 23     Patient will increase functional independence with mobility by performin. Supine to sit with Stand-by Assistance  2. Sit to supine with Stand-by Assistance  3. Sit to stand transfer with Stand-by Assistance  4. Bed to chair transfer with Stand-by Assistance using Rolling Walker  5. Gait  x 250 feet with Stand-by Assistance using Rolling Walker.     Outcome: Ongoing, Progressing

## 2023-07-30 NOTE — CARE UPDATE
07/29/23 2035   Patient Assessment/Suction   Level of Consciousness (AVPU) responds to voice   Respiratory Effort Unlabored   Expansion/Accessory Muscles/Retractions no use of accessory muscles   All Lung Fields Breath Sounds diminished;wheezes, expiratory   Rhythm/Pattern, Respiratory no shortness of breath reported   Cough Frequency infrequent   Cough Type no productive sputum   PRE-TX-O2   Device (Oxygen Therapy) nasal cannula   $ Is the patient on Low Flow Oxygen? Yes   Flow (L/min) 3   SpO2 96 %   Pulse Oximetry Type Continuous   $ Pulse Oximetry - Multiple Charge Pulse Oximetry - Multiple   Pulse 109   Resp 15   Positioning   Head of Bed (HOB) Positioning HOB elevated;HOB at 30 degrees   Aerosol Therapy   $ Aerosol Therapy Charges Aerosol Treatment   Daily Review of Necessity (SVN) completed   Respiratory Treatment Status (SVN) given   Treatment Route (SVN) mask   Patient Position (SVN) HOB elevated;semi-Sanderson's   Post Treatment Assessment (SVN) breath sounds improved   Signs of Intolerance (SVN) none   Breath Sounds Post-Respiratory Treatment   Throughout All Fields Post-Treatment All Fields   Throughout All Fields Post-Treatment aeration increased   Post-treatment Heart Rate (beats/min) 115   Post-treatment Resp Rate (breaths/min) 17   Education   $ Education Bronchodilator;15 min   Respiratory Evaluation   $ Care Plan Tech Time 15 min   $ Eval/Re-eval Charges Re-evaluation

## 2023-07-30 NOTE — PROGRESS NOTES
INPATIENT NEPHROLOGY CONSULT   [facility] NEPHROLOGY    Tessa Bartolo Enriquez  07/30/2023    Reason for consultation:  Acute kidney injury    Chief Complaint:   Chief Complaint   Patient presents with    Post-op Problem     History of Present Illness:    67 year old female with recurrent adenocarcinoma of the colon.  She had another resection with formation of a colostomy on 7/24.  She had persistent tachycardia, abdominal pain and RUDOLPH following this.  She was noted to have an ischemic stoma and was brought back to the OR where multiple areas of ischemic small bowel were found.    7/26  Consulted for RUDOLPH.  Pt POD 2 s/p ex lap with extensive lysis of adhesions, bilateral ureteral stent insertion, and partial colectomy with colostomy placement. She has developed oliguric with rudolph.  7/27 Afebrile.  Tachycardic.  680 cc uop  7/28 VSS, monitor. UO is improving.  7/29 VSS, pressors, s/p re-operation 7/28 with small bowel resection for patchy area of necrosis and colostomy creation. Hgb again low this AM., will be transfused. UO seem decent, on pressors.   7/30 VSS, extubated, good UO. Monitor for now.    Plan of Care:    RUDOLPH 2/2 hemodynamically mediated renal injury s/p prolonged episode of hypotension  Acidosis  --avoid NSAIDS, Jacobs II inhibitors, and other non-essential nephrotoxic agents  (NO MORE IBUPROFEN)   --keep map above 55  --strict I/Os  --maintain colin    Hyponatremia  --avoid hypotonic iv piggy backs  --urine osm concentrating implicating ADH effect.                                                                                                                                                                                                                                                                                                                   Anemia  --iron panel reveals iron deficiency  --pRBC transfusions per primary    Hypotension with sinus tachycardia  --judicious opiates  --iv fluids    Thank  you for allowing us to participate in this patient's care. We will continue to follow.    Vital Signs:  Temp Readings from Last 3 Encounters:   07/30/23 98.4 °F (36.9 °C) (Axillary)   07/03/23 97.5 °F (36.4 °C) (Temporal)   06/27/23 98.1 °F (36.7 °C) (Temporal)       Pulse Readings from Last 3 Encounters:   07/30/23 106   07/03/23 84   06/27/23 84       BP Readings from Last 3 Encounters:   07/30/23 132/62   07/03/23 (!) 143/79   06/27/23 (!) 185/86       Weight:  Wt Readings from Last 3 Encounters:   07/24/23 89.4 kg (197 lb)   07/03/23 89.3 kg (196 lb 13.9 oz)   06/27/23 89.7 kg (197 lb 12 oz)       Past Medical & Surgical History:  Past Medical History:   Diagnosis Date    Acute hypoxemic respiratory failure 07/09/2018    Arthritis     Bilateral kidney stones 3/27/2023    Cancer     colon    COPD (chronic obstructive pulmonary disease)     History of home oxygen therapy     ONLY PRN AND HASN'T USED IT IN A YEAR    Hyperlipidemia     Thyroid disease        Past Surgical History:   Procedure Laterality Date    CHOLECYSTECTOMY      COLECTOMY N/A 11/9/2020    Procedure: COLECTOMY;  Surgeon: Brandon Wagner MD;  Location: [facility];  Service: General;  Laterality: N/A;  LOWER- ANTERIOR    COLONOSCOPY N/A 10/23/2020    Procedure: COLONOSCOPY;  Surgeon: Jolynn Ayala MD;  Location: [facility];  Service: Endoscopy;  Laterality: N/A;    COLONOSCOPY N/A 8/6/2021    Procedure: COLONOSCOPY;  Surgeon: Jolynn Ayala MD;  Location: [facility];  Service: Endoscopy;  Laterality: N/A;    COLONOSCOPY N/A 7/7/2022    Procedure: COLONOSCOPY;  Surgeon: Jolynn Ayala MD;  Location: [facility] ENDO;  Service: Endoscopy;  Laterality: N/A;    COLONOSCOPY N/A 5/31/2023    Procedure: COLONOSCOPY;  Surgeon: Jolynn Ayala MD;  Location: [facility] ENDO;  Service: Endoscopy;  Laterality: N/A;    COLOSTOMY N/A 7/24/2023    Procedure: CREATION, COLOSTOMY;  Surgeon: Brandon Wagner MD;  Location: [facility];  Service: General;  Laterality:  N/A;    COMPLETION PROCTECTOMY N/A 7/24/2023    Procedure: PROCTECTOMY, COMPLETION;  Surgeon: Brandon Wagner MD;  Location: [facility] OR;  Service: General;  Laterality: N/A;    CYSTOSCOPY W/ URETERAL STENT PLACEMENT Right 2/22/2023    Procedure: CYSTOSCOPY, WITH URETERAL STENT INSERTION;  Surgeon: Carin Kwon Jr., MD;  Location: [facility] OR;  Service: Urology;  Laterality: Right;    CYSTOSCOPY W/ URETERAL STENT PLACEMENT Bilateral 3/26/2023    Procedure: CYSTOSCOPY, WITH URETERAL STENT INSERTION;  Surgeon: Sabine Barber MD;  Location: [facility] OR;  Service: Urology;  Laterality: Bilateral;    CYSTOSCOPY W/ URETERAL STENT PLACEMENT Bilateral 7/24/2023    Procedure: CYSTOSCOPY, WITH URETERAL STENT INSERTION;  Surgeon: Adrienne Mijares MD;  Location: [facility];  Service: Urology;  Laterality: Bilateral;  chamberlain placing stents 1st    CYSTOSCOPY WITH URETEROSCOPY, RETROGRADE PYELOGRAPHY, AND INSERTION OF STENT Bilateral 3/26/2023    Procedure: CYSTOSCOPY, WITH RETROGRADE PYELOGRAM AND URETERAL STENT INSERTION;  Surgeon: Sabine Barber MD;  Location: [facility];  Service: Urology;  Laterality: Bilateral;    CYSTOURETEROSCOPY WITH RETROGRADE PYELOGRAPHY AND INSERTION OF STENT INTO URETER Right 3/7/2023    Procedure: CYSTOURETEROSCOPY, WITH RETROGRADE PYELOGRAM AND URETERAL STENT INSERTION;  Surgeon: Carin Kwon Jr., MD;  Location: [facility];  Service: Urology;  Laterality: Right;    CYSTOURETEROSCOPY, WITH HOLMIUM LASER LITHOTRIPSY OF URETERAL CALCULUS AND STENT INSERTION Bilateral 4/11/2023    Procedure: CYSTOURETEROSCOPY, WITH HOLMIUM LASER LITHOTRIPSY OF URETERAL CALCULUS AND STENT INSERTION;  Surgeon: Carin Kwon Jr., MD;  Location: [facility] OR;  Service: Urology;  Laterality: Bilateral;    ESOPHAGEAL DILATION N/A 6/29/2021    Procedure: DILATION, ESOPHAGUS;  Surgeon: Sourav Baires III, MD;  Location: [facility] ENDO;  Service: Endoscopy;  Laterality: N/A;    ESOPHAGOGASTRODUODENOSCOPY  06/29/2021     Dilation to 57 Fr    ESOPHAGOGASTRODUODENOSCOPY N/A 6/29/2021    Procedure: EGD (ESOPHAGOGASTRODUODENOSCOPY);  Surgeon: Sourav Baires III, MD;  Location: [facility] ENDO;  Service: Endoscopy;  Laterality: N/A;    EXCISION, SMALL INTESTINE N/A 7/27/2023    Procedure: EXCISION, SMALL INTESTINE;  Surgeon: Brandon Wagner MD;  Location: [facility] OR;  Service: General;  Laterality: N/A;    FLEXIBLE SIGMOIDOSCOPY N/A 8/1/2022    Procedure: SIGMOIDOSCOPY, FLEXIBLE;  Surgeon: Brandon Wagner MD;  Location: [facility] ENDO;  Service: Endoscopy;  Laterality: N/A;    HYSTERECTOMY      INSERTION OF TUNNELED CENTRAL VENOUS CATHETER (CVC) WITH SUBCUTANEOUS PORT N/A 1/11/2021    Procedure: [id number];  Surgeon: Brandon Wagner MD;  Location: [facility];  Service: General;  Laterality: N/A;    INSERTION OF TUNNELED CENTRAL VENOUS CATHETER (CVC) WITH SUBCUTANEOUS PORT Right 9/1/2022    Procedure: [id number];  Surgeon: Brandon Wagner MD;  Location: [facility] OR;  Service: General;  Laterality: Right;  Wants rt side placement    LAPAROTOMY, EXPLORATORY N/A 7/24/2023    Procedure: LAPAROTOMY, EXPLORATORY;  Surgeon: Brandon Wagner MD;  Location: [facility] OR;  Service: General;  Laterality: N/A;  case must go 1st-chamberlain placing stents    LAPAROTOMY, EXPLORATORY N/A 7/27/2023    Procedure: LAPAROTOMY, EXPLORATORY;  Surgeon: Brandon Wagner MD;  Location: [facility] OR;  Service: General;  Laterality: N/A;    MEDIPORT REMOVAL Left 4/19/2021    Procedure: REMOVAL, CATHETER, CENTRAL VENOUS, TUNNELED, WITH PORT;  Surgeon: Brandon Wagner MD;  Location: [facility] OR;  Service: General;  Laterality: Left;    REMOVAL-STENT Right 3/7/2023    Procedure: REMOVAL-STENT;  Surgeon: Carin Kwon Jr., MD;  Location: [facility] OR;  Service: Urology;  Laterality: Right;    REMOVAL-STENT Bilateral 4/11/2023    Procedure: REMOVAL-STENT;  Surgeon: Carin Kwon Jr., MD;  Location: [facility] OR;  Service: Urology;  Laterality: Bilateral;    RETROGRADE PYELOGRAPHY  Bilateral 2023    Procedure: PYELOGRAM, RETROGRADE;  Surgeon: Carin Kwon Jr., MD;  Location: [facility] OR;  Service: Urology;  Laterality: Bilateral;    ROBOT-ASSISTED COLECTOMY N/A 2020    Procedure: ROBOTIC COLECTOMY low anterior resection, possible open;  Surgeon: Brandon Wagner MD;  Location: [facility] OR;  Service: General;  Laterality: N/A;  CONVERTED TO OPEN AT 1503    SUBTOTAL COLECTOMY N/A 2023    Procedure: COLECTOMY, PARTIAL;  Surgeon: Brandon Wagner MD;  Location: [facility] OR;  Service: General;  Laterality: N/A;  with colorectal Anastamosis    URETEROSCOPIC REMOVAL OF URETERIC CALCULUS Right 3/7/2023    Procedure: REMOVAL, CALCULUS, URETER, URETEROSCOPIC;  Surgeon: Carin Kwon Jr., MD;  Location: [facility] OR;  Service: Urology;  Laterality: Right;    URETEROSCOPIC REMOVAL OF URETERIC CALCULUS Bilateral 2023    Procedure: REMOVAL, CALCULUS, URETER, URETEROSCOPIC;  Surgeon: Carin Kwon Jr., MD;  Location: [facility] OR;  Service: Urology;  Laterality: Bilateral;       Past Social History:  Social History     Socioeconomic History    Marital status:    Tobacco Use    Smoking status: Former     Current packs/day: 0.00     Average packs/day: 2.0 packs/day for 40.0 years (80.0 ttl pk-yrs)     Types: Cigarettes     Start date: 1977     Quit date: 2017     Years since quittin.0    Smokeless tobacco: Never   Substance and Sexual Activity    Alcohol use: No    Drug use: No    Sexual activity: Not Currently     Partners: Male     Social Determinants of Health     Financial Resource Strain: Unknown (2023)    Overall Financial Resource Strain (CARDIA)     Difficulty of Paying Living Expenses: Patient refused   Food Insecurity: Unknown (2023)    Hunger Vital Sign     Worried About Running Out of Food in the Last Year: Patient refused     Ran Out of Food in the Last Year: Patient refused   Transportation Needs: Unknown (2023)    PRAPARE - Transportation     Lack of  Transportation (Medical): Patient refused     Lack of Transportation (Non-Medical): Patient refused   Physical Activity: Unknown (2/21/2023)    Exercise Vital Sign     Days of Exercise per Week: Patient refused     Minutes of Exercise per Session: Patient refused   Stress: Unknown (2/21/2023)    [employer] - Occupational Stress Questionnaire     Feeling of Stress : Patient refused   Social Connections: Unknown (2/21/2023)    Social Connection and Isolation Panel [NHANES]     Frequency of Communication with Friends and Family: Patient refused     Frequency of Social Gatherings with Friends and Family: Patient refused     Attends Islam Services: Patient refused     Active Member of Clubs or Organizations: Patient refused     Attends Club or Organization Meetings: Patient refused     Marital Status: Patient refused   Housing Stability: Unknown (2/21/2023)    Housing Stability Vital Sign     Unable to Pay for Housing in the Last Year: Patient refused     Unstable Housing in the Last Year: Patient refused       Medications:  Current Facility-Administered Medications on File Prior to Encounter   Medication Dose Route Frequency Provider Last Rate Last Admin    diphenhydrAMINE injection 12.5 mg  12.5 mg Intravenous Once PRN Monster Atwood MD        electrolyte-S (ISOLYTE)   Intravenous Continuous Monster Atwood MD 25 mL/hr at 03/07/23 1138 New Bag at 07/24/23 1804    HYDROmorphone (PF) injection 0.2 mg  0.2 mg Intravenous Q5 Min PRN Monster Atwood MD        lactated ringers infusion  10 mL/hr Intravenous Continuous Monster Atwood MD        lactated ringers infusion  500 mL Intravenous Once Monster Atwood MD        LIDOcaine (PF) 10 mg/ml (1%) injection 10 mg  1 mL Intradermal Once Monster Atwood MD        LORazepam injection 0.25 mg  0.25 mg Intravenous Once PRN Monster Atwood MD        ondansetron injection 4 mg  4 mg Intravenous Once PRN Monster  TITUS Atwood MD        sodium chloride 0.9% flush 3 mL  3 mL Intravenous Q8H Monster Atwood MD         Current Outpatient Medications on File Prior to Encounter   Medication Sig Dispense Refill    diphenoxylate-atropine 2.5-0.025 mg (LOMOTIL) 2.5-0.025 mg per tablet Take by mouth.      ferrous sulfate 325 (65 FE) MG EC tablet Take 1 tablet (325 mg total) by mouth every other day.  0    HYDROcodone-acetaminophen (NORCO) 5-325 mg per tablet TAKE ONE TABLET BY MOUTH EVERY SIX HOURS AS NEEDED FOR PAIN 90 tablet 0    levothyroxine (SYNTHROID) 125 MCG tablet Take 1 tablet (125 mcg total) by mouth before breakfast. 30 tablet 11    ondansetron (ZOFRAN-ODT) 8 MG TbDL Take 1 tablet (8 mg total) by mouth every 12 (twelve) hours as needed. 30 tablet 1    potassium chloride SA (K-DUR,KLOR-CON) 20 MEQ tablet Take 1 tablet (20 mEq total) by mouth once daily. 30 tablet 11    albuterol (PROVENTIL) 2.5 mg /3 mL (0.083 %) nebulizer solution Take 3 mLs (2.5 mg total) by nebulization every 6 (six) hours as needed for Wheezing. Rescue (Patient not taking: Reported on 7/20/2023) 120 each 1    CMPD hydrocortisone 2%- LIDOcaine 3% suppository (RECTAL ROCKET) Place 1 suppository rectally every evening. Insert 1 suppository 3/4 of the way onto rectum. Wet for easier application. Repeat for 3 nights. 30 suppository 2    prochlorperazine (COMPAZINE) 10 MG tablet Take 1 tablet (10 mg total) by mouth every 6 (six) hours as needed. 30 tablet 1    triamcinolone acetonide 0.025% (KENALOG) 0.025 % cream Apply topically once daily. 80 g 1     Scheduled Meds:   albuterol-ipratropium  3 mL Nebulization Q6H    budesonide  0.5 mg Nebulization Q12H    chlorhexidine  15 mL Mouth/Throat BID    enoxparin  40 mg Subcutaneous Q24H (prophylaxis, 1700)    fluconazole (DIFLUCAN) IV (PEDS and ADULTS)  200 mg Intravenous Q24H    levothyroxine  75 mcg Intravenous Before breakfast    meropenem (MERREM) IVPB  1 g Intravenous Q12H    pantoprazole  40 mg Intravenous  "Daily    vancomycin (VANCOCIN) IV (PEDS and ADULTS)  1,750 mg Intravenous Q24H     Continuous Infusions:   dextrose 5 % and 0.9 % NaCl Stopped (07/29/23 1751)    lactated ringers 100 mL/hr at 07/29/23 1752    NORepinephrine bitartrate-D5W Stopped (07/29/23 1103)    propofoL Stopped (07/29/23 1105)     PRN Meds:.0.9%  NaCl infusion (for blood administration), acetaminophen, calcium gluconate IVPB, calcium gluconate IVPB, calcium gluconate IVPB, HYDROmorphone, magnesium sulfate IVPB, magnesium sulfate IVPB, ondansetron, potassium chloride **AND** potassium chloride **AND** potassium chloride, sodium phosphate 15 mmol in dextrose 5 % (D5W) 250 mL IVPB, sodium phosphate 20.01 mmol in dextrose 5 % (D5W) 250 mL IVPB, sodium phosphate 30 mmol in dextrose 5 % (D5W) 250 mL IVPB, Pharmacy to dose Vancomycin consult **AND** vancomycin - pharmacy to dose    Allergies:  Ativan [lorazepam], Flagyl [metronidazole], and Pcn [penicillins]    Past Family History:  Reviewed; refer to Hospitalist Admission Note    Review of Systems:  Review of Systems - All 14 systems reviewed and negative, except as noted in HPI    Physical Exam:    /62   Pulse 106   Temp 98.4 °F (36.9 °C) (Axillary)   Resp 15   Ht 5' 1" (1.549 m)   Wt 89.4 kg (197 lb)   SpO2 97%   Breastfeeding No   BMI 37.22 kg/m²     General Appearance:    No distress   Head:    Normocephalic, without obvious abnormality, atraumatic   Eyes:    PER, conjunctiva/corneas clear, EOM's intact in both eyes        Throat:   Lips, mucosa, and tongue normal; teeth and gums normal   Back:     Symmetric, no curvature, ROM normal, no CVA tenderness   Lungs:     Clear to auscultation bilaterally, respirations unlabored   Chest wall:    No tenderness or deformity   Heart:    tachycardic   Abdomen:     Tender.  Bandages in place.  Drain in place   Extremities:   Extremities normal, atraumatic, no cyanosis or edema   Pulses:   2+ and symmetric all extremities   MSK:   No joint or " muscle swelling, tenderness or deformity   Skin:   Skin color, texture, turgor normal, no rashes or lesions   Neurologic:   Mildly confused.   No flap     Results:  Recent Labs   Lab 07/28/23  1346 07/29/23  0421 07/29/23  1531 07/30/23  0346   * 134*  --  135*   K 3.9 3.5 3.7 3.8    110  --  109   CO2 17* 19*  --  19*   BUN 27* 21  --  15   CREATININE 1.4 1.1  --  0.9   * 189*  --  113*         Recent Labs   Lab 07/27/23  0415 07/27/23  2104 07/28/23  0359 07/28/23  1346 07/29/23  0421 07/30/23  0346   CALCIUM 7.4* 5.6* 6.8* 6.6* 7.2* 7.2*   ALBUMIN 2.1* 1.6* 1.6* 1.5* 1.6* 1.6*   PHOS 4.0 4.4  --   --   --   --    MG  --  1.9 1.9  --  2.0  --                Recent Labs   Lab 07/26/23  0035 07/26/23  0802 07/26/23  1454 07/26/23  1631   POCTGLUCOSE 136* 98 98 79         Recent Labs   Lab 10/06/20  0924   Hemoglobin A1C 5.6         Recent Labs   Lab 07/28/23  0359 07/28/23  0414 07/29/23  0421 07/29/23  0528 07/29/23  1531 07/30/23  0346   WBC 2.04*  --  5.89  --   --  7.46   HGB 9.6*  --  6.8*  --  7.8* 7.6*   HCT 28.6*   < > 20.1* 19* 22.7* 22.5*     --  117*  --   --  102*   MCV 84  --  84  --   --  86   MCHC 33.6  --  33.8  --   --  33.8   MONO 4.9  0.1*  --  3.4*  0.2*  --   --  4.0  0.3   EOSINOPHIL 0.0  --  0.0  --   --  0.0    < > = values in this interval not displayed.         Recent Labs   Lab 07/28/23  1346 07/29/23  0421 07/30/23  0346   BILITOT 1.1* 1.1* 0.9   PROT 4.1* 4.1* 4.6*   ALBUMIN 1.5* 1.6* 1.6*   ALKPHOS 53* 39* 52*   ALT 24 20 21   AST 36 25 25         Recent Labs   Lab 02/21/23  0910 03/25/23  1810 07/24/23  2055 07/26/23  1517   Color, UA Yellow Yellow Yellow Red A   Appearance, UA Clear Hazy A Clear Cloudy A   pH, UA 6.0 8.0 7.0 SEE COMMENT   Specific Gravity, UA 1.010 1.010 1.015 SEE COMMENT   Protein, UA 1+ A Trace A Negative SEE COMMENT   Glucose, UA Negative Negative Negative SEE COMMENT   Ketones, UA Negative Negative Negative SEE COMMENT   Urobilinogen,  UA Negative Negative Negative SEE COMMENT   Bilirubin (UA) Negative Negative Negative SEE COMMENT   Occult Blood UA Trace A 1+ A Trace A SEE COMMENT   Nitrite, UA Negative Negative Negative SEE COMMENT   RBC, UA 1 21 H  --  >100 H   WBC, UA 6 H 37 H  --  6 H   Bacteria Few A Few A  --  Moderate A   Hyaline Casts, UA 0  --   --   --          Recent Labs   Lab 07/27/23  2224 07/28/23  0414 07/29/23  0528   POC PH 7.285 LL 7.369 7.458 H   POC PCO2 36.4 29.1 L 29.6 L   POC HCO3 17.3 L 16.8 L 20.9 L   POC PO2 62 L 78 L 270 H   POC SATURATED O2 89 L 95 100   POC BE -9 -9 -3   Sample ARTERIAL ARTERIAL ARTERIAL         Microbiology Results (last 7 days)       Procedure Component Value Units Date/Time    Culture, Respiratory with Gram Stain [[id number]]  (Abnormal) Collected: 07/27/23 2350    Order Status: Completed Specimen: Respiratory from Tracheal Aspirate Updated: 07/29/23 1603     Respiratory Culture STAPHYLOCOCCUS AUREUS  Moderate  Susceptibility pending       Gram Stain (Respiratory) <10 epithelial cells per low power field.     Gram Stain (Respiratory) Few WBC's     Gram Stain (Respiratory) Moderate Gram positive cocci          Patient care was time spent personally by me on the following activities:   Obtaining a history  Examination of patient.  Providing medical care at the patients bedside.  Developing a treatment plan with patient or surrogate and bedside caregivers  Ordering and reviewing laboratory studies, radiographic studies, pulse oximetry.  Ordering and performing treatments and interventions.  Evaluation of patient's response to treatment.  Discussions with consultants while on the unit and immediately available to the patient.  Re-evaluation of the patient's condition.  Documentation in the medical record.     Roscoe Silva MD  Nephrology  [facility] [place]  [phone]

## 2023-07-30 NOTE — CARE UPDATE
07/30/23 0714   Patient Assessment/Suction   Level of Consciousness (AVPU) alert   Respiratory Effort Unlabored   All Lung Fields Breath Sounds coarse;wheezes, expiratory   PRE-TX-O2   Device (Oxygen Therapy) nasal cannula   $ Is the patient on Low Flow Oxygen? Yes   Flow (L/min) 3  (decreased to 2)   SpO2 97 %   Pulse Oximetry Type Continuous   $ Pulse Oximetry - Multiple Charge Pulse Oximetry - Multiple   Pulse 106   Resp 15   Aerosol Therapy   $ Aerosol Therapy Charges Aerosol Treatment  (duoneb)   Daily Review of Necessity (SVN) completed   Respiratory Treatment Status (SVN) given   Treatment Route (SVN) mask   Patient Position (SVN) semi-Sanderson's   Post Treatment Assessment (SVN) increased aeration   Signs of Intolerance (SVN) none   Breath Sounds Post-Respiratory Treatment   Throughout All Fields Post-Treatment aeration increased   Post-treatment Heart Rate (beats/min) 103   Post-treatment Resp Rate (breaths/min) 15   Education   $ Education Bronchodilator;15 min   Respiratory Evaluation   $ Care Plan Tech Time 15 min

## 2023-07-30 NOTE — ANESTHESIA POSTPROCEDURE EVALUATION
Anesthesia Post Evaluation    Patient: Tessa Enriquez    Procedure(s) Performed: Procedure(s) (LRB):  LAPAROTOMY, EXPLORATORY (N/A)    Final Anesthesia Type: general      Patient location during evaluation: ICU  Patient participation: Yes- Able to Participate  Level of consciousness: awake and alert and oriented  Post-procedure vital signs: reviewed and stable  Pain management: adequate  Airway patency: patent    PONV status at discharge: No PONV  Anesthetic complications: no      Cardiovascular status: blood pressure returned to baseline and hemodynamically stable  Respiratory status: unassisted, spontaneous ventilation and nasal cannula  Hydration status: euvolemic  Follow-up not needed.          Vitals Value Taken Time   /66 07/30/23 1030   Temp 36.5 °C (97.7 °F) 07/30/23 0730   Pulse 106 07/30/23 1051   Resp 11 07/30/23 1051   SpO2 93 % 07/30/23 1051   Vitals shown include unvalidated device data.      No case tracking events are documented in the log.      Pain/Bautista Score: Pain Rating Prior to Med Admin: 8 (7/30/2023  9:09 AM)  Pain Rating Post Med Admin: 8 (7/30/2023  9:09 AM)

## 2023-07-30 NOTE — PT/OT/SLP PROGRESS
Occupational Therapy      Patient Name:  Tessa Enriquez   MRN:  [id number]    Patient not seen today secondary to  (Pt being transferred from ICU to room 1123.). Will follow-up 7/31/2023.    7/30/2023

## 2023-07-30 NOTE — PROGRESS NOTES
Pulmonary/Critical Care Progress Note      PATIENT NAME: Tessa Enriquez  MRN: [id number]  TODAY'S DATE: 2023  6:25 AM  ADMIT DATE: 2023  AGE: 67 y.o. : 1955    CONSULT REQUESTED BY: Melissa Pereira MD    REASON FOR CONSULT:   Critical care management    HPI:  The patient is a 67 year old female with recurrent adenocarcinoma of the colon.  She had another resection with formation of a colostomy on .  She had persistent tachycardia, abdominal pain and BOOM following this.  She was noted to have an ischemic stoma yesterday and was brought back to the OR where multiple areas of ischemic small bowel were found.  The patient's abdomen was closed at the skin level with plans to return to the OR today.  The patient is maintained on the ventilator. She is requiring 10 PEEP and 55% FiO2     23: Doing well on SAT SBT today with good cough on command and more than adequate ventilation on PS 5/8. Extubating now to NC, as the patient has a good cough and does not use inhalers at home. Is having moderate yellow respi secretions.    23: Doing well this morning -- off pressors and vent for past day. Complaining of right pleuritic chest pain (note that pleural effusion is on the left). Denies SOB. Not on O2 at home.    REVIEW OF SYSTEMS  As above.    ALLERGIES  Review of patient's allergies indicates:   Allergen Reactions    Ativan [lorazepam] Hives and Itching    Flagyl [metronidazole] Itching and Rash    Pcn [penicillins] Hives, Itching and Rash       INPATIENT SCHEDULED MEDICATIONS   albuterol-ipratropium  3 mL Nebulization Q6H    budesonide  0.5 mg Nebulization Q12H    chlorhexidine  15 mL Mouth/Throat BID    enoxparin  40 mg Subcutaneous Q24H (prophylaxis, 1700)    fluconazole (DIFLUCAN) IV (PEDS and ADULTS)  200 mg Intravenous Q24H    levothyroxine  75 mcg Intravenous Before breakfast    meropenem (MERREM) IVPB  1 g Intravenous Q12H    pantoprazole  40 mg Intravenous Daily     vancomycin (VANCOCIN) IV (PEDS and ADULTS)  1,750 mg Intravenous Q24H      dextrose 5 % and 0.9 % NaCl Stopped (07/29/23 1751)    lactated ringers 100 mL/hr at 07/29/23 1752    NORepinephrine bitartrate-D5W Stopped (07/29/23 1103)    propofoL Stopped (07/29/23 1105)       MEDICAL AND SURGICAL HISTORY  Past Medical History:   Diagnosis Date    Acute hypoxemic respiratory failure 07/09/2018    Arthritis     Bilateral kidney stones 3/27/2023    Cancer     colon    COPD (chronic obstructive pulmonary disease)     History of home oxygen therapy     ONLY PRN AND HASN'T USED IT IN A YEAR    Hyperlipidemia     Thyroid disease      Past Surgical History:   Procedure Laterality Date    CHOLECYSTECTOMY      COLECTOMY N/A 11/9/2020    Procedure: COLECTOMY;  Surgeon: Brandon Wagner MD;  Location: [facility];  Service: General;  Laterality: N/A;  LOWER- ANTERIOR    COLONOSCOPY N/A 10/23/2020    Procedure: COLONOSCOPY;  Surgeon: Jolynn Ayala MD;  Location: [facility];  Service: Endoscopy;  Laterality: N/A;    COLONOSCOPY N/A 8/6/2021    Procedure: COLONOSCOPY;  Surgeon: Jolynn Ayala MD;  Location: [facility];  Service: Endoscopy;  Laterality: N/A;    COLONOSCOPY N/A 7/7/2022    Procedure: COLONOSCOPY;  Surgeon: Jolynn Ayala MD;  Location: [facility];  Service: Endoscopy;  Laterality: N/A;    COLONOSCOPY N/A 5/31/2023    Procedure: COLONOSCOPY;  Surgeon: Jolynn Ayala MD;  Location: [facility];  Service: Endoscopy;  Laterality: N/A;    COLOSTOMY N/A 7/24/2023    Procedure: CREATION, COLOSTOMY;  Surgeon: Brandon Wagner MD;  Location: [facility] OR;  Service: General;  Laterality: N/A;    COMPLETION PROCTECTOMY N/A 7/24/2023    Procedure: PROCTECTOMY, COMPLETION;  Surgeon: Brandon Wagner MD;  Location: [facility] OR;  Service: General;  Laterality: N/A;    CYSTOSCOPY W/ URETERAL STENT PLACEMENT Right 2/22/2023    Procedure: CYSTOSCOPY, WITH URETERAL STENT INSERTION;  Surgeon: Carin Kwon Jr., MD;  Location: [facility];   Service: Urology;  Laterality: Right;    CYSTOSCOPY W/ URETERAL STENT PLACEMENT Bilateral 3/26/2023    Procedure: CYSTOSCOPY, WITH URETERAL STENT INSERTION;  Surgeon: Sabine Barber MD;  Location: [facility] OR;  Service: Urology;  Laterality: Bilateral;    CYSTOSCOPY W/ URETERAL STENT PLACEMENT Bilateral 7/24/2023    Procedure: CYSTOSCOPY, WITH URETERAL STENT INSERTION;  Surgeon: Adrienne Mijares MD;  Location: [facility];  Service: Urology;  Laterality: Bilateral;  chamberlain placing stents 1st    CYSTOSCOPY WITH URETEROSCOPY, RETROGRADE PYELOGRAPHY, AND INSERTION OF STENT Bilateral 3/26/2023    Procedure: CYSTOSCOPY, WITH RETROGRADE PYELOGRAM AND URETERAL STENT INSERTION;  Surgeon: Sabine Barber MD;  Location: [facility];  Service: Urology;  Laterality: Bilateral;    CYSTOURETEROSCOPY WITH RETROGRADE PYELOGRAPHY AND INSERTION OF STENT INTO URETER Right 3/7/2023    Procedure: CYSTOURETEROSCOPY, WITH RETROGRADE PYELOGRAM AND URETERAL STENT INSERTION;  Surgeon: Carin Kwon Jr., MD;  Location: [facility];  Service: Urology;  Laterality: Right;    CYSTOURETEROSCOPY, WITH HOLMIUM LASER LITHOTRIPSY OF URETERAL CALCULUS AND STENT INSERTION Bilateral 4/11/2023    Procedure: CYSTOURETEROSCOPY, WITH HOLMIUM LASER LITHOTRIPSY OF URETERAL CALCULUS AND STENT INSERTION;  Surgeon: Carin Kwon Jr., MD;  Location: [facility];  Service: Urology;  Laterality: Bilateral;    ESOPHAGEAL DILATION N/A 6/29/2021    Procedure: DILATION, ESOPHAGUS;  Surgeon: Sourav Baires III, MD;  Location: [facility];  Service: Endoscopy;  Laterality: N/A;    ESOPHAGOGASTRODUODENOSCOPY  06/29/2021    Dilation to 57 Fr    ESOPHAGOGASTRODUODENOSCOPY N/A 6/29/2021    Procedure: EGD (ESOPHAGOGASTRODUODENOSCOPY);  Surgeon: Sourav Baires III, MD;  Location: [facility];  Service: Endoscopy;  Laterality: N/A;    EXCISION, SMALL INTESTINE N/A 7/27/2023    Procedure: EXCISION, SMALL INTESTINE;  Surgeon: Brandon Wagner MD;  Location: [facility];   Service: General;  Laterality: N/A;    FLEXIBLE SIGMOIDOSCOPY N/A 8/1/2022    Procedure: SIGMOIDOSCOPY, FLEXIBLE;  Surgeon: Brandon Wagner MD;  Location: [facility] ENDO;  Service: Endoscopy;  Laterality: N/A;    HYSTERECTOMY      INSERTION OF TUNNELED CENTRAL VENOUS CATHETER (CVC) WITH SUBCUTANEOUS PORT N/A 1/11/2021    Procedure: [id number];  Surgeon: Brandon Wagner MD;  Location: [facility] OR;  Service: General;  Laterality: N/A;    INSERTION OF TUNNELED CENTRAL VENOUS CATHETER (CVC) WITH SUBCUTANEOUS PORT Right 9/1/2022    Procedure: [id number];  Surgeon: Brandon Wagner MD;  Location: [facility] OR;  Service: General;  Laterality: Right;  Wants rt side placement    LAPAROTOMY, EXPLORATORY N/A 7/24/2023    Procedure: LAPAROTOMY, EXPLORATORY;  Surgeon: Brandon Wagner MD;  Location: [facility] OR;  Service: General;  Laterality: N/A;  case must go 1st-chamberlain placing stents    LAPAROTOMY, EXPLORATORY N/A 7/27/2023    Procedure: LAPAROTOMY, EXPLORATORY;  Surgeon: Brandon Wagner MD;  Location: [facility] OR;  Service: General;  Laterality: N/A;    MEDIPORT REMOVAL Left 4/19/2021    Procedure: REMOVAL, CATHETER, CENTRAL VENOUS, TUNNELED, WITH PORT;  Surgeon: Brandon Wagner MD;  Location: [facility] OR;  Service: General;  Laterality: Left;    REMOVAL-STENT Right 3/7/2023    Procedure: REMOVAL-STENT;  Surgeon: Carin Kwon Jr., MD;  Location: [facility] OR;  Service: Urology;  Laterality: Right;    REMOVAL-STENT Bilateral 4/11/2023    Procedure: REMOVAL-STENT;  Surgeon: Carin Kwon Jr., MD;  Location: [facility] OR;  Service: Urology;  Laterality: Bilateral;    RETROGRADE PYELOGRAPHY Bilateral 4/11/2023    Procedure: PYELOGRAM, RETROGRADE;  Surgeon: Carin Kwon Jr., MD;  Location: [facility] OR;  Service: Urology;  Laterality: Bilateral;    ROBOT-ASSISTED COLECTOMY N/A 11/9/2020    Procedure: ROBOTIC COLECTOMY low anterior resection, possible open;  Surgeon: Brandon Wagner MD;  Location: [facility] OR;  Service: General;   Laterality: N/A;  CONVERTED TO OPEN AT 1503    SUBTOTAL COLECTOMY N/A 2023    Procedure: COLECTOMY, PARTIAL;  Surgeon: Brandon Wagner MD;  Location: [facility];  Service: General;  Laterality: N/A;  with colorectal Anastamosis    URETEROSCOPIC REMOVAL OF URETERIC CALCULUS Right 3/7/2023    Procedure: REMOVAL, CALCULUS, URETER, URETEROSCOPIC;  Surgeon: Carin Kwon Jr., MD;  Location: [facility];  Service: Urology;  Laterality: Right;    URETEROSCOPIC REMOVAL OF URETERIC CALCULUS Bilateral 2023    Procedure: REMOVAL, CALCULUS, URETER, URETEROSCOPIC;  Surgeon: Carin Kwon Jr., MD;  Location: [facility] OR;  Service: Urology;  Laterality: Bilateral;       ALCOHOL, TOBACCO AND DRUG USE  Social History     Tobacco Use   Smoking Status Former    Current packs/day: 0.00    Average packs/day: 2.0 packs/day for 40.0 years (80.0 ttl pk-yrs)    Types: Cigarettes    Start date: 1977    Quit date: 2017    Years since quittin.0   Smokeless Tobacco Never     Social History     Substance and Sexual Activity   Alcohol Use No     Social History     Substance and Sexual Activity   Drug Use No       FAMILY HISTORY  Family History   Problem Relation Age of Onset    COPD Mother     COPD Father     Prostate cancer Brother     Breast cancer Maternal Grandmother        VITAL SIGNS (MOST RECENT)  Temp: 97.7 °F (36.5 °C) (23 0730)  Pulse: 109 (23)  Resp: 11 (23)  BP: 139/64 (23)  SpO2: (!) 92 % (23 0930)    INTAKE AND OUTPUT (LAST 24 HOURS):  Intake/Output Summary (Last 24 hours) at 2023 0940  Last data filed at 2023 0810  Gross per 24 hour   Intake 5001.95 ml   Output 3237 ml   Net 1764.95 ml       WEIGHT  Wt Readings from Last 1 Encounters:   23 89.4 kg (197 lb)       PHYSICAL EXAM  GENERAL: awake and alert, NAD  HEENT: Pupils equal and reactive. Extraocular movements intact. Nose intact.   NECK: Supple.   HEART: Regular rate and rhythm. No murmur or gallop  auscultated.  LUNGS: Clear to anterior auscultation.  ABDOMEN: Ostomy in LLQ w/ dark liquid. Midline incision is dressed and clean. No peritoneal signs.  : Normal anatomy. Velasco with yellow urine.  EXTREMITIES: Normal muscle tone and joint movement, no cyanosis or clubbing.   LYMPHATICS: No adenopathy palpated, no edema.  SKIN: Dry, intact.  NEURO: No gross motor or cognitive deficit.  PSYCH: Appropriate affect      CBC LAST (LAST 24 HOURS)  Recent Labs   Lab 07/30/23  0346   WBC 7.46   RBC 2.63*   HGB 7.6*   HCT 22.5*   MCV 86   MCH 28.9   MCHC 33.8   RDW 18.1*   *   MPV 10.8   GRAN 91.7*  6.8   LYMPH 3.4*  0.3*   MONO 4.0  0.3   BASO 0.01   NRBC 0       CHEMISTRY LAST (LAST 24 HOURS)  Recent Labs   Lab 07/30/23  0346   *   K 3.8      CO2 19*   ANIONGAP 7*   BUN 15   CREATININE 0.9   *   CALCIUM 7.2*   ALBUMIN 1.6*   PROT 4.6*   ALKPHOS 52*   ALT 21   AST 25   BILITOT 0.9           CARDIAC PROFILE (LAST 24 HOURS)  Recent Labs   Lab 07/27/23  2104   BNP 25   TROPONINIHS 12.4       LAST 7 DAYS MICROBIOLOGY   Microbiology Results (last 7 days)       Procedure Component Value Units Date/Time    Culture, Respiratory with Gram Stain [[id number]]  (Abnormal)  (Susceptibility) Collected: 07/27/23 2350    Order Status: Completed Specimen: Respiratory from Tracheal Aspirate Updated: 07/30/23 0928     Respiratory Culture METHICILLIN RESISTANT STAPHYLOCOCCUS AUREUS  Moderate  Results called to and read back by Alessandra Colón RN-2BICU;    07/30/2023  09:27 CJD       Gram Stain (Respiratory) <10 epithelial cells per low power field.     Gram Stain (Respiratory) Few WBC's     Gram Stain (Respiratory) Moderate Gram positive cocci            MOST RECENT IMAGING  X-Ray Chest AP Portable  EXAM: XR CHEST 1 VIEW    DATE: 7/28/2023 5:12 PM CDT    INDICATION: Line placement    COMPARISON: X-ray chest July 28, 2023    TECHNIQUE: AP chest.    FINDINGS:    Lines, tubes and hardware: Endotracheal tube tip  projects approximately 3 cm above the anjel. Right internal jugular central venous catheter tip overlies the superior vena cava. A gastric suction tube passes below the diaphragm.  CT compatible Port-A-Cath in the right chest with tip terminating in the SVC.    Lungs and pleura: Pulmonary vascularity is normal. Scattered platelike atelectasis is present. Redemonstrated left base opacity. Unchanged left-sided effusion. No perceptible pneumothorax.    Heart and mediastinum: The heart size is normal for technique. The mediastinal contours are normal.    Bones and soft tissues: No acute abnormality. Age-related degenerative findings.    IMPRESSION:  1.  Lines and tubes as above.  2.  Persistent left base opacity and effusion.    Electronically signed by:  Memo Ferreira DO  7/28/2023 5:47 PM CDT Workstation: [id number]  X-Ray Chest AP Portable  CLINICAL HISTORY:  67 years (1955) Female ventilator Ventilator    TECHNIQUE:  Portable AP radiograph the chest. One view.    COMPARISON:  Radiograph from July 27, 2023.    FINDINGS:  There is a focal opacity at the left lung base, characteristic of a combination of a small left pleural effusion and associated atelectasis/consolidation. The right lung is essentially clear noting a tiny bandlike area of atelectasis versus scarring in the right midlung zone. No pneumothorax is identified. The heart is normal in size. Atheromatous calcifications are seen at the aortic arch. Osseous structures appear unchanged. The visualized upper abdomen is unremarkable.    Lines and tubes: Endotracheal tube with tip projecting 4 cm from the anjel. Right-sided subclavian medical infusion port with tip at the level of the SVC. Enteric tube with tip projecting of the body the stomach.    IMPRESSION:  Unchanged radiograph the chest when accounting for differences in technique.    .    Electronically signed by:  Pierre Barragan MD  7/28/2023 8:12 AM CDT Workstation: [id number]  X-Ray Chest  AP Portable  EXAM: XR Chest, 1 View  CLINICAL HISTORY: 67 years old, Female; ET tube repositioning; ET tube repositioning  TECHNIQUE: Frontal view of the chest.  COMPARISON: 11:07 PM    FINDINGS:  Lungs: Left basilar opacity.  Pleural space: Probable small left pleural effusion.  Heart: No cardiomegaly.  Mediastinum: No acute findings.  Bones/joints: No acute findings.  Tubes, lines and devices: ET tube 4.2 cm above the anjel. NG tube projecting in the proximal stomach, though side-port above the gastroesophageal junction. Right portacatheter in the upper SVC.    IMPRESSION:  1. ET tube 4.2 cm above the anjel.  2. Left basilar atelectasis/infiltrate with pleural effusion.  3. NG tube projecting in the proximal stomach, though side-port above the gastroesophageal junction. Suggest advancing by 6 cm.    Electronically signed by:  Abdulaziz Holman MD  7/28/2023 12:52 AM CDT Workstation: [id number]      CURRENT VISIT EKG  Results for orders placed or performed during the hospital encounter of 07/24/23   EKG 12-lead    Narrative    Test Reason : R00.0,    Vent. Rate : 123 BPM     Atrial Rate : 123 BPM     P-R Int : 114 ms          QRS Dur : 068 ms      QT Int : 340 ms       P-R-T Axes : 060 020 071 degrees     QTc Int : 486 ms    Sinus tachycardia  Low voltage QRS  Borderline Abnormal ECG  When compared with ECG of 26-JUL-2023 07:13,  Nonspecific T wave abnormality now evident in Anterior leads    Referred By: [email or web address]   SELF           Confirmed By:        ECHOCARDIOGRAM RESULTS  No results found for this or any previous visit.        VENTILATOR INFORMATION  Vent Mode: Spont  Oxygen Concentration (%):  [40-50] 40  Resp Rate Total:  [15 br/min-30 br/min] 18 br/min  PEEP/CPAP:  [8 cmH20] 8 cmH20  Pressure Support:  [5 cmH20] 5 cmH20  Mean Airway Pressure:  [10 cmH20] 10 cmH20           LAST ARTERIAL BLOOD GAS  ABG  Recent Labs   Lab 07/29/23  0528   PH 7.458*   PO2 270*   PCO2 29.6*   HCO3 20.9*   BE -3       IMPRESSION  AND PLAN  Ischemic bowel with resection  S/p 3 abdominal surgeries, extensive bowel resection, and ostomy.  - continue meropenem for peritonitis Tx/ppx for a total of 7 days, as per surgeon  - nutrition consulted for TPN, as per surgeon recs    Metastatic adenocarcinoma, resected again  - following with Dr. Frank, oncologist    COPD  - duoneb  - budesonide    Acute on chronic hypoxic respiratory failure, improving  - extubated 7/29  - wean O2 as tolerated     Pneumonia of left lower lobe due to MRSA  - continue vanc for total 5-7 days    Left pleural effusion, likely related to peritoneal inflammation  - If fevers or other signs of infection appear despite appropriate Abx, please call our service back to assess for need for thoracentesis for possible empyema.     Anemia  Thrombocytopenia  acute blood loss, expected  - PRBCs transfused on 7/27 and 7/29    Severe hypoalbuminemia/morbid obesity  Hypothyroidism  - receiving IV Synthroid    BOOM, resolved  Shock, resolved  - IV fluids and pressors d/c'ed    Discussed with nursing and surgeon.  SCDs for DVT prophylaxis  Protonix for GI prophylaxis  Dispo: transfer to med/surg    Discussed with surgeon and RN. Labs reviewed. Chest imaging personally interpreted by me, as above.    Pulmonary & Critical Care Medicine will sign off at this time.   Please call with any further questions or concerns.    Ari Mcdermott MD  Pulmonary and Critical Care Medicine  [facility] / [facility]  Date of Service: 07/30/2023  9:57 AM

## 2023-07-30 NOTE — PROGRESS NOTES
PT seen and examined.  REsting comfortably.  Conversant  Some abd pain.  No n/v    Wt Readings from Last 3 Encounters:   07/24/23 89.4 kg (197 lb)   07/03/23 89.3 kg (196 lb 13.9 oz)   06/27/23 89.7 kg (197 lb 12 oz)     Temp Readings from Last 3 Encounters:   07/30/23 97.7 °F (36.5 °C)   07/03/23 97.5 °F (36.4 °C) (Temporal)   06/27/23 98.1 °F (36.7 °C) (Temporal)     BP Readings from Last 3 Encounters:   07/30/23 (!) 155/72   07/03/23 (!) 143/79   06/27/23 (!) 185/86     Pulse Readings from Last 3 Encounters:   07/30/23 102   07/03/23 84   06/27/23 84     AAOx3  Sinus  Soft/appt ttp/slight dist  Ostomy pink and viable    Lab Results   Component Value Date    WBC 7.46 07/30/2023    HGB 7.6 (L) 07/30/2023    HCT 22.5 (L) 07/30/2023    MCV 86 07/30/2023     (L) 07/30/2023       BMP  Lab Results   Component Value Date     (L) 07/30/2023    K 3.8 07/30/2023     07/30/2023    CO2 19 (L) 07/30/2023    BUN 15 07/30/2023    CREATININE 0.9 07/30/2023    CALCIUM 7.2 (L) 07/30/2023    ANIONGAP 7 (L) 07/30/2023    EGFRNORACEVR >60.0 07/30/2023     A/P: s/p APR complicated by catastrophic ischemia to small bowel (Suspected clots) requiring SBR leaving pt with 50 cc's of small blweo    PT/OT  Cont NG tube to LIS  WIll need to TPN.  Will start tomorrow.

## 2023-07-30 NOTE — PT/OT/SLP EVAL
"Physical Therapy Evaluation    Patient Name:  Tessa Enriquez   MRN:  [id number]    Recommendations:     Discharge Recommendations: rehabilitation facility   Discharge Equipment Recommendations: walker, rolling   Barriers to discharge: Inaccessible home, Decreased caregiver support, and increased burden of care    Assessment:     Tessa Enriquez is a 67 y.o. female admitted with a medical diagnosis of Ischemic necrosis of small bowel.  She presents with the following impairments/functional limitations: weakness, impaired functional mobility, gait instability, pain, impaired skin, edema, impaired cardiopulmonary response to activity, impaired endurance, decreased lower extremity function, impaired self care skills Pt found awake in bed. Agreeable to PT. Reports sitting on EOB earlier today with nursing.Pt anxious and noted elevated HR up to 150 bpm anticipating pain with mobility. She required max A x 2 plus additional time to mobilize to EOB. Pt stood with RW min A x 2 x 2 trials. Able to take 6 small side steps towards head of bed. Pt reports fatigued and increased pain and requesting back to bed.Pt was independent PTA. She has a room mate but will not have 24/7 assistance at home if needed. She will benefit from post acute PT to maximize safety and functional independence prior to DC home.    Rehab Prognosis: Good; patient would benefit from acute skilled PT services to address these deficits and reach maximum level of function.    Recent Surgery: Procedure(s) (LRB):  LAPAROTOMY, EXPLORATORY (N/A) 2 Days Post-Op    Plan:     During this hospitalization, patient to be seen daily to address the identified rehab impairments via gait training, therapeutic activities, therapeutic exercises and progress toward the following goals:    Plan of Care Expires:  08/30/23    Subjective     Chief Complaint: pain  Patient/Family Comments/goals: "I don't think I'm going to make it out of here"  Pain/Comfort:  Pain " Rating 1: 5/10  Location 1: abdomen  Pain Addressed 1: Reposition, Distraction, Cessation of Activity, Nurse notified  Pain Rating Post-Intervention 1: 6/10    Patients cultural, spiritual, Quaker conflicts given the current situation: no    Living Environment:  Lives with room mate in [facility], has UMA w hand rails, drives  Prior to admission, patients level of function was independent.  Equipment used at home: none.  DME owned (not currently used): none.  Upon discharge, patient will have assistance from friends?.    Objective:     Communicated with nurseGricel prior to session.  Patient found HOB elevated with peripheral IV, colin catheter, oxygen, blood pressure cuff, bed alarm, telemetry, SCD, PICC line, colostomy  upon PT entry to room.    General Precautions: Standard, fall  Orthopedic Precautions:N/A   Braces: N/A  Respiratory Status: Nasal cannula, flow 3 L/min    Exams:  Cognitive Exam:  Patient is oriented to Person, Place, Time, and Situation  Gross Motor Coordination:  WFL  Skin Integrity/Edema:      -       Abdominal incision dressed, rhina drain in place, stoma  RLE ROM: WFL  RLE Strength: WFL  LLE ROM: WFL  LLE Strength: WFL    Functional Mobility:  Bed Mobility:     Supine to Sit: maximal assistance and of 2 persons  Sit to Supine: maximal assistance and of 2 persons  Transfers:     Sit to Stand:  minimum assistance and of 2 persons with rolling walker  Gait: amb 6 side steps towards HOB with RW and min A x 2, VC's to  R foot      AM-PAC 6 CLICK MOBILITY  Total Score:9       Treatment & Education:  Educated in PT roles and goals, safety with RW for transfers and side stepping, DC recommendations, fall prevention  Pt requesting pain medication following session.  Patient left HOB elevated with all lines intact, call button in reach, bed alarm on, and nurse notified.    GOALS:   Multidisciplinary Problems       Physical Therapy Goals          Problem: Physical Therapy    Goal Priority Disciplines  Outcome Goal Variances Interventions   Physical Therapy Goal     PT, PT/OT Ongoing, Progressing     Description: Goals to be met by: 23     Patient will increase functional independence with mobility by performin. Supine to sit with Stand-by Assistance  2. Sit to supine with Stand-by Assistance  3. Sit to stand transfer with Stand-by Assistance  4. Bed to chair transfer with Stand-by Assistance using Rolling Walker  5. Gait  x 250 feet with Stand-by Assistance using Rolling Walker.                          History:     Past Medical History:   Diagnosis Date    Acute hypoxemic respiratory failure 2018    Arthritis     Bilateral kidney stones 3/27/2023    Cancer     colon    COPD (chronic obstructive pulmonary disease)     History of home oxygen therapy     ONLY PRN AND HASN'T USED IT IN A YEAR    Hyperlipidemia     Thyroid disease        Past Surgical History:   Procedure Laterality Date    CHOLECYSTECTOMY      COLECTOMY N/A 2020    Procedure: COLECTOMY;  Surgeon: Brandon Wagner MD;  Location: [facility] OR;  Service: General;  Laterality: N/A;  LOWER- ANTERIOR    COLONOSCOPY N/A 10/23/2020    Procedure: COLONOSCOPY;  Surgeon: Jolynn Ayala MD;  Location: [facility] ENDO;  Service: Endoscopy;  Laterality: N/A;    COLONOSCOPY N/A 2021    Procedure: COLONOSCOPY;  Surgeon: Jolynn Ayala MD;  Location: [facility] ENDO;  Service: Endoscopy;  Laterality: N/A;    COLONOSCOPY N/A 2022    Procedure: COLONOSCOPY;  Surgeon: Jolynn Ayala MD;  Location: [facility] ENDO;  Service: Endoscopy;  Laterality: N/A;    COLONOSCOPY N/A 2023    Procedure: COLONOSCOPY;  Surgeon: Jolynn Ayala MD;  Location: [facility] ENDO;  Service: Endoscopy;  Laterality: N/A;    COLOSTOMY N/A 2023    Procedure: CREATION, COLOSTOMY;  Surgeon: Brandon Wagner MD;  Location: [facility] OR;  Service: General;  Laterality: N/A;    COMPLETION PROCTECTOMY N/A 2023    Procedure: PROCTECTOMY, COMPLETION;  Surgeon: Brandon ALVES  MD Kristy;  Location: [facility];  Service: General;  Laterality: N/A;    CYSTOSCOPY W/ URETERAL STENT PLACEMENT Right 2/22/2023    Procedure: CYSTOSCOPY, WITH URETERAL STENT INSERTION;  Surgeon: Carin Kwon Jr., MD;  Location: [facility] OR;  Service: Urology;  Laterality: Right;    CYSTOSCOPY W/ URETERAL STENT PLACEMENT Bilateral 3/26/2023    Procedure: CYSTOSCOPY, WITH URETERAL STENT INSERTION;  Surgeon: Sabine Barber MD;  Location: [facility] OR;  Service: Urology;  Laterality: Bilateral;    CYSTOSCOPY W/ URETERAL STENT PLACEMENT Bilateral 7/24/2023    Procedure: CYSTOSCOPY, WITH URETERAL STENT INSERTION;  Surgeon: Adrienne Mijares MD;  Location: [facility] OR;  Service: Urology;  Laterality: Bilateral;  chamberlain placing stents 1st    CYSTOSCOPY WITH URETEROSCOPY, RETROGRADE PYELOGRAPHY, AND INSERTION OF STENT Bilateral 3/26/2023    Procedure: CYSTOSCOPY, WITH RETROGRADE PYELOGRAM AND URETERAL STENT INSERTION;  Surgeon: Sabine Barber MD;  Location: [facility];  Service: Urology;  Laterality: Bilateral;    CYSTOURETEROSCOPY WITH RETROGRADE PYELOGRAPHY AND INSERTION OF STENT INTO URETER Right 3/7/2023    Procedure: CYSTOURETEROSCOPY, WITH RETROGRADE PYELOGRAM AND URETERAL STENT INSERTION;  Surgeon: Carin Kwon Jr., MD;  Location: [facility];  Service: Urology;  Laterality: Right;    CYSTOURETEROSCOPY, WITH HOLMIUM LASER LITHOTRIPSY OF URETERAL CALCULUS AND STENT INSERTION Bilateral 4/11/2023    Procedure: CYSTOURETEROSCOPY, WITH HOLMIUM LASER LITHOTRIPSY OF URETERAL CALCULUS AND STENT INSERTION;  Surgeon: Carin Kwon Jr., MD;  Location: [facility];  Service: Urology;  Laterality: Bilateral;    ESOPHAGEAL DILATION N/A 6/29/2021    Procedure: DILATION, ESOPHAGUS;  Surgeon: Sourav Baires III, MD;  Location: [facility];  Service: Endoscopy;  Laterality: N/A;    ESOPHAGOGASTRODUODENOSCOPY  06/29/2021    Dilation to 57 Fr    ESOPHAGOGASTRODUODENOSCOPY N/A 6/29/2021    Procedure: EGD (ESOPHAGOGASTRODUODENOSCOPY);   Surgeon: Sourav Baires III, MD;  Location: [facility] ENDO;  Service: Endoscopy;  Laterality: N/A;    EXCISION, SMALL INTESTINE N/A 7/27/2023    Procedure: EXCISION, SMALL INTESTINE;  Surgeon: Brandon Wagner MD;  Location: [facility] OR;  Service: General;  Laterality: N/A;    FLEXIBLE SIGMOIDOSCOPY N/A 8/1/2022    Procedure: SIGMOIDOSCOPY, FLEXIBLE;  Surgeon: Brandon Wagner MD;  Location: [facility] ENDO;  Service: Endoscopy;  Laterality: N/A;    HYSTERECTOMY      INSERTION OF TUNNELED CENTRAL VENOUS CATHETER (CVC) WITH SUBCUTANEOUS PORT N/A 1/11/2021    Procedure: [id number];  Surgeon: Brandon Wagner MD;  Location: [facility] OR;  Service: General;  Laterality: N/A;    INSERTION OF TUNNELED CENTRAL VENOUS CATHETER (CVC) WITH SUBCUTANEOUS PORT Right 9/1/2022    Procedure: [id number];  Surgeon: Brandon Wagner MD;  Location: [facility] OR;  Service: General;  Laterality: Right;  Wants rt side placement    LAPAROTOMY, EXPLORATORY N/A 7/24/2023    Procedure: LAPAROTOMY, EXPLORATORY;  Surgeon: Brandon Wagner MD;  Location: [facility] OR;  Service: General;  Laterality: N/A;  case must go 1st-chamberlain placing stents    LAPAROTOMY, EXPLORATORY N/A 7/27/2023    Procedure: LAPAROTOMY, EXPLORATORY;  Surgeon: Brandon Wagner MD;  Location: [facility] OR;  Service: General;  Laterality: N/A;    MEDIPORT REMOVAL Left 4/19/2021    Procedure: REMOVAL, CATHETER, CENTRAL VENOUS, TUNNELED, WITH PORT;  Surgeon: Brandon Wagner MD;  Location: [facility] OR;  Service: General;  Laterality: Left;    REMOVAL-STENT Right 3/7/2023    Procedure: REMOVAL-STENT;  Surgeon: Carin Kwon Jr., MD;  Location: [facility] OR;  Service: Urology;  Laterality: Right;    REMOVAL-STENT Bilateral 4/11/2023    Procedure: REMOVAL-STENT;  Surgeon: Carin Kwon Jr., MD;  Location: [facility] OR;  Service: Urology;  Laterality: Bilateral;    RETROGRADE PYELOGRAPHY Bilateral 4/11/2023    Procedure: PYELOGRAM, RETROGRADE;  Surgeon: Carin Kwon Jr., MD;  Location: [facility] OR;   Service: Urology;  Laterality: Bilateral;    ROBOT-ASSISTED COLECTOMY N/A 11/9/2020    Procedure: ROBOTIC COLECTOMY low anterior resection, possible open;  Surgeon: Brandon Wagner MD;  Location: [facility];  Service: General;  Laterality: N/A;  CONVERTED TO OPEN AT 1503    SUBTOTAL COLECTOMY N/A 7/24/2023    Procedure: COLECTOMY, PARTIAL;  Surgeon: Brandon Wagner MD;  Location: [facility];  Service: General;  Laterality: N/A;  with colorectal Anastamosis    URETEROSCOPIC REMOVAL OF URETERIC CALCULUS Right 3/7/2023    Procedure: REMOVAL, CALCULUS, URETER, URETEROSCOPIC;  Surgeon: Carin Kwon Jr., MD;  Location: [facility];  Service: Urology;  Laterality: Right;    URETEROSCOPIC REMOVAL OF URETERIC CALCULUS Bilateral 4/11/2023    Procedure: REMOVAL, CALCULUS, URETER, URETEROSCOPIC;  Surgeon: Carin Kwon Jr., MD;  Location: [facility];  Service: Urology;  Laterality: Bilateral;       Time Tracking:     PT Received On: 07/30/23  PT Start Time: 1333     PT Stop Time: 1354  PT Total Time (min): 21 min     Billable Minutes: Evaluation 10 and Therapeutic Activity 11      07/30/2023

## 2023-07-30 NOTE — PROGRESS NOTES
[facility]  Progress Note    Patient Name: Tessa Enriquez  MRN: [id number]  Admission Date: 7/24/2023  5:40 AM  Attending Physician: Tabitha Curtis MD  Face-to-Face encounter date: 07/30/2023    Assessment & Plan:   Tessa Enriquez is a 67 y.o. female with:    Active Hospital Problems    Diagnosis  POA    *Ischemic necrosis of small bowel [K55.029]  Yes    Hypocalcemia [E83.51]  Yes    On mechanically assisted ventilation [Z99.11]  Not Applicable    Malnutrition of moderate degree [E44.0]  Yes     Chronic    Hyponatremia [E87.1]  Yes    Sinus tachycardia [R00.0]  Yes    BOOM (acute kidney injury) [N17.9]  No    Severe sepsis [A41.9, R65.20]  No    Bilateral kidney stones [N20.0]  Yes    Severe obesity (BMI 35.0-39.9) with comorbidity [E66.01]  Yes    TESSA (iron deficiency anemia) [D50.9]  Yes     In setting of chronic blood loss from colorectal cancer.      Malignant neoplasm of colon [C18.9]  Yes    Hypothyroidism with abnormal TFTs [E03.9]  Yes     Chronic    COPD (chronic obstructive pulmonary disease) [J44.9]  Yes      Resolved Hospital Problems   No resolved problems to display.     Severe sepsis, improving  Shock, resolved  BOOM, resolved  Ischemic small bowel necrosis, currently stable  Peritonitis, improving  Recurrent colorectal adenocarcinoma    Acute blood loss anemia, expected after these surgeries  Acute on chronic respiratory failure  MRSA pneumonia  L pleural effusion   COPD  - multiple previous carcinoma resections, latest 7/24  - s/p resection/anastomosis x 2 + colostomy creation 7/28  - vanc, merrem, diflucan  - pRBC transfusions prn  - trending CBC  - evaluate for thora if pt worsens  - out of ICU   - pulmonology has signed off, assistance appreciated  - general surgery and nephrology following, thank you    Hypothyroidism  - IV synthroid    Other chronic conditions including but not limited to those noted above/below:  All home meds reviewed  "personally by me, and adjusted as appropriate.  Electrolyte derangement:  Trending BMP, Mg; Replacement prn  GI ppx:  protonix  DVT ppx:  lovenox  Dispo:  Home when medically stable.  Will need follow-up with their PCP.    FULL CODE    - The above conditions include an acute and/or chronic illness that poses a threat to life (or bodily function).  - Previous notes/encounters/external records reviewed personally by me.  - Pt's case personally discussed with the : discharge planning    Subjective:      Interval History:  pt having stool outpt in ostomy.  No subjective fever.  No chills.      Review of Systems   All systems reviewed and are negative except as noted per above.    Objective:     Physical Exam  BP (!) 155/72   Pulse 106   Temp 97.7 °F (36.5 °C)   Resp 16   Ht 5' 1" (1.549 m)   Wt 89.4 kg (197 lb)   SpO2 98%   Breastfeeding No   BMI 37.22 kg/m²     Gen: alert, responsive   HEENT:  Eyes - no pallor  External ears with no lesions  Nares patent  Mouth, Throat:  trachea midline  CV: RRR  Lungs: CTA B/L  Abd: +BS, soft, appropriately TTP, ND, +ostomy, +surgical site c/d/I   Ext: no atrophy or edema  Skin: warm, dry  Neuro: grossly intact  Psych: pleasant    Recent Labs   Lab 07/30/23  0346   WBC 7.46   HGB 7.6*   HCT 22.5*   *     Recent Labs   Lab 07/30/23  0346   CALCIUM 7.2*   ALBUMIN 1.6*   PROT 4.6*   *   K 3.8   CO2 19*      BUN 15   CREATININE 0.9   ALKPHOS 52*   ALT 21   AST 25   BILITOT 0.9     No results found for: "POCTGLUCOSE"   Microbiology Results (last 7 days)       Procedure Component Value Units Date/Time    Culture, Respiratory with Gram Stain [[id number]]  (Abnormal)  (Susceptibility) Collected: 07/27/23 8680    Order Status: Completed Specimen: Respiratory from Tracheal Aspirate Updated: 07/30/23 0928     Respiratory Culture METHICILLIN RESISTANT STAPHYLOCOCCUS AUREUS  Moderate  Results called to and read back by Alessandra Colón RN-2BICU;    07/30/2023  " "09:27 CJD       Gram Stain (Respiratory) <10 epithelial cells per low power field.     Gram Stain (Respiratory) Few WBC's     Gram Stain (Respiratory) Moderate Gram positive cocci               Labs and images reviewed personally by me.  See my personal assessment/interpretation above under "Assessment & Plan."    Tabitha Curtis MD  [facility] Hospitalist      "

## 2023-07-31 NOTE — CARE UPDATE
07/31/23 1322   Patient Assessment/Suction   Level of Consciousness (AVPU) alert   Respiratory Effort Normal;Unlabored   Expansion/Accessory Muscles/Retractions no use of accessory muscles;no retractions   All Lung Fields Breath Sounds diminished   Rhythm/Pattern, Respiratory unlabored;pattern regular   Cough Frequency no cough   PRE-TX-O2   Device (Oxygen Therapy) nasal cannula   $ Is the patient on Low Flow Oxygen? Yes   Flow (L/min) 2  (decreased to 1LPM)   SpO2 100 %   Pulse Oximetry Type Intermittent   $ Pulse Oximetry - Single Charge Pulse Oximetry - Single   Pulse 110   Resp 18   Aerosol Therapy   $ Aerosol Therapy Charges Aerosol Treatment   Daily Review of Necessity (SVN) completed   Respiratory Treatment Status (SVN) given   Treatment Route (SVN) mask;oxygen   Patient Position (SVN) HOB elevated   Post Treatment Assessment (SVN) increased aeration   Signs of Intolerance (SVN) none   Breath Sounds Post-Respiratory Treatment   Throughout All Fields Post-Treatment All Fields   Throughout All Fields Post-Treatment aeration increased   Post-treatment Heart Rate (beats/min) 110   Post-treatment Resp Rate (breaths/min) 18   Education   $ Education 15 min;Bronchodilator   Home Oxygen   Has Home Oxygen? No   Oxygen Care Plan   Oxygen Care Plan Per Protocol   SPO2 Goal (%) MD order   Rationale SpO2 is <MD Goal   Other Oxygen NONE

## 2023-07-31 NOTE — PT/OT/SLP EVAL
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 female with a medical diagnosis of Ischemic necrosis of small bowel. Performance deficits affecting function: weakness, impaired endurance, impaired self care skills, impaired functional mobility, gait instability, decreased lower extremity function, decreased safety awareness, pain, impaired cardiopulmonary response to activity, edema.      Rehab Prognosis: Fair; patient would benefit from acute skilled OT services to address these deficits and reach maximum level of function.       Plan:     Patient to be seen 6 x/week to address the above listed problems via self-care/home management, therapeutic activities, therapeutic exercises  Plan of Care Expires: 08/31/23  Plan of Care Reviewed with: patient    Subjective     Chief Complaint: pain  Patient/Family Comments/goals: return home    Occupational Profile:  Living Environment: patient lives with roommate in single story home  Previous level of function: independent with self care; driving  Equipment Used at Home: none  Assistance upon Discharge: unknown; friend may provide assistance    Pain/Comfort:  Pain Rating 1: 4/10  Location 1: abdomen  Pain Addressed 1: Pre-medicate for activity, Reposition, Distraction  Pain Rating Post-Intervention 1: 4/10    Patients cultural, spiritual, [employer] conflicts given the current situation: no    Objective:     Communicated with: nurse prior to session.  Patient found HOB elevated with peripheral IV, telemetry, PureWick,  colostomy, JUANITA drain, oxygen, bed alarm upon OT entry to room.    General Precautions: Standard, fall, contact  Orthopedic Precautions: N/A  Braces: N/A  Respiratory Status: Nasal cannula, flow 2 L/min    Occupational Performance:    Bed Mobility:    Patient completed Rolling/Turning to Left with  moderate assistance  Patient completed Rolling/Turning to Right with moderate assistance      Activities of Daily Living:  Feeding:  stand by assistance for self feeding and drinking    Cognitive/Visual Perceptual:  Cognitive/Psychosocial Skills:     -       Oriented to: Person, Place, and Time   -       Follows Commands/attention:Follows multistep  commands  -       Communication: clear/fluent  -       Memory: No Deficits noted  -       Safety awareness/insight to disability: intact   -       Mood/Affect/Coping skills/emotional control: Appropriate to situation and Cooperative    Physical Exam:  Upper Extremity Range of Motion:     -       Right Upper Extremity: WNL  -       Left Upper Extremity: WNL  Upper Extremity Strength:    -       Right Upper Extremity: WNL  -       Left Upper Extremity: WNL   Strength:    -       Right Upper Extremity: WFL  -       Left Upper Extremity: WFL  Fine Motor Coordination: -       Intact    AMPAC 6 Click ADL:  AMPAC Total Score: 16    Treatment & Education:  Patient was educated on role of OT/POC, upper body exercises utilizing theraband, importance of getting out of bed, discharge planning and equipment needs, and reviewed use of call bell for assistance.     Patient left HOB elevated with all lines intact, call button in reach, bed alarm on, and family present    GOALS:   Multidisciplinary Problems       Occupational Therapy Goals          Problem: Occupational Therapy    Goal Priority Disciplines Outcome Interventions   Occupational Therapy Goal     OT, PT/OT     Description: Goals to be met by: 8/31/2023     Patient will increase functional independence with ADLs by  performing:    UE Dressing with Supervision.  LE Dressing with Supervision.  Grooming while standing at sink with Supervision.  Toileting from toilet with Supervision for hygiene and clothing management.   Bathing from  tub bench with Supervision.                         History:     Past Medical History:   Diagnosis Date    Acute hypoxemic respiratory failure 07/09/2018    Arthritis     Bilateral kidney stones 3/27/2023    Cancer     colon    COPD (chronic obstructive pulmonary disease)     History of home oxygen therapy     ONLY PRN AND HASN'T USED IT IN A YEAR    Hyperlipidemia     Thyroid disease          Past Surgical History:   Procedure Laterality Date    CHOLECYSTECTOMY      COLECTOMY N/A 11/9/2020    Procedure: COLECTOMY;  Surgeon: Brandon Wagner MD;  Location: [facility] OR;  Service: General;  Laterality: N/A;  LOWER- ANTERIOR    COLONOSCOPY N/A 10/23/2020    Procedure: COLONOSCOPY;  Surgeon: Jolynn Ayala MD;  Location: [facility] ENDO;  Service: Endoscopy;  Laterality: N/A;    COLONOSCOPY N/A 8/6/2021    Procedure: COLONOSCOPY;  Surgeon: Jolynn Ayala MD;  Location: [facility] ENDO;  Service: Endoscopy;  Laterality: N/A;    COLONOSCOPY N/A 7/7/2022    Procedure: COLONOSCOPY;  Surgeon: Jolynn Ayala MD;  Location: [facility] ENDO;  Service: Endoscopy;  Laterality: N/A;    COLONOSCOPY N/A 5/31/2023    Procedure: COLONOSCOPY;  Surgeon: Jolynn Ayala MD;  Location: [facility] ENDO;  Service: Endoscopy;  Laterality: N/A;    COLOSTOMY N/A 7/24/2023    Procedure: CREATION, COLOSTOMY;  Surgeon: Brandon Wagner MD;  Location: [facility] OR;  Service: General;  Laterality: N/A;    COMPLETION PROCTECTOMY N/A 7/24/2023    Procedure: PROCTECTOMY, COMPLETION;  Surgeon: Brandon Wagner MD;  Location: [facility] OR;  Service: General;  Laterality: N/A;    CYSTOSCOPY W/ URETERAL STENT PLACEMENT Right 2/22/2023    Procedure: CYSTOSCOPY, WITH URETERAL STENT INSERTION;  Surgeon: Carin Kwon Jr., MD;  Location: [facility];  Service: Urology;   Laterality: Right;    CYSTOSCOPY W/ URETERAL STENT PLACEMENT Bilateral 3/26/2023    Procedure: CYSTOSCOPY, WITH URETERAL STENT INSERTION;  Surgeon: Sabine Barber MD;  Location: [facility] OR;  Service: Urology;  Laterality: Bilateral;    CYSTOSCOPY W/ URETERAL STENT PLACEMENT Bilateral 7/24/2023    Procedure: CYSTOSCOPY, WITH URETERAL STENT INSERTION;  Surgeon: Adrienne Mijares MD;  Location: [facility];  Service: Urology;  Laterality: Bilateral;  chamberlain placing stents 1st    CYSTOSCOPY WITH URETEROSCOPY, RETROGRADE PYELOGRAPHY, AND INSERTION OF STENT Bilateral 3/26/2023    Procedure: CYSTOSCOPY, WITH RETROGRADE PYELOGRAM AND URETERAL STENT INSERTION;  Surgeon: Sabine Barber MD;  Location: [facility] OR;  Service: Urology;  Laterality: Bilateral;    CYSTOURETEROSCOPY WITH RETROGRADE PYELOGRAPHY AND INSERTION OF STENT INTO URETER Right 3/7/2023    Procedure: CYSTOURETEROSCOPY, WITH RETROGRADE PYELOGRAM AND URETERAL STENT INSERTION;  Surgeon: Carin Kwon Jr., MD;  Location: [facility];  Service: Urology;  Laterality: Right;    CYSTOURETEROSCOPY, WITH HOLMIUM LASER LITHOTRIPSY OF URETERAL CALCULUS AND STENT INSERTION Bilateral 4/11/2023    Procedure: CYSTOURETEROSCOPY, WITH HOLMIUM LASER LITHOTRIPSY OF URETERAL CALCULUS AND STENT INSERTION;  Surgeon: Carin Kwon Jr., MD;  Location: [facility];  Service: Urology;  Laterality: Bilateral;    ESOPHAGEAL DILATION N/A 6/29/2021    Procedure: DILATION, ESOPHAGUS;  Surgeon: Sourav Baires III, MD;  Location: [facility] [place] [place];  Service: Endoscopy;  Laterality: N/A;    ESOPHAGOGASTRODUODENOSCOPY  06/29/2021    Dilation to 57 Fr    ESOPHAGOGASTRODUODENOSCOPY N/A 6/29/2021    Procedure: EGD (ESOPHAGOGASTRODUODENOSCOPY);  Surgeon: Sourav Baires III, MD;  Location: [facility] ENDO;  Service: Endoscopy;  Laterality: N/A;    EXCISION, SMALL INTESTINE N/A 7/27/2023    Procedure: EXCISION, SMALL INTESTINE;  Surgeon: Brandon Wagner MD;  Location: [facility];  Service: General;   Laterality: N/A;    EXCISION, SMALL INTESTINE  7/28/2023    Procedure: EXCISION, SMALL INTESTINE;  Surgeon: Brandon Wagner MD;  Location: [facility] OR;  Service: General;;    FLEXIBLE SIGMOIDOSCOPY N/A 8/1/2022    Procedure: SIGMOIDOSCOPY, FLEXIBLE;  Surgeon: Brandon Wagner MD;  Location: [facility] ENDO;  Service: Endoscopy;  Laterality: N/A;    HYSTERECTOMY      ILEOSTOMY  7/28/2023    Procedure: CREATION, ILEOSTOMY;  Surgeon: Brandon Wagner MD;  Location: [facility] OR;  Service: General;;    INSERTION OF TUNNELED CENTRAL VENOUS CATHETER (CVC) WITH SUBCUTANEOUS PORT N/A 1/11/2021    Procedure: [id number];  Surgeon: Brandon Wagner MD;  Location: [facility] OR;  Service: General;  Laterality: N/A;    INSERTION OF TUNNELED CENTRAL VENOUS CATHETER (CVC) WITH SUBCUTANEOUS PORT Right 9/1/2022    Procedure: [id number];  Surgeon: Brandon Wagnre MD;  Location: [facility] OR;  Service: General;  Laterality: Right;  Wants rt side placement    LAPAROTOMY, EXPLORATORY N/A 7/24/2023    Procedure: LAPAROTOMY, EXPLORATORY;  Surgeon: Brandon Wagner MD;  Location: [facility] OR;  Service: General;  Laterality: N/A;  case must go 1st-chamberlain placing stents    LAPAROTOMY, EXPLORATORY N/A 7/27/2023    Procedure: LAPAROTOMY, EXPLORATORY;  Surgeon: Brandon Wagner MD;  Location: [facility] OR;  Service: General;  Laterality: N/A;    LAPAROTOMY, EXPLORATORY N/A 7/28/2023    Procedure: LAPAROTOMY, EXPLORATORY;  Surgeon: Brandon Wagner MD;  Location: [facility] OR;  Service: General;  Laterality: N/A;    MEDIPORT REMOVAL Left 4/19/2021    Procedure: REMOVAL, CATHETER, CENTRAL VENOUS, TUNNELED, WITH PORT;  Surgeon: Brandon Wagner MD;  Location: [facility] OR;  Service: General;  Laterality: Left;    REMOVAL-STENT Right 3/7/2023    Procedure: REMOVAL-STENT;  Surgeon: Carin Kwon Jr., MD;  Location: [facility] OR;  Service: Urology;  Laterality: Right;    REMOVAL-STENT Bilateral 4/11/2023    Procedure: REMOVAL-STENT;  Surgeon: Carin Kwon Jr., MD;   Location: [facility] OR;  Service: Urology;  Laterality: Bilateral;    RETROGRADE PYELOGRAPHY Bilateral 4/11/2023    Procedure: PYELOGRAM, RETROGRADE;  Surgeon: Carin Kwon Jr., MD;  Location: [facility] OR;  Service: Urology;  Laterality: Bilateral;    ROBOT-ASSISTED COLECTOMY N/A 11/9/2020    Procedure: ROBOTIC COLECTOMY low anterior resection, possible open;  Surgeon: Brandon Wagner MD;  Location: [facility] OR;  Service: General;  Laterality: N/A;  CONVERTED TO OPEN AT 1503    SUBTOTAL COLECTOMY N/A 7/24/2023    Procedure: COLECTOMY, PARTIAL;  Surgeon: Brandon Wagner MD;  Location: [facility] OR;  Service: General;  Laterality: N/A;  with colorectal Anastamosis    URETEROSCOPIC REMOVAL OF URETERIC CALCULUS Right 3/7/2023    Procedure: REMOVAL, CALCULUS, URETER, URETEROSCOPIC;  Surgeon: Carin Kwon Jr., MD;  Location: [facility];  Service: Urology;  Laterality: Right;    URETEROSCOPIC REMOVAL OF URETERIC CALCULUS Bilateral 4/11/2023    Procedure: REMOVAL, CALCULUS, URETER, URETEROSCOPIC;  Surgeon: Carin Kwon Jr., MD;  Location: [facility];  Service: Urology;  Laterality: Bilateral;       Time Tracking:     OT Date of Treatment: 07/31/23  OT Start Time: 1115  OT Stop Time: 1130  OT Total Time (min): 15 min    Billable Minutes:Evaluation 15    7/31/2023

## 2023-07-31 NOTE — CARE UPDATE
07/31/23 0115   Patient Assessment/Suction   Level of Consciousness (AVPU) alert   Respiratory Effort Normal;Unlabored   Expansion/Accessory Muscles/Retractions no use of accessory muscles   All Lung Fields Breath Sounds wheezes, expiratory;wheezes, inspiratory   Rhythm/Pattern, Respiratory unlabored;pattern regular   Cough Frequency no cough   PRE-TX-O2   Device (Oxygen Therapy) nasal cannula   $ Is the patient on Low Flow Oxygen? Yes   Flow (L/min) 3   SpO2 97 %   Pulse Oximetry Type Intermittent   $ Pulse Oximetry - Multiple Charge Pulse Oximetry - Multiple   Pulse (!) 117   Resp 20   Positioning Supine   Positioning   Body Position 30 degrees   Head of Bed (HOB) Positioning HOB at 20-30 degrees   Positioning/Transfer Devices pillows;in use   Aerosol Therapy   $ Aerosol Therapy Charges Aerosol Treatment   Daily Review of Necessity (SVN) completed   Respiratory Treatment Status (SVN) given   Treatment Route (SVN) mask;oxygen   Patient Position (SVN) HOB elevated   Post Treatment Assessment (SVN) breath sounds improved   Signs of Intolerance (SVN) none   Breath Sounds Post-Respiratory Treatment   Throughout All Fields Post-Treatment All Fields   Throughout All Fields Post-Treatment aeration increased   Post-treatment Heart Rate (beats/min) 110   Post-treatment Resp Rate (breaths/min) 18   Education   $ Education Bronchodilator;15 min

## 2023-07-31 NOTE — PROGRESS NOTES
INPATIENT NEPHROLOGY PROGRESS  [facility] NEPHROLOGY    Tessa Enriquez  07/31/2023    Reason for consultation:  Acute kidney injury    Chief Complaint:   Chief Complaint   Patient presents with    Post-op Problem     History of Present Illness:    67 year old female with recurrent adenocarcinoma of the colon.  She had another resection with formation of a colostomy on 7/24.  She had persistent tachycardia, abdominal pain and BOOM following this.  She was noted to have an ischemic stoma and was brought back to the OR where multiple areas of ischemic small bowel were found.    7/26  Consulted for BOOM.  Pt POD 2 s/p ex lap with extensive lysis of adhesions, bilateral ureteral stent insertion, and partial colectomy with colostomy placement. She has developed oliguric with boom.  7/27 Afebrile.  Tachycardic.  680 cc uop  7/28 VSS, monitor. UO is improving.  7/29 VSS, pressors, s/p re-operation 7/28 with small bowel resection for patchy area of necrosis and colostomy creation. Hgb again low this AM., will be transfused. UO seem decent, on pressors.   7/30 VSS, extubated, good UO. Monitor for now.  7/31  no new lab results.  Pressures stable, tachycardic.  Ordered CMP and PO4 in am, f/u for Na+, Ca+.  Pt d/o post op pain, no other complaints.  Only one shift UOP recorded.    Plan of Care:    BOOM 2/2 hemodynamically mediated renal injury s/p prolonged episode of hypotension  Acidosis  --avoid NSAIDS, Jacobs II inhibitors, and other non-essential nephrotoxic agents  (NO MORE IBUPROFEN)   --keep map above 55  --strict I/Os  --maintain colin    Hyponatremia  --avoid hypotonic iv piggy backs  --urine osm concentrating implicating ADH effect.                                                                                                                                                                                                                                                                                                                    Anemia  --iron panel reveals iron deficiency  --pRBC transfusions per primary    Hypotension with sinus tachycardia  --judicious opiates  --iv fluids    Thank you for allowing us to participate in this patient's care. We will continue to follow.    Vital Signs:  Temp Readings from Last 3 Encounters:   07/31/23 98.3 °F (36.8 °C) (Oral)   07/03/23 97.5 °F (36.4 °C) (Temporal)   06/27/23 98.1 °F (36.7 °C) (Temporal)       Pulse Readings from Last 3 Encounters:   07/31/23 (!) 112   07/03/23 84   06/27/23 84       BP Readings from Last 3 Encounters:   07/31/23 (!) 142/64   07/03/23 (!) 143/79   06/27/23 (!) 185/86       Weight:  Wt Readings from Last 3 Encounters:   07/24/23 89.4 kg (197 lb)   07/03/23 89.3 kg (196 lb 13.9 oz)   06/27/23 89.7 kg (197 lb 12 oz)       Past Medical & Surgical History:  Past Medical History:   Diagnosis Date    Acute hypoxemic respiratory failure 07/09/2018    Arthritis     Bilateral kidney stones 3/27/2023    Cancer     colon    COPD (chronic obstructive pulmonary disease)     History of home oxygen therapy     ONLY PRN AND HASN'T USED IT IN A YEAR    Hyperlipidemia     Thyroid disease        Past Surgical History:   Procedure Laterality Date    CHOLECYSTECTOMY      COLECTOMY N/A 11/9/2020    Procedure: COLECTOMY;  Surgeon: Brandon Wagner MD;  Location: [facility];  Service: General;  Laterality: N/A;  LOWER- ANTERIOR    COLONOSCOPY N/A 10/23/2020    Procedure: COLONOSCOPY;  Surgeon: Jolynn Ayala MD;  Location: [facility];  Service: Endoscopy;  Laterality: N/A;    COLONOSCOPY N/A 8/6/2021    Procedure: COLONOSCOPY;  Surgeon: Jolynn Ayala MD;  Location: [facility];  Service: Endoscopy;  Laterality: N/A;    COLONOSCOPY N/A 7/7/2022    Procedure: COLONOSCOPY;  Surgeon: Jolynn Ayala MD;  Location: [facility];  Service: Endoscopy;  Laterality: N/A;    COLONOSCOPY N/A 5/31/2023    Procedure: COLONOSCOPY;  Surgeon: Jolynn Ayala MD;  Location: [facility];   Service: Endoscopy;  Laterality: N/A;    COLOSTOMY N/A 7/24/2023    Procedure: CREATION, COLOSTOMY;  Surgeon: Brandon Wagner MD;  Location: [facility] OR;  Service: General;  Laterality: N/A;    COMPLETION PROCTECTOMY N/A 7/24/2023    Procedure: PROCTECTOMY, COMPLETION;  Surgeon: Brandon Wagner MD;  Location: [facility] OR;  Service: General;  Laterality: N/A;    CYSTOSCOPY W/ URETERAL STENT PLACEMENT Right 2/22/2023    Procedure: CYSTOSCOPY, WITH URETERAL STENT INSERTION;  Surgeon: Carin Kwon Jr., MD;  Location: [facility] OR;  Service: Urology;  Laterality: Right;    CYSTOSCOPY W/ URETERAL STENT PLACEMENT Bilateral 3/26/2023    Procedure: CYSTOSCOPY, WITH URETERAL STENT INSERTION;  Surgeon: Sabine Barber MD;  Location: [facility] OR;  Service: Urology;  Laterality: Bilateral;    CYSTOSCOPY W/ URETERAL STENT PLACEMENT Bilateral 7/24/2023    Procedure: CYSTOSCOPY, WITH URETERAL STENT INSERTION;  Surgeon: Adrienne Mijares MD;  Location: [facility] OR;  Service: Urology;  Laterality: Bilateral;  bassem placing stents 1st    CYSTOSCOPY WITH URETEROSCOPY, RETROGRADE PYELOGRAPHY, AND INSERTION OF STENT Bilateral 3/26/2023    Procedure: CYSTOSCOPY, WITH RETROGRADE PYELOGRAM AND URETERAL STENT INSERTION;  Surgeon: Sabine Barber MD;  Location: [facility] OR;  Service: Urology;  Laterality: Bilateral;    CYSTOURETEROSCOPY WITH RETROGRADE PYELOGRAPHY AND INSERTION OF STENT INTO URETER Right 3/7/2023    Procedure: CYSTOURETEROSCOPY, WITH RETROGRADE PYELOGRAM AND URETERAL STENT INSERTION;  Surgeon: Carin Kwon Jr., MD;  Location: [facility];  Service: Urology;  Laterality: Right;    CYSTOURETEROSCOPY, WITH HOLMIUM LASER LITHOTRIPSY OF URETERAL CALCULUS AND STENT INSERTION Bilateral 4/11/2023    Procedure: CYSTOURETEROSCOPY, WITH HOLMIUM LASER LITHOTRIPSY OF URETERAL CALCULUS AND STENT INSERTION;  Surgeon: Carin Kwon Jr., MD;  Location: [facility];  Service: Urology;  Laterality: Bilateral;    ESOPHAGEAL DILATION N/A 6/29/2021     Procedure: DILATION, ESOPHAGUS;  Surgeon: Sourav Baires III, MD;  Location: [facility] ENDO;  Service: Endoscopy;  Laterality: N/A;    ESOPHAGOGASTRODUODENOSCOPY  06/29/2021    Dilation to 57 Fr    ESOPHAGOGASTRODUODENOSCOPY N/A 6/29/2021    Procedure: EGD (ESOPHAGOGASTRODUODENOSCOPY);  Surgeon: Sourav Baires III, MD;  Location: [facility] ENDO;  Service: Endoscopy;  Laterality: N/A;    EXCISION, SMALL INTESTINE N/A 7/27/2023    Procedure: EXCISION, SMALL INTESTINE;  Surgeon: Brandon Wagner MD;  Location: [facility] OR;  Service: General;  Laterality: N/A;    FLEXIBLE SIGMOIDOSCOPY N/A 8/1/2022    Procedure: SIGMOIDOSCOPY, FLEXIBLE;  Surgeon: Brandon Wagner MD;  Location: [facility] ENDO;  Service: Endoscopy;  Laterality: N/A;    HYSTERECTOMY      INSERTION OF TUNNELED CENTRAL VENOUS CATHETER (CVC) WITH SUBCUTANEOUS PORT N/A 1/11/2021    Procedure: [id number];  Surgeon: Brandon Wagner MD;  Location: [facility] OR;  Service: General;  Laterality: N/A;    INSERTION OF TUNNELED CENTRAL VENOUS CATHETER (CVC) WITH SUBCUTANEOUS PORT Right 9/1/2022    Procedure: [id number];  Surgeon: Brandon Wagner MD;  Location: [facility] OR;  Service: General;  Laterality: Right;  Wants rt side placement    LAPAROTOMY, EXPLORATORY N/A 7/24/2023    Procedure: LAPAROTOMY, EXPLORATORY;  Surgeon: Brandon Wagner MD;  Location: [facility] OR;  Service: General;  Laterality: N/A;  case must go 1st-chamberlain placing stents    LAPAROTOMY, EXPLORATORY N/A 7/27/2023    Procedure: LAPAROTOMY, EXPLORATORY;  Surgeon: Brandon Wagner MD;  Location: [facility] OR;  Service: General;  Laterality: N/A;    MEDIPORT REMOVAL Left 4/19/2021    Procedure: REMOVAL, CATHETER, CENTRAL VENOUS, TUNNELED, WITH PORT;  Surgeon: Brandon Wagner MD;  Location: [facility] OR;  Service: General;  Laterality: Left;    REMOVAL-STENT Right 3/7/2023    Procedure: REMOVAL-STENT;  Surgeon: Carin Kwon Jr., MD;  Location: [facility];  Service: Urology;  Laterality: Right;     REMOVAL-STENT Bilateral 2023    Procedure: REMOVAL-STENT;  Surgeon: Carin Kwon Jr., MD;  Location: [facility] OR;  Service: Urology;  Laterality: Bilateral;    RETROGRADE PYELOGRAPHY Bilateral 2023    Procedure: PYELOGRAM, RETROGRADE;  Surgeon: Carin Kwon Jr., MD;  Location: [facility];  Service: Urology;  Laterality: Bilateral;    ROBOT-ASSISTED COLECTOMY N/A 2020    Procedure: ROBOTIC COLECTOMY low anterior resection, possible open;  Surgeon: Brandon Wagner MD;  Location: [facility];  Service: General;  Laterality: N/A;  CONVERTED TO OPEN AT 1503    SUBTOTAL COLECTOMY N/A 2023    Procedure: COLECTOMY, PARTIAL;  Surgeon: Brandon Wagner MD;  Location: [facility];  Service: General;  Laterality: N/A;  with colorectal Anastamosis    URETEROSCOPIC REMOVAL OF URETERIC CALCULUS Right 3/7/2023    Procedure: REMOVAL, CALCULUS, URETER, URETEROSCOPIC;  Surgeon: Carin Kwon Jr., MD;  Location: [facility];  Service: Urology;  Laterality: Right;    URETEROSCOPIC REMOVAL OF URETERIC CALCULUS Bilateral 2023    Procedure: REMOVAL, CALCULUS, URETER, URETEROSCOPIC;  Surgeon: Carin Kwon Jr., MD;  Location: [facility];  Service: Urology;  Laterality: Bilateral;       Past Social History:  Social History     Socioeconomic History    Marital status:    Tobacco Use    Smoking status: Former     Current packs/day: 0.00     Average packs/day: 2.0 packs/day for 40.0 years (80.0 ttl pk-yrs)     Types: Cigarettes     Start date: 1977     Quit date: 2017     Years since quittin.0    Smokeless tobacco: Never   Substance and Sexual Activity    Alcohol use: No    Drug use: No    Sexual activity: Not Currently     Partners: Male     Social Determinants of Health     Financial Resource Strain: Unknown (2023)    Overall Financial Resource Strain (CARDIA)     Difficulty of Paying Living Expenses: Patient refused   Food Insecurity: Unknown (2023)    Hunger Vital Sign     Worried About Running Out of  Food in the Last Year: Patient refused     Ran Out of Food in the Last Year: Patient refused   Transportation Needs: Unknown (2/21/2023)    PRAPARE - Transportation     Lack of Transportation (Medical): Patient refused     Lack of Transportation (Non-Medical): Patient refused   Physical Activity: Unknown (2/21/2023)    Exercise Vital Sign     Days of Exercise per Week: Patient refused     Minutes of Exercise per Session: Patient refused   Stress: Unknown (2/21/2023)    [employer] [place] [employer] - Occupational Stress Questionnaire     Feeling of Stress : Patient refused   Social Connections: Unknown (2/21/2023)    Social Connection and Isolation Panel [NHANES]     Frequency of Communication with Friends and Family: Patient refused     Frequency of Social Gatherings with Friends and Family: Patient refused     Attends Denominational Services: Patient refused     Active Member of Clubs or Organizations: Patient refused     Attends Club or Organization Meetings: Patient refused     Marital Status: Patient refused   Housing Stability: Unknown (2/21/2023)    Housing Stability Vital Sign     Unable to Pay for Housing in the Last Year: Patient refused     Unstable Housing in the Last Year: Patient refused       Medications:  Current Facility-Administered Medications on File Prior to Encounter   Medication Dose Route Frequency Provider Last Rate Last Admin    diphenhydrAMINE injection 12.5 mg  12.5 mg Intravenous Once PRN Monster Atwood MD        electrolyte-S (ISOLYTE)   Intravenous Continuous Monster Atwood MD 25 mL/hr at 03/07/23 1138 New Bag at 07/24/23 1804    HYDROmorphone (PF) injection 0.2 mg  0.2 mg Intravenous Q5 Min PRN Monster Atwood MD        lactated ringers infusion  10 mL/hr Intravenous Continuous Monster Atwood MD        lactated ringers infusion  500 mL Intravenous Once Monster Atwood MD        LIDOcaine (PF) 10 mg/ml (1%) injection 10 mg  1 mL Intradermal Once  Monster Atwood MD        LORazepam injection 0.25 mg  0.25 mg Intravenous Once PRN Monster Atwood MD        ondansetron injection 4 mg  4 mg Intravenous Once PRN Monster Atwood MD        sodium chloride 0.9% flush 3 mL  3 mL Intravenous Q8H Monster Atwood MD         Current Outpatient Medications on File Prior to Encounter   Medication Sig Dispense Refill    diphenoxylate-atropine 2.5-0.025 mg (LOMOTIL) 2.5-0.025 mg per tablet Take by mouth.      ferrous sulfate 325 (65 FE) MG EC tablet Take 1 tablet (325 mg total) by mouth every other day.  0    HYDROcodone-acetaminophen (NORCO) 5-325 mg per tablet TAKE ONE TABLET BY MOUTH EVERY SIX HOURS AS NEEDED FOR PAIN 90 tablet 0    levothyroxine (SYNTHROID) 125 MCG tablet Take 1 tablet (125 mcg total) by mouth before breakfast. 30 tablet 11    ondansetron (ZOFRAN-ODT) 8 MG TbDL Take 1 tablet (8 mg total) by mouth every 12 (twelve) hours as needed. 30 tablet 1    potassium chloride SA (K-DUR,KLOR-CON) 20 MEQ tablet Take 1 tablet (20 mEq total) by mouth once daily. 30 tablet 11    albuterol (PROVENTIL) 2.5 mg /3 mL (0.083 %) nebulizer solution Take 3 mLs (2.5 mg total) by nebulization every 6 (six) hours as needed for Wheezing. Rescue (Patient not taking: Reported on 7/20/2023) 120 each 1    CMPD hydrocortisone 2%- LIDOcaine 3% suppository (RECTAL ROCKET) Place 1 suppository rectally every evening. Insert 1 suppository 3/4 of the way onto rectum. Wet for easier application. Repeat for 3 nights. 30 suppository 2    prochlorperazine (COMPAZINE) 10 MG tablet Take 1 tablet (10 mg total) by mouth every 6 (six) hours as needed. 30 tablet 1    triamcinolone acetonide 0.025% (KENALOG) 0.025 % cream Apply topically once daily. 80 g 1     Scheduled Meds:   albuterol-ipratropium  3 mL Nebulization Q6H    budesonide  0.5 mg Nebulization Q12H    chlorhexidine  15 mL Mouth/Throat BID    enoxparin  40 mg Subcutaneous Q24H (prophylaxis, 1700)    fluconazole (DIFLUCAN)  "IV (PEDS and ADULTS)  200 mg Intravenous Q24H    levothyroxine  75 mcg Intravenous Before breakfast    meropenem (MERREM) IVPB  1 g Intravenous Q12H    pantoprazole  40 mg Intravenous Daily    vancomycin (VANCOCIN) IV (PEDS and ADULTS)  1,750 mg Intravenous Q24H     Continuous Infusions:   dextrose 5 % and 0.9 % NaCl 50 mL/hr at 07/31/23 0049     PRN Meds:.0.9%  NaCl infusion (for blood administration), acetaminophen, calcium gluconate IVPB, calcium gluconate IVPB, calcium gluconate IVPB, dextrose 50%, dextrose 50%, glucagon (human recombinant), glucose, glucose, hydrALAZINE, hydrALAZINE, HYDROmorphone, magnesium sulfate IVPB, magnesium sulfate IVPB, ondansetron, potassium chloride **AND** potassium chloride **AND** potassium chloride, sodium phosphate 15 mmol in dextrose 5 % (D5W) 250 mL IVPB, sodium phosphate 20.01 mmol in dextrose 5 % (D5W) 250 mL IVPB, sodium phosphate 30 mmol in dextrose 5 % (D5W) 250 mL IVPB, traZODone, Pharmacy to dose Vancomycin consult **AND** vancomycin - pharmacy to dose    Allergies:  Ativan [lorazepam], Flagyl [metronidazole], and Pcn [penicillins]    Past Family History:  Reviewed; refer to Hospitalist Admission Note    Review of Systems:  Review of Systems - All 14 systems reviewed and negative, except as noted in HPI    Physical Exam:    BP (!) 142/64   Pulse (!) 112   Temp 98.3 °F (36.8 °C) (Oral)   Resp 18   Ht 5' 1" (1.549 m)   Wt 89.4 kg (197 lb)   SpO2 98%   Breastfeeding No   BMI 37.22 kg/m²     General Appearance:    No distress   Head:    Normocephalic, without obvious abnormality, atraumatic   Eyes:    PER, conjunctiva/corneas clear, EOM's intact in both eyes        Throat:   Lips, mucosa, and tongue normal; teeth and gums normal   Back:     Symmetric, no curvature, ROM normal, no CVA tenderness   Lungs:     Clear to auscultation bilaterally, respirations unlabored   Chest wall:    No tenderness or deformity   Heart:    tachycardic   Abdomen:     Tender.  Bandages in " place.  Drain in place   Extremities:   Extremities normal, atraumatic, no cyanosis or edema   Pulses:   2+ and symmetric all extremities   MSK:   No joint or muscle swelling, tenderness or deformity   Skin:   Skin color, texture, turgor normal, no rashes or lesions   Neurologic:   Mildly confused.   No flap     Results:  Recent Labs   Lab 07/28/23  1346 07/29/23  0421 07/29/23  1531 07/30/23  0346   * 134*  --  135*   K 3.9 3.5 3.7 3.8    110  --  109   CO2 17* 19*  --  19*   BUN 27* 21  --  15   CREATININE 1.4 1.1  --  0.9   * 189*  --  113*         Recent Labs   Lab 07/27/23  0415 07/27/23  2104 07/28/23  0359 07/28/23  1346 07/29/23  0421 07/30/23  0346   CALCIUM 7.4* 5.6* 6.8* 6.6* 7.2* 7.2*   ALBUMIN 2.1* 1.6* 1.6* 1.5* 1.6* 1.6*   PHOS 4.0 4.4  --   --   --   --    MG  --  1.9 1.9  --  2.0  --                Recent Labs   Lab 07/26/23  0035 07/26/23  0802 07/26/23  1454 07/26/23  1631   POCTGLUCOSE 136* 98 98 79         Recent Labs   Lab 10/06/20  0924   Hemoglobin A1C 5.6         Recent Labs   Lab 07/28/23  0359 07/28/23  0414 07/29/23  0421 07/29/23  0528 07/29/23  1531 07/30/23  0346   WBC 2.04*  --  5.89  --   --  7.46   HGB 9.6*  --  6.8*  --  7.8* 7.6*   HCT 28.6*   < > 20.1* 19* 22.7* 22.5*     --  117*  --   --  102*   MCV 84  --  84  --   --  86   MCHC 33.6  --  33.8  --   --  33.8   MONO 4.9  0.1*  --  3.4*  0.2*  --   --  4.0  0.3   EOSINOPHIL 0.0  --  0.0  --   --  0.0    < > = values in this interval not displayed.         Recent Labs   Lab 07/28/23  1346 07/29/23  0421 07/30/23  0346   BILITOT 1.1* 1.1* 0.9   PROT 4.1* 4.1* 4.6*   ALBUMIN 1.5* 1.6* 1.6*   ALKPHOS 53* 39* 52*   ALT 24 20 21   AST 36 25 25         Recent Labs   Lab 02/21/23  0910 03/25/23  1810 07/24/23 2055 07/26/23  1517   Color, UA Yellow Yellow Yellow Red A   Appearance, UA Clear Hazy A Clear Cloudy A   pH, UA 6.0 8.0 7.0 SEE COMMENT   Specific Gravity, UA 1.010 1.010 1.015 SEE COMMENT   Protein,  UA 1+ A Trace A Negative SEE COMMENT   Glucose, UA Negative Negative Negative SEE COMMENT   Ketones, UA Negative Negative Negative SEE COMMENT   Urobilinogen, UA Negative Negative Negative SEE COMMENT   Bilirubin (UA) Negative Negative Negative SEE COMMENT   Occult Blood UA Trace A 1+ A Trace A SEE COMMENT   Nitrite, UA Negative Negative Negative SEE COMMENT   RBC, UA 1 21 H  --  >100 H   WBC, UA 6 H 37 H  --  6 H   Bacteria Few A Few A  --  Moderate A   Hyaline Casts, UA 0  --   --   --          Recent Labs   Lab 07/27/23  2224 07/28/23  0414 07/29/23  0528   POC PH 7.285 LL 7.369 7.458 H   POC PCO2 36.4 29.1 L 29.6 L   POC HCO3 17.3 L 16.8 L 20.9 L   POC PO2 62 L 78 L 270 H   POC SATURATED O2 89 L 95 100   POC BE -9 -9 -3   Sample ARTERIAL ARTERIAL ARTERIAL         Microbiology Results (last 7 days)       Procedure Component Value Units Date/Time    Culture, Respiratory with Gram Stain [[id number]]  (Abnormal)  (Susceptibility) Collected: 07/27/23 2350    Order Status: Completed Specimen: Respiratory from Tracheal Aspirate Updated: 07/30/23 0928     Respiratory Culture METHICILLIN RESISTANT STAPHYLOCOCCUS AUREUS  Moderate  Results called to and read back by Alessandra oClón [id number];    07/30/2023  09:27 CJD       Gram Stain (Respiratory) <10 epithelial cells per low power field.     Gram Stain (Respiratory) Few WBC's     Gram Stain (Respiratory) Moderate Gram positive cocci          Patient care was time spent personally by me on the following activities:   Obtaining a history  Examination of patient.  Providing medical care at the patients bedside.  Developing a treatment plan with patient or surrogate and bedside caregivers  Ordering and reviewing laboratory studies, radiographic studies, pulse oximetry.  Ordering and performing treatments and interventions.  Evaluation of patient's response to treatment.  Discussions with consultants while on the unit and immediately available to the patient.  Re-evaluation of  the patient's condition.  Documentation in the medical record.     Germaine Rios NP    Nephrology  [facility]  [phone]

## 2023-07-31 NOTE — CONSULTS
Many family members at bedside, son states he will be helping his mom with care.  States he has already watch Ostomy ed. Video, and numerous other videos, went over all aspects of ostomy care, patient nor son wants pouch changed at this time.  Pouch is holding.  Discussed skin care and encouraged patient to move, turn side to side often.  Will request a waffle mattress.

## 2023-07-31 NOTE — CONSULTS
"[facility]  Adult Nutrition   Consult Note (Nutrition Support Management)    SUMMARY     Recommendations  1. Recommend Clinimix E @ 75 ml/hr to provide 612 kcal, 77gm protein.Supplemental PN to meet 35% EEN and 100% EPN.    2. Advance to clear liquid diet as medically feasible and continue PN till diet advanced to cardiac diet and tolerating 50%.   3. RD to monitor NS, weight and labs.  Goals: Pt to receive nutrition within 48hr.  Nutrition Goal Status: progressing towards goal    Dietitian Rounds Brief  Pt is s/p small bowel resection. NPO x 4 days.Consult for NS.    Diet order:   Current Diet Order: NPO                 Evaluation of Received Nutrient/Fluid Intake  Energy Calories Required: not meeting needs  Protein Required: not meeting needs  Fluid Required: not meeting needs  Comments:   % Intake of Estimated Energy Needs: 0 - 25 %  % Meal Intake: 0 - 25 %      Intake/Output Summary (Last 24 hours) at 7/31/2023 1048  Last data filed at 7/31/2023 0929  Gross per 24 hour   Intake --   Output 1106 ml   Net -1106 ml        Anthropometrics  Temp: 98.3 °F (36.8 °C)  Height Method: Stated  Height: 5' 1" (154.9 cm)  Height (inches): 61 in  Weight Method: Standard Scale  Weight: 89.4 kg (197 lb)  Weight (lb): 197 lb  Ideal Body Weight (IBW), Female: 105 lb  % Ideal Body Weight, Female (lb): 187.62 %  BMI (Calculated): 37.2  BMI Grade: 35 - 39.9 - obesity - grade II       Estimated/Assessed Needs  Weight Used For Calorie Calculations: 89.4 kg (197 lb 1.5 oz)  Energy Calorie Requirements (kcal): 3741-2991 kcal (20-25 kcal/ kg bw)  Energy Need Method: Kcal/kg  Protein Requirements: 72-96 (1.5-2.0 g/ 48 kg IBW)  Weight Used For Protein Calculations: 48 kg (105 lb 13.1 oz)     Estimated Fluid Requirement Method: RDA Method  RDA Method (mL): 1788       Reason for Assessment  Reason For Assessment: consult  Relevant Medical History: COPD, hypothyroidism, rectosigmoid cancer, s/p small bowel " resection    Nutrition/Diet History  Spiritual, Cultural Beliefs, Orthodoxy Practices, Values that Affect Care: no  Food Allergies: NKFA  Factors Affecting Nutritional Intake: NPO, on mechanical ventilation    Nutrition Risk Screen  Nutrition Risk Screen: no indicators present     MST Score: 0  Have you recently lost weight without trying?: No  Weight loss score: 0  Have you been eating poorly because of a decreased appetite?: No  Appetite score: 0       Weight History:  Wt Readings from Last 10 Encounters:   07/24/23 89.4 kg (197 lb)   07/03/23 89.3 kg (196 lb 13.9 oz)   06/27/23 89.7 kg (197 lb 12 oz)   06/20/23 88.5 kg (195 lb 1.7 oz)   06/14/23 88.5 kg (195 lb)   06/12/23 88.5 kg (195 lb)   06/06/23 87.4 kg (192 lb 10.9 oz)   05/31/23 81.6 kg (180 lb)   05/26/23 85.7 kg (189 lb)   05/24/23 86 kg (189 lb 9.5 oz)        Lab/Procedures/Meds: Pertinent Labs/Meds Reviewed    Medications:Pertinent Medications Reviewed  Scheduled Meds:   albuterol-ipratropium  3 mL Nebulization Q6H    budesonide  0.5 mg Nebulization Q12H    chlorhexidine  15 mL Mouth/Throat BID    enoxparin  40 mg Subcutaneous Q24H (prophylaxis, 1700)    fluconazole (DIFLUCAN) IV (PEDS and ADULTS)  200 mg Intravenous Q24H    levothyroxine  75 mcg Intravenous Before breakfast    meropenem (MERREM) IVPB  1 g Intravenous Q12H    pantoprazole  40 mg Intravenous Daily    vancomycin (VANCOCIN) IV (PEDS and ADULTS)  1,750 mg Intravenous Q24H     Continuous Infusions:   dextrose 5 % and 0.9 % NaCl 50 mL/hr at 07/31/23 0049     PRN Meds:.0.9%  NaCl infusion (for blood administration), acetaminophen, calcium gluconate IVPB, calcium gluconate IVPB, calcium gluconate IVPB, dextrose 50%, dextrose 50%, glucagon (human recombinant), glucose, glucose, hydrALAZINE, hydrALAZINE, HYDROmorphone, magnesium sulfate IVPB, magnesium sulfate IVPB, ondansetron, potassium chloride **AND** potassium chloride **AND** potassium chloride, sodium phosphate 15 mmol in dextrose 5 %  "(D5W) 250 mL IVPB, sodium phosphate 20.01 mmol in dextrose 5 % (D5W) 250 mL IVPB, sodium phosphate 30 mmol in dextrose 5 % (D5W) 250 mL IVPB, traZODone, Pharmacy to dose Vancomycin consult **AND** vancomycin - pharmacy to dose    Labs: Pertinent Labs Reviewed  Clinical Chemistry:  Recent Labs   Lab 07/27/23  0415 07/27/23  2104 07/28/23  0359 07/28/23  1346 07/29/23  0421 07/29/23  1531 07/30/23  0346   * 132*   < > 132* 134*  --  135*   K 4.8 3.7   < > 3.9 3.5   < > 3.8    105   < > 107 110  --  109   CO2 19* 19*   < > 17* 19*  --  19*    123*   < > 124* 189*  --  113*   BUN 36* 29*   < > 27* 21  --  15   CREATININE 1.8* 1.2   < > 1.4 1.1  --  0.9   CALCIUM 7.4* 5.6*   < > 6.6* 7.2*  --  7.2*   PROT  --  3.7*   < > 4.1* 4.1*  --  4.6*   ALBUMIN 2.1* 1.6*   < > 1.5* 1.6*  --  1.6*   BILITOT  --  1.3*   < > 1.1* 1.1*  --  0.9   ALKPHOS  --  50*   < > 53* 39*  --  52*   AST  --  43*   < > 36 25  --  25   ALT  --  30   < > 24 20  --  21   ANIONGAP 10 8   < > 8 5*  --  7*   MG  --  1.9   < >  --  2.0  --   --    PHOS 4.0 4.4  --   --   --   --   --     < > = values in this interval not displayed.     CBC:   Recent Labs   Lab 07/30/23  0346   WBC 7.46   RBC 2.63*   HGB 7.6*   HCT 22.5*   *   MCV 86   MCH 28.9   MCHC 33.8     Lipid Panel:  No results for input(s): "CHOL", "HDL", "LDLCALC", "TRIG", "CHOLHDL" in the last 168 hours.  Cardiac Profile:  Recent Labs   Lab 07/27/23  0415 07/27/23  2104   BNP 52 25     Inflammatory Labs:  No results for input(s): "CRP" in the last 168 hours.  Diabetes:  Recent Labs   Lab 07/26/23  0802 07/26/23  1454 07/26/23  1631   POCTGLUCOSE 98 98 79     Thyroid & Parathyroid:  Recent Labs   Lab 07/27/23  0415   TSH 23.868*   FREET4 <0.40*       Monitor and Evaluation  Food and Nutrient Intake: energy intake, food and beverage intake, enteral nutrition intake  Food and Nutrient Adminstration: diet order  Anthropometric Measurements: height/length, weight, weight " change  Nutrition-Focused Physical Findings: overall appearance     Nutrition Risk  Level of Risk/Frequency of Follow-up: high     Nutrition Follow-Up  RD Follow-up?: Yes      Nancy Johnson RD 07/31/2023 10:48 AM

## 2023-07-31 NOTE — CARE UPDATE
07/30/23 2108   Patient Assessment/Suction   Level of Consciousness (AVPU) alert   Respiratory Effort Unlabored   Expansion/Accessory Muscles/Retractions no use of accessory muscles   All Lung Fields Breath Sounds wheezes, expiratory   Rhythm/Pattern, Respiratory unlabored   Cough Frequency no cough   PRE-TX-O2   Device (Oxygen Therapy) nasal cannula   $ Is the patient on Low Flow Oxygen? Yes   Flow (L/min) 3   SpO2 (!) 92 %   Pulse Oximetry Type Intermittent   $ Pulse Oximetry - Multiple Charge Pulse Oximetry - Multiple   Pulse 70   Resp 20   Positioning Supine   Positioning   Body Position supine   Head of Bed (HOB) Positioning HOB at 20 degrees   Aerosol Therapy   $ Aerosol Therapy Charges Aerosol Treatment   Daily Review of Necessity (SVN) completed   Respiratory Treatment Status (SVN) given   Treatment Route (SVN) mask;oxygen   Patient Position (SVN) HOB elevated   Post Treatment Assessment (SVN) increased aeration   Signs of Intolerance (SVN) none   Breath Sounds Post-Respiratory Treatment   Throughout All Fields Post-Treatment All Fields   Throughout All Fields Post-Treatment aeration increased   Post-treatment Heart Rate (beats/min) 72   Post-treatment Resp Rate (breaths/min) 18   Education   $ Education BiPAP;15 min

## 2023-07-31 NOTE — NURSING
1950 Patient lying in bed, awake. Patient complaining of pain. Spoke with doctor about it not being time for her dilaudid to be given. One time dose ordered and given to patient.     2137 Pain unrelieved with 0.5mg dose of dilaudid. Dose increased to 1mg by md.     0030 Patient blood glucose 46. Charge nurse reported to on call doctor. New orders placed.     0111 Blood glucose level remains low. Attempted to give patient glucose tablets. However, patient could not chew so they were discarded. Another ampule of dextrose again. Will recheck.

## 2023-07-31 NOTE — TELEPHONE ENCOUNTER
Spoke to pt and notified dr out appt 08/04/23 and will need to reschedule,pt notifies currently in hospital due to 5 blood clots in abdomen and had to have emergency surgery, notifies she will call upon release to reschedule appts w/Dr Frank, lab appt 08/03/23 and appt 08/04/23 cancelled.

## 2023-07-31 NOTE — PT/OT/SLP PROGRESS
Physical Therapy Treatment    Patient Name:  Tessa Enriquez   MRN:  [id number]    Recommendations:     Discharge Recommendations: rehabilitation facility  Discharge Equipment Recommendations: walker, rolling  Barriers to discharge:  increased burden of care, inaccessible home    Assessment:     Tessa Enriquez is a 67 y.o. female admitted with a medical diagnosis of Ischemic necrosis of small bowel.  She presents with the following impairments/functional limitations: weakness, impaired functional mobility, gait instability, pain, impaired skin, edema, impaired cardiopulmonary response to activity, impaired endurance, decreased lower extremity function, impaired self care skills Pt required encouragement to participate. Today she stood 2 times with RW, took several side steps and 8 steps forward and backwards. Complained of dizziness with standing but vitals were stable. Declined OOB to chair stating she would be in too much pain. Returned to bed. Total time seated on EOB 17 minutes.    Rehab Prognosis: Good; patient would benefit from acute skilled PT services to address these deficits and reach maximum level of function.    Recent Surgery: Procedure(s) (LRB):  LAPAROTOMY, EXPLORATORY (N/A)  EXCISION, SMALL INTESTINE  CREATION, ILEOSTOMY 3 Days Post-Op    Plan:     During this hospitalization, patient to be seen daily to address the identified rehab impairments via gait training, therapeutic activities, therapeutic exercises and progress toward the following goals:    Plan of Care Expires:  08/30/23    Subjective     Chief Complaint: pain  Patient/Family Comments/goals: return to PLF  Pain/Comfort:  Pain Rating 1: 4/10  Location 1: abdomen  Pain Addressed 1: Pre-medicate for activity, Reposition, Distraction  Pain Rating Post-Intervention 1: 4/10      Objective:     Communicated with nurseRe prior to session.  Patient found HOB elevated with peripheral IV, telemetry, PureWick, colostomy, JUANITA  drain, oxygen, bed alarm , NG tube, upon PT entry to room.     General Precautions: Standard, fall  Orthopedic Precautions: N/A  Braces: N/A  Respiratory Status: Nasal cannula, flow 2 L/min     Functional Mobility:  Bed Mobility:     Supine to Sit: moderate assistance and of 2 persons  Sit to Supine: maximal assistance and of 2 persons  Transfers:     Sit to Stand:  minimum assistance and of 1 persons with rolling walker  Gait: amb 4 side steps with RW CGA x 2 trials, amb 8 steps forward and backwards with RW CGA/min A, decreased B foot clearance      AM-PAC 6 CLICK MOBILITY  Turning over in bed (including adjusting bedclothes, sheets and blankets)?: 2  Sitting down on and standing up from a chair with arms (e.g., wheelchair, bedside commode, etc.): 2  Moving from lying on back to sitting on the side of the bed?: 2  Moving to and from a bed to a chair (including a wheelchair)?: 2  Need to walk in hospital room?: 3  Climbing 3-5 steps with a railing?: 1  Basic Mobility Total Score: 12       Treatment & Education:  Worked on postural control in midline seated EOB with deep breathing, cues for forward WS  Educated on importance of increased mobility OOB  Patient left with bed in chair position with all lines intact, call button in reach, bed alarm on, and family present..    GOALS:   Multidisciplinary Problems       Physical Therapy Goals          Problem: Physical Therapy    Goal Priority Disciplines Outcome Goal Variances Interventions   Physical Therapy Goal     PT, PT/OT Ongoing, Progressing     Description: Goals to be met by: 23     Patient will increase functional independence with mobility by performin. Supine to sit with Stand-by Assistance  2. Sit to supine with Stand-by Assistance  3. Sit to stand transfer with Stand-by Assistance  4. Bed to chair transfer with Stand-by Assistance using Rolling Walker  5. Gait  x 250 feet with Stand-by Assistance using Rolling Walker.                           Time Tracking:     PT Received On: 07/31/23  PT Start Time: 1047     PT Stop Time: 1110  PT Total Time (min): 23 min     Billable Minutes: Therapeutic Activity 23    Treatment Type: Treatment  PT/PTA: PT     Number of PTA visits since last PT visit: 0     07/31/2023

## 2023-07-31 NOTE — PLAN OF CARE
Problem: Infection  Goal: Absence of Infection Signs and Symptoms  Outcome: Ongoing, Progressing     Problem: Adult Inpatient Plan of Care  Goal: Plan of Care Review  Outcome: Ongoing, Progressing  Goal: Patient-Specific Goal (Individualized)  Outcome: Ongoing, Progressing  Goal: Absence of Hospital-Acquired Illness or Injury  Outcome: Ongoing, Progressing  Goal: Optimal Comfort and Wellbeing  Outcome: Ongoing, Progressing  Goal: Readiness for Transition of Care  Outcome: Ongoing, Progressing     Problem: Adjustment to Illness (Sepsis/Septic Shock)  Goal: Optimal Coping  Outcome: Ongoing, Progressing

## 2023-07-31 NOTE — PLAN OF CARE
Problem: Parenteral Nutrition  Goal: Effective Intravenous Nutrition Therapy Delivery  Outcome: Ongoing, Progressing  Intervention: Optimize Intravenous Nutrition Delivery  Flowsheets (Taken 7/31/2023 1050)  Nutrition Support Management: parenteral nutrition initiated    Pt NPO/CLD  x 7 days. Start Clinimix E @ 75 ml/hr to meet 100% of EPN.  RD to monitor PN,

## 2023-08-01 NOTE — NURSING
1930 Patient in bed, awake. Patient speaking with doctor. Call light within reach. Bed in the lowest position. NO needs at this time.     0317 Patient had blotchy red spots on her left thigh during initial assessment. Dr Bellamy aware. Upon entering room, I noticed that the rash and blotchy spots on her right leg. Reported to Dr. Davis.

## 2023-08-01 NOTE — PROGRESS NOTES
Acute kidney injury has resolved.    Avoid non-essential nephrotoxic agents for a couple of weeks  Keep euvolemic  Keep mean arterial bp above 55    Thank your for the referral.  Please call if further assistance is needed    Lion Barreto MD  Nephrology  [facility]  [phone]

## 2023-08-01 NOTE — PROGRESS NOTES
[facility]  Adult Nutrition   Progress Note (Follow-Up)    SUMMARY      Recommendations:   1. Continue Clinimix E @ 75 ml/hr to provide 612 kcal, 77gm protein.Supplemental PN to meet 35% EEN and 100% EPN.    2. Advance to clear liquid diet as medically feasible and continue PN till diet advanced to cardiac diet and tolerating 50%.   3. RD to monitor NS,diet progression, weight and labs.     Goals:   Goals: Pt to receive nutrition within 48hr.    Dietitian Rounds Brief  Discussed with patient and family members. Pt remains NPO with Clinimix E @ 75 mL/hr infusing. Family states pt is to get CT scan this pm; GI note says possible advance to clears. RD to follow.     Diet order: NPO    PN type/rate: Clinimix E @ 75 mL/hr  Calories: 612  Protein: 77  Fluid: 1800    % Intake of Estimated Energy Needs: 25 - 50 %  % Meal Intake: NPO    Estimated/Assessed Needs  Weight Used For Calorie Calculations: 89.4 kg (197 lb 1.5 oz)  Energy Calorie Requirements (kcal): 2505-3010 kcal (20-25 kcal/ kg bw)  Energy Need Method: Kcal/kg  Protein Requirements: 72-96 (1.5-2.0 g/ 48 kg IBW)  Weight Used For Protein Calculations: 48 kg (105 lb 13.1 oz)     Estimated Fluid Requirement Method: RDA Method  RDA Method (mL): 1788       Weight History:  Wt Readings from Last 5 Encounters:   07/24/23 89.4 kg (197 lb)   07/03/23 89.3 kg (196 lb 13.9 oz)   06/27/23 89.7 kg (197 lb 12 oz)   06/20/23 88.5 kg (195 lb 1.7 oz)   06/14/23 88.5 kg (195 lb)        Reason for Assessment  Reason For Assessment: consult  Relevant Medical History: COPD, hypothyroidism, rectosigmoid cancer, s/p small bowel resection    Medications:Pertinent Medications Reviewed  Scheduled Meds:   albuterol-ipratropium  3 mL Nebulization Q6H    budesonide  0.5 mg Nebulization Q12H    chlorhexidine  15 mL Mouth/Throat BID    enoxparin  40 mg Subcutaneous Q24H (prophylaxis, 1700)    fluconazole (DIFLUCAN) IV (PEDS and ADULTS)  200 mg Intravenous Q24H    levothyroxine   75 mcg Intravenous Before breakfast    meropenem (MERREM) IVPB  1 g Intravenous Q8H    pantoprazole  40 mg Intravenous Daily    vancomycin (VANCOCIN) IV (PEDS and ADULTS)  1,750 mg Intravenous Q24H     Continuous Infusions:  PRN Meds:.0.9%  NaCl infusion (for blood administration), acetaminophen, calcium gluconate IVPB, calcium gluconate IVPB, calcium gluconate IVPB, dextrose 50%, dextrose 50%, diphenhydrAMINE, glucagon (human recombinant), glucose, glucose, hydrALAZINE, hydrALAZINE, HYDROmorphone, magnesium sulfate IVPB, magnesium sulfate IVPB, ondansetron, potassium chloride **AND** potassium chloride **AND** potassium chloride, sodium phosphate 15 mmol in dextrose 5 % (D5W) 250 mL IVPB, sodium phosphate 20.01 mmol in dextrose 5 % (D5W) 250 mL IVPB, sodium phosphate 30 mmol in dextrose 5 % (D5W) 250 mL IVPB, traZODone, Pharmacy to dose Vancomycin consult **AND** vancomycin - pharmacy to dose    Labs: Pertinent Labs Reviewed  Clinical Chemistry:  Recent Labs   Lab 07/27/23  0415 07/27/23  2104 07/28/23  0359 07/29/23  0421 07/29/23  1531 07/30/23  0346 07/31/23  1644 08/01/23  0506   * 132*   < > 134*  --  135* 136 138   K 4.8 3.7   < > 3.5   < > 3.8 4.1 3.8    105   < > 110  --  109 107 113*   CO2 19* 19*   < > 19*  --  19* 20* 21*    123*   < > 189*  --  113* 109 123*   BUN 36* 29*   < > 21  --  15 14 17   CREATININE 1.8* 1.2   < > 1.1  --  0.9 0.8 0.8   CALCIUM 7.4* 5.6*   < > 7.2*  --  7.2* 7.0* 6.8*   PROT  --  3.7*   < > 4.1*  --  4.6* 4.8*  --    ALBUMIN 2.1* 1.6*   < > 1.6*  --  1.6* 1.6*  --    BILITOT  --  1.3*   < > 1.1*  --  0.9 0.7  --    ALKPHOS  --  50*   < > 39*  --  52* 55  --    AST  --  43*   < > 25  --  25 23  --    ALT  --  30   < > 20  --  21 20  --    ANIONGAP 10 8   < > 5*  --  7* 9 4*   MG  --  1.9   < > 2.0  --   --   --   --    PHOS 4.0 4.4  --   --   --   --  2.2*  --     < > = values in this interval not displayed.     CBC:   Recent Labs   Lab 08/01/23  0505   WBC  "6.14   RBC 2.67*   HGB 7.6*   HCT 24.3*      MCV 91   MCH 28.5   MCHC 31.3*     Lipid Panel:  No results for input(s): "CHOL", "HDL", "LDLCALC", "TRIG", "CHOLHDL" in the last 168 hours.  Cardiac Profile:  Recent Labs   Lab 07/27/23  0415 07/27/23  2104   BNP 52 25     Inflammatory Labs:  No results for input(s): "CRP" in the last 168 hours.  Diabetes:  Recent Labs   Lab 07/26/23  0802 07/26/23  1454 07/26/23  1631   POCTGLUCOSE 98 98 79     Thyroid & Parathyroid:  Recent Labs   Lab 07/27/23  0415   TSH 23.868*   FREET4 <0.40*       Monitor and Evaluation  Food and Nutrient Intake: energy intake, food and beverage intake, enteral nutrition intake  Food and Nutrient Adminstration: diet order  Anthropometric Measurements: height/length, weight, weight change  Nutrition-Focused Physical Findings: overall appearance     Nutrition Risk  Level of Risk/Frequency of Follow-up: high     Nutrition Follow-Up  RD Follow-up?: Yes    Anu Petersen RD 08/01/2023 1:53 PM               "

## 2023-08-01 NOTE — PROGRESS NOTES
[facility] Medicine  Progress Note    Patient Name: Tessa Enriquez  MRN: [id number]  Patient Class: IP- Inpatient   Admission Date: 7/24/2023  Length of Stay: 8 days  Attending Physician: Linda Bellamy MD  Primary Care Provider: Karma Mcdermott MD        Subjective:     Principal Problem:Ischemic necrosis of small bowel        HPI:  Ms. Enriquez is a 67-years-old female who presented as a direct admission from [facility] for ICU level of care postoperatively.  Patient with a history of recurrent colorectal adenocarcinoma, status post neoadjuvant radiation and chemotherapy, followed by oncologist Dr. Frank.  On 07/24 she underwent exploratory laparotomy with extensive lysis of adhesion, proctectomy with creation of end colostomy by Dr. Wagner and she was admitted to hospital medicine postoperatively.  Postop course complicated by ongoing sinus tachycardia with anemia, status post PRBC transfusion and acute kidney injury, seen by Nephrology.  Earlier today ostomy noted to be dusky with concern for ischemia, she was taken back to the OR and underwent exploratory laparotomy with small-bowel resection, as per OR note, large area of small bowel resected with four areas of ischemia.  She was intubated for surgery and remains intubated postoperatively.  Reported history of COPD, hypothyroidism, nephrolithiasis status post bilateral ureteral stenting.  She is currently sedated on propofol, orally intubated on mechanical ventilation.  Repeat labs have been submitted.  As per ICU RN plans for possible repeat surgery.  Plan of care reviewed with nursing.  No visitors at bedside.      Overview/Hospital Course:  No notes on file    Interval History: patient c/o burning in legs, wants to wait on another midline    Review of Systems   Constitutional:  Positive for fatigue. Negative for chills and fever.   Respiratory:  Positive for cough and shortness of breath.     Cardiovascular: Negative.    Gastrointestinal:  Positive for abdominal pain and nausea.   Genitourinary:  Positive for difficulty urinating.   Musculoskeletal:  Positive for arthralgias, gait problem and myalgias.   Skin:  Positive for color change.   Neurological:  Positive for weakness.   Psychiatric/Behavioral:  The patient is nervous/anxious.      Objective:     Vital Signs (Most Recent):  Temp: 97.7 °F (36.5 °C) (08/01/23 0044)  Pulse: 107 (08/01/23 0044)  Resp: 18 (08/01/23 0044)  BP: 131/60 (08/01/23 0044)  SpO2: 95 % (08/01/23 0044) Vital Signs (24h Range):  Temp:  [97.7 °F (36.5 °C)-98.3 °F (36.8 °C)] 97.7 °F (36.5 °C)  Pulse:  [106-114] 107  Resp:  [16-20] 18  SpO2:  [92 %-100 %] 95 %  BP: (111-143)/(60-90) 131/60     Weight: 89.4 kg (197 lb)  Body mass index is 37.22 kg/m².    Intake/Output Summary (Last 24 hours) at 8/1/2023 0118  Last data filed at 7/31/2023 1823  Gross per 24 hour   Intake 2330.53 ml   Output 1956 ml   Net 374.53 ml         Physical Exam  Constitutional:       Appearance: She is obese. She is ill-appearing.   HENT:      Mouth/Throat:      Mouth: Mucous membranes are dry.      Pharynx: Oropharynx is clear. No oropharyngeal exudate.   Eyes:      Extraocular Movements: Extraocular movements intact.   Cardiovascular:      Rate and Rhythm: Regular rhythm. Tachycardia present.   Pulmonary:      Effort: Pulmonary effort is normal.      Breath sounds: Rhonchi present.   Abdominal:      General: There is distension.      Tenderness: There is abdominal tenderness.      Comments: Beny drain and ostomy intact   Musculoskeletal:      Cervical back: Neck supple. No tenderness.   Skin:     General: Skin is warm.      Capillary Refill: Capillary refill takes less than 2 seconds.      Findings: Bruising present.   Neurological:      General: No focal deficit present.      Mental Status: She is alert and oriented to person, place, and time.   Psychiatric:         Mood and Affect: Mood normal.          Behavior: Behavior normal.             Significant Labs: All pertinent labs within the past 24 hours have been reviewed.  Recent Lab Results  (Last 5 results in the past 24 hours)        07/31/23  2119   07/31/23  1653   07/31/23  1644   07/31/23  1235   07/31/23  0820        Albumin     1.6           Alkaline Phosphatase     55           ALT     20           Anion Gap     9           AST     23           BILIRUBIN TOTAL     0.7  Comment: For infants and newborns, interpretation of results should be based  on gestational age, weight and in agreement with clinical  observations.    Premature Infant recommended reference ranges:  Up to 24 hours.............<8.0 mg/dL  Up to 48 hours............<12.0 mg/dL  3-5 days..................<15.0 mg/dL  6-29 days.................<15.0 mg/dL             BUN     14           Calcium     7.0           Chloride     107           CO2     20           Creatinine     0.8           eGFR     >60.0           Glucose     109           Phosphorus     2.2           POC Glucose 108   100     98   89       Potassium     4.1           PROTEIN TOTAL     4.8           Sodium     136                                  Significant Imaging: I have reviewed all pertinent imaging results/findings within the past 24 hours.      Assessment/Plan:      * Ischemic necrosis of small bowel  OR note 7/27 with extensive area of small bowel necrosis   Possible etiology for ongoing tachycardia/sepsis   IV fluid hydration  Broaden antibiotics to meropenem   Lactic acid ordered and pending   Serial abdominal examination   Appreciate general surgery input      Hypocalcemia  Corrected calcium remains low.  Ordered IV replacement and trending.    Sinus tachycardia  Multifactorial in the setting of acute medical conditions including dehydration, pain, postop, anemia  Telemetry monitoring   Replace thyroid as outlined, add on magnesium level      Hyponatremia  Monitor with IV fluid hydration     Malnutrition of  moderate degree  Albumin 1.6 in the setting of extensive bowel surgery   Currently NPO, if prolonged consider parenteral nutrition    On mechanically assisted ventilation  Intubated for repeat procedure earlier today and remains on mechanical ventilation   Continue oral intubation with mechanical ventilation  P.r.n. ABG and chest x-ray, chest x-ray in a.m.  Famotidine for GI prophylaxis  Ventilator precautions   Pulmonary consulted for management      Severe sepsis  Ongoing sinus tachycardia and now repeat labs with leukopenia  Extensive area of small bowel necrosis   Escalate antibiotics to meropenem and following clinically  Lactic acid in progress   Monitor blood pressure, aim for map greater than 65    BOOM (acute kidney injury)  Postop course complicated by acute kidney injury, creatinine today 1.8, repeat labs pending   History of nephrolithiasis status post bilateral ureteral stenting   Continue IV fluid hydration   Keep Velasco catheter in place, monitoring urine output  Renally dosing all medications and avoiding nephrotoxin drugs   Appreciate nephrology input    Bilateral kidney stones  Seen by Dr. Mijares this admission, followed outpatient by Dr. Kwon   Status post bilateral stent placement      Severe obesity (BMI 35.0-39.9) with comorbidity  Body mass index is 37.22 kg/m². Morbid obesity complicates all aspects of disease management from diagnostic modalities to treatment. Weight loss encouraged and health benefits explained to patient.         FENG (iron deficiency anemia)  Worsening postoperatively, status post PRBC transfusion   Repeat anemia labs including B12, folic acid, iron studies  Transfuse as needed      Malignant neoplasm of colon  History of recurrent colorectal adenocarcinoma  See small-bowel ischemia  Did receive adjuvant chemoradiation, followed by Dr. Frank    Hypothyroidism with abnormal TFTs  Recent TSH elevated at 23, as per chart review elevated 2 years previous, unclear if  compliance   Given current NPO status with bowel surgery changed to IV levothyroxine    COPD (chronic obstructive pulmonary disease)  No evidence of acute exacerbation   P.r.n. ABG and chest x-ray  Scheduled breathing treatments        VTE Risk Mitigation (From admission, onward)         Ordered     enoxaparin injection 40 mg  Every 24 hours         07/28/23 1849     Place sequential compression device  Until discontinued         07/28/23 0724     IP VTE HIGH RISK PATIENT  Once         07/24/23 2015                Discharge Planning   DERICK: 8/1/2023     Code Status: Full Code   Is the patient medically ready for discharge?:     Reason for patient still in hospital (select all that apply): Patient trending condition  Discharge Plan A: Home                  Linda Bellamy MD  Department of Hospital Medicine   [facility], [facility]

## 2023-08-01 NOTE — PLAN OF CARE
Problem: Infection  Goal: Absence of Infection Signs and Symptoms  Outcome: Ongoing, Progressing     Problem: Adult Inpatient Plan of Care  Goal: Plan of Care Review  Outcome: Ongoing, Progressing  Goal: Patient-Specific Goal (Individualized)  Outcome: Ongoing, Progressing  Goal: Absence of Hospital-Acquired Illness or Injury  Outcome: Ongoing, Progressing  Goal: Optimal Comfort and Wellbeing  Outcome: Ongoing, Progressing  Goal: Readiness for Transition of Care  Outcome: Ongoing, Progressing     Problem: Adjustment to Illness (Sepsis/Septic Shock)  Goal: Optimal Coping  Outcome: Ongoing, Progressing     Problem: Bleeding (Sepsis/Septic Shock)  Goal: Absence of Bleeding  Outcome: Ongoing, Progressing     Problem: Glycemic Control Impaired (Sepsis/Septic Shock)  Goal: Blood Glucose Level Within Desired Range  Outcome: Ongoing, Progressing     Problem: Infection Progression (Sepsis/Septic Shock)  Goal: Absence of Infection Signs and Symptoms  Outcome: Ongoing, Progressing     Problem: Nutrition Impaired (Sepsis/Septic Shock)  Goal: Optimal Nutrition Intake  Outcome: Ongoing, Progressing     Problem: Fluid and Electrolyte Imbalance (Acute Kidney Injury/Impairment)  Goal: Fluid and Electrolyte Balance  Outcome: Ongoing, Progressing     Problem: Oral Intake Inadequate (Acute Kidney Injury/Impairment)  Goal: Optimal Nutrition Intake  Outcome: Ongoing, Progressing     Problem: Renal Function Impairment (Acute Kidney Injury/Impairment)  Goal: Effective Renal Function  Outcome: Ongoing, Progressing     Problem: Fall Injury Risk  Goal: Absence of Fall and Fall-Related Injury  Outcome: Ongoing, Progressing     Problem: Skin Injury Risk Increased  Goal: Skin Health and Integrity  Outcome: Ongoing, Progressing     Problem: Communication Impairment (Mechanical Ventilation, Invasive)  Goal: Effective Communication  Outcome: Ongoing, Progressing     Problem: Device-Related Complication Risk (Mechanical Ventilation,  Invasive)  Goal: Optimal Device Function  Outcome: Ongoing, Progressing     Problem: Inability to Wean (Mechanical Ventilation, Invasive)  Goal: Mechanical Ventilation Liberation  Outcome: Ongoing, Progressing     Problem: Nutrition Impairment (Mechanical Ventilation, Invasive)  Goal: Optimal Nutrition Delivery  Outcome: Ongoing, Progressing     Problem: Skin and Tissue Injury (Mechanical Ventilation, Invasive)  Goal: Absence of Device-Related Skin and Tissue Injury  Outcome: Ongoing, Progressing     Problem: Ventilator-Induced Lung Injury (Mechanical Ventilation, Invasive)  Goal: Absence of Ventilator-Induced Lung Injury  Outcome: Ongoing, Progressing     Problem: Communication Impairment (Artificial Airway)  Goal: Effective Communication  Outcome: Ongoing, Progressing     Problem: Device-Related Complication Risk (Artificial Airway)  Goal: Optimal Device Function  Outcome: Ongoing, Progressing     Problem: Skin and Tissue Injury (Artificial Airway)  Goal: Absence of Device-Related Skin or Tissue Injury  Outcome: Ongoing, Progressing     Problem: Noninvasive Ventilation Acute  Goal: Effective Unassisted Ventilation and Oxygenation  Outcome: Ongoing, Progressing     Problem: Restraint, Nonbehavioral (Nonviolent)  Goal: Absence of Harm or Injury  Outcome: Ongoing, Progressing     Problem: Oral Intake Inadequate  Goal: Improved Oral Intake  Outcome: Ongoing, Progressing     Problem: Parenteral Nutrition  Goal: Effective Intravenous Nutrition Therapy Delivery  Outcome: Ongoing, Progressing

## 2023-08-01 NOTE — SUBJECTIVE & OBJECTIVE
Interval History: patient c/o burning in legs, wants to wait on another midline    Review of Systems   Constitutional:  Positive for fatigue. Negative for chills and fever.   Respiratory:  Positive for cough and shortness of breath.    Cardiovascular: Negative.    Gastrointestinal:  Positive for abdominal pain and nausea.   Genitourinary:  Positive for difficulty urinating.   Musculoskeletal:  Positive for arthralgias, gait problem and myalgias.   Skin:  Positive for color change.   Neurological:  Positive for weakness.   Psychiatric/Behavioral:  The patient is nervous/anxious.      Objective:     Vital Signs (Most Recent):  Temp: 97.7 °F (36.5 °C) (08/01/23 0044)  Pulse: 107 (08/01/23 0044)  Resp: 18 (08/01/23 0044)  BP: 131/60 (08/01/23 0044)  SpO2: 95 % (08/01/23 0044) Vital Signs (24h Range):  Temp:  [97.7 °F (36.5 °C)-98.3 °F (36.8 °C)] 97.7 °F (36.5 °C)  Pulse:  [106-114] 107  Resp:  [16-20] 18  SpO2:  [92 %-100 %] 95 %  BP: (111-143)/(60-90) 131/60     Weight: 89.4 kg (197 lb)  Body mass index is 37.22 kg/m².    Intake/Output Summary (Last 24 hours) at 8/1/2023 0118  Last data filed at 7/31/2023 1823  Gross per 24 hour   Intake 2330.53 ml   Output 1956 ml   Net 374.53 ml         Physical Exam  Constitutional:       Appearance: She is obese. She is ill-appearing.   HENT:      Mouth/Throat:      Mouth: Mucous membranes are dry.      Pharynx: Oropharynx is clear. No oropharyngeal exudate.   Eyes:      Extraocular Movements: Extraocular movements intact.   Cardiovascular:      Rate and Rhythm: Regular rhythm. Tachycardia present.   Pulmonary:      Effort: Pulmonary effort is normal.      Breath sounds: Rhonchi present.   Abdominal:      General: There is distension.      Tenderness: There is abdominal tenderness.      Comments: Beny drain and ostomy intact   Musculoskeletal:      Cervical back: Neck supple. No tenderness.   Skin:     General: Skin is warm.      Capillary Refill: Capillary refill takes less than 2  seconds.      Findings: Bruising present.   Neurological:      General: No focal deficit present.      Mental Status: She is alert and oriented to person, place, and time.   Psychiatric:         Mood and Affect: Mood normal.         Behavior: Behavior normal.             Significant Labs: All pertinent labs within the past 24 hours have been reviewed.  Recent Lab Results  (Last 5 results in the past 24 hours)        07/31/23  2119   07/31/23  1653   07/31/23  1644   07/31/23  1235   07/31/23  0820        Albumin     1.6           Alkaline Phosphatase     55           ALT     20           Anion Gap     9           AST     23           BILIRUBIN TOTAL     0.7  Comment: For infants and newborns, interpretation of results should be based  on gestational age, weight and in agreement with clinical  observations.    Premature Infant recommended reference ranges:  Up to 24 hours.............<8.0 mg/dL  Up to 48 hours............<12.0 mg/dL  3-5 days..................<15.0 mg/dL  6-29 days.................<15.0 mg/dL             BUN     14           Calcium     7.0           Chloride     107           CO2     20           Creatinine     0.8           eGFR     >60.0           Glucose     109           Phosphorus     2.2           POC Glucose 108   100     98   89       Potassium     4.1           PROTEIN TOTAL     4.8           Sodium     136                                  Significant Imaging: I have reviewed all pertinent imaging results/findings within the past 24 hours.

## 2023-08-01 NOTE — PT/OT/SLP PROGRESS
Physical Therapy Treatment    Patient Name:  Tessa Enriquez   MRN:  [id number]    Recommendations:     Discharge Recommendations: rehabilitation facility  Discharge Equipment Recommendations: walker, rolling  Barriers to discharge:  increased assist with mobility, decreased activity tolerance, pain    Assessment:     Tessa Enriquez is a 67 y.o. female admitted with a medical diagnosis of Ischemic necrosis of small bowel.  She presents with the following impairments/functional limitations: weakness, impaired endurance, impaired self care skills, impaired functional mobility, gait instability, decreased lower extremity function, decreased safety awareness, pain, impaired cardiopulmonary response to activity.    Pt agreeable to visit. Pt premedicated for activity. Pt required mod to mod assist x 2 for log roll onto right side and transfer from side lying to sitting. Pt reports that it feels like her incision is pulling when rolling. Pt required min to mod assist for sitting balance EOB with posterior lean. Pt noted to have drainage upon sitting EOB that looked as if it was coming from incision site. RN informed and came to examine and reports leaking is from ostomy. RN exited. Pt returned to bed max assist x 2 to be cleaned up. Pt performed rolling left and right with mod to max assist for hygiene. RN informed ostomy still leaking.    Rehab Prognosis: Fair; patient would benefit from acute skilled PT services to address these deficits and reach maximum level of function.    Recent Surgery: Procedure(s) (LRB):  LAPAROTOMY, EXPLORATORY (N/A)  EXCISION, SMALL INTESTINE  CREATION, ILEOSTOMY 4 Days Post-Op    Plan:     During this hospitalization, patient to be seen daily to address the identified rehab impairments via gait training, therapeutic activities, therapeutic exercises and progress toward the following goals:    Plan of Care Expires:  08/30/23    Subjective     Chief Complaint: pt reports feeling as  if her incision is pulling with rolling   Patient/Family Comments/goals: to get better  Pain/Comfort:  Pain Rating 1: other (see comments) (not rated)  Location 1: abdomen  Pain Addressed 1: Reposition, Distraction, Cessation of Activity, Nurse notified  Pain Rating Post-Intervention 1: other (see comments) (not rated)      Objective:     Communicated with RN prior to session.  Patient found HOB elevated with peripheral IV, telemetry, PureWick, colostomy, JUANITA drain, oxygen, bed alarm, NG tube upon PT entry to room.     General Precautions: Standard, fall  Orthopedic Precautions: N/A  Braces: N/A  Respiratory Status: Nasal cannula, flow 2 L/min     Functional Mobility:  Bed Mobility:     Rolling Left:  moderate assistance and maximal assistance  Rolling Right: moderate assistance and maximal assistance  Supine to Sit: moderate assistance and of 2 persons  Sit to Supine: maximal assistance and of 2 persons  Balance: sitting EOB with min-mod assist and posterior lean      AM-PAC 6 CLICK MOBILITY          Treatment & Education:  Pt educated on importance of time OOB, importance of intermittent mobility, safe techniques for transfers/ambulation, discharge recommendations/options, and use of call light for assistance and fall prevention.      Patient left HOB elevated with all lines intact, call button in reach, bed alarm on, and RN notified..    GOALS:   Multidisciplinary Problems       Physical Therapy Goals          Problem: Physical Therapy    Goal Priority Disciplines Outcome Goal Variances Interventions   Physical Therapy Goal     PT, PT/OT Ongoing, Progressing     Description: Goals to be met by: 23     Patient will increase functional independence with mobility by performin. Supine to sit with Stand-by Assistance  2. Sit to supine with Stand-by Assistance  3. Sit to stand transfer with Stand-by Assistance  4. Bed to chair transfer with Stand-by Assistance using Rolling Walker  5. Gait  x 250 feet with  Stand-by Assistance using Rolling Walker.                          Time Tracking:     PT Received On: 08/01/23  PT Start Time: 1101     PT Stop Time: 1141  PT Total Time (min): 40 min     Billable Minutes: Therapeutic Activity 40    Treatment Type: Treatment  PT/PTA: PTA     Number of PTA visits since last PT visit: 1 08/01/2023

## 2023-08-01 NOTE — PROGRESS NOTES
Pt seen and examined.  REsting comfortably.  Ambulating  Wants to eat.    Minimal NG output    Wt Readings from Last 3 Encounters:   07/24/23 89.4 kg (197 lb)   07/03/23 89.3 kg (196 lb 13.9 oz)   06/27/23 89.7 kg (197 lb 12 oz)     Temp Readings from Last 3 Encounters:   07/31/23 98.3 °F (36.8 °C) (Oral)   07/03/23 97.5 °F (36.4 °C) (Temporal)   06/27/23 98.1 °F (36.7 °C) (Temporal)     BP Readings from Last 3 Encounters:   07/31/23 (!) 119/90   07/03/23 (!) 143/79   06/27/23 (!) 185/86     Pulse Readings from Last 3 Encounters:   07/31/23 109   07/03/23 84   06/27/23 84     AAox3  Sinus  Soft/nd/appt ttp    Lab Results   Component Value Date    WBC 7.46 07/30/2023    HGB 7.6 (L) 07/30/2023    HCT 22.5 (L) 07/30/2023    MCV 86 07/30/2023     (L) 07/30/2023       BMP  Lab Results   Component Value Date     07/31/2023    K 4.1 07/31/2023     07/31/2023    CO2 20 (L) 07/31/2023    BUN 14 07/31/2023    CREATININE 0.8 07/31/2023    CALCIUM 7.0 (L) 07/31/2023    ANIONGAP 9 07/31/2023    EGFRNORACEVR >60.0 07/31/2023     A/P: s/p ex lap APR complicated by bowel ischemia requiring ex lap and extensive SBR  Ambulate  Aggressive IS  Check labs in AM  Will get ct scan with oral contrast tomorrow.  If no evidence of leak will remove NG and start clears

## 2023-08-01 NOTE — PLAN OF CARE
Problem: Occupational Therapy  Goal: Occupational Therapy Goal  Description: Goals to be met by: 8/31/2023     Patient will increase functional independence with ADLs by performing:    UE Dressing with Supervision.  LE Dressing with Supervision.  Grooming while standing at sink with Supervision.  Toileting from toilet with Supervision for hygiene and clothing management.   Bathing from  tub bench with Supervision.    Outcome: Ongoing, Progressing

## 2023-08-01 NOTE — PT/OT/SLP PROGRESS
Occupational Therapy   Treatment    Name: Tessa Enriquez  MRN: [id number]  Admitting Diagnosis:  Ischemic necrosis of small bowel  4 Days Post-Op    Recommendations:     Discharge Recommendations: rehabilitation facility  Discharge Equipment Recommendations:  walker, rolling  Barriers to discharge:  Decreased caregiver support    Assessment:     Tessa Enriquez is a 67 y.o. female with a medical diagnosis of Ischemic necrosis of small bowel.  Performance deficits affecting function are weakness, impaired endurance, impaired self care skills, impaired functional mobility, gait instability, decreased lower extremity function, decreased safety awareness, pain, impaired cardiopulmonary response to activity.     Rehab Prognosis:  Fair; patient would benefit from acute skilled OT services to address these deficits and reach maximum level of function.       Plan:     Patient to be seen 6 x/week to address the above listed problems via self-care/home management, therapeutic activities, therapeutic exercises  Plan of Care Expires: 08/31/23  Plan of Care Reviewed with: patient    Subjective     Chief Complaint: pain; awaiting pain meds  Patient/Family Comments/goals: return home   Pain/Comfort:  Pain Rating 1:  (did not rate)  Location 1: back  Pain Addressed 1: Pre-medicate for activity, Reposition, Distraction    Objective:     Communicated with: nurse prior to session.  Patient found HOB elevated with peripheral IV, telemetry, PureWick, colostomy, JUANITA drain, oxygen, bed alarm, NG tube upon OT entry to room.    General Precautions: Standard, fall, contact    Orthopedic Precautions:N/A  Braces: N/A  Respiratory Status: Nasal cannula, flow 2 L/min     Occupational Performance:     Bed Mobility:    Patient completed Rolling/Turning to Left with  moderate assistance  Patient completed Rolling/Turning to Right with moderate assistance     Activities of Daily Living:  Grooming: stand by assistance/setup for washing  face and hands, combing hair  Lower Body Dressing: Jose Roberto JIMENES donning socks  Reviewed upper body strengthening exercises with red theraband    Treatment & Education:  Patient was educated on importance of getting out of bed, home exercise program, discharge planning and reviewed use of call bell for assistance.     Patient left HOB elevated with all lines intact, call button in reach, bed alarm on, and nurse notified    GOALS:   Multidisciplinary Problems       Occupational Therapy Goals          Problem: Occupational Therapy    Goal Priority Disciplines Outcome Interventions   Occupational Therapy Goal     OT, PT/OT Ongoing, Progressing    Description: Goals to be met by: 8/31/2023     Patient will increase functional independence with ADLs by performing:    UE Dressing with Supervision.  LE Dressing with Supervision.  Grooming while standing at sink with Supervision.  Toileting from toilet with Supervision for hygiene and clothing management.   Bathing from  tub bench with Supervision.                         Time Tracking:     OT Date of Treatment: 08/01/23  OT Start Time: 1015  OT Stop Time: 1025  OT Total Time (min): 10 min    Billable Minutes:Self Care/Home Management 10    OT/BRENNON: OT          8/1/2023

## 2023-08-01 NOTE — PLAN OF CARE
JACKIE met with Pt and family at bedside to discuss rehab choice. Pt and family have not made a decision yet, but they said they are leaning towards [facility]. Pt not medically cleared for DC yet. JACKIE/JANI will continue to follow.      08/01/23 1422   Post-Acute Status   Post-Acute Authorization Placement   Post-Acute Placement Status Pending medical clearance/testing   Discharge Delays None known at this time   Discharge Plan   Discharge Plan A Rehab   Discharge Plan B Rehab

## 2023-08-01 NOTE — ANESTHESIA PREPROCEDURE EVALUATION
08/01/2023  Tessa Bartolo Enriquez is a 67 y.o., female.      Patient Active Problem List   Diagnosis    COPD (chronic obstructive pulmonary disease)    Pure hypercholesterolemia    Hypothyroidism with abnormal TFTs    Rectal bleeding    Rectal cancer    Malignant neoplasm of colon    B12 deficiency    TESSA (iron deficiency anemia)    Chronic diarrhea    Severe obesity (BMI 35.0-39.9) with comorbidity    Secondary and unspecified malignant neoplasm of intra-abdominal lymph nodes    Dehydration    Diarrhea    Chemotherapy-induced neutropenia    Chemotherapy-induced diarrhea    Hypokalemia    Anemia    Bilateral kidney stones    BOOM (acute kidney injury)    Severe sepsis    Ischemic necrosis of small bowel    On mechanically assisted ventilation    Malnutrition of moderate degree    Hyponatremia    Sinus tachycardia    Hypocalcemia       Past Surgical History:   Procedure Laterality Date    CHOLECYSTECTOMY      COLECTOMY N/A 11/9/2020    Procedure: COLECTOMY;  Surgeon: Brandon Wagner MD;  Location: [facility];  Service: General;  Laterality: N/A;  LOWER- ANTERIOR    COLONOSCOPY N/A 10/23/2020    Procedure: COLONOSCOPY;  Surgeon: Jolynn Ayala MD;  Location: [facility];  Service: Endoscopy;  Laterality: N/A;    COLONOSCOPY N/A 8/6/2021    Procedure: COLONOSCOPY;  Surgeon: Jolynn Ayala MD;  Location: [facility];  Service: Endoscopy;  Laterality: N/A;    COLONOSCOPY N/A 7/7/2022    Procedure: COLONOSCOPY;  Surgeon: Jolynn Ayala MD;  Location: [facility] ENDO;  Service: Endoscopy;  Laterality: N/A;    COLONOSCOPY N/A 5/31/2023    Procedure: COLONOSCOPY;  Surgeon: Jolynn Ayala MD;  Location: [facility] ENDO;  Service: Endoscopy;  Laterality: N/A;    COLOSTOMY N/A 7/24/2023    Procedure: CREATION, COLOSTOMY;  Surgeon: Brandon Wagner MD;  Location: [facility];  Service: General;   Laterality: N/A;    COMPLETION PROCTECTOMY N/A 7/24/2023    Procedure: PROCTECTOMY, COMPLETION;  Surgeon: Brandon Wagner MD;  Location: [facility] OR;  Service: General;  Laterality: N/A;    CYSTOSCOPY W/ URETERAL STENT PLACEMENT Right 2/22/2023    Procedure: CYSTOSCOPY, WITH URETERAL STENT INSERTION;  Surgeon: Carin Kwon Jr., MD;  Location: [facility] OR;  Service: Urology;  Laterality: Right;    CYSTOSCOPY W/ URETERAL STENT PLACEMENT Bilateral 3/26/2023    Procedure: CYSTOSCOPY, WITH URETERAL STENT INSERTION;  Surgeon: Sabine Barber MD;  Location: [facility] OR;  Service: Urology;  Laterality: Bilateral;    CYSTOSCOPY W/ URETERAL STENT PLACEMENT Bilateral 7/24/2023    Procedure: CYSTOSCOPY, WITH URETERAL STENT INSERTION;  Surgeon: Adrienne Mijares MD;  Location: [facility];  Service: Urology;  Laterality: Bilateral;  chamberlain placing stents 1st    CYSTOSCOPY WITH URETEROSCOPY, RETROGRADE PYELOGRAPHY, AND INSERTION OF STENT Bilateral 3/26/2023    Procedure: CYSTOSCOPY, WITH RETROGRADE PYELOGRAM AND URETERAL STENT INSERTION;  Surgeon: Sabine Barber MD;  Location: [facility] OR;  Service: Urology;  Laterality: Bilateral;    CYSTOURETEROSCOPY WITH RETROGRADE PYELOGRAPHY AND INSERTION OF STENT INTO URETER Right 3/7/2023    Procedure: CYSTOURETEROSCOPY, WITH RETROGRADE PYELOGRAM AND URETERAL STENT INSERTION;  Surgeon: Carin Kwon Jr., MD;  Location: [facility] OR;  Service: Urology;  Laterality: Right;    CYSTOURETEROSCOPY, WITH HOLMIUM LASER LITHOTRIPSY OF URETERAL CALCULUS AND STENT INSERTION Bilateral 4/11/2023    Procedure: CYSTOURETEROSCOPY, WITH HOLMIUM LASER LITHOTRIPSY OF URETERAL CALCULUS AND STENT INSERTION;  Surgeon: Carin Kwon Jr., MD;  Location: [facility] OR;  Service: Urology;  Laterality: Bilateral;    ESOPHAGEAL DILATION N/A 6/29/2021    Procedure: DILATION, ESOPHAGUS;  Surgeon: Sourav Baires III, MD;  Location: [facility] ENDO;  Service: Endoscopy;  Laterality: N/A;    ESOPHAGOGASTRODUODENOSCOPY   06/29/2021    Dilation to 57 Fr    ESOPHAGOGASTRODUODENOSCOPY N/A 6/29/2021    Procedure: EGD (ESOPHAGOGASTRODUODENOSCOPY);  Surgeon: Sourav Baires III, MD;  Location: [facility] ENDO;  Service: Endoscopy;  Laterality: N/A;    EXCISION, SMALL INTESTINE N/A 7/27/2023    Procedure: EXCISION, SMALL INTESTINE;  Surgeon: Brandon Wagner MD;  Location: [facility] OR;  Service: General;  Laterality: N/A;    EXCISION, SMALL INTESTINE  7/28/2023    Procedure: EXCISION, SMALL INTESTINE;  Surgeon: Brandon Wagner MD;  Location: [facility] OR;  Service: General;;    FLEXIBLE SIGMOIDOSCOPY N/A 8/1/2022    Procedure: SIGMOIDOSCOPY, FLEXIBLE;  Surgeon: Brandon Wagner MD;  Location: [facility] ENDO;  Service: Endoscopy;  Laterality: N/A;    HYSTERECTOMY      ILEOSTOMY  7/28/2023    Procedure: CREATION, ILEOSTOMY;  Surgeon: Brandon Wagner MD;  Location: [facility] OR;  Service: General;;    INSERTION OF TUNNELED CENTRAL VENOUS CATHETER (CVC) WITH SUBCUTANEOUS PORT N/A 1/11/2021    Procedure: [id number];  Surgeon: Brandon Wagner MD;  Location: [facility];  Service: General;  Laterality: N/A;    INSERTION OF TUNNELED CENTRAL VENOUS CATHETER (CVC) WITH SUBCUTANEOUS PORT Right 9/1/2022    Procedure: [id number];  Surgeon: Brandon Wagner MD;  Location: [facility];  Service: General;  Laterality: Right;  Wants rt side placement    LAPAROTOMY, EXPLORATORY N/A 7/24/2023    Procedure: LAPAROTOMY, EXPLORATORY;  Surgeon: Brnadon Wagner MD;  Location: [facility] OR;  Service: General;  Laterality: N/A;  case must go 1st-chamberlain placing stents    LAPAROTOMY, EXPLORATORY N/A 7/27/2023    Procedure: LAPAROTOMY, EXPLORATORY;  Surgeon: Brandon Wagner MD;  Location: [facility] OR;  Service: General;  Laterality: N/A;    LAPAROTOMY, EXPLORATORY N/A 7/28/2023    Procedure: LAPAROTOMY, EXPLORATORY;  Surgeon: Brandon Wagner MD;  Location: [facility] OR;  Service: General;  Laterality: N/A;    MEDIPORT REMOVAL Left 4/19/2021    Procedure: REMOVAL,  CATHETER, CENTRAL VENOUS, TUNNELED, WITH PORT;  Surgeon: Brandon Wagner MD;  Location: [facility] OR;  Service: General;  Laterality: Left;    REMOVAL-STENT Right 3/7/2023    Procedure: REMOVAL-STENT;  Surgeon: Carin Kwon Jr., MD;  Location: [facility] OR;  Service: Urology;  Laterality: Right;    REMOVAL-STENT Bilateral 4/11/2023    Procedure: REMOVAL-STENT;  Surgeon: Carin Kwon Jr., MD;  Location: [facility] OR;  Service: Urology;  Laterality: Bilateral;    RETROGRADE PYELOGRAPHY Bilateral 4/11/2023    Procedure: PYELOGRAM, RETROGRADE;  Surgeon: Carin Kwon Jr., MD;  Location: [facility] OR;  Service: Urology;  Laterality: Bilateral;    ROBOT-ASSISTED COLECTOMY N/A 11/9/2020    Procedure: ROBOTIC COLECTOMY low anterior resection, possible open;  Surgeon: Brandon Wagner MD;  Location: [facility] OR;  Service: General;  Laterality: N/A;  CONVERTED TO OPEN AT 1503    SUBTOTAL COLECTOMY N/A 7/24/2023    Procedure: COLECTOMY, PARTIAL;  Surgeon: Brandon Wagner MD;  Location: [facility];  Service: General;  Laterality: N/A;  with colorectal Anastamosis    URETEROSCOPIC REMOVAL OF URETERIC CALCULUS Right 3/7/2023    Procedure: REMOVAL, CALCULUS, URETER, URETEROSCOPIC;  Surgeon: Carin Kwon Jr., MD;  Location: [facility] OR;  Service: Urology;  Laterality: Right;    URETEROSCOPIC REMOVAL OF URETERIC CALCULUS Bilateral 4/11/2023    Procedure: REMOVAL, CALCULUS, URETER, URETEROSCOPIC;  Surgeon: Carin Kwon Jr., MD;  Location: [facility];  Service: Urology;  Laterality: Bilateral;        Tobacco Use:  The patient  reports that she quit smoking about 6 years ago. Her smoking use included cigarettes. She started smoking about 46 years ago. She has a 80.0 pack-year smoking history. She has never used smokeless tobacco.     Results for orders placed or performed during the hospital encounter of 07/24/23   EKG 12-lead    Collection Time: 07/28/23  4:14 AM    Narrative    Test Reason : R00.0,    Vent. Rate : 123 BPM     Atrial Rate : 123 BPM      P-R Int : 114 ms          QRS Dur : 068 ms      QT Int : 340 ms       P-R-T Axes : 060 020 071 degrees     QTc Int : 486 ms    Sinus tachycardia  Low voltage QRS  Borderline Abnormal ECG  When compared with ECG of 26-JUL-2023 07:13,  Nonspecific T wave abnormality now evident in Anterior leads  Confirmed by Joseph Anthony MD ([id number]) on 7/28/2023 8:29:45 PM    Referred By: [email or web address]   SELF           Confirmed By:Joseph Anthony MD             Lab Results   Component Value Date    WBC 6.14 08/01/2023    HGB 7.6 (L) 08/01/2023    HCT 24.3 (L) 08/01/2023    MCV 91 08/01/2023     08/01/2023     BMP  Lab Results   Component Value Date     08/01/2023    K 3.8 08/01/2023     (H) 08/01/2023    CO2 21 (L) 08/01/2023    BUN 17 08/01/2023    CREATININE 0.8 08/01/2023    CALCIUM 6.8 (LL) 08/01/2023    ANIONGAP 4 (L) 08/01/2023     (H) 08/01/2023     07/31/2023     (H) 07/30/2023       No results found for this or any previous visit.            Pre-op Assessment    I have reviewed the Patient Summary Reports.     I have reviewed the Nursing Notes. I have reviewed the NPO Status.   I have reviewed the Medications.     Review of Systems  Anesthesia Hx:  No problems with previous Anesthesia  Denies Family Hx of Anesthesia complications.   Denies Personal Hx of Anesthesia complications.   Social:  Former Smoker    Hematology/Oncology:         -- Anemia: Current/Recent Cancer. (hx of colo-rectal cancer, prior surgery and chemo, new rectal mass/bleeding PTA )   Cardiovascular:   Dysrhythmias (tachycardia) hyperlipidemia    Pulmonary:   COPD (home O2 prn, no recent exacerbations prior to this admission), moderate    Renal/:   Chronic renal disease: BOOM this admission, slight improvement over past 48 hours. renal calculi (hx bilat kidney stones)    Hepatic/GI:  Hepatic/GI Normal Ischemic bowel, recent ex lap with resection   Musculoskeletal:  Musculoskeletal Normal    Neurological:  Neurology  Normal    Endocrine:   Hypothyroidism (on meds)  Obesity / BMI > 30      Physical Exam  General: Lethargic    Airway:  Mallampati: III / II  Mouth Opening: Normal  TM Distance: Normal  Tongue: Normal  Neck ROM: Normal ROM  Pre-Existing Airway: Oral Endotracheal tube    Chest/Lungs:  Rhonchi    Heart:  Rate: Normal  Rhythm: Regular Rhythm  Sounds: Normal    Abdomen:  Normal, Soft, Nontender        Anesthesia Plan  Type of Anesthesia, risks & benefits discussed:    Anesthesia Type: Gen ETT  Intra-op Monitoring Plan: Standard ASA Monitors and Art Line  Post Op Pain Control Plan: multimodal analgesia and IV/PO Opioids PRN  Induction:  IV  Airway Plan: Video, Post-Induction  Informed Consent: Informed consent signed with the Patient and all parties understand the risks and agree with anesthesia plan.  All questions answered. Patient consented to blood products? Yes  ASA Score: 4 Emergent  Anesthesia Plan Notes:   Given patient's respiratory status, plan to remain intubated and transferred to ICU post operativley    Ready For Surgery From Anesthesia Perspective.     .

## 2023-08-01 NOTE — ANESTHESIA POSTPROCEDURE EVALUATION
Anesthesia Post Evaluation    Patient: Tessa Enriquez    Procedure(s) Performed: Procedure(s) (LRB):  LAPAROTOMY, EXPLORATORY (N/A)  EXCISION, SMALL INTESTINE (N/A)    Final Anesthesia Type: general      Patient location during evaluation: ICU  Patient participation: No - Unable to Participate, Sedation  Level of consciousness: sedated  Post-procedure vital signs: reviewed and stable  Pain management: adequate  Airway patency: patent      Anesthetic complications: no      Cardiovascular status: hemodynamically stable  Respiratory status: ETT and intubated  Hydration status: euvolemic  Follow-up not needed.          Vitals Value Taken Time   /60 08/01/23 1055   Temp 36.2 °C (97.2 °F) 08/01/23 1055   Pulse 115 08/01/23 1055   Resp 19 08/01/23 1055   SpO2 93 % 08/01/23 1055         Event Time   Out of Recovery 07/27/2023 20:35:00         Pain/Bautista Score: Pain Rating Prior to Med Admin: 10 (8/1/2023 10:32 AM)  Pain Rating Post Med Admin: 0 (8/1/2023  3:37 AM)

## 2023-08-01 NOTE — PROGRESS NOTES
Pt seen and examined.    Resting comfortably  Notes abd pain.  Easily fatigued    Wt Readings from Last 3 Encounters:   07/24/23 89.4 kg (197 lb)   07/03/23 89.3 kg (196 lb 13.9 oz)   06/27/23 89.7 kg (197 lb 12 oz)     Temp Readings from Last 3 Encounters:   08/01/23 97.2 °F (36.2 °C) (Oral)   07/03/23 97.5 °F (36.4 °C) (Temporal)   06/27/23 98.1 °F (36.7 °C) (Temporal)     BP Readings from Last 3 Encounters:   08/01/23 127/60   07/03/23 (!) 143/79   06/27/23 (!) 185/86     Pulse Readings from Last 3 Encounters:   08/01/23 110   07/03/23 84   06/27/23 84     AAOx3  Sinus  Soft/nt/mild dist  Ostomy pink and viable  Inc: c/ minimal drainge    Lab Results   Component Value Date    WBC 6.14 08/01/2023    HGB 7.6 (L) 08/01/2023    HCT 24.3 (L) 08/01/2023    MCV 91 08/01/2023     08/01/2023       BMP  Lab Results   Component Value Date     08/01/2023    K 3.8 08/01/2023     (H) 08/01/2023    CO2 21 (L) 08/01/2023    BUN 17 08/01/2023    CREATININE 0.8 08/01/2023    CALCIUM 6.8 (LL) 08/01/2023    ANIONGAP 4 (L) 08/01/2023    EGFRNORACEVR >60.0 08/01/2023     A/P: s/p ex lap with APR followed by ex lap extended SBR secondary to ischemic bowel  Pt with 50 cm of small bowel remaining with two separate anastomosis.  High chance of developing short gut syndrome.  Needs to be on full strength TPN starting now.  Pt at high risk for anastomotic leak given the condition of the bowel at the time of surgery.  WIll obtain ct scan with oral contrast today

## 2023-08-01 NOTE — CARE UPDATE
08/01/23 0245   Patient Assessment/Suction   Level of Consciousness (AVPU) alert   Respiratory Effort Normal;Unlabored   Expansion/Accessory Muscles/Retractions no use of accessory muscles   All Lung Fields Breath Sounds Anterior:;diminished  (slight audible wheeze)   Rhythm/Pattern, Respiratory pattern regular;unlabored   Cough Frequency no cough   PRE-TX-O2   Device (Oxygen Therapy) nasal cannula   $ Is the patient on Low Flow Oxygen? Yes   Flow (L/min) 2   SpO2 96 %   Pulse Oximetry Type Intermittent   $ Pulse Oximetry - Multiple Charge Pulse Oximetry - Multiple   Pulse 86   Resp 18   Positioning Supine   Positioning   Head of Bed (HOB) Positioning HOB elevated   Positioning/Transfer Devices pillows;in use   Aerosol Therapy   $ Aerosol Therapy Charges Aerosol Treatment   Daily Review of Necessity (SVN) completed   Respiratory Treatment Status (SVN) given   Treatment Route (SVN) mask;oxygen   Patient Position (SVN) HOB elevated   Post Treatment Assessment (SVN) breath sounds improved   Signs of Intolerance (SVN) none

## 2023-08-01 NOTE — CARE UPDATE
07/31/23 2110   Patient Assessment/Suction   Level of Consciousness (AVPU) alert   Respiratory Effort Normal;Unlabored   Expansion/Accessory Muscles/Retractions no use of accessory muscles   All Lung Fields Breath Sounds diminished;wheezes, expiratory   Rhythm/Pattern, Respiratory pattern regular;depth regular   Cough Frequency no cough   PRE-TX-O2   Device (Oxygen Therapy) nasal cannula   $ Is the patient on Low Flow Oxygen? Yes   Flow (L/min) 2   SpO2 99 %   Pulse Oximetry Type Intermittent   $ Pulse Oximetry - Multiple Charge Pulse Oximetry - Multiple   Pulse (!) 114   Resp 18   Positioning Supine   Positioning   Head of Bed (HOB) Positioning HOB at 30 degrees   Positioning/Transfer Devices pillows;applied   Aerosol Therapy   $ Aerosol Therapy Charges Aerosol Treatment   Daily Review of Necessity (SVN) completed   Respiratory Treatment Status (SVN) given   Treatment Route (SVN) mask;oxygen   Patient Position (SVN) HOB elevated   Post Treatment Assessment (SVN) increased aeration   Signs of Intolerance (SVN) none   Education   $ Education Bronchodilator;15 min

## 2023-08-02 PROBLEM — Z99.11 ON MECHANICALLY ASSISTED VENTILATION: Status: RESOLVED | Noted: 2023-01-01 | Resolved: 2023-01-01

## 2023-08-02 NOTE — SUBJECTIVE & OBJECTIVE
Interval History: patient c/o burning in legs, wants to wait on another midline; rash resolved from BLE, disagrees with picc line order     Review of Systems   Constitutional:  Positive for fatigue. Negative for chills and fever.   Respiratory:  Positive for cough and shortness of breath.    Cardiovascular: Negative.    Gastrointestinal:  Positive for abdominal pain and nausea.   Genitourinary:  Positive for difficulty urinating.   Musculoskeletal:  Positive for arthralgias, gait problem and myalgias.   Skin:  Positive for color change.   Neurological:  Positive for weakness.   Psychiatric/Behavioral:  The patient is nervous/anxious.      Objective:     Vital Signs (Most Recent):  Temp: 97.9 °F (36.6 °C) (08/02/23 0001)  Pulse: 106 (08/02/23 0001)  Resp: 20 (08/02/23 0001)  BP: (!) 116/58 (08/02/23 0001)  SpO2: 96 % (08/02/23 0001) Vital Signs (24h Range):  Temp:  [97 °F (36.1 °C)-97.9 °F (36.6 °C)] 97.9 °F (36.6 °C)  Pulse:  [] 106  Resp:  [18-20] 20  SpO2:  [93 %-97 %] 96 %  BP: (108-147)/(58-78) 116/58     Weight: 89.4 kg (197 lb)  Body mass index is 37.22 kg/m².    Intake/Output Summary (Last 24 hours) at 8/2/2023 0145  Last data filed at 8/1/2023 2323  Gross per 24 hour   Intake --   Output 2270 ml   Net -2270 ml           Physical Exam  Constitutional:       Appearance: She is obese. She is ill-appearing.   HENT:      Mouth/Throat:      Mouth: Mucous membranes are dry.      Pharynx: Oropharynx is clear. No oropharyngeal exudate.   Eyes:      Extraocular Movements: Extraocular movements intact.   Cardiovascular:      Rate and Rhythm: Regular rhythm. Tachycardia present.   Pulmonary:      Effort: Pulmonary effort is normal.      Breath sounds: Rhonchi present.   Abdominal:      General: There is distension.      Tenderness: There is abdominal tenderness.      Comments: Beny drain and ostomy intact   Musculoskeletal:      Cervical back: Neck supple. No tenderness.   Skin:     General: Skin is warm.       Capillary Refill: Capillary refill takes less than 2 seconds.      Findings: Bruising present.   Neurological:      General: No focal deficit present.      Mental Status: She is alert and oriented to person, place, and time.   Psychiatric:         Mood and Affect: Mood normal.         Behavior: Behavior normal.             Significant Labs: All pertinent labs within the past 24 hours have been reviewed.  Recent Lab Results  (Last 5 results in the past 24 hours)        08/01/23  2102   08/01/23  1557   08/01/23  1057   08/01/23  0705   08/01/23  0518        Anion Gap               BUN               Calcium               Chloride               CO2               Creatinine               eGFR               Glucose               POC Glucose 88     128   106   118       Potassium               Sodium               Vancomycin-Trough   11.4  Comment: Therapeutic Range: 10.0-20.0 ug/mL                                    Significant Imaging: I have reviewed all pertinent imaging results/findings within the past 24 hours.

## 2023-08-02 NOTE — PROGRESS NOTES
[facility] Medicine  Progress Note    Patient Name: Tessa Enriquez  MRN: [id number]  Patient Class: IP- Inpatient   Admission Date: 7/24/2023  Length of Stay: 9 days  Attending Physician: Linda Bellamy MD  Primary Care Provider: Karma Mcdermott MD        Subjective:     Principal Problem:Ischemic necrosis of small bowel        HPI:  Ms. Enriquez is a 67-years-old female who presented as a direct admission from [facility] for ICU level of care postoperatively.  Patient with a history of recurrent colorectal adenocarcinoma, status post neoadjuvant radiation and chemotherapy, followed by oncologist Dr. Frank.  On 07/24 she underwent exploratory laparotomy with extensive lysis of adhesion, proctectomy with creation of end colostomy by Dr. Wagner and she was admitted to hospital medicine postoperatively.  Postop course complicated by ongoing sinus tachycardia with anemia, status post PRBC transfusion and acute kidney injury, seen by Nephrology.  Earlier today ostomy noted to be dusky with concern for ischemia, she was taken back to the OR and underwent exploratory laparotomy with small-bowel resection, as per OR note, large area of small bowel resected with four areas of ischemia.  She was intubated for surgery and remains intubated postoperatively.  Reported history of COPD, hypothyroidism, nephrolithiasis status post bilateral ureteral stenting.  She is currently sedated on propofol, orally intubated on mechanical ventilation.  Repeat labs have been submitted.  As per ICU RN plans for possible repeat surgery.  Plan of care reviewed with nursing.  No visitors at bedside.      Overview/Hospital Course:  Patient was transferred from [facility] for ICU care postoperatively: ostomy noted to be dusky with concern for ischemia, she was taken back to the OR and underwent exploratory laparotomy with small-bowel resection, as per OR note, large area of small bowel resected with  four areas of ischemia.     She was followed by pulmnology/CC and nephrology and GS. Once extubated, she was transferred to [facility]-surg. She was on meropenem for peritonitis but only completed 5 of 7 days due to rash that developed. Allergies to PCN and flagyl.  Currently on VAnc and diflucan. Ostomy with good output. Patient refused another picc line yesterday despite needing TPN. She wants to take in oral diet and adamant an abdominal CT should be done first to see if she needs to remain strict NPO. Dr. Guevara agrees TPN is necessary and also ordered CT to eval anastamosis leak.           Interval History: patient c/o burning in legs, wants to wait on another midline; rash resolved from BLE, disagrees with picc line order     Review of Systems   Constitutional:  Positive for fatigue. Negative for chills and fever.   Respiratory:  Positive for cough and shortness of breath.    Cardiovascular: Negative.    Gastrointestinal:  Positive for abdominal pain and nausea.   Genitourinary:  Positive for difficulty urinating.   Musculoskeletal:  Positive for arthralgias, gait problem and myalgias.   Skin:  Positive for color change.   Neurological:  Positive for weakness.   Psychiatric/Behavioral:  The patient is nervous/anxious.      Objective:     Vital Signs (Most Recent):  Temp: 97.9 °F (36.6 °C) (08/02/23 0001)  Pulse: 106 (08/02/23 0001)  Resp: 20 (08/02/23 0001)  BP: (!) 116/58 (08/02/23 0001)  SpO2: 96 % (08/02/23 0001) Vital Signs (24h Range):  Temp:  [97 °F (36.1 °C)-97.9 °F (36.6 °C)] 97.9 °F (36.6 °C)  Pulse:  [] 106  Resp:  [18-20] 20  SpO2:  [93 %-97 %] 96 %  BP: (108-147)/(58-78) 116/58     Weight: 89.4 kg (197 lb)  Body mass index is 37.22 kg/m².    Intake/Output Summary (Last 24 hours) at 8/2/2023 0145  Last data filed at 8/1/2023 2323  Gross per 24 hour   Intake --   Output 2270 ml   Net -2270 ml           Physical Exam  Constitutional:       Appearance: She is obese. She is ill-appearing.   HENT:       Mouth/Throat:      Mouth: Mucous membranes are dry.      Pharynx: Oropharynx is clear. No oropharyngeal exudate.   Eyes:      Extraocular Movements: Extraocular movements intact.   Cardiovascular:      Rate and Rhythm: Regular rhythm. Tachycardia present.   Pulmonary:      Effort: Pulmonary effort is normal.      Breath sounds: Rhonchi present.   Abdominal:      General: There is distension.      Tenderness: There is abdominal tenderness.      Comments: Beny drain and ostomy intact   Musculoskeletal:      Cervical back: Neck supple. No tenderness.   Skin:     General: Skin is warm.      Capillary Refill: Capillary refill takes less than 2 seconds.      Findings: Bruising present.   Neurological:      General: No focal deficit present.      Mental Status: She is alert and oriented to person, place, and time.   Psychiatric:         Mood and Affect: Mood normal.         Behavior: Behavior normal.             Significant Labs: All pertinent labs within the past 24 hours have been reviewed.  Recent Lab Results  (Last 5 results in the past 24 hours)        08/01/23  2102   08/01/23  1557   08/01/23  1057   08/01/23  0705   08/01/23  0518        Anion Gap               BUN               Calcium               Chloride               CO2               Creatinine               eGFR               Glucose               POC Glucose 88     128   106   118       Potassium               Sodium               Vancomycin-Trough   11.4  Comment: Therapeutic Range: 10.0-20.0 ug/mL                                    Significant Imaging: I have reviewed all pertinent imaging results/findings within the past 24 hours.      Assessment/Plan:      * Ischemic necrosis of small bowel  OR note 7/27 with extensive area of small bowel necrosis   Possible etiology for ongoing tachycardia/sepsis   IV fluid hydration  Broaden antibiotics to meropenem   Lactic acid ordered and pending   Serial abdominal examination   Appreciate general surgery  input      Hypocalcemia  Corrected calcium remains low.  Ordered IV replacement and trending.    Sinus tachycardia  Multifactorial in the setting of acute medical conditions including dehydration, pain, postop, anemia  Telemetry monitoring   Replace thyroid as outlined, add on magnesium level      Hyponatremia  Monitor with IV fluid hydration     Malnutrition of moderate degree  Albumin 1.6 in the setting of extensive bowel surgery   Currently NPO, if prolonged consider parenteral nutrition    Severe sepsis  REsolved   Step down ICU to med surg  Extensive area of small bowel necrosis -s/p surgical repair   Escalate antibiotics to meropenem, vanc and diflucan and following clinically  Resp Cx: + MRSA - vancomycin/contact isolation  Urine and blood culture negative   Lactic acidtrending down  Monitor blood pressure, aim for map greater than 65    BOOM (acute kidney injury)  Postop course complicated by acute kidney injury, creatinine today 1.8, Follow labs closely   History of nephrolithiasis status post bilateral ureteral stenting   Continue IV fluid hydration   Pure wick   Renally dosing all medications and avoiding nephrotoxin drugs   Appreciate nephrology input    Bilateral kidney stones  Seen by Dr. Mijares this admission, followed outpatient by Dr. Kwon   Status post bilateral stent placement      Severe obesity (BMI 35.0-39.9) with comorbidity  Body mass index is 37.22 kg/m². Morbid obesity complicates all aspects of disease management from diagnostic modalities to treatment. Weight loss encouraged and health benefits explained to patient.         FENG (iron deficiency anemia)  Worsening postoperatively, status post PRBC transfusion   Repeat anemia labs including B12, folic acid, iron studies  Transfuse as needed      Malignant neoplasm of colon  History of recurrent colorectal adenocarcinoma  See small-bowel ischemia  Did receive adjuvant chemoradiation, followed by Dr. Frank    Hypothyroidism with abnormal  TFTs  Recent TSH elevated at 23, as per chart review elevated 2 years previous, unclear if compliance   Given current NPO status with bowel surgery changed to IV levothyroxine    COPD (chronic obstructive pulmonary disease)  No evidence of acute exacerbation   P.r.n. ABG and chest x-ray  Scheduled breathing treatments        VTE Risk Mitigation (From admission, onward)         Ordered     enoxaparin injection 40 mg  Every 24 hours         07/28/23 1849     Place sequential compression device  Until discontinued         07/28/23 0724     IP VTE HIGH RISK PATIENT  Once         07/24/23 2015                Discharge Planning   DERICK: 8/3/2023     Code Status: Full Code   Is the patient medically ready for discharge?:     Reason for patient still in hospital (select all that apply): Patient unstable  Discharge Plan A: Rehab   Discharge Delays: None known at this time              Linda Bellamy MD  Department of Hospital Medicine   [facility]

## 2023-08-02 NOTE — ASSESSMENT & PLAN NOTE
Postop course complicated by acute kidney injury, creatinine today 1.8, Follow labs closely   History of nephrolithiasis status post bilateral ureteral stenting   Continue IV fluid hydration   Pure wick   Renally dosing all medications and avoiding nephrotoxin drugs   Appreciate nephrology input

## 2023-08-02 NOTE — ASSESSMENT & PLAN NOTE
REsolved   Step down ICU to med surg  Extensive area of small bowel necrosis -s/p surgical repair   Escalate antibiotics to meropenem, vanc and diflucan and following clinically  Resp Cx: + MRSA - vancomycin/contact isolation  Urine and blood culture negative   Lactic acidtrending down  Monitor blood pressure, aim for map greater than 65

## 2023-08-02 NOTE — PLAN OF CARE
Per Level of Care at Discharge CM, recommendation is : Presently, this patient meets InterQual® criteria for Rehab Preadmission, pending a Preadmission assessment completed by a licensed or certified clinician and rehab physician agreement with findings.

## 2023-08-02 NOTE — PT/OT/SLP PROGRESS
"Physical Therapy Treatment    Patient Name:  Tessa Enriquez   MRN:  [id number]    Recommendations:     Discharge Recommendations: rehabilitation facility  Discharge Equipment Recommendations: walker, rolling  Barriers to discharge:  pain, ostomy site leakage, assist x2 for bed mobility, increased caregiver burden    Assessment:     Tessa Enriquez is a 67 y.o. female admitted with a medical diagnosis of Ischemic necrosis of small bowel.  She presents with the following impairments/functional limitations: weakness, impaired endurance, impaired self care skills, impaired functional mobility, gait instability, decreased safety awareness, pain, impaired cardiopulmonary response to activity.    Pt presents with increased leakage from ostomy site, pain, drinking contrast drink, and anticipating return to OR.     EOB and OOB activity deferred. Pt eager to participate in supine BLE exercises, which were performed with assist as needed. Instructed to continue frequent glut sets and ankle pumps.     Nurse made aware of need for purewick and brief change.     Rehab Prognosis: Good and Fair; patient would benefit from acute skilled PT services to address these deficits and reach maximum level of function.    Recent Surgery: Procedure(s) (LRB):  LAPAROTOMY, EXPLORATORY (N/A)  EXCISION, SMALL INTESTINE  CREATION, ILEOSTOMY 5 Days Post-Op    Plan:     During this hospitalization, patient to be seen daily to address the identified rehab impairments via gait training, therapeutic activities, therapeutic exercises and progress toward the following goals:    Plan of Care Expires:  08/30/23    Subjective     Chief Complaint: pain  Patient/Family Comments/goals: "at least keep my legs strong"  Pain/Comfort:  Pain Rating 1: other (see comments) (unrated)  Location - Orientation 1: anterior  Location 1: abdomen  Pain Addressed 1: Reposition, Distraction, Cessation of Activity, Nurse notified  Pain Rating Post-Intervention 1: " other (see comments) (unrated)      Objective:     Communicated with nurse prior to session.  Patient found HOB elevated with peripheral IV, telemetry, PureWick, colostomy, JUANITA drain, oxygen, bed alarm, NG tube upon PT entry to room.     General Precautions: Standard, fall, contact  Orthopedic Precautions: N/A  Braces: N/A  Respiratory Status: Room air     Functional Mobility:  Bed Mobility:         AM-PAC 6 CLICK MOBILITY          Treatment & Education:  -Supine BLE therex with VC,TC, and assist as needed: AP, Heel slides (max tolerance of 8 reps bilaterally due to increasing abdominal discomfort), SAQ, hip abduction, glut sets   -Pt educ: benefits of participation with PT, proper form for TE, positioning/turning/pressure relief to reduce skin disruption, call light, fall prevention    Patient left HOB elevated with all lines intact, call button in reach, bed alarm on, and nurse notified..    GOALS:   Multidisciplinary Problems       Physical Therapy Goals          Problem: Physical Therapy    Goal Priority Disciplines Outcome Goal Variances Interventions   Physical Therapy Goal     PT, PT/OT Ongoing, Progressing     Description: Goals to be met by: 23     Patient will increase functional independence with mobility by performin. Supine to sit with Stand-by Assistance  2. Sit to supine with Stand-by Assistance  3. Sit to stand transfer with Stand-by Assistance  4. Bed to chair transfer with Stand-by Assistance using Rolling Walker  5. Gait  x 250 feet with Stand-by Assistance using Rolling Walker.                          Time Tracking:     PT Received On: 23  PT Start Time: 932     PT Stop Time: 948  PT Total Time (min): 16 min     Billable Minutes: Therapeutic Exercise 16    Treatment Type: Treatment  PT/PTA: PTA     Number of PTA visits since last PT visit: 2     2023

## 2023-08-02 NOTE — PT/OT/SLP PROGRESS
Occupational Therapy      Patient Name:  Tessa Enriquez   MRN:  [id number]    Patient not seen today secondary to pt being unavailable on multiple attempts (getting line placed, x-ray, wound care).      8/2/2023

## 2023-08-02 NOTE — ASSESSMENT & PLAN NOTE
Extubated   Continue oral intubation with mechanical ventilation  P.r.n. ABG and chest x-ray, chest x-ray in a.m.  Famotidine for GI prophylaxis    Pulmonary consulted for management

## 2023-08-02 NOTE — CONSULTS
Patient found with ostomy leaking, gown soaked, Incision with staples intact several areas of drainage along incision line, yellow thin drainage, ostomy rosa red peristomal skin red angry, denuded skin fold, cleaned and applied dressing to mid line, changed pouch using skin prep 1 piece pouch, triad to left leg skin fold.  Bilateral buttock without redness or breakdown.  Mepilex for protection.  Changed bed, sent photo to Dr Wagner via secure chat

## 2023-08-02 NOTE — HOSPITAL COURSE
Patient was transferred from [facility] for ICU care postoperatively after extensive surgery for ischemic bowel with large areas of small bowel resections done. She has resultant short gut syndrome. Also with ileostomy. She arrived intubated. She was followed by pulmonology/CC, ID, nephrology and GS. Once extubated, she was transferred to [facility]-surg. She was on meropenem for peritonitis but only completed 5 of 7 days due to rash that developed. Allergies to PCN and flagyl. Respiratory culture grew MRSA considered to have pneumonia from this contributing to her sepsis. S/p merrem and vancomycin. Surgical site wound culture grew acinetobacter. Continued on course of eravacycline and diflucan. NGT utilized and removed 8/7. TPN utilized. Refractory electrolyte replacement given. Required intermittent packed red blood cell transfusions without active bleeding noted. 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 Wound care team followed as well. Started on octreotide in attempt to decrease fistula output. Palliative consulted and continued with full code and full treatment pursuit.  is working on eventual LTAC placement once cleared for this from surgery.  Patient is continuing to have significant output from abdominal wounds and cutaneous fistula.  Dr. Wagner awaiting approval for Gattex.  Once patient is cleared by Dr. Wagner, patient to go to long-term acute care facility for further management and care. Unfortunately patient had sudden severe abdominal bleeding and had code blue called with eventual expiration on 8/19/23 at 0634. I did not pronounce the patient.

## 2023-08-02 NOTE — PROGRESS NOTES
08/02/23-Admitted to the hospital on 07/24/23. Will continue to follow for discharge plan.

## 2023-08-02 NOTE — PLAN OF CARE
CM continues to follow for discharge needs. Surgery following. Plan to DC to LTAC vs rehab once medically clear.          08/02/23 1503   Discharge Assessment   Assessment Type Discharge Planning Reassessment   Discharge Plan A Long-term acute care facility (LTAC)   Discharge Plan B Rehab

## 2023-08-02 NOTE — PROGRESS NOTES
Pt seen and examined.   Resting comfortably in bed  Deneis n//v  minimal output.   Having drainage from abd incision      Wt Readings from Last 3 Encounters:   07/24/23 89.4 kg (197 lb)   07/03/23 89.3 kg (196 lb 13.9 oz)   06/27/23 89.7 kg (197 lb 12 oz)     Temp Readings from Last 3 Encounters:   08/02/23 98.5 °F (36.9 °C) (Oral)   07/03/23 97.5 °F (36.4 °C) (Temporal)   06/27/23 98.1 °F (36.7 °C) (Temporal)     BP Readings from Last 3 Encounters:   08/02/23 (!) 122/58   07/03/23 (!) 143/79   06/27/23 (!) 185/86     Pulse Readings from Last 3 Encounters:   08/02/23 110   07/03/23 84   06/27/23 84     AAOx3  Sinus  Soft/nd/nt  Ostomy viable  Inc: staples in place.  Bilious drainage centered about the umbilicus    Lab Results   Component Value Date    WBC 6.14 08/01/2023    HGB 7.6 (L) 08/01/2023    HCT 24.3 (L) 08/01/2023    MCV 91 08/01/2023     08/01/2023       BMP  Lab Results   Component Value Date     08/01/2023    K 3.8 08/01/2023     (H) 08/01/2023    CO2 21 (L) 08/01/2023    BUN 17 08/01/2023    CREATININE 0.8 08/01/2023    CALCIUM 6.8 (LL) 08/01/2023    ANIONGAP 4 (L) 08/01/2023    EGFRNORACEVR >60.0 08/01/2023     A/P: s/p ex lap with APR, complicated by wide spread interspersed ischemic sections of bowel requiring SBR with ultimate reconnection leaving pt with approximately 50 cm of small bowel and two anastomosis.    Clinically pt doing well with no signs of sepsis.  Began having bilious drainage yesterday consistent with EC fistula.  Very difficult situation.  Will need to consider ostomy bag to address mgmt of fistula output.  Will get ct scan with oral contrast to ensure no undrained fluid collections.  Very limited options from surgical perspective right now.  NEed to optimize pt nutritional status.  Needs full caloric intake via TPN with lipids.  Would keep strict NPO ( can likely remove NG as minimal output).  Would consider Gattex to help with small bowel absorption.  This may  be of benefit in limiting output from fistula.    Will cont to follow.    Dr Amos to see until Monday

## 2023-08-02 NOTE — PROCEDURES
[facility]  Date/Time: 8/2/2023 1:33 PM  Location procedure was performed: [address] SURGICAL UNIT  Performed by: Agusto Lopes RN  Supervising provider: Armin Ma RN  Assisting provider: Linda Bellamy MD  Pre-operative Diagnosis: Bowel obstruction  Consent Done: Yes  Time out: Immediately prior to procedure a time out was called to verify the correct patient, procedure, equipment, support staff and site/side marked as required  Indications: med administration  Anesthesia: local infiltration  Local anesthetic: lidocaine 1% without epinephrine  Anesthetic Total (mL): 5  Preparation: skin prepped with Betadine and skin prepped with ChloraPrep  Skin prep agent dried: skin prep agent completely dried prior to procedure  Sterile barriers: all five maximum sterile barriers used - cap, mask, sterile gown, sterile gloves, and large sterile sheet  Hand hygiene: hand hygiene performed prior to central venous catheter insertion  Location details: left basilic  Catheter type: double lumen  Catheter size: 5 Fr  Catheter Length: 43cm    Ultrasound guidance: yes  Vessel Caliber: medium and patent, compressibility normal  Vascular Doppler: not done  Needle advanced into vessel with real time Ultrasound guidance.  Guidewire confirmed in vessel.  Sterile sheath used.  no esophageal manometryNumber of attempts: 1  Post-procedure: blood return through all ports, chlorhexidine patch and sterile dressing applied  Technical procedures used: seldinger technique  Estimated blood loss (mL): 2  Specimens: No  Implants: No  Assessment: placement verified by x-ray, no pneumothorax on x-ray and successful placement  Complications: none

## 2023-08-03 NOTE — RESPIRATORY THERAPY
08/03/23 0727   Patient Assessment/Suction   Level of Consciousness (AVPU) alert   Respiratory Effort Normal;Unlabored   Expansion/Accessory Muscles/Retractions expansion symmetric;no retractions;no use of accessory muscles   All Lung Fields Breath Sounds Anterior:;Lateral:;diminished   Rhythm/Pattern, Respiratory depth regular;pattern regular;unlabored;no shortness of breath reported   Cough Frequency no cough   Skin Integrity   $ Wound Care Tech Time 15 min   Area Observed Left;Right;Behind ear;Cheek;Upper lip   Skin Appearance without discoloration   PRE-TX-O2   Device (Oxygen Therapy) nasal cannula   $ Is the patient on Low Flow Oxygen? Yes   Flow (L/min) 2  (weaned from 3L)   SpO2 98 %   Pulse Oximetry Type Intermittent   $ Pulse Oximetry - Multiple Charge Pulse Oximetry - Multiple   Pulse 106   Resp 16   Positioning HOB elevated 30 degrees   Aerosol Therapy   $ Aerosol Therapy Charges Aerosol Treatment   Respiratory Treatment Status (SVN) given   Treatment Route (SVN) mask;oxygen   Patient Position (SVN) HOB elevated;semi-Sanderson's   Post Treatment Assessment (SVN) increased aeration   Signs of Intolerance (SVN) none   Breath Sounds Post-Respiratory Treatment   Throughout All Fields Post-Treatment aeration increased   Post-treatment Heart Rate (beats/min) 107   Post-treatment Resp Rate (breaths/min) 18   Respiratory Evaluation   $ Care Plan Tech Time 15 min   $ Eval/Re-eval Charges Re-evaluation   Evaluation For Re-Eval 3 day   Home Oxygen   Has Home Oxygen? No   Oxygen Care Plan   Oxygen Care Plan Per Protocol   SPO2 Goal (%) MD order   Rationale SpO2 is <MD Goal   Other Oxygen none weaning O2 as tolerated

## 2023-08-03 NOTE — PLAN OF CARE
Problem: Parenteral Nutrition  Goal: Effective Intravenous Nutrition Therapy Delivery  Intervention: Optimize Intravenous Nutrition Delivery  Flowsheets (Taken 8/3/2023 1248)  Nutrition Support Management: parenteral nutrition initiated     [employer] running. Pt remains NPO and TPN ordered and scheduled to hang at 2200 to better meet nutritional needs.

## 2023-08-03 NOTE — PT/OT/SLP PROGRESS
Physical Therapy Treatment    Patient Name:  Tessa Enriquez   MRN:  [id number]    Recommendations:     Discharge Recommendations: rehabilitation facility  Discharge Equipment Recommendations: to be determined by next level of care  Barriers to discharge:  Significant decline in functional mobility, post surgical needs    Assessment:     Tessa Enriquez is a 67 y.o. female admitted with a medical diagnosis of Ischemic necrosis of small bowel.  She presents with the following impairments/functional limitations: weakness, impaired endurance, impaired self care skills, impaired functional mobility, gait instability, pain, impaired cardiopulmonary response to activity .    Pt presented with HOB  elevated.  Pt voiced concern of  of increased pain with attempt to t/f OOB. HOB was elevated to highest position and pt advanced B legs off EOB gently with Mod A x2. She scooted closer to EOB with Max A and cueing for shifting of her hips. She t/f'ed sit to stand with Mod A x2 with pt complaining of leakage.  Pt returned to supine with Max A x2. Nurse was called to attend to pt. Pt minimally rolled to R and L  with Max A  for removal of soiled bedding. Therapist assisted nurse with cleaning of pt  and reapplication of dressing.     Rehab Prognosis: Fair; patient would benefit from acute skilled PT services to address these deficits and reach maximum level of function.    Recent Surgery: Procedure(s) (LRB):  LAPAROTOMY, EXPLORATORY (N/A)  EXCISION, SMALL INTESTINE  CREATION, ILEOSTOMY 6 Days Post-Op    Plan:     During this hospitalization, patient to be seen daily to address the identified rehab impairments via gait training, therapeutic activities, therapeutic exercises and progress toward the following goals:    Plan of Care Expires:  08/30/23    Subjective     Chief Complaint: Abdominal leakage  Patient/Family Comments/goals: Return to PLOF  Pain/Comfort:  Pain Rating 1:  (Did not rate)  Location 1: abdomen  Pain  Addressed 1: Pre-medicate for activity, Reposition, Distraction      Objective:     Communicated with nurse prior to session.  Patient found HOB elevated with peripheral IV, telemetry, PureWick, JUANITA drain, bed alarm upon PT entry to room.     General Precautions: Standard, fall, contact  Orthopedic Precautions: N/A  Braces: N/A  Respiratory Status: Room air     Functional Mobility:  Bed Mobility:     Rolling Left:  maximal assistance  Rolling Right: maximal assistance  Supine to Sit: moderate assistance and of 2 persons  Sit to Supine: maximal assistance and of 2 persons  Transfers:     Sit to Stand:  moderate assistance and of 2 persons with rolling walker  Pt took 4 side steps with RW and Mod A  with report of abdominal leakage  Balance: unsupported sitting, Mod A for standing with RW               AM-PAC 6 CLICK MOBILITY  Turning over in bed (including adjusting bedclothes, sheets and blankets)?: 1  Sitting down on and standing up from a chair with arms (e.g., wheelchair, bedside commode, etc.): 2  Moving from lying on back to sitting on the side of the bed?: 2  Moving to and from a bed to a chair (including a wheelchair)?: 2  Need to walk in hospital room?: 2  Climbing 3-5 steps with a railing?: 1  Basic Mobility Total Score: 10       Treatment & Education:  Bed mobility t/f, training, 1x10 reps of B AP's QS's.  Educated on benefits of increased time OOB, encouraging  increased participation in  bed mobility training    Patient left HOB elevated with all lines intact, call button in reach, and bed alarm on..    GOALS:   Multidisciplinary Problems       Physical Therapy Goals          Problem: Physical Therapy    Goal Priority Disciplines Outcome Goal Variances Interventions   Physical Therapy Goal     PT, PT/OT Ongoing, Progressing     Description: Goals to be met by: 23     Patient will increase functional independence with mobility by performin. Supine to sit with Stand-by Assistance  2. Sit to  supine with Stand-by Assistance  3. Sit to stand transfer with Stand-by Assistance  4. Bed to chair transfer with Stand-by Assistance using Rolling Walker  5. Gait  x 250 feet with Stand-by Assistance using Rolling Walker.                          Time Tracking:     PT Received On: 08/03/23  PT Start Time: 0930     PT Stop Time: 1045  PT Total Time (min): 75 min     Billable Minutes: Therapeutic Activity 75 minutes    Treatment Type: Treatment  PT/PTA: PT     Number of PTA visits since last PT visit: 2     08/03/2023

## 2023-08-03 NOTE — PROGRESS NOTES
"[facility]  Adult Nutrition   Progress Note (Nutrition Support Management)    SUMMARY     Recommendations  Recommendation/Intervention:   1. Start TPN to better meet nutritional needs. 2. Advance to clear liquid diet as medically feasible.  3. RD to monitor TPN, weight and labs.  Goals: TPN to meet nutritional needs.  Nutrition Goal Status: progressing towards goal    Dietitian Rounds Brief  Pt is S/P bowel resection. She is still NPO will provide Clinimix @ 100 ml/hr. TPN ordered and will hang at 2200 to better meet nutritional needs.     PARENTERAL NUTRITION PROGRESS NOTE:      Parenteral Nutrition Day # 1 will hang at 2200.  IV Access: PICC  Diet/Tube Feeding: NPO         Admixture Type: 2:1  Infusion Rate and Frequency: 75       PN Composition:   95 grams Amino Acid, 140 grams Dextrose, and 50 grams Lipid.    Today's TPN provides 1356 kcal.    *Dextrose is started at less than full dextrose goal to reduce risk for Refeeding Syndrome. Dextrose content will be advanced as appropriate over the next few days.    Please see order for electrolytes and additives content and adjustments.   *The Nutrition Support Team will continue to monitor electrolytes daily and adjust parenteral nutrition as warranted.     Diet order:   Current Diet Order: NPO                 Evaluation of Received Nutrient/Fluid Intake  Energy Calories Required: not meeting needs  Protein Required: not meeting needs  Fluid Required: not meeting needs  Comments:     % Intake of Estimated Energy Needs: 25 - 50 %  % Meal Intake: 25 - 50 %      Intake/Output Summary (Last 24 hours) at 8/3/2023 1232  Last data filed at 8/3/2023 1027  Gross per 24 hour   Intake 400 ml   Output 2005 ml   Net -1605 ml        Anthropometrics  Temp: 98 °F (36.7 °C)  Height Method: Stated  Height: 5' 1" (154.9 cm)  Height (inches): 61 in  Weight Method: Standard Scale  Weight: 89.4 kg (197 lb)  Weight (lb): 197 lb  Ideal Body Weight (IBW), Female: 105 lb  % " Ideal Body Weight, Female (lb): 187.62 %  BMI (Calculated): 37.2  BMI Grade: 35 - 39.9 - obesity - grade II       Estimated/Assessed Needs  Weight Used For Calorie Calculations: 89.4 kg (197 lb 1.5 oz)  Energy Calorie Requirements (kcal): 6141-2189 kcal (20-25 kcal/ kg bw)  Energy Need Method: Kcal/kg  Protein Requirements: 72-96 (1.5-2.0 g/ 48 kg IBW)  Weight Used For Protein Calculations: 48 kg (105 lb 13.1 oz)     Estimated Fluid Requirement Method: RDA Method  RDA Method (mL): 1788       Reason for Assessment  Reason For Assessment: RD follow-up  Relevant Medical History: COPD, hypothyroidism, rectosigmoid cancer, s/p small bowel resection    Nutrition/Diet History  Spiritual, Cultural Beliefs, Christianity Practices, Values that Affect Care: no  Food Allergies: NKFA  Factors Affecting Nutritional Intake: NPO, on mechanical ventilation    Nutrition Risk Screen  Nutrition Risk Screen: no indicators present     MST Score: 0  Have you recently lost weight without trying?: No  Weight loss score: 0  Have you been eating poorly because of a decreased appetite?: No  Appetite score: 0       Weight History:  Wt Readings from Last 10 Encounters:   07/24/23 89.4 kg (197 lb)   07/03/23 89.3 kg (196 lb 13.9 oz)   06/27/23 89.7 kg (197 lb 12 oz)   06/20/23 88.5 kg (195 lb 1.7 oz)   06/14/23 88.5 kg (195 lb)   06/12/23 88.5 kg (195 lb)   06/06/23 87.4 kg (192 lb 10.9 oz)   05/31/23 81.6 kg (180 lb)   05/26/23 85.7 kg (189 lb)   05/24/23 86 kg (189 lb 9.5 oz)        Lab/Procedures/Meds: Pertinent Labs/Meds Reviewed    Medications:Pertinent Medications Reviewed  Scheduled Meds:   albuterol-ipratropium  3 mL Nebulization Q6H WAKE    budesonide  0.5 mg Nebulization Q12H    chlorhexidine  15 mL Mouth/Throat BID    enoxparin  40 mg Subcutaneous Q24H (prophylaxis, 1700)    fat emulsion 20%  250 mL Intravenous Daily    fluconazole (DIFLUCAN) IV (PEDS and ADULTS)  200 mg Intravenous Q24H    levothyroxine  75 mcg Intravenous Before  breakfast    pantoprazole  40 mg Intravenous Daily    vancomycin (VANCOCIN) IV (PEDS and ADULTS)  1,750 mg Intravenous Q24H     Continuous Infusions:   Amino acid 4.25% - dextrose 5% (CLINIMIX) solution 100 mL/hr at 08/03/23 1220    TPN ADULT CENTRAL LINE CUSTOM       PRN Meds:.0.9%  NaCl infusion (for blood administration), acetaminophen, calcium gluconate IVPB, calcium gluconate IVPB, calcium gluconate IVPB, dextrose 50%, dextrose 50%, diphenhydrAMINE, glucagon (human recombinant), glucose, glucose, hydrALAZINE, hydrALAZINE, HYDROmorphone, magnesium sulfate IVPB, magnesium sulfate IVPB, ondansetron, potassium chloride **AND** potassium chloride **AND** potassium chloride, sodium phosphate 15 mmol in dextrose 5 % (D5W) 250 mL IVPB, sodium phosphate 20.01 mmol in dextrose 5 % (D5W) 250 mL IVPB, sodium phosphate 30 mmol in dextrose 5 % (D5W) 250 mL IVPB, traZODone, Pharmacy to dose Vancomycin consult **AND** vancomycin - pharmacy to dose    Labs: Pertinent Labs Reviewed  Clinical Chemistry:  Recent Labs   Lab 07/27/23  2104 07/28/23  0359 07/29/23  0421 07/29/23  1531 07/30/23  0346 07/31/23  1644 08/01/23  0506   *   < > 134*  --  135* 136 138   K 3.7   < > 3.5   < > 3.8 4.1 3.8      < > 110  --  109 107 113*   CO2 19*   < > 19*  --  19* 20* 21*   *   < > 189*  --  113* 109 123*   BUN 29*   < > 21  --  15 14 17   CREATININE 1.2   < > 1.1  --  0.9 0.8 0.8   CALCIUM 5.6*   < > 7.2*  --  7.2* 7.0* 6.8*   PROT 3.7*   < > 4.1*  --  4.6* 4.8*  --    ALBUMIN 1.6*   < > 1.6*  --  1.6* 1.6*  --    BILITOT 1.3*   < > 1.1*  --  0.9 0.7  --    ALKPHOS 50*   < > 39*  --  52* 55  --    AST 43*   < > 25  --  25 23  --    ALT 30   < > 20  --  21 20  --    ANIONGAP 8   < > 5*  --  7* 9 4*   MG 1.9   < > 2.0  --   --   --   --    PHOS 4.4  --   --   --   --  2.2*  --     < > = values in this interval not displayed.     CBC:   Recent Labs   Lab 08/01/23  0505   WBC 6.14   RBC 2.67*   HGB 7.6*   HCT 24.3*     "  MCV 91   MCH 28.5   MCHC 31.3*     Lipid Panel:  No results for input(s): "CHOL", "HDL", "LDLCALC", "TRIG", "CHOLHDL" in the last 168 hours.  Cardiac Profile:  Recent Labs   Lab 07/27/23  2104   BNP 25     Inflammatory Labs:  No results for input(s): "CRP" in the last 168 hours.  Diabetes:  No results for input(s): "HGBA1C", "POCTGLUCOSE" in the last 168 hours.  Thyroid & Parathyroid:  No results for input(s): "TSH", "FREET4", "G9JKGEM", "U0JDLQH", "THYROIDAB" in the last 168 hours.    Monitor and Evaluation  Food and Nutrient Intake: energy intake, food and beverage intake, enteral nutrition intake  Food and Nutrient Adminstration: diet order  Anthropometric Measurements: height/length, weight, weight change  Nutrition-Focused Physical Findings: overall appearance     Nutrition Risk  Level of Risk/Frequency of Follow-up: high     Nutrition Follow-Up  RD Follow-up?: Yes      Nancy Johnson, MILLIE 08/03/2023 12:32 PM      "

## 2023-08-03 NOTE — RESPIRATORY THERAPY
08/03/23 1338   Patient Assessment/Suction   Level of Consciousness (AVPU) alert   Respiratory Effort Normal;Unlabored   Expansion/Accessory Muscles/Retractions expansion symmetric;no retractions;no use of accessory muscles   All Lung Fields Breath Sounds Anterior:;Lateral:;diminished   Rhythm/Pattern, Respiratory depth regular;pattern regular;unlabored;no shortness of breath reported   Cough Frequency no cough   PRE-TX-O2   Device (Oxygen Therapy) room air  (turned off at wall)   SpO2 (S)  98 %  (NC 2L weaned to room air)   Pulse Oximetry Type Intermittent   Pulse 108   Resp 18   Positioning HOB elevated 30 degrees   Aerosol Therapy   $ Aerosol Therapy Charges Aerosol Treatment   Respiratory Treatment Status (SVN) given   Treatment Route (SVN) mask   Patient Position (SVN) HOB elevated;semi-Sanderson's   Post Treatment Assessment (SVN) increased aeration   Signs of Intolerance (SVN) none   Breath Sounds Post-Respiratory Treatment   Post-treatment Heart Rate (beats/min) 110   Post-treatment Resp Rate (breaths/min) 18

## 2023-08-03 NOTE — PROGRESS NOTES
[facility] Medicine  Progress Note    Patient Name: Tessa Enriquez  MRN: [id number]  Patient Class: IP- Inpatient   Admission Date: 7/24/2023  Length of Stay: 10 days  Attending Physician: Linda Bellamy MD  Primary Care Provider: Karma Mcdermott MD        Subjective:     Principal Problem:Ischemic necrosis of small bowel        HPI:  Ms. Enriquez is a 67-years-old female who presented as a direct admission from [facility] for ICU level of care postoperatively.  Patient with a history of recurrent colorectal adenocarcinoma, status post neoadjuvant radiation and chemotherapy, followed by oncologist Dr. Frank.  On 07/24 she underwent exploratory laparotomy with extensive lysis of adhesion, proctectomy with creation of end colostomy by Dr. Wagner and she was admitted to hospital medicine postoperatively.  Postop course complicated by ongoing sinus tachycardia with anemia, status post PRBC transfusion and acute kidney injury, seen by Nephrology.  Earlier today ostomy noted to be dusky with concern for ischemia, she was taken back to the OR and underwent exploratory laparotomy with small-bowel resection, as per OR note, large area of small bowel resected with four areas of ischemia.  She was intubated for surgery and remains intubated postoperatively.  Reported history of COPD, hypothyroidism, nephrolithiasis status post bilateral ureteral stenting.  She is currently sedated on propofol, orally intubated on mechanical ventilation.  Repeat labs have been submitted.  As per ICU RN plans for possible repeat surgery.  Plan of care reviewed with nursing.  No visitors at bedside.      Overview/Hospital Course:  Patient was transferred from [facility] for ICU care postoperatively: ostomy noted to be dusky with concern for ischemia, she was taken back to the OR and underwent exploratory laparotomy with small-bowel resection, as per OR note, large area of small bowel resected with  four areas of ischemia.     She was followed by pulmnology/CC and nephrology and GS. Once extubated, she was transferred to [facility]-surg. She was on meropenem for peritonitis but only completed 5 of 7 days due to rash that developed. Allergies to PCN and flagyl.  Currently on VAnc and diflucan. Ostomy with good output. Patient refused another picc line yesterday despite needing TPN. She wants to take in oral diet and adamant an abdominal CT should be done first to see if she needs to remain strict NPO. Dr. Guevara agrees TPN is necessary and also ordered CT to eval anastamosis leak.   8/2/23  Picc line placement.           Interval History: picc line placement today- tolerated well; abdominal CT completed    Review of Systems   Constitutional:  Positive for fatigue. Negative for chills and fever.   Respiratory:  Positive for cough and shortness of breath.    Cardiovascular: Negative.    Gastrointestinal:  Positive for abdominal pain and nausea.   Genitourinary:  Positive for difficulty urinating.   Musculoskeletal:  Positive for arthralgias, gait problem and myalgias.   Skin:  Positive for color change.   Neurological:  Positive for weakness.   Psychiatric/Behavioral:  The patient is nervous/anxious.      Objective:     Vital Signs (Most Recent):  Temp: 98 °F (36.7 °C) (08/03/23 0720)  Pulse: 106 (08/03/23 0731)  Resp: 16 (08/03/23 0731)  BP: 134/61 (08/03/23 0720)  SpO2: 98 % (08/03/23 0731) Vital Signs (24h Range):  Temp:  [98 °F (36.7 °C)-99 °F (37.2 °C)] 98 °F (36.7 °C)  Pulse:  [] 106  Resp:  [16-18] 16  SpO2:  [96 %-99 %] 98 %  BP: (106-134)/(59-65) 134/61     Weight: 89.4 kg (197 lb)  Body mass index is 37.22 kg/m².    Intake/Output Summary (Last 24 hours) at 8/3/2023 1033  Last data filed at 8/3/2023 1027  Gross per 24 hour   Intake 400 ml   Output 2005 ml   Net -1605 ml           Physical Exam  Constitutional:       Appearance: She is obese. She is ill-appearing.   HENT:      Mouth/Throat:      Mouth:  Mucous membranes are dry.      Pharynx: Oropharynx is clear. No oropharyngeal exudate.   Eyes:      Extraocular Movements: Extraocular movements intact.   Cardiovascular:      Rate and Rhythm: Regular rhythm. Tachycardia present.   Pulmonary:      Effort: Pulmonary effort is normal.      Breath sounds: Rhonchi present.   Abdominal:      General: There is distension.      Tenderness: There is abdominal tenderness.      Comments: Beny drain and ostomy intact   Musculoskeletal:      Cervical back: Neck supple. No tenderness.   Skin:     General: Skin is warm.      Capillary Refill: Capillary refill takes less than 2 seconds.      Findings: Bruising present.   Neurological:      General: No focal deficit present.      Mental Status: She is alert and oriented to person, place, and time.   Psychiatric:         Mood and Affect: Mood normal.         Behavior: Behavior normal.             Significant Labs: All pertinent labs within the past 24 hours have been reviewed.  Recent Lab Results  (Last 5 results in the past 24 hours)        08/03/23  0727   08/03/23  0607   08/03/23  0001   08/02/23  2021 08/02/23  1125        POC Glucose 95   103   71   96   74                              Significant Imaging: I have reviewed all pertinent imaging results/findings within the past 24 hours.  IMPRESSION: Abdominal CT  1. Colonic resection with left lower abdominal quadrant colostomy, punctate foci of intra-abdominal free air and a small volume of ascites, likely related to recent postoperative state.     2. Mildly prominent gas and fluid-filled loops of small bowel containing contrast suggestive of ileus and less likely partial low-grade mechanical bowel obstruction. Consider radiographic follow-up.     3. Airspace opacity in the left greater than right posterior dependent lower lobe with air bronchograms suggestive of atelectasis and aspiration/pneumonia, with a small left and trace right pleural effusion.     4. Moderate diffuse  body wall edema (anasarca).     5. Full bladder, with mild bilateral hydroureteronephrosis and no radiopaque stones seen, consider correlation with urine output and urinalysis as warranted (noting there is a punctate focus of gas in the bladder, and a gas forming infection is not excluded).     6. Numerous additional and incidental findings as above      Assessment/Plan:      * Ischemic necrosis of small bowel  OR note 7/27 with extensive area of small bowel necrosis   Possible etiology for ongoing tachycardia/sepsis   IV fluid hydration  Broaden antibiotics to meropenem   Lactic acid ordered and pending   Serial abdominal examination   Appreciate general surgery input      Hypocalcemia  Corrected calcium remains low.  Ordered IV replacement and trending.    Sinus tachycardia  Multifactorial in the setting of acute medical conditions including dehydration, pain, postop, anemia  Telemetry monitoring   Replace thyroid as outlined, add on magnesium level      Hyponatremia  Monitor with IV fluid hydration     Malnutrition of moderate degree  Albumin 1.6 in the setting of extensive bowel surgery   Currently NPO, if prolonged consider parenteral nutrition    Severe sepsis  REsolved   Step down ICU to med surg  Extensive area of small bowel necrosis -s/p surgical repair   Escalate antibiotics to meropenem, vanc and diflucan and following clinically  Resp Cx: + MRSA - vancomycin/contact isolation  Urine and blood culture negative   Lactic acidtrending down  Monitor blood pressure, aim for map greater than 65    BOOM (acute kidney injury)  Postop course complicated by acute kidney injury, creatinine today 1.8, Follow labs closely   History of nephrolithiasis status post bilateral ureteral stenting   Continue IV fluid hydration   Pure wick   Renally dosing all medications and avoiding nephrotoxin drugs   Appreciate nephrology input    Bilateral kidney stones  Seen by Dr. Mijares this admission, followed outpatient by   [facility]   Status post bilateral stent placement      Severe obesity (BMI 35.0-39.9) with comorbidity  Body mass index is 37.22 kg/m². Morbid obesity complicates all aspects of disease management from diagnostic modalities to treatment. Weight loss encouraged and health benefits explained to patient.         FENG (iron deficiency anemia)  Worsening postoperatively, status post PRBC transfusion   Repeat anemia labs including B12, folic acid, iron studies  Transfuse as needed      Malignant neoplasm of colon  History of recurrent colorectal adenocarcinoma  See small-bowel ischemia  Did receive adjuvant chemoradiation, followed by Dr. Frank    Hypothyroidism with abnormal TFTs  Recent TSH elevated at 23, as per chart review elevated 2 years previous, unclear if compliance   Given current NPO status with bowel surgery changed to IV levothyroxine    COPD (chronic obstructive pulmonary disease)  No evidence of acute exacerbation   P.r.n. ABG and chest x-ray  Scheduled breathing treatments        VTE Risk Mitigation (From admission, onward)         Ordered     enoxaparin injection 40 mg  Every 24 hours         07/28/23 1849     Place sequential compression device  Until discontinued         07/28/23 0724     IP VTE HIGH RISK PATIENT  Once         07/24/23 2015                Discharge Planning   DERICK:      Code Status: Full Code   Is the patient medically ready for discharge?:     Reason for patient still in hospital (select all that apply): Patient trending condition  Discharge Plan A: Long-term acute care facility (LTAC)   Discharge Delays: None known at this time              Linda Bellamy MD  Department of Hospital Medicine   [facility]

## 2023-08-03 NOTE — SUBJECTIVE & OBJECTIVE
Interval History: picc line placement today- tolerated well; abdominal CT completed    Review of Systems   Constitutional:  Positive for fatigue. Negative for chills and fever.   Respiratory:  Positive for cough and shortness of breath.    Cardiovascular: Negative.    Gastrointestinal:  Positive for abdominal pain and nausea.   Genitourinary:  Positive for difficulty urinating.   Musculoskeletal:  Positive for arthralgias, gait problem and myalgias.   Skin:  Positive for color change.   Neurological:  Positive for weakness.   Psychiatric/Behavioral:  The patient is nervous/anxious.      Objective:     Vital Signs (Most Recent):  Temp: 98 °F (36.7 °C) (08/03/23 0720)  Pulse: 106 (08/03/23 0731)  Resp: 16 (08/03/23 0731)  BP: 134/61 (08/03/23 0720)  SpO2: 98 % (08/03/23 0731) Vital Signs (24h Range):  Temp:  [98 °F (36.7 °C)-99 °F (37.2 °C)] 98 °F (36.7 °C)  Pulse:  [] 106  Resp:  [16-18] 16  SpO2:  [96 %-99 %] 98 %  BP: (106-134)/(59-65) 134/61     Weight: 89.4 kg (197 lb)  Body mass index is 37.22 kg/m².    Intake/Output Summary (Last 24 hours) at 8/3/2023 1033  Last data filed at 8/3/2023 1027  Gross per 24 hour   Intake 400 ml   Output 2005 ml   Net -1605 ml           Physical Exam  Constitutional:       Appearance: She is obese. She is ill-appearing.   HENT:      Mouth/Throat:      Mouth: Mucous membranes are dry.      Pharynx: Oropharynx is clear. No oropharyngeal exudate.   Eyes:      Extraocular Movements: Extraocular movements intact.   Cardiovascular:      Rate and Rhythm: Regular rhythm. Tachycardia present.   Pulmonary:      Effort: Pulmonary effort is normal.      Breath sounds: Rhonchi present.   Abdominal:      General: There is distension.      Tenderness: There is abdominal tenderness.      Comments: Beny drain and ostomy intact   Musculoskeletal:      Cervical back: Neck supple. No tenderness.   Skin:     General: Skin is warm.      Capillary Refill: Capillary refill takes less than 2 seconds.       Findings: Bruising present.   Neurological:      General: No focal deficit present.      Mental Status: She is alert and oriented to person, place, and time.   Psychiatric:         Mood and Affect: Mood normal.         Behavior: Behavior normal.             Significant Labs: All pertinent labs within the past 24 hours have been reviewed.  Recent Lab Results  (Last 5 results in the past 24 hours)        08/03/23  0727   08/03/23  0607   08/03/23  0001   08/02/23 2021 08/02/23  1125        POC Glucose 95   103   71   96   74                              Significant Imaging: I have reviewed all pertinent imaging results/findings within the past 24 hours.  IMPRESSION: Abdominal CT  1. Colonic resection with left lower abdominal quadrant colostomy, punctate foci of intra-abdominal free air and a small volume of ascites, likely related to recent postoperative state.     2. Mildly prominent gas and fluid-filled loops of small bowel containing contrast suggestive of ileus and less likely partial low-grade mechanical bowel obstruction. Consider radiographic follow-up.     3. Airspace opacity in the left greater than right posterior dependent lower lobe with air bronchograms suggestive of atelectasis and aspiration/pneumonia, with a small left and trace right pleural effusion.     4. Moderate diffuse body wall edema (anasarca).     5. Full bladder, with mild bilateral hydroureteronephrosis and no radiopaque stones seen, consider correlation with urine output and urinalysis as warranted (noting there is a punctate focus of gas in the bladder, and a gas forming infection is not excluded).     6. Numerous additional and incidental findings as above

## 2023-08-03 NOTE — PLAN OF CARE
Per Level of Care at Discharge CM, recommendation is : Presently, this patient meets InterQual® criteria for LTAC placement

## 2023-08-03 NOTE — PT/OT/SLP PROGRESS
Occupational Therapy      Patient Name:  Tessa Enriquez   MRN:  [id number]    Patient not seen today secondary to pt reporting concern regarding how much her wound leaked when she mobilized earlier with PT; pt reports needing a full bath every time she moves around.      8/3/2023

## 2023-08-03 NOTE — CARE UPDATE
08/1955   Patient Assessment/Suction   Level of Consciousness (AVPU) alert   Respiratory Effort Unlabored   Expansion/Accessory Muscles/Retractions no use of accessory muscles   All Lung Fields Breath Sounds diminished   Rhythm/Pattern, Respiratory unlabored   Cough Frequency infrequent   Cough Type productive   PRE-TX-O2   Device (Oxygen Therapy) nasal cannula   Flow (L/min) 3   SpO2 98 %   Pulse Oximetry Type Intermittent   $ Pulse Oximetry - Multiple Charge Pulse Oximetry - Multiple   Pulse 110   Resp 18   Aerosol Therapy   $ Aerosol Therapy Charges Aerosol Treatment   Daily Review of Necessity (SVN) completed   Respiratory Treatment Status (SVN) given   Treatment Route (SVN) mask;oxygen   Patient Position (SVN) semi-Sanderson's   Post Treatment Assessment (SVN) breath sounds unchanged   Signs of Intolerance (SVN) none   Breath Sounds Post-Respiratory Treatment   Post-treatment Heart Rate (beats/min) 110   Post-treatment Resp Rate (breaths/min) 18

## 2023-08-04 NOTE — CONSULTS
"Consult Note  Infectious Disease    Reason for Consult:  Drainage from incision site; allergy to meropenem/penicillin/flagyl    HPI: Tessa Enriquez is a 67 y.o. female , obese, BMI 37.2Kg/m2, with unfortunate past medical history of recurrent colorectal adenocarcinoma, and b/l kidney stones status post robotic colectomy in November 2020, 1 out of 46 nodules was positive.  She could not tolerate chemo and was only able to complete 3 treatments and radiation therapy.  In August of 2022 she was found to have recurrence, status post 2nd session of chemotherapy.  On repeat surveillance this year in May she had evidence of adenocarcinoma at the anastomosis site.  PET-CT on June 14th demonstrated decreased size and activity of the mass.    She was admitted on 07/24 for ex-lap, s/p 8mg dexamethasone pre-op. As per operation note: "There is evidence of catastrophic events within the abdominal cavity.  There was diffuse he ischemia noted throughout multiple portions of the small bowel.  There were multiple sharp areas of demarcation between viable bowel and ischemic bowel.  Having removed the other obvious he clean necrotic areas there was evidence of dusky and bogginess at the site of her previous jejunal jejunostomy.  Decision was made against any reestablishment of continuity.  Remaining small bowel approximately 70-75 cm left.", Surgery required assistance from Urology for b/l ureteral catheter placement.     Patient was transferred to [facility] for ICU care.  Postop course complicated by tachycardia and hypotension.  Status post blood transfusion.     During hospital stay, respiratory culture grew MRSA 7/27. The patient developed a rash, clarified with nursing, unclear if vancomycin related although infusion rate was adjusted and rash resolved.  Seems like a second rash developed yesterday after 6 days of meropenem.      Patient seen and examined at bedside, she reports she is feeling fine, NG tube " in place, she is requesting something to eat.  Stable BP, tachycardic, PureWick in place, hazy urine noted.  Ostomy with liquid output. Surgical wound with bilious drainage form mid incision. On TPN. Repeat CT scan concerning for EC fistu;a.     ID consult for drainage from incision site, concern for peritonitis: Allergy to meropenem, penicillin, Flagyl.    Antibiotics:  Cipro 7/24  Cipro 7/26/27  Meropenem 7/27-8/1  Fluconazole 7/28--present  Vancomycin 7/29--present       Review of patient's allergies indicates:   Allergen Reactions    Ativan [lorazepam] Hives and Itching    Merrem [meropenem] Rash    Flagyl [metronidazole] Itching and Rash    Pcn [penicillins] Hives, Itching and Rash     Past Medical History:   Diagnosis Date    Acute hypoxemic respiratory failure 07/09/2018    Arthritis     Bilateral kidney stones 3/27/2023    Cancer     colon    COPD (chronic obstructive pulmonary disease)     History of home oxygen therapy     ONLY PRN AND HASN'T USED IT IN A YEAR    Hyperlipidemia     Thyroid disease      Past Surgical History:   Procedure Laterality Date    CHOLECYSTECTOMY      COLECTOMY N/A 11/9/2020    Procedure: COLECTOMY;  Surgeon: Brandon Wagner MD;  Location: [facility] OR;  Service: General;  Laterality: N/A;  LOWER- ANTERIOR    COLONOSCOPY N/A 10/23/2020    Procedure: COLONOSCOPY;  Surgeon: Jolynn Ayala MD;  Location: [facility] ENDO;  Service: Endoscopy;  Laterality: N/A;    COLONOSCOPY N/A 8/6/2021    Procedure: COLONOSCOPY;  Surgeon: Jolynn Ayala MD;  Location: [facility] ENDO;  Service: Endoscopy;  Laterality: N/A;    COLONOSCOPY N/A 7/7/2022    Procedure: COLONOSCOPY;  Surgeon: Jolynn Ayala MD;  Location: [facility] ENDO;  Service: Endoscopy;  Laterality: N/A;    COLONOSCOPY N/A 5/31/2023    Procedure: COLONOSCOPY;  Surgeon: Jolynn Ayala MD;  Location: [facility] ENDO;  Service: Endoscopy;  Laterality: N/A;    COLOSTOMY N/A 7/24/2023    Procedure: CREATION, COLOSTOMY;  Surgeon: Brandon Wagner MD;   Location: [facility] OR;  Service: General;  Laterality: N/A;    COMPLETION PROCTECTOMY N/A 7/24/2023    Procedure: PROCTECTOMY, COMPLETION;  Surgeon: Brandon Wagner MD;  Location: [facility] OR;  Service: General;  Laterality: N/A;    CYSTOSCOPY W/ URETERAL STENT PLACEMENT Right 2/22/2023    Procedure: CYSTOSCOPY, WITH URETERAL STENT INSERTION;  Surgeon: Carin Kwon Jr., MD;  Location: [facility] OR;  Service: Urology;  Laterality: Right;    CYSTOSCOPY W/ URETERAL STENT PLACEMENT Bilateral 3/26/2023    Procedure: CYSTOSCOPY, WITH URETERAL STENT INSERTION;  Surgeon: Sabine Barber MD;  Location: [facility] OR;  Service: Urology;  Laterality: Bilateral;    CYSTOSCOPY W/ URETERAL STENT PLACEMENT Bilateral 7/24/2023    Procedure: CYSTOSCOPY, WITH URETERAL STENT INSERTION;  Surgeon: Adrienne Mijares MD;  Location: [facility] OR;  Service: Urology;  Laterality: Bilateral;  calosin placing stents 1st    CYSTOSCOPY WITH URETEROSCOPY, RETROGRADE PYELOGRAPHY, AND INSERTION OF STENT Bilateral 3/26/2023    Procedure: CYSTOSCOPY, WITH RETROGRADE PYELOGRAM AND URETERAL STENT INSERTION;  Surgeon: Sabine Barber MD;  Location: [facility] OR;  Service: Urology;  Laterality: Bilateral;    CYSTOURETEROSCOPY WITH RETROGRADE PYELOGRAPHY AND INSERTION OF STENT INTO URETER Right 3/7/2023    Procedure: CYSTOURETEROSCOPY, WITH RETROGRADE PYELOGRAM AND URETERAL STENT INSERTION;  Surgeon: Carin Kwon Jr., MD;  Location: [facility] OR;  Service: Urology;  Laterality: Right;    CYSTOURETEROSCOPY, WITH HOLMIUM LASER LITHOTRIPSY OF URETERAL CALCULUS AND STENT INSERTION Bilateral 4/11/2023    Procedure: CYSTOURETEROSCOPY, WITH HOLMIUM LASER LITHOTRIPSY OF URETERAL CALCULUS AND STENT INSERTION;  Surgeon: Carin Kwon Jr., MD;  Location: [facility] OR;  Service: Urology;  Laterality: Bilateral;    ESOPHAGEAL DILATION N/A 6/29/2021    Procedure: DILATION, ESOPHAGUS;  Surgeon: Sourav Baires III, MD;  Location: [facility];  Service: Endoscopy;  Laterality: N/A;     ESOPHAGOGASTRODUODENOSCOPY  06/29/2021    Dilation to 57 Fr    ESOPHAGOGASTRODUODENOSCOPY N/A 6/29/2021    Procedure: EGD (ESOPHAGOGASTRODUODENOSCOPY);  Surgeon: Sourav Baires III, MD;  Location: [facility] ENDO;  Service: Endoscopy;  Laterality: N/A;    EXCISION, SMALL INTESTINE  7/28/2023    Procedure: EXCISION, SMALL INTESTINE;  Surgeon: Brandon Wagner MD;  Location: [facility] OR;  Service: General;;    EXCISION, SMALL INTESTINE N/A 7/27/2023    Procedure: EXCISION, SMALL INTESTINE;  Surgeon: Brandon Wagner MD;  Location: [facility] OR;  Service: General;  Laterality: N/A;    FLEXIBLE SIGMOIDOSCOPY N/A 8/1/2022    Procedure: SIGMOIDOSCOPY, FLEXIBLE;  Surgeon: Brandon Wagner MD;  Location: [facility] ENDO;  Service: Endoscopy;  Laterality: N/A;    HYSTERECTOMY      ILEOSTOMY  7/28/2023    Procedure: CREATION, ILEOSTOMY;  Surgeon: Brandon Wagner MD;  Location: [facility] OR;  Service: General;;    INSERTION OF TUNNELED CENTRAL VENOUS CATHETER (CVC) WITH SUBCUTANEOUS PORT N/A 1/11/2021    Procedure: [id number];  Surgeon: Brandon Wagner MD;  Location: [facility] OR;  Service: General;  Laterality: N/A;    INSERTION OF TUNNELED CENTRAL VENOUS CATHETER (CVC) WITH SUBCUTANEOUS PORT Right 9/1/2022    Procedure: [id number];  Surgeon: Brandon Wagner MD;  Location: [facility] OR;  Service: General;  Laterality: Right;  Wants rt side placement    LAPAROTOMY, EXPLORATORY N/A 7/24/2023    Procedure: LAPAROTOMY, EXPLORATORY;  Surgeon: Brandon Wagner MD;  Location: [facility] OR;  Service: General;  Laterality: N/A;  case must go 1st-chamberlain placing stents    LAPAROTOMY, EXPLORATORY N/A 7/28/2023    Procedure: LAPAROTOMY, EXPLORATORY;  Surgeon: Brandon Wagner MD;  Location: [facility] OR;  Service: General;  Laterality: N/A;    LAPAROTOMY, EXPLORATORY N/A 7/27/2023    Procedure: LAPAROTOMY, EXPLORATORY;  Surgeon: Brandon Wagner MD;  Location: [facility] OR;  Service: General;  Laterality: N/A;    MEDIPORT REMOVAL Left 4/19/2021     Procedure: REMOVAL, CATHETER, CENTRAL VENOUS, TUNNELED, WITH PORT;  Surgeon: Brandon Wagner MD;  Location: [facility] OR;  Service: General;  Laterality: Left;    REMOVAL-STENT Right 3/7/2023    Procedure: REMOVAL-STENT;  Surgeon: Carin Kwon Jr., MD;  Location: [facility] OR;  Service: Urology;  Laterality: Right;    REMOVAL-STENT Bilateral 2023    Procedure: REMOVAL-STENT;  Surgeon: Carin Kwon Jr., MD;  Location: [facility] OR;  Service: Urology;  Laterality: Bilateral;    RETROGRADE PYELOGRAPHY Bilateral 2023    Procedure: PYELOGRAM, RETROGRADE;  Surgeon: Carin Kwon Jr., MD;  Location: [facility] OR;  Service: Urology;  Laterality: Bilateral;    ROBOT-ASSISTED COLECTOMY N/A 2020    Procedure: ROBOTIC COLECTOMY low anterior resection, possible open;  Surgeon: Brandon Wagner MD;  Location: [facility] OR;  Service: General;  Laterality: N/A;  CONVERTED TO OPEN AT 1503    SUBTOTAL COLECTOMY N/A 2023    Procedure: COLECTOMY, PARTIAL;  Surgeon: Brandon Wagner MD;  Location: [facility];  Service: General;  Laterality: N/A;  with colorectal Anastamosis    URETEROSCOPIC REMOVAL OF URETERIC CALCULUS Right 3/7/2023    Procedure: REMOVAL, CALCULUS, URETER, URETEROSCOPIC;  Surgeon: Carin Kwon Jr., MD;  Location: [facility];  Service: Urology;  Laterality: Right;    URETEROSCOPIC REMOVAL OF URETERIC CALCULUS Bilateral 2023    Procedure: REMOVAL, CALCULUS, URETER, URETEROSCOPIC;  Surgeon: Carin Kwon Jr., MD;  Location: [facility];  Service: Urology;  Laterality: Bilateral;     Social History     Tobacco Use    Smoking status: Former     Current packs/day: 0.00     Average packs/day: 2.0 packs/day for 40.0 years (80.0 ttl pk-yrs)     Types: Cigarettes     Start date: 1977     Quit date: 2017     Years since quittin.0    Smokeless tobacco: Never   Substance Use Topics    Alcohol use: No        Family History   Problem Relation Age of Onset    COPD Mother     COPD Father     Prostate cancer Brother      Breast cancer Maternal Grandmother      Review of Systems:   No chills, fever, sweats, weight loss  No change in vision, loss of vision or diplopia  No sinus congestion, purulent nasal discharge, post nasal drip or facial pain  No pain in mouth or throat. No problems with teeth, gums.  No chest pain, palpitations, syncope  No cough, sputum production, shortness of breath, dyspnea on exertion, pleurisy, hemoptysis  No dysphagia, odynophagia  No nausea, vomiting, abdominal pain  No dysuria, hesitancy, hematuria, retention, incontinence  No swelling of joints, redness of joints, injuries, or new focal pain  No unusual headaches, dizziness, vertigo, numbness, paresthesias, neuropathy, falls  No bleeding, lymphadenopathy  Rash on her legs, lesions, or wounds    Outdoor activities: From home, former smoker  Antibiotic History: See above    EXAM & DIAGNOSTICS REVIEWED:   Vitals:     Temp:  [98.2 °F (36.8 °C)-99.6 °F (37.6 °C)]   Temp: 98.5 °F (36.9 °C) (08/04/23 0735)  Pulse: 105 (08/04/23 0753)  Resp: 12 (08/04/23 0753)  BP: 130/60 (08/04/23 0735)  SpO2: (!) 93 % (08/04/23 0752)    Intake/Output Summary (Last 24 hours) at 8/4/2023 0844  Last data filed at 8/4/2023 0451  Gross per 24 hour   Intake --   Output 2170 ml   Net -2170 ml       General:  Chronically-ill appearing, obese, In NAD. Alert and attentive, cooperative, comfortable on O2 by NC 2L  Eyes:  Anicteric, PERRL  ENT:  NG tube in place, dry oral mucosa, edentulous   Neck:  Supple  Lungs: Clear to auscultation b/l  Heart:  S1/S2+, regular rhythm, no murmurs  Abd:  Distended, JUANITA drain with bloddy drainage, ostomy with liquid output, decreased bowel sounds, tender to palpation around incision  :  Purewick, hazy urine   Musc:  Joints without effusion, swelling,  erythema, synovitis,   Skin:  Warm, no evidence of rash on chest, arms, abdomen or legs   Wound:   Neuro:  Following commands, speech is clear, moves all extremities   Psych:  Calm, cooperative  Lymphatic:  "    No cervical, supraclavicular nodes  Extrem: No LE edema, no evidence of rash  VAD:  L PICC line 8/2/23    L Port-A-Cath not accessed, no redness noted      Isolation: Contact/MRSA    8/2:        8/1:      Right leg rash   Left leg rash    General Labs reviewed:  Recent Labs   Lab 07/30/23  0346 08/01/23  0505 08/04/23  0426   WBC 7.46 6.14 5.91   HGB 7.6* 7.6* 7.5*   HCT 22.5* 24.3* 23.2*   * 162 227       Recent Labs   Lab 07/29/23  0421 07/29/23  1531 07/30/23  0346 07/31/23  1644 08/01/23  0506 08/04/23  0426   *  --  135* 136 138 131*   K 3.5   < > 3.8 4.1 3.8 2.9*     --  109 107 113* 105   CO2 19*  --  19* 20* 21* 22*   BUN 21  --  15 14 17 22   CREATININE 1.1  --  0.9 0.8 0.8 1.0   CALCIUM 7.2*  --  7.2* 7.0* 6.8* 6.6*   PROT 4.1*  --  4.6* 4.8*  --   --    BILITOT 1.1*  --  0.9 0.7  --   --    ALKPHOS 39*  --  52* 55  --   --    ALT 20  --  21 20  --   --    AST 25  --  25 23  --   --     < > = values in this interval not displayed.     No results for input(s): "CRP" in the last 168 hours.  No results for input(s): "SEDRATE" in the last 168 hours.    Estimated Creatinine Clearance: 57 mL/min (based on SCr of 1 mg/dL).     Micro:  Microbiology Results (last 7 days)       Procedure Component Value Units Date/Time    Culture, Respiratory with Gram Stain [[id number]]  (Abnormal)  (Susceptibility) Collected: 07/27/23 4298    Order Status: Completed Specimen: Respiratory from Tracheal Aspirate Updated: 07/30/23 0928     Respiratory Culture METHICILLIN RESISTANT STAPHYLOCOCCUS AUREUS  Moderate  Results called to and read back by Alessandra Colón RN-[id number];    07/30/2023  09:27 CJD       Gram Stain (Respiratory) <10 epithelial cells per low power field.     Gram Stain (Respiratory) Few WBC's     Gram Stain (Respiratory) Moderate Gram positive cocci            Imaging Reviewed:  CXRs  CT abdomen/pelvis 8/2:  1. Colonic resection with left lower abdominal quadrant colostomy, punctate foci of " intra-abdominal free air and a small volume of ascites, likely related to recent postoperative state.  2. Mildly prominent gas and fluid-filled loops of small bowel containing contrast suggestive of ileus and less likely partial low-grade mechanical bowel obstruction. Consider radiographic follow-up.  3. Airspace opacity in the left greater than right posterior dependent lower lobe with air bronchograms suggestive of atelectasis and aspiration/pneumonia, with a small left and trace right pleural effusion.  4. Moderate diffuse body wall edema (anasarca).  5. Full bladder, with mild bilateral hydroureteronephrosis and no radiopaque stones seen, consider correlation with urine output and urinalysis as warranted (noting there is a punctate focus of gas in the bladder, and a gas forming infection is not excluded).    Cardiology: EKG Qtc: 486ms    PATHOLOGY:   7/24:  1. DESIGNATED AS LYMPH NODES:   - BENIGN CYST PARTIALLY LINED BY MULLERIAN TYPE EPITHELIUM, POSSIBLE  REMNANTS OF PARATUBAL CYST.   - LYMPH NODE TISSUE WAS NOT IDENTIFIED GROSSLY OR MICROSCOPICALLY.   - NEGATIVE FOR MALIGNANCY.     2. DISTAL RECTUM, RESECTION:   - FOCUS OF MODERATELY DIFFERENTIATED ADENOCARCINOMA INVOLVING INFLAMED  AND ULCERATED RECTAL MUCOSA.   - PROXIMAL RESECTION MARGIN IS POSITIVE FOR ADENOCARCINOMA  - EXTENSIVE TRANSMURAL CHRONIC AND ACUTE INFLAMMATION WITH MUCOSAL  ULCERATION.   - TWO PERIRECTAL LYMPH NODES NEGATIVE FOR MALIGNANCY.     3. SEGMENT OF SMALL INTESTINE:   - SEROSAL ADHESIONS.   - RECENT TRANSMURAL DEFECT WITHOUT ASSOCIATE INFLAMMATION OR HEMORRHAGE.   - NEGATIVE FOR MALIGNANCY.     4. PROXIMAL COLON, RESECTION:   - INVASIVE MODERATELY DIFFERENTIATED ADENOCARCINOMA IN THE BACKGROUND OF RECTAL ULCERATION AND MARKED CHRONIC AND ACUTE INFLAMMATION.   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, 75cm of small bowel left, concern for EC fistula formation       MRSA  pneumonia, improving     B/l kidney stones, s/p b/l stents 7/24    H/o recurrent colorectal adenocarcinoma s/p chemotherapy     Malnutrition, albumin 1.6 - on TPN    Recommendations:  Unclear reaction to meropenem since she tolerated it for 6 days, no evident rash on exam  UA with reflex to culture  Eravacycline 90mg IV q12h to cover GNR and anaerobes   Continue Vancomycin IV, keep level 15-20 for MRSA pneumonia  Fluconazole 200mg IV daily  Consider Pain & Palliative to discuss GOC  Aspiration precautions     Unfortunately, poor prognosis     Dr Lilly will be available over the weekend if needed      Medical Decision Making during this encounter was  [_] Low Complexity  [_] Moderate Complexity  [xx] High Complexity

## 2023-08-04 NOTE — PROGRESS NOTES
[facility]  Progress Note    Patient Name: Tessa Enriquez  MRN: [id number]  Patient Class: IP- Inpatient   Admission Date: 7/24/2023  Length of Stay: 10 days  Attending Physician: Linda Bellamy MD  Primary Care Provider: Karma Mcdermott MD        Subjective:     Principal Problem:Ischemic necrosis of small bowel        HPI:  Ms. Enriquez is a 67-years-old female who presented as a direct admission from [facility] for ICU level of care postoperatively.  Patient with a history of recurrent colorectal adenocarcinoma, status post neoadjuvant radiation and chemotherapy, followed by oncologist Dr. Frank.  On 07/24 she underwent exploratory laparotomy with extensive lysis of adhesion, proctectomy with creation of end colostomy by Dr. Wagner and she was admitted to hospital medicine postoperatively.  Postop course complicated by ongoing sinus tachycardia with anemia, status post PRBC transfusion and acute kidney injury, seen by Nephrology.  Earlier today ostomy noted to be dusky with concern for ischemia, she was taken back to the OR and underwent exploratory laparotomy with small-bowel resection, as per OR note, large area of small bowel resected with four areas of ischemia.  She was intubated for surgery and remains intubated postoperatively.  Reported history of COPD, hypothyroidism, nephrolithiasis status post bilateral ureteral stenting.  She is currently sedated on propofol, orally intubated on mechanical ventilation.  Repeat labs have been submitted.  As per ICU RN plans for possible repeat surgery.  Plan of care reviewed with nursing.  No visitors at bedside.      Overview/Hospital Course:  Patient was transferred from [facility] for ICU care postoperatively: ostomy noted to be dusky with concern for ischemia, she was taken back to the OR and underwent exploratory laparotomy with small-bowel resection, as per OR note, large area of small bowel resected with  four areas of ischemia.     She was followed by pulmnology/CC and nephrology and GS. Once extubated, she was transferred to [facility]-surg. She was on meropenem for peritonitis but only completed 5 of 7 days due to rash that developed. Allergies to PCN and flagyl.  Currently on VAnc and diflucan. Ostomy with good output. Patient refused another picc line yesterday despite needing TPN. She wants to take in oral diet and adamant an abdominal CT should be done first to see if she needs to remain strict NPO. Dr. Guevara agrees TPN is necessary and also ordered CT to eval anastamosis leak.   8/2/23 Picc line placement. TPN started 8/3/2023. ID consulted to address peritonitis - multiple drug allergies, wound leaking- surgery notified.           Interval History: picc line placement today- tolerated well; abdominal CT completed    Review of Systems   Constitutional:  Positive for fatigue. Negative for chills and fever.   Respiratory:  Positive for cough and shortness of breath.    Cardiovascular: Negative.    Gastrointestinal:  Positive for abdominal pain and nausea.   Genitourinary:  Positive for difficulty urinating.   Musculoskeletal:  Positive for arthralgias, gait problem and myalgias.   Skin:  Positive for color change.   Neurological:  Positive for weakness.   Psychiatric/Behavioral:  The patient is nervous/anxious.      Objective:     Vital Signs (Most Recent):  Temp: 98.2 °F (36.8 °C) (08/03/23 1914)  Pulse: (!) 113 (08/03/23 1914)  Resp: 18 (08/03/23 1914)  BP: (!) 117/56 (08/03/23 1914)  SpO2: 97 % (08/03/23 1914) Vital Signs (24h Range):  Temp:  [98 °F (36.7 °C)-99 °F (37.2 °C)] 98.2 °F (36.8 °C)  Pulse:  [] 113  Resp:  [16-19] 18  SpO2:  [97 %-99 %] 97 %  BP: (117-140)/(56-71) 117/56     Weight: 89.4 kg (197 lb)  Body mass index is 37.22 kg/m².    Intake/Output Summary (Last 24 hours) at 8/3/2023 2004  Last data filed at 8/3/2023 1758  Gross per 24 hour   Intake --   Output 2345 ml   Net -2345 ml            Physical Exam  Constitutional:       Appearance: She is obese. She is ill-appearing.   HENT:      Mouth/Throat:      Mouth: Mucous membranes are dry.      Pharynx: Oropharynx is clear. No oropharyngeal exudate.   Eyes:      Extraocular Movements: Extraocular movements intact.   Cardiovascular:      Rate and Rhythm: Regular rhythm. Tachycardia present.   Pulmonary:      Effort: Pulmonary effort is normal.      Breath sounds: Rhonchi present.   Abdominal:      General: There is distension.      Tenderness: There is abdominal tenderness.      Comments: Beny drain and ostomy intact   Musculoskeletal:      Cervical back: Neck supple. No tenderness.   Skin:     General: Skin is warm.      Capillary Refill: Capillary refill takes less than 2 seconds.      Findings: Bruising present.   Neurological:      General: No focal deficit present.      Mental Status: She is alert and oriented to person, place, and time.   Psychiatric:         Mood and Affect: Mood normal.         Behavior: Behavior normal.             Significant Labs: All pertinent labs within the past 24 hours have been reviewed.  Recent Lab Results  (Last 5 results in the past 24 hours)        08/03/23  1916   08/03/23  1726   08/03/23  0727   08/03/23  0607   08/03/23  0001        POC Glucose 135   113   95   103   71                              Significant Imaging: I have reviewed all pertinent imaging results/findings within the past 24 hours.  IMPRESSION: Abdominal CT  1. Colonic resection with left lower abdominal quadrant colostomy, punctate foci of intra-abdominal free air and a small volume of ascites, likely related to recent postoperative state.     2. Mildly prominent gas and fluid-filled loops of small bowel containing contrast suggestive of ileus and less likely partial low-grade mechanical bowel obstruction. Consider radiographic follow-up.     3. Airspace opacity in the left greater than right posterior dependent lower lobe with air bronchograms  suggestive of atelectasis and aspiration/pneumonia, with a small left and trace right pleural effusion.     4. Moderate diffuse body wall edema (anasarca).     5. Full bladder, with mild bilateral hydroureteronephrosis and no radiopaque stones seen, consider correlation with urine output and urinalysis as warranted (noting there is a punctate focus of gas in the bladder, and a gas forming infection is not excluded).     6. Numerous additional and incidental findings as above      Assessment/Plan:      * Ischemic necrosis of small bowel  OR note 7/27 with extensive area of small bowel necrosis   Possible etiology for ongoing tachycardia/sepsis   IV fluid hydration  Broaden antibiotics to meropenem - developed rash - ID consulted   Lactic acid- trended to normal range   Serial abdominal examination   Appreciate general surgery input      Hypocalcemia  Corrected calcium remains low.  Ordered IV replacement and trending.    Sinus tachycardia  Multifactorial in the setting of acute medical conditions including dehydration, pain, postop, anemia  Telemetry monitoring   Replace thyroid as outlined, add on magnesium level      Hyponatremia  Monitor with IV fluid hydration     Malnutrition of moderate degree  Albumin 1.6 in the setting of extensive bowel surgery   Currently NPO, if prolonged consider parenteral nutrition    Severe sepsis  REsolved   Step down ICU to med surg  Extensive area of small bowel necrosis -s/p surgical repair   Escalate antibiotics to meropenem, vanc and diflucan and following clinically  Resp Cx: + MRSA - vancomycin/contact isolation  Urine and blood culture negative   Lactic acidtrending down  Monitor blood pressure, aim for map greater than 65    BOOM (acute kidney injury)  Postop course complicated by acute kidney injury,monitor labs  History of nephrolithiasis status post bilateral ureteral stenting   Continue IV fluid hydration   Pure wick   Renally dosing all medications and avoiding  nephrotoxin drugs   Appreciate nephrology input    Bilateral kidney stones  Seen by Dr. Mijares this admission, followed outpatient by Dr. Kwon   Status post bilateral stent placement      Severe obesity (BMI 35.0-39.9) with comorbidity  Body mass index is 37.22 kg/m². Morbid obesity complicates all aspects of disease management from diagnostic modalities to treatment. Weight loss encouraged and health benefits explained to patient.         FENG (iron deficiency anemia)  Worsening postoperatively, status post PRBC transfusion   Repeat anemia labs including B12, folic acid, iron studies  Transfuse as needed      Malignant neoplasm of colon  History of recurrent colorectal adenocarcinoma  See small-bowel ischemia  Did receive adjuvant chemoradiation, followed by Dr. Frank    Hypothyroidism with abnormal TFTs  Recent TSH elevated at 23, as per chart review elevated 2 years previous, unclear if compliance   Given current NPO status with bowel surgery changed to IV levothyroxine    COPD (chronic obstructive pulmonary disease)  No evidence of acute exacerbation   P.r.n. ABG and chest x-ray  Scheduled breathing treatments        VTE Risk Mitigation (From admission, onward)         Ordered     enoxaparin injection 40 mg  Every 24 hours         07/28/23 1849     Place sequential compression device  Until discontinued         07/28/23 0724     IP VTE HIGH RISK PATIENT  Once         07/24/23 2015                Discharge Planning   DERICK: 8/7/2023     Code Status: Full Code   Is the patient medically ready for discharge?:     Reason for patient still in hospital (select all that apply): Patient unstable, Patient trending condition, Treatment and Consult recommendations  Discharge Plan A: Long-term acute care facility (LTAC)   Discharge Delays: None known at this time              Linda Bellamy MD  Department of Hospital Medicine   [facility]

## 2023-08-04 NOTE — ASSESSMENT & PLAN NOTE
REsolved   Extensive area of small bowel necrosis -s/p surgical repair   Escalate antibiotics to meropenem, vanc and diflucan and following clinically.  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  Follow General surgery recommendations.  Resp Cx: + MRSA - vancomycin/contact isolation  Urine and blood culture negative   Lactic acidtrending down  Monitor blood pressure, aim for map greater than 65

## 2023-08-04 NOTE — PT/OT/SLP PROGRESS
Occupational Therapy   Treatment    Name: Tessa Enriquez  MRN: [id number]  Admitting Diagnosis:  Ischemic necrosis of small bowel  7 Days Post-Op    Recommendations:     Discharge Recommendations: rehabilitation facility  Discharge Equipment Recommendations:  walker, rolling  Barriers to discharge:  Decreased caregiver support    Assessment:     Tessa Enriquez is a 67 y.o. female with a medical diagnosis of Ischemic necrosis of small bowel.  Performance deficits affecting function are weakness, impaired endurance, impaired self care skills, impaired functional mobility, gait instability, impaired balance, pain.     Pt presented supine and agreed to EOB activity only; she required Max A of 1-2 persons for bed mobility and tolerated sitting EOB for ~10 minutes.      Rehab Prognosis:  Good; patient would benefit from acute skilled OT services to address these deficits and reach maximum level of function.       Plan:     Patient to be seen 5 x/week to address the above listed problems via self-care/home management, therapeutic activities, therapeutic exercises  Plan of Care Expires: 08/31/23  Plan of Care Reviewed with: patient    Subjective     Pain/Comfort:  Pain Rating 1: 5/10  Location - Side 1: Bilateral  Location - Orientation 1: lower  Location 1: abdomen  Pain Addressed 1: Reposition, Distraction, Nurse notified  Pain Rating Post-Intervention 1: 8/10    Objective:     Communicated with: nurse prior to session.  Patient found supine with colostomy, JUANITA drain, NG tube, PureWick upon OT entry to room.    General Precautions: Standard, fall, contact    Orthopedic Precautions:N/A  Braces: N/A  Respiratory Status: Room air     Occupational Performance:     Bed Mobility:    Patient completed Rolling/Turning to Left with  moderate assistance  Patient completed Rolling/Turning to Right with moderate assistance  Patient completed Supine to Sit with maximal assistance  Patient completed Sit to Supine with  maximal assistance and 2 persons     Treatment & Education:  Pt sat EOB ~10 minutes with SBA and BUEs propped behind her; she reported less abdominal pain when in a semi-reclined position; she did not participate in ADL tasks due to feeling the need to support herself with both arms while EOB.  After returning to supine it was noted that her linens were soiled with urine; she was assisted with bed mobility for linen change; her ostomy bag was intact and her abdominal wound dressing was clean/dry.      Patient left HOB elevated with all lines intact, call button in reach, and bed alarm on    GOALS:   Multidisciplinary Problems       Occupational Therapy Goals          Problem: Occupational Therapy    Goal Priority Disciplines Outcome Interventions   Occupational Therapy Goal     OT, PT/OT Ongoing, Progressing    Description: Goals to be met by: 8/31/2023     Patient will increase functional independence with ADLs by performing:    UE Dressing with Supervision.  LE Dressing with Supervision.  Grooming while standing at sink with Supervision.  Toileting from toilet with Supervision for hygiene and clothing management.   Bathing from  tub bench with Supervision.                         Time Tracking:     OT Date of Treatment: 08/04/23  OT Start Time: 1143  OT Stop Time: 1210  OT Total Time (min): 27 min    Billable Minutes:Therapeutic Activity 27    OT/BRENNON: OT          8/4/2023

## 2023-08-04 NOTE — PROGRESS NOTES
[facility]  Progress Note    Patient Name: Tessa Enriquez  MRN: [id number]  Patient Class: IP- Inpatient   Admission Date: 7/24/2023  Length of Stay: 11 days  Attending Physician: Parul Portillo MD  Primary Care Provider: Karma Mcdermott MD        Subjective:     Principal Problem:Ischemic necrosis of small bowel        HPI:  Ms. Enriquez is a 67-years-old female who presented as a direct admission from [facility] for ICU level of care postoperatively.  Patient with a history of recurrent colorectal adenocarcinoma, status post neoadjuvant radiation and chemotherapy, followed by oncologist Dr. Frank.  On 07/24 she underwent exploratory laparotomy with extensive lysis of adhesion, proctectomy with creation of end colostomy by Dr. Wagner and she was admitted to hospital medicine postoperatively.  Postop course complicated by ongoing sinus tachycardia with anemia, status post PRBC transfusion and acute kidney injury, seen by Nephrology.  Earlier today ostomy noted to be dusky with concern for ischemia, she was taken back to the OR and underwent exploratory laparotomy with small-bowel resection, as per OR note, large area of small bowel resected with four areas of ischemia.  She was intubated for surgery and remains intubated postoperatively.  Reported history of COPD, hypothyroidism, nephrolithiasis status post bilateral ureteral stenting.  She is currently sedated on propofol, orally intubated on mechanical ventilation.  Repeat labs have been submitted.  As per ICU RN plans for possible repeat surgery.  Plan of care reviewed with nursing.  No visitors at bedside.      Overview/Hospital Course:  Patient was transferred from [facility] for ICU care postoperatively: ostomy noted to be dusky with concern for ischemia, she was taken back to the OR and underwent exploratory laparotomy with small-bowel resection, as per OR note, large area of small bowel resected with four  areas of ischemia.     She was followed by pulmnology/CC and nephrology and GS. Once extubated, she was transferred to [facility]-surg. She was on meropenem for peritonitis but only completed 5 of 7 days due to rash that developed. Allergies to PCN and flagyl.  Currently on VAnc and diflucan. Ostomy with good output. Patient refused another picc line yesterday despite needing TPN. She wants to take in oral diet and adamant an abdominal CT should be done first to see if she needs to remain strict NPO. Dr. Guevara agrees TPN is necessary and also ordered CT to eval anastamosis leak.   8/2/23 Picc line placement. TPN started 8/3/2023. ID consulted to address peritonitis - multiple drug allergies, wound leaking- surgery notified.           Interval History:  No new subjective complaints.  NG tube is in place.  Receiving TPN.  Bilious drainage from abdominal wound noted.  General surgery closely following.    Review of Systems   Constitutional:  Positive for fatigue. Negative for chills and fever.   Respiratory:  Positive for cough and shortness of breath.    Cardiovascular: Negative.    Gastrointestinal:  Positive for abdominal pain and nausea.   Genitourinary:  Positive for difficulty urinating.   Musculoskeletal:  Positive for arthralgias, gait problem and myalgias.   Skin:  Positive for color change.   Neurological:  Positive for weakness.   Psychiatric/Behavioral:  The patient is nervous/anxious.      Objective:     Vital Signs (Most Recent):  Temp: 98.5 °F (36.9 °C) (08/04/23 0735)  Pulse: 105 (08/04/23 0753)  Resp: 12 (08/04/23 0753)  BP: 130/60 (08/04/23 0735)  SpO2: (!) 93 % (08/04/23 0752) Vital Signs (24h Range):  Temp:  [98.2 °F (36.8 °C)-99.6 °F (37.6 °C)] 98.5 °F (36.9 °C)  Pulse:  [105-113] 105  Resp:  [12-20] 12  SpO2:  [93 %-98 %] 93 %  BP: (117-140)/(56-71) 130/60     Weight: 89.4 kg (197 lb)  Body mass index is 37.22 kg/m².    Intake/Output Summary (Last 24 hours) at 8/4/2023 0804  Last data filed at 8/4/2023  0451  Gross per 24 hour   Intake --   Output 2170 ml   Net -2170 ml           Physical Exam  Constitutional:       Appearance: She is obese. She is ill-appearing.   HENT:      Mouth/Throat:      Mouth: Mucous membranes are dry.      Pharynx: Oropharynx is clear. No oropharyngeal exudate.   Eyes:      Extraocular Movements: Extraocular movements intact.   Cardiovascular:      Rate and Rhythm: Regular rhythm. Tachycardia present.   Pulmonary:      Effort: Pulmonary effort is normal.      Breath sounds: Rhonchi present.   Abdominal:      General: There is distension.      Tenderness: There is abdominal tenderness.      Comments: Beny drain and ostomy intact   Musculoskeletal:      Cervical back: Neck supple. No tenderness.   Skin:     General: Skin is warm.      Capillary Refill: Capillary refill takes less than 2 seconds.      Findings: Bruising present.   Neurological:      General: No focal deficit present.      Mental Status: She is alert and oriented to person, place, and time.   Psychiatric:         Mood and Affect: Mood normal.         Behavior: Behavior normal.             Significant Labs: All pertinent labs within the past 24 hours have been reviewed.  Recent Lab Results  (Last 5 results in the past 24 hours)        08/04/23  0736   08/04/23  0426   08/03/23  2335   08/03/23  1916   08/03/23  1726        Anion Gap   4             Baso #   0.00             Basophil %   0.0             BUN   22             Calcium   6.6  Comment: Potassium &  Calcium critical result(s) called and verbal readback   obtained from Norma Franco RN (1100) by SWÁngel 08/04/2023 05:43               Chloride   105             CO2   22             Creatinine   1.0             Differential Method   Automated             eGFR   >60.0             Eos #   0.1             Eosinophil %   1.0             Glucose   152             Gran # (ANC)   5.2             Gran %   88.2             Hematocrit   23.2             Hemoglobin   7.5              Immature Grans (Abs)   0.06  Comment: Mild elevation in immature granulocytes is non specific and   can be seen in a variety of conditions including stress response,   acute inflammation, trauma and pregnancy. Correlation with other   laboratory and clinical findings is essential.               Immature Granulocytes   1.0             Lymph #   0.3             Lymph %   5.4             Magnesium   1.9             MCH   28.5             MCHC   32.3             MCV   88             Mono #   0.3             Mono %   4.4             MPV   11.6             nRBC   0             Phosphorus   2.5             Platelets   227  Comment: Delta check review             POC Glucose 159     154   135   113       Potassium   2.9  Comment: Potassium &  Calcium critical result(s) called and verbal readback   obtained from Norma Franco RN (1100) by SW1 08/04/2023 05:43               RBC   2.63             RDW   17.2             Sodium   131             WBC   5.91                                    Significant Imaging: I have reviewed all pertinent imaging results/findings within the past 24 hours.  IMPRESSION: Abdominal CT  1. Colonic resection with left lower abdominal quadrant colostomy, punctate foci of intra-abdominal free air and a small volume of ascites, likely related to recent postoperative state.     2. Mildly prominent gas and fluid-filled loops of small bowel containing contrast suggestive of ileus and less likely partial low-grade mechanical bowel obstruction. Consider radiographic follow-up.     3. Airspace opacity in the left greater than right posterior dependent lower lobe with air bronchograms suggestive of atelectasis and aspiration/pneumonia, with a small left and trace right pleural effusion.     4. Moderate diffuse body wall edema (anasarca).     5. Full bladder, with mild bilateral hydroureteronephrosis and no radiopaque stones seen, consider correlation with urine output and urinalysis as warranted (noting  there is a punctate focus of gas in the bladder, and a gas forming infection is not excluded).     6. Numerous additional and incidental findings as above      Assessment/Plan:      * Ischemic necrosis of small bowel  OR note 7/27 with extensive area of small bowel necrosis   Possible etiology for ongoing tachycardia/sepsis   IV fluid hydration  Broaden antibiotics to meropenem - developed rash - ID consulted   Lactic acid- trended to normal range   Serial abdominal examination   Appreciate general surgery input      Hypocalcemia  Corrected calcium remains low.  Ordered IV replacement and trending.    Sinus tachycardia  Multifactorial in the setting of acute medical conditions including dehydration, pain, postop, anemia  Telemetry monitoring   Replace thyroid as outlined, add on magnesium level      Hyponatremia  Monitor with IV fluid hydration     Malnutrition of moderate degree  Albumin 1.6 in the setting of extensive bowel surgery   Currently NPO, on TPN.    Severe sepsis  REsolved   Extensive area of small bowel necrosis -s/p surgical repair   Escalate antibiotics to meropenem, vanc and diflucan and following clinically.  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  Follow General surgery recommendations.  Resp Cx: + MRSA - vancomycin/contact isolation  Urine and blood culture negative   Lactic acidtrending down  Monitor blood pressure, aim for map greater than 65    BOOM (acute kidney injury)  Postop course complicated by acute kidney injury,monitor labs  History of nephrolithiasis status post bilateral ureteral stenting   Continue IV fluid hydration   Pure wick   Renally dosing all medications and avoiding nephrotoxin drugs   Appreciate nephrology input    Bilateral kidney stones  Seen by Dr. Mijares this admission, followed outpatient by Dr. Kwon   Status post bilateral stent placement      Severe obesity (BMI 35.0-39.9) with comorbidity  Body mass index is 37.22 kg/m². Morbid obesity complicates all aspects of  disease management from diagnostic modalities to treatment. Weight loss encouraged and health benefits explained to patient.         FENG (iron deficiency anemia)  Worsening postoperatively, status post PRBC transfusion   Transfuse as needed.      Malignant neoplasm of colon  History of recurrent colorectal adenocarcinoma  See small-bowel ischemia  Did receive adjuvant chemoradiation, followed by Dr. Frank    Hypothyroidism with abnormal TFTs  Recent TSH elevated at 23, as per chart review elevated 2 years previous, unclear if compliance   Given current NPO status with bowel surgery changed to IV levothyroxine    COPD (chronic obstructive pulmonary disease)  No evidence of acute exacerbation   P.r.n. ABG and chest x-ray  Scheduled breathing treatments      Discussed with  regarding discharge planning.  Ultimately patient will require LTAC placement.  VTE Risk Mitigation (From admission, onward)         Ordered     enoxaparin injection 40 mg  Every 24 hours         07/28/23 1849     Place sequential compression device  Until discontinued         07/28/23 0724     IP VTE HIGH RISK PATIENT  Once         07/24/23 2015                Discharge Planning   DERICK: 8/8/2023     Code Status: Full Code   Is the patient medically ready for discharge?:     Reason for patient still in hospital (select all that apply): Patient trending condition and Consult recommendations  Discharge Plan A: Long-term acute care facility (LTAC)   Discharge Delays: None known at this time              Parul Portillo MD  Department of Hospital Medicine   [facility]

## 2023-08-04 NOTE — SUBJECTIVE & OBJECTIVE
Interval History: picc line placement today- tolerated well; abdominal CT completed    Review of Systems   Constitutional:  Positive for fatigue. Negative for chills and fever.   Respiratory:  Positive for cough and shortness of breath.    Cardiovascular: Negative.    Gastrointestinal:  Positive for abdominal pain and nausea.   Genitourinary:  Positive for difficulty urinating.   Musculoskeletal:  Positive for arthralgias, gait problem and myalgias.   Skin:  Positive for color change.   Neurological:  Positive for weakness.   Psychiatric/Behavioral:  The patient is nervous/anxious.      Objective:     Vital Signs (Most Recent):  Temp: 98.2 °F (36.8 °C) (08/03/23 1914)  Pulse: (!) 113 (08/03/23 1914)  Resp: 18 (08/03/23 1914)  BP: (!) 117/56 (08/03/23 1914)  SpO2: 97 % (08/03/23 1914) Vital Signs (24h Range):  Temp:  [98 °F (36.7 °C)-99 °F (37.2 °C)] 98.2 °F (36.8 °C)  Pulse:  [] 113  Resp:  [16-19] 18  SpO2:  [97 %-99 %] 97 %  BP: (117-140)/(56-71) 117/56     Weight: 89.4 kg (197 lb)  Body mass index is 37.22 kg/m².    Intake/Output Summary (Last 24 hours) at 8/3/2023 2004  Last data filed at 8/3/2023 1758  Gross per 24 hour   Intake --   Output 2345 ml   Net -2345 ml           Physical Exam  Constitutional:       Appearance: She is obese. She is ill-appearing.   HENT:      Mouth/Throat:      Mouth: Mucous membranes are dry.      Pharynx: Oropharynx is clear. No oropharyngeal exudate.   Eyes:      Extraocular Movements: Extraocular movements intact.   Cardiovascular:      Rate and Rhythm: Regular rhythm. Tachycardia present.   Pulmonary:      Effort: Pulmonary effort is normal.      Breath sounds: Rhonchi present.   Abdominal:      General: There is distension.      Tenderness: There is abdominal tenderness.      Comments: Beny drain and ostomy intact   Musculoskeletal:      Cervical back: Neck supple. No tenderness.   Skin:     General: Skin is warm.      Capillary Refill: Capillary refill takes less than 2  seconds.      Findings: Bruising present.   Neurological:      General: No focal deficit present.      Mental Status: She is alert and oriented to person, place, and time.   Psychiatric:         Mood and Affect: Mood normal.         Behavior: Behavior normal.             Significant Labs: All pertinent labs within the past 24 hours have been reviewed.  Recent Lab Results  (Last 5 results in the past 24 hours)        08/03/23  1916   08/03/23  1726   08/03/23  0727   08/03/23  0607   08/03/23  0001        POC Glucose 135   113   95   103   71                              Significant Imaging: I have reviewed all pertinent imaging results/findings within the past 24 hours.  IMPRESSION: Abdominal CT  1. Colonic resection with left lower abdominal quadrant colostomy, punctate foci of intra-abdominal free air and a small volume of ascites, likely related to recent postoperative state.     2. Mildly prominent gas and fluid-filled loops of small bowel containing contrast suggestive of ileus and less likely partial low-grade mechanical bowel obstruction. Consider radiographic follow-up.     3. Airspace opacity in the left greater than right posterior dependent lower lobe with air bronchograms suggestive of atelectasis and aspiration/pneumonia, with a small left and trace right pleural effusion.     4. Moderate diffuse body wall edema (anasarca).     5. Full bladder, with mild bilateral hydroureteronephrosis and no radiopaque stones seen, consider correlation with urine output and urinalysis as warranted (noting there is a punctate focus of gas in the bladder, and a gas forming infection is not excluded).     6. Numerous additional and incidental findings as above

## 2023-08-04 NOTE — SUBJECTIVE & OBJECTIVE
Interval History:  No new subjective complaints.  NG tube is in place.  Receiving TPN.  Bilious drainage from abdominal wound noted.  General surgery closely following.    Review of Systems   Constitutional:  Positive for fatigue. Negative for chills and fever.   Respiratory:  Positive for cough and shortness of breath.    Cardiovascular: Negative.    Gastrointestinal:  Positive for abdominal pain and nausea.   Genitourinary:  Positive for difficulty urinating.   Musculoskeletal:  Positive for arthralgias, gait problem and myalgias.   Skin:  Positive for color change.   Neurological:  Positive for weakness.   Psychiatric/Behavioral:  The patient is nervous/anxious.      Objective:     Vital Signs (Most Recent):  Temp: 98.5 °F (36.9 °C) (08/04/23 0735)  Pulse: 105 (08/04/23 0753)  Resp: 12 (08/04/23 0753)  BP: 130/60 (08/04/23 0735)  SpO2: (!) 93 % (08/04/23 0752) Vital Signs (24h Range):  Temp:  [98.2 °F (36.8 °C)-99.6 °F (37.6 °C)] 98.5 °F (36.9 °C)  Pulse:  [105-113] 105  Resp:  [12-20] 12  SpO2:  [93 %-98 %] 93 %  BP: (117-140)/(56-71) 130/60     Weight: 89.4 kg (197 lb)  Body mass index is 37.22 kg/m².    Intake/Output Summary (Last 24 hours) at 8/4/2023 0804  Last data filed at 8/4/2023 0451  Gross per 24 hour   Intake --   Output 2170 ml   Net -2170 ml           Physical Exam  Constitutional:       Appearance: She is obese. She is ill-appearing.   HENT:      Mouth/Throat:      Mouth: Mucous membranes are dry.      Pharynx: Oropharynx is clear. No oropharyngeal exudate.   Eyes:      Extraocular Movements: Extraocular movements intact.   Cardiovascular:      Rate and Rhythm: Regular rhythm. Tachycardia present.   Pulmonary:      Effort: Pulmonary effort is normal.      Breath sounds: Rhonchi present.   Abdominal:      General: There is distension.      Tenderness: There is abdominal tenderness.      Comments: Beny drain and ostomy intact   Musculoskeletal:      Cervical back: Neck supple. No tenderness.   Skin:      General: Skin is warm.      Capillary Refill: Capillary refill takes less than 2 seconds.      Findings: Bruising present.   Neurological:      General: No focal deficit present.      Mental Status: She is alert and oriented to person, place, and time.   Psychiatric:         Mood and Affect: Mood normal.         Behavior: Behavior normal.             Significant Labs: All pertinent labs within the past 24 hours have been reviewed.  Recent Lab Results  (Last 5 results in the past 24 hours)        08/04/23  0736   08/04/23  0426   08/03/23  2335   08/03/23  1916   08/03/23  1726        Anion Gap   4             Baso #   0.00             Basophil %   0.0             BUN   22             Calcium   6.6  Comment: Potassium &  Calcium critical result(s) called and verbal readback   obtained from Norma Franco RN (1100) by RANDOLPH 08/04/2023 05:43               Chloride   105             CO2   22             Creatinine   1.0             Differential Method   Automated             eGFR   >60.0             Eos #   0.1             Eosinophil %   1.0             Glucose   152             Gran # (ANC)   5.2             Gran %   88.2             Hematocrit   23.2             Hemoglobin   7.5             Immature Grans (Abs)   0.06  Comment: Mild elevation in immature granulocytes is non specific and   can be seen in a variety of conditions including stress response,   acute inflammation, trauma and pregnancy. Correlation with other   laboratory and clinical findings is essential.               Immature Granulocytes   1.0             Lymph #   0.3             Lymph %   5.4             Magnesium   1.9             MCH   28.5             MCHC   32.3             MCV   88             Mono #   0.3             Mono %   4.4             MPV   11.6             nRBC   0             Phosphorus   2.5             Platelets   227  Comment: Delta check review             POC Glucose 159     154   135   113       Potassium   2.9  Comment: Potassium &   Calcium critical result(s) called and verbal readback   obtained from Norma Franco RN (1100) by SWÁngel 08/04/2023 05:43               RBC   2.63             RDW   17.2             Sodium   131             WBC   5.91                                    Significant Imaging: I have reviewed all pertinent imaging results/findings within the past 24 hours.  IMPRESSION: Abdominal CT  1. Colonic resection with left lower abdominal quadrant colostomy, punctate foci of intra-abdominal free air and a small volume of ascites, likely related to recent postoperative state.     2. Mildly prominent gas and fluid-filled loops of small bowel containing contrast suggestive of ileus and less likely partial low-grade mechanical bowel obstruction. Consider radiographic follow-up.     3. Airspace opacity in the left greater than right posterior dependent lower lobe with air bronchograms suggestive of atelectasis and aspiration/pneumonia, with a small left and trace right pleural effusion.     4. Moderate diffuse body wall edema (anasarca).     5. Full bladder, with mild bilateral hydroureteronephrosis and no radiopaque stones seen, consider correlation with urine output and urinalysis as warranted (noting there is a punctate focus of gas in the bladder, and a gas forming infection is not excluded).     6. Numerous additional and incidental findings as above

## 2023-08-04 NOTE — PROGRESS NOTES
[facility]  General Surgery  Progress Note    Subjective:     Interval History: awake and alert. States she is feeling better. Feeling hungry.   Wound has started to have bilious appearing drainage form mid incision over past couple of days. CT showed no evidence of intraabdominal extravasation of contrast.       Post-Op Info:  Procedure(s) (LRB):  LAPAROTOMY, EXPLORATORY (N/A)  EXCISION, SMALL INTESTINE  CREATION, ILEOSTOMY   6 Days Post-Op      Medications:  Continuous Infusions:   Amino acid 4.25% - dextrose 5% (CLINIMIX) solution Stopped (08/03/23 2220)    TPN ADULT CENTRAL LINE CUSTOM 75 mL/hr at 08/03/23 2036     Scheduled Meds:   albuterol-ipratropium  3 mL Nebulization Q6H WAKE    budesonide  0.5 mg Nebulization Q12H    chlorhexidine  15 mL Mouth/Throat BID    enoxparin  40 mg Subcutaneous Q24H (prophylaxis, 1700)    fat emulsion 20%  250 mL Intravenous Daily    fluconazole (DIFLUCAN) IV (PEDS and ADULTS)  200 mg Intravenous Q24H    levothyroxine  75 mcg Intravenous Before breakfast    pantoprazole  40 mg Intravenous Daily    vancomycin (VANCOCIN) IV (PEDS and ADULTS)  1,750 mg Intravenous Q24H     PRN Meds:0.9%  NaCl infusion (for blood administration), acetaminophen, calcium gluconate IVPB, calcium gluconate IVPB, calcium gluconate IVPB, dextrose 50%, dextrose 50%, diphenhydrAMINE, glucagon (human recombinant), glucose, glucose, hydrALAZINE, hydrALAZINE, HYDROmorphone, magnesium sulfate IVPB, magnesium sulfate IVPB, ondansetron, potassium chloride **AND** potassium chloride **AND** potassium chloride, sodium phosphate 15 mmol in dextrose 5 % (D5W) 250 mL IVPB, sodium phosphate 20.01 mmol in dextrose 5 % (D5W) 250 mL IVPB, sodium phosphate 30 mmol in dextrose 5 % (D5W) 250 mL IVPB, traZODone, Pharmacy to dose Vancomycin consult **AND** vancomycin - pharmacy to dose     Objective:     Vital Signs (Most Recent):  Temp: 98.2 °F (36.8 °C) (08/03/23 1914)  Pulse: (!) 111 (08/03/23 2001)  Resp: 18  "(08/03/23 2117)  BP: (!) 117/56 (08/03/23 1914)  SpO2: 96 % (08/03/23 2001) Vital Signs (24h Range):  Temp:  [98 °F (36.7 °C)-99 °F (37.2 °C)] 98.2 °F (36.8 °C)  Pulse:  [] 111  Resp:  [16-20] 18  SpO2:  [96 %-99 %] 96 %  BP: (117-140)/(56-71) 117/56       Intake/Output Summary (Last 24 hours) at 8/3/2023 2139  Last data filed at 8/3/2023 1758  Gross per 24 hour   Intake --   Output 2345 ml   Net -2345 ml       Physical Exam  Constitutional:       General: She is not in acute distress.  Pulmonary:      Effort: Pulmonary effort is normal. No respiratory distress.   Abdominal:      Comments: Ostomy producing flatus.   Incision has somewhat bilious drainage from near umbilicus.   Abdomen is soft.   Appropriately tender.   Neurological:      Mental Status: She is alert.         Significant Labs:  CBC: No results for input(s): "WBC", "RBC", "HGB", "HCT", "PLT", "MCV", "MCH", "MCHC" in the last 48 hours.  CMP: No results for input(s): "GLU", "CALCIUM", "ALBUMIN", "PROT", "NA", "K", "CO2", "CL", "BUN", "CREATININE", "ALKPHOS", "ALT", "AST", "BILITOT" in the last 48 hours.    Significant Diagnostics:  I have reviewed all pertinent imaging results/findings within the past 24 hours.    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] 07/27/2023 Yes    Hypocalcemia [E83.51] 07/28/2023 Yes    Malnutrition of moderate degree [E44.0] 07/27/2023 Yes     Chronic    Hyponatremia [E87.1] 07/27/2023 Yes    Sinus tachycardia [R00.0] 07/27/2023 Yes    BOOM (acute kidney injury) [N17.9] 07/26/2023 No    Severe sepsis [A41.9, R65.20] 07/26/2023 No    Bilateral kidney stones [N20.0] 03/27/2023 Yes    Severe obesity (BMI 35.0-39.9) with comorbidity [E66.01] 06/01/2022 Yes    FENG (iron deficiency anemia) [D50.9] 05/04/2022 Yes    Malignant neoplasm of colon [C18.9] 08/06/2021 Yes    Hypothyroidism with abnormal TFTs [E03.9] 10/15/2020 Yes     Chronic    " COPD (chronic obstructive pulmonary disease) [J44.9] 07/09/2018 Yes      Problems Resolved During this Admission:    Diagnosis Date Noted Date Resolved POA    On mechanically assisted ventilation [Z99.11] 07/27/2023 08/02/2023 Not Applicable     -TPN   -CT negative for abnormality. Drainage has been high. Somewhat bilious.  Clifford is  pretty high suspicion for an enterocutaneous fistula. Plan to allow it to mature as long as no intraabdominal contamination.   -will discuss wound care plan with wound care.    Reji Amos III, MD  General Surgery  [facility]

## 2023-08-04 NOTE — ASSESSMENT & PLAN NOTE
Postop course complicated by acute kidney injury,monitor labs  History of nephrolithiasis status post bilateral ureteral stenting   Continue IV fluid hydration   Pure wick   Renally dosing all medications and avoiding nephrotoxin drugs   Appreciate nephrology input

## 2023-08-04 NOTE — PT/OT/SLP PROGRESS
Physical Therapy      Patient Name:  Tessa Enriquez   MRN:  [id number]    Patient not seen today secondary to pt declined PT attempt at 13:42 due to increased pain but reported good session with OT earlier in day and reduced wound leakage. Will follow-up 8/5/23.

## 2023-08-04 NOTE — PLAN OF CARE
Problem: Parenteral Nutrition  Goal: Effective Intravenous Nutrition Therapy Delivery  Outcome: Ongoing, Progressing  Intervention: Optimize Intravenous Nutrition Delivery  Flowsheets (Taken 8/4/2023 1224)  Nutrition Support Management: parenteral nutrition continued as ordered    PARENTERAL NUTRITION PROGRESS NOTE:      Parenteral Nutrition Day # 2 to hang at 2000 x 24 hrs. Lipids @ 2000 x 12 hrs.  Diagnosis/Indication for PN: s/p bowel resection  IV Access: PICC  Diet/Tube Feeding: NPO         Admixture Type: 2:1  Infusion Rate and Frequency: 75       PN Composition:   95 grams Amino Acid, 140 grams Dextrose, and 50 grams Lipid.    Today's TPN provides 1356 kcal.    *Dextrose is started at less than full dextrose goal to reduce risk for Refeeding Syndrome. Dextrose content will be advanced as appropriate over the next few days.    Please see order for electrolytes and additives content and adjustments.   *The Nutrition Support Team will continue to monitor electrolytes daily and adjust parenteral nutrition as warranted.

## 2023-08-04 NOTE — ASSESSMENT & PLAN NOTE
OR note 7/27 with extensive area of small bowel necrosis   Possible etiology for ongoing tachycardia/sepsis   IV fluid hydration  Broaden antibiotics to meropenem - developed rash - ID consulted   Lactic acid- trended to normal range   Serial abdominal examination   Appreciate general surgery input

## 2023-08-05 PROBLEM — K90.829 SHORT GUT SYNDROME: Status: ACTIVE | Noted: 2023-01-01

## 2023-08-05 PROBLEM — K63.2 ENTEROCUTANEOUS FISTULA: Status: ACTIVE | Noted: 2023-01-01

## 2023-08-05 NOTE — CARE UPDATE
08/04/23 1939   Patient Assessment/Suction   Level of Consciousness (AVPU) alert   Respiratory Effort Normal;Unlabored   Expansion/Accessory Muscles/Retractions no use of accessory muscles   All Lung Fields Breath Sounds equal bilaterally;clear   Rhythm/Pattern, Respiratory unlabored   Cough Frequency no cough   Skin Integrity   $ Wound Care Tech Time 15 min   Area Observed Left;Right;Behind ear   Skin Appearance without discoloration   PRE-TX-O2   Device (Oxygen Therapy) room air   SpO2 96 %   Pulse Oximetry Type Continuous   $ Pulse Oximetry - Multiple Charge Pulse Oximetry - Multiple   Pulse 103   Resp 16   Aerosol Therapy   $ Aerosol Therapy Charges Aerosol Treatment  (Duoneb)   Daily Review of Necessity (SVN) completed   Respiratory Treatment Status (SVN) given   Treatment Route (SVN) mask   Patient Position (SVN) HOB elevated   Post Treatment Assessment (SVN) breath sounds unchanged   Signs of Intolerance (SVN) none   Breath Sounds Post-Respiratory Treatment   Throughout All Fields Post-Treatment All Fields   Throughout All Fields Post-Treatment no change   Post-treatment Heart Rate (beats/min) 101   Post-treatment Resp Rate (breaths/min) 16   Education   $ Education Bronchodilator;15 min   Respiratory Evaluation   $ Care Plan Tech Time 15 min   $ Eval/Re-eval Charges Re-evaluation   Evaluation For Re-Eval 5+ day   Home Oxygen   Has Home Oxygen? No

## 2023-08-05 NOTE — ASSESSMENT & PLAN NOTE
Worsening postoperatively, status post PRBC transfusion   -trend hgb on daily cbc and transfuse as needed.

## 2023-08-05 NOTE — CARE UPDATE
08/05/23 0810   Patient Assessment/Suction   Level of Consciousness (AVPU) alert   Respiratory Effort Normal;Unlabored   Expansion/Accessory Muscles/Retractions no use of accessory muscles   All Lung Fields Breath Sounds diminished;clear   PRE-TX-O2   Device (Oxygen Therapy) room air   SpO2 (!) 93 %   Pulse Oximetry Type Intermittent   $ Pulse Oximetry - Multiple Charge Pulse Oximetry - Multiple   Pulse 105   Resp 16   Positioning HOB elevated 45 degrees   Aerosol Therapy   $ Aerosol Therapy Charges Aerosol Treatment  (PULMICORT)   Daily Review of Necessity (SVN) completed   Respiratory Treatment Status (SVN) given   Treatment Route (SVN) mask;oxygen   Patient Position (SVN) HOB elevated   Post Treatment Assessment (SVN) breath sounds unchanged;vital signs unchanged   Signs of Intolerance (SVN) none   Education   $ Education Bronchodilator;15 min   Respiratory Evaluation   $ Care Plan Tech Time 15 min

## 2023-08-05 NOTE — PROGRESS NOTES
VANCOMYCIN PHARMACOKINETIC NOTE:  Vancomycin Day #7    Objective:    67 y.o., female, Actual Body Weight = 89.4 kg (197 lb)    Diagnosis/Indication for Vancomycin:  Pneumonia   Desired Vancomycin Peak:  30-35 mcg/ml; Desired Trough: 10-15 mcg/m    Current Vancomycin Regimen:  1750 IV every 24 hours    Receiving other antibiotics:  Antibiotics (From admission, onward)      Start     Stop Route Frequency Ordered    08/04/23 0900  eravacycline 90 mg in sodium chloride 0.9% 250 mL infusion         08/11/23 0859 IV Every 12 hours (non-standard times) 08/04/23 0806    08/01/23 1800  vancomycin (VANCOCIN) 1,750 mg in dextrose 5 % (D5W) 500 mL IVPB         -- IV Every 24 hours (non-standard times) 08/01/23 1723    07/29/23 1305  vancomycin - pharmacy to dose  (vancomycin IVPB (PEDS and ADULTS))        See Hyperspace for full Linked Orders Report.    -- IV pharmacy to manage frequency 07/29/23 1206             The patient has the following labs:     8/4/2023 Estimated Creatinine Clearance: 57 mL/min (based on SCr of 1 mg/dL). Lab Results   Component Value Date    BUN 22 08/04/2023     Lab Results   Component Value Date    WBC 5.91 08/04/2023      Vancomycin Trough: IV hung prior to lab. Will reschedule level prior to next     Microbiology Results (last 7 days)       Procedure Component Value Units Date/Time    Urine Culture High Risk [[id number]] Collected: 08/04/23 1943    Order Status: Sent Specimen: Urine, Clean Catch Updated: 08/04/23 1950    Culture, Respiratory with Gram Stain [[id number]]  (Abnormal)  (Susceptibility) Collected: 07/27/23 2350    Order Status: Completed Specimen: Respiratory from Tracheal Aspirate Updated: 07/30/23 0928     Respiratory Culture METHICILLIN RESISTANT STAPHYLOCOCCUS AUREUS  Moderate  Results called to and read back by Alessandra Colón RN-2BICU;    07/30/2023  09:27 CJD       Gram Stain (Respiratory) <10 epithelial cells per low power field.     Gram Stain (Respiratory) Few WBC's     Gram  Stain (Respiratory) Moderate Gram positive cocci             Assessment:  Vancomycin dose =  19.6 mg/kg  Renal function is:worsening    Plan:  No change to current vancomycin regimen  Will obtain vancomycin trough prior to next dose on 8/4 at 17:00.    Pharmacy will continue to manage vancomycin therapy and adjust regimen as necessary.    Thank you for allowing us to participate in this patient's care.     Geovanna Ramirez 8/4/2023 8:21 PM  Department of Pharmacy  Ext [phone]

## 2023-08-05 NOTE — SUBJECTIVE & OBJECTIVE
Interval History: Patient seen and examined. NAEON. No changes today. Still needing excess electrolyte replacement. TPN customized for more potassium. NGT in place.      Objective:     Vital Signs (Most Recent):  Temp: 98.3 °F (36.8 °C) (08/05/23 1505)  Pulse: 107 (08/05/23 1505)  Resp: 20 (08/05/23 1710)  BP: (!) 129/59 (08/05/23 1505)  SpO2: 97 % (08/05/23 1505) Vital Signs (24h Range):  Temp:  [98.2 °F (36.8 °C)-99.1 °F (37.3 °C)] 98.3 °F (36.8 °C)  Pulse:  [103-110] 107  Resp:  [16-20] 20  SpO2:  [92 %-97 %] 97 %  BP: (105-133)/(52-64) 129/59     Weight: 89.4 kg (197 lb)  Body mass index is 37.22 kg/m².    Intake/Output Summary (Last 24 hours) at 8/5/2023 1736  Last data filed at 8/5/2023 0544  Gross per 24 hour   Intake 1317.9 ml   Output 660 ml   Net 657.9 ml         Physical Exam  Constitutional:       General: She is not in acute distress.     Appearance: She is obese. She is ill-appearing.   HENT:      Nose:      Comments: NGT in place, light brownish drainage  Cardiovascular:      Rate and Rhythm: Regular rhythm. Tachycardia present.   Pulmonary:      Effort: Pulmonary effort is normal. No respiratory distress.   Abdominal:      General: There is distension.      Tenderness: There is abdominal tenderness.      Comments: Beny drain and ostomy intact  Large abdominal dressing in place    Skin:     General: Skin is warm and dry.   Neurological:      General: No focal deficit present.      Mental Status: She is alert and oriented to person, place, and time.   Psychiatric:         Mood and Affect: Mood normal.         Behavior: Behavior normal.             Significant Labs: All pertinent labs within the past 24 hours have been reviewed.    Significant Imaging: I have reviewed all pertinent imaging results/findings within the past 24 hours.

## 2023-08-05 NOTE — PROGRESS NOTES
[facility]  Adult Nutrition   Progress Note (Nutrition Support Management)    SUMMARY     Recommendations  1.) Continue TPN with SMOF lipids to provide 1356 kcals, 95gm protein; GIR 1.1.  Pharmacy to cover Sunday.    2.) Hypokalemia: will adjust potassium in TPN bag to better meet needs.    Suggest 40mEq potassium.   3.) Advance diet when medically appropriate to regular.   4.) RD to monitor and provide recommendations PRN.    Goals:   1.) Pt to meet/tolerate >75% of nutritional support.   2.) Progression of wound healing.  Nutrition Goal Status: progressing towards goal    Dietitian Rounds Brief  Follow up: Pt continues on custom TPN @ 75mL/hr providing 1356 kcals, 95gm protein; GIR 1.1. Noted hypokalemia, will adjust potassium in TPN to better meet needs. NGT to LIWS with ~400mL output. Per MD notes:  Bilious drainage from abdominal wound noted. Awaiting general surgery to round this morning. LBM 8/4.    Diet order:   Current Diet Order: NPO      Current Nutrition Support Formula Ordered: Other (Comment) (custom TPN: AA 95gm; Dextrose 140gm; 250mL lipids)  Current Nutrition Support Rate Ordered: 75 (ml)  Current Nutrition Support Frequency Ordered: mL/hr    Evaluation of Received Nutrient/Fluid Intake  Parenteral Calories (kcal): 856  Parenteral Protein (gm): 95  Parenteral Fluid (mL): 1800  Lipid Calories (kcals): 500 kcals  GIR (Glucose Infusion Rate) (mg/kg/min): 1.1 mg/kg/min  Total Calories (kcal): 1356  % Kcal Needs: 76  % Protein Needs: 100  Energy Calories Required: meeting needs  Protein Required: meeting needs  Fluid Required: meeting needs  Comments: -  Tolerance: tolerating     % Intake of Estimated Energy Needs: 50 - 75 %  % Meal Intake: NPO      Intake/Output Summary (Last 24 hours) at 8/5/2023 0915  Last data filed at 8/5/2023 0544  Gross per 24 hour   Intake 1317.9 ml   Output 1465 ml   Net -147.1 ml        Anthropometrics  Temp: 98.2 °F (36.8 °C)  Height Method: Stated  Height: 5'  "1" (154.9 cm)  Height (inches): 61 in  Weight Method: Standard Scale  Weight: 89.4 kg (197 lb)  Weight (lb): 197 lb  Ideal Body Weight (IBW), Female: 105 lb  % Ideal Body Weight, Female (lb): 187.62 %  BMI (Calculated): 37.2  BMI Grade: 35 - 39.9 - obesity - grade II       Estimated/Assessed Needs  Weight Used For Calorie Calculations: 89.4 kg (197 lb 1.5 oz)  Energy Calorie Requirements (kcal): 6105-9636 kcal (20-25 kcal/ kg bw)  Energy Need Method: Kcal/kg  Protein Requirements: 72-96 (1.5-2.0 g/ 48 kg IBW)  Weight Used For Protein Calculations: 48 kg (105 lb 13.1 oz)     Estimated Fluid Requirement Method: RDA Method  RDA Method (mL): 1788       Reason for Assessment  Reason For Assessment: RD follow-up  Relevant Medical History: COPD, hypothyroidism, rectosigmoid cancer, s/p small bowel resection    Nutrition/Diet History  Spiritual, Cultural Beliefs, Gnosticist Practices, Values that Affect Care: no  Food Allergies: NKFA  Factors Affecting Nutritional Intake: NPO, altered gastrointestinal function    Nutrition Risk Screen  Nutrition Risk Screen: tube feeding or parenteral nutrition, large or nonhealing wound, burn or pressure injury     MST Score: 0  Have you recently lost weight without trying?: No  Weight loss score: 0  Have you been eating poorly because of a decreased appetite?: No  Appetite score: 0       Weight History:  Wt Readings from Last 5 Encounters:   07/24/23 89.4 kg (197 lb)   07/03/23 89.3 kg (196 lb 13.9 oz)   06/27/23 89.7 kg (197 lb 12 oz)   06/20/23 88.5 kg (195 lb 1.7 oz)   06/14/23 88.5 kg (195 lb)        Lab/Procedures/Meds: Pertinent Labs/Meds Reviewed    Medications:Pertinent Medications Reviewed  Scheduled Meds:   albuterol-ipratropium  3 mL Nebulization Q6H WAKE    budesonide  0.5 mg Nebulization Q12H    chlorhexidine  15 mL Mouth/Throat BID    enoxparin  40 mg Subcutaneous Q24H (prophylaxis, 1700)    eravacycline 90 mg in sodium chloride 0.9% 250 mL infusion  90 mg Intravenous Q12H    " fluconazole (DIFLUCAN) IV (PEDS and ADULTS)  200 mg Intravenous Q24H    levothyroxine  75 mcg Intravenous Before breakfast    pantoprazole  40 mg Intravenous Daily    vancomycin (VANCOCIN) IV (PEDS and ADULTS)  1,750 mg Intravenous Q24H     Continuous Infusions:   TPN ADULT CENTRAL LINE CUSTOM 75 mL/hr at 08/04/23 2046     PRN Meds:.0.9%  NaCl infusion (for blood administration), acetaminophen, calcium gluconate IVPB, calcium gluconate IVPB, calcium gluconate IVPB, dextrose 50%, dextrose 50%, diphenhydrAMINE, glucagon (human recombinant), glucose, glucose, hydrALAZINE, hydrALAZINE, HYDROmorphone, magnesium sulfate IVPB, magnesium sulfate IVPB, ondansetron, potassium chloride **AND** potassium chloride **AND** potassium chloride, sodium phosphate 15 mmol in dextrose 5 % (D5W) 250 mL IVPB, sodium phosphate 20.01 mmol in dextrose 5 % (D5W) 250 mL IVPB, sodium phosphate 30 mmol in dextrose 5 % (D5W) 250 mL IVPB, traZODone, Pharmacy to dose Vancomycin consult **AND** vancomycin - pharmacy to dose    Labs: Pertinent Labs Reviewed  Clinical Chemistry:  Recent Labs   Lab 07/30/23  0346 07/31/23  1644 08/01/23  0506 08/04/23  0426 08/05/23  0432   * 136   < > 131* 131*   K 3.8 4.1   < > 2.9* 3.0*    107   < > 105 101   CO2 19* 20*   < > 22* 22*   * 109   < > 152* 134*   BUN 15 14   < > 22 38*   CREATININE 0.9 0.8   < > 1.0 1.2   CALCIUM 7.2* 7.0*   < > 6.6* 6.5*   PROT 4.6* 4.8*  --   --   --    ALBUMIN 1.6* 1.6*  --   --   --    BILITOT 0.9 0.7  --   --   --    ALKPHOS 52* 55  --   --   --    AST 25 23  --   --   --    ALT 21 20  --   --   --    ANIONGAP 7* 9   < > 4* 8   MG  --   --    < > 1.9 1.8   PHOS  --  2.2*  --  2.5* 4.2    < > = values in this interval not displayed.     CBC:   Recent Labs   Lab 08/05/23  0432   WBC 5.26   RBC 2.52*   HGB 7.2*   HCT 22.2*      MCV 88   MCH 28.6   MCHC 32.4     Lipid Panel:  Recent Labs   Lab 08/05/23  0432   TRIG 80       Monitor and Evaluation  Food and  Nutrient Intake: parenteral nutrition intake  Food and Nutrient Adminstration: enteral and parenteral nutrition administration  Anthropometric Measurements: weight, weight change  Biochemical Data, Medical Tests and Procedures: electrolyte and renal panel, gastrointestinal profile, glucose/endocrine profile  Nutrition-Focused Physical Findings: overall appearance     Nutrition Risk  Level of Risk/Frequency of Follow-up: high     Nutrition Follow-Up  RD Follow-up?: Yes      Adrienne Edmondson RD 08/05/2023 9:15 AM

## 2023-08-05 NOTE — ASSESSMENT & PLAN NOTE
Multifactorial in the setting of acute medical conditions including dehydration, pain, postop, anemia  - tele  - addressing above conditions

## 2023-08-05 NOTE — PLAN OF CARE
Problem: Nutrition Impaired (Sepsis/Septic Shock)  Goal: Optimal Nutrition Intake  Outcome: Ongoing, Progressing     Problem: Oral Intake Inadequate (Acute Kidney Injury/Impairment)  Goal: Optimal Nutrition Intake  Outcome: Ongoing, Not Progressing     Problem: Nutrition Impairment (Mechanical Ventilation, Invasive)  Goal: Optimal Nutrition Delivery  Outcome: Met     Problem: Oral Intake Inadequate  Goal: Improved Oral Intake  Outcome: Ongoing, Not Progressing     Problem: Parenteral Nutrition  Goal: Effective Intravenous Nutrition Therapy Delivery  Outcome: Ongoing, Progressing  Intervention: Optimize Intravenous Nutrition Delivery  Flowsheets (Taken 8/5/2023 0917)  Nutrition Support Management:   weight trending reviewed   other (see comments)   parenteral nutrition continued as ordered  Intervention: Optimize Nutrition, Fluid and Electrolyte Intake  Flowsheets (Taken 8/5/2023 0917)  Fluid/Electrolyte Management: electrolyte supplement adjusted     Recommendations  1.) Continue TPN with SMOF lipids to provide 1356 kcals, 95gm protein; GIR 1.1.  Pharmacy to cover Sunday.    2.) Hypokalemia: will adjust potassium in TPN bag to better meet needs.               Suggest 40mEq potassium.   3.) Advance diet when medically appropriate to regular.   4.) RD to monitor and provide recommendations PRN.     Goals:   1.) Pt to meet/tolerate >75% of nutritional support.   2.) Progression of wound healing.  Nutrition Goal Status: progressing towards goal

## 2023-08-05 NOTE — ASSESSMENT & PLAN NOTE
OR note 7/27 with extensive area of small bowel necrosis   Possible etiology for ongoing tachycardia/sepsis   - gen surg following  - TPN, NGT, monitor for bowel function

## 2023-08-05 NOTE — ASSESSMENT & PLAN NOTE
Resolved   Extensive area of small bowel necrosis -s/p surgical repair   S/p merrem  Resp Cx: + MRSA - vancomycin/contact isolation  Urine and blood culture negative   - abx per ID

## 2023-08-05 NOTE — PROGRESS NOTES
"Consult Note  Infectious Disease    Reason for Consult:  Drainage from incision site; allergy to meropenem/penicillin/flagyl    HPI: Tessa Enriquez is a 67 y.o. female , obese, BMI 37.2Kg/m2, with unfortunate past medical history of recurrent colorectal adenocarcinoma, and b/l kidney stones status post robotic colectomy in November 2020, 1 out of 46 nodules was positive.  She could not tolerate chemo and was only able to complete 3 treatments and radiation therapy.  In August of 2022 she was found to have recurrence, status post 2nd session of chemotherapy.  On repeat surveillance this year in May she had evidence of adenocarcinoma at the anastomosis site.  PET-CT on June 14th demonstrated decreased size and activity of the mass.    She was admitted on 07/24 for ex-lap, s/p 8mg dexamethasone pre-op. As per operation note: "There is evidence of catastrophic events within the abdominal cavity.  There was diffuse he ischemia noted throughout multiple portions of the small bowel.  There were multiple sharp areas of demarcation between viable bowel and ischemic bowel.  Having removed the other obvious he clean necrotic areas there was evidence of dusky and bogginess at the site of her previous jejunal jejunostomy.  Decision was made against any reestablishment of continuity.  Remaining small bowel approximately 70-75 cm left.", Surgery required assistance from Urology for b/l ureteral catheter placement.     Patient was transferred to [facility] for ICU care.  Postop course complicated by tachycardia and hypotension.  Status post blood transfusion.     During hospital stay, respiratory culture grew MRSA 7/27. The patient developed a rash, clarified with nursing, unclear if vancomycin related although infusion rate was adjusted and rash resolved.  Seems like a second rash developed yesterday after 6 days of meropenem.      Patient seen and examined at bedside, she reports she is feeling fine, NG tube " in place, she is requesting something to eat.  Stable BP, tachycardic, PureWick in place, hazy urine noted.  Ostomy with liquid output. Surgical wound with bilious drainage form mid incision. On TPN. Repeat CT scan concerning for EC fistu;a.     ID consult for drainage from incision site, concern for peritonitis: Allergy to meropenem, penicillin, Flagyl.    Antibiotics:  Cipro 7/24  Cipro 7/26/27  Meropenem 7/27-8/1  Fluconazole 7/28--present  Vancomycin 7/29--present     8/5/23(Vijaya) case d/w Dr. Lacey. She is alert, complains of hunger. Reports(confirmed bynursing) of copious bilious drainage from incision. Noted previously by surgery. NGT remains in place. She is drinking water for dry mouth. Sat up in bed, but reluctant to get out of bed due to pain and increased drainage. Volume loss reflected in multiple electrolyte abnormalities.  She has no fever. Multiple family members at bedside.     Review of patient's allergies indicates:   Allergen Reactions    Ativan [lorazepam] Hives and Itching    Merrem [meropenem] Rash    Flagyl [metronidazole] Itching and Rash    Pcn [penicillins] Hives, Itching and Rash     Past Medical History:   Diagnosis Date    Acute hypoxemic respiratory failure 07/09/2018    Arthritis     Bilateral kidney stones 3/27/2023    Cancer     colon    COPD (chronic obstructive pulmonary disease)     History of home oxygen therapy     ONLY PRN AND HASN'T USED IT IN A YEAR    Hyperlipidemia     Thyroid disease      Past Surgical History:   Procedure Laterality Date    CHOLECYSTECTOMY      COLECTOMY N/A 11/9/2020    Procedure: COLECTOMY;  Surgeon: Brandon Wagner MD;  Location: [facility] OR;  Service: General;  Laterality: N/A;  LOWER- ANTERIOR    COLONOSCOPY N/A 10/23/2020    Procedure: COLONOSCOPY;  Surgeon: Jolynn Ayala MD;  Location: [facility] ENDO;  Service: Endoscopy;  Laterality: N/A;    COLONOSCOPY N/A 8/6/2021    Procedure: COLONOSCOPY;  Surgeon: Jolynn Ayala MD;  Location: [facility]  ENDO;  Service: Endoscopy;  Laterality: N/A;    COLONOSCOPY N/A 7/7/2022    Procedure: COLONOSCOPY;  Surgeon: Jolynn Ayala MD;  Location: [facility] ENDO;  Service: Endoscopy;  Laterality: N/A;    COLONOSCOPY N/A 5/31/2023    Procedure: COLONOSCOPY;  Surgeon: Jolynn Ayala MD;  Location: [facility] ENDO;  Service: Endoscopy;  Laterality: N/A;    COLOSTOMY N/A 7/24/2023    Procedure: CREATION, COLOSTOMY;  Surgeon: Brandon Wagner MD;  Location: [facility] OR;  Service: General;  Laterality: N/A;    COMPLETION PROCTECTOMY N/A 7/24/2023    Procedure: PROCTECTOMY, COMPLETION;  Surgeon: Brandon Wagner MD;  Location: [facility] OR;  Service: General;  Laterality: N/A;    CYSTOSCOPY W/ URETERAL STENT PLACEMENT Right 2/22/2023    Procedure: CYSTOSCOPY, WITH URETERAL STENT INSERTION;  Surgeon: Carin Kwon Jr., MD;  Location: [facility] OR;  Service: Urology;  Laterality: Right;    CYSTOSCOPY W/ URETERAL STENT PLACEMENT Bilateral 3/26/2023    Procedure: CYSTOSCOPY, WITH URETERAL STENT INSERTION;  Surgeon: Sabine Barber MD;  Location: [facility] OR;  Service: Urology;  Laterality: Bilateral;    CYSTOSCOPY W/ URETERAL STENT PLACEMENT Bilateral 7/24/2023    Procedure: CYSTOSCOPY, WITH URETERAL STENT INSERTION;  Surgeon: Adrienne Mijraes MD;  Location: [facility] OR;  Service: Urology;  Laterality: Bilateral;  bassem placing stents 1st    CYSTOSCOPY WITH URETEROSCOPY, RETROGRADE PYELOGRAPHY, AND INSERTION OF STENT Bilateral 3/26/2023    Procedure: CYSTOSCOPY, WITH RETROGRADE PYELOGRAM AND URETERAL STENT INSERTION;  Surgeon: Sabine Barber MD;  Location: [facility] OR;  Service: Urology;  Laterality: Bilateral;    CYSTOURETEROSCOPY WITH RETROGRADE PYELOGRAPHY AND INSERTION OF STENT INTO URETER Right 3/7/2023    Procedure: CYSTOURETEROSCOPY, WITH RETROGRADE PYELOGRAM AND URETERAL STENT INSERTION;  Surgeon: Carin Kwon Jr., MD;  Location: [facility] OR;  Service: Urology;  Laterality: Right;    CYSTOURETEROSCOPY, WITH HOLMIUM LASER  LITHOTRIPSY OF URETERAL CALCULUS AND STENT INSERTION Bilateral 4/11/2023    Procedure: CYSTOURETEROSCOPY, WITH HOLMIUM LASER LITHOTRIPSY OF URETERAL CALCULUS AND STENT INSERTION;  Surgeon: Carin Kwon Jr., MD;  Location: [facility] OR;  Service: Urology;  Laterality: Bilateral;    ESOPHAGEAL DILATION N/A 6/29/2021    Procedure: DILATION, ESOPHAGUS;  Surgeon: Sourav Baires III, MD;  Location: [facility] ENDO;  Service: Endoscopy;  Laterality: N/A;    ESOPHAGOGASTRODUODENOSCOPY  06/29/2021    Dilation to 57 Fr    ESOPHAGOGASTRODUODENOSCOPY N/A 6/29/2021    Procedure: EGD (ESOPHAGOGASTRODUODENOSCOPY);  Surgeon: Sourav Baires III, MD;  Location: [facility] ENDO;  Service: Endoscopy;  Laterality: N/A;    EXCISION, SMALL INTESTINE  7/28/2023    Procedure: EXCISION, SMALL INTESTINE;  Surgeon: Brnadon Wagner MD;  Location: [facility] OR;  Service: General;;    EXCISION, SMALL INTESTINE N/A 7/27/2023    Procedure: EXCISION, SMALL INTESTINE;  Surgeon: Brandon Wagner MD;  Location: [facility] OR;  Service: General;  Laterality: N/A;    FLEXIBLE SIGMOIDOSCOPY N/A 8/1/2022    Procedure: SIGMOIDOSCOPY, FLEXIBLE;  Surgeon: Brandon Wagner MD;  Location: [facility] ENDO;  Service: Endoscopy;  Laterality: N/A;    HYSTERECTOMY      ILEOSTOMY  7/28/2023    Procedure: CREATION, ILEOSTOMY;  Surgeon: Brandon Wagner MD;  Location: [facility] OR;  Service: General;;    INSERTION OF TUNNELED CENTRAL VENOUS CATHETER (CVC) WITH SUBCUTANEOUS PORT N/A 1/11/2021    Procedure: [id number];  Surgeon: Brandon Wagner MD;  Location: [facility] OR;  Service: General;  Laterality: N/A;    INSERTION OF TUNNELED CENTRAL VENOUS CATHETER (CVC) WITH SUBCUTANEOUS PORT Right 9/1/2022    Procedure: [id number];  Surgeon: Brandon Wagner MD;  Location: [facility] OR;  Service: General;  Laterality: Right;  Wants rt side placement    LAPAROTOMY, EXPLORATORY N/A 7/24/2023    Procedure: LAPAROTOMY, EXPLORATORY;  Surgeon: Brandon Wagner MD;  Location: [facility];  Service:  General;  Laterality: N/A;  case must go 1st-chamberlain placing stents    LAPAROTOMY, EXPLORATORY N/A 7/28/2023    Procedure: LAPAROTOMY, EXPLORATORY;  Surgeon: Brandon Wagner MD;  Location: [facility] OR;  Service: General;  Laterality: N/A;    LAPAROTOMY, EXPLORATORY N/A 7/27/2023    Procedure: LAPAROTOMY, EXPLORATORY;  Surgeon: Brandon Wagner MD;  Location: [facility] OR;  Service: General;  Laterality: N/A;    MEDIPORT REMOVAL Left 4/19/2021    Procedure: REMOVAL, CATHETER, CENTRAL VENOUS, TUNNELED, WITH PORT;  Surgeon: Brandon Wagner MD;  Location: [facility] OR;  Service: General;  Laterality: Left;    REMOVAL-STENT Right 3/7/2023    Procedure: REMOVAL-STENT;  Surgeon: Carin Kwon Jr., MD;  Location: [facility] OR;  Service: Urology;  Laterality: Right;    REMOVAL-STENT Bilateral 4/11/2023    Procedure: REMOVAL-STENT;  Surgeon: Carin Kwon Jr., MD;  Location: [facility] OR;  Service: Urology;  Laterality: Bilateral;    RETROGRADE PYELOGRAPHY Bilateral 4/11/2023    Procedure: PYELOGRAM, RETROGRADE;  Surgeon: Carin Kwon Jr., MD;  Location: [facility] OR;  Service: Urology;  Laterality: Bilateral;    ROBOT-ASSISTED COLECTOMY N/A 11/9/2020    Procedure: ROBOTIC COLECTOMY low anterior resection, possible open;  Surgeon: Brandon Wagner MD;  Location: [facility] OR;  Service: General;  Laterality: N/A;  CONVERTED TO OPEN AT 1503    SUBTOTAL COLECTOMY N/A 7/24/2023    Procedure: COLECTOMY, PARTIAL;  Surgeon: Brandon Wagner MD;  Location: [facility] OR;  Service: General;  Laterality: N/A;  with colorectal Anastamosis    URETEROSCOPIC REMOVAL OF URETERIC CALCULUS Right 3/7/2023    Procedure: REMOVAL, CALCULUS, URETER, URETEROSCOPIC;  Surgeon: Carin Kwon Jr., MD;  Location: [facility] OR;  Service: Urology;  Laterality: Right;    URETEROSCOPIC REMOVAL OF URETERIC CALCULUS Bilateral 4/11/2023    Procedure: REMOVAL, CALCULUS, URETER, URETEROSCOPIC;  Surgeon: Carin Kwon Jr., MD;  Location: [facility] OR;  Service: Urology;  Laterality: Bilateral;      Social History     Tobacco Use    Smoking status: Former     Current packs/day: 0.00     Average packs/day: 2.0 packs/day for 40.0 years (80.0 ttl pk-yrs)     Types: Cigarettes     Start date: 1977     Quit date: 2017     Years since quittin.0    Smokeless tobacco: Never   Substance Use Topics    Alcohol use: No        Family History   Problem Relation Age of Onset    COPD Mother     COPD Father     Prostate cancer Brother     Breast cancer Maternal Grandmother      Review of Systems:  per Dr. Lacey  No chills, fever, sweats, weight loss  No change in vision, loss of vision or diplopia  No sinus congestion, purulent nasal discharge, post nasal drip or facial pain  No pain in mouth or throat. No problems with teeth, gums.  No chest pain, palpitations, syncope  No cough, sputum production, shortness of breath, dyspnea on exertion, pleurisy, hemoptysis  No dysphagia, odynophagia  No nausea, vomiting, abdominal pain  No dysuria, hesitancy, hematuria, retention, incontinence  No swelling of joints, redness of joints, injuries, or new focal pain  No unusual headaches, dizziness, vertigo, numbness, paresthesias, neuropathy, falls  No bleeding, lymphadenopathy  Rash on her legs, lesions, or wounds    Outdoor activities: From home, former smoker  Antibiotic History: See above    EXAM & DIAGNOSTICS REVIEWED:   Vitals:     Temp:  [98.2 °F (36.8 °C)-99.1 °F (37.3 °C)]   Temp: 99.1 °F (37.3 °C) (23 1149)  Pulse: 103 (23 1149)  Resp: 20 (23 1149)  BP: (!) 105/52 (23 1149)  SpO2: (!) 92 % (23 1149)    Intake/Output Summary (Last 24 hours) at 2023 1237  Last data filed at 2023 0544  Gross per 24 hour   Intake 1317.9 ml   Output 1465 ml   Net -147.1 ml       General:  Chronically-ill appearing, obese, In NAD. Alert and attentive, cooperative, comfortable on O2 by NC 2L  Eyes:  Anicteric, PERRL  ENT:  NG tube in place, moist oral mucosa, edentulous   Neck:  Supple  Lungs: Clear  "to auscultation b/l  Heart:  S1/S2+, regular rhythm, no murmurs  Abd:  Distended, JUANITA drain with bloddy drainage, ostomy with liquid output, decreased bowel sounds, very large and fresh bandage in place, d/w nursing. Ostomy appears pink and viable(better than 8/2 photos)  :  Purewick, hazy urine   Musc:  Joints without effusion, swelling,  erythema, synovitis,   Skin:  Warm, no evidence of rash on chest, arms, abdomen or legs   Wound:   Neuro:  Following commands, speech is clear, moves all extremities   Psych:  Calm, cooperative  Lymphatic:     No cervical, supraclavicular nodes  Extrem: No LE edema, no evidence of rash  VAD:  L PICC line 8/2/23    L Port-A-Cath not accessed, no redness noted      Isolation: Contact/MRSA    8/2:        8/1:      Right leg rash   Left leg rash    General Labs reviewed:  Recent Labs   Lab 08/01/23  0505 08/04/23  0426 08/05/23  0432   WBC 6.14 5.91 5.26   HGB 7.6* 7.5* 7.2*   HCT 24.3* 23.2* 22.2*    227 222       Recent Labs   Lab 07/30/23  0346 07/31/23  1644 08/01/23  0506 08/04/23  0426 08/05/23  0432   * 136 138 131* 131*   K 3.8 4.1 3.8 2.9* 3.0*    107 113* 105 101   CO2 19* 20* 21* 22* 22*   BUN 15 14 17 22 38*   CREATININE 0.9 0.8 0.8 1.0 1.2   CALCIUM 7.2* 7.0* 6.8* 6.6* 6.5*   PROT 4.6* 4.8*  --   --   --    BILITOT 0.9 0.7  --   --   --    ALKPHOS 52* 55  --   --   --    ALT 21 20  --   --   --    AST 25 23  --   --   --      No results for input(s): "CRP" in the last 168 hours.  No results for input(s): "SEDRATE" in the last 168 hours.    Estimated Creatinine Clearance: 47.5 mL/min (based on SCr of 1.2 mg/dL).     Micro:  Microbiology Results (last 7 days)       Procedure Component Value Units Date/Time    Urine Culture High Risk [[id number]] Collected: 08/04/23 1943    Order Status: Completed Specimen: Urine, Clean Catch Updated: 08/05/23 0804     Urine Culture, Routine No growth to date    Narrative:      Indicated criteria for high risk " culture:->Other  Other (specify):->complicated abdomen    Culture, Respiratory with Gram Stain [[id number]]  (Abnormal)  (Susceptibility) Collected: 07/27/23 6160    Order Status: Completed Specimen: Respiratory from Tracheal Aspirate Updated: 07/30/23 0928     Respiratory Culture METHICILLIN RESISTANT STAPHYLOCOCCUS AUREUS  Moderate  Results called to and read back by Alessandra Colón RN-2BICU;    07/30/2023  09:27 CJD       Gram Stain (Respiratory) <10 epithelial cells per low power field.     Gram Stain (Respiratory) Few WBC's     Gram Stain (Respiratory) Moderate Gram positive cocci            Imaging Reviewed:  CXRs  CT abdomen/pelvis 8/2:  1. Colonic resection with left lower abdominal quadrant colostomy, punctate foci of intra-abdominal free air and a small volume of ascites, likely related to recent postoperative state.  2. Mildly prominent gas and fluid-filled loops of small bowel containing contrast suggestive of ileus and less likely partial low-grade mechanical bowel obstruction. Consider radiographic follow-up.  3. Airspace opacity in the left greater than right posterior dependent lower lobe with air bronchograms suggestive of atelectasis and aspiration/pneumonia, with a small left and trace right pleural effusion.  4. Moderate diffuse body wall edema (anasarca).  5. Full bladder, with mild bilateral hydroureteronephrosis and no radiopaque stones seen, consider correlation with urine output and urinalysis as warranted (noting there is a punctate focus of gas in the bladder, and a gas forming infection is not excluded).    Cardiology: EKG Qtc: 486ms    PATHOLOGY:   7/24:  1. DESIGNATED AS LYMPH NODES:   - BENIGN CYST PARTIALLY LINED BY MULLERIAN TYPE EPITHELIUM, POSSIBLE  REMNANTS OF PARATUBAL CYST.   - LYMPH NODE TISSUE WAS NOT IDENTIFIED GROSSLY OR MICROSCOPICALLY.   - NEGATIVE FOR MALIGNANCY.     2. DISTAL RECTUM, RESECTION:   - FOCUS OF MODERATELY DIFFERENTIATED ADENOCARCINOMA INVOLVING INFLAMED  AND  ULCERATED RECTAL MUCOSA.   - PROXIMAL RESECTION MARGIN IS POSITIVE FOR ADENOCARCINOMA  - EXTENSIVE TRANSMURAL CHRONIC AND ACUTE INFLAMMATION WITH MUCOSAL  ULCERATION.   - TWO PERIRECTAL LYMPH NODES NEGATIVE FOR MALIGNANCY.     3. SEGMENT OF SMALL INTESTINE:   - SEROSAL ADHESIONS.   - RECENT TRANSMURAL DEFECT WITHOUT ASSOCIATE INFLAMMATION OR HEMORRHAGE.   - NEGATIVE FOR MALIGNANCY.     4. PROXIMAL COLON, RESECTION:   - INVASIVE MODERATELY DIFFERENTIATED ADENOCARCINOMA IN THE BACKGROUND OF RECTAL ULCERATION AND MARKED CHRONIC AND ACUTE INFLAMMATION.   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, 75cm of small bowel left, concern for EC fistula formation      MRSA  pneumonia, improving     B/l kidney stones, s/p b/l stents 7/24    H/o recurrent colorectal adenocarcinoma s/p chemotherapy     Malnutrition, albumin 1.6 - on TPN    Recommendations:     UA with reflex to culture neg so far  Eravacycline 90mg IV q12h to cover GNR and anaerobes   Continue Vancomycin IV, keep level 15-20 for MRSA pneumonia(though eravacycline covers MRSA as well)  Fluconazole 200mg IV daily as prophylaxis     Aspiration precautions , reinforced at bedside    Unfortunately, poor prognosis   D/w patient, family and nursing    available over the weekend if needed      Medical Decision Making during this encounter was  [_] Low Complexity  [_] Moderate Complexity  [xx] High Complexity

## 2023-08-05 NOTE — PROGRESS NOTES
[facility]  Progress Note    Patient Name: Tessa Enriquez  MRN: [id number]  Patient Class: IP- Inpatient   Admission Date: 7/24/2023  Length of Stay: 12 days  Attending Physician: Kevin Bryant MD  Primary Care Provider: Karma Mcdermott MD        Subjective:     Principal Problem:Ischemic necrosis of small bowel        HPI:  Ms. Enriquez is a 67-years-old female who presented as a direct admission from [facility] for ICU level of care postoperatively.  Patient with a history of recurrent colorectal adenocarcinoma, status post neoadjuvant radiation and chemotherapy, followed by oncologist Dr. Frank.  On 07/24 she underwent exploratory laparotomy with extensive lysis of adhesion, proctectomy with creation of end colostomy by Dr. Wagner and she was admitted to hospital medicine postoperatively.  Postop course complicated by ongoing sinus tachycardia with anemia, status post PRBC transfusion and acute kidney injury, seen by Nephrology.  Earlier today ostomy noted to be dusky with concern for ischemia, she was taken back to the OR and underwent exploratory laparotomy with small-bowel resection, as per OR note, large area of small bowel resected with four areas of ischemia.  She was intubated for surgery and remains intubated postoperatively.  Reported history of COPD, hypothyroidism, nephrolithiasis status post bilateral ureteral stenting.  She is currently sedated on propofol, orally intubated on mechanical ventilation.  Repeat labs have been submitted.  As per ICU RN plans for possible repeat surgery.  Plan of care reviewed with nursing.  No visitors at bedside.      Overview/Hospital Course:  Patient was transferred from [facility] for ICU care postoperatively: ostomy noted to be dusky with concern for ischemia, she was taken back to the OR and underwent exploratory laparotomy with small-bowel resection, as per OR note, large area of small bowel resected with  four areas of ischemia transferred while intubated. She was followed by pulmnology/CC, ID, nephrology and GS. Once extubated, she was transferred to med-surg. She was on meropenem for peritonitis but only completed 5 of 7 days due to rash that developed. Allergies to PCN and flagyl.  Currently on Vanc, eravacycline, and diflucan. NGT utilized. TPN utilized. Refractory electrolyte replacement given. Patient likely has enterocutaneous fistula and general surgery allowing this to mature.   is working on LTAC placement.      Interval History: Patient seen and examined. NAEON. No changes today. Still needing excess electrolyte replacement. TPN customized for more potassium. NGT in place.      Objective:     Vital Signs (Most Recent):  Temp: 98.3 °F (36.8 °C) (08/05/23 1505)  Pulse: 107 (08/05/23 1505)  Resp: 20 (08/05/23 1710)  BP: (!) 129/59 (08/05/23 1505)  SpO2: 97 % (08/05/23 1505) Vital Signs (24h Range):  Temp:  [98.2 °F (36.8 °C)-99.1 °F (37.3 °C)] 98.3 °F (36.8 °C)  Pulse:  [103-110] 107  Resp:  [16-20] 20  SpO2:  [92 %-97 %] 97 %  BP: (105-133)/(52-64) 129/59     Weight: 89.4 kg (197 lb)  Body mass index is 37.22 kg/m².    Intake/Output Summary (Last 24 hours) at 8/5/2023 1736  Last data filed at 8/5/2023 0544  Gross per 24 hour   Intake 1317.9 ml   Output 660 ml   Net 657.9 ml         Physical Exam  Constitutional:       General: She is not in acute distress.     Appearance: She is obese. She is ill-appearing.   HENT:      Nose:      Comments: NGT in place, light brownish drainage  Cardiovascular:      Rate and Rhythm: Regular rhythm. Tachycardia present.   Pulmonary:      Effort: Pulmonary effort is normal. No respiratory distress.   Abdominal:      General: There is distension.      Tenderness: There is abdominal tenderness.      Comments: Beny drain and ostomy intact  Large abdominal dressing in place    Skin:     General: Skin is warm and dry.   Neurological:      General: No focal deficit  present.      Mental Status: She is alert and oriented to person, place, and time.   Psychiatric:         Mood and Affect: Mood normal.         Behavior: Behavior normal.             Significant Labs: All pertinent labs within the past 24 hours have been reviewed.    Significant Imaging: I have reviewed all pertinent imaging results/findings within the past 24 hours.      Assessment/Plan:      * Ischemic necrosis of small bowel  OR note 7/27 with extensive area of small bowel necrosis   Possible etiology for ongoing tachycardia/sepsis   - gen surg following  - TPN, NGT, monitor for bowel function    Short gut syndrome  - gen surg following    Enterocutaneous fistula  Suspected  - gen surg following and allowing to mature    Hypocalcemia  - replace with prn IV scale    Sinus tachycardia  Multifactorial in the setting of acute medical conditions including dehydration, pain, postop, anemia  - tele  - addressing above conditions    Hyponatremia  Stable  - continue TPN  - trend daily on chem panel    Malnutrition of moderate degree  Low albumin and pre-albumin  Currently NPO  - TPN    Severe sepsis  Resolved   Extensive area of small bowel necrosis -s/p surgical repair   S/p merrem  Resp Cx: + MRSA - vancomycin/contact isolation  Urine and blood culture negative   - abx per ID    BOOM (acute kidney injury)  Resolved     Bilateral kidney stones  Seen by Dr. Mijares this admission, followed outpatient by Dr. Kwon   Status post bilateral stent placement    Severe obesity (BMI 35.0-39.9) with comorbidity  Body mass index is 37.22 kg/m². Morbid obesity complicates all aspects of disease management from diagnostic modalities to treatment. Weight loss encouraged and health benefits explained to patient.     FENG (iron deficiency anemia)  Worsening postoperatively, status post PRBC transfusion   -trend hgb on daily cbc and transfuse as needed.    Malignant neoplasm of colon  History of recurrent colorectal adenocarcinoma  See  small-bowel ischemia  Did receive adjuvant chemoradiation, followed by Dr. Frank    Hypothyroidism with abnormal TFTs  Recent TSH elevated at 23, as per chart review elevated 2 years previous, unclear if compliance   Given current NPO status with bowel surgery changed to IV levothyroxine    COPD (chronic obstructive pulmonary disease)  No evidence of acute exacerbation   P.r.n. ABG and chest x-ray  Scheduled breathing treatments      VTE Risk Mitigation (From admission, onward)         Ordered     enoxaparin injection 40 mg  Every 24 hours         07/28/23 1849     Place sequential compression device  Until discontinued         07/28/23 0724     IP VTE HIGH RISK PATIENT  Once         07/24/23 2015                Discharge Planning   DERICK: 8/8/2023     Code Status: Full Code   Is the patient medically ready for discharge?:     Reason for patient still in hospital (select all that apply): Patient trending condition, Treatment, Consult recommendations and Pending disposition  Discharge Plan A: Long-term acute care facility (LTAC)   Discharge Delays: None known at this time              Kevin Bryant MD  Department of Hospital Medicine   [facility]

## 2023-08-05 NOTE — PROGRESS NOTES
Pharmacokinetic Assessment Follow Up: IV Vancomycin    Vancomycin serum concentration assessment(s):    The trough level was drawn correctly and can be used to guide therapy at this time. The measurement is within the desired definitive target range of 15 to 20 mcg/mL.    Vancomycin Regimen Plan:    Continue regimen to Vancomycin 1750 mg IV every 24 hours with next serum trough concentration measured at 17:00 prior to 11th dose on 8/8/23.    Drug levels (last 3 results):  Recent Labs   Lab Result Units 08/05/23  1720   Vancomycin-Trough ug/mL 16.7       Pharmacy will continue to follow and monitor vancomycin.    Please contact pharmacy at extension [phone] for questions regarding this assessment.    Thank you for the consult,   Delmer Collier       Patient brief summary:  Tessa Enriquez is a 67 y.o. female initiated on antimicrobial therapy with IV Vancomycin for treatment of  MRSA pneumonia    The patient's current regimen is Vancomycin 1750 mg q24h.    Drug Allergies:   Review of patient's allergies indicates:   Allergen Reactions    Ativan [lorazepam] Hives and Itching    Merrem [meropenem] Rash    Flagyl [metronidazole] Itching and Rash    Pcn [penicillins] Hives, Itching and Rash       Actual Body Weight:   89.4 kg    Renal Function:   Estimated Creatinine Clearance: 47.5 mL/min (based on SCr of 1.2 mg/dL).,     Dialysis Method (if applicable):  N/A    CBC (last 72 hours):  Recent Labs   Lab Result Units 08/04/23 0426 08/05/23  0432   WBC K/uL 5.91 5.26   Hemoglobin g/dL 7.5* 7.2*   Hematocrit % 23.2* 22.2*   Platelets K/uL 227 222   Gran % % 88.2* 37.0*   Lymph % % 5.4* 13.0*   Mono % % 4.4 1.0*   Eosinophil % % 1.0 0.0   Basophil % % 0.0 0.0   Differential Method  Automated Manual       Metabolic Panel (last 72 hours):  Recent Labs   Lab Result Units 08/04/23  0426 08/04/23  1943 08/05/23  0432   Sodium mmol/L 131*  --  131*   Potassium mmol/L 2.9*  --  3.0*   Chloride mmol/L 105  --  101   CO2 mmol/L 22*   --  22*   Glucose mg/dL 152*  --  134*   Glucose, UA   --  2+*  --    BUN mg/dL 22  --  38*   Creatinine mg/dL 1.0  --  1.2   Magnesium mg/dL 1.9  --  1.8   Phosphorus mg/dL 2.5*  --  4.2       Vancomycin Administrations:  vancomycin given in the last 96 hours                     vancomycin (VANCOCIN) 1,750 mg in dextrose 5 % (D5W) 500 mL IVPB (mg) 1,750 mg New Bag 08/05/23 1830     1,750 mg New Bag 08/04/23 1708     1,750 mg New Bag 08/03/23 1758     1,750 mg New Bag 08/02/23 1802                    Microbiologic Results:  Microbiology Results (last 7 days)       Procedure Component Value Units Date/Time    Urine Culture High Risk [[id number]] Collected: 08/04/23 1943    Order Status: Completed Specimen: Urine, Clean Catch Updated: 08/05/23 0804     Urine Culture, Routine No growth to date    Narrative:      Indicated criteria for high risk culture:->Other  Other (specify):->complicated abdomen    Culture, Respiratory with Gram Stain [[id number]]  (Abnormal)  (Susceptibility) Collected: 07/27/23 2350    Order Status: Completed Specimen: Respiratory from Tracheal Aspirate Updated: 07/30/23 0928     Respiratory Culture METHICILLIN RESISTANT STAPHYLOCOCCUS AUREUS  Moderate  Results called to and read back by Alessandra Colón RN-2BICU;    07/30/2023  09:27 CJD       Gram Stain (Respiratory) <10 epithelial cells per low power field.     Gram Stain (Respiratory) Few WBC's     Gram Stain (Respiratory) Moderate Gram positive cocci

## 2023-08-06 PROBLEM — J15.212 MRSA PNEUMONIA: Status: ACTIVE | Noted: 2023-01-01

## 2023-08-06 NOTE — ASSESSMENT & PLAN NOTE
Returned now possibly IVVD +/- vancomycin use  - dc vanc per ID  - getting more IVF with electrolyte replacements  - getting unit of pRBC as well with hgb 7 and low BP  - trend renal function daily

## 2023-08-06 NOTE — SUBJECTIVE & OBJECTIVE
Interval History: Patient seen and examined. SLIM. Similar clinical picture. Reports some gas output. BP trending lower with BOOM today.      Objective:     Vital Signs (Most Recent):  Temp: 99.6 °F (37.6 °C) (08/06/23 1050)  Pulse: 99 (08/06/23 1050)  Resp: 16 (08/06/23 1050)  BP: (!) 89/50 (08/06/23 1050)  SpO2: (!) 94 % (08/06/23 1050) Vital Signs (24h Range):  Temp:  [98.3 °F (36.8 °C)-99.6 °F (37.6 °C)] 99.6 °F (37.6 °C)  Pulse:  [] 99  Resp:  [16-20] 16  SpO2:  [92 %-99 %] 94 %  BP: ()/(49-59) 89/50     Weight: 89.4 kg (197 lb)  Body mass index is 37.22 kg/m².    Intake/Output Summary (Last 24 hours) at 8/6/2023 1102  Last data filed at 8/6/2023 0440  Gross per 24 hour   Intake --   Output 4225 ml   Net -4225 ml         Physical Exam  Constitutional:       General: She is not in acute distress.     Appearance: She is obese. She is ill-appearing.   HENT:      Nose:      Comments: NGT in place, light brownish drainage  Cardiovascular:      Rate and Rhythm: Regular rhythm. Tachycardia present.   Pulmonary:      Effort: Pulmonary effort is normal. No respiratory distress.   Abdominal:      General: There is distension.      Tenderness: There is abdominal tenderness.      Comments: Beny drain and ostomy intact  Large abdominal dressing in place    Skin:     General: Skin is warm and dry.   Neurological:      General: No focal deficit present.      Mental Status: She is alert and oriented to person, place, and time.   Psychiatric:         Mood and Affect: Mood normal.         Behavior: Behavior normal.             Significant Labs: All pertinent labs within the past 24 hours have been reviewed.    Significant Imaging: I have reviewed all pertinent imaging results/findings within the past 24 hours.

## 2023-08-06 NOTE — PT/OT/SLP PROGRESS
Physical Therapy Treatment    Patient Name:  Tessa Enriquez   MRN:  [id number]    Recommendations:     Discharge Recommendations: rehabilitation facility  Discharge Equipment Recommendations: to be determined by next level of care  Barriers to discharge:  increase assist with mobility, high fall risk, decrease activity tolerance, pain    Assessment:     Tessa Enriquez is a 67 y.o. female admitted with a medical diagnosis of Ischemic necrosis of small bowel.  She presents with the following impairments/functional limitations: weakness, impaired endurance, impaired functional mobility, impaired self care skills, gait instability, impaired balance, decreased lower extremity function, decreased safety awareness, pain, impaired cardiopulmonary response to activity.    Pt found in bed with HOB elevated. RN present and patient premedicated. Pt performed bed mobility with mod A x 2 with supine to sit. Pt requires mod A initially for sitting balance improved to supervision. Sit to stand with mod A x 2 to RW. Pt able to progress to lateral side steps to HOB with mod A x 2 to RW. NG tube dislodged while sitting EOB. RN present and aware.     Rehab Prognosis: Fair; patient would benefit from acute skilled PT services to address these deficits and reach maximum level of function.    Recent Surgery: Procedure(s) (LRB):  LAPAROTOMY, EXPLORATORY (N/A)  EXCISION, SMALL INTESTINE  CREATION, ILEOSTOMY 9 Days Post-Op    Plan:     During this hospitalization, patient to be seen daily to address the identified rehab impairments via gait training, therapeutic activities, therapeutic exercises, neuromuscular re-education and progress toward the following goals:    Plan of Care Expires:  09/06/23    Subjective     Chief Complaint: pain  Patient/Family Comments/goals: stand at EOB  Pain/Comfort:  Pain Rating 1:  (did not quantify)  Pain Addressed 1: Pre-medicate for activity, Nurse notified, Distraction,  Reposition      Objective:     Communicated with RN prior to session.  Patient found HOB elevated with colostomy, JUANITA drain, NG tube, PureWick, peripheral IV, telemetry upon PT entry to room.     General Precautions: Standard, fall, contact  Orthopedic Precautions: N/A  Braces: N/A  Respiratory Status: Room air     Functional Mobility:  Bed Mobility:     Supine to Sit: moderate assistance and of 2 persons  Sit to Supine: moderate assistance and of 2 persons  Transfers:     Sit to Stand:  moderate assistance and of 2 persons with rolling walker  Gait: lateral side stepping to HOB mod A x 2 RW      AM-PAC 6 CLICK MOBILITY  Turning over in bed (including adjusting bedclothes, sheets and blankets)?: 2  Sitting down on and standing up from a chair with arms (e.g., wheelchair, bedside commode, etc.): 2  Moving from lying on back to sitting on the side of the bed?: 2  Moving to and from a bed to a chair (including a wheelchair)?: 2  Need to walk in hospital room?: 1       Treatment & Education:  Pt educated on POC, discharge recommendation, importance of time OOB, proper form with mobility, need for assist with mobility, use of call bell to seek assistance as needed and fall prevention      Patient left supine with all lines intact, call button in reach, and 2 RN present..    GOALS:   Multidisciplinary Problems       Physical Therapy Goals          Problem: Physical Therapy    Goal Priority Disciplines Outcome Goal Variances Interventions   Physical Therapy Goal     PT, PT/OT Ongoing, Progressing     Description: Goals to be met by: 23     Patient will increase functional independence with mobility by performin. Supine to sit with Stand-by Assistance  2. Sit to supine with Stand-by Assistance  3. Sit to stand transfer with Stand-by Assistance  4. Bed to chair transfer with Stand-by Assistance using Rolling Walker  5. Gait  x 250 feet with Stand-by Assistance using Rolling Walker.                          Time  Tracking:     PT Received On: 08/06/23  PT Start Time: 0846     PT Stop Time: 0905  PT Total Time (min): 19 min     Billable Minutes: Therapeutic Activity 19    Treatment Type: Treatment        Number of PTA visits since last PT visit: 2     08/06/2023

## 2023-08-06 NOTE — ASSESSMENT & PLAN NOTE
Worsening postoperatively, status post PRBC transfusion this stay  - transfuse another 1 unit pRBC 8/6  - trend hgb on daily cbc and transfuse as needed.

## 2023-08-06 NOTE — CARE UPDATE
08/06/23 0800   Patient Assessment/Suction   Level of Consciousness (AVPU) alert   Respiratory Effort Normal;Unlabored   Expansion/Accessory Muscles/Retractions no retractions;no use of accessory muscles;expansion symmetric   All Lung Fields Breath Sounds diminished   Rhythm/Pattern, Respiratory unlabored;pattern regular;depth regular   Cough Frequency no cough   PRE-TX-O2   Device (Oxygen Therapy) room air   SpO2 95 %   Pulse Oximetry Type Intermittent   $ Pulse Oximetry - Single Charge Pulse Oximetry - Single   Pulse 103   Resp 19   Aerosol Therapy   $ Aerosol Therapy Charges Aerosol Treatment   Daily Review of Necessity (SVN) completed   Respiratory Treatment Status (SVN) given   Treatment Route (SVN) oxygen;mask   Patient Position (SVN) HOB elevated   Post Treatment Assessment (SVN) breath sounds unchanged   Signs of Intolerance (SVN) none   Education   $ Education Bronchodilator;15 min   Respiratory Evaluation   $ Care Plan Tech Time 15 min   $ Eval/Re-eval Charges Re-evaluation

## 2023-08-06 NOTE — PROGRESS NOTES
[facility]  General Surgery  Progress Note    Subjective:     Interval History:  Hgb 7. Systolic pressure dipping to the 80's. Heart rate 89k864s.  Bilious drainage from the incision. Wound protector put in place by nursing staff.       Post-Op Info:  Procedure(s) (LRB):  LAPAROTOMY, EXPLORATORY (N/A)  EXCISION, SMALL INTESTINE  CREATION, ILEOSTOMY   9 Days Post-Op      Medications:  Continuous Infusions:   TPN ADULT CENTRAL LINE CUSTOM 75 mL/hr at 08/05/23 2132    TPN ADULT CENTRAL LINE CUSTOM       Scheduled Meds:   albuterol-ipratropium  3 mL Nebulization Q6H WAKE    budesonide  0.5 mg Nebulization Q12H    chlorhexidine  15 mL Mouth/Throat BID    enoxparin  40 mg Subcutaneous Q24H (prophylaxis, 1700)    eravacycline 90 mg in sodium chloride 0.9% 250 mL infusion  90 mg Intravenous Q12H    fat emulsion 20%  250 mL Intravenous Daily    fluconazole (DIFLUCAN) IV (PEDS and ADULTS)  200 mg Intravenous Q24H    levothyroxine  75 mcg Intravenous Before breakfast    [START ON 8/7/2023] pantoprazole  40 mg Intravenous Daily     PRN Meds:0.9%  NaCl infusion (for blood administration), 0.9%  NaCl infusion (for blood administration), acetaminophen, calcium gluconate IVPB, calcium gluconate IVPB, calcium gluconate IVPB, clonazePAM, dextrose 50%, dextrose 50%, diphenhydrAMINE, glucagon (human recombinant), glucose, glucose, hydrALAZINE, hydrALAZINE, HYDROmorphone, magnesium sulfate IVPB, magnesium sulfate IVPB, ondansetron, potassium chloride **AND** potassium chloride **AND** potassium chloride, Flushing PICC Protocol **AND** [DISCONTINUED] sodium chloride 0.9% **AND** sodium chloride 0.9%, sodium phosphate 15 mmol in dextrose 5 % (D5W) 250 mL IVPB, sodium phosphate 20.01 mmol in dextrose 5 % (D5W) 250 mL IVPB, sodium phosphate 30 mmol in dextrose 5 % (D5W) 250 mL IVPB, traZODone     Objective:     Vital Signs (Most Recent):  Temp: 99.6 °F (37.6 °C) (08/06/23 1050)  Pulse: 94 (08/06/23 1403)  Resp: 18 (08/06/23  1403)  BP: (!) 89/50 (08/06/23 1050)  SpO2: 96 % (08/06/23 1403) Vital Signs (24h Range):  Temp:  [98.3 °F (36.8 °C)-99.6 °F (37.6 °C)] 99.6 °F (37.6 °C)  Pulse:  [] 94  Resp:  [16-20] 18  SpO2:  [94 %-99 %] 96 %  BP: ()/(49-59) 89/50       Intake/Output Summary (Last 24 hours) at 8/6/2023 1600  Last data filed at 8/6/2023 0440  Gross per 24 hour   Intake --   Output 4225 ml   Net -4225 ml       Physical Exam    Significant Labs:  CBC:   Recent Labs   Lab 08/06/23  0500   WBC 6.77   RBC 2.45*   HGB 7.0*   HCT 21.4*      MCV 87   MCH 28.6   MCHC 32.7     CMP:   Recent Labs   Lab 08/06/23  0500   *   CALCIUM 6.7*   *   K 3.8   CO2 23      BUN 59*   CREATININE 1.7*       Significant Diagnostics:  I have reviewed all pertinent imaging results/findings within the past 24 hours.    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] 07/27/2023 Yes    MRSA pneumonia [J15.212] 08/06/2023 Yes    Enterocutaneous fistula [K63.2] 08/05/2023 Yes    Short gut syndrome [K91.2] 08/05/2023 No    Hypocalcemia [E83.51] 07/28/2023 Yes    Malnutrition of moderate degree [E44.0] 07/27/2023 Yes     Chronic    Hyponatremia [E87.1] 07/27/2023 Yes    Sinus tachycardia [R00.0] 07/27/2023 Yes    BOOM (acute kidney injury) [N17.9] 07/26/2023 No    Severe sepsis [A41.9, R65.20] 07/26/2023 No    Bilateral kidney stones [N20.0] 03/27/2023 Yes    Severe obesity (BMI 35.0-39.9) with comorbidity [E66.01] 06/01/2022 Yes    FENG (iron deficiency anemia) [D50.9] 05/04/2022 Yes    Malignant neoplasm of colon [C18.9] 08/06/2021 Yes    Hypothyroidism with abnormal TFTs [E03.9] 10/15/2020 Yes     Chronic    COPD (chronic obstructive pulmonary disease) [J44.9] 07/09/2018 Yes      Problems Resolved During this Admission:    Diagnosis Date Noted Date Resolved POA    On mechanically assisted ventilation [Z99.11] 07/27/2023 08/02/2023 Not  Applicable   -receiving a unit PRBC today  -High out put fistula coming through midline incision. unfortunately not a real candidate for any further surgery. Will attempt to keep wound protector in place to collect the output and try to protect the skin.       Reji Amos III, MD  General Surgery  [facility]

## 2023-08-06 NOTE — PROGRESS NOTES
"Consult Note  Infectious Disease    Reason for Consult:  Drainage from incision site; allergy to meropenem/penicillin/flagyl    HPI: Tessa Enriquez is a 67 y.o. female , obese, BMI 37.2Kg/m2, with unfortunate past medical history of recurrent colorectal adenocarcinoma, and b/l kidney stones status post robotic colectomy in November 2020, 1 out of 46 nodules was positive.  She could not tolerate chemo and was only able to complete 3 treatments and radiation therapy.  In August of 2022 she was found to have recurrence, status post 2nd session of chemotherapy.  On repeat surveillance this year in May she had evidence of adenocarcinoma at the anastomosis site.  PET-CT on June 14th demonstrated decreased size and activity of the mass.    She was admitted on 07/24 for ex-lap, s/p 8mg dexamethasone pre-op. As per operation note: "There is evidence of catastrophic events within the abdominal cavity.  There was diffuse he ischemia noted throughout multiple portions of the small bowel.  There were multiple sharp areas of demarcation between viable bowel and ischemic bowel.  Having removed the other obvious he clean necrotic areas there was evidence of dusky and bogginess at the site of her previous jejunal jejunostomy.  Decision was made against any reestablishment of continuity.  Remaining small bowel approximately 70-75 cm left.", Surgery required assistance from Urology for b/l ureteral catheter placement.     Patient was transferred to [facility] for ICU care.  Postop course complicated by tachycardia and hypotension.  Status post blood transfusion.     During hospital stay, respiratory culture grew MRSA 7/27. The patient developed a rash, clarified with nursing, unclear if vancomycin related although infusion rate was adjusted and rash resolved.  Seems like a second rash developed yesterday after 6 days of meropenem.      Patient seen and examined at bedside, she reports she is feeling fine, NG tube " in place, she is requesting something to eat.  Stable BP, tachycardic, PureWick in place, hazy urine noted.  Ostomy with liquid output. Surgical wound with bilious drainage form mid incision. On TPN. Repeat CT scan concerning for EC fistu;a.     ID consult for drainage from incision site, concern for peritonitis: Allergy to meropenem, penicillin, Flagyl.    Antibiotics:  Cipro 7/24  Cipro 7/26/27  Meropenem 7/27-8/1  Fluconazole 7/28--present  Vancomycin 7/29--present     8/5/23(Vijaya) case d/w Dr. Lacey. She is alert, complains of hunger. Reports(confirmed bynursing) of copious bilious drainage from incision. Noted previously by surgery. NGT remains in place. She is drinking water for dry mouth. Sat up in bed, but reluctant to get out of bed due to pain and increased drainage. Volume loss reflected in multiple electrolyte abnormalities.  She has no fever. Multiple family members at bedside.   8/6: interim reviewed. While urine output is still ok, she has BOOM now. She may be having more ileostomy volume loss than the TPN can replace. She is drinking for pleasure and intake po is not recorded, thus making NGT output appear greater than actual. Vancomycin was stopped (8d given). She sat up in the chair for 30 min this am. She is willing to spend more time in chair. She is going to receive a unit of blood today.       EXAM & DIAGNOSTICS REVIEWED:   Vitals:     Temp:  [98.3 °F (36.8 °C)-99.6 °F (37.6 °C)]   Temp: 99.6 °F (37.6 °C) (08/06/23 1050)  Pulse: 99 (08/06/23 1050)  Resp: 16 (08/06/23 1050)  BP: (!) 89/50 (08/06/23 1050)  SpO2: (!) 94 % (08/06/23 1050)    Intake/Output Summary (Last 24 hours) at 8/6/2023 1129  Last data filed at 8/6/2023 0440  Gross per 24 hour   Intake --   Output 4225 ml   Net -4225 ml       General:  Chronically-ill appearing, obese, In NAD. Alert and attentive, cooperative, on O2 by NC 2L. Anxious and tremulous  Eyes:  Anicteric, EOMI  ENT:  NG tube in place, moist oral mucosa, edentulous  "  Neck:  Supple  Lungs: Clear to auscultation b/l  Heart:  S1/S2+, regular rhythm, no murmurs  Abd:  Distended, JUANITA drain with bloody drainage, ostomy with large volume bilious liquid output, decreased bowel sounds, very large and fresh bandage in place, d/w nursing. Ostomy appears pink and viable(better than 8/2 photos), d/w nursing, that she has fistulous drainage from distal(inferior) incision  :  Purewick, hazy urine   Musc:  Joints without effusion, swelling,  erythema, synovitis,   Skin:  Warm, no evidence of rash on chest, arms, abdomen or legs   Wound:   Neuro:  Following commands, speech is clear, moves all extremities   Psych:  Anxious, cooperative  Lymphatic:     No cervical, supraclavicular nodes  Extrem: No LE edema, no evidence of rash  VAD:  L PICC line 8/2/23    L Port-A-Cath not accessed, no redness noted      Isolation: Contact/MRSA     8/2:    8/5 8/1:      Right leg rash   Left leg rash    General Labs reviewed:  Recent Labs   Lab 08/04/23  0426 08/05/23  0432 08/06/23  0500   WBC 5.91 5.26 6.77   HGB 7.5* 7.2* 7.0*   HCT 23.2* 22.2* 21.4*    222 235       Recent Labs   Lab 07/31/23  1644 08/01/23  0506 08/04/23  0426 08/05/23  0432 08/06/23  0500      < > 131* 131* 131*   K 4.1   < > 2.9* 3.0* 3.8      < > 105 101 100   CO2 20*   < > 22* 22* 23   BUN 14   < > 22 38* 59*   CREATININE 0.8   < > 1.0 1.2 1.7*   CALCIUM 7.0*   < > 6.6* 6.5* 6.7*   PROT 4.8*  --   --   --   --    BILITOT 0.7  --   --   --   --    ALKPHOS 55  --   --   --   --    ALT 20  --   --   --   --    AST 23  --   --   --   --     < > = values in this interval not displayed.     No results for input(s): "CRP" in the last 168 hours.  No results for input(s): "SEDRATE" in the last 168 hours.    Estimated Creatinine Clearance: 33.5 mL/min (A) (based on SCr of 1.7 mg/dL (H)).     Micro:  Microbiology Results (last 7 days)       Procedure Component Value Units Date/Time    Urine Culture High Risk " [[id number]] Collected: 08/04/23 1943    Order Status: Completed Specimen: Urine, Clean Catch Updated: 08/06/23 0838     Urine Culture, Routine No growth to date      No growth to date    Narrative:      Indicated criteria for high risk culture:->Other  Other (specify):->complicated abdomen            Imaging Reviewed:  CXRs  CT abdomen/pelvis 8/2:  1. Colonic resection with left lower abdominal quadrant colostomy, punctate foci of intra-abdominal free air and a small volume of ascites, likely related to recent postoperative state.  2. Mildly prominent gas and fluid-filled loops of small bowel containing contrast suggestive of ileus and less likely partial low-grade mechanical bowel obstruction. Consider radiographic follow-up.  3. Airspace opacity in the left greater than right posterior dependent lower lobe with air bronchograms suggestive of atelectasis and aspiration/pneumonia, with a small left and trace right pleural effusion.  4. Moderate diffuse body wall edema (anasarca).  5. Full bladder, with mild bilateral hydroureteronephrosis and no radiopaque stones seen, consider correlation with urine output and urinalysis as warranted (noting there is a punctate focus of gas in the bladder, and a gas forming infection is not excluded).    Cardiology: EKG Qtc: 486ms    PATHOLOGY:   7/24:  1. DESIGNATED AS LYMPH NODES:   - BENIGN CYST PARTIALLY LINED BY MULLERIAN TYPE EPITHELIUM, POSSIBLE  REMNANTS OF PARATUBAL CYST.   - LYMPH NODE TISSUE WAS NOT IDENTIFIED GROSSLY OR MICROSCOPICALLY.   - NEGATIVE FOR MALIGNANCY.     2. DISTAL RECTUM, RESECTION:   - FOCUS OF MODERATELY DIFFERENTIATED ADENOCARCINOMA INVOLVING INFLAMED  AND ULCERATED RECTAL MUCOSA.   - PROXIMAL RESECTION MARGIN IS POSITIVE FOR ADENOCARCINOMA  - EXTENSIVE TRANSMURAL CHRONIC AND ACUTE INFLAMMATION WITH MUCOSAL  ULCERATION.   - TWO PERIRECTAL LYMPH NODES NEGATIVE FOR MALIGNANCY.     3. SEGMENT OF SMALL INTESTINE:   - SEROSAL ADHESIONS.   - RECENT  TRANSMURAL DEFECT WITHOUT ASSOCIATE INFLAMMATION OR HEMORRHAGE.   - NEGATIVE FOR MALIGNANCY.     4. PROXIMAL COLON, RESECTION:   - INVASIVE MODERATELY DIFFERENTIATED ADENOCARCINOMA IN THE BACKGROUND OF RECTAL ULCERATION AND MARKED CHRONIC AND ACUTE INFLAMMATION.   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, 75cm of small bowel left, concern for EC fistula formation      MRSA  pneumonia, improving s/p 8 days of Vanc    B/l kidney stones, s/p b/l stents 7/24    H/o recurrent colorectal adenocarcinoma s/p chemotherapy     BOOM.  Short gut volume losses contributing  Malnutrition, albumin 1.6 - on TPN    Recommendations:   Stopping vancomycin  UA with reflex to culture neg so far  Eravacycline 90mg IV q12h to cover GNR and anaerobes   Continue Vancomycin IV, keep level 15-20 for MRSA pneumonia(though eravacycline covers MRSA as well :Antimicrob Agents Chemother. 2013 Nov; 57(11): 0744-1212.)  Fluconazole 200mg IV daily as prophylaxis   Needs accurate I and O  Aspiration precautions , reinforced at bedside    Unfortunately, poor prognosis   D/w Dr. Bryant and nursing         Medical Decision Making during this encounter was  [_] Low Complexity  [_] Moderate Complexity  [xx] High Complexity

## 2023-08-06 NOTE — RESPIRATORY THERAPY
08/05/23 1947   Patient Assessment/Suction   Level of Consciousness (AVPU) alert   Respiratory Effort Normal;Unlabored   Expansion/Accessory Muscles/Retractions no use of accessory muscles;expansion symmetric   All Lung Fields Breath Sounds diminished;clear   ROHIT Breath Sounds diminished;clear   LLL Breath Sounds diminished   RUL Breath Sounds diminished   RML Breath Sounds diminished   RLL Breath Sounds diminished   Rhythm/Pattern, Respiratory pattern regular;depth regular;unlabored   Cough Frequency no cough   PRE-TX-O2   Device (Oxygen Therapy) room air   SpO2 99 %   Pulse Oximetry Type Intermittent   $ Pulse Oximetry - Single Charge Pulse Oximetry - Single   $ Pulse Oximetry - Multiple Charge Pulse Oximetry - Multiple   Pulse 102   Resp 20   Aerosol Therapy   $ Aerosol Therapy Charges Aerosol Treatment   Daily Review of Necessity (SVN) completed   Respiratory Treatment Status (SVN) given   Treatment Route (SVN) mask;oxygen   Patient Position (SVN) semi-Sanderson's   Post Treatment Assessment (SVN) increased aeration   Signs of Intolerance (SVN) none   Breath Sounds Post-Respiratory Treatment   Throughout All Fields Post-Treatment All Fields   Throughout All Fields Post-Treatment no change   Post-treatment Heart Rate (beats/min) 104   Post-treatment Resp Rate (breaths/min) 17   Education   $ Education Bronchodilator

## 2023-08-06 NOTE — ASSESSMENT & PLAN NOTE
Resolved   Extensive area of small bowel necrosis -s/p surgical repair   Resp Cx: + MRSA  Urine and blood culture negative   S/p merrem & vanc  - eravacycline and diflucan per ID

## 2023-08-06 NOTE — PROGRESS NOTES
Therapy with Vancomycin complete and / or consult / order discontinued by Dr Lilly on 8-6-2023 @ 6:59   Pharmacy will sign off, please re-consult as needed.  Thank you for allowing us to participate in this patient's care.  William Mar 8/6/2023 7:15 AM  Dept of Pharmacy  Ext [phone]     [FreeTextEntry1] : follow up hypertension, hyperlipidemia, type 2 diabetes\par Interview and discussion conducted in Yi by Yi speaking physician\par  [de-identified] : 67 years old male here for follow up, states dong well, no complains today, he is due for follow up labs, requesting refill of amlodipine

## 2023-08-06 NOTE — PROGRESS NOTES
[facility]  Progress Note    Patient Name: Tessa Enriquez  MRN: [id number]  Patient Class: IP- Inpatient   Admission Date: 7/24/2023  Length of Stay: 13 days  Attending Physician: Kevin Bryant MD  Primary Care Provider: Karma Mcdermott MD        Subjective:     Principal Problem:Ischemic necrosis of small bowel        HPI:  Ms. Enriquez is a 67-years-old female who presented as a direct admission from [facility] for ICU level of care postoperatively.  Patient with a history of recurrent colorectal adenocarcinoma, status post neoadjuvant radiation and chemotherapy, followed by oncologist Dr. Frank.  On 07/24 she underwent exploratory laparotomy with extensive lysis of adhesion, proctectomy with creation of end colostomy by Dr. Wagner and she was admitted to hospital medicine postoperatively.  Postop course complicated by ongoing sinus tachycardia with anemia, status post PRBC transfusion and acute kidney injury, seen by Nephrology.  Earlier today ostomy noted to be dusky with concern for ischemia, she was taken back to the OR and underwent exploratory laparotomy with small-bowel resection, as per OR note, large area of small bowel resected with four areas of ischemia.  She was intubated for surgery and remains intubated postoperatively.  Reported history of COPD, hypothyroidism, nephrolithiasis status post bilateral ureteral stenting.  She is currently sedated on propofol, orally intubated on mechanical ventilation.  Repeat labs have been submitted.  As per ICU RN plans for possible repeat surgery.  Plan of care reviewed with nursing.  No visitors at bedside.      Overview/Hospital Course:  Patient was transferred from [facility] for ICU care postoperatively: ostomy noted to be dusky with concern for ischemia, she was taken back to the OR and underwent exploratory laparotomy with small-bowel resection, as per OR note, large area of small bowel resected with  four areas of ischemia transferred while intubated. She was followed by pulmonology/CC, ID, nephrology and GS. Once extubated, she was transferred to med-surg. She was on meropenem for peritonitis but only completed 5 of 7 days due to rash that developed. Allergies to PCN and flagyl. Respiratory culture grew MRSA considered to have pneumonia from this contributing to her sepsis. S/p merrem and vancomycin. Continued on course of eravacycline and diflucan. NGT utilized. TPN utilized. Refractory electrolyte replacement given. Required packed red blood cell transfusion without active bleeding noted. Patient likely has enterocutaneous fistula and general surgery allowing this to mature.   is working on LTAC placement.      Interval History: Patient seen and examined. NAEON. Similar clinical picture. Reports some gas output. BP trending lower with BOOM today.      Objective:     Vital Signs (Most Recent):  Temp: 99.6 °F (37.6 °C) (08/06/23 1050)  Pulse: 99 (08/06/23 1050)  Resp: 16 (08/06/23 1050)  BP: (!) 89/50 (08/06/23 1050)  SpO2: (!) 94 % (08/06/23 1050) Vital Signs (24h Range):  Temp:  [98.3 °F (36.8 °C)-99.6 °F (37.6 °C)] 99.6 °F (37.6 °C)  Pulse:  [] 99  Resp:  [16-20] 16  SpO2:  [92 %-99 %] 94 %  BP: ()/(49-59) 89/50     Weight: 89.4 kg (197 lb)  Body mass index is 37.22 kg/m².    Intake/Output Summary (Last 24 hours) at 8/6/2023 1102  Last data filed at 8/6/2023 0440  Gross per 24 hour   Intake --   Output 4225 ml   Net -4225 ml         Physical Exam  Constitutional:       General: She is not in acute distress.     Appearance: She is obese. She is ill-appearing.   HENT:      Nose:      Comments: NGT in place, light brownish drainage  Cardiovascular:      Rate and Rhythm: Regular rhythm. Tachycardia present.   Pulmonary:      Effort: Pulmonary effort is normal. No respiratory distress.   Abdominal:      General: There is distension.      Tenderness: There is abdominal tenderness.       Comments: Beny drain and ostomy intact  Large abdominal dressing in place    Skin:     General: Skin is warm and dry.   Neurological:      General: No focal deficit present.      Mental Status: She is alert and oriented to person, place, and time.   Psychiatric:         Mood and Affect: Mood normal.         Behavior: Behavior normal.             Significant Labs: All pertinent labs within the past 24 hours have been reviewed.    Significant Imaging: I have reviewed all pertinent imaging results/findings within the past 24 hours.      Assessment/Plan:      * Ischemic necrosis of small bowel  OR note 7/27 with extensive area of small bowel necrosis   Possible etiology for ongoing tachycardia/sepsis   - gen surg following  - TPN, NGT, monitor for bowel function    MRSA pneumonia  - see sepsis section  - ID following    Short gut syndrome  - gen surg following    Enterocutaneous fistula  Suspected  - gen surg following and allowing to mature    Hypocalcemia  - replace with prn IV scale    Sinus tachycardia  Multifactorial in the setting of acute medical conditions including dehydration, pain, postop, anemia  - tele  - addressing above conditions    Hyponatremia  Stable  - continue TPN  - trend daily on chem panel    Malnutrition of moderate degree  Low albumin and pre-albumin  Currently NPO  - TPN    Severe sepsis  Resolved   Extensive area of small bowel necrosis -s/p surgical repair   Resp Cx: + MRSA  Urine and blood culture negative   S/p merrem & vanc  - eravacycline and diflucan per ID    BOOM (acute kidney injury)  Returned now possibly IVVD +/- vancomycin use  - dc vanc per ID  - getting more IVF with electrolyte replacements  - getting unit of pRBC as well with hgb 7 and low BP  - trend renal function daily    Bilateral kidney stones  Seen by Dr. Mijares this admission, followed outpatient by Dr. Kwon   Status post bilateral stent placement    Severe obesity (BMI 35.0-39.9) with comorbidity  Body mass index is  37.22 kg/m². Morbid obesity complicates all aspects of disease management from diagnostic modalities to treatment. Weight loss encouraged and health benefits explained to patient.     FENG (iron deficiency anemia)  Worsening postoperatively, status post PRBC transfusion this stay  - transfuse another 1 unit pRBC 8/6  - trend hgb on daily cbc and transfuse as needed.    Malignant neoplasm of colon  History of recurrent colorectal adenocarcinoma  See small-bowel ischemia  Did receive adjuvant chemoradiation, followed by Dr. Frank    Hypothyroidism with abnormal TFTs  Recent TSH elevated at 23, as per chart review elevated 2 years previous, unclear if compliance   Given current NPO status with bowel surgery changed to IV levothyroxine    COPD (chronic obstructive pulmonary disease)  No evidence of acute exacerbation   P.r.n. ABG and chest x-ray  Scheduled breathing treatments      VTE Risk Mitigation (From admission, onward)         Ordered     enoxaparin injection 40 mg  Every 24 hours         07/28/23 1849     Place sequential compression device  Until discontinued         07/28/23 0724     IP VTE HIGH RISK PATIENT  Once         07/24/23 2015                Discharge Planning   DERICK: 8/8/2023     Code Status: Full Code   Is the patient medically ready for discharge?:     Reason for patient still in hospital (select all that apply): Patient trending condition  Discharge Plan A: Long-term acute care facility (LTAC)   Discharge Delays: None known at this time              Kevin Bryant MD  Department of Hospital Medicine   [facility] hospital

## 2023-08-06 NOTE — PROGRESS NOTES
PARENTERAL NUTRITION PROGRESS NOTE:      Parenteral Nutrition Day # 4  Diagnosis/Indication for PN: small bowel resection  IV Access: central  Diet/Tube Feedin         Admixture Type: 2 in one  Infusion Rate and Frequency: 75   Patient Acuity:1 east    PN Composition:   95 grams Amino Acid, 140 grams Dextrose, and 50 grams Lipid.    Today's TPN provides 1356 kcal.  *Dextrose is started at less than full dextrose goal to reduce risk for Refeeding Syndrome. Dextrose content will be advanced as appropriate over the next few days.    Please see order for electrolytes and additives content and adjustments.   *The Nutrition Support Team will continue to monitor electrolytes daily and adjust parenteral nutrition as warranted.

## 2023-08-07 PROBLEM — Z71.89 GOALS OF CARE, COUNSELING/DISCUSSION: Status: ACTIVE | Noted: 2023-01-01

## 2023-08-07 PROBLEM — E86.0 DEHYDRATION: Status: RESOLVED | Noted: 2022-01-01 | Resolved: 2023-01-01

## 2023-08-07 NOTE — PT/OT/SLP PROGRESS
Physical Therapy      Patient Name:  Tessa Enriquez   MRN:  [id number]    Patient not seen today secondary to Patient unwilling to participate. Will follow-up 8/8/23.

## 2023-08-07 NOTE — ASSESSMENT & PLAN NOTE
No evidence of acute exacerbation   - P.r.n. ABG and chest x-ray  - Scheduled breathing treatments

## 2023-08-07 NOTE — SUBJECTIVE & OBJECTIVE
Interval History: Patient seen and examined. NAEON. BP stable. BOOM slightly improved. Electrolytes stabilizing. Gen surg ordered for NGT removal today and to try octreotide and consider gattex. Palliative also to visit with patient.      Objective:     Vital Signs (Most Recent):  Temp: 98 °F (36.7 °C) (08/07/23 1224)  Pulse: 99 (08/07/23 1359)  Resp: 18 (08/07/23 1529)  BP: 108/78 (08/07/23 1224)  SpO2: 98 % (08/07/23 1359) Vital Signs (24h Range):  Temp:  [97.4 °F (36.3 °C)-98.7 °F (37.1 °C)] 98 °F (36.7 °C)  Pulse:  [] 99  Resp:  [16-20] 18  SpO2:  [94 %-98 %] 98 %  BP: (106-118)/(50-78) 108/78     Weight: 89.4 kg (197 lb)  Body mass index is 37.22 kg/m².    Intake/Output Summary (Last 24 hours) at 8/7/2023 1605  Last data filed at 8/7/2023 1224  Gross per 24 hour   Intake 658.33 ml   Output 2965 ml   Net -2306.67 ml         Physical Exam  Constitutional:       General: She is not in acute distress.     Appearance: She is obese. She is ill-appearing.   HENT:      Nose:      Comments: NGT in place, light brownish drainage  Cardiovascular:      Rate and Rhythm: Regular rhythm. Tachycardia present.   Pulmonary:      Effort: Pulmonary effort is normal. No respiratory distress.   Abdominal:      General: There is distension.      Tenderness: There is abdominal tenderness.      Comments: Beny drain and ostomy intact  Large abdominal dressing in place    Skin:     General: Skin is warm and dry.   Neurological:      General: No focal deficit present.      Mental Status: She is alert and oriented to person, place, and time.   Psychiatric:         Mood and Affect: Mood normal.         Behavior: Behavior normal.             Significant Labs: All pertinent labs within the past 24 hours have been reviewed.    Significant Imaging: I have reviewed all pertinent imaging results/findings within the past 24 hours.

## 2023-08-07 NOTE — PROGRESS NOTES
Pt seen and examined.  Resting comfortably in bed.  Denies discomfort  Continues to have bilious drainage from incision.    NG in place clear fluid    Wt Readings from Last 3 Encounters:   07/24/23 89.4 kg (197 lb)   07/03/23 89.3 kg (196 lb 13.9 oz)   06/27/23 89.7 kg (197 lb 12 oz)     Temp Readings from Last 3 Encounters:   08/07/23 98 °F (36.7 °C) (Oral)   07/03/23 97.5 °F (36.4 °C) (Temporal)   06/27/23 98.1 °F (36.7 °C) (Temporal)     BP Readings from Last 3 Encounters:   08/07/23 108/78   07/03/23 (!) 143/79   06/27/23 (!) 185/86     Pulse Readings from Last 3 Encounters:   08/07/23 97   07/03/23 84   06/27/23 84     AAox3  Sinus  Soft/nd/nt    Lab Results   Component Value Date    WBC 6.53 08/07/2023    HGB 7.7 (L) 08/07/2023    HCT 23.4 (L) 08/07/2023    MCV 85 08/07/2023     08/07/2023       BMP  Lab Results   Component Value Date     (L) 08/07/2023    K 4.5 08/07/2023     08/07/2023    CO2 26 08/07/2023    BUN 69 (H) 08/07/2023    CREATININE 1.6 (H) 08/07/2023    CALCIUM 6.7 (LL) 08/07/2023    ANIONGAP 8 08/07/2023    EGFRNORACEVR 35.6 (A) 08/07/2023     Prealbumin 5    A/P: Short gut syndrome with EC fistula s/p APR  Aggressive nutritional supplementation with TPN  Will remove NG.  D/w pt recommend strict npo  Start Gattex  Octretide

## 2023-08-07 NOTE — CARE UPDATE
08/07/23 0809   Patient Assessment/Suction   Level of Consciousness (AVPU) alert   Respiratory Effort Normal;Unlabored   Expansion/Accessory Muscles/Retractions no use of accessory muscles   All Lung Fields Breath Sounds diminished   PRE-TX-O2   Device (Oxygen Therapy) room air   SpO2 96 %   Pulse Oximetry Type Intermittent   $ Pulse Oximetry - Single Charge Pulse Oximetry - Single   Pulse 108   Resp 18   Temp 97.4 °F (36.3 °C)   BP (!) 113/58  (notified nurse seth)   Aerosol Therapy   $ Aerosol Therapy Charges Aerosol Treatment   Daily Review of Necessity (SVN) completed   Respiratory Treatment Status (SVN) given   Treatment Route (SVN) mask;oxygen   Patient Position (SVN) HOB elevated   Post Treatment Assessment (SVN) increased aeration   Signs of Intolerance (SVN) none   Respiratory Evaluation   $ Care Plan Tech Time 15 min

## 2023-08-07 NOTE — ASSESSMENT & PLAN NOTE
Worsening postoperatively, now s/p 5u pRBC this stay  No active bleeding  - trend hgb on daily cbc and transfuse as needed.

## 2023-08-07 NOTE — SUBJECTIVE & OBJECTIVE
Interval History: See HPI    Past Medical History:   Diagnosis Date    Acute hypoxemic respiratory failure 07/09/2018    Arthritis     Bilateral kidney stones 3/27/2023    Cancer     colon    COPD (chronic obstructive pulmonary disease)     History of home oxygen therapy     ONLY PRN AND HASN'T USED IT IN A YEAR    Hyperlipidemia     Thyroid disease        Past Surgical History:   Procedure Laterality Date    CHOLECYSTECTOMY      COLECTOMY N/A 11/9/2020    Procedure: COLECTOMY;  Surgeon: Brandon Wagner MD;  Location: [facility] OR;  Service: General;  Laterality: N/A;  LOWER- ANTERIOR    COLONOSCOPY N/A 10/23/2020    Procedure: COLONOSCOPY;  Surgeon: Jolynn Ayala MD;  Location: [facility] ENDO;  Service: Endoscopy;  Laterality: N/A;    COLONOSCOPY N/A 8/6/2021    Procedure: COLONOSCOPY;  Surgeon: Jolynn Ayala MD;  Location: [facility] ENDO;  Service: Endoscopy;  Laterality: N/A;    COLONOSCOPY N/A 7/7/2022    Procedure: COLONOSCOPY;  Surgeon: Jolynn Ayala MD;  Location: [facility] ENDO;  Service: Endoscopy;  Laterality: N/A;    COLONOSCOPY N/A 5/31/2023    Procedure: COLONOSCOPY;  Surgeon: Jolynn Ayala MD;  Location: [facility] ENDO;  Service: Endoscopy;  Laterality: N/A;    COLOSTOMY N/A 7/24/2023    Procedure: CREATION, COLOSTOMY;  Surgeon: Brandon Wagner MD;  Location: [facility] OR;  Service: General;  Laterality: N/A;    COMPLETION PROCTECTOMY N/A 7/24/2023    Procedure: PROCTECTOMY, COMPLETION;  Surgeon: Brandon Wagner MD;  Location: [facility] OR;  Service: General;  Laterality: N/A;    CYSTOSCOPY W/ URETERAL STENT PLACEMENT Right 2/22/2023    Procedure: CYSTOSCOPY, WITH URETERAL STENT INSERTION;  Surgeon: Carin Kwon Jr., MD;  Location: [facility] OR;  Service: Urology;  Laterality: Right;    CYSTOSCOPY W/ URETERAL STENT PLACEMENT Bilateral 3/26/2023    Procedure: CYSTOSCOPY, WITH URETERAL STENT INSERTION;  Surgeon: Sabine Barber MD;  Location: [facility] OR;  Service: Urology;  Laterality: Bilateral;    CYSTOSCOPY W/  URETERAL STENT PLACEMENT Bilateral 7/24/2023    Procedure: CYSTOSCOPY, WITH URETERAL STENT INSERTION;  Surgeon: Adrienne Mijares MD;  Location: [facility] OR;  Service: Urology;  Laterality: Bilateral;  bassem placing stents 1st    CYSTOSCOPY WITH URETEROSCOPY, RETROGRADE PYELOGRAPHY, AND INSERTION OF STENT Bilateral 3/26/2023    Procedure: CYSTOSCOPY, WITH RETROGRADE PYELOGRAM AND URETERAL STENT INSERTION;  Surgeon: Sabine Barber MD;  Location: [facility] OR;  Service: Urology;  Laterality: Bilateral;    CYSTOURETEROSCOPY WITH RETROGRADE PYELOGRAPHY AND INSERTION OF STENT INTO URETER Right 3/7/2023    Procedure: CYSTOURETEROSCOPY, WITH RETROGRADE PYELOGRAM AND URETERAL STENT INSERTION;  Surgeon: Carin Kwon Jr., MD;  Location: [facility] OR;  Service: Urology;  Laterality: Right;    CYSTOURETEROSCOPY, WITH HOLMIUM LASER LITHOTRIPSY OF URETERAL CALCULUS AND STENT INSERTION Bilateral 4/11/2023    Procedure: CYSTOURETEROSCOPY, WITH HOLMIUM LASER LITHOTRIPSY OF URETERAL CALCULUS AND STENT INSERTION;  Surgeon: Carin Kwon Jr., MD;  Location: [facility] OR;  Service: Urology;  Laterality: Bilateral;    ESOPHAGEAL DILATION N/A 6/29/2021    Procedure: DILATION, ESOPHAGUS;  Surgeon: Sourav Baires III, MD;  Location: [facility];  Service: Endoscopy;  Laterality: N/A;    ESOPHAGOGASTRODUODENOSCOPY  06/29/2021    Dilation to 57 Fr    ESOPHAGOGASTRODUODENOSCOPY N/A 6/29/2021    Procedure: EGD (ESOPHAGOGASTRODUODENOSCOPY);  Surgeon: Sourav Baires III, MD;  Location: [facility] ENDO;  Service: Endoscopy;  Laterality: N/A;    EXCISION, SMALL INTESTINE  7/28/2023    Procedure: EXCISION, SMALL INTESTINE;  Surgeon: Brandon Wagner MD;  Location: [facility] OR;  Service: General;;    EXCISION, SMALL INTESTINE N/A 7/27/2023    Procedure: EXCISION, SMALL INTESTINE;  Surgeon: Brandon Wagner MD;  Location: [facility] OR;  Service: General;  Laterality: N/A;    FLEXIBLE SIGMOIDOSCOPY N/A 8/1/2022    Procedure: SIGMOIDOSCOPY, FLEXIBLE;   Surgeon: Brandon Wagner MD;  Location: [facility] ENDO;  Service: Endoscopy;  Laterality: N/A;    HYSTERECTOMY      ILEOSTOMY  7/28/2023    Procedure: CREATION, ILEOSTOMY;  Surgeon: Brandon Wagner MD;  Location: [facility] OR;  Service: General;;    INSERTION OF TUNNELED CENTRAL VENOUS CATHETER (CVC) WITH SUBCUTANEOUS PORT N/A 1/11/2021    Procedure: [id number];  Surgeon: Brandon Wagner MD;  Location: [facility] OR;  Service: General;  Laterality: N/A;    INSERTION OF TUNNELED CENTRAL VENOUS CATHETER (CVC) WITH SUBCUTANEOUS PORT Right 9/1/2022    Procedure: [id number];  Surgeon: Brandon Wagner MD;  Location: [facility] OR;  Service: General;  Laterality: Right;  Wants rt side placement    LAPAROTOMY, EXPLORATORY N/A 7/24/2023    Procedure: LAPAROTOMY, EXPLORATORY;  Surgeon: Brandon Wagner MD;  Location: [facility] OR;  Service: General;  Laterality: N/A;  case must go 1st-chamberlain placing stents    LAPAROTOMY, EXPLORATORY N/A 7/28/2023    Procedure: LAPAROTOMY, EXPLORATORY;  Surgeon: Brandon Wagner MD;  Location: [facility] OR;  Service: General;  Laterality: N/A;    LAPAROTOMY, EXPLORATORY N/A 7/27/2023    Procedure: LAPAROTOMY, EXPLORATORY;  Surgeon: Brandon Wagner MD;  Location: [facility] OR;  Service: General;  Laterality: N/A;    MEDIPORT REMOVAL Left 4/19/2021    Procedure: REMOVAL, CATHETER, CENTRAL VENOUS, TUNNELED, WITH PORT;  Surgeon: Brandon Wagner MD;  Location: [facility] OR;  Service: General;  Laterality: Left;    REMOVAL-STENT Right 3/7/2023    Procedure: REMOVAL-STENT;  Surgeon: Carin Kwon Jr., MD;  Location: [facility] OR;  Service: Urology;  Laterality: Right;    REMOVAL-STENT Bilateral 4/11/2023    Procedure: REMOVAL-STENT;  Surgeon: Carin Kwon Jr., MD;  Location: [facility] OR;  Service: Urology;  Laterality: Bilateral;    RETROGRADE PYELOGRAPHY Bilateral 4/11/2023    Procedure: PYELOGRAM, RETROGRADE;  Surgeon: Carin Kwon Jr., MD;  Location: [facility] OR;  Service: Urology;  Laterality: Bilateral;     ROBOT-ASSISTED COLECTOMY N/A 11/9/2020    Procedure: ROBOTIC COLECTOMY low anterior resection, possible open;  Surgeon: Brandon Wagner MD;  Location: [facility];  Service: General;  Laterality: N/A;  CONVERTED TO OPEN AT 1503    SUBTOTAL COLECTOMY N/A 7/24/2023    Procedure: COLECTOMY, PARTIAL;  Surgeon: Brandon Wagner MD;  Location: [facility];  Service: General;  Laterality: N/A;  with colorectal Anastamosis    URETEROSCOPIC REMOVAL OF URETERIC CALCULUS Right 3/7/2023    Procedure: REMOVAL, CALCULUS, URETER, URETEROSCOPIC;  Surgeon: Carin Kwon Jr., MD;  Location: [facility] OR;  Service: Urology;  Laterality: Right;    URETEROSCOPIC REMOVAL OF URETERIC CALCULUS Bilateral 4/11/2023    Procedure: REMOVAL, CALCULUS, URETER, URETEROSCOPIC;  Surgeon: Carin Kwon Jr., MD;  Location: [facility] OR;  Service: Urology;  Laterality: Bilateral;       Review of patient's allergies indicates:   Allergen Reactions    Ativan [lorazepam] Hives and Itching    Merrem [meropenem] Rash    Flagyl [metronidazole] Itching and Rash    Pcn [penicillins] Hives, Itching and Rash       Medications:  Continuous Infusions:   TPN ADULT CENTRAL LINE CUSTOM 75 mL/hr at 08/06/23 2048    TPN ADULT CENTRAL LINE CUSTOM       Scheduled Meds:   albuterol-ipratropium  3 mL Nebulization Q6H WAKE    budesonide  0.5 mg Nebulization Q12H    chlorhexidine  15 mL Mouth/Throat BID    enoxparin  40 mg Subcutaneous Q24H (prophylaxis, 1700)    eravacycline 90 mg in sodium chloride 0.9% 250 mL infusion  90 mg Intravenous Q12H    fat emulsion 20%  250 mL Intravenous Daily    fluconazole (DIFLUCAN) IV (PEDS and ADULTS)  200 mg Intravenous Q24H    levothyroxine  75 mcg Intravenous Before breakfast    pantoprazole  40 mg Intravenous Daily     PRN Meds:0.9%  NaCl infusion (for blood administration), 0.9%  NaCl infusion (for blood administration), acetaminophen, calcium gluconate IVPB, calcium gluconate IVPB, calcium gluconate IVPB, clonazePAM, dextrose 50%, dextrose 50%,  diphenhydrAMINE, glucagon (human recombinant), glucose, glucose, hydrALAZINE, hydrALAZINE, HYDROmorphone, HYDROmorphone, magnesium sulfate IVPB, magnesium sulfate IVPB, ondansetron, potassium chloride **AND** potassium chloride **AND** potassium chloride, Flushing PICC Protocol **AND** [DISCONTINUED] sodium chloride 0.9% **AND** sodium chloride 0.9%, sodium phosphate 15 mmol in dextrose 5 % (D5W) 250 mL IVPB, sodium phosphate 20.01 mmol in dextrose 5 % (D5W) 250 mL IVPB, sodium phosphate 30 mmol in dextrose 5 % (D5W) 250 mL IVPB, traZODone    Family History       Problem Relation (Age of Onset)    Breast cancer Maternal Grandmother    COPD Mother, Father    Prostate cancer Brother          Tobacco Use    Smoking status: Former     Current packs/day: 0.00     Average packs/day: 2.0 packs/day for 40.0 years (80.0 ttl pk-yrs)     Types: Cigarettes     Start date: 1977     Quit date: 2017     Years since quittin.0    Smokeless tobacco: Never   Substance and Sexual Activity    Alcohol use: No    Drug use: No    Sexual activity: Not Currently     Partners: Male       Review of Systems  Objective:     Vital Signs (Most Recent):  Temp: 98 °F (36.7 °C) (23 1224)  Pulse: 97 (23 1224)  Resp: 20 (23 1224)  BP: 108/78 (23 1224)  SpO2: 98 % (23 1224) Vital Signs (24h Range):  Temp:  [97.4 °F (36.3 °C)-98.7 °F (37.1 °C)] 98 °F (36.7 °C)  Pulse:  [] 97  Resp:  [16-20] 20  SpO2:  [94 %-98 %] 98 %  BP: (106-118)/(50-78) 108/78     Weight: 89.4 kg (197 lb)  Body mass index is 37.22 kg/m².       Physical Exam  Vitals reviewed.   Constitutional:       General: She is not in acute distress.     Appearance: She is ill-appearing. She is not toxic-appearing.   HENT:      Head: Normocephalic and atraumatic.      Right Ear: External ear normal.      Left Ear: External ear normal.      Nose: Nose normal. No congestion.      Mouth/Throat:      Mouth: Mucous membranes are moist.      Pharynx: No  oropharyngeal exudate.      Comments: Trach in place  Eyes:      General:         Right eye: No discharge.         Left eye: No discharge.      Extraocular Movements: Extraocular movements intact.      Pupils: Pupils are equal, round, and reactive to light.   Cardiovascular:      Rate and Rhythm: Tachycardia present. Rhythm irregular.      Pulses: Normal pulses.   Pulmonary:      Effort: Pulmonary effort is normal. No respiratory distress.   Abdominal:      General: There is distension.      Palpations: Abdomen is soft.   Skin:     General: Skin is warm.   Neurological:      General: No focal deficit present.      Mental Status: She is alert and oriented to person, place, and time.   Psychiatric:         Mood and Affect: Mood normal.         Behavior: Behavior normal.         Thought Content: Thought content normal.            Review of Symptoms      Symptom Assessment (ESAS 0-10 Scale)  Pain:  0  Dyspnea:  0  Anxiety:  0  Nausea:  0  Depression:  0  Anorexia:  0  Fatigue:  6  Insomnia:  4  Restlessness:  0  Agitation:  0         Performance Status:  20    Living Arrangements:  Lives in home and Lives with family    Psychosocial/Cultural:   See Palliative Psychosocial Note: No  **Primary  to Follow**  Palliative Care  Consult: No        Advance Care Planning   Advance Directives:   Medical Power of : Yes      Decision Making:  Patient answered questions  Goals of Care: The patient endorses that what is most important right now is to focus on spending time at home, avoiding the hospital, remaining as independent as possible, symptom/pain control, and curative/life-prolongation (regardless of treatment burdens)    Accordingly, we have decided that the best plan to meet the patient's goals includes continuing with treatment         Significant Labs: BMP:   Recent Labs   Lab 08/07/23  0428   *   *   K 4.5      CO2 26   BUN 69*   CREATININE 1.6*   CALCIUM 6.7*   MG 2.0      CBC:   Recent Labs   Lab 08/06/23  0500 08/07/23  0428   WBC 6.77 6.53   HGB 7.0* 7.7*   HCT 21.4* 23.4*    169     CBC:   Recent Labs   Lab 08/07/23  0428   WBC 6.53   HGB 7.7*   HCT 23.4*   MCV 85        BMP:  Recent Labs   Lab 08/07/23  0428   *   *   K 4.5      CO2 26   BUN 69*   CREATININE 1.6*   CALCIUM 6.7*   MG 2.0     LFT:  Lab Results   Component Value Date    AST 23 07/31/2023    ALKPHOS 55 07/31/2023    BILITOT 0.7 07/31/2023     Albumin:   Albumin   Date Value Ref Range Status   07/31/2023 1.6 (L) 3.5 - 5.2 g/dL Final     Protein:   Total Protein   Date Value Ref Range Status   07/31/2023 4.8 (L) 6.0 - 8.4 g/dL Final     Lactic acid:   Lab Results   Component Value Date    LACTATE 1.4 07/28/2023    LACTATE 0.9 07/27/2023       Significant Imaging: I have reviewed all pertinent imaging results/findings within the past 24 hours.

## 2023-08-07 NOTE — ASSESSMENT & PLAN NOTE
Recent TSH elevated at 23, as per chart review elevated 2 years previous, unclear if compliance   - Given current NPO status with bowel surgery continue with IV levothyroxine

## 2023-08-07 NOTE — ASSESSMENT & PLAN NOTE
OR note 7/27 with extensive area of small bowel necrosis s/p resection now  Possible etiology for ongoing tachycardia/sepsis   - gen surg following  - TPN  - dc NGT per gen surg  - monitor bowel function  - palliative consult

## 2023-08-07 NOTE — PROGRESS NOTES
[facility] Medicine  Progress Note    Patient Name: Tessa Enriquez  MRN: [id number]  Patient Class: IP- Inpatient   Admission Date: 7/24/2023  Length of Stay: 14 days  Attending Physician: Kevin Bryant MD  Primary Care Provider: Karma Mcdermott MD        Subjective:     Principal Problem:Ischemic necrosis of small bowel        HPI:  Ms. Enriquez is a 67-years-old female who presented as a direct admission from [facility] for ICU level of care postoperatively.  Patient with a history of recurrent colorectal adenocarcinoma, status post neoadjuvant radiation and chemotherapy, followed by oncologist Dr. Frank.  On 07/24 she underwent exploratory laparotomy with extensive lysis of adhesion, proctectomy with creation of end colostomy by Dr. Wagner and she was admitted to hospital medicine postoperatively.  Postop course complicated by ongoing sinus tachycardia with anemia, status post PRBC transfusion and acute kidney injury, seen by Nephrology.  Earlier today ostomy noted to be dusky with concern for ischemia, she was taken back to the OR and underwent exploratory laparotomy with small-bowel resection, as per OR note, large area of small bowel resected with four areas of ischemia.  She was intubated for surgery and remains intubated postoperatively.  Reported history of COPD, hypothyroidism, nephrolithiasis status post bilateral ureteral stenting.  She is currently sedated on propofol, orally intubated on mechanical ventilation.  Repeat labs have been submitted.  As per ICU RN plans for possible repeat surgery.  Plan of care reviewed with nursing.  No visitors at bedside.      Overview/Hospital Course:  Patient was transferred from [facility] for ICU care postoperatively: ostomy noted to be dusky with concern for ischemia, she was taken back to the OR and underwent exploratory laparotomy with small-bowel resection, as per OR note, large area of small bowel resected with  four areas of ischemia transferred while intubated. She was followed by pulmonology/CC, ID, nephrology and GS. Once extubated, she was transferred to med-surg. She was on meropenem for peritonitis but only completed 5 of 7 days due to rash that developed. Allergies to PCN and flagyl. Respiratory culture grew MRSA considered to have pneumonia from this contributing to her sepsis. S/p merrem and vancomycin. Continued on course of eravacycline and diflucan. NGT utilized. TPN utilized. Refractory electrolyte replacement given. Required packed red blood cell transfusion without active bleeding noted. Patient likely has enterocutaneous fistula and general surgery allowing this to mature. NGT removed 8/7. Started on octreotide. Palliative consulted.  is working on LTAC placement.      Interval History: Patient seen and examined. NAEON. BP stable. BOOM slightly improved. Electrolytes stabilizing. Gen surg ordered for NGT removal today and to try octreotide and consider gattex. Palliative also to visit with patient.      Objective:     Vital Signs (Most Recent):  Temp: 98 °F (36.7 °C) (08/07/23 1224)  Pulse: 99 (08/07/23 1359)  Resp: 18 (08/07/23 1529)  BP: 108/78 (08/07/23 1224)  SpO2: 98 % (08/07/23 1359) Vital Signs (24h Range):  Temp:  [97.4 °F (36.3 °C)-98.7 °F (37.1 °C)] 98 °F (36.7 °C)  Pulse:  [] 99  Resp:  [16-20] 18  SpO2:  [94 %-98 %] 98 %  BP: (106-118)/(50-78) 108/78     Weight: 89.4 kg (197 lb)  Body mass index is 37.22 kg/m².    Intake/Output Summary (Last 24 hours) at 8/7/2023 1605  Last data filed at 8/7/2023 1224  Gross per 24 hour   Intake 658.33 ml   Output 2965 ml   Net -2306.67 ml         Physical Exam  Constitutional:       General: She is not in acute distress.     Appearance: She is obese. She is ill-appearing.   HENT:      Nose:      Comments: NGT in place, light brownish drainage  Cardiovascular:      Rate and Rhythm: Regular rhythm. Tachycardia present.   Pulmonary:      Effort:  Pulmonary effort is normal. No respiratory distress.   Abdominal:      General: There is distension.      Tenderness: There is abdominal tenderness.      Comments: Beny drain and ostomy intact  Large abdominal dressing in place    Skin:     General: Skin is warm and dry.   Neurological:      General: No focal deficit present.      Mental Status: She is alert and oriented to person, place, and time.   Psychiatric:         Mood and Affect: Mood normal.         Behavior: Behavior normal.             Significant Labs: All pertinent labs within the past 24 hours have been reviewed.    Significant Imaging: I have reviewed all pertinent imaging results/findings within the past 24 hours.      Assessment/Plan:      * Ischemic necrosis of small bowel  OR note 7/27 with extensive area of small bowel necrosis s/p resection now  Possible etiology for ongoing tachycardia/sepsis   - gen surg following  - TPN  - dc NGT per gen surg  - monitor bowel function  - palliative consult    MRSA pneumonia  - see sepsis section  - ID following    Short gut syndrome  - gen surg following  - considering gattex    Enterocutaneous fistula  At surgical site  - gen surg following and allowing to mature  - start octreotide    Hypocalcemia  Improving   - replace with prn IV scale    Sinus tachycardia  Multifactorial in the setting of acute medical conditions including dehydration, pain, postop, anemia  - tele  - addressing above conditions    Hyponatremia  Stable  - continue TPN  - trend daily on chem panel    Malnutrition of moderate degree  Low albumin and pre-albumin  Currently NPO  - TPN    Severe sepsis  Resolved   Extensive area of small bowel necrosis -s/p surgical repair   Resp Cx: + MRSA  Urine and blood culture negative   S/p merrem & vanc  - eravacycline and diflucan per ID    BOOM (acute kidney injury)  Returned now possibly IVVD +/- vancomycin use  Improving   DC'd vanc per ID  - getting more IVF with electrolyte replacements  - renally  dose meds and avoid  nephrotoxins  - trend renal function daily    Bilateral kidney stones  Seen by Dr. Mijares this admission, followed outpatient by Dr. Kwon   Status post bilateral stent placement    Severe obesity (BMI 35.0-39.9) with comorbidity  Body mass index is 37.22 kg/m². Morbid obesity complicates all aspects of disease management from diagnostic modalities to treatment. Weight loss encouraged and health benefits explained to patient.     FENG (iron deficiency anemia)  Worsening postoperatively, now s/p 5u pRBC this stay  No active bleeding  - trend hgb on daily cbc and transfuse as needed.    Malignant neoplasm of colon  History of recurrent colorectal adenocarcinoma  See small-bowel ischemia  Did receive adjuvant chemoradiation, followed by Dr. Frank    Hypothyroidism with abnormal TFTs  Recent TSH elevated at 23, as per chart review elevated 2 years previous, unclear if compliance   - Given current NPO status with bowel surgery continue with IV levothyroxine    COPD (chronic obstructive pulmonary disease)  No evidence of acute exacerbation   - P.r.n. ABG and chest x-ray  - Scheduled breathing treatments      VTE Risk Mitigation (From admission, onward)         Ordered     enoxaparin injection 40 mg  Every 24 hours         07/28/23 1849     Place sequential compression device  Until discontinued         07/28/23 0724     IP VTE HIGH RISK PATIENT  Once         07/24/23 2015                Discharge Planning   DERICK: 8/8/2023     Code Status: Full Code   Is the patient medically ready for discharge?:     Reason for patient still in hospital (select all that apply): Patient trending condition, Treatment and Consult recommendations  Discharge Plan A: Long-term acute care facility (LTAC)   Discharge Delays: None known at this time              Kevin Bryant MD  Department of Hospital Medicine   [facility]

## 2023-08-07 NOTE — CARE UPDATE
08/06/23 2016   Patient Assessment/Suction   Level of Consciousness (AVPU) alert   Respiratory Effort Normal;Unlabored   Expansion/Accessory Muscles/Retractions no use of accessory muscles   All Lung Fields Breath Sounds diminished   Rhythm/Pattern, Respiratory pattern regular;unlabored   Cough Frequency no cough   Cough Type nonproductive   PRE-TX-O2   Device (Oxygen Therapy) room air   SpO2 98 %   Pulse Oximetry Type Intermittent   $ Pulse Oximetry - Multiple Charge Pulse Oximetry - Multiple   Pulse 94   Resp 18   Positioning HOB elevated 30 degrees   Positioning   Body Position supine;30 degrees   Head of Bed (HOB) Positioning HOB at 30 degrees   Positioning/Transfer Devices pillows;in use   Aerosol Therapy   $ Aerosol Therapy Charges Aerosol Treatment   Daily Review of Necessity (SVN) completed   Respiratory Treatment Status (SVN) given   Treatment Route (SVN) mask;oxygen   Patient Position (SVN) HOB elevated   Post Treatment Assessment (SVN) increased aeration   Signs of Intolerance (SVN) none   Breath Sounds Post-Respiratory Treatment   Throughout All Fields Post-Treatment All Fields   Throughout All Fields Post-Treatment aeration increased   Post-treatment Heart Rate (beats/min) 99   Post-treatment Resp Rate (breaths/min) 18   Education   $ Education Bronchodilator;15 min

## 2023-08-07 NOTE — PROGRESS NOTES
"Progress Note  Infectious Disease    Reason for Consult:  Drainage from incision site; allergy to meropenem/penicillin/flagyl    HPI: Tessa Enriquez is a 67 y.o. female , obese, BMI 37.2Kg/m2, with unfortunate past medical history of recurrent colorectal adenocarcinoma, and b/l kidney stones status post robotic colectomy in November 2020, 1 out of 46 nodules was positive.  She could not tolerate chemo and was only able to complete 3 treatments and radiation therapy.  In August of 2022 she was found to have recurrence, status post 2nd session of chemotherapy.  On repeat surveillance this year in May she had evidence of adenocarcinoma at the anastomosis site.  PET-CT on June 14th demonstrated decreased size and activity of the mass.    She was admitted on 07/24 for ex-lap, s/p 8mg dexamethasone pre-op. As per operation note: "There is evidence of catastrophic events within the abdominal cavity.  There was diffuse he ischemia noted throughout multiple portions of the small bowel.  There were multiple sharp areas of demarcation between viable bowel and ischemic bowel.  Having removed the other obvious he clean necrotic areas there was evidence of dusky and bogginess at the site of her previous jejunal jejunostomy.  Decision was made against any reestablishment of continuity.  Remaining small bowel approximately 70-75 cm left.", Surgery required assistance from Urology for b/l ureteral catheter placement.     Patient was transferred to [facility] for ICU care.  Postop course complicated by tachycardia and hypotension.  Status post blood transfusion.     During hospital stay, respiratory culture grew MRSA 7/27. The patient developed a rash, clarified with nursing, unclear if vancomycin related although infusion rate was adjusted and rash resolved.  Seems like a second rash developed yesterday after 6 days of meropenem.      Patient seen and examined at bedside, she reports she is feeling fine, NG tube " in place, she is requesting something to eat.  Stable BP, tachycardic, PureWick in place, hazy urine noted.  Ostomy with liquid output. Surgical wound with bilious drainage form mid incision. On TPN. Repeat CT scan concerning for EC fistula.     ID consult for drainage from incision site, concern for peritonitis: Allergy to meropenem, penicillin, Flagyl.    Antibiotics:  Cipro 7/24  Cipro 7/26/27  Meropenem 7/27-8/1  Fluconazole 7/28--present  Vancomycin 7/29--present     8/5/23([facility]ijaya) case d/w Dr. Lacey. She is alert, complains of hunger. Reports(confirmed bynursing) of copious bilious drainage from incision. Noted previously by surgery. NGT remains in place. She is drinking water for dry mouth. Sat up in bed, but reluctant to get out of bed due to pain and increased drainage. Volume loss reflected in multiple electrolyte abnormalities.  She has no fever. Multiple family members at bedside.   8/6: interim reviewed. While urine output is still ok, she has BOOM now. She may be having more ileostomy volume loss than the TPN can replace. She is drinking for pleasure and intake po is not recorded, thus making NGT output appear greater than actual. Vancomycin was stopped (8d given). She sat up in the chair for 30 min this am. She is willing to spend more time in chair. She is going to receive a unit of blood today.    8/7: Interim reviewed, discussed with Dr Lilly, patient seen and examined at bedside, RN changing dressing from anterior abdominal wound, copious amounts of biliary fluid draining, sent for cultures. She reports she feels 'fine', still tolerating ice chips and water. Will discuss with Hospitalist to have P&P see her.       EXAM & DIAGNOSTICS REVIEWED:   Vitals:     Temp:  [97.4 °F (36.3 °C)-99.6 °F (37.6 °C)]   Temp: 97.4 °F (36.3 °C) (08/07/23 0809)  Pulse: 106 (08/07/23 0813)  Resp: 18 (08/07/23 0852)  BP: (!) 113/58 (notified nurse seth) (08/07/23 0809)  SpO2: 96 % (08/07/23 0813)    Intake/Output  Summary (Last 24 hours) at 8/7/2023 1022  Last data filed at 8/7/2023 0613  Gross per 24 hour   Intake 658.33 ml   Output 2165 ml   Net -1506.67 ml       General:  Chronically-ill appearing, obese, In NAD. Alert and attentive, cooperative, on O2 by NC 2L. Anxious and tremulous  Eyes:  Anicteric, EOMI  ENT:  NG tube in place, moist oral mucosa, edentulous   Neck:  Supple  Lungs: Clear to auscultation b/l  Heart:  S1/S2+, regular rhythm, no murmurs  Abd:  Distended, JUANITA drain with bloody drainage, ostomy with large volume bilious liquid output, decreased bowel sounds, very large and fresh bandage in place, d/w nursing. Ostomy appears pink and viable d/w nursing, that she has fistulous drainage from distal (inferior) incision  :  Purewick, hazy urine   Musc:  Joints without effusion, swelling,  erythema, synovitis,   Skin:  Warm, no evidence of rash on chest, arms, abdomen or legs   Wound:   Neuro:  Following commands, speech is clear, moves all extremities   Psych:  Anxious, cooperative  Lymphatic:     No cervical, supraclavicular nodes  Extrem: No LE edema, no evidence of rash  VAD:  L PICC line 8/2/23    L Port-A-Cath not accessed, no redness noted      Isolation: Contact/MRSA     8/7:        8/2:    8/5 8/1:      Right leg rash   Left leg rash    General Labs reviewed:  Recent Labs   Lab 08/05/23  0432 08/06/23  0500 08/07/23  0428   WBC 5.26 6.77 6.53   HGB 7.2* 7.0* 7.7*   HCT 22.2* 21.4* 23.4*    235 169       Recent Labs   Lab 07/31/23  1644 08/01/23  0506 08/05/23  0432 08/06/23  0500 08/07/23  0428      < > 131* 131* 134*   K 4.1   < > 3.0* 3.8 4.5      < > 101 100 100   CO2 20*   < > 22* 23 26   BUN 14   < > 38* 59* 69*   CREATININE 0.8   < > 1.2 1.7* 1.6*   CALCIUM 7.0*   < > 6.5* 6.7* 6.7*   PROT 4.8*  --   --   --   --    BILITOT 0.7  --   --   --   --    ALKPHOS 55  --   --   --   --    ALT 20  --   --   --   --    AST 23  --   --   --   --     < > = values in this interval  "not displayed.     No results for input(s): "CRP" in the last 168 hours.  No results for input(s): "SEDRATE" in the last 168 hours.    Estimated Creatinine Clearance: 35.6 mL/min (A) (based on SCr of 1.6 mg/dL (H)).     Micro:  Microbiology Results (last 7 days)       Procedure Component Value Units Date/Time    Culture, Anaerobe [[id number]]     Order Status: No result Specimen: Incision site from Abdomen     Aerobic culture [[id number]]     Order Status: No result Specimen: Incision site from Abdomen     Urine Culture High Risk [[id number]] Collected: 08/04/23 1943    Order Status: Completed Specimen: Urine, Clean Catch Updated: 08/07/23 0710     Urine Culture, Routine No growth    Narrative:      Indicated criteria for high risk culture:->Other  Other (specify):->complicated abdomen            Imaging Reviewed:  CXRs  CT abdomen/pelvis 8/2:  1. Colonic resection with left lower abdominal quadrant colostomy, punctate foci of intra-abdominal free air and a small volume of ascites, likely related to recent postoperative state.  2. Mildly prominent gas and fluid-filled loops of small bowel containing contrast suggestive of ileus and less likely partial low-grade mechanical bowel obstruction. Consider radiographic follow-up.  3. Airspace opacity in the left greater than right posterior dependent lower lobe with air bronchograms suggestive of atelectasis and aspiration/pneumonia, with a small left and trace right pleural effusion.  4. Moderate diffuse body wall edema (anasarca).  5. Full bladder, with mild bilateral hydroureteronephrosis and no radiopaque stones seen, consider correlation with urine output and urinalysis as warranted (noting there is a punctate focus of gas in the bladder, and a gas forming infection is not excluded).    Cardiology: EKG Qtc: 486ms    PATHOLOGY:   7/24:  1. DESIGNATED AS LYMPH NODES:   - BENIGN CYST PARTIALLY LINED BY MULLERIAN TYPE EPITHELIUM, POSSIBLE  REMNANTS OF PARATUBAL CYST.   - " LYMPH NODE TISSUE WAS NOT IDENTIFIED GROSSLY OR MICROSCOPICALLY.   - NEGATIVE FOR MALIGNANCY.     2. DISTAL RECTUM, RESECTION:   - FOCUS OF MODERATELY DIFFERENTIATED ADENOCARCINOMA INVOLVING INFLAMED  AND ULCERATED RECTAL MUCOSA.   - PROXIMAL RESECTION MARGIN IS POSITIVE FOR ADENOCARCINOMA  - EXTENSIVE TRANSMURAL CHRONIC AND ACUTE INFLAMMATION WITH MUCOSAL  ULCERATION.   - TWO PERIRECTAL LYMPH NODES NEGATIVE FOR MALIGNANCY.     3. SEGMENT OF SMALL INTESTINE:   - SEROSAL ADHESIONS.   - RECENT TRANSMURAL DEFECT WITHOUT ASSOCIATE INFLAMMATION OR HEMORRHAGE.   - NEGATIVE FOR MALIGNANCY.     4. PROXIMAL COLON, RESECTION:   - INVASIVE MODERATELY DIFFERENTIATED ADENOCARCINOMA IN THE BACKGROUND OF RECTAL ULCERATION AND MARKED CHRONIC AND ACUTE INFLAMMATION.   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, 75cm of small bowel left, concern for EC fistula formation   Bedside cultures form ostomy draining bile in process      MRSA  pneumonia, improved s/p 8 days of Vanc    B/l kidney stones, s/p b/l stents 7/24    H/o recurrent colorectal adenocarcinoma s/p chemotherapy     BOOM.  Short gut volume losses contributing  Malnutrition, albumin 1.6 - on TPN    Recommendations:  Continue Eravacycline 90mg IV q12h to cover GNR and anaerobes   Fluconazole 200mg IV daily as prophylaxis  Follow bedside cultures   Above for at least 2 weeks, exact duration of therapy will depend on clinical improvement   Consider Pain & Palliative to discuss GOC    Unfortunately, very poor prognosis     D/w patient, nursing, Dr Bryant      Medical Decision Making during this encounter was  [_] Low Complexity  [_] Moderate Complexity  [xx] High Complexity

## 2023-08-07 NOTE — ASSESSMENT & PLAN NOTE
Returned now possibly IVVD +/- vancomycin use  Improving   DC'd vanc per ID  - getting more IVF with electrolyte replacements  - renally dose meds and avoid  nephrotoxins  - trend renal function daily

## 2023-08-07 NOTE — PROGRESS NOTES
PARENTERAL NUTRITION PROGRESS NOTE:      Parenteral Nutrition Day # 5  Diagnosis/Indication for PN: s/p bowel resection  IV Access: PICC  Diet/Tube Feeding: NPO         Admixture Type: 2:1  Infusion Rate and Frequency: 75       PN Composition:   95 grams Amino Acid, 200 grams Dextrose, and 50 grams Lipid.    Today's TPN provides 1560 kcal.    *Dextrose is started at less than full dextrose goal to reduce risk for Refeeding Syndrome. Dextrose content will be advanced as appropriate over the next few days.    Please see order for electrolytes and additives content and adjustments.   *The Nutrition Support Team will continue to monitor electrolytes daily and adjust parenteral nutrition as warranted.     Spoke with RN about electrolytes, she has had issues keeping potassium WNL, reviewed contents of TPN with RN. RD to continue monitor and manage TPN.

## 2023-08-07 NOTE — HPI
67-year-old female with multiple medical problems significant for obesity, recurrent colorectal adenocarcinoma status post colectomy, chemotherapy, and radiation.  She initially presented with diarrhea and rectal bleeding.  She underwent exploratory laparotomy with surgery which revealed evidence of catastrophic events within the abdominal cavity; there were multiple areas of ischemia and necrotic bowel which were resected.  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  At this point she carries a guarded prognosis.  I have been asked to assist with goals of care.

## 2023-08-07 NOTE — PT/OT/SLP PROGRESS
Occupational Therapy      Patient Name:  Tessa Enriquez   MRN:  [id number]    Patient not seen today secondary to Patient unwilling to participate (fatigue). Will follow-up next scheduled service date.    8/7/2023

## 2023-08-07 NOTE — PLAN OF CARE
Problem: Infection  Goal: Absence of Infection Signs and Symptoms  Outcome: Ongoing, Progressing     Problem: Adult Inpatient Plan of Care  Goal: Plan of Care Review  Outcome: Ongoing, Progressing  Goal: Patient-Specific Goal (Individualized)  Outcome: Ongoing, Progressing  Goal: Absence of Hospital-Acquired Illness or Injury  Outcome: Ongoing, Progressing  Goal: Optimal Comfort and Wellbeing  Outcome: Ongoing, Progressing  Goal: Readiness for Transition of Care  Outcome: Ongoing, Progressing     Problem: Adjustment to Illness (Sepsis/Septic Shock)  Goal: Optimal Coping  Outcome: Ongoing, Progressing     Problem: Bleeding (Sepsis/Septic Shock)  Goal: Absence of Bleeding  Outcome: Ongoing, Progressing     Problem: Glycemic Control Impaired (Sepsis/Septic Shock)  Goal: Blood Glucose Level Within Desired Range  Outcome: Ongoing, Progressing     Problem: Infection Progression (Sepsis/Septic Shock)  Goal: Absence of Infection Signs and Symptoms  Outcome: Ongoing, Progressing     Problem: Nutrition Impaired (Sepsis/Septic Shock)  Goal: Optimal Nutrition Intake  Outcome: Ongoing, Progressing     Problem: Fluid and Electrolyte Imbalance (Acute Kidney Injury/Impairment)  Goal: Fluid and Electrolyte Balance  Outcome: Ongoing, Progressing     Problem: Oral Intake Inadequate (Acute Kidney Injury/Impairment)  Goal: Optimal Nutrition Intake  Outcome: Ongoing, Progressing     Problem: Renal Function Impairment (Acute Kidney Injury/Impairment)  Goal: Effective Renal Function  Outcome: Ongoing, Progressing     Problem: Fall Injury Risk  Goal: Absence of Fall and Fall-Related Injury  Outcome: Ongoing, Progressing     Problem: Skin Injury Risk Increased  Goal: Skin Health and Integrity  Outcome: Ongoing, Progressing     Problem: Communication Impairment (Mechanical Ventilation, Invasive)  Goal: Effective Communication  Outcome: Ongoing, Progressing     Problem: Device-Related Complication Risk (Mechanical Ventilation,  Invasive)  Goal: Optimal Device Function  Outcome: Ongoing, Progressing     Problem: Inability to Wean (Mechanical Ventilation, Invasive)  Goal: Mechanical Ventilation Liberation  Outcome: Ongoing, Progressing     Problem: Skin and Tissue Injury (Mechanical Ventilation, Invasive)  Goal: Absence of Device-Related Skin and Tissue Injury  Outcome: Ongoing, Progressing     Problem: Ventilator-Induced Lung Injury (Mechanical Ventilation, Invasive)  Goal: Absence of Ventilator-Induced Lung Injury  Outcome: Ongoing, Progressing     Problem: Communication Impairment (Artificial Airway)  Goal: Effective Communication  Outcome: Ongoing, Progressing     Problem: Device-Related Complication Risk (Artificial Airway)  Goal: Optimal Device Function  Outcome: Ongoing, Progressing     Problem: Skin and Tissue Injury (Artificial Airway)  Goal: Absence of Device-Related Skin or Tissue Injury  Outcome: Ongoing, Progressing     Problem: Noninvasive Ventilation Acute  Goal: Effective Unassisted Ventilation and Oxygenation  Outcome: Ongoing, Progressing     Problem: Restraint, Nonbehavioral (Nonviolent)  Goal: Absence of Harm or Injury  Outcome: Ongoing, Progressing     Problem: Oral Intake Inadequate  Goal: Improved Oral Intake  Outcome: Ongoing, Progressing     Problem: Parenteral Nutrition  Goal: Effective Intravenous Nutrition Therapy Delivery  Outcome: Ongoing, Progressing     Problem: Fluid Deficit (Intestinal Obstruction)  Goal: Fluid Balance  Outcome: Ongoing, Progressing     Problem: Infection (Intestinal Obstruction)  Goal: Absence of Infection Signs and Symptoms  Outcome: Ongoing, Progressing     Problem: Nausea and Vomiting (Intestinal Obstruction)  Goal: Nausea and Vomiting Relief  Outcome: Ongoing, Progressing     Problem: Pain (Intestinal Obstruction)  Goal: Acceptable Pain Control  Outcome: Ongoing, Progressing

## 2023-08-07 NOTE — PLAN OF CARE
Patient accepted at [facility] for LTAC pending medical clearance       08/07/23 0849   Post-Acute Status   Post-Acute Authorization Placement   Post-Acute Placement Status Pending medical clearance/testing

## 2023-08-08 NOTE — PROGRESS NOTES
"[facility]  Adult Nutrition   Progress Note (Nutrition Support Management)    SUMMARY     Recommendations  Recommendation/Intervention:   1.) Continue TPN rate. Adjustments made based off labs.    2.) Advance diet when medically appropriate to regular.   3.) RD to monitor and provide recommendations PRN.    Goals: 1.) Pt to meet/tolerate >75% of nutritional support. 2.) Progression of wound healing.  Nutrition Goal Status: progressing towards goal    Dietitian Rounds Brief  Pt remains NPO with TPN for nutrition support. Adjust to TPN made: increased dextrose, sodium, mag and calcium.     Diet order:   Current Diet Order: NPO      Current Nutrition Support Formula Ordered: Other (Comment) (custom TPN: AA 95gm; Dextrose 140gm; 250mL lipids)  Current Nutrition Support Rate Ordered: 75 (ml)  Current Nutrition Support Frequency Ordered: mL/hr    Evaluation of Received Nutrient/Fluid Intake  Energy Calories Required: meeting needs  Protein Required: meeting needs  Fluid Required: meeting needs  Comments: Pt is S/P bowel resection. She is still NPO will provide Clinimix @ 100 ml/hr. TPN ordered and will hang at 2200 to better meet nutritional needs.     % Intake of Estimated Energy Needs: 75 - 100 %  % Meal Intake: 75 - 100 %      Intake/Output Summary (Last 24 hours) at 8/8/2023 1354  Last data filed at 8/8/2023 0450  Gross per 24 hour   Intake --   Output 3330 ml   Net -3330 ml        Anthropometrics  Temp: 98.4 °F (36.9 °C)  Height Method: Stated  Height: 5' 1" (154.9 cm)  Height (inches): 61 in  Weight Method: Standard Scale  Weight: 89.4 kg (197 lb)  Weight (lb): 197 lb  Ideal Body Weight (IBW), Female: 105 lb  % Ideal Body Weight, Female (lb): 187.62 %  BMI (Calculated): 37.2  BMI Grade: 35 - 39.9 - obesity - grade II       Estimated/Assessed Needs  Weight Used For Calorie Calculations: 89.4 kg (197 lb 1.5 oz)  Energy Calorie Requirements (kcal): 8026-7657 kcal (20-25 kcal/ kg bw)  Energy Need Method: " Kcal/kg  Protein Requirements: 72-96 (1.5-2.0 g/ 48 kg IBW)  Weight Used For Protein Calculations: 48 kg (105 lb 13.1 oz)     Estimated Fluid Requirement Method: RDA Method  RDA Method (mL): 1788       Reason for Assessment  Reason For Assessment: RD follow-up  Relevant Medical History: COPD, hypothyroidism, rectosigmoid cancer, s/p small bowel resection  General Information Comments: Pt continues on custom TPN @ 75mL/hr providing 1560 kcals, 95gm protein.    Nutrition/Diet History  Spiritual, Cultural Beliefs, Church Practices, Values that Affect Care: no  Food Allergies: NKFA  Factors Affecting Nutritional Intake: NPO, altered gastrointestinal function    Nutrition Risk Screen  Nutrition Risk Screen: tube feeding or parenteral nutrition, large or nonhealing wound, burn or pressure injury     MST Score: 0  Have you recently lost weight without trying?: No  Weight loss score: 0  Have you been eating poorly because of a decreased appetite?: No  Appetite score: 0       Weight History:  Wt Readings from Last 10 Encounters:   07/24/23 89.4 kg (197 lb)   07/03/23 89.3 kg (196 lb 13.9 oz)   06/27/23 89.7 kg (197 lb 12 oz)   06/20/23 88.5 kg (195 lb 1.7 oz)   06/14/23 88.5 kg (195 lb)   06/12/23 88.5 kg (195 lb)   06/06/23 87.4 kg (192 lb 10.9 oz)   05/31/23 81.6 kg (180 lb)   05/26/23 85.7 kg (189 lb)   05/24/23 86 kg (189 lb 9.5 oz)        Lab/Procedures/Meds: Pertinent Labs/Meds Reviewed    Medications:Pertinent Medications Reviewed  Scheduled Meds:   albuterol-ipratropium  3 mL Nebulization Q6H WAKE    budesonide  0.5 mg Nebulization Q12H    chlorhexidine  15 mL Mouth/Throat BID    enoxparin  40 mg Subcutaneous Q24H (prophylaxis, 1700)    eravacycline 90 mg in sodium chloride 0.9% 250 mL infusion  90 mg Intravenous Q12H    fat emulsion 20%  250 mL Intravenous Daily    fluconazole (DIFLUCAN) IV (PEDS and ADULTS)  200 mg Intravenous Q24H    levothyroxine  75 mcg Intravenous Before breakfast    octreotide  100 mcg  "Subcutaneous Q8H    pantoprazole  40 mg Intravenous Daily     Continuous Infusions:   TPN ADULT CENTRAL LINE CUSTOM 75 mL/hr at 08/07/23 2117    TPN ADULT CENTRAL LINE CUSTOM       PRN Meds:.0.9%  NaCl infusion (for blood administration), 0.9%  NaCl infusion (for blood administration), acetaminophen, calcium gluconate IVPB, calcium gluconate IVPB, calcium gluconate IVPB, clonazePAM, dextrose 50%, dextrose 50%, diphenhydrAMINE, glucagon (human recombinant), glucose, glucose, hydrALAZINE, hydrALAZINE, HYDROmorphone, HYDROmorphone, magnesium sulfate IVPB, magnesium sulfate IVPB, ondansetron, potassium chloride **AND** potassium chloride **AND** potassium chloride, Flushing PICC Protocol **AND** [DISCONTINUED] sodium chloride 0.9% **AND** sodium chloride 0.9%, sodium phosphate 15 mmol in dextrose 5 % (D5W) 250 mL IVPB, sodium phosphate 20.01 mmol in dextrose 5 % (D5W) 250 mL IVPB, sodium phosphate 30 mmol in dextrose 5 % (D5W) 250 mL IVPB, traZODone    Labs: Pertinent Labs Reviewed  Clinical Chemistry:  Recent Labs   Lab 08/06/23  0500 08/07/23  0428 08/08/23  0537   * 134* 133*   K 3.8 4.5 4.6    100 99   CO2 23 26 26   * 121* 97   BUN 59* 69* 66*   CREATININE 1.7* 1.6* 1.6*   CALCIUM 6.7* 6.7* 6.9*   ANIONGAP 8 8 8   MG 2.2 2.0 1.7   PHOS 4.3 4.2 4.7*     CBC:   Recent Labs   Lab 08/08/23  0537   WBC 8.44   RBC 2.96*   HGB 8.5*   HCT 25.5*      MCV 86   MCH 28.7   MCHC 33.3     Lipid Panel:  Recent Labs   Lab 08/05/23  0432   TRIG 80     Cardiac Profile:  No results for input(s): "BNP", "CPK", "CPKMB", "TROPONINI", "CKTOTAL" in the last 168 hours.  Inflammatory Labs:  No results for input(s): "CRP" in the last 168 hours.  Diabetes:  No results for input(s): "HGBA1C", "POCTGLUCOSE" in the last 168 hours.  Thyroid & Parathyroid:  No results for input(s): "TSH", "FREET4", "X1RQIYE", "O0QKYTA", "THYROIDAB" in the last 168 hours.    Monitor and Evaluation  Food and Nutrient Intake: parenteral " nutrition intake  Food and Nutrient Adminstration: enteral and parenteral nutrition administration  Anthropometric Measurements: weight, weight change  Biochemical Data, Medical Tests and Procedures: electrolyte and renal panel, gastrointestinal profile, glucose/endocrine profile  Nutrition-Focused Physical Findings: overall appearance     Nutrition Risk  Level of Risk/Frequency of Follow-up: high     Nutrition Follow-Up  RD Follow-up?: Yes      Nancy Johnson RD 08/08/2023 1:54 PM

## 2023-08-08 NOTE — PROGRESS NOTES
"Progress Note  Infectious Disease    Reason for Consult:  Drainage from incision site; allergy to meropenem/penicillin/flagyl    HPI: Tessa Enriquez is a 67 y.o. female , obese, BMI 37.2Kg/m2, with unfortunate past medical history of recurrent colorectal adenocarcinoma, and b/l kidney stones status post robotic colectomy in November 2020, 1 out of 46 nodules was positive.  She could not tolerate chemo and was only able to complete 3 treatments and radiation therapy.  In August of 2022 she was found to have recurrence, status post 2nd session of chemotherapy.  On repeat surveillance this year in May she had evidence of adenocarcinoma at the anastomosis site.  PET-CT on June 14th demonstrated decreased size and activity of the mass.    She was admitted on 07/24 for ex-lap, s/p 8mg dexamethasone pre-op. As per operation note: "There is evidence of catastrophic events within the abdominal cavity.  There was diffuse he ischemia noted throughout multiple portions of the small bowel.  There were multiple sharp areas of demarcation between viable bowel and ischemic bowel.  Having removed the other obvious he clean necrotic areas there was evidence of dusky and bogginess at the site of her previous jejunal jejunostomy.  Decision was made against any reestablishment of continuity.  Remaining small bowel approximately 70-75 cm left.", Surgery required assistance from Urology for b/l ureteral catheter placement.     Patient was transferred to [facility] for ICU care.  Postop course complicated by tachycardia and hypotension.  Status post blood transfusion.     During hospital stay, respiratory culture grew MRSA 7/27. The patient developed a rash, clarified with nursing, unclear if vancomycin related although infusion rate was adjusted and rash resolved.  Seems like a second rash developed yesterday after 6 days of meropenem.      Patient seen and examined at bedside, she reports she is feeling fine, NG tube " in place, she is requesting something to eat.  Stable BP, tachycardic, PureWick in place, hazy urine noted.  Ostomy with liquid output. Surgical wound with bilious drainage form mid incision. On TPN. Repeat CT scan concerning for EC fistula.     ID consult for drainage from incision site, concern for peritonitis: Allergy to meropenem, penicillin, Flagyl.    Antibiotics:  Cipro 7/24  Cipro 7/26/27  Meropenem 7/27-8/1  Fluconazole 7/28--present  Vancomycin 7/29--present     8/5/23(Vijaya) case d/w Dr. Lacey. She is alert, complains of hunger. Reports(confirmed bynursing) of copious bilious drainage from incision. Noted previously by surgery. NGT remains in place. She is drinking water for dry mouth. Sat up in bed, but reluctant to get out of bed due to pain and increased drainage. Volume loss reflected in multiple electrolyte abnormalities.  She has no fever. Multiple family members at bedside.   8/6: interim reviewed. While urine output is still ok, she has BOOM now. She may be having more ileostomy volume loss than the TPN can replace. She is drinking for pleasure and intake po is not recorded, thus making NGT output appear greater than actual. Vancomycin was stopped (8d given). She sat up in the chair for 30 min this am. She is willing to spend more time in chair. She is going to receive a unit of blood today.    8/7: Interim reviewed, discussed with Dr Lilly, patient seen and examined at bedside, RN changing dressing from anterior abdominal wound, copious amounts of biliary fluid draining, sent for cultures. She reports she feels 'fine', still tolerating ice chips and water. Will discuss with Hospitalist to have P&P see her.    8/8: Interim reviewed, patient seen and examined at bedside, seen with wound care RN and RN, impressive amount of bile draining from abdominal wound, 2 dehisced areas, colostomy bag draining enteric fluid, skin around ostomy irritated/dermatitis. NG tube removed yesterday. Labs reviewed,  pre-albumin 5, very low. P&P recs noted. Micro reviewed, cultures from surgical wound dehiscence, skin micheal no predominant organisms.        EXAM & DIAGNOSTICS REVIEWED:   Vitals:     Temp:  [97.3 °F (36.3 °C)-98.2 °F (36.8 °C)]   Temp: 98.2 °F (36.8 °C) (08/08/23 0745)  Pulse: 101 (08/08/23 0808)  Resp: 20 (08/08/23 0948)  BP: 123/61 (08/08/23 0745)  SpO2: 96 % (08/08/23 0808)    Intake/Output Summary (Last 24 hours) at 8/8/2023 1058  Last data filed at 8/8/2023 0450  Gross per 24 hour   Intake --   Output 4130 ml   Net -4130 ml       General:  Chronically-ill appearing, obese, In NAD. Alert and attentive, cooperative, on O2 by NC 2L. Anxious and tremulous  Eyes:  Anicteric, EOMI  ENT:  Moist oral mucosa, edentulous   Neck:  Supple  Lungs: Clear to auscultation b/l  Heart:  S1/S2+, regular rhythm, no murmurs  Abd:  Distended, LLQ colostomy with dermatitis around ostomy, leakage of enteric fluid noted. Surgical incision 2 dehisced areas (middle and inferior) draining yellow bile  8/6: Distended, JUANITA drain with bloody drainage, ostomy with large volume bilious liquid output, decreased bowel sounds, very large and fresh bandage in place, d/w nursing. Ostomy appears pink and viable d/w nursing, that she has fistulous drainage from distal (inferior) incision  :  Purewick, hazy urine   Musc:  Joints without effusion, swelling,  erythema, synovitis,   Skin:  Warm, no evidence of rash on chest, arms, abdomen or legs   Wound:   Neuro:  Following commands, speech is clear, moves all extremities   Psych:  Anxious, cooperative  Lymphatic:     Extrem: No LE edema, no evidence of rash  VAD:  L PICC line 8/2/23    R Port-A-Cath not accessed, no redness noted      Isolation: Contact/MRSA     8/8:          8/7:        8/2:    8/5 8/1:      Right leg rash   Left leg rash    General Labs reviewed:  Recent Labs   Lab 08/06/23  0500 08/07/23  0428 08/08/23  0537   WBC 6.77 6.53 8.44   HGB 7.0* 7.7* 8.5*   HCT 21.4* 23.4*  "25.5*    169 294       Recent Labs   Lab 08/06/23  0500 08/07/23  0428 08/08/23  0537   * 134* 133*   K 3.8 4.5 4.6    100 99   CO2 23 26 26   BUN 59* 69* 66*   CREATININE 1.7* 1.6* 1.6*   CALCIUM 6.7* 6.7* 6.9*     No results for input(s): "CRP" in the last 168 hours.  No results for input(s): "SEDRATE" in the last 168 hours.    Estimated Creatinine Clearance: 35.6 mL/min (A) (based on SCr of 1.6 mg/dL (H)).     Micro:  Microbiology Results (last 7 days)       Procedure Component Value Units Date/Time    Aerobic culture [[id number]] Collected: 08/07/23 0940    Order Status: Completed Specimen: Incision site from Abdomen Updated: 08/08/23 0812     Aerobic Bacterial Culture Skin micheal,  no predominant organism    Culture, Anaerobe [[id number]] Collected: 08/07/23 0940    Order Status: Sent Specimen: Incision site from Abdomen Updated: 08/07/23 1048    Urine Culture High Risk [[id number]] Collected: 08/04/23 1943    Order Status: Completed Specimen: Urine, Clean Catch Updated: 08/07/23 0710     Urine Culture, Routine No growth    Narrative:      Indicated criteria for high risk culture:->Other  Other (specify):->complicated abdomen            Imaging Reviewed:  CXRs  CT abdomen/pelvis 8/2:  1. Colonic resection with left lower abdominal quadrant colostomy, punctate foci of intra-abdominal free air and a small volume of ascites, likely related to recent postoperative state.  2. Mildly prominent gas and fluid-filled loops of small bowel containing contrast suggestive of ileus and less likely partial low-grade mechanical bowel obstruction. Consider radiographic follow-up.  3. Airspace opacity in the left greater than right posterior dependent lower lobe with air bronchograms suggestive of atelectasis and aspiration/pneumonia, with a small left and trace right pleural effusion.  4. Moderate diffuse body wall edema (anasarca).  5. Full bladder, with mild bilateral hydroureteronephrosis and no radiopaque " stones seen, consider correlation with urine output and urinalysis as warranted (noting there is a punctate focus of gas in the bladder, and a gas forming infection is not excluded).    Cardiology: EKG Qtc: 486ms    PATHOLOGY:   7/24:  1. DESIGNATED AS LYMPH NODES:   - BENIGN CYST PARTIALLY LINED BY MULLERIAN TYPE EPITHELIUM, POSSIBLE  REMNANTS OF PARATUBAL CYST.   - LYMPH NODE TISSUE WAS NOT IDENTIFIED GROSSLY OR MICROSCOPICALLY.   - NEGATIVE FOR MALIGNANCY.     2. DISTAL RECTUM, RESECTION:   - FOCUS OF MODERATELY DIFFERENTIATED ADENOCARCINOMA INVOLVING INFLAMED  AND ULCERATED RECTAL MUCOSA.   - PROXIMAL RESECTION MARGIN IS POSITIVE FOR ADENOCARCINOMA  - EXTENSIVE TRANSMURAL CHRONIC AND ACUTE INFLAMMATION WITH MUCOSAL  ULCERATION.   - TWO PERIRECTAL LYMPH NODES NEGATIVE FOR MALIGNANCY.     3. SEGMENT OF SMALL INTESTINE:   - SEROSAL ADHESIONS.   - RECENT TRANSMURAL DEFECT WITHOUT ASSOCIATE INFLAMMATION OR HEMORRHAGE.   - NEGATIVE FOR MALIGNANCY.     4. PROXIMAL COLON, RESECTION:   - INVASIVE MODERATELY DIFFERENTIATED ADENOCARCINOMA IN THE BACKGROUND OF RECTAL ULCERATION AND MARKED CHRONIC AND ACUTE INFLAMMATION.   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  Short gut volume losses contributing, cr 1.6    Malnutrition, albumin 1.6, pre-albumin 5 - on TPN    Recommendations:  Continue Eravacycline 90mg IV q12h to cover GNR and anaerobes   Fluconazole 200mg IV daily as prophylaxis  Follow bedside cultures   Above for at least 2 weeks, exact duration of therapy will depend on clinical improvement   Wound care following     Unfortunately, very poor prognosis     D/w patient,  nursing, wound care RN Dr Bryant      Medical Decision Making during this encounter was  [_] Low Complexity  [_] Moderate Complexity  [xx] High Complexity

## 2023-08-08 NOTE — PROGRESS NOTES
Pt seen and examined.  NO acute events.  Contiues to have bilious output from EC fistula  Continues to work with wound care on mgmt of output    Wt Readings from Last 3 Encounters:   07/24/23 89.4 kg (197 lb)   07/03/23 89.3 kg (196 lb 13.9 oz)   06/27/23 89.7 kg (197 lb 12 oz)     Temp Readings from Last 3 Encounters:   08/08/23 98.2 °F (36.8 °C) (Oral)   07/03/23 97.5 °F (36.4 °C) (Temporal)   06/27/23 98.1 °F (36.7 °C) (Temporal)     BP Readings from Last 3 Encounters:   08/08/23 (!) 142/63   07/03/23 (!) 143/79   06/27/23 (!) 185/86     Pulse Readings from Last 3 Encounters:   08/08/23 96   07/03/23 84   06/27/23 84     AAOx3  Soft/nd/appt ttp  Images reviewed in media    Lab Results   Component Value Date    WBC 8.44 08/08/2023    HGB 8.5 (L) 08/08/2023    HCT 25.5 (L) 08/08/2023    MCV 86 08/08/2023     08/08/2023     .  Lastb  BMP  Lab Results   Component Value Date     (L) 08/08/2023    K 4.6 08/08/2023    CL 99 08/08/2023    CO2 26 08/08/2023    BUN 66 (H) 08/08/2023    CREATININE 1.6 (H) 08/08/2023    CALCIUM 6.9 (LL) 08/08/2023    ANIONGAP 8 08/08/2023    EGFRNORACEVR 35.6 (A) 08/08/2023     A/P:EC fistula/ short gut  Started on Octreotide yesterday  Waiting to start Gattex, spoke to rep today who will assist on getting started  Continued medical mgmt

## 2023-08-08 NOTE — CARE UPDATE
08/08/23 0808   Patient Assessment/Suction   Level of Consciousness (AVPU) alert   Respiratory Effort Normal;Unlabored   Expansion/Accessory Muscles/Retractions no use of accessory muscles   All Lung Fields Breath Sounds diminished   Rhythm/Pattern, Respiratory unlabored;pattern regular;depth regular   Cough Frequency no cough   PRE-TX-O2   Device (Oxygen Therapy) room air   SpO2 96 %   Pulse Oximetry Type Intermittent   $ Pulse Oximetry - Multiple Charge Pulse Oximetry - Multiple   Pulse 101   Resp 18   Aerosol Therapy   $ Aerosol Therapy Charges Aerosol Treatment   Daily Review of Necessity (SVN) completed   Respiratory Treatment Status (SVN) given   Treatment Route (SVN) mask   Patient Position (SVN) semi-Sanderson's   Post Treatment Assessment (SVN) increased aeration   Signs of Intolerance (SVN) none   Breath Sounds Post-Respiratory Treatment   Throughout All Fields Post-Treatment All Fields   Throughout All Fields Post-Treatment aeration increased   Post-treatment Heart Rate (beats/min) 101   Post-treatment Resp Rate (breaths/min) 18

## 2023-08-08 NOTE — ASSESSMENT & PLAN NOTE
OR note 7/27 with extensive area of small bowel necrosis s/p resection now  Possible etiology for ongoing tachycardia/sepsis   S/p NGT  - gen surg following  - TPN  - monitor bowel function  - palliative consult: continue care and full code

## 2023-08-08 NOTE — PLAN OF CARE
Problem: Infection  Goal: Absence of Infection Signs and Symptoms  Outcome: Ongoing, Progressing     Problem: Adult Inpatient Plan of Care  Goal: Plan of Care Review  Outcome: Ongoing, Progressing  Goal: Patient-Specific Goal (Individualized)  Outcome: Ongoing, Progressing  Goal: Absence of Hospital-Acquired Illness or Injury  Outcome: Ongoing, Progressing  Goal: Optimal Comfort and Wellbeing  Outcome: Ongoing, Progressing  Goal: Readiness for Transition of Care  Outcome: Ongoing, Progressing     Problem: Adjustment to Illness (Sepsis/Septic Shock)  Goal: Optimal Coping  Outcome: Ongoing, Progressing     Problem: Bleeding (Sepsis/Septic Shock)  Goal: Absence of Bleeding  Outcome: Ongoing, Progressing     Problem: Glycemic Control Impaired (Sepsis/Septic Shock)  Goal: Blood Glucose Level Within Desired Range  Outcome: Ongoing, Progressing     Problem: Infection Progression (Sepsis/Septic Shock)  Goal: Absence of Infection Signs and Symptoms  Outcome: Ongoing, Progressing     Problem: Nutrition Impaired (Sepsis/Septic Shock)  Goal: Optimal Nutrition Intake  Outcome: Ongoing, Progressing     Problem: Fluid and Electrolyte Imbalance (Acute Kidney Injury/Impairment)  Goal: Fluid and Electrolyte Balance  Outcome: Ongoing, Progressing     Problem: Oral Intake Inadequate (Acute Kidney Injury/Impairment)  Goal: Optimal Nutrition Intake  Outcome: Ongoing, Progressing     Problem: Renal Function Impairment (Acute Kidney Injury/Impairment)  Goal: Effective Renal Function  Outcome: Ongoing, Progressing     Problem: Fall Injury Risk  Goal: Absence of Fall and Fall-Related Injury  Outcome: Ongoing, Progressing     Problem: Skin Injury Risk Increased  Goal: Skin Health and Integrity  Outcome: Ongoing, Progressing     Problem: Communication Impairment (Mechanical Ventilation, Invasive)  Goal: Effective Communication  Outcome: Ongoing, Progressing     Problem: Device-Related Complication Risk (Mechanical Ventilation,  Invasive)  Goal: Optimal Device Function  Outcome: Ongoing, Progressing     Problem: Inability to Wean (Mechanical Ventilation, Invasive)  Goal: Mechanical Ventilation Liberation  Outcome: Ongoing, Progressing     Problem: Skin and Tissue Injury (Mechanical Ventilation, Invasive)  Goal: Absence of Device-Related Skin and Tissue Injury  Outcome: Ongoing, Progressing     Problem: Ventilator-Induced Lung Injury (Mechanical Ventilation, Invasive)  Goal: Absence of Ventilator-Induced Lung Injury  Outcome: Ongoing, Progressing     Problem: Communication Impairment (Artificial Airway)  Goal: Effective Communication  Outcome: Ongoing, Progressing     Problem: Device-Related Complication Risk (Artificial Airway)  Goal: Optimal Device Function  Outcome: Ongoing, Progressing     Problem: Skin and Tissue Injury (Artificial Airway)  Goal: Absence of Device-Related Skin or Tissue Injury  Outcome: Ongoing, Progressing     Problem: Noninvasive Ventilation Acute  Goal: Effective Unassisted Ventilation and Oxygenation  Outcome: Ongoing, Progressing     Problem: Restraint, Nonbehavioral (Nonviolent)  Goal: Absence of Harm or Injury  Outcome: Ongoing, Progressing     Problem: Oral Intake Inadequate  Goal: Improved Oral Intake  Outcome: Ongoing, Progressing     Problem: Parenteral Nutrition  Goal: Effective Intravenous Nutrition Therapy Delivery  Outcome: Ongoing, Progressing     Problem: Fluid Deficit (Intestinal Obstruction)  Goal: Fluid Balance  Outcome: Ongoing, Progressing     Problem: Infection (Intestinal Obstruction)  Goal: Absence of Infection Signs and Symptoms  Outcome: Ongoing, Progressing     Problem: Nausea and Vomiting (Intestinal Obstruction)  Goal: Nausea and Vomiting Relief  Outcome: Ongoing, Progressing     Problem: Pain (Intestinal Obstruction)  Goal: Acceptable Pain Control  Outcome: Ongoing, Progressing     Problem: Coping Ineffective  Goal: Effective Coping  Outcome: Ongoing, Progressing

## 2023-08-08 NOTE — SUBJECTIVE & OBJECTIVE
Interval History: Patient seen and examined. SLIMBola ZULUAGAT out yesterday. Wound care working with her abdominal draining wound.      Objective:     Vital Signs (Most Recent):  Temp: 98.4 °F (36.9 °C) (08/08/23 1056)  Pulse: 93 (08/08/23 1331)  Resp: 18 (08/08/23 1332)  BP: (!) 162/67 (08/08/23 1056)  SpO2: 96 % (08/08/23 1331) Vital Signs (24h Range):  Temp:  [97.3 °F (36.3 °C)-98.4 °F (36.9 °C)] 98.4 °F (36.9 °C)  Pulse:  [] 93  Resp:  [18-20] 18  SpO2:  [92 %-96 %] 96 %  BP: (123-162)/(55-67) 162/67     Weight: 89.4 kg (197 lb)  Body mass index is 37.22 kg/m².    Intake/Output Summary (Last 24 hours) at 8/8/2023 1722  Last data filed at 8/8/2023 0450  Gross per 24 hour   Intake --   Output 2905 ml   Net -2905 ml         Physical Exam  Constitutional:       General: She is not in acute distress.     Appearance: She is obese. She is ill-appearing.   Cardiovascular:      Rate and Rhythm: Regular rhythm. Tachycardia present.   Pulmonary:      Effort: Pulmonary effort is normal. No respiratory distress.   Abdominal:      General: There is distension.      Tenderness: There is abdominal tenderness.      Comments: Beny drain and ostomy intact  Large abdominal dressing in place    Skin:     General: Skin is warm and dry.   Neurological:      General: No focal deficit present.      Mental Status: She is alert and oriented to person, place, and time.   Psychiatric:         Mood and Affect: Mood normal.         Behavior: Behavior normal.             Significant Labs: All pertinent labs within the past 24 hours have been reviewed.    Significant Imaging: I have reviewed all pertinent imaging results/findings within the past 24 hours.

## 2023-08-08 NOTE — PT/OT/SLP PROGRESS
Occupational Therapy      Patient Name:  Tessa Enriquez   MRN:  [id number]    Patient not seen today secondary to Nursing care, Wound care. Will follow-up next scheduled service date.    8/8/2023

## 2023-08-08 NOTE — CONSULTS
Incision leaking around unbilicus and area in abdominal fold, yellow bile lookiing drainage, non odorous.  Stoma with sutures failing, area recessed, yellow bile looking drainage coming from the 2-3 oclock position, redness at the 12 to 6 position.  Cleaned and dried and applied no sting skin prep. Frank and nephew.  Patient complains of burning.  Applied coloplast flat pouch cut at about [id number] used moldable ring.  Spoke with Torsten in Purchasing, ordering  belts,  larger high flow pouch up to 65mm, phlange, convex ring, cavilon spray.

## 2023-08-08 NOTE — RESPIRATORY THERAPY
08/07/23 2030   Patient Assessment/Suction   Level of Consciousness (AVPU) alert   Respiratory Effort Unlabored   Expansion/Accessory Muscles/Retractions no use of accessory muscles   All Lung Fields Breath Sounds diminished   PRE-TX-O2   Device (Oxygen Therapy) room air   SpO2 (!) 92 %   Pulse Oximetry Type Intermittent   $ Pulse Oximetry - Multiple Charge Pulse Oximetry - Multiple   Pulse 102   Resp 18   Aerosol Therapy   $ Aerosol Therapy Charges Aerosol Treatment   Daily Review of Necessity (SVN) completed   Respiratory Treatment Status (SVN) given   Treatment Route (SVN) mask;oxygen   Patient Position (SVN) HOB elevated   Post Treatment Assessment (SVN) breath sounds unchanged   Signs of Intolerance (SVN) none   Breath Sounds Post-Respiratory Treatment   Throughout All Fields Post-Treatment no change   Post-treatment Heart Rate (beats/min) 100   Post-treatment Resp Rate (breaths/min) 15   Education   $ Education Bronchodilator;15 min   Respiratory Evaluation   $ Care Plan Tech Time 15 min   $ Eval/Re-eval Charges Re-evaluation

## 2023-08-08 NOTE — CONSULTS
[facility]  Palliative Medicine  Consult Note    Patient Name: Tessa Enriquez  MRN: [id number]  Admission Date: 7/24/2023  Hospital Length of Stay: 14 days  Code Status: Full Code   Attending Provider: Kevin Bryant MD  Consulting Provider: Mike Bauman MD  Primary Care Physician: Karma Mcdermott MD  Principal Problem:Ischemic necrosis of small bowel    Patient information was obtained from patient, spouse/SO, past medical records and primary team.      Inpatient consult to Palliative Care  Consult performed by: Mike Bauman MD  Consult ordered by: Kevin Bryant MD        Assessment/Plan:     Palliative Care  Goals of care, counseling/discussion  I reviewed her chart discussed her case with her team.      I examined Ms. Enriquez at bedside.  She maintains capacity for complex medical decision-making.      I introduced myself and my role as palliative care physician.  She was agreeable to speaking.      We discussed her medical illness, prognosis, and values in detail.      Briefly, she was able to very succinctly and accurately describe her very complicated hospital course up to this point.  She was able to speak to her multiple surgeries throughout this hospitalization that has led to the removal most of her bowel.  She also understands that her course has been complicated by wound infection and an enterocutaneous fistula.  She also understands that workup has revealed recurrent cancer.    We discussed her prognosis will.  In the best case scenario she will have a protracted and likely complicated recovery course.  She also understands my worry after recovery she will likely remain dependent on others for at least some of her care.  She also understands she was at risk of complications that could easily become life-threatening result in her death throughout the recovery process.    We discussed her values.  She tells me she was a fighter.  She was willing to go through a lot  to get better.  She was okay with prolonged hospitalization.  She would be willing to undergo further surgery.  She has been through cancer treatment in the past and is willing to go through more of it.  She worries about dying.  She was hopeful to get back home to her family.  Aida is part of her life and she does finds strength in her Taoism aida.    I spoke back and forth.  I answered all of her questions which were few.    Is clear that her goals are most aligned with current plan of care.  I recommended that we continue with current plan of care being hopeful for the best and prepared for the worst.    And in preparing for the worst it did take a moment to discuss healthcare power of .  She was completed this and desires for her daughter, Umu, to be her healthcare power of .  This has already been uploaded to her chart.    Took a moment to discuss code status and clinical realities of code and her situation.  She desires to maintain a full code status.  This prompted me to explore what makes her minimal reasonable quality of life for her.  She was not able to answer his questions.  I asked that she think about this so that we can help guide her care as her condition changes. She understood.    While her goals are not aligned hospice today.  I did take a moment to discuss hospice and the philosophy of hospice care so that she knows she was options her goals become more aligned with a comfort focused approach to care.     We spoke back and forth.  She was asking good questions.  She was showing significant insight into her disease process.    As her condition changes, I believe she will make wise, patient centered decisions.    I appreciate being involved.  I hope I have been helpful.        Thank you for your consult. I will sign off. Please contact us if you have any additional questions.    Subjective:     HPI:   67-year-old female with multiple medical problems significant for obesity,  recurrent colorectal adenocarcinoma status post colectomy, chemotherapy, and radiation.  She initially presented with diarrhea and rectal bleeding.  She underwent exploratory laparotomy with surgery which revealed evidence of catastrophic events within the abdominal cavity; there were multiple areas of ischemia and necrotic bowel which were resected.  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  At this point she carries a guarded prognosis.  I have been asked to assist with goals of care.      Hospital Course:  No notes on file    Interval History: See HPI    Past Medical History:   Diagnosis Date    Acute hypoxemic respiratory failure 07/09/2018    Arthritis     Bilateral kidney stones 3/27/2023    Cancer     colon    COPD (chronic obstructive pulmonary disease)     History of home oxygen therapy     ONLY PRN AND HASN'T USED IT IN A YEAR    Hyperlipidemia     Thyroid disease        Past Surgical History:   Procedure Laterality Date    CHOLECYSTECTOMY      COLECTOMY N/A 11/9/2020    Procedure: COLECTOMY;  Surgeon: Brandon Wagner MD;  Location: [facility];  Service: General;  Laterality: N/A;  LOWER- ANTERIOR    COLONOSCOPY N/A 10/23/2020    Procedure: COLONOSCOPY;  Surgeon: Jolynn Ayala MD;  Location: [facility] ENDO;  Service: Endoscopy;  Laterality: N/A;    COLONOSCOPY N/A 8/6/2021    Procedure: COLONOSCOPY;  Surgeon: Jolynn Ayala MD;  Location: [facility] ENDO;  Service: Endoscopy;  Laterality: N/A;    COLONOSCOPY N/A 7/7/2022    Procedure: COLONOSCOPY;  Surgeon: Jolynn Ayala MD;  Location: [facility] ENDO;  Service: Endoscopy;  Laterality: N/A;    COLONOSCOPY N/A 5/31/2023    Procedure: COLONOSCOPY;  Surgeon: Jolynn Ayala MD;  Location: [facility] ENDO;  Service: Endoscopy;  Laterality: N/A;    COLOSTOMY N/A 7/24/2023    Procedure: CREATION, COLOSTOMY;  Surgeon:  Brandon Wagner MD;  Location: [facility] OR;  Service: General;  Laterality: N/A;    COMPLETION PROCTECTOMY N/A 7/24/2023    Procedure: PROCTECTOMY, COMPLETION;  Surgeon: Brandon Wagner MD;  Location: [facility] OR;  Service: General;  Laterality: N/A;    CYSTOSCOPY W/ URETERAL STENT PLACEMENT Right 2/22/2023    Procedure: CYSTOSCOPY, WITH URETERAL STENT INSERTION;  Surgeon: Carin Kwon Jr., MD;  Location: [facility] OR;  Service: Urology;  Laterality: Right;    CYSTOSCOPY W/ URETERAL STENT PLACEMENT Bilateral 3/26/2023    Procedure: CYSTOSCOPY, WITH URETERAL STENT INSERTION;  Surgeon: Sabine Barber MD;  Location: [facility] OR;  Service: Urology;  Laterality: Bilateral;    CYSTOSCOPY W/ URETERAL STENT PLACEMENT Bilateral 7/24/2023    Procedure: CYSTOSCOPY, WITH URETERAL STENT INSERTION;  Surgeon: Adrienne Mijares MD;  Location: [facility] OR;  Service: Urology;  Laterality: Bilateral;  calosin placing stents 1st    CYSTOSCOPY WITH URETEROSCOPY, RETROGRADE PYELOGRAPHY, AND INSERTION OF STENT Bilateral 3/26/2023    Procedure: CYSTOSCOPY, WITH RETROGRADE PYELOGRAM AND URETERAL STENT INSERTION;  Surgeon: Sabine Barber MD;  Location: [facility];  Service: Urology;  Laterality: Bilateral;    CYSTOURETEROSCOPY WITH RETROGRADE PYELOGRAPHY AND INSERTION OF STENT INTO URETER Right 3/7/2023    Procedure: CYSTOURETEROSCOPY, WITH RETROGRADE PYELOGRAM AND URETERAL STENT INSERTION;  Surgeon: Carin Kwon Jr., MD;  Location: [facility];  Service: Urology;  Laterality: Right;    CYSTOURETEROSCOPY, WITH HOLMIUM LASER LITHOTRIPSY OF URETERAL CALCULUS AND STENT INSERTION Bilateral 4/11/2023    Procedure: CYSTOURETEROSCOPY, WITH HOLMIUM LASER LITHOTRIPSY OF URETERAL CALCULUS AND STENT INSERTION;  Surgeon: Carin Kwon Jr., MD;  Location: [facility];  Service: Urology;  Laterality: Bilateral;    ESOPHAGEAL DILATION N/A 6/29/2021    Procedure: DILATION, ESOPHAGUS;  Surgeon: Sourav Baires III, MD;  Location: [facility];  Service:  Endoscopy;  Laterality: N/A;    ESOPHAGOGASTRODUODENOSCOPY  06/29/2021    Dilation to 57 Fr    ESOPHAGOGASTRODUODENOSCOPY N/A 6/29/2021    Procedure: EGD (ESOPHAGOGASTRODUODENOSCOPY);  Surgeon: Sourav Baires III, MD;  Location: [facility] ENDO;  Service: Endoscopy;  Laterality: N/A;    EXCISION, SMALL INTESTINE  7/28/2023    Procedure: EXCISION, SMALL INTESTINE;  Surgeon: Brandon Wagner MD;  Location: [facility] OR;  Service: General;;    EXCISION, SMALL INTESTINE N/A 7/27/2023    Procedure: EXCISION, SMALL INTESTINE;  Surgeon: Brandon Wagner MD;  Location: [facility] OR;  Service: General;  Laterality: N/A;    FLEXIBLE SIGMOIDOSCOPY N/A 8/1/2022    Procedure: SIGMOIDOSCOPY, FLEXIBLE;  Surgeon: Brandon Wagner MD;  Location: [facility] ENDO;  Service: Endoscopy;  Laterality: N/A;    HYSTERECTOMY      ILEOSTOMY  7/28/2023    Procedure: CREATION, ILEOSTOMY;  Surgeon: Brandon Wagner MD;  Location: [facility] OR;  Service: General;;    INSERTION OF TUNNELED CENTRAL VENOUS CATHETER (CVC) WITH SUBCUTANEOUS PORT N/A 1/11/2021    Procedure: [id number];  Surgeon: Brandon Wagner MD;  Location: [facility] OR;  Service: General;  Laterality: N/A;    INSERTION OF TUNNELED CENTRAL VENOUS CATHETER (CVC) WITH SUBCUTANEOUS PORT Right 9/1/2022    Procedure: [id number];  Surgeon: Brandon Wagner MD;  Location: [facility] OR;  Service: General;  Laterality: Right;  Wants rt side placement    LAPAROTOMY, EXPLORATORY N/A 7/24/2023    Procedure: LAPAROTOMY, EXPLORATORY;  Surgeon: Brandon Wagner MD;  Location: [facility] OR;  Service: General;  Laterality: N/A;  case must go 1st-chamberlain placing stents    LAPAROTOMY, EXPLORATORY N/A 7/28/2023    Procedure: LAPAROTOMY, EXPLORATORY;  Surgeon: Brandon Wagner MD;  Location: [facility] OR;  Service: General;  Laterality: N/A;    LAPAROTOMY, EXPLORATORY N/A 7/27/2023    Procedure: LAPAROTOMY, EXPLORATORY;  Surgeon: Brandon Wagner MD;  Location: [facility];  Service: General;  Laterality:  N/A;    MEDIPORT REMOVAL Left 4/19/2021    Procedure: REMOVAL, CATHETER, CENTRAL VENOUS, TUNNELED, WITH PORT;  Surgeon: Brandon Wagner MD;  Location: [facility] OR;  Service: General;  Laterality: Left;    REMOVAL-STENT Right 3/7/2023    Procedure: REMOVAL-STENT;  Surgeon: Carin Kwon Jr., MD;  Location: [facility] OR;  Service: Urology;  Laterality: Right;    REMOVAL-STENT Bilateral 4/11/2023    Procedure: REMOVAL-STENT;  Surgeon: Carin Kwon Jr., MD;  Location: [facility] OR;  Service: Urology;  Laterality: Bilateral;    RETROGRADE PYELOGRAPHY Bilateral 4/11/2023    Procedure: PYELOGRAM, RETROGRADE;  Surgeon: Carin Kwon Jr., MD;  Location: [facility] OR;  Service: Urology;  Laterality: Bilateral;    ROBOT-ASSISTED COLECTOMY N/A 11/9/2020    Procedure: ROBOTIC COLECTOMY low anterior resection, possible open;  Surgeon: Brandon Wagner MD;  Location: [facility] OR;  Service: General;  Laterality: N/A;  CONVERTED TO OPEN AT 1503    SUBTOTAL COLECTOMY N/A 7/24/2023    Procedure: COLECTOMY, PARTIAL;  Surgeon: Brandon Wagner MD;  Location: [facility] OR;  Service: General;  Laterality: N/A;  with colorectal Anastamosis    URETEROSCOPIC REMOVAL OF URETERIC CALCULUS Right 3/7/2023    Procedure: REMOVAL, CALCULUS, URETER, URETEROSCOPIC;  Surgeon: Carin Kwon Jr., MD;  Location: [facility] OR;  Service: Urology;  Laterality: Right;    URETEROSCOPIC REMOVAL OF URETERIC CALCULUS Bilateral 4/11/2023    Procedure: REMOVAL, CALCULUS, URETER, URETEROSCOPIC;  Surgeon: Carin Kwon Jr., MD;  Location: [facility] OR;  Service: Urology;  Laterality: Bilateral;       Review of patient's allergies indicates:   Allergen Reactions    Ativan [lorazepam] Hives and Itching    Merrem [meropenem] Rash    Flagyl [metronidazole] Itching and Rash    Pcn [penicillins] Hives, Itching and Rash       Medications:  Continuous Infusions:   TPN ADULT CENTRAL LINE CUSTOM 75 mL/hr at 08/06/23 2048    TPN ADULT CENTRAL LINE CUSTOM       Scheduled Meds:    albuterol-ipratropium  3 mL Nebulization Q6H WAKE    budesonide  0.5 mg Nebulization Q12H    chlorhexidine  15 mL Mouth/Throat BID    enoxparin  40 mg Subcutaneous Q24H (prophylaxis, 1700)    eravacycline 90 mg in sodium chloride 0.9% 250 mL infusion  90 mg Intravenous Q12H    fat emulsion 20%  250 mL Intravenous Daily    fluconazole (DIFLUCAN) IV (PEDS and ADULTS)  200 mg Intravenous Q24H    levothyroxine  75 mcg Intravenous Before breakfast    pantoprazole  40 mg Intravenous Daily     PRN Meds:0.9%  NaCl infusion (for blood administration), 0.9%  NaCl infusion (for blood administration), acetaminophen, calcium gluconate IVPB, calcium gluconate IVPB, calcium gluconate IVPB, clonazePAM, dextrose 50%, dextrose 50%, diphenhydrAMINE, glucagon (human recombinant), glucose, glucose, hydrALAZINE, hydrALAZINE, HYDROmorphone, HYDROmorphone, magnesium sulfate IVPB, magnesium sulfate IVPB, ondansetron, potassium chloride **AND** potassium chloride **AND** potassium chloride, Flushing PICC Protocol **AND** [DISCONTINUED] sodium chloride 0.9% **AND** sodium chloride 0.9%, sodium phosphate 15 mmol in dextrose 5 % (D5W) 250 mL IVPB, sodium phosphate 20.01 mmol in dextrose 5 % (D5W) 250 mL IVPB, sodium phosphate 30 mmol in dextrose 5 % (D5W) 250 mL IVPB, traZODone    Family History       Problem Relation (Age of Onset)    Breast cancer Maternal Grandmother    COPD Mother, Father    Prostate cancer Brother          Tobacco Use    Smoking status: Former     Current packs/day: 0.00     Average packs/day: 2.0 packs/day for 40.0 years (80.0 ttl pk-yrs)     Types: Cigarettes     Start date: 1977     Quit date: 2017     Years since quittin.0    Smokeless tobacco: Never   Substance and Sexual Activity    Alcohol use: No    Drug use: No    Sexual activity: Not Currently     Partners: Male       Review of Systems  Objective:     Vital Signs (Most Recent):  Temp: 98 °F (36.7 °C) (23 1224)  Pulse: 97 (23  1224)  Resp: 20 (08/07/23 1224)  BP: 108/78 (08/07/23 1224)  SpO2: 98 % (08/07/23 1224) Vital Signs (24h Range):  Temp:  [97.4 °F (36.3 °C)-98.7 °F (37.1 °C)] 98 °F (36.7 °C)  Pulse:  [] 97  Resp:  [16-20] 20  SpO2:  [94 %-98 %] 98 %  BP: (106-118)/(50-78) 108/78     Weight: 89.4 kg (197 lb)  Body mass index is 37.22 kg/m².       Physical Exam  Vitals reviewed.   Constitutional:       General: She is not in acute distress.     Appearance: She is ill-appearing. She is not toxic-appearing.   HENT:      Head: Normocephalic and atraumatic.      Right Ear: External ear normal.      Left Ear: External ear normal.      Nose: Nose normal. No congestion.      Mouth/Throat:      Mouth: Mucous membranes are moist.      Pharynx: No oropharyngeal exudate.      Comments: Trach in place  Eyes:      General:         Right eye: No discharge.         Left eye: No discharge.      Extraocular Movements: Extraocular movements intact.      Pupils: Pupils are equal, round, and reactive to light.   Cardiovascular:      Rate and Rhythm: Tachycardia present. Rhythm irregular.      Pulses: Normal pulses.   Pulmonary:      Effort: Pulmonary effort is normal. No respiratory distress.   Abdominal:      General: There is distension.      Palpations: Abdomen is soft.   Skin:     General: Skin is warm.   Neurological:      General: No focal deficit present.      Mental Status: She is alert and oriented to person, place, and time.   Psychiatric:         Mood and Affect: Mood normal.         Behavior: Behavior normal.         Thought Content: Thought content normal.            Review of Symptoms      Symptom Assessment (ESAS 0-10 Scale)  Pain:  0  Dyspnea:  0  Anxiety:  0  Nausea:  0  Depression:  0  Anorexia:  0  Fatigue:  6  Insomnia:  4  Restlessness:  0  Agitation:  0         Performance Status:  20    Living Arrangements:  Lives in home and Lives with family    Psychosocial/Cultural:   See Palliative Psychosocial Note: No  **Primary Social  Worker to Follow**  Palliative Care  Consult: No        Advance Care Planning  Advance Directives:   Medical Power of : Yes      Decision Making:  Patient answered questions  Goals of Care: The patient endorses that what is most important right now is to focus on spending time at home, avoiding the hospital, remaining as independent as possible, symptom/pain control, and curative/life-prolongation (regardless of treatment burdens)    Accordingly, we have decided that the best plan to meet the patient's goals includes continuing with treatment         Significant Labs: BMP:   Recent Labs   Lab 08/07/23  0428   *   *   K 4.5      CO2 26   BUN 69*   CREATININE 1.6*   CALCIUM 6.7*   MG 2.0     CBC:   Recent Labs   Lab 08/06/23  0500 08/07/23 0428   WBC 6.77 6.53   HGB 7.0* 7.7*   HCT 21.4* 23.4*    169     CBC:   Recent Labs   Lab 08/07/23 0428   WBC 6.53   HGB 7.7*   HCT 23.4*   MCV 85        BMP:  Recent Labs   Lab 08/07/23  0428   *   *   K 4.5      CO2 26   BUN 69*   CREATININE 1.6*   CALCIUM 6.7*   MG 2.0     LFT:  Lab Results   Component Value Date    AST 23 07/31/2023    ALKPHOS 55 07/31/2023    BILITOT 0.7 07/31/2023     Albumin:   Albumin   Date Value Ref Range Status   07/31/2023 1.6 (L) 3.5 - 5.2 g/dL Final     Protein:   Total Protein   Date Value Ref Range Status   07/31/2023 4.8 (L) 6.0 - 8.4 g/dL Final     Lactic acid:   Lab Results   Component Value Date    LACTATE 1.4 07/28/2023    LACTATE 0.9 07/27/2023       Significant Imaging: I have reviewed all pertinent imaging results/findings within the past 24 hours.        I spent a total of 95 minutes on the day of the visit. This includes face to face time in discussion of goals of care, symptom assessment, coordination of care and emotional support.  This also includes non-face to face time preparing to see the patient (eg, review of tests/imaging), obtaining and/or reviewing  separately obtained history, documenting clinical information in the electronic or other health record, independently interpreting results and communicating results to the patient/family/caregiver, or care coordinator.    Mike Bauman MD  Palliative Medicine  [facility]

## 2023-08-08 NOTE — ASSESSMENT & PLAN NOTE
I reviewed her chart discussed her case with her team.      I examined Ms. Enriquez at bedside.  She maintains capacity for complex medical decision-making.      I introduced myself and my role as palliative care physician.  She was agreeable to speaking.      We discussed her medical illness, prognosis, and values in detail.      Briefly, she was able to very succinctly and accurately describe her very complicated hospital course up to this point.  She was able to speak to her multiple surgeries throughout this hospitalization that has led to the removal most of her bowel.  She also understands that her course has been complicated by wound infection and an enterocutaneous fistula.  She also understands that workup has revealed recurrent cancer.    We discussed her prognosis will.  In the best case scenario she will have a protracted and likely complicated recovery course.  She also understands my worry after recovery she will likely remain dependent on others for at least some of her care.  She also understands she was at risk of complications that could easily become life-threatening result in her death throughout the recovery process.    We discussed her values.  She tells me she was a fighter.  She was willing to go through a lot to get better.  She was okay with prolonged hospitalization.  She would be willing to undergo further surgery.  She has been through cancer treatment in the past and is willing to go through more of it.  She worries about dying.  She was hopeful to get back home to her family.  Aida is part of her life and she does finds strength in her Voodoo aida.    I spoke back and forth.  I answered all of her questions which were few.    Is clear that her goals are most aligned with current plan of care.  I recommended that we continue with current plan of care being hopeful for the best and prepared for the worst.    And in preparing for the worst it did take a moment to discuss healthcare  power of .  She was completed this and desires for her daughter, Umu, to be her healthcare power of .  This has already been uploaded to her chart.    Took a moment to discuss code status and clinical realities of code and her situation.  She desires to maintain a full code status.  This prompted me to explore what makes her minimal reasonable quality of life for her.  She was not able to answer his questions.  I asked that she think about this so that we can help guide her care as her condition changes. She understood.    While her goals are not aligned hospice today.  I did take a moment to discuss hospice and the philosophy of hospice care so that she knows she was options her goals become more aligned with a comfort focused approach to care.     We spoke back and forth.  She was asking good questions.  She was showing significant insight into her disease process.    As her condition changes, I believe she will make wise, patient centered decisions.    I appreciate being involved.  I hope I have been helpful.

## 2023-08-08 NOTE — PT/OT/SLP PROGRESS
Physical Therapy      Patient Name:  Tessa Enriquez   MRN:  [id number]    Patient not seen today secondary to declined PT for today due to pain and fatigue following wound care and linen change. Will follow-up 8/9/23.

## 2023-08-08 NOTE — PROGRESS NOTES
[facility] Medicine  Progress Note    Patient Name: Tessa Enriquez  MRN: [id number]  Patient Class: IP- Inpatient   Admission Date: 7/24/2023  Length of Stay: 15 days  Attending Physician: Kevin Bryant MD  Primary Care Provider: THERESA PACKER        Subjective:     Principal Problem:Ischemic necrosis of small bowel        HPI:  Ms. Enriquez is a 67-years-old female who presented as a direct admission from [facility] for ICU level of care postoperatively.  Patient with a history of recurrent colorectal adenocarcinoma, status post neoadjuvant radiation and chemotherapy, followed by oncologist Dr. Frank.  On 07/24 she underwent exploratory laparotomy with extensive lysis of adhesion, proctectomy with creation of end colostomy by Dr. Wagner and she was admitted to hospital medicine postoperatively.  Postop course complicated by ongoing sinus tachycardia with anemia, status post PRBC transfusion and acute kidney injury, seen by Nephrology.  Earlier today ostomy noted to be dusky with concern for ischemia, she was taken back to the OR and underwent exploratory laparotomy with small-bowel resection, as per OR note, large area of small bowel resected with four areas of ischemia.  She was intubated for surgery and remains intubated postoperatively.  Reported history of COPD, hypothyroidism, nephrolithiasis status post bilateral ureteral stenting.  She is currently sedated on propofol, orally intubated on mechanical ventilation.  Repeat labs have been submitted.  As per ICU RN plans for possible repeat surgery.  Plan of care reviewed with nursing.  No visitors at bedside.      Overview/Hospital Course:  Patient was transferred from [facility] for ICU care postoperatively after extensive surgery for ischemic bowel with large areas of small bowel resections done. She has resultant short gut syndrome. Also with ileostomy. She arrived intubated. She was followed by pulmonology/CC,  ID, nephrology and GS. Once extubated, she was transferred to med-surg. She was on meropenem for peritonitis but only completed 5 of 7 days due to rash that developed. Allergies to PCN and flagyl. Respiratory culture grew MRSA considered to have pneumonia from this contributing to her sepsis. S/p merrem and vancomycin. Continued on course of eravacycline and diflucan. NGT utilized. TPN utilized. Refractory electrolyte replacement given. Required intermittent packed red blood cell transfusions without active bleeding noted. Enterocutaneous fistula noted with high output and general surgery allowing this to mature. Wound care team followed as well. NGT removed 8/7. Started on octreotide in attempt to decrease fistula output. Palliative consulted and continued with full code and full treatment pursuit.  is working on eventual LTAC placement once cleared for this.      Interval History: Patient seen and examined. SLIM. NGT out yesterday. Wound care working with her abdominal draining wound.      Objective:     Vital Signs (Most Recent):  Temp: 98.4 °F (36.9 °C) (08/08/23 1056)  Pulse: 93 (08/08/23 1331)  Resp: 18 (08/08/23 1332)  BP: (!) 162/67 (08/08/23 1056)  SpO2: 96 % (08/08/23 1331) Vital Signs (24h Range):  Temp:  [97.3 °F (36.3 °C)-98.4 °F (36.9 °C)] 98.4 °F (36.9 °C)  Pulse:  [] 93  Resp:  [18-20] 18  SpO2:  [92 %-96 %] 96 %  BP: (123-162)/(55-67) 162/67     Weight: 89.4 kg (197 lb)  Body mass index is 37.22 kg/m².    Intake/Output Summary (Last 24 hours) at 8/8/2023 1722  Last data filed at 8/8/2023 0450  Gross per 24 hour   Intake --   Output 2905 ml   Net -2905 ml         Physical Exam  Constitutional:       General: She is not in acute distress.     Appearance: She is obese. She is ill-appearing.   Cardiovascular:      Rate and Rhythm: Regular rhythm. Tachycardia present.   Pulmonary:      Effort: Pulmonary effort is normal. No respiratory distress.   Abdominal:      General: There is  distension.      Tenderness: There is abdominal tenderness.      Comments: Beny drain and ostomy intact  Large abdominal dressing in place    Skin:     General: Skin is warm and dry.   Neurological:      General: No focal deficit present.      Mental Status: She is alert and oriented to person, place, and time.   Psychiatric:         Mood and Affect: Mood normal.         Behavior: Behavior normal.             Significant Labs: All pertinent labs within the past 24 hours have been reviewed.    Significant Imaging: I have reviewed all pertinent imaging results/findings within the past 24 hours.      Assessment/Plan:      * Ischemic necrosis of small bowel  OR note 7/27 with extensive area of small bowel necrosis s/p resection now  Possible etiology for ongoing tachycardia/sepsis   S/p NGT  - gen surg following  - TPN  - monitor bowel function  - palliative consult: continue care and full code    Goals of care, counseling/discussion  - see palliative care consult    MRSA pneumonia  - see sepsis section  - ID following    Short gut syndrome  - gen surg following  - considering gattex    Enterocutaneous fistula  At surgical site  - gen surg following and allowing to mature  - started octreotide  - wound care    Hypocalcemia  Improving   - replace with prn IV scale    Sinus tachycardia  Multifactorial in the setting of acute medical conditions including dehydration, pain, postop, anemia  - tele  - addressing above conditions    Hyponatremia  Stable  - continue TPN  - trend daily on chem panel    Malnutrition of moderate degree  Low albumin and pre-albumin  Currently NPO  - TPN    Severe sepsis  Resolved   Extensive area of small bowel necrosis -s/p surgical repair   Resp Cx: + MRSA  Urine and blood culture negative   S/p merrem & vanc  - eravacycline and diflucan per ID    BOOM (acute kidney injury)  Returned now possibly IVVD +/- vancomycin use  Improved, stable  DC'd vanc per ID  - getting more IVF with electrolyte  replacements  - renally dose meds and avoid nephrotoxins  - trend renal function daily    Bilateral kidney stones  Seen by Dr. Mijares this admission, followed outpatient by Dr. Kwon   Status post bilateral stent placement    Severe obesity (BMI 35.0-39.9) with comorbidity  Body mass index is 37.22 kg/m². Morbid obesity complicates all aspects of disease management from diagnostic modalities to treatment. Weight loss encouraged and health benefits explained to patient.     FENG (iron deficiency anemia)  Worsening postoperatively, now s/p 5u pRBC this stay  No active bleeding  - trend hgb on daily cbc and transfuse as needed.    Malignant neoplasm of colon  History of recurrent colorectal adenocarcinoma  See small-bowel ischemia  Did receive adjuvant chemoradiation, followed by Dr. Frank    Hypothyroidism with abnormal TFTs  Recent TSH elevated at 23, as per chart review elevated 2 years previous, unclear if compliance   - Given current NPO status with bowel surgery continue with IV levothyroxine    COPD (chronic obstructive pulmonary disease)  No evidence of acute exacerbation   - P.r.n. ABG and chest x-ray  - Scheduled nebs      VTE Risk Mitigation (From admission, onward)         Ordered     enoxaparin injection 40 mg  Every 24 hours         07/28/23 1849     Place sequential compression device  Until discontinued         07/28/23 0724     IP VTE HIGH RISK PATIENT  Once         07/24/23 2015                Discharge Planning   DERICK: 8/8/2023     Code Status: Full Code   Is the patient medically ready for discharge?:     Reason for patient still in hospital (select all that apply): Patient trending condition and Consult recommendations  Discharge Plan A: Long-term acute care facility (LTAC)   Discharge Delays: None known at this time              Kevin Bryant MD  Department of Hospital Medicine   [facility]

## 2023-08-08 NOTE — CONSULTS
Spoke Dr. Wagner, try to vac using white foam the 2 open areas along the incision line and bridge.  If this does work continue dressing changes   also wants  setting low 100mmhg.  Notified of  the failure of the stoma and the bile coming from the 2-3 oclock area of the stoma.  Secure chat to Shayna and Dr. Melton.  Dr. Melton wants to run at -75mmhg.

## 2023-08-08 NOTE — ASSESSMENT & PLAN NOTE
Returned now possibly IVVD +/- vancomycin use  Improved, stable  DC'd vanc per ID  - getting more IVF with electrolyte replacements  - renally dose meds and avoid nephrotoxins  - trend renal function daily

## 2023-08-09 NOTE — PROGRESS NOTES
"Progress Note  Infectious Disease    Reason for Consult:  Drainage from incision site; allergy to meropenem/penicillin/flagyl    HPI: Tessa Enriquez is a 67 y.o. female , obese, BMI 37.2Kg/m2, with unfortunate past medical history of recurrent colorectal adenocarcinoma, and b/l kidney stones status post robotic colectomy in November 2020, 1 out of 46 nodules was positive.  She could not tolerate chemo and was only able to complete 3 treatments and radiation therapy.  In August of 2022 she was found to have recurrence, status post 2nd session of chemotherapy.  On repeat surveillance this year in May she had evidence of adenocarcinoma at the anastomosis site.  PET-CT on June 14th demonstrated decreased size and activity of the mass.    She was admitted on 07/24 for ex-lap, s/p 8mg dexamethasone pre-op. As per operation note: "There is evidence of catastrophic events within the abdominal cavity.  There was diffuse he ischemia noted throughout multiple portions of the small bowel.  There were multiple sharp areas of demarcation between viable bowel and ischemic bowel.  Having removed the other obvious he clean necrotic areas there was evidence of dusky and bogginess at the site of her previous jejunal jejunostomy.  Decision was made against any reestablishment of continuity.  Remaining small bowel approximately 70-75 cm left.", Surgery required assistance from Urology for b/l ureteral catheter placement.     Patient was transferred to [facility] for ICU care.  Postop course complicated by tachycardia and hypotension.  Status post blood transfusion.     During hospital stay, respiratory culture grew MRSA 7/27. The patient developed a rash, clarified with nursing, unclear if vancomycin related although infusion rate was adjusted and rash resolved.  Seems like a second rash developed yesterday after 6 days of meropenem.      Patient seen and examined at bedside, she reports she is feeling fine, NG tube " in place, she is requesting something to eat.  Stable BP, tachycardic, PureWick in place, hazy urine noted.  Ostomy with liquid output. Surgical wound with bilious drainage form mid incision. On TPN. Repeat CT scan concerning for EC fistula.     ID consult for drainage from incision site, concern for peritonitis: Allergy to meropenem, penicillin, Flagyl.    Antibiotics:  Cipro 7/24  Cipro 7/26/27  Meropenem 7/27-8/1  Fluconazole 7/28--present  Vancomycin 7/29--present     8/5/23(Vijaya) case d/w Dr. Lacey. She is alert, complains of hunger. Reports(confirmed bynursing) of copious bilious drainage from incision. Noted previously by surgery. NGT remains in place. She is drinking water for dry mouth. Sat up in bed, but reluctant to get out of bed due to pain and increased drainage. Volume loss reflected in multiple electrolyte abnormalities.  She has no fever. Multiple family members at bedside.   8/6: interim reviewed. While urine output is still ok, she has BOOM now. She may be having more ileostomy volume loss than the TPN can replace. She is drinking for pleasure and intake po is not recorded, thus making NGT output appear greater than actual. Vancomycin was stopped (8d given). She sat up in the chair for 30 min this am. She is willing to spend more time in chair. She is going to receive a unit of blood today.    8/7: Interim reviewed, discussed with Dr Lilly, patient seen and examined at bedside, RN changing dressing from anterior abdominal wound, copious amounts of biliary fluid draining, sent for cultures. She reports she feels 'fine', still tolerating ice chips and water. Will discuss with Hospitalist to have P&P see her.    8/8: Interim reviewed, patient seen and examined at bedside, seen with wound care RN and RN, impressive amount of bile draining from abdominal wound, 2 dehisced areas, colostomy bag draining enteric fluid, skin around ostomy irritated/dermatitis. NG tube removed yesterday. Labs reviewed,  pre-albumin 5, very low. P&P recs noted. Micro reviewed, cultures from surgical wound dehiscence, skin micheal no predominant organisms, pending final.     8/9: Interim reviewed, patient seen and examined at bedside, she wants to eat, feels hungry. Stable BP, afebrile. Micro updated today, GNR-non lactose , from middle wound dehiscence. Will discuss with wound care possible wound vac to help with leakage.        EXAM & DIAGNOSTICS REVIEWED:   Vitals:     Temp:  [97.7 °F (36.5 °C)-98.4 °F (36.9 °C)]   Temp: 98.4 °F (36.9 °C) (08/09/23 0447)  Pulse: 93 (08/09/23 0742)  Resp: (!) 2 (08/09/23 1214)  BP: (!) 164/67 (08/09/23 0447)  SpO2: 95 % (08/09/23 0742)    Intake/Output Summary (Last 24 hours) at 8/9/2023 1221  Last data filed at 8/9/2023 0848  Gross per 24 hour   Intake 480 ml   Output 3005 ml   Net -2525 ml       General:  Chronically-ill appearing, obese, In NAD. Alert and attentive, cooperative, on O2 by NC 2L, tremulous  Eyes:  Anicteric, EOMI  ENT:  Moist oral mucosa, edentulous   Neck:  Supple  Lungs: Clear to auscultation b/l  Heart:  S1/S2+, regular rhythm, no murmurs  Abd:  Dressing in place, not disturbed. LLQ liquid green bile   8/8: Distended, LLQ colostomy with dermatitis around ostomy, leakage of enteric fluid noted. Surgical incision 2 dehisced areas (middle and inferior) draining yellow bile  8/6: Distended, JUANITA drain with bloody drainage, ostomy with large volume bilious liquid output, decreased bowel sounds, very large and fresh bandage in place, d/w nursing. Ostomy appears pink and viable d/w nursing, that she has fistulous drainage from distal (inferior) incision  :  Purewick, hazy urine   Musc:  Joints without effusion, swelling,  erythema, synovitis,   Skin:  Warm, no evidence of rash on chest, arms, abdomen or legs   Wound:   Neuro:  Following commands, speech is clear, moves all extremities   Psych:  Anxious, cooperative  Lymphatic:     Extrem: No LE edema, no evidence of  "rash  VAD:  L PICC line 8/2/23    R Port-A-Cath not accessed, no redness noted      Isolation: Contact/MRSA     8/8:          8/7:        8/2:    8/5 8/1:      Right leg rash   Left leg rash    General Labs reviewed:  Recent Labs   Lab 08/07/23 0428 08/08/23  0537 08/09/23  0521   WBC 6.53 8.44 8.95   HGB 7.7* 8.5* 8.6*   HCT 23.4* 25.5* 26.4*    294 304       Recent Labs   Lab 08/07/23 0428 08/08/23  0537 08/09/23  0521   * 133* 134*   K 4.5 4.6 4.4    99 101   CO2 26 26 25   BUN 69* 66* 58*   CREATININE 1.6* 1.6* 1.5*   CALCIUM 6.7* 6.9* 6.9*     No results for input(s): "CRP" in the last 168 hours.  No results for input(s): "SEDRATE" in the last 168 hours.    Estimated Creatinine Clearance: 38 mL/min (A) (based on SCr of 1.5 mg/dL (H)).     Micro:  Microbiology Results (last 7 days)       Procedure Component Value Units Date/Time    Aerobic culture [[id number]]  (Abnormal) Collected: 08/07/23 0940    Order Status: Completed Specimen: Incision site from Abdomen Updated: 08/09/23 1207     Aerobic Bacterial Culture Skin micheal,  no predominant organism      GRAM NEGATIVE DARYA, NON-LACTOSE   Rare  Identification and susceptibility pending      Culture, Anaerobe [[id number]] Collected: 08/07/23 0940    Order Status: Sent Specimen: Incision site from Abdomen Updated: 08/07/23 1048    Urine Culture High Risk [[id number]] Collected: 08/04/23 1943    Order Status: Completed Specimen: Urine, Clean Catch Updated: 08/07/23 0710     Urine Culture, Routine No growth    Narrative:      Indicated criteria for high risk culture:->Other  Other (specify):->complicated abdomen            Imaging Reviewed:  CXRs  CT abdomen/pelvis 8/2:  1. Colonic resection with left lower abdominal quadrant colostomy, punctate foci of intra-abdominal free air and a small volume of ascites, likely related to recent postoperative state.  2. Mildly prominent gas and fluid-filled loops of small bowel containing " contrast suggestive of ileus and less likely partial low-grade mechanical bowel obstruction. Consider radiographic follow-up.  3. Airspace opacity in the left greater than right posterior dependent lower lobe with air bronchograms suggestive of atelectasis and aspiration/pneumonia, with a small left and trace right pleural effusion.  4. Moderate diffuse body wall edema (anasarca).  5. Full bladder, with mild bilateral hydroureteronephrosis and no radiopaque stones seen, consider correlation with urine output and urinalysis as warranted (noting there is a punctate focus of gas in the bladder, and a gas forming infection is not excluded).    Cardiology: EKG Qtc: 486ms    PATHOLOGY:   7/24:  1. DESIGNATED AS LYMPH NODES:   - BENIGN CYST PARTIALLY LINED BY MULLERIAN TYPE EPITHELIUM, POSSIBLE  REMNANTS OF PARATUBAL CYST.   - LYMPH NODE TISSUE WAS NOT IDENTIFIED GROSSLY OR MICROSCOPICALLY.   - NEGATIVE FOR MALIGNANCY.     2. DISTAL RECTUM, RESECTION:   - FOCUS OF MODERATELY DIFFERENTIATED ADENOCARCINOMA INVOLVING INFLAMED  AND ULCERATED RECTAL MUCOSA.   - PROXIMAL RESECTION MARGIN IS POSITIVE FOR ADENOCARCINOMA  - EXTENSIVE TRANSMURAL CHRONIC AND ACUTE INFLAMMATION WITH MUCOSAL  ULCERATION.   - TWO PERIRECTAL LYMPH NODES NEGATIVE FOR MALIGNANCY.     3. SEGMENT OF SMALL INTESTINE:   - SEROSAL ADHESIONS.   - RECENT TRANSMURAL DEFECT WITHOUT ASSOCIATE INFLAMMATION OR HEMORRHAGE.   - NEGATIVE FOR MALIGNANCY.     4. PROXIMAL COLON, RESECTION:   - INVASIVE MODERATELY DIFFERENTIATED ADENOCARCINOMA IN THE BACKGROUND OF RECTAL ULCERATION AND MARKED CHRONIC AND ACUTE INFLAMMATION.   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, 75cm of small bowel left, EC fistula, high-outpt  Bedside cultures 8/7 GNR, non-lactose      MRSA  pneumonia, improved s/p 8 days of Vanc    B/l kidney stones, s/p b/l stents 7/24    H/o recurrent colorectal adenocarcinoma s/p  chemotherapy     BOOM.  Short gut volume losses contributing, cr 1.6    Malnutrition, albumin 1.6, pre-albumin 5 - on TPN    Recommendations:  Follow cultures   Continue Eravacycline 90mg IV q12h to cover GNR and anaerobes   Fluconazole 200mg IV daily as prophylaxis  Follow bedside cultures   Above for at least 2 weeks, exact duration of therapy will depend on clinical improvement   Wound are to assess possible wound vac to help with leakage     Unfortunately, very poor prognosis     D/w patient, nursing, Dr Bryant      Medical Decision Making during this encounter was  [_] Low Complexity  [_] Moderate Complexity  [xx] High Complexity

## 2023-08-09 NOTE — ASSESSMENT & PLAN NOTE
S/p extensive small bowel resection and ileostomy creation  - gen surg following  - considering gattex

## 2023-08-09 NOTE — ASSESSMENT & PLAN NOTE
Seen by Dr. Mijares this admission, followed outpatient by Dr. Kwon   Status post bilateral stent placement  - f/u outpatient

## 2023-08-09 NOTE — PROGRESS NOTES
[facility] Medicine  Progress Note    Patient Name: Tessa Enriquez  MRN: [id number]  Patient Class: IP- Inpatient   Admission Date: 7/24/2023  Length of Stay: 16 days  Attending Physician: Kevin Bryant MD  Primary Care Provider: Karma Mcdermott MD        Subjective:     Principal Problem:Ischemic necrosis of small bowel        HPI:  Ms. Enriquez is a 67-years-old female who presented as a direct admission from formerly [facility] for ICU level of care postoperatively.  Patient with a history of recurrent colorectal adenocarcinoma, status post neoadjuvant radiation and chemotherapy, followed by oncologist Dr. Frank.  On 07/24 she underwent exploratory laparotomy with extensive lysis of adhesion, proctectomy with creation of end colostomy by Dr. Wagner and she was admitted to hospital medicine postoperatively.  Postop course complicated by ongoing sinus tachycardia with anemia, status post PRBC transfusion and acute kidney injury, seen by Nephrology.  Earlier today ostomy noted to be dusky with concern for ischemia, she was taken back to the OR and underwent exploratory laparotomy with small-bowel resection, as per OR note, large area of small bowel resected with four areas of ischemia.  She was intubated for surgery and remains intubated postoperatively.  Reported history of COPD, hypothyroidism, nephrolithiasis status post bilateral ureteral stenting.  She is currently sedated on propofol, orally intubated on mechanical ventilation.  Repeat labs have been submitted.  As per ICU RN plans for possible repeat surgery.  Plan of care reviewed with nursing.  No visitors at bedside.      Overview/Hospital Course:  Patient was transferred from [facility] for ICU care postoperatively after extensive surgery for ischemic bowel with large areas of small bowel resections done. She has resultant short gut syndrome. Also with ileostomy. She arrived intubated. She was followed by  pulmonology/CC, ID, nephrology and GS. Once extubated, she was transferred to [facility]-surg. She was on meropenem for peritonitis but only completed 5 of 7 days due to rash that developed. Allergies to PCN and flagyl. Respiratory culture grew MRSA considered to have pneumonia from this contributing to her sepsis. S/p merrem and vancomycin. Continued on course of eravacycline and diflucan. NGT utilized and removed 8/7. TPN utilized. Refractory electrolyte replacement given. Required intermittent packed red blood cell transfusions without active bleeding noted. 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 Wound care team followed as well. Started on octreotide in attempt to decrease fistula output. Palliative consulted and continued with full code and full treatment pursuit.  is working on eventual LTAC placement once cleared for this.      Interval History: Patient seen and examined. NAEON. No significant changes today.      Objective:     Vital Signs (Most Recent):  Temp: 98.3 °F (36.8 °C) (08/09/23 1506)  Pulse: 95 (08/09/23 1506)  Resp: 20 (08/09/23 1506)  BP: (!) 140/64 (08/09/23 1506)  SpO2: 98 % (08/09/23 1506) Vital Signs (24h Range):  Temp:  [97.7 °F (36.5 °C)-98.4 °F (36.9 °C)] 98.3 °F (36.8 °C)  Pulse:  [] 95  Resp:  [2-20] 20  SpO2:  [95 %-99 %] 98 %  BP: (135-165)/(63-74) 140/64     Weight: 89.4 kg (197 lb)  Body mass index is 37.22 kg/m².    Intake/Output Summary (Last 24 hours) at 8/9/2023 1633  Last data filed at 8/9/2023 0848  Gross per 24 hour   Intake 480 ml   Output 3005 ml   Net -2525 ml         Physical Exam  Constitutional:       General: She is not in acute distress.     Appearance: She is obese. She is ill-appearing.   Cardiovascular:      Rate and Rhythm: Regular rhythm. Tachycardia present.   Pulmonary:      Effort: Pulmonary effort is normal. No respiratory distress.   Abdominal:      General: There is distension.       Tenderness: There is abdominal tenderness.      Comments: Beny drain and ostomy intact  Large abdominal dressing in place    Skin:     General: Skin is warm and dry.   Neurological:      General: No focal deficit present.      Mental Status: She is alert and oriented to person, place, and time.   Psychiatric:         Mood and Affect: Mood normal.         Behavior: Behavior normal.             Significant Labs: All pertinent labs within the past 24 hours have been reviewed.    Significant Imaging: I have reviewed all pertinent imaging results/findings within the past 24 hours.      Assessment/Plan:      * Ischemic necrosis of small bowel  OR note 7/27 with extensive area of small bowel necrosis s/p resection now  Possible etiology for ongoing tachycardia/sepsis   S/p NGT  - NPO per gen surg rec  - TPN  - monitor bowel function  - palliative consult: continue care and full code  - gen surg following    Goals of care, counseling/discussion  - see palliative care consult    MRSA pneumonia  - see sepsis section  - ID following    Short gut syndrome  S/p extensive small bowel resection and ileostomy creation  - gen surg following  - considering gattex    Enterocutaneous fistula  At surgical site  - gen surg following and allowing to mature  - started octreotide  - wound care    Hypocalcemia  Improving   - replace with prn IV scale    Sinus tachycardia  Multifactorial in the setting of acute medical conditions including dehydration, pain, postop, anemia  - tele  - addressing above conditions    Hyponatremia  Stable  - continue TPN  - trend daily on chem panel    Malnutrition of moderate degree  Low albumin and pre-albumin  Currently NPO per gen surg rec  - TPN    Severe sepsis  Resolved   Extensive area of small bowel necrosis -s/p surgical repair   Resp Cx: + MRSA  Urine and blood culture negative   S/p merrem & vanc  - eravacycline and diflucan per ID    BOOM (acute kidney injury)  Returned now possibly IVVD +/-  vancomycin use  Improved, stable  DC'd vanc per ID  - getting more IVF with electrolyte replacements  - renally dose meds and avoid nephrotoxins  - trend renal function daily    Bilateral kidney stones  Seen by Dr. Mijares this admission, followed outpatient by Dr. Kwon   Status post bilateral stent placement  - f/u outpatient    Severe obesity (BMI 35.0-39.9) with comorbidity  Body mass index is 37.22 kg/m². Morbid obesity complicates all aspects of disease management from diagnostic modalities to treatment. Weight loss encouraged and health benefits explained to patient.     FENG (iron deficiency anemia)  Worsening postoperatively, now s/p 5u pRBC this stay  No active bleeding  - trend hgb on daily cbc and transfuse as needed.    Malignant neoplasm of colon  History of recurrent colorectal adenocarcinoma  See small-bowel ischemia  Did receive adjuvant chemoradiation, followed by Dr. Frank  - f/u outpatient    Hypothyroidism with abnormal TFTs  Recent TSH elevated at 23, as per chart review elevated 2 years previous, unclear if compliance   - Given current NPO status with bowel surgery continue with IV levothyroxine    COPD (chronic obstructive pulmonary disease)  No evidence of acute exacerbation   - P.r.n. ABG and chest x-ray  - Scheduled nebs      VTE Risk Mitigation (From admission, onward)         Ordered     enoxaparin injection 40 mg  Every 24 hours         07/28/23 1849     Place sequential compression device  Until discontinued         07/28/23 0724     IP VTE HIGH RISK PATIENT  Once         07/24/23 2015                Discharge Planning   DERICK: 8/11/2023     Code Status: Full Code   Is the patient medically ready for discharge?:     Reason for patient still in hospital (select all that apply): Patient trending condition and Consult recommendations  Discharge Plan A: Long-term acute care facility (LTAC)   Discharge Delays: None known at this time              Kevin Bryant MD  Department of Hospital  Medicine   [facility]

## 2023-08-09 NOTE — PROGRESS NOTES
Pt seen and examined.   NO significant changes  Continues to have fistula output    .l  Wt Readings from Last 3 Encounters:   07/24/23 89.4 kg (197 lb)   07/03/23 89.3 kg (196 lb 13.9 oz)   06/27/23 89.7 kg (197 lb 12 oz)     Temp Readings from Last 3 Encounters:   08/09/23 98 °F (36.7 °C) (Oral)   07/03/23 97.5 °F (36.4 °C) (Temporal)   06/27/23 98.1 °F (36.7 °C) (Temporal)     BP Readings from Last 3 Encounters:   08/09/23 135/74   07/03/23 (!) 143/79   06/27/23 (!) 185/86     Pulse Readings from Last 3 Encounters:   08/09/23 101   07/03/23 84   06/27/23 84     AAOx3  Sinus  Soft/nd/nt  EC fistula    Lab Results   Component Value Date    WBC 8.95 08/09/2023    HGB 8.6 (L) 08/09/2023    HCT 26.4 (L) 08/09/2023    MCV 86 08/09/2023     08/09/2023       BMP  Lab Results   Component Value Date     (L) 08/09/2023    K 4.4 08/09/2023     08/09/2023    CO2 25 08/09/2023    BUN 58 (H) 08/09/2023    CREATININE 1.5 (H) 08/09/2023    CALCIUM 6.9 (LL) 08/09/2023    ANIONGAP 8 08/09/2023    EGFRNORACEVR 38.4 (A) 08/09/2023     A/P:  EC Fistula  Cont TPN  D/w strict NPO  Pt has signed consent for GATTEX  WIll arrange

## 2023-08-09 NOTE — PLAN OF CARE
CM continuing to follow for DC planning. DC plan is LTAC.. patient is accepted at [facility] pending medically clearance. Patient also has not made a decision regarding LTAC.   [facility] if patient needs gattex.. states medication is too expensive and cannot get.        08/09/23 1553   Post-Acute Status   Post-Acute Authorization Placement   Post-Acute Placement Status Pending medical clearance/testing

## 2023-08-09 NOTE — SUBJECTIVE & OBJECTIVE
Interval History: Patient seen and examined. NAEON. No significant changes today.      Objective:     Vital Signs (Most Recent):  Temp: 98.3 °F (36.8 °C) (08/09/23 1506)  Pulse: 95 (08/09/23 1506)  Resp: 20 (08/09/23 1506)  BP: (!) 140/64 (08/09/23 1506)  SpO2: 98 % (08/09/23 1506) Vital Signs (24h Range):  Temp:  [97.7 °F (36.5 °C)-98.4 °F (36.9 °C)] 98.3 °F (36.8 °C)  Pulse:  [] 95  Resp:  [2-20] 20  SpO2:  [95 %-99 %] 98 %  BP: (135-165)/(63-74) 140/64     Weight: 89.4 kg (197 lb)  Body mass index is 37.22 kg/m².    Intake/Output Summary (Last 24 hours) at 8/9/2023 1633  Last data filed at 8/9/2023 0848  Gross per 24 hour   Intake 480 ml   Output 3005 ml   Net -2525 ml         Physical Exam  Constitutional:       General: She is not in acute distress.     Appearance: She is obese. She is ill-appearing.   Cardiovascular:      Rate and Rhythm: Regular rhythm. Tachycardia present.   Pulmonary:      Effort: Pulmonary effort is normal. No respiratory distress.   Abdominal:      General: There is distension.      Tenderness: There is abdominal tenderness.      Comments: Beny drain and ostomy intact  Large abdominal dressing in place    Skin:     General: Skin is warm and dry.   Neurological:      General: No focal deficit present.      Mental Status: She is alert and oriented to person, place, and time.   Psychiatric:         Mood and Affect: Mood normal.         Behavior: Behavior normal.             Significant Labs: All pertinent labs within the past 24 hours have been reviewed.    Significant Imaging: I have reviewed all pertinent imaging results/findings within the past 24 hours.

## 2023-08-09 NOTE — PROGRESS NOTES
"[facility]  Adult Nutrition   Progress Note (Nutrition Support Management)    SUMMARY     Recommendations  Recommendation/Intervention:   1.) Continue TPN rate. Adjustments made based off labs.    2.) Advance diet when medically appropriate to regular.   3.) RD to monitor and provide recommendations PRN.    Goals: 1.) Pt to meet/tolerate >75% of nutritional support. 2.) Progression of wound healing.  Nutrition Goal Status: progressing towards goal    Dietitian Rounds Brief  Pt continue on TPN at this time. Jose Alberto RN pt states she is hungry and requesting to eat. RN will ask surgeon if diet can be advanced. Pt does have bowel sounds and ostomy has output.     Diet order:   Current Diet Order: NPO      Current Nutrition Support Formula Ordered: Other (Comment) (custom TPN: AA 95gm; Dextrose 140gm; 250mL lipids)  Current Nutrition Support Rate Ordered: 75 (ml)  Current Nutrition Support Frequency Ordered: mL/hr    Evaluation of Received Nutrient/Fluid Intake  Energy Calories Required: meeting needs  Protein Required: meeting needs  Fluid Required: meeting needs  Comments:     % Intake of Estimated Energy Needs: 50%  % Meal Intake: 75 - 100 %      Intake/Output Summary (Last 24 hours) at 8/9/2023 1222  Last data filed at 8/9/2023 0848  Gross per 24 hour   Intake 480 ml   Output 3005 ml   Net -2525 ml        Anthropometrics  Temp: 98.4 °F (36.9 °C)  Height Method: Stated  Height: 5' 1" (154.9 cm)  Height (inches): 61 in  Weight Method: Standard Scale  Weight: 89.4 kg (197 lb)  Weight (lb): 197 lb  Ideal Body Weight (IBW), Female: 105 lb  % Ideal Body Weight, Female (lb): 187.62 %  BMI (Calculated): 37.2  BMI Grade: 35 - 39.9 - obesity - grade II       Estimated/Assessed Needs  Weight Used For Calorie Calculations: 89.4 kg (197 lb 1.5 oz)  Energy Calorie Requirements (kcal): 9617-5860 kcal (20-25 kcal/ kg bw)  Energy Need Method: Kcal/kg  Protein Requirements: 72-96 (1.5-2.0 g/ 48 kg IBW)  Weight Used For " Protein Calculations: 48 kg (105 lb 13.1 oz)     Estimated Fluid Requirement Method: RDA Method  RDA Method (mL): 1788       Reason for Assessment  Reason For Assessment: RD follow-up  Relevant Medical History: COPD, hypothyroidism, rectosigmoid cancer, s/p small bowel resection  General Information Comments: Pt continues on custom TPN @ 75mL/hr providing 1560 kcals, 95gm protein.    Nutrition/Diet History  Spiritual, Cultural Beliefs, Cheondoism Practices, Values that Affect Care: no  Food Allergies: NKFA  Factors Affecting Nutritional Intake: NPO, altered gastrointestinal function    Nutrition Risk Screen  Nutrition Risk Screen: tube feeding or parenteral nutrition, large or nonhealing wound, burn or pressure injury     MST Score: 0  Have you recently lost weight without trying?: No  Weight loss score: 0  Have you been eating poorly because of a decreased appetite?: No  Appetite score: 0       Weight History:  Wt Readings from Last 10 Encounters:   07/24/23 89.4 kg (197 lb)   07/03/23 89.3 kg (196 lb 13.9 oz)   06/27/23 89.7 kg (197 lb 12 oz)   06/20/23 88.5 kg (195 lb 1.7 oz)   06/14/23 88.5 kg (195 lb)   06/12/23 88.5 kg (195 lb)   06/06/23 87.4 kg (192 lb 10.9 oz)   05/31/23 81.6 kg (180 lb)   05/26/23 85.7 kg (189 lb)   05/24/23 86 kg (189 lb 9.5 oz)        Lab/Procedures/Meds: Pertinent Labs/Meds Reviewed    Medications:Pertinent Medications Reviewed  Scheduled Meds:   albuterol-ipratropium  3 mL Nebulization Q6H WAKE    budesonide  0.5 mg Nebulization Q12H    chlorhexidine  15 mL Mouth/Throat BID    enoxparin  40 mg Subcutaneous Q24H (prophylaxis, 1700)    eravacycline 90 mg in sodium chloride 0.9% 250 mL infusion  90 mg Intravenous Q12H    fat emulsion 20%  250 mL Intravenous Daily    fluconazole (DIFLUCAN) IV (PEDS and ADULTS)  200 mg Intravenous Q24H    levothyroxine  75 mcg Intravenous Before breakfast    octreotide  100 mcg Subcutaneous Q8H    pantoprazole  40 mg Intravenous Daily     Continuous  "Infusions:   TPN ADULT CENTRAL LINE CUSTOM 75 mL/hr at 08/08/23 2119    TPN ADULT CENTRAL LINE CUSTOM       PRN Meds:.0.9%  NaCl infusion (for blood administration), 0.9%  NaCl infusion (for blood administration), acetaminophen, calcium gluconate IVPB, calcium gluconate IVPB, calcium gluconate IVPB, clonazePAM, dextrose 50%, dextrose 50%, diphenhydrAMINE, glucagon (human recombinant), glucose, glucose, hydrALAZINE, hydrALAZINE, HYDROmorphone, HYDROmorphone, magnesium sulfate IVPB, magnesium sulfate IVPB, ondansetron, potassium chloride **AND** potassium chloride **AND** potassium chloride, Flushing PICC Protocol **AND** [DISCONTINUED] sodium chloride 0.9% **AND** sodium chloride 0.9%, sodium phosphate 15 mmol in dextrose 5 % (D5W) 250 mL IVPB, sodium phosphate 20.01 mmol in dextrose 5 % (D5W) 250 mL IVPB, sodium phosphate 30 mmol in dextrose 5 % (D5W) 250 mL IVPB, traZODone    Labs: Pertinent Labs Reviewed  Clinical Chemistry:  Recent Labs   Lab 08/07/23  0428 08/08/23  0537 08/09/23  0521   * 133* 134*   K 4.5 4.6 4.4    99 101   CO2 26 26 25   * 97 106   BUN 69* 66* 58*   CREATININE 1.6* 1.6* 1.5*   CALCIUM 6.7* 6.9* 6.9*   ANIONGAP 8 8 8   MG 2.0 1.7 1.8   PHOS 4.2 4.7* 5.1*     CBC:   Recent Labs   Lab 08/09/23  0521   WBC 8.95   RBC 3.06*   HGB 8.6*   HCT 26.4*      MCV 86   MCH 28.1   MCHC 32.6     Lipid Panel:  Recent Labs   Lab 08/05/23  0432   TRIG 80     Cardiac Profile:  No results for input(s): "BNP", "CPK", "CPKMB", "TROPONINI", "CKTOTAL" in the last 168 hours.  Inflammatory Labs:  No results for input(s): "CRP" in the last 168 hours.  Diabetes:  No results for input(s): "HGBA1C", "POCTGLUCOSE" in the last 168 hours.  Thyroid & Parathyroid:  No results for input(s): "TSH", "FREET4", "Q1XAYDO", "K2HHOHX", "THYROIDAB" in the last 168 hours.    Monitor and Evaluation  Food and Nutrient Intake: parenteral nutrition intake  Food and Nutrient Adminstration: enteral and parenteral " nutrition administration  Anthropometric Measurements: weight, weight change  Biochemical Data, Medical Tests and Procedures: electrolyte and renal panel, gastrointestinal profile, glucose/endocrine profile  Nutrition-Focused Physical Findings: overall appearance     Nutrition Risk  Level of Risk/Frequency of Follow-up: high     Nutrition Follow-Up  RD Follow-up?: Yes      Nancy Johnson RD 08/09/2023 12:22 PM

## 2023-08-09 NOTE — CARE UPDATE
08/08/23 2030   Patient Assessment/Suction   Level of Consciousness (AVPU) alert   Respiratory Effort Normal;Unlabored   Expansion/Accessory Muscles/Retractions no use of accessory muscles   All Lung Fields Breath Sounds diminished   Rhythm/Pattern, Respiratory unlabored;pattern regular   PRE-TX-O2   Device (Oxygen Therapy) room air   SpO2 98 %   Pulse Oximetry Type Intermittent   Pulse 87   Resp 19   Aerosol Therapy   $ Aerosol Therapy Charges Aerosol Treatment   Daily Review of Necessity (SVN) completed   Respiratory Treatment Status (SVN) given   Treatment Route (SVN) mask;oxygen   Patient Position (SVN) HOB elevated   Post Treatment Assessment (SVN) increased aeration   Signs of Intolerance (SVN) none   Respiratory Evaluation   $ Care Plan Tech Time 15 min

## 2023-08-09 NOTE — ASSESSMENT & PLAN NOTE
History of recurrent colorectal adenocarcinoma  See small-bowel ischemia  Did receive adjuvant chemoradiation, followed by Dr. Frank  - f/u outpatient

## 2023-08-09 NOTE — ASSESSMENT & PLAN NOTE
OR note 7/27 with extensive area of small bowel necrosis s/p resection now  Possible etiology for ongoing tachycardia/sepsis   S/p NGT  - NPO per gen surg rec  - TPN  - monitor bowel function  - palliative consult: continue care and full code  - gen surg following

## 2023-08-09 NOTE — PT/OT/SLP PROGRESS
Occupational Therapy      Patient Name:  Tessa Enriquez   MRN:  [id number]    Patient not seen today secondary to Patient unwilling to participate (fatigue). Will follow-up next scheduled service date.    8/9/2023

## 2023-08-09 NOTE — PT/OT/SLP PROGRESS
"Physical Therapy Treatment    Patient Name:  Tessa Enriquez   MRN:  [id number]    Recommendations:     Discharge Recommendations: rehabilitation facility  Discharge Equipment Recommendations: to be determined by next level of care  Barriers to discharge:   increase assist with mobility, high fall risk, decrease activity tolerance, pain    Assessment:     Tessa Enriquez is a 67 y.o. female admitted with a medical diagnosis of Ischemic necrosis of small bowel.  She presents with the following impairments/functional limitations: weakness, impaired endurance, impaired functional mobility, impaired self care skills, gait instability, impaired balance, decreased lower extremity function, decreased safety awareness, pain, impaired cardiopulmonary response to activity.    Pt agreeable to supine LE TE but declined out of bed activity. Pt reports not doing well over the past couple of days, " My  ." Pt performed supine LE TE with assist. Pt required max assist x 2 to scoot towards HOB.    Rehab Prognosis: Fair; patient would benefit from acute skilled PT services to address these deficits and reach maximum level of function.    Recent Surgery: Procedure(s) (LRB):  LAPAROTOMY, EXPLORATORY (N/A)  EXCISION, SMALL INTESTINE  CREATION, ILEOSTOMY 12 Days Post-Op    Plan:     During this hospitalization, patient to be seen daily to address the identified rehab impairments via gait training, therapeutic activities, therapeutic exercises, neuromuscular re-education and progress toward the following goals:    Plan of Care Expires:  23    Subjective     Chief Complaint: pt upset secondary to her  passing away  Patient/Family Comments/goals: to get better  Pain/Comfort:  Pain Rating 1: other (see comments) (not rated)  Location - Orientation 1: lower  Location 1: abdomen  Pain Addressed 1: Reposition, Distraction  Pain Rating Post-Intervention 1: other (see comments) (not rated)      Objective: "     Communicated with RN prior to session.  Patient found HOB elevated with colostomy, JUANITA drain, PureWick, peripheral IV, telemetry upon PT entry to room.     General Precautions: Standard, fall, contact  Orthopedic Precautions: N/A  Braces: N/A  Respiratory Status: Room air     Functional Mobility:  Bed Mobility:     Scooting: maximal assistance and of 2 persons      AM-PAC 6 CLICK MOBILITY          Treatment & Education:  Pt educated on importance of time OOB, importance of intermittent mobility, safe techniques for transfers/ambulation, discharge recommendations/options, and use of call light for assistance and fall prevention.  Pt tolerated supine AAROM LE TE x 10 each including LAQ, HS, SLR, hip IR, and ankle DF/PF with verbal cuing for proper form and pacing for optimal strengthening.      Patient left HOB elevated with all lines intact, call button in reach, bed alarm on, and RN notified..    GOALS:   Multidisciplinary Problems       Physical Therapy Goals          Problem: Physical Therapy    Goal Priority Disciplines Outcome Goal Variances Interventions   Physical Therapy Goal     PT, PT/OT Ongoing, Progressing     Description: Goals to be met by: 23     Patient will increase functional independence with mobility by performin. Supine to sit with Stand-by Assistance  2. Sit to supine with Stand-by Assistance  3. Sit to stand transfer with Stand-by Assistance  4. Bed to chair transfer with Stand-by Assistance using Rolling Walker  5. Gait  x 250 feet with Stand-by Assistance using Rolling Walker.                          Time Tracking:     PT Received On: 23  PT Start Time: 913     PT Stop Time: 940  PT Total Time (min): 27 min     Billable Minutes: Therapeutic Activity 10 and Therapeutic Exercise 17    Treatment Type: Treatment  PT/PTA: PTA     Number of PTA visits since last PT visit: 2023

## 2023-08-10 NOTE — PROGRESS NOTES
Pt seen and examined.  Resting comfortably.  Having occasionalb bouts of nausea  Notes decreased output from wound    .l  Wt Readings from Last 3 Encounters:   07/24/23 89.4 kg (197 lb)   07/03/23 89.3 kg (196 lb 13.9 oz)   06/27/23 89.7 kg (197 lb 12 oz)     Temp Readings from Last 3 Encounters:   08/10/23 98.1 °F (36.7 °C) (Oral)   07/03/23 97.5 °F (36.4 °C) (Temporal)   06/27/23 98.1 °F (36.7 °C) (Temporal)     BP Readings from Last 3 Encounters:   08/10/23 (!) 153/67   07/03/23 (!) 143/79   06/27/23 (!) 185/86     Pulse Readings from Last 3 Encounters:   08/10/23 96   07/03/23 84   06/27/23 84     AAOx3  Soft/nt/nd    Lab Results   Component Value Date    WBC 8.95 08/09/2023    HGB 8.6 (L) 08/09/2023    HCT 26.4 (L) 08/09/2023    MCV 86 08/09/2023     08/09/2023       BMP  Lab Results   Component Value Date     (L) 08/09/2023    K 4.4 08/09/2023     08/09/2023    CO2 25 08/09/2023    BUN 58 (H) 08/09/2023    CREATININE 1.5 (H) 08/09/2023    CALCIUM 6.9 (LL) 08/09/2023    ANIONGAP 8 08/09/2023    EGFRNORACEVR 38.4 (A) 08/09/2023     A/P: EC fistula  Cont supportive care  Wound care  Electrolyte mgmt

## 2023-08-10 NOTE — PT/OT/SLP PROGRESS
Occupational Therapy   Treatment    Name: Tessa Enriquez  MRN: [id number]  Admitting Diagnosis:  Ischemic necrosis of small bowel  13 Days Post-Op    Recommendations:     Discharge Recommendations: rehabilitation facility  Discharge Equipment Recommendations:  TBD at next level of care  Barriers to discharge:  Decreased caregiver support    Assessment:     Tessa Enriquez is a 67 y.o. female with a medical diagnosis of Ischemic necrosis of small bowel. Performance deficits affecting function are weakness, impaired endurance, impaired self care skills, impaired functional mobility, decreased safety awareness, decreased lower extremity function, decreased upper extremity function, impaired cardiopulmonary response to activity, pain.     Rehab Prognosis:  Fair; patient would benefit from acute skilled OT services to address these deficits and reach maximum level of function.       Plan:     Patient to be seen 5 x/week to address the above listed problems via self-care/home management, therapeutic activities, therapeutic exercises  Plan of Care Expires: 08/31/23  Plan of Care Reviewed with: patient    Subjective     Chief Complaint: not wanting to get out of bed  Patient/Family Comments/goals: did not indicate   Pain/Comfort:  Pain Rating 1:  (did not rate)  Location 1: back  Pain Rating Post-Intervention 1: 0/10    Objective:     Communicated with: nurse prior to session.  Patient found supine with colostomy, JUANITA drain, PureWick, peripheral IV, telemetry upon OT entry to room.    General Precautions: Standard, contact, fall    Orthopedic Precautions:N/A  Braces: N/A  Respiratory Status: Room air     Occupational Performance:     Bed Mobility:    Patient completed Rolling/Turning to Left with  maximal assistance with much encouragement to reposition  Patient completed Rolling/Turning to Right with maximal assistance     Activities of Daily Living:  Grooming: stand by assistance/setup for oral care and  washing face with head of bed elevated  Patient declines OOB, bed mobility and transfers secondary to pain and drainage    Treatment & Education:  Patient was educated on the importance of activity and getting out of bed, skin protection, discharge planning and reviewed use of call bell for assistance.     Patient left HOB elevated with all lines intact, call button in reach, and bed alarm on    GOALS:   Multidisciplinary Problems       Occupational Therapy Goals          Problem: Occupational Therapy    Goal Priority Disciplines Outcome Interventions   Occupational Therapy Goal     OT, PT/OT Ongoing, Progressing    Description: Goals to be met by: 8/31/2023     Patient will increase functional independence with ADLs by performing:    UE Dressing with Supervision.  LE Dressing with Supervision.  Grooming while standing at sink with Supervision.  Toileting from toilet with Supervision for hygiene and clothing management.   Bathing from  tub bench with Supervision.                         Time Tracking:     OT Date of Treatment: 08/10/23  OT Start Time: 1100  OT Stop Time: 1110  OT Total Time (min): 10 min    Billable Minutes:Self Care/Home Management 10    OT/BRENNON: OT          8/10/2023

## 2023-08-10 NOTE — SUBJECTIVE & OBJECTIVE
Interval History: Patient seen and examined. NAEON. No major changes.      Objective:     Vital Signs (Most Recent):  Temp: 98.1 °F (36.7 °C) (08/10/23 1157)  Pulse: 96 (08/10/23 1435)  Resp: 16 (08/10/23 1508)  BP: (!) 153/67 (08/10/23 1157)  SpO2: 98 % (08/10/23 1435) Vital Signs (24h Range):  Temp:  [97.8 °F (36.6 °C)-98.4 °F (36.9 °C)] 98.1 °F (36.7 °C)  Pulse:  [] 96  Resp:  [16-20] 16  SpO2:  [90 %-100 %] 98 %  BP: (135-183)/(59-74) 153/67     Weight: 89.4 kg (197 lb)  Body mass index is 37.22 kg/m².    Intake/Output Summary (Last 24 hours) at 8/10/2023 1630  Last data filed at 8/10/2023 1358  Gross per 24 hour   Intake --   Output 4470 ml   Net -4470 ml         Physical Exam  Constitutional:       General: She is not in acute distress.     Appearance: She is obese. She is ill-appearing.   Cardiovascular:      Rate and Rhythm: Regular rhythm. Tachycardia present.   Pulmonary:      Effort: Pulmonary effort is normal. No respiratory distress.   Abdominal:      General: There is distension.      Tenderness: There is abdominal tenderness.      Comments: Beny drain and ostomy intact  Large abdominal dressing in place    Skin:     General: Skin is warm and dry.   Neurological:      General: No focal deficit present.      Mental Status: She is alert and oriented to person, place, and time.   Psychiatric:         Mood and Affect: Mood normal.         Behavior: Behavior normal.             Significant Labs: All pertinent labs within the past 24 hours have been reviewed.    Significant Imaging: I have reviewed all pertinent imaging results/findings within the past 24 hours.

## 2023-08-10 NOTE — PLAN OF CARE
Problem: Physical Therapy  Goal: Physical Therapy Goal  Description: Goals to be met by: 23     Patient will increase functional independence with mobility by performin. Supine to sit with Stand-by Assistance  2. Sit to supine with Stand-by Assistance  3. Sit to stand transfer with Stand-by Assistance  4. Bed to chair transfer with Stand-by Assistance using Rolling Walker  5. Gait  x 250 feet with Stand-by Assistance using Rolling Walker.     Outcome: Ongoing, Not Progressing

## 2023-08-10 NOTE — PLAN OF CARE
Problem: Parenteral Nutrition  Goal: Effective Intravenous Nutrition Therapy Delivery  Outcome: Ongoing, Progressing  Intervention: Optimize Intravenous Nutrition Delivery  Flowsheets (Taken 8/10/2023 1150)  Nutrition Support Management: parenteral nutrition continued as ordered    PARENTERAL NUTRITION PROGRESS NOTE:      Parenteral Nutrition Day # 8  Diagnosis/Indication for PN: s/p small bowel resection  IV Access: central  Diet/Tube Feeding: NPO         Admixture Type: 2:1  Infusion Rate and Frequency: 75       PN Composition:   95 grams Amino Acid, 250 grams Dextrose, and 50 grams Lipid.    Today's TPN provides 1730 kcal.    *Dextrose is started at less than full dextrose goal to reduce risk for Refeeding Syndrome. Dextrose content will be advanced as appropriate over the next few days.    Please see order for electrolytes and additives content and adjustments.   *The Nutrition Support Team will continue to monitor electrolytes daily and adjust parenteral nutrition as warranted.

## 2023-08-10 NOTE — PLAN OF CARE
Problem: Infection  Goal: Absence of Infection Signs and Symptoms  Outcome: Ongoing, Progressing     Problem: Adult Inpatient Plan of Care  Goal: Plan of Care Review  Outcome: Ongoing, Progressing  Goal: Patient-Specific Goal (Individualized)  Outcome: Ongoing, Progressing  Goal: Absence of Hospital-Acquired Illness or Injury  Outcome: Ongoing, Progressing  Goal: Optimal Comfort and Wellbeing  Outcome: Ongoing, Progressing  Goal: Readiness for Transition of Care  Outcome: Ongoing, Progressing     Problem: Adjustment to Illness (Sepsis/Septic Shock)  Goal: Optimal Coping  Outcome: Ongoing, Progressing     Problem: Bleeding (Sepsis/Septic Shock)  Goal: Absence of Bleeding  Outcome: Ongoing, Progressing     Problem: Glycemic Control Impaired (Sepsis/Septic Shock)  Goal: Blood Glucose Level Within Desired Range  Outcome: Ongoing, Progressing     Problem: Infection Progression (Sepsis/Septic Shock)  Goal: Absence of Infection Signs and Symptoms  Outcome: Ongoing, Progressing     Problem: Nutrition Impaired (Sepsis/Septic Shock)  Goal: Optimal Nutrition Intake  Outcome: Ongoing, Progressing     Problem: Fluid and Electrolyte Imbalance (Acute Kidney Injury/Impairment)  Goal: Fluid and Electrolyte Balance  Outcome: Ongoing, Progressing     Problem: Oral Intake Inadequate (Acute Kidney Injury/Impairment)  Goal: Optimal Nutrition Intake  Outcome: Ongoing, Progressing     Problem: Renal Function Impairment (Acute Kidney Injury/Impairment)  Goal: Effective Renal Function  Outcome: Ongoing, Progressing     Problem: Fall Injury Risk  Goal: Absence of Fall and Fall-Related Injury  Outcome: Ongoing, Progressing     Problem: Skin Injury Risk Increased  Goal: Skin Health and Integrity  Outcome: Ongoing, Progressing     Problem: Communication Impairment (Mechanical Ventilation, Invasive)  Goal: Effective Communication  Outcome: Ongoing, Progressing     Problem: Device-Related Complication Risk (Mechanical Ventilation,  Invasive)  Goal: Optimal Device Function  Outcome: Ongoing, Progressing     Problem: Skin and Tissue Injury (Mechanical Ventilation, Invasive)  Goal: Absence of Device-Related Skin and Tissue Injury  Outcome: Ongoing, Progressing     Problem: Ventilator-Induced Lung Injury (Mechanical Ventilation, Invasive)  Goal: Absence of Ventilator-Induced Lung Injury  Outcome: Ongoing, Progressing     Problem: Communication Impairment (Artificial Airway)  Goal: Effective Communication  Outcome: Ongoing, Progressing     Problem: Device-Related Complication Risk (Artificial Airway)  Goal: Optimal Device Function  Outcome: Ongoing, Progressing     Problem: Skin and Tissue Injury (Artificial Airway)  Goal: Absence of Device-Related Skin or Tissue Injury  Outcome: Ongoing, Progressing     Problem: Noninvasive Ventilation Acute  Goal: Effective Unassisted Ventilation and Oxygenation  Outcome: Ongoing, Progressing     Problem: Oral Intake Inadequate  Goal: Improved Oral Intake  Outcome: Ongoing, Progressing     Problem: Parenteral Nutrition  Goal: Effective Intravenous Nutrition Therapy Delivery  Outcome: Ongoing, Progressing     Problem: Fluid Deficit (Intestinal Obstruction)  Goal: Fluid Balance  Outcome: Ongoing, Progressing     Problem: Infection (Intestinal Obstruction)  Goal: Absence of Infection Signs and Symptoms  Outcome: Ongoing, Progressing     Problem: Nausea and Vomiting (Intestinal Obstruction)  Goal: Nausea and Vomiting Relief  Outcome: Ongoing, Progressing     Problem: Pain (Intestinal Obstruction)  Goal: Acceptable Pain Control  Outcome: Ongoing, Progressing     Problem: Coping Ineffective  Goal: Effective Coping  Outcome: Ongoing, Progressing

## 2023-08-10 NOTE — NURSING
0700:report received, in bed with no distress  1850: uneventful shift, busy but pleasant to care for.

## 2023-08-10 NOTE — PROGRESS NOTES
[facility] Medicine  Progress Note    Patient Name: Tessa Enriquez  MRN: [id number]  Patient Class: IP- Inpatient   Admission Date: 7/24/2023  Length of Stay: 17 days  Attending Physician: Kevin Bryant MD  Primary Care Provider: Karma Mcdermott MD        Subjective:     Principal Problem:Ischemic necrosis of small bowel        HPI:  Ms. Enriquez is a 67-years-old female who presented as a direct admission from [facility] for ICU level of care postoperatively.  Patient with a history of recurrent colorectal adenocarcinoma, status post neoadjuvant radiation and chemotherapy, followed by oncologist Dr. Frank.  On 07/24 she underwent exploratory laparotomy with extensive lysis of adhesion, proctectomy with creation of end colostomy by Dr. Wagner and she was admitted to hospital medicine postoperatively.  Postop course complicated by ongoing sinus tachycardia with anemia, status post PRBC transfusion and acute kidney injury, seen by Nephrology.  Earlier today ostomy noted to be dusky with concern for ischemia, she was taken back to the OR and underwent exploratory laparotomy with small-bowel resection, as per OR note, large area of small bowel resected with four areas of ischemia.  She was intubated for surgery and remains intubated postoperatively.  Reported history of COPD, hypothyroidism, nephrolithiasis status post bilateral ureteral stenting.  She is currently sedated on propofol, orally intubated on mechanical ventilation.  Repeat labs have been submitted.  As per ICU RN plans for possible repeat surgery.  Plan of care reviewed with nursing.  No visitors at bedside.      Overview/Hospital Course:  Patient was transferred from [facility] for ICU care postoperatively after extensive surgery for ischemic bowel with large areas of small bowel resections done. She has resultant short gut syndrome. Also with ileostomy. She arrived intubated. She was followed by  pulmonology/CC, ID, nephrology and GS. Once extubated, she was transferred to [facility]-surg. She was on meropenem for peritonitis but only completed 5 of 7 days due to rash that developed. Allergies to PCN and flagyl. Respiratory culture grew MRSA considered to have pneumonia from this contributing to her sepsis. S/p merrem and vancomycin. Continued on course of eravacycline and diflucan. NGT utilized and removed 8/7. TPN utilized. Refractory electrolyte replacement given. Required intermittent packed red blood cell transfusions without active bleeding noted. 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 Wound care team followed as well. Started on octreotide in attempt to decrease fistula output. Palliative consulted and continued with full code and full treatment pursuit.  is working on eventual LTAC placement once cleared for this from surgery.      Interval History: Patient seen and examined. NAEON. No major changes.      Objective:     Vital Signs (Most Recent):  Temp: 98.1 °F (36.7 °C) (08/10/23 1157)  Pulse: 96 (08/10/23 1435)  Resp: 16 (08/10/23 1508)  BP: (!) 153/67 (08/10/23 1157)  SpO2: 98 % (08/10/23 1435) Vital Signs (24h Range):  Temp:  [97.8 °F (36.6 °C)-98.4 °F (36.9 °C)] 98.1 °F (36.7 °C)  Pulse:  [] 96  Resp:  [16-20] 16  SpO2:  [90 %-100 %] 98 %  BP: (135-183)/(59-74) 153/67     Weight: 89.4 kg (197 lb)  Body mass index is 37.22 kg/m².    Intake/Output Summary (Last 24 hours) at 8/10/2023 1630  Last data filed at 8/10/2023 1358  Gross per 24 hour   Intake --   Output 4470 ml   Net -4470 ml         Physical Exam  Constitutional:       General: She is not in acute distress.     Appearance: She is obese. She is ill-appearing.   Cardiovascular:      Rate and Rhythm: Regular rhythm. Tachycardia present.   Pulmonary:      Effort: Pulmonary effort is normal. No respiratory distress.   Abdominal:      General: There is distension.       Tenderness: There is abdominal tenderness.      Comments: Beny drain and ostomy intact  Large abdominal dressing in place    Skin:     General: Skin is warm and dry.   Neurological:      General: No focal deficit present.      Mental Status: She is alert and oriented to person, place, and time.   Psychiatric:         Mood and Affect: Mood normal.         Behavior: Behavior normal.             Significant Labs: All pertinent labs within the past 24 hours have been reviewed.    Significant Imaging: I have reviewed all pertinent imaging results/findings within the past 24 hours.      Assessment/Plan:      * Ischemic necrosis of small bowel  OR note 7/27 with extensive area of small bowel necrosis s/p resection now  Possible etiology for ongoing tachycardia/sepsis   S/p NGT  - NPO per gen surg rec  - TPN  - monitor bowel function  - palliative consult: continue care and full code  - gen surg following    Goals of care, counseling/discussion  - see palliative care consult    MRSA pneumonia  - see sepsis section  - ID following    Short gut syndrome  S/p extensive small bowel resection and ileostomy creation  - gen surg following  - considering gattex    Enterocutaneous fistula  At surgical site  - gen surg following and allowing to mature  - started octreotide  - wound care    Hypocalcemia  Improving   - replace with prn IV scale    Sinus tachycardia  Multifactorial in the setting of acute medical conditions including dehydration, pain, postop, anemia  - tele  - addressing above conditions    Hyponatremia  Stable  - continue TPN  - trend daily on chem panel    Malnutrition of moderate degree  Low albumin and pre-albumin  Currently NPO per gen surg rec  - TPN    Severe sepsis  Resolved   Extensive area of small bowel necrosis -s/p surgical repair   Resp Cx: + MRSA  Urine and blood culture negative   S/p merrem & vanc  - eravacycline and diflucan per ID    BOOM (acute kidney injury)  Returned now possibly IVVD +/-  vancomycin use  Improved, stable  DC'd vanc per ID  - getting more IVF with electrolyte replacements  - renally dose meds and avoid nephrotoxins  - trend renal function daily    Bilateral kidney stones  Seen by Dr. Mijares this admission, followed outpatient by Dr. Kwon   Status post bilateral stent placement  - f/u outpatient    Severe obesity (BMI 35.0-39.9) with comorbidity  Body mass index is 37.22 kg/m². Morbid obesity complicates all aspects of disease management from diagnostic modalities to treatment. Weight loss encouraged and health benefits explained to patient.     FENG (iron deficiency anemia)  Worsening postoperatively, now s/p 5u pRBC this stay  No active bleeding  - trend hgb on daily cbc and transfuse as needed.    Malignant neoplasm of colon  History of recurrent colorectal adenocarcinoma  See small-bowel ischemia  Did receive adjuvant chemoradiation, followed by Dr. Frank  - f/u outpatient    Hypothyroidism with abnormal TFTs  Recent TSH elevated at 23, as per chart review elevated 2 years previous, unclear if compliance   - Given current NPO status with bowel surgery continue with IV levothyroxine    COPD (chronic obstructive pulmonary disease)  No evidence of acute exacerbation   - P.r.n. ABG and chest x-ray  - Scheduled nebs      VTE Risk Mitigation (From admission, onward)         Ordered     enoxaparin injection 40 mg  Every 24 hours         07/28/23 1849     Place sequential compression device  Until discontinued         07/28/23 0724     IP VTE HIGH RISK PATIENT  Once         07/24/23 2015                Discharge Planning   DERICK:  unclear    Code Status: Full Code   Is the patient medically ready for discharge?:     Reason for patient still in hospital (select all that apply): Patient trending condition and Consult recommendations  Discharge Plan A: Long-term acute care facility (LTAC)   Discharge Delays: None known at this time              Kevin Bryant MD  Department of Hospital  Medicine   [facility]

## 2023-08-10 NOTE — PT/OT/SLP PROGRESS
Physical Therapy Treatment    Patient Name:  Tessa Enriquez   MRN:  [id number]    Recommendations:     Discharge Recommendations: rehabilitation facility  Discharge Equipment Recommendations: to be determined by next level of care  Barriers to discharge:   increase assist with mobility, high fall risk, decrease activity tolerance, pain    Assessment:     Tessa Enriquez is a 67 y.o. female admitted with a medical diagnosis of Ischemic necrosis of small bowel.  She presents with the following impairments/functional limitations: weakness, impaired endurance, impaired functional mobility, impaired self care skills, gait instability, impaired balance, decreased lower extremity function, decreased safety awareness, pain, impaired cardiopulmonary response to activity.    Pt declined out of bed activity and bed mobility but agreeable to supine LE TE. Pt performed supine active assisted and active range of motion B LE therex.    Rehab Prognosis: Fair; patient would benefit from acute skilled PT services to address these deficits and reach maximum level of function.    Recent Surgery: Procedure(s) (LRB):  LAPAROTOMY, EXPLORATORY (N/A)  EXCISION, SMALL INTESTINE  CREATION, ILEOSTOMY 13 Days Post-Op    Plan:     During this hospitalization, patient to be seen daily to address the identified rehab impairments via gait training, therapeutic activities, therapeutic exercises, neuromuscular re-education and progress toward the following goals:    Plan of Care Expires:  09/06/23    Subjective     Chief Complaint: back pain  Patient/Family Comments/goals: to get better  Pain/Comfort:  Pain Rating 1: other (see comments) (not rated)  Location 1: back  Pain Addressed 1: Reposition, Distraction, Nurse notified  Pain Rating Post-Intervention 1: other (see comments) (not rated)      Objective:     Communicated with RN prior to session.  Patient found HOB elevated with colostomy, JUANITA drain, PureWick, peripheral IV, telemetry  upon PT entry to room.     General Precautions: Standard, fall, contact  Orthopedic Precautions: N/A  Braces: N/A  Respiratory Status: Room air     Functional Mobility:  Pt declined bed mobility or out of bed mobility      AM-PAC 6 CLICK MOBILITY          Treatment & Education:  Pt educated on importance of time OOB, importance of intermittent mobility, safe techniques for transfers/ambulation, discharge recommendations/options, and use of call light for assistance and fall prevention.  Pt tolerated supine LE TE x 10 each, AAROM HS, SLR, and hip IR and AROM ankle DF/PF with verbal cuing for proper form and pacing for optimal strengthening.      Patient left HOB elevated with all lines intact, call button in reach, bed alarm on, and RN notified..    GOALS:   Multidisciplinary Problems       Physical Therapy Goals          Problem: Physical Therapy    Goal Priority Disciplines Outcome Goal Variances Interventions   Physical Therapy Goal     PT, PT/OT Ongoing, Progressing     Description: Goals to be met by: 23     Patient will increase functional independence with mobility by performin. Supine to sit with Stand-by Assistance  2. Sit to supine with Stand-by Assistance  3. Sit to stand transfer with Stand-by Assistance  4. Bed to chair transfer with Stand-by Assistance using Rolling Walker  5. Gait  x 250 feet with Stand-by Assistance using Rolling Walker.                          Time Tracking:     PT Received On: 08/10/23  PT Start Time: 0950     PT Stop Time: 1019  PT Total Time (min): 29 min     Billable Minutes: Therapeutic Exercise 29    Treatment Type: Treatment  PT/PTA: PTA     Number of PTA visits since last PT visit: 2     08/10/2023

## 2023-08-10 NOTE — PLAN OF CARE
08/09/23 2029   Patient Assessment/Suction   Level of Consciousness (AVPU) alert   Respiratory Effort Unlabored   All Lung Fields Breath Sounds diminished   PRE-TX-O2   Device (Oxygen Therapy) room air   SpO2 100 %   Pulse Oximetry Type Intermittent   $ Pulse Oximetry - Multiple Charge Pulse Oximetry - Multiple   Pulse 100   Resp 18   Aerosol Therapy   $ Aerosol Therapy Charges Aerosol Treatment   Respiratory Treatment Status (SVN) given   Treatment Route (SVN) mask   Patient Position (SVN) HOB elevated   Post Treatment Assessment (SVN) increased aeration   Signs of Intolerance (SVN) none

## 2023-08-10 NOTE — PROGRESS NOTES
"Progress Note  Infectious Disease    Reason for Consult:  Drainage from incision site; allergy to meropenem/penicillin/flagyl    HPI: Tessa Enriquez is a 67 y.o. female , obese, BMI 37.2Kg/m2, with unfortunate past medical history of recurrent colorectal adenocarcinoma, and b/l kidney stones status post robotic colectomy in November 2020, 1 out of 46 nodules was positive.  She could not tolerate chemo and was only able to complete 3 treatments and radiation therapy.  In August of 2022 she was found to have recurrence, status post 2nd session of chemotherapy.  On repeat surveillance this year in May she had evidence of adenocarcinoma at the anastomosis site.  PET-CT on June 14th demonstrated decreased size and activity of the mass.    She was admitted on 07/24 for ex-lap, s/p 8mg dexamethasone pre-op. As per operation note: "There is evidence of catastrophic events within the abdominal cavity.  There was diffuse he ischemia noted throughout multiple portions of the small bowel.  There were multiple sharp areas of demarcation between viable bowel and ischemic bowel.  Having removed the other obvious he clean necrotic areas there was evidence of dusky and bogginess at the site of her previous jejunal jejunostomy.  Decision was made against any reestablishment of continuity.  Remaining small bowel approximately 70-75 cm left.", Surgery required assistance from Urology for b/l ureteral catheter placement.     Patient was transferred to [facility] for ICU care.  Postop course complicated by tachycardia and hypotension.  Status post blood transfusion.     During hospital stay, respiratory culture grew MRSA 7/27. The patient developed a rash, clarified with nursing, unclear if vancomycin related although infusion rate was adjusted and rash resolved.  Seems like a second rash developed yesterday after 6 days of meropenem.      Patient seen and examined at bedside, she reports she is feeling fine, NG tube " in place, she is requesting something to eat.  Stable BP, tachycardic, PureWick in place, hazy urine noted.  Ostomy with liquid output. Surgical wound with bilious drainage form mid incision. On TPN. Repeat CT scan concerning for EC fistula.     ID consult for drainage from incision site, concern for peritonitis: Allergy to meropenem, penicillin, Flagyl.    Antibiotics:  Cipro 7/24  Cipro 7/26/27  Meropenem 7/27-8/1  Fluconazole 7/28--present  Vancomycin 7/29--present     8/5/23([facility]ijaya) case d/w Dr. Lacey. She is alert, complains of hunger. Reports(confirmed bynursing) of copious bilious drainage from incision. Noted previously by surgery. NGT remains in place. She is drinking water for dry mouth. Sat up in bed, but reluctant to get out of bed due to pain and increased drainage. Volume loss reflected in multiple electrolyte abnormalities.  She has no fever. Multiple family members at bedside.   8/6: interim reviewed. While urine output is still ok, she has BOOM now. She may be having more ileostomy volume loss than the TPN can replace. She is drinking for pleasure and intake po is not recorded, thus making NGT output appear greater than actual. Vancomycin was stopped (8d given). She sat up in the chair for 30 min this am. She is willing to spend more time in chair. She is going to receive a unit of blood today.    8/7: Interim reviewed, discussed with Dr Lilly, patient seen and examined at bedside, RN changing dressing from anterior abdominal wound, copious amounts of biliary fluid draining, sent for cultures. She reports she feels 'fine', still tolerating ice chips and water. Will discuss with Hospitalist to have P&P see her.    8/8: Interim reviewed, patient seen and examined at bedside, seen with wound care RN and RN, impressive amount of bile draining from abdominal wound, 2 dehisced areas, colostomy bag draining enteric fluid, skin around ostomy irritated/dermatitis. NG tube removed yesterday. Labs reviewed,  pre-albumin 5, very low. P&P recs noted. Micro reviewed, cultures from surgical wound dehiscence, skin micheal no predominant organisms, pending final.     8/9: Interim reviewed, patient seen and examined at bedside, she wants to eat, feels hungry. Stable BP, afebrile. Micro updated today, GNR-non lactose , from middle wound dehiscence. Will discuss with wound care possible wound vac to help with leakage.    8/10:  Interim reviewed, patient seen examined at bedside, she continues to ask for food, remains NPO, on TPN.  Micro reviewed, cultures from surgical incision grew Acinetobacter baumannii, intermediate to Unasyn, sensitive to everything else including tetracycline.  Patient currently on Arava cyclin.         EXAM & DIAGNOSTICS REVIEWED:   Vitals:     Temp:  [97.8 °F (36.6 °C)-98.4 °F (36.9 °C)]   Temp: 98.1 °F (36.7 °C) (08/10/23 1157)  Pulse: 96 (08/10/23 1435)  Resp: 16 (08/10/23 1508)  BP: (!) 153/67 (08/10/23 1157)  SpO2: 98 % (08/10/23 1435)    Intake/Output Summary (Last 24 hours) at 8/10/2023 1701  Last data filed at 8/10/2023 1358  Gross per 24 hour   Intake --   Output 4470 ml   Net -4470 ml       General:  Chronically-ill appearing, obese, In NAD. Alert and attentive, cooperative, on O2 by NC 2L, tremulous  Eyes:  Anicteric, EOMI  ENT:  Moist oral mucosa, edentulous   Neck:  Supple  Lungs: Clear to auscultation b/l  Heart:  S1/S2+, regular rhythm, no murmurs  Abd:  Dressing in place not disturbed   8/9: Dressing in place, not disturbed. LLQ liquid green bile   8/8: Distended, LLQ colostomy with dermatitis around ostomy, leakage of enteric fluid noted. Surgical incision 2 dehisced areas (middle and inferior) draining yellow bile  8/6: Distended, JUANITA drain with bloody drainage, ostomy with large volume bilious liquid output, decreased bowel sounds, very large and fresh bandage in place, d/w nursing. Ostomy appears pink and viable d/w nursing, that she has fistulous drainage from distal (inferior)  "incision  :  Purewick, hazy urine   Musc:  Joints without effusion, swelling,  erythema, synovitis,   Skin:  Warm, no evidence of rash on chest, arms, abdomen or legs   Wound:   Neuro:  Following commands, speech is clear, moves all extremities   Psych:  Anxious, cooperative  Lymphatic:     Extrem: No LE edema, no evidence of rash  VAD:  L PICC line 8/2/23    R Port-A-Cath not accessed, no redness noted      Isolation: Contact/MRSA     8/8:          8/7:        8/2:    8/5 8/1:      Right leg rash   Left leg rash    General Labs reviewed:  Recent Labs   Lab 08/07/23 0428 08/08/23  0537 08/09/23  0521   WBC 6.53 8.44 8.95   HGB 7.7* 8.5* 8.6*   HCT 23.4* 25.5* 26.4*    294 304       Recent Labs   Lab 08/07/23 0428 08/08/23 0537 08/09/23  0521   * 133* 134*   K 4.5 4.6 4.4    99 101   CO2 26 26 25   BUN 69* 66* 58*   CREATININE 1.6* 1.6* 1.5*   CALCIUM 6.7* 6.9* 6.9*     No results for input(s): "CRP" in the last 168 hours.  No results for input(s): "SEDRATE" in the last 168 hours.    Estimated Creatinine Clearance: 38 mL/min (A) (based on SCr of 1.5 mg/dL (H)).     Micro:  Microbiology Results (last 7 days)       Procedure Component Value Units Date/Time    Aerobic culture [[id number]]  (Abnormal)  (Susceptibility) Collected: 08/07/23 0940    Order Status: Completed Specimen: Incision site from Abdomen Updated: 08/10/23 1154     Aerobic Bacterial Culture ACINETOBACTER BAUMANNII/HAEMOLYTICUS  Rare  Skin micheal also present      Culture, Anaerobe [[id number]] Collected: 08/07/23 0940    Order Status: Completed Specimen: Incision site from Abdomen Updated: 08/09/23 1516     Anaerobic Culture Culture in progress    Urine Culture High Risk [[id number]] Collected: 08/04/23 1943    Order Status: Completed Specimen: Urine, Clean Catch Updated: 08/07/23 0710     Urine Culture, Routine No growth    Narrative:      Indicated criteria for high risk culture:->Other  Other (specify):->complicated " abdomen            Imaging Reviewed:  CXRs  CT abdomen/pelvis 8/2:  1. Colonic resection with left lower abdominal quadrant colostomy, punctate foci of intra-abdominal free air and a small volume of ascites, likely related to recent postoperative state.  2. Mildly prominent gas and fluid-filled loops of small bowel containing contrast suggestive of ileus and less likely partial low-grade mechanical bowel obstruction. Consider radiographic follow-up.  3. Airspace opacity in the left greater than right posterior dependent lower lobe with air bronchograms suggestive of atelectasis and aspiration/pneumonia, with a small left and trace right pleural effusion.  4. Moderate diffuse body wall edema (anasarca).  5. Full bladder, with mild bilateral hydroureteronephrosis and no radiopaque stones seen, consider correlation with urine output and urinalysis as warranted (noting there is a punctate focus of gas in the bladder, and a gas forming infection is not excluded).    Cardiology: EKG Qtc: 486ms    PATHOLOGY:   7/24:  1. DESIGNATED AS LYMPH NODES:   - BENIGN CYST PARTIALLY LINED BY MULLERIAN TYPE EPITHELIUM, POSSIBLE  REMNANTS OF PARATUBAL CYST.   - LYMPH NODE TISSUE WAS NOT IDENTIFIED GROSSLY OR MICROSCOPICALLY.   - NEGATIVE FOR MALIGNANCY.     2. DISTAL RECTUM, RESECTION:   - FOCUS OF MODERATELY DIFFERENTIATED ADENOCARCINOMA INVOLVING INFLAMED  AND ULCERATED RECTAL MUCOSA.   - PROXIMAL RESECTION MARGIN IS POSITIVE FOR ADENOCARCINOMA  - EXTENSIVE TRANSMURAL CHRONIC AND ACUTE INFLAMMATION WITH MUCOSAL  ULCERATION.   - TWO PERIRECTAL LYMPH NODES NEGATIVE FOR MALIGNANCY.     3. SEGMENT OF SMALL INTESTINE:   - SEROSAL ADHESIONS.   - RECENT TRANSMURAL DEFECT WITHOUT ASSOCIATE INFLAMMATION OR HEMORRHAGE.   - NEGATIVE FOR MALIGNANCY.     4. PROXIMAL COLON, RESECTION:   - INVASIVE MODERATELY DIFFERENTIATED ADENOCARCINOMA IN THE BACKGROUND OF RECTAL ULCERATION AND MARKED CHRONIC AND ACUTE INFLAMMATION.   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  Short gut volume losses contributing, cr 1.6    Malnutrition, albumin 1.6, pre-albumin 5 - on TPN    Recommendations:  Continue Eravacycline 90mg IV q12h to cover GNR and anaerobes   Fluconazole 200mg IV daily as prophylaxis  Follow bedside cultures   Above for at least 2 weeks, exact duration of therapy will depend on clinical improvement   Wound care following for EC fistula/leakage  CM working on LTAC    Unfortunately, very poor prognosis     D/w patient, nursing, Dr Bryant     Will be available if needed, thank you    Medical Decision Making during this encounter was  [_] Low Complexity  [_] Moderate Complexity  [xx] High Complexity

## 2023-08-10 NOTE — ASSESSMENT & PLAN NOTE
S/p extensive small bowel resection and ileostomy creation  - gen surg following  - considering gattex   patient

## 2023-08-11 NOTE — SUBJECTIVE & OBJECTIVE
Interval History: Patient seen and examined. NAEON. No significant changes.      Objective:     Vital Signs (Most Recent):  Temp: 97.8 °F (36.6 °C) (08/11/23 1100)  Pulse: 94 (08/11/23 1352)  Resp: 16 (08/11/23 1459)  BP: 138/80 (08/11/23 1100)  SpO2: 96 % (08/11/23 1352) Vital Signs (24h Range):  Temp:  [97.6 °F (36.4 °C)-98.5 °F (36.9 °C)] 97.8 °F (36.6 °C)  Pulse:  [] 94  Resp:  [16-22] 16  SpO2:  [96 %-99 %] 96 %  BP: (129-173)/(61-80) 138/80     Weight: 89.4 kg (197 lb)  Body mass index is 37.22 kg/m².    Intake/Output Summary (Last 24 hours) at 8/11/2023 1718  Last data filed at 8/11/2023 0501  Gross per 24 hour   Intake 300 ml   Output 2470 ml   Net -2170 ml         Physical Exam  Constitutional:       General: She is not in acute distress.     Appearance: She is obese. She is ill-appearing.   Cardiovascular:      Rate and Rhythm: Regular rhythm. Tachycardia present.   Pulmonary:      Effort: Pulmonary effort is normal. No respiratory distress.   Abdominal:      General: There is distension.      Tenderness: There is abdominal tenderness.      Comments: Beny drain and ostomy intact  Large abdominal dressing in place    Skin:     General: Skin is warm and dry.   Neurological:      General: No focal deficit present.      Mental Status: She is alert and oriented to person, place, and time.   Psychiatric:         Mood and Affect: Mood normal.         Behavior: Behavior normal.             Significant Labs: All pertinent labs within the past 24 hours have been reviewed.    Significant Imaging: I have reviewed all pertinent imaging results/findings within the past 24 hours.

## 2023-08-11 NOTE — PT/OT/SLP PROGRESS
Physical Therapy Treatment    Patient Name:  Tessa Enriquez   MRN:  [id number]    Recommendations:     Discharge Recommendations: rehabilitation facility, LTACH (long-term acute care hospital)  Discharge Equipment Recommendations: to be determined by next level of care  Barriers to discharge: Decreased caregiver support, Decreased tolerance of  change of positioning    Assessment:     Tessa Enriquez is a 67 y.o. female admitted with a medical diagnosis of Ischemic necrosis of small bowel.  She presents with the following impairments/functional limitations: weakness, impaired endurance, impaired self care skills, impaired functional mobility, gait instability, decreased lower extremity function, decreased upper extremity function, impaired cardiopulmonary response to activity, impaired skin .  Pt refused t/f to EOB because of fear of vomiting.  She did consent to  therex in supine.    Rehab Prognosis: Fair; patient would benefit from acute skilled PT services to address these deficits and reach maximum level of function.    Recent Surgery: Procedure(s) (LRB):  LAPAROTOMY, EXPLORATORY (N/A)  EXCISION, SMALL INTESTINE  CREATION, ILEOSTOMY 14 Days Post-Op    Plan:     During this hospitalization, patient to be seen 5 x/week to address the identified rehab impairments via gait training, therapeutic activities, therapeutic exercises and progress toward the following goals:    Plan of Care Expires:  09/06/23    Subjective     Chief Complaint: n/v with t/f to sit  Patient/Family Comments/goals: PLOF  Pain/Comfort:         Objective:     Communicated with nurse prior to session.  Patient found supine with colostomy, JUANITA drain, PureWick, telemetry upon PT entry to room.     General Precautions: Standard, contact, fall  Orthopedic Precautions: N/A  Braces: N/A  Respiratory Status: Room air           AM-PAC 6 CLICK MOBILITY          Treatment & Education:  B LE therex in supine 1x10 reps of  heel slides, quad sets,  ankle pumps    Patient left supine with all lines intact, call button in reach, and bed alarm on..    GOALS:   Multidisciplinary Problems       Physical Therapy Goals          Problem: Physical Therapy    Goal Priority Disciplines Outcome Goal Variances Interventions   Physical Therapy Goal     PT, PT/OT Ongoing, Not Progressing     Description: Goals to be met by: 23     Patient will increase functional independence with mobility by performin. Supine to sit with Stand-by Assistance  2. Sit to supine with Stand-by Assistance  3. Sit to stand transfer with Stand-by Assistance  4. Bed to chair transfer with Stand-by Assistance using Rolling Walker  5. Gait  x 250 feet with Stand-by Assistance using Rolling Walker.                          Time Tracking:     PT Received On: 23  PT Start Time: 1008     PT Stop Time: 1023  PT Total Time (min): 15 min     Billable Minutes: Therapeutic Exercise 15 minutes    Treatment Type: Treatment  PT/PTA: PT     Number of PTA visits since last PT visit: 2     2023

## 2023-08-11 NOTE — PT/OT/SLP PROGRESS
Occupational Therapy   Treatment    Name: Tessa Enriquez  MRN: [id number]  Admitting Diagnosis:  Ischemic necrosis of small bowel  14 Days Post-Op    Recommendations:     Discharge Recommendations: rehabilitation facility  Discharge Equipment Recommendations:  other (see comments) (TBD at next level of care)  Barriers to discharge:  Decreased caregiver support    Assessment:     Tessa Enriquez is a 67 y.o. female with a medical diagnosis of Ischemic necrosis of small bowel.  Performance deficits affecting function are weakness, impaired endurance, impaired self care skills, impaired functional mobility, gait instability, decreased upper extremity function, decreased lower extremity function, decreased safety awareness, impaired cardiopulmonary response to activity, pain. Patient agreed to ADLs at bed level.     Rehab Prognosis:  Fair; patient would benefit from acute skilled OT services to address these deficits and reach maximum level of function.       Plan:     Patient to be seen 5 x/week to address the above listed problems via self-care/home management, therapeutic activities, therapeutic exercises  Plan of Care Expires: 08/31/23  Plan of Care Reviewed with: patient    Subjective     Chief Complaint: states she will request pain meds  Patient/Family Comments/goals: did not indicate  Pain/Comfort:  Pain Rating 1:  (did not rate)  Location - Orientation 1: lower  Location 1: back  Pain Addressed 1: Distraction, Reposition  Pain Rating Post-Intervention 1: 0/10    Objective:     Communicated with: nurse prior to session.  Patient found HOB elevated with colostomy, JUANITA drain, PureWick, peripheral IV, telemetry upon OT entry to room.    General Precautions: Standard, contact, fall    Orthopedic Precautions:N/A  Braces: N/A  Respiratory Status: Room air     Occupational Performance:     Bed Mobility:    Patient completed Rolling/Turning to Left with  maximal assistance  Patient completed Rolling/Turning  to Right with maximal assistance     Activities of Daily Living:  Grooming: stand by assistance/setup for oral care, brushing hair and washing face with HOB elevated    Treatment & Education:  Patient was educated on on importance of positioning and skin protection, getting out of bed, discharge planning and reviewed use of call bell for assistance. RN requested telemetry be removed.     Patient left HOB elevated with all lines intact, call button in reach, and bed alarm on    GOALS:   Multidisciplinary Problems       Occupational Therapy Goals          Problem: Occupational Therapy    Goal Priority Disciplines Outcome Interventions   Occupational Therapy Goal     OT, PT/OT Ongoing, Progressing    Description: Goals to be met by: 8/31/2023     Patient will increase functional independence with ADLs by performing:    UE Dressing with Supervision.  LE Dressing with Supervision.  Grooming while standing at sink with Supervision.  Toileting from toilet with Supervision for hygiene and clothing management.   Bathing from  tub bench with Supervision.                         Time Tracking:     OT Date of Treatment: 08/11/23  OT Start Time: 0930  OT Stop Time: 0945  OT Total Time (min): 15 min    Billable Minutes:Self Care/Home Management 15    OT/BRENNON: OT          8/11/2023

## 2023-08-11 NOTE — ASSESSMENT & PLAN NOTE
At surgical site  - gen surg following and allowing to mature  - continue octreotide  - wound care

## 2023-08-11 NOTE — PROGRESS NOTES
"[facility]  Adult Nutrition   Progress Note (Nutrition Support Management)    SUMMARY     Recommendations  Recommendation/Intervention:   1. Continue TPN at this time.   2. Consider providing CLD for pleasure.   3. RD to monitor/ manage TPN, weights and labs.  Goals: 1.) Pt to meet/tolerate >75% of nutritional support. 2.) Progression of wound healing.  Nutrition Goal Status: progressing towards goal    Dietitian Rounds Brief  Pt had been asking for food but continues NPO with TPN for nutrition support.     Diet order:   Current Diet Order: NPO      Current Nutrition Support Formula Ordered: Other (Comment) (custom TPN: AA 95gm; Dextrose 140gm; 250mL lipids)  Current Nutrition Support Rate Ordered: 75 (ml)  Current Nutrition Support Frequency Ordered: mL/hr    Evaluation of Received Nutrient/Fluid Intake  Energy Calories Required: meeting needs  Protein Required: meeting needs  Fluid Required: meeting needs  Comments: Pt continue on TPN at this time. Per RN pt states she is hungry and requesting to eat. RN will ask surgeon if diet can be advanced. Pt does have bowel sounds and ostomy has output.     % Intake of Estimated Energy Needs: 75 - 100 %  % Meal Intake: 75 - 100 %      Intake/Output Summary (Last 24 hours) at 8/11/2023 1216  Last data filed at 8/11/2023 0501  Gross per 24 hour   Intake 300 ml   Output 3370 ml   Net -3070 ml        Anthropometrics  Temp: 98.5 °F (36.9 °C)  Height Method: Stated  Height: 5' 1" (154.9 cm)  Height (inches): 61 in  Weight Method: Standard Scale  Weight: 89.4 kg (197 lb)  Weight (lb): 197 lb  Ideal Body Weight (IBW), Female: 105 lb  % Ideal Body Weight, Female (lb): 187.62 %  BMI (Calculated): 37.2  BMI Grade: 35 - 39.9 - obesity - grade II       Estimated/Assessed Needs  Weight Used For Calorie Calculations: 89.4 kg (197 lb 1.5 oz)  Energy Calorie Requirements (kcal): 8162-1121 kcal (20-25 kcal/ kg bw)  Energy Need Method: Kcal/kg  Protein Requirements: 72-96 " (1.5-2.0 g/ 48 kg IBW)  Weight Used For Protein Calculations: 48 kg (105 lb 13.1 oz)     Estimated Fluid Requirement Method: RDA Method  RDA Method (mL): 1788       Reason for Assessment  Reason For Assessment: RD follow-up  Relevant Medical History: COPD, hypothyroidism, rectosigmoid cancer, s/p small bowel resection  General Information Comments: Pt continues on custom TPN @ 75mL/hr providing 1560 kcals, 95gm protein.    Nutrition/Diet History  Spiritual, Cultural Beliefs, Rastafarian Practices, Values that Affect Care: no  Food Allergies: NKFA  Factors Affecting Nutritional Intake: NPO, altered gastrointestinal function    Nutrition Risk Screen  Nutrition Risk Screen: tube feeding or parenteral nutrition     MST Score: 0  Have you recently lost weight without trying?: No  Weight loss score: 0  Have you been eating poorly because of a decreased appetite?: No  Appetite score: 0       Weight History:  Wt Readings from Last 10 Encounters:   07/24/23 89.4 kg (197 lb)   07/03/23 89.3 kg (196 lb 13.9 oz)   06/27/23 89.7 kg (197 lb 12 oz)   06/20/23 88.5 kg (195 lb 1.7 oz)   06/14/23 88.5 kg (195 lb)   06/12/23 88.5 kg (195 lb)   06/06/23 87.4 kg (192 lb 10.9 oz)   05/31/23 81.6 kg (180 lb)   05/26/23 85.7 kg (189 lb)   05/24/23 86 kg (189 lb 9.5 oz)        Lab/Procedures/Meds: Pertinent Labs/Meds Reviewed    Medications:Pertinent Medications Reviewed  Scheduled Meds:   albuterol-ipratropium  3 mL Nebulization Q6H WAKE    budesonide  0.5 mg Nebulization Q12H    chlorhexidine  15 mL Mouth/Throat BID    enoxparin  40 mg Subcutaneous Q24H (prophylaxis, 1700)    eravacycline 90 mg in sodium chloride 0.9% 250 mL infusion  90 mg Intravenous Q12H    fat emulsion 20%  250 mL Intravenous Daily    fluconazole (DIFLUCAN) IV (PEDS and ADULTS)  200 mg Intravenous Q24H    levothyroxine  75 mcg Intravenous Before breakfast    octreotide  100 mcg Subcutaneous Q8H    pantoprazole  40 mg Intravenous Daily     Continuous Infusions:   TPN  "ADULT CENTRAL LINE CUSTOM 75 mL/hr at 08/10/23 2104    TPN ADULT CENTRAL LINE CUSTOM       PRN Meds:.0.9%  NaCl infusion (for blood administration), 0.9%  NaCl infusion (for blood administration), acetaminophen, calcium gluconate IVPB, calcium gluconate IVPB, calcium gluconate IVPB, clonazePAM, dextrose 50%, dextrose 50%, diphenhydrAMINE, glucagon (human recombinant), glucose, glucose, hydrALAZINE, hydrALAZINE, HYDROmorphone, HYDROmorphone, HYDROmorphone, magnesium sulfate IVPB, magnesium sulfate IVPB, ondansetron, potassium chloride **AND** potassium chloride **AND** potassium chloride, Flushing PICC Protocol **AND** [DISCONTINUED] sodium chloride 0.9% **AND** sodium chloride 0.9%, sodium phosphate 15 mmol in dextrose 5 % (D5W) 250 mL IVPB, sodium phosphate 20.01 mmol in dextrose 5 % (D5W) 250 mL IVPB, sodium phosphate 30 mmol in dextrose 5 % (D5W) 250 mL IVPB, traZODone    Labs: Pertinent Labs Reviewed  Clinical Chemistry:  Recent Labs   Lab 08/09/23  0521 08/10/23  1715 08/11/23  0521   * 131* 135*   K 4.4 3.8 3.6    101 105   CO2 25 24 23    124* 141*   BUN 58* 51* 48*   CREATININE 1.5* 1.4 1.3   CALCIUM 6.9* 6.5* 7.0*   ANIONGAP 8 6* 7*   MG 1.8 1.7 1.7   PHOS 5.1* 4.4 4.1     CBC:   Recent Labs   Lab 08/11/23  0521   WBC 10.22   RBC 2.93*   HGB 8.2*   HCT 25.7*      MCV 88   MCH 28.0   MCHC 31.9*     Lipid Panel:  Recent Labs   Lab 08/10/23  1715   TRIG 43     Cardiac Profile:  No results for input(s): "BNP", "CPK", "CPKMB", "TROPONINI", "CKTOTAL" in the last 168 hours.  Inflammatory Labs:  No results for input(s): "CRP" in the last 168 hours.  Diabetes:  No results for input(s): "HGBA1C", "POCTGLUCOSE" in the last 168 hours.  Thyroid & Parathyroid:  No results for input(s): "TSH", "FREET4", "N5UDNRI", "F3ZKRUN", "THYROIDAB" in the last 168 hours.    Monitor and Evaluation  Food and Nutrient Intake: parenteral nutrition intake  Food and Nutrient Adminstration: enteral and parenteral " nutrition administration  Anthropometric Measurements: weight, weight change  Biochemical Data, Medical Tests and Procedures: electrolyte and renal panel, gastrointestinal profile, glucose/endocrine profile  Nutrition-Focused Physical Findings: overall appearance     Nutrition Risk  Level of Risk/Frequency of Follow-up: high     Nutrition Follow-Up  RD Follow-up?: Yes      Nancy Johnson RD 08/11/2023 12:16 PM

## 2023-08-11 NOTE — ASSESSMENT & PLAN NOTE
S/p extensive small bowel resection and ileostomy creation  - gen surg following  - working on starting gattex

## 2023-08-11 NOTE — PROGRESS NOTES
Pt seen and examined.  NO acute events  Continues to have output  Prealbumin remains very low at 4.    COntinue TPN  Hopefully starts gattex soon  Continue to manage fistula output.  Very difficult situation

## 2023-08-11 NOTE — ASSESSMENT & PLAN NOTE
Stable   Multifactorial in the setting of acute medical conditions including dehydration, pain, postop, anemia  - tele  - addressing above conditions

## 2023-08-11 NOTE — PLAN OF CARE
Problem: Parenteral Nutrition  Goal: Effective Intravenous Nutrition Therapy Delivery  Outcome: Ongoing, Progressing  Intervention: Optimize Intravenous Nutrition Delivery  Flowsheets (Taken 8/11/2023 1218)  Nutrition Support Management: parenteral nutrition continued as ordered     PARENTERAL NUTRITION PROGRESS NOTE:      Parenteral Nutrition Day # 9  Diet/Tube Feeding: NPO         Admixture Type: 2:1  Infusion Rate and Frequency: 75       PN Composition:   95 grams Amino Acid, 250 grams Dextrose, and 50 grams Lipid.    Today's TPN provides 1730 kcal.    *Dextrose is started at less than full dextrose goal to reduce risk for Refeeding Syndrome. Dextrose content will be advanced as appropriate over the next few days.    Please see order for electrolytes and additives content and adjustments.   *The Nutrition Support Team will continue to monitor electrolytes daily and adjust parenteral nutrition as warranted.

## 2023-08-11 NOTE — PROGRESS NOTES
[facility] Medicine  Progress Note    Patient Name: Tessa Enriquez  MRN: [id number]  Patient Class: IP- Inpatient   Admission Date: 7/24/2023  Length of Stay: 18 days  Attending Physician: Kevin Bryant MD  Primary Care Provider: Karma Mcdermott MD        Subjective:     Principal Problem:Ischemic necrosis of small bowel        HPI:  Ms. Enriquez is a 67-years-old female who presented as a direct admission from [facility] for ICU level of care postoperatively.  Patient with a history of recurrent colorectal adenocarcinoma, status post neoadjuvant radiation and chemotherapy, followed by oncologist Dr. Frank.  On 07/24 she underwent exploratory laparotomy with extensive lysis of adhesion, proctectomy with creation of end colostomy by Dr. Wagner and she was admitted to hospital medicine postoperatively.  Postop course complicated by ongoing sinus tachycardia with anemia, status post PRBC transfusion and acute kidney injury, seen by Nephrology.  Earlier today ostomy noted to be dusky with concern for ischemia, she was taken back to the OR and underwent exploratory laparotomy with small-bowel resection, as per OR note, large area of small bowel resected with four areas of ischemia.  She was intubated for surgery and remains intubated postoperatively.  Reported history of COPD, hypothyroidism, nephrolithiasis status post bilateral ureteral stenting.  She is currently sedated on propofol, orally intubated on mechanical ventilation.  Repeat labs have been submitted.  As per ICU RN plans for possible repeat surgery.  Plan of care reviewed with nursing.  No visitors at bedside.      Overview/Hospital Course:  Patient was transferred from [facility] for ICU care postoperatively after extensive surgery for ischemic bowel with large areas of small bowel resections done. She has resultant short gut syndrome. Also with ileostomy. She arrived intubated. She was followed by  pulmonology/CC, ID, nephrology and GS. Once extubated, she was transferred to [facility]-surg. She was on meropenem for peritonitis but only completed 5 of 7 days due to rash that developed. Allergies to PCN and flagyl. Respiratory culture grew MRSA considered to have pneumonia from this contributing to her sepsis. S/p merrem and vancomycin. Surgical site wound culture grew acinetobacter. Continued on course of eravacycline and diflucan. NGT utilized and removed 8/7. TPN utilized. Refractory electrolyte replacement given. Required intermittent packed red blood cell transfusions without active bleeding noted. 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 Wound care team followed as well. Started on octreotide in attempt to decrease fistula output. Palliative consulted and continued with full code and full treatment pursuit.  is working on eventual LTAC placement once cleared for this from surgery.      Interval History: Patient seen and examined. NAEON. No significant changes.      Objective:     Vital Signs (Most Recent):  Temp: 97.8 °F (36.6 °C) (08/11/23 1100)  Pulse: 94 (08/11/23 1352)  Resp: 16 (08/11/23 1459)  BP: 138/80 (08/11/23 1100)  SpO2: 96 % (08/11/23 1352) Vital Signs (24h Range):  Temp:  [97.6 °F (36.4 °C)-98.5 °F (36.9 °C)] 97.8 °F (36.6 °C)  Pulse:  [] 94  Resp:  [16-22] 16  SpO2:  [96 %-99 %] 96 %  BP: (129-173)/(61-80) 138/80     Weight: 89.4 kg (197 lb)  Body mass index is 37.22 kg/m².    Intake/Output Summary (Last 24 hours) at 8/11/2023 1718  Last data filed at 8/11/2023 0501  Gross per 24 hour   Intake 300 ml   Output 2470 ml   Net -2170 ml         Physical Exam  Constitutional:       General: She is not in acute distress.     Appearance: She is obese. She is ill-appearing.   Cardiovascular:      Rate and Rhythm: Regular rhythm. Tachycardia present.   Pulmonary:      Effort: Pulmonary effort is normal. No respiratory  distress.   Abdominal:      General: There is distension.      Tenderness: There is abdominal tenderness.      Comments: Beny drain and ostomy intact  Large abdominal dressing in place    Skin:     General: Skin is warm and dry.   Neurological:      General: No focal deficit present.      Mental Status: She is alert and oriented to person, place, and time.   Psychiatric:         Mood and Affect: Mood normal.         Behavior: Behavior normal.             Significant Labs: All pertinent labs within the past 24 hours have been reviewed.    Significant Imaging: I have reviewed all pertinent imaging results/findings within the past 24 hours.      Assessment/Plan:      * Ischemic necrosis of small bowel  OR note 7/27 with extensive area of small bowel necrosis s/p resection now  Possible etiology for ongoing tachycardia/sepsis   S/p NGT  - NPO per gen surg rec  - TPN  - monitor bowel function  - palliative consult: continue care and full code  - gen surg following    Goals of care, counseling/discussion  - see palliative care consult    MRSA pneumonia  - see sepsis section  - ID following    Short gut syndrome  S/p extensive small bowel resection and ileostomy creation  - gen surg following  - working on starting gattex    Enterocutaneous fistula  At surgical site  - gen surg following and allowing to mature  - continue octreotide  - wound care    Hypocalcemia  Improving   - trend daily & replace with prn IV scale based on ionized calcium    Sinus tachycardia  Stable   Multifactorial in the setting of acute medical conditions including dehydration, pain, postop, anemia  - tele  - addressing above conditions    Hyponatremia  Stable  - continue TPN  - trend daily on chem panel    Malnutrition of moderate degree  Low albumin and pre-albumin  Currently NPO per gen surg rec  - TPN    Severe sepsis  Resolved   Extensive area of small bowel necrosis -s/p surgical repair   Resp Cx: + MRSA  Surg wound Cx: + Acinetobacter  Urine and  blood culture negative   S/p merrem & vanc  - eravacycline and diflucan per ID    BOOM (acute kidney injury)  Returned now possibly IVVD +/- vancomycin use  Improving  DC'd vanc per ID  - renally dose meds and avoid nephrotoxins  - trend renal function daily  - continue TPN    Bilateral kidney stones  Seen by Dr. Mijares this admission, followed outpatient by Dr. Kwon   Status post bilateral stent placement  - f/u outpatient    Severe obesity (BMI 35.0-39.9) with comorbidity  Body mass index is 37.22 kg/m². Morbid obesity complicates all aspects of disease management from diagnostic modalities to treatment. Weight loss encouraged and health benefits explained to patient.     FENG (iron deficiency anemia)  Worsening postoperatively, now s/p 5u pRBC this stay  No active bleeding  - trend hgb on daily cbc and transfuse as needed.    Malignant neoplasm of colon  History of recurrent colorectal adenocarcinoma  See small-bowel ischemia  Did receive adjuvant chemoradiation, followed by Dr. Frank  - f/u outpatient    Hypothyroidism with abnormal TFTs  Recent TSH elevated at 23, as per chart review elevated 2 years previous, unclear if compliance   - Given current NPO status with bowel surgery continue with IV levothyroxine    COPD (chronic obstructive pulmonary disease)  No evidence of acute exacerbation   - P.r.n. ABG and chest x-ray  - Scheduled nebs      VTE Risk Mitigation (From admission, onward)         Ordered     enoxaparin injection 40 mg  Every 24 hours         07/28/23 1849     Place sequential compression device  Until discontinued         07/28/23 0724     IP VTE HIGH RISK PATIENT  Once         07/24/23 2015                Discharge Planning   DERICK:  unclear    Code Status: Full Code   Is the patient medically ready for discharge?:     Reason for patient still in hospital (select all that apply): Patient trending condition and Consult recommendations  Discharge Plan A: Long-term acute care facility (LTAC)    Discharge Delays: None known at this time              Kevin Bryant MD  Department of Hospital Medicine   [facility]

## 2023-08-11 NOTE — ASSESSMENT & PLAN NOTE
Resolved   Extensive area of small bowel necrosis -s/p surgical repair   Resp Cx: + MRSA  Surg wound Cx: + Acinetobacter  Urine and blood culture negative   S/p merrem & vanc  - eravacycline and diflucan per ID

## 2023-08-11 NOTE — ASSESSMENT & PLAN NOTE
Returned now possibly IVVD +/- vancomycin use  Improving  DC'd vanc per ID  - renally dose meds and avoid nephrotoxins  - trend renal function daily  - continue TPN

## 2023-08-12 NOTE — ASSESSMENT & PLAN NOTE
S/p extensive small bowel resection and ileostomy creation  - gen surg following  - working on starting gattex per gen surg

## 2023-08-12 NOTE — PROGRESS NOTES
[facility]  Adult Nutrition   Consult Note (Nutrition Support Management)    SUMMARY     Recommendations  Recommendation/Intervention: 1. RD ordered new TPN to hang today at 2200. Run at 75ml/hr. 2. 250 ml of 20% intralipid to infuse at 2200, run for 12 hours. 3. RD to continue to monitor and manage nutrition support.  Goals: 1. Nutrition provision to meet at least 75% EEN/EPN.  Nutrition Goal Status: progressing towards goal    Dietitian Rounds Brief  TPN to continue. New TPN ordered.    PARENTERAL NUTRITION PROGRESS NOTE:    Parenteral Nutrition Day # 10   Diagnosis/Indication for PN: short gut syndrome with high output fistula s/p small bowel resection   IV Access: PICC  Diet/Tube Feeding: NPO         Admixture Type: 2-in-1, central   Infusion Rate and Frequency: 75 ml/hr, continuous    Patient Acuity:Acute Care     PN Composition:   96 grams Amino Acid, 252 grams Dextrose. 50 grams Lipids hung separately.     Today's TPN provides 1741 kcal.    Please see order for electrolytes and additives content and adjustments.   *The Nutrition Support Team will continue to monitor electrolytes daily and adjust parenteral nutrition as warranted.      Assessment  Reason For Assessment: RD follow-up, new TPN  Relevant Medical History: COPD, hypothyroidism, rectosigmoid cancer, s/p small bowel resection  General Information Comments: Pt continues on custom TPN @ 75mL/hr providing 1560 kcals, 95gm protein.    Nutrition Risk Screen  Nutrition Risk Screen: tube feeding or parenteral nutrition     MST Score: 0  Have you recently lost weight without trying?: No  Weight loss score: 0  Have you been eating poorly because of a decreased appetite?: No  Appetite score: 0       Nutrition/Diet History  Spiritual, Cultural Beliefs, Jainism Practices, Values that Affect Care: no  Food Allergies: NKFA  Factors Affecting Nutritional Intake: NPO, altered gastrointestinal function    Anthropometrics  Temp: 97 °F (36.1  "°C)  Height Method: Stated  Height: 5' 1" (154.9 cm)  Height (inches): 61 in  Weight Method: Standard Scale  Weight: 89.4 kg (197 lb)  Weight (lb): 197 lb  Ideal Body Weight (IBW), Female: 105 lb  % Ideal Body Weight, Female (lb): 187.62 %  BMI (Calculated): 37.2  BMI Grade: 35 - 39.9 - obesity - grade II       Weight History:  Wt Readings from Last 10 Encounters:   07/24/23 89.4 kg (197 lb)   07/03/23 89.3 kg (196 lb 13.9 oz)   06/27/23 89.7 kg (197 lb 12 oz)   06/20/23 88.5 kg (195 lb 1.7 oz)   06/14/23 88.5 kg (195 lb)   06/12/23 88.5 kg (195 lb)   06/06/23 87.4 kg (192 lb 10.9 oz)   05/31/23 81.6 kg (180 lb)   05/26/23 85.7 kg (189 lb)   05/24/23 86 kg (189 lb 9.5 oz)       Lab/Procedures/Meds: Pertinent Labs Reviewed    Clinical Chemistry:  Recent Labs   Lab 08/10/23  1715 08/11/23  0521 08/12/23  0513   * 135* 137   K 3.8 3.6 3.5    105 109   CO2 24 23 24   * 141* 104   BUN 51* 48* 44*   CREATININE 1.4 1.3 1.3   CALCIUM 6.5* 7.0* 7.1*   PROT  --   --  4.4*   ALBUMIN  --   --  1.3*   BILITOT  --   --  0.9   ALKPHOS  --   --  121   AST  --   --  35   ALT  --   --  25   ANIONGAP 6* 7* 4*   MG 1.7 1.7 1.8   PHOS 4.4 4.1 3.9       CBC:   Recent Labs   Lab 08/12/23  0513   WBC 10.85   RBC 3.17*   HGB 8.9*   HCT 27.9*      MCV 88   MCH 28.1   MCHC 31.9*       Lipid Panel:  Recent Labs   Lab 08/12/23  0513   TRIG 62         Medications: Pertinent Medications reviewed    Scheduled Meds:   albuterol-ipratropium  3 mL Nebulization Q6H WAKE    budesonide  0.5 mg Nebulization Q12H    chlorhexidine  15 mL Mouth/Throat BID    enoxparin  40 mg Subcutaneous Q24H (prophylaxis, 1700)    eravacycline 90 mg in sodium chloride 0.9% 250 mL infusion  90 mg Intravenous Q12H    fat emulsion 20%  250 mL Intravenous Daily    fluconazole (DIFLUCAN) IV (PEDS and ADULTS)  200 mg Intravenous Q24H    levothyroxine  75 mcg Intravenous Before breakfast    octreotide  100 mcg Subcutaneous Q8H    pantoprazole  40 mg " Intravenous Daily       Continuous Infusions:   TPN ADULT CENTRAL LINE CUSTOM 75 mL/hr at 08/11/23 2022    TPN ADULT CENTRAL LINE CUSTOM         PRN Meds:.0.9%  NaCl infusion (for blood administration), 0.9%  NaCl infusion (for blood administration), acetaminophen, calcium gluconate IVPB, calcium gluconate IVPB, calcium gluconate IVPB, clonazePAM, dextrose 50%, dextrose 50%, diphenhydrAMINE, glucagon (human recombinant), glucose, glucose, hydrALAZINE, hydrALAZINE, HYDROmorphone, HYDROmorphone, HYDROmorphone, magnesium sulfate IVPB, magnesium sulfate IVPB, ondansetron, potassium chloride **AND** potassium chloride **AND** potassium chloride, Flushing PICC Protocol **AND** [DISCONTINUED] sodium chloride 0.9% **AND** sodium chloride 0.9%, sodium phosphate 15 mmol in dextrose 5 % (D5W) 250 mL IVPB, sodium phosphate 20.01 mmol in dextrose 5 % (D5W) 250 mL IVPB, sodium phosphate 30 mmol in dextrose 5 % (D5W) 250 mL IVPB, traZODone    Estimated/Assessed Needs  Weight Used For Calorie Calculations: 89.4 kg (197 lb 1.5 oz)  Energy Calorie Requirements (kcal): 1953-9719 kcal (20-25 kcal/ kg bw)  Energy Need Method: Kcal/kg  Protein Requirements: 72-96 (1.5-2.0 g/ 48 kg IBW)  Weight Used For Protein Calculations: 48 kg (105 lb 13.1 oz)     Estimated Fluid Requirement Method: RDA Method  RDA Method (mL): 1788       Nutrition Prescription Ordered  Current Diet Order: NPO  Current Nutrition Support Formula Ordered: Other (Comment) (custom TPN)  Current Nutrition Support Rate Ordered: 75 (ml)  Current Nutrition Support Frequency Ordered: ml/hr    Evaluation of Received Nutrient/Fluid Intake  Parenteral Calories (kcal): 1230  Parenteral Protein (gm): 95  Parenteral Fluid (mL): 1800  Lipid Calories (kcals): 500 kcals  Total Calories (kcal): 1730  Energy Calories Required: meeting needs  Protein Required: meeting needs  Fluid Required: meeting needs  Comments: TPN to continue. New TPN ordered.  Tolerance: tolerating     Intake/Output  Summary (Last 24 hours) at 8/12/2023 1141  Last data filed at 8/12/2023 1035  Gross per 24 hour   Intake 2580.4 ml   Output 3810 ml   Net -1229.6 ml      % Intake of Estimated Energy Needs: 75 - 100 %  % Meal Intake: NPO    Nutrition Risk  Level of Risk/Frequency of Follow-up: high     Monitor and Evaluation  Food and Nutrient Intake: parenteral nutrition intake  Food and Nutrient Adminstration: enteral and parenteral nutrition administration  Anthropometric Measurements: weight, weight change, body mass index  Biochemical Data, Medical Tests and Procedures: electrolyte and renal panel, inflammatory profile, gastrointestinal profile, lipid profile, glucose/endocrine profile  Nutrition-Focused Physical Findings: overall appearance     Nutrition Follow-Up  RD Follow-up?: Yes    Shahnaz Nathan RD 08/12/2023 11:42 AM

## 2023-08-12 NOTE — ASSESSMENT & PLAN NOTE
Stable   Multifactorial in the setting of acute medical conditions including dehydration, pain, postop, anemia, IV synthroid  - tele  - addressing above conditions

## 2023-08-12 NOTE — ASSESSMENT & PLAN NOTE
OR note 7/27 with extensive area of small bowel necrosis s/p resection now  S/p NGT  - NPO per gen surg rec  - TPN  - monitor bowel function  - palliative consult: continue care and full code  - gen surg following

## 2023-08-12 NOTE — PROGRESS NOTES
Midline ABD dressing and ostomy changed;     unable to complete wound care per verbal instructions provided by wound care RN Shayna, no updated notes or updated wound care orders seen in epic; Wound care orders and Plan of care need to be clarified; No new wound care notes seen since 08/08/23 at which time the POC was to see about getting a wound vac placed;     MD(s) (ID, Hospitalist, and Gen Sx) notified of possible deterioration of wound since last photos entered into chart, to this RN it appears wound has further dehiscence and stool like drainage coming from wound itself; JUANITA drainage extremely purulent     Wound thoroughly rinsed with saline and cleansed with commercial cleaning solution;    Aquacell AG placed over incision site with ABD pad and tape over;    New photos entered into epic; Ostomy changed;     Assessment ongoing   NP visit in 1 month with MRI face prior. Will determine next MD follow-up after review of the scans.

## 2023-08-12 NOTE — ASSESSMENT & PLAN NOTE
Recent TSH elevated at 23, as per chart review elevated 2 years previous, suspect compliance issue  Repeat TSH borderline low, normal fT4  - Given current NPO status with bowel surgery continue with IV levothyroxine  - decrease IV levothyroxine from 75mcg to 50mcg daily 8/13 given tachycardia and quickly changing TFTs  - f/u fT3

## 2023-08-12 NOTE — ASSESSMENT & PLAN NOTE
Worsening postoperatively, now stable s/p 5u pRBC this stay  No active bleeding  - trend hgb on daily cbc and transfuse as needed.

## 2023-08-12 NOTE — PLAN OF CARE
Problem: Parenteral Nutrition  Goal: Effective Intravenous Nutrition Therapy Delivery  Outcome: Ongoing, Progressing  Intervention: Optimize Intravenous Nutrition Delivery  Flowsheets (Taken 8/12/2023 1142)  Nutrition Support Management: (new PN ordered.) parenteral nutrition initiated

## 2023-08-12 NOTE — PROGRESS NOTES
"Progress Note  Infectious Disease    Reason for Consult:  Drainage from incision site; allergy to meropenem/penicillin/flagyl    HPI: Tessa Enriquez is a 67 y.o. female , obese, BMI 37.2Kg/m2, with unfortunate past medical history of recurrent colorectal adenocarcinoma, and b/l kidney stones status post robotic colectomy in November 2020, 1 out of 46 nodules was positive.  She could not tolerate chemo and was only able to complete 3 treatments and radiation therapy.  In August of 2022 she was found to have recurrence, status post 2nd session of chemotherapy.  On repeat surveillance this year in May she had evidence of adenocarcinoma at the anastomosis site.  PET-CT on June 14th demonstrated decreased size and activity of the mass.    She was admitted on 07/24 for ex-lap, s/p 8mg dexamethasone pre-op. As per operation note: "There is evidence of catastrophic events within the abdominal cavity.  There was diffuse he ischemia noted throughout multiple portions of the small bowel.  There were multiple sharp areas of demarcation between viable bowel and ischemic bowel.  Having removed the other obvious he clean necrotic areas there was evidence of dusky and bogginess at the site of her previous jejunal jejunostomy.  Decision was made against any reestablishment of continuity.  Remaining small bowel approximately 70-75 cm left.", Surgery required assistance from Urology for b/l ureteral catheter placement.     Patient was transferred to [facility] for ICU care.  Postop course complicated by tachycardia and hypotension.  Status post blood transfusion.     During hospital stay, respiratory culture grew MRSA 7/27. The patient developed a rash, clarified with nursing, unclear if vancomycin related although infusion rate was adjusted and rash resolved.  Seems like a second rash developed yesterday after 6 days of meropenem.      Patient seen and examined at bedside, she reports she is feeling fine, NG tube " in place, she is requesting something to eat.  Stable BP, tachycardic, PureWick in place, hazy urine noted.  Ostomy with liquid output. Surgical wound with bilious drainage form mid incision. On TPN. Repeat CT scan concerning for EC fistula.     ID consult for drainage from incision site, concern for peritonitis: Allergy to meropenem, penicillin, Flagyl.    Antibiotics:  Cipro 7/24  Cipro 7/26/27  Meropenem 7/27-8/1  Fluconazole 7/28--present  Vancomycin 7/29--present     8/5/23(Vijaya) case d/w Dr. Lacey. She is alert, complains of hunger. Reports(confirmed bynursing) of copious bilious drainage from incision. Noted previously by surgery. NGT remains in place. She is drinking water for dry mouth. Sat up in bed, but reluctant to get out of bed due to pain and increased drainage. Volume loss reflected in multiple electrolyte abnormalities.  She has no fever. Multiple family members at bedside.   8/6: interim reviewed. While urine output is still ok, she has BOOM now. She may be having more ileostomy volume loss than the TPN can replace. She is drinking for pleasure and intake po is not recorded, thus making NGT output appear greater than actual. Vancomycin was stopped (8d given). She sat up in the chair for 30 min this am. She is willing to spend more time in chair. She is going to receive a unit of blood today.    8/7: Interim reviewed, discussed with Dr Lilly, patient seen and examined at bedside, RN changing dressing from anterior abdominal wound, copious amounts of biliary fluid draining, sent for cultures. She reports she feels 'fine', still tolerating ice chips and water. Will discuss with Hospitalist to have P&P see her.    8/8: Interim reviewed, patient seen and examined at bedside, seen with wound care RN and RN, impressive amount of bile draining from abdominal wound, 2 dehisced areas, colostomy bag draining enteric fluid, skin around ostomy irritated/dermatitis. NG tube removed yesterday. Labs reviewed,  pre-albumin 5, very low. P&P recs noted. Micro reviewed, cultures from surgical wound dehiscence, skin micheal no predominant organisms, pending final.     8/9: Interim reviewed, patient seen and examined at bedside, she wants to eat, feels hungry. Stable BP, afebrile. Micro updated today, GNR-non lactose , from middle wound dehiscence. Will discuss with wound care possible wound vac to help with leakage.    8/10:  Interim reviewed, patient seen examined at bedside, she continues to ask for food, remains NPO, on TPN.  Micro reviewed, cultures from surgical incision grew Acinetobacter baumannii, intermediate to Unasyn, sensitive to everything else including tetracycline.  Patient currently on Arava cyclin.       08/12/2023 Dr Louis, covering for the weekend : Tired, no new complaints, afebrile, nurse in room . There was a lot of drainage last night from lower abdominal wound and nurses had to change the dressing a few times        Antibiotics (From admission, onward)      Start     Stop Route Frequency Ordered    08/11/23 0930  eravacycline 90 mg in sodium chloride 0.9% 250 mL infusion         08/18/23 0929 IV Every 12 hours (non-standard times) 08/11/23 0820          Antifungals (From admission, onward)      Start     Stop Route Frequency Ordered    08/06/23 2100  fluconazole (DIFLUCAN) IVPB 200 mg         -- IV Every 24 hours (non-standard times) 08/06/23 1115          Antivirals (From admission, onward)      None            EXAM & DIAGNOSTICS REVIEWED:   Vitals:     Temp:  [97 °F (36.1 °C)-97.8 °F (36.6 °C)]   Temp: 97 °F (36.1 °C) (08/12/23 0823)  Pulse: 91 (08/12/23 0823)  Resp: 17 (08/12/23 0823)  BP: 139/61 (08/12/23 0823)  SpO2: 97 % (08/12/23 0823)    Intake/Output Summary (Last 24 hours) at 8/12/2023 0838  Last data filed at 8/12/2023 0712  Gross per 24 hour   Intake 2263.49 ml   Output 3210 ml   Net -946.51 ml       General:  Chronically-ill appearing, obese, In NAD. Alert and attentive,  "cooperative, on O2 by NC 2L, tremulous  Eyes:  Anicteric, EOMI  ENT:  Moist oral mucosa, edentulous   Neck:  Supple  Lungs: Clear to auscultation b/l  Heart:  S1/S2+, regular rhythm, no murmurs  Abd:  Dressing in place not disturbed   08/12/2023-- similar to pics,   8/9: Dressing in place, not disturbed. LLQ liquid green bile   8/8: Distended, LLQ colostomy with dermatitis around ostomy, leakage of enteric fluid noted. Surgical incision 2 dehisced areas (middle and inferior) draining yellow bile  8/6: Distended, JUANITA drain with bloody drainage, ostomy with large volume bilious liquid output, decreased bowel sounds, very large and fresh bandage in place, d/w nursing. Ostomy appears pink and viable d/w nursing, that she has fistulous drainage from distal (inferior) incision  :  Purewick, hazy urine   Musc:  Joints without effusion, swelling,  erythema, synovitis,   Skin:  Warm, no evidence of rash on chest, arms, abdomen or legs   Wound:   Neuro:  Following commands, speech is clear, moves all extremities   Psych:  Anxious, cooperative  Lymphatic:     Extrem: No LE edema, no evidence of rash  VAD:  L PICC line 8/2/23    R Port-A-Cath not accessed, no redness noted      Isolation: Contact/MRSA  08/11/23 8/8:          8/7:        8/2:    8/5 8/1:      Right leg rash   Left leg rash    General Labs reviewed:  Recent Labs   Lab 08/10/23  1715 08/11/23  0521 08/12/23  0513   WBC 8.71 10.22 10.85   HGB 8.4* 8.2* 8.9*   HCT 25.9* 25.7* 27.9*    285 329       Recent Labs   Lab 08/10/23  1715 08/11/23  0521 08/12/23  0513   * 135* 137   K 3.8 3.6 3.5    105 109   CO2 24 23 24   BUN 51* 48* 44*   CREATININE 1.4 1.3 1.3   CALCIUM 6.5* 7.0* 7.1*   PROT  --   --  4.4*   BILITOT  --   --  0.9   ALKPHOS  --   --  121   ALT  --   --  25   AST  --   --  35     No results for input(s): "CRP" in the last 168 hours.  No results for input(s): "SEDRATE" in the last 168 hours.    Estimated " Creatinine Clearance: 43.9 mL/min (based on SCr of 1.3 mg/dL).     Micro:  Microbiology Results (last 7 days)       Procedure Component Value Units Date/Time    Culture, Anaerobe [[id number]] Collected: 08/07/23 0940    Order Status: Completed Specimen: Incision site from Abdomen Updated: 08/11/23 0625     Anaerobic Culture No anaerobes isolated    Aerobic culture [[id number]]  (Abnormal)  (Susceptibility) Collected: 08/07/23 0940    Order Status: Completed Specimen: Incision site from Abdomen Updated: 08/10/23 1154     Aerobic Bacterial Culture ACINETOBACTER BAUMANNII/HAEMOLYTICUS  Rare  Skin micheal also present      Urine Culture High Risk [[id number]] Collected: 08/04/23 1943    Order Status: Completed Specimen: Urine, Clean Catch Updated: 08/07/23 0710     Urine Culture, Routine No growth    Narrative:      Indicated criteria for high risk culture:->Other  Other (specify):->complicated abdomen            Imaging Reviewed:  CXRs  CT abdomen/pelvis 8/2:  1. Colonic resection with left lower abdominal quadrant colostomy, punctate foci of intra-abdominal free air and a small volume of ascites, likely related to recent postoperative state.  2. Mildly prominent gas and fluid-filled loops of small bowel containing contrast suggestive of ileus and less likely partial low-grade mechanical bowel obstruction. Consider radiographic follow-up.  3. Airspace opacity in the left greater than right posterior dependent lower lobe with air bronchograms suggestive of atelectasis and aspiration/pneumonia, with a small left and trace right pleural effusion.  4. Moderate diffuse body wall edema (anasarca).  5. Full bladder, with mild bilateral hydroureteronephrosis and no radiopaque stones seen, consider correlation with urine output and urinalysis as warranted (noting there is a punctate focus of gas in the bladder, and a gas forming infection is not excluded).    Cardiology: EKG Qtc: 486ms    PATHOLOGY:   7/24:  1. DESIGNATED AS LYMPH  NODES:   - BENIGN CYST PARTIALLY LINED BY MULLERIAN TYPE EPITHELIUM, POSSIBLE  REMNANTS OF PARATUBAL CYST.   - LYMPH NODE TISSUE WAS NOT IDENTIFIED GROSSLY OR MICROSCOPICALLY.   - NEGATIVE FOR MALIGNANCY.     2. DISTAL RECTUM, RESECTION:   - FOCUS OF MODERATELY DIFFERENTIATED ADENOCARCINOMA INVOLVING INFLAMED  AND ULCERATED RECTAL MUCOSA.   - PROXIMAL RESECTION MARGIN IS POSITIVE FOR ADENOCARCINOMA  - EXTENSIVE TRANSMURAL CHRONIC AND ACUTE INFLAMMATION WITH MUCOSAL  ULCERATION.   - TWO PERIRECTAL LYMPH NODES NEGATIVE FOR MALIGNANCY.     3. SEGMENT OF SMALL INTESTINE:   - SEROSAL ADHESIONS.   - RECENT TRANSMURAL DEFECT WITHOUT ASSOCIATE INFLAMMATION OR HEMORRHAGE.   - NEGATIVE FOR MALIGNANCY.     4. PROXIMAL COLON, RESECTION:   - INVASIVE MODERATELY DIFFERENTIATED ADENOCARCINOMA IN THE BACKGROUND OF RECTAL ULCERATION AND MARKED CHRONIC AND ACUTE INFLAMMATION.   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  Short gut volume losses contributing, cr 1.6    Malnutrition, albumin 1.6, pre-albumin 5 - on TPN    Recommendations:  Continue Eravacycline 90mg IV q12h to cover GNR and anaerobes   Fluconazole 200mg IV daily as prophylaxis  Above for at least 2 weeks, exact duration of therapy will depend on clinical improvement   Wound care following for EC fistula/leakage  CM working on LTAC    Unfortunately, very poor prognosis   D/w patient  Will be available if needed, thank you    Medical Decision Making during this encounter was  [_] Low Complexity  [_] Moderate Complexity  [xx] High Complexity

## 2023-08-12 NOTE — SUBJECTIVE & OBJECTIVE
Interval History: Patient seen and examined. SLIM. Nursing reports some runs of asymptomatic tachycardia. She states feeling unchanged.      Objective:     Vital Signs (Most Recent):  Temp: 97 °F (36.1 °C) (08/12/23 1100)  Pulse: 91 (08/12/23 1321)  Resp: 18 (08/12/23 1345)  BP: 134/62 (08/12/23 1100)  SpO2: 97 % (08/12/23 1321) Vital Signs (24h Range):  Temp:  [97 °F (36.1 °C)-97.8 °F (36.6 °C)] 97 °F (36.1 °C)  Pulse:  [] 91  Resp:  [16-19] 18  SpO2:  [97 %-99 %] 97 %  BP: (129-149)/(61-91) 134/62     Weight: 89.4 kg (197 lb)  Body mass index is 37.22 kg/m².    Intake/Output Summary (Last 24 hours) at 8/12/2023 1615  Last data filed at 8/12/2023 1551  Gross per 24 hour   Intake 2580.4 ml   Output 4600 ml   Net -2019.6 ml         Physical Exam  Constitutional:       General: She is not in acute distress.     Appearance: She is obese. She is ill-appearing.   Cardiovascular:      Rate and Rhythm: Regular rhythm. Tachycardia present.   Pulmonary:      Effort: Pulmonary effort is normal. No respiratory distress.   Abdominal:      General: There is distension.      Tenderness: There is abdominal tenderness.      Comments: Beny drain and ostomy intact  Large abdominal dressing in place    Skin:     General: Skin is warm and dry.   Neurological:      General: No focal deficit present.      Mental Status: She is alert and oriented to person, place, and time.   Psychiatric:         Mood and Affect: Mood normal.         Behavior: Behavior normal.             Significant Labs: All pertinent labs within the past 24 hours have been reviewed.    Significant Imaging: I have reviewed all pertinent imaging results/findings within the past 24 hours.

## 2023-08-12 NOTE — PROGRESS NOTES
[facility] Medicine  Progress Note    Patient Name: Tessa Enriquez  MRN: [id number]  Patient Class: IP- Inpatient   Admission Date: 7/24/2023  Length of Stay: 19 days  Attending Physician: Kevin Bryant MD  Primary Care Provider: Karma Mcdermott MD        Subjective:     Principal Problem:Ischemic necrosis of small bowel        HPI:  Ms. Enriquez is a 67-years-old female who presented as a direct admission from [facility] for ICU level of care postoperatively.  Patient with a history of recurrent colorectal adenocarcinoma, status post neoadjuvant radiation and chemotherapy, followed by oncologist Dr. Frank.  On 07/24 she underwent exploratory laparotomy with extensive lysis of adhesion, proctectomy with creation of end colostomy by Dr. Wagner and she was admitted to hospital medicine postoperatively.  Postop course complicated by ongoing sinus tachycardia with anemia, status post PRBC transfusion and acute kidney injury, seen by Nephrology.  Earlier today ostomy noted to be dusky with concern for ischemia, she was taken back to the OR and underwent exploratory laparotomy with small-bowel resection, as per OR note, large area of small bowel resected with four areas of ischemia.  She was intubated for surgery and remains intubated postoperatively.  Reported history of COPD, hypothyroidism, nephrolithiasis status post bilateral ureteral stenting.  She is currently sedated on propofol, orally intubated on mechanical ventilation.  Repeat labs have been submitted.  As per ICU RN plans for possible repeat surgery.  Plan of care reviewed with nursing.  No visitors at bedside.      Overview/Hospital Course:  Patient was transferred from [facility] for ICU care postoperatively after extensive surgery for ischemic bowel with large areas of small bowel resections done. She has resultant short gut syndrome. Also with ileostomy. She arrived intubated. She was followed by  pulmonology/CC, ID, nephrology and GS. Once extubated, she was transferred to [facility]surg. She was on meropenem for peritonitis but only completed 5 of 7 days due to rash that developed. Allergies to PCN and flagyl. Respiratory culture grew MRSA considered to have pneumonia from this contributing to her sepsis. S/p merrem and vancomycin. Surgical site wound culture grew acinetobacter. Continued on course of eravacycline and diflucan. NGT utilized and removed 8/7. TPN utilized. Refractory electrolyte replacement given. Required intermittent packed red blood cell transfusions without active bleeding noted. 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 Wound care team followed as well. Started on octreotide in attempt to decrease fistula output. Palliative consulted and continued with full code and full treatment pursuit.  is working on eventual LTAC placement once cleared for this from surgery.      Interval History: Patient seen and examined. SARAYON. Nursing reports some runs of asymptomatic tachycardia. She states feeling unchanged.      Objective:     Vital Signs (Most Recent):  Temp: 97 °F (36.1 °C) (08/12/23 1100)  Pulse: 91 (08/12/23 1321)  Resp: 18 (08/12/23 1345)  BP: 134/62 (08/12/23 1100)  SpO2: 97 % (08/12/23 1321) Vital Signs (24h Range):  Temp:  [97 °F (36.1 °C)-97.8 °F (36.6 °C)] 97 °F (36.1 °C)  Pulse:  [] 91  Resp:  [16-19] 18  SpO2:  [97 %-99 %] 97 %  BP: (129-149)/(61-91) 134/62     Weight: 89.4 kg (197 lb)  Body mass index is 37.22 kg/m².    Intake/Output Summary (Last 24 hours) at 8/12/2023 1615  Last data filed at 8/12/2023 1551  Gross per 24 hour   Intake 2580.4 ml   Output 4600 ml   Net -2019.6 ml         Physical Exam  Constitutional:       General: She is not in acute distress.     Appearance: She is obese. She is ill-appearing.   Cardiovascular:      Rate and Rhythm: Regular rhythm. Tachycardia present.   Pulmonary:       Effort: Pulmonary effort is normal. No respiratory distress.   Abdominal:      General: There is distension.      Tenderness: There is abdominal tenderness.      Comments: Beny drain and ostomy intact  Large abdominal dressing in place    Skin:     General: Skin is warm and dry.   Neurological:      General: No focal deficit present.      Mental Status: She is alert and oriented to person, place, and time.   Psychiatric:         Mood and Affect: Mood normal.         Behavior: Behavior normal.             Significant Labs: All pertinent labs within the past 24 hours have been reviewed.    Significant Imaging: I have reviewed all pertinent imaging results/findings within the past 24 hours.      Assessment/Plan:      * Ischemic necrosis of small bowel  OR note 7/27 with extensive area of small bowel necrosis s/p resection now  S/p NGT  - NPO per gen surg rec  - TPN  - monitor bowel function  - palliative consult: continue care and full code  - gen surg following    Goals of care, counseling/discussion  - see palliative care consult    MRSA pneumonia  - see sepsis section  - ID following    Short gut syndrome  S/p extensive small bowel resection and ileostomy creation  - gen surg following  - working on starting gattex per gen surg    Enterocutaneous fistula  At surgical site  - gen surg following and allowing to mature  - continue octreotide  - wound care    Hypocalcemia  Stable  - trend daily & replace with prn IV scale based on ionized calcium    Sinus tachycardia  Stable   Multifactorial in the setting of acute medical conditions including dehydration, pain, postop, anemia, IV synthroid  - tele  - addressing above conditions    Hyponatremia  Stable  - continue TPN  - trend daily on chem panel    Malnutrition of moderate degree  Low albumin and pre-albumin  Currently NPO per gen surg rec  - TPN    Severe sepsis  Resolved   Extensive area of small bowel necrosis -s/p surgical repair   Resp Cx: + MRSA  Surg wound  Cx: + Acinetobacter  Urine and blood culture negative   S/p merrem & vanc  - eravacycline and diflucan per ID    BOOM (acute kidney injury)  Returned now possibly IVVD +/- vancomycin use  Improving  DC'd vanc per ID  - renally dose meds and avoid nephrotoxins  - trend renal function daily  - continue TPN    Bilateral kidney stones  Seen by Dr. Mijares this admission, followed outpatient by Dr. Kwon   Status post bilateral stent placement  - f/u outpatient    Severe obesity (BMI 35.0-39.9) with comorbidity  Body mass index is 37.22 kg/m². Morbid obesity complicates all aspects of disease management from diagnostic modalities to treatment. Weight loss encouraged and health benefits explained to patient.     FENG (iron deficiency anemia)  Worsening postoperatively, now stable s/p 5u pRBC this stay  No active bleeding  - trend hgb on daily cbc and transfuse as needed.    Malignant neoplasm of colon  History of recurrent colorectal adenocarcinoma  See small-bowel ischemia  Did receive adjuvant chemoradiation, followed by Dr. Frank  - f/u outpatient    Hypothyroidism with abnormal TFTs  Recent TSH elevated at 23, as per chart review elevated 2 years previous, suspect compliance issue  Repeat TSH borderline low, normal fT4  - Given current NPO status with bowel surgery continue with IV levothyroxine  - decrease IV levothyroxine from 75mcg to 50mcg daily 8/13 given tachycardia and quickly changing TFTs  - f/u fT3    COPD (chronic obstructive pulmonary disease)  No evidence of acute exacerbation   - P.r.n. ABG and chest x-ray  - Scheduled nebs      VTE Risk Mitigation (From admission, onward)         Ordered     enoxaparin injection 40 mg  Every 24 hours         07/28/23 1849     Place sequential compression device  Until discontinued         07/28/23 0724     IP VTE HIGH RISK PATIENT  Once         07/24/23 2015                Discharge Planning   DERICK:  unclear    Code Status: Full Code   Is the patient medically ready  for discharge?:     Reason for patient still in hospital (select all that apply): Patient trending condition and Consult recommendations  Discharge Plan A: Long-term acute care facility (LTAC)   Discharge Delays: None known at this time              Kevin Bryant MD  Department of Hospital Medicine   [facility]

## 2023-08-12 NOTE — CARE UPDATE
08/12/23 0800   Patient Assessment/Suction   Level of Consciousness (AVPU) alert   Respiratory Effort Normal;Unlabored   Expansion/Accessory Muscles/Retractions no use of accessory muscles   All Lung Fields Breath Sounds diminished   Rhythm/Pattern, Respiratory unlabored;pattern regular;depth regular   PRE-TX-O2   Device (Oxygen Therapy) room air   SpO2 98 %   Pulse Oximetry Type Intermittent   $ Pulse Oximetry - Multiple Charge Pulse Oximetry - Multiple   Pulse 100   Resp 16   Aerosol Therapy   $ Aerosol Therapy Charges Aerosol Treatment  (PULMICORT)   Daily Review of Necessity (SVN) completed   Respiratory Treatment Status (SVN) given   Treatment Route (SVN) mask;oxygen   Patient Position (SVN) HOB elevated   Post Treatment Assessment (SVN) breath sounds unchanged;vital signs unchanged   Signs of Intolerance (SVN) none   Education   $ Education Bronchodilator;15 min  (SATS)   Respiratory Evaluation   $ Care Plan Tech Time 15 min

## 2023-08-13 NOTE — CARE UPDATE
08/12/23 2005   Patient Assessment/Suction   Level of Consciousness (AVPU) alert   Respiratory Effort Normal   Expansion/Accessory Muscles/Retractions no use of accessory muscles   All Lung Fields Breath Sounds wheezes, expiratory   LLL Breath Sounds diminished   RLL Breath Sounds diminished   Rhythm/Pattern, Respiratory unlabored   Cough Frequency no cough   PRE-TX-O2   Device (Oxygen Therapy) room air   SpO2 95 %   Pulse Oximetry Type Intermittent   $ Pulse Oximetry - Multiple Charge Pulse Oximetry - Multiple   Pulse 101   Resp 18   Positioning HOB elevated 30 degrees   Positioning   Body Position supine   Head of Bed (HOB) Positioning HOB at 20-30 degrees   Positioning/Transfer Devices pillows;in use   Aerosol Therapy   $ Aerosol Therapy Charges Aerosol Treatment   Daily Review of Necessity (SVN) completed   Respiratory Treatment Status (SVN) given   Treatment Route (SVN) mask;oxygen   Patient Position (SVN) HOB elevated   Post Treatment Assessment (SVN) increased aeration   Signs of Intolerance (SVN) none   Breath Sounds Post-Respiratory Treatment   Throughout All Fields Post-Treatment All Fields   Throughout All Fields Post-Treatment aeration increased   Post-treatment Heart Rate (beats/min) 100   Post-treatment Resp Rate (breaths/min) 18   Incentive Spirometer   $ Incentive Spirometer Charges refused   Education   $ Education Bronchodilator;15 min

## 2023-08-13 NOTE — PLAN OF CARE
Problem: Parenteral Nutrition  Goal: Effective Intravenous Nutrition Therapy Delivery  Outcome: Ongoing, Progressing  Intervention: Optimize Intravenous Nutrition Delivery  Flowsheets (Taken 8/13/2023 1319)  Nutrition Support Management: (New PN ordered)   other (see comments)   parenteral nutrition initiated

## 2023-08-13 NOTE — ASSESSMENT & PLAN NOTE
Recent TSH elevated at 23, as per chart review elevated 2 years previous, suspect compliance issue  Repeat TSH borderline low, normal fT4  - Given current NPO status with bowel surgery continue with IV levothyroxine  - decreased IV levothyroxine from 75mcg to 50mcg daily 8/13 given tachycardia and quickly changing TFTs  - f/u fT3

## 2023-08-13 NOTE — PLAN OF CARE
Problem: Infection  Goal: Absence of Infection Signs and Symptoms  Outcome: Ongoing, Progressing     Problem: Adult Inpatient Plan of Care  Goal: Plan of Care Review  Outcome: Ongoing, Progressing  Goal: Patient-Specific Goal (Individualized)  Outcome: Ongoing, Progressing  Goal: Absence of Hospital-Acquired Illness or Injury  Outcome: Ongoing, Progressing  Goal: Optimal Comfort and Wellbeing  Outcome: Ongoing, Progressing  Goal: Readiness for Transition of Care  Outcome: Ongoing, Progressing     Problem: Adjustment to Illness (Sepsis/Septic Shock)  Goal: Optimal Coping  Outcome: Ongoing, Progressing     Problem: Bleeding (Sepsis/Septic Shock)  Goal: Absence of Bleeding  Outcome: Ongoing, Progressing     Problem: Glycemic Control Impaired (Sepsis/Septic Shock)  Goal: Blood Glucose Level Within Desired Range  Outcome: Ongoing, Progressing     Problem: Infection Progression (Sepsis/Septic Shock)  Goal: Absence of Infection Signs and Symptoms  Outcome: Ongoing, Progressing     Problem: Nutrition Impaired (Sepsis/Septic Shock)  Goal: Optimal Nutrition Intake  Outcome: Ongoing, Progressing     Problem: Fluid and Electrolyte Imbalance (Acute Kidney Injury/Impairment)  Goal: Fluid and Electrolyte Balance  Outcome: Ongoing, Progressing     Problem: Oral Intake Inadequate (Acute Kidney Injury/Impairment)  Goal: Optimal Nutrition Intake  Outcome: Ongoing, Progressing     Problem: Renal Function Impairment (Acute Kidney Injury/Impairment)  Goal: Effective Renal Function  Outcome: Ongoing, Progressing     Problem: Fall Injury Risk  Goal: Absence of Fall and Fall-Related Injury  Outcome: Ongoing, Progressing     Problem: Skin Injury Risk Increased  Goal: Skin Health and Integrity  Outcome: Ongoing, Progressing     Problem: Communication Impairment (Mechanical Ventilation, Invasive)  Goal: Effective Communication  Outcome: Ongoing, Progressing     Problem: Device-Related Complication Risk (Mechanical Ventilation,  Invasive)  Goal: Optimal Device Function  Outcome: Ongoing, Progressing     Problem: Skin and Tissue Injury (Mechanical Ventilation, Invasive)  Goal: Absence of Device-Related Skin and Tissue Injury  Outcome: Ongoing, Progressing     Problem: Ventilator-Induced Lung Injury (Mechanical Ventilation, Invasive)  Goal: Absence of Ventilator-Induced Lung Injury  Outcome: Ongoing, Progressing     Problem: Communication Impairment (Artificial Airway)  Goal: Effective Communication  Outcome: Ongoing, Progressing     Problem: Device-Related Complication Risk (Artificial Airway)  Goal: Optimal Device Function  Outcome: Ongoing, Progressing     Problem: Skin and Tissue Injury (Artificial Airway)  Goal: Absence of Device-Related Skin or Tissue Injury  Outcome: Ongoing, Progressing     Problem: Noninvasive Ventilation Acute  Goal: Effective Unassisted Ventilation and Oxygenation  Outcome: Ongoing, Progressing     Problem: Oral Intake Inadequate  Goal: Improved Oral Intake  Outcome: Ongoing, Progressing     Problem: Parenteral Nutrition  Goal: Effective Intravenous Nutrition Therapy Delivery  Outcome: Ongoing, Progressing     Problem: Fluid Deficit (Intestinal Obstruction)  Goal: Fluid Balance  Outcome: Ongoing, Progressing     Problem: Infection (Intestinal Obstruction)  Goal: Absence of Infection Signs and Symptoms  Outcome: Ongoing, Progressing      none

## 2023-08-13 NOTE — PLAN OF CARE
Problem: Infection  Goal: Absence of Infection Signs and Symptoms  Outcome: Ongoing, Progressing     Problem: Adult Inpatient Plan of Care  Goal: Plan of Care Review  Outcome: Ongoing, Progressing  Goal: Patient-Specific Goal (Individualized)  Outcome: Ongoing, Progressing  Goal: Absence of Hospital-Acquired Illness or Injury  Outcome: Ongoing, Progressing  Goal: Optimal Comfort and Wellbeing  Outcome: Ongoing, Progressing  Goal: Readiness for Transition of Care  Outcome: Ongoing, Progressing     Problem: Adjustment to Illness (Sepsis/Septic Shock)  Goal: Optimal Coping  Outcome: Ongoing, Progressing     Problem: Bleeding (Sepsis/Septic Shock)  Goal: Absence of Bleeding  Outcome: Ongoing, Progressing

## 2023-08-13 NOTE — PROGRESS NOTES
"Progress Note  Infectious Disease    Reason for Consult:  Drainage from incision site; allergy to meropenem/penicillin/flagyl    HPI: Tessa Enriquez is a 67 y.o. female , obese, BMI 37.2Kg/m2, with unfortunate past medical history of recurrent colorectal adenocarcinoma, and b/l kidney stones status post robotic colectomy in November 2020, 1 out of 46 nodules was positive.  She could not tolerate chemo and was only able to complete 3 treatments and radiation therapy.  In August of 2022 she was found to have recurrence, status post 2nd session of chemotherapy.  On repeat surveillance this year in May she had evidence of adenocarcinoma at the anastomosis site.  PET-CT on June 14th demonstrated decreased size and activity of the mass.    She was admitted on 07/24 for ex-lap, s/p 8mg dexamethasone pre-op. As per operation note: "There is evidence of catastrophic events within the abdominal cavity.  There was diffuse he ischemia noted throughout multiple portions of the small bowel.  There were multiple sharp areas of demarcation between viable bowel and ischemic bowel.  Having removed the other obvious he clean necrotic areas there was evidence of dusky and bogginess at the site of her previous jejunal jejunostomy.  Decision was made against any reestablishment of continuity.  Remaining small bowel approximately 70-75 cm left.", Surgery required assistance from Urology for b/l ureteral catheter placement.     Patient was transferred to [facility] for ICU care.  Postop course complicated by tachycardia and hypotension.  Status post blood transfusion.     During hospital stay, respiratory culture grew MRSA 7/27. The patient developed a rash, clarified with nursing, unclear if vancomycin related although infusion rate was adjusted and rash resolved.  Seems like a second rash developed yesterday after 6 days of meropenem.      Patient seen and examined at bedside, she reports she is feeling fine, NG tube " in place, she is requesting something to eat.  Stable BP, tachycardic, PureWick in place, hazy urine noted.  Ostomy with liquid output. Surgical wound with bilious drainage form mid incision. On TPN. Repeat CT scan concerning for EC fistula.     ID consult for drainage from incision site, concern for peritonitis: Allergy to meropenem, penicillin, Flagyl.    Antibiotics:  Cipro 7/24  Cipro 7/26/27  Meropenem 7/27-8/1  Fluconazole 7/28--present  Vancomycin 7/29--present     8/5/23(Vijaya) case d/w Dr. Lacey. She is alert, complains of hunger. Reports(confirmed bynursing) of copious bilious drainage from incision. Noted previously by surgery. NGT remains in place. She is drinking water for dry mouth. Sat up in bed, but reluctant to get out of bed due to pain and increased drainage. Volume loss reflected in multiple electrolyte abnormalities.  She has no fever. Multiple family members at bedside.   8/6: interim reviewed. While urine output is still ok, she has BOOM now. She may be having more ileostomy volume loss than the TPN can replace. She is drinking for pleasure and intake po is not recorded, thus making NGT output appear greater than actual. Vancomycin was stopped (8d given). She sat up in the chair for 30 min this am. She is willing to spend more time in chair. She is going to receive a unit of blood today.    8/7: Interim reviewed, discussed with Dr Lilly, patient seen and examined at bedside, RN changing dressing from anterior abdominal wound, copious amounts of biliary fluid draining, sent for cultures. She reports she feels 'fine', still tolerating ice chips and water. Will discuss with Hospitalist to have P&P see her.    8/8: Interim reviewed, patient seen and examined at bedside, seen with wound care RN and RN, impressive amount of bile draining from abdominal wound, 2 dehisced areas, colostomy bag draining enteric fluid, skin around ostomy irritated/dermatitis. NG tube removed yesterday. Labs reviewed,  pre-albumin 5, very low. P&P recs noted. Micro reviewed, cultures from surgical wound dehiscence, skin micheal no predominant organisms, pending final.     8/9: Interim reviewed, patient seen and examined at bedside, she wants to eat, feels hungry. Stable BP, afebrile. Micro updated today, GNR-non lactose , from middle wound dehiscence. Will discuss with wound care possible wound vac to help with leakage.    8/10:  Interim reviewed, patient seen examined at bedside, she continues to ask for food, remains NPO, on TPN.  Micro reviewed, cultures from surgical incision grew Acinetobacter baumannii, intermediate to Unasyn, sensitive to everything else including tetracycline.  Patient currently on Arava cyclin.       08/12/2023 Dr Louis, covering for the weekend : Tired, no new complaints, afebrile, nurse in room . There was a lot of drainage last night from lower abdominal wound and nurses had to change the dressing a few times    08/13/2023 Dr Louis, covering for the weekend :  Afebrile.  Slight increase in WBC noted, 10--10--14..  In good spirits.  No new complaints.  She can produces a lot of drainage from the lower abdominal wound.  Nurse has just changed the wound.    Antibiotics (From admission, onward)      Start     Stop Route Frequency Ordered    08/11/23 0930  eravacycline 90 mg in sodium chloride 0.9% 250 mL infusion         08/18/23 0929 IV Every 12 hours (non-standard times) 08/11/23 0820          Antifungals (From admission, onward)      Start     Stop Route Frequency Ordered    08/06/23 2100  fluconazole (DIFLUCAN) IVPB 200 mg         -- IV Every 24 hours (non-standard times) 08/06/23 1115          Antivirals (From admission, onward)      None            EXAM & DIAGNOSTICS REVIEWED:   Vitals:     Temp:  [94 °F (34.4 °C)-98.2 °F (36.8 °C)]   Temp: 98.2 °F (36.8 °C) (08/13/23 1613)  Pulse: 80 (08/13/23 1613)  Resp: 18 (08/13/23 1613)  BP: 119/67 (08/13/23 1613)  SpO2: 97 % (08/13/23  1883)    Intake/Output Summary (Last 24 hours) at 8/13/2023 1743  Last data filed at 8/13/2023 1703  Gross per 24 hour   Intake 2600 ml   Output 4020 ml   Net -1420 ml       General:  Chronically-ill appearing, obese, In NAD. Alert and attentive, cooperative, on O2 by NC 2L, tremulous  Eyes:  Anicteric, EOMI  ENT:  Moist oral mucosa, edentulous   Neck:  Supple  Lungs: Clear to auscultation b/l  Heart:  S1/S2+, regular rhythm, no murmurs  Abd:  Dressing in place not disturbed   08/13/2023-- similar to pics, there is less drainage today compared to yesterday.  Nurse has just changed the wound.  08/12/2023-- similar to pics,   8/9: Dressing in place, not disturbed. LLQ liquid green bile   8/8: Distended, LLQ colostomy with dermatitis around ostomy, leakage of enteric fluid noted. Surgical incision 2 dehisced areas (middle and inferior) draining yellow bile  8/6: Distended, JUANITA drain with bloody drainage, ostomy with large volume bilious liquid output, decreased bowel sounds, very large and fresh bandage in place, d/w nursing. Ostomy appears pink and viable d/w nursing, that she has fistulous drainage from distal (inferior) incision  :  Purewick,  Musc:  Joints without effusion, swelling,  erythema, synovitis,   Skin:  Warm, irritation and fungal rash in the left groin, nurse will apply Calmoseptine.     Wound:   Neuro:  Following commands, speech is clear, moves all extremities   Psych:  Anxious, cooperative  Lymphatic:     Extrem: No LE edema, no evidence of rash  VAD:  L PICC line 8/2/23    R Port-A-Cath not accessed, no redness noted      Isolation: Contact/MRSA  08/11/23 8/8:          8/7:        8/2:    8/5 8/1:      Right leg rash   Left leg rash    General Labs reviewed:  Recent Labs   Lab 08/11/23  0521 08/12/23  0513 08/13/23  0612   WBC 10.22 10.85 14.66*   HGB 8.2* 8.9* 8.5*   HCT 25.7* 27.9* 27.3*    329 331       Recent Labs   Lab 08/11/23  0521 08/12/23  0513 08/13/23  0612  "  * 137 138   K 3.6 3.5 4.1    109 113*   CO2 23 24 21*   BUN 48* 44* 38*   CREATININE 1.3 1.3 1.2   CALCIUM 7.0* 7.1* 7.2*   PROT  --  4.4* 4.2*   BILITOT  --  0.9 0.8   ALKPHOS  --  121 144*   ALT  --  25 39   AST  --  35 47*     No results for input(s): "CRP" in the last 168 hours.  No results for input(s): "SEDRATE" in the last 168 hours.    Estimated Creatinine Clearance: 47.5 mL/min (based on SCr of 1.2 mg/dL).     Micro:  Microbiology Results (last 7 days)       Procedure Component Value Units Date/Time    Culture, Anaerobe [[id number]] Collected: 08/07/23 0940    Order Status: Completed Specimen: Incision site from Abdomen Updated: 08/11/23 0625     Anaerobic Culture No anaerobes isolated    Aerobic culture [[id number]]  (Abnormal)  (Susceptibility) Collected: 08/07/23 0940    Order Status: Completed Specimen: Incision site from Abdomen Updated: 08/10/23 1154     Aerobic Bacterial Culture ACINETOBACTER BAUMANNII/HAEMOLYTICUS  Rare  Skin micheal also present      Urine Culture High Risk [[id number]] Collected: 08/04/23 1943    Order Status: Completed Specimen: Urine, Clean Catch Updated: 08/07/23 0710     Urine Culture, Routine No growth    Narrative:      Indicated criteria for high risk culture:->Other  Other (specify):->complicated abdomen            Imaging Reviewed:  CXRs  CT abdomen/pelvis 8/2:  1. Colonic resection with left lower abdominal quadrant colostomy, punctate foci of intra-abdominal free air and a small volume of ascites, likely related to recent postoperative state.  2. Mildly prominent gas and fluid-filled loops of small bowel containing contrast suggestive of ileus and less likely partial low-grade mechanical bowel obstruction. Consider radiographic follow-up.  3. Airspace opacity in the left greater than right posterior dependent lower lobe with air bronchograms suggestive of atelectasis and aspiration/pneumonia, with a small left and trace right pleural effusion.  4. Moderate " diffuse body wall edema (anasarca).  5. Full bladder, with mild bilateral hydroureteronephrosis and no radiopaque stones seen, consider correlation with urine output and urinalysis as warranted (noting there is a punctate focus of gas in the bladder, and a gas forming infection is not excluded).    Cardiology: EKG Qtc: 486ms    PATHOLOGY:   7/24:  1. DESIGNATED AS LYMPH NODES:   - BENIGN CYST PARTIALLY LINED BY MULLERIAN TYPE EPITHELIUM, POSSIBLE  REMNANTS OF PARATUBAL CYST.   - LYMPH NODE TISSUE WAS NOT IDENTIFIED GROSSLY OR MICROSCOPICALLY.   - NEGATIVE FOR MALIGNANCY.     2. DISTAL RECTUM, RESECTION:   - FOCUS OF MODERATELY DIFFERENTIATED ADENOCARCINOMA INVOLVING INFLAMED  AND ULCERATED RECTAL MUCOSA.   - PROXIMAL RESECTION MARGIN IS POSITIVE FOR ADENOCARCINOMA  - EXTENSIVE TRANSMURAL CHRONIC AND ACUTE INFLAMMATION WITH MUCOSAL  ULCERATION.   - TWO PERIRECTAL LYMPH NODES NEGATIVE FOR MALIGNANCY.     3. SEGMENT OF SMALL INTESTINE:   - SEROSAL ADHESIONS.   - RECENT TRANSMURAL DEFECT WITHOUT ASSOCIATE INFLAMMATION OR HEMORRHAGE.   - NEGATIVE FOR MALIGNANCY.     4. PROXIMAL COLON, RESECTION:   - INVASIVE MODERATELY DIFFERENTIATED ADENOCARCINOMA IN THE BACKGROUND OF RECTAL ULCERATION AND MARKED CHRONIC AND ACUTE INFLAMMATION.   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  Short gut volume losses contributing, cr 1.6    Malnutrition, albumin 1.6, pre-albumin 5 - on TPN    Recommendations:  Continue Eravacycline 90mg IV q12h to cover GNR and anaerobes   Fluconazole 200mg IV daily as prophylaxis  Above for at least 2 weeks, exact duration of therapy will  depend on clinical improvement   Wound care following for EC fistula/leakage  CM working on LTAC  Unfortunately, very poor prognosis   D/w patient  Will be available if needed, thank you        Medical Decision Making during this encounter was  [_] Low Complexity  [_] Moderate Complexity  [xx] High Complexity

## 2023-08-13 NOTE — PROGRESS NOTES
[facility]  Adult Nutrition   Progress Note (Nutrition Support Management)    SUMMARY     Recommendations  Recommendation/Intervention: 1. RD ordered new TPN to hang today at 2000. Run at 75ml/hr. 2. 250 ml of 20% Smoflipid to infuse at 2000, run for 12 hours. 3. RD to continue to monitor and manage nutrition support.  Goals: 1. Nutrition provision to meet at least 75% EEN/EPN.  Nutrition Goal Status: new, progressing towards goal  Communication of RD Recs: reviewed with RN    Dietitian Rounds Brief  TPN to continue. New TPN ordered. Previous bag hung late. Notified RN to continue infuse PN bag from 8/12 until 2000 tonight and discard any leftover admixture. Start new PN infusion per order at 2000.     PARENTERAL NUTRITION PROGRESS NOTE:    Parenteral Nutrition Day # 11           Diagnosis/Indication for PN: short gut syndrome with high output fistula s/p small bowel resection   IV Access: PICC  Diet/Tube Feeding: NPO         Admixture Type: 2-in-1, central   Infusion Rate and Frequency: 75 ml/hr, continuous    Patient Acuity:Acute Care      PN Composition:   96 grams Amino Acid, 252 grams Dextrose. 50 grams Lipids hung separately (Smoflipid).     Today's TPN provides 1741 kcal.     Please see order for electrolytes and additives content and adjustments.   *The Nutrition Support Team will continue to monitor electrolytes daily and adjust parenteral nutrition as warranted.    Reason for Assessment  Reason For Assessment: RD follow-up, new TPN  Relevant Medical History: COPD, hypothyroidism, rectosigmoid cancer, s/p small bowel resection  General Information Comments: Pt continues on custom TPN @ 75mL/hr providing 1560 kcals, 95gm protein.    Nutrition Risk Screen  Nutrition Risk Screen: tube feeding or parenteral nutrition     MST Score: 0  Have you recently lost weight without trying?: No  Weight loss score: 0  Have you been eating poorly because of a decreased appetite?: No  Appetite score: 0    "    Nutrition/Diet History  Spiritual, Cultural Beliefs, Yazidi Practices, Values that Affect Care: no  Food Allergies: NKFA  Factors Affecting Nutritional Intake: NPO, altered gastrointestinal function    Anthropometrics  Temp: 96.7 °F (35.9 °C)  Height Method: Stated  Height: 5' 1" (154.9 cm)  Height (inches): 61 in  Weight Method: Standard Scale  Weight: 89.4 kg (197 lb)  Weight (lb): 197 lb  Ideal Body Weight (IBW), Female: 105 lb  % Ideal Body Weight, Female (lb): 187.62 %  BMI (Calculated): 37.2  BMI Grade: 35 - 39.9 - obesity - grade II       Weight History:  Wt Readings from Last 10 Encounters:   07/24/23 89.4 kg (197 lb)   07/03/23 89.3 kg (196 lb 13.9 oz)   06/27/23 89.7 kg (197 lb 12 oz)   06/20/23 88.5 kg (195 lb 1.7 oz)   06/14/23 88.5 kg (195 lb)   06/12/23 88.5 kg (195 lb)   06/06/23 87.4 kg (192 lb 10.9 oz)   05/31/23 81.6 kg (180 lb)   05/26/23 85.7 kg (189 lb)   05/24/23 86 kg (189 lb 9.5 oz)       Lab/Procedures/Meds: Pertinent Labs Reviewed    Clinical Chemistry:  Recent Labs   Lab 08/11/23  0521 08/11/23  0521 08/12/23  0513 08/13/23  0612   *  --  137 138   K 3.6  --  3.5 4.1     --  109 113*   CO2 23  --  24 21*   *  --  104 122*   BUN 48*  --  44* 38*   CREATININE 1.3  --  1.3 1.2   CALCIUM 7.0*  --  7.1* 7.2*   PROT  --    < > 4.4* 4.2*   ALBUMIN  --    < > 1.3* 1.2*   BILITOT  --   --  0.9 0.8   ALKPHOS  --   --  121 144*   AST  --   --  35 47*   ALT  --   --  25 39   ANIONGAP 7*  --  4* 4*   MG 1.7  --  1.8 1.7   PHOS 4.1  --  3.9 4.0    < > = values in this interval not displayed.       CBC:   Recent Labs   Lab 08/13/23  0612   WBC 14.66*   RBC 3.06*   HGB 8.5*   HCT 27.3*      MCV 89   MCH 27.8   MCHC 31.1*       Lipid Panel:  Recent Labs   Lab 08/12/23  0513   TRIG 62       Thyroid & Parathyroid:  Recent Labs   Lab 08/12/23  1308   TSH 0.650   FREET4 1.18       Medications: Pertinent Medications reviewed    Scheduled Meds:   albuterol-ipratropium  3 mL " Nebulization Q6H WAKE    budesonide  0.5 mg Nebulization Q12H    chlorhexidine  15 mL Mouth/Throat BID    enoxparin  40 mg Subcutaneous Q24H (prophylaxis, 1700)    eravacycline 90 mg in sodium chloride 0.9% 250 mL infusion  90 mg Intravenous Q12H    fluconazole (DIFLUCAN) IV (PEDS and ADULTS)  200 mg Intravenous Q24H    levothyroxine  50 mcg Intravenous Before breakfast    lipid (SMOFLIPID)  250 mL Intravenous Daily    octreotide  100 mcg Subcutaneous Q8H    pantoprazole  40 mg Intravenous Daily       Continuous Infusions:   TPN ADULT CENTRAL LINE CUSTOM 75 mL/hr at 08/13/23 0621    TPN ADULT CENTRAL LINE CUSTOM         PRN Meds:.0.9%  NaCl infusion (for blood administration), 0.9%  NaCl infusion (for blood administration), acetaminophen, calcium gluconate IVPB, calcium gluconate IVPB, calcium gluconate IVPB, clonazePAM, dextrose 50%, dextrose 50%, diphenhydrAMINE, glucagon (human recombinant), glucose, glucose, hydrALAZINE, hydrALAZINE, HYDROmorphone, HYDROmorphone, HYDROmorphone, magnesium sulfate IVPB, magnesium sulfate IVPB, ondansetron, potassium chloride **AND** potassium chloride **AND** potassium chloride, Flushing PICC Protocol **AND** [DISCONTINUED] sodium chloride 0.9% **AND** sodium chloride 0.9%, sodium phosphate 15 mmol in dextrose 5 % (D5W) 250 mL IVPB, sodium phosphate 20.01 mmol in dextrose 5 % (D5W) 250 mL IVPB, sodium phosphate 30 mmol in dextrose 5 % (D5W) 250 mL IVPB, traZODone    Estimated/Assessed Needs  Weight Used For Calorie Calculations: 89.4 kg (197 lb 1.5 oz)  Energy Calorie Requirements (kcal): 7668-7659 kcal (20-25 kcal/ kg bw)  Energy Need Method: Kcal/kg  Protein Requirements: 72-96 (1.5-2.0 g/ 48 kg IBW)  Weight Used For Protein Calculations: 48 kg (105 lb 13.1 oz)     Estimated Fluid Requirement Method: RDA Method  RDA Method (mL): 1788       Nutrition Prescription Ordered  Current Diet Order: NPO  Current Nutrition Support Formula Ordered: Other (Comment) (custom PN)  Current  Nutrition Support Rate Ordered: 75 (ml)  Current Nutrition Support Frequency Ordered: ml/hr    Evaluation of Received Nutrient/Fluid Intake  Parenteral Calories (kcal): 1241  Parenteral Protein (gm): 96  Parenteral Fluid (mL): 1800  Lipid Calories (kcals): 500 kcals  Total Calories (kcal): 1741  % Kcal Needs: 97%  % Protein Needs: 100%  Energy Calories Required: meeting needs  Protein Required: meeting needs  Fluid Required: meeting needs  Tolerance: tolerating     Intake/Output Summary (Last 24 hours) at 8/13/2023 1318  Last data filed at 8/13/2023 0741  Gross per 24 hour   Intake 3050 ml   Output 3500 ml   Net -450 ml      % Intake of Estimated Energy Needs: 75 - 100 %  % Meal Intake: NPO    Nutrition Risk  Level of Risk/Frequency of Follow-up: high     Monitor and Evaluation  Food and Nutrient Intake: parenteral nutrition intake  Food and Nutrient Adminstration: enteral and parenteral nutrition administration  Physical Activity and Function: nutrition-related ADLs and IADLs, factors affecting access to physical activity  Anthropometric Measurements: weight, weight change, body mass index  Biochemical Data, Medical Tests and Procedures: electrolyte and renal panel, inflammatory profile, gastrointestinal profile, lipid profile, glucose/endocrine profile  Nutrition-Focused Physical Findings: overall appearance     Nutrition Follow-Up  RD Follow-up?: Yes    Shahnaz Nathan RD 08/13/2023 1:18 PM

## 2023-08-13 NOTE — CARE UPDATE
08/13/23 0739   Patient Assessment/Suction   Level of Consciousness (AVPU) alert   Respiratory Effort Normal;Unlabored   Expansion/Accessory Muscles/Retractions no use of accessory muscles;no retractions;expansion symmetric   All Lung Fields Breath Sounds diminished   Rhythm/Pattern, Respiratory unlabored;pattern regular;depth regular;chest wiggle adequate;no shortness of breath reported   Cough Frequency infrequent   Cough Type good   PRE-TX-O2   Device (Oxygen Therapy) nasal cannula   $ Is the patient on Low Flow Oxygen? Yes   Flow (L/min) 2   SpO2 97 %   Pulse Oximetry Type Intermittent   $ Pulse Oximetry - Multiple Charge Pulse Oximetry - Multiple   Pulse 78   Resp 18   Aerosol Therapy   $ Aerosol Therapy Charges Aerosol Treatment   Daily Review of Necessity (SVN) completed   Respiratory Treatment Status (SVN) given   Treatment Route (SVN) oxygen;mask   Patient Position (SVN) HOB elevated   Post Treatment Assessment (SVN) increased aeration   Signs of Intolerance (SVN) none   Breath Sounds Post-Respiratory Treatment   Throughout All Fields Post-Treatment All Fields   Throughout All Fields Post-Treatment aeration increased   Post-treatment Heart Rate (beats/min) 79   Post-treatment Resp Rate (breaths/min) 18   Respiratory Evaluation   $ Care Plan Tech Time 15 min

## 2023-08-13 NOTE — ASSESSMENT & PLAN NOTE
Stable, improved with decreased thyroid medicine  Multifactorial in the setting of acute medical conditions including dehydration, pain, postop, anemia, IV synthroid  - tele  - addressing above conditions

## 2023-08-13 NOTE — PROGRESS NOTES
[facility] Medicine  Progress Note    Patient Name: Tessa Enriquez  MRN: [id number]  Patient Class: IP- Inpatient   Admission Date: 7/24/2023  Length of Stay: 20 days  Attending Physician: Kevin Bryant MD  Primary Care Provider: Karma Mcdermott MD        Subjective:     Principal Problem:Ischemic necrosis of small bowel        HPI:  Ms. Enriquez is a 67-years-old female who presented as a direct admission from [facility] for ICU level of care postoperatively.  Patient with a history of recurrent colorectal adenocarcinoma, status post neoadjuvant radiation and chemotherapy, followed by oncologist Dr. Frank.  On 07/24 she underwent exploratory laparotomy with extensive lysis of adhesion, proctectomy with creation of end colostomy by Dr. Wagner and she was admitted to hospital medicine postoperatively.  Postop course complicated by ongoing sinus tachycardia with anemia, status post PRBC transfusion and acute kidney injury, seen by Nephrology.  Earlier today ostomy noted to be dusky with concern for ischemia, she was taken back to the OR and underwent exploratory laparotomy with small-bowel resection, as per OR note, large area of small bowel resected with four areas of ischemia.  She was intubated for surgery and remains intubated postoperatively.  Reported history of COPD, hypothyroidism, nephrolithiasis status post bilateral ureteral stenting.  She is currently sedated on propofol, orally intubated on mechanical ventilation.  Repeat labs have been submitted.  As per ICU RN plans for possible repeat surgery.  Plan of care reviewed with nursing.  No visitors at bedside.      Overview/Hospital Course:  Patient was transferred from [facility] for ICU care postoperatively after extensive surgery for ischemic bowel with large areas of small bowel resections done. She has resultant short gut syndrome. Also with ileostomy. She arrived intubated. She was followed by  pulmonology/CC, ID, nephrology and GS. Once extubated, she was transferred to [facility]-surg. She was on meropenem for peritonitis but only completed 5 of 7 days due to rash that developed. Allergies to PCN and flagyl. Respiratory culture grew MRSA considered to have pneumonia from this contributing to her sepsis. S/p merrem and vancomycin. Surgical site wound culture grew acinetobacter. Continued on course of eravacycline and diflucan. NGT utilized and removed 8/7. TPN utilized. Refractory electrolyte replacement given. Required intermittent packed red blood cell transfusions without active bleeding noted. 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 Wound care team followed as well. Started on octreotide in attempt to decrease fistula output. Palliative consulted and continued with full code and full treatment pursuit.  is working on eventual LTAC placement once cleared for this from surgery.      Interval History: Patient seen and examined. NAEON. No major changes.      Objective:     Vital Signs (Most Recent):  Temp: 98.2 °F (36.8 °C) (08/13/23 1613)  Pulse: 80 (08/13/23 1613)  Resp: 18 (08/13/23 1613)  BP: 119/67 (08/13/23 1613)  SpO2: 97 % (08/13/23 1613) Vital Signs (24h Range):  Temp:  [94 °F (34.4 °C)-98.2 °F (36.8 °C)] 98.2 °F (36.8 °C)  Pulse:  [] 80  Resp:  [17-23] 18  SpO2:  [92 %-100 %] 97 %  BP: (113-147)/(58-83) 119/67     Weight: 89.4 kg (197 lb)  Body mass index is 37.22 kg/m².    Intake/Output Summary (Last 24 hours) at 8/13/2023 1711  Last data filed at 8/13/2023 1703  Gross per 24 hour   Intake 2600 ml   Output 4020 ml   Net -1420 ml         Physical Exam  Constitutional:       General: She is not in acute distress.     Appearance: She is obese. She is ill-appearing.   Cardiovascular:      Rate and Rhythm: Normal rate and regular rhythm.   Pulmonary:      Effort: Pulmonary effort is normal. No respiratory distress.    Abdominal:      General: There is distension.      Tenderness: There is abdominal tenderness.      Comments: Beny drain and ostomy intact  Large abdominal dressing in place    Skin:     General: Skin is warm and dry.   Neurological:      General: No focal deficit present.      Mental Status: She is alert and oriented to person, place, and time.   Psychiatric:         Mood and Affect: Mood normal.         Behavior: Behavior normal.             Significant Labs: All pertinent labs within the past 24 hours have been reviewed.    Significant Imaging: I have reviewed all pertinent imaging results/findings within the past 24 hours.      Assessment/Plan:      * Ischemic necrosis of small bowel  OR note 7/27 with extensive area of small bowel necrosis s/p resection now  S/p NGT  - NPO per gen surg rec  - TPN  - monitor bowel function  - palliative consult: continue care and full code  - gen surg following    Goals of care, counseling/discussion  - see palliative care consult    MRSA pneumonia  - see sepsis section  - ID following    Short gut syndrome  S/p extensive small bowel resection and ileostomy creation  - gen surg following  - working on starting gattex per gen surg    Enterocutaneous fistula  At surgical site  - gen surg following and allowing to mature  - continue octreotide  - wound care    Hypocalcemia  Stable  - trend daily & replace with prn IV scale based on ionized calcium    Sinus tachycardia  Stable, improved with decreased thyroid medicine  Multifactorial in the setting of acute medical conditions including dehydration, pain, postop, anemia, IV synthroid  - tele  - addressing above conditions    Hyponatremia  Stable  - continue TPN  - trend daily on chem panel    Malnutrition of moderate degree  Low albumin and pre-albumin  Currently NPO per gen surg rec  - TPN    Severe sepsis  Resolved   Extensive area of small bowel necrosis -s/p surgical repair   Resp Cx: + MRSA  Surg wound Cx: + Acinetobacter  Urine  and blood culture negative   S/p merrem & vanc  - eravacycline and diflucan per ID    BOOM (acute kidney injury)  Returned now possibly IVVD +/- vancomycin use  Improving  DC'd vanc per ID  - renally dose meds and avoid nephrotoxins  - trend renal function daily  - continue TPN    Bilateral kidney stones  Seen by Dr. Mijares this admission, followed outpatient by Dr. Kwon   Status post bilateral stent placement  - f/u outpatient    Severe obesity (BMI 35.0-39.9) with comorbidity  Body mass index is 37.22 kg/m². Morbid obesity complicates all aspects of disease management from diagnostic modalities to treatment. Weight loss encouraged and health benefits explained to patient.     FENG (iron deficiency anemia)  Worsening postoperatively, now stable s/p 5u pRBC this stay  No active bleeding  - trend hgb on daily cbc and transfuse as needed.    Malignant neoplasm of colon  History of recurrent colorectal adenocarcinoma  See small-bowel ischemia  Did receive adjuvant chemoradiation, followed by Dr. Frank  - f/u outpatient    Hypothyroidism with abnormal TFTs  Recent TSH elevated at 23, as per chart review elevated 2 years previous, suspect compliance issue  Repeat TSH borderline low, normal fT4  - Given current NPO status with bowel surgery continue with IV levothyroxine  - decreased IV levothyroxine from 75mcg to 50mcg daily 8/13 given tachycardia and quickly changing TFTs  - f/u fT3    COPD (chronic obstructive pulmonary disease)  No evidence of acute exacerbation   - P.r.n. ABG and chest x-ray  - Scheduled nebs      VTE Risk Mitigation (From admission, onward)         Ordered     enoxaparin injection 40 mg  Every 24 hours         07/28/23 1849     Place sequential compression device  Until discontinued         07/28/23 0724     IP VTE HIGH RISK PATIENT  Once         07/24/23 2015                Discharge Planning   DERICK:  unclear    Code Status: Full Code   Is the patient medically ready for discharge?:     Reason  for patient still in hospital (select all that apply): Patient trending condition and Consult recommendations  Discharge Plan A: Long-term acute care facility (LTAC)   Discharge Delays: None known at this time              Kevin Bryant MD  Department of Hospital Medicine   Count includes the [facility]

## 2023-08-13 NOTE — SUBJECTIVE & OBJECTIVE
Interval History: Patient seen and examined. NAEON. No major changes.      Objective:     Vital Signs (Most Recent):  Temp: 98.2 °F (36.8 °C) (08/13/23 1613)  Pulse: 80 (08/13/23 1613)  Resp: 18 (08/13/23 1613)  BP: 119/67 (08/13/23 1613)  SpO2: 97 % (08/13/23 1613) Vital Signs (24h Range):  Temp:  [94 °F (34.4 °C)-98.2 °F (36.8 °C)] 98.2 °F (36.8 °C)  Pulse:  [] 80  Resp:  [17-23] 18  SpO2:  [92 %-100 %] 97 %  BP: (113-147)/(58-83) 119/67     Weight: 89.4 kg (197 lb)  Body mass index is 37.22 kg/m².    Intake/Output Summary (Last 24 hours) at 8/13/2023 1711  Last data filed at 8/13/2023 1703  Gross per 24 hour   Intake 2600 ml   Output 4020 ml   Net -1420 ml         Physical Exam  Constitutional:       General: She is not in acute distress.     Appearance: She is obese. She is ill-appearing.   Cardiovascular:      Rate and Rhythm: Normal rate and regular rhythm.   Pulmonary:      Effort: Pulmonary effort is normal. No respiratory distress.   Abdominal:      General: There is distension.      Tenderness: There is abdominal tenderness.      Comments: Beny drain and ostomy intact  Large abdominal dressing in place    Skin:     General: Skin is warm and dry.   Neurological:      General: No focal deficit present.      Mental Status: She is alert and oriented to person, place, and time.   Psychiatric:         Mood and Affect: Mood normal.         Behavior: Behavior normal.             Significant Labs: All pertinent labs within the past 24 hours have been reviewed.    Significant Imaging: I have reviewed all pertinent imaging results/findings within the past 24 hours.

## 2023-08-14 NOTE — PROGRESS NOTES
[facility] Medicine  Progress Note    Patient Name: Tessa Enriquez  MRN: [id number]  Patient Class: IP- Inpatient   Admission Date: 7/24/2023  Length of Stay: 21 days  Attending Physician: Parul Portillo MD  Primary Care Provider: Karma Mcdermott MD        Subjective:     Principal Problem:Ischemic necrosis of small bowel        HPI:  Ms. Enriquez is a 67-years-old female who presented as a direct admission from [facility] for ICU level of care postoperatively.  Patient with a history of recurrent colorectal adenocarcinoma, status post neoadjuvant radiation and chemotherapy, followed by oncologist Dr. Frank.  On 07/24 she underwent exploratory laparotomy with extensive lysis of adhesion, proctectomy with creation of end colostomy by Dr. Wagner and she was admitted to hospital medicine postoperatively.  Postop course complicated by ongoing sinus tachycardia with anemia, status post PRBC transfusion and acute kidney injury, seen by Nephrology.  Earlier today ostomy noted to be dusky with concern for ischemia, she was taken back to the OR and underwent exploratory laparotomy with small-bowel resection, as per OR note, large area of small bowel resected with four areas of ischemia.  She was intubated for surgery and remains intubated postoperatively.  Reported history of COPD, hypothyroidism, nephrolithiasis status post bilateral ureteral stenting.  She is currently sedated on propofol, orally intubated on mechanical ventilation.  Repeat labs have been submitted.  As per ICU RN plans for possible repeat surgery.  Plan of care reviewed with nursing.  No visitors at bedside.      Overview/Hospital Course:  Patient was transferred from [facility] for ICU care postoperatively after extensive surgery for ischemic bowel with large areas of small bowel resections done. She has resultant short gut syndrome. Also with ileostomy. She arrived intubated. She was followed by  pulmonology/CC, ID, nephrology and GS. Once extubated, she was transferred to [facility]-surg. She was on meropenem for peritonitis but only completed 5 of 7 days due to rash that developed. Allergies to PCN and flagyl. Respiratory culture grew MRSA considered to have pneumonia from this contributing to her sepsis. S/p merrem and vancomycin. Surgical site wound culture grew acinetobacter. Continued on course of eravacycline and diflucan. NGT utilized and removed 8/7. TPN utilized. Refractory electrolyte replacement given. Required intermittent packed red blood cell transfusions without active bleeding noted. 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 Wound care team followed as well. Started on octreotide in attempt to decrease fistula output. Palliative consulted and continued with full code and full treatment pursuit.  is working on eventual LTAC placement once cleared for this from surgery.      Interval History: Patient seen and examined. NAEON. No major changes.  Awaiting LTAC placement.      Objective:     Vital Signs (Most Recent):  Temp: 98.1 °F (36.7 °C) (08/14/23 0725)  Pulse: 98 (08/14/23 0725)  Resp: 19 (08/14/23 0901)  BP: 139/64 (08/14/23 0725)  SpO2: 99 % (08/14/23 0725) Vital Signs (24h Range):  Temp:  [96.7 °F (35.9 °C)-98.8 °F (37.1 °C)] 98.1 °F (36.7 °C)  Pulse:  [] 98  Resp:  [17-20] 19  SpO2:  [96 %-100 %] 99 %  BP: (119-148)/(58-71) 139/64     Weight: 89.4 kg (197 lb)  Body mass index is 37.22 kg/m².    Intake/Output Summary (Last 24 hours) at 8/14/2023 1049  Last data filed at 8/14/2023 0550  Gross per 24 hour   Intake --   Output 2210 ml   Net -2210 ml           Physical Exam  Constitutional:       General: She is not in acute distress.     Appearance: She is obese. She is ill-appearing.   Cardiovascular:      Rate and Rhythm: Normal rate and regular rhythm.   Pulmonary:      Effort: Pulmonary effort is normal. No  respiratory distress.   Abdominal:      General: There is distension.      Tenderness: There is abdominal tenderness.      Comments: Beny drain and ostomy intact  Large abdominal dressing in place    Skin:     General: Skin is warm and dry.   Neurological:      General: No focal deficit present.      Mental Status: She is alert and oriented to person, place, and time.   Psychiatric:         Mood and Affect: Mood normal.         Behavior: Behavior normal.             Significant Labs:   Lab Results   Component Value Date    WBC 15.86 (H) 08/14/2023    HGB 8.6 (L) 08/14/2023    HCT 27.6 (L) 08/14/2023    MCV 90 08/14/2023     08/14/2023       CMP  Sodium   Date Value Ref Range Status   08/14/2023 142 136 - 145 mmol/L Final     Potassium   Date Value Ref Range Status   08/14/2023 3.6 3.5 - 5.1 mmol/L Final     Chloride   Date Value Ref Range Status   08/14/2023 112 (H) 95 - 110 mmol/L Final     CO2   Date Value Ref Range Status   08/14/2023 25 23 - 29 mmol/L Final     Glucose   Date Value Ref Range Status   08/14/2023 152 (H) 70 - 110 mg/dL Final     BUN   Date Value Ref Range Status   08/14/2023 40 (H) 8 - 23 mg/dL Final     Creatinine   Date Value Ref Range Status   08/14/2023 1.1 0.5 - 1.4 mg/dL Final     Calcium   Date Value Ref Range Status   08/14/2023 7.1 (L) 8.7 - 10.5 mg/dL Final     Total Protein   Date Value Ref Range Status   08/14/2023 4.3 (L) 6.0 - 8.4 g/dL Final     Albumin   Date Value Ref Range Status   08/14/2023 1.3 (L) 3.5 - 5.2 g/dL Final     Total Bilirubin   Date Value Ref Range Status   08/14/2023 1.0 0.1 - 1.0 mg/dL Final     Comment:     For infants and newborns, interpretation of results should be based  on gestational age, weight and in agreement with clinical  observations.    Premature Infant recommended reference ranges:  Up to 24 hours.............<8.0 mg/dL  Up to 48 hours............<12.0 mg/dL  3-5 days..................<15.0 mg/dL  6-29 days.................<15.0 mg/dL        Alkaline Phosphatase   Date Value Ref Range Status   08/14/2023 171 (H) 55 - 135 U/L Final     AST   Date Value Ref Range Status   08/14/2023 54 (H) 10 - 40 U/L Final     ALT   Date Value Ref Range Status   08/14/2023 59 (H) 10 - 44 U/L Final     Anion Gap   Date Value Ref Range Status   08/14/2023 5 (L) 8 - 16 mmol/L Final     eGFR   Date Value Ref Range Status   08/14/2023 55.8 (A) >60 mL/min/1.73 m^2 Final     Microbiology Results (last 7 days)       Procedure Component Value Units Date/Time    Culture, Anaerobe [[id number]] Collected: 08/07/23 0940    Order Status: Completed Specimen: Incision site from Abdomen Updated: 08/11/23 0625     Anaerobic Culture No anaerobes isolated    Aerobic culture [[id number]]  (Abnormal)  (Susceptibility) Collected: 08/07/23 0940    Order Status: Completed Specimen: Incision site from Abdomen Updated: 08/10/23 1154     Aerobic Bacterial Culture ACINETOBACTER BAUMANNII/HAEMOLYTICUS  Rare  Skin micheal also present            Significant Imaging: Noted.      Assessment/Plan:      * Ischemic necrosis of small bowel  OR note 7/27 with extensive area of small bowel necrosis s/p resection now  S/p NGT  - NPO per gen surg rec  - TPN  - monitor bowel function  - palliative consult: continue care and full code  - gen surg following    Goals of care, counseling/discussion  - see palliative care consult    MRSA pneumonia  - see sepsis section  - ID following    Short gut syndrome  S/p extensive small bowel resection and ileostomy creation  - gen surg following  - working on starting gattex per gen surg    Enterocutaneous fistula  At surgical site  - gen surg following and allowing to mature  - continue octreotide  - wound care    Hypocalcemia  Stable  - trend daily & replace with prn IV scale based on ionized calcium    Sinus tachycardia  Stable, improved with decreased thyroid medicine  Multifactorial in the setting of acute medical conditions including dehydration, pain, postop, anemia,  IV synthroid  - tele  - addressing above conditions    Hyponatremia  Stable  - continue TPN  - trend daily on chem panel    Malnutrition of moderate degree  Low albumin and pre-albumin  Currently NPO per gen surg rec  - TPN    Severe sepsis  Resolved   Extensive area of small bowel necrosis -s/p surgical repair   Resp Cx: + MRSA  Surg wound Cx: + Acinetobacter  Urine and blood culture negative   S/p merrem & vanc  - eravacycline and diflucan per ID    BOOM (acute kidney injury)  Returned now possibly IVVD +/- vancomycin use  Improving  DC'd vanc per ID  - renally dose meds and avoid nephrotoxins  - trend renal function daily  - continue TPN    Bilateral kidney stones  Seen by Dr. Mijares this admission, followed outpatient by Dr. Kwon   Status post bilateral stent placement  - f/u outpatient    Severe obesity (BMI 35.0-39.9) with comorbidity  Body mass index is 37.22 kg/m². Morbid obesity complicates all aspects of disease management from diagnostic modalities to treatment. Weight loss encouraged and health benefits explained to patient.     FENG (iron deficiency anemia)  Worsening postoperatively, now stable s/p 5u pRBC this stay  No active bleeding  - trend hgb on daily cbc and transfuse as needed.    Malignant neoplasm of colon  History of recurrent colorectal adenocarcinoma  See small-bowel ischemia  Did receive adjuvant chemoradiation, followed by Dr. Frank  - f/u outpatient    Hypothyroidism with abnormal TFTs  Recent TSH elevated at 23, as per chart review elevated 2 years previous, suspect compliance issue  Repeat TSH borderline low, normal fT4  - Given current NPO status with bowel surgery continue with IV levothyroxine  - decreased IV levothyroxine from 75mcg to 50mcg daily 8/13 given tachycardia and quickly changing TFTs  - f/u fT3    COPD (chronic obstructive pulmonary disease)  No evidence of acute exacerbation   - P.r.n. ABG and chest x-ray  - Scheduled nebs    Discussed with  regarding  discharge planning, awaiting LTAC placement pending approval from Dr. Wagner.  VTE Risk Mitigation (From admission, onward)         Ordered     enoxaparin injection 40 mg  Every 24 hours         07/28/23 1849     Place sequential compression device  Until discontinued         07/28/23 0724     IP VTE HIGH RISK PATIENT  Once         07/24/23 2015                Discharge Planning   DERICK:  To be determined.    Code Status: Full Code   Is the patient medically ready for discharge?:     Reason for patient still in hospital (select all that apply): Patient trending condition and Consult recommendations  Discharge Plan A: Long-term acute care facility (LTAC)   Discharge Delays: None known at this time              Parul Portillo MD  Department of Hospital Medicine   [facility]

## 2023-08-14 NOTE — CARE UPDATE
08/14/23 0725   Patient Assessment/Suction   Level of Consciousness (AVPU) alert   Respiratory Effort Normal;Unlabored   Expansion/Accessory Muscles/Retractions no use of accessory muscles;no retractions;expansion symmetric   All Lung Fields Breath Sounds diminished   Rhythm/Pattern, Respiratory unlabored;pattern regular;depth regular   PRE-TX-O2   Device (Oxygen Therapy) nasal cannula   $ Is the patient on Low Flow Oxygen? Yes   Flow (L/min) 2  (weaned to RA)   SpO2 99 %   Pulse Oximetry Type Intermittent   $ Pulse Oximetry - Single Charge Pulse Oximetry - Single   Pulse 98   Resp 18   Temp 98.1 °F (36.7 °C)   /64   Aerosol Therapy   $ Aerosol Therapy Charges Aerosol Treatment   Daily Review of Necessity (SVN) completed   Respiratory Treatment Status (SVN) given   Treatment Route (SVN) mask;oxygen   Patient Position (SVN) HOB elevated   Post Treatment Assessment (SVN) breath sounds unchanged   Signs of Intolerance (SVN) none   Education   $ Education Bronchodilator;15 min   Respiratory Evaluation   $ Care Plan Tech Time 15 min   $ Eval/Re-eval Charges Re-evaluation

## 2023-08-14 NOTE — SUBJECTIVE & OBJECTIVE
Interval History: Patient seen and examined. NAEON. No major changes.  Awaiting LTAC placement.      Objective:     Vital Signs (Most Recent):  Temp: 98.1 °F (36.7 °C) (08/14/23 0725)  Pulse: 98 (08/14/23 0725)  Resp: 19 (08/14/23 0901)  BP: 139/64 (08/14/23 0725)  SpO2: 99 % (08/14/23 0725) Vital Signs (24h Range):  Temp:  [96.7 °F (35.9 °C)-98.8 °F (37.1 °C)] 98.1 °F (36.7 °C)  Pulse:  [] 98  Resp:  [17-20] 19  SpO2:  [96 %-100 %] 99 %  BP: (119-148)/(58-71) 139/64     Weight: 89.4 kg (197 lb)  Body mass index is 37.22 kg/m².    Intake/Output Summary (Last 24 hours) at 8/14/2023 1049  Last data filed at 8/14/2023 0550  Gross per 24 hour   Intake --   Output 2210 ml   Net -2210 ml           Physical Exam  Constitutional:       General: She is not in acute distress.     Appearance: She is obese. She is ill-appearing.   Cardiovascular:      Rate and Rhythm: Normal rate and regular rhythm.   Pulmonary:      Effort: Pulmonary effort is normal. No respiratory distress.   Abdominal:      General: There is distension.      Tenderness: There is abdominal tenderness.      Comments: Beny drain and ostomy intact  Large abdominal dressing in place    Skin:     General: Skin is warm and dry.   Neurological:      General: No focal deficit present.      Mental Status: She is alert and oriented to person, place, and time.   Psychiatric:         Mood and Affect: Mood normal.         Behavior: Behavior normal.             Significant Labs:   Lab Results   Component Value Date    WBC 15.86 (H) 08/14/2023    HGB 8.6 (L) 08/14/2023    HCT 27.6 (L) 08/14/2023    MCV 90 08/14/2023     08/14/2023       CMP  Sodium   Date Value Ref Range Status   08/14/2023 142 136 - 145 mmol/L Final     Potassium   Date Value Ref Range Status   08/14/2023 3.6 3.5 - 5.1 mmol/L Final     Chloride   Date Value Ref Range Status   08/14/2023 112 (H) 95 - 110 mmol/L Final     CO2   Date Value Ref Range Status   08/14/2023 25 23 - 29 mmol/L Final      Glucose   Date Value Ref Range Status   08/14/2023 152 (H) 70 - 110 mg/dL Final     BUN   Date Value Ref Range Status   08/14/2023 40 (H) 8 - 23 mg/dL Final     Creatinine   Date Value Ref Range Status   08/14/2023 1.1 0.5 - 1.4 mg/dL Final     Calcium   Date Value Ref Range Status   08/14/2023 7.1 (L) 8.7 - 10.5 mg/dL Final     Total Protein   Date Value Ref Range Status   08/14/2023 4.3 (L) 6.0 - 8.4 g/dL Final     Albumin   Date Value Ref Range Status   08/14/2023 1.3 (L) 3.5 - 5.2 g/dL Final     Total Bilirubin   Date Value Ref Range Status   08/14/2023 1.0 0.1 - 1.0 mg/dL Final     Comment:     For infants and newborns, interpretation of results should be based  on gestational age, weight and in agreement with clinical  observations.    Premature Infant recommended reference ranges:  Up to 24 hours.............<8.0 mg/dL  Up to 48 hours............<12.0 mg/dL  3-5 days..................<15.0 mg/dL  6-29 days.................<15.0 mg/dL       Alkaline Phosphatase   Date Value Ref Range Status   08/14/2023 171 (H) 55 - 135 U/L Final     AST   Date Value Ref Range Status   08/14/2023 54 (H) 10 - 40 U/L Final     ALT   Date Value Ref Range Status   08/14/2023 59 (H) 10 - 44 U/L Final     Anion Gap   Date Value Ref Range Status   08/14/2023 5 (L) 8 - 16 mmol/L Final     eGFR   Date Value Ref Range Status   08/14/2023 55.8 (A) >60 mL/min/1.73 m^2 Final     Microbiology Results (last 7 days)       Procedure Component Value Units Date/Time    Culture, Anaerobe [[id number]] Collected: 08/07/23 0960    Order Status: Completed Specimen: Incision site from Abdomen Updated: 08/11/23 0667     Anaerobic Culture No anaerobes isolated    Aerobic culture [[id number]]  (Abnormal)  (Susceptibility) Collected: 08/07/23 0940    Order Status: Completed Specimen: Incision site from Abdomen Updated: 08/10/23 1154     Aerobic Bacterial Culture ACINETOBACTER BAUMANNII/HAEMOLYTICUS  Rare  Skin micheal also present            Significant  Imaging: Noted.

## 2023-08-14 NOTE — CARE UPDATE
08/13/23 2015   Patient Assessment/Suction   Level of Consciousness (AVPU) alert   Respiratory Effort Normal;Unlabored   Expansion/Accessory Muscles/Retractions no use of accessory muscles   All Lung Fields Breath Sounds diminished   Rhythm/Pattern, Respiratory pattern regular;unlabored   Cough Frequency no cough   PRE-TX-O2   Device (Oxygen Therapy) nasal cannula   $ Is the patient on Low Flow Oxygen? Yes   Flow (L/min) 2   SpO2 98 %   Pulse Oximetry Type Intermittent   $ Pulse Oximetry - Multiple Charge Pulse Oximetry - Multiple   Pulse 94   Resp 18   Positioning HOB elevated 30 degrees   Aerosol Therapy   $ Aerosol Therapy Charges Aerosol Treatment   Daily Review of Necessity (SVN) completed   Respiratory Treatment Status (SVN) given   Treatment Route (SVN) mask;oxygen   Patient Position (SVN) HOB elevated   Post Treatment Assessment (SVN) breath sounds improved   Signs of Intolerance (SVN) none   Breath Sounds Post-Respiratory Treatment   Throughout All Fields Post-Treatment All Fields   Throughout All Fields Post-Treatment aeration increased   Post-treatment Heart Rate (beats/min) 92   Post-treatment Resp Rate (breaths/min) 18   Education   $ Education Bronchodilator;15 min

## 2023-08-14 NOTE — PLAN OF CARE
Problem: Parenteral Nutrition  Goal: Effective Intravenous Nutrition Therapy Delivery  Outcome: Ongoing, Progressing  Intervention: Optimize Intravenous Nutrition Delivery  Flowsheets (Taken 8/14/2023 1203)  Nutrition Support Management: parenteral nutrition continued as ordered    PARENTERAL NUTRITION PROGRESS NOTE:      Parenteral Nutrition Day # 12  Diagnosis/Indication for PN: short gut syndrome with high output fistula s/p small bowel resection   IV Access: PICC  Diet/Tube Feeding: NPO         Admixture Type: : 2-in-1 central + ILE Piggy Back   Infusion Rate and Frequency: 75 ml/hr, continuous    Patient Acuity: acute care    PN Composition:   96 grams Amino Acid, 252 grams Dextrose, and 50 grams Lipid.    Today's TPN provides 1741 kcal.    1.RD ordered new TPN to hang today at 2000. Run at 75ml/hr. 2. 250 ml of 20% Smoflipid to infuse at 2000, run for 12 hours. 3. RD to continue to monitor and manage nutrition support.    *Dextrose is started at less than full dextrose goal to reduce risk for Refeeding Syndrome. Dextrose content will be advanced as appropriate over the next few days.    Please see order for electrolytes and additives content and adjustments.   *The Nutrition Support Team will continue to monitor electrolytes daily and adjust parenteral nutrition as warranted.

## 2023-08-14 NOTE — PLAN OF CARE
[facility] and [facility] following for possible acceptance. [facility] cannot accept pt on gattex or Eravacycline due to the cost.        08/14/23 1548   Post-Acute Status   Post-Acute Authorization Placement   Post-Acute Placement Status Pending medical clearance/testing

## 2023-08-14 NOTE — PROGRESS NOTES
Pt seen and examined.  Resting comfortably.  No n/v.  Remains weak.  Little work with pt.   Having bilious output from incision consistent with persistent EC fistula    Wt Readings from Last 3 Encounters:   07/24/23 89.4 kg (197 lb)   07/03/23 89.3 kg (196 lb 13.9 oz)   06/27/23 89.7 kg (197 lb 12 oz)     Temp Readings from Last 3 Encounters:   08/14/23 97.9 °F (36.6 °C) (Oral)   07/03/23 97.5 °F (36.4 °C) (Temporal)   06/27/23 98.1 °F (36.7 °C) (Temporal)     BP Readings from Last 3 Encounters:   08/14/23 (!) 142/75   07/03/23 (!) 143/79   06/27/23 (!) 185/86     Pulse Readings from Last 3 Encounters:   08/14/23 104   07/03/23 84   06/27/23 84     AAOx3  Sinus  Soft/nd/  having fistula  Wound mgmt remains difficult    Lab Results   Component Value Date    WBC 15.86 (H) 08/14/2023    HGB 8.6 (L) 08/14/2023    HCT 27.6 (L) 08/14/2023    MCV 90 08/14/2023     08/14/2023       BMP  Lab Results   Component Value Date     08/14/2023    K 3.6 08/14/2023     (H) 08/14/2023    CO2 25 08/14/2023    BUN 40 (H) 08/14/2023    CREATININE 1.1 08/14/2023    CALCIUM 7.1 (L) 08/14/2023    ANIONGAP 5 (L) 08/14/2023    EGFRNORACEVR 55.8 (A) 08/14/2023     A/P: s/p APR, complicated by catastrophic ischemic event leading to short gut.  Pt with EC fistula  Continue supportive care and wound care.  Unfortunately not a candidate for Operative mgmt atp resent  Needs aggressive nutritional support  Gattex hopefully to be approved in next 4-5 days

## 2023-08-14 NOTE — PT/OT/SLP PROGRESS
Occupational Therapy   Treatment    Name: Tessa Enriquez  MRN: [id number]  Admitting Diagnosis:  Ischemic necrosis of small bowel  17 Days Post-Op    Recommendations:     Discharge Recommendations: rehabilitation facility, LTACH (long-term acute care hospital)  Discharge Equipment Recommendations:  other (see comments) (TBD at next level of care)  Barriers to discharge:       Assessment:     Tessa Enriquez is a 67 y.o. female with a medical diagnosis of Ischemic necrosis of small bowel.  Pt agreeable to OT therapy session this AM. Performance deficits affecting function are weakness, impaired endurance, impaired self care skills, impaired functional mobility, gait instability, impaired balance, decreased upper extremity function, decreased lower extremity function, decreased safety awareness, pain, impaired skin, edema, impaired cardiopulmonary response to activity. Upon entry, pt's colostomy bag full and increased time taken to await nursing to assist. OT assisted patient with LE ROM while waiting for nursing and to prep patient for movement. Pt required increased time for self expression and increased time to complete bed mobility this date.    Rehab Prognosis:  Fair; patient would benefit from acute skilled OT services to address these deficits and reach maximum level of function.       Plan:     Patient to be seen 5 x/week to address the above listed problems via self-care/home management, therapeutic activities, therapeutic exercises  Plan of Care Expires: 08/31/23  Plan of Care Reviewed with: patient    Subjective     Chief Complaint: none stated  Patient/Family Comments/goals: none stated  Pain/Comfort:  Pain Rating 1:  (not rated)  Location - Orientation 1: lower  Location 1: back  Pain Addressed 1: Reposition, Distraction, Cessation of Activity  Pain Rating 2:  (not rated)  Location 2: abdomen  Pain Addressed 2: Reposition, Distraction, Cessation of Activity    Objective:     Communicated  with: nursing prior to session.  Patient found HOB elevated with colostomy, JUANITA drain, PureWick, telemetry, bed alarm upon OT entry to room.    General Precautions: Standard, fall, contact    Orthopedic Precautions:N/A  Braces: N/A  Respiratory Status: Room air     Occupational Performance:     Bed Mobility:    Patient completed Supine to Sit with maximal assistance and 2 persons--moving slowly and requiring a rest break after each little movement  Pt only able to sit on EOB for ~15 seconds before needing to return to supine due to pain    Activities of Daily Living:  Feeding:  setup assistance bed level to take sips from cup of water bed level    Grooming: setup assistance bed level to wash face    Upper Body Dressing: moderate assistance to doff soiled gown and don clean gown bed level  Toileting: total assist--purewick and colostomy      Therapeutic Exercise:  AA/AROM with BLE ROM exercises to prep patient for movement. Pt tolerated well    Treatment & Education:  Pt educated on role of OT/POC, importance of OOB/EOB activity, use of call bell, and safety during ADLs, transfers, and functional mobility.    Patient left HOB elevated with all lines intact, call button in reach, and bed alarm on    GOALS:   Multidisciplinary Problems       Occupational Therapy Goals          Problem: Occupational Therapy    Goal Priority Disciplines Outcome Interventions   Occupational Therapy Goal     OT, PT/OT Ongoing, Progressing    Description: Goals to be met by: 8/31/2023     Patient will increase functional independence with ADLs by performing:    UE Dressing with Supervision.  LE Dressing with Supervision.  Grooming while standing at sink with Supervision.  Toileting from toilet with Supervision for hygiene and clothing management.   Bathing from  tub bench with Supervision.                         Time Tracking:     OT Date of Treatment: 08/14/23  OT Start Time: 1007  OT Stop Time: 1045  OT Total Time (min): 38  min    Billable Minutes:Self Care/Home Management 30  Therapeutic Exercise 8    OT/BRENNON: OT          8/14/2023

## 2023-08-14 NOTE — PROGRESS NOTES
"Progress Note  Infectious Disease    Reason for Consult:  Drainage from incision site; allergy to meropenem/penicillin/flagyl    HPI: Tessa Enriquez is a 67 y.o. female , obese, BMI 37.2Kg/m2, with unfortunate past medical history of recurrent colorectal adenocarcinoma, and b/l kidney stones status post robotic colectomy in November 2020, 1 out of 46 nodules was positive.  She could not tolerate chemo and was only able to complete 3 treatments and radiation therapy.  In August of 2022 she was found to have recurrence, status post 2nd session of chemotherapy.  On repeat surveillance this year in May she had evidence of adenocarcinoma at the anastomosis site.  PET-CT on June 14th demonstrated decreased size and activity of the mass.    She was admitted on 07/24 for ex-lap, s/p 8mg dexamethasone pre-op. As per operation note: "There is evidence of catastrophic events within the abdominal cavity.  There was diffuse he ischemia noted throughout multiple portions of the small bowel.  There were multiple sharp areas of demarcation between viable bowel and ischemic bowel.  Having removed the other obvious he clean necrotic areas there was evidence of dusky and bogginess at the site of her previous jejunal jejunostomy.  Decision was made against any reestablishment of continuity.  Remaining small bowel approximately 70-75 cm left.", Surgery required assistance from Urology for b/l ureteral catheter placement.     Patient was transferred to [facility] for ICU care.  Postop course complicated by tachycardia and hypotension.  Status post blood transfusion.     During hospital stay, respiratory culture grew MRSA 7/27. The patient developed a rash, clarified with nursing, unclear if vancomycin related although infusion rate was adjusted and rash resolved.  Seems like a second rash developed yesterday after 6 days of meropenem.      Patient seen and examined at bedside, she reports she is feeling fine, NG tube " in place, she is requesting something to eat.  Stable BP, tachycardic, PureWick in place, hazy urine noted.  Ostomy with liquid output. Surgical wound with bilious drainage form mid incision. On TPN. Repeat CT scan concerning for EC fistula.     ID consult for drainage from incision site, concern for peritonitis: Allergy to meropenem, penicillin, Flagyl.    Antibiotics:  Cipro 7/24  Cipro 7/26/27  Meropenem 7/27-8/1  Fluconazole 7/28--present  Vancomycin 7/29--present     8/5/23(Vijaya) case d/w Dr. Lacey. She is alert, complains of hunger. Reports(confirmed bynursing) of copious bilious drainage from incision. Noted previously by surgery. NGT remains in place. She is drinking water for dry mouth. Sat up in bed, but reluctant to get out of bed due to pain and increased drainage. Volume loss reflected in multiple electrolyte abnormalities.  She has no fever. Multiple family members at bedside.   8/6: interim reviewed. While urine output is still ok, she has BOOM now. She may be having more ileostomy volume loss than the TPN can replace. She is drinking for pleasure and intake po is not recorded, thus making NGT output appear greater than actual. Vancomycin was stopped (8d given). She sat up in the chair for 30 min this am. She is willing to spend more time in chair. She is going to receive a unit of blood today.    8/7: Interim reviewed, discussed with Dr Lilly, patient seen and examined at bedside, RN changing dressing from anterior abdominal wound, copious amounts of biliary fluid draining, sent for cultures. She reports she feels 'fine', still tolerating ice chips and water. Will discuss with Hospitalist to have P&P see her.    8/8: Interim reviewed, patient seen and examined at bedside, seen with wound care RN and RN, impressive amount of bile draining from abdominal wound, 2 dehisced areas, colostomy bag draining enteric fluid, skin around ostomy irritated/dermatitis. NG tube removed yesterday. Labs reviewed,  pre-albumin 5, very low. P&P recs noted. Micro reviewed, cultures from surgical wound dehiscence, skin micheal no predominant organisms, pending final.     8/9: Interim reviewed, patient seen and examined at bedside, she wants to eat, feels hungry. Stable BP, afebrile. Micro updated today, GNR-non lactose , from middle wound dehiscence. Will discuss with wound care possible wound vac to help with leakage.    8/10:  Interim reviewed, patient seen examined at bedside, she continues to ask for food, remains NPO, on TPN.  Micro reviewed, cultures from surgical incision grew Acinetobacter baumannii, intermediate to Unasyn, sensitive to everything else including tetracycline.  Patient currently on Arava cyclin.       08/12/2023 Dr Louis, covering for the weekend : Tired, no new complaints, afebrile, nurse in room . There was a lot of drainage last night from lower abdominal wound and nurses had to change the dressing a few times    08/13/2023 Dr Louis, covering for the weekend :  Afebrile.  Slight increase in WBC noted, 10--10--14..  In good spirits.  No new complaints.  She can produces a lot of drainage from the lower abdominal wound.  Nurse has just changed the wound.    8/14 (Lacey): interim reviewed, discussed with Dr Louis, patient seen and examined at bedside, she is tremulous on exam, she reports every time they change and turn her, significant amounts of drainage comes out through her ostomies.  Labs reviewed, leukocytosis 15.8, left shift 77% cannot exclude underlying prior ischemic/necrotic bowel. She has a very short amount of small bowel left.     Antibiotics (From admission, onward)      Start     Stop Route Frequency Ordered    08/11/23 0930  eravacycline 90 mg in sodium chloride 0.9% 250 mL infusion         08/18/23 0929 IV Every 12 hours (non-standard times) 08/11/23 0820          Antifungals (From admission, onward)      Start     Stop Route Frequency Ordered    08/06/23 2100  fluconazole  (DIFLUCAN) IVPB 200 mg         -- IV Every 24 hours (non-standard times) 08/06/23 1115            EXAM & DIAGNOSTICS REVIEWED:   Vitals:     Temp:  [97.9 °F (36.6 °C)-98.8 °F (37.1 °C)]   Temp: 97.9 °F (36.6 °C) (08/14/23 1148)  Pulse: 104 (08/14/23 1148)  Resp: 18 (08/14/23 1148)  BP: (!) 142/75 (08/14/23 1148)  SpO2: 99 % (08/14/23 1148)    Intake/Output Summary (Last 24 hours) at 8/14/2023 1405  Last data filed at 8/14/2023 0550  Gross per 24 hour   Intake --   Output 2210 ml   Net -2210 ml       General:  Chronically-ill appearing, obese, In NAD. Alert and attentive, cooperative, on O2 by NC 2L, tremulous  Eyes:  Anicteric, EOMI  ENT:  Moist oral mucosa, edentulous   Neck:  Supple  Lungs: Clear to auscultation b/l  Heart:  S1/S2+, regular rhythm, no murmurs  Abd:  Dressing in place not disturbed   08/13/2023-- similar to pics, there is less drainage today compared to yesterday.  Nurse has just changed the wound.  08/12/2023-- similar to pics,   8/9: Dressing in place, not disturbed. LLQ liquid green bile   8/8: Distended, LLQ colostomy with dermatitis around ostomy, leakage of enteric fluid noted. Surgical incision 2 dehisced areas (middle and inferior) draining yellow bile  8/6: Distended, JUANITA drain with bloody drainage, ostomy with large volume bilious liquid output, decreased bowel sounds, very large and fresh bandage in place, d/w nursing. Ostomy appears pink and viable d/w nursing, that she has fistulous drainage from distal (inferior) incision  :  Purewick,  Musc:  Joints without effusion, swelling,  erythema, synovitis,   Skin:  Warm, inguinal rash improved     Wound:   Neuro:  Following commands, speech is clear, moves all extremities   Psych:  Anxious, cooperative  Lymphatic:     Extrem: No LE edema, no evidence of rash  VAD:  L PICC line 8/2/23    R Port-A-Cath not accessed, no redness noted      Isolation: Contact/MRSA  08/11/23 8/8:          8/7:        8/2:    8/5 8/1:     "  Right leg rash   Left leg rash    General Labs reviewed:  Recent Labs   Lab 08/12/23  0513 08/13/23  0612 08/14/23  0851   WBC 10.85 14.66* 15.86*   HGB 8.9* 8.5* 8.6*   HCT 27.9* 27.3* 27.6*    331 297       Recent Labs   Lab 08/12/23  0513 08/13/23  0612 08/14/23  0851    138 142   K 3.5 4.1 3.6    113* 112*   CO2 24 21* 25   BUN 44* 38* 40*   CREATININE 1.3 1.2 1.1   CALCIUM 7.1* 7.2* 7.1*   PROT 4.4* 4.2* 4.3*   BILITOT 0.9 0.8 1.0   ALKPHOS 121 144* 171*   ALT 25 39 59*   AST 35 47* 54*     No results for input(s): "CRP" in the last 168 hours.  No results for input(s): "SEDRATE" in the last 168 hours.    Estimated Creatinine Clearance: 51.8 mL/min (based on SCr of 1.1 mg/dL).     Micro:  Microbiology Results (last 7 days)       Procedure Component Value Units Date/Time    Culture, Anaerobe [[id number]] Collected: 08/07/23 0940    Order Status: Completed Specimen: Incision site from Abdomen Updated: 08/11/23 0625     Anaerobic Culture No anaerobes isolated    Aerobic culture [[id number]]  (Abnormal)  (Susceptibility) Collected: 08/07/23 0940    Order Status: Completed Specimen: Incision site from Abdomen Updated: 08/10/23 1154     Aerobic Bacterial Culture ACINETOBACTER BAUMANNII/HAEMOLYTICUS  Rare  Skin micheal also present              Imaging Reviewed:  CXRs  CT abdomen/pelvis 8/2:  1. Colonic resection with left lower abdominal quadrant colostomy, punctate foci of intra-abdominal free air and a small volume of ascites, likely related to recent postoperative state.  2. Mildly prominent gas and fluid-filled loops of small bowel containing contrast suggestive of ileus and less likely partial low-grade mechanical bowel obstruction. Consider radiographic follow-up.  3. Airspace opacity in the left greater than right posterior dependent lower lobe with air bronchograms suggestive of atelectasis and aspiration/pneumonia, with a small left and trace right pleural effusion.  4. Moderate diffuse body " wall edema (anasarca).  5. Full bladder, with mild bilateral hydroureteronephrosis and no radiopaque stones seen, consider correlation with urine output and urinalysis as warranted (noting there is a punctate focus of gas in the bladder, and a gas forming infection is not excluded).    Cardiology: EKG Qtc: 486ms    PATHOLOGY:   7/24:  1. DESIGNATED AS LYMPH NODES:   - BENIGN CYST PARTIALLY LINED BY MULLERIAN TYPE EPITHELIUM, POSSIBLE  REMNANTS OF PARATUBAL CYST.   - LYMPH NODE TISSUE WAS NOT IDENTIFIED GROSSLY OR MICROSCOPICALLY.   - NEGATIVE FOR MALIGNANCY.     2. DISTAL RECTUM, RESECTION:   - FOCUS OF MODERATELY DIFFERENTIATED ADENOCARCINOMA INVOLVING INFLAMED  AND ULCERATED RECTAL MUCOSA.   - PROXIMAL RESECTION MARGIN IS POSITIVE FOR ADENOCARCINOMA  - EXTENSIVE TRANSMURAL CHRONIC AND ACUTE INFLAMMATION WITH MUCOSAL  ULCERATION.   - TWO PERIRECTAL LYMPH NODES NEGATIVE FOR MALIGNANCY.     3. SEGMENT OF SMALL INTESTINE:   - SEROSAL ADHESIONS.   - RECENT TRANSMURAL DEFECT WITHOUT ASSOCIATE INFLAMMATION OR HEMORRHAGE.   - NEGATIVE FOR MALIGNANCY.     4. PROXIMAL COLON, RESECTION:   - INVASIVE MODERATELY DIFFERENTIATED ADENOCARCINOMA IN THE BACKGROUND OF RECTAL ULCERATION AND MARKED CHRONIC AND ACUTE INFLAMMATION.   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  Short gut volume losses contributing, cr 1.6    Malnutrition, albumin 1.3, pre-albumin 5 - on TPN    Recommendations:  It is a very unfortunate situation; high output from EC fistula and wound care and Surgery are looking at options to mitigate outpt  Continue Eravacycline 90mg IV q12h to cover GNR  and anaerobes   Fluconazole 200mg IV daily as prophylaxis  Above for at least 2 more weeks, exact duration of therapy will depend on clinical improvement - EC fistula continuous to have large amounts of drainage  Antibiotics are unlikely to repair the  EC fistula   Wound care following for EC fistula/leakage  CM working on LTAC    Unfortunately, very poor prognosis     D/w patient, nursing, Dr Melton, Dr Portillo      Medical Decision Making during this encounter was  [_] Low Complexity  [_] Moderate Complexity  [xx] High Complexity

## 2023-08-14 NOTE — PHYSICIAN QUERY
PT Name: Tessa Enriquez  MR #: [id number]     DOCUMENTATION CLARIFICATION     CDS/: Zaira Bautista               Contact information:[phone] or through epic messenger  This form is a permanent document in the medical record.     Query Date: August 14, 2023    By submitting this query, we are merely seeking further clarification of documentation.  Please utilize your independent clinical judgment when addressing the question(s) below.  The Medical Record contains the following:  Indicators Supporting Clinical Findings Location in Medical Record    Acute Illness (e.g. AMI, Sepsis, etc.) Severe sepsis  This patient does not have evidence of infective focus  My overall impression is sepsis.  Source: Unknown  Antibiotics given-     Ischemic necrosis of small bowel  OR note 7/27 with extensive area of small bowel necrosis  Possible etiology for ongoing tachycardia/sepsis     Respiratory culture grew MRSA considered to have pneumonia from this contributing to her sepsis.        Clinically pt doing well with no signs of sepsis.    Hospitalist's progress note 7/26-7/27              HP 7/27 and hospitalist's progress note 7/31-8/5          Hospitalist's progress notes 8/6-8/13            Dr Garcia's progress note 8/2    Hypotension or Low Blood Pressure documented AI alert received due to hypotension. Event 7/24    Medication/Treatment  -Vasopressors  -Inotropic Drugs  -IV Fluids   -IV Antibiotics  -Cardiac Assist Devices  -Hemodynamic Monitoring  -Blood/Blood Products She is on low-dose Levophed. Anesthesiology note 7/29    Other Shock, resolved  - IV fluids and pressors d/c'ed     Dr Mcdermott'  and hospitalist's progress notes 7/30     Provider, please specify the type of shock associated with above clinical findings.  [    ] Septic Shock   [    ] Cardiogenic Shock   [    ] Hemorrhagic Shock   [    ] Hypovolemic Shock   [    ] Other Shock (please specify):        [   x ] Shock, Unspecified       Please document in  your progress notes daily for the duration of treatment until resolved and include in your discharge summary.

## 2023-08-15 NOTE — PLAN OF CARE
Problem: Parenteral Nutrition  Goal: Effective Intravenous Nutrition Therapy Delivery  Outcome: Ongoing, Progressing  Intervention: Optimize Intravenous Nutrition Delivery  Flowsheets (Taken 8/15/2023 1224)  Nutrition Support Management: parenteral nutrition continued as ordered    PARENTERAL NUTRITION PROGRESS NOTE:      Parenteral Nutrition Day # 13  Diet/Tube Feeding: NPO         Admixture Type: : 2-in-1 central + ILE Piggy Back   Infusion Rate and Frequency: 75   Patient Acuity: acute    PN Composition:   96 grams Amino Acid, 252 grams Dextrose, and 50 grams Lipid.    Today's TPN provides 1741 kcal.  *Dextrose is started at less than full dextrose goal to reduce risk for Refeeding Syndrome. Dextrose content will be advanced as appropriate over the next few days.    Please see order for electrolytes and additives content and adjustments.   *The Nutrition Support Team will continue to monitor electrolytes daily and adjust parenteral nutrition as warranted.

## 2023-08-15 NOTE — SUBJECTIVE & OBJECTIVE
Interval History: Patient seen and examined. SARAYON.  White cell count is increasing.  Currently afebrile.  LTAC placement pending.      Objective:     Vital Signs (Most Recent):  Temp: 98 °F (36.7 °C) (08/15/23 1100)  Pulse: 96 (08/15/23 1100)  Resp: 18 (08/15/23 1218)  BP: 139/66 (08/15/23 1100)  SpO2: 97 % (08/15/23 1100) Vital Signs (24h Range):  Temp:  [97.3 °F (36.3 °C)-98.7 °F (37.1 °C)] 98 °F (36.7 °C)  Pulse:  [] 96  Resp:  [17-18] 18  SpO2:  [97 %-99 %] 97 %  BP: (129-143)/(62-76) 139/66     Weight: 89.4 kg (197 lb)  Body mass index is 37.22 kg/m².    Intake/Output Summary (Last 24 hours) at 8/15/2023 1516  Last data filed at 8/15/2023 1210  Gross per 24 hour   Intake --   Output 1650 ml   Net -1650 ml           Physical Exam  Constitutional:       General: She is not in acute distress.     Appearance: She is obese. She is ill-appearing.   Cardiovascular:      Rate and Rhythm: Normal rate and regular rhythm.   Pulmonary:      Effort: Pulmonary effort is normal. No respiratory distress.   Abdominal:      General: There is distension.      Tenderness: There is abdominal tenderness.      Comments: Beny drain and ostomy intact  Large abdominal dressing in place    Skin:     General: Skin is warm and dry.   Neurological:      General: No focal deficit present.      Mental Status: She is alert and oriented to person, place, and time.   Psychiatric:         Mood and Affect: Mood normal.         Behavior: Behavior normal.             Significant Labs:   Lab Results   Component Value Date    WBC 23.19 (H) 08/15/2023    HGB 8.6 (L) 08/15/2023    HCT 27.7 (L) 08/15/2023    MCV 90 08/15/2023     08/15/2023       CMP  Sodium   Date Value Ref Range Status   08/15/2023 141 136 - 145 mmol/L Final     Potassium   Date Value Ref Range Status   08/15/2023 3.3 (L) 3.5 - 5.1 mmol/L Final     Chloride   Date Value Ref Range Status   08/15/2023 110 95 - 110 mmol/L Final     CO2   Date Value Ref Range Status    08/15/2023 26 23 - 29 mmol/L Final     Glucose   Date Value Ref Range Status   08/15/2023 106 70 - 110 mg/dL Final     BUN   Date Value Ref Range Status   08/15/2023 40 (H) 8 - 23 mg/dL Final     Creatinine   Date Value Ref Range Status   08/15/2023 1.2 0.5 - 1.4 mg/dL Final     Calcium   Date Value Ref Range Status   08/15/2023 6.9 (LL) 8.7 - 10.5 mg/dL Final     Comment:     Calcium critical result(s) called and verbal readback obtained   from Agata Mccartney RN 1E  by MS1 08/15/2023 05:31       Total Protein   Date Value Ref Range Status   08/15/2023 4.4 (L) 6.0 - 8.4 g/dL Final     Albumin   Date Value Ref Range Status   08/15/2023 1.3 (L) 3.5 - 5.2 g/dL Final     Total Bilirubin   Date Value Ref Range Status   08/15/2023 1.2 (H) 0.1 - 1.0 mg/dL Final     Comment:     For infants and newborns, interpretation of results should be based  on gestational age, weight and in agreement with clinical  observations.    Premature Infant recommended reference ranges:  Up to 24 hours.............<8.0 mg/dL  Up to 48 hours............<12.0 mg/dL  3-5 days..................<15.0 mg/dL  6-29 days.................<15.0 mg/dL       Alkaline Phosphatase   Date Value Ref Range Status   08/15/2023 217 (H) 55 - 135 U/L Final     AST   Date Value Ref Range Status   08/15/2023 71 (H) 10 - 40 U/L Final     ALT   Date Value Ref Range Status   08/15/2023 79 (H) 10 - 44 U/L Final     Anion Gap   Date Value Ref Range Status   08/15/2023 5 (L) 8 - 16 mmol/L Final     eGFR   Date Value Ref Range Status   08/15/2023 50.2 (A) >60 mL/min/1.73 m^2 Final     Microbiology Results (last 7 days)       Procedure Component Value Units Date/Time    Culture, Anaerobe [[id number]] Collected: 08/07/23 0940    Order Status: Completed Specimen: Incision site from Abdomen Updated: 08/11/23 0625     Anaerobic Culture No anaerobes isolated    Aerobic culture [[id number]]  (Abnormal)  (Susceptibility) Collected: 08/07/23 0940    Order Status: Completed Specimen:  Incision site from Abdomen Updated: 08/10/23 1154     Aerobic Bacterial Culture ACINETOBACTER BAUMANNII/HAEMOLYTICUS  Rare  Skin micheal also present            Significant Imaging: Noted.

## 2023-08-15 NOTE — CONSULTS
Continue with frequent dressing changes to abdomen incision.  Gauze, abd pads, paper tape or tegaderm.

## 2023-08-15 NOTE — PT/OT/SLP PROGRESS
Occupational Therapy      Patient Name:  Tessa Enriquez   MRN:  [id number]    Patient not seen today secondary to Nursing care, Xray. Will follow-up next scheduled service date.    8/15/2023

## 2023-08-15 NOTE — ASSESSMENT & PLAN NOTE
Resolved   Extensive area of small bowel necrosis -s/p surgical repair   Resp Cx: + MRSA  Surg wound Cx: + Acinetobacter  Urine and blood culture negative   S/p merrem & vanc  - eravacycline and diflucan per ID.  Case discussed with Dr. Lacey.  Patient to undergo CT abdomen and pelvis with contrast.  Check chest x-ray and urine analysis.

## 2023-08-15 NOTE — PT/OT/SLP PROGRESS
Physical Therapy Treatment    Patient Name:  Tessa Enriquez   MRN:  [id number]    Recommendations:     Discharge Recommendations: LTACH (long-term acute care hospital)  Discharge Equipment Recommendations: to be determined by next level of care  Barriers to discharge:  Medical status    Assessment:     Tessa Enriquez is a 67 y.o. female admitted with a medical diagnosis of Ischemic necrosis of small bowel.  She presents with the following impairments/functional limitations: weakness, impaired endurance, impaired self care skills, impaired functional mobility, gait instability, impaired balance, decreased safety awareness, impaired cardiopulmonary response to activity .    Pt found with HOB elevated and agreeable to bed exercises. Following encouragement pt was agreeable to sit EOB. Pt instructed and performed bed mobility with vc's for sequencing and max A for LE management and trunk control. Once seated EOB pt agreed to static stand trials using rw, pt was able to maintain 2 static stands for approximately 30 seconds before requiring a seated rest break due to poor endurance. Pt then instructed in 3 lateral steps to HOB with vc's for sequencing and lateral weight shifts to offload contralateral LE. Pt left with HOB elevated with RN present administering contrast for CT.    Rehab Prognosis: Fair; patient would benefit from acute skilled PT services to address these deficits and reach maximum level of function.    Recent Surgery: Procedure(s) (LRB):  LAPAROTOMY, EXPLORATORY (N/A)  EXCISION, SMALL INTESTINE  CREATION, ILEOSTOMY 18 Days Post-Op    Plan:     During this hospitalization, patient to be seen 5 x/week to address the identified rehab impairments via gait training, therapeutic activities, therapeutic exercises and progress toward the following goals:    Plan of Care Expires:  09/06/23    Subjective     Chief Complaint: None stated.  Patient/Family Comments/goals: Pt agreeable to  PT.  Pain/Comfort:  Pain Rating 1: 0/10  Pain Rating Post-Intervention 1: 0/10      Objective:     Communicated with RN prior to session.  Patient found HOB elevated with bed alarm, JUANITA drain, peripheral IV, PureWick upon PT entry to room.     General Precautions: Standard, contact, fall  Orthopedic Precautions: N/A  Braces: N/A  Respiratory Status: Room air     Functional Mobility:  Bed Mobility:     Supine to Sit: maximal assistance  Sit to Supine: maximal assistance  Transfers:     Sit to Stand:  moderate assistance with rolling walker  Gait: 3 lateral steps with rw mod A      AM-PAC 6 CLICK MOBILITY  Turning over in bed (including adjusting bedclothes, sheets and blankets)?: 2  Sitting down on and standing up from a chair with arms (e.g., wheelchair, bedside commode, etc.): 2  Moving from lying on back to sitting on the side of the bed?: 2  Moving to and from a bed to a chair (including a wheelchair)?: 2  Need to walk in hospital room?: 2  Climbing 3-5 steps with a railing?: 1  Basic Mobility Total Score: 11       Treatment & Education:  Pt was educated on the following: call light use, importance of OOB activity and functional mobility to negate the negative effects of prolonged bed rest during this hospitalization, safe transfers/ambulation and discharge planning recommendations/options.     Patient left HOB elevated with all lines intact, call button in reach, bed alarm on, and RN and family present..    GOALS:   Multidisciplinary Problems       Physical Therapy Goals          Problem: Physical Therapy    Goal Priority Disciplines Outcome Goal Variances Interventions   Physical Therapy Goal     PT, PT/OT Ongoing, Not Progressing     Description: Goals to be met by: 23     Patient will increase functional independence with mobility by performin. Supine to sit with Moderate Assistance  2. Sit to supine with Moderate Assistance  3. Sit to stand transfer with Moderate Assistance  4. Bed to chair  transfer with Moderate Assistanceusing Rolling Walker  5. Gait  x 10 feet with Moderate Assistance using Rolling Walker.                          Time Tracking:     PT Received On: 08/15/23  PT Start Time: 1153     PT Stop Time: 1213  PT Total Time (min): 20 min     Billable Minutes: Therapeutic Activity 20    Treatment Type: Treatment  PT/PTA: PTA     Number of PTA visits since last PT visit: 1     08/15/2023

## 2023-08-15 NOTE — PROGRESS NOTES
"Progress Note  Infectious Disease    Reason for Consult:  Drainage from incision site; allergy to meropenem/penicillin/flagyl    HPI: Tessa Enriquez is a 67 y.o. female , obese, BMI 37.2Kg/m2, with unfortunate past medical history of recurrent colorectal adenocarcinoma, and b/l kidney stones status post robotic colectomy in November 2020, 1 out of 46 nodules was positive.  She could not tolerate chemo and was only able to complete 3 treatments and radiation therapy.  In August of 2022 she was found to have recurrence, status post 2nd session of chemotherapy.  On repeat surveillance this year in May she had evidence of adenocarcinoma at the anastomosis site.  PET-CT on June 14th demonstrated decreased size and activity of the mass.    She was admitted on 07/24 for ex-lap, s/p 8mg dexamethasone pre-op. As per operation note: "There is evidence of catastrophic events within the abdominal cavity.  There was diffuse he ischemia noted throughout multiple portions of the small bowel.  There were multiple sharp areas of demarcation between viable bowel and ischemic bowel.  Having removed the other obvious he clean necrotic areas there was evidence of dusky and bogginess at the site of her previous jejunal jejunostomy.  Decision was made against any reestablishment of continuity.  Remaining small bowel approximately 70-75 cm left.", Surgery required assistance from Urology for b/l ureteral catheter placement.     Patient was transferred to [facility] for ICU care.  Postop course complicated by tachycardia and hypotension.  Status post blood transfusion.     During hospital stay, respiratory culture grew MRSA 7/27. The patient developed a rash, clarified with nursing, unclear if vancomycin related although infusion rate was adjusted and rash resolved.  Seems like a second rash developed yesterday after 6 days of meropenem.      Patient seen and examined at bedside, she reports she is feeling fine, NG tube " in place, she is requesting something to eat.  Stable BP, tachycardic, PureWick in place, hazy urine noted.  Ostomy with liquid output. Surgical wound with bilious drainage form mid incision. On TPN. Repeat CT scan concerning for EC fistula.     ID consult for drainage from incision site, concern for peritonitis: Allergy to meropenem, penicillin, Flagyl.    Antibiotics:  Cipro 7/24  Cipro 7/26/27  Meropenem 7/27-8/1  Fluconazole 7/28--present  Vancomycin 7/29--present     8/5/23(Vijaya) case d/w Dr. Lacey. She is alert, complains of hunger. Reports(confirmed bynursing) of copious bilious drainage from incision. Noted previously by surgery. NGT remains in place. She is drinking water for dry mouth. Sat up in bed, but reluctant to get out of bed due to pain and increased drainage. Volume loss reflected in multiple electrolyte abnormalities.  She has no fever. Multiple family members at bedside.   8/6: interim reviewed. While urine output is still ok, she has BOOM now. She may be having more ileostomy volume loss than the TPN can replace. She is drinking for pleasure and intake po is not recorded, thus making NGT output appear greater than actual. Vancomycin was stopped (8d given). She sat up in the chair for 30 min this am. She is willing to spend more time in chair. She is going to receive a unit of blood today.    8/7: Interim reviewed, discussed with Dr Lilly, patient seen and examined at bedside, RN changing dressing from anterior abdominal wound, copious amounts of biliary fluid draining, sent for cultures. She reports she feels 'fine', still tolerating ice chips and water. Will discuss with Hospitalist to have P&P see her.    8/8: Interim reviewed, patient seen and examined at bedside, seen with wound care RN and RN, impressive amount of bile draining from abdominal wound, 2 dehisced areas, colostomy bag draining enteric fluid, skin around ostomy irritated/dermatitis. NG tube removed yesterday. Labs reviewed,  pre-albumin 5, very low. P&P recs noted. Micro reviewed, cultures from surgical wound dehiscence, skin micheal no predominant organisms, pending final.     8/9: Interim reviewed, patient seen and examined at bedside, she wants to eat, feels hungry. Stable BP, afebrile. Micro updated today, GNR-non lactose , from middle wound dehiscence. Will discuss with wound care possible wound vac to help with leakage.    8/10:  Interim reviewed, patient seen examined at bedside, she continues to ask for food, remains NPO, on TPN.  Micro reviewed, cultures from surgical incision grew Acinetobacter baumannii, intermediate to Unasyn, sensitive to everything else including tetracycline.  Patient currently on Arava cyclin.       08/12/2023 Dr Louis, covering for the weekend : Tired, no new complaints, afebrile, nurse in room . There was a lot of drainage last night from lower abdominal wound and nurses had to change the dressing a few times    08/13/2023 Dr Louis, covering for the weekend :  Afebrile.  Slight increase in WBC noted, 10--10--14..  In good spirits.  No new complaints.  She can produces a lot of drainage from the lower abdominal wound.  Nurse has just changed the wound.    8/14 (Lacey): interim reviewed, discussed with Dr Louis, patient seen and examined at bedside, she is tremulous on exam, she reports every time they change and turn her, significant amounts of drainage comes out through her ostomies.  Labs reviewed, leukocytosis 15.8, left shift 77% cannot exclude underlying prior ischemic/necrotic bowel. She has a very short amount of small bowel left.     8/15: Patient seen and examined at bedside, she reports she feels okay today. Discussed with Surgeon, repeat CT scan w/ PO and IV contrast ordered.     Antibiotics (From admission, onward)      Start     Stop Route Frequency Ordered    08/11/23 0930  eravacycline 90 mg in sodium chloride 0.9% 250 mL infusion         08/18/23 0929 IV Every 12 hours  (non-standard times) 08/11/23 0820          Antifungals (From admission, onward)      Start     Stop Route Frequency Ordered    08/06/23 2100  fluconazole (DIFLUCAN) IVPB 200 mg         -- IV Every 24 hours (non-standard times) 08/06/23 1115            EXAM & DIAGNOSTICS REVIEWED:   Vitals:     Temp:  [97.3 °F (36.3 °C)-98.7 °F (37.1 °C)]   Temp: 97.6 °F (36.4 °C) (08/15/23 1554)  Pulse: 100 (08/15/23 1554)  Resp: 18 (08/15/23 1554)  BP: 129/74 (08/15/23 1554)  SpO2: 96 % (08/15/23 1554)    Intake/Output Summary (Last 24 hours) at 8/15/2023 1715  Last data filed at 8/15/2023 1210  Gross per 24 hour   Intake --   Output 1650 ml   Net -1650 ml       General:  Chronically-ill appearing, obese, In NAD. Alert and attentive, cooperative, on O2 by NC 2L, tremulous  Eyes:  Anicteric, EOMI  ENT:  Moist oral mucosa, edentulous   Neck:  Supple  Lungs: Clear to auscultation b/l  Heart:  S1/S2+, regular rhythm, no murmurs  Abd:  Dressing in place not disturbed   08/13/2023-- similar to pics, there is less drainage today compared to yesterday.  Nurse has just changed the wound.  08/12/2023-- similar to pics,   8/9: Dressing in place, not disturbed. LLQ liquid green bile   8/8: Distended, LLQ colostomy with dermatitis around ostomy, leakage of enteric fluid noted. Surgical incision 2 dehisced areas (middle and inferior) draining yellow bile  8/6: Distended, JUANITA drain with bloody drainage, ostomy with large volume bilious liquid output, decreased bowel sounds, very large and fresh bandage in place, d/w nursing. Ostomy appears pink and viable d/w nursing, that she has fistulous drainage from distal (inferior) incision  :  Purewick,  Musc:  Joints without effusion, swelling,  erythema, synovitis,   Skin:  Warm, inguinal rash improved     Wound:   Neuro:  Following commands, speech is clear, moves all extremities   Psych:  Anxious, cooperative  Lymphatic:     Extrem: No LE edema, no evidence of rash  VAD:  L PICC line 8/2/23    R  "Port-A-Cath not accessed, no redness noted      Isolation: Contact/MRSA  08/11/23 8/8:          8/7:        8/2:    8/5 8/1:      Right leg rash   Left leg rash    General Labs reviewed:  Recent Labs   Lab 08/13/23  0612 08/14/23  0851 08/15/23  0437   WBC 14.66* 15.86* 23.19*   HGB 8.5* 8.6* 8.6*   HCT 27.3* 27.6* 27.7*    297 271       Recent Labs   Lab 08/13/23  0612 08/14/23  0851 08/15/23  0437    142 141   K 4.1 3.6 3.3*   * 112* 110   CO2 21* 25 26   BUN 38* 40* 40*   CREATININE 1.2 1.1 1.2   CALCIUM 7.2* 7.1* 6.9*   PROT 4.2* 4.3* 4.4*   BILITOT 0.8 1.0 1.2*   ALKPHOS 144* 171* 217*   ALT 39 59* 79*   AST 47* 54* 71*     No results for input(s): "CRP" in the last 168 hours.  No results for input(s): "SEDRATE" in the last 168 hours.    Estimated Creatinine Clearance: 47.5 mL/min (based on SCr of 1.2 mg/dL).     Micro:  Microbiology Results (last 7 days)       Procedure Component Value Units Date/Time    Culture, Anaerobe [[id number]] Collected: 08/07/23 0940    Order Status: Completed Specimen: Incision site from Abdomen Updated: 08/11/23 0625     Anaerobic Culture No anaerobes isolated    Aerobic culture [[id number]]  (Abnormal)  (Susceptibility) Collected: 08/07/23 0940    Order Status: Completed Specimen: Incision site from Abdomen Updated: 08/10/23 1154     Aerobic Bacterial Culture ACINETOBACTER BAUMANNII/HAEMOLYTICUS  Rare  Skin micheal also present              Imaging Reviewed:  CXRs  CT abdomen/pelvis 8/15/23:  1. Persistent left pleural effusion and left lower lobe opacity suggesting atelectasis with resolution of prior right pleural effusion and right lower lobe opacities.   2. Unchanged moderate bilateral hydroureteronephrosis and mild to moderate bladder distention, with no obstructing etiology identified.   3. Postsurgical changes of the intestines as discussed above. Correlation with surgical history is requested. There are new scattered foci of " extraluminal gas in left abdomen as well as hyperdense material in left abdomen, about colostomy site, and in presacral space of concern for leaking enteric contrast. Etiology of intestinal leak/perforation is unable to be determined on the basis of this exam.   4. Nonocclusive filling defect in left common femoral vein suggesting nonocclusive DVT.     CT abdomen/pelvis 8/2:  1. Colonic resection with left lower abdominal quadrant colostomy, punctate foci of intra-abdominal free air and a small volume of ascites, likely related to recent postoperative state.  2. Mildly prominent gas and fluid-filled loops of small bowel containing contrast suggestive of ileus and less likely partial low-grade mechanical bowel obstruction. Consider radiographic follow-up.  3. Airspace opacity in the left greater than right posterior dependent lower lobe with air bronchograms suggestive of atelectasis and aspiration/pneumonia, with a small left and trace right pleural effusion.  4. Moderate diffuse body wall edema (anasarca).  5. Full bladder, with mild bilateral hydroureteronephrosis and no radiopaque stones seen, consider correlation with urine output and urinalysis as warranted (noting there is a punctate focus of gas in the bladder, and a gas forming infection is not excluded).    Cardiology: EKG Qtc: 486ms    PATHOLOGY:   7/24:  1. DESIGNATED AS LYMPH NODES:   - BENIGN CYST PARTIALLY LINED BY MULLERIAN TYPE EPITHELIUM, POSSIBLE  REMNANTS OF PARATUBAL CYST.   - LYMPH NODE TISSUE WAS NOT IDENTIFIED GROSSLY OR MICROSCOPICALLY.   - NEGATIVE FOR MALIGNANCY.     2. DISTAL RECTUM, RESECTION:   - FOCUS OF MODERATELY DIFFERENTIATED ADENOCARCINOMA INVOLVING INFLAMED  AND ULCERATED RECTAL MUCOSA.   - PROXIMAL RESECTION MARGIN IS POSITIVE FOR ADENOCARCINOMA  - EXTENSIVE TRANSMURAL CHRONIC AND ACUTE INFLAMMATION WITH MUCOSAL  ULCERATION.   - TWO PERIRECTAL LYMPH NODES NEGATIVE FOR MALIGNANCY.     3. SEGMENT OF SMALL INTESTINE:   - SEROSAL  ADHESIONS.   - RECENT TRANSMURAL DEFECT WITHOUT ASSOCIATE INFLAMMATION OR HEMORRHAGE.   - NEGATIVE FOR MALIGNANCY.     4. PROXIMAL COLON, RESECTION:   - INVASIVE MODERATELY DIFFERENTIATED ADENOCARCINOMA IN THE BACKGROUND OF RECTAL ULCERATION AND MARKED CHRONIC AND ACUTE INFLAMMATION.   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 Etiology of intestinal leak/perforation is unable to be determined on the basis of this exam.     MRSA  pneumonia, improved s/p 8 days of Vanc    B/l kidney stones, s/p b/l stents 7/24    H/o recurrent colorectal adenocarcinoma s/p chemotherapy     BOOM.  Short gut volume losses contributing, cr 1.6    Malnutrition, albumin 1.3, pre-albumin 5 - on TPN    Recommendations:  Will discuss with Surgeon CT scan findings  Continue Eravacycline 90mg IV q12h to cover GNR and anaerobes   Fluconazole 200mg IV daily as prophylaxis  Above for at least 2 more weeks, exact duration of therapy will depend on clinical improvement - EC fistula continuous to have large amounts of drainage  Antibiotics are unlikely to repair the EC fistula   Wound care following for EC fistula/leakage  CM working on LTAC    Unfortunately, very poor prognosis     D/w patient, nursing, Dr Wagner, Dr Portillo      Medical Decision Making during this encounter was  [_] Low Complexity  [_] Moderate Complexity  [xx] High Complexity

## 2023-08-15 NOTE — PROGRESS NOTES
[facility] Medicine  Progress Note    Patient Name: Tessa Enriquez  MRN: [id number]  Patient Class: IP- Inpatient   Admission Date: 7/24/2023  Length of Stay: 22 days  Attending Physician: Parul Portillo MD  Primary Care Provider: Karma Mcdermott MD        Subjective:     Principal Problem:Ischemic necrosis of small bowel        HPI:  Ms. Enriquez is a 67-years-old female who presented as a direct admission from [facility] for ICU level of care postoperatively.  Patient with a history of recurrent colorectal adenocarcinoma, status post neoadjuvant radiation and chemotherapy, followed by oncologist Dr. Frank.  On 07/24 she underwent exploratory laparotomy with extensive lysis of adhesion, proctectomy with creation of end colostomy by Dr. Wagner and she was admitted to hospital medicine postoperatively.  Postop course complicated by ongoing sinus tachycardia with anemia, status post PRBC transfusion and acute kidney injury, seen by Nephrology.  Earlier today ostomy noted to be dusky with concern for ischemia, she was taken back to the OR and underwent exploratory laparotomy with small-bowel resection, as per OR note, large area of small bowel resected with four areas of ischemia.  She was intubated for surgery and remains intubated postoperatively.  Reported history of COPD, hypothyroidism, nephrolithiasis status post bilateral ureteral stenting.  She is currently sedated on propofol, orally intubated on mechanical ventilation.  Repeat labs have been submitted.  As per ICU RN plans for possible repeat surgery.  Plan of care reviewed with nursing.  No visitors at bedside.      Overview/Hospital Course:  Patient was transferred from [facility] for ICU care postoperatively after extensive surgery for ischemic bowel with large areas of small bowel resections done. She has resultant short gut syndrome. Also with ileostomy. She arrived intubated. She was followed by  pulmonology/CC, ID, nephrology and GS. Once extubated, she was transferred to [facility]-surg. She was on meropenem for peritonitis but only completed 5 of 7 days due to rash that developed. Allergies to PCN and flagyl. Respiratory culture grew MRSA considered to have pneumonia from this contributing to her sepsis. S/p merrem and vancomycin. Surgical site wound culture grew acinetobacter. Continued on course of eravacycline and diflucan. NGT utilized and removed 8/7. TPN utilized. Refractory electrolyte replacement given. Required intermittent packed red blood cell transfusions without active bleeding noted. 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 Wound care team followed as well. Started on octreotide in attempt to decrease fistula output. Palliative consulted and continued with full code and full treatment pursuit.  is working on eventual LTAC placement once cleared for this from surgery.      Interval History: Patient seen and examined. NAEON.  White cell count is increasing.  Currently afebrile.  LTAC placement pending.      Objective:     Vital Signs (Most Recent):  Temp: 98 °F (36.7 °C) (08/15/23 1100)  Pulse: 96 (08/15/23 1100)  Resp: 18 (08/15/23 1218)  BP: 139/66 (08/15/23 1100)  SpO2: 97 % (08/15/23 1100) Vital Signs (24h Range):  Temp:  [97.3 °F (36.3 °C)-98.7 °F (37.1 °C)] 98 °F (36.7 °C)  Pulse:  [] 96  Resp:  [17-18] 18  SpO2:  [97 %-99 %] 97 %  BP: (129-143)/(62-76) 139/66     Weight: 89.4 kg (197 lb)  Body mass index is 37.22 kg/m².    Intake/Output Summary (Last 24 hours) at 8/15/2023 1516  Last data filed at 8/15/2023 1210  Gross per 24 hour   Intake --   Output 1650 ml   Net -1650 ml           Physical Exam  Constitutional:       General: She is not in acute distress.     Appearance: She is obese. She is ill-appearing.   Cardiovascular:      Rate and Rhythm: Normal rate and regular rhythm.   Pulmonary:      Effort:  Pulmonary effort is normal. No respiratory distress.   Abdominal:      General: There is distension.      Tenderness: There is abdominal tenderness.      Comments: Beny drain and ostomy intact  Large abdominal dressing in place    Skin:     General: Skin is warm and dry.   Neurological:      General: No focal deficit present.      Mental Status: She is alert and oriented to person, place, and time.   Psychiatric:         Mood and Affect: Mood normal.         Behavior: Behavior normal.             Significant Labs:   Lab Results   Component Value Date    WBC 23.19 (H) 08/15/2023    HGB 8.6 (L) 08/15/2023    HCT 27.7 (L) 08/15/2023    MCV 90 08/15/2023     08/15/2023       CMP  Sodium   Date Value Ref Range Status   08/15/2023 141 136 - 145 mmol/L Final     Potassium   Date Value Ref Range Status   08/15/2023 3.3 (L) 3.5 - 5.1 mmol/L Final     Chloride   Date Value Ref Range Status   08/15/2023 110 95 - 110 mmol/L Final     CO2   Date Value Ref Range Status   08/15/2023 26 23 - 29 mmol/L Final     Glucose   Date Value Ref Range Status   08/15/2023 106 70 - 110 mg/dL Final     BUN   Date Value Ref Range Status   08/15/2023 40 (H) 8 - 23 mg/dL Final     Creatinine   Date Value Ref Range Status   08/15/2023 1.2 0.5 - 1.4 mg/dL Final     Calcium   Date Value Ref Range Status   08/15/2023 6.9 (LL) 8.7 - 10.5 mg/dL Final     Comment:     Calcium critical result(s) called and verbal readback obtained   from Agata Mccartney RN 1E  by MS1 08/15/2023 05:31       Total Protein   Date Value Ref Range Status   08/15/2023 4.4 (L) 6.0 - 8.4 g/dL Final     Albumin   Date Value Ref Range Status   08/15/2023 1.3 (L) 3.5 - 5.2 g/dL Final     Total Bilirubin   Date Value Ref Range Status   08/15/2023 1.2 (H) 0.1 - 1.0 mg/dL Final     Comment:     For infants and newborns, interpretation of results should be based  on gestational age, weight and in agreement with clinical  observations.    Premature Infant recommended reference  ranges:  Up to 24 hours.............<8.0 mg/dL  Up to 48 hours............<12.0 mg/dL  3-5 days..................<15.0 mg/dL  6-29 days.................<15.0 mg/dL       Alkaline Phosphatase   Date Value Ref Range Status   08/15/2023 217 (H) 55 - 135 U/L Final     AST   Date Value Ref Range Status   08/15/2023 71 (H) 10 - 40 U/L Final     ALT   Date Value Ref Range Status   08/15/2023 79 (H) 10 - 44 U/L Final     Anion Gap   Date Value Ref Range Status   08/15/2023 5 (L) 8 - 16 mmol/L Final     eGFR   Date Value Ref Range Status   08/15/2023 50.2 (A) >60 mL/min/1.73 m^2 Final     Microbiology Results (last 7 days)       Procedure Component Value Units Date/Time    Culture, Anaerobe [[id number]] Collected: 08/07/23 0940    Order Status: Completed Specimen: Incision site from Abdomen Updated: 08/11/23 0625     Anaerobic Culture No anaerobes isolated    Aerobic culture [[id number]]  (Abnormal)  (Susceptibility) Collected: 08/07/23 0940    Order Status: Completed Specimen: Incision site from Abdomen Updated: 08/10/23 1154     Aerobic Bacterial Culture ACINETOBACTER BAUMANNII/HAEMOLYTICUS  Rare  Skin micheal also present            Significant Imaging: Noted.        Assessment/Plan:      * Ischemic necrosis of small bowel  OR note 7/27 with extensive area of small bowel necrosis s/p resection now  S/p NGT  - NPO per gen surg rec  - TPN  - monitor bowel function  - palliative consult: continue care and full code  - gen surg following    Goals of care, counseling/discussion  - see palliative care consult    MRSA pneumonia  - see sepsis section  - ID following    Short gut syndrome  S/p extensive small bowel resection and ileostomy creation  - gen surg following  - working on starting gattex per gen surg    Enterocutaneous fistula  At surgical site  - gen surg following and allowing to mature  - continue octreotide  - wound care    Hypocalcemia  Stable  - trend daily & replace with prn IV scale based on ionized calcium    Sinus  tachycardia  Stable, improved with decreased thyroid medicine  Multifactorial in the setting of acute medical conditions including dehydration, pain, postop, anemia, IV synthroid  - tele  - addressing above conditions    Hyponatremia  Stable  - continue TPN  - trend daily on chem panel    Malnutrition of moderate degree  Low albumin and pre-albumin  Currently NPO per gen surg rec  - TPN    Severe sepsis  Resolved   Extensive area of small bowel necrosis -s/p surgical repair   Resp Cx: + MRSA  Surg wound Cx: + Acinetobacter  Urine and blood culture negative   S/p merrem & vanc  - eravacycline and diflucan per ID.  Case discussed with Dr. Lacey.  Patient to undergo CT abdomen and pelvis with contrast.  Check chest x-ray and urine analysis.    BOOM (acute kidney injury)  Returned now possibly IVVD +/- vancomycin use  Improving  DC'd vanc per ID  - renally dose meds and avoid nephrotoxins  - trend renal function daily  - continue TPN    Bilateral kidney stones  Seen by Dr. Mijares this admission, followed outpatient by Dr. Kwon   Status post bilateral stent placement  - f/u outpatient    Severe obesity (BMI 35.0-39.9) with comorbidity  Body mass index is 37.22 kg/m². Morbid obesity complicates all aspects of disease management from diagnostic modalities to treatment. Weight loss encouraged and health benefits explained to patient.     FENG (iron deficiency anemia)  Worsening postoperatively, now stable s/p 5u pRBC this stay  No active bleeding  - trend hgb on daily cbc and transfuse as needed.    Malignant neoplasm of colon  History of recurrent colorectal adenocarcinoma  See small-bowel ischemia  Did receive adjuvant chemoradiation, followed by Dr. Frank  - f/u outpatient    Hypothyroidism with abnormal TFTs  Recent TSH elevated at 23, as per chart review elevated 2 years previous, suspect compliance issue  Repeat TSH borderline low, normal fT4  - Given current NPO status with bowel surgery continue with IV  levothyroxine  - decreased IV levothyroxine from 75mcg to 50mcg daily 8/13 given tachycardia and quickly changing TFTs  - f/u fT3    COPD (chronic obstructive pulmonary disease)  No evidence of acute exacerbation   - P.r.n. ABG and chest x-ray  - Scheduled nebs      VTE Risk Mitigation (From admission, onward)         Ordered     enoxaparin injection 40 mg  Every 24 hours         07/28/23 1849     Place sequential compression device  Until discontinued         07/28/23 0724     IP VTE HIGH RISK PATIENT  Once         07/24/23 2015                Discharge Planning   DERICK:      Code Status: Full Code   Is the patient medically ready for discharge?:     Reason for patient still in hospital (select all that apply): Patient trending condition and Consult recommendations  Discharge Plan A: Long-term acute care facility (LTAC)   Discharge Delays: None known at this time              Parul Portillo MD  Department of Hospital Medicine   [facility]

## 2023-08-15 NOTE — CARE UPDATE
08/14/23 2130   Patient Assessment/Suction   Level of Consciousness (AVPU) alert   Respiratory Effort Normal;Unlabored   Expansion/Accessory Muscles/Retractions no use of accessory muscles   PRE-TX-O2   Device (Oxygen Therapy) room air   SpO2 99 %   Pulse Oximetry Type Intermittent   $ Pulse Oximetry - Multiple Charge Pulse Oximetry - Multiple   Pulse 97   Resp 17   Respiratory Evaluation   $ Care Plan Tech Time 15 min

## 2023-08-15 NOTE — PLAN OF CARE
Abiola with [facility] following. Rev codes sent as requested along with updated clinicals. States they can administer the gattex as long as provided to their facility for whole duration. Abiola requesting frequency, duration, and dose of gattex- requested info from Dr. Wagner.    Patient not medically clear for LTAC currently due to elevated WBC.        08/15/23 4918   Post-Acute Status   Post-Acute Authorization Placement   Post-Acute Placement Status Pending post-acute provider review/more information requested

## 2023-08-15 NOTE — PROGRESS NOTES
Pt seen and examined.  Resting comfortably.  Continues with bilious drainage when rolls and ambulates  No complaints.  Leukocytosis worsening    Wt Readings from Last 3 Encounters:   07/24/23 89.4 kg (197 lb)   07/03/23 89.3 kg (196 lb 13.9 oz)   06/27/23 89.7 kg (197 lb 12 oz)     Temp Readings from Last 3 Encounters:   08/15/23 98 °F (36.7 °C) (Oral)   07/03/23 97.5 °F (36.4 °C) (Temporal)   06/27/23 98.1 °F (36.7 °C) (Temporal)     BP Readings from Last 3 Encounters:   08/15/23 139/66   07/03/23 (!) 143/79   06/27/23 (!) 185/86     Pulse Readings from Last 3 Encounters:   08/15/23 96   07/03/23 84   06/27/23 84     AAox3  Soft/nt/nd  JUANITA Not bilious output but having murky output    Lab Results   Component Value Date    WBC 23.19 (H) 08/15/2023    HGB 8.6 (L) 08/15/2023    HCT 27.7 (L) 08/15/2023    MCV 90 08/15/2023     08/15/2023       BMP  Lab Results   Component Value Date     08/15/2023    K 3.3 (L) 08/15/2023     08/15/2023    CO2 26 08/15/2023    BUN 40 (H) 08/15/2023    CREATININE 1.2 08/15/2023    CALCIUM 6.9 (LL) 08/15/2023    ANIONGAP 5 (L) 08/15/2023    EGFRNORACEVR 50.2 (A) 08/15/2023     A/P: s/p ex lap with short gut syndrome and EC Fistula    Remain strict NPO  Agree with ct scan already order  Further recommendations pending ct

## 2023-08-16 NOTE — PROGRESS NOTES
"[facility]  Adult Nutrition   Progress Note (Nutrition Support Management)    SUMMARY     Recommendations  Recommendation/Intervention:   1. RD ordered new TPN to hang today at 2000. Run at 75ml/hr.   2. 250 ml of 20% Smoflipid to infuse at 2000, run for 12 hours.   3. RD to continue to monitor and manage nutrition support.  Goals: 1. Nutrition provision to meet at least 75% EEN/EPN.  Nutrition Goal Status: progressing towards goal  Communication of RD Recs: reviewed with RN    Dietitian Rounds Brief  Pt remains NPO with TPN hanging. New bag ordered and scheduled to hang at 2000.     PARENTERAL NUTRITION PROGRESS NOTE:    Parenteral Nutrition Day # 14           Diagnosis/Indication for PN: short gut syndrome with high output fistula s/p small bowel resection   IV Access: PICC  Diet/Tube Feeding: NPO         Admixture Type: 2-in-1, central   Infusion Rate and Frequency: 75 ml/hr, continuous    Patient Acuity:Acute Care      PN Composition:   96 grams Amino Acid, 252 grams Dextrose. 50 grams Lipids hung separately (Smoflipid).     Today's TPN provides 1741 kcal.     Please see order for electrolytes and additives content and adjustments.   *The Nutrition Support Team will continue to monitor electrolytes daily and adjust parenteral nutrition as warranted.        Intake/Output Summary (Last 24 hours) at 8/16/2023 1245  Last data filed at 8/16/2023 1135  Gross per 24 hour   Intake --   Output 2710 ml   Net -2710 ml        Anthropometrics  Temp: 97.7 °F (36.5 °C)  Height Method: Stated  Height: 5' 1" (154.9 cm)  Height (inches): 61 in  Weight Method: Standard Scale  Weight: 89.4 kg (197 lb)  Weight (lb): 197 lb  Ideal Body Weight (IBW), Female: 105 lb  % Ideal Body Weight, Female (lb): 187.62 %  BMI (Calculated): 37.2  BMI Grade: 35 - 39.9 - obesity - grade II       Estimated/Assessed Needs  Weight Used For Calorie Calculations: 89.4 kg (197 lb 1.5 oz)  Energy Calorie Requirements (kcal): 1626-0062 kcal " (20-25 kcal/ kg bw)  Energy Need Method: Kcal/kg  Protein Requirements: 72-96 (1.5-2.0 g/ 48 kg IBW)  Weight Used For Protein Calculations: 48 kg (105 lb 13.1 oz)     Estimated Fluid Requirement Method: RDA Method  RDA Method (mL): 1788       Reason for Assessment  Reason For Assessment: RD follow-up  Relevant Medical History: COPD, hypothyroidism, rectosigmoid cancer, s/p small bowel resection  Interdisciplinary Rounds: did not attend  General Information Comments: Pt remains NPO with TPN for nutrition support.    Nutrition/Diet History  Spiritual, Cultural Beliefs, Evangelical Practices, Values that Affect Care: no  Food Allergies: NKFA  Factors Affecting Nutritional Intake: NPO, altered gastrointestinal function    Nutrition Risk Screen  Nutrition Risk Screen: large or nonhealing wound, burn or pressure injury     MST Score: 0  Have you recently lost weight without trying?: No  Weight loss score: 0  Have you been eating poorly because of a decreased appetite?: No  Appetite score: 0       Weight History:  Wt Readings from Last 10 Encounters:   07/24/23 89.4 kg (197 lb)   07/03/23 89.3 kg (196 lb 13.9 oz)   06/27/23 89.7 kg (197 lb 12 oz)   06/20/23 88.5 kg (195 lb 1.7 oz)   06/14/23 88.5 kg (195 lb)   06/12/23 88.5 kg (195 lb)   06/06/23 87.4 kg (192 lb 10.9 oz)   05/31/23 81.6 kg (180 lb)   05/26/23 85.7 kg (189 lb)   05/24/23 86 kg (189 lb 9.5 oz)        Lab/Procedures/Meds: Pertinent Labs/Meds Reviewed    Medications:Pertinent Medications Reviewed  Scheduled Meds:   chlorhexidine  15 mL Mouth/Throat BID    diphenoxylate-atropine 2.5-0.025 mg  1 tablet Oral QID    enoxparin  40 mg Subcutaneous Q24H (prophylaxis, 1700)    eravacycline 90 mg in sodium chloride 0.9% 250 mL infusion  90 mg Intravenous Q12H    fluconazole (DIFLUCAN) IV (PEDS and ADULTS)  200 mg Intravenous Q24H    levothyroxine  50 mcg Intravenous Before breakfast    lipid (SMOFLIPID)  250 mL Intravenous Daily    octreotide  100 mcg Subcutaneous Q8H     "pantoprazole  40 mg Intravenous Daily     Continuous Infusions:   TPN ADULT CENTRAL LINE CUSTOM 75 mL/hr at 08/15/23 2054    TPN ADULT CENTRAL LINE CUSTOM       PRN Meds:.0.9%  NaCl infusion (for blood administration), 0.9%  NaCl infusion (for blood administration), acetaminophen, calcium gluconate IVPB, calcium gluconate IVPB, calcium gluconate IVPB, clonazePAM, dextrose 50%, dextrose 50%, diphenhydrAMINE, glucagon (human recombinant), glucose, glucose, hydrALAZINE, hydrALAZINE, HYDROmorphone, HYDROmorphone, HYDROmorphone, magnesium sulfate IVPB, magnesium sulfate IVPB, ondansetron, potassium chloride **AND** potassium chloride **AND** potassium chloride, Flushing PICC Protocol **AND** [DISCONTINUED] sodium chloride 0.9% **AND** sodium chloride 0.9%, sodium phosphate 15 mmol in dextrose 5 % (D5W) 250 mL IVPB, sodium phosphate 20.01 mmol in dextrose 5 % (D5W) 250 mL IVPB, sodium phosphate 30 mmol in dextrose 5 % (D5W) 250 mL IVPB, traZODone    Labs: Pertinent Labs Reviewed  Clinical Chemistry:  Recent Labs   Lab 08/14/23  0851 08/15/23  0437 08/16/23  0425    141 142   K 3.6 3.3* 3.8   * 110 107   CO2 25 26 28   * 106 106   BUN 40* 40* 41*   CREATININE 1.1 1.2 1.2   CALCIUM 7.1* 6.9* 7.0*   PROT 4.3* 4.4* 4.3*   ALBUMIN 1.3* 1.3* 1.3*   BILITOT 1.0 1.2* 1.3*   ALKPHOS 171* 217* 241*   AST 54* 71* 83*   ALT 59* 79* 96*   ANIONGAP 5* 5* 7*   MG 2.1 1.9 2.0   PHOS 3.5 3.4 3.6     CBC:   Recent Labs   Lab 08/16/23  0425   WBC 24.56*   RBC 3.13*   HGB 8.8*   HCT 28.1*      MCV 90   MCH 28.1   MCHC 31.3*     Lipid Panel:  Recent Labs   Lab 08/12/23  0513   TRIG 62     Cardiac Profile:  No results for input(s): "BNP", "CPK", "CPKMB", "TROPONINI", "CKTOTAL" in the last 168 hours.  Inflammatory Labs:  No results for input(s): "CRP" in the last 168 hours.  Diabetes:  No results for input(s): "HGBA1C", "POCTGLUCOSE" in the last 168 hours.  Thyroid & Parathyroid:  Recent Labs   Lab 08/12/23  1308   TSH " 0.650   FREET4 1.18       Monitor and Evaluation  Food and Nutrient Intake: parenteral nutrition intake  Food and Nutrient Adminstration: enteral and parenteral nutrition administration  Physical Activity and Function: nutrition-related ADLs and IADLs, factors affecting access to physical activity  Anthropometric Measurements: height/length, weight, weight change  Biochemical Data, Medical Tests and Procedures: electrolyte and renal panel, gastrointestinal profile, glucose/endocrine profile, inflammatory profile, lipid profile  Nutrition-Focused Physical Findings: overall appearance     Nutrition Risk  Level of Risk/Frequency of Follow-up: high     Nutrition Follow-Up  RD Follow-up?: Yes      Nancy Johnson RD 08/16/2023 12:45 PM

## 2023-08-16 NOTE — PLAN OF CARE
Pt's daughter (Umu Enriquez (Relative) [phone] ([place])) informed  she will be in town Monday 8/21/2023 and requested time to tour facilities prior to Pt discharging.  informed Pt's daughter Pt is not currently medically cleared to discharge and does not have an acceptance at this time.  to update Pt's daughter when Pt is closer to discharge.       08/16/23 1045   Post-Acute Status   Post-Acute Authorization Placement   Post-Acute Placement Status Pending post-acute provider review/more information requested   Discharge Delays None known at this time   Discharge Plan   Discharge Plan A Long-term acute care facility (LTAC)   Discharge Plan B Long-term acute care facility (LTAC)

## 2023-08-16 NOTE — SUBJECTIVE & OBJECTIVE
Interval History: Patient seen and examined. SLIM.  White cell count is increasing with purulent discharge from the surgical wound.  Currently afebrile.  LTAC placement pending.      Objective:     Vital Signs (Most Recent):  Temp: 98 °F (36.7 °C) (08/16/23 0801)  Pulse: 101 (08/16/23 0801)  Resp: 17 (08/16/23 0801)  BP: 108/76 (08/16/23 0801)  SpO2: 96 % (08/16/23 0801) Vital Signs (24h Range):  Temp:  [97.6 °F (36.4 °C)-98.8 °F (37.1 °C)] 98 °F (36.7 °C)  Pulse:  [] 101  Resp:  [17-18] 17  SpO2:  [95 %-98 %] 96 %  BP: ()/(63-81) 108/76     Weight: 89.4 kg (197 lb)  Body mass index is 37.22 kg/m².    Intake/Output Summary (Last 24 hours) at 8/16/2023 0917  Last data filed at 8/16/2023 0801  Gross per 24 hour   Intake --   Output 3710 ml   Net -3710 ml           Physical Exam  Constitutional:       General: She is not in acute distress.     Appearance: She is obese. She is ill-appearing.   Cardiovascular:      Rate and Rhythm: Normal rate and regular rhythm.   Pulmonary:      Effort: Pulmonary effort is normal. No respiratory distress.   Abdominal:      General: There is distension.      Tenderness: There is abdominal tenderness.      Comments: Beny drain and ostomy intact  Large abdominal dressing in place    Skin:     General: Skin is warm and dry.   Neurological:      General: No focal deficit present.      Mental Status: She is alert and oriented to person, place, and time.   Psychiatric:         Mood and Affect: Mood normal.         Behavior: Behavior normal.             Significant Labs:   Lab Results   Component Value Date    WBC 24.56 (H) 08/16/2023    HGB 8.8 (L) 08/16/2023    HCT 28.1 (L) 08/16/2023    MCV 90 08/16/2023     08/16/2023       CMP  Sodium   Date Value Ref Range Status   08/16/2023 142 136 - 145 mmol/L Final     Potassium   Date Value Ref Range Status   08/16/2023 3.8 3.5 - 5.1 mmol/L Final     Chloride   Date Value Ref Range Status   08/16/2023 107 95 - 110 mmol/L Final      CO2   Date Value Ref Range Status   08/16/2023 28 23 - 29 mmol/L Final     Glucose   Date Value Ref Range Status   08/16/2023 106 70 - 110 mg/dL Final     BUN   Date Value Ref Range Status   08/16/2023 41 (H) 8 - 23 mg/dL Final     Creatinine   Date Value Ref Range Status   08/16/2023 1.2 0.5 - 1.4 mg/dL Final     Calcium   Date Value Ref Range Status   08/16/2023 7.0 (L) 8.7 - 10.5 mg/dL Final     Total Protein   Date Value Ref Range Status   08/16/2023 4.3 (L) 6.0 - 8.4 g/dL Final     Albumin   Date Value Ref Range Status   08/16/2023 1.3 (L) 3.5 - 5.2 g/dL Final     Total Bilirubin   Date Value Ref Range Status   08/16/2023 1.3 (H) 0.1 - 1.0 mg/dL Final     Comment:     For infants and newborns, interpretation of results should be based  on gestational age, weight and in agreement with clinical  observations.    Premature Infant recommended reference ranges:  Up to 24 hours.............<8.0 mg/dL  Up to 48 hours............<12.0 mg/dL  3-5 days..................<15.0 mg/dL  6-29 days.................<15.0 mg/dL       Alkaline Phosphatase   Date Value Ref Range Status   08/16/2023 241 (H) 55 - 135 U/L Final     AST   Date Value Ref Range Status   08/16/2023 83 (H) 10 - 40 U/L Final     ALT   Date Value Ref Range Status   08/16/2023 96 (H) 10 - 44 U/L Final     Anion Gap   Date Value Ref Range Status   08/16/2023 7 (L) 8 - 16 mmol/L Final     eGFR   Date Value Ref Range Status   08/16/2023 50.2 (A) >60 mL/min/1.73 m^2 Final     Microbiology Results (last 7 days)       Procedure Component Value Units Date/Time    Culture, Anaerobe [[id number]] Collected: 08/07/23 0940    Order Status: Completed Specimen: Incision site from Abdomen Updated: 08/11/23 0625     Anaerobic Culture No anaerobes isolated    Aerobic culture [[id number]]  (Abnormal)  (Susceptibility) Collected: 08/07/23 0940    Order Status: Completed Specimen: Incision site from Abdomen Updated: 08/10/23 1154     Aerobic Bacterial Culture ACINETOBACTER  BAUMANNII/HAEMOLYTICUS  Rare  Skin micheal also present            Significant Imaging:   CXR: Improvement of the airspace disease in the left lung base with persistent atelectasis in the right lower lobe    CT abdomen and pelvis with contrast:  1. Persistent left pleural effusion and left lower lobe opacity suggesting atelectasis with resolution of prior right pleural effusion and right lower lobe opacities.  2. Unchanged moderate bilateral hydroureteronephrosis and mild to moderate bladder distention, with no obstructing etiology identified.  3. Postsurgical changes of the intestines as discussed above. Correlation with surgical history is requested. There are new scattered foci of extraluminal gas in left abdomen as well as hyperdense material in left abdomen, about colostomy site, and in presacral space of concern for leaking enteric contrast. Etiology of intestinal leak/perforation is unable to be determined on the basis of this exam.  4. Nonocclusive filling defect in left common femoral vein suggesting nonocclusive DVT.

## 2023-08-16 NOTE — NURSING
PCT alerted me that pt BS was 31.  Pt asymptomatic, lying in bed with no complaints. PRN dextrose administered as pt is NPO.  Will re-assess blood glucose in 30 minutes. Dr. Portillo notified.

## 2023-08-16 NOTE — PT/OT/SLP PROGRESS
Physical Therapy Treatment    Patient Name:  Tessa Enriquez   MRN:  [id number]    Recommendations:     Discharge Recommendations: LTACH (long-term acute care hospital)  Discharge Equipment Recommendations: to be determined by next level of care  Barriers to discharge:  increased assist with mobility, increased burden of care, decreased activity tolerance, pain    Assessment:     Tessa Enriquez is a 67 y.o. female admitted with a medical diagnosis of Ischemic necrosis of small bowel.  She presents with the following impairments/functional limitations: weakness, impaired endurance, impaired self care skills, impaired functional mobility, gait instability, impaired balance, decreased safety awareness, impaired cardiopulmonary response to activity.    Pt with HOB elevated and visitors present. Pt declined out of bed activity and request supine LE therex only. Pt performed LE TE with verbal cuing for correct form and pacing.    Rehab Prognosis: Fair; patient would benefit from acute skilled PT services to address these deficits and reach maximum level of function.    Recent Surgery: Procedure(s) (LRB):  LAPAROTOMY, EXPLORATORY (N/A)  EXCISION, SMALL INTESTINE  CREATION, ILEOSTOMY 19 Days Post-Op    Plan:     During this hospitalization, patient to be seen 5 x/week to address the identified rehab impairments via therapeutic activities, gait training, therapeutic exercises and progress toward the following goals:    Plan of Care Expires:  09/06/23    Subjective     Chief Complaint: pt reports fatigue after exercising  Patient/Family Comments/goals: to get better  Pain/Comfort:  Pain Rating 1: 0/10      Objective:     Communicated with RN prior to session.  Patient found HOB elevated with colostomy, JUANITA drain, PureWick, peripheral IV, telemetry upon PT entry to room.     General Precautions: Standard, contact, fall  Orthopedic Precautions: N/A  Braces: N/A  Respiratory Status: Room air     Functional  Mobility:  Bed Mobility:     Scooting: maximal assistance and of 2 persons      AM-PAC 6 CLICK MOBILITY          Treatment & Education:  Pt educated on importance of time OOB, importance of intermittent mobility, safe techniques for transfers/ambulation, discharge recommendations/options, and use of call light for assistance and fall prevention.  Pt tolerated supine LE TE  2 x 10 each including SAQ, hip IR, GS, and ankle DF/PF with verbal cuing for proper form and pacing for optimal strengthening.      Patient left HOB elevated with all lines intact, call button in reach, bed alarm on, and sister and friend present..    GOALS:   Multidisciplinary Problems       Physical Therapy Goals          Problem: Physical Therapy    Goal Priority Disciplines Outcome Goal Variances Interventions   Physical Therapy Goal     PT, PT/OT Ongoing, Not Progressing     Description: Goals to be met by: 23     Patient will increase functional independence with mobility by performin. Supine to sit with Moderate Assistance  2. Sit to supine with Moderate Assistance  3. Sit to stand transfer with Moderate Assistance  4. Bed to chair transfer with Moderate Assistanceusing Rolling Walker  5. Gait  x 10 feet with Moderate Assistance using Rolling Walker.                          Time Tracking:     PT Received On: 23  PT Start Time: 1336     PT Stop Time: 1352  PT Total Time (min): 16 min     Billable Minutes: Therapeutic Exercise 16    Treatment Type: Treatment  PT/PTA: PTA     Number of PTA visits since last PT visit: 2     2023

## 2023-08-16 NOTE — ASSESSMENT & PLAN NOTE
At surgical site  - gen surg following and allowing to mature  - continue octreotide. Waiting for G[employer] approval.  - wound care

## 2023-08-16 NOTE — CONSULTS
Abdominal incision 2 areas of dehisence draining yellow drainage. cleaned with normal saline Changed dressing abdomen using gauze, and abd pad, Dr. Portillo here.   Beny drainage is brown with blood tinge.  Ostomy out put brown liquid blood tinged.   Stoma failed edges prolapsed 2-5 oclock position, pale red .  Changed pouch using large moldable ring and high flow pouch.  [id number] stoma size. 1.6cm dark red scratch on the left side of hip/buttock.

## 2023-08-16 NOTE — PT/OT/SLP PROGRESS
Occupational Therapy      Patient Name:  Tessa Enriquez   MRN:  [id number]    Attempted OT tx. Patient declined due to severe pain in low back and abdomen. Will follow-up when appropriate.    8/16/2023

## 2023-08-16 NOTE — PLAN OF CARE
Problem: Infection  Goal: Absence of Infection Signs and Symptoms  Outcome: Ongoing, Progressing     Problem: Adult Inpatient Plan of Care  Goal: Plan of Care Review  Outcome: Ongoing, Progressing  Goal: Patient-Specific Goal (Individualized)  Outcome: Ongoing, Progressing  Goal: Absence of Hospital-Acquired Illness or Injury  Outcome: Ongoing, Progressing  Goal: Optimal Comfort and Wellbeing  Outcome: Ongoing, Progressing  Goal: Readiness for Transition of Care  Outcome: Ongoing, Progressing     Problem: Adjustment to Illness (Sepsis/Septic Shock)  Goal: Optimal Coping  Outcome: Ongoing, Progressing     Problem: Bleeding (Sepsis/Septic Shock)  Goal: Absence of Bleeding  Outcome: Ongoing, Progressing     Problem: Glycemic Control Impaired (Sepsis/Septic Shock)  Goal: Blood Glucose Level Within Desired Range  Outcome: Ongoing, Progressing     Problem: Infection Progression (Sepsis/Septic Shock)  Goal: Absence of Infection Signs and Symptoms  Outcome: Ongoing, Progressing     Problem: Nutrition Impaired (Sepsis/Septic Shock)  Goal: Optimal Nutrition Intake  Outcome: Ongoing, Progressing     Problem: Fluid and Electrolyte Imbalance (Acute Kidney Injury/Impairment)  Goal: Fluid and Electrolyte Balance  Outcome: Ongoing, Progressing     Problem: Oral Intake Inadequate (Acute Kidney Injury/Impairment)  Goal: Optimal Nutrition Intake  Outcome: Ongoing, Progressing     Problem: Renal Function Impairment (Acute Kidney Injury/Impairment)  Goal: Effective Renal Function  Outcome: Ongoing, Progressing     Problem: Fall Injury Risk  Goal: Absence of Fall and Fall-Related Injury  Outcome: Ongoing, Progressing     Problem: Skin Injury Risk Increased  Goal: Skin Health and Integrity  Outcome: Ongoing, Progressing     Problem: Communication Impairment (Mechanical Ventilation, Invasive)  Goal: Effective Communication  Outcome: Ongoing, Progressing     Problem: Device-Related Complication Risk (Mechanical Ventilation,  Invasive)  Goal: Optimal Device Function  Outcome: Ongoing, Progressing     Problem: Skin and Tissue Injury (Mechanical Ventilation, Invasive)  Goal: Absence of Device-Related Skin and Tissue Injury  Outcome: Ongoing, Progressing     Problem: Ventilator-Induced Lung Injury (Mechanical Ventilation, Invasive)  Goal: Absence of Ventilator-Induced Lung Injury  Outcome: Ongoing, Progressing     Problem: Communication Impairment (Artificial Airway)  Goal: Effective Communication  Outcome: Ongoing, Progressing     Problem: Skin and Tissue Injury (Artificial Airway)  Goal: Absence of Device-Related Skin or Tissue Injury  Outcome: Ongoing, Progressing     Problem: Device-Related Complication Risk (Artificial Airway)  Goal: Optimal Device Function  Outcome: Ongoing, Progressing     Problem: Noninvasive Ventilation Acute  Goal: Effective Unassisted Ventilation and Oxygenation  Outcome: Ongoing, Progressing     Problem: Oral Intake Inadequate  Goal: Improved Oral Intake  Outcome: Ongoing, Progressing     Problem: Parenteral Nutrition  Goal: Effective Intravenous Nutrition Therapy Delivery  Outcome: Ongoing, Progressing     Problem: Fluid Deficit (Intestinal Obstruction)  Goal: Fluid Balance  Outcome: Ongoing, Progressing     Problem: Infection (Intestinal Obstruction)  Goal: Absence of Infection Signs and Symptoms  Outcome: Ongoing, Progressing     Problem: Nausea and Vomiting (Intestinal Obstruction)  Goal: Nausea and Vomiting Relief  Outcome: Ongoing, Progressing     Problem: Pain (Intestinal Obstruction)  Goal: Acceptable Pain Control  Outcome: Ongoing, Progressing     Problem: Coping Ineffective  Goal: Effective Coping  Outcome: Ongoing, Progressing

## 2023-08-16 NOTE — PROGRESS NOTES
[facility] Medicine  Progress Note    Patient Name: Tessa Enriquez  MRN: [id number]  Patient Class: IP- Inpatient   Admission Date: 7/24/2023  Length of Stay: 23 days  Attending Physician: Parul Portillo MD  Primary Care Provider: Karma Mcdermott MD        Subjective:     Principal Problem:Ischemic necrosis of small bowel        HPI:  Ms. Enriquez is a 67-years-old female who presented as a direct admission from [facility] for ICU level of care postoperatively.  Patient with a history of recurrent colorectal adenocarcinoma, status post neoadjuvant radiation and chemotherapy, followed by oncologist Dr. Frank.  On 07/24 she underwent exploratory laparotomy with extensive lysis of adhesion, proctectomy with creation of end colostomy by Dr. Wagner and she was admitted to hospital medicine postoperatively.  Postop course complicated by ongoing sinus tachycardia with anemia, status post PRBC transfusion and acute kidney injury, seen by Nephrology.  Earlier today ostomy noted to be dusky with concern for ischemia, she was taken back to the OR and underwent exploratory laparotomy with small-bowel resection, as per OR note, large area of small bowel resected with four areas of ischemia.  She was intubated for surgery and remains intubated postoperatively.  Reported history of COPD, hypothyroidism, nephrolithiasis status post bilateral ureteral stenting.  She is currently sedated on propofol, orally intubated on mechanical ventilation.  Repeat labs have been submitted.  As per ICU RN plans for possible repeat surgery.  Plan of care reviewed with nursing.  No visitors at bedside.      Overview/Hospital Course:  Patient was transferred from [facility] for ICU care postoperatively after extensive surgery for ischemic bowel with large areas of small bowel resections done. She has resultant short gut syndrome. Also with ileostomy. She arrived intubated. She was followed by  pulmonology/CC, ID, nephrology and GS. Once extubated, she was transferred to [facility]surg. She was on meropenem for peritonitis but only completed 5 of 7 days due to rash that developed. Allergies to PCN and flagyl. Respiratory culture grew MRSA considered to have pneumonia from this contributing to her sepsis. S/p merrem and vancomycin. Surgical site wound culture grew acinetobacter. Continued on course of eravacycline and diflucan. NGT utilized and removed 8/7. TPN utilized. Refractory electrolyte replacement given. Required intermittent packed red blood cell transfusions without active bleeding noted. 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 Wound care team followed as well. Started on octreotide in attempt to decrease fistula output. Palliative consulted and continued with full code and full treatment pursuit.  is working on eventual LTAC placement once cleared for this from surgery.      Interval History: Patient seen and examined. NAEON.  White cell count is increasing with purulent discharge from the surgical wound.  Currently afebrile.  LTAC placement pending.      Objective:     Vital Signs (Most Recent):  Temp: 98 °F (36.7 °C) (08/16/23 0801)  Pulse: 101 (08/16/23 0801)  Resp: 17 (08/16/23 0801)  BP: 108/76 (08/16/23 0801)  SpO2: 96 % (08/16/23 0801) Vital Signs (24h Range):  Temp:  [97.6 °F (36.4 °C)-98.8 °F (37.1 °C)] 98 °F (36.7 °C)  Pulse:  [] 101  Resp:  [17-18] 17  SpO2:  [95 %-98 %] 96 %  BP: ()/(63-81) 108/76     Weight: 89.4 kg (197 lb)  Body mass index is 37.22 kg/m².    Intake/Output Summary (Last 24 hours) at 8/16/2023 0917  Last data filed at 8/16/2023 0801  Gross per 24 hour   Intake --   Output 3710 ml   Net -3710 ml           Physical Exam  Constitutional:       General: She is not in acute distress.     Appearance: She is obese. She is ill-appearing.   Cardiovascular:      Rate and Rhythm: Normal rate  and regular rhythm.   Pulmonary:      Effort: Pulmonary effort is normal. No respiratory distress.   Abdominal:      General: There is distension.      Tenderness: There is abdominal tenderness.      Comments: Beny drain and ostomy intact  Large abdominal dressing in place    Skin:     General: Skin is warm and dry.   Neurological:      General: No focal deficit present.      Mental Status: She is alert and oriented to person, place, and time.   Psychiatric:         Mood and Affect: Mood normal.         Behavior: Behavior normal.             Significant Labs:   Lab Results   Component Value Date    WBC 24.56 (H) 08/16/2023    HGB 8.8 (L) 08/16/2023    HCT 28.1 (L) 08/16/2023    MCV 90 08/16/2023     08/16/2023       CMP  Sodium   Date Value Ref Range Status   08/16/2023 142 136 - 145 mmol/L Final     Potassium   Date Value Ref Range Status   08/16/2023 3.8 3.5 - 5.1 mmol/L Final     Chloride   Date Value Ref Range Status   08/16/2023 107 95 - 110 mmol/L Final     CO2   Date Value Ref Range Status   08/16/2023 28 23 - 29 mmol/L Final     Glucose   Date Value Ref Range Status   08/16/2023 106 70 - 110 mg/dL Final     BUN   Date Value Ref Range Status   08/16/2023 41 (H) 8 - 23 mg/dL Final     Creatinine   Date Value Ref Range Status   08/16/2023 1.2 0.5 - 1.4 mg/dL Final     Calcium   Date Value Ref Range Status   08/16/2023 7.0 (L) 8.7 - 10.5 mg/dL Final     Total Protein   Date Value Ref Range Status   08/16/2023 4.3 (L) 6.0 - 8.4 g/dL Final     Albumin   Date Value Ref Range Status   08/16/2023 1.3 (L) 3.5 - 5.2 g/dL Final     Total Bilirubin   Date Value Ref Range Status   08/16/2023 1.3 (H) 0.1 - 1.0 mg/dL Final     Comment:     For infants and newborns, interpretation of results should be based  on gestational age, weight and in agreement with clinical  observations.    Premature Infant recommended reference ranges:  Up to 24 hours.............<8.0 mg/dL  Up to 48 hours............<12.0 mg/dL  3-5  days..................<15.0 mg/dL  6-29 days.................<15.0 mg/dL       Alkaline Phosphatase   Date Value Ref Range Status   08/16/2023 241 (H) 55 - 135 U/L Final     AST   Date Value Ref Range Status   08/16/2023 83 (H) 10 - 40 U/L Final     ALT   Date Value Ref Range Status   08/16/2023 96 (H) 10 - 44 U/L Final     Anion Gap   Date Value Ref Range Status   08/16/2023 7 (L) 8 - 16 mmol/L Final     eGFR   Date Value Ref Range Status   08/16/2023 50.2 (A) >60 mL/min/1.73 m^2 Final     Microbiology Results (last 7 days)       Procedure Component Value Units Date/Time    Culture, Anaerobe [[id number]] Collected: 08/07/23 0940    Order Status: Completed Specimen: Incision site from Abdomen Updated: 08/11/23 0625     Anaerobic Culture No anaerobes isolated    Aerobic culture [[id number]]  (Abnormal)  (Susceptibility) Collected: 08/07/23 0940    Order Status: Completed Specimen: Incision site from Abdomen Updated: 08/10/23 1154     Aerobic Bacterial Culture ACINETOBACTER BAUMANNII/HAEMOLYTICUS  Rare  Skin micheal also present            Significant Imaging:   CXR: Improvement of the airspace disease in the left lung base with persistent atelectasis in the right lower lobe    CT abdomen and pelvis with contrast:  1. Persistent left pleural effusion and left lower lobe opacity suggesting atelectasis with resolution of prior right pleural effusion and right lower lobe opacities.  2. Unchanged moderate bilateral hydroureteronephrosis and mild to moderate bladder distention, with no obstructing etiology identified.  3. Postsurgical changes of the intestines as discussed above. Correlation with surgical history is requested. There are new scattered foci of extraluminal gas in left abdomen as well as hyperdense material in left abdomen, about colostomy site, and in presacral space of concern for leaking enteric contrast. Etiology of intestinal leak/perforation is unable to be determined on the basis of this exam.  4.  Nonocclusive filling defect in left common femoral vein suggesting nonocclusive DVT.      Assessment/Plan:      * Ischemic necrosis of small bowel  OR note 7/27 with extensive area of small bowel necrosis s/p resection now  S/p NGT  - NPO per gen surg rec  - TPN  - monitor bowel function  - palliative consult: continue care and full code  - gen surg following    Goals of care, counseling/discussion  - see palliative care consult    MRSA pneumonia  - see sepsis section  - ID following    Short gut syndrome  S/p extensive small bowel resection and ileostomy creation  - gen surg following  - working on starting gattex per gen surg    Enterocutaneous fistula  At surgical site  - gen surg following and allowing to mature  - continue octreotide. Waiting for Gattis approval.  - wound care    Hypocalcemia  Stable  - trend daily & replace with prn IV scale based on ionized calcium    Sinus tachycardia  Stable, improved with decreased thyroid medicine  Multifactorial in the setting of acute medical conditions including dehydration, pain, postop, anemia, IV synthroid  - tele  - addressing above conditions    Hyponatremia  Stable  - continue TPN  - trend daily on chem panel    Malnutrition of moderate degree  Low albumin and pre-albumin  Currently NPO per gen surg rec  - TPN    Severe sepsis  Resolved   Extensive area of small bowel necrosis -s/p surgical repair   Resp Cx: + MRSA  Surg wound Cx: + Acinetobacter  Urine and blood culture negative   S/p merrem & vanc  - eravacycline and diflucan per ID.  Case discussed with Dr. Lacey.  Patient to undergo CT abdomen and pelvis with contrast.  Check chest x-ray and urine analysis.    BOOM (acute kidney injury)  Returned now possibly IVVD +/- vancomycin use  Improving  DC'd vanc per ID  - renally dose meds and avoid nephrotoxins  - trend renal function daily  - continue TPN    Bilateral kidney stones  Seen by Dr. Mijares this admission, followed outpatient by Dr. Kwon   Status  post bilateral stent placement  - f/u outpatient    Severe obesity (BMI 35.0-39.9) with comorbidity  Body mass index is 37.22 kg/m². Morbid obesity complicates all aspects of disease management from diagnostic modalities to treatment. Weight loss encouraged and health benefits explained to patient.     FENG (iron deficiency anemia)  Worsening postoperatively, now stable s/p 5u pRBC this stay  No active bleeding  - trend hgb on daily cbc and transfuse as needed.    Malignant neoplasm of colon  History of recurrent colorectal adenocarcinoma  See small-bowel ischemia  Did receive adjuvant chemoradiation, followed by Dr. Frank  - f/u outpatient    Hypothyroidism with abnormal TFTs  Recent TSH elevated at 23, as per chart review elevated 2 years previous, suspect compliance issue  Repeat TSH borderline low, normal fT4  - Given current NPO status with bowel surgery continue with IV levothyroxine  - decreased IV levothyroxine from 75mcg to 50mcg daily 8/13 given tachycardia and quickly changing TFTs  - f/u fT3    COPD (chronic obstructive pulmonary disease)  No evidence of acute exacerbation   - P.r.n. ABG and chest x-ray  - Scheduled nebs    Discussed with Dr. Lacey and Dr. Wagner.  Dr. Wagner has no immediate plans to perform any surgical intervention at this time as it discussed with the radiologist there is no area of fluid collection which is drainable.  Patient is at high risk for deterioration and will be closely monitored for now.  VTE Risk Mitigation (From admission, onward)         Ordered     enoxaparin injection 40 mg  Every 24 hours         07/28/23 1849     Place sequential compression device  Until discontinued         07/28/23 0724     IP VTE HIGH RISK PATIENT  Once         07/24/23 2015                Discharge Planning   DERICK:  TBD    Code Status: Full Code   Is the patient medically ready for discharge?:     Reason for patient still in hospital (select all that apply): Patient trending condition and Consult  recommendations  Discharge Plan A: Long-term acute care facility (LTAC)   Discharge Delays: None known at this time              Parul Portillo MD  Department of Hospital Medicine   [facility]

## 2023-08-17 NOTE — PROGRESS NOTES
"Progress Note  Infectious Disease    Reason for Consult:  Drainage from incision site; allergy to meropenem/penicillin/flagyl    HPI: Tessa Enriquez is a 67 y.o. female , obese, BMI 37.2Kg/m2, with unfortunate past medical history of recurrent colorectal adenocarcinoma, and b/l kidney stones status post robotic colectomy in November 2020, 1 out of 46 nodules was positive.  She could not tolerate chemo and was only able to complete 3 treatments and radiation therapy.  In August of 2022 she was found to have recurrence, status post 2nd session of chemotherapy.  On repeat surveillance this year in May she had evidence of adenocarcinoma at the anastomosis site.  PET-CT on June 14th demonstrated decreased size and activity of the mass.    She was admitted on 07/24 for ex-lap, s/p 8mg dexamethasone pre-op. As per operation note: "There is evidence of catastrophic events within the abdominal cavity.  There was diffuse he ischemia noted throughout multiple portions of the small bowel.  There were multiple sharp areas of demarcation between viable bowel and ischemic bowel.  Having removed the other obvious he clean necrotic areas there was evidence of dusky and bogginess at the site of her previous jejunal jejunostomy.  Decision was made against any reestablishment of continuity.  Remaining small bowel approximately 70-75 cm left.", Surgery required assistance from Urology for b/l ureteral catheter placement.     Patient was transferred to [facility] for ICU care.  Postop course complicated by tachycardia and hypotension.  Status post blood transfusion.     During hospital stay, respiratory culture grew MRSA 7/27. The patient developed a rash, clarified with nursing, unclear if vancomycin related although infusion rate was adjusted and rash resolved.  Seems like a second rash developed yesterday after 6 days of meropenem.      Patient seen and examined at bedside, she reports she is feeling fine, NG tube " in place, she is requesting something to eat.  Stable BP, tachycardic, PureWick in place, hazy urine noted.  Ostomy with liquid output. Surgical wound with bilious drainage form mid incision. On TPN. Repeat CT scan concerning for EC fistula.     ID consult for drainage from incision site, concern for peritonitis: Allergy to meropenem, penicillin, Flagyl.    Antibiotics:  Cipro 7/24  Cipro 7/26/27  Meropenem 7/27-8/1  Fluconazole 7/28--present  Vancomycin 7/29--present     8/5/23(Vijaya) case d/w Dr. Lacey. She is alert, complains of hunger. Reports(confirmed bynursing) of copious bilious drainage from incision. Noted previously by surgery. NGT remains in place. She is drinking water for dry mouth. Sat up in bed, but reluctant to get out of bed due to pain and increased drainage. Volume loss reflected in multiple electrolyte abnormalities.  She has no fever. Multiple family members at bedside.   8/6: interim reviewed. While urine output is still ok, she has BOOM now. She may be having more ileostomy volume loss than the TPN can replace. She is drinking for pleasure and intake po is not recorded, thus making NGT output appear greater than actual. Vancomycin was stopped (8d given). She sat up in the chair for 30 min this am. She is willing to spend more time in chair. She is going to receive a unit of blood today.    8/7: Interim reviewed, discussed with Dr Lilly, patient seen and examined at bedside, RN changing dressing from anterior abdominal wound, copious amounts of biliary fluid draining, sent for cultures. She reports she feels 'fine', still tolerating ice chips and water. Will discuss with Hospitalist to have P&P see her.    8/8: Interim reviewed, patient seen and examined at bedside, seen with wound care RN and RN, impressive amount of bile draining from abdominal wound, 2 dehisced areas, colostomy bag draining enteric fluid, skin around ostomy irritated/dermatitis. NG tube removed yesterday. Labs reviewed,  pre-albumin 5, very low. P&P recs noted. Micro reviewed, cultures from surgical wound dehiscence, skin micheal no predominant organisms, pending final.     8/9: Interim reviewed, patient seen and examined at bedside, she wants to eat, feels hungry. Stable BP, afebrile. Micro updated today, GNR-non lactose , from middle wound dehiscence. Will discuss with wound care possible wound vac to help with leakage.    8/10:  Interim reviewed, patient seen examined at bedside, she continues to ask for food, remains NPO, on TPN.  Micro reviewed, cultures from surgical incision grew Acinetobacter baumannii, intermediate to Unasyn, sensitive to everything else including tetracycline.  Patient currently on Arava cyclin.       08/12/2023 Dr Louis, covering for the weekend : Tired, no new complaints, afebrile, nurse in room . There was a lot of drainage last night from lower abdominal wound and nurses had to change the dressing a few times    08/13/2023 Dr Louis, covering for the weekend :  Afebrile.  Slight increase in WBC noted, 10--10--14..  In good spirits.  No new complaints.  She can produces a lot of drainage from the lower abdominal wound.  Nurse has just changed the wound.    8/14 (Lacey): interim reviewed, discussed with Dr Louis, patient seen and examined at bedside, she is tremulous on exam, she reports every time they change and turn her, significant amounts of drainage comes out through her ostomies.  Labs reviewed, leukocytosis 15.8, left shift 77% cannot exclude underlying prior ischemic/necrotic bowel. She has a very short amount of small bowel left.     8/15: Patient seen and examined at bedside, she reports she feels okay today. Discussed with Surgeon, repeat CT scan w/ PO and IV contrast ordered.     8/17:  Interim reviewed, patient seen examined at bedside, she reports she feels good today, asking for water.  Discussed with wound care RN, seems like drainage from fistula slightly decreased still copious.   Discussed with hospitalist and surgeon, trying to get Guttrex, to improve nutrition.  Labs reviewed, leukocytosis 18.7, H&H 7.2/23.8, MCV 92, platelet count 134, thrombocytopenia.  Creatinine 1.2, stable, albumin 1.1, low pre-albumin 5.  Transaminitis AST 94/.    Antibiotics (From admission, onward)      Start     Stop Route Frequency Ordered    08/11/23 0930  eravacycline 90 mg in sodium chloride 0.9% 250 mL infusion         08/18/23 0929 IV Every 12 hours (non-standard times) 08/11/23 0820          Antifungals (From admission, onward)      Start     Stop Route Frequency Ordered    08/06/23 2100  fluconazole (DIFLUCAN) IVPB 200 mg         -- IV Every 24 hours (non-standard times) 08/06/23 1115            EXAM & DIAGNOSTICS REVIEWED:   Vitals:     Temp:  [97.3 °F (36.3 °C)-98.7 °F (37.1 °C)]   Temp: 98.5 °F (36.9 °C) (08/17/23 1630)  Pulse: 100 (08/17/23 1630)  Resp: 16 (08/17/23 1630)  BP: 102/72 (08/17/23 1630)  SpO2: 98 % (08/17/23 1630)    Intake/Output Summary (Last 24 hours) at 8/17/2023 1753  Last data filed at 8/17/2023 1739  Gross per 24 hour   Intake --   Output 3375 ml   Net -3375 ml       General:  Chronically-ill appearing, obese, In NAD. Alert and attentive, cooperative, on O2 by NC 2L, tremulous  Eyes:  Anicteric, EOMI  ENT:  Moist oral mucosa, edentulous   Neck:  Supple  Lungs: Clear to auscultation b/l  Heart:  S1/S2+, regular rhythm, no murmurs  Abd:  Dressing in place not disturbed   08/13/2023-- similar to pics, there is less drainage today compared to yesterday.  Nurse has just changed the wound.  08/12/2023-- similar to pics,   8/9: Dressing in place, not disturbed. LLQ liquid green bile   8/8: Distended, LLQ colostomy with dermatitis around ostomy, leakage of enteric fluid noted. Surgical incision 2 dehisced areas (middle and inferior) draining yellow bile  8/6: Distended, JUANITA drain with bloody drainage, ostomy with large volume bilious liquid output, decreased bowel sounds, very large  "and fresh bandage in place, d/w nursing. Ostomy appears pink and viable d/w nursing, that she has fistulous drainage from distal (inferior) incision  :  Purewick,  Musc:  Joints without effusion, swelling,  erythema, synovitis,   Skin:  Warm, inguinal rash improved     Wound:   Neuro:  Following commands, speech is clear, moves all extremities   Psych:  Anxious, cooperative  Lymphatic:     Extrem: No LE edema, no evidence of rash  VAD:  L PICC line 8/2/23    R Port-A-Cath not accessed, no redness noted      Isolation: Contact/MRSA  08/11/23 8/8:          8/7:        8/2:    8/5 8/1:      Right leg rash   Left leg rash    General Labs reviewed:  Recent Labs   Lab 08/15/23  0437 08/16/23  0425 08/17/23  1147   WBC 23.19* 24.56* 18.72*   HGB 8.6* 8.8* 7.2*   HCT 27.7* 28.1* 23.8*    252 134*       Recent Labs   Lab 08/15/23  0437 08/16/23  0425 08/17/23  1344    142 140   K 3.3* 3.8 3.8    107 105   CO2 26 28 31*   BUN 40* 41* 40*   CREATININE 1.2 1.2 1.2   CALCIUM 6.9* 7.0* 6.5*   PROT 4.4* 4.3* 4.2*   BILITOT 1.2* 1.3* 1.2*   ALKPHOS 217* 241* 242*   ALT 79* 96* 115*   AST 71* 83* 94*     No results for input(s): "CRP" in the last 168 hours.  No results for input(s): "SEDRATE" in the last 168 hours.    Estimated Creatinine Clearance: 47.5 mL/min (based on SCr of 1.2 mg/dL).     Micro:  Microbiology Results (last 7 days)       Procedure Component Value Units Date/Time    Culture, Anaerobe [[id number]] Collected: 08/07/23 0940    Order Status: Completed Specimen: Incision site from Abdomen Updated: 08/11/23 0625     Anaerobic Culture No anaerobes isolated            Imaging Reviewed:  CXRs  CT abdomen/pelvis 8/15/23:  1. Persistent left pleural effusion and left lower lobe opacity suggesting atelectasis with resolution of prior right pleural effusion and right lower lobe opacities.   2. Unchanged moderate bilateral hydroureteronephrosis and mild to moderate bladder " distention, with no obstructing etiology identified.   3. Postsurgical changes of the intestines as discussed above. Correlation with surgical history is requested. There are new scattered foci of extraluminal gas in left abdomen as well as hyperdense material in left abdomen, about colostomy site, and in presacral space of concern for leaking enteric contrast. Etiology of intestinal leak/perforation is unable to be determined on the basis of this exam.   4. Nonocclusive filling defect in left common femoral vein suggesting nonocclusive DVT.     CT abdomen/pelvis 8/2:  1. Colonic resection with left lower abdominal quadrant colostomy, punctate foci of intra-abdominal free air and a small volume of ascites, likely related to recent postoperative state.  2. Mildly prominent gas and fluid-filled loops of small bowel containing contrast suggestive of ileus and less likely partial low-grade mechanical bowel obstruction. Consider radiographic follow-up.  3. Airspace opacity in the left greater than right posterior dependent lower lobe with air bronchograms suggestive of atelectasis and aspiration/pneumonia, with a small left and trace right pleural effusion.  4. Moderate diffuse body wall edema (anasarca).  5. Full bladder, with mild bilateral hydroureteronephrosis and no radiopaque stones seen, consider correlation with urine output and urinalysis as warranted (noting there is a punctate focus of gas in the bladder, and a gas forming infection is not excluded).    Cardiology: EKG Qtc: 486ms    PATHOLOGY:   7/24:  1. DESIGNATED AS LYMPH NODES:   - BENIGN CYST PARTIALLY LINED BY MULLERIAN TYPE EPITHELIUM, POSSIBLE  REMNANTS OF PARATUBAL CYST.   - LYMPH NODE TISSUE WAS NOT IDENTIFIED GROSSLY OR MICROSCOPICALLY.   - NEGATIVE FOR MALIGNANCY.     2. DISTAL RECTUM, RESECTION:   - FOCUS OF MODERATELY DIFFERENTIATED ADENOCARCINOMA INVOLVING INFLAMED  AND ULCERATED RECTAL MUCOSA.   - PROXIMAL RESECTION MARGIN IS POSITIVE FOR  ADENOCARCINOMA  - EXTENSIVE TRANSMURAL CHRONIC AND ACUTE INFLAMMATION WITH MUCOSAL  ULCERATION.   - TWO PERIRECTAL LYMPH NODES NEGATIVE FOR MALIGNANCY.     3. SEGMENT OF SMALL INTESTINE:   - SEROSAL ADHESIONS.   - RECENT TRANSMURAL DEFECT WITHOUT ASSOCIATE INFLAMMATION OR HEMORRHAGE.   - NEGATIVE FOR MALIGNANCY.     4. PROXIMAL COLON, RESECTION:   - INVASIVE MODERATELY DIFFERENTIATED ADENOCARCINOMA IN THE BACKGROUND OF RECTAL ULCERATION AND MARKED CHRONIC AND ACUTE INFLAMMATION.   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 Etiology of intestinal leak/perforation is unable to be determined on the basis of this exam.    Persistent leukocytosis 24-->18.7    MRSA  pneumonia, improved s/p 8 days of Vanc    B/l kidney stones, s/p b/l stents 7/24    H/o recurrent colorectal adenocarcinoma s/p chemotherapy     BOOM.  Short gut volume losses contributing, cr 1.2    Malnutrition, albumin 1.1, pre-albumin 5 - on TPN    Transaminitis, AST 94/     Recommendations:  Continue Eravacycline 90mg IV q12h to cover GNR and anaerobes   Fluconazole 200mg IV daily as prophylaxis  Above for at least 2 more weeks, exact duration of therapy will depend on clinical improvement   Antibiotics are unlikely to repair the EC fistula   Wound care following for EC fistula/leakage  Monitor LFTs  CM working on LTAC    Unfortunately, very poor prognosis     D/w patient, nursing, wound care RN, Dr Wagner, Dr Portillo    Medical Decision Making during this encounter was  [_] Low Complexity  [_] Moderate Complexity  [xx] High  Complexity

## 2023-08-17 NOTE — NURSING
0700: report received, in bed with  no distress  1830: uneventful shift, dressing change done twice, medicated several times thoughout

## 2023-08-17 NOTE — PHYSICIAN QUERY
PT Name: Tessa Enriquez  MR #: [id number]     Documentation Clarification      CDS/: Zaira Bautista               Contact information:[phone] or through epic messenger    This form is a permanent document in the medical record.     Query Date: August 17, 2023    By submitting this query, we are merely seeking further clarification of documentation. Please utilize your independent clinical judgment when addressing the question(s) below.    The Medical Record reflects the following:    Supporting Clinical Findings Location in Medical Record   Nonocclusive filling defect in left common femoral vein suggesting nonocclusive DVT.   CT abd/pelvis with contrast 8/15  Radiology [facility]                                                                            Provider, please provide diagnosis or diagnoses associated with above clinical findings.    [  x ] Left Common Femoral Vein DVT   [   ]          [   ] Other (please specify):           Insignificant finding   [  ] Clinically undetermined

## 2023-08-17 NOTE — PT/OT/SLP PROGRESS
Occupational Therapy   Treatment    Name: Tessa Enriquez  MRN: [id number]  Admitting Diagnosis:  Ischemic necrosis of small bowel  20 Days Post-Op  Pre-op Diagnosis: Rectal cancer [C20]  Procedure(s):  LAPAROTOMY, EXPLORATORY  EXCISION, SMALL INTESTINE  The primary encounter diagnosis was Ischemic necrosis of small bowel. Diagnoses of Rectosigmoid cancer, Bilateral kidney stones, Rectal cancer, Malignant neoplasm of colon, Tachycardia, Colostomy in place, Severe sepsis, Enterocutaneous fistula [K63.2], Short gut syndrome [K91.2], and Cardiac rhythm disorder or disturbance or change were also pertinent to this visit.    Recommendations:     Discharge Recommendations: rehabilitation facility, LTACH (long-term acute care [facility])  Discharge Equipment Recommendations:  other (see comments) (TBD at next level of care)  Barriers to discharge:  Decreased caregiver support (increased level of assistance)    Assessment:     Tessa Enriquez is a 67 y.o. female with a medical diagnosis of Ischemic necrosis of small bowel.  She presents with Performance deficits affecting function are weakness, impaired endurance, impaired self care skills, impaired functional mobility, gait instability, impaired balance, decreased upper extremity function, decreased lower extremity function, decreased safety awareness, pain, impaired skin, edema, impaired cardiopulmonary response to activity.     Pt abd pain 9.5/10. Pt attempted to sit EOB with OT after max encouragement. Pt. rolled to L side with Max A using bed rail and stopped 2/2 feeling faint. /70, . Pt declined to continue with transition to sit EOB. She performed BUE AROM ex with supervision. Pt's affect was flat, mood was down and distant look on her face.  Continue with OT POC.     Rehab Prognosis:  Fair; patient would benefit from acute skilled OT services to address these deficits and reach maximum level of function.       Plan:     Patient to be seen 5  x/week to address the above listed problems via self-care/home management, therapeutic activities, therapeutic exercises  Plan of Care Expires: 08/31/23  Plan of Care Reviewed with: patient    Subjective     Chief Complaint: abd pain 9.5/10  Patient/Family Comments/goals: none  Pain/Comfort:  Pain Rating 1:  (9.5/10)  Location - Side 1: Bilateral  Location - Orientation 1: generalized  Location 1: abdomen  Pain Addressed 1: Reposition, Distraction, Cessation of Activity, Nurse notified  Pain Rating Post-Intervention 1:  (same)    Objective:     Communicated with: nurse prior to session.  Patient found HOB elevated with bed alarm, PICC line, PureWick, telemetry, colostomy, JUANITA drain upon OT entry to room.    General Precautions: Standard, fall, NPO, contact    Orthopedic Precautions:N/A  Braces: N/A  Respiratory Status: Room air     Occupational Performance:     Bed Mobility:    Patient completed Rolling/Turning to Left with  maximal assistance, with side rail, and 2 trials        AMPAC 6 Click ADL: 13    Treatment & Education:  Purpose of OT and POC  BUE AROM x 10 reps shld flex/ext, hor abd/add, 20 reps biceps curls with supervision with HOB elevated slightly.  All questions/concerns addressed within scope.     Patient left left sidelying with all lines intact, call button in reach, bed alarm on, and nurse notified    GOALS:   Multidisciplinary Problems       Occupational Therapy Goals          Problem: Occupational Therapy    Goal Priority Disciplines Outcome Interventions   Occupational Therapy Goal     OT, PT/OT Ongoing, Not Progressing    Description: Goals to be met by: 8/31/2023     Patient will increase functional independence with ADLs by performing:    UE Dressing with Supervision.  LE Dressing with Supervision.  Grooming while standing at sink with Supervision.  Toileting from toilet with Supervision for hygiene and clothing management.   Bathing from  tub bench with Supervision.                          Time Tracking:     OT Date of Treatment: 08/17/23  OT Start Time: 1611  OT Stop Time: 1627  OT Total Time (min): 16 min    Billable Minutes:Therapeutic Exercise 16  Total Time 16    OT/BRENNON: OT          8/17/2023

## 2023-08-17 NOTE — PLAN OF CARE
Problem: Occupational Therapy  Goal: Occupational Therapy Goal  Description: Goals to be met by: 8/31/2023     Patient will increase functional independence with ADLs by performing:    UE Dressing with Supervision.  LE Dressing with Supervision.  Grooming while standing at sink with Supervision.  Toileting from toilet with Supervision for hygiene and clothing management.   Bathing from  tub bench with Supervision.    Outcome: Ongoing, Not Progressing   Limited by abd pain.

## 2023-08-17 NOTE — SUBJECTIVE & OBJECTIVE
Interval History: Patient seen and examined. SLIM.  White blood cell count improving.  Currently afebrile.  LTAC placement pending.      Objective:     Vital Signs (Most Recent):  Temp: 98 °F (36.7 °C) (08/17/23 0720)  Pulse: 100 (08/17/23 0720)  Resp: 16 (08/17/23 0720)  BP: 115/60 (08/17/23 0720)  SpO2: 95 % (08/17/23 0720) Vital Signs (24h Range):  Temp:  [97.7 °F (36.5 °C)-98.7 °F (37.1 °C)] 98 °F (36.7 °C)  Pulse:  [] 100  Resp:  [12-20] 16  SpO2:  [93 %-95 %] 95 %  BP: (112-120)/(56-69) 115/60     Weight: 89.4 kg (197 lb)  Body mass index is 37.22 kg/m².    Intake/Output Summary (Last 24 hours) at 8/17/2023 0911  Last data filed at 8/17/2023 0601  Gross per 24 hour   Intake --   Output 2125 ml   Net -2125 ml           Physical Exam  Constitutional:       General: She is not in acute distress.     Appearance: She is obese. She is ill-appearing.   Cardiovascular:      Rate and Rhythm: Normal rate and regular rhythm.   Pulmonary:      Effort: Pulmonary effort is normal. No respiratory distress.   Abdominal:      General: There is distension.      Tenderness: There is abdominal tenderness.      Comments: Beny drain and ostomy intact  Large abdominal dressing in place    Skin:     General: Skin is warm and dry.   Neurological:      General: No focal deficit present.      Mental Status: She is alert and oriented to person, place, and time.   Psychiatric:         Mood and Affect: Mood normal.         Behavior: Behavior normal.             Significant Labs:   Lab Results   Component Value Date    WBC 24.56 (H) 08/16/2023    HGB 8.8 (L) 08/16/2023    HCT 28.1 (L) 08/16/2023    MCV 90 08/16/2023     08/16/2023       CMP  Sodium   Date Value Ref Range Status   08/16/2023 142 136 - 145 mmol/L Final     Potassium   Date Value Ref Range Status   08/16/2023 3.8 3.5 - 5.1 mmol/L Final     Chloride   Date Value Ref Range Status   08/16/2023 107 95 - 110 mmol/L Final     CO2   Date Value Ref Range Status    08/16/2023 28 23 - 29 mmol/L Final     Glucose   Date Value Ref Range Status   08/16/2023 106 70 - 110 mg/dL Final     BUN   Date Value Ref Range Status   08/16/2023 41 (H) 8 - 23 mg/dL Final     Creatinine   Date Value Ref Range Status   08/16/2023 1.2 0.5 - 1.4 mg/dL Final     Calcium   Date Value Ref Range Status   08/16/2023 7.0 (L) 8.7 - 10.5 mg/dL Final     Total Protein   Date Value Ref Range Status   08/16/2023 4.3 (L) 6.0 - 8.4 g/dL Final     Albumin   Date Value Ref Range Status   08/16/2023 1.3 (L) 3.5 - 5.2 g/dL Final     Total Bilirubin   Date Value Ref Range Status   08/16/2023 1.3 (H) 0.1 - 1.0 mg/dL Final     Comment:     For infants and newborns, interpretation of results should be based  on gestational age, weight and in agreement with clinical  observations.    Premature Infant recommended reference ranges:  Up to 24 hours.............<8.0 mg/dL  Up to 48 hours............<12.0 mg/dL  3-5 days..................<15.0 mg/dL  6-29 days.................<15.0 mg/dL       Alkaline Phosphatase   Date Value Ref Range Status   08/16/2023 241 (H) 55 - 135 U/L Final     AST   Date Value Ref Range Status   08/16/2023 83 (H) 10 - 40 U/L Final     ALT   Date Value Ref Range Status   08/16/2023 96 (H) 10 - 44 U/L Final     Anion Gap   Date Value Ref Range Status   08/16/2023 7 (L) 8 - 16 mmol/L Final     eGFR   Date Value Ref Range Status   08/16/2023 50.2 (A) >60 mL/min/1.73 m^2 Final     Microbiology Results (last 7 days)       Procedure Component Value Units Date/Time    Culture, Anaerobe [[id number]] Collected: 08/07/23 0940    Order Status: Completed Specimen: Incision site from Abdomen Updated: 08/11/23 0625     Anaerobic Culture No anaerobes isolated    Aerobic culture [[id number]]  (Abnormal)  (Susceptibility) Collected: 08/07/23 0940    Order Status: Completed Specimen: Incision site from Abdomen Updated: 08/10/23 1154     Aerobic Bacterial Culture ACINETOBACTER BAUMANNII/HAEMOLYTICUS  Rare  Skin micheal  also present            Significant Imaging:   CXR: Improvement of the airspace disease in the left lung base with persistent atelectasis in the right lower lobe    CT abdomen and pelvis with contrast:  1. Persistent left pleural effusion and left lower lobe opacity suggesting atelectasis with resolution of prior right pleural effusion and right lower lobe opacities.  2. Unchanged moderate bilateral hydroureteronephrosis and mild to moderate bladder distention, with no obstructing etiology identified.  3. Postsurgical changes of the intestines as discussed above. Correlation with surgical history is requested. There are new scattered foci of extraluminal gas in left abdomen as well as hyperdense material in left abdomen, about colostomy site, and in presacral space of concern for leaking enteric contrast. Etiology of intestinal leak/perforation is unable to be determined on the basis of this exam.  4. Nonocclusive filling defect in left common femoral vein suggesting nonocclusive DVT.

## 2023-08-17 NOTE — PT/OT/SLP PROGRESS
Physical Therapy Treatment    Patient Name:  Tessa Enriquez   MRN:  [id number]    Recommendations:     Discharge Recommendations: LTACH (long-term acute care hospital)  Discharge Equipment Recommendations: to be determined by next level of care  Barriers to discharge:  Medical status    Assessment:     Tessa Enriquez is a 67 y.o. female admitted with a medical diagnosis of Ischemic necrosis of small bowel.  She presents with the following impairments/functional limitations: weakness, impaired endurance, impaired self care skills, impaired functional mobility, gait instability, impaired balance, decreased lower extremity function, pain, impaired skin, impaired cardiopulmonary response to activity .    Rehab Prognosis: Fair; patient would benefit from acute skilled PT services to address these deficits and reach maximum level of function.    Recent Surgery: Procedure(s) (LRB):  LAPAROTOMY, EXPLORATORY (N/A)  EXCISION, SMALL INTESTINE  CREATION, ILEOSTOMY 20 Days Post-Op    Plan:     During this hospitalization, patient to be seen 5 x/week to address the identified rehab impairments via therapeutic activities, gait training, therapeutic exercises and progress toward the following goals:    Plan of Care Expires:  09/06/23    Subjective     Chief Complaint: 10/10 abdominal pain.  Patient/Family Comments/goals: Pt agreeable to PT following encouragement.   Pain/Comfort:  Pain Rating 1: 9/10  Location - Orientation 1: generalized  Location 1: abdomen  Pain Rating Post-Intervention 1: 10/10      Objective:     Communicated with RN prior to session.  Patient found HOB elevated with PureWick, JUANITA drain, peripheral IV upon PT entry to room.     General Precautions: Standard, contact, fall  Orthopedic Precautions: N/A  Braces: N/A  Respiratory Status: Room air     Functional Mobility:  Bed Mobility:     Rolling Left:  maximal assistance  Rolling Right: maximal assistance      AM-PAC 6 CLICK MOBILITY  Turning over  in bed (including adjusting bedclothes, sheets and blankets)?: 2  Sitting down on and standing up from a chair with arms (e.g., wheelchair, bedside commode, etc.): 2  Moving from lying on back to sitting on the side of the bed?: 2  Moving to and from a bed to a chair (including a wheelchair)?: 2  Need to walk in hospital room?: 2  Climbing 3-5 steps with a railing?: 1  Basic Mobility Total Score: 11       Treatment & Education:  Pt performed 3 LE x 10 reps in bed with vc for proper execution and joint protection: ap,saq, heel slide    Patient left HOB elevated with all lines intact and call button in reach..    GOALS:   Multidisciplinary Problems       Physical Therapy Goals          Problem: Physical Therapy    Goal Priority Disciplines Outcome Goal Variances Interventions   Physical Therapy Goal     PT, PT/OT Ongoing, Not Progressing     Description: Goals to be met by: 23     Patient will increase functional independence with mobility by performin. Supine to sit with Moderate Assistance  2. Sit to supine with Moderate Assistance  3. Sit to stand transfer with Moderate Assistance  4. Bed to chair transfer with Moderate Assistanceusing Rolling Walker  5. Gait  x 10 feet with Moderate Assistance using Rolling Walker.                          Time Tracking:     PT Received On: 23  PT Start Time: 1027     PT Stop Time: 1045  PT Total Time (min): 18 min     Billable Minutes: Therapeutic Exercise 18    Treatment Type: Treatment  PT/PTA: PTA     Number of PTA visits since last PT visit: 3     2023

## 2023-08-17 NOTE — PROGRESS NOTES
[facility] Medicine  Progress Note    Patient Name: Tessa Enriquez  MRN: [id number]  Patient Class: IP- Inpatient   Admission Date: 7/24/2023  Length of Stay: 24 days  Attending Physician: Parul Portillo MD  Primary Care Provider: Karma Mcdermott MD        Subjective:     Principal Problem:Ischemic necrosis of small bowel        HPI:  Ms. Enriquez is a 67-years-old female who presented as a direct admission from [facility] for ICU level of care postoperatively.  Patient with a history of recurrent colorectal adenocarcinoma, status post neoadjuvant radiation and chemotherapy, followed by oncologist Dr. Frank.  On 07/24 she underwent exploratory laparotomy with extensive lysis of adhesion, proctectomy with creation of end colostomy by Dr. Wagner and she was admitted to hospital medicine postoperatively.  Postop course complicated by ongoing sinus tachycardia with anemia, status post PRBC transfusion and acute kidney injury, seen by Nephrology.  Earlier today ostomy noted to be dusky with concern for ischemia, she was taken back to the OR and underwent exploratory laparotomy with small-bowel resection, as per OR note, large area of small bowel resected with four areas of ischemia.  She was intubated for surgery and remains intubated postoperatively.  Reported history of COPD, hypothyroidism, nephrolithiasis status post bilateral ureteral stenting.  She is currently sedated on propofol, orally intubated on mechanical ventilation.  Repeat labs have been submitted.  As per ICU RN plans for possible repeat surgery.  Plan of care reviewed with nursing.  No visitors at bedside.      Overview/Hospital Course:  Patient was transferred from [facility] for ICU care postoperatively after extensive surgery for ischemic bowel with large areas of small bowel resections done. She has resultant short gut syndrome. Also with ileostomy. She arrived intubated. She was followed by  pulmonology/CC, ID, nephrology and GS. Once extubated, she was transferred to [facility]-surg. She was on meropenem for peritonitis but only completed 5 of 7 days due to rash that developed. Allergies to PCN and flagyl. Respiratory culture grew MRSA considered to have pneumonia from this contributing to her sepsis. S/p merrem and vancomycin. Surgical site wound culture grew acinetobacter. Continued on course of eravacycline and diflucan. NGT utilized and removed 8/7. TPN utilized. Refractory electrolyte replacement given. Required intermittent packed red blood cell transfusions without active bleeding noted. 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 Wound care team followed as well. Started on octreotide in attempt to decrease fistula output. Palliative consulted and continued with full code and full treatment pursuit.  is working on eventual LTAC placement once cleared for this from surgery.      Interval History: Patient seen and examined. NAEON.  White blood cell count improving.  Currently afebrile.  LTAC placement pending.      Objective:     Vital Signs (Most Recent):  Temp: 98 °F (36.7 °C) (08/17/23 0720)  Pulse: 100 (08/17/23 0720)  Resp: 16 (08/17/23 0720)  BP: 115/60 (08/17/23 0720)  SpO2: 95 % (08/17/23 0720) Vital Signs (24h Range):  Temp:  [97.7 °F (36.5 °C)-98.7 °F (37.1 °C)] 98 °F (36.7 °C)  Pulse:  [] 100  Resp:  [12-20] 16  SpO2:  [93 %-95 %] 95 %  BP: (112-120)/(56-69) 115/60     Weight: 89.4 kg (197 lb)  Body mass index is 37.22 kg/m².    Intake/Output Summary (Last 24 hours) at 8/17/2023 0911  Last data filed at 8/17/2023 0601  Gross per 24 hour   Intake --   Output 2125 ml   Net -2125 ml           Physical Exam  Constitutional:       General: She is not in acute distress.     Appearance: She is obese. She is ill-appearing.   Cardiovascular:      Rate and Rhythm: Normal rate and regular rhythm.   Pulmonary:      Effort:  Pulmonary effort is normal. No respiratory distress.   Abdominal:      General: There is distension.      Tenderness: There is abdominal tenderness.      Comments: Beny drain and ostomy intact  Large abdominal dressing in place    Skin:     General: Skin is warm and dry.   Neurological:      General: No focal deficit present.      Mental Status: She is alert and oriented to person, place, and time.   Psychiatric:         Mood and Affect: Mood normal.         Behavior: Behavior normal.             Significant Labs:   Lab Results   Component Value Date    WBC 24.56 (H) 08/16/2023    HGB 8.8 (L) 08/16/2023    HCT 28.1 (L) 08/16/2023    MCV 90 08/16/2023     08/16/2023       CMP  Sodium   Date Value Ref Range Status   08/16/2023 142 136 - 145 mmol/L Final     Potassium   Date Value Ref Range Status   08/16/2023 3.8 3.5 - 5.1 mmol/L Final     Chloride   Date Value Ref Range Status   08/16/2023 107 95 - 110 mmol/L Final     CO2   Date Value Ref Range Status   08/16/2023 28 23 - 29 mmol/L Final     Glucose   Date Value Ref Range Status   08/16/2023 106 70 - 110 mg/dL Final     BUN   Date Value Ref Range Status   08/16/2023 41 (H) 8 - 23 mg/dL Final     Creatinine   Date Value Ref Range Status   08/16/2023 1.2 0.5 - 1.4 mg/dL Final     Calcium   Date Value Ref Range Status   08/16/2023 7.0 (L) 8.7 - 10.5 mg/dL Final     Total Protein   Date Value Ref Range Status   08/16/2023 4.3 (L) 6.0 - 8.4 g/dL Final     Albumin   Date Value Ref Range Status   08/16/2023 1.3 (L) 3.5 - 5.2 g/dL Final     Total Bilirubin   Date Value Ref Range Status   08/16/2023 1.3 (H) 0.1 - 1.0 mg/dL Final     Comment:     For infants and newborns, interpretation of results should be based  on gestational age, weight and in agreement with clinical  observations.    Premature Infant recommended reference ranges:  Up to 24 hours.............<8.0 mg/dL  Up to 48 hours............<12.0 mg/dL  3-5 days..................<15.0 mg/dL  6-29  days.................<15.0 mg/dL       Alkaline Phosphatase   Date Value Ref Range Status   08/16/2023 241 (H) 55 - 135 U/L Final     AST   Date Value Ref Range Status   08/16/2023 83 (H) 10 - 40 U/L Final     ALT   Date Value Ref Range Status   08/16/2023 96 (H) 10 - 44 U/L Final     Anion Gap   Date Value Ref Range Status   08/16/2023 7 (L) 8 - 16 mmol/L Final     eGFR   Date Value Ref Range Status   08/16/2023 50.2 (A) >60 mL/min/1.73 m^2 Final     Microbiology Results (last 7 days)       Procedure Component Value Units Date/Time    Culture, Anaerobe [[id number]] Collected: 08/07/23 0940    Order Status: Completed Specimen: Incision site from Abdomen Updated: 08/11/23 0625     Anaerobic Culture No anaerobes isolated    Aerobic culture [[id number]]  (Abnormal)  (Susceptibility) Collected: 08/07/23 0940    Order Status: Completed Specimen: Incision site from Abdomen Updated: 08/10/23 1154     Aerobic Bacterial Culture ACINETOBACTER BAUMANNII/HAEMOLYTICUS  Rare  Skin micheal also present            Significant Imaging:   CXR: Improvement of the airspace disease in the left lung base with persistent atelectasis in the right lower lobe    CT abdomen and pelvis with contrast:  1. Persistent left pleural effusion and left lower lobe opacity suggesting atelectasis with resolution of prior right pleural effusion and right lower lobe opacities.  2. Unchanged moderate bilateral hydroureteronephrosis and mild to moderate bladder distention, with no obstructing etiology identified.  3. Postsurgical changes of the intestines as discussed above. Correlation with surgical history is requested. There are new scattered foci of extraluminal gas in left abdomen as well as hyperdense material in left abdomen, about colostomy site, and in presacral space of concern for leaking enteric contrast. Etiology of intestinal leak/perforation is unable to be determined on the basis of this exam.  4. Nonocclusive filling defect in left common  femoral vein suggesting nonocclusive DVT.        Assessment/Plan:      * Ischemic necrosis of small bowel  OR note 7/27 with extensive area of small bowel necrosis s/p resection now  S/p NGT  - NPO per gen surg rec  - TPN  - monitor bowel function  - palliative consult: continue care and full code  - gen surg following    Goals of care, counseling/discussion  - see palliative care consult    MRSA pneumonia  - see sepsis section  - ID following    Short gut syndrome  S/p extensive small bowel resection and ileostomy creation  - gen surg following  - working on starting gattex per gen surg    Enterocutaneous fistula  At surgical site  - gen surg following and allowing to mature  - continue octreotide. Waiting for Gattis approval.  - wound care    Hypocalcemia  Stable  - trend daily & replace with prn IV scale based on ionized calcium    Sinus tachycardia  Stable, improved with decreased thyroid medicine  Multifactorial in the setting of acute medical conditions including dehydration, pain, postop, anemia, IV synthroid  - tele  - addressing above conditions    Hyponatremia  Stable  - continue TPN  - trend daily on chem panel    Malnutrition of moderate degree  Low albumin and pre-albumin  Currently NPO per gen surg rec  - TPN    Severe sepsis  Resolved   Extensive area of small bowel necrosis -s/p surgical repair   Resp Cx: + MRSA  Surg wound Cx: + Acinetobacter  Urine and blood culture negative   S/p merrem & vanc  - eravacycline and diflucan per ID.  Case discussed with Dr. aLcey.  Patient to undergo CT abdomen and pelvis with contrast.  Check chest x-ray and urine analysis.    BOOM (acute kidney injury)  Returned now possibly IVVD +/- vancomycin use  Improving  DC'd vanc per ID  - renally dose meds and avoid nephrotoxins  - trend renal function daily  - continue TPN    Bilateral kidney stones  Seen by Dr. Mijares this admission, followed outpatient by Dr. Kwon   Status post bilateral stent placement  - f/u  outpatient    Severe obesity (BMI 35.0-39.9) with comorbidity  Body mass index is 37.22 kg/m². Morbid obesity complicates all aspects of disease management from diagnostic modalities to treatment. Weight loss encouraged and health benefits explained to patient.     FENG (iron deficiency anemia)  Worsening postoperatively, now stable s/p 5u pRBC this stay  No active bleeding  - trend hgb on daily cbc and transfuse as needed.    Malignant neoplasm of colon  History of recurrent colorectal adenocarcinoma  See small-bowel ischemia  Did receive adjuvant chemoradiation, followed by Dr. Frank  - f/u outpatient    Hypothyroidism with abnormal TFTs  Recent TSH elevated at 23, as per chart review elevated 2 years previous, suspect compliance issue  Repeat TSH borderline low, normal fT4  - Given current NPO status with bowel surgery continue with IV levothyroxine  - decreased IV levothyroxine from 75mcg to 50mcg daily 8/13 given tachycardia and quickly changing TFTs  - f/u fT3    COPD (chronic obstructive pulmonary disease)  No evidence of acute exacerbation   - P.r.n. ABG and chest x-ray  - Scheduled nebs    Discussed with Dr. Lacey and  regarding discharge planning.  VTE Risk Mitigation (From admission, onward)         Ordered     enoxaparin injection 40 mg  Every 24 hours         07/28/23 1849     Place sequential compression device  Until discontinued         07/28/23 0724     IP VTE HIGH RISK PATIENT  Once         07/24/23 2015                Discharge Planning   DERICK:  TBD    Code Status: Full Code   Is the patient medically ready for discharge?:     Reason for patient still in hospital (select all that apply): Patient trending condition and Consult recommendations  Discharge Plan A: Long-term acute care facility (LTAC)   Discharge Delays: None known at this time              Parul Portillo MD  Department of Hospital Medicine   [facility]

## 2023-08-18 NOTE — TELEPHONE ENCOUNTER
Insurance company contacted for prior auth on Gattex    Auth # [id number]  Good from 8/18/2023 until 2/14/2024  Prescription request sent to Dr Wagner and will be sent to [facility] pharmacy

## 2023-08-18 NOTE — NURSING
0725 Performed PA on Pt tolerated well, no complaints at this time, safety checks performed.    1647 Pt lying in bed administered medications, and emptied colostomy 75 ml output noted, safety checks performed.

## 2023-08-18 NOTE — PROGRESS NOTES
Pt seen and examined. Resting comfortably  NO abd pain.  Reports output seems to be decreasing.     Wt Readings from Last 3 Encounters:   07/24/23 89.4 kg (197 lb)   07/03/23 89.3 kg (196 lb 13.9 oz)   06/27/23 89.7 kg (197 lb 12 oz)     Temp Readings from Last 3 Encounters:   08/18/23 98.2 °F (36.8 °C) (Oral)   07/03/23 97.5 °F (36.4 °C) (Temporal)   06/27/23 98.1 °F (36.7 °C) (Temporal)     BP Readings from Last 3 Encounters:   08/18/23 (!) 102/50   07/03/23 (!) 143/79   06/27/23 (!) 185/86     Pulse Readings from Last 3 Encounters:   08/18/23 102   07/03/23 84   06/27/23 84     AAOx3  Soft/nd/nt      Lab Results   Component Value Date    WBC 24.27 (H) 08/18/2023    HGB 8.0 (L) 08/18/2023    HCT 25.3 (L) 08/18/2023    MCV 90 08/18/2023     (L) 08/18/2023       BMP  Lab Results   Component Value Date     08/18/2023    K 4.1 08/18/2023     08/18/2023    CO2 30 (H) 08/18/2023    BUN 38 (H) 08/18/2023    CREATININE 1.1 08/18/2023    CALCIUM 6.8 (LL) 08/18/2023    ANIONGAP 5 (L) 08/18/2023    EGFRNORACEVR 55.8 (A) 08/18/2023     A/P;   Cont TPN  Aggressive nutritional supplementation  Ambulate

## 2023-08-18 NOTE — SUBJECTIVE & OBJECTIVE
Interval History: Patient seen and examined. SLIM.  White blood cell count improving.  Currently afebrile.  LTAC placement pending.      Objective:     Vital Signs (Most Recent):  Temp: 97.6 °F (36.4 °C) (08/18/23 0712)  Pulse: 104 (08/18/23 0712)  Resp: 18 (08/18/23 0913)  BP: (!) 113/55 (notified nurse luke) (08/18/23 0712)  SpO2: 96 % (08/18/23 0712) Vital Signs (24h Range):  Temp:  [97.3 °F (36.3 °C)-99 °F (37.2 °C)] 97.6 °F (36.4 °C)  Pulse:  [] 104  Resp:  [16-18] 18  SpO2:  [93 %-98 %] 96 %  BP: (102-140)/(44-76) 113/55     Weight: 89.4 kg (197 lb)  Body mass index is 37.22 kg/m².    Intake/Output Summary (Last 24 hours) at 8/18/2023 0937  Last data filed at 8/18/2023 0725  Gross per 24 hour   Intake --   Output 2850 ml   Net -2850 ml           Physical Exam  Constitutional:       General: She is not in acute distress.     Appearance: She is obese. She is ill-appearing.   Cardiovascular:      Rate and Rhythm: Normal rate and regular rhythm.   Pulmonary:      Effort: Pulmonary effort is normal. No respiratory distress.   Abdominal:      General: There is distension.      Tenderness: There is abdominal tenderness.      Comments: Beny drain and ostomy intact  Large abdominal dressing in place    Skin:     General: Skin is warm and dry.   Neurological:      General: No focal deficit present.      Mental Status: She is alert and oriented to person, place, and time.   Psychiatric:         Mood and Affect: Mood normal.         Behavior: Behavior normal.             Significant Labs:   Lab Results   Component Value Date    WBC 18.72 (H) 08/17/2023    HGB 7.2 (L) 08/17/2023    HCT 23.8 (L) 08/17/2023    MCV 92 08/17/2023     (L) 08/17/2023       CMP  Sodium   Date Value Ref Range Status   08/17/2023 140 136 - 145 mmol/L Final     Potassium   Date Value Ref Range Status   08/17/2023 3.8 3.5 - 5.1 mmol/L Final     Chloride   Date Value Ref Range Status   08/17/2023 105 95 - 110 mmol/L Final     CO2   Date  Value Ref Range Status   08/17/2023 31 (H) 23 - 29 mmol/L Final     Glucose   Date Value Ref Range Status   08/17/2023 133 (H) 70 - 110 mg/dL Final     BUN   Date Value Ref Range Status   08/17/2023 40 (H) 8 - 23 mg/dL Final     Creatinine   Date Value Ref Range Status   08/17/2023 1.2 0.5 - 1.4 mg/dL Final     Calcium   Date Value Ref Range Status   08/17/2023 6.5 (LL) 8.7 - 10.5 mg/dL Final     Comment:     Results confirmed, test repeated  Calcium critical result(s) repeated. Called and verbal readback   obtained from Obdulia Holt 1 CHARITY Silverio.  by SLT1 08/17/2023 14:41       Total Protein   Date Value Ref Range Status   08/17/2023 4.2 (L) 6.0 - 8.4 g/dL Final     Albumin   Date Value Ref Range Status   08/17/2023 1.1 (L) 3.5 - 5.2 g/dL Final     Total Bilirubin   Date Value Ref Range Status   08/17/2023 1.2 (H) 0.1 - 1.0 mg/dL Final     Comment:     For infants and newborns, interpretation of results should be based  on gestational age, weight and in agreement with clinical  observations.    Premature Infant recommended reference ranges:  Up to 24 hours.............<8.0 mg/dL  Up to 48 hours............<12.0 mg/dL  3-5 days..................<15.0 mg/dL  6-29 days.................<15.0 mg/dL       Alkaline Phosphatase   Date Value Ref Range Status   08/17/2023 242 (H) 55 - 135 U/L Final     AST   Date Value Ref Range Status   08/17/2023 94 (H) 10 - 40 U/L Final     ALT   Date Value Ref Range Status   08/17/2023 115 (H) 10 - 44 U/L Final     Anion Gap   Date Value Ref Range Status   08/17/2023 4 (L) 8 - 16 mmol/L Final     eGFR   Date Value Ref Range Status   08/17/2023 50.2 (A) >60 mL/min/1.73 m^2 Final     Microbiology Results (last 7 days)       ** No results found for the last 168 hours. **          Significant Imaging:   CXR: Improvement of the airspace disease in the left lung base with persistent atelectasis in the right lower lobe    CT abdomen and pelvis with contrast:  1. Persistent left pleural effusion  and left lower lobe opacity suggesting atelectasis with resolution of prior right pleural effusion and right lower lobe opacities.  2. Unchanged moderate bilateral hydroureteronephrosis and mild to moderate bladder distention, with no obstructing etiology identified.  3. Postsurgical changes of the intestines as discussed above. Correlation with surgical history is requested. There are new scattered foci of extraluminal gas in left abdomen as well as hyperdense material in left abdomen, about colostomy site, and in presacral space of concern for leaking enteric contrast. Etiology of intestinal leak/perforation is unable to be determined on the basis of this exam.  4. Nonocclusive filling defect in left common femoral vein suggesting nonocclusive DVT.

## 2023-08-18 NOTE — TELEPHONE ENCOUNTER
----- Message from Alexander Bruno sent at 8/18/2023 10:57 AM CDT -----  Contact: self  Type: Needs Medical Advice  Who Called: Patient   Best Call Back Number: [phone]  Additional Information: Pt really needs a call back about PA. The call is regarding this pt. Medication called [employer] that needs a PA. Thanks

## 2023-08-18 NOTE — PT/OT/SLP PROGRESS
Occupational Therapy      Patient Name:  Tessa Enriquez   MRN:  [id number]    Patient not seen today secondary to Patient unwilling to participate. Will follow-up next service date.    8/18/2023

## 2023-08-18 NOTE — PROGRESS NOTES
08/18/23-Admitted to hospital on 07/24/2023 for surgery of recurrent rectal cancer. Remains in the hospital at this time. Will dis-enroll from OPCM. OPCM Case Closure.

## 2023-08-18 NOTE — TELEPHONE ENCOUNTER
If you give me the name, dosage, directions and amount prescribed, as well as briefly what it is for I can put it in cover my meds on line

## 2023-08-18 NOTE — PROGRESS NOTES
[facility]  Progress Note    Patient Name: Tessa Enriquez  MRN: [id number]  Patient Class: IP- Inpatient   Admission Date: 7/24/2023  Length of Stay: 25 days  Attending Physician: Parul Portillo MD  Primary Care Provider: Karma Mcdermott MD        Subjective:     Principal Problem:Ischemic necrosis of small bowel        HPI:  Ms. Enriquez is a 67-years-old female who presented as a direct admission from [facility] for ICU level of care postoperatively.  Patient with a history of recurrent colorectal adenocarcinoma, status post neoadjuvant radiation and chemotherapy, followed by oncologist Dr. Frank.  On 07/24 she underwent exploratory laparotomy with extensive lysis of adhesion, proctectomy with creation of end colostomy by Dr. Wagner and she was admitted to hospital medicine postoperatively.  Postop course complicated by ongoing sinus tachycardia with anemia, status post PRBC transfusion and acute kidney injury, seen by Nephrology.  Earlier today ostomy noted to be dusky with concern for ischemia, she was taken back to the OR and underwent exploratory laparotomy with small-bowel resection, as per OR note, large area of small bowel resected with four areas of ischemia.  She was intubated for surgery and remains intubated postoperatively.  Reported history of COPD, hypothyroidism, nephrolithiasis status post bilateral ureteral stenting.  She is currently sedated on propofol, orally intubated on mechanical ventilation.  Repeat labs have been submitted.  As per ICU RN plans for possible repeat surgery.  Plan of care reviewed with nursing.  No visitors at bedside.      Overview/Hospital Course:  Patient was transferred from [facility] for ICU care postoperatively after extensive surgery for ischemic bowel with large areas of small bowel resections done. She has resultant short gut syndrome. Also with ileostomy. She arrived intubated. She was followed by  pulmonology/CC, ID, nephrology and GS. Once extubated, she was transferred to [facility]-surg. She was on meropenem for peritonitis but only completed 5 of 7 days due to rash that developed. Allergies to PCN and flagyl. Respiratory culture grew MRSA considered to have pneumonia from this contributing to her sepsis. S/p merrem and vancomycin. Surgical site wound culture grew acinetobacter. Continued on course of eravacycline and diflucan. NGT utilized and removed 8/7. TPN utilized. Refractory electrolyte replacement given. Required intermittent packed red blood cell transfusions without active bleeding noted. 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 Wound care team followed as well. Started on octreotide in attempt to decrease fistula output. Palliative consulted and continued with full code and full treatment pursuit.  is working on eventual LTAC placement once cleared for this from surgery.      Interval History: Patient seen and examined. NAEON.  White blood cell count improving.  Currently afebrile.  LTAC placement pending.      Objective:     Vital Signs (Most Recent):  Temp: 97.6 °F (36.4 °C) (08/18/23 0712)  Pulse: 104 (08/18/23 0712)  Resp: 18 (08/18/23 0913)  BP: (!) 113/55 (notified nurse saira) (08/18/23 0712)  SpO2: 96 % (08/18/23 0712) Vital Signs (24h Range):  Temp:  [97.3 °F (36.3 °C)-99 °F (37.2 °C)] 97.6 °F (36.4 °C)  Pulse:  [] 104  Resp:  [16-18] 18  SpO2:  [93 %-98 %] 96 %  BP: (102-140)/(44-76) 113/55     Weight: 89.4 kg (197 lb)  Body mass index is 37.22 kg/m².    Intake/Output Summary (Last 24 hours) at 8/18/2023 0900  Last data filed at 8/18/2023 0749  Gross per 24 hour   Intake --   Output 2850 ml   Net -2850 ml           Physical Exam  Constitutional:       General: She is not in acute distress.     Appearance: She is obese. She is ill-appearing.   Cardiovascular:      Rate and Rhythm: Normal rate and regular  rhythm.   Pulmonary:      Effort: Pulmonary effort is normal. No respiratory distress.   Abdominal:      General: There is distension.      Tenderness: There is abdominal tenderness.      Comments: Beny drain and ostomy intact  Large abdominal dressing in place    Skin:     General: Skin is warm and dry.   Neurological:      General: No focal deficit present.      Mental Status: She is alert and oriented to person, place, and time.   Psychiatric:         Mood and Affect: Mood normal.         Behavior: Behavior normal.             Significant Labs:   Lab Results   Component Value Date    WBC 18.72 (H) 08/17/2023    HGB 7.2 (L) 08/17/2023    HCT 23.8 (L) 08/17/2023    MCV 92 08/17/2023     (L) 08/17/2023       CMP  Sodium   Date Value Ref Range Status   08/17/2023 140 136 - 145 mmol/L Final     Potassium   Date Value Ref Range Status   08/17/2023 3.8 3.5 - 5.1 mmol/L Final     Chloride   Date Value Ref Range Status   08/17/2023 105 95 - 110 mmol/L Final     CO2   Date Value Ref Range Status   08/17/2023 31 (H) 23 - 29 mmol/L Final     Glucose   Date Value Ref Range Status   08/17/2023 133 (H) 70 - 110 mg/dL Final     BUN   Date Value Ref Range Status   08/17/2023 40 (H) 8 - 23 mg/dL Final     Creatinine   Date Value Ref Range Status   08/17/2023 1.2 0.5 - 1.4 mg/dL Final     Calcium   Date Value Ref Range Status   08/17/2023 6.5 (LL) 8.7 - 10.5 mg/dL Final     Comment:     Results confirmed, test repeated  Calcium critical result(s) repeated. Called and verbal readback   obtained from Obdulia Holt 1 CHARITY Silverio.  by SLT1 08/17/2023 14:41       Total Protein   Date Value Ref Range Status   08/17/2023 4.2 (L) 6.0 - 8.4 g/dL Final     Albumin   Date Value Ref Range Status   08/17/2023 1.1 (L) 3.5 - 5.2 g/dL Final     Total Bilirubin   Date Value Ref Range Status   08/17/2023 1.2 (H) 0.1 - 1.0 mg/dL Final     Comment:     For infants and newborns, interpretation of results should be based  on gestational age, weight  and in agreement with clinical  observations.    Premature Infant recommended reference ranges:  Up to 24 hours.............<8.0 mg/dL  Up to 48 hours............<12.0 mg/dL  3-5 days..................<15.0 mg/dL  6-29 days.................<15.0 mg/dL       Alkaline Phosphatase   Date Value Ref Range Status   08/17/2023 242 (H) 55 - 135 U/L Final     AST   Date Value Ref Range Status   08/17/2023 94 (H) 10 - 40 U/L Final     ALT   Date Value Ref Range Status   08/17/2023 115 (H) 10 - 44 U/L Final     Anion Gap   Date Value Ref Range Status   08/17/2023 4 (L) 8 - 16 mmol/L Final     eGFR   Date Value Ref Range Status   08/17/2023 50.2 (A) >60 mL/min/1.73 m^2 Final     Microbiology Results (last 7 days)       ** No results found for the last 168 hours. **          Significant Imaging:   CXR: Improvement of the airspace disease in the left lung base with persistent atelectasis in the right lower lobe    CT abdomen and pelvis with contrast:  1. Persistent left pleural effusion and left lower lobe opacity suggesting atelectasis with resolution of prior right pleural effusion and right lower lobe opacities.  2. Unchanged moderate bilateral hydroureteronephrosis and mild to moderate bladder distention, with no obstructing etiology identified.  3. Postsurgical changes of the intestines as discussed above. Correlation with surgical history is requested. There are new scattered foci of extraluminal gas in left abdomen as well as hyperdense material in left abdomen, about colostomy site, and in presacral space of concern for leaking enteric contrast. Etiology of intestinal leak/perforation is unable to be determined on the basis of this exam.  4. Nonocclusive filling defect in left common femoral vein suggesting nonocclusive DVT.        Assessment/Plan:      * Ischemic necrosis of small bowel  OR note 7/27 with extensive area of small bowel necrosis s/p resection now  S/p NGT  - NPO per gen surg rec  - TPN  - monitor bowel  function  - palliative consult: continue care and full code  - gen surg following    Goals of care, counseling/discussion  - see palliative care consult    MRSA pneumonia  - see sepsis section  - ID following    Short gut syndrome  S/p extensive small bowel resection and ileostomy creation  - gen surg following  - working on starting gattex per gen surg    Enterocutaneous fistula  At surgical site  - gen surg following and allowing to mature  - continue octreotide. Waiting for G[employer] approval.  - wound care    Hypocalcemia  Stable  - trend daily & replace with prn IV scale based on ionized calcium    Sinus tachycardia  Stable, improved with decreased thyroid medicine  Multifactorial in the setting of acute medical conditions including dehydration, pain, postop, anemia, IV synthroid  - tele  - addressing above conditions    Hyponatremia  Stable  - continue TPN  - trend daily on chem panel    Malnutrition of moderate degree  Low albumin and pre-albumin  Currently NPO per gen surg rec  - TPN    Severe sepsis  Resolved   Extensive area of small bowel necrosis -s/p surgical repair   Resp Cx: + MRSA  Surg wound Cx: + Acinetobacter  Urine and blood culture negative   S/p merrem & vanc  - eravacycline and diflucan per ID.  Case discussed with Dr. Lacey.  Patient to undergo CT abdomen and pelvis with contrast.  Check chest x-ray and urine analysis.    BOOM (acute kidney injury)  Returned now possibly IVVD +/- vancomycin use  Improving  DC'd vanc per ID  - renally dose meds and avoid nephrotoxins  - trend renal function daily  - continue TPN    Bilateral kidney stones  Seen by Dr. Mijares this admission, followed outpatient by Dr. Kwon   Status post bilateral stent placement  - f/u outpatient    Severe obesity (BMI 35.0-39.9) with comorbidity  Body mass index is 37.22 kg/m². Morbid obesity complicates all aspects of disease management from diagnostic modalities to treatment. Weight loss encouraged and health benefits  explained to patient.     FENG (iron deficiency anemia)  Worsening postoperatively, now stable s/p 5u pRBC this stay  No active bleeding  - trend hgb on daily cbc and transfuse as needed.    Malignant neoplasm of colon  History of recurrent colorectal adenocarcinoma  See small-bowel ischemia  Did receive adjuvant chemoradiation, followed by Dr. Frank  - f/u outpatient    Hypothyroidism with abnormal TFTs  Recent TSH elevated at 23, as per chart review elevated 2 years previous, suspect compliance issue  Repeat TSH borderline low, normal fT4  - Given current NPO status with bowel surgery continue with IV levothyroxine  - decreased IV levothyroxine from 75mcg to 50mcg daily 8/13 given tachycardia and quickly changing TFTs  - f/u fT3    COPD (chronic obstructive pulmonary disease)  No evidence of acute exacerbation   - P.r.n. ABG and chest x-ray  - Scheduled nebs    Discussed with  regarding discharge planning.  General surgery and ID specialist closely following.  VTE Risk Mitigation (From admission, onward)         Ordered     enoxaparin injection 40 mg  Every 24 hours         07/28/23 1849     Place sequential compression device  Until discontinued         07/28/23 0724     IP VTE HIGH RISK PATIENT  Once         07/24/23 2015                Discharge Planning   DERICK:  TBD    Code Status: Full Code   Is the patient medically ready for discharge?:     Reason for patient still in hospital (select all that apply): Patient trending condition and Consult recommendations  Discharge Plan A: Long-term acute care facility (LTAC)   Discharge Delays: None known at this time              Parul Portillo MD  Department of Hospital Medicine   formerly [facility]

## 2023-08-18 NOTE — PT/OT/SLP PROGRESS
Physical Therapy      Patient Name:  Tessa Enriquez   MRN:  [id number]    Patient not seen today secondary to Patient unwilling to participate. Requested to defer OOB activity until next day when son present. Will follow-up 8/21/23, or  8/19/23 when son present if schedule permits.

## 2023-08-18 NOTE — NURSING
1614 Pt complains of nausea, Dr notified, administered Zofran early to follow MD orders, Pt tolerated well, safety checks performed.

## 2023-08-18 NOTE — PROGRESS NOTES
[facility]  Adult Nutrition   Progress Note (Nutrition Support Management)    SUMMARY     Recommendations  Recommendation/Intervention:   1. RD ordered new TPN to hang today at 2000. Run at 75ml/hr.   2. 250 ml of 20% Smoflipid to infuse at 2000, run for 12 hours.   3. RD to continue to monitor and manage nutrition support.  Goals: 1. Nutrition provision to meet at least 75% EEN/EPN.  Nutrition Goal Status: progressing towards goal, goal met  Communication of RD Recs: reviewed with RN    Dietitian Rounds Brief  Pt remains NPO with TPN for nutrition support.     PARENTERAL NUTRITION PROGRESS NOTE:      Parenteral Nutrition Day # 16  Diet/Tube Feedin         Admixture Type: : 2-in-1, central   Infusion Rate and Frequency: 75       PN Composition:   115 grams Amino Acid, 275 grams Dextrose, and 50 grams Lipid.    Today's TPN provides 1895 kcal.    Please see order for electrolytes and additives content and adjustments.   *The Nutrition Support Team will continue to monitor electrolytes daily and adjust parenteral nutrition as warranted.     Diet order:   Current Diet Order: 1      Current Nutrition Support Formula Ordered:  (Custom TPN)  Current Nutrition Support Rate Ordered: 75 (ml)  Current Nutrition Support Frequency Ordered: ml/hr    Evaluation of Received Nutrient/Fluid Intake  Parenteral Calories (kcal): 1395  Parenteral Protein (gm): 115  Parenteral Fluid (mL): 1800  Lipid Calories (kcals): 500 kcals  GIR (Glucose Infusion Rate) (mg/kg/min): 1.1 mg/kg/min  Total Calories (kcal): 1741  Energy Calories Required: meeting needs  Protein Required: meeting needs  Fluid Required: meeting needs  Comments: TPN to continue. New TPN ordered.  Tolerance: tolerating         Intake/Output Summary (Last 24 hours) at 2023 1243  Last data filed at 2023 1215  Gross per 24 hour   Intake --   Output 2650 ml   Net -2650 ml        Anthropometrics  Temp: 98.2 °F (36.8 °C)  Height Method: Stated  Height:  "5' 1" (154.9 cm)  Height (inches): 61 in  Weight Method: Standard Scale  Weight: 89.4 kg (197 lb)  Weight (lb): 197 lb  Ideal Body Weight (IBW), Female: 105 lb  % Ideal Body Weight, Female (lb): 187.62 %  BMI (Calculated): 37.2  BMI Grade: 35 - 39.9 - obesity - grade II       Estimated/Assessed Needs  Weight Used For Calorie Calculations: 89.4 kg (197 lb 1.5 oz)  Energy Calorie Requirements (kcal): 5871-3314 kcal (20-25 kcal/ kg bw)  Energy Need Method: Kcal/kg  Protein Requirements: 107-133 (1.2-1.35g/ kg bm)  Weight Used For Protein Calculations: 89 kg (196 lb 3.4 oz)     Estimated Fluid Requirement Method: RDA Method  RDA Method (mL): 1788       Reason for Assessment  Reason For Assessment: RD follow-up  Relevant Medical History: COPD, hypothyroidism, rectosigmoid cancer, s/p small bowel resection  Interdisciplinary Rounds: did not attend  General Information Comments: Pt remains NPO with TPN for nutrition support.    Nutrition/Diet History  Spiritual, Cultural Beliefs, Mandaen Practices, Values that Affect Care: no  Food Allergies: NKFA  Factors Affecting Nutritional Intake: NPO, altered gastrointestinal function    Nutrition Risk Screen  Nutrition Risk Screen: large or nonhealing wound, burn or pressure injury     MST Score: 0  Have you recently lost weight without trying?: No  Weight loss score: 0  Have you been eating poorly because of a decreased appetite?: No  Appetite score: 0       Weight History:  Wt Readings from Last 10 Encounters:   07/24/23 89.4 kg (197 lb)   07/03/23 89.3 kg (196 lb 13.9 oz)   06/27/23 89.7 kg (197 lb 12 oz)   06/20/23 88.5 kg (195 lb 1.7 oz)   06/14/23 88.5 kg (195 lb)   06/12/23 88.5 kg (195 lb)   06/06/23 87.4 kg (192 lb 10.9 oz)   05/31/23 81.6 kg (180 lb)   05/26/23 85.7 kg (189 lb)   05/24/23 86 kg (189 lb 9.5 oz)        Lab/Procedures/Meds: Pertinent Labs/Meds Reviewed    Medications:Pertinent Medications Reviewed  Scheduled Meds:   chlorhexidine  15 mL Mouth/Throat BID    " diphenoxylate-atropine 2.5-0.025 mg  1 tablet Oral QID    enoxparin  40 mg Subcutaneous Q24H (prophylaxis, 1700)    fluconazole (DIFLUCAN) IV (PEDS and ADULTS)  200 mg Intravenous Q24H    levothyroxine  50 mcg Intravenous Before breakfast    lipid (SMOFLIPID)  250 mL Intravenous Daily    octreotide  100 mcg Subcutaneous Q8H    pantoprazole  40 mg Intravenous Daily     Continuous Infusions:   TPN ADULT CENTRAL LINE CUSTOM 75 mL/hr at 08/17/23 2139    TPN ADULT CENTRAL LINE CUSTOM       PRN Meds:.0.9%  NaCl infusion (for blood administration), 0.9%  NaCl infusion (for blood administration), acetaminophen, calcium gluconate IVPB, calcium gluconate IVPB, calcium gluconate IVPB, clonazePAM, dextrose 50%, dextrose 50%, diphenhydrAMINE, glucagon (human recombinant), glucose, glucose, hydrALAZINE, hydrALAZINE, HYDROmorphone, HYDROmorphone, HYDROmorphone, magnesium sulfate IVPB, magnesium sulfate IVPB, ondansetron, potassium chloride **AND** potassium chloride **AND** potassium chloride, Flushing PICC Protocol **AND** [DISCONTINUED] sodium chloride 0.9% **AND** sodium chloride 0.9%, sodium phosphate 15 mmol in dextrose 5 % (D5W) 250 mL IVPB, sodium phosphate 20.01 mmol in dextrose 5 % (D5W) 250 mL IVPB, sodium phosphate 30 mmol in dextrose 5 % (D5W) 250 mL IVPB, traZODone    Labs: Pertinent Labs Reviewed  Clinical Chemistry:  Recent Labs   Lab 08/16/23  0425 08/17/23  1344 08/18/23  0938    140 143   K 3.8 3.8 4.1    105 108   CO2 28 31* 30*    133* 120*   BUN 41* 40* 38*   CREATININE 1.2 1.2 1.1   CALCIUM 7.0* 6.5* 6.8*   PROT 4.3* 4.2* 3.9*   ALBUMIN 1.3* 1.1* 1.1*   BILITOT 1.3* 1.2* 1.3*   ALKPHOS 241* 242* 311*   AST 83* 94* 97*   ALT 96* 115* 114*   ANIONGAP 7* 4* 5*   MG 2.0 1.9 1.9   PHOS 3.6 3.5 3.4     CBC:   Recent Labs   Lab 08/18/23  0938   WBC 24.27*   RBC 2.82*   HGB 8.0*   HCT 25.3*   *   MCV 90   MCH 28.4   MCHC 31.6*     Lipid Panel:  Recent Labs   Lab 08/17/23  1344   TRIG 102  "    Cardiac Profile:  No results for input(s): "BNP", "CPK", "CPKMB", "TROPONINI", "CKTOTAL" in the last 168 hours.  Inflammatory Labs:  No results for input(s): "CRP" in the last 168 hours.  Diabetes:  No results for input(s): "HGBA1C", "POCTGLUCOSE" in the last 168 hours.  Thyroid & Parathyroid:  Recent Labs   Lab 08/12/23  1308   TSH 0.650   FREET4 1.18       Monitor and Evaluation  Food and Nutrient Intake: parenteral nutrition intake  Food and Nutrient Adminstration: enteral and parenteral nutrition administration  Physical Activity and Function: nutrition-related ADLs and IADLs, factors affecting access to physical activity  Anthropometric Measurements: height/length, weight, weight change  Biochemical Data, Medical Tests and Procedures: electrolyte and renal panel, gastrointestinal profile, glucose/endocrine profile  Nutrition-Focused Physical Findings: overall appearance     Nutrition Risk  Level of Risk/Frequency of Follow-up: high     Nutrition Follow-Up  RD Follow-up?: Yes      Nancy Johnson RD 08/18/2023 12:43 PM      "

## 2023-08-19 NOTE — DISCHARGE SUMMARY
[facility]  Discharge Summary      Patient Name: Tessa Enriquez  MRN: [id number]  TAE: [id number]  Patient Class: IP- Inpatient  Admission Date: 7/24/2023  Hospital Length of Stay: 26 days  Discharge Date and Time: 8/19/2023 11:10 AM  Attending Physician: Kevin Bryant MD   Discharging Provider: Kevin Bryant MD  Primary Care Provider: Karma Mcdermott MD    Primary Care Team: Networked reference to record PCT     HPI:   Ms. Enriquez is a 67-years-old female who presented as a direct admission from [facility] for ICU level of care postoperatively.  Patient with a history of recurrent colorectal adenocarcinoma, status post neoadjuvant radiation and chemotherapy, followed by oncologist Dr. Frank.  On 07/24 she underwent exploratory laparotomy with extensive lysis of adhesion, proctectomy with creation of end colostomy by Dr. Wagner and she was admitted to hospital medicine postoperatively.  Postop course complicated by ongoing sinus tachycardia with anemia, status post PRBC transfusion and acute kidney injury, seen by Nephrology.  Earlier today ostomy noted to be dusky with concern for ischemia, she was taken back to the OR and underwent exploratory laparotomy with small-bowel resection, as per OR note, large area of small bowel resected with four areas of ischemia.  She was intubated for surgery and remains intubated postoperatively.  Reported history of COPD, hypothyroidism, nephrolithiasis status post bilateral ureteral stenting.  She is currently sedated on propofol, orally intubated on mechanical ventilation.  Repeat labs have been submitted.  As per ICU RN plans for possible repeat surgery.  Plan of care reviewed with nursing.  No visitors at bedside.      Procedure(s) (LRB):  LAPAROTOMY, EXPLORATORY (N/A)  EXCISION, SMALL INTESTINE  CREATION, ILEOSTOMY      Hospital Course:   Patient was transferred from [facility] for ICU care postoperatively after  extensive surgery for ischemic bowel with large areas of small bowel resections done. She has resultant short gut syndrome. Also with ileostomy. She arrived intubated. She was followed by pulmonology/CC, ID, nephrology and GS. Once extubated, she was transferred to [facility]-surg. She was on meropenem for peritonitis but only completed 5 of 7 days due to rash that developed. Allergies to PCN and flagyl. Respiratory culture grew MRSA considered to have pneumonia from this contributing to her sepsis. S/p merrem and vancomycin. Surgical site wound culture grew acinetobacter. Continued on course of eravacycline and diflucan. NGT utilized and removed 8/7. TPN utilized. Refractory electrolyte replacement given. Required intermittent packed red blood cell transfusions without active bleeding noted. 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 Wound care team followed as well. Started on octreotide in attempt to decrease fistula output. Palliative consulted and continued with full code and full treatment pursuit.  is working on eventual LTAC placement once cleared for this from surgery.  Patient is continuing to have significant output from abdominal wounds and cutaneous fistula.  Dr. Wagner awaiting approval for G[employer].  Once patient is cleared by Dr. Wagner, patient to go to long-term acute care facility for further management and care. Unfortunately patient had sudden severe abdominal bleeding and had code blue called with eventual expiration on 8/19/23 at 0634. I did not pronounce the patient.       Goals of Care Treatment Preferences:  Code Status: Full Code          What is most important right now is to focus on spending time at home, avoiding the hospital, remaining as independent as possible, symptom/pain control, curative/life-prolongation (regardless of treatment burdens).  Accordingly, we have decided that the best plan to meet the patient's  goals includes continuing with treatment.      Consults:   Consults (From admission, onward)        Status Ordering Provider     Inpatient consult to Palliative Care  Once        Provider:  Mike Bauman MD    Completed JADON BERNAL     Inpatient consult to Infectious Diseases  Once        Provider:  Clair Mcdonald MD    Completed CHANCE DUTTON     Inpatient consult to PICC team ([facility])  Once        Provider:  (Not yet assigned)    Completed CHANCE DUTTON     Inpatient consult to Registered Dietitian/Nutritionist  Once        Provider:  (Not yet assigned)    Completed BIJU LAMA     Inpatient consult to Pulmonology  Once        Provider:  Tika Nathan MD    Completed YAIMA RIZO     Inpatient consult to Registered Dietitian/Nutritionist  Once        Provider:  (Not yet assigned)    Completed SHENA RAYGOZA     Inpatient consult to Nephrology  Once        Provider:  Lion Barreto MD    Completed SHENA RAYGOZA     Inpatient consult to Midline team  Once        Provider:  (Not yet assigned)    Completed AGUEDA GARCIA          No new Assessment & Plan notes have been filed under this hospital service since the last note was generated.  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] 07/27/2023 Yes    Goals of care, counseling/discussion [Z71.89] 08/07/2023 Not Applicable    MRSA pneumonia [J15.212] 08/06/2023 Yes    Enterocutaneous fistula [K63.2] 08/05/2023 Yes    Short gut syndrome [K91.2] 08/05/2023 No    Hypocalcemia [E83.51] 07/28/2023 Yes    Malnutrition of moderate degree [E44.0] 07/27/2023 Yes     Chronic    Hyponatremia [E87.1] 07/27/2023 Yes    Sinus tachycardia [R00.0] 07/27/2023 Yes    BOOM (acute kidney injury) [N17.9] 07/26/2023 No    Severe sepsis [A41.9, R65.20] 07/26/2023 No    Bilateral kidney stones [N20.0] 03/27/2023 Yes    Severe  "obesity (BMI 35.0-39.9) with comorbidity [E66.01] 2022 Yes    FENG (iron deficiency anemia) [D50.9] 2022 Yes    Malignant neoplasm of colon [C18.9] 2021 Yes    Hypothyroidism with abnormal TFTs [E03.9] 10/15/2020 Yes     Chronic    COPD (chronic obstructive pulmonary disease) [J44.9] 2018 Yes      Problems Resolved During this Admission:    Diagnosis Date Noted Date Resolved POA    On mechanically assisted ventilation [Z99.11] 2023 Not Applicable       Discharged Condition:     Disposition:     Follow Up:   Follow-up Information     Brandon Wagner MD. Go on 2023.    Specialties: General Surgery, Colon and Rectal Surgery, Surgery  Why: at 1:30pm  Contact information:  [address]  [address]  [place] [address]  [phone]                       Patient Instructions:      OSTOMY SUPPLIES FOR HOME USE     Order Specific Question Answer Comments   Height: 5' 1" (1.549 m)    Weight: 89.4 kg (197 lb)    Length of need (1-99 months): 99    Diagnosis Colostomy    1 piece system:Qty - Month 20     Reference Number Coloplast # [id number]    Skin prep? Yes Brava #    Odor eliminator? Yes Brava #    Adhesive remover? Yes Brava #    Barrier strips? Yes Brava #   Does patient have medical equipment at home? none      NM Renogram With Lasix   Standing Status: Future Standing Exp. Date: 24     Order Specific Question Answer Comments   May the Radiologist modify the order per protocol to meet the clinical needs of the patient? Yes        Significant Diagnostic Studies:     Please see Epic EMR for all studies on this  patient.          Time spent on the discharge of patient: 32 minutes         Kevin Bryant MD  Department of Hospital Medicine  [facility]  "

## 2023-08-19 NOTE — PROGRESS NOTES
"Progress Note  Infectious Disease    Reason for Consult:  Drainage from incision site; allergy to meropenem/penicillin/flagyl    HPI: Tessa Enriquez is a 67 y.o. female , obese, BMI 37.2Kg/m2, with unfortunate past medical history of recurrent colorectal adenocarcinoma, and b/l kidney stones status post robotic colectomy in November 2020, 1 out of 46 nodules was positive.  She could not tolerate chemo and was only able to complete 3 treatments and radiation therapy.  In August of 2022 she was found to have recurrence, status post 2nd session of chemotherapy.  On repeat surveillance this year in May she had evidence of adenocarcinoma at the anastomosis site.  PET-CT on June 14th demonstrated decreased size and activity of the mass.    She was admitted on 07/24 for ex-lap, s/p 8mg dexamethasone pre-op. As per operation note: "There is evidence of catastrophic events within the abdominal cavity.  There was diffuse he ischemia noted throughout multiple portions of the small bowel.  There were multiple sharp areas of demarcation between viable bowel and ischemic bowel.  Having removed the other obvious he clean necrotic areas there was evidence of dusky and bogginess at the site of her previous jejunal jejunostomy.  Decision was made against any reestablishment of continuity.  Remaining small bowel approximately 70-75 cm left.", Surgery required assistance from Urology for b/l ureteral catheter placement.     Patient was transferred to [facility] for ICU care.  Postop course complicated by tachycardia and hypotension.  Status post blood transfusion.     During hospital stay, respiratory culture grew MRSA 7/27. The patient developed a rash, clarified with nursing, unclear if vancomycin related although infusion rate was adjusted and rash resolved.  Seems like a second rash developed yesterday after 6 days of meropenem.      Patient seen and examined at bedside, she reports she is feeling fine, NG tube " in place, she is requesting something to eat.  Stable BP, tachycardic, PureWick in place, hazy urine noted.  Ostomy with liquid output. Surgical wound with bilious drainage form mid incision. On TPN. Repeat CT scan concerning for EC fistula.     ID consult for drainage from incision site, concern for peritonitis: Allergy to meropenem, penicillin, Flagyl.    Antibiotics:  Cipro 7/24  Cipro 7/26/27  Meropenem 7/27-8/1  Fluconazole 7/28--present  Vancomycin 7/29--present     8/5/23(Vijaya) case d/w Dr. Lacey. She is alert, complains of hunger. Reports(confirmed bynursing) of copious bilious drainage from incision. Noted previously by surgery. NGT remains in place. She is drinking water for dry mouth. Sat up in bed, but reluctant to get out of bed due to pain and increased drainage. Volume loss reflected in multiple electrolyte abnormalities.  She has no fever. Multiple family members at bedside.   8/6: interim reviewed. While urine output is still ok, she has BOOM now. She may be having more ileostomy volume loss than the TPN can replace. She is drinking for pleasure and intake po is not recorded, thus making NGT output appear greater than actual. Vancomycin was stopped (8d given). She sat up in the chair for 30 min this am. She is willing to spend more time in chair. She is going to receive a unit of blood today.    8/7: Interim reviewed, discussed with Dr Lilly, patient seen and examined at bedside, RN changing dressing from anterior abdominal wound, copious amounts of biliary fluid draining, sent for cultures. She reports she feels 'fine', still tolerating ice chips and water. Will discuss with Hospitalist to have P&P see her.    8/8: Interim reviewed, patient seen and examined at bedside, seen with wound care RN and RN, impressive amount of bile draining from abdominal wound, 2 dehisced areas, colostomy bag draining enteric fluid, skin around ostomy irritated/dermatitis. NG tube removed yesterday. Labs reviewed,  pre-albumin 5, very low. P&P recs noted. Micro reviewed, cultures from surgical wound dehiscence, skin micheal no predominant organisms, pending final.     8/9: Interim reviewed, patient seen and examined at bedside, she wants to eat, feels hungry. Stable BP, afebrile. Micro updated today, GNR-non lactose , from middle wound dehiscence. Will discuss with wound care possible wound vac to help with leakage.    8/10:  Interim reviewed, patient seen examined at bedside, she continues to ask for food, remains NPO, on TPN.  Micro reviewed, cultures from surgical incision grew Acinetobacter baumannii, intermediate to Unasyn, sensitive to everything else including tetracycline.  Patient currently on Arava cyclin.       08/12/2023 Dr Louis, covering for the weekend : Tired, no new complaints, afebrile, nurse in room . There was a lot of drainage last night from lower abdominal wound and nurses had to change the dressing a few times    08/13/2023 Dr Louis, covering for the weekend :  Afebrile.  Slight increase in WBC noted, 10--10--14..  In good spirits.  No new complaints.  She can produces a lot of drainage from the lower abdominal wound.  Nurse has just changed the wound.    8/14 (Lacey): interim reviewed, discussed with Dr Louis, patient seen and examined at bedside, she is tremulous on exam, she reports every time they change and turn her, significant amounts of drainage comes out through her ostomies.  Labs reviewed, leukocytosis 15.8, left shift 77% cannot exclude underlying prior ischemic/necrotic bowel. She has a very short amount of small bowel left.     8/15: Patient seen and examined at bedside, she reports she feels okay today. Discussed with Surgeon, repeat CT scan w/ PO and IV contrast ordered.     8/17:  Interim reviewed, patient seen examined at bedside, she reports she feels good today, asking for water.  Discussed with wound care RN, seems like drainage from fistula slightly decreased still copious.   Discussed with hospitalist and surgeon, trying to get Guttrex, to improve nutrition.  Labs reviewed, leukocytosis 18.7, H&H 7.2/23.8, MCV 92, platelet count 134, thrombocytopenia.  Creatinine 1.2, stable, albumin 1.1, low pre-albumin 5.  Transaminitis AST 94/.    8/18:  Interim reviewed, patient seen examined at bedside, she feels good today, has some nausea.  Reports drainage from fistula has decreased.  Discussed with hospitalist and surgeon, awaiting approval of medication for her go to LTAC here.  Unfortunately, unable to have any further surgical intervention for enterocutaneous fistula.    Antibiotics (From admission, onward)      None          Antifungals (From admission, onward)      Start     Stop Route Frequency Ordered    08/06/23 2100  fluconazole (DIFLUCAN) IVPB 200 mg         -- IV Every 24 hours (non-standard times) 08/06/23 1115            EXAM & DIAGNOSTICS REVIEWED:   Vitals:     Temp:  [97.5 °F (36.4 °C)-99 °F (37.2 °C)]   Temp: 97.8 °F (36.6 °C) (08/18/23 1629)  Pulse: 97 (08/18/23 1629)  Resp: 18 (08/18/23 1828)  BP: 109/62 (08/18/23 1629)  SpO2: 96 % (08/18/23 1629)    Intake/Output Summary (Last 24 hours) at 8/18/2023 1912  Last data filed at 8/18/2023 1853  Gross per 24 hour   Intake --   Output 2165 ml   Net -2165 ml       General:  Chronically-ill appearing, obese, In NAD. Alert and attentive, cooperative, on O2 by NC 2L, tremulous  Eyes:  Anicteric, EOMI  ENT:  Moist oral mucosa, edentulous   Neck:  Supple  Lungs: Clear to auscultation b/l  Heart:  S1/S2+, regular rhythm, no murmurs  Abd:  Dressing in place not disturbed   08/13/2023-- similar to pics, there is less drainage today compared to yesterday.  Nurse has just changed the wound.  08/12/2023-- similar to pics,   8/9: Dressing in place, not disturbed. LLQ liquid green bile   8/8: Distended, LLQ colostomy with dermatitis around ostomy, leakage of enteric fluid noted. Surgical incision 2 dehisced areas (middle and inferior)  "draining yellow bile  8/6: Distended, JUANITA drain with bloody drainage, ostomy with large volume bilious liquid output, decreased bowel sounds, very large and fresh bandage in place, d/w nursing. Ostomy appears pink and viable d/w nursing, that she has fistulous drainage from distal (inferior) incision  :  Purewick,  Musc:  Joints without effusion, swelling,  erythema, synovitis,   Skin:  Warm, inguinal rash improved     Wound:   Neuro:  Following commands, speech is clear, moves all extremities   Psych:  Anxious, cooperative  Lymphatic:     Extrem: No LE edema, no evidence of rash  VAD:  L PICC line 8/2/23    R Port-A-Cath not accessed, no redness noted      Isolation: Contact/MRSA  08/11/23 8/8:          8/7:        8/2:    8/5 8/1:      Right leg rash   Left leg rash    General Labs reviewed:  Recent Labs   Lab 08/16/23  0425 08/17/23  1147 08/18/23  0938   WBC 24.56* 18.72* 24.27*   HGB 8.8* 7.2* 8.0*   HCT 28.1* 23.8* 25.3*    134* 116*       Recent Labs   Lab 08/16/23  0425 08/17/23  1344 08/18/23  0938    140 143   K 3.8 3.8 4.1    105 108   CO2 28 31* 30*   BUN 41* 40* 38*   CREATININE 1.2 1.2 1.1   CALCIUM 7.0* 6.5* 6.8*   PROT 4.3* 4.2* 3.9*   BILITOT 1.3* 1.2* 1.3*   ALKPHOS 241* 242* 311*   ALT 96* 115* 114*   AST 83* 94* 97*     No results for input(s): "CRP" in the last 168 hours.  No results for input(s): "SEDRATE" in the last 168 hours.    Estimated Creatinine Clearance: 51.8 mL/min (based on SCr of 1.1 mg/dL).     Micro:   Specimen: Incision site from Abdomen Updated: 08/10/23 1154    Aerobic Bacterial Culture ACINETOBACTER BAUMANNII/HAEMOLYTICUS   Rare   Skin micheal also present    Abnormal    Susceptibility     ACINETOBACTER BAUMANNII/HAEMOLYTICUS     CULTURE, AEROBIC  (SPECIFY SOURCE)     Amp/Sulbactam 16/8 mcg/mL Intermediate     Cefepime <=2 mcg/mL Sensitive     Ceftriaxone 2 mcg/mL Sensitive     Ciprofloxacin <=1 mcg/mL Sensitive     Gentamicin <=4 " mcg/mL Sensitive     Levofloxacin <=2 mcg/mL Sensitive     Meropenem <=1 mcg/mL Sensitive     Tetracycline <=4 mcg/mL Sensitive     Tobramycin <=4 mcg/mL Sensitive     Trimeth/Sulfa <=2/38 mcg/mL Sensitive             Specimen: Respiratory from Tracheal Aspirate Updated: 07/30/23 0928    Respiratory Culture METHICILLIN RESISTANT STAPHYLOCOCCUS AUREUS   Moderate   Results called to and read back by Alessandra Colón RN-2B[facility];     07/30/2023  09:27 CJD    Abnormal     Gram Stain (Respiratory) <10 epithelial cells per low power field.    Gram Stain (Respiratory) Few WBC's    Gram Stain (Respiratory) Moderate Gram positive cocci   Susceptibility     Methicillin resistant Staphylococcus aureus     CULTURE, RESPIRATORY     Ceftriaxone >32 mcg/mL Resistant     Clindamycin <=0.5 mcg/mL Resistant     Erythromycin >4 mcg/mL Resistant     Oxacillin >2 mcg/mL Resistant     Penicillin >8 mcg/mL Resistant     Tetracycline >8 mcg/mL Resistant     Trimeth/Sulfa <=0.5/9.5 m... Sensitive     Vancomycin 1 mcg/mL Sensitive                        Imaging Reviewed:  CXRs  CT abdomen/pelvis 8/15/23:  1. Persistent left pleural effusion and left lower lobe opacity suggesting atelectasis with resolution of prior right pleural effusion and right lower lobe opacities.   2. Unchanged moderate bilateral hydroureteronephrosis and mild to moderate bladder distention, with no obstructing etiology identified.   3. Postsurgical changes of the intestines as discussed above. Correlation with surgical history is requested. There are new scattered foci of extraluminal gas in left abdomen as well as hyperdense material in left abdomen, about colostomy site, and in presacral space of concern for leaking enteric contrast. Etiology of intestinal leak/perforation is unable to be determined on the basis of this exam.   4. Nonocclusive filling defect in left common femoral vein suggesting nonocclusive DVT.     CT abdomen/pelvis 8/2:  1. Colonic resection with  left lower abdominal quadrant colostomy, punctate foci of intra-abdominal free air and a small volume of ascites, likely related to recent postoperative state.  2. Mildly prominent gas and fluid-filled loops of small bowel containing contrast suggestive of ileus and less likely partial low-grade mechanical bowel obstruction. Consider radiographic follow-up.  3. Airspace opacity in the left greater than right posterior dependent lower lobe with air bronchograms suggestive of atelectasis and aspiration/pneumonia, with a small left and trace right pleural effusion.  4. Moderate diffuse body wall edema (anasarca).  5. Full bladder, with mild bilateral hydroureteronephrosis and no radiopaque stones seen, consider correlation with urine output and urinalysis as warranted (noting there is a punctate focus of gas in the bladder, and a gas forming infection is not excluded).    Cardiology: EKG Qtc: 486ms    PATHOLOGY:   7/24:  1. DESIGNATED AS LYMPH NODES:   - BENIGN CYST PARTIALLY LINED BY MULLERIAN TYPE EPITHELIUM, POSSIBLE  REMNANTS OF PARATUBAL CYST.   - LYMPH NODE TISSUE WAS NOT IDENTIFIED GROSSLY OR MICROSCOPICALLY.   - NEGATIVE FOR MALIGNANCY.     2. DISTAL RECTUM, RESECTION:   - FOCUS OF MODERATELY DIFFERENTIATED ADENOCARCINOMA INVOLVING INFLAMED  AND ULCERATED RECTAL MUCOSA.   - PROXIMAL RESECTION MARGIN IS POSITIVE FOR ADENOCARCINOMA  - EXTENSIVE TRANSMURAL CHRONIC AND ACUTE INFLAMMATION WITH MUCOSAL  ULCERATION.   - TWO PERIRECTAL LYMPH NODES NEGATIVE FOR MALIGNANCY.     3. SEGMENT OF SMALL INTESTINE:   - SEROSAL ADHESIONS.   - RECENT TRANSMURAL DEFECT WITHOUT ASSOCIATE INFLAMMATION OR HEMORRHAGE.   - NEGATIVE FOR MALIGNANCY.     4. PROXIMAL COLON, RESECTION:   - INVASIVE MODERATELY DIFFERENTIATED ADENOCARCINOMA IN THE BACKGROUND OF RECTAL ULCERATION AND MARKED CHRONIC AND ACUTE INFLAMMATION.   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 Etiology of intestinal leak/perforation is unable to be determined on the basis of this exam.    Persistent leukocytosis 24.2, bands 8%    MRSA  pneumonia, improved s/p 8 days of Vanc    B/l kidney stones, s/p b/l stents 7/24    H/o recurrent colorectal adenocarcinoma s/p chemotherapy     BOOM.  Short gut volume losses contributing, cr 1.2    Malnutrition, albumin 1.1, pre-albumin 5 - on TPN    Transaminitis, AST 97/    Recommendations:  Continue Eravacycline 90mg IV q12h to cover GNR and anaerobes   Fluconazole 200mg IV daily as prophylaxis  Above for at least 2 more weeks, exact duration of therapy will depend on clinical improvement   Antibiotics are unlikely to repair the EC fistula   Wound care following for EC fistula/leakage  Monitor LFTs  CM working on LTAC    Unfortunately, very poor prognosis     D/w patient, nursing, wound care RN, Dr Wagner, Dr Portillo    Will follow peripherally     Medical Decision Making during this encounter was  [_] Low Complexity  [_] Moderate Complexity  [xx] High Complexity

## 2023-08-19 NOTE — NURSING
Observed pt at 558am when giving synthroid. Pt was AAOx4 speaking clearly and vibrantly. I left the room to gather supplies to draw labs. While gathering labs the aid notified me pt was asking for pain medicine. I went to med room and pulled pain med and passed by the RN station at which time the charge nurse stated the pt heart rate dropped to around 30 bpm and slowly came up to 80 bpm over about 15 sec. I grabbed the supplies and proceeded to the room at about 611. As I entered the room the patients color was white. And there was a large puddle of blood on the floor and I pulled the sheet back where I saw blood filling the ostomy and pouring from the midline incision. Pt had no pulse. code blue was called and compressions started at 0612. Code cart and supplies were brought in the room , pt attached to the monitor. Code team arrived moments later. Drug pushes and pulse checks as follows   Compressions started at 0612.   0614 pulse check  0615 epi  0618epi  0619 calcium  0619 PC asystole  0620 bicarb 1 amp  0622 epi  0624 v fib 120 j shock  0625 epi  0626 bicarb   0627 asystole  0628 epi  0629 asystole  0631 epi  0633 bicarb  0634 asystole. MD called TOD  Pt abdomen observed to be hard and distended, dorian amounts of blood has drained from ostomy and midline wound.   Family notified of pt passing.

## 2023-08-22 NOTE — PLAN OF CARE
23 1239   Final Note   Assessment Type Final Discharge Note   Anticipated Discharge Disposition

## 2024-03-07 NOTE — ASSESSMENT & PLAN NOTE
Body mass index is 37.22 kg/m². Morbid obesity complicates all aspects of disease management from diagnostic modalities to treatment. Weight loss encouraged and health benefits explained to patient.    Detail Level: Zone Additional Note: Benign Hide Include Location In Plan Question?: No Include Location In Plan?: Yes
